# Patient Record
Sex: FEMALE | Race: BLACK OR AFRICAN AMERICAN | NOT HISPANIC OR LATINO | Employment: OTHER | URBAN - METROPOLITAN AREA
[De-identification: names, ages, dates, MRNs, and addresses within clinical notes are randomized per-mention and may not be internally consistent; named-entity substitution may affect disease eponyms.]

---

## 2017-01-24 ENCOUNTER — OFFICE VISIT (OUTPATIENT)
Dept: INTERNAL MEDICINE | Facility: CLINIC | Age: 47
End: 2017-01-24
Payer: MEDICARE

## 2017-01-24 ENCOUNTER — OFFICE VISIT (OUTPATIENT)
Dept: OBSTETRICS AND GYNECOLOGY | Facility: CLINIC | Age: 47
End: 2017-01-24
Attending: OBSTETRICS & GYNECOLOGY
Payer: MEDICARE

## 2017-01-24 ENCOUNTER — TELEPHONE (OUTPATIENT)
Dept: OBSTETRICS AND GYNECOLOGY | Facility: CLINIC | Age: 47
End: 2017-01-24

## 2017-01-24 VITALS
HEIGHT: 67 IN | BODY MASS INDEX: 41.91 KG/M2 | DIASTOLIC BLOOD PRESSURE: 84 MMHG | SYSTOLIC BLOOD PRESSURE: 122 MMHG | WEIGHT: 267 LBS

## 2017-01-24 VITALS
HEIGHT: 67 IN | BODY MASS INDEX: 43.7 KG/M2 | SYSTOLIC BLOOD PRESSURE: 116 MMHG | HEART RATE: 92 BPM | WEIGHT: 278.44 LBS | DIASTOLIC BLOOD PRESSURE: 88 MMHG

## 2017-01-24 DIAGNOSIS — M79.7 FIBROMYALGIA: Primary | ICD-10-CM

## 2017-01-24 DIAGNOSIS — F31.9 BIPOLAR 1 DISORDER: ICD-10-CM

## 2017-01-24 DIAGNOSIS — R39.89 SUSPECTED UTI: Primary | ICD-10-CM

## 2017-01-24 DIAGNOSIS — Z90.710 HISTORY OF HYSTERECTOMY: ICD-10-CM

## 2017-01-24 DIAGNOSIS — G89.4 CHRONIC PAIN SYNDROME: ICD-10-CM

## 2017-01-24 DIAGNOSIS — F41.9 ANXIETY: ICD-10-CM

## 2017-01-24 DIAGNOSIS — N39.0 URINARY TRACT INFECTION WITHOUT HEMATURIA, SITE UNSPECIFIED: Primary | ICD-10-CM

## 2017-01-24 DIAGNOSIS — R10.2 PELVIC PAIN: ICD-10-CM

## 2017-01-24 DIAGNOSIS — M35.00 SJOGREN'S SYNDROME: ICD-10-CM

## 2017-01-24 DIAGNOSIS — I10 ESSENTIAL HYPERTENSION: Chronic | ICD-10-CM

## 2017-01-24 LAB
BACTERIA #/AREA URNS AUTO: ABNORMAL /HPF
BILIRUB UR QL STRIP: NEGATIVE
CANDIDA RRNA VAG QL PROBE: POSITIVE
CLARITY UR REFRACT.AUTO: ABNORMAL
COLOR UR AUTO: YELLOW
G VAGINALIS RRNA GENITAL QL PROBE: NEGATIVE
GLUCOSE UR QL STRIP: NEGATIVE
HGB UR QL STRIP: NEGATIVE
HYALINE CASTS UR QL AUTO: 1 /LPF
KETONES UR QL STRIP: NEGATIVE
LEUKOCYTE ESTERASE UR QL STRIP: ABNORMAL
MICROSCOPIC COMMENT: ABNORMAL
NITRITE UR QL STRIP: POSITIVE
PH UR STRIP: 6 [PH] (ref 5–8)
PROT UR QL STRIP: ABNORMAL
RBC #/AREA URNS AUTO: 1 /HPF (ref 0–4)
SP GR UR STRIP: 1.01 (ref 1–1.03)
SQUAMOUS #/AREA URNS AUTO: 8 /HPF
T VAGINALIS RRNA GENITAL QL PROBE: NEGATIVE
URN SPEC COLLECT METH UR: ABNORMAL
UROBILINOGEN UR STRIP-ACNC: NEGATIVE EU/DL
WBC #/AREA URNS AUTO: 16 /HPF (ref 0–5)

## 2017-01-24 PROCEDURE — 87186 SC STD MICRODIL/AGAR DIL: CPT

## 2017-01-24 PROCEDURE — 87077 CULTURE AEROBIC IDENTIFY: CPT

## 2017-01-24 PROCEDURE — 99214 OFFICE O/P EST MOD 30 MIN: CPT | Mod: S$PBB,,, | Performed by: INTERNAL MEDICINE

## 2017-01-24 PROCEDURE — 99999 PR PBB SHADOW E&M-EST. PATIENT-LVL III: CPT | Mod: PBBFAC,,, | Performed by: INTERNAL MEDICINE

## 2017-01-24 PROCEDURE — 99213 OFFICE O/P EST LOW 20 MIN: CPT | Mod: PBBFAC,27 | Performed by: NURSE PRACTITIONER

## 2017-01-24 PROCEDURE — 87088 URINE BACTERIA CULTURE: CPT

## 2017-01-24 PROCEDURE — 87086 URINE CULTURE/COLONY COUNT: CPT

## 2017-01-24 PROCEDURE — 81001 URINALYSIS AUTO W/SCOPE: CPT

## 2017-01-24 PROCEDURE — 99999 PR PBB SHADOW E&M-EST. PATIENT-LVL III: CPT | Mod: PBBFAC,,, | Performed by: NURSE PRACTITIONER

## 2017-01-24 PROCEDURE — 87480 CANDIDA DNA DIR PROBE: CPT

## 2017-01-24 PROCEDURE — 99213 OFFICE O/P EST LOW 20 MIN: CPT | Mod: S$PBB,,, | Performed by: NURSE PRACTITIONER

## 2017-01-24 PROCEDURE — 87591 N.GONORRHOEAE DNA AMP PROB: CPT

## 2017-01-24 RX ORDER — HYDROCODONE BITARTRATE AND ACETAMINOPHEN 5; 325 MG/1; MG/1
1 TABLET ORAL 2 TIMES DAILY PRN
Qty: 60 TABLET | Refills: 0 | Status: SHIPPED | OUTPATIENT
Start: 2017-03-25 | End: 2017-04-18 | Stop reason: SDUPTHER

## 2017-01-24 RX ORDER — HYDROCODONE BITARTRATE AND ACETAMINOPHEN 5; 325 MG/1; MG/1
1 TABLET ORAL 2 TIMES DAILY PRN
Qty: 60 TABLET | Refills: 0 | Status: SHIPPED | OUTPATIENT
Start: 2017-01-24 | End: 2017-01-24 | Stop reason: SDUPTHER

## 2017-01-24 RX ORDER — NAPROXEN 500 MG/1
TABLET ORAL
Refills: 2 | COMMUNITY
Start: 2016-12-27 | End: 2017-03-17 | Stop reason: SDUPTHER

## 2017-01-24 RX ORDER — HYDROCODONE BITARTRATE AND ACETAMINOPHEN 5; 325 MG/1; MG/1
1 TABLET ORAL 2 TIMES DAILY PRN
Qty: 60 TABLET | Refills: 0 | Status: SHIPPED | OUTPATIENT
Start: 2017-02-23 | End: 2017-01-24 | Stop reason: SDUPTHER

## 2017-01-24 RX ORDER — CIPROFLOXACIN 500 MG/1
500 TABLET ORAL 2 TIMES DAILY
Qty: 14 TABLET | Refills: 0 | Status: SHIPPED | OUTPATIENT
Start: 2017-01-24 | End: 2017-01-31

## 2017-01-24 RX ORDER — DESONIDE 0.5 MG/G
CREAM TOPICAL
Qty: 30 G | Refills: 3 | Status: SHIPPED | OUTPATIENT
Start: 2017-01-24 | End: 2017-04-18 | Stop reason: SDUPTHER

## 2017-01-24 NOTE — TELEPHONE ENCOUNTER
Spoke to patient and informed patient of results. Informed patient medication was sent to the pharmacy.

## 2017-01-24 NOTE — TELEPHONE ENCOUNTER
----- Message from Crystal Fong NP sent at 1/24/2017  3:18 PM CST -----  Regarding: test results  Please notify pt that her urinalysis results indicate a UTI. I sent in Cipro-take 1 pill twice a day x 7 days. The urine culture is still processing and may cause us to change the antibiotic based on sensitivity.     Thanks  Crystal

## 2017-01-24 NOTE — PROGRESS NOTES
"  CC: Suspected UTI    HPI: Pt is a 46 y.o.  female who presents for a suspected UTI. Pt was dx with a UTI about a month ago by her PCP. Treated with macrobid. C/o urinary frequency and a "soreness" for several days now. Pt wants to make sure the UTI is gone.  Hx of hysterectomy in 2012 for fibroids. Stills has ovaries. Denies fever, chills, hematuria, odor, urgency and dysuria. No other complaints today.     ROS:  GENERAL: Feeling well overall. Denies fever or chills.   SKIN: Denies rash or lesions.   HEAD: Denies head injury or headache.   NODES: Denies enlarged lymph nodes.   CHEST: Denies chest pain or shortness of breath.   CARDIOVASCULAR: Denies palpitations or left sided chest pain.   ABDOMEN: No abdominal pain, constipation, diarrhea, nausea, vomiting or rectal bleeding. + soreness in pelvic region on right side.   URINARY: No dysuria, hematuria, or burning on urination.  REPRODUCTIVE: See HPI.   BREASTS: Denies pain, lumps, or nipple discharge.   HEMATOLOGIC: No easy bruisability or excessive bleeding.   MUSCULOSKELETAL: Denies joint pain or swelling.   NEUROLOGIC: Denies syncope or weakness.   PSYCHIATRIC: Denies depression, anxiety or mood swings.    PE:   APPEARANCE: Well nourished, well developed, Black or  female in no acute distress.  VULVA: No lesions. Normal external female genitalia.  URETHRAL MEATUS: Normal size and location, no lesions, no prolapse.  URETHRA: No masses, tenderness, or prolapse.  VAGINA: Moist. No lesions or lacerations noted. No abnormal discharge present. No odor present.   CERVIX: surgically absent  UTERUS:  Surgically absent  ADNEXA: No tenderness. No fullness or masses palpated in the adnexal regions.   ANUS PERINEUM: Normal.      Diagnosis:  1. Suspected UTI    2. Pelvic pain    3. History of hysterectomy        Plan:     Orders Placed This Encounter    Urine culture    Vaginosis Screen by DNA Probe    C. trachomatis/N. gonorrhoeae by AMP DNA Cervix    " "Urinalysis     Urine dip + nitrates  UA C&S pending  Discussed UTI prevention and OTC Azo   Affirm and GCCT pending  Discussed pelvic ultrasound if "soreness" continues despite UTI tx and/or negative results today. Pt agreed.     Follow-up pending results and/or pelvic pain does not improve        "

## 2017-01-24 NOTE — PROGRESS NOTES
Subjective:       Patient ID: Jaki Bajwa is a 46 y.o. female.    Chief Complaint: Hypertension    HPI Comments: Pt here for f/u. She has a dx of sjogrens and fibromyalgia based on blood work and chronic pain that is primarily across shoulders and in legs but also in upper and lower back. As a result she is on several meds that she says helps. She sees rheum at \[facility]\"" and has regular f/u. She has prior dx of lupus but this was not corroborated in notes from rheum. She is on plaquenil, tizanidine, gabapentin and norco. She uses norco 1-2x/day. She is due for refills on pain meds. She has previously signed a pain contract. [place]  reviewed and is consistent. She is seeing pain mgmt. She has also done PT with some success in the past.     She also has dx of sjogren's syndrome. She was on pilocarpine but stopped b/c it made her nauseous. Uses eye drops for dry eyes.    Pt's BP is well controlled. Tolerating meds well. Pt denies cp/sob/ha/vision or neuro changes. Not checking at home. Prior labs reviewed with pt.     She continues to see psychiatry for bipolar and anxiety. This is well controlled. No SI/HI.       Hypertension   Pertinent negatives include no chest pain, headaches or shortness of breath.   Medication Refill   Associated symptoms include arthralgias and myalgias. Pertinent negatives include no abdominal pain, chest pain, headaches, numbness or weakness.   Fibromyalgia   Associated symptoms include arthralgias and myalgias. Pertinent negatives include no abdominal pain, chest pain, headaches, numbness or weakness.   Back Pain   Pertinent negatives include no abdominal pain, chest pain, headaches, numbness or weakness.     Review of Systems   Constitutional: Negative for unexpected weight change.   Eyes: Negative for visual disturbance.   Respiratory: Negative for shortness of breath.    Cardiovascular: Negative for chest pain.   Gastrointestinal: Negative for abdominal pain.   Endocrine: Negative for  polyuria.   Genitourinary: Negative for frequency.   Musculoskeletal: Positive for arthralgias, back pain and myalgias.   Neurological: Negative for weakness, numbness and headaches.   Psychiatric/Behavioral: Negative for dysphoric mood.       Objective:          Assessment:       1. Fibromyalgia    2. Sjogren's syndrome    3. Essential hypertension    4. Chronic pain syndrome    5. Bipolar 1 disorder    6. Anxiety        Plan:       1. Refill norco--3 separate 30 day rxs given for 90 days total--proper use d/w pt  2. Keep f/u with specialists  3. Refill requested meds  4. F/u 3 mos          Physical Exam   Constitutional: She is oriented to person, place, and time. She appears well-developed and well-nourished.   Neck: Neck supple. No thyromegaly present.   Cardiovascular: Normal rate, regular rhythm and normal heart sounds.    Pulmonary/Chest: Effort normal and breath sounds normal.   Musculoskeletal: She exhibits no edema.        Lumbar back: She exhibits normal range of motion, no tenderness and no bony tenderness.   Lymphadenopathy:     She has no cervical adenopathy.   Neurological: She is alert and oriented to person, place, and time. No cranial nerve deficit.   Psychiatric: She has a normal mood and affect. Her behavior is normal.

## 2017-01-24 NOTE — MR AVS SNAPSHOT
[facility] [address]  [address]  [address]  [facility] [address]  Phone: [phone]  Fax: [phone]                  Jaki Bajwa   2017 8:40 AM   Office Visit    Description:  Female : 1970   Provider:  Crystal Fong NP   Department:  [facility] [address]           Reason for Visit     Pelvic Pain     Urinary Tract Infection                To Do List           Future Appointments        Provider Department Dept Phone    2017 8:40 AM Crystal Fong NP [facility] [address] [phone]    2017 7:00 AM Pratik Rasmussen MD [facility] Internal Medicine [phone]      Goals (5 Years of Data)     None      [facility] On [facility]     [facility] On Call Nurse Care Line -  Assistance  Registered nurses in the [facility] On Call Center provide clinical advisement, health education, appointment booking, and other advisory services.  Call for this free service at [phone].             Medications           Message regarding Medications     Verify the changes and/or additions to your medication regime listed below are the same as discussed with your clinician today.  If any of these changes or additions are incorrect, please notify your healthcare provider.             Verify that the below list of medications is an accurate representation of the medications you are currently taking.  If none reported, the list may be blank. If incorrect, please contact your healthcare provider. Carry this list with you in case of emergency.           Current Medications     alprazolam (XANAX) 1 MG tablet Take 1 tablet (1 mg total) by mouth 2 (two) times daily as needed for Anxiety.    buPROPion (WELLBUTRIN XL) 150 MG TB24 tablet Take 3 tablets (450 mg total) by mouth every morning.    desonide (DESOWEN) 0.05 % cream MILTON EXT AA BID PRN    doxepin (SINEQUAN) 150 MG Cap Take 1 capsule (150 mg total) by mouth every evening.    estradiol (CLIMARA) 0.0375 mg/24 hr APPLY 1  "PATCH EXTERNALLY TO THE SKIN EVERY 7 DAYS    gabapentin (NEURONTIN) 300 MG capsule Take 600 mg by mouth 2 (two) times daily.     hydrocodone-acetaminophen 5-325mg (NORCO) 5-325 mg per tablet Starting on Mar 25, 2017. Take 1 tablet by mouth 2 (two) times daily as needed for Pain.    hydroxychloroquine (PLAQUENIL) 200 mg tablet Take 200 mg by mouth Twice daily.    losartan-hydrochlorothiazide 100-12.5 mg (HYZAAR) 100-12.5 mg Tab Take 1 tablet by mouth once daily.    lurasidone (LATUDA) 60 mg Tab tablet Take 1 tablet (60 mg total) by mouth once daily.    naproxen (EC NAPROSYN) 500 MG EC tablet Take 1 tablet (500 mg total) by mouth 2 (two) times daily as needed.    RESTASIS 0.05 % ophthalmic emulsion PLACE ONE DROP INTO BOTH EYES BID    tizanidine (ZANAFLEX) 4 MG tablet Take 1 tablet (4 mg total) by mouth every 8 (eight) hours as needed (muscle pain).    naproxen (NAPROSYN) 500 MG tablet TK ONE T PO BID FOR PAIN. TAKE WITH FOOD           Clinical Reference Information           Vital Signs - Last Recorded  Most recent update: 1/24/2017  8:21 AM by Shirley Myles LPN    BP Ht Wt LMP BMI    122/84 5' 7" (1.702 m) 121.1 kg (266 lb 15.6 oz) 07/11/2012 41.81 kg/m2      Blood Pressure          Most Recent Value    BP  122/84      Allergies as of 1/24/2017     Aspirin      Immunizations Administered on Date of Encounter - 1/24/2017     None      "

## 2017-01-25 ENCOUNTER — TELEPHONE (OUTPATIENT)
Dept: OBSTETRICS AND GYNECOLOGY | Facility: CLINIC | Age: 47
End: 2017-01-25

## 2017-01-25 DIAGNOSIS — B37.31 YEAST VAGINITIS: Primary | ICD-10-CM

## 2017-01-25 LAB
C TRACH DNA SPEC QL NAA+PROBE: NEGATIVE
N GONORRHOEA DNA SPEC QL NAA+PROBE: NEGATIVE

## 2017-01-25 RX ORDER — LOSARTAN POTASSIUM AND HYDROCHLOROTHIAZIDE 12.5; 1 MG/1; MG/1
1 TABLET ORAL DAILY
Qty: 90 TABLET | Refills: 3 | Status: SHIPPED | OUTPATIENT
Start: 2017-01-25 | End: 2018-01-12 | Stop reason: SDUPTHER

## 2017-01-25 RX ORDER — FLUCONAZOLE 150 MG/1
150 TABLET ORAL ONCE
Qty: 1 TABLET | Refills: 0 | Status: SHIPPED | OUTPATIENT
Start: 2017-01-25 | End: 2017-01-25

## 2017-01-25 NOTE — TELEPHONE ENCOUNTER
Called pt to inform her that her vaginosis culture came back positive for yeast and that an Rx has been sent to her pharmacy.  Pt verbalized understanding.

## 2017-01-25 NOTE — TELEPHONE ENCOUNTER
----- Message from Crystal Fong NP sent at 1/25/2017  8:52 AM CST -----  Regarding: rx to pharmacy  + yeast infection. Diflucan sent to pharmacy.

## 2017-01-26 LAB — BACTERIA UR CULT: NORMAL

## 2017-02-14 ENCOUNTER — OFFICE VISIT (OUTPATIENT)
Dept: PSYCHIATRY | Facility: CLINIC | Age: 47
End: 2017-02-14
Payer: MEDICARE

## 2017-02-14 VITALS
SYSTOLIC BLOOD PRESSURE: 158 MMHG | HEART RATE: 86 BPM | BODY MASS INDEX: 44.42 KG/M2 | HEIGHT: 67 IN | WEIGHT: 283 LBS | DIASTOLIC BLOOD PRESSURE: 90 MMHG

## 2017-02-14 DIAGNOSIS — F40.10 SOCIAL PHOBIA: ICD-10-CM

## 2017-02-14 DIAGNOSIS — F41.1 GAD (GENERALIZED ANXIETY DISORDER): ICD-10-CM

## 2017-02-14 DIAGNOSIS — F33.1 MAJOR DEPRESSIVE DISORDER, RECURRENT, MODERATE: Primary | ICD-10-CM

## 2017-02-14 DIAGNOSIS — F42.9 OBSESSIVE-COMPULSIVE DISORDER, UNSPECIFIED TYPE: ICD-10-CM

## 2017-02-14 PROCEDURE — 90834 PSYTX W PT 45 MINUTES: CPT | Mod: PBBFAC | Performed by: SOCIAL WORKER

## 2017-02-14 PROCEDURE — 90833 PSYTX W PT W E/M 30 MIN: CPT | Mod: S$PBB,,, | Performed by: NURSE PRACTITIONER

## 2017-02-14 PROCEDURE — 99213 OFFICE O/P EST LOW 20 MIN: CPT | Mod: S$PBB,,, | Performed by: NURSE PRACTITIONER

## 2017-02-14 PROCEDURE — 99999 PR PBB SHADOW E&M-EST. PATIENT-LVL III: CPT | Mod: PBBFAC,,, | Performed by: NURSE PRACTITIONER

## 2017-02-14 PROCEDURE — 90834 PSYTX W PT 45 MINUTES: CPT | Mod: S$PBB,,, | Performed by: SOCIAL WORKER

## 2017-02-14 RX ORDER — DOXEPIN HYDROCHLORIDE 150 MG/1
150 CAPSULE ORAL NIGHTLY
Qty: 180 CAPSULE | Refills: 3 | Status: SHIPPED | OUTPATIENT
Start: 2017-02-14 | End: 2018-01-10 | Stop reason: SDUPTHER

## 2017-02-14 RX ORDER — BUPROPION HYDROCHLORIDE 150 MG/1
450 TABLET ORAL EVERY MORNING
Qty: 270 TABLET | Refills: 3 | Status: SHIPPED | OUTPATIENT
Start: 2017-02-14 | End: 2018-01-10 | Stop reason: SDUPTHER

## 2017-02-14 NOTE — PROGRESS NOTES
"Individual Psychotherapy (PhD/LCSW)    2/14/2017    Site:  [facility]         Therapeutic Intervention: Met with patient.  Outpatient - Insight oriented psychotherapy 45 min - CPT code 37671 and Outpatient - Behavior modifying psychotherapy 45 min - CPT code 70876    Chief complaint/reason for encounter: depression, anxiety and interpersonal     Interval history and content of current session:        Describes OCD behavior like having to write things down and tear it apart until it looks perfect.  She continues to be unable not to shop.     Has a stack of blue jeans  Feet high.   She is still with boyfriend.  Does not see the yolanda from [place]. But is "talking to another yolanda".   No suicidal ideation.   Arguments in relation as boyfriend wants her to cook.     Treatment plan:  · Target symptoms: depression, anxiety   · Why chosen therapy is appropriate versus another modality: relevant to diagnosis  · Outcome monitoring methods: self-report, observation    · Therapeutic intervention type: insight oriented psychotherapy, behavior modifying psychotherapy, supportive psychotherapy    Risk parameters:  Patient reports no suicidal ideation  Patient reports no homicidal ideation  Patient reports no self-injurious behavior  Patient reports no violent behavior    Verbal deficits: None    Patient's response to intervention:  The patient's response to intervention is accepting.    Progress toward goals and other mental status changes:  The patient's progress toward goals is limited.    Diagnosis: 296.35;   Obsessive compulsive disorder.  Cognitive limitations.      Plan:  individual psychotherapy    Return to clinic: 2 weeks pt prefers to come in 3 months.                                 "

## 2017-02-14 NOTE — MR AVS SNAPSHOT
[facility] - Psychiatry  [address]  [facility] [address]  Phone: [phone]  Fax: [phone]                  Jaki Bajwa   2017 8:00 AM   Office Visit    Description:  Female : 1970   Provider:  Ayush Claire NP   Department:  [facility] - Psychiatry           Reason for Visit     Mood           Diagnoses this Visit        Comments    Major depressive disorder, recurrent, moderate    -  Primary     JUAN JOSE (generalized anxiety disorder)         Social phobia                To Do List           Future Appointments        Provider Department Dept Phone    2017 7:00 AM Pratik Rasmussen MD [facility] Internal Medicine [phone]      Goals (5 Years of Data)     None      Follow-Up and Disposition     Return in about 3 months (around 2017).       These Medications        Disp Refills Start End    buPROPion (WELLBUTRIN XL) 150 MG TB24 tablet 270 tablet 3 2017     Take 3 tablets (450 mg total) by mouth every morning. - Oral    Pharmacy: [facility] [address] [place] [address] [address] AT [facility] [place] [place] Ph #: [phone]       doxepin (SINEQUAN) 150 MG Cap 180 capsule 3 2017     Take 1 capsule (150 mg total) by mouth every evening. - Oral    Pharmacy: [facility] [place] [address] AT [facility] [place] Ph #: [phone]         [facility] On [facility]     [facility] On Call Nurse Care Line -  Assistance  Registered nurses in the [facility] On Call Center provide clinical advisement, health education, appointment booking, and other advisory services.  Call for this free service at [phone].             Medications           Message regarding Medications     Verify the changes and/or additions to your medication regime listed below are the same as discussed with your clinician today.  If any of these changes or additions are incorrect, please notify your healthcare  "provider.             Verify that the below list of medications is an accurate representation of the medications you are currently taking.  If none reported, the list may be blank. If incorrect, please contact your healthcare provider. Carry this list with you in case of emergency.           Current Medications     alprazolam (XANAX) 1 MG tablet Take 1 tablet (1 mg total) by mouth 2 (two) times daily as needed for Anxiety.    buPROPion (WELLBUTRIN XL) 150 MG TB24 tablet Take 3 tablets (450 mg total) by mouth every morning.    desonide (DESOWEN) 0.05 % cream MILTON EXT AA BID PRN    doxepin (SINEQUAN) 150 MG Cap Take 1 capsule (150 mg total) by mouth every evening.    estradiol (CLIMARA) 0.0375 mg/24 hr APPLY 1 PATCH EXTERNALLY TO THE SKIN EVERY 7 DAYS    gabapentin (NEURONTIN) 300 MG capsule Take 600 mg by mouth 2 (two) times daily.     hydrocodone-acetaminophen 5-325mg (NORCO) 5-325 mg per tablet Starting on Mar 25, 2017. Take 1 tablet by mouth 2 (two) times daily as needed for Pain.    hydroxychloroquine (PLAQUENIL) 200 mg tablet Take 200 mg by mouth Twice daily.    losartan-hydrochlorothiazide 100-12.5 mg (HYZAAR) 100-12.5 mg Tab Take 1 tablet by mouth once daily.    lurasidone (LATUDA) 60 mg Tab tablet Take 1 tablet (60 mg total) by mouth once daily.    naproxen (EC NAPROSYN) 500 MG EC tablet Take 1 tablet (500 mg total) by mouth 2 (two) times daily as needed.    naproxen (NAPROSYN) 500 MG tablet TK ONE T PO BID FOR PAIN. TAKE WITH FOOD    RESTASIS 0.05 % ophthalmic emulsion PLACE ONE DROP INTO BOTH EYES BID    tizanidine (ZANAFLEX) 4 MG tablet Take 1 tablet (4 mg total) by mouth every 8 (eight) hours as needed (muscle pain).           Clinical Reference Information           Your Vitals Were     BP Pulse Height Weight Last Period BMI    158/90 86 5' 7" (1.702 m) 128.4 kg (283 lb) 07/11/2012 44.32 kg/m2      Blood Pressure          Most Recent Value    BP  (!)  158/90      Allergies as of 2/14/2017     Aspirin    "   Immunizations Administered on Date of Encounter - 2/14/2017     None      Instructions    [facility] - DEPARTMENT OF PSYCHIATRY   PATIENT INFORMATION    We appreciate the opportunity to participate in your medical care and hope the following protocols will make it easier for you to receive quality treatment in our department.    1. PUNCTUALITY: Your appointment is scheduled for a fixed amount of time.  To get the benefit of your appointment, please arrive early enough to allow time for traffic, parking and registration.  If you are late for your appointment, you may have to reschedule.  Please make every effort to be on time.      2. PAYMENT FOR SERVICES:   Payments are expected at the time of service.  Please contact [phone] if you need to resolve issues involving your account at [facility] or to set up a payment plan.    3. CANCELLATION / MISSED APPOINTMENTS:   In order to receive quality care, all appointments must be kept.  Appointment may be cancelled at least 24 hours before your appointment time. If you do not give at least 24-hour notice of cancellation a fee may be billed directly to the patient.  Please note that insurance does not cover no-show charges, so you will be billed directly.  If you are consistently late, cancel, or do not show for your appointments, our department reserves the right to terminate treatment     MESSAGES- In general you can reach the department by calling [phone], between 8:00 a.m. and 5:00 p.m., Monday through Friday.  You can also use the [facility] Patient Portal.     AFTER HOURS, WEEKEND OR HOLIDAYS- For urgent questions after hours, weekends and holidays, calling the department number [phone] will connect you with a representative.  EMERGENCY-  In case of a crisis when there is a concern of harm to self or others, call 911 or the office [phone] between 8:00 a.m. and 5:00 p.m., Monday through Friday or go to the [facility] Emergency  Room.    4. PRESCRIPTION REFILLS:  Prescription refills must be done at your physician office visit, You will be given a sufficient number of refills to last until your next appointment, you must come to appointments for prescriptions.   No additional refills will be approved beyond the original treatment plan. Again, please note that no additional prescriptions will be approved per patient request.     5. FOLLOW UP APPOINTMENTS:  Follow-up appointments can be made in person at the Psychiatry Appointment Desk, online through the [facility] Patient Portal, or by calling [phone], from 8am to 5pm, between Monday and Friday.  Patients are responsible for scheduling their own follow-up appointment,  It  Is recommended you schedule your appointment before leaving the clinic.    6. Providers are NOT ABLE to schedule appointments; all appointments must be made through the appointment department or through [facility] Patient Portal.           PATIENTS MAY EXPERIENCE SYMPTOMS OF WITHDRAWAL IF THEY RUN OUT OF MEDICATIONS.  THE PATIENT WILL ASSUME ALL RESPONSIBILITIES OF ANY OUTCOMES WHEN THERE IS AN ISSUE WITH NONCOMPLIANCE WITH FOLLOW-UP APPOINTMENTS AND MEDICATIONS.        THERE IS TO BE NO USE OF ALCOHOL AND/OR ILLEGAL SUBSTANCES    PATIENT ARE TO GO TO THE CLOSEST EMERGENCY ROOM IF FEELING A THREAT TO THEMSELVES OR OTHER OR IF GRAVELY DISABLED    TELL US HOW WE ARE DOING.  PLEASE COMPLETE THE PATIENT SATISFACTION SURVERY  Revised December 2016         Language Assistance Services     ATTENTION: Language assistance services are available, free of charge. Please call [phone].      ATENCIÓN: Si habla español, tiene a quiroz disposición servicios gratuitos de asistencia lingüística. Llame al [phone].     JASMIN Ý: N?u b?n nói Ti?ng Vi?t, có các d?ch v? h? tr? ngôn ng? mi?n phí dành cho b?n. G?i s? [phone].         [facility] complies with applicable Federal civil rights laws and does not  discriminate on the basis of race, color, national origin, age, disability, or sex.

## 2017-02-14 NOTE — PATIENT INSTRUCTIONS
[facility] - DEPARTMENT OF PSYCHIATRY   PATIENT INFORMATION    We appreciate the opportunity to participate in your medical care and hope the following protocols will make it easier for you to receive quality treatment in our department.    1. PUNCTUALITY: Your appointment is scheduled for a fixed amount of time.  To get the benefit of your appointment, please arrive early enough to allow time for traffic, parking and registration.  If you are late for your appointment, you may have to reschedule.  Please make every effort to be on time.      2. PAYMENT FOR SERVICES:   Payments are expected at the time of service.  Please contact [phone] if you need to resolve issues involving your account at [facility] or to set up a payment plan.    3. CANCELLATION / MISSED APPOINTMENTS:   In order to receive quality care, all appointments must be kept.  Appointment may be cancelled at least 24 hours before your appointment time. If you do not give at least 24-hour notice of cancellation a fee may be billed directly to the patient.  Please note that insurance does not cover no-show charges, so you will be billed directly.  If you are consistently late, cancel, or do not show for your appointments, our department reserves the right to terminate treatment     MESSAGES- In general you can reach the department by calling [phone], between 8:00 a.m. and 5:00 p.m., Monday through Friday.  You can also use the [facility] Patient Portal.     AFTER HOURS, WEEKEND OR HOLIDAYS- For urgent questions after hours, weekends and holidays, calling the department number [phone] will connect you with a representative.  EMERGENCY-  In case of a crisis when there is a concern of harm to self or others, call 911 or the office [phone] between 8:00 a.m. and 5:00 p.m., Monday through Friday or go to the [facility] Emergency Room.    4. PRESCRIPTION REFILLS:  Prescription refills must be done at your physician office visit, You  will be given a sufficient number of refills to last until your next appointment, you must come to appointments for prescriptions.   No additional refills will be approved beyond the original treatment plan. Again, please note that no additional prescriptions will be approved per patient request.     5. FOLLOW UP APPOINTMENTS:  Follow-up appointments can be made in person at the Psychiatry Appointment Desk, online through the [facility] Patient Portal, or by calling [phone], from 8am to 5pm, between Monday and Friday.  Patients are responsible for scheduling their own follow-up appointment,  It  Is recommended you schedule your appointment before leaving the clinic.    6. Providers are NOT ABLE to schedule appointments; all appointments must be made through the appointment department or through [facility].           PATIENTS MAY EXPERIENCE SYMPTOMS OF WITHDRAWAL IF THEY RUN OUT OF MEDICATIONS.  THE PATIENT WILL ASSUME ALL RESPONSIBILITIES OF ANY OUTCOMES WHEN THERE IS AN ISSUE WITH NONCOMPLIANCE WITH FOLLOW-UP APPOINTMENTS AND MEDICATIONS.        THERE IS TO BE NO USE OF ALCOHOL AND/OR ILLEGAL SUBSTANCES    PATIENT ARE TO GO TO THE CLOSEST EMERGENCY ROOM IF FEELING A THREAT TO THEMSELVES OR OTHER OR IF GRAVELY DISABLED    TELL US HOW WE ARE DOING.  PLEASE COMPLETE THE PATIENT SATISFACTION SURVERY  Revised December 2016

## 2017-02-14 NOTE — PROGRESS NOTES
"Outpatient Psychiatry Follow-Up Visit (MD/NP)    2/14/2017    Clinical Status of Patient:  Outpatient (Ambulatory)    Chief Complaint:  Jaki Bajwa is a 46 y.o. female who presents today for follow-up of depression and anxiety.  Met with patient.      Interval History and Content of Current Session:  Interim Events/Subjective Report/Content of Current Session:     Chart reviewed,  reviewed    Latuda 60mg po q d  Wellbutrin XL 450mg po q d  Doxepin 150mg po q hs  Xanax 1mg po bid prn    Still in difficult relationship, becomes very emotional when BF appears distance.  Mood improved with increase in Latuda but remains dysthymic.  When stressed patient will write and write random lists, which is a stress relief. Thinks others "look at me different when I tell them I get a check". Discussed the need for limits when meeting new people and not over disclosing.  Admits she talks too much and cuts others off during a conversation, "I got a big mouth", states her children don't discuss situations with her because she tells others.  "I just want to be liked".  Patient sits at home watching TV.        Psychotherapy:  · Target symptoms: depression, anxiety   · Why chosen therapy is appropriate versus another modality: relevant to diagnosis  · Outcome monitoring methods: self-report  · Therapeutic intervention type: insight oriented psychotherapy  · Topics discussed/themes: relationships difficulties, symptom recognition  · The patient's response to the intervention is accepting. The patient's progress toward treatment goals is poor.   · Duration of intervention: 16 minutes.    Review of Systems   GENERAL: + weight gain  SKIN: No rashes   HEAD: No headaches   EYES: No exophthalmos, jaundice or blindness   EARS: No dizziness, tinnitus or hearing loss   NOSE: No changes in smell   MOUTH & THROAT: No dyskinetic movements or obvious goiter   CHEST: No shortness of breath, hyperventilation or cough   CARDIOVASCULAR: No " "tachycardia or chest pain   ABDOMEN: No nausea, vomiting, pain, constipation or diarrhea   URINARY: No frequency, dysuria or sexual dysfunction   ENDOCRINE: No polydipsia, polyuria   MUSCULOSKELETAL: No pain or stiffness of the joints   NEUROLOGIC: No weakness, sensory changes, seizures, confusion, memory loss, tremor or other abnormal movements      Psychiatric Review Of Systems:  sleep: yes  appetite changes: yes  weight changes: yes  energy/anergy: yes  interest/pleasure/anhedonia: yes  somatic symptoms: no  libido: no  anxiety/panic: yes      Past Medical, Family and Social History: The patient's past medical, family and social history have been reviewed and updated as appropriate within the electronic medical record - see encounter notes.    Compliance: yes    Side effects: None    Risk Parameters:  Patient reports no suicidal ideation  Patient reports no homicidal ideation  Patient reports no self-injurious behavior  Patient reports no violent behavior    Exam (detailed: at least 9 elements; comprehensive: all 15 elements)   Constitutional  Vitals:  Most recent vital signs, dated less than 90 days prior to this appointment, were reviewed.   Vitals:    02/14/17 0744   BP: (!) 158/90   Pulse: 86   Weight: 128.4 kg (283 lb)   Height: 5' 7" (1.702 m)        General:  unremarkable, age appropriate, casually dressed, neatly groomed     Musculoskeletal  Muscle Strength/Tone:  no dyskinesia, no dystonia, no tremor, no tic   Gait & Station:  non-ataxic     Psychiatric  Speech:  no latency; no press   Mood & Affect:  dysthymic  blunted   Thought Process:  normal and logical   Associations:  intact   Thought Content:  normal, no suicidality, no homicidality, delusions, or paranoia   Insight:  has awareness of illness   Judgement: behavior is adequate to circumstances   Orientation:  grossly intact   Memory: intact for content of interview   Language: grossly intact   Attention Span & Concentration:  able to focus   Fund " of Knowledge:  intact and appropriate to age and level of education     Assessment and Diagnosis   Status/Progress: Based on the examination today, the patient's problem(s) is/are inadequately controlled.  New problems have not been presented today.   Co-morbidities and Lack of compliance are not complicating management of the primary condition.  There are no active rule-out diagnoses for this patient at this time.     General Impression:       ICD-10-CM ICD-9-CM   1. Major depressive disorder, recurrent, moderate F33.1 296.32   2. JUAN JOSE (generalized anxiety disorder) F41.1 300.02   3. Social phobia F40.10 300.23     Cluster B Traits    Intervention/Counseling/Treatment Plan   · Medication Management: Continue current medications. The risks and benefits of medication were discussed with the patient.   · Wellbutrin XL 450mg po q d  · Latuda 60mg po q d  · Doxepin 150mg po q hs  · Xanax 1mg po bid prn  · Continue with RAMIN Zamora LCSW      Return to Clinic: 3 months

## 2017-03-17 DIAGNOSIS — F41.1 GAD (GENERALIZED ANXIETY DISORDER): ICD-10-CM

## 2017-03-17 DIAGNOSIS — F40.10 SOCIAL PHOBIA: ICD-10-CM

## 2017-03-17 RX ORDER — ALPRAZOLAM 1 MG/1
TABLET ORAL
Qty: 60 TABLET | Refills: 0 | OUTPATIENT
Start: 2017-03-17

## 2017-03-17 RX ORDER — NAPROXEN 500 MG/1
TABLET ORAL
Qty: 60 TABLET | Refills: 5 | Status: SHIPPED | OUTPATIENT
Start: 2017-03-17 | End: 2017-09-13

## 2017-04-04 ENCOUNTER — PATIENT OUTREACH (OUTPATIENT)
Dept: ADMINISTRATIVE | Facility: HOSPITAL | Age: 47
End: 2017-04-04

## 2017-04-11 ENCOUNTER — TELEPHONE (OUTPATIENT)
Dept: INTERNAL MEDICINE | Facility: CLINIC | Age: 47
End: 2017-04-11

## 2017-04-11 NOTE — TELEPHONE ENCOUNTER
Spoke with pt advised that tdap can be done at 4/18/2017 visit, however mammogram not due until after 5/12/2017

## 2017-04-11 NOTE — TELEPHONE ENCOUNTER
----- Message from Dianna Ramos sent at 4/10/2017  3:25 PM CDT -----  Contact: Self  X  1st Request  _  2nd Request  _  3rd Request        Who: PENG MAYO [[id number]]    Why: Pt states she received a letter stating she needs an order for a mammogram and tetanus vaccine. Pt states she would like to have these done on 04/18 when she comes for her visit. Please call pt with any questions, thanks!    What Number to Call Back: [phone]    When to Expect a call back: (Before the end of the day)   -- if the call is after 12:00, the call back will be tomorrow.

## 2017-04-18 ENCOUNTER — TELEPHONE (OUTPATIENT)
Dept: INTERNAL MEDICINE | Facility: CLINIC | Age: 47
End: 2017-04-18

## 2017-04-18 ENCOUNTER — LAB VISIT (OUTPATIENT)
Dept: LAB | Facility: OTHER | Age: 47
End: 2017-04-18
Attending: INTERNAL MEDICINE
Payer: MEDICARE

## 2017-04-18 ENCOUNTER — OFFICE VISIT (OUTPATIENT)
Dept: OBSTETRICS AND GYNECOLOGY | Facility: CLINIC | Age: 47
End: 2017-04-18
Payer: MEDICARE

## 2017-04-18 ENCOUNTER — OFFICE VISIT (OUTPATIENT)
Dept: INTERNAL MEDICINE | Facility: CLINIC | Age: 47
End: 2017-04-18
Payer: MEDICARE

## 2017-04-18 VITALS
HEIGHT: 67 IN | DIASTOLIC BLOOD PRESSURE: 88 MMHG | HEART RATE: 94 BPM | WEIGHT: 273.56 LBS | SYSTOLIC BLOOD PRESSURE: 122 MMHG | BODY MASS INDEX: 42.93 KG/M2

## 2017-04-18 VITALS
HEIGHT: 67 IN | DIASTOLIC BLOOD PRESSURE: 82 MMHG | BODY MASS INDEX: 43.18 KG/M2 | SYSTOLIC BLOOD PRESSURE: 118 MMHG | WEIGHT: 275.13 LBS

## 2017-04-18 DIAGNOSIS — M79.7 FIBROMYALGIA: ICD-10-CM

## 2017-04-18 DIAGNOSIS — F31.9 BIPOLAR 1 DISORDER: ICD-10-CM

## 2017-04-18 DIAGNOSIS — Z13.220 ENCOUNTER FOR LIPID SCREENING FOR CARDIOVASCULAR DISEASE: ICD-10-CM

## 2017-04-18 DIAGNOSIS — Z12.31 ENCOUNTER FOR SCREENING MAMMOGRAM FOR BREAST CANCER: ICD-10-CM

## 2017-04-18 DIAGNOSIS — N39.0 FREQUENT UTI: ICD-10-CM

## 2017-04-18 DIAGNOSIS — R35.0 URINARY FREQUENCY: Primary | ICD-10-CM

## 2017-04-18 DIAGNOSIS — I10 ESSENTIAL HYPERTENSION: Chronic | ICD-10-CM

## 2017-04-18 DIAGNOSIS — G89.29 CHRONIC BILATERAL LOW BACK PAIN WITHOUT SCIATICA: ICD-10-CM

## 2017-04-18 DIAGNOSIS — F41.9 ANXIETY: ICD-10-CM

## 2017-04-18 DIAGNOSIS — Z13.6 ENCOUNTER FOR LIPID SCREENING FOR CARDIOVASCULAR DISEASE: ICD-10-CM

## 2017-04-18 DIAGNOSIS — I10 ESSENTIAL HYPERTENSION: Primary | Chronic | ICD-10-CM

## 2017-04-18 DIAGNOSIS — M54.50 CHRONIC BILATERAL LOW BACK PAIN WITHOUT SCIATICA: ICD-10-CM

## 2017-04-18 DIAGNOSIS — G89.4 CHRONIC PAIN SYNDROME: ICD-10-CM

## 2017-04-18 DIAGNOSIS — M35.01 SJOGREN'S SYNDROME WITH KERATOCONJUNCTIVITIS SICCA: ICD-10-CM

## 2017-04-18 DIAGNOSIS — N95.1 HOT FLASHES, MENOPAUSAL: ICD-10-CM

## 2017-04-18 LAB
ALBUMIN SERPL BCP-MCNC: 4.1 G/DL
ALP SERPL-CCNC: 68 U/L
ALT SERPL W/O P-5'-P-CCNC: 18 U/L
ANION GAP SERPL CALC-SCNC: 9 MMOL/L
AST SERPL-CCNC: 25 U/L
BASOPHILS # BLD AUTO: 0.02 K/UL
BASOPHILS NFR BLD: 0.3 %
BILIRUB SERPL-MCNC: 0.4 MG/DL
BILIRUB SERPL-MCNC: ABNORMAL MG/DL
BLOOD URINE, POC: ABNORMAL
BUN SERPL-MCNC: 15 MG/DL
CALCIUM SERPL-MCNC: 9.3 MG/DL
CHLORIDE SERPL-SCNC: 100 MMOL/L
CHOLEST/HDLC SERPL: 2.5 {RATIO}
CO2 SERPL-SCNC: 30 MMOL/L
COLOR, POC UA: ABNORMAL
CREAT SERPL-MCNC: 1 MG/DL
DIFFERENTIAL METHOD: ABNORMAL
EOSINOPHIL # BLD AUTO: 0.2 K/UL
EOSINOPHIL NFR BLD: 3 %
ERYTHROCYTE [DISTWIDTH] IN BLOOD BY AUTOMATED COUNT: 15 %
EST. GFR  (AFRICAN AMERICAN): >60 ML/MIN/1.73 M^2
EST. GFR  (NON AFRICAN AMERICAN): >60 ML/MIN/1.73 M^2
GLUCOSE SERPL-MCNC: 100 MG/DL
GLUCOSE UR QL STRIP: ABNORMAL
HCT VFR BLD AUTO: 39.3 %
HDL/CHOLESTEROL RATIO: 39.9 %
HDLC SERPL-MCNC: 163 MG/DL
HDLC SERPL-MCNC: 65 MG/DL
HGB BLD-MCNC: 11.9 G/DL
KETONES UR QL STRIP: ABNORMAL
LDLC SERPL CALC-MCNC: 74.8 MG/DL
LEUKOCYTE ESTERASE URINE, POC: ABNORMAL
LYMPHOCYTES # BLD AUTO: 2.5 K/UL
LYMPHOCYTES NFR BLD: 36.3 %
MCH RBC QN AUTO: 24.4 PG
MCHC RBC AUTO-ENTMCNC: 30.3 %
MCV RBC AUTO: 81 FL
MONOCYTES # BLD AUTO: 0.6 K/UL
MONOCYTES NFR BLD: 8.5 %
NEUTROPHILS # BLD AUTO: 3.6 K/UL
NEUTROPHILS NFR BLD: 51.9 %
NITRITE, POC UA: ABNORMAL
NONHDLC SERPL-MCNC: 98 MG/DL
PH, POC UA: ABNORMAL
PLATELET # BLD AUTO: 288 K/UL
PMV BLD AUTO: 11.6 FL
POTASSIUM SERPL-SCNC: 3.8 MMOL/L
PROT SERPL-MCNC: 8.3 G/DL
PROTEIN, POC: ABNORMAL
RBC # BLD AUTO: 4.88 M/UL
SODIUM SERPL-SCNC: 139 MMOL/L
SPECIFIC GRAVITY, POC UA: ABNORMAL
TRIGL SERPL-MCNC: 116 MG/DL
TSH SERPL DL<=0.005 MIU/L-ACNC: 2.26 UIU/ML
UROBILINOGEN, POC UA: ABNORMAL
WBC # BLD AUTO: 6.95 K/UL

## 2017-04-18 PROCEDURE — 99213 OFFICE O/P EST LOW 20 MIN: CPT | Mod: PBBFAC,27 | Performed by: NURSE PRACTITIONER

## 2017-04-18 PROCEDURE — 81002 URINALYSIS NONAUTO W/O SCOPE: CPT | Mod: PBBFAC | Performed by: NURSE PRACTITIONER

## 2017-04-18 PROCEDURE — 99999 PR PBB SHADOW E&M-EST. PATIENT-LVL III: CPT | Mod: PBBFAC,,, | Performed by: NURSE PRACTITIONER

## 2017-04-18 PROCEDURE — 99999 PR PBB SHADOW E&M-EST. PATIENT-LVL III: CPT | Mod: PBBFAC,,, | Performed by: INTERNAL MEDICINE

## 2017-04-18 PROCEDURE — 87077 CULTURE AEROBIC IDENTIFY: CPT

## 2017-04-18 PROCEDURE — 87088 URINE BACTERIA CULTURE: CPT

## 2017-04-18 PROCEDURE — 99214 OFFICE O/P EST MOD 30 MIN: CPT | Mod: S$PBB,,, | Performed by: INTERNAL MEDICINE

## 2017-04-18 PROCEDURE — 87086 URINE CULTURE/COLONY COUNT: CPT

## 2017-04-18 PROCEDURE — 99213 OFFICE O/P EST LOW 20 MIN: CPT | Mod: S$PBB,,, | Performed by: NURSE PRACTITIONER

## 2017-04-18 PROCEDURE — 87186 SC STD MICRODIL/AGAR DIL: CPT

## 2017-04-18 RX ORDER — HYDROCODONE BITARTRATE AND ACETAMINOPHEN 5; 325 MG/1; MG/1
1 TABLET ORAL 2 TIMES DAILY PRN
Qty: 60 TABLET | Refills: 0 | Status: SHIPPED | OUTPATIENT
Start: 2017-06-17 | End: 2017-07-18 | Stop reason: SDUPTHER

## 2017-04-18 RX ORDER — ESTRADIOL 0.04 MG/D
PATCH TRANSDERMAL
Qty: 4 PATCH | Refills: 11 | Status: SHIPPED | OUTPATIENT
Start: 2017-04-18 | End: 2017-06-02 | Stop reason: SDUPTHER

## 2017-04-18 RX ORDER — DESONIDE 0.5 MG/G
CREAM TOPICAL
Qty: 30 G | Refills: 3 | Status: SHIPPED | OUTPATIENT
Start: 2017-04-18 | End: 2017-07-18 | Stop reason: SDUPTHER

## 2017-04-18 RX ORDER — HYDROCODONE BITARTRATE AND ACETAMINOPHEN 5; 325 MG/1; MG/1
1 TABLET ORAL 2 TIMES DAILY PRN
Qty: 60 TABLET | Refills: 0 | Status: SHIPPED | OUTPATIENT
Start: 2017-04-18 | End: 2017-04-18 | Stop reason: SDUPTHER

## 2017-04-18 RX ORDER — HYDROCODONE BITARTRATE AND ACETAMINOPHEN 5; 325 MG/1; MG/1
1 TABLET ORAL 2 TIMES DAILY PRN
Qty: 60 TABLET | Refills: 0 | Status: SHIPPED | OUTPATIENT
Start: 2017-05-18 | End: 2017-04-18 | Stop reason: SDUPTHER

## 2017-04-18 RX ORDER — NITROFURANTOIN 25; 75 MG/1; MG/1
100 CAPSULE ORAL 2 TIMES DAILY
Qty: 14 CAPSULE | Refills: 0 | Status: SHIPPED | OUTPATIENT
Start: 2017-04-18 | End: 2017-04-25

## 2017-04-18 NOTE — MR AVS SNAPSHOT
[facility]  [address]  [place] [address]  Phone: [phone]  Fax: [phone]                  Jaki Bajwa   2017 7:00 AM   Office Visit    Description:  Female : 1970   Provider:  Pratik Rasmussen MD   Department:  [facility]           Reason for Visit     Hypertension           Diagnoses this Visit        Comments    Essential hypertension    -  Primary     Chronic pain syndrome         Encounter for screening mammogram for breast cancer         Chronic bilateral low back pain without sciatica         Sjogren's syndrome with keratoconjunctivitis sicca         Bipolar 1 disorder         Anxiety         Fibromyalgia         Encounter for lipid screening for cardiovascular disease                To Do List           Future Appointments        Provider Department Dept Phone    2017 7:30 AM LAB, SAME DAY [facility] [phone]    2017 8:20 AM Kristyn Carmichael NP [facility] [address] [phone]    2017 9:45 AM [facility] MAMMO1 [facility] [phone]      Goals (5 Years of Data)     None      Follow-Up and Disposition     Return in about 3 months (around 2017) for f/u.       These Medications        Disp Refills Start End    hydrocodone-acetaminophen 5-325mg (NORCO) 5-325 mg per tablet 60 tablet 0 2017     Take 1 tablet by mouth 2 (two) times daily as needed for Pain. - Oral    Pharmacy: [facility] [address] [place] [address] [address] AT [facility] [place] [facility] Ph #: [phone]       desonide (DESOWEN) 0.05 % cream 30 g 3 2017     MILTON EXT AA BID PRN    Pharmacy: [facility] [address] [place] [address] [address] AT [facility] [place] [facility] Ph #: [phone]         [facility]     [facility] Nurse Care Line -  Assistance  Unless otherwise directed by your provider, please contact  [facility], our nurse care line that is available for 24/7 assistance.     Registered nurses in the [facility] provide: appointment scheduling, clinical advisement, health education, and other advisory services.  Call: [phone] (toll free)               Medications           Message regarding Medications     Verify the changes and/or additions to your medication regime listed below are the same as discussed with your clinician today.  If any of these changes or additions are incorrect, please notify your healthcare provider.             Verify that the below list of medications is an accurate representation of the medications you are currently taking.  If none reported, the list may be blank. If incorrect, please contact your healthcare provider. Carry this list with you in case of emergency.           Current Medications     alprazolam (XANAX) 1 MG tablet Take 1 tablet (1 mg total) by mouth 2 (two) times daily as needed for Anxiety.    buPROPion (WELLBUTRIN XL) 150 MG TB24 tablet Take 3 tablets (450 mg total) by mouth every morning.    desonide (DESOWEN) 0.05 % cream MILTON EXT AA BID PRN    doxepin (SINEQUAN) 150 MG Cap Take 1 capsule (150 mg total) by mouth every evening.    estradiol (CLIMARA) 0.0375 mg/24 hr APPLY 1 PATCH EXTERNALLY TO THE SKIN EVERY 7 DAYS    gabapentin (NEURONTIN) 300 MG capsule Take 600 mg by mouth 2 (two) times daily.     hydrocodone-acetaminophen 5-325mg (NORCO) 5-325 mg per tablet Starting on Jun 17, 2017. Take 1 tablet by mouth 2 (two) times daily as needed for Pain.    hydroxychloroquine (PLAQUENIL) 200 mg tablet Take 200 mg by mouth Twice daily.    losartan-hydrochlorothiazide 100-12.5 mg (HYZAAR) 100-12.5 mg Tab Take 1 tablet by mouth once daily.    lurasidone (LATUDA) 60 mg Tab tablet Take 1 tablet (60 mg total) by mouth once daily.    naproxen (NAPROSYN) 500 MG tablet TAKE ONE TABLET BY MOUTH TWICE DAILY FOR PAIN. TAKE WITH FOOD    RESTASIS 0.05 % ophthalmic  "emulsion PLACE ONE DROP INTO BOTH EYES BID    tizanidine (ZANAFLEX) 4 MG tablet Take 1 tablet (4 mg total) by mouth every 8 (eight) hours as needed (muscle pain).           Clinical Reference Information           Your Vitals Were     BP Pulse Height Weight Last Period BMI    122/88 94 5' 7" (1.702 m) 124.1 kg (273 lb 9.5 oz) 07/11/2012 42.85 kg/m2      Blood Pressure          Most Recent Value    BP  122/88      Allergies as of 4/18/2017     Aspirin      Immunizations Administered on Date of Encounter - 4/18/2017     None      Orders Placed During Today's Visit     Future Labs/Procedures Expected by Expires    CBC auto differential  4/18/2017 4/18/2018    Comprehensive metabolic panel  4/18/2017 4/18/2018    Lipid panel  4/18/2017 4/18/2018    Mammo Digital Screening Bilat with CAD  4/18/2017 6/5/2018    TSH  4/18/2017 4/18/2018      Language Assistance Services     ATTENTION: Language assistance services are available, free of charge. Please call [phone].      ATENCIÓN: Si habla español, tiene a quiroz disposición servicios gratuitos de asistencia lingüística. Llame al [phone].     JASMIN Ý: N?u b?n nói Ti?ng Vi?t, có các d?ch v? h? tr? ngôn ng? mi?n phí dành cho b?n. G?i s? [phone].         [facility] complies with applicable Federal civil rights laws and does not discriminate on the basis of race, color, national origin, age, disability, or sex.        "

## 2017-04-18 NOTE — PROGRESS NOTES
Subjective:       Patient ID: Jaki Bajwa is a 46 y.o. female.    Chief Complaint: Hypertension    HPI Comments: Pt here for f/u. She has a dx of sjogrens and fibromyalgia based on blood work and chronic pain that is primarily across shoulders and in legs but also in upper and lower back. As a result she is on several meds that she says helps. She sees rheum at [facility] and has regular f/u. She has prior dx of lupus but this was not corroborated in notes from rheum. She is on plaquenil, tizanidine, gabapentin and norco. She uses norco 1-2x/day. She is due for refills on pain meds. She has previously signed a pain contract. [place]  reviewed and is consistent. She is seeing pain mgmt. She has also done PT with some success in the past.     She also has dx of sjogren's syndrome. She was on pilocarpine but stopped b/c it made her nauseous. Uses eye drops for dry eyes.    Pt's BP is well controlled. Tolerating meds well. Pt denies cp/sob/ha/vision or neuro changes. Not checking at home.     She continues to see psychiatry for bipolar and anxiety. This is well controlled. No SI/HI.       Hypertension   Pertinent negatives include no chest pain, headaches or shortness of breath.   Medication Refill   Associated symptoms include arthralgias and myalgias. Pertinent negatives include no abdominal pain, chest pain, headaches, numbness or weakness.   Fibromyalgia   Associated symptoms include arthralgias and myalgias. Pertinent negatives include no abdominal pain, chest pain, headaches, numbness or weakness.   Back Pain   Pertinent negatives include no abdominal pain, chest pain, headaches, numbness or weakness.     Review of Systems   Constitutional: Negative for unexpected weight change.   Eyes: Negative for visual disturbance.   Respiratory: Negative for shortness of breath.    Cardiovascular: Negative for chest pain.   Gastrointestinal: Negative for abdominal pain.   Endocrine: Negative for polyuria.   Genitourinary:  Negative for frequency.   Musculoskeletal: Positive for arthralgias, back pain and myalgias.   Neurological: Negative for weakness, numbness and headaches.   Psychiatric/Behavioral: Negative for dysphoric mood.       Objective:          Assessment:       1. Essential hypertension    2. Encounter for screening mammogram for breast cancer    3. Chronic pain syndrome    4. Chronic bilateral low back pain without sciatica    5. Sjogren's syndrome with keratoconjunctivitis sicca    6. Bipolar 1 disorder    7. Anxiety    8. Fibromyalgia    9. Encounter for lipid screening for cardiovascular disease        Plan:       1. Refill norco--3 separate 30 day rxs given for 90 days total--proper use d/w pt  2. Keep f/u with specialists  3. Refill requested meds  4. F/u 3 mos          Physical Exam   Constitutional: She is oriented to person, place, and time. She appears well-developed and well-nourished.   Neck: Neck supple. No thyromegaly present.   Cardiovascular: Normal rate, regular rhythm and normal heart sounds.    Pulmonary/Chest: Effort normal and breath sounds normal.   Musculoskeletal: She exhibits no edema.        Lumbar back: She exhibits normal range of motion, no tenderness and no bony tenderness.   Lymphadenopathy:     She has no cervical adenopathy.   Neurological: She is alert and oriented to person, place, and time. No cranial nerve deficit.   Psychiatric: She has a normal mood and affect. Her behavior is normal.

## 2017-04-18 NOTE — MR AVS SNAPSHOT
[facility] [address]  [address]  [facility] [address]  Phone: [phone]  Fax: [phone]                  Jaki Bajwa   2017 8:20 AM   Office Visit    Description:  Female : 1970   Provider:  Kristyn Carmichael NP   Department:  [facility] [address]           Reason for Visit     Urinary Frequency     Medication Refill                To Do List           Future Appointments        Provider Department Dept Phone    2017 9:45 AM [facility] MAMMO1 [facility] [phone]    2017 7:00 AM Pratik Rasmussen MD [facility] [phone]      Goals (5 Years of Data)     None      [facility]     [facility] Nurse Care Line -  Assistance  Unless otherwise directed by your provider, please contact [facility], our nurse care line that is available for  assistance.     Registered nurses in the [facility] provide: appointment scheduling, clinical advisement, health education, and other advisory services.  Call: [phone] (toll free)               Medications           Message regarding Medications     Verify the changes and/or additions to your medication regime listed below are the same as discussed with your clinician today.  If any of these changes or additions are incorrect, please notify your healthcare provider.             Verify that the below list of medications is an accurate representation of the medications you are currently taking.  If none reported, the list may be blank. If incorrect, please contact your healthcare provider. Carry this list with you in case of emergency.           Current Medications     alprazolam (XANAX) 1 MG tablet Take 1 tablet (1 mg total) by mouth 2 (two) times daily as needed for Anxiety.    buPROPion (WELLBUTRIN XL) 150 MG TB24 tablet Take 3 tablets (450 mg total) by mouth every morning.    desonide (DESOWEN) 0.05 % cream MILTON EXT AA BID PRN     "doxepin (SINEQUAN) 150 MG Cap Take 1 capsule (150 mg total) by mouth every evening.    estradiol (CLIMARA) 0.0375 mg/24 hr APPLY 1 PATCH EXTERNALLY TO THE SKIN EVERY 7 DAYS    gabapentin (NEURONTIN) 300 MG capsule Take 600 mg by mouth 2 (two) times daily.     hydrocodone-acetaminophen 5-325mg (NORCO) 5-325 mg per tablet Starting on Jun 17, 2017. Take 1 tablet by mouth 2 (two) times daily as needed for Pain.    hydroxychloroquine (PLAQUENIL) 200 mg tablet Take 200 mg by mouth Twice daily.    losartan-hydrochlorothiazide 100-12.5 mg (HYZAAR) 100-12.5 mg Tab Take 1 tablet by mouth once daily.    lurasidone (LATUDA) 60 mg Tab tablet Take 1 tablet (60 mg total) by mouth once daily.    naproxen (NAPROSYN) 500 MG tablet TAKE ONE TABLET BY MOUTH TWICE DAILY FOR PAIN. TAKE WITH FOOD    RESTASIS 0.05 % ophthalmic emulsion PLACE ONE DROP INTO BOTH EYES BID    tizanidine (ZANAFLEX) 4 MG tablet Take 1 tablet (4 mg total) by mouth every 8 (eight) hours as needed (muscle pain).           Clinical Reference Information           Your Vitals Were     BP Height Weight Last Period BMI    118/82 5' 7" (1.702 m) 124.8 kg (275 lb 2.2 oz) 07/11/2012 43.09 kg/m2      Blood Pressure          Most Recent Value    BP  118/82      Allergies as of 4/18/2017     Aspirin      Immunizations Administered on Date of Encounter - 4/18/2017     Name Date Dose VIS Date Route    TDAP 4/18/2017 0.5 mL 2/24/2015 Intramuscular      Language Assistance Services     ATTENTION: Language assistance services are available, free of charge. Please call [phone].      ATENCIÓN: Si wiliamla gueritarenetta, tiene a quiroz disposición servicios gratuitos de asistencia lingüística. Llame al [phone].     JASMIN Ý: N?u b?n nói Ti?ng Vi?t, có các d?ch v? h? tr? ngôn ng? mi?n phí dành cho b?n. G?i s? [phone].         [facility] [address] complies with applicable Federal civil rights laws and does not discriminate on the basis of race, color, national origin, age, " disability, or sex.

## 2017-04-18 NOTE — PROGRESS NOTES
CC: Urinary frequency and dark colored urine  HPI: Pt is a 46 y.o.  female who presents c/o urinary frequency and dark colored urine for the past 1 week.  Denies associated dysuria, back pain, or fever.  Urine dip is positive for +2 leukocytes, nitrates, and blood.  Reports she has had frequent UTIs since her hysterectomy.  Pt is also requesting a refill fir her ERT patches.      ROS:  GENERAL: Feeling well overall. Denies fever or chills.   SKIN: Denies rash or lesions.   HEAD: Denies head injury or headache.   NODES: Denies enlarged lymph nodes.   CHEST: Denies chest pain or shortness of breath.   CARDIOVASCULAR: Denies palpitations or left sided chest pain.   ABDOMEN: No abdominal pain, constipation, diarrhea, nausea, vomiting or rectal bleeding.   URINARY: No dysuria, hematuria, or burning on urination.  REPRODUCTIVE: See HPI.   BREASTS: Denies pain, lumps, or nipple discharge.   HEMATOLOGIC: No easy bruisability or excessive bleeding.   MUSCULOSKELETAL: Denies joint pain or swelling.   NEUROLOGIC: Denies syncope or weakness.   PSYCHIATRIC: Denies depression, anxiety or mood swings.    PE:   APPEARANCE: Well nourished, well developed, Black or  female in no acute distress.  PELVIS: Deferred  MUSCULOSKELETAL: No CVA tenderness      Diagnosis:  1. Urinary frequency    2. Hot flashes, menopausal    3. Frequent UTI        Plan:   Urine culture  Macrobid  Referral to urology for evaluation of frequent UTIs  Refilled Climara patches  Discussed R/B/A of ERT    Orders Placed This Encounter    Urine culture    Ambulatory Referral to Urology    POCT URINE DIPSTICK WITHOUT MICROSCOPE    nitrofurantoin, macrocrystal-monohydrate, (MACROBID) 100 MG capsule    estradiol (CLIMARA) 0.0375 mg/24 hr         Follow-up in 5/2017 for annual or PRN    RYLIE Alatorre

## 2017-04-21 LAB — BACTERIA UR CULT: NORMAL

## 2017-05-09 ENCOUNTER — OFFICE VISIT (OUTPATIENT)
Dept: PSYCHIATRY | Facility: CLINIC | Age: 47
End: 2017-05-09
Payer: MEDICARE

## 2017-05-09 VITALS
HEART RATE: 95 BPM | SYSTOLIC BLOOD PRESSURE: 130 MMHG | WEIGHT: 278 LBS | HEIGHT: 67 IN | DIASTOLIC BLOOD PRESSURE: 78 MMHG | BODY MASS INDEX: 43.63 KG/M2

## 2017-05-09 DIAGNOSIS — F41.1 GAD (GENERALIZED ANXIETY DISORDER): ICD-10-CM

## 2017-05-09 DIAGNOSIS — F42.9 OBSESSIVE-COMPULSIVE DISORDER, UNSPECIFIED TYPE: Primary | ICD-10-CM

## 2017-05-09 DIAGNOSIS — F40.10 SOCIAL PHOBIA: ICD-10-CM

## 2017-05-09 DIAGNOSIS — F33.41 MAJOR DEPRESSIVE DISORDER, RECURRENT EPISODE, IN PARTIAL REMISSION: ICD-10-CM

## 2017-05-09 DIAGNOSIS — F33.1 MAJOR DEPRESSIVE DISORDER, RECURRENT, MODERATE: Primary | ICD-10-CM

## 2017-05-09 PROCEDURE — 99213 OFFICE O/P EST LOW 20 MIN: CPT | Mod: S$PBB,,, | Performed by: NURSE PRACTITIONER

## 2017-05-09 PROCEDURE — 90833 PSYTX W PT W E/M 30 MIN: CPT | Mod: S$PBB,,, | Performed by: NURSE PRACTITIONER

## 2017-05-09 PROCEDURE — 90834 PSYTX W PT 45 MINUTES: CPT | Mod: PBBFAC | Performed by: SOCIAL WORKER

## 2017-05-09 PROCEDURE — 90834 PSYTX W PT 45 MINUTES: CPT | Mod: S$PBB,,, | Performed by: SOCIAL WORKER

## 2017-05-09 PROCEDURE — 99999 PR PBB SHADOW E&M-EST. PATIENT-LVL IV: CPT | Mod: PBBFAC,,, | Performed by: NURSE PRACTITIONER

## 2017-05-09 RX ORDER — ALPRAZOLAM 0.5 MG/1
0.5 TABLET ORAL 3 TIMES DAILY
Qty: 90 TABLET | Refills: 5 | Status: SHIPPED | OUTPATIENT
Start: 2017-05-09 | End: 2017-10-03

## 2017-05-09 NOTE — PROGRESS NOTES
"Outpatient Psychiatry Follow-Up Visit (MD/NP)    5/9/2017    Clinical Status of Patient:  Outpatient (Ambulatory)    Chief Complaint:  Jaki Bajwa is a 46 y.o. female who presents today for follow-up of depression and anxiety.  Met with patient.      Interval History and Content of Current Session:  Interim Events/Subjective Report/Content of Current Session:     Chart reviewed,  reviewed    Latuda 60mg po q day  Wellbutrin XL 450mg po q day  Doxepin 150mg po q hs  Xanax 1mg po bid prn    States, "everthing is great", getting along better with her BF, has 1 female friend, admits she is an introvert, enjoys being at home.  Yet still reports high anxiety daily.  Patient noted to be sitting in one position, resp even, unlabored at 16/min.  States her dreams influence her mood in the morning, "I can connect the two".  Mood dysthymic. Has not seen therapist since Feb 2017.  Discussed the need to titrate down from Xanax, "Kayley been on that for years". Very reluctant to decrease Xanax    Patient contradics herself, initially said she likes to be at home, then later stated she doesn't like to be at home.  Reported not being able to socialize but when at  house there are others she talks too.       Psychotherapy:  · Target symptoms: depression, anxiety   · Why chosen therapy is appropriate versus another modality: relevant to diagnosis  · Outcome monitoring methods: self-report  · Therapeutic intervention type: insight oriented psychotherapy  · Topics discussed/themes: symptom recognition  · The patient's response to the intervention is reluctant. The patient's progress toward treatment goals is fair.   · Duration of intervention: 16 minutes.    Review of Systems   GENERAL: + weight gain  SKIN: No rashes   HEAD: No headaches   EYES: No exophthalmos, jaundice or blindness   EARS: No dizziness, tinnitus or hearing loss   NOSE: No changes in smell   MOUTH & THROAT: No dyskinetic movements or obvious goiter   CHEST: " "No shortness of breath, hyperventilation or cough   CARDIOVASCULAR: No tachycardia or chest pain   ABDOMEN: No nausea, vomiting, pain, constipation or diarrhea   URINARY: No frequency, dysuria or sexual dysfunction   ENDOCRINE: No polydipsia, polyuria   MUSCULOSKELETAL: lower back pain, believes she could have slept in a different position.   NEUROLOGIC: No weakness, sensory changes, seizures, confusion, memory loss, tremor or other abnormal movements      Psychiatric Review Of Systems:  sleep: yes,  Sleeping 5-6 hours/night  appetite changes: yes  weight changes: yes  energy/anergy: yes  interest/pleasure/anhedonia: yes  somatic symptoms: no  libido: no  anxiety/panic: yes      Past Medical, Family and Social History: The patient's past medical, family and social history have been reviewed and updated as appropriate within the electronic medical record - see encounter notes.    Compliance: yes    Side effects: None    Risk Parameters:  Patient reports no suicidal ideation  Patient reports no homicidal ideation  Patient reports no self-injurious behavior  Patient reports no violent behavior    Exam (detailed: at least 9 elements; comprehensive: all 15 elements)   Constitutional  Vitals:  Most recent vital signs, dated less than 90 days prior to this appointment, were reviewed.   Vitals:    05/09/17 0742   BP: 130/78   Pulse: 95   Weight: 126.1 kg (278 lb)   Height: 5' 7" (1.702 m)        General:  unremarkable, age appropriate, casually dressed, neatly groomed     Musculoskeletal  Muscle Strength/Tone:  no dyskinesia, no dystonia, no tremor, no tic   Gait & Station:  non-ataxic     Psychiatric  Speech:  no latency; no press   Mood & Affect:  dysthymic, yet improved  blunted   Thought Process:  normal and logical   Associations:  intact   Thought Content:  normal, no suicidality, no homicidality, delusions, or paranoia   Insight:  has awareness of illness   Judgement: behavior is adequate to circumstances "   Orientation:  grossly intact   Memory: intact for content of interview   Language: grossly intact   Attention Span & Concentration:  able to focus   Fund of Knowledge:  intact and appropriate to age and level of education     Assessment and Diagnosis   Status/Progress: Based on the examination today, the patient's problem(s) is/are inadequately controlled.  New problems have not been presented today.   Co-morbidities and Lack of compliance are not complicating management of the primary condition.  There are no active rule-out diagnoses for this patient at this time.     General Impression:       ICD-10-CM ICD-9-CM   1. Major depressive disorder, recurrent, moderate F33.1 296.32   2. JUAN JOSE (generalized anxiety disorder) F41.1 300.02   3. Social phobia F40.10 300.23     Cluster B Traits    Intervention/Counseling/Treatment Plan   · Medication Management: Continue current medications. The risks and benefits of medication were discussed with the patient.   · Wellbutrin XL 450mg po q day  · Latuda 60mg po q dday  · Doxepin 150mg po q hs  · Decrease Xanax 0.5mg po tid prn  · Continue with RAMIN Zamora LCSW      Return to Clinic: 4 months

## 2017-05-09 NOTE — PATIENT INSTRUCTIONS
[facility] - DEPARTMENT OF PSYCHIATRY   PATIENT INFORMATION    We appreciate the opportunity to participate in your medical care and hope the following protocols will make it easier for you to receive quality treatment in our department.    1. PUNCTUALITY: Your appointment is scheduled for a fixed amount of time.  To get the benefit of your appointment, please arrive at least 15 minutes early enough to allow time for traffic, parking and registration.  If you are late for your appointment, you may have to reschedule.  Please make every effort to be on time.      2. PAYMENT FOR SERVICES:   Payments are expected at the time of service.  Please contact [phone] if you need to resolve issues involving your account at [facility] or to set up a payment plan.    3. CANCELLATION / MISSED APPOINTMENTS:   In order to receive quality care, all appointments must be kept.  Appointment may be cancelled at least 24 hours before your appointment time. If you do not give at least 24-hour notice of cancellation a fee may be billed directly to the patient.  Please note that insurance does not cover no-show charges, so you will be billed directly.  If you are consistently late, cancel, or do not show for your appointments, our department reserves the right to terminate treatment     MESSAGES- In general you can reach the department by calling [phone], between 8:00 a.m. and 5:00 p.m., Monday through Friday.  You can also use the [facility] Patient Portal.     AFTER HOURS, WEEKEND OR HOLIDAYS- For urgent questions after hours, weekends and holidays, calling the department number [phone] will connect you with a representative.  EMERGENCY-  In case of a crisis when there is a concern of harm to self or others, call 911 or the office [phone] between 8:00 a.m. and 5:00 p.m., Monday through Friday or go to the [facility] Emergency Room.    4. PRESCRIPTION REFILLS:  Prescription refills must be done at your  physician office visit, You will be given a sufficient number of refills to last until your next appointment, you must come to appointments for prescriptions.       5. FOLLOW UP APPOINTMENTS:  Follow-up appointments can be made in person at the Psychiatry Appointment Desk, online through the [facility] Patient Portal, or by calling [phone], from 8am to 5pm, between Monday and Friday.  Patients are responsible for scheduling their own follow-up appointment,  It  Is recommended you schedule your appointment before leaving the clinic.    6. Providers are NOT ABLE to schedule appointments; all appointments must be made through the appointment department or through [facility] Patient Portal.     Cancellation Policy:  Please provide greater than 24 hour notice of any cancellations as a fee will be charged for failure to do so. This policy is in effect to allow for other individuals on a long waiting list to be seen as soon as possible. Unlike other branches of medicine where several individuals can be scheduled in a 15 minute time slot, only one individual can be scheduled in any time slot in Psychiatry.    Walk-in appointments:      Walk in appointments are not available. For emergencies, please go to the Emergency Room.    [facility]: Please enroll as this is the preferred method of contacting your doctor for non-emergent calls. It will also allow you to book your own follow up appointments.    No Shows: Repeated no shows may result in a warning letter or you may be asked to seek care elsewhere.    Phone Calls: Please discuss concerns with your doctor within your scheduled appointment time. In most cases Psychotherapy and Medication management are not appropriate by telephone. If you have a simple question/concern, please provide all of the following information to the :   Detailed description of concern   Pharmacy name and phone number   Date of last visit and next scheduled visit   Phone number  where you can be reached throughout the day   Indication as to whether leaving a voice mail or message on an answering machine is appropriate (if applicable)  Calls will be returned by the Medical Assistant or Office Staff after your doctor has reviewed the message.  Please allow 24 hours for a returned phone call before calling back to leave another message. (If numerous calls are made between appointments, or if calls end up being lengthy, more frequent appointments are required for proper management.)    Disability: We do not do disability evaluations.  Please contact Social Security Administration for evaluations and determinations. You will then sign releases allowing for records from this office to be forwarded to Social Security Administration to use in their evaluation.    Gun Permit: We do not offer Sound Judgment Evaluations or assessments leading to gun ownership, nor do we fill out or file paperwork relevant to owning, concealing or purchasing a firearm.        Emotional Support     Animals: ESAs are not trained to perform tasks or recognize particular signs or symptoms but are distinguished by the close, emotional, and supportive bond between the animal and the owner; therefore we do not provide documentation, including letters, to aid in the acclamation that an Emotional Support Animal is required.      Samples: We do not provide samples of any medications. If you have financial difficulties and are on a limited income, you may qualify for Patient Assistance Programs from various pharmaceutical companies. This will require that you complete paperwork with your financial information, but this does not guarantee that the company will approve the application. Alternative medication options can be discussed.    Controlled Medications: Some medications are tightly controlled and regulated by the FDA and [place].  Controlled medications will not be refilled before 30 days. Some of these medications will not  be refilled without a face to face appointment. For some individuals, monthly appointments may have to be scheduled.    Forms: If you have any forms or letters that need to be completed by your doctor, please present these at the beginning of the appointment. Forms, letters, etc. will not be completed outside of appointment times. Please provide a full name and address of the recipient. Anonymous letters will not be dictated. We do not have disability forms, FMLA forms, etc. on file.  You will need to obtain the necessary paperwork from your employer/school.    Limitations: You will be referred to other providers if we feel unable to adequately diagnose or treat your particular condition, or if collaboration with another provider would allow for better management of your condition.    Revised 4/12/2017    PATIENTS MAY EXPERIENCE SYMPTOMS OF WITHDRAWAL IF THEY RUN OUT OF MEDICATIONS.  THE PATIENT WILL ASSUME ALL RESPONSIBILITIES OF ANY OUTCOMES WHEN THERE IS AN ISSUE WITH NONCOMPLIANCE WITH FOLLOW-UP APPOINTMENTS AND MEDICATIONS.        THERE IS TO BE NO USE OF ALCOHOL AND/OR ILLEGAL SUBSTANCES      PATIENT ARE TO GO TO THE CLOSEST EMERGENCY ROOM IF FEELING A THREAT TO THEMSELVES OR OTHER OR IF GRAVELY DISABLED    TELL US HOW WE ARE DOING.  PLEASE COMPLETE THE PATIENT SATISFACTION SURVERY

## 2017-05-09 NOTE — MR AVS SNAPSHOT
[facility]  [address]  [facility] [address]  Phone: [phone]  Fax: [phone]                  Jaki Bajwa   2017 8:00 AM   Office Visit    Description:  Female : 1970   Provider:  Ayush Claire NP   Department:  [facility]           Reason for Visit     Depression     Anxiety           Diagnoses this Visit        Comments    Major depressive disorder, recurrent, moderate    -  Primary     JUAN JOSE (generalized anxiety disorder)         Social phobia                To Do List           Future Appointments        Provider Department Dept Phone    2017 8:45 AM Hien Oh DO [facility] - OB/GYN [address] [phone]    2017 9:45 AM BAPH MAMMO1 [facility] [phone]    2017 7:00 AM Pratik Rasmussen MD [facility] Internal Medicine [phone]      Goals (5 Years of Data)     None      Follow-Up and Disposition     Return in about 4 months (around 2017).       These Medications        Disp Refills Start End    alprazolam (XANAX) 0.5 MG tablet 90 tablet 5 2017     Take 1 tablet (0.5 mg total) by mouth 3 (three) times daily. - Oral    Pharmacy: [facility] [address] [place] [address] [address] AT [facility] F[facility] De La Cruz  #: [phone]         [facility]     [facility] Care Line -  Assistance  Unless otherwise directed by your provider, please contact [facility], our nurse care line that is available for  assistance.     Registered nurses in the [facility] provide: appointment scheduling, clinical advisement, health education, and other advisory services.  Call: [phone] (toll free)               Medications           Message regarding Medications     Verify the changes and/or additions to your medication regime listed below are the same as discussed with your clinician today.  If any of these changes or additions are  incorrect, please notify your healthcare provider.        START taking these NEW medications        Refills    alprazolam (XANAX) 0.5 MG tablet 5    Sig: Take 1 tablet (0.5 mg total) by mouth 3 (three) times daily.    Class: Normal    Route: Oral           Verify that the below list of medications is an accurate representation of the medications you are currently taking.  If none reported, the list may be blank. If incorrect, please contact your healthcare provider. Carry this list with you in case of emergency.           Current Medications     alprazolam (XANAX) 0.5 MG tablet Take 1 tablet (0.5 mg total) by mouth 3 (three) times daily.    buPROPion (WELLBUTRIN XL) 150 MG TB24 tablet Take 3 tablets (450 mg total) by mouth every morning.    desonide (DESOWEN) 0.05 % cream MILTON EXT AA BID PRN    doxepin (SINEQUAN) 150 MG Cap Take 1 capsule (150 mg total) by mouth every evening.    estradiol (CLIMARA) 0.0375 mg/24 hr APPLY 1 PATCH EXTERNALLY TO THE SKIN EVERY 7 DAYS    estradiol (CLIMARA) 0.0375 mg/24 hr Apply 1 patch externally to the skin every 7 days    gabapentin (NEURONTIN) 300 MG capsule Take 600 mg by mouth 2 (two) times daily.     hydrocodone-acetaminophen 5-325mg (NORCO) 5-325 mg per tablet Starting on Jun 17, 2017. Take 1 tablet by mouth 2 (two) times daily as needed for Pain.    hydroxychloroquine (PLAQUENIL) 200 mg tablet Take 200 mg by mouth Twice daily.    losartan-hydrochlorothiazide 100-12.5 mg (HYZAAR) 100-12.5 mg Tab Take 1 tablet by mouth once daily.    lurasidone (LATUDA) 60 mg Tab tablet Take 1 tablet (60 mg total) by mouth once daily.    naproxen (NAPROSYN) 500 MG tablet TAKE ONE TABLET BY MOUTH TWICE DAILY FOR PAIN. TAKE WITH FOOD    RESTASIS 0.05 % ophthalmic emulsion PLACE ONE DROP INTO BOTH EYES BID    tizanidine (ZANAFLEX) 4 MG tablet Take 1 tablet (4 mg total) by mouth every 8 (eight) hours as needed (muscle pain).           Clinical Reference Information           Your Vitals Were     BP  "Pulse Height Weight Last Period BMI    130/78 95 5' 7" (1.702 m) 126.1 kg (278 lb) 07/11/2012 43.54 kg/m2      Blood Pressure          Most Recent Value    BP  130/78      Allergies as of 5/9/2017     Aspirin      Immunizations Administered on Date of Encounter - 5/9/2017     None      Instructions    [facility] - DEPARTMENT OF PSYCHIATRY   PATIENT INFORMATION    We appreciate the opportunity to participate in your medical care and hope the following protocols will make it easier for you to receive quality treatment in our department.    1. PUNCTUALITY: Your appointment is scheduled for a fixed amount of time.  To get the benefit of your appointment, please arrive at least 15 minutes early enough to allow time for traffic, parking and registration.  If you are late for your appointment, you may have to reschedule.  Please make every effort to be on time.      2. PAYMENT FOR SERVICES:   Payments are expected at the time of service.  Please contact [phone] if you need to resolve issues involving your account at [facility] or to set up a payment plan.    3. CANCELLATION / MISSED APPOINTMENTS:   In order to receive quality care, all appointments must be kept.  Appointment may be cancelled at least 24 hours before your appointment time. If you do not give at least 24-hour notice of cancellation a fee may be billed directly to the patient.  Please note that insurance does not cover no-show charges, so you will be billed directly.  If you are consistently late, cancel, or do not show for your appointments, our department reserves the right to terminate treatment     MESSAGES- In general you can reach the department by calling [phone], between 8:00 a.m. and 5:00 p.m., Monday through Friday.  You can also use the [facility] Patient Portal.     AFTER HOURS, WEEKEND OR HOLIDAYS- For urgent questions after hours, weekends and holidays, calling the department number [phone] will connect you with a " representative.  EMERGENCY-  In case of a crisis when there is a concern of harm to self or others, call 911 or the office [phone] between 8:00 a.m. and 5:00 p.m., Monday through Friday or go to the [facility] Emergency Room.    4. PRESCRIPTION REFILLS:  Prescription refills must be done at your physician office visit, You will be given a sufficient number of refills to last until your next appointment, you must come to appointments for prescriptions.       5. FOLLOW UP APPOINTMENTS:  Follow-up appointments can be made in person at the Psychiatry Appointment Desk, online through the [facility] Portal, or by calling [phone], from 8am to 5pm, between Monday and Friday.  Patients are responsible for scheduling their own follow-up appointment,  It  Is recommended you schedule your appointment before leaving the clinic.    6. Providers are NOT ABLE to schedule appointments; all appointments must be made through the appointment department or through [facility].     Cancellation Policy:  Please provide greater than 24 hour notice of any cancellations as a fee will be charged for failure to do so. This policy is in effect to allow for other individuals on a long waiting list to be seen as soon as possible. Unlike other branches of medicine where several individuals can be scheduled in a 15 minute time slot, only one individual can be scheduled in any time slot in Psychiatry.    Walk-in appointments:      Walk in appointments are not available. For emergencies, please go to the Emergency Room.    [facility]: Please enroll as this is the preferred method of contacting your doctor for non-emergent calls. It will also allow you to book your own follow up appointments.    No Shows: Repeated no shows may result in a warning letter or you may be asked to seek care elsewhere.    Phone Calls: Please discuss concerns with your doctor within your scheduled appointment time. In most cases Psychotherapy and  Medication management are not appropriate by telephone. If you have a simple question/concern, please provide all of the following information to the :   Detailed description of concern   Pharmacy name and phone number   Date of last visit and next scheduled visit   Phone number where you can be reached throughout the day   Indication as to whether leaving a voice mail or message on an answering machine is appropriate (if applicable)  Calls will be returned by the Medical Assistant or Office Staff after your doctor has reviewed the message.  Please allow 24 hours for a returned phone call before calling back to leave another message. (If numerous calls are made between appointments, or if calls end up being lengthy, more frequent appointments are required for proper management.)    Disability: We do not do disability evaluations.  Please contact Social Security Administration for evaluations and determinations. You will then sign releases allowing for records from this office to be forwarded to Social Security Administration to use in their evaluation.    Gun Permit: We do not offer Sound Judgment Evaluations or assessments leading to gun ownership, nor do we fill out or file paperwork relevant to owning, concealing or purchasing a firearm.        Emotional Support     Animals: ESAs are not trained to perform tasks or recognize particular signs or symptoms but are distinguished by the close, emotional, and supportive bond between the animal and the owner; therefore we do not provide documentation, including letters, to aid in the acclamation that an Emotional Support Animal is required.      Samples: We do not provide samples of any medications. If you have financial difficulties and are on a limited income, you may qualify for Patient Assistance Programs from various pharmaceutical companies. This will require that you complete paperwork with your financial information, but this does not  guarantee that the company will approve the application. Alternative medication options can be discussed.    Controlled Medications: Some medications are tightly controlled and regulated by the FDA and [place].  Controlled medications will not be refilled before 30 days. Some of these medications will not be refilled without a face to face appointment. For some individuals, monthly appointments may have to be scheduled.    Forms: If you have any forms or letters that need to be completed by your doctor, please present these at the beginning of the appointment. Forms, letters, etc. will not be completed outside of appointment times. Please provide a full name and address of the recipient. Anonymous letters will not be dictated. We do not have disability forms, FMLA forms, etc. on file.  You will need to obtain the necessary paperwork from your employer/school.    Limitations: You will be referred to other providers if we feel unable to adequately diagnose or treat your particular condition, or if collaboration with another provider would allow for better management of your condition.    Revised 4/12/2017    PATIENTS MAY EXPERIENCE SYMPTOMS OF WITHDRAWAL IF THEY RUN OUT OF MEDICATIONS.  THE PATIENT WILL ASSUME ALL RESPONSIBILITIES OF ANY OUTCOMES WHEN THERE IS AN ISSUE WITH NONCOMPLIANCE WITH FOLLOW-UP APPOINTMENTS AND MEDICATIONS.        THERE IS TO BE NO USE OF ALCOHOL AND/OR ILLEGAL SUBSTANCES      PATIENT ARE TO GO TO THE CLOSEST EMERGENCY ROOM IF FEELING A THREAT TO THEMSELVES OR OTHER OR IF GRAVELY DISABLED    TELL US HOW WE ARE DOING.  PLEASE COMPLETE THE PATIENT SATISFACTION SURVERY           Language Assistance Services     ATTENTION: Language assistance services are available, free of charge. Please call [phone].      ATENCIÓN: Si habla malu, tiene a quiroz disposición servicios gratuitos de asistencia lingüística. Llame al [phone].     CHÚ Ý: N?u b?n nói Ti?ng Vi?t, có các d?ch v? h? tr? ngôn ng?  mi?n phí dành cho b?n. G?i s? [phone].         [facility] complies with applicable Federal civil rights laws and does not discriminate on the basis of race, color, national origin, age, disability, or sex.

## 2017-05-09 NOTE — PROGRESS NOTES
"Individual Psychotherapy (PhD/LCSW)    5/9/2017    Site:  [facility]         Therapeutic Intervention: Met with patient.  Outpatient - Insight oriented psychotherapy 45 min - CPT code 32103 and Outpatient - Behavior modifying psychotherapy 45 min - CPT code 79487    Chief complaint/reason for encounter: depression, anxiety and interpersonal     Interval history and content of current session:         she recognizes she has compulsive shopping problem; however adds that she does not wants to stop because it makes her happy.    Cognitive therapy to disrupt her dysfunctional thinking and propel her into doing more life enhancing behaviors.   "I cant walk until I feel like walking" for example.    No suicidal ideation.  Reports doing well with boyfriend and no relations on the side.     Treatment plan:  · Target symptoms: depression, anxiety   · Why chosen therapy is appropriate versus another modality: relevant to diagnosis  · Outcome monitoring methods: self-report, observation    · Therapeutic intervention type: insight oriented psychotherapy, behavior modifying psychotherapy, supportive psychotherapy    Risk parameters:  Patient reports no suicidal ideation  Patient reports no homicidal ideation  Patient reports no self-injurious behavior  Patient reports no violent behavior    Verbal deficits: None    Patient's response to intervention:  The patient's response to intervention is accepting.    Progress toward goals and other mental status changes:  The patient's progress toward goals is limited.    Diagnosis: 296.35;   Obsessive compulsive disorder.  Cognitive limitations.      Plan:  individual psychotherapy    Return to clinic: 2 weeks pt prefers to come in 3 months.                                     "

## 2017-05-18 ENCOUNTER — TELEPHONE (OUTPATIENT)
Dept: INTERNAL MEDICINE | Facility: CLINIC | Age: 47
End: 2017-05-18

## 2017-05-18 ENCOUNTER — TELEPHONE (OUTPATIENT)
Dept: OBSTETRICS AND GYNECOLOGY | Facility: CLINIC | Age: 47
End: 2017-05-18

## 2017-05-18 NOTE — TELEPHONE ENCOUNTER
----- Message from Servando Wilson sent at 5/18/2017 11:56 AM CDT -----  Contact: Patient  x 1st Request  _ 2nd Request  _ 3rd Request    Who: PENG MAYO [[id number]]    Why: Patient is calling in regards to still experiencing the symptoms from the UTI. Patient states she is needing a RX called in to her pharmacy. Patient also states she is needing refills on her estradol RX as well. Patient is needing to speak with staff.    What Number to Call Back: Patient can be reached at [phone].    When to Expect a call back: (Before the end of the day)  -- if call after 3:00 call back will be tomorrow.

## 2017-05-18 NOTE — TELEPHONE ENCOUNTER
----- Message from Dianna Ramos sent at 5/18/2017 11:53 AM CDT -----  Contact: Self  X  1st Request  _  2nd Request  _  3rd Request        Who: PENG MAYO [[id number]]    Why: Pt states she would like to get some information regarding a weight loss program. Pt requested to speak to the clinical team. Please call, thanks!    What Number to Call Back: [phone]    When to Expect a call back: (Before the end of the day)   -- if the call is after 12:00, the call back will be tomorrow.

## 2017-05-22 ENCOUNTER — PATIENT OUTREACH (OUTPATIENT)
Dept: ADMINISTRATIVE | Facility: HOSPITAL | Age: 47
End: 2017-05-22

## 2017-05-29 ENCOUNTER — TELEPHONE (OUTPATIENT)
Dept: OBSTETRICS AND GYNECOLOGY | Facility: CLINIC | Age: 47
End: 2017-05-29

## 2017-05-29 NOTE — TELEPHONE ENCOUNTER
Called and advise patient to appointment with Urology and changed her appointment type for Friday, 5/2/2017 to NICKYE. Patient was given Urology phone number and verbalized understanding.

## 2017-06-02 ENCOUNTER — OFFICE VISIT (OUTPATIENT)
Dept: INTERNAL MEDICINE | Facility: CLINIC | Age: 47
End: 2017-06-02
Attending: FAMILY MEDICINE
Payer: MEDICARE

## 2017-06-02 ENCOUNTER — OFFICE VISIT (OUTPATIENT)
Dept: UROLOGY | Facility: CLINIC | Age: 47
End: 2017-06-02
Payer: MEDICARE

## 2017-06-02 ENCOUNTER — HOSPITAL ENCOUNTER (OUTPATIENT)
Dept: RADIOLOGY | Facility: OTHER | Age: 47
Discharge: HOME | End: 2017-06-02
Attending: NURSE PRACTITIONER
Payer: MEDICARE

## 2017-06-02 ENCOUNTER — TELEPHONE (OUTPATIENT)
Dept: INTERNAL MEDICINE | Facility: CLINIC | Age: 47
End: 2017-06-02

## 2017-06-02 ENCOUNTER — HOSPITAL ENCOUNTER (OUTPATIENT)
Dept: RADIOLOGY | Facility: OTHER | Age: 47
Discharge: HOME | End: 2017-06-02
Attending: INTERNAL MEDICINE
Payer: MEDICARE

## 2017-06-02 ENCOUNTER — OFFICE VISIT (OUTPATIENT)
Dept: OBSTETRICS AND GYNECOLOGY | Facility: CLINIC | Age: 47
End: 2017-06-02
Payer: MEDICARE

## 2017-06-02 VITALS
BODY MASS INDEX: 42.93 KG/M2 | DIASTOLIC BLOOD PRESSURE: 82 MMHG | SYSTOLIC BLOOD PRESSURE: 110 MMHG | HEIGHT: 67 IN | WEIGHT: 273.56 LBS | OXYGEN SATURATION: 96 % | HEART RATE: 109 BPM

## 2017-06-02 VITALS
HEIGHT: 67 IN | BODY MASS INDEX: 43.11 KG/M2 | WEIGHT: 274.69 LBS | DIASTOLIC BLOOD PRESSURE: 84 MMHG | SYSTOLIC BLOOD PRESSURE: 128 MMHG

## 2017-06-02 VITALS
DIASTOLIC BLOOD PRESSURE: 82 MMHG | HEIGHT: 67 IN | SYSTOLIC BLOOD PRESSURE: 134 MMHG | WEIGHT: 274 LBS | BODY MASS INDEX: 43 KG/M2 | HEART RATE: 112 BPM

## 2017-06-02 DIAGNOSIS — Z11.4 ENCOUNTER FOR SCREENING FOR HIV: ICD-10-CM

## 2017-06-02 DIAGNOSIS — Z01.419 WOMEN'S ANNUAL ROUTINE GYNECOLOGICAL EXAMINATION: ICD-10-CM

## 2017-06-02 DIAGNOSIS — N39.0 RECURRENT UTI (URINARY TRACT INFECTION): Primary | ICD-10-CM

## 2017-06-02 DIAGNOSIS — I10 ESSENTIAL HYPERTENSION: Primary | Chronic | ICD-10-CM

## 2017-06-02 DIAGNOSIS — N95.1 HOT FLASHES, MENOPAUSAL: ICD-10-CM

## 2017-06-02 DIAGNOSIS — F41.9 ANXIETY: ICD-10-CM

## 2017-06-02 DIAGNOSIS — M79.7 FIBROMYALGIA: ICD-10-CM

## 2017-06-02 DIAGNOSIS — N94.10 DYSPAREUNIA, FEMALE: ICD-10-CM

## 2017-06-02 DIAGNOSIS — E66.01 MORBID OBESITY, UNSPECIFIED OBESITY TYPE: ICD-10-CM

## 2017-06-02 DIAGNOSIS — R53.83 FATIGUE, UNSPECIFIED TYPE: ICD-10-CM

## 2017-06-02 DIAGNOSIS — M35.01 SJOGREN'S SYNDROME WITH KERATOCONJUNCTIVITIS SICCA: ICD-10-CM

## 2017-06-02 DIAGNOSIS — N39.3 SUI (STRESS URINARY INCONTINENCE, FEMALE): ICD-10-CM

## 2017-06-02 DIAGNOSIS — M26.629 TMJ SYNDROME: ICD-10-CM

## 2017-06-02 DIAGNOSIS — F33.0 MILD EPISODE OF RECURRENT MAJOR DEPRESSIVE DISORDER: Chronic | ICD-10-CM

## 2017-06-02 DIAGNOSIS — Z11.3 SCREEN FOR STD (SEXUALLY TRANSMITTED DISEASE): Primary | ICD-10-CM

## 2017-06-02 DIAGNOSIS — Z12.31 ENCOUNTER FOR SCREENING MAMMOGRAM FOR BREAST CANCER: ICD-10-CM

## 2017-06-02 DIAGNOSIS — N39.0 RECURRENT UTI (URINARY TRACT INFECTION): ICD-10-CM

## 2017-06-02 DIAGNOSIS — R82.90 ABNORMAL URINE ODOR: ICD-10-CM

## 2017-06-02 LAB
BILIRUB SERPL-MCNC: ABNORMAL MG/DL
BLOOD URINE, POC: ABNORMAL
C TRACH DNA SPEC QL NAA+PROBE: NOT DETECTED
CANDIDA RRNA VAG QL PROBE: NEGATIVE
COLOR, POC UA: ABNORMAL
G VAGINALIS RRNA GENITAL QL PROBE: NEGATIVE
GLUCOSE UR QL STRIP: ABNORMAL
KETONES UR QL STRIP: ABNORMAL
LEUKOCYTE ESTERASE URINE, POC: ABNORMAL
N GONORRHOEA DNA SPEC QL NAA+PROBE: NOT DETECTED
NITRITE, POC UA: ABNORMAL
PH, POC UA: 5
PROTEIN, POC: ABNORMAL
SPECIFIC GRAVITY, POC UA: 1.02
T VAGINALIS RRNA GENITAL QL PROBE: NEGATIVE
UROBILINOGEN, POC UA: ABNORMAL

## 2017-06-02 PROCEDURE — 99999 PR PBB SHADOW E&M-EST. PATIENT-LVL III: CPT | Mod: PBBFAC,,, | Performed by: NURSE PRACTITIONER

## 2017-06-02 PROCEDURE — 87186 SC STD MICRODIL/AGAR DIL: CPT

## 2017-06-02 PROCEDURE — 76770 US EXAM ABDO BACK WALL COMP: CPT | Mod: 26,,, | Performed by: RADIOLOGY

## 2017-06-02 PROCEDURE — 99214 OFFICE O/P EST MOD 30 MIN: CPT | Mod: S$PBB,,, | Performed by: FAMILY MEDICINE

## 2017-06-02 PROCEDURE — 81002 URINALYSIS NONAUTO W/O SCOPE: CPT | Mod: PBBFAC | Performed by: NURSE PRACTITIONER

## 2017-06-02 PROCEDURE — 99214 OFFICE O/P EST MOD 30 MIN: CPT | Mod: S$PBB,,, | Performed by: NURSE PRACTITIONER

## 2017-06-02 PROCEDURE — 99213 OFFICE O/P EST LOW 20 MIN: CPT | Mod: PBBFAC,27 | Performed by: NURSE PRACTITIONER

## 2017-06-02 PROCEDURE — G0101 CA SCREEN;PELVIC/BREAST EXAM: HCPCS | Mod: S$PBB,,, | Performed by: NURSE PRACTITIONER

## 2017-06-02 PROCEDURE — 87077 CULTURE AEROBIC IDENTIFY: CPT

## 2017-06-02 PROCEDURE — 87088 URINE BACTERIA CULTURE: CPT

## 2017-06-02 PROCEDURE — 99999 PR PBB SHADOW E&M-EST. PATIENT-LVL III: CPT | Mod: PBBFAC,,, | Performed by: FAMILY MEDICINE

## 2017-06-02 PROCEDURE — 99999 PR PBB SHADOW E&M-EST. PATIENT-LVL IV: CPT | Mod: PBBFAC,,, | Performed by: NURSE PRACTITIONER

## 2017-06-02 PROCEDURE — 87086 URINE CULTURE/COLONY COUNT: CPT

## 2017-06-02 PROCEDURE — 77067 SCR MAMMO BI INCL CAD: CPT | Mod: 26,,, | Performed by: RADIOLOGY

## 2017-06-02 RX ORDER — ALPRAZOLAM 1 MG/1
TABLET ORAL
COMMUNITY
Start: 2017-04-18 | End: 2017-06-02 | Stop reason: ALTCHOICE

## 2017-06-02 RX ORDER — PHENAZOPYRIDINE HYDROCHLORIDE 100 MG/1
100 TABLET, FILM COATED ORAL 3 TIMES DAILY PRN
Qty: 30 TABLET | Refills: 3 | Status: SHIPPED | OUTPATIENT
Start: 2017-06-02 | End: 2017-06-13

## 2017-06-02 RX ORDER — ESTRADIOL 0.04 MG/D
PATCH TRANSDERMAL
Qty: 12 PATCH | Refills: 11 | Status: SHIPPED | OUTPATIENT
Start: 2017-06-02 | End: 2017-10-18

## 2017-06-02 RX ORDER — CEPHALEXIN 500 MG/1
500 CAPSULE ORAL EVERY 12 HOURS
Qty: 14 CAPSULE | Refills: 0 | Status: SHIPPED | OUTPATIENT
Start: 2017-06-02 | End: 2017-06-09

## 2017-06-02 RX ORDER — NITROFURANTOIN MACROCRYSTALS 50 MG/1
50 CAPSULE ORAL NIGHTLY
Qty: 180 CAPSULE | Refills: 0 | Status: SHIPPED | OUTPATIENT
Start: 2017-06-02 | End: 2017-11-29

## 2017-06-02 NOTE — PROGRESS NOTES
Subjective:      Jaki Bajwa is a 46 y.o. female who was referred by Kristyn Carmichael [facility] for evaluation of recurrent UTIs.    Recurrent UTIs  Patient complains of recurrent urinary tract infections.  These have been occuring for 1 years and she reports 6 infections in the past year.  Her most recent infection was in April of this year.  Of her recent infections, 4 are culture proven, including E. coli.  She describes associated symptoms during these episodes as burning with urination, frequency and urgency.  She denies associated fever and flank pain.  She reports that symptoms improve with antibiotic treatment, which have included Nitrofurantoin and Trimethoprim/sulfa.  She is is sexually active. The timing of her infections is related to intercourse.  Other treatments have included none antibiotics.  Other urologic history includes none.    The patient presents today with recurrent UTIs since her hysterectomy 1 year ago. Today she reports malodorous urine, urinary frequency, and urgency. She denies flank pain, fever, and hematuria today. The patient reports that her urinary infections are related to intercourse. She does void before and after intercourse. No history of kidney stones.     The following portions of the patient's history were reviewed and updated as appropriate: allergies, current medications, past family history, past medical history, past social history, past surgical history and problem list.    Review of Systems  Constitutional: no fever or chills  ENT: no nasal congestion or sore throat  Respiratory: no cough or shortness of breath  Cardiovascular: no chest pain or palpitations  Gastrointestinal: no nausea or vomiting, tolerating diet  Genitourinary: as per HPI  Hematologic/Lymphatic: no easy bruising or lymphadenopathy  Musculoskeletal: no arthralgias or myalgias  Neurological: no seizures or tremors  Behavioral/Psych: no auditory or visual hallucinations     Objective:   Vital  "Signs:/82 (BP Location: Right arm, Patient Position: Sitting, BP Method: Automatic)   Pulse (!) 112   Ht 5' 7" (1.702 m)   Wt 124.3 kg (274 lb)   LMP 07/11/2012   BMI 42.91 kg/m²     Physical Exam   General: alert and oriented, no acute distress  Head: normocephalic, atraumatic  Neck: supple, no lymphadenopathy, normal ROM, no masses  Respiratory: Symmetric expansion, non-labored breathing  Cardiovascular: regular rate and rhythm, nomal pulses, no peripheral edema  Abdomen: soft, non tender, non distended, no palpable masses, no hernias, no hepatomegaly or splenomegaly  Pelvic:not examined   Lymphatic: no inguinal nodes  Skin: normal coloration and turgor, no rashes, no suspicious skin lesions noted  Neuro: alert and oriented x3, no gross deficits  Psych: normal judgment and insight, normal mood/affect and non-anxious    Lab Review   Urinalysis demonstrates positive for nitrites, leukocytes (++), protein (trace)  Lab Results   Component Value Date    WBC 6.95 04/18/2017    HGB 11.9 (L) 04/18/2017    HCT 39.3 04/18/2017    MCV 81 (L) 04/18/2017     04/18/2017     Lab Results   Component Value Date    CREATININE 1.0 04/18/2017    BUN 15 04/18/2017       Imaging   None   Assessment:     1. Recurrent UTI (urinary tract infection)      Plan:   Jaki CHI was seen today for urinary tract infection.    Diagnoses and all orders for this visit:    Recurrent UTI (urinary tract infection)  -     POCT URINE DIPSTICK WITHOUT MICROSCOPE  -     Urine culture  -     nitrofurantoin (MACRODANTIN) 50 MG capsule; Take 1 capsule (50 mg total) by mouth every evening.  -     cephALEXin (KEFLEX) 500 MG capsule; Take 1 capsule (500 mg total) by mouth every 12 (twelve) hours.  -     phenazopyridine (PYRIDIUM) 100 MG tablet; Take 1 tablet (100 mg total) by mouth 3 (three) times daily as needed for Pain.  -     US Kidney Only; Future    Plan:   1. Discussed various etiologies for recurrent UTIs as well as the difference " between recurrent and persistent UTIs.   2. Renal US to r/o hydro and/or stones.  3. Will send urine culture today and treat as indicated.   4. Begin keflex for current infection, may adjust based on the urine culture  5. Expressed importance of obtaining culture any time she thinks she has UTI - standing order placed for UA and UCx and pt provided with cups to collect at home.  6. Discussed preventive measures including adequate hydration, cranberry juice, regular voiding, and voiding after intercourse.  7. Discussed prophylactic antibiotics, which she wishes to start. Prescription provided forMacrodantin 50mg daily.  8. FU after US for cysto

## 2017-06-02 NOTE — PATIENT INSTRUCTIONS
140 fl oz water daily    EAS protein shakes, Atkins    protein shake (at least 20-30 g, no more than 5 g sugar) for breakfast and lunch and 1 snack  Sensible meal-lean protein(4-6 oz fish, chicken, turkey, lean beef) and 1-2 cup green vegetables for dinner.   No soda, sweet tea, juices or lemonade     Continue with exercise. Add some type of resistance training 2-3 days a week. These can be body weight exercises, light weight or elastic bands. [employer] and [employer] are great sources for free work out plans and videos.       [email or web address]         Liraglutide injection (Weight Management)  What is this medicine?  LIRAGLUTIDE (LIR a GLOO tide) is used with a reduced calorie diet and exercise to help you lose weight.  How should I use this medicine?  This medicine is for injection under the skin of your upper leg, stomach area, or upper arm. You will be taught how to prepare and give this medicine. Use exactly as directed. Take your medicine at regular intervals. Do not take it more often than directed.   It is important that you put your used needles and syringes in a special sharps container. Do not put them in a trash can. If you do not have a sharps container, call your pharmacist or healthcare provider to get one.   A special MedGuide will be given to you by the pharmacist with each prescription and refill. Be sure to read this information carefully each time.   Talk to your pediatrician regarding the use of this medicine in children. Special care may be needed.  What side effects may I notice from receiving this medicine?  Side effects that you should report to your doctor or health care professional as soon as possible:  · allergic reactions like skin rash, itching or hives, swelling of the face, lips, or tongue  · breathing problems  · fever, chills  · loss of appetite  · signs and symptoms of low blood sugar such as feeling anxious, confusion, dizziness, increased hunger, unusually weak or tired, sweating,  shakiness, cold, irritable, headache, blurred vision, fast heartbeat, loss of consciousness  · trouble passing urine or change in the amount of urine  · unusual stomach pain or upset  · vomiting  Side effects that usually do not require medical attention (Report these to your doctor or health care professional if they continue or are bothersome.):  · constipation  · diarrhea  · fatigue  · headache  · nausea  What may interact with this medicine?  · acetaminophen  · atorvastatin  · birth control pills  · digoxin  · griseofulvin  · lisinopril  What if I miss a dose?  If you miss a dose, take it as soon as you can. If it is almost time for your next dose, take only that dose. Do not take double or extra doses. If you miss your dose for 3 days or more, call your doctor or health care professional to talk about how to restart this medicine.  Where should I keep my medicine?  Keep out of the reach of children.  Store unopened pen in a refrigerator between 2 and 8 degrees C (36 and 46 degrees F). Do not freeze or use if the medicine has been frozen. Protect from light and excessive heat. After you first use the pen, it can be stored at room temperature between 15 and 30 degrees C (59 and 86 degrees F) or in a refrigerator. Throw away your used pen after 30 days or after the expiration date, whichever comes first.  Do not store your pen with the needle attached. If the needle is left on, medicine may leak from the pen.  What should I tell my health care provider before I take this medicine?  They need to know if you have any of these conditions:  · endocrine tumors (MEN 2) or if someone in your family had these tumors  · gallstones  · high cholesterol  · history of alcohol abuse problem  · history of pancreatitis  · kidney disease or if you are on dialysis  · liver disease  · previous swelling of the tongue, face, or lips with difficulty breathing, difficulty swallowing, hoarseness, or tightening of the throat  · stomach  problems  · suicidal thoughts, plans, or attempt; a previous suicide attempt by you or a family member  · thyroid cancer or if someone in your family had thyroid cancer  · an unusual or allergic reaction to liraglutide, medicines, foods, dyes, or preservatives  · pregnant or trying to get pregnant  · breast-feeding  What should I watch for while using this medicine?  Visit your doctor or health care professional for regular checks on your progress. This medicine is intended to be used in addition to a healthy diet and appropriate exercise. The best results are achieved this way. Do not increase or in any way change your dose without consulting your doctor or health care professional.  This medicine may affect blood sugar levels. If you have diabetes, check with your doctor or health care professional before you change your diet or the dose of your diabetic medicine.  Patients and their families should watch out for worsening depression or thoughts of suicide. Also watch out for sudden changes in feelings such as feeling anxious, agitated, panicky, irritable, hostile, aggressive, impulsive, severely restless, overly excited and hyperactive, or not being able to sleep. If this happens, especially at the beginning of treatment or after a change in dose, call your health care professional.  Date Last Reviewed:   NOTE:This sheet is a summary. It may not cover all possible information. If you have questions about this medicine, talk to your doctor, pharmacist, or health care provider. Copyright© 2016 [employer]      Initial: 0.6 mg once daily for one week; increase by 0.6 mg daily at weekly intervals to a target dose of 3 mg once daily.

## 2017-06-02 NOTE — PROGRESS NOTES
Jaki Bajwa is a 46 y.o. year old  who presents for annual exam.  She is scheduled for a mammogram later today.  She is S/P partial hysterectomy- ovaries still in place.  Pt desires STD screening - full panel.  She is requesting a refill for her estradiol patches- uses to help manage her hot flashes.   Pt c/o abnormal urine odor.  Denies any associated fever, back pain, N/V.   Reports frequent UTIs since her hysterectomy.  She has an appt today with urology for evaluation.  Pt also c/o painful intercourse.  She is using a water based lubricant prior to intercourse.  Denies any abnormal vaginal discharge, itching or odor.  She states she has occasional GRACE with coughing, laughing, and sneezing.  Reports fatigue- she has a history of lupus.       ROS:  GENERAL: Feeling well overall.   SKIN: Denies rash or lesions.   HEAD: Denies head injury or headache.   NODES: Denies enlarged lymph nodes.   CHEST: Denies chest pain or shortness of breath.   CARDIOVASCULAR: Denies palpitations or left sided chest pain.   ABDOMEN: No abdominal pain, nausea, vomiting or rectal bleeding.   URINARY: No dysuria or hematuria.  REPRODUCTIVE: See HPI.   BREASTS: Denies pain, lumps, or nipple discharge.   HEMATOLOGIC: No easy bruisability or excessive bleeding.   MUSCULOSKELETAL: Denies joint pain or swelling.   NEUROLOGIC: Denies syncope or weakness.   PSYCHIATRIC: Denies depression.    PE:  APPEARANCE: Well nourished, well developed, in no acute distress.  NODES: No inguinal lymph node enlargement.  ABDOMEN: Soft. No tenderness or masses. No hernias.  BREASTS: Symmetrical, no skin changes or visible lesions. No palpable masses, nipple discharge or adenopathy bilaterally. + bilateral breast reduction scars noted.   PELVIC: Normal external female genitalia without lesions. Normal hair distribution. Adequate perineal body, normal urethral meatus. Vagina without lesions or discharge. No significant cystocele or rectocele. Uterus and  cervix surgically absent. Bimanual exam revealed no masses, tenderness or abnormality.  ANUS: Normal.    Diagnosis:  1. Screen for STD (sexually transmitted disease)    2. Women's annual routine gynecological examination    3. Dyspareunia, female    4. Abnormal urine odor    5. GRACE (stress urinary incontinence, female)    6. Encounter for screening for HIV    7. Fatigue, unspecified type    8. Hot flashes, menopausal          PLAN:  Mammogram today  STD screening  Climara patches refilled.  Discussed R/B/A ERT.  Urology today for evaluation of recurrent UTIs  Discussed management options for painful intercourse including water based lubricant, Osphena, Premarin cream, and lidocaine jelly.  Pt to contact clinic if she desires to start treatment  Discussed timed voiding, decreasing bladder irritants, and Kegel exercises for GRACE.  Discussed referral to Uro Gyn  Orders Placed This Encounter    C. trachomatis/N. gonorrhoeae by AMP DNA Cervix    Vaginosis Screen by DNA Probe    HIV-1 and HIV-2 antibodies    RPR    Hepatitis panel, acute    estradiol (CLIMARA) 0.0375 mg/24 hr       Patient was counseled today on postmenopausal issues.     Follow-up in 1 year.    MAGDALENA Alatorre-C

## 2017-06-02 NOTE — PROGRESS NOTES
"Subjective:      Patient ID: Jaki Bajwa is a 46 y.o. female.    Chief Complaint: Weight Loss    She is here for weight loss management. She has tried in the past to exercise for 6 months. She worked 2 days a week which included 20 minutes of stretching. This did not help with her weight. Her heaviest weight is 279. Her goal weight is 250. She reports willingness to change.     Breakfast  Skip  Grits/2 eggs/sausage  8 oz Water     Lunch   Skip  [place]-white bread/bologna  8 oz water    Dinner  1 TV dinner-banquet   Chicken/vegetables/potatoes   Take out-chinese/ribs  8 oz water       Review of Systems   Constitutional: Negative.    Respiratory: Negative.    Cardiovascular: Negative.      I personally reviewed Past Medical History, Past Surgical history,  Past Social History and Family History    Objective:   /82   Pulse 109   Ht 5' 7" (1.702 m)   Wt 124.1 kg (273 lb 9.5 oz)   LMP 07/11/2012   SpO2 96%   BMI 42.85 kg/m²     Physical Exam   Constitutional: She is oriented to person, place, and time. She appears well-developed and well-nourished. No distress.   HENT:   Head: Normocephalic.   Right Ear: External ear normal.   Left Ear: External ear normal.   Mouth/Throat: Oropharynx is clear and moist.   Eyes: Conjunctivae and EOM are normal. Pupils are equal, round, and reactive to light. Right eye exhibits no discharge. Left eye exhibits no discharge. No scleral icterus.   Neck: Normal range of motion. No tracheal deviation present. No thyromegaly present.   Cardiovascular: Normal rate, regular rhythm, normal heart sounds and intact distal pulses.  Exam reveals no gallop.    No murmur heard.  Pulmonary/Chest: Effort normal and breath sounds normal. No respiratory distress. She has no wheezes. She has no rales. She exhibits no tenderness.   Abdominal: Soft. Bowel sounds are normal. She exhibits no distension and no mass. There is no tenderness. There is no rebound and no guarding. "   Musculoskeletal: Normal range of motion.   Neurological: She is alert and oriented to person, place, and time.   Skin: Skin is warm and dry.   Psychiatric: She has a normal mood and affect. Her behavior is normal. Judgment and thought content normal.   Vitals reviewed.      Jaki CHI was seen today for weight loss.    Diagnoses and all orders for this visit:    Essential hypertension  Sjogren's syndrome with keratoconjunctivitis sicca  TMJ syndrome  Mild episode of recurrent major depressive disorder  Bipolar  Fibromyalgia  Anxiety  Morbid obesity, unspecified obesity type  -reviewed with patient dietary changes, discussed all co-morbidities and medications   -discussed side effects pancreatitis, diarrhea, nausea  -   -     liraglutide 0.6 mg/0.1 mL, 18 mg/3 mL, subq PNIJ (VICTOZA 3-KHUSHI) 0.6 mg/0.1 mL (18 mg/3 mL) PnIj; Inject 0.6 mg into the skin once daily.          Other orders  -     Cancel: Mammo Digital Screening Bilat with CAD; Future

## 2017-06-04 LAB — BACTERIA UR CULT: NORMAL

## 2017-06-05 ENCOUNTER — TELEPHONE (OUTPATIENT)
Dept: UROLOGY | Facility: CLINIC | Age: 47
End: 2017-06-05

## 2017-06-05 ENCOUNTER — TELEPHONE (OUTPATIENT)
Dept: INTERNAL MEDICINE | Facility: CLINIC | Age: 47
End: 2017-06-05

## 2017-06-05 ENCOUNTER — TELEPHONE (OUTPATIENT)
Dept: OBSTETRICS AND GYNECOLOGY | Facility: CLINIC | Age: 47
End: 2017-06-05

## 2017-06-05 NOTE — TELEPHONE ENCOUNTER
"----- Message from Zehra Velasco sent at 6/5/2017 12:02 PM CDT -----  Contact: Patient herself  X  1st Request  _  2nd Request  _  3rd Request    Who: Angy Bajwa (mrn# [id number])    Why: Patient called and said, "she's returning your call and would like you to please call again."   THANKS!    What Number to Call Back:  [phone]    When to Expect a call back: (Before the end of the day)   -- if the call is after 12:00, the call back will be tomorrow.                        "

## 2017-06-05 NOTE — TELEPHONE ENCOUNTER
Pt informed of normal mammo. Pt states verbal understanding. Patient has no further questions or concerns.

## 2017-06-05 NOTE — TELEPHONE ENCOUNTER
----- Message from Najma Gallegos sent at 6/5/2017  8:12 AM CDT -----  Contact: pt   X_  1st Request  _  2nd Request  _  3rd Request    Who:PENG MAYO [[id number]]    Why: Patient states she would like to reschedule her procedure...... Please contact patient to further discuss and advise     What Number to Call Back: [phone]    When to Expect a call back: (Before the end of the day)   -- if call after 3:00 call back will be tomorrow.

## 2017-06-07 ENCOUNTER — TELEPHONE (OUTPATIENT)
Dept: UROLOGY | Facility: CLINIC | Age: 47
End: 2017-06-07

## 2017-06-07 RX ORDER — FLUCONAZOLE 150 MG/1
150 TABLET ORAL DAILY
Qty: 1 TABLET | Refills: 0 | Status: SHIPPED | OUTPATIENT
Start: 2017-06-07 | End: 2017-06-08

## 2017-06-07 NOTE — TELEPHONE ENCOUNTER
----- Message from Zonia Rhoades sent at 6/7/2017  9:16 AM CDT -----  Contact: pt  _  1st Request  _  2nd Request  _  3rd Request        Who: pt    Why: pt needs to speak to the nurse due to the antibiotics that was given is making her dry in the vaginal region. Please call the pt     What Number to Call Back:[phone]    When to Expect a call back: (Before the end of the day)   -- if the call is after 12:00, the call back will be tomorrow.

## 2017-06-07 NOTE — TELEPHONE ENCOUNTER
Spoke with the patient and called in a single dose of diflucan. Instructed the patient that it is okay to take Azo instead of pyridium, which has insurance is not covering.

## 2017-06-13 ENCOUNTER — PROCEDURE VISIT (OUTPATIENT)
Dept: UROLOGY | Facility: CLINIC | Age: 47
End: 2017-06-13
Attending: UROLOGY
Payer: MEDICARE

## 2017-06-13 VITALS
SYSTOLIC BLOOD PRESSURE: 126 MMHG | WEIGHT: 274 LBS | HEIGHT: 67 IN | BODY MASS INDEX: 43 KG/M2 | DIASTOLIC BLOOD PRESSURE: 87 MMHG | HEART RATE: 123 BPM

## 2017-06-13 DIAGNOSIS — N39.0 RECURRENT UTI (URINARY TRACT INFECTION): ICD-10-CM

## 2017-06-13 LAB
BILIRUB SERPL-MCNC: NORMAL MG/DL
BLOOD URINE, POC: NORMAL
COLOR, POC UA: NORMAL
GLUCOSE UR QL STRIP: NORMAL
KETONES UR QL STRIP: NORMAL
LEUKOCYTE ESTERASE URINE, POC: NORMAL
NITRITE, POC UA: NORMAL
PH, POC UA: 6
PROTEIN, POC: NORMAL
SPECIFIC GRAVITY, POC UA: 1
UROBILINOGEN, POC UA: NORMAL

## 2017-06-13 PROCEDURE — 81002 URINALYSIS NONAUTO W/O SCOPE: CPT | Mod: PBBFAC | Performed by: UROLOGY

## 2017-06-13 RX ORDER — TAMSULOSIN HYDROCHLORIDE 0.4 MG/1
0.4 CAPSULE ORAL DAILY
Qty: 30 CAPSULE | Refills: 11 | Status: SHIPPED | OUTPATIENT
Start: 2017-06-13 | End: 2018-02-08

## 2017-06-13 RX ORDER — LIDOCAINE HYDROCHLORIDE 20 MG/ML
JELLY TOPICAL
Status: CANCELLED | OUTPATIENT
Start: 2017-06-13 | End: 2017-06-13

## 2017-06-13 RX ORDER — LIDOCAINE HYDROCHLORIDE 20 MG/ML
JELLY TOPICAL
Status: COMPLETED | OUTPATIENT
Start: 2017-06-13 | End: 2017-06-13

## 2017-06-13 RX ADMIN — LIDOCAINE HYDROCHLORIDE 5 ML: 20 JELLY TOPICAL at 12:06

## 2017-06-13 NOTE — PROCEDURES
"Jaki Bajwa is a 46 y.o. female patient.    Pulse: (!) 123 (06/13/17 1239)  BP: 126/87 (06/13/17 1239)  Weight: 124.3 kg (274 lb) (06/13/17 1239)  Height: 5' 7" (170.2 cm) (06/13/17 1239)       Cystoscopy  Date/Time: 6/13/2017 1:08 PM  Performed by: SCOTT EUGENE  Authorized by: DANA RICK     Consent Done?:  Yes (Written)  Time out: Immediately prior to procedure a "time out" was called to verify the correct patient, procedure, equipment, support staff and site/side marked as required.    Indications: recurrent UTIs    Position:  Dorsal lithotomy  Anesthesia:  Intraurethral instillation  Patient sedated?: No    Preparation: Patient was prepped and draped in usual sterile fashion      Scope type:  Flexible cystoscope  Stent inserted: No    External exam normal: Yes    Digital exam performed: Yes    Urethra normal: Yes  Bladder neck normal: Bladder neck normal   Bladder normal: Yes      Patient tolerance:  Patient tolerated the procedure well with no immediate complications     No tumors  No stones  Few mucosal petechiae suggestive of recent infection  Sediment at bladder base suggestive of incomplete emptying    No evidence of yeast vaginitis    Renal US normal    Recurrent UTI  Incomplete bladder emptying    Add flomax  Cont nitrofurantoin qhs and post coital  rtc 3 months; check pvr        Scott Eugene  6/13/2017  "

## 2017-06-29 ENCOUNTER — TELEPHONE (OUTPATIENT)
Dept: INTERNAL MEDICINE | Facility: CLINIC | Age: 47
End: 2017-06-29

## 2017-06-29 NOTE — TELEPHONE ENCOUNTER
Pt states she needed clarity on rtc appt with PCP. Pt informed of 4wk f/u appt with Dr. Bartlett to f/u on weight loss visit from 6/2/2017.  States verbal understanding.  Pt states she will call the office when ready to schedule. Patient has no further questions or concerns.

## 2017-06-29 NOTE — TELEPHONE ENCOUNTER
----- Message from Najma Gallegos sent at 6/29/2017  9:52 AM CDT -----  Contact: pt  _X  1st Request  _  2nd Request  _  3rd Request    Who:PENG MAYO [[id number]]    Why: Patient states she would like to speak with the staff in regards to scheduling with the weight loss dept...... Please contact patient to further discuss and advise     What Number to Call Back: [phone]    When to Expect a call back: (Before the end of the day)   -- if call after 3:00 call back will be tomorrow.

## 2017-07-14 DIAGNOSIS — M79.10 MYALGIA: ICD-10-CM

## 2017-07-14 RX ORDER — TIZANIDINE 4 MG/1
TABLET ORAL
Qty: 270 TABLET | Refills: 0 | Status: SHIPPED | OUTPATIENT
Start: 2017-07-14 | End: 2017-10-09 | Stop reason: SDUPTHER

## 2017-07-18 ENCOUNTER — OFFICE VISIT (OUTPATIENT)
Dept: INTERNAL MEDICINE | Facility: CLINIC | Age: 47
End: 2017-07-18
Payer: MEDICARE

## 2017-07-18 VITALS
DIASTOLIC BLOOD PRESSURE: 76 MMHG | SYSTOLIC BLOOD PRESSURE: 104 MMHG | BODY MASS INDEX: 42.7 KG/M2 | HEART RATE: 91 BPM | HEIGHT: 67 IN | WEIGHT: 272.06 LBS

## 2017-07-18 DIAGNOSIS — N95.1 HOT FLASHES, MENOPAUSAL: ICD-10-CM

## 2017-07-18 DIAGNOSIS — M35.01 SJOGREN'S SYNDROME WITH KERATOCONJUNCTIVITIS SICCA: ICD-10-CM

## 2017-07-18 DIAGNOSIS — F33.0 MILD EPISODE OF RECURRENT MAJOR DEPRESSIVE DISORDER: Chronic | ICD-10-CM

## 2017-07-18 DIAGNOSIS — G89.4 CHRONIC PAIN SYNDROME: ICD-10-CM

## 2017-07-18 DIAGNOSIS — M79.7 FIBROMYALGIA: ICD-10-CM

## 2017-07-18 DIAGNOSIS — F41.9 ANXIETY: ICD-10-CM

## 2017-07-18 DIAGNOSIS — I10 ESSENTIAL HYPERTENSION: Primary | Chronic | ICD-10-CM

## 2017-07-18 DIAGNOSIS — F31.9 BIPOLAR 1 DISORDER: ICD-10-CM

## 2017-07-18 PROCEDURE — 99999 PR PBB SHADOW E&M-EST. PATIENT-LVL III: CPT | Mod: PBBFAC,,, | Performed by: INTERNAL MEDICINE

## 2017-07-18 PROCEDURE — 99213 OFFICE O/P EST LOW 20 MIN: CPT | Mod: PBBFAC | Performed by: INTERNAL MEDICINE

## 2017-07-18 PROCEDURE — 99214 OFFICE O/P EST MOD 30 MIN: CPT | Mod: S$PBB,,, | Performed by: INTERNAL MEDICINE

## 2017-07-18 RX ORDER — DESONIDE 0.5 MG/G
CREAM TOPICAL
Qty: 30 G | Refills: 3 | Status: SHIPPED | OUTPATIENT
Start: 2017-07-18 | End: 2018-06-26 | Stop reason: SDUPTHER

## 2017-07-18 RX ORDER — HYDROCODONE BITARTRATE AND ACETAMINOPHEN 5; 325 MG/1; MG/1
1 TABLET ORAL 2 TIMES DAILY PRN
Qty: 60 TABLET | Refills: 0 | Status: SHIPPED | OUTPATIENT
Start: 2017-07-18 | End: 2017-07-18 | Stop reason: SDUPTHER

## 2017-07-18 RX ORDER — HYDROCODONE BITARTRATE AND ACETAMINOPHEN 5; 325 MG/1; MG/1
1 TABLET ORAL 2 TIMES DAILY PRN
Qty: 60 TABLET | Refills: 0 | Status: SHIPPED | OUTPATIENT
Start: 2017-09-16 | End: 2017-10-18 | Stop reason: SDUPTHER

## 2017-07-18 RX ORDER — HYDROCODONE BITARTRATE AND ACETAMINOPHEN 5; 325 MG/1; MG/1
1 TABLET ORAL 2 TIMES DAILY PRN
Qty: 60 TABLET | Refills: 0 | Status: SHIPPED | OUTPATIENT
Start: 2017-08-17 | End: 2017-07-18 | Stop reason: SDUPTHER

## 2017-07-18 NOTE — PROGRESS NOTES
Subjective:       Patient ID: Jaki Bajwa is a 46 y.o. female.    Chief Complaint: Hypertension (3 mth f/u) and Medication Refill    Pt here for f/u. She has a dx of sjogrens and fibromyalgia based on blood work and chronic pain that is primarily across shoulders and in legs but also in upper and lower back. As a result she is on several meds that she says helps. She sees rheum at [facility] and has regular f/u. She has prior dx of lupus but this was not corroborated in notes from rheum. She is on plaquenil, tizanidine, gabapentin and norco. She uses norco 1-2x/day. She is due for refills on pain meds. She has previously signed a pain contract. [place]  reviewed and is consistent. She is seeing pain mgmt. She has also done PT with some success in the past.     She also has dx of sjogren's syndrome. She was on pilocarpine but stopped b/c it made her nauseous. Uses eye drops for dry eyes.    Pt's BP is well controlled. Tolerating meds well. Pt denies cp/sob/ha/vision or neuro changes. Not checking at home.     She continues to see psychiatry for bipolar and anxiety. This is well controlled. No SI/HI.         Hypertension   Pertinent negatives include no chest pain, headaches or shortness of breath.   Medication Refill   Associated symptoms include arthralgias and myalgias. Pertinent negatives include no abdominal pain, chest pain, headaches, numbness or weakness.   Fibromyalgia   Associated symptoms include arthralgias and myalgias. Pertinent negatives include no abdominal pain, chest pain, headaches, numbness or weakness.   Back Pain   Pertinent negatives include no abdominal pain, chest pain, headaches, numbness or weakness.     Review of Systems   Constitutional: Negative for unexpected weight change.   Eyes: Negative for visual disturbance.   Respiratory: Negative for shortness of breath.    Cardiovascular: Negative for chest pain.   Gastrointestinal: Negative for abdominal pain.   Endocrine: Negative for polyuria.    Genitourinary: Negative for frequency.   Musculoskeletal: Positive for arthralgias, back pain and myalgias.   Neurological: Negative for weakness, numbness and headaches.   Psychiatric/Behavioral: Negative for dysphoric mood.       Objective:          Assessment:       1. Essential hypertension    2. Chronic pain syndrome    3. Bipolar 1 disorder    4. Anxiety    5. Fibromyalgia    6. Mild episode of recurrent major depressive disorder    7. Sjogren's syndrome with keratoconjunctivitis sicca    8. Hot flashes, menopausal        Plan:       1. Refill norco--3 separate 30 day rxs given for 90 days total--proper use d/w pt  2. Keep f/u with specialists  3. Refill requested meds  4. F/u 3 mos          Physical Exam   Constitutional: She is oriented to person, place, and time. She appears well-developed and well-nourished.   Neck: Neck supple. No thyromegaly present.   Cardiovascular: Normal rate, regular rhythm and normal heart sounds.    Pulmonary/Chest: Effort normal and breath sounds normal.   Musculoskeletal: She exhibits no edema.        Lumbar back: She exhibits normal range of motion, no tenderness and no bony tenderness.   Lymphadenopathy:     She has no cervical adenopathy.   Neurological: She is alert and oriented to person, place, and time. No cranial nerve deficit.   Psychiatric: She has a normal mood and affect. Her behavior is normal.

## 2017-07-26 ENCOUNTER — OFFICE VISIT (OUTPATIENT)
Dept: INTERNAL MEDICINE | Facility: CLINIC | Age: 47
End: 2017-07-26
Attending: FAMILY MEDICINE
Payer: MEDICARE

## 2017-07-26 VITALS
WEIGHT: 270.31 LBS | BODY MASS INDEX: 42.43 KG/M2 | HEIGHT: 67 IN | DIASTOLIC BLOOD PRESSURE: 70 MMHG | OXYGEN SATURATION: 96 % | HEART RATE: 98 BPM | SYSTOLIC BLOOD PRESSURE: 112 MMHG

## 2017-07-26 DIAGNOSIS — M79.7 FIBROMYALGIA: ICD-10-CM

## 2017-07-26 DIAGNOSIS — I10 ESSENTIAL HYPERTENSION: Primary | Chronic | ICD-10-CM

## 2017-07-26 PROCEDURE — 99214 OFFICE O/P EST MOD 30 MIN: CPT | Mod: PBBFAC | Performed by: FAMILY MEDICINE

## 2017-07-26 PROCEDURE — 99213 OFFICE O/P EST LOW 20 MIN: CPT | Mod: S$PBB,,, | Performed by: FAMILY MEDICINE

## 2017-07-26 PROCEDURE — 99999 PR PBB SHADOW E&M-EST. PATIENT-LVL IV: CPT | Mod: PBBFAC,,, | Performed by: FAMILY MEDICINE

## 2017-07-26 NOTE — PROGRESS NOTES
"Subjective:      Patient ID: Jaki Bajwa is a 46 y.o. female.    Chief Complaint: Weight Loss (f/u)    She reports her nausea is manageable she gets it only in the morning and resolves. She denies vomiting, abdominal pain, blood in stool or black tarry stool. Mood has been ok.     Breakfast  Oatmeal  Generic protein shake   Yoplait yogurt  2 oz water     Lunch   Tuna   Fruits  Red bean salad   1 bottle 16.9 oz     Dinner   Lean cuisine     Snacks   Watermelon       Review of Systems   Constitutional: Negative.    Respiratory: Negative.    Cardiovascular: Negative.    Gastrointestinal: Negative.    Genitourinary: Negative.      I personally reviewed Past Medical History, Past Surgical history,  Past Social History and Family History    Objective:   /70   Pulse 98   Ht 5' 7" (1.702 m)   Wt 122.6 kg (270 lb 4.5 oz)   LMP 07/11/2012   SpO2 96%   BMI 42.33 kg/m²     Physical Exam   Constitutional: She is oriented to person, place, and time. She appears well-developed and well-nourished. No distress.   HENT:   Head: Normocephalic.   Right Ear: External ear normal.   Left Ear: External ear normal.   Mouth/Throat: Oropharynx is clear and moist.   Eyes: Conjunctivae and EOM are normal. Pupils are equal, round, and reactive to light. Right eye exhibits no discharge. Left eye exhibits no discharge. No scleral icterus.   Neck: Normal range of motion. Neck supple.   Cardiovascular: Normal rate, regular rhythm, normal heart sounds and intact distal pulses.  Exam reveals no gallop.    No murmur heard.  Pulmonary/Chest: Effort normal and breath sounds normal. No respiratory distress. She has no wheezes. She has no rales. She exhibits no tenderness.   Abdominal: Soft. Bowel sounds are normal. She exhibits no distension and no mass. There is no tenderness. There is no rebound and no guarding.   Neurological: She is alert and oriented to person, place, and time.   Vitals reviewed.      Jaki CHI was seen today for " "weight loss.    Diagnoses and all orders for this visit:    Essential hypertension  Fibromyalgia  -patient with 4 pound weight loss in 4 weeks, will continue victoza and increase to 1.8 mg    When you start Victoza, you will need to titrate it upwards to the final dose of 1.8 mg over several days.  There are only certain numbers mraked on the dial of the pen, but you can hear it clicking, so you will use the "click" sound to help you adjust it. The directions for that are as follows:  To Start Victoza:   Start  With 6 clicks.  Go up by 3 clicks every 3 days.  The goal is to get to 1.8 mg dose.     If you get pain across the upper abdomen, stop and call the office.       -     liraglutide 0.6 mg/0.1 mL, 18 mg/3 mL, subq PNIJ (VICTOZA 3-KHUSHI) 0.6 mg/0.1 mL (18 mg/3 mL) PnIj; Inject 3 mg into the skin once daily.      "

## 2017-07-26 NOTE — PATIENT INSTRUCTIONS
liraglutide 1.2 x 7 days  liraglutide 1.8 x 7 days   liraglutide 2.4 x7day   liraglutide 3 x 7days

## 2017-08-23 ENCOUNTER — OFFICE VISIT (OUTPATIENT)
Dept: INTERNAL MEDICINE | Facility: CLINIC | Age: 47
End: 2017-08-23
Attending: FAMILY MEDICINE
Payer: MEDICARE

## 2017-08-23 VITALS
OXYGEN SATURATION: 95 % | WEIGHT: 263.69 LBS | HEART RATE: 100 BPM | HEIGHT: 67 IN | SYSTOLIC BLOOD PRESSURE: 112 MMHG | BODY MASS INDEX: 41.39 KG/M2 | DIASTOLIC BLOOD PRESSURE: 84 MMHG

## 2017-08-23 DIAGNOSIS — E66.01 MORBID OBESITY, UNSPECIFIED OBESITY TYPE: Primary | ICD-10-CM

## 2017-08-23 DIAGNOSIS — I10 ESSENTIAL HYPERTENSION: Chronic | ICD-10-CM

## 2017-08-23 PROCEDURE — 3074F SYST BP LT 130 MM HG: CPT | Mod: ,,, | Performed by: FAMILY MEDICINE

## 2017-08-23 PROCEDURE — 99213 OFFICE O/P EST LOW 20 MIN: CPT | Mod: S$PBB,,, | Performed by: FAMILY MEDICINE

## 2017-08-23 PROCEDURE — 3079F DIAST BP 80-89 MM HG: CPT | Mod: ,,, | Performed by: FAMILY MEDICINE

## 2017-08-23 PROCEDURE — 99999 PR PBB SHADOW E&M-EST. PATIENT-LVL III: CPT | Mod: PBBFAC,,, | Performed by: FAMILY MEDICINE

## 2017-08-23 PROCEDURE — 99213 OFFICE O/P EST LOW 20 MIN: CPT | Mod: PBBFAC | Performed by: FAMILY MEDICINE

## 2017-08-23 NOTE — PROGRESS NOTES
"Subjective:      Patient ID: Jaki Bajwa is a 46 y.o. female.    Chief Complaint: Weight Loss (f/u)    She is here for HTN and weight loss follow up. She denies upset stomach, abdominal pain, nausea or vomiting. She is eating yogurt, smaller portion rice/veggies/chicken for lunch and dinner. She gets about 3 bottles water daily. She is not drinking anything but water. She will eat hard candy to help with dry mouth.       Review of Systems   Constitutional: Negative.    Respiratory: Negative.    Cardiovascular: Negative.    Gastrointestinal: Negative.      I personally reviewed Past Medical History, Past Surgical history,  Past Social History and Family History    Objective:   /84   Pulse 100   Ht 5' 7" (1.702 m)   Wt 119.6 kg (263 lb 10.7 oz)   LMP 07/11/2012   SpO2 95%   BMI 41.30 kg/m²     Physical Exam   Constitutional: She is oriented to person, place, and time. She appears well-developed and well-nourished. No distress.   HENT:   Head: Normocephalic and atraumatic.   Right Ear: External ear normal.   Left Ear: External ear normal.   Mouth/Throat: Oropharynx is clear and moist.   Eyes: Conjunctivae and EOM are normal. Pupils are equal, round, and reactive to light. Right eye exhibits no discharge. Left eye exhibits no discharge. No scleral icterus.   Neck: Normal range of motion. Neck supple. No thyromegaly present.   Cardiovascular: Normal rate, regular rhythm, normal heart sounds and intact distal pulses.  Exam reveals no gallop.    No murmur heard.  Pulmonary/Chest: Effort normal and breath sounds normal. No respiratory distress. She has no wheezes. She has no rales. She exhibits no tenderness.   Abdominal: Soft. Bowel sounds are normal. She exhibits no distension and no mass. There is no tenderness. There is no rebound and no guarding.   Neurological: She is alert and oriented to person, place, and time.   Skin: Skin is warm and dry.   Vitals reviewed.      Jaki CHI was seen today for " weight loss.    Diagnoses and all orders for this visit:    Essential hypertension  -controlled, cont current regimen     Morbid obesity   -patient with 7 pounds weight loss in the last 4 weeks, reviewed food journal, cont victoza and return in 4 weeks   -     liraglutide 0.6 mg/0.1 mL, 18 mg/3 mL, subq PNIJ (VICTOZA 3-KHUSHI) 0.6 mg/0.1 mL (18 mg/3 mL) PnIj; Inject 1.8 mg into the skin once daily.

## 2017-09-08 ENCOUNTER — OFFICE VISIT (OUTPATIENT)
Dept: PSYCHIATRY | Facility: CLINIC | Age: 47
End: 2017-09-08
Payer: MEDICARE

## 2017-09-08 DIAGNOSIS — F42.9 OBSESSIVE-COMPULSIVE DISORDER, UNSPECIFIED TYPE: Primary | ICD-10-CM

## 2017-09-08 DIAGNOSIS — F33.41 MAJOR DEPRESSIVE DISORDER, RECURRENT EPISODE, IN PARTIAL REMISSION: ICD-10-CM

## 2017-09-08 PROCEDURE — 90834 PSYTX W PT 45 MINUTES: CPT | Mod: PBBFAC | Performed by: SOCIAL WORKER

## 2017-09-08 PROCEDURE — 90834 PSYTX W PT 45 MINUTES: CPT | Mod: S$PBB,,, | Performed by: SOCIAL WORKER

## 2017-09-08 NOTE — PROGRESS NOTES
"Individual Psychotherapy (PhD/LCSW)    9/8/2017    Site:  [facility]         Therapeutic Intervention: Met with patient.  Outpatient - Insight oriented psychotherapy 45 min - CPT code 30516 and Outpatient - Behavior modifying psychotherapy 45 min - CPT code 86160    Chief complaint/reason for encounter: depression, anxiety and interpersonal     Interval history and content of current session:        Pt tells me today that she is angry.  That she is angry because Dr. Claire reduced her xanax.  That because of this she is staying home and not going out.   It is noted that the reduction in xanax occurred 4 mo. Ago for a total reduction of about 0.5mg per day.   I educated pt over the addictive potential of xanax.  I also reminded pt that her passive disposition has existed for as long as I have known her.  She insisted that she believes the reduction of xanax is to blame.  She conceded that she is currently taking showers.     She initially told me that she goes out with her boyfriend when he comes by but later told me this only happened in the past.  I believe they are spending days or weeks at each other's residence.   They tried to live together but pt was unable to tolerate this.    As usual she continues to struggle with various contradictions.    She reveals that if she was to have a second opportunity at life her choice in life would be to do "nothing".   She continues an isolated lifestyle with the exception of the visits by boyfriend.  She continues to buy new items because it gratifies her (even though he uses them just once).   She continues to want love (even though she can't tolerate too much of it).  She is gratified at boyfriend giving her gifts and money.  She reports the relationship is going well.  Her affect today is euthymic (usual for her).   She is encouraged to exercise.  She has failed to go to the [employer] around the block.  She avoids being alone but can not tolerate living with " boyfriend.  Frequent appointments has been encouraged.    Treatment plan:  · Target symptoms: depression, anxiety   · Why chosen therapy is appropriate versus another modality: relevant to diagnosis  · Outcome monitoring methods: self-report, observation    · Therapeutic intervention type: insight oriented psychotherapy, behavior modifying psychotherapy, supportive psychotherapy    Risk parameters:  Patient reports no suicidal ideation  Patient reports no homicidal ideation  Patient reports no self-injurious behavior  Patient reports no violent behavior    Verbal deficits: None    Patient's response to intervention:  The patient's response to intervention is accepting.    Progress toward goals and other mental status changes:  The patient's progress toward goals is limited.    Diagnosis: 296.35;   Obsessive compulsive disorder.  personality disorder nos      Plan:  individual psychotherapy    Return to clinic: 2 weeks pt prefers to come in 3 months.

## 2017-09-20 ENCOUNTER — OFFICE VISIT (OUTPATIENT)
Dept: UROLOGY | Facility: CLINIC | Age: 47
End: 2017-09-20
Payer: MEDICARE

## 2017-09-20 ENCOUNTER — TELEPHONE (OUTPATIENT)
Dept: OBSTETRICS AND GYNECOLOGY | Facility: CLINIC | Age: 47
End: 2017-09-20

## 2017-09-20 ENCOUNTER — CLINICAL SUPPORT (OUTPATIENT)
Dept: INTERNAL MEDICINE | Facility: CLINIC | Age: 47
End: 2017-09-20
Payer: MEDICARE

## 2017-09-20 VITALS
WEIGHT: 263.31 LBS | BODY MASS INDEX: 41.33 KG/M2 | SYSTOLIC BLOOD PRESSURE: 131 MMHG | HEIGHT: 67 IN | DIASTOLIC BLOOD PRESSURE: 90 MMHG | HEART RATE: 103 BPM

## 2017-09-20 VITALS — WEIGHT: 262.13 LBS | BODY MASS INDEX: 41.05 KG/M2

## 2017-09-20 DIAGNOSIS — N39.0 RECURRENT UTI (URINARY TRACT INFECTION): ICD-10-CM

## 2017-09-20 LAB
BILIRUB SERPL-MCNC: ABNORMAL MG/DL
BLOOD URINE, POC: ABNORMAL
COLOR, POC UA: ABNORMAL
GLUCOSE UR QL STRIP: NORMAL
KETONES UR QL STRIP: ABNORMAL
LEUKOCYTE ESTERASE URINE, POC: ABNORMAL
NITRITE, POC UA: ABNORMAL
PH, POC UA: 6
POC RESIDUAL URINE VOLUME: 17 ML (ref 0–100)
PROTEIN, POC: ABNORMAL
SPECIFIC GRAVITY, POC UA: 1
UROBILINOGEN, POC UA: NORMAL

## 2017-09-20 PROCEDURE — 3080F DIAST BP >= 90 MM HG: CPT | Mod: S$GLB,,, | Performed by: NURSE PRACTITIONER

## 2017-09-20 PROCEDURE — 3075F SYST BP GE 130 - 139MM HG: CPT | Mod: S$GLB,,, | Performed by: NURSE PRACTITIONER

## 2017-09-20 PROCEDURE — 87086 URINE CULTURE/COLONY COUNT: CPT

## 2017-09-20 PROCEDURE — 99213 OFFICE O/P EST LOW 20 MIN: CPT | Mod: 25,S$GLB,, | Performed by: NURSE PRACTITIONER

## 2017-09-20 PROCEDURE — 51798 US URINE CAPACITY MEASURE: CPT | Mod: S$GLB,,, | Performed by: NURSE PRACTITIONER

## 2017-09-20 PROCEDURE — 81002 URINALYSIS NONAUTO W/O SCOPE: CPT | Mod: S$GLB,,, | Performed by: NURSE PRACTITIONER

## 2017-09-20 NOTE — TELEPHONE ENCOUNTER
----- Message from Amalia Phillips NP sent at 9/20/2017  1:51 PM CDT -----  Keyshawn Simons,  I have been seeing Mrs. Bajwa in clinic for recurrent UTIs. I saw her this morning and she said that her skin has become very sensitive to the estrogen patch that ya'll have been prescribing. Do you want me to have her schedule an appointment with jeanine to discuss an alternative? Also, I was considering starting estrace cream for her atrophic vaginitis as well.   Thanks,  Amalia

## 2017-09-20 NOTE — PROGRESS NOTES
Subjective:      Jaki Bajwa is a 46 y.o. female who returns today regarding her recurrent UTIs.    Recurrent UTIs  Patient complains of recurrent urinary tract infections.  These have been occuring for 1 years and she reports 6 infections in the past year.  Her most recent infection was in April of this year.  Of her recent infections, 4 are culture proven, including E. coli.  She describes associated symptoms during these episodes as burning with urination, frequency and urgency.  She denies associated fever and flank pain.  She reports that symptoms improve with antibiotic treatment, which have included Nitrofurantoin and Trimethoprim/sulfa.  She is is sexually active. The timing of her infections is related to intercourse.  Other treatments have included none antibiotics.  Other urologic history includes none.     The patient reports recurrent UTIs since her hysterectomy, which was 1 year ago. When she was evaluated in June, she had an active infection. Treated with keflex and put on macrodantin 50 mg QHS. At the time she reported that her UTIs were related to intercourse. She does report voiding before and after intercourse. No history of kidney stones.      --Normal renal US in June  --Cystoscopy revealed no tumors or stones. There were few mucosal petechiae suggestive of recent infection as well as sediment in bladder base suggestive of incomplete emptying. Flomax was started per Dr. Acharya.     Today the patient reports that she is doing great. No UTI symptoms in the last 3 months. No SE of the medications. Does report vaginal dryness and skin irritation with her estrogen patch. No urinary symptoms today. No history of breast, ovarian or uterine cancer.      The following portions of the patient's history were reviewed and updated as appropriate: allergies, current medications, past family history, past medical history, past social history, past surgical history and problem list.    Review of  Systems  A comprehensive multipoint review of systems was negative except as otherwise stated in the HPI.      Objective:   Vitals:  Vitals:    09/20/17 0759   BP: (!) 131/90   Pulse: 103     Physical Exam   General: alert and oriented, no acute distress  Respiratory: Symmetric expansion, non-labored breathing  Cardiovascular: no peripheral edema  Abdomen: soft, non distended  Skin: normal coloration and turgor, no rashes, no suspicious skin lesions noted  Neuro: no gross deficits  Psych: normal judgment and insight, normal mood/affect and non-anxious    Lab Review   Urinalysis demonstrates positive for leukocytes (trace?)  PVR: 17 m:  Lab Results   Component Value Date    WBC 6.95 04/18/2017    HGB 11.9 (L) 04/18/2017    HCT 39.3 04/18/2017    MCV 81 (L) 04/18/2017     04/18/2017     Lab Results   Component Value Date    CREATININE 1.0 04/18/2017    BUN 15 04/18/2017       Imaging   All images have been personally reviewed and agree with the findings below:   Renal US (6/2017)- Normal renal US    Assessment and Plan:   Jaki CHI was seen today for follow-up.    Diagnoses and all orders for this visit:    Recurrent UTI (urinary tract infection)  -     Urine culture    Plan:  --Urine culture today  --Continue macrodantin and flomax  --Will consider estrace cream for atrophic vaginitis  --Follow up in 3 months

## 2017-09-20 NOTE — TELEPHONE ENCOUNTER
Pt was seen today in urology clinic for recurrent UTIs- she told Amalia (urology NP) that her skin has become very sensitive to the estrogen patch.  Please contact pt and see if she would like to try PO estrogen as an alternative.

## 2017-09-20 NOTE — TELEPHONE ENCOUNTER
Patient states would like to to try a po estrogen, but would like to speak with Kristyn in regards to po medication effecting other medications.

## 2017-09-21 ENCOUNTER — TELEPHONE (OUTPATIENT)
Dept: UROLOGY | Facility: CLINIC | Age: 47
End: 2017-09-21

## 2017-09-21 DIAGNOSIS — N95.2 ATROPHIC VAGINITIS: Primary | ICD-10-CM

## 2017-09-21 LAB — BACTERIA UR CULT: NO GROWTH

## 2017-09-21 RX ORDER — ESTRADIOL 0.1 MG/G
1 CREAM VAGINAL
Qty: 8 G | Refills: 11 | Status: SHIPPED | OUTPATIENT
Start: 2017-09-21 | End: 2017-11-15

## 2017-09-21 NOTE — TELEPHONE ENCOUNTER
Spoke with the patient regarding estrace cream. She would like to start the medication at this time. Prescription sent to her pharmacy.

## 2017-09-25 RX ORDER — NAPROXEN 500 MG/1
TABLET ORAL
Qty: 180 TABLET | Refills: 5 | OUTPATIENT
Start: 2017-09-25

## 2017-09-25 NOTE — TELEPHONE ENCOUNTER
Patient has not been seen here in > 1 year.  Please refer patient to PCP to fill medication, or offer a follow up here.

## 2017-10-03 ENCOUNTER — OFFICE VISIT (OUTPATIENT)
Dept: PSYCHIATRY | Facility: CLINIC | Age: 47
End: 2017-10-03
Payer: MEDICARE

## 2017-10-03 VITALS
WEIGHT: 260 LBS | SYSTOLIC BLOOD PRESSURE: 130 MMHG | HEART RATE: 115 BPM | HEIGHT: 67 IN | DIASTOLIC BLOOD PRESSURE: 87 MMHG | BODY MASS INDEX: 40.81 KG/M2

## 2017-10-03 DIAGNOSIS — G47.00 INSOMNIA, UNSPECIFIED TYPE: ICD-10-CM

## 2017-10-03 DIAGNOSIS — F41.1 GAD (GENERALIZED ANXIETY DISORDER): Primary | ICD-10-CM

## 2017-10-03 DIAGNOSIS — F33.0 MILD EPISODE OF RECURRENT MAJOR DEPRESSIVE DISORDER: Chronic | ICD-10-CM

## 2017-10-03 DIAGNOSIS — R46.81 OBSESSIVE-COMPULSIVE BEHAVIOR: ICD-10-CM

## 2017-10-03 DIAGNOSIS — F39 MOOD DISORDER: ICD-10-CM

## 2017-10-03 PROCEDURE — 99213 OFFICE O/P EST LOW 20 MIN: CPT | Mod: PBBFAC | Performed by: INTERNAL MEDICINE

## 2017-10-03 PROCEDURE — 99999 PR PBB SHADOW E&M-EST. PATIENT-LVL III: CPT | Mod: PBBFAC,,, | Performed by: INTERNAL MEDICINE

## 2017-10-03 PROCEDURE — 90792 PSYCH DIAG EVAL W/MED SRVCS: CPT | Mod: S$PBB,,, | Performed by: INTERNAL MEDICINE

## 2017-10-03 PROCEDURE — 90792 PSYCH DIAG EVAL W/MED SRVCS: CPT | Mod: PBBFAC | Performed by: INTERNAL MEDICINE

## 2017-10-03 RX ORDER — DIAZEPAM 5 MG/1
5 TABLET ORAL 3 TIMES DAILY
Qty: 90 TABLET | Refills: 2 | Status: SHIPPED | OUTPATIENT
Start: 2017-10-03 | End: 2017-12-29 | Stop reason: SDUPTHER

## 2017-10-03 NOTE — PATIENT INSTRUCTIONS
Valium (diazepam) 5mg   Can either take   1. 1 tablet 3 times per day.  OR  2. 1 tablet in the morning and 2 tablets at night.
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
## 2017-10-09 ENCOUNTER — PATIENT OUTREACH (OUTPATIENT)
Dept: INTERNAL MEDICINE | Facility: CLINIC | Age: 47
End: 2017-10-09

## 2017-10-09 DIAGNOSIS — M79.10 MYALGIA: ICD-10-CM

## 2017-10-09 RX ORDER — ALPRAZOLAM 0.5 MG/1
TABLET ORAL
Refills: 5 | COMMUNITY
Start: 2017-09-05 | End: 2017-10-18

## 2017-10-09 RX ORDER — NAPROXEN 500 MG/1
TABLET ORAL
COMMUNITY
Start: 2017-09-24 | End: 2018-01-12 | Stop reason: SDUPTHER

## 2017-10-09 RX ORDER — TIZANIDINE 4 MG/1
TABLET ORAL
Qty: 270 TABLET | Refills: 0 | Status: SHIPPED | OUTPATIENT
Start: 2017-10-09 | End: 2018-01-02 | Stop reason: SDUPTHER

## 2017-10-09 NOTE — TELEPHONE ENCOUNTER
----- Message from Ritujenae Alegre sent at 10/9/2017 10:29 AM CDT -----  Contact: Patient  x_  1st Request  _  2nd Request  _  3rd Request        Who: PENG MAYO [[id number]]    Why: Requesting a call back in regards to prescriptions. Please call her.     What Number to Call Back: [phone]    When to Expect a call back: (Within 24 hours)    Please return the call at earliest convenience. Thanks!

## 2017-10-09 NOTE — PROGRESS NOTES
[facility] is committed to your overall health.  To help you get the most out of each of your visits, we will review your information to make sure you are up to date on all of your recommended tests and/or procedures.       Your PCP  Pratik Rasmussen MD   found that you may be due for:       Health Maintenance Due   Topic Date Due    Influenza Vaccine  08/01/2017     Medicare does not cover all immunizations to be given in the clinic. Check your benefits to ensure that you do not need to receive your immunizations at the pharmacy.          If you have had any of the above done at another facility, please bring the records or information with you so that your record at [facility] will be complete.  If you would like to schedule any of these, please contact me.     If you are currently taking medication, please bring it with you to your appointment for review.     Also, if you have any type of Advanced Directives, please bring them with you to your office visit so we may scan them into your chart.       Thank you for choosing [facility] for your healthcare needs.

## 2017-10-18 ENCOUNTER — CLINICAL SUPPORT (OUTPATIENT)
Dept: INTERNAL MEDICINE | Facility: CLINIC | Age: 47
End: 2017-10-18
Payer: MEDICARE

## 2017-10-18 ENCOUNTER — OFFICE VISIT (OUTPATIENT)
Dept: INTERNAL MEDICINE | Facility: CLINIC | Age: 47
End: 2017-10-18
Payer: MEDICARE

## 2017-10-18 VITALS
BODY MASS INDEX: 42 KG/M2 | WEIGHT: 267.63 LBS | SYSTOLIC BLOOD PRESSURE: 126 MMHG | BODY MASS INDEX: 41.92 KG/M2 | WEIGHT: 267.63 LBS | DIASTOLIC BLOOD PRESSURE: 72 MMHG | HEART RATE: 100 BPM | OXYGEN SATURATION: 98 % | HEIGHT: 67 IN

## 2017-10-18 DIAGNOSIS — G89.29 CHRONIC BILATERAL LOW BACK PAIN WITHOUT SCIATICA: ICD-10-CM

## 2017-10-18 DIAGNOSIS — G89.4 CHRONIC PAIN SYNDROME: Primary | ICD-10-CM

## 2017-10-18 DIAGNOSIS — I10 ESSENTIAL HYPERTENSION: Chronic | ICD-10-CM

## 2017-10-18 DIAGNOSIS — M54.50 CHRONIC BILATERAL LOW BACK PAIN WITHOUT SCIATICA: ICD-10-CM

## 2017-10-18 DIAGNOSIS — M79.7 FIBROMYALGIA: ICD-10-CM

## 2017-10-18 PROCEDURE — G0008 ADMIN INFLUENZA VIRUS VAC: HCPCS | Mod: PBBFAC

## 2017-10-18 PROCEDURE — 99213 OFFICE O/P EST LOW 20 MIN: CPT | Mod: PBBFAC | Performed by: INTERNAL MEDICINE

## 2017-10-18 PROCEDURE — 99213 OFFICE O/P EST LOW 20 MIN: CPT | Mod: S$PBB,,, | Performed by: INTERNAL MEDICINE

## 2017-10-18 PROCEDURE — 99999 PR PBB SHADOW E&M-EST. PATIENT-LVL III: CPT | Mod: PBBFAC,,, | Performed by: INTERNAL MEDICINE

## 2017-10-18 RX ORDER — HYDROCODONE BITARTRATE AND ACETAMINOPHEN 5; 325 MG/1; MG/1
1 TABLET ORAL 2 TIMES DAILY PRN
Qty: 60 TABLET | Refills: 0 | Status: SHIPPED | OUTPATIENT
Start: 2017-12-17 | End: 2017-11-15

## 2017-10-18 RX ORDER — HYDROCODONE BITARTRATE AND ACETAMINOPHEN 5; 325 MG/1; MG/1
1 TABLET ORAL 2 TIMES DAILY PRN
Qty: 60 TABLET | Refills: 0 | Status: SHIPPED | OUTPATIENT
Start: 2017-11-17 | End: 2017-11-15

## 2017-10-18 RX ORDER — HYDROCODONE BITARTRATE AND ACETAMINOPHEN 5; 325 MG/1; MG/1
1 TABLET ORAL 2 TIMES DAILY PRN
Qty: 60 TABLET | Refills: 0 | Status: SHIPPED | OUTPATIENT
Start: 2017-10-18 | End: 2018-01-12 | Stop reason: SDUPTHER

## 2017-10-18 NOTE — TELEPHONE ENCOUNTER
Pt has been informed of rx denial. Pt scheduled for f/u appt 11/21/2017. Please advise if pt should proceed with weight check scheduled for 11/15/2017. Please advise/authorize?

## 2017-10-18 NOTE — PROGRESS NOTES
Patient given PF Influenza IM in the RD. Patient tolerated well and Band-Aid was applied. Lot#[id number] Exp:6/30/2018. Patient advised to wait in the lobby for 15 min to make sure no adverse reactions occur. Patient states verbal understanding and has no further questions. Patient given vaccine information sheet.

## 2017-10-18 NOTE — PROGRESS NOTES
Pt came in for weight check. Pt weight recorded as 121.4kg. Pt states she is in need of refill for victoza. Pt has no further questions or concerns.

## 2017-10-18 NOTE — PROGRESS NOTES
Subjective:       Patient ID: Jaki Bajwa is a 47 y.o. female.    Chief Complaint: Immunizations and Annual Exam    Pt here for f/u and refill of norco. She has a dx of sjogrens and fibromyalgia based on blood work and chronic pain that is primarily across shoulders and in legs but also in upper and lower back. As a result she is on several meds that she says helps. She sees rheum at [facility] and has regular f/u. She has prior dx of lupus but this was not corroborated in notes from rheum. She is on plaquenil, tizanidine, gabapentin and norco. She uses norco 1-2x/day. She is due for refills on pain meds. She has previously signed a pain contract. [place]  reviewed and is consistent. She is no longer seeing pain mgmt; she was offered injections but declined as meds were working for her. She has also done PT with some success in the past.      Review of Systems   Constitutional: Negative for fever.   Respiratory: Negative for shortness of breath.    Cardiovascular: Negative for chest pain.   Musculoskeletal: Positive for back pain. Negative for joint swelling.   Neurological: Negative for headaches.   Psychiatric/Behavioral: Negative for dysphoric mood.       Objective:      Physical Exam   Constitutional: She is oriented to person, place, and time. She appears well-developed and well-nourished.   Neck: Neck supple. No thyromegaly present.   Cardiovascular: Normal rate, regular rhythm and normal heart sounds.    Pulmonary/Chest: Effort normal and breath sounds normal.   Musculoskeletal: She exhibits no edema.        Lumbar back: She exhibits decreased range of motion and tenderness. She exhibits no bony tenderness.   Lymphadenopathy:     She has no cervical adenopathy.   Neurological: She is alert and oriented to person, place, and time. She has normal strength. No sensory deficit.   Reflex Scores:       Patellar reflexes are 2+ on the right side and 2+ on the left side.       Achilles reflexes are 2+ on the right side  and 2+ on the left side.  Psychiatric: She has a normal mood and affect. Her behavior is normal.       Assessment:       1. Chronic pain syndrome    2. Fibromyalgia    3. Chronic bilateral low back pain without sciatica        Plan:       1. Refill norco--3 separate 30 day rxs given for 90 days total; proper use d/w pt

## 2017-10-19 NOTE — TELEPHONE ENCOUNTER
----- Message from Ritujenae Alegre sent at 10/19/2017 10:55 AM CDT -----  Contact: Jossy  x_  1st Request  _  2nd Request  _  3rd Request        Who: Jossy/Varun     Why: Requesting a call back in regards to the prescription she have questions. Please call her.    What Number to Call Back: [phone]    When to Expect a call back: (Within 24 hours)    Please return the call at earliest convenience. Thanks!

## 2017-10-19 NOTE — TELEPHONE ENCOUNTER
Spoke with judy in pharmacy, states she needs the icd10 code for rx hydrocodone, provided chronic pain syndrome code, fibromyalgia and chronic low back pain with sciatica

## 2017-10-25 DIAGNOSIS — I10 ESSENTIAL HYPERTENSION: Chronic | ICD-10-CM

## 2017-11-15 ENCOUNTER — CLINICAL SUPPORT (OUTPATIENT)
Dept: INTERNAL MEDICINE | Facility: CLINIC | Age: 47
End: 2017-11-15
Payer: MEDICARE

## 2017-11-15 ENCOUNTER — OFFICE VISIT (OUTPATIENT)
Dept: OBSTETRICS AND GYNECOLOGY | Facility: CLINIC | Age: 47
End: 2017-11-15
Payer: MEDICARE

## 2017-11-15 VITALS
SYSTOLIC BLOOD PRESSURE: 100 MMHG | HEIGHT: 67 IN | WEIGHT: 272.06 LBS | DIASTOLIC BLOOD PRESSURE: 40 MMHG | BODY MASS INDEX: 42.7 KG/M2

## 2017-11-15 VITALS — WEIGHT: 270.5 LBS | BODY MASS INDEX: 42.37 KG/M2

## 2017-11-15 DIAGNOSIS — R82.90 ABNORMAL URINE ODOR: ICD-10-CM

## 2017-11-15 DIAGNOSIS — R82.998 URINE LEUKOCYTES: ICD-10-CM

## 2017-11-15 DIAGNOSIS — R35.0 URINARY FREQUENCY: ICD-10-CM

## 2017-11-15 DIAGNOSIS — R82.998 DARK URINE: Primary | ICD-10-CM

## 2017-11-15 PROCEDURE — 87088 URINE BACTERIA CULTURE: CPT

## 2017-11-15 PROCEDURE — 99213 OFFICE O/P EST LOW 20 MIN: CPT | Mod: S$PBB,,, | Performed by: NURSE PRACTITIONER

## 2017-11-15 PROCEDURE — 87186 SC STD MICRODIL/AGAR DIL: CPT

## 2017-11-15 PROCEDURE — 87086 URINE CULTURE/COLONY COUNT: CPT

## 2017-11-15 PROCEDURE — 99213 OFFICE O/P EST LOW 20 MIN: CPT | Mod: PBBFAC | Performed by: NURSE PRACTITIONER

## 2017-11-15 PROCEDURE — 87077 CULTURE AEROBIC IDENTIFY: CPT

## 2017-11-15 PROCEDURE — 99999 PR PBB SHADOW E&M-EST. PATIENT-LVL III: CPT | Mod: PBBFAC,,, | Performed by: NURSE PRACTITIONER

## 2017-11-15 RX ORDER — PEN NEEDLE, DIABETIC 31 GX5/16"
NEEDLE, DISPOSABLE MISCELLANEOUS
Qty: 100 EACH | Refills: 1 | Status: SHIPPED | OUTPATIENT
Start: 2017-11-15 | End: 2018-04-17

## 2017-11-15 RX ORDER — NITROFURANTOIN 25; 75 MG/1; MG/1
100 CAPSULE ORAL 2 TIMES DAILY
Qty: 14 CAPSULE | Refills: 0 | Status: SHIPPED | OUTPATIENT
Start: 2017-11-15 | End: 2017-11-22

## 2017-11-15 NOTE — PROGRESS NOTES
CC: Dark Urine, urinary odor  HPI: Pt is a 47 y.o.  female who presents for evaluation of her UTI symptoms.   The patient presents today with dark Urine, urinary odor, frequency, and urgency for the past 7 days. The patient denies flank pain, fever, nausea, vomiting, and hematuria. Alleviating factors: none.  Udip + 3 leukocytes.     ROS:  GENERAL: Feeling well overall. Denies fever or chills.   SKIN: Denies rash or lesions.   HEAD: Denies head injury or headache.   NODES: Denies enlarged lymph nodes.   CHEST: Denies chest pain or shortness of breath.   CARDIOVASCULAR: Denies palpitations or left sided chest pain.   ABDOMEN: No abdominal pain, constipation, diarrhea, nausea, vomiting or rectal bleeding.   URINARY: +urinary odor, frequency, and urgency.  No dysuria, hematuria, or burning on urination.  REPRODUCTIVE: See HPI.   BREASTS: Denies pain, lumps, or nipple discharge.   HEMATOLOGIC: No easy bruisability or excessive bleeding.   MUSCULOSKELETAL: Denies joint pain or swelling.   NEUROLOGIC: Denies syncope or weakness.   PSYCHIATRIC: Denies depression, anxiety or mood swings.    PE:   APPEARANCE: Well nourished, well developed, Black or  female in no acute distress.  PELVIS: Deferred  ABDOMEN: No suprapubic tenderness  MUSCULOSKELETAL: No CVA tenderness      Diagnosis:  1. Dark urine    2. Abnormal urine odor    3. Urinary frequency    4. Urine leukocytes        Plan:   Udip + 3 leukocytes  Urine culture  Macrobid      Orders Placed This Encounter    POCT URINE DIPSTICK WITHOUT MICROSCOPE    nitrofurantoin, macrocrystal-monohydrate, (MACROBID) 100 MG capsule         -UTI prevention including   a. perineal hygiene, wipe front to back,  b. drink water prior to intercourse and urinate afterwards and avoid certain positions which could increase likelyhood of UTIs,  c. establish regular bladder habits,   d. avoid vulvovaginal irritants,   e. increase fluids and avoid caffeine and alcohol;  -signs  of pylenephritis and to go to the ED if develops fever, chills, n/v, back pain, worsening dyuria, hematuria;   -pelvic rest until symptoms resolve;  -Macrobid use and potential side effects;  -A.C.S. Pap and pelvic exam guidelines (pap every 3 years), recomendations for yearly mammogram;  -to follow up with her PCP for other health maintenance.       Follow-up PRN no resolution of symptoms.        Kristyn Carmichale, ANTWONP-C

## 2017-11-15 NOTE — PROGRESS NOTES
Pt came in for weight check. Pt weight recorded as 122.7kg. Pt informed of weight. Patient has no further questions or concerns.

## 2017-11-17 LAB — BACTERIA UR CULT: NORMAL

## 2017-11-18 ENCOUNTER — TELEPHONE (OUTPATIENT)
Dept: OBSTETRICS AND GYNECOLOGY | Facility: CLINIC | Age: 47
End: 2017-11-18

## 2017-11-20 NOTE — TELEPHONE ENCOUNTER
Called to inform patient of abnormal  urien culture result and to continue medication as discussed. Patient verbalized understanding.        Please notify pt her urine culture came back showing a UTI.  Continue Macrobid as we discussed.

## 2017-11-27 ENCOUNTER — TELEPHONE (OUTPATIENT)
Dept: UROLOGY | Facility: CLINIC | Age: 47
End: 2017-11-27

## 2017-11-27 NOTE — TELEPHONE ENCOUNTER
----- Message from Servando Wilson sent at 11/27/2017  8:52 AM CST -----  Contact: pt  x_ 1st Request  _ 2nd Request  _ 3rd Request    Who: pt    Why: is needing to reschedule a appointment     What Number to Call Back: [phone]    When to Expect a call back: (Before the end of the day)  -- if call after 3:00 call back will be tomorrow.

## 2017-12-15 ENCOUNTER — OFFICE VISIT (OUTPATIENT)
Dept: UROLOGY | Facility: CLINIC | Age: 47
End: 2017-12-15
Payer: MEDICARE

## 2017-12-15 ENCOUNTER — OFFICE VISIT (OUTPATIENT)
Dept: INTERNAL MEDICINE | Facility: CLINIC | Age: 47
End: 2017-12-15
Attending: FAMILY MEDICINE
Payer: MEDICARE

## 2017-12-15 VITALS
BODY MASS INDEX: 41.28 KG/M2 | DIASTOLIC BLOOD PRESSURE: 91 MMHG | WEIGHT: 263 LBS | HEIGHT: 67 IN | HEART RATE: 112 BPM | SYSTOLIC BLOOD PRESSURE: 143 MMHG

## 2017-12-15 VITALS
SYSTOLIC BLOOD PRESSURE: 112 MMHG | WEIGHT: 263.44 LBS | HEIGHT: 67 IN | DIASTOLIC BLOOD PRESSURE: 82 MMHG | BODY MASS INDEX: 41.35 KG/M2

## 2017-12-15 DIAGNOSIS — E66.01 MORBID OBESITY: ICD-10-CM

## 2017-12-15 DIAGNOSIS — I10 ESSENTIAL HYPERTENSION: Chronic | ICD-10-CM

## 2017-12-15 DIAGNOSIS — N39.0 RECURRENT UTI: Primary | ICD-10-CM

## 2017-12-15 PROCEDURE — 51798 US URINE CAPACITY MEASURE: CPT | Mod: S$GLB,,, | Performed by: NURSE PRACTITIONER

## 2017-12-15 PROCEDURE — 99213 OFFICE O/P EST LOW 20 MIN: CPT | Mod: 25,S$GLB,, | Performed by: NURSE PRACTITIONER

## 2017-12-15 PROCEDURE — 99213 OFFICE O/P EST LOW 20 MIN: CPT | Mod: PBBFAC | Performed by: FAMILY MEDICINE

## 2017-12-15 PROCEDURE — 87086 URINE CULTURE/COLONY COUNT: CPT

## 2017-12-15 PROCEDURE — 99999 PR PBB SHADOW E&M-EST. PATIENT-LVL III: CPT | Mod: PBBFAC,,, | Performed by: FAMILY MEDICINE

## 2017-12-15 PROCEDURE — 99213 OFFICE O/P EST LOW 20 MIN: CPT | Mod: S$PBB,,, | Performed by: FAMILY MEDICINE

## 2017-12-15 RX ORDER — NITROFURANTOIN MACROCRYSTALS 50 MG/1
50 CAPSULE ORAL NIGHTLY
Qty: 30 CAPSULE | Refills: 2 | Status: SHIPPED | OUTPATIENT
Start: 2017-12-15 | End: 2018-02-08

## 2017-12-15 RX ORDER — ESTRADIOL 0.04 MG/D
PATCH TRANSDERMAL
COMMUNITY
Start: 2017-12-08 | End: 2018-04-17

## 2017-12-15 RX ORDER — PHENAZOPYRIDINE HYDROCHLORIDE 100 MG/1
100 TABLET, FILM COATED ORAL 3 TIMES DAILY PRN
Qty: 30 TABLET | Refills: 2 | Status: SHIPPED | OUTPATIENT
Start: 2017-12-15 | End: 2017-12-25

## 2017-12-15 NOTE — PROGRESS NOTES
"Subjective:      Patient ID: Jaki Bajwa is a 47 y.o. female.    Chief Complaint: Annual Exam (F/U)    She is here for weight loss follow up. She reports an appetite increase since she was started on valium however she reports her emotions are more well controlled.     Breakfast   Egg  Oatmeal   Yogurt   Orange juice    Lunch   Turkey [place]     Dinner  Salad   Fish/vegetable       Snacks   Chips   Cookies         BMR 7927       Review of Systems   Constitutional: Negative.    HENT: Negative.    Respiratory: Negative.    Cardiovascular: Negative.    Gastrointestinal: Negative.    Genitourinary: Negative.    Neurological: Negative.      I personally reviewed Past Medical History, Past Surgical history,  Past Social History and Family History    Objective:   /82 (BP Location: Right arm, Patient Position: Sitting, BP Method: Medium (Manual))   Ht 5' 7" (1.702 m)   Wt 119.5 kg (263 lb 7.2 oz)   LMP 07/11/2012   BMI 41.26 kg/m²     Physical Exam   Constitutional: She is oriented to person, place, and time. She appears well-developed and well-nourished. No distress.   HENT:   Head: Normocephalic and atraumatic.   Right Ear: External ear normal.   Left Ear: External ear normal.   Mouth/Throat: Oropharynx is clear and moist.   Eyes: Conjunctivae and EOM are normal. Pupils are equal, round, and reactive to light. Right eye exhibits no discharge. Left eye exhibits no discharge. No scleral icterus.   Neck: Normal range of motion. Neck supple.   Cardiovascular: Normal rate, regular rhythm, normal heart sounds and intact distal pulses.  Exam reveals no gallop.    No murmur heard.  Pulmonary/Chest: Effort normal and breath sounds normal. No respiratory distress. She has no wheezes. She has no rales. She exhibits no tenderness.   Abdominal: Soft. Bowel sounds are normal. She exhibits no distension and no mass. There is no tenderness. There is no rebound and no guarding.   Musculoskeletal: Normal range of motion. "   Neurological: She is alert and oriented to person, place, and time.   Skin: Skin is warm and dry.   Vitals reviewed.      Jaki CHI was seen today for annual exam.    Diagnoses and all orders for this visit:    Essential hypertension  Morbid obesity  -will cont victoza, will trial invokana in addition for 4 weeks, will discuss with psychiatry if amenable to starting diethylpropion   -start food journal and increase water intake   -     liraglutide 0.6 mg/0.1 mL, 18 mg/3 mL, subq PNIJ (VICTOZA 3-KHUSHI) 0.6 mg/0.1 mL (18 mg/3 mL) PnIj; Inject 1.8 mg into the skin once daily.        Other orders  -     Discontinue: canagliflozin (INVOKANA) 100 mg Tab; Take 1 tablet (100 mg total) by mouth once daily.  -     canagliflozin (INVOKANA) 100 mg Tab; Take 1 tablet (100 mg total) by mouth once daily.

## 2017-12-15 NOTE — PATIENT INSTRUCTIONS
150 fl oz water daily   My fitness pal   1400 calorie diet   Sleep 7.5 hours nightly     Helpful tips to survive the holidays:  - Dont save yourself for the meal: Make sure you continue to eat high protein small meals every 3-4 hours to ensure to do not become over-hungry. Avoid temptation by not showing up to a holiday party or gathering hungry.   - Plan ahead. Bring a dish to a party if you know there may not be an appropriate option.   - Choose sugar-free drinks: Stick to water or other sugar-free beverages and make sure you are getting 6-8 cups of fluid each day (but not with meals!). Avoid alcohol, carbonated beverages, and high-fat/high-sugar beverages like hot chocolate and eggnog. Try sugar-free hot cocoa made with skim milk or water, or sugar-free spiced tea to add some holiday flair to your beverage (see sugar-free mulled cider recipe below)  - Take your time: Eat mindfully. Dont graze on food throughout the day. Sit down to enjoy your small meals. Chew slowly and thoroughly. Cut your food into small pieces before eating.  - Listen to your body: Stop eating as soon as you feel full. Do not feel pressured to try certain (or all) foods or to eat all of the food on your plate. Listen to your hunger cues.   - REMEMBER: Make your holidays about spending time with family and friends instead of focusing gatherings around food.  - Keep up your exercise routine: Make sure you continue to get regular exercise throughout the holiday season. Encourage friends and family to be active by taking a walk together after a meal, to look at holiday lights, or to window-shop.    Good Holiday Meal Options:  - Roasted Turkey, NO skin. Use low sodium broth instead of gravy.   - Stuffed Bell Peppers made WITHOUT bread crumbs or Rice. Try using parmesan cheese instead  - Gumbo, NO rice. Try picking out mostly the meat/seafood and vegetables with little broth.   - Green Bean Casserole made with 98% fat free cream of mushroom  soup and crushed almonds/pecans instead of fried onions  - Side salad w/ low fat dressing. Try a different kind of salad maybe use Kale or spinach.   - Roasted non-starchy vegetables like brussel sprouts, broccoli, green beans, zucchini, butternut squash, cauliflower  - Cauliflower Mash (steam or roast cauliflower, puree w/ low fat cheese, dash of fat free milk and 2-3 sprays of I cant believe its not butter spray. Add garlic powder and black pepper to season). Use Low sodium broth instead of gravy.   - Try Loaded Cauliflower Mash (Make cauliflower like above cauliflower mash. Top with diced turkey zarate, ¼ cup low fat cheddar cheese and bake @ 350* F for 5-10 minutes, until cheese is melted. Top with minced chives, black pepper and garlic to taste).   - Homemade cranberry sauce using Splenda or another alternative sweetener. Boil fresh cranberries and add splenda to taste. Boil until cranberries break open and then simmer until it reaches the consistency you want (less time for more watery sauce and simmer for longer to create a thicker sauce).   - Deviled eggs: make using low fat traylor, mustard, DILL relish (not sweet relish).   - Vegetable tray w/ Greek yogurt Ranch Dip. Mix 1 packet of [place] ranch dip mix w/ 16 oz low fat plain greek yogurt.     Good Holiday Dessert Options:  - High protein Pumpkin Cheesecake (see recipe below)  - Pumpkin Whip (see recipe below)  - Quest Apple Pie or Cinnamon Roll flavored protein bar (warm in microwave for 10-15 seconds)  - Eggnog Protein shake (see recipe below)  - Fresh fruit w/ low fat cheese  - Sugar-free Jello Parfaits. Layer Red and Green sugar-free jello in cups and top w/ 2 tbsp Sugar-free cool-whip    Pumpkin Cheesecake    8 ounces fat free cream cheese, softened   2 scoops of vanilla protein powder (<4 g sugar per serving)   ¼ tsp Fine salt   2 eggs, at room temperature   1/3 cup fat free sour cream  1/3 cup fat free half and half  1 15 -ounce can pure  pumpkin puree   1 tablespoon pumpkin pie spice, plus more for dusting   Unsalted nuts, crushed  *Add splenda to taste    Directions     1. Preheat the oven to 300 degrees F. Line 18 muffin cups with paper liners. Sprinkle 1 tsp crushed unsalted nuts at the bottom of each of muffin cup liner.     2. In a large bowl, beat the cream cheese, vanilla protein powder and 1/4 teaspoon fine salt on medium-high speed until smooth and creamy, 2 to 3 minutes. Scrape down the sides, reduce speed to low and beat in the eggs, 1 at a time, until combined. Beat in 1/3 cup fat free sour cream and fat free half and half. Stir in the pumpkin puree and pumpkin pie spice until smooth. Divide evenly among cookie-lined paper cups, filling almost all the way to the top.     3. Bake until the filling is just set, 40 to 45 minutes. A sharp knife inserted into the center will come out moist, but clean. Cool completely in tins on a wire rack. Refrigerate until cold, 4 hours, or overnight. Top with a dusting of pumpkin pie spice.    Recipe altered from the following recipe: http://[email or web address].com/recipes/food-network-dev/mini-pumpkin-cheesecakes-recipe.print.html?oc=linkback    Pumpkin Whip    Box of sugar-free vanilla pudding  Can of pumpkin puree  Pumpkin Pie spice (sprinkle to taste)  ½ cup of sugar-free Cool Whip    Directions:  Make sugar-free pudding according to package directions using fat free or 1% milk. Stir in pumpkin and cool whip. Add pumpkin pie spice to taste.     Egg Nog Protein shake    8 oz skim or 1% milk  1 scoop vanilla protein powder  1 tbsp sugar-free vanilla pudding mix  ½ tsp butter flavor extract  ½ tsp rum extract  ½ tsp cinnamon     Shake together or blend with ice and serve.     Sugar-Free Mulled Cider    3 oz diet cran-apple juice  6 oz water  1 packet sugar-free apple cider mix  ½ tsp apple pie spice  ½ tsp butter flavor extract  1 tbsp Sugar-free Syrup    Mix together. Warm if needed and serve w/ orange  wedge and cinnamon stick.

## 2017-12-15 NOTE — PROGRESS NOTES
"Subjective:      Jaki Bajwa is a 47 y.o. female who returns today regarding her recurrent UTIs.    The patient has been on macrodantin prophylaxis since June. She recently stopped taking the macrodantin for a week and began having symptoms again. Urine culture on 11/15 was + for e Coli. She completed a week of macrobid. Mild dysuria today. Denies frequency, urgency, hematuria, flank pain and fever.     The following portions of the patient's history were reviewed and updated as appropriate: allergies, current medications, past family history, past medical history, past social history, past surgical history and problem list.    Review of Systems  A comprehensive multipoint review of systems was negative except as otherwise stated in the HPI.     Objective:   Vitals: BP (!) 143/91   Pulse (!) 112   Ht 5' 7" (1.702 m)   Wt 119.3 kg (263 lb)   LMP 07/11/2012   BMI 41.19 kg/m²     Physical Exam   General: alert and oriented, no acute distress  Respiratory: Symmetric expansion, non-labored breathing  Cardiovascular: no peripheral edema  Abdomen: soft, non distended  Skin: normal coloration and turgor, no rashes, no suspicious skin lesions noted  Neuro: no gross deficits  Psych: normal judgment and insight, normal mood/affect and non-anxious    Lab Review   Urinalysis demonstrates negative for all component  PVR: 37 mL  Lab Results   Component Value Date    WBC 6.95 04/18/2017    HGB 11.9 (L) 04/18/2017    HCT 39.3 04/18/2017    MCV 81 (L) 04/18/2017     04/18/2017     Lab Results   Component Value Date    CREATININE 1.0 04/18/2017    BUN 15 04/18/2017       Imaging   All images have been personally reviewed and agree with the findings below:   Renal US (6/2017)- Normal renal US       Assessment and Plan:   Jaki CHI was seen today for follow-up.    Diagnoses and all orders for this visit:    Recurrent UTI  -     nitrofurantoin (MACRODANTIN) 50 MG capsule; Take 1 capsule (50 mg total) by mouth every " evening.  -     Urine culture  -     phenazopyridine (PYRIDIUM) 100 MG tablet; Take 1 tablet (100 mg total) by mouth 3 (three) times daily as needed for Pain.    Plan:  --Repeat urine culture today  --Restart macrodantin prophylaxis x3 months   --Pyridium PRN  --Follow up in 6 months or sooner for any symptoms

## 2017-12-16 LAB — BACTERIA UR CULT: NO GROWTH

## 2017-12-29 DIAGNOSIS — F33.1 MAJOR DEPRESSIVE DISORDER, RECURRENT, MODERATE: ICD-10-CM

## 2017-12-29 RX ORDER — LURASIDONE HYDROCHLORIDE 60 MG/1
60 TABLET, FILM COATED ORAL DAILY
Qty: 90 TABLET | Refills: 0 | Status: SHIPPED | OUTPATIENT
Start: 2017-12-29 | End: 2018-01-10 | Stop reason: SDUPTHER

## 2017-12-29 RX ORDER — DIAZEPAM 5 MG/1
5 TABLET ORAL 3 TIMES DAILY
Qty: 90 TABLET | Refills: 0 | Status: SHIPPED | OUTPATIENT
Start: 2017-12-29 | End: 2018-01-10 | Stop reason: SDUPTHER

## 2018-01-02 DIAGNOSIS — M79.10 MYALGIA: ICD-10-CM

## 2018-01-02 RX ORDER — TIZANIDINE 4 MG/1
TABLET ORAL
Qty: 270 TABLET | Refills: 0 | Status: SHIPPED | OUTPATIENT
Start: 2018-01-02 | End: 2018-03-29 | Stop reason: SDUPTHER

## 2018-01-02 RX ORDER — TIZANIDINE 4 MG/1
TABLET ORAL
Qty: 270 TABLET | Refills: 0 | OUTPATIENT
Start: 2018-01-02

## 2018-01-02 NOTE — TELEPHONE ENCOUNTER
----- Message from Brien Villarreal sent at 1/2/2018 12:11 PM CST -----  Contact: Ryanne Lawtons      X_  1st Request  _  2nd Request  _  3rd Request    Please refill the medication(s) listed below. Please call the patient when the prescription(s) is ready for  at this phone number            Medication #1    tizanidine (ZANAFLEX) 4 MG tablet 270 tablet 0 10/9/2017  No  Sig: TAKE 1 TABLET(4 MG) BY MOUTH EVERY 8 HOURS AS NEEDED FOR MUSCLE PAIN  Class: Normal  Order: [id number]  Date/Time Signed: 10/9/2017 13:54      E-Prescribing Status: Receipt confirmed by pharmacy (10/9/2017  1:54 PM CDT)        Medication #2      [facility] Pharm[facility]:    [facility] [address] [place] [address] [address] AT [facility] [address] [place] [phone] (Phone)  [phone] (Fax)

## 2018-01-02 NOTE — TELEPHONE ENCOUNTER
----- Message from Dionte Andrews sent at 1/2/2018  1:35 PM CST -----  Contact: Pt      x  1st Request  _  2nd Request  _  3rd Request    Please refill the medication(s) listed below. Please call the patient when the prescription(s) is ready for  at this phone number [phone].      Medication #1:tiZANidine (ZANAFLEX) 4 MG tablet      Preferred Pharmacy:[facility] [address] [place] [address] [address] AT [facility] [address] [place]

## 2018-01-08 ENCOUNTER — TELEPHONE (OUTPATIENT)
Dept: INTERNAL MEDICINE | Facility: CLINIC | Age: 48
End: 2018-01-08

## 2018-01-10 ENCOUNTER — OFFICE VISIT (OUTPATIENT)
Dept: PSYCHIATRY | Facility: CLINIC | Age: 48
End: 2018-01-10
Payer: MEDICARE

## 2018-01-10 VITALS
BODY MASS INDEX: 42.16 KG/M2 | HEART RATE: 107 BPM | WEIGHT: 268.63 LBS | DIASTOLIC BLOOD PRESSURE: 82 MMHG | HEIGHT: 67 IN | SYSTOLIC BLOOD PRESSURE: 121 MMHG

## 2018-01-10 DIAGNOSIS — F33.1 MAJOR DEPRESSIVE DISORDER, RECURRENT, MODERATE: ICD-10-CM

## 2018-01-10 DIAGNOSIS — F40.10 SOCIAL PHOBIA: Primary | ICD-10-CM

## 2018-01-10 DIAGNOSIS — F41.1 GAD (GENERALIZED ANXIETY DISORDER): ICD-10-CM

## 2018-01-10 DIAGNOSIS — F41.1 GAD (GENERALIZED ANXIETY DISORDER): Primary | ICD-10-CM

## 2018-01-10 DIAGNOSIS — E66.01 MORBID OBESITY: ICD-10-CM

## 2018-01-10 DIAGNOSIS — F33.41 MAJOR DEPRESSIVE DISORDER, RECURRENT EPISODE, IN PARTIAL REMISSION: ICD-10-CM

## 2018-01-10 PROCEDURE — 99999 PR PBB SHADOW E&M-EST. PATIENT-LVL III: CPT | Mod: PBBFAC,,, | Performed by: INTERNAL MEDICINE

## 2018-01-10 PROCEDURE — 90834 PSYTX W PT 45 MINUTES: CPT | Mod: S$PBB,,, | Performed by: SOCIAL WORKER

## 2018-01-10 PROCEDURE — 99213 OFFICE O/P EST LOW 20 MIN: CPT | Mod: PBBFAC,25 | Performed by: INTERNAL MEDICINE

## 2018-01-10 PROCEDURE — 90834 PSYTX W PT 45 MINUTES: CPT | Mod: PBBFAC | Performed by: SOCIAL WORKER

## 2018-01-10 PROCEDURE — 99214 OFFICE O/P EST MOD 30 MIN: CPT | Mod: S$PBB,,, | Performed by: INTERNAL MEDICINE

## 2018-01-10 RX ORDER — BUPROPION HYDROCHLORIDE 150 MG/1
450 TABLET ORAL EVERY MORNING
Qty: 270 TABLET | Refills: 0 | Status: SHIPPED | OUTPATIENT
Start: 2018-01-10 | End: 2018-04-17 | Stop reason: SDUPTHER

## 2018-01-10 RX ORDER — DIAZEPAM 5 MG/1
5 TABLET ORAL 3 TIMES DAILY
Qty: 90 TABLET | Refills: 2 | Status: SHIPPED | OUTPATIENT
Start: 2018-01-10 | End: 2018-04-17 | Stop reason: SDUPTHER

## 2018-01-10 RX ORDER — LURASIDONE HYDROCHLORIDE 60 MG/1
60 TABLET, FILM COATED ORAL DAILY
Qty: 90 TABLET | Refills: 0 | Status: SHIPPED | OUTPATIENT
Start: 2018-01-10 | End: 2018-04-17 | Stop reason: SDUPTHER

## 2018-01-10 RX ORDER — DOXEPIN HYDROCHLORIDE 150 MG/1
150 CAPSULE ORAL NIGHTLY
Qty: 90 CAPSULE | Refills: 0 | Status: SHIPPED | OUTPATIENT
Start: 2018-01-10 | End: 2018-04-13 | Stop reason: SDUPTHER
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
## 2018-01-10 NOTE — Clinical Note
Dr. Bartlett,  I saw this mutual patient today.  I am hesitant to use diethylpropion due to its stimulant effects and the fact that she is already on high doses of Wellbutrin and Doxepin.  What are some other options for her for weight loss?    Najma Doyle

## 2018-01-10 NOTE — PROGRESS NOTES
Individual Psychotherapy (PhD/LCSW)    1/10/2018    Site:  [facility]         Therapeutic Intervention: Met with patient.  Outpatient - Insight oriented psychotherapy 45 min - CPT code 68654 and Outpatient - Behavior modifying psychotherapy 45 min - CPT code 11279    Chief complaint/reason for encounter: depression, anxiety and interpersonal     Interval history and content of current session:        Helping pt distinguish dreams with expectations in real life.  Emotions with real odds of catastrophe occurring.   Helping pt structure her day.  Pt wanted to discuss her intellectual and attention deficiencies.   Emphasis for her not to  her limitations.    Treatment plan:  · Target symptoms: depression, anxiety   · Why chosen therapy is appropriate versus another modality: relevant to diagnosis  · Outcome monitoring methods: self-report, observation    · Therapeutic intervention type: insight oriented psychotherapy, behavior modifying psychotherapy, supportive psychotherapy    Risk parameters:  Patient reports no suicidal ideation  Patient reports no homicidal ideation  Patient reports no self-injurious behavior  Patient reports no violent behavior    Verbal deficits: None    Patient's response to intervention:  The patient's response to intervention is accepting.    Progress toward goals and other mental status changes:  The patient's progress toward goals is limited.    Diagnosis: 296.35;   Obsessive compulsive disorder.  personality disorder nos      Plan:  individual psychotherapy    Return to clinic: 2 weeks pt prefers to come in 3 months.

## 2018-01-12 ENCOUNTER — OFFICE VISIT (OUTPATIENT)
Dept: INTERNAL MEDICINE | Facility: CLINIC | Age: 48
End: 2018-01-12
Payer: MEDICARE

## 2018-01-12 VITALS
HEART RATE: 88 BPM | WEIGHT: 267.88 LBS | DIASTOLIC BLOOD PRESSURE: 70 MMHG | HEIGHT: 67 IN | SYSTOLIC BLOOD PRESSURE: 116 MMHG | BODY MASS INDEX: 42.04 KG/M2

## 2018-01-12 DIAGNOSIS — M79.7 FIBROMYALGIA: Primary | ICD-10-CM

## 2018-01-12 DIAGNOSIS — M35.01 SJOGREN'S SYNDROME WITH KERATOCONJUNCTIVITIS SICCA: ICD-10-CM

## 2018-01-12 DIAGNOSIS — G89.4 CHRONIC PAIN SYNDROME: ICD-10-CM

## 2018-01-12 LAB
AMPHET+METHAMPHET UR QL: NEGATIVE
BARBITURATES UR QL SCN>200 NG/ML: NEGATIVE
BENZODIAZ UR QL SCN>200 NG/ML: NORMAL
BZE UR QL SCN: NEGATIVE
CANNABINOIDS UR QL SCN: NEGATIVE
CREAT UR-MCNC: 217.8 MG/DL
ETHANOL UR-MCNC: <10 MG/DL
METHADONE UR QL SCN>300 NG/ML: NEGATIVE
OPIATES UR QL SCN: NORMAL
PCP UR QL SCN>25 NG/ML: NEGATIVE
TOXICOLOGY INFORMATION: NORMAL

## 2018-01-12 PROCEDURE — 99213 OFFICE O/P EST LOW 20 MIN: CPT | Mod: PBBFAC | Performed by: INTERNAL MEDICINE

## 2018-01-12 PROCEDURE — 99999 PR PBB SHADOW E&M-EST. PATIENT-LVL III: CPT | Mod: PBBFAC,,, | Performed by: INTERNAL MEDICINE

## 2018-01-12 PROCEDURE — 80307 DRUG TEST PRSMV CHEM ANLYZR: CPT

## 2018-01-12 PROCEDURE — 99213 OFFICE O/P EST LOW 20 MIN: CPT | Mod: S$PBB,,, | Performed by: INTERNAL MEDICINE

## 2018-01-12 RX ORDER — HYDROCODONE BITARTRATE AND ACETAMINOPHEN 5; 325 MG/1; MG/1
1 TABLET ORAL 2 TIMES DAILY PRN
Qty: 60 TABLET | Refills: 0 | Status: SHIPPED | OUTPATIENT
Start: 2018-03-13 | End: 2018-07-11 | Stop reason: SDUPTHER

## 2018-01-12 RX ORDER — LOSARTAN POTASSIUM AND HYDROCHLOROTHIAZIDE 12.5; 1 MG/1; MG/1
1 TABLET ORAL DAILY
Qty: 90 TABLET | Refills: 3 | Status: SHIPPED | OUTPATIENT
Start: 2018-01-12 | End: 2019-01-09 | Stop reason: SDUPTHER

## 2018-01-12 RX ORDER — HYDROCODONE BITARTRATE AND ACETAMINOPHEN 5; 325 MG/1; MG/1
1 TABLET ORAL 2 TIMES DAILY PRN
Qty: 60 TABLET | Refills: 0 | Status: SHIPPED | OUTPATIENT
Start: 2018-02-11 | End: 2018-01-26 | Stop reason: SDUPTHER

## 2018-01-12 RX ORDER — NAPROXEN 500 MG/1
500 TABLET ORAL 2 TIMES DAILY PRN
Qty: 180 TABLET | Refills: 0 | Status: SHIPPED | OUTPATIENT
Start: 2018-01-12 | End: 2018-06-26 | Stop reason: SDUPTHER

## 2018-01-12 RX ORDER — HYDROCODONE BITARTRATE AND ACETAMINOPHEN 5; 325 MG/1; MG/1
1 TABLET ORAL 2 TIMES DAILY PRN
Qty: 60 TABLET | Refills: 0 | Status: SHIPPED | OUTPATIENT
Start: 2018-01-12 | End: 2018-04-17 | Stop reason: SDUPTHER

## 2018-01-12 NOTE — PROGRESS NOTES
Subjective:       Patient ID: Jaki Bajwa is a 47 y.o. female.    Chief Complaint: Hypertension    Pt here for f/u and refill of norco. She has a dx of sjogrens and fibromyalgia based on blood work and chronic pain that is primarily across shoulders and in legs but also in upper and lower back. As a result she is on several meds that she says helps. She sees rheum at [facility] and has regular f/u. She has prior dx of lupus but this was not corroborated in notes from rheum. She is on plaquenil, tizanidine, gabapentin and norco. She uses norco 1-2x/day. She is due for refills on pain meds. She has previously signed a pain contract. LA  reviewed and is consistent. She is no longer seeing pain mgmt; she was offered injections but declined as meds were working for her. She has also done PT with some success in the past.      Hypertension   Pertinent negatives include no chest pain, headaches or shortness of breath.     Review of Systems   Constitutional: Negative for fever.   Respiratory: Negative for shortness of breath.    Cardiovascular: Negative for chest pain.   Musculoskeletal: Positive for back pain. Negative for joint swelling.   Neurological: Negative for headaches.   Psychiatric/Behavioral: Negative for dysphoric mood.       Objective:      Physical Exam   Constitutional: She is oriented to person, place, and time. She appears well-developed and well-nourished.   Neck: Neck supple. No thyromegaly present.   Cardiovascular: Normal rate, regular rhythm and normal heart sounds.    Pulmonary/Chest: Effort normal and breath sounds normal.   Musculoskeletal: She exhibits no edema.        Lumbar back: She exhibits decreased range of motion and tenderness. She exhibits no bony tenderness.   Lymphadenopathy:     She has no cervical adenopathy.   Neurological: She is alert and oriented to person, place, and time. She has normal strength. No sensory deficit.   Reflex Scores:       Patellar reflexes are 2+ on the right  side and 2+ on the left side.       Achilles reflexes are 2+ on the right side and 2+ on the left side.  Psychiatric: She has a normal mood and affect. Her behavior is normal.       Assessment:       1. Fibromyalgia    2. Chronic pain syndrome    3. Sjogren's syndrome with keratoconjunctivitis sicca        Plan:       1. Refill norco--3 separate 30 day rxs given for 90 days total; proper use d/w pt  2. Educated on opiate use and narcan rx   3. UDS

## 2018-01-16 ENCOUNTER — TELEPHONE (OUTPATIENT)
Dept: INTERNAL MEDICINE | Facility: CLINIC | Age: 48
End: 2018-01-16

## 2018-01-16 NOTE — TELEPHONE ENCOUNTER
----- Message from Zehra Velasco sent at 1/16/2018 12:06 PM CST -----  Contact: Patient herself  X  1st Request  _  2nd Request  _  3rd Request    Who:  Jaki Bajwa (mrn# [id number])    Why:  Patient called requesting a prescription for the medication NARCAN.  Please give a call back at your earliest convenience.     THANKS!    What Number to Call Back:  [phone]    When to Expect a call back: (Before the end of the day)   -- if the call is after 12:00, the call back will be tomorrow.

## 2018-01-16 NOTE — TELEPHONE ENCOUNTER
Pt states insurance wont cover evzio. Pt states she saw on the news that the rx for evzio in nasal form. Pt would like to know if rx can be sent in for nasal form. Please advise/authorize?

## 2018-01-26 ENCOUNTER — OFFICE VISIT (OUTPATIENT)
Dept: INTERNAL MEDICINE | Facility: CLINIC | Age: 48
End: 2018-01-26
Attending: FAMILY MEDICINE
Payer: MEDICARE

## 2018-01-26 VITALS
OXYGEN SATURATION: 98 % | SYSTOLIC BLOOD PRESSURE: 132 MMHG | HEIGHT: 67 IN | BODY MASS INDEX: 41.73 KG/M2 | WEIGHT: 265.88 LBS | DIASTOLIC BLOOD PRESSURE: 86 MMHG | HEART RATE: 100 BPM

## 2018-01-26 DIAGNOSIS — I10 ESSENTIAL HYPERTENSION: Primary | Chronic | ICD-10-CM

## 2018-01-26 DIAGNOSIS — M79.7 FIBROMYALGIA: ICD-10-CM

## 2018-01-26 DIAGNOSIS — E66.01 MORBID OBESITY: ICD-10-CM

## 2018-01-26 PROCEDURE — 99215 OFFICE O/P EST HI 40 MIN: CPT | Mod: PBBFAC | Performed by: FAMILY MEDICINE

## 2018-01-26 PROCEDURE — 99999 PR PBB SHADOW E&M-EST. PATIENT-LVL V: CPT | Mod: PBBFAC,,, | Performed by: FAMILY MEDICINE

## 2018-01-26 PROCEDURE — 99213 OFFICE O/P EST LOW 20 MIN: CPT | Mod: S$PBB,,, | Performed by: FAMILY MEDICINE

## 2018-01-26 RX ORDER — TOPIRAMATE 50 MG/1
50-100 TABLET, FILM COATED ORAL NIGHTLY
Qty: 60 TABLET | Refills: 1 | Status: SHIPPED | OUTPATIENT
Start: 2018-01-26 | End: 2018-04-17

## 2018-01-26 NOTE — PATIENT INSTRUCTIONS
1600 calorie     Cauliflower rice    apps  [employer] fitness pal   Or loose it or  Seven       Topiramate tablets  What is this medicine?  TOPIRAMATE (toe PYRE a mate) is used to treat seizures in adults or children with epilepsy. It is also used for the prevention of migraine headaches.  How should I use this medicine?  Take this medicine by mouth with a glass of water. Follow the directions on the prescription label. Do not crush or chew. You may take this medicine with meals. Take your medicine at regular intervals. Do not take it more often than directed.  Talk to your pediatrician regarding the use of this medicine in children. Special care may be needed. While this drug may be prescribed for children as young as 2 years of age for selected conditions, precautions do apply.  What side effects may I notice from receiving this medicine?  Side effects that you should report to your doctor or health care professional as soon as possible:  · allergic reactions like skin rash, itching or hives, swelling of the face, lips, or tongue  · decreased sweating and/or rise in body temperature  · depression  · difficulty breathing, fast or irregular breathing patterns  · difficulty speaking  · difficulty walking or controlling muscle movements  · hearing impairment  · redness, blistering, peeling or loosening of the skin, including inside the mouth  · tingling, pain or numbness in the hands or feet  · unusual bleeding or bruising  · unusually weak or tired  · worsening of mood, thoughts or actions of suicide or dying  Side effects that usually do not require medical attention (report to your doctor or health care professional if they continue or are bothersome):  · altered taste  · back pain, joint or muscle aches and pains  · diarrhea, or constipation  · headache  · loss of appetite  · nausea  · stomach upset, indigestion  · tremors  What may interact with this medicine?  Do not take this medicine with any of the following  medications:  · probenecid  This medicine may also interact with the following medications:  · acetazolamide  · alcohol  · amitriptyline  · aspirin and aspirin-like medicines  · birth control pills  · certain medicines for depression  · certain medicines for seizures  · certain medicines that treat or prevent blood clots like warfarin, enoxaparin, dalteparin, apixaban, dabigatran, and rivaroxaban  · digoxin  · hydrochlorothiazide  · lithium  · medicines for pain, sleep, or muscle relaxation  · metformin  · methazolamide  · NSAIDS, medicines for pain and inflammation, like ibuprofen or naproxen  · pioglitazone  · risperidone  What if I miss a dose?  If you miss a dose, take it as soon as you can. If your next dose is to be taken in less than 6 hours, then do not take the missed dose. Take the next dose at your regular time. Do not take double or extra doses.  Where should I keep my medicine?  Keep out of the reach of children.  Store at room temperature between 15 and 30 degrees C (59 and 86 degrees F) in a tightly closed container. Protect from moisture. Throw away any unused medicine after the expiration date.  What should I tell my health care provider before I take this medicine?  They need to know if you have any of these conditions:  · bleeding disorders  · cirrhosis of the liver or liver disease  · diarrhea  · glaucoma  · kidney stones or kidney disease  · low blood counts, like low white cell, platelet, or red cell counts  · lung disease like asthma, obstructive pulmonary disease, emphysema  · metabolic acidosis  · on a ketogenic diet  · schedule for surgery or a procedure  · suicidal thoughts, plans, or attempt; a previous suicide attempt by you or a family member  · an unusual or allergic reaction to topiramate, other medicines, foods, dyes, or preservatives  · pregnant or trying to get pregnant  · breast-feeding  What should I watch for while using this medicine?  Visit your doctor or health care  professional for regular checks on your progress. Do not stop taking this medicine suddenly. This increases the risk of seizures if you are using this medicine to control epilepsy. Wear a medical identification bracelet or chain to say you have epilepsy or seizures, and carry a card that lists all your medicines.  This medicine can decrease sweating and increase your body temperature. Watch for signs of  sweating or fever, especially in children. Avoid extreme heat, hot baths, and saunas. Be careful about exercising, especially in hot weather. Contact your health care provider right away if you notice a fever or decrease in sweating.  You should drink plenty of fluids while taking this medicine. If you have had kidney stones in the past, this will help to reduce your chances of forming kidney stones.  If you have stomach pain, with nausea or vomiting and yellowing of your eyes or skin, call your doctor immediately.  You may get drowsy, dizzy, or have blurred vision. Do not drive, use machinery, or do anything that needs mental alertness until you know how this medicine affects you. To reduce dizziness, do not sit or stand up quickly, especially if you are an older patient. Alcohol can increase drowsiness and dizziness. Avoid alcoholic drinks.  If you notice blurred vision, eye pain, or other eye problems, seek medical attention at once for an eye exam.  The use of this medicine may increase the chance of suicidal thoughts or actions. Pay special attention to how you are responding while on this medicine. Any worsening of mood, or thoughts of suicide or dying should be reported to your health care professional right away.  This medicine may increase the chance of developing metabolic acidosis. If left untreated, this can cause kidney stones, bone disease, or slowed growth in children. Symptoms include breathing fast, fatigue, loss of appetite, irregular heartbeat, or loss of consciousness. Call your doctor  immediately if you experience any of these side effects. Also, tell your doctor about any surgery you plan on having while taking this medicine since this may increase your risk for metabolic acidosis.  Birth control pills may not work properly while you are taking this medicine. Talk to your doctor about using an extra method of birth control.  Women who become pregnant while using this medicine may enroll in the [place] Antiepileptic Drug Pregnancy Registry by calling 1-977.240.6276. This registry collects information about the safety of antiepileptic drug use during pregnancy.  NOTE:This sheet is a summary. It may not cover all possible information. If you have questions about this medicine, talk to your doctor, pharmacist, or health care provider. Copyright© 2017 [employer]

## 2018-02-08 ENCOUNTER — LAB VISIT (OUTPATIENT)
Dept: LAB | Facility: OTHER | Age: 48
End: 2018-02-08
Attending: INTERNAL MEDICINE
Payer: MEDICARE

## 2018-02-08 ENCOUNTER — TELEPHONE (OUTPATIENT)
Dept: INTERNAL MEDICINE | Facility: CLINIC | Age: 48
End: 2018-02-08

## 2018-02-08 ENCOUNTER — OFFICE VISIT (OUTPATIENT)
Dept: INTERNAL MEDICINE | Facility: CLINIC | Age: 48
End: 2018-02-08
Payer: MEDICARE

## 2018-02-08 VITALS
DIASTOLIC BLOOD PRESSURE: 70 MMHG | SYSTOLIC BLOOD PRESSURE: 118 MMHG | WEIGHT: 265.44 LBS | HEART RATE: 112 BPM | HEIGHT: 67 IN | BODY MASS INDEX: 41.66 KG/M2

## 2018-02-08 DIAGNOSIS — G89.4 CHRONIC PAIN SYNDROME: ICD-10-CM

## 2018-02-08 DIAGNOSIS — F41.1 GAD (GENERALIZED ANXIETY DISORDER): ICD-10-CM

## 2018-02-08 DIAGNOSIS — R46.81 OBSESSIVE-COMPULSIVE BEHAVIOR: ICD-10-CM

## 2018-02-08 DIAGNOSIS — F33.0 MILD EPISODE OF RECURRENT MAJOR DEPRESSIVE DISORDER: Chronic | ICD-10-CM

## 2018-02-08 DIAGNOSIS — I10 ESSENTIAL HYPERTENSION: Chronic | ICD-10-CM

## 2018-02-08 DIAGNOSIS — Z01.818 PREOP EXAM FOR INTERNAL MEDICINE: ICD-10-CM

## 2018-02-08 DIAGNOSIS — E66.01 MORBID OBESITY: ICD-10-CM

## 2018-02-08 DIAGNOSIS — I10 ESSENTIAL HYPERTENSION: Primary | Chronic | ICD-10-CM

## 2018-02-08 DIAGNOSIS — M35.01 SJOGREN'S SYNDROME WITH KERATOCONJUNCTIVITIS SICCA: ICD-10-CM

## 2018-02-08 DIAGNOSIS — M79.7 FIBROMYALGIA: ICD-10-CM

## 2018-02-08 LAB
ANION GAP SERPL CALC-SCNC: 7 MMOL/L
BASOPHILS # BLD AUTO: 0.04 K/UL
BASOPHILS NFR BLD: 0.6 %
BUN SERPL-MCNC: 18 MG/DL
CALCIUM SERPL-MCNC: 9.8 MG/DL
CHLORIDE SERPL-SCNC: 102 MMOL/L
CO2 SERPL-SCNC: 31 MMOL/L
CREAT SERPL-MCNC: 1.1 MG/DL
DIFFERENTIAL METHOD: ABNORMAL
EOSINOPHIL # BLD AUTO: 0.3 K/UL
EOSINOPHIL NFR BLD: 3.5 %
ERYTHROCYTE [DISTWIDTH] IN BLOOD BY AUTOMATED COUNT: 15.1 %
EST. GFR  (AFRICAN AMERICAN): >60 ML/MIN/1.73 M^2
EST. GFR  (NON AFRICAN AMERICAN): 59 ML/MIN/1.73 M^2
GLUCOSE SERPL-MCNC: 91 MG/DL
HCT VFR BLD AUTO: 39.7 %
HGB BLD-MCNC: 11.9 G/DL
LYMPHOCYTES # BLD AUTO: 2.7 K/UL
LYMPHOCYTES NFR BLD: 36.8 %
MCH RBC QN AUTO: 24.4 PG
MCHC RBC AUTO-ENTMCNC: 30 G/DL
MCV RBC AUTO: 81 FL
MONOCYTES # BLD AUTO: 0.4 K/UL
MONOCYTES NFR BLD: 5.4 %
NEUTROPHILS # BLD AUTO: 3.9 K/UL
NEUTROPHILS NFR BLD: 53.7 %
PLATELET # BLD AUTO: 300 K/UL
PMV BLD AUTO: 11.5 FL
POTASSIUM SERPL-SCNC: 3.9 MMOL/L
RBC # BLD AUTO: 4.88 M/UL
SODIUM SERPL-SCNC: 140 MMOL/L
WBC # BLD AUTO: 7.21 K/UL

## 2018-02-08 PROCEDURE — 93005 ELECTROCARDIOGRAM TRACING: CPT | Mod: PBBFAC | Performed by: INTERNAL MEDICINE

## 2018-02-08 PROCEDURE — 36415 COLL VENOUS BLD VENIPUNCTURE: CPT

## 2018-02-08 PROCEDURE — 99213 OFFICE O/P EST LOW 20 MIN: CPT | Mod: PBBFAC | Performed by: INTERNAL MEDICINE

## 2018-02-08 PROCEDURE — 85025 COMPLETE CBC W/AUTO DIFF WBC: CPT

## 2018-02-08 PROCEDURE — 93010 ELECTROCARDIOGRAM REPORT: CPT | Mod: ,,, | Performed by: INTERNAL MEDICINE

## 2018-02-08 PROCEDURE — 99999 PR PBB SHADOW E&M-EST. PATIENT-LVL III: CPT | Mod: PBBFAC,,, | Performed by: INTERNAL MEDICINE

## 2018-02-08 PROCEDURE — 99214 OFFICE O/P EST MOD 30 MIN: CPT | Mod: S$PBB,,, | Performed by: INTERNAL MEDICINE

## 2018-02-08 PROCEDURE — 80048 BASIC METABOLIC PNL TOTAL CA: CPT

## 2018-02-08 NOTE — TELEPHONE ENCOUNTER
Pt expressed verbal understandings concerning lab results. Faxed over clearance to Dr. Lio Bettencourt @[phone]. CF

## 2018-02-08 NOTE — PROGRESS NOTES
Subjective:       Patient ID: Jaki Bajwa is a 47 y.o. female.    Chief Complaint: Pre-op Exam (right toe surgery )    Pt here for preop exam for upcoming R foot surgery for removing growth with Dr. Bettencourt. She has tolerated anesthesia in the past without known complication. She is active and able to achieve >4 METS without cp/sob/angina.     She has a dx of sjogrens and fibromyalgia based on blood work and chronic pain that is primarily across shoulders and in legs but also in upper and lower back. As a result she is on several meds that she says helps. She sees rheum at [facility] and has regular f/u. She has prior dx of lupus but this was not corroborated in notes from rheum. She is on plaquenil, tizanidine, gabapentin and norco. She uses norco 1-2x/day. She is due for refills on pain meds. She has previously signed a pain contract. LA  reviewed and is consistent. She is seeing pain mgmt. She has also done PT with some success in the past.     She also has dx of sjogren's syndrome. She was on pilocarpine but stopped b/c it made her nauseous. Uses eye drops for dry eyes.    Pt's BP is well controlled. Tolerating meds well. Pt denies cp/sob/ha/vision or neuro changes. Not checking at home.     She continues to see psychiatry for bipolar and anxiety. This is well controlled. No SI/HI.         Hypertension   Pertinent negatives include no chest pain, headaches, palpitations or shortness of breath.   Fibromyalgia   Pertinent negatives include no abdominal pain, chest pain or headaches.     Review of Systems   Constitutional: Negative for unexpected weight change.   Eyes: Negative for visual disturbance.   Respiratory: Negative for shortness of breath.    Cardiovascular: Negative for chest pain, palpitations and leg swelling.   Gastrointestinal: Negative for abdominal pain.   Endocrine: Negative for polyuria.   Genitourinary: Negative for dysuria and frequency.   Neurological: Negative for headaches.    Psychiatric/Behavioral: Negative for dysphoric mood.       Objective:          Assessment:       1. Essential hypertension    2. Mild episode of recurrent major depressive disorder    3. Obsessive-compulsive behavior    4. JUAN JOSE (generalized anxiety disorder)    5. Sjogren's syndrome with keratoconjunctivitis sicca    6. Fibromyalgia    7. Morbid obesity    8. Chronic pain syndrome    9. Preop exam for internal medicine        Plan:       1. EKG--nsr with non-specific T changes but unchanged from EKG in 2011  2. Appropriate labs  3. Keep f/u with specialists  4. Pt is low risk to proceed with planned procedure as she has no active cardiopulmonary issues and chronic diseases are well compensated currently          Physical Exam   Constitutional: She is oriented to person, place, and time. She appears well-developed and well-nourished.   Eyes: EOM are normal. Pupils are equal, round, and reactive to light.   Neck: Neck supple. No thyromegaly present.   Cardiovascular: Normal rate, regular rhythm and normal heart sounds.    Pulmonary/Chest: Effort normal and breath sounds normal.   Musculoskeletal: She exhibits no edema.        Lumbar back: She exhibits normal range of motion, no tenderness and no bony tenderness.   Lymphadenopathy:     She has no cervical adenopathy.   Neurological: She is alert and oriented to person, place, and time. No cranial nerve deficit.   Psychiatric: She has a normal mood and affect. Her behavior is normal.

## 2018-02-08 NOTE — PATIENT INSTRUCTIONS
Thank you for enrolling in [facility]. Please follow the instructions below to securely access your online medical record. My allows you to send messages to your doctor, view your test results, renew your prescriptions, schedule appointments, and more.     How Do I Sign Up?  1. In your Internet browser, go to [email or web address].  2. In the lower right of the page, click the Activate Now link located under the Have Access Code? Title.  3. Enter your [facility] Access Code exactly as it appears below. You will not need to use this code after youve completed the sign-up process. If you do not sign up before the expiration date, you must request a new code.  [facility] Access Code: [email or web address]  Expires: 2/23/2018  8:56 AM    4. Enter Date of Birth (mm/dd/yyyy) as indicated and click the Next button. You will be taken to the next sign-up page.  5. Create a [facility] ID. This will be your new [facility] login ID and cannot be changed, so think of one that is secure and easy to remember.  6. Create a [facility] password.  Your password must be at least 8 characters long and contain at least 1 letter and 1 number.  You can change your password at any time.  7. Enter your Password Reset Question and Answer, then click the Next button.   8. Enter your e-mail address. You will receive e-mail notification when new information is available in [facility].  9. Click Sign Up. You can now view your medical record.     Additional Information  If you have questions, you can email [email or web address] or call [phone]  to talk to our [facility] staff. Remember, [facility] is NOT to be used for urgent needs. For medical emergencies, dial 911.

## 2018-02-08 NOTE — TELEPHONE ENCOUNTER
Inform pt that labs look good. No changes are needed at this time. Please send labs, UA, EKG and my last note to pt's podiatry for surgery.

## 2018-02-19 ENCOUNTER — TELEPHONE (OUTPATIENT)
Dept: INTERNAL MEDICINE | Facility: CLINIC | Age: 48
End: 2018-02-19

## 2018-02-19 NOTE — TELEPHONE ENCOUNTER
Spoke to pt advised that her insurance company may not cover increased dosage of Norco since she just  a 30 day supply approved by Dr. Rasmussen. Pt was advised to contact her surgeon with advice on current mediation of Norco prescribed by Dr. Rasmussen on 02/16/2018. Pt expressed verbal understandings. CF

## 2018-02-19 NOTE — TELEPHONE ENCOUNTER
----- Message from Zonia Rhoades sent at 2/19/2018  8:58 AM CST -----  Contact: pt      _  1st Request  _  2nd Request  _  3rd Request    Please refill the medication(s) listed below. Please call the patient when the prescription(s) is ready for  at this phone number      [phone]      Medication #1hydrocodone-acetaminophen 5-325mg (NORCO) 5-325 mg per tablet - asking for 10    Had foot surgery. Please call pt     Medication #2      Preferred Pharmacy:[facility]

## 2018-02-27 ENCOUNTER — TELEPHONE (OUTPATIENT)
Dept: INTERNAL MEDICINE | Facility: CLINIC | Age: 48
End: 2018-02-27

## 2018-02-27 NOTE — TELEPHONE ENCOUNTER
----- Message from Roberta Avitia sent at 2/27/2018  9:25 AM CST -----  _  1st Request  _  2nd Request  _  3rd Request        Who: patient     Why: Requesting a call back in regards to having a form filled out for the pt to get a handicap lic plate, does your office have the form and does pt have to make appt or can she just come in and pick it up.   Please return the call at earliest convenience. Thanks!    What Number to Call Back: [phone]      When to Expect a call back: (Within 24 hours)

## 2018-03-29 DIAGNOSIS — M79.10 MYALGIA: ICD-10-CM

## 2018-03-29 RX ORDER — TIZANIDINE 4 MG/1
TABLET ORAL
Qty: 270 TABLET | Refills: 0 | Status: SHIPPED | OUTPATIENT
Start: 2018-03-29 | End: 2018-06-20 | Stop reason: SDUPTHER

## 2018-04-01 ENCOUNTER — EXTERNAL CHRONIC CARE MANAGEMENT (OUTPATIENT)
Dept: PRIMARY CARE CLINIC | Facility: CLINIC | Age: 48
End: 2018-04-01
Payer: MEDICARE

## 2018-04-13 DIAGNOSIS — F33.1 MAJOR DEPRESSIVE DISORDER, RECURRENT, MODERATE: ICD-10-CM

## 2018-04-13 DIAGNOSIS — F40.10 SOCIAL PHOBIA: ICD-10-CM

## 2018-04-13 DIAGNOSIS — F41.1 GAD (GENERALIZED ANXIETY DISORDER): ICD-10-CM

## 2018-04-13 RX ORDER — DOXEPIN HYDROCHLORIDE 150 MG/1
150 CAPSULE ORAL NIGHTLY
Qty: 90 CAPSULE | Refills: 0 | Status: SHIPPED | OUTPATIENT
Start: 2018-04-13 | End: 2018-04-17 | Stop reason: SDUPTHER

## 2018-04-17 ENCOUNTER — OFFICE VISIT (OUTPATIENT)
Dept: INTERNAL MEDICINE | Facility: CLINIC | Age: 48
End: 2018-04-17
Payer: MEDICARE

## 2018-04-17 ENCOUNTER — LAB VISIT (OUTPATIENT)
Dept: LAB | Facility: HOSPITAL | Age: 48
End: 2018-04-17
Attending: INTERNAL MEDICINE
Payer: MEDICARE

## 2018-04-17 ENCOUNTER — TELEPHONE (OUTPATIENT)
Dept: INTERNAL MEDICINE | Facility: CLINIC | Age: 48
End: 2018-04-17

## 2018-04-17 ENCOUNTER — OFFICE VISIT (OUTPATIENT)
Dept: PSYCHIATRY | Facility: CLINIC | Age: 48
End: 2018-04-17
Payer: MEDICARE

## 2018-04-17 VITALS
DIASTOLIC BLOOD PRESSURE: 90 MMHG | WEIGHT: 276.56 LBS | HEIGHT: 67 IN | BODY MASS INDEX: 43.41 KG/M2 | HEART RATE: 102 BPM | SYSTOLIC BLOOD PRESSURE: 150 MMHG

## 2018-04-17 VITALS
SYSTOLIC BLOOD PRESSURE: 130 MMHG | HEIGHT: 67 IN | HEART RATE: 98 BPM | BODY MASS INDEX: 43.18 KG/M2 | DIASTOLIC BLOOD PRESSURE: 84 MMHG | WEIGHT: 275.13 LBS

## 2018-04-17 DIAGNOSIS — R46.81 OBSESSIVE-COMPULSIVE BEHAVIOR: ICD-10-CM

## 2018-04-17 DIAGNOSIS — Z12.39 SCREENING FOR BREAST CANCER: ICD-10-CM

## 2018-04-17 DIAGNOSIS — F33.1 MAJOR DEPRESSIVE DISORDER, RECURRENT, MODERATE: ICD-10-CM

## 2018-04-17 DIAGNOSIS — F40.10 SOCIAL PHOBIA: ICD-10-CM

## 2018-04-17 DIAGNOSIS — D64.9 ANEMIA, UNSPECIFIED TYPE: ICD-10-CM

## 2018-04-17 DIAGNOSIS — N39.0 RECURRENT UTI (URINARY TRACT INFECTION): ICD-10-CM

## 2018-04-17 DIAGNOSIS — F33.41 MAJOR DEPRESSIVE DISORDER, RECURRENT EPISODE, IN PARTIAL REMISSION: ICD-10-CM

## 2018-04-17 DIAGNOSIS — I10 ESSENTIAL HYPERTENSION: Chronic | ICD-10-CM

## 2018-04-17 DIAGNOSIS — M35.01 SJOGREN'S SYNDROME WITH KERATOCONJUNCTIVITIS SICCA: ICD-10-CM

## 2018-04-17 DIAGNOSIS — E66.01 MORBID OBESITY: ICD-10-CM

## 2018-04-17 DIAGNOSIS — F41.1 GAD (GENERALIZED ANXIETY DISORDER): Primary | ICD-10-CM

## 2018-04-17 DIAGNOSIS — F33.0 MILD EPISODE OF RECURRENT MAJOR DEPRESSIVE DISORDER: Chronic | ICD-10-CM

## 2018-04-17 DIAGNOSIS — Z13.6 ENCOUNTER FOR LIPID SCREENING FOR CARDIOVASCULAR DISEASE: ICD-10-CM

## 2018-04-17 DIAGNOSIS — Z13.220 ENCOUNTER FOR LIPID SCREENING FOR CARDIOVASCULAR DISEASE: ICD-10-CM

## 2018-04-17 DIAGNOSIS — G47.00 INSOMNIA, UNSPECIFIED TYPE: ICD-10-CM

## 2018-04-17 DIAGNOSIS — I10 ESSENTIAL HYPERTENSION: Primary | Chronic | ICD-10-CM

## 2018-04-17 DIAGNOSIS — F41.1 GAD (GENERALIZED ANXIETY DISORDER): ICD-10-CM

## 2018-04-17 DIAGNOSIS — M79.7 FIBROMYALGIA: ICD-10-CM

## 2018-04-17 DIAGNOSIS — F81.9 LEARNING DIFFICULTY DUE TO COGNITIVE LIMITATIONS: ICD-10-CM

## 2018-04-17 LAB
ALBUMIN SERPL BCP-MCNC: 4 G/DL
ALP SERPL-CCNC: 78 U/L
ALT SERPL W/O P-5'-P-CCNC: 45 U/L
ANION GAP SERPL CALC-SCNC: 10 MMOL/L
AST SERPL-CCNC: 45 U/L
BILIRUB SERPL-MCNC: 0.4 MG/DL
BUN SERPL-MCNC: 8 MG/DL
CALCIUM SERPL-MCNC: 10.2 MG/DL
CHLORIDE SERPL-SCNC: 100 MMOL/L
CHOLEST SERPL-MCNC: 171 MG/DL
CHOLEST/HDLC SERPL: 2.3 {RATIO}
CO2 SERPL-SCNC: 32 MMOL/L
CREAT SERPL-MCNC: 1.1 MG/DL
EST. GFR  (AFRICAN AMERICAN): >60 ML/MIN/1.73 M^2
EST. GFR  (NON AFRICAN AMERICAN): 59.1 ML/MIN/1.73 M^2
GLUCOSE SERPL-MCNC: 90 MG/DL
HDLC SERPL-MCNC: 73 MG/DL
HDLC SERPL: 42.7 %
LDLC SERPL CALC-MCNC: 71.8 MG/DL
NONHDLC SERPL-MCNC: 98 MG/DL
POTASSIUM SERPL-SCNC: 3.8 MMOL/L
PROT SERPL-MCNC: 8.1 G/DL
SODIUM SERPL-SCNC: 142 MMOL/L
TRIGL SERPL-MCNC: 131 MG/DL

## 2018-04-17 PROCEDURE — 90834 PSYTX W PT 45 MINUTES: CPT | Mod: PBBFAC | Performed by: SOCIAL WORKER

## 2018-04-17 PROCEDURE — 99999 PR PBB SHADOW E&M-EST. PATIENT-LVL III: CPT | Mod: PBBFAC,,, | Performed by: INTERNAL MEDICINE

## 2018-04-17 PROCEDURE — 99213 OFFICE O/P EST LOW 20 MIN: CPT | Mod: PBBFAC,27,25 | Performed by: INTERNAL MEDICINE

## 2018-04-17 PROCEDURE — 99214 OFFICE O/P EST MOD 30 MIN: CPT | Mod: S$PBB,,, | Performed by: INTERNAL MEDICINE

## 2018-04-17 PROCEDURE — 90834 PSYTX W PT 45 MINUTES: CPT | Mod: S$PBB,,, | Performed by: SOCIAL WORKER

## 2018-04-17 PROCEDURE — 99213 OFFICE O/P EST LOW 20 MIN: CPT | Mod: PBBFAC,25 | Performed by: INTERNAL MEDICINE

## 2018-04-17 PROCEDURE — 80061 LIPID PANEL: CPT

## 2018-04-17 PROCEDURE — 80053 COMPREHEN METABOLIC PANEL: CPT

## 2018-04-17 PROCEDURE — 36415 COLL VENOUS BLD VENIPUNCTURE: CPT

## 2018-04-17 RX ORDER — BUPROPION HYDROCHLORIDE 150 MG/1
450 TABLET ORAL EVERY MORNING
Qty: 270 TABLET | Refills: 0 | Status: SHIPPED | OUTPATIENT
Start: 2018-04-17 | End: 2018-07-18 | Stop reason: SDUPTHER

## 2018-04-17 RX ORDER — HYDROCODONE BITARTRATE AND ACETAMINOPHEN 5; 325 MG/1; MG/1
1 TABLET ORAL 2 TIMES DAILY PRN
Qty: 60 TABLET | Refills: 0 | Status: SHIPPED | OUTPATIENT
Start: 2018-04-17 | End: 2018-06-11

## 2018-04-17 RX ORDER — HYDROCODONE BITARTRATE AND ACETAMINOPHEN 5; 325 MG/1; MG/1
1 TABLET ORAL 2 TIMES DAILY PRN
Qty: 60 TABLET | Refills: 0 | Status: SHIPPED | OUTPATIENT
Start: 2018-06-16 | End: 2018-06-11

## 2018-04-17 RX ORDER — DOXEPIN HYDROCHLORIDE 150 MG/1
150 CAPSULE ORAL NIGHTLY
Qty: 90 CAPSULE | Refills: 0 | Status: SHIPPED | OUTPATIENT
Start: 2018-04-17 | End: 2018-07-18 | Stop reason: SDUPTHER

## 2018-04-17 RX ORDER — DIAZEPAM 5 MG/1
5 TABLET ORAL 3 TIMES DAILY
Qty: 90 TABLET | Refills: 2 | Status: SHIPPED | OUTPATIENT
Start: 2018-04-17 | End: 2018-07-18 | Stop reason: SDUPTHER

## 2018-04-17 RX ORDER — HYDROCODONE BITARTRATE AND ACETAMINOPHEN 5; 325 MG/1; MG/1
1 TABLET ORAL 2 TIMES DAILY PRN
Qty: 60 TABLET | Refills: 0 | Status: SHIPPED | OUTPATIENT
Start: 2018-05-17 | End: 2018-06-11

## 2018-04-17 RX ORDER — LURASIDONE HYDROCHLORIDE 60 MG/1
60 TABLET, FILM COATED ORAL DAILY
Qty: 90 TABLET | Refills: 0 | Status: SHIPPED | OUTPATIENT
Start: 2018-04-17 | End: 2018-07-18 | Stop reason: SDUPTHER
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
## 2018-04-17 NOTE — PROGRESS NOTES
OUTPATIENT PSYCHIATRY RETURN VISIT    ENCOUNTER DATE:  4/18/2018  SITE:  [facility]  LENGTH OF SESSION:  25 minutes    CHIEF COMPLAINT:  Mood and Anxiety    HISTORY OF PRESENTING ILLNESS:  Jaki Bajwa is a 47 y.o. female with history of MDD, JUAN JOSE, OCD, and social phobia who presents for follow up appointment.  Patient was last seen in clinic on 1/10/18, at which time she was continued on Latuda 60mg daily, Wellbutrin XL 450mg daily, Doxepin 150mg qHS, and Valium 5mg/10mg.  She is seeing Mr. Zamora, [facility], for therapy.    History as told by patient:  No changes with online shopping.  Continues to buy things obsessively.  Mood has been really good since change to Valium from Xanax.  Now getting out of bed in the morning, getting out of the house and doing things.  Went to the movies, strawberry festival, and out to eat.  Relationship with boyfriend is good.  Denies symptoms of depression, however say she sometimes wakes up crying because she is lonely.  States this has been present for many years.  Tries to stay busy and distract herself to feel better.  Say she wishes she had more people surrounding her in her life, especially family.  Says daughter is in nursing school - lives in [place], [place].  Son is a  so works a lot.  Boyfriend is retired but drives school buses for the [employer].  Gets lonely when she goes home from his house.  Fears people breaking into her house.  Sees grandchild on Thursdays - makes her very happy.  About to have another grandchild.  Denies close friends, although has childhood cousin she has recently been talking to every day.  Cousin has also been strugling with depression, she lives in [facility].  Obsessed with [employer] right now.  Gets obsessed over things easily.  Anxiety is still present.  Nervous about coming here and talking about things.  Any social interaction makes her nervous.  Gets very anxious in a crowded room - worried  people are looking at her and judging her.  Does not like looking in the mirror - very hard on herself, feels she is ugly.  Stopped Invokana and Topamax.  Denies SI, HI, or AVH.    Medication side effects:  No  Medication compliance:  Yes    PSYCHIATRIC REVIEW OF SYSTEMS:  Trouble with sleep:  Improved with medication  Appetite changes:  Denies  Weight changes:  Denies  Lack of energy:  Denies  Anhedonia:  Denies  Somatic symptoms:  Denies  Libido:  Denies  Anxiety/panic:  Yes, as above  Guilty/hopeless:  Denies  Self-injurious behavior/risky behavior:  Denies  Any drugs:  Denies  Alcohol:  Denies    MEDICAL REVIEW OF SYSTEMS:  Complete review of systems performed covering Constitutional, Musculoskeletal, Neurologic.  All systems negative except for that covered in HPI.    PAST PSYCHIATRIC, MEDICAL, AND SOCIAL HISTORY REVIEWED  The patient's past medical, family and social history have been reviewed and updated as appropriate within the electronic medical record - see encounter notes.    MEDICATIONS:    Current Outpatient Prescriptions:     buPROPion (WELLBUTRIN XL) 150 MG TB24 tablet, Take 3 tablets (450 mg total) by mouth every morning., Disp: 270 tablet, Rfl: 0    desonide (DESOWEN) 0.05 % cream, MILTON EXT AA BID PRN, Disp: 30 g, Rfl: 3    diazePAM (VALIUM) 5 MG tablet, Take 1 tablet (5 mg total) by mouth 3 (three) times daily., Disp: 90 tablet, Rfl: 2    doxepin (SINEQUAN) 150 MG Cap, Take 1 capsule (150 mg total) by mouth every evening., Disp: 90 capsule, Rfl: 0    gabapentin (NEURONTIN) 300 MG capsule, Take 600 mg by mouth 2 (two) times daily. , Disp: , Rfl:     hydrocodone-acetaminophen 5-325mg (NORCO) 5-325 mg per tablet, Take 1 tablet by mouth 2 (two) times daily as needed for Pain., Disp: 60 tablet, Rfl: 0    hydrocodone-acetaminophen 5-325mg (NORCO) 5-325 mg per tablet, Take 1 tablet by mouth 2 (two) times daily as needed for Pain., Disp: 60 tablet, Rfl: 0    [START ON 5/17/2018]  "hydrocodone-acetaminophen 5-325mg (NORCO) 5-325 mg per tablet, Take 1 tablet by mouth 2 (two) times daily as needed for Pain., Disp: 60 tablet, Rfl: 0    [START ON 6/16/2018] hydrocodone-acetaminophen 5-325mg (NORCO) 5-325 mg per tablet, Take 1 tablet by mouth 2 (two) times daily as needed for Pain., Disp: 60 tablet, Rfl: 0    hydroxychloroquine (PLAQUENIL) 200 mg tablet, Take 200 mg by mouth Twice daily., Disp: , Rfl:     losartan-hydrochlorothiazide 100-12.5 mg (HYZAAR) 100-12.5 mg Tab, Take 1 tablet by mouth once daily., Disp: 90 tablet, Rfl: 3    lurasidone (LATUDA) 60 mg Tab tablet, Take 1 tablet (60 mg total) by mouth once daily., Disp: 90 tablet, Rfl: 0    naproxen (NAPROSYN) 500 MG tablet, Take 1 tablet (500 mg total) by mouth 2 (two) times daily as needed (pain)., Disp: 180 tablet, Rfl: 0    RESTASIS 0.05 % ophthalmic emulsion, PLACE ONE DROP INTO BOTH EYES BID, Disp: , Rfl: 0    tiZANidine (ZANAFLEX) 4 MG tablet, TAKE 1 TABLET(4 MG) BY MOUTH EVERY 8 HOURS AS NEEDED FOR MUSCLE PAIN, Disp: 270 tablet, Rfl: 0    ALLERGIES:  Review of patient's allergies indicates:   Allergen Reactions    Aspirin Hives     PSYCHIATRIC EXAM:  Vitals:    04/17/18 0755   BP: (!) 150/90   Pulse: 102   Weight: 125.4 kg (276 lb 9.1 oz)   Height: 5' 7" (1.702 m)     Appearance:  Well groomed, appearing healthy and of stated age  Behavior:  Cooperative, pleasant, no psychomotor agitation or retardation  Speech:  Normal rate, rhythm, prosody, and volume  Mood:  "Good"  Affect:  Congruent and appropriate  Thought Process:  Linear, logical, goal directed  Thought Content:  Negative for suicidal ideation, homicidal ideation, delusions or hallucinations.  Associations:  Intact  Memory:  Grossly Intact  Level of Consciousness/Orientation:  Grossly intact  Fund of Knowledge:  Good  Attention:  Good  Language:  Fluent, able to name abstract and concrete objects  Insight:  Fair  Judgment:  Intact  Psychomotor signs:  No involuntary " movements or tremor  Gait:  Normal    RELEVANT LABS/STUDIES:    Lab Results   Component Value Date    WBC 7.21 02/08/2018    HGB 11.9 (L) 02/08/2018    HCT 39.7 02/08/2018    MCV 81 (L) 02/08/2018     02/08/2018     BMP  Lab Results   Component Value Date     04/17/2018    K 3.8 04/17/2018     04/17/2018    CO2 32 (H) 04/17/2018    BUN 8 04/17/2018    CREATININE 1.1 04/17/2018    CALCIUM 10.2 04/17/2018    ANIONGAP 10 04/17/2018    ESTGFRAFRICA >60.0 04/17/2018    EGFRNONAA 59.1 (A) 04/17/2018     Lab Results   Component Value Date    ALT 45 (H) 04/17/2018    AST 45 (H) 04/17/2018    ALKPHOS 78 04/17/2018    BILITOT 0.4 04/17/2018     Lab Results   Component Value Date    TSH 2.261 04/18/2017     No results found for: LABA1C, HGBA1C    IMPRESSION:    Jaki Bajwa is a 47 y.o. female with history of MDD, JUAN JOSE, OCD and social phobia who presents for follow up appointment.    Status/Progress:  Based on the examination today, the patient's problem(s) is/are improved.  New problems have not been presented today.     Risk Parameters:  Patient reports no suicidal ideation  Patient reports no homicidal ideation  Patient reports no self-injurious behavior  Patient reports no violent behavior    DIAGNOSES:    ICD-10-CM ICD-9-CM   1. JUAN JOSE (generalized anxiety disorder) F41.1 300.02   2. Social phobia F40.10 300.23   3. Obsessive-compulsive behavior R46.81 300.3   4. Major depressive disorder, recurrent, moderate F33.1 296.32   5. Insomnia, unspecified type G47.00 780.52     PLAN:  · Continue Latuda 60mg daily for mood stabilization/anxiety.    · Continue Wellbutrin XL 450mg daily for depression.    · Continue Doxepin 150mg qHS for insomnia.    · Continue Valium 5mg/10mg for anxiety and insomnia.  Discussed goal of slowly reducing this as symptoms remain stable.  She is aware of risks and potential side effects but would like to continue current dose for now.  · Continue therapy with Mr. Noah LCSW,  to target anxiety and obsessions (mostly online shopping).    RETURN TO CLINIC:  Follow-up in about 3 months (around 7/17/2018).

## 2018-04-17 NOTE — PROGRESS NOTES
Subjective:       Patient ID: Jaki Bajwa is a 47 y.o. female.    Chief Complaint: Hypertension    Pt here for f/u. She has a dx of sjogrens and fibromyalgia based on blood work and chronic pain that is primarily across shoulders and in legs but also in upper and lower back. As a result she is on several meds that she says helps. She sees rheum at [facility] and has regular f/u. She has prior dx of lupus but this was not corroborated in notes from rheum. She is on plaquenil, tizanidine, gabapentin and norco. She uses norco 1-2x/day. She is due for refills on pain meds. She has previously signed a pain contract. [place]  reviewed and is consistent. She is seeing pain mgmt. She has also done PT with some success in the past.     She also has dx of sjogren's syndrome. She was on pilocarpine but stopped b/c it made her nauseous. Uses eye drops for dry eyes which has been effective and is stable.     Pt's BP is well controlled. Tolerating meds well. Pt denies cp/sob/ha/vision or neuro changes. Not checking at home.     She continues to see psychiatry for bipolar and anxiety. This is well controlled and stable. No SI/HI.     She has morbid obesity but is seeing bariatric medicine. We reviewed importance of exercise. Has not found victoza effective so stopped this and will d/w her bariatric med specialist.     She has mild chronic anemia with prior negative w/u. This is stable and not having signs/symptoms of anemia/blood loss.     She has had recurrent UTIs. Saw urology and was on prophylactic macrodantin. However, has been off this and no issues in a while.         Hypertension   Pertinent negatives include no chest pain, headaches, palpitations or shortness of breath.   Fibromyalgia   Pertinent negatives include no abdominal pain, chest pain or headaches.     Review of Systems   Constitutional: Negative for unexpected weight change.   Eyes: Negative for visual disturbance.   Respiratory: Negative for shortness of breath.     Cardiovascular: Negative for chest pain, palpitations and leg swelling.   Gastrointestinal: Negative for abdominal pain.   Endocrine: Negative for polyuria.   Genitourinary: Negative for dysuria and frequency.   Neurological: Negative for headaches.   Psychiatric/Behavioral: Negative for dysphoric mood.       Objective:          Assessment:       1. Essential hypertension    2. Screening for breast cancer    3. JUAN JOSE (generalized anxiety disorder)    4. Mild episode of recurrent major depressive disorder    5. Obsessive-compulsive behavior    6. Recurrent UTI (urinary tract infection)    7. Sjogren's syndrome with keratoconjunctivitis sicca    8. Morbid obesity    9. Anemia, unspecified type    10. Fibromyalgia    11. Encounter for lipid screening for cardiovascular disease        Plan:       1. Appropriate labs  2. Keep f/u with specialists  3. Mammogram  4. Refill norco--3 separate 30 day rxs given for 90 days total; proper use d/w pt              Physical Exam   Constitutional: She is oriented to person, place, and time. She appears well-developed and well-nourished.   Eyes: EOM are normal. Pupils are equal, round, and reactive to light.   Neck: Neck supple. No thyromegaly present.   Cardiovascular: Normal rate, regular rhythm and normal heart sounds.    Pulmonary/Chest: Effort normal and breath sounds normal.   Musculoskeletal: She exhibits no edema.        Lumbar back: She exhibits normal range of motion, no tenderness and no bony tenderness.   Lymphadenopathy:     She has no cervical adenopathy.   Neurological: She is alert and oriented to person, place, and time. No cranial nerve deficit.   Psychiatric: She has a normal mood and affect. Her behavior is normal.

## 2018-04-30 PROCEDURE — 99490 CHRNC CARE MGMT STAFF 1ST 20: CPT | Mod: PBBFAC | Performed by: INTERNAL MEDICINE

## 2018-04-30 PROCEDURE — 99490 CHRNC CARE MGMT STAFF 1ST 20: CPT | Mod: S$PBB,,, | Performed by: INTERNAL MEDICINE

## 2018-05-01 ENCOUNTER — EXTERNAL CHRONIC CARE MANAGEMENT (OUTPATIENT)
Dept: PRIMARY CARE CLINIC | Facility: CLINIC | Age: 48
End: 2018-05-01
Payer: MEDICARE

## 2018-05-16 ENCOUNTER — OFFICE VISIT (OUTPATIENT)
Dept: OBSTETRICS AND GYNECOLOGY | Facility: CLINIC | Age: 48
End: 2018-05-16
Payer: MEDICARE

## 2018-05-16 ENCOUNTER — TELEPHONE (OUTPATIENT)
Dept: OBSTETRICS AND GYNECOLOGY | Facility: CLINIC | Age: 48
End: 2018-05-16

## 2018-05-16 VITALS — WEIGHT: 268.94 LBS | HEIGHT: 67 IN | BODY MASS INDEX: 42.21 KG/M2

## 2018-05-16 DIAGNOSIS — N89.8 VAGINAL ODOR: ICD-10-CM

## 2018-05-16 DIAGNOSIS — B96.89 BV (BACTERIAL VAGINOSIS): Primary | ICD-10-CM

## 2018-05-16 DIAGNOSIS — N30.01 ACUTE CYSTITIS WITH HEMATURIA: ICD-10-CM

## 2018-05-16 DIAGNOSIS — N76.0 BV (BACTERIAL VAGINOSIS): Primary | ICD-10-CM

## 2018-05-16 DIAGNOSIS — R30.0 DYSURIA: Primary | ICD-10-CM

## 2018-05-16 LAB
BILIRUB SERPL-MCNC: NEGATIVE MG/DL
BLOOD URINE, POC: ABNORMAL
C TRACH DNA SPEC QL NAA+PROBE: NOT DETECTED
CANDIDA RRNA VAG QL PROBE: NEGATIVE
COLOR, POC UA: YELLOW
G VAGINALIS RRNA GENITAL QL PROBE: POSITIVE
GLUCOSE UR QL STRIP: NORMAL
KETONES UR QL STRIP: NEGATIVE
LEUKOCYTE ESTERASE URINE, POC: ABNORMAL
N GONORRHOEA DNA SPEC QL NAA+PROBE: NOT DETECTED
NITRITE, POC UA: NEGATIVE
PH, POC UA: 7
PROTEIN, POC: ABNORMAL
SPECIFIC GRAVITY, POC UA: 1
T VAGINALIS RRNA GENITAL QL PROBE: NEGATIVE
UROBILINOGEN, POC UA: NORMAL

## 2018-05-16 PROCEDURE — 81001 URINALYSIS AUTO W/SCOPE: CPT | Mod: PBBFAC | Performed by: ADVANCED PRACTICE MIDWIFE

## 2018-05-16 PROCEDURE — 99999 PR PBB SHADOW E&M-EST. PATIENT-LVL IV: CPT | Mod: PBBFAC,,, | Performed by: ADVANCED PRACTICE MIDWIFE

## 2018-05-16 PROCEDURE — 87491 CHLMYD TRACH DNA AMP PROBE: CPT

## 2018-05-16 PROCEDURE — 87510 GARDNER VAG DNA DIR PROBE: CPT

## 2018-05-16 PROCEDURE — 87186 SC STD MICRODIL/AGAR DIL: CPT

## 2018-05-16 PROCEDURE — 87077 CULTURE AEROBIC IDENTIFY: CPT

## 2018-05-16 PROCEDURE — 87086 URINE CULTURE/COLONY COUNT: CPT

## 2018-05-16 PROCEDURE — 87480 CANDIDA DNA DIR PROBE: CPT

## 2018-05-16 PROCEDURE — 99214 OFFICE O/P EST MOD 30 MIN: CPT | Mod: PBBFAC | Performed by: ADVANCED PRACTICE MIDWIFE

## 2018-05-16 PROCEDURE — 99214 OFFICE O/P EST MOD 30 MIN: CPT | Mod: S$PBB,,, | Performed by: ADVANCED PRACTICE MIDWIFE

## 2018-05-16 PROCEDURE — 87088 URINE BACTERIA CULTURE: CPT

## 2018-05-16 RX ORDER — NITROFURANTOIN 25; 75 MG/1; MG/1
100 CAPSULE ORAL 2 TIMES DAILY
Qty: 10 CAPSULE | Refills: 0 | Status: SHIPPED | OUTPATIENT
Start: 2018-05-16 | End: 2018-05-21

## 2018-05-16 RX ORDER — METRONIDAZOLE 500 MG/1
500 TABLET ORAL 2 TIMES DAILY
Qty: 14 TABLET | Refills: 0 | Status: SHIPPED | OUTPATIENT
Start: 2018-05-16 | End: 2018-05-23

## 2018-05-16 NOTE — PROGRESS NOTES
Jaki Bajwa is a 47 y.o. female  [facility] with complaint of dysuria and vaginal odor x 1 week.  She denies itching, and reports odor.  She states the discharge is white.          ROS:  GENERAL: No fever, chills, fatigability or weight loss.  VULVAR: No pain, no lesions and no itching.  VAGINAL: No relaxation, no abnormal bleeding and no lesions.Reports discharge and odor  ABDOMEN: No abdominal pain. Denies nausea. Denies vomiting. No diarrhea. No constipation  BREAST: Denies pain. No lumps. No discharge.  URINARY: No incontinence, no nocturia, no frequency , reports urgency and dysuria.  CARDIOVASCULAR: No chest pain. No shortness of breath. No leg cramps.  NEUROLOGICAL: No headaches. No vision changes.      Review of patient's allergies indicates:   Allergen Reactions    Aspirin Hives       Current Outpatient Prescriptions:     buPROPion (WELLBUTRIN XL) 150 MG TB24 tablet, Take 3 tablets (450 mg total) by mouth every morning., Disp: 270 tablet, Rfl: 0    desonide (DESOWEN) 0.05 % cream, MILTON EXT AA BID PRN, Disp: 30 g, Rfl: 3    diazePAM (VALIUM) 5 MG tablet, Take 1 tablet (5 mg total) by mouth 3 (three) times daily., Disp: 90 tablet, Rfl: 2    doxepin (SINEQUAN) 150 MG Cap, Take 1 capsule (150 mg total) by mouth every evening., Disp: 90 capsule, Rfl: 0    gabapentin (NEURONTIN) 300 MG capsule, Take 600 mg by mouth 2 (two) times daily. , Disp: , Rfl:     hydrocodone-acetaminophen 5-325mg (NORCO) 5-325 mg per tablet, Take 1 tablet by mouth 2 (two) times daily as needed for Pain., Disp: 60 tablet, Rfl: 0    hydrocodone-acetaminophen 5-325mg (NORCO) 5-325 mg per tablet, Take 1 tablet by mouth 2 (two) times daily as needed for Pain., Disp: 60 tablet, Rfl: 0    [START ON 2018] hydrocodone-acetaminophen 5-325mg (NORCO) 5-325 mg per tablet, Take 1 tablet by mouth 2 (two) times daily as needed for Pain., Disp: 60 tablet, Rfl: 0    [START ON 2018] hydrocodone-acetaminophen  "5-325mg (NORCO) 5-325 mg per tablet, Take 1 tablet by mouth 2 (two) times daily as needed for Pain., Disp: 60 tablet, Rfl: 0    hydroxychloroquine (PLAQUENIL) 200 mg tablet, Take 200 mg by mouth Twice daily., Disp: , Rfl:     losartan-hydrochlorothiazide 100-12.5 mg (HYZAAR) 100-12.5 mg Tab, Take 1 tablet by mouth once daily., Disp: 90 tablet, Rfl: 3    lurasidone (LATUDA) 60 mg Tab tablet, Take 1 tablet (60 mg total) by mouth once daily., Disp: 90 tablet, Rfl: 0    metroNIDAZOLE (FLAGYL) 500 MG tablet, Take 1 tablet (500 mg total) by mouth 2 (two) times daily., Disp: 14 tablet, Rfl: 0    naproxen (NAPROSYN) 500 MG tablet, Take 1 tablet (500 mg total) by mouth 2 (two) times daily as needed (pain)., Disp: 180 tablet, Rfl: 0    nitrofurantoin, macrocrystal-monohydrate, (MACROBID) 100 MG capsule, Take 1 capsule (100 mg total) by mouth 2 (two) times daily., Disp: 10 capsule, Rfl: 0    RESTASIS 0.05 % ophthalmic emulsion, PLACE ONE DROP INTO BOTH EYES BID, Disp: , Rfl: 0    tiZANidine (ZANAFLEX) 4 MG tablet, TAKE 1 TABLET(4 MG) BY MOUTH EVERY 8 HOURS AS NEEDED FOR MUSCLE PAIN, Disp: 270 tablet, Rfl: 0    Past Medical History:   Diagnosis Date    Anemia     Anxiety     Depression     History of psychiatric hospitalization     [place] x 1 week for depression    History of ventral hernia repair     Hypertension     Lupus     patient reports that she is being treated "like I have Lupus"    Mental disorder     Morbid obesity 12/15/2017    Psychiatric problem     Sjogren's syndrome 2013    Therapy     TMJ (temporomandibular joint disorder)     Uterine fibroids 2012     Past Surgical History:   Procedure Laterality Date    breast reduction       SECTION      x 2    HYSTERECTOMY      INGUINAL HERNIA REPAIR      TUBAL LIGATION      VENTRAL HERNIA REPAIR       Social History   Substance Use Topics    Smoking status: Never Smoker    Smokeless tobacco: Never Used    Alcohol use Yes " "     Comment: daiquiri 2x/year     OB History    Para Term  AB Living   2 2           SAB TAB Ectopic Multiple Live Births                  # Outcome Date GA Lbr Riccardo/2nd Weight Sex Delivery Anes PTL Lv   2 Para            1 Para                   Ht 5' 7" (1.702 m)   Wt 122 kg (268 lb 15.4 oz)   LMP 2012   BMI 42.13 kg/m²     PHYSICAL EXAM:  GENERAL: Calm and appropriate affect, alert, oriented x4  SKIN: Color appropriate for race, warm and dry, clean and intact with no rashes.  RESP: Even, unlabored breathing  ABDOMEN: Soft, nontender, no masses.  :   Normal external female genitalia without lesions. Normal hair distribution. Adequate perineal body, normal urethral meatus.  Vagina pink and well rugated, no lesions, vaginal discharge - thin, clear, pos odor   No significant cystocele or rectocele.  Cervix pink without discharge or lesions, no cervical motion tenderness.  Uterus 4-6 weeks size, regular, mobile and nontender.  Adnexa: normal adnexa in size, nontender and no masses        ASSESSMENT / PLAN:    ICD-10-CM ICD-9-CM    1. Dysuria R30.0 788.1 nitrofurantoin, macrocrystal-monohydrate, (MACROBID) 100 MG capsule      POCT URINALYSIS, WITH MICRO, DIPSTICK OR TABLET REAGENT, AUTOMATED,   2. Vaginal odor N89.8 625.8 Vaginosis Screen by DNA Probe      C. trachomatis/N. gonorrhoeae by AMP DNA Cervicovaginal   3. Acute cystitis with hematuria N30.01 595.0 Urine culture     Dysuria  -     nitrofurantoin, macrocrystal-monohydrate, (MACROBID) 100 MG capsule; Take 1 capsule (100 mg total) by mouth 2 (two) times daily.  Dispense: 10 capsule; Refill: 0  -     POCT URINALYSIS, WITH MICRO, DIPSTICK OR TABLET REAGENT, AUTOMATED,    Vaginal odor  -     Vaginosis Screen by DNA Probe  -     C. trachomatis/N. gonorrhoeae by AMP DNA Cervicovaginal    Acute cystitis with hematuria  -     Urine culture    Discussed urine dip results, rx provided sec to symptoms and will follow up p culture " results      Patient was counseled today on vaginitis prevention including :  a. avoiding feminine products such as deoderant soaps, body wash, bubble bath, douches, scented toilet paper, deoderant tampons or pads, feminine wipes, chronic pad use, etc.  b. avoiding other vulvovaginal irritants such as long hot baths, humidity, tight, synthetic clothing, chlorine and sitting around in wet bathing suits  c. wearing cotton underwear, avoiding thong underwear and no underwear to bed  d. taking showers instead of baths and use a hair dryer on cool setting afterwards to dry  e. wearing cotton to exercise and shower immediately after exercise and change clothes  f. using polyurethane condoms without spermicide if sexually active and symptoms are triggered by intercourse  g. Discussed use of vagisil, along with repHresh and probiotics    FOLLOW UP:   Pending lab results, PRN lack of improvement.

## 2018-05-18 ENCOUNTER — TELEPHONE (OUTPATIENT)
Dept: OBSTETRICS AND GYNECOLOGY | Facility: CLINIC | Age: 48
End: 2018-05-18

## 2018-05-18 LAB — BACTERIA UR CULT: NORMAL

## 2018-05-31 PROCEDURE — 99490 CHRNC CARE MGMT STAFF 1ST 20: CPT | Mod: PBBFAC | Performed by: INTERNAL MEDICINE

## 2018-05-31 PROCEDURE — 99490 CHRNC CARE MGMT STAFF 1ST 20: CPT | Mod: S$PBB,,, | Performed by: INTERNAL MEDICINE

## 2018-06-07 ENCOUNTER — TELEPHONE (OUTPATIENT)
Dept: INTERNAL MEDICINE | Facility: CLINIC | Age: 48
End: 2018-06-07

## 2018-06-07 NOTE — TELEPHONE ENCOUNTER
Called pt and informed her the clinic want be held that day and reschedule pt for June 16,2018 for 8:30

## 2018-06-11 ENCOUNTER — OFFICE VISIT (OUTPATIENT)
Dept: INTERNAL MEDICINE | Facility: CLINIC | Age: 48
End: 2018-06-11
Attending: FAMILY MEDICINE
Payer: MEDICARE

## 2018-06-11 ENCOUNTER — OFFICE VISIT (OUTPATIENT)
Dept: OBSTETRICS AND GYNECOLOGY | Facility: CLINIC | Age: 48
End: 2018-06-11
Payer: MEDICARE

## 2018-06-11 ENCOUNTER — OFFICE VISIT (OUTPATIENT)
Dept: UROLOGY | Facility: CLINIC | Age: 48
End: 2018-06-11
Payer: MEDICARE

## 2018-06-11 ENCOUNTER — HOSPITAL ENCOUNTER (OUTPATIENT)
Dept: RADIOLOGY | Facility: OTHER | Age: 48
Discharge: HOME | End: 2018-06-11
Attending: INTERNAL MEDICINE
Payer: MEDICARE

## 2018-06-11 VITALS
BODY MASS INDEX: 42.06 KG/M2 | HEIGHT: 67 IN | HEART RATE: 97 BPM | HEIGHT: 67 IN | DIASTOLIC BLOOD PRESSURE: 86 MMHG | WEIGHT: 268 LBS | WEIGHT: 268.06 LBS | OXYGEN SATURATION: 99 % | BODY MASS INDEX: 42.07 KG/M2 | SYSTOLIC BLOOD PRESSURE: 122 MMHG

## 2018-06-11 VITALS
HEIGHT: 67 IN | BODY MASS INDEX: 42.06 KG/M2 | WEIGHT: 268 LBS | DIASTOLIC BLOOD PRESSURE: 82 MMHG | HEART RATE: 98 BPM | SYSTOLIC BLOOD PRESSURE: 116 MMHG

## 2018-06-11 VITALS
WEIGHT: 286.38 LBS | DIASTOLIC BLOOD PRESSURE: 84 MMHG | HEIGHT: 67 IN | SYSTOLIC BLOOD PRESSURE: 110 MMHG | BODY MASS INDEX: 44.95 KG/M2

## 2018-06-11 DIAGNOSIS — R33.9 INCOMPLETE BLADDER EMPTYING: ICD-10-CM

## 2018-06-11 DIAGNOSIS — N39.0 RECURRENT UTI (URINARY TRACT INFECTION): ICD-10-CM

## 2018-06-11 DIAGNOSIS — Z12.39 SCREENING FOR BREAST CANCER: ICD-10-CM

## 2018-06-11 DIAGNOSIS — N39.0 RECURRENT UTI: Primary | ICD-10-CM

## 2018-06-11 DIAGNOSIS — E66.01 MORBID OBESITY: ICD-10-CM

## 2018-06-11 DIAGNOSIS — R30.0 DYSURIA: Primary | ICD-10-CM

## 2018-06-11 DIAGNOSIS — N89.8 VAGINAL DRYNESS: ICD-10-CM

## 2018-06-11 DIAGNOSIS — I10 ESSENTIAL HYPERTENSION: Primary | Chronic | ICD-10-CM

## 2018-06-11 LAB
BILIRUB SERPL-MCNC: NORMAL MG/DL
BLOOD URINE, POC: NORMAL
COLOR, POC UA: YELLOW
GLUCOSE UR QL STRIP: NORMAL
KETONES UR QL STRIP: NORMAL
LEUKOCYTE ESTERASE URINE, POC: NORMAL
NITRITE, POC UA: NORMAL
PH, POC UA: 5
PROTEIN, POC: NORMAL
SPECIFIC GRAVITY, POC UA: 1.01
UROBILINOGEN, POC UA: NORMAL

## 2018-06-11 PROCEDURE — 87088 URINE BACTERIA CULTURE: CPT

## 2018-06-11 PROCEDURE — 99214 OFFICE O/P EST MOD 30 MIN: CPT | Mod: PBBFAC | Performed by: FAMILY MEDICINE

## 2018-06-11 PROCEDURE — 99213 OFFICE O/P EST LOW 20 MIN: CPT | Mod: S$PBB,,, | Performed by: FAMILY MEDICINE

## 2018-06-11 PROCEDURE — 77063 BREAST TOMOSYNTHESIS BI: CPT | Mod: 26,,, | Performed by: RADIOLOGY

## 2018-06-11 PROCEDURE — 99214 OFFICE O/P EST MOD 30 MIN: CPT | Mod: 25,S$GLB,, | Performed by: NURSE PRACTITIONER

## 2018-06-11 PROCEDURE — 81002 URINALYSIS NONAUTO W/O SCOPE: CPT | Mod: S$GLB,,, | Performed by: NURSE PRACTITIONER

## 2018-06-11 PROCEDURE — 87186 SC STD MICRODIL/AGAR DIL: CPT

## 2018-06-11 PROCEDURE — 77067 SCR MAMMO BI INCL CAD: CPT | Mod: TC

## 2018-06-11 PROCEDURE — 77067 SCR MAMMO BI INCL CAD: CPT | Mod: 26,,, | Performed by: RADIOLOGY

## 2018-06-11 PROCEDURE — 99999 PR PBB SHADOW E&M-EST. PATIENT-LVL IV: CPT | Mod: PBBFAC,,, | Performed by: FAMILY MEDICINE

## 2018-06-11 PROCEDURE — 99214 OFFICE O/P EST MOD 30 MIN: CPT | Mod: PBBFAC,27 | Performed by: NURSE PRACTITIONER

## 2018-06-11 PROCEDURE — 99999 PR PBB SHADOW E&M-EST. PATIENT-LVL IV: CPT | Mod: PBBFAC,,, | Performed by: NURSE PRACTITIONER

## 2018-06-11 PROCEDURE — 87077 CULTURE AEROBIC IDENTIFY: CPT

## 2018-06-11 PROCEDURE — 99213 OFFICE O/P EST LOW 20 MIN: CPT | Mod: S$PBB,,, | Performed by: NURSE PRACTITIONER

## 2018-06-11 PROCEDURE — 87086 URINE CULTURE/COLONY COUNT: CPT

## 2018-06-11 RX ORDER — SULFAMETHOXAZOLE AND TRIMETHOPRIM 800; 160 MG/1; MG/1
1 TABLET ORAL DAILY PRN
Qty: 30 TABLET | Refills: 6 | Status: SHIPPED | OUTPATIENT
Start: 2018-06-11 | End: 2018-07-11

## 2018-06-11 RX ORDER — ESTRADIOL 0.1 MG/G
1 CREAM VAGINAL
Qty: 8 G | Refills: 11 | Status: SHIPPED | OUTPATIENT
Start: 2018-06-11 | End: 2018-09-07 | Stop reason: SDUPTHER

## 2018-06-11 NOTE — PROGRESS NOTES
CC: Dysuria    HPI: Pt is a 47 y.o.  female who presents for evaluation of her UTI symptoms. Pt will be having a new grandson soon- baby due in 8/2018.    The patient presents today with  Frequency and urgency for the past 7 days. The patient denies dysuria, flank pain, fever, nausea, vomiting, and hematuria. Pt reports recurrent UTIs.   Alleviating factors: none.  She has appt with urology later today.     ROS:  GENERAL: Feeling well overall. Denies fever or chills.   SKIN: Denies rash or lesions.   HEAD: Denies head injury or headache.   NODES: Denies enlarged lymph nodes.   CHEST: Denies chest pain or shortness of breath.   CARDIOVASCULAR: Denies palpitations or left sided chest pain.   ABDOMEN: No abdominal pain, constipation, diarrhea, nausea, vomiting or rectal bleeding.   URINARY: + dysuria, hematuria, or burning on urination.  REPRODUCTIVE: See HPI.   BREASTS: Denies pain, lumps, or nipple discharge.   HEMATOLOGIC: No easy bruisability or excessive bleeding.   MUSCULOSKELETAL: Denies joint pain or swelling.   NEUROLOGIC: Denies syncope or weakness.   PSYCHIATRIC: Denies depression, anxiety or mood swings.    PE:   APPEARANCE: Well nourished, well developed, Black or  female in no acute distress.  PELVIS: Deferred  ABDOMEN: No suprapubic tenderness  MUSCULOSKELETAL: No CVA tenderness      Diagnosis:  1. Dysuria        Plan:   U dip is negative  Urine culture   Keep f/u appt with urology today    Orders Placed This Encounter    Urine culture    POCT URINE DIPSTICK WITHOUT MICROSCOPE       -UTI prevention including   a. perineal hygiene, wipe front to back,  b. drink water prior to intercourse and urinate afterwards and avoid certain positions which could increase likelyhood of UTIs,  c. establish regular bladder habits,   d. avoid vulvovaginal irritants,   e. increase fluids and avoid caffeine and alcohol;  -signs of pylenephritis and to go to the ED if develops fever, chills, n/v, back  pain, worsening dyuria, hematuria;   -pelvic rest until symptoms resolve;  -Macrobid use and potential side effects;  -A.C.S. Pap and pelvic exam guidelines (pap every 3 years), recomendations for yearly mammogram;  -to follow up with her PCP for other health maintenance.       Follow-up PRN no resolution of symptoms.    Kristyn Carmichael, ANTWONP-C

## 2018-06-11 NOTE — PROGRESS NOTES
"Subjective:      Patient ID: Jaki Bajwa is a 47 y.o. female.    Chief Complaint: Weight Loss    She has had 20 pound weight gain in 5 months. Her mood and personal life has been a rollercoaster. She stopped topamax/victoza stopped a few months ago. She drinks about 32 fl oz water daily. She is not exercising.     Breakfast  Coffee (sugar)     Lunch   tuna fish sandwich (mayonnaise/onion)    Dinner   tv dinner       Review of Systems   Constitutional: Negative.    Respiratory: Negative.    Cardiovascular: Negative.    Gastrointestinal: Negative.      I personally reviewed Past Medical History, Past Surgical history,  Past Social History and Family History    Objective:   /86   Pulse 97   Ht 5' 7" (1.702 m)   Wt 121.6 kg (268 lb 1.3 oz)   LMP 07/11/2012 Comment: partial   SpO2 99%   BMI 41.99 kg/m²     Physical Exam   Constitutional: She is oriented to person, place, and time. She appears well-developed and well-nourished. No distress.   HENT:   Head: Normocephalic.   Right Ear: External ear normal.   Left Ear: External ear normal.   Mouth/Throat: Oropharynx is clear and moist.   Eyes: Conjunctivae and EOM are normal. Pupils are equal, round, and reactive to light. Right eye exhibits no discharge. Left eye exhibits no discharge. No scleral icterus.   Neck: Normal range of motion. No tracheal deviation present. No thyromegaly present.   Cardiovascular: Normal rate, regular rhythm, normal heart sounds and intact distal pulses.  Exam reveals no gallop.    No murmur heard.  Pulmonary/Chest: Effort normal and breath sounds normal. No respiratory distress. She has no wheezes. She has no rales. She exhibits no tenderness.   Abdominal: Soft. Bowel sounds are normal. She exhibits no distension and no mass. There is no tenderness. There is no rebound and no guarding.   Musculoskeletal: Normal range of motion.   Neurological: She is alert and oriented to person, place, and time.   Skin: Skin is warm and dry. "   Psychiatric: She has a normal mood and affect. Her behavior is normal. Judgment and thought content normal.   Vitals reviewed.      Jaki CHI was seen today for weight loss.    Diagnoses and all orders for this visit:    Essential hypertension  Morbid obesity  -discussed medication alternative, change in lifestyle and patient would like to proceed with victoza, she also wants to consider surgery   -she will return in 4 weeks with a food journal   -     liraglutide 0.6 mg/0.1 mL, 18 mg/3 mL, subq PNIJ (VICTOZA 2-KHUSHI) 0.6 mg/0.1 mL (18 mg/3 mL) PnIj; Inject 1.8 mg into the skin once daily.  -     Ambulatory consult to Bariatric Surgery

## 2018-06-11 NOTE — PROGRESS NOTES
Subjective:      Jaki Bajwa is a 47 y.o. female who returns today regarding her recurrent UTIs.     Recurrent UTIs  Patient complains of recurrent urinary tract infections.  These have been occuring for 1 years and she reports 5 infections in the past year.  Her most recent infection was in May of this year.  Of her recent infections, all are culture proven, including E. coli.  She describes associated symptoms during these episodes as burning with urination, frequency and urgency.  She denies associated fever and flank pain.  She reports that symptoms improve with antibiotic treatment, which have included Bactrim and Nitrofurantoin.  She is is sexually active with 1 partners.  The timing of her infections is related to intercourse.  Other treatments have included none antibiotics.  Other urologic history includes none.    The patient has been seen for recurrent UTIs. MAY showed no stones and cysto showed bladder sediment suggestive of incomplete bladder emptying. She has intermittently been on macrodantin prophylaxis; however whenever she discontinues the medication, she develops an infection. Most recently treated for a UTI on 5/16. Today she denies urinary symptoms including dysuria, frequency, and urgency. Denies fever/chills and flank pain. She does report vaginal dryness and believes that her infections are related to intercourse. She is s/p partial hysterectomy. Voiding before and after intercourse.     The following portions of the patient's history were reviewed and updated as appropriate: allergies, current medications, past family history, past medical history, past social history, past surgical history and problem list.    Review of Systems  Constitutional: no fever or chills  ENT: no nasal congestion or sore throat  Respiratory: no cough or shortness of breath  Cardiovascular: no chest pain or palpitations  Gastrointestinal: no nausea or vomiting, tolerating diet  Genitourinary: as per  "HPI  Hematologic/Lymphatic: no easy bruising or lymphadenopathy  Musculoskeletal: no arthralgias or myalgias  Neurological: no seizures or tremors  Behavioral/Psych: no auditory or visual hallucinations     Objective:   Vital Signs:/82 (BP Location: Right arm, Patient Position: Sitting, BP Method: Large (Automatic))   Pulse 98   Ht 5' 7" (1.702 m)   Wt 121.6 kg (268 lb)   LMP 07/11/2012 Comment: partial   BMI 41.97 kg/m²     Physical Exam   General: alert and oriented, no acute distress  Head: normocephalic, atraumatic  Neck: supple, no lymphadenopathy, normal ROM, no masses  Respiratory: Symmetric expansion, non-labored breathing  Cardiovascular: regular rate and rhythm, nomal pulses, no peripheral edema  Abdomen: soft, non tender, non distended, no palpable masses, no hernias, no hepatomegaly or splenomegaly  Pelvic: not examined   Lymphatic: no inguinal nodes  Skin: normal coloration and turgor, no rashes, no suspicious skin lesions noted  Neuro: alert and oriented x3, no gross deficits  Psych: normal judgment and insight, normal mood/affect and non-anxious    Lab Review   Urinalysis demonstrates negative for all components  Lab Results   Component Value Date    WBC 7.21 02/08/2018    HGB 11.9 (L) 02/08/2018    HCT 39.7 02/08/2018    MCV 81 (L) 02/08/2018     02/08/2018     Lab Results   Component Value Date    CREATININE 1.1 04/17/2018    BUN 8 04/17/2018       Imaging   None    Assessment:     1. Recurrent UTI    2. Vaginal dryness    3. Incomplete bladder emptying   Plan:   Jaki CHI was seen today for follow-up.    Diagnoses and all orders for this visit:    Recurrent UTI  -     POCT URINE DIPSTICK WITHOUT MICROSCOPE  -     estradiol (ESTRACE) 0.01 % (0.1 mg/gram) vaginal cream; Place 1 g vaginally twice a week.  -     sulfamethoxazole-trimethoprim 800-160mg (BACTRIM DS) 800-160 mg Tab; Take 1 tablet by mouth daily as needed.    Vaginal dryness    Recurrent UTI (urinary tract " infection)    Incomplete bladder emptying      Plan:  1. Discussed various etiologies for recurrent UTIs as well as the difference between recurrent and persistent UTIs. Infections are likely 2/2 atrophic vaginitis  2. Discontinue flomax for incomplete bladder emptying  3. Renal US to r/o hydro and/or stones.- normal   4. Expressed importance of obtaining culture any time she thinks she has UTI symptoms   5. Discussed preventive measures including adequate hydration, cranberry juice, regular voiding, and voiding after intercourse.  6. Will start estrace cream for atrophic vaginitis. Also trial of replens OTC for vaginal dryness   7. Discussed prophylactic antibiotics, which she wishes to start. Prescription provided for postcoital Bactrim  8. FU in 6 months or sooner PRN

## 2018-06-14 LAB — BACTERIA UR CULT: NORMAL

## 2018-06-15 ENCOUNTER — TELEPHONE (OUTPATIENT)
Dept: UROLOGY | Facility: CLINIC | Age: 48
End: 2018-06-15

## 2018-06-15 DIAGNOSIS — N30.00 ACUTE CYSTITIS WITHOUT HEMATURIA: Primary | ICD-10-CM

## 2018-06-15 RX ORDER — NITROFURANTOIN 25; 75 MG/1; MG/1
100 CAPSULE ORAL 2 TIMES DAILY
Qty: 14 CAPSULE | Refills: 0 | Status: SHIPPED | OUTPATIENT
Start: 2018-06-15 | End: 2018-06-22

## 2018-06-18 ENCOUNTER — TELEPHONE (OUTPATIENT)
Dept: UROLOGY | Facility: CLINIC | Age: 48
End: 2018-06-18

## 2018-06-18 NOTE — TELEPHONE ENCOUNTER
----- Message from Xin Neri sent at 6/18/2018  8:51 AM CDT -----  Contact: PENG MAYO [[id number]]            Name of Who is Calling: PENG MAYO [[id number]]      What is the request in detail: Pt is calling to find out which medication she should be taking or should she be taking both.  Pt says that she was given sulfamethoxazole-trimethoprim 800-160mg (BACTRIM DS) 800-160 mg Tab first then given on Friday nitrofurantoin, macrocrystal-monohydrate, (MACROBID) 100 MG capsule.  Please contact pt to further discuss and advise.      Can the clinic reply by [facility]: No      [facility] [phone]

## 2018-06-20 DIAGNOSIS — M79.10 MYALGIA: ICD-10-CM

## 2018-06-20 DIAGNOSIS — M79.10 MUSCLE PAIN: Primary | ICD-10-CM

## 2018-06-20 RX ORDER — TIZANIDINE 4 MG/1
TABLET ORAL
Qty: 270 TABLET | Refills: 0 | Status: SHIPPED | OUTPATIENT
Start: 2018-06-20 | End: 2018-09-12 | Stop reason: SDUPTHER

## 2018-06-20 RX ORDER — TIZANIDINE 4 MG/1
TABLET ORAL
Qty: 270 TABLET | Refills: 0 | OUTPATIENT
Start: 2018-06-20

## 2018-06-20 NOTE — TELEPHONE ENCOUNTER
Pt was notified that rx was approved and sent to her pharm. Asked if she wanted to cancel 6/22/18 appointment as well as today? Yes , canceled and if not better next will see PCP or cancel.

## 2018-06-20 NOTE — TELEPHONE ENCOUNTER
Pt is  c/o muscle spasms states she's out of her zanaflex and requesting a refill . Has not had the mediciaton in a couple days. Pt is scheduled for 6/26/18 7:30  would prefer to see PCP.  Please advise

## 2018-06-26 ENCOUNTER — OFFICE VISIT (OUTPATIENT)
Dept: INTERNAL MEDICINE | Facility: CLINIC | Age: 48
End: 2018-06-26
Payer: MEDICARE

## 2018-06-26 VITALS
HEIGHT: 67 IN | WEIGHT: 262.13 LBS | HEART RATE: 78 BPM | DIASTOLIC BLOOD PRESSURE: 80 MMHG | BODY MASS INDEX: 41.14 KG/M2 | SYSTOLIC BLOOD PRESSURE: 124 MMHG

## 2018-06-26 DIAGNOSIS — M26.629 TMJ SYNDROME: Primary | ICD-10-CM

## 2018-06-26 PROCEDURE — 99213 OFFICE O/P EST LOW 20 MIN: CPT | Mod: PBBFAC,25 | Performed by: INTERNAL MEDICINE

## 2018-06-26 PROCEDURE — 99213 OFFICE O/P EST LOW 20 MIN: CPT | Mod: S$PBB,,, | Performed by: INTERNAL MEDICINE

## 2018-06-26 PROCEDURE — 96372 THER/PROPH/DIAG INJ SC/IM: CPT | Mod: PBBFAC

## 2018-06-26 PROCEDURE — 99999 PR PBB SHADOW E&M-EST. PATIENT-LVL III: CPT | Mod: PBBFAC,,, | Performed by: INTERNAL MEDICINE

## 2018-06-26 RX ORDER — NAPROXEN 500 MG/1
500 TABLET ORAL 2 TIMES DAILY PRN
Qty: 180 TABLET | Refills: 0 | Status: SHIPPED | OUTPATIENT
Start: 2018-06-26 | End: 2018-10-10 | Stop reason: SDUPTHER

## 2018-06-26 RX ORDER — DESONIDE 0.5 MG/G
CREAM TOPICAL
Qty: 30 G | Refills: 3 | Status: SHIPPED | OUTPATIENT
Start: 2018-06-26 | End: 2018-10-10 | Stop reason: SDUPTHER

## 2018-06-26 RX ORDER — TRIAMCINOLONE ACETONIDE 40 MG/ML
40 INJECTION, SUSPENSION INTRA-ARTICULAR; INTRAMUSCULAR
Status: COMPLETED | OUTPATIENT
Start: 2018-06-26 | End: 2018-06-26

## 2018-06-26 RX ADMIN — TRIAMCINOLONE ACETONIDE 40 MG: 40 INJECTION, SUSPENSION INTRA-ARTICULAR; INTRAMUSCULAR at 07:06

## 2018-06-26 NOTE — PROGRESS NOTES
Subjective:       Patient ID: Jaki Bajwa is a 47 y.o. female.    Chief Complaint: Eye Pain (right eye) and Otalgia (right ear )    Pt c/o pain related to TMJ for a couple of weeks. She ran out of tizanidine which prompted start of increase in pain. She is now back on this and feeling better but still with some pain. She just resumed tizanidine 4 days ago. This is similar to past episodes for TMJ. No objective fever but has had some chills at times. No dental pain otherwise. She is currently on bactrim for UTI. No URI symptoms.       Review of Systems   Constitutional: Negative for fever.   HENT: Negative for ear pain, rhinorrhea and sore throat.    Eyes: Negative for visual disturbance.   Respiratory: Negative for cough and shortness of breath.    Cardiovascular: Negative for chest pain.   Psychiatric/Behavioral: Negative for dysphoric mood.       Objective:      Physical Exam   Constitutional: She is oriented to person, place, and time. She appears well-developed and well-nourished.   HENT:   Right Ear: Tympanic membrane, external ear and ear canal normal.   Left Ear: Tympanic membrane, external ear and ear canal normal.   Nose: No mucosal edema or rhinorrhea.   Mouth/Throat: No oropharyngeal exudate or posterior oropharyngeal erythema.   Neck: Neck supple. No thyromegaly present.   Cardiovascular: Normal rate, regular rhythm and normal heart sounds.    Pulmonary/Chest: Effort normal and breath sounds normal.   Lymphadenopathy:     She has no cervical adenopathy.   Neurological: She is alert and oriented to person, place, and time.   Psychiatric: She has a normal mood and affect.       Assessment:       1. TMJ syndrome        Plan:       1. Stay on tizanidine as Rx  2. Will give shot of steroids which may help settle acute pain which is already improving; r/b of this d/w pt and she wishes to proceed

## 2018-06-26 NOTE — PROGRESS NOTES
Patient given Kenalog 40mg IM in the RUOQ using 22G x 1.5 needle, site cleaned with alcohol prep. Patient tolerated well and Band-Aid was applied. NDC#1306-1421-73 Lot#[id number] exp:9/2019. Patient advised to wait in the lobby for 15 min to make sure no adverse reactions occur. Patient states verbal understanding and has no further questions.

## 2018-06-30 ENCOUNTER — EXTERNAL CHRONIC CARE MANAGEMENT (OUTPATIENT)
Dept: PRIMARY CARE CLINIC | Facility: CLINIC | Age: 48
End: 2018-06-30
Payer: MEDICARE

## 2018-06-30 PROCEDURE — 99490 CHRNC CARE MGMT STAFF 1ST 20: CPT | Mod: PBBFAC | Performed by: INTERNAL MEDICINE

## 2018-06-30 PROCEDURE — 99490 CHRNC CARE MGMT STAFF 1ST 20: CPT | Mod: S$PBB,,, | Performed by: INTERNAL MEDICINE

## 2018-07-10 ENCOUNTER — OFFICE VISIT (OUTPATIENT)
Dept: INTERNAL MEDICINE | Facility: CLINIC | Age: 48
End: 2018-07-10
Attending: FAMILY MEDICINE
Payer: MEDICARE

## 2018-07-10 VITALS
WEIGHT: 263 LBS | HEART RATE: 100 BPM | HEIGHT: 67 IN | DIASTOLIC BLOOD PRESSURE: 80 MMHG | BODY MASS INDEX: 41.28 KG/M2 | SYSTOLIC BLOOD PRESSURE: 122 MMHG | OXYGEN SATURATION: 98 %

## 2018-07-10 DIAGNOSIS — I10 ESSENTIAL HYPERTENSION: Chronic | ICD-10-CM

## 2018-07-10 DIAGNOSIS — E66.01 MORBID OBESITY: ICD-10-CM

## 2018-07-10 PROCEDURE — 99999 PR PBB SHADOW E&M-EST. PATIENT-LVL III: CPT | Mod: PBBFAC,,, | Performed by: FAMILY MEDICINE

## 2018-07-10 PROCEDURE — 99213 OFFICE O/P EST LOW 20 MIN: CPT | Mod: PBBFAC | Performed by: FAMILY MEDICINE

## 2018-07-10 PROCEDURE — 99213 OFFICE O/P EST LOW 20 MIN: CPT | Mod: S$PBB,,, | Performed by: FAMILY MEDICINE

## 2018-07-10 NOTE — PROGRESS NOTES
"Subjective:      Patient ID: Jaki Bajwa is a 47 y.o. female.    Chief Complaint: Weight Loss    She is here for weight loss follow up. She has lost close to 20 pounds. She did restart victoza. No side effects. She does walk every other day for about 10 minutes. She is completing crossword puzzles instead of watching tv. She is sleeping 7-8 hours nightly. She is drinking 32 fl oz water daily.       Breakfast   Yogurt  oatmeal  Boiled egg  Meal replacement     Lunch   Chicken noodle soup   Smoothie   Tuna fish. Bellpepper. Onion. egg    Dinner   Fruits  Ribs     Snacks   none        Review of Systems   Constitutional: Negative for activity change, appetite change, chills, diaphoresis, fatigue, fever and unexpected weight change.   HENT: Negative for congestion, ear discharge, ear pain, hearing loss, postnasal drip, rhinorrhea, sinus pressure and sore throat.    Eyes: Negative for visual disturbance.   Respiratory: Negative for cough, shortness of breath and wheezing.    Cardiovascular: Negative for chest pain.   Gastrointestinal: Negative for abdominal pain, constipation, diarrhea, nausea and vomiting.   Genitourinary: Negative for dysuria, frequency, hematuria and vaginal discharge.   Musculoskeletal: Negative.    Skin: Negative.    Neurological: Negative for dizziness, facial asymmetry, light-headedness and headaches.   Psychiatric/Behavioral: Negative for suicidal ideas.     I personally reviewed Past Medical History, Past Surgical history,  Past Social History and Family History    Objective:   /80   Pulse 100   Ht 5' 7" (1.702 m)   Wt 119.3 kg (263 lb 0.1 oz)   LMP 07/11/2012 Comment: partial   SpO2 98%   BMI 41.19 kg/m²     Physical Exam   Constitutional: She is oriented to person, place, and time. She appears well-developed and well-nourished. No distress.   HENT:   Head: Normocephalic.   Right Ear: External ear normal.   Left Ear: External ear normal.   Mouth/Throat: Oropharynx is clear and " moist.   Eyes: Conjunctivae and EOM are normal. Pupils are equal, round, and reactive to light. Right eye exhibits no discharge. Left eye exhibits no discharge. No scleral icterus.   Neck: Normal range of motion. Neck supple.   Cardiovascular: Normal rate, regular rhythm, normal heart sounds and intact distal pulses.  Exam reveals no gallop.    No murmur heard.  Pulmonary/Chest: Effort normal and breath sounds normal. No respiratory distress. She has no wheezes. She has no rales. She exhibits no tenderness.   Abdominal: Soft. Bowel sounds are normal. She exhibits no distension and no mass. There is no tenderness. There is no rebound and no guarding.   Neurological: She is alert and oriented to person, place, and time.   Skin: Skin is warm and dry.   Vitals reviewed.      Jaki CHI was seen today for weight loss.    Diagnoses and all orders for this visit:    Essential hypertension  -  Controlled, cont current regimen     Morbid obesity  -patient without any side effects from medication   -reviewed food journal  -will cont victoza, return every 4 weeks for weight check     -     liraglutide 0.6 mg/0.1 mL, 18 mg/3 mL, subq PNIJ (VICTOZA 2-KHUSHI) 0.6 mg/0.1 mL (18 mg/3 mL) PnIj; Inject 1.8 mg into the skin once daily.

## 2018-07-11 ENCOUNTER — OFFICE VISIT (OUTPATIENT)
Dept: INTERNAL MEDICINE | Facility: CLINIC | Age: 48
End: 2018-07-11
Payer: MEDICARE

## 2018-07-11 VITALS
SYSTOLIC BLOOD PRESSURE: 112 MMHG | HEIGHT: 67 IN | WEIGHT: 260.38 LBS | DIASTOLIC BLOOD PRESSURE: 82 MMHG | BODY MASS INDEX: 40.87 KG/M2 | HEART RATE: 106 BPM

## 2018-07-11 DIAGNOSIS — M79.7 FIBROMYALGIA: ICD-10-CM

## 2018-07-11 DIAGNOSIS — G89.4 CHRONIC PAIN SYNDROME: Primary | ICD-10-CM

## 2018-07-11 PROCEDURE — 99213 OFFICE O/P EST LOW 20 MIN: CPT | Mod: S$PBB,,, | Performed by: INTERNAL MEDICINE

## 2018-07-11 PROCEDURE — 99999 PR PBB SHADOW E&M-EST. PATIENT-LVL III: CPT | Mod: PBBFAC,,, | Performed by: INTERNAL MEDICINE

## 2018-07-11 PROCEDURE — 99213 OFFICE O/P EST LOW 20 MIN: CPT | Mod: PBBFAC | Performed by: INTERNAL MEDICINE

## 2018-07-11 RX ORDER — HYDROCODONE BITARTRATE AND ACETAMINOPHEN 5; 325 MG/1; MG/1
1 TABLET ORAL 2 TIMES DAILY PRN
Qty: 50 TABLET | Refills: 0 | Status: SHIPPED | OUTPATIENT
Start: 2018-08-10 | End: 2018-10-10 | Stop reason: SDUPTHER

## 2018-07-11 RX ORDER — HYDROCODONE BITARTRATE AND ACETAMINOPHEN 5; 325 MG/1; MG/1
1 TABLET ORAL 2 TIMES DAILY PRN
Qty: 50 TABLET | Refills: 0 | Status: SHIPPED | OUTPATIENT
Start: 2018-07-11 | End: 2018-10-10 | Stop reason: SDUPTHER

## 2018-07-11 RX ORDER — HYDROCODONE BITARTRATE AND ACETAMINOPHEN 5; 325 MG/1; MG/1
1 TABLET ORAL 2 TIMES DAILY PRN
Qty: 50 TABLET | Refills: 0 | Status: SHIPPED | OUTPATIENT
Start: 2018-09-09 | End: 2018-10-10 | Stop reason: SDUPTHER

## 2018-07-11 NOTE — PROGRESS NOTES
Subjective:       Patient ID: Jaki Bajwa is a 47 y.o. female.    Chief Complaint: Back Pain    Pt here for f/u. She has a dx of sjogrens and fibromyalgia based on blood work and chronic pain that is primarily across shoulders and in legs but also in upper and lower back. As a result she is on several meds that she says helps. She [facility] and has regular f/u. She has prior dx of lupus but this was not corroborated in notes from rheum. She is on plaquenil, tizanidine, gabapentin and norco. She uses norco 1-2x/day. She is due for refills on pain meds. We discussed decreasing dispense amount to 50 per month and she is agreeable to this. She has previously signed a pain contract. [place]  reviewed and is consistent. She is seeing pain mgmt. She has also done PT with some success in the past.       Review of Systems   Constitutional: Negative for fever and unexpected weight change.   Respiratory: Negative for shortness of breath.    Cardiovascular: Negative for chest pain.   Gastrointestinal: Negative for abdominal pain.   Musculoskeletal: Positive for arthralgias and myalgias.   Psychiatric/Behavioral: Negative for dysphoric mood.       Objective:      Physical Exam   Constitutional: She is oriented to person, place, and time. She appears well-developed and well-nourished.   Neck: Neck supple. No thyromegaly present.   Cardiovascular: Normal rate, regular rhythm and normal heart sounds.    Pulmonary/Chest: Effort normal and breath sounds normal.   Musculoskeletal: She exhibits no edema.        Right shoulder: Normal.        Left shoulder: Normal.        Cervical back: Normal.        Lumbar back: Normal.   Lymphadenopathy:     She has no cervical adenopathy.   Neurological: She is alert and oriented to person, place, and time.   Psychiatric: She has a normal mood and affect. Her behavior is normal.       Assessment:       1. Chronic pain syndrome    2. Fibromyalgia        Plan:       1. Refill norco--3  separate 30 days Rxs given for 90 days total; proper use d/w pt; dispense 50 tabs (down from 60) per month  2. Will continue to try and decrease use of norco

## 2018-07-18 ENCOUNTER — TELEPHONE (OUTPATIENT)
Dept: RADIOLOGY | Facility: HOSPITAL | Age: 48
End: 2018-07-18

## 2018-07-18 ENCOUNTER — OFFICE VISIT (OUTPATIENT)
Dept: PSYCHIATRY | Facility: CLINIC | Age: 48
End: 2018-07-18
Payer: MEDICARE

## 2018-07-18 VITALS
HEIGHT: 67 IN | DIASTOLIC BLOOD PRESSURE: 83 MMHG | BODY MASS INDEX: 41.42 KG/M2 | WEIGHT: 263.88 LBS | SYSTOLIC BLOOD PRESSURE: 130 MMHG | HEART RATE: 97 BPM

## 2018-07-18 DIAGNOSIS — F41.1 GAD (GENERALIZED ANXIETY DISORDER): Primary | ICD-10-CM

## 2018-07-18 DIAGNOSIS — R46.81 OBSESSIVE-COMPULSIVE BEHAVIOR: ICD-10-CM

## 2018-07-18 DIAGNOSIS — F33.42 RECURRENT MAJOR DEPRESSIVE DISORDER, IN FULL REMISSION: ICD-10-CM

## 2018-07-18 DIAGNOSIS — F40.10 SOCIAL PHOBIA: ICD-10-CM

## 2018-07-18 DIAGNOSIS — F63.89 INTERNET ADDICTION: ICD-10-CM

## 2018-07-18 DIAGNOSIS — F33.41 MAJOR DEPRESSIVE DISORDER, RECURRENT EPISODE, IN PARTIAL REMISSION: ICD-10-CM

## 2018-07-18 PROCEDURE — 90834 PSYTX W PT 45 MINUTES: CPT | Mod: PBBFAC | Performed by: SOCIAL WORKER

## 2018-07-18 PROCEDURE — 90834 PSYTX W PT 45 MINUTES: CPT | Mod: S$PBB,,, | Performed by: SOCIAL WORKER

## 2018-07-18 PROCEDURE — 99213 OFFICE O/P EST LOW 20 MIN: CPT | Mod: PBBFAC | Performed by: INTERNAL MEDICINE

## 2018-07-18 PROCEDURE — 99999 PR PBB SHADOW E&M-EST. PATIENT-LVL III: CPT | Mod: PBBFAC,,, | Performed by: INTERNAL MEDICINE

## 2018-07-18 PROCEDURE — 99214 OFFICE O/P EST MOD 30 MIN: CPT | Mod: S$PBB,,, | Performed by: INTERNAL MEDICINE

## 2018-07-18 RX ORDER — DOXEPIN HYDROCHLORIDE 150 MG/1
150 CAPSULE ORAL NIGHTLY
Qty: 90 CAPSULE | Refills: 0 | Status: SHIPPED | OUTPATIENT
Start: 2018-07-18 | End: 2018-10-24 | Stop reason: SDUPTHER

## 2018-07-18 RX ORDER — BUPROPION HYDROCHLORIDE 150 MG/1
450 TABLET ORAL EVERY MORNING
Qty: 270 TABLET | Refills: 0 | Status: SHIPPED | OUTPATIENT
Start: 2018-07-18 | End: 2018-10-24 | Stop reason: SDUPTHER

## 2018-07-18 RX ORDER — DIAZEPAM 5 MG/1
5 TABLET ORAL 2 TIMES DAILY
Qty: 60 TABLET | Refills: 2 | Status: SHIPPED | OUTPATIENT
Start: 2018-07-18 | End: 2018-10-24 | Stop reason: SDUPTHER

## 2018-07-18 RX ORDER — LURASIDONE HYDROCHLORIDE 60 MG/1
60 TABLET, FILM COATED ORAL DAILY
Qty: 90 TABLET | Refills: 0 | Status: SHIPPED | OUTPATIENT
Start: 2018-07-18 | End: 2018-10-24 | Stop reason: SDUPTHER

## 2018-07-18 NOTE — PROGRESS NOTES
Individual Psychotherapy (PhD/LCSW)    7/18/2018    Site:  [facility]         Therapeutic Intervention: Met with patient.  Outpatient - Insight oriented psychotherapy 45 min - CPT code 40575 and Outpatient - Behavior modifying psychotherapy 45 min - CPT code 14803    Chief complaint/reason for encounter: depression, anxiety and interpersonal     Interval history and content of current session:        Pt of the belief that a watch brings her more happiness than visiting her cousin who has invited her for a visit.  Pt also scare to visit.   Metaphor of the chestboard.   Mindfulness of the leaves.   Constancy of the self.  To be the pieces or the board.   What matters in life, what is constant in life.     Boyfriend left her as he felt abandoned as pt bought more things instead of helping him.   Help pt recognized her responsibility in her life, that she plays a role in a relationship as well.       Treatment plan:  · Target symptoms: depression, anxiety   · Why chosen therapy is appropriate versus another modality: relevant to diagnosis  · Outcome monitoring methods: self-report, observation    · Therapeutic intervention type: insight oriented psychotherapy, behavior modifying psychotherapy, supportive psychotherapy    Risk parameters:  Patient reports no suicidal ideation  Patient reports no homicidal ideation  Patient reports no self-injurious behavior  Patient reports no violent behavior    Verbal deficits: None    Patient's response to intervention:  The patient's response to intervention is accepting.    Progress toward goals and other mental status changes:  The patient's progress toward goals is limited.    Diagnosis: 296.35;   Obsessive compulsive disorder.  personality disorder nos  Learning problems/limitations    Plan:  individual psychotherapy    Return to clinic: 2 weeks pt prefers to come in 3 months.
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
## 2018-07-24 ENCOUNTER — TELEPHONE (OUTPATIENT)
Dept: INTERNAL MEDICINE | Facility: CLINIC | Age: 48
End: 2018-07-24

## 2018-07-30 ENCOUNTER — TELEPHONE (OUTPATIENT)
Dept: UROLOGY | Facility: CLINIC | Age: 48
End: 2018-07-30

## 2018-07-30 DIAGNOSIS — B37.31 VAGINAL CANDIDA: Primary | ICD-10-CM

## 2018-07-30 RX ORDER — FLUCONAZOLE 150 MG/1
150 TABLET ORAL DAILY
Qty: 1 TABLET | Refills: 2 | Status: SHIPPED | OUTPATIENT
Start: 2018-07-30 | End: 2018-07-31

## 2018-07-30 NOTE — TELEPHONE ENCOUNTER
Spoke to the patient and was informed that rhe antibiotics that she takes has irritated the vaginal area.please advise

## 2018-07-30 NOTE — TELEPHONE ENCOUNTER
"----- Message from Zehra Velasco sent at 7/30/2018  8:35 AM CDT -----  Contact: PENG MAYO [[id number]]      Name of Who is Calling: PENG MAYO [[id number]]      What is the request in detail:  Patient called requesting to have medication  / DIFLUCAN  called into her local pharmacy.  Says, "she's irritated down there."   Please give a call back at your earliest convenience.       THANKS!       Can the clinic reply by [facility]: No      What Number to Call Back: PENG MAYO / ph# [phone]                                    "

## 2018-07-31 ENCOUNTER — EXTERNAL CHRONIC CARE MANAGEMENT (OUTPATIENT)
Dept: PRIMARY CARE CLINIC | Facility: CLINIC | Age: 48
End: 2018-07-31
Payer: MEDICARE

## 2018-07-31 PROCEDURE — 99490 CHRNC CARE MGMT STAFF 1ST 20: CPT | Mod: PBBFAC | Performed by: INTERNAL MEDICINE

## 2018-07-31 PROCEDURE — 99490 CHRNC CARE MGMT STAFF 1ST 20: CPT | Mod: S$PBB,,, | Performed by: INTERNAL MEDICINE

## 2018-08-02 ENCOUNTER — PES CALL (OUTPATIENT)
Dept: ADMINISTRATIVE | Facility: CLINIC | Age: 48
End: 2018-08-02

## 2018-08-08 ENCOUNTER — CLINICAL SUPPORT (OUTPATIENT)
Dept: INTERNAL MEDICINE | Facility: CLINIC | Age: 48
End: 2018-08-08
Payer: MEDICARE

## 2018-08-08 VITALS — BODY MASS INDEX: 40.26 KG/M2 | WEIGHT: 257.06 LBS

## 2018-08-08 PROCEDURE — 99999 PR PBB SHADOW E&M-EST. PATIENT-LVL I: CPT | Mod: PBBFAC,,,

## 2018-08-08 PROCEDURE — 99211 OFF/OP EST MAY X REQ PHY/QHP: CPT | Mod: PBBFAC

## 2018-08-08 NOTE — PATIENT INSTRUCTIONS
Pt. came into office for weight check as advised. Patient weight recorded as 116.6 kg . Pt states verbal understanding . Pt has no further questions or concerns.

## 2018-08-08 NOTE — PROGRESS NOTES
Pt. came into office for weight check as advised. Patient weight recorded as 116.6kg . Pt states verbal understanding . Pt has no further questions or concerns.   Adrienne WU

## 2018-08-31 ENCOUNTER — EXTERNAL CHRONIC CARE MANAGEMENT (OUTPATIENT)
Dept: PRIMARY CARE CLINIC | Facility: CLINIC | Age: 48
End: 2018-08-31
Payer: MEDICARE

## 2018-08-31 PROCEDURE — 99490 CHRNC CARE MGMT STAFF 1ST 20: CPT | Mod: PBBFAC | Performed by: INTERNAL MEDICINE

## 2018-08-31 PROCEDURE — 99490 CHRNC CARE MGMT STAFF 1ST 20: CPT | Mod: S$PBB,,, | Performed by: INTERNAL MEDICINE

## 2018-09-05 DIAGNOSIS — N95.1 HOT FLASHES, MENOPAUSAL: ICD-10-CM

## 2018-09-05 NOTE — TELEPHONE ENCOUNTER
Spoke with pt, explained that we needed to get her an appointment for an annual before we can do another refill. Pt expressed that she does not drive, so she will have to come in as a walk in tomorrow.

## 2018-09-05 NOTE — TELEPHONE ENCOUNTER
----- Message from Merced Hodge sent at 9/5/2018 12:18 PM CDT -----  Contact: pt   Name of Who is Calling: PENG MAYO [[id number]]    What is the request in detail:Patient is requesting hormone patches be called in to [facility] [address] [place] [address] [address] [facility] [employer]......Please contact to further discuss and advise      Can the clinic reply by [facility]: No     [facility]: [phone].

## 2018-09-06 RX ORDER — ESTRADIOL 0.04 MG/D
PATCH TRANSDERMAL
Qty: 12 PATCH | Refills: 8 | Status: SHIPPED | OUTPATIENT
Start: 2018-09-06 | End: 2018-09-10

## 2018-09-07 ENCOUNTER — TELEPHONE (OUTPATIENT)
Dept: INTERNAL MEDICINE | Facility: CLINIC | Age: 48
End: 2018-09-07

## 2018-09-07 DIAGNOSIS — N39.0 RECURRENT UTI: ICD-10-CM

## 2018-09-07 NOTE — TELEPHONE ENCOUNTER
Spoke to Aylin from [facility] regarding a fax they sent regarding Drug Therapy.Informed her that fax was not received.  They will resend the fax.  Adrienne WU

## 2018-09-10 RX ORDER — ESTRADIOL 0.1 MG/G
1 CREAM VAGINAL
Qty: 24 G | Refills: 3 | Status: SHIPPED | OUTPATIENT
Start: 2018-09-10 | End: 2018-09-12

## 2018-09-12 ENCOUNTER — OFFICE VISIT (OUTPATIENT)
Dept: OBSTETRICS AND GYNECOLOGY | Facility: CLINIC | Age: 48
End: 2018-09-12
Payer: MEDICARE

## 2018-09-12 ENCOUNTER — OFFICE VISIT (OUTPATIENT)
Dept: INTERNAL MEDICINE | Facility: CLINIC | Age: 48
End: 2018-09-12
Payer: MEDICARE

## 2018-09-12 VITALS
HEIGHT: 67 IN | DIASTOLIC BLOOD PRESSURE: 78 MMHG | HEART RATE: 108 BPM | BODY MASS INDEX: 39.31 KG/M2 | SYSTOLIC BLOOD PRESSURE: 120 MMHG | WEIGHT: 250.44 LBS

## 2018-09-12 VITALS
BODY MASS INDEX: 39.44 KG/M2 | HEIGHT: 67 IN | DIASTOLIC BLOOD PRESSURE: 78 MMHG | WEIGHT: 251.31 LBS | SYSTOLIC BLOOD PRESSURE: 120 MMHG

## 2018-09-12 DIAGNOSIS — Z78.0 POSTMENOPAUSAL: Primary | ICD-10-CM

## 2018-09-12 DIAGNOSIS — M79.10 MYALGIA: ICD-10-CM

## 2018-09-12 DIAGNOSIS — N39.0 RECURRENT UTI: ICD-10-CM

## 2018-09-12 DIAGNOSIS — N95.1 MENOPAUSAL VASOMOTOR SYNDROME: Primary | ICD-10-CM

## 2018-09-12 PROCEDURE — 99999 PR PBB SHADOW E&M-EST. PATIENT-LVL II: CPT | Mod: PBBFAC,,,

## 2018-09-12 PROCEDURE — 99213 OFFICE O/P EST LOW 20 MIN: CPT | Mod: S$PBB,,, | Performed by: OBSTETRICS & GYNECOLOGY

## 2018-09-12 PROCEDURE — 99212 OFFICE O/P EST SF 10 MIN: CPT | Mod: PBBFAC

## 2018-09-12 PROCEDURE — 99213 OFFICE O/P EST LOW 20 MIN: CPT | Mod: PBBFAC,27 | Performed by: INTERNAL MEDICINE

## 2018-09-12 PROCEDURE — 99999 PR PBB SHADOW E&M-EST. PATIENT-LVL III: CPT | Mod: PBBFAC,,, | Performed by: INTERNAL MEDICINE

## 2018-09-12 PROCEDURE — 99213 OFFICE O/P EST LOW 20 MIN: CPT | Mod: S$PBB,,, | Performed by: INTERNAL MEDICINE

## 2018-09-12 RX ORDER — ESTRADIOL 0.1 MG/G
1 CREAM VAGINAL
Qty: 24 G | Refills: 3 | Status: SHIPPED | OUTPATIENT
Start: 2018-09-13 | End: 2018-12-04 | Stop reason: SDUPTHER

## 2018-09-12 RX ORDER — TIZANIDINE 4 MG/1
TABLET ORAL
Qty: 270 TABLET | Refills: 0 | Status: SHIPPED | OUTPATIENT
Start: 2018-09-12 | End: 2018-12-04 | Stop reason: SDUPTHER

## 2018-09-12 RX ORDER — ESTRADIOL 0.03 MG/D
1 PATCH TRANSDERMAL
Qty: 4 PATCH | Refills: 11 | Status: SHIPPED | OUTPATIENT
Start: 2018-09-12 | End: 2018-09-12

## 2018-09-12 RX ORDER — ESTRADIOL 0.03 MG/D
1 PATCH TRANSDERMAL
Qty: 4 PATCH | Refills: 11 | Status: SHIPPED | OUTPATIENT
Start: 2018-09-12 | End: 2019-09-12

## 2018-09-12 NOTE — PROGRESS NOTES
Subjective:       Patient ID: Jaki Bajwa is a 47 y.o. female.    Chief Complaint: Mood    Pt c/o increased moodiness and generally not feeling well. She feels like some of her normal aches and pains are worse as are her migraines. She is menopausal so d/w her GYN who recently rx HRT patch which she has not yet started. She thinks this will help but just wanted to touch base with me today. Her chronic meds remain the same. She denies any cp/sob/abd pain. She does have some pain in xiphoid process which is prominent but no other concerning symptoms.       Review of Systems   Constitutional: Positive for fatigue. Negative for fever and unexpected weight change.   Eyes: Negative for visual disturbance.   Respiratory: Negative for shortness of breath.    Cardiovascular: Negative for chest pain.   Gastrointestinal: Negative for abdominal pain, constipation, diarrhea and vomiting.   Neurological: Negative for dizziness and numbness.   Psychiatric/Behavioral: Negative for dysphoric mood.       Objective:      Physical Exam   Constitutional: She is oriented to person, place, and time. She appears well-developed and well-nourished.   Neck: Neck supple. No thyromegaly present.   Cardiovascular: Normal rate, regular rhythm and normal heart sounds.   Pulmonary/Chest: Effort normal and breath sounds normal.   Prominent xiphoid process but non-tender today   Abdominal: Soft. Bowel sounds are normal. She exhibits no distension and no mass. There is no tenderness.   Musculoskeletal: She exhibits no edema.   Lymphadenopathy:     She has no cervical adenopathy.   Neurological: She is alert and oriented to person, place, and time.   Psychiatric: She has a normal mood and affect. Her behavior is normal.       Assessment:       1. Postmenopausal    2. Myalgia        Plan:       1. It does seem that general malaise, moodiness and aches/pains may be exacerbated by menopausal syndrome which I expect to improve with HRT per GYN      2. Offered to pursue additional w/u and/or adjust current meds but she wants to hold off and see how HRT works first which is reasonable  3. She is set to see me next month but understands to call if needing to be seen sooner

## 2018-09-12 NOTE — PROGRESS NOTES
"Gynecology      SUBJECTIVE:     Chief Complaint: Gynecologic Exam (HRT)       History of Present Illness:  46 y/o F presents for refill of HRT medications. Pt previously on estradiol patches with good control of vasomotor symptoms. States she ran out of Rx a few months ago and symptoms have been "out of control" - night sweats, hot flashes, decreased appetite, mood swings. Requesting refill.   Is s/p hysterectomy for fibroids.  Does not smoke.  Is treated with rheumatologist for "likely Lupus". No hx VTE or vascular disease.      Review of patient's allergies indicates:   Allergen Reactions    Aspirin Hives       Past Medical History:   Diagnosis Date    Anemia     Anxiety     Depression     History of psychiatric hospitalization     [place] x 1 week for depression    History of ventral hernia repair     Hypertension     Lupus     patient reports that she is being treated "like I have Lupus"    Mental disorder     Morbid obesity 12/15/2017    Psychiatric problem     Sjogren's syndrome 2013    Therapy     TMJ (temporomandibular joint disorder)     Uterine fibroids 2012     Past Surgical History:   Procedure Laterality Date    breast reduction       SECTION      x 2    HYSTERECTOMY      INGUINAL HERNIA REPAIR      TOTAL REDUCTION MAMMOPLASTY      TUBAL LIGATION      VENTRAL HERNIA REPAIR       OB History      Para Term  AB Living    2 2 2     2    SAB TAB Ectopic Multiple Live Births                     Family History   Problem Relation Age of Onset    Cancer Father         colon ca    Colon cancer Father     Pancreatic cancer Brother     Depression Brother     Alcohol abuse Brother     Liver cancer Sister     Cancer Sister     Depression Sister     Anxiety disorder Sister     Heart attack Sister     Pancreatic cancer Unknown     Liver cancer Unknown     Suicide Neg Hx     Ovarian cancer Neg Hx     Breast cancer Neg Hx      Social History " "    Tobacco Use    Smoking status: Never Smoker    Smokeless tobacco: Never Used   Substance Use Topics    Alcohol use: Yes     Comment: rarely     Drug use: No       Current Outpatient Medications   Medication Sig    buPROPion (WELLBUTRIN XL) 150 MG TB24 tablet Take 3 tablets (450 mg total) by mouth every morning.    desonide (DESOWEN) 0.05 % cream MILTON EXT AA BID PRN    diazePAM (VALIUM) 5 MG tablet Take 1 tablet (5 mg total) by mouth 2 (two) times daily.    doxepin (SINEQUAN) 150 MG Cap Take 1 capsule (150 mg total) by mouth every evening.    estradiol (ESTRACE) 0.01 % (0.1 mg/gram) vaginal cream Place 1 g vaginally twice a week.    gabapentin (NEURONTIN) 300 MG capsule Take 600 mg by mouth 2 (two) times daily.     HYDROcodone-acetaminophen (NORCO) 5-325 mg per tablet Take 1 tablet by mouth 2 (two) times daily as needed for Pain.    HYDROcodone-acetaminophen (NORCO) 5-325 mg per tablet Take 1 tablet by mouth 2 (two) times daily as needed for Pain.    HYDROcodone-acetaminophen (NORCO) 5-325 mg per tablet Take 1 tablet by mouth 2 (two) times daily as needed for Pain.    hydroxychloroquine (PLAQUENIL) 200 mg tablet Take 200 mg by mouth Twice daily.    liraglutide 0.6 mg/0.1 mL, 18 mg/3 mL, subq PNIJ (VICTOZA 2-KHUSHI) 0.6 mg/0.1 mL (18 mg/3 mL) PnIj Inject 1.8 mg into the skin once daily.    losartan-hydrochlorothiazide 100-12.5 mg (HYZAAR) 100-12.5 mg Tab Take 1 tablet by mouth once daily.    lurasidone (LATUDA) 60 mg Tab tablet Take 1 tablet (60 mg total) by mouth once daily.    naproxen (NAPROSYN) 500 MG tablet Take 1 tablet (500 mg total) by mouth 2 (two) times daily as needed (pain).    pen needle, diabetic (BD ULTRA-FINE SHORT PEN NEEDLE) 31 gauge x 5/16" Ndle USE WITH VICTOZA    RESTASIS 0.05 % ophthalmic emulsion PLACE ONE DROP INTO BOTH EYES BID    tiZANidine (ZANAFLEX) 4 MG tablet TAKE 1 TABLET(4 MG) BY MOUTH EVERY 8 HOURS AS NEEDED FOR MUSCLE PAIN     No current facility-administered " medications for this visit.          Review of Systems:  Review of Systems   Constitutional: Positive for diaphoresis.   Respiratory: Negative.  Negative for cough and shortness of breath.    Cardiovascular: Negative.  Negative for chest pain.   Gastrointestinal: Negative.  Negative for constipation, diarrhea, nausea and vomiting.   Endocrine: Positive for hot flashes.   Genitourinary: Negative.    Musculoskeletal: Negative.    Skin:  Negative.   Psychiatric/Behavioral: Positive for sleep disturbance.   Breast: negative.         OBJECTIVE:     Physical Exam:  Physical Exam   Constitutional: She is oriented to person, place, and time. She appears well-developed and well-nourished.   HENT:   Head: Normocephalic and atraumatic.   Cardiovascular: Normal rate, regular rhythm, normal heart sounds and intact distal pulses.   Pulmonary/Chest: Effort normal and breath sounds normal. She has no wheezes. She has no rales.   Abdominal: Soft. Bowel sounds are normal. She exhibits no distension. There is no tenderness.   Musculoskeletal: Normal range of motion.   Neurological: She is alert and oriented to person, place, and time.   Skin: Skin is warm and dry.         ASSESSMENT:       ICD-10-CM ICD-9-CM    1. Menopausal vasomotor syndrome N95.1 627.2           Plan:      Jaki CHI was seen today for gynecologic exam.    Diagnoses and all orders for this visit:    Menopausal vasomotor syndrome  A full discussion of the benefit-risk ratio of hormonal replacement therapy was carried out. Improvement in vasomotor and other climacteric symptoms is discussed, including possible improvements in sleep and mood. Reduction of risk for osteoporosis was explained. We discussed the study data showing increased risk of thrombo-embolic events such as myocardial infarction, stroke and also possibly breast cancer with estrogen replacement, and how this might affect her. The range of side effects such as breast tenderness, weight gain and  including possible increases in lifetime risk of breast cancer and possible thrombotic complications was discussed. We also discussed ACOG's recommendation to use hormone replacement therapy for the relief of hot flashes alone and to be on the lowest dose possible for the shortest amount of time.  Alternative such as herbal and soy-based products were reviewed. All of her questions about this therapy were answered.  Rx Climara. Discussed consider changing to Paroxetine in the future.   RTC for annual exam  -     estradiol 0.025 mg/24 hr 0.025 mg/24 hr; Place 1 patch onto the skin every 7 days.        No orders of the defined types were placed in this encounter.      Follow-up in about 1 month (around 10/12/2018) for well woman.    Counseling time: 15 minutes    Enid Coello

## 2018-09-30 ENCOUNTER — EXTERNAL CHRONIC CARE MANAGEMENT (OUTPATIENT)
Dept: PRIMARY CARE CLINIC | Facility: CLINIC | Age: 48
End: 2018-09-30
Payer: MEDICARE

## 2018-09-30 PROCEDURE — 99490 CHRNC CARE MGMT STAFF 1ST 20: CPT | Mod: S$PBB,,, | Performed by: INTERNAL MEDICINE

## 2018-09-30 PROCEDURE — 99490 CHRNC CARE MGMT STAFF 1ST 20: CPT | Mod: PBBFAC | Performed by: INTERNAL MEDICINE

## 2018-10-10 ENCOUNTER — TELEPHONE (OUTPATIENT)
Dept: INTERNAL MEDICINE | Facility: CLINIC | Age: 48
End: 2018-10-10

## 2018-10-10 ENCOUNTER — OFFICE VISIT (OUTPATIENT)
Dept: INTERNAL MEDICINE | Facility: CLINIC | Age: 48
End: 2018-10-10
Payer: MEDICARE

## 2018-10-10 ENCOUNTER — LAB VISIT (OUTPATIENT)
Dept: LAB | Facility: OTHER | Age: 48
End: 2018-10-10
Attending: INTERNAL MEDICINE
Payer: MEDICARE

## 2018-10-10 ENCOUNTER — OFFICE VISIT (OUTPATIENT)
Dept: OBSTETRICS AND GYNECOLOGY | Facility: CLINIC | Age: 48
End: 2018-10-10
Payer: MEDICARE

## 2018-10-10 VITALS
BODY MASS INDEX: 40.14 KG/M2 | HEART RATE: 110 BPM | HEIGHT: 67 IN | WEIGHT: 255.75 LBS | OXYGEN SATURATION: 99 % | DIASTOLIC BLOOD PRESSURE: 80 MMHG | SYSTOLIC BLOOD PRESSURE: 120 MMHG

## 2018-10-10 VITALS
HEIGHT: 67 IN | SYSTOLIC BLOOD PRESSURE: 102 MMHG | WEIGHT: 257.69 LBS | DIASTOLIC BLOOD PRESSURE: 80 MMHG | BODY MASS INDEX: 40.45 KG/M2

## 2018-10-10 DIAGNOSIS — N89.8 VAGINAL ODOR: ICD-10-CM

## 2018-10-10 DIAGNOSIS — F11.90 CHRONIC NARCOTIC USE: ICD-10-CM

## 2018-10-10 DIAGNOSIS — N39.0 RECURRENT UTI (URINARY TRACT INFECTION): ICD-10-CM

## 2018-10-10 DIAGNOSIS — M79.7 FIBROMYALGIA: ICD-10-CM

## 2018-10-10 DIAGNOSIS — F33.0 MILD EPISODE OF RECURRENT MAJOR DEPRESSIVE DISORDER: Chronic | ICD-10-CM

## 2018-10-10 DIAGNOSIS — Z80.0 FAMILY HISTORY OF COLON CANCER: ICD-10-CM

## 2018-10-10 DIAGNOSIS — E66.01 MORBID OBESITY: ICD-10-CM

## 2018-10-10 DIAGNOSIS — I10 ESSENTIAL HYPERTENSION: Chronic | ICD-10-CM

## 2018-10-10 DIAGNOSIS — F41.1 GAD (GENERALIZED ANXIETY DISORDER): ICD-10-CM

## 2018-10-10 DIAGNOSIS — I10 ESSENTIAL HYPERTENSION: Primary | Chronic | ICD-10-CM

## 2018-10-10 DIAGNOSIS — Z01.419 WOMEN'S ANNUAL ROUTINE GYNECOLOGICAL EXAMINATION: Primary | ICD-10-CM

## 2018-10-10 DIAGNOSIS — R46.81 OBSESSIVE-COMPULSIVE BEHAVIOR: ICD-10-CM

## 2018-10-10 DIAGNOSIS — D64.9 ANEMIA, UNSPECIFIED TYPE: ICD-10-CM

## 2018-10-10 DIAGNOSIS — M35.01 SJOGREN'S SYNDROME WITH KERATOCONJUNCTIVITIS SICCA: ICD-10-CM

## 2018-10-10 LAB
ALBUMIN SERPL BCP-MCNC: 4.3 G/DL
ALP SERPL-CCNC: 64 U/L
ALT SERPL W/O P-5'-P-CCNC: 14 U/L
AMPHET+METHAMPHET UR QL: NEGATIVE
ANION GAP SERPL CALC-SCNC: 7 MMOL/L
AST SERPL-CCNC: 16 U/L
BARBITURATES UR QL SCN>200 NG/ML: NEGATIVE
BASOPHILS # BLD AUTO: 0.02 K/UL
BASOPHILS NFR BLD: 0.2 %
BENZODIAZ UR QL SCN>200 NG/ML: NORMAL
BILIRUB SERPL-MCNC: 0.4 MG/DL
BUN SERPL-MCNC: 13 MG/DL
BZE UR QL SCN: NEGATIVE
CALCIUM SERPL-MCNC: 10.2 MG/DL
CANDIDA RRNA VAG QL PROBE: NEGATIVE
CANNABINOIDS UR QL SCN: NEGATIVE
CHLORIDE SERPL-SCNC: 102 MMOL/L
CO2 SERPL-SCNC: 30 MMOL/L
CREAT SERPL-MCNC: 1 MG/DL
CREAT UR-MCNC: 84 MG/DL
DIFFERENTIAL METHOD: ABNORMAL
EOSINOPHIL # BLD AUTO: 0.1 K/UL
EOSINOPHIL NFR BLD: 1 %
ERYTHROCYTE [DISTWIDTH] IN BLOOD BY AUTOMATED COUNT: 15 %
EST. GFR  (AFRICAN AMERICAN): >60 ML/MIN/1.73 M^2
EST. GFR  (NON AFRICAN AMERICAN): >60 ML/MIN/1.73 M^2
ETHANOL UR-MCNC: <10 MG/DL
G VAGINALIS RRNA GENITAL QL PROBE: POSITIVE
GLUCOSE SERPL-MCNC: 102 MG/DL
HCT VFR BLD AUTO: 38.4 %
HGB BLD-MCNC: 11.5 G/DL
LYMPHOCYTES # BLD AUTO: 2.2 K/UL
LYMPHOCYTES NFR BLD: 26.7 %
MCH RBC QN AUTO: 25.1 PG
MCHC RBC AUTO-ENTMCNC: 29.9 G/DL
MCV RBC AUTO: 84 FL
METHADONE UR QL SCN>300 NG/ML: NEGATIVE
MONOCYTES # BLD AUTO: 0.5 K/UL
MONOCYTES NFR BLD: 5.9 %
NEUTROPHILS # BLD AUTO: 5.5 K/UL
NEUTROPHILS NFR BLD: 66.1 %
OPIATES UR QL SCN: NORMAL
PCP UR QL SCN>25 NG/ML: NEGATIVE
PLATELET # BLD AUTO: 297 K/UL
PMV BLD AUTO: 11.9 FL
POTASSIUM SERPL-SCNC: 4.1 MMOL/L
PROT SERPL-MCNC: 8.2 G/DL
RBC # BLD AUTO: 4.59 M/UL
SODIUM SERPL-SCNC: 139 MMOL/L
T VAGINALIS RRNA GENITAL QL PROBE: NEGATIVE
TOXICOLOGY INFORMATION: NORMAL
WBC # BLD AUTO: 8.34 K/UL

## 2018-10-10 PROCEDURE — G0101 CA SCREEN;PELVIC/BREAST EXAM: HCPCS | Mod: S$PBB,,, | Performed by: NURSE PRACTITIONER

## 2018-10-10 PROCEDURE — 85025 COMPLETE CBC W/AUTO DIFF WBC: CPT

## 2018-10-10 PROCEDURE — 99999 PR PBB SHADOW E&M-EST. PATIENT-LVL III: CPT | Mod: PBBFAC,,, | Performed by: INTERNAL MEDICINE

## 2018-10-10 PROCEDURE — 80053 COMPREHEN METABOLIC PANEL: CPT

## 2018-10-10 PROCEDURE — 80307 DRUG TEST PRSMV CHEM ANLYZR: CPT

## 2018-10-10 PROCEDURE — 99214 OFFICE O/P EST MOD 30 MIN: CPT | Mod: S$PBB,,, | Performed by: INTERNAL MEDICINE

## 2018-10-10 PROCEDURE — 87660 TRICHOMONAS VAGIN DIR PROBE: CPT

## 2018-10-10 PROCEDURE — 99999 PR PBB SHADOW E&M-EST. PATIENT-LVL V: CPT | Mod: PBBFAC,,, | Performed by: NURSE PRACTITIONER

## 2018-10-10 PROCEDURE — 99213 OFFICE O/P EST LOW 20 MIN: CPT | Mod: PBBFAC | Performed by: INTERNAL MEDICINE

## 2018-10-10 PROCEDURE — 99215 OFFICE O/P EST HI 40 MIN: CPT | Mod: PBBFAC,27,25 | Performed by: NURSE PRACTITIONER

## 2018-10-10 PROCEDURE — 36415 COLL VENOUS BLD VENIPUNCTURE: CPT

## 2018-10-10 PROCEDURE — 90688 IIV4 VACCINE SPLT 0.5 ML IM: CPT | Mod: PBBFAC

## 2018-10-10 RX ORDER — HYDROCODONE BITARTRATE AND ACETAMINOPHEN 5; 325 MG/1; MG/1
1 TABLET ORAL 2 TIMES DAILY PRN
Qty: 40 TABLET | Refills: 0 | Status: SHIPPED | OUTPATIENT
Start: 2018-12-09 | End: 2018-10-10

## 2018-10-10 RX ORDER — SULFAMETHOXAZOLE AND TRIMETHOPRIM 200; 40 MG/5ML; MG/5ML
8 SUSPENSION ORAL
COMMUNITY
End: 2018-12-04

## 2018-10-10 RX ORDER — NAPROXEN 500 MG/1
500 TABLET ORAL 2 TIMES DAILY PRN
Qty: 180 TABLET | Refills: 0 | Status: SHIPPED | OUTPATIENT
Start: 2018-10-10 | End: 2019-01-09

## 2018-10-10 RX ORDER — METRONIDAZOLE 500 MG/1
500 TABLET ORAL 2 TIMES DAILY
Qty: 14 TABLET | Refills: 0 | Status: SHIPPED | OUTPATIENT
Start: 2018-10-10 | End: 2018-10-17

## 2018-10-10 RX ORDER — HYDROCODONE BITARTRATE AND ACETAMINOPHEN 5; 325 MG/1; MG/1
1 TABLET ORAL 2 TIMES DAILY PRN
Qty: 40 TABLET | Refills: 0 | Status: SHIPPED | OUTPATIENT
Start: 2018-11-09 | End: 2018-10-10

## 2018-10-10 RX ORDER — HYDROCODONE BITARTRATE AND ACETAMINOPHEN 5; 325 MG/1; MG/1
1 TABLET ORAL 2 TIMES DAILY PRN
Qty: 40 TABLET | Refills: 0 | Status: SHIPPED | OUTPATIENT
Start: 2018-10-10 | End: 2019-01-10 | Stop reason: SDUPTHER

## 2018-10-10 RX ORDER — DESONIDE 0.5 MG/G
CREAM TOPICAL
Qty: 30 G | Refills: 3 | Status: SHIPPED | OUTPATIENT
Start: 2018-10-10 | End: 2019-10-17 | Stop reason: SDUPTHER

## 2018-10-10 NOTE — PROGRESS NOTES
"Patient was given vaccine information sheet for the Flu Vaccine. The area of injection was palpated using the acromion process as a landmark. This area was cleaned with alcohol. Using a 25g 1" safety needle, 0.5mL of the vaccine was placed into the left muscle. The injection site was dressed with a bandage. Patient experienced no complications and was discharged in stable condition. Fluzone vaccine Lot: [id number] Exp: 06/30/2019.  Adrienne Lacy LPN    "

## 2018-10-10 NOTE — PROGRESS NOTES
CC: Annial  HPI: Pt is a 47 y.o.  female who presents for routine annual exam. She is s/p hysterectomy- ovaries remain. She does not want STD screening.  Pt is c/o vaginal odor. Denies any associated itching or discharge.  She uses Climara patches for HRT.  Reports the patches occasionally leave a rash to her skin and have come off in the shower. The patient participates in regular exercise: no.  The patient does not smoke.  The patient wears seatbelts.   Pt denies any domestic violence.  Pt is using Bactrim PRN after intercourse for recurrent UTIs- reports this has helped.  Last mammo- 2018- left breast WNL and right breast with focal asymmetry.  She has not yet done the follow up right breast US and diagnostic mammo. Reports + family history of colon cancer- father and 2 sisters- all now  from colon cancer.  Reports she had a colonoscopy several years ago- has not had a follow up.         FH:  Breast cancer: none  Colon cancer: father, sister X 2   Ovarian cancer: none  Endometrial cancer: none      ROS:  GENERAL: Feeling well overall.   SKIN: Denies rash or lesions.   HEAD: Denies head injury or headache.   NODES: Denies enlarged lymph nodes.   CHEST: Denies chest pain or shortness of breath.   CARDIOVASCULAR: Denies palpitations or left sided chest pain.   ABDOMEN: No abdominal pain, nausea, vomiting or rectal bleeding.   URINARY: No dysuria or hematuria.  REPRODUCTIVE: See HPI.   BREASTS: Denies pain, lumps, or nipple discharge.   HEMATOLOGIC: No easy bruisability or excessive bleeding.   MUSCULOSKELETAL: Denies joint pain or swelling.   NEUROLOGIC: Denies syncope or weakness.   PSYCHIATRIC: Denies depression.    PE:    APPEARANCE: Well nourished, well developed, in no acute distress.  NODES: No inguinal lymph node enlargement.  ABDOMEN: Soft. No tenderness or masses. No hernias.  BREASTS: Symmetrical, no skin changes or visible lesions. No palpable masses, nipple discharge or adenopathy  bilaterally.  PELVIC: Normal external female genitalia without lesions. Normal hair distribution. Adequate perineal body, normal urethral meatus. Vagina without lesions. + thin discharge. No significant cystocele or rectocele. Uterus and cervix surgically absent. Bimanual exam revealed no masses, tenderness or abnormality.  ANUS: Normal.    Diagnosis:  1. Women's annual routine gynecological examination    2. Family history of colon cancer    3. Vaginal odor          PLAN:  Right Breast US and diagnostic mammo scheduled   Discussed alternating sites of patches and applying OTC hydrocortisone to rash.    Affirm  Flagyl  Referral to GI for colonoscopy      Orders Placed This Encounter    Vaginosis Screen by DNA Probe    Ambulatory Referral to Gastroenterology    metroNIDAZOLE (FLAGYL) 500 MG tablet       Patient was counseled today on postmenopausal issues.     Follow-up in 1 year.    Kristyn Carmichael, BONNYC

## 2018-10-10 NOTE — PROGRESS NOTES
Subjective:       Patient ID: Jaki Bajwa is a 47 y.o. female.    Chief Complaint: No chief complaint on file.    Pt here for f/u. She has a dx of sjogrens and fibromyalgia based on blood work and chronic pain that is primarily across shoulders and in legs but also in upper and lower back. As a result she is on several meds that she says helps. She sees rheum at [facility] and has regular f/u. She has prior dx of lupus but this was not corroborated in notes from rheum. She is on plaquenil, tizanidine, gabapentin and norco. She uses norco 1-2x/day. She is due for refills on pain meds. Last visit we discussed decreasing dispense amount to 50 per month which we did and she has tolerated this well. Therefore, we discussed decreasing further again to #40 dispense per month and she is agreeable to this. She has previously signed a pain contract. [place]  reviewed and is consistent. She has seen pain mgmt. She has also done PT with some success in the past.     She also has dx of sjogren's syndrome. She was on pilocarpine but stopped b/c it made her nauseous. Uses eye drops for dry eyes which has been effective and is stable.     Pt's BP is well controlled. Tolerating meds well. Pt denies cp/sob/ha/vision or neuro changes. Not checking at home.     She continues to see psychiatry for bipolar and anxiety. This is well controlled and stable. No SI/HI. They are planning on reducing and hopefully eliminating diazepam per her report. She understands dangers of co-administration of norco and diazepam.     She has morbid obesity but is seeing bariatric medicine. We reviewed importance of exercise. Has not found victoza effective so stopped this and will d/w her bariatric med specialist.     She has mild chronic anemia with prior negative w/u. This is stable and not having signs/symptoms of anemia/blood loss.     She has had recurrent UTIs. Saw urology and was on prophylactic macrodantin. However, has been off this and no issues in  a while.         Hypertension   Pertinent negatives include no chest pain, headaches, palpitations or shortness of breath.   Fibromyalgia   Pertinent negatives include no abdominal pain, chest pain or headaches.     Review of Systems   Constitutional: Negative for unexpected weight change.   Eyes: Negative for visual disturbance.   Respiratory: Negative for shortness of breath.    Cardiovascular: Negative for chest pain, palpitations and leg swelling.   Gastrointestinal: Negative for abdominal pain.   Endocrine: Negative for polyuria.   Genitourinary: Negative for dysuria and frequency.   Neurological: Negative for headaches.   Psychiatric/Behavioral: Negative for dysphoric mood.       Objective:          Assessment:       1. Essential hypertension    2. Obsessive-compulsive behavior    3. Mild episode of recurrent major depressive disorder    4. JUAN JOSE (generalized anxiety disorder)    5. Recurrent UTI (urinary tract infection)    6. Sjogren's syndrome with keratoconjunctivitis sicca    7. Anemia, unspecified type    8. Morbid obesity    9. Fibromyalgia    10. Chronic narcotic use        Plan:       1. Appropriate labs  2. Keep f/u with specialists  3. Refill norco--3 separate 30 day rxs given for 90 days total but decrease dispense # to 40 per month; proper use d/w pt              Physical Exam   Constitutional: She is oriented to person, place, and time. She appears well-developed and well-nourished.   Eyes: EOM are normal. Pupils are equal, round, and reactive to light.   Neck: Neck supple. No thyromegaly present.   Cardiovascular: Normal rate, regular rhythm and normal heart sounds.   Pulmonary/Chest: Effort normal and breath sounds normal.   Musculoskeletal: She exhibits no edema.        Lumbar back: She exhibits normal range of motion, no tenderness and no bony tenderness.   Lymphadenopathy:     She has no cervical adenopathy.   Neurological: She is alert and oriented to person, place, and time. No cranial nerve  deficit.   Psychiatric: She has a normal mood and affect. Her behavior is normal.

## 2018-10-10 NOTE — TELEPHONE ENCOUNTER
Inform pt that labs look good. Reminder her that f/u breast imaging due to abnormal mammogram is still important to complete. Assist if needed.

## 2018-10-17 DIAGNOSIS — E66.01 MORBID OBESITY: ICD-10-CM

## 2018-10-17 DIAGNOSIS — I10 ESSENTIAL HYPERTENSION: Chronic | ICD-10-CM

## 2018-10-24 ENCOUNTER — OFFICE VISIT (OUTPATIENT)
Dept: PSYCHIATRY | Facility: CLINIC | Age: 48
End: 2018-10-24
Payer: MEDICARE

## 2018-10-24 VITALS
BODY MASS INDEX: 40.25 KG/M2 | DIASTOLIC BLOOD PRESSURE: 77 MMHG | HEART RATE: 114 BPM | WEIGHT: 256.44 LBS | SYSTOLIC BLOOD PRESSURE: 111 MMHG | HEIGHT: 67 IN

## 2018-10-24 DIAGNOSIS — F41.1 GAD (GENERALIZED ANXIETY DISORDER): ICD-10-CM

## 2018-10-24 DIAGNOSIS — F33.41 MAJOR DEPRESSIVE DISORDER, RECURRENT EPISODE, IN PARTIAL REMISSION: ICD-10-CM

## 2018-10-24 DIAGNOSIS — F33.42 RECURRENT MAJOR DEPRESSIVE DISORDER, IN FULL REMISSION: ICD-10-CM

## 2018-10-24 DIAGNOSIS — F40.10 SOCIAL PHOBIA: ICD-10-CM

## 2018-10-24 DIAGNOSIS — F41.1 GAD (GENERALIZED ANXIETY DISORDER): Primary | ICD-10-CM

## 2018-10-24 DIAGNOSIS — F63.89 INTERNET ADDICTION: ICD-10-CM

## 2018-10-24 DIAGNOSIS — R46.81 OBSESSIVE-COMPULSIVE BEHAVIOR: Primary | ICD-10-CM

## 2018-10-24 PROCEDURE — 99213 OFFICE O/P EST LOW 20 MIN: CPT | Mod: PBBFAC,25 | Performed by: INTERNAL MEDICINE

## 2018-10-24 PROCEDURE — 99999 PR PBB SHADOW E&M-EST. PATIENT-LVL III: CPT | Mod: PBBFAC,,, | Performed by: INTERNAL MEDICINE

## 2018-10-24 PROCEDURE — 90834 PSYTX W PT 45 MINUTES: CPT | Mod: S$PBB,,, | Performed by: SOCIAL WORKER

## 2018-10-24 PROCEDURE — 90834 PSYTX W PT 45 MINUTES: CPT | Mod: PBBFAC | Performed by: SOCIAL WORKER

## 2018-10-24 PROCEDURE — 99214 OFFICE O/P EST MOD 30 MIN: CPT | Mod: S$PBB,,, | Performed by: INTERNAL MEDICINE

## 2018-10-24 RX ORDER — BUPROPION HYDROCHLORIDE 150 MG/1
450 TABLET ORAL EVERY MORNING
Qty: 270 TABLET | Refills: 1 | Status: SHIPPED | OUTPATIENT
Start: 2018-10-24 | End: 2019-02-26 | Stop reason: SDUPTHER

## 2018-10-24 RX ORDER — DOXEPIN HYDROCHLORIDE 150 MG/1
150 CAPSULE ORAL NIGHTLY
Qty: 90 CAPSULE | Refills: 1 | Status: SHIPPED | OUTPATIENT
Start: 2018-10-24 | End: 2018-12-10

## 2018-10-24 RX ORDER — LURASIDONE HYDROCHLORIDE 60 MG/1
60 TABLET, FILM COATED ORAL DAILY
Qty: 90 TABLET | Refills: 1 | Status: SHIPPED | OUTPATIENT
Start: 2018-10-24 | End: 2019-02-26 | Stop reason: SDUPTHER

## 2018-10-24 RX ORDER — DIAZEPAM 5 MG/1
TABLET ORAL
Qty: 45 TABLET | Refills: 3 | Status: SHIPPED | OUTPATIENT
Start: 2018-10-24 | End: 2019-02-26 | Stop reason: SDUPTHER

## 2018-10-24 NOTE — PROGRESS NOTES
Individual Psychotherapy (PhD/LCSW)    10/24/2018    Site:  [facility]         Therapeutic Intervention: Met with patient.  Outpatient - Insight oriented psychotherapy 45 min - CPT code 85862 and Outpatient - Behavior modifying psychotherapy 45 min - CPT code 44928    Chief complaint/reason for encounter: depression, anxiety and interpersonal     Interval history and content of current session:        Shared with pt the passengers on the bus metaphor to address de-literalization.   Also  metaphor to identify her values.   Fear of being  predominates which leads to avoidance.    Reports reduce shopping but spent 600 for her bday in a purse and watch.    Mood is stable.      Treatment plan:  · Target symptoms: depression, anxiety   · Why chosen therapy is appropriate versus another modality: relevant to diagnosis  · Outcome monitoring methods: self-report, observation    · Therapeutic intervention type: insight oriented psychotherapy, behavior modifying psychotherapy, supportive psychotherapy    Risk parameters:  Patient reports no suicidal ideation  Patient reports no homicidal ideation  Patient reports no self-injurious behavior  Patient reports no violent behavior    Verbal deficits: None    Patient's response to intervention:  The patient's response to intervention is accepting.    Progress toward goals and other mental status changes:  The patient's progress toward goals is limited.    Diagnosis: 296.35;   Obsessive compulsive disorder.  personality disorder nos  Learning problems/limitations    Plan:  individual psychotherapy    Return to clinic: 2 weeks pt prefers to come in 3 months.

## 2018-10-24 NOTE — PROGRESS NOTES
"OUTPATIENT PSYCHIATRY RETURN VISIT    ENCOUNTER DATE:  10/29/2018  SITE:  [facility]  LENGTH OF SESSION:  25 minutes    CHIEF COMPLAINT:  Anxiety    HISTORY OF PRESENTING ILLNESS:  Jaki Bajwa is a 48 y.o. female with history of MDD, JUAN JOSE, OCD, social phobia, and Internet shopping addiction who presents for follow up appointment.  Patient was last seen in clinic on 7/18/18, at which time Valium was decreased from 5mg/10mg to 5mg BID since she is also on opiates.  She was continued on Latuda 60mg daily, Wellbutrin XL 450mg daily, and Doxepin 150mg qHS.  She is seeing Mr. Zamora, [facility], for therapy.    History as told by patient:  Had bump in the road with menopause.  Mood swings and did not want to get out of bed.  Got back on patch and started feeling better.  Feels it was hormone related.  This was about 1 month ago.  Still feels a little nervous and jittery in the mornings.  Otherwise feels pretty calm.  Has tolerated decreased Valium dose well.  Still feels she needs same morning dose, but she is sleeping well.  Feels Latuda has been extremely helpful for her depression.  Has made her feel "almost normal."  Still with ex-/boyfriend.  He wants her to move back in but she does not want to.  Just turned 48 but stayed in bed most of the weekend.  Had period where he told her it was not going to work, and this hurt her.  Feels it has to do with the shopping.  Has not bought clothes but spent 600 dollars for birthday.  Feels addiction is getting better.  Has not bought clothes in 3-4 months.  Spending less money.  Just splurged for birthday.  Working on this in therapy.  Denies depression currently.  Still feels anxious mostly in the mornings. Feels talking to daughter about shopping has helped her improved.  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  Worries she will not have enough money.  Triggers panic attacks.  Still cleans obsessively, checks locks " obsessively.  Feels guilty after shopping.  14 watches and 14 purses that she does not wear.  Will not let anyone come over if house is not spotless.  If anything gets messed up, is behind them cleaning up.  Feels she embarrasses people.  Denies symptoms of depression or SI.    Medication side effects:  No  Medication compliance:  Yes    PSYCHIATRIC REVIEW OF SYSTEMS:  Trouble with sleep:  Improved with medication  Appetite changes:  Denies  Weight changes:  Denies  Lack of energy:  Denies  Anhedonia:  Denies  Somatic symptoms:  Denies  Libido:  Denies  Anxiety/panic: Improving  Guilty/hopeless:  Denies  Self-injurious behavior/risky behavior:  Denies  Any drugs:  Denies  Alcohol:  Denies    MEDICAL REVIEW OF SYSTEMS:  Complete review of systems performed covering Constitutional, Musculoskeletal, Neurologic.  All systems negative except for that covered in HPI.    PAST PSYCHIATRIC, MEDICAL, AND SOCIAL HISTORY REVIEWED  The patient's past medical, family and social history have been reviewed and updated as appropriate within the electronic medical record - see encounter notes.    MEDICATIONS:    Current Outpatient Medications:     buPROPion (WELLBUTRIN XL) 150 MG TB24 tablet, Take 3 tablets (450 mg total) by mouth every morning., Disp: 270 tablet, Rfl: 1    desonide (DESOWEN) 0.05 % cream, MILTON EXT AA BID PRN, Disp: 30 g, Rfl: 3    diazePAM (VALIUM) 5 MG tablet, Take 1 tablet (5mg) in the morning and 1/2 tablet (2.5mg) in the evening., Disp: 45 tablet, Rfl: 3    doxepin (SINEQUAN) 150 MG Cap, Take 1 capsule (150 mg total) by mouth every evening., Disp: 90 capsule, Rfl: 1    estradiol (ESTRACE) 0.01 % (0.1 mg/gram) vaginal cream, Place 1 g vaginally twice a week., Disp: 24 g, Rfl: 3    estradiol 0.025 mg/24 hr 0.025 mg/24 hr, Place 1 patch onto the skin every 7 days., Disp: 4 patch, Rfl: 11    gabapentin (NEURONTIN) 300 MG capsule, Take 600 mg by mouth 2 (two) times daily. , Disp: , Rfl:      "HYDROcodone-acetaminophen (NORCO) 5-325 mg per tablet, Take 1 tablet by mouth 2 (two) times daily as needed for Pain., Disp: 40 tablet, Rfl: 0    hydroxychloroquine (PLAQUENIL) 200 mg tablet, Take 200 mg by mouth Twice daily., Disp: , Rfl:     liraglutide 0.6 mg/0.1 mL, 18 mg/3 mL, subq PNIJ (VICTOZA 2-KHUSHI) 0.6 mg/0.1 mL (18 mg/3 mL) PnIj, Inject 1.8 mg into the skin once daily., Disp: 9 mL, Rfl: 1    losartan-hydrochlorothiazide 100-12.5 mg (HYZAAR) 100-12.5 mg Tab, Take 1 tablet by mouth once daily., Disp: 90 tablet, Rfl: 3    lurasidone (LATUDA) 60 mg Tab tablet, Take 1 tablet (60 mg total) by mouth once daily., Disp: 90 tablet, Rfl: 1    naproxen (NAPROSYN) 500 MG tablet, Take 1 tablet (500 mg total) by mouth 2 (two) times daily as needed (pain)., Disp: 180 tablet, Rfl: 0    pen needle, diabetic (BD ULTRA-FINE SHORT PEN NEEDLE) 31 gauge x 5/16" Ndle, USE WITH VICTOZA, Disp: 100 each, Rfl: 1    RESTASIS 0.05 % ophthalmic emulsion, PLACE ONE DROP INTO BOTH EYES BID, Disp: , Rfl: 0    sulfamethoxazole-trimethoprim 200-40 mg/5 ml (BACTRIM,SEPTRA) 200-40 mg/5 mL Susp, Take 8 mg/kg/day by mouth every 12 (twelve) hours., Disp: , Rfl:     tiZANidine (ZANAFLEX) 4 MG tablet, TAKE 1 TABLET(4 MG) BY MOUTH EVERY 8 HOURS AS NEEDED FOR MUSCLE PAIN, Disp: 270 tablet, Rfl: 0    ALLERGIES:  Review of patient's allergies indicates:   Allergen Reactions    Aspirin Hives     PSYCHIATRIC EXAM:  Vitals:    10/24/18 0821   BP: 111/77   Pulse: (!) 114   Weight: 116.3 kg (256 lb 6.5 oz)   Height: 5' 7" (1.702 m)     Appearance:  Well groomed, appearing healthy and of stated age  Behavior:  Cooperative, pleasant, no psychomotor agitation or retardation  Speech:  Normal rate, rhythm, prosody, and volume  Mood:  "Getting better"  Affect:  Congruent and appropriate  Thought Process:  Linear, logical, goal directed  Thought Content:  Negative for suicidal ideation, homicidal ideation, delusions or hallucinations.  Associations:  " Intact  Memory:  Grossly Intact  Level of Consciousness/Orientation:  Grossly intact  Fund of Knowledge:  Good  Attention:  Good  Language:  Fluent, able to name abstract and concrete objects  Insight:  Fair  Judgment:  Intact  Psychomotor signs:  No involuntary movements or tremor  Gait:  Normal    RELEVANT LABS/STUDIES:    Lab Results   Component Value Date    WBC 8.34 10/10/2018    HGB 11.5 (L) 10/10/2018    HCT 38.4 10/10/2018    MCV 84 10/10/2018     10/10/2018     BMP  Lab Results   Component Value Date     10/10/2018    K 4.1 10/10/2018     10/10/2018    CO2 30 (H) 10/10/2018    BUN 13 10/10/2018    CREATININE 1.0 10/10/2018    CALCIUM 10.2 10/10/2018    ANIONGAP 7 (L) 10/10/2018    ESTGFRAFRICA >60 10/10/2018    EGFRNONAA >60 10/10/2018     Lab Results   Component Value Date    ALT 14 10/10/2018    AST 16 10/10/2018    ALKPHOS 64 10/10/2018    BILITOT 0.4 10/10/2018     Lab Results   Component Value Date    TSH 2.261 04/18/2017     No results found for: LABA1C, HGBA1C    IMPRESSION:    Jaki Bajwa is a 48 y.o. female with history of MDD, JUAN JOSE, OCD, social phobia, and Internet shopping addiction who presents for follow up appointment.    Status/Progress:  Based on the examination today, the patient's problem(s) is/are adequately but not ideally controlled.  New problems have not been presented today.    Risk Parameters:  Patient reports no suicidal ideation  Patient reports no homicidal ideation  Patient reports no self-injurious behavior  Patient reports no violent behavior    DIAGNOSES:    ICD-10-CM ICD-9-CM   1. Obsessive-compulsive behavior R46.81 300.3   2. JUAN JOSE (generalized anxiety disorder) F41.1 300.02   3. Social phobia F40.10 300.23   4. Recurrent major depressive disorder, in full remission F33.42 296.36   5. Internet shopping addiction F63.89 301.89     PLAN:  · Continue Latuda 60mg daily for mood stabilization/anxiety.    · Continue Wellbutrin XL 450mg daily for  depression.    · Continue Doxepin 150mg qHS for insomnia.    · Will continue reducing Valium dose.  Will reduce from 5mg BID to 5mg/2.5mg.  She is aware of risks associated with these medications, including the risk of respiratory depression and death.    · Continue therapy with Mr. Noah LCSW.    RETURN TO CLINIC:  Follow-up in about 4 months (around 2/24/2019).

## 2018-10-25 ENCOUNTER — TELEPHONE (OUTPATIENT)
Dept: OBSTETRICS AND GYNECOLOGY | Facility: CLINIC | Age: 48
End: 2018-10-25

## 2018-10-25 RX ORDER — FLUCONAZOLE 200 MG/1
200 TABLET ORAL EVERY OTHER DAY
Qty: 2 TABLET | Refills: 0 | Status: SHIPPED | OUTPATIENT
Start: 2018-10-25 | End: 2018-10-28

## 2018-10-25 NOTE — TELEPHONE ENCOUNTER
Spoke with pt, pt expressed that she now has a yeast infection from the medication for the B.V. Pt expressed that she would like a Diflucan for the yeast infection. I advised pt that I will send a message to get her a Diflucan. Pt expressed understanding.

## 2018-10-25 NOTE — TELEPHONE ENCOUNTER
----- Message from Gloria Mock sent at 10/25/2018  3:37 PM CDT -----  Contact: pt   Name of Who is Calling: PENG MAYO [[id number]]       What is the request in detail: patient is requesting a call in regards to the prescription she received.......Please contact to further discuss and advise.       Can the clinic reply by [facility]: no       What Number to Call Back if not in [facility]: [phone]

## 2018-10-31 ENCOUNTER — EXTERNAL CHRONIC CARE MANAGEMENT (OUTPATIENT)
Dept: PRIMARY CARE CLINIC | Facility: CLINIC | Age: 48
End: 2018-10-31
Payer: MEDICARE

## 2018-10-31 PROCEDURE — 99490 CHRNC CARE MGMT STAFF 1ST 20: CPT | Mod: PBBFAC | Performed by: INTERNAL MEDICINE

## 2018-10-31 PROCEDURE — 99490 CHRNC CARE MGMT STAFF 1ST 20: CPT | Mod: S$PBB,,, | Performed by: INTERNAL MEDICINE

## 2018-11-13 ENCOUNTER — TELEPHONE (OUTPATIENT)
Dept: OBSTETRICS AND GYNECOLOGY | Facility: CLINIC | Age: 48
End: 2018-11-13

## 2018-11-13 DIAGNOSIS — N76.0 BV (BACTERIAL VAGINOSIS): Primary | ICD-10-CM

## 2018-11-13 DIAGNOSIS — B96.89 BV (BACTERIAL VAGINOSIS): Primary | ICD-10-CM

## 2018-11-13 RX ORDER — METRONIDAZOLE 7.5 MG/G
1 GEL VAGINAL DAILY
Qty: 70 G | Refills: 0 | Status: SHIPPED | OUTPATIENT
Start: 2018-11-13 | End: 2018-11-18

## 2018-11-13 NOTE — TELEPHONE ENCOUNTER
----- Message from Breanna Hoffman MA sent at 11/13/2018  5:03 PM CST -----  Contact: self  Refill for BV   ----- Message -----  From: Kenna Wilcox  Sent: 11/13/2018   9:30 AM  To: [facility] Staff    Pt is requesting a prescription refill for BV. The pt can be reached at [phone].

## 2018-11-30 ENCOUNTER — EXTERNAL CHRONIC CARE MANAGEMENT (OUTPATIENT)
Dept: PRIMARY CARE CLINIC | Facility: CLINIC | Age: 48
End: 2018-11-30
Payer: MEDICARE

## 2018-11-30 PROCEDURE — 99490 CHRNC CARE MGMT STAFF 1ST 20: CPT | Mod: S$PBB,,, | Performed by: INTERNAL MEDICINE

## 2018-11-30 PROCEDURE — 99490 CHRNC CARE MGMT STAFF 1ST 20: CPT | Mod: PBBFAC | Performed by: INTERNAL MEDICINE

## 2018-12-04 ENCOUNTER — OFFICE VISIT (OUTPATIENT)
Dept: UROLOGY | Facility: CLINIC | Age: 48
End: 2018-12-04
Payer: MEDICARE

## 2018-12-04 ENCOUNTER — OFFICE VISIT (OUTPATIENT)
Dept: INTERNAL MEDICINE | Facility: CLINIC | Age: 48
End: 2018-12-04
Payer: MEDICARE

## 2018-12-04 ENCOUNTER — OFFICE VISIT (OUTPATIENT)
Dept: INTERNAL MEDICINE | Facility: CLINIC | Age: 48
End: 2018-12-04
Attending: FAMILY MEDICINE
Payer: MEDICARE

## 2018-12-04 VITALS
HEART RATE: 93 BPM | HEIGHT: 67 IN | DIASTOLIC BLOOD PRESSURE: 80 MMHG | WEIGHT: 242.06 LBS | SYSTOLIC BLOOD PRESSURE: 112 MMHG | WEIGHT: 241.38 LBS | OXYGEN SATURATION: 97 % | DIASTOLIC BLOOD PRESSURE: 80 MMHG | BODY MASS INDEX: 37.89 KG/M2 | OXYGEN SATURATION: 97 % | BODY MASS INDEX: 37.99 KG/M2 | HEART RATE: 99 BPM | SYSTOLIC BLOOD PRESSURE: 118 MMHG | HEIGHT: 67 IN

## 2018-12-04 VITALS
BODY MASS INDEX: 40.18 KG/M2 | SYSTOLIC BLOOD PRESSURE: 112 MMHG | HEART RATE: 110 BPM | HEIGHT: 67 IN | DIASTOLIC BLOOD PRESSURE: 80 MMHG | WEIGHT: 256 LBS

## 2018-12-04 DIAGNOSIS — G89.4 CHRONIC PAIN SYNDROME: ICD-10-CM

## 2018-12-04 DIAGNOSIS — M35.01 SJOGREN'S SYNDROME WITH KERATOCONJUNCTIVITIS SICCA: ICD-10-CM

## 2018-12-04 DIAGNOSIS — F41.1 GAD (GENERALIZED ANXIETY DISORDER): ICD-10-CM

## 2018-12-04 DIAGNOSIS — F33.0 MILD EPISODE OF RECURRENT MAJOR DEPRESSIVE DISORDER: Chronic | ICD-10-CM

## 2018-12-04 DIAGNOSIS — G89.29 CHRONIC BILATERAL LOW BACK PAIN WITHOUT SCIATICA: ICD-10-CM

## 2018-12-04 DIAGNOSIS — N89.8 VAGINAL DRYNESS: ICD-10-CM

## 2018-12-04 DIAGNOSIS — M79.10 MYALGIA: ICD-10-CM

## 2018-12-04 DIAGNOSIS — I10 ESSENTIAL HYPERTENSION: Chronic | ICD-10-CM

## 2018-12-04 DIAGNOSIS — E66.01 MORBID OBESITY: ICD-10-CM

## 2018-12-04 DIAGNOSIS — Z79.4 TYPE 2 DIABETES MELLITUS WITH COMPLICATION, WITH LONG-TERM CURRENT USE OF INSULIN: ICD-10-CM

## 2018-12-04 DIAGNOSIS — F63.89 INTERNET ADDICTION: ICD-10-CM

## 2018-12-04 DIAGNOSIS — R30.0 DYSURIA: ICD-10-CM

## 2018-12-04 DIAGNOSIS — M79.7 FIBROMYALGIA: ICD-10-CM

## 2018-12-04 DIAGNOSIS — N39.0 RECURRENT UTI (URINARY TRACT INFECTION): ICD-10-CM

## 2018-12-04 DIAGNOSIS — R46.81 OBSESSIVE-COMPULSIVE BEHAVIOR: ICD-10-CM

## 2018-12-04 DIAGNOSIS — D25.9 UTERINE LEIOMYOMA, UNSPECIFIED LOCATION: ICD-10-CM

## 2018-12-04 DIAGNOSIS — M54.50 CHRONIC BILATERAL LOW BACK PAIN WITHOUT SCIATICA: ICD-10-CM

## 2018-12-04 DIAGNOSIS — Z79.4 TYPE 2 DIABETES MELLITUS WITH COMPLICATION, WITH LONG-TERM CURRENT USE OF INSULIN: Primary | ICD-10-CM

## 2018-12-04 DIAGNOSIS — F33.42 RECURRENT MAJOR DEPRESSIVE DISORDER, IN FULL REMISSION: ICD-10-CM

## 2018-12-04 DIAGNOSIS — R33.9 INCOMPLETE BLADDER EMPTYING: ICD-10-CM

## 2018-12-04 DIAGNOSIS — F39 MOOD DISORDER: ICD-10-CM

## 2018-12-04 DIAGNOSIS — M26.629 TMJ SYNDROME: ICD-10-CM

## 2018-12-04 DIAGNOSIS — E11.8 TYPE 2 DIABETES MELLITUS WITH COMPLICATION, WITH LONG-TERM CURRENT USE OF INSULIN: Primary | ICD-10-CM

## 2018-12-04 DIAGNOSIS — R35.0 URINARY FREQUENCY: ICD-10-CM

## 2018-12-04 DIAGNOSIS — E11.8 TYPE 2 DIABETES MELLITUS WITH COMPLICATION, WITH LONG-TERM CURRENT USE OF INSULIN: ICD-10-CM

## 2018-12-04 DIAGNOSIS — N39.0 RECURRENT UTI: Primary | ICD-10-CM

## 2018-12-04 DIAGNOSIS — G47.62 LEG CRAMPS, SLEEP RELATED: ICD-10-CM

## 2018-12-04 DIAGNOSIS — I10 ESSENTIAL HYPERTENSION: Primary | Chronic | ICD-10-CM

## 2018-12-04 DIAGNOSIS — Z00.00 ENCOUNTER FOR PREVENTIVE HEALTH EXAMINATION: Primary | ICD-10-CM

## 2018-12-04 DIAGNOSIS — D64.9 ANEMIA, UNSPECIFIED TYPE: ICD-10-CM

## 2018-12-04 DIAGNOSIS — G47.00 INSOMNIA, UNSPECIFIED TYPE: ICD-10-CM

## 2018-12-04 LAB
BILIRUB SERPL-MCNC: ABNORMAL MG/DL
BLOOD URINE, POC: ABNORMAL
COLOR, POC UA: ABNORMAL
GLUCOSE UR QL STRIP: NORMAL
KETONES UR QL STRIP: ABNORMAL
LEUKOCYTE ESTERASE URINE, POC: ABNORMAL
NITRITE, POC UA: ABNORMAL
PH, POC UA: 5
PROTEIN, POC: ABNORMAL
SPECIFIC GRAVITY, POC UA: 1.01
UROBILINOGEN, POC UA: NORMAL

## 2018-12-04 PROCEDURE — 87086 URINE CULTURE/COLONY COUNT: CPT

## 2018-12-04 PROCEDURE — 99213 OFFICE O/P EST LOW 20 MIN: CPT | Mod: PBBFAC,27 | Performed by: NURSE PRACTITIONER

## 2018-12-04 PROCEDURE — 99213 OFFICE O/P EST LOW 20 MIN: CPT | Mod: S$PBB,,, | Performed by: FAMILY MEDICINE

## 2018-12-04 PROCEDURE — 87088 URINE BACTERIA CULTURE: CPT

## 2018-12-04 PROCEDURE — 99214 OFFICE O/P EST MOD 30 MIN: CPT | Mod: 25,S$GLB,, | Performed by: NURSE PRACTITIONER

## 2018-12-04 PROCEDURE — 87077 CULTURE AEROBIC IDENTIFY: CPT

## 2018-12-04 PROCEDURE — 99999 PR PBB SHADOW E&M-EST. PATIENT-LVL III: CPT | Mod: PBBFAC,,, | Performed by: FAMILY MEDICINE

## 2018-12-04 PROCEDURE — G0439 PPPS, SUBSEQ VISIT: HCPCS | Mod: ,,, | Performed by: NURSE PRACTITIONER

## 2018-12-04 PROCEDURE — 81002 URINALYSIS NONAUTO W/O SCOPE: CPT | Mod: S$GLB,,, | Performed by: NURSE PRACTITIONER

## 2018-12-04 PROCEDURE — 99999 PR PBB SHADOW E&M-EST. PATIENT-LVL III: CPT | Mod: PBBFAC,,, | Performed by: NURSE PRACTITIONER

## 2018-12-04 PROCEDURE — 99213 OFFICE O/P EST LOW 20 MIN: CPT | Mod: PBBFAC | Performed by: FAMILY MEDICINE

## 2018-12-04 PROCEDURE — 87186 SC STD MICRODIL/AGAR DIL: CPT

## 2018-12-04 RX ORDER — ESTRADIOL 0.1 MG/G
1 CREAM VAGINAL
Qty: 24 G | Refills: 3 | Status: SHIPPED | OUTPATIENT
Start: 2018-12-06 | End: 2019-12-06

## 2018-12-04 RX ORDER — SULFAMETHOXAZOLE AND TRIMETHOPRIM 400; 80 MG/1; MG/1
1 TABLET ORAL 2 TIMES DAILY
Qty: 60 TABLET | Refills: 0 | Status: SHIPPED | OUTPATIENT
Start: 2018-12-04 | End: 2019-01-03

## 2018-12-04 RX ORDER — TIZANIDINE 4 MG/1
TABLET ORAL
Qty: 270 TABLET | Refills: 0 | Status: SHIPPED | OUTPATIENT
Start: 2018-12-04 | End: 2019-02-26 | Stop reason: SDUPTHER

## 2018-12-04 RX ORDER — PEN NEEDLE, DIABETIC 30 GX3/16"
NEEDLE, DISPOSABLE MISCELLANEOUS
Qty: 100 EACH | Refills: 3 | Status: SHIPPED | OUTPATIENT
Start: 2018-12-04 | End: 2020-03-02

## 2018-12-04 RX ORDER — PEN NEEDLE, DIABETIC 30 GX3/16"
NEEDLE, DISPOSABLE MISCELLANEOUS
Qty: 100 EACH | Refills: 3 | Status: SHIPPED | OUTPATIENT
Start: 2018-12-04 | End: 2018-12-04 | Stop reason: SDUPTHER

## 2018-12-04 NOTE — PROGRESS NOTES
"Subjective:      Patient ID: Jaki Bajwa is a 48 y.o. female.    Chief Complaint: Weight Loss    HPI   Patient here today for follow up for weight loss. She is not exercising. She does watch a 4 month old grand child and is tired with watching him. She has been drinking around  fl oz water daily.        Breakfast  Smoothie coconut milk     Lunch   Protein/Salad/italian     Dinner   Shake     Review of Systems   Constitutional: Negative.    Respiratory: Negative.    Cardiovascular: Negative.    Gastrointestinal: Negative.    Genitourinary: Negative.      I personally reviewed Past Medical History, Past Surgical history,  Past Social History and Family History      Objective:   /80 (BP Location: Left arm, Patient Position: Sitting, BP Method: Medium (Manual))   Pulse 93   Ht 5' 7" (1.702 m)   Wt 109.8 kg (242 lb 1 oz)   LMP 07/11/2012 Comment: partial   SpO2 97%   BMI 37.91 kg/m²     Physical Exam   Constitutional: She is oriented to person, place, and time. She appears well-developed and well-nourished. No distress.   HENT:   Head: Normocephalic and atraumatic.   Right Ear: Hearing, tympanic membrane, external ear and ear canal normal.   Left Ear: Hearing, tympanic membrane, external ear and ear canal normal.   Nose: Nose normal.   Mouth/Throat: Uvula is midline and oropharynx is clear and moist. No oropharyngeal exudate.   Eyes: Conjunctivae and EOM are normal. Pupils are equal, round, and reactive to light. Right eye exhibits no discharge. Left eye exhibits no discharge. No scleral icterus.   Neck: Normal range of motion. Neck supple.   Cardiovascular: Normal rate, regular rhythm, normal heart sounds and intact distal pulses. Exam reveals no gallop.   No murmur heard.  Pulmonary/Chest: Effort normal and breath sounds normal. No respiratory distress. She has no wheezes. She has no rales. She exhibits no tenderness.   Abdominal: Soft. Bowel sounds are normal. She exhibits no distension and no " mass. There is no tenderness. There is no rebound and no guarding.   Neurological: She is alert and oriented to person, place, and time.   Skin: Skin is warm and dry.   Vitals reviewed.      1. Essential hypertension    2. Morbid obesity        1. stable, cont current regimen   2. 19 pound weight loss in the last 5 months, will cont victoza and discussed dietary changes, return in 6-8 weeks for weight checks       No orders of the defined types were placed in this encounter.    Medications Ordered This Encounter   Medications    liraglutide 0.6 mg/0.1 mL, 18 mg/3 mL, subq PNIJ (VICTOZA 2-KHUSHI) 0.6 mg/0.1 mL (18 mg/3 mL) PnIj     Sig: Inject 1.8 mg into the skin once daily.     Dispense:  9 mL     Refill:  2     Order Specific Question:   This medication typically requires a prior authorization. For prior auth services, patient financial assistance, patient education and medication management send to an [facility].     Answer:   Yes [facility]

## 2018-12-04 NOTE — PATIENT INSTRUCTIONS
Counseling and Referral of Other Preventative  (Italic type indicates deductible and co-insurance are waived)    Patient Name: Jaki Bajwa  Today's Date: 12/4/2018    Health Maintenance       Date Due Completion Date    Naloxone Prescription 01/16/2019 1/16/2018    Urine Drug Screen 04/10/2019 10/10/2018    Colonoscopy 05/21/2019 5/21/2014 (Done)    Override on 5/21/2014: Done    Mammogram 06/11/2019 6/11/2018    Lipid Panel 04/17/2023 4/17/2018    TETANUS VACCINE 04/18/2027 4/18/2017        No orders of the defined types were placed in this encounter.    The following information is provided to all patients.  This information is to help you find resources for any of the problems found today that may be affecting your health:                Living healthy guide: www.[facility].[place].gov      Understanding Diabetes: www.diabetes.org      Eating healthy: www.cdc.gov/healthyweight      CDC home safety checklist: www.cdc.gov/steadi/patient.html      Agency on Aging: www.goea.[place].gov      Alcoholics anonymous (AA): www.aa.org      Physical Activity: www.tracie.nih.gov/is7iszs      Tobacco use: www.quitwithusla.org

## 2018-12-04 NOTE — PROGRESS NOTES
"Subjective:      Jaki Bajwa is a 48 y.o. female who returns today regarding her recurrent UTIs.     Recurrent UTI workup in 2017- MAY showed no stones and cysto showed bladder sediment suggestive of incomplete bladder emptying.     Previously on prophylactic macrodantin; however when the medication was discontinued, she continued to develop UTIs. Now on post coital bactrim, as her infections appeared to be related to intercourse despite voiding before and after intercourse. Also started estrace vaginal cream 6 months ago. Denies UTIs over the last 6 months. Reports urinary urgency and "tingling" after urination and slight dysuria. She is unsure when these symptoms began. Denies frequency, hematuria, flank pain and fever/chills. Reports significant improvement since she began vaginal estrace.     The following portions of the patient's history were reviewed and updated as appropriate: allergies, current medications, past family history, past medical history, past social history, past surgical history and problem list.    Review of Systems  Constitutional: no fever or chills  ENT: no nasal congestion or sore throat  Respiratory: no cough or shortness of breath  Cardiovascular: no chest pain or palpitations  Gastrointestinal: no nausea or vomiting, tolerating diet  Genitourinary: as per HPI  Hematologic/Lymphatic: no easy bruising or lymphadenopathy  Musculoskeletal: no arthralgias or myalgias  Neurological: no seizures or tremors  Behavioral/Psych: no auditory or visual hallucinations     Objective:   Vital Signs:   Vitals:    12/04/18 0801   BP: 112/80   Pulse: 110       Physical Exam   General: alert and oriented, no acute distress  Head: normocephalic, atraumatic  Neck: supple, no lymphadenopathy, normal ROM, no masses  Respiratory: Symmetric expansion, non-labored breathing  Cardiovascular: regular rate and rhythm, nomal pulses, no peripheral edema  Abdomen: soft, non tender, non distended, no palpable " masses, no hernias, no hepatomegaly or splenomegaly  Pelvic: not examined   Lymphatic: no inguinal nodes  Skin: normal coloration and turgor, no rashes, no suspicious skin lesions noted  Neuro: alert and oriented x3, no gross deficits  Psych: normal judgment and insight, normal mood/affect and non-anxious  No CVA tenderness    Lab Review   Urinalysis demonstrates positive for nitrites, leukocytes (+), red blood cells (5-10 juan david/mL), protein (trace)  Lab Results   Component Value Date    WBC 8.34 10/10/2018    HGB 11.5 (L) 10/10/2018    HCT 38.4 10/10/2018    MCV 84 10/10/2018     10/10/2018     Lab Results   Component Value Date    CREATININE 1.0 10/10/2018    BUN 13 10/10/2018       Imaging   None    Assessment:   Recurrent UTI  Urinary frequency  Dysuria     Plan:   Jaki CHI was seen today for follow-up.    Diagnoses and all orders for this visit:    Recurrent UTI  -     POCT urine dipstick without microscope  -     sulfamethoxazole-trimethoprim 400-80mg (BACTRIM,SEPTRA) 400-80 mg per tablet; Take 1 tablet by mouth 2 (two) times daily.  -     estradiol (ESTRACE) 0.01 % (0.1 mg/gram) vaginal cream; Place 1 g vaginally twice a week.    Urinary frequency  -     Urine culture    Dysuria    Plan:  --Continue post coital bactrim   --Continue estrace  --Urine culture today, will notify if antibiotics are needed  --Follow up in 6 months

## 2018-12-05 NOTE — PROGRESS NOTES
"Jaki Bajwa presented for a  Medicare AWV and comprehensive Health Risk Assessment today. The following components were reviewed and updated:    · Medical history  · Family History  · Social history  · Allergies and Current Medications  · Health Risk Assessment  · Health Maintenance  · Care Team     ** See Completed Assessments for Annual Wellness Visit within the encounter summary.**       The following assessments were completed:  · Living Situation  · CAGE  · Depression Screening  · Timed Get Up and Go  · Whisper Test  · Cognitive Function Screening  · Nutrition Screening  · ADL Screening  · PAQ Screening      Vitals:    12/04/18 0911   BP: 112/80   Pulse: 99   SpO2: 97%   Weight: 109.5 kg (241 lb 6.5 oz)   Height: 5' 7" (1.702 m)     Body mass index is 37.81 kg/m².     Physical Exam   Constitutional: She is oriented to person, place, and time.   Obese   HENT:   Head: Normocephalic and atraumatic. Not macrocephalic and not microcephalic. Head is without raccoon's eyes, without Alan's sign, without abrasion, without contusion, without laceration, without right periorbital erythema and without left periorbital erythema. Hair is normal.   Right Ear: No lacerations. No drainage, swelling or tenderness. No foreign bodies. No mastoid tenderness. Tympanic membrane is not injected, not scarred, not perforated, not erythematous, not retracted and not bulging. Tympanic membrane mobility is normal. No middle ear effusion. No hemotympanum. No decreased hearing is noted.   Left Ear: No lacerations. No drainage, swelling or tenderness. No foreign bodies. No mastoid tenderness. Tympanic membrane is not injected, not scarred, not perforated, not erythematous, not retracted and not bulging. Tympanic membrane mobility is normal.  No middle ear effusion. No hemotympanum. No decreased hearing is noted.   Nose: Nose normal. No mucosal edema, rhinorrhea, nose lacerations, sinus tenderness or nasal deformity.   Mouth/Throat: " Uvula is midline.   Eyes: Conjunctivae and lids are normal. No scleral icterus.   Neck: Trachea normal. Neck supple. No spinous process tenderness and no muscular tenderness present. No neck rigidity. No edema, no erythema and normal range of motion present. No thyroid mass and no thyromegaly present.   Cardiovascular: Normal rate, regular rhythm, normal heart sounds and intact distal pulses. Exam reveals no gallop and no friction rub.   No murmur heard.  Pulmonary/Chest: Effort normal and breath sounds normal. No stridor. No respiratory distress. She has no wheezes. She has no rales. She exhibits no tenderness.   Abdominal: Soft. Bowel sounds are normal. She exhibits no distension.   Musculoskeletal: Normal range of motion.   Lymphadenopathy:        Head (right side): No submental, no submandibular, no tonsillar, no preauricular and no posterior auricular adenopathy present.        Head (left side): No submental, no submandibular, no tonsillar, no preauricular, no posterior auricular and no occipital adenopathy present.   Neurological: She is alert and oriented to person, place, and time. No cranial nerve deficit.   Skin: Skin is warm and dry.   Psychiatric: She has a normal mood and affect. Her behavior is normal. Judgment and thought content normal.   Vitals reviewed.            Diagnoses and health risks identified today and associated recommendations/orders:    1. Encounter for preventive health examination  Annual Health Risk Assessment (HRA) visit today.  Counseling and referral of health maintenance and preventative health measures performed.  Patient given annual wellness paperwork to take home.  Encouraged to return in 1 year for subsequent HRA visit.     2. Recurrent major depressive disorder, in full remission  Chronic. Stable. Continue current treatment plan. Monitored by Psych.    3. Mild episode of recurrent major depressive disorder  Chronic. Stable. Continue current treatment plan.Monitored by  Psych.    4. Mood disorder  Chronic. Stable. Continue current treatment plan. Monitored by Psych.    5. JUAN JOSE (generalized anxiety disorder)  Chronic. Stable. Continue current treatment plan. Monitored by Psych.    6. Obsessive-compulsive behavior  Chronic. Stable. Continue current treatment plan. Monitored by Psych.    7. Sjogren's syndrome with keratoconjunctivitis sicca  Chronic. Stable. Continue current treatment plan. Monitored by Hem/Onc.    8. Internet shopping addiction  Chronic. Stable. Continue current treatment plan. Monitored by Psych.    9. TMJ syndrome  Chronic. Stable. Continue current treatment plan. Monitored by ENT.    10. Essential hypertension  Chronic. Stable. Uncontrolled. Encouraged to increase exercise as tolerated (moderate-intensity aerobic activity and muscle-strengthening activities) improve diet to heart healthy, low sodium diet. Continue current treatment plan. Monitored by PCP.    11. Incomplete bladder emptying  Chronic. Stable. Continue current treatment plan. Monitored by Urology.    12. Recurrent UTI (urinary tract infection)  Chronic. Stable. Continue current treatment plan. Monitored by Urology.    13. Uterine leiomyoma, unspecified location  Chronic. Stable. Continue current treatment plan. Monitored by Ob/Gyn.    14. Vaginal dryness  Chronic. Stable. Continue current treatment plan. Monitored by Ob/Gyn.    15. Anemia, unspecified type  Chronic. Stable. Continue current treatment plan. Monitored by Hem/onc.    16. Morbid obesity  Chronic. Stable. Continue current treatment plan. Monitored by PCP.    17. Fibromyalgia  Chronic. Stable. Continue current treatment plan. Monitored by Pain Medicine.    18. Chronic bilateral low back pain without sciatica  Chronic. Stable. Continue current treatment plan. Monitored by Pain Medicine.    19. Insomnia, unspecified type  Chronic. Stable. Continue current treatment plan. Monitored by PCP.    20. Leg cramps, sleep related  Chronic. Stable.  Continue current treatment plan. Monitored by PCP.    21. Chronic pain syndrome  Chronic. Stable. Continue current treatment plan. Monitored by Pain Medicine.        Provided Jaki [facility] with a 5-10 year written screening schedule and personal prevention plan. Recommendations were developed using the USPSTF age appropriate recommendations. Education, counseling, and referrals were provided as needed. After Visit Summary printed and given to patient which includes a list of additional screenings\tests needed.    Follow-up in about 1 year (around 12/4/2019).    Renato Duran NP

## 2018-12-06 ENCOUNTER — TELEPHONE (OUTPATIENT)
Dept: UROLOGY | Facility: CLINIC | Age: 48
End: 2018-12-06

## 2018-12-06 DIAGNOSIS — N30.00 ACUTE CYSTITIS WITHOUT HEMATURIA: Primary | ICD-10-CM

## 2018-12-06 LAB — BACTERIA UR CULT: NORMAL

## 2018-12-06 RX ORDER — NITROFURANTOIN 25; 75 MG/1; MG/1
100 CAPSULE ORAL 2 TIMES DAILY
Qty: 14 CAPSULE | Refills: 0 | Status: SHIPPED | OUTPATIENT
Start: 2018-12-06 | End: 2018-12-13

## 2018-12-08 DIAGNOSIS — F40.10 SOCIAL PHOBIA: ICD-10-CM

## 2018-12-08 DIAGNOSIS — R46.81 OBSESSIVE-COMPULSIVE BEHAVIOR: ICD-10-CM

## 2018-12-08 DIAGNOSIS — F41.1 GAD (GENERALIZED ANXIETY DISORDER): ICD-10-CM

## 2018-12-08 DIAGNOSIS — F33.42 RECURRENT MAJOR DEPRESSIVE DISORDER, IN FULL REMISSION: ICD-10-CM

## 2018-12-10 RX ORDER — DOXEPIN HYDROCHLORIDE 150 MG/1
CAPSULE ORAL
Qty: 90 CAPSULE | Refills: 1 | Status: SHIPPED | OUTPATIENT
Start: 2018-12-10 | End: 2019-02-26 | Stop reason: SDUPTHER

## 2018-12-28 ENCOUNTER — TELEPHONE (OUTPATIENT)
Dept: UROLOGY | Facility: CLINIC | Age: 48
End: 2018-12-28

## 2018-12-28 DIAGNOSIS — B37.31 VAGINAL YEAST INFECTION: Primary | ICD-10-CM

## 2018-12-28 RX ORDER — FLUCONAZOLE 150 MG/1
150 TABLET ORAL DAILY
Qty: 1 TABLET | Refills: 6 | Status: SHIPPED | OUTPATIENT
Start: 2018-12-28 | End: 2018-12-29

## 2018-12-28 NOTE — TELEPHONE ENCOUNTER
----- Message from Lor Mason sent at 12/28/2018 10:15 AM CST -----  Contact: pt            Name of Who is Calling: PENG MAYO [[id number]]      What is the request in detail: pt returning call.. Please advise      Can the clinic reply by [facility]:yes      What Number to Call Back if not in [facility]: [phone]

## 2018-12-28 NOTE — TELEPHONE ENCOUNTER
----- Message from Lor Mason sent at 12/28/2018 10:15 AM CST -----  Contact: pt            Name of Who is Calling: PENG MAYO [[id number]]      What is the request in detail: pt returning call.. Please advise      Can the clinic reply by [facility]:yes      What Number to Call Back if not in [facility]: [phone]

## 2018-12-31 ENCOUNTER — EXTERNAL CHRONIC CARE MANAGEMENT (OUTPATIENT)
Dept: PRIMARY CARE CLINIC | Facility: CLINIC | Age: 48
End: 2018-12-31
Payer: MEDICARE

## 2018-12-31 PROCEDURE — 99490 CHRNC CARE MGMT STAFF 1ST 20: CPT | Mod: PBBFAC | Performed by: INTERNAL MEDICINE

## 2018-12-31 PROCEDURE — 99490 PR CHRONIC CARE MGMT, 1ST 20 MIN: ICD-10-PCS | Mod: S$PBB,,, | Performed by: INTERNAL MEDICINE

## 2018-12-31 PROCEDURE — 99490 CHRNC CARE MGMT STAFF 1ST 20: CPT | Mod: S$PBB,,, | Performed by: INTERNAL MEDICINE

## 2019-01-09 ENCOUNTER — TELEPHONE (OUTPATIENT)
Dept: INTERNAL MEDICINE | Facility: CLINIC | Age: 49
End: 2019-01-09

## 2019-01-09 RX ORDER — NAPROXEN 500 MG/1
TABLET ORAL
Qty: 180 TABLET | Refills: 0 | Status: SHIPPED | OUTPATIENT
Start: 2019-01-09 | End: 2019-10-17 | Stop reason: SDUPTHER

## 2019-01-09 RX ORDER — LOSARTAN POTASSIUM AND HYDROCHLOROTHIAZIDE 12.5; 1 MG/1; MG/1
1 TABLET ORAL DAILY
Qty: 90 TABLET | Refills: 3 | Status: SHIPPED | OUTPATIENT
Start: 2019-01-09 | End: 2019-04-11

## 2019-01-10 ENCOUNTER — HOSPITAL ENCOUNTER (OUTPATIENT)
Dept: RADIOLOGY | Facility: OTHER | Age: 49
Discharge: HOME | End: 2019-01-10
Attending: INTERNAL MEDICINE
Payer: MEDICARE

## 2019-01-10 ENCOUNTER — OFFICE VISIT (OUTPATIENT)
Dept: INTERNAL MEDICINE | Facility: CLINIC | Age: 49
End: 2019-01-10
Payer: MEDICARE

## 2019-01-10 VITALS
BODY MASS INDEX: 38.3 KG/M2 | HEART RATE: 99 BPM | HEIGHT: 67 IN | SYSTOLIC BLOOD PRESSURE: 130 MMHG | WEIGHT: 244.06 LBS | DIASTOLIC BLOOD PRESSURE: 80 MMHG

## 2019-01-10 DIAGNOSIS — Z13.6 ENCOUNTER FOR LIPID SCREENING FOR CARDIOVASCULAR DISEASE: ICD-10-CM

## 2019-01-10 DIAGNOSIS — Z13.220 ENCOUNTER FOR LIPID SCREENING FOR CARDIOVASCULAR DISEASE: ICD-10-CM

## 2019-01-10 DIAGNOSIS — G89.4 CHRONIC PAIN SYNDROME: Primary | ICD-10-CM

## 2019-01-10 DIAGNOSIS — M79.7 FIBROMYALGIA: ICD-10-CM

## 2019-01-10 DIAGNOSIS — I10 ESSENTIAL HYPERTENSION: Chronic | ICD-10-CM

## 2019-01-10 PROCEDURE — 77065 DX MAMMO INCL CAD UNI: CPT | Mod: 26,,, | Performed by: RADIOLOGY

## 2019-01-10 PROCEDURE — 99213 OFFICE O/P EST LOW 20 MIN: CPT | Mod: PBBFAC | Performed by: INTERNAL MEDICINE

## 2019-01-10 PROCEDURE — 77061 MAMMO DIGITAL DIAGNOSTIC RIGHT WITH TOMOSYNTHESIS_CAD: ICD-10-PCS | Mod: 26,,, | Performed by: RADIOLOGY

## 2019-01-10 PROCEDURE — 77065 DX MAMMO INCL CAD UNI: CPT | Mod: TC

## 2019-01-10 PROCEDURE — 77061 BREAST TOMOSYNTHESIS UNI: CPT | Mod: 26,,, | Performed by: RADIOLOGY

## 2019-01-10 PROCEDURE — 99999 PR PBB SHADOW E&M-EST. PATIENT-LVL III: ICD-10-PCS | Mod: PBBFAC,,, | Performed by: INTERNAL MEDICINE

## 2019-01-10 PROCEDURE — 99213 PR OFFICE/OUTPT VISIT, EST, LEVL III, 20-29 MIN: ICD-10-PCS | Mod: S$PBB,,, | Performed by: INTERNAL MEDICINE

## 2019-01-10 PROCEDURE — 99213 OFFICE O/P EST LOW 20 MIN: CPT | Mod: S$PBB,,, | Performed by: INTERNAL MEDICINE

## 2019-01-10 PROCEDURE — 77065 MAMMO DIGITAL DIAGNOSTIC RIGHT WITH TOMOSYNTHESIS_CAD: ICD-10-PCS | Mod: 26,,, | Performed by: RADIOLOGY

## 2019-01-10 PROCEDURE — 99999 PR PBB SHADOW E&M-EST. PATIENT-LVL III: CPT | Mod: PBBFAC,,, | Performed by: INTERNAL MEDICINE

## 2019-01-10 RX ORDER — HYDROCODONE BITARTRATE AND ACETAMINOPHEN 5; 325 MG/1; MG/1
1 TABLET ORAL 2 TIMES DAILY PRN
Qty: 40 TABLET | Refills: 0 | Status: SHIPPED | OUTPATIENT
Start: 2019-02-09 | End: 2019-04-04

## 2019-01-10 RX ORDER — HYDROCODONE BITARTRATE AND ACETAMINOPHEN 5; 325 MG/1; MG/1
1 TABLET ORAL 2 TIMES DAILY PRN
Qty: 40 TABLET | Refills: 0 | Status: SHIPPED | OUTPATIENT
Start: 2019-03-11 | End: 2019-04-04

## 2019-01-10 RX ORDER — HYDROCODONE BITARTRATE AND ACETAMINOPHEN 5; 325 MG/1; MG/1
1 TABLET ORAL 2 TIMES DAILY PRN
Qty: 40 TABLET | Refills: 0 | Status: SHIPPED | OUTPATIENT
Start: 2019-01-10 | End: 2019-04-11 | Stop reason: SDUPTHER

## 2019-01-10 NOTE — TELEPHONE ENCOUNTER
Inform pt that additional breast imaging was normal and a return to normal annual mammogram this summer is recommended.

## 2019-01-10 NOTE — PROGRESS NOTES
Subjective:       Patient ID: Jaki Bajwa is a 48 y.o. female.    Chief Complaint: Leg Pain; Ankle Pain; and Knee Pain    Pt here for f/u. She has a dx of sjogrens and fibromyalgia based on blood work and chronic pain that is primarily across shoulders and in legs but also in upper and lower back. As a result she is on several meds that she says helps. She sees rheum at [facility] and has regular f/u. She has prior dx of lupus but this was not corroborated in notes from rheum. She is on plaquenil, tizanidine, gabapentin and norco. She uses norco 1-2x/day. She is due for refills on pain meds. Last visit we discussed decreasing dispense amount to 40 per month which we did and she has tolerated this well but does say pain is more bothersome on some days; however, she is willing to stay at 40 tabs per month. She has previously signed a pain contract. [place]  reviewed and is consistent. She has seen pain mgmt. She has also done PT with some success in the past.       Review of Systems   Constitutional: Negative for fever.   Respiratory: Negative for shortness of breath.    Cardiovascular: Negative for chest pain.   Gastrointestinal: Negative for abdominal pain.   Neurological: Negative for headaches.   Psychiatric/Behavioral: Negative for dysphoric mood.       Objective:      Physical Exam   Constitutional: She is oriented to person, place, and time. She appears well-developed and well-nourished.   Neck: Neck supple. No thyromegaly present.   Cardiovascular: Normal rate and regular rhythm.   Pulmonary/Chest: Effort normal and breath sounds normal.   Lymphadenopathy:     She has no cervical adenopathy.   Neurological: She is alert and oriented to person, place, and time.   Psychiatric: She has a normal mood and affect. Her behavior is normal.       Assessment:       1. Chronic pain syndrome    2. Fibromyalgia    3. Essential hypertension    4. Encounter for lipid screening for cardiovascular disease        Plan:       1.  Refill norco--3 separate rx for 40 tabs each given for 90 days total; proper use d/w pt  2. F/u 3 mos

## 2019-01-31 ENCOUNTER — CLINICAL SUPPORT (OUTPATIENT)
Dept: INTERNAL MEDICINE | Facility: CLINIC | Age: 49
End: 2019-01-31
Payer: MEDICARE

## 2019-01-31 ENCOUNTER — OFFICE VISIT (OUTPATIENT)
Dept: OBSTETRICS AND GYNECOLOGY | Facility: CLINIC | Age: 49
End: 2019-01-31
Payer: MEDICARE

## 2019-01-31 ENCOUNTER — EXTERNAL CHRONIC CARE MANAGEMENT (OUTPATIENT)
Dept: PRIMARY CARE CLINIC | Facility: CLINIC | Age: 49
End: 2019-01-31
Payer: MEDICARE

## 2019-01-31 VITALS
SYSTOLIC BLOOD PRESSURE: 108 MMHG | HEIGHT: 67 IN | DIASTOLIC BLOOD PRESSURE: 80 MMHG | BODY MASS INDEX: 39.06 KG/M2 | WEIGHT: 248.88 LBS

## 2019-01-31 VITALS — WEIGHT: 246.06 LBS | BODY MASS INDEX: 38.53 KG/M2

## 2019-01-31 DIAGNOSIS — N89.8 VAGINAL IRRITATION: Primary | ICD-10-CM

## 2019-01-31 DIAGNOSIS — N39.0 RECURRENT UTI: ICD-10-CM

## 2019-01-31 DIAGNOSIS — N89.8 VAGINAL LESION: ICD-10-CM

## 2019-01-31 LAB
CANDIDA RRNA VAG QL PROBE: POSITIVE
G VAGINALIS RRNA GENITAL QL PROBE: NEGATIVE
T VAGINALIS RRNA GENITAL QL PROBE: NEGATIVE

## 2019-01-31 PROCEDURE — 99999 PR PBB SHADOW E&M-EST. PATIENT-LVL I: CPT | Mod: PBBFAC,,,

## 2019-01-31 PROCEDURE — 99213 PR OFFICE/OUTPT VISIT, EST, LEVL III, 20-29 MIN: ICD-10-PCS | Mod: S$PBB,,, | Performed by: NURSE PRACTITIONER

## 2019-01-31 PROCEDURE — 99214 OFFICE O/P EST MOD 30 MIN: CPT | Mod: PBBFAC | Performed by: NURSE PRACTITIONER

## 2019-01-31 PROCEDURE — 87088 URINE BACTERIA CULTURE: CPT

## 2019-01-31 PROCEDURE — 87086 URINE CULTURE/COLONY COUNT: CPT

## 2019-01-31 PROCEDURE — 87077 CULTURE AEROBIC IDENTIFY: CPT

## 2019-01-31 PROCEDURE — 87186 SC STD MICRODIL/AGAR DIL: CPT

## 2019-01-31 PROCEDURE — 99999 PR PBB SHADOW E&M-EST. PATIENT-LVL IV: ICD-10-PCS | Mod: PBBFAC,,, | Performed by: NURSE PRACTITIONER

## 2019-01-31 PROCEDURE — 99490 CHRNC CARE MGMT STAFF 1ST 20: CPT | Mod: S$PBB,,, | Performed by: INTERNAL MEDICINE

## 2019-01-31 PROCEDURE — 99213 OFFICE O/P EST LOW 20 MIN: CPT | Mod: S$PBB,,, | Performed by: NURSE PRACTITIONER

## 2019-01-31 PROCEDURE — 99999 PR PBB SHADOW E&M-EST. PATIENT-LVL I: ICD-10-PCS | Mod: PBBFAC,,,

## 2019-01-31 PROCEDURE — 99211 OFF/OP EST MAY X REQ PHY/QHP: CPT | Mod: PBBFAC,27,25

## 2019-01-31 PROCEDURE — 99490 PR CHRONIC CARE MGMT, 1ST 20 MIN: ICD-10-PCS | Mod: S$PBB,,, | Performed by: INTERNAL MEDICINE

## 2019-01-31 PROCEDURE — 99999 PR PBB SHADOW E&M-EST. PATIENT-LVL IV: CPT | Mod: PBBFAC,,, | Performed by: NURSE PRACTITIONER

## 2019-01-31 PROCEDURE — 99490 CHRNC CARE MGMT STAFF 1ST 20: CPT | Mod: PBBFAC | Performed by: INTERNAL MEDICINE

## 2019-01-31 PROCEDURE — 87480 CANDIDA DNA DIR PROBE: CPT

## 2019-01-31 NOTE — PROGRESS NOTES
CC: vaginal burning, tingling    HPI: Pt is a 48 y.o.  female who presents for c/o vaginal burning and tingling for the past several days.  Denies any abnormal vaginal discharge or odor.  No new sexual partner.  Pt with h/o recurrent UTIs.  Desires DONNA for UTI.        ROS:  GENERAL: Feeling well overall. Denies fever or chills.   SKIN: Denies rash or lesions.   HEAD: Denies head injury or headache.   NODES: Denies enlarged lymph nodes.   CHEST: Denies chest pain or shortness of breath.   CARDIOVASCULAR: Denies palpitations or left sided chest pain.   ABDOMEN: No abdominal pain, constipation, diarrhea, nausea, vomiting or rectal bleeding.   URINARY: No dysuria, hematuria, or burning on urination.  REPRODUCTIVE: See HPI.   BREASTS: Denies pain, lumps, or nipple discharge.   HEMATOLOGIC: No easy bruisability or excessive bleeding.   MUSCULOSKELETAL: Denies joint pain or swelling.   NEUROLOGIC: Denies syncope or weakness.   PSYCHIATRIC: Denies depression, anxiety or mood swings.    PE:   APPEARANCE: Well nourished, well developed, Black or  female in no acute distress.  VULVA: No lesions. Normal external female genitalia.  URETHRAL MEATUS: Normal size and location, no lesions, no prolapse.  URETHRA: No masses, tenderness, or prolapse.  VAGINA: Moist. + lesions to right labia majora.  + watery discharge present. No odor present.   CERVIX: surgically absent   UTERUS: surgically absent   ADNEXA: No tenderness. No fullness or masses palpated in the adnexal regions.   ANUS PERINEUM: Normal.      Diagnosis:  1. Vaginal irritation    2. Vaginal lesion    3. Recurrent UTI        Plan:   HSV culture  Affirm   Urine culture    Orders Placed This Encounter    Urine culture    Vaginosis Screen by DNA Probe    HSV by Rapid PCR, Non-Blood [facility]; Vagina         Follow-up PRN no resolution of symptoms.    Kristyn Carmichael, RYLIE

## 2019-01-31 NOTE — PROGRESS NOTES
Patient reported to clinic today for weight check.    Last office visit: 1/10/19  - Weight as of last office visit: 244.0    Current weight: 246.0    Patient adhering to diet plan: yes  Patient requesting refill of medication: no  Dr. Bartlett notified

## 2019-02-01 ENCOUNTER — TELEPHONE (OUTPATIENT)
Dept: OBSTETRICS AND GYNECOLOGY | Facility: CLINIC | Age: 49
End: 2019-02-01

## 2019-02-01 DIAGNOSIS — B37.31 VAGINAL YEAST INFECTION: Primary | ICD-10-CM

## 2019-02-01 RX ORDER — FLUCONAZOLE 200 MG/1
200 TABLET ORAL ONCE
Qty: 3 TABLET | Refills: 1 | Status: SHIPPED | OUTPATIENT
Start: 2019-02-01 | End: 2019-02-01

## 2019-02-02 LAB
HSV1 DNA SPEC QL NAA+PROBE: NEGATIVE
HSV2 DNA SPEC QL NAA+PROBE: NEGATIVE
SPECIMEN SOURCE: NORMAL

## 2019-02-04 LAB — BACTERIA UR CULT: NORMAL

## 2019-02-05 ENCOUNTER — TELEPHONE (OUTPATIENT)
Dept: OBSTETRICS AND GYNECOLOGY | Facility: CLINIC | Age: 49
End: 2019-02-05

## 2019-02-05 DIAGNOSIS — B96.20 E-COLI UTI: Primary | ICD-10-CM

## 2019-02-05 DIAGNOSIS — N39.0 E-COLI UTI: Primary | ICD-10-CM

## 2019-02-05 RX ORDER — AMOXICILLIN AND CLAVULANATE POTASSIUM 875; 125 MG/1; MG/1
1 TABLET, FILM COATED ORAL EVERY 12 HOURS
Qty: 14 TABLET | Refills: 0 | Status: SHIPPED | OUTPATIENT
Start: 2019-02-05 | End: 2019-02-12

## 2019-02-26 ENCOUNTER — OFFICE VISIT (OUTPATIENT)
Dept: PSYCHIATRY | Facility: CLINIC | Age: 49
End: 2019-02-26
Payer: MEDICARE

## 2019-02-26 ENCOUNTER — HOSPITAL ENCOUNTER (OUTPATIENT)
Dept: CARDIOLOGY | Facility: CLINIC | Age: 49
Discharge: HOME | End: 2019-02-26
Payer: MEDICARE

## 2019-02-26 VITALS
DIASTOLIC BLOOD PRESSURE: 68 MMHG | HEART RATE: 95 BPM | SYSTOLIC BLOOD PRESSURE: 116 MMHG | WEIGHT: 244.69 LBS | HEIGHT: 67 IN | BODY MASS INDEX: 38.4 KG/M2

## 2019-02-26 DIAGNOSIS — R46.81 OBSESSIVE-COMPULSIVE BEHAVIOR: ICD-10-CM

## 2019-02-26 DIAGNOSIS — M79.10 MYALGIA: ICD-10-CM

## 2019-02-26 DIAGNOSIS — F33.42 RECURRENT MAJOR DEPRESSIVE DISORDER, IN FULL REMISSION: ICD-10-CM

## 2019-02-26 DIAGNOSIS — F41.1 GAD (GENERALIZED ANXIETY DISORDER): ICD-10-CM

## 2019-02-26 DIAGNOSIS — F33.42 RECURRENT MAJOR DEPRESSIVE DISORDER, IN FULL REMISSION: Primary | ICD-10-CM

## 2019-02-26 DIAGNOSIS — F41.1 GAD (GENERALIZED ANXIETY DISORDER): Primary | ICD-10-CM

## 2019-02-26 DIAGNOSIS — F63.89 INTERNET ADDICTION: ICD-10-CM

## 2019-02-26 DIAGNOSIS — F40.10 SOCIAL PHOBIA: ICD-10-CM

## 2019-02-26 DIAGNOSIS — Z79.899 MEDICATION MANAGEMENT: ICD-10-CM

## 2019-02-26 PROCEDURE — 90834 PSYTX W PT 45 MINUTES: CPT | Mod: PBBFAC | Performed by: SOCIAL WORKER

## 2019-02-26 PROCEDURE — 99213 OFFICE O/P EST LOW 20 MIN: CPT | Mod: PBBFAC,25 | Performed by: INTERNAL MEDICINE

## 2019-02-26 PROCEDURE — 90834 PSYTX W PT 45 MINUTES: CPT | Mod: S$PBB,,, | Performed by: SOCIAL WORKER

## 2019-02-26 PROCEDURE — 99214 PR OFFICE/OUTPT VISIT, EST, LEVL IV, 30-39 MIN: ICD-10-PCS | Mod: S$PBB,,, | Performed by: INTERNAL MEDICINE

## 2019-02-26 PROCEDURE — 93010 ELECTROCARDIOGRAM REPORT: CPT | Mod: S$PBB,,, | Performed by: INTERNAL MEDICINE

## 2019-02-26 PROCEDURE — 99999 PR PBB SHADOW E&M-EST. PATIENT-LVL III: CPT | Mod: PBBFAC,,, | Performed by: INTERNAL MEDICINE

## 2019-02-26 PROCEDURE — 90834 PR PSYCHOTHERAPY W/PATIENT, 45 MIN: ICD-10-PCS | Mod: S$PBB,,, | Performed by: SOCIAL WORKER

## 2019-02-26 PROCEDURE — 99999 PR PBB SHADOW E&M-EST. PATIENT-LVL III: ICD-10-PCS | Mod: PBBFAC,,, | Performed by: INTERNAL MEDICINE

## 2019-02-26 PROCEDURE — 93010 EKG 12-LEAD: ICD-10-PCS | Mod: S$PBB,,, | Performed by: INTERNAL MEDICINE

## 2019-02-26 PROCEDURE — 99214 OFFICE O/P EST MOD 30 MIN: CPT | Mod: S$PBB,,, | Performed by: INTERNAL MEDICINE

## 2019-02-26 PROCEDURE — 93005 ELECTROCARDIOGRAM TRACING: CPT | Mod: PBBFAC | Performed by: INTERNAL MEDICINE

## 2019-02-26 RX ORDER — BUPROPION HYDROCHLORIDE 150 MG/1
450 TABLET ORAL EVERY MORNING
Qty: 270 TABLET | Refills: 3 | Status: SHIPPED | OUTPATIENT
Start: 2019-02-26 | End: 2019-11-27 | Stop reason: SDUPTHER

## 2019-02-26 RX ORDER — DIAZEPAM 5 MG/1
2.5 TABLET ORAL 2 TIMES DAILY
Qty: 30 TABLET | Refills: 3 | Status: SHIPPED | OUTPATIENT
Start: 2019-02-26 | End: 2019-05-28 | Stop reason: SDUPTHER

## 2019-02-26 RX ORDER — TIZANIDINE 4 MG/1
TABLET ORAL
Qty: 270 TABLET | Refills: 0 | Status: SHIPPED | OUTPATIENT
Start: 2019-02-26 | End: 2019-05-16 | Stop reason: SDUPTHER

## 2019-02-26 RX ORDER — DOXEPIN HYDROCHLORIDE 150 MG/1
150 CAPSULE ORAL NIGHTLY
Qty: 90 CAPSULE | Refills: 3 | Status: SHIPPED | OUTPATIENT
Start: 2019-02-26 | End: 2019-11-04 | Stop reason: SDUPTHER

## 2019-02-26 RX ORDER — LURASIDONE HYDROCHLORIDE 60 MG/1
60 TABLET, FILM COATED ORAL DAILY
Qty: 90 TABLET | Refills: 3 | Status: SHIPPED | OUTPATIENT
Start: 2019-02-26 | End: 2019-11-27 | Stop reason: SDUPTHER

## 2019-02-26 NOTE — PROGRESS NOTES
"OUTPATIENT PSYCHIATRY RETURN VISIT    ENCOUNTER DATE:  2/26/2019  SITE:  [facility]  LENGTH OF SESSION:  20 minutes    CHIEF COMPLAINT:  Anxiety    HISTORY OF PRESENTING ILLNESS:  Jaki Bajwa is a 48 y.o. female with history of MDD, JUAN JOSE, OCD, social phobia, and Internet shopping addiction who presents for follow up appointment.      Plan at last appointment on 10/24/2018:  · Continue Latuda 60mg daily for mood stabilization/anxiety.    · Continue Wellbutrin XL 450mg daily for depression.    · Continue Doxepin 150mg qHS for insomnia.    · Will continue reducing Valium dose.  Will reduce from 5mg BID to 5mg/2.5mg.  She is aware of risks associated with these medications, including the risk of respiratory depression and death.      History as told by patient:  Says it took a little while to adjust to lower Valium dose.  Says she is doing well now.  Feeling tired recently because of the grandchildren.  Keeps them every day except Thursdays.  Sometimes has the 4 year old on Thursday.  Still feels depression is well controlled with Latuda and Wellbutrin.  Anxiety not as great.  Not going anywhere or doing anything recently.  Anxiety is worse in the morning.  Goes down throughout the day.  Ok with decreasing Valium more.  Shopping obsession has not improved.  Says she did "ok" because she did have a little money left over from her check.  Does not care if she runs out of money.  Has family support.  Then lets her "old man" know if she runs out and needs something.  Relationship is good.  Denies SI, HI, AVH.    Medication side effects:  No  Medication compliance:  Yes    PSYCHIATRIC REVIEW OF SYSTEMS:  Trouble with sleep:  Improved with medication  Appetite changes:  Denies  Weight changes:  Denies  Lack of energy:  Denies  Anhedonia:  Denies  Somatic symptoms:  Denies  Libido:  Denies  Anxiety/panic: Improving  Guilty/hopeless:  Denies  Self-injurious behavior/risky behavior:  Denies  Any " drugs:  Denies  Alcohol:  Denies    MEDICAL REVIEW OF SYSTEMS:  Complete review of systems performed covering Constitutional, Musculoskeletal, Neurologic.  All systems negative except for that covered in HPI.    PAST PSYCHIATRIC, MEDICAL, AND SOCIAL HISTORY REVIEWED  The patient's past medical, family and social history have been reviewed and updated as appropriate within the electronic medical record - see encounter notes.    MEDICATIONS:    Current Outpatient Medications:     buPROPion (WELLBUTRIN XL) 150 MG TB24 tablet, Take 3 tablets (450 mg total) by mouth every morning., Disp: 270 tablet, Rfl: 3    desonide (DESOWEN) 0.05 % cream, MILTON EXT AA BID PRN, Disp: 30 g, Rfl: 3    diazePAM (VALIUM) 5 MG tablet, Take 0.5 tablets (2.5 mg total) by mouth 2 (two) times daily., Disp: 30 tablet, Rfl: 3    doxepin (SINEQUAN) 150 MG Cap, Take 1 capsule (150 mg total) by mouth every evening., Disp: 90 capsule, Rfl: 3    estradiol (ESTRACE) 0.01 % (0.1 mg/gram) vaginal cream, Place 1 g vaginally twice a week., Disp: 24 g, Rfl: 3    estradiol 0.025 mg/24 hr 0.025 mg/24 hr, Place 1 patch onto the skin every 7 days., Disp: 4 patch, Rfl: 11    gabapentin (NEURONTIN) 300 MG capsule, Take 600 mg by mouth 2 (two) times daily. , Disp: , Rfl:     HYDROcodone-acetaminophen (NORCO) 5-325 mg per tablet, Take 1 tablet by mouth 2 (two) times daily as needed for Pain., Disp: 40 tablet, Rfl: 0    HYDROcodone-acetaminophen (NORCO) 5-325 mg per tablet, Take 1 tablet by mouth 2 (two) times daily as needed for Pain., Disp: 40 tablet, Rfl: 0    [START ON 3/11/2019] HYDROcodone-acetaminophen (NORCO) 5-325 mg per tablet, Take 1 tablet by mouth 2 (two) times daily as needed for Pain., Disp: 40 tablet, Rfl: 0    hydroxychloroquine (PLAQUENIL) 200 mg tablet, Take 200 mg by mouth Twice daily., Disp: , Rfl:     liraglutide 0.6 mg/0.1 mL, 18 mg/3 mL, subq PNIJ (VICTOZA 2-KHUSHI) 0.6 mg/0.1 mL (18 mg/3 mL) PnIj, Inject 1.8 mg into the skin once  "daily., Disp: 9 mL, Rfl: 2    losartan-hydrochlorothiazide 100-12.5 mg (HYZAAR) 100-12.5 mg Tab, TAKE 1 TABLET BY MOUTH ONCE DAILY, Disp: 90 tablet, Rfl: 3    lurasidone (LATUDA) 60 mg Tab tablet, Take 1 tablet (60 mg total) by mouth once daily., Disp: 90 tablet, Rfl: 3    naproxen (NAPROSYN) 500 MG tablet, TAKE 1 TABLET(500 MG) BY MOUTH TWICE DAILY AS NEEDED FOR PAIN, Disp: 180 tablet, Rfl: 0    pen needle, diabetic (BD ULTRA-FINE SHORT PEN NEEDLE) 31 gauge x 5/16" Ndle, USE WITH VICTOZA, Disp: 100 each, Rfl: 3    RESTASIS 0.05 % ophthalmic emulsion, PLACE ONE DROP INTO BOTH EYES BID, Disp: , Rfl: 0    tiZANidine (ZANAFLEX) 4 MG tablet, TAKE 1 TABLET(4 MG) BY MOUTH EVERY 8 HOURS AS NEEDED FOR MUSCLE PAIN, Disp: 270 tablet, Rfl: 0    ALLERGIES:  Review of patient's allergies indicates:   Allergen Reactions    Aspirin Hives     PSYCHIATRIC EXAM:  Vitals:    02/26/19 0939   BP: 116/68   Pulse: 95   Weight: 111 kg (244 lb 11.4 oz)   Height: 5' 7" (1.702 m)     Appearance:  Well groomed, appearing healthy and of stated age  Behavior:  Cooperative, pleasant, no psychomotor agitation or retardation  Speech:  Normal rate, rhythm, prosody, and volume  Mood:  "Ok"  Affect:  Congruent and appropriate  Thought Process:  Linear, logical, goal directed  Thought Content:  Negative for suicidal ideation, homicidal ideation, delusions or hallucinations.  Associations:  Intact  Memory:  Grossly Intact  Level of Consciousness/Orientation:  Grossly intact  Fund of Knowledge:  Good  Attention:  Good  Language:  Fluent, able to name abstract and concrete objects  Insight:  Fair  Judgment:  Intact  Psychomotor signs:  No involuntary movements or tremor  Gait:  Normal    RELEVANT LABS/STUDIES:    Lab Results   Component Value Date    WBC 8.34 10/10/2018    HGB 11.5 (L) 10/10/2018    HCT 38.4 10/10/2018    MCV 84 10/10/2018     10/10/2018     BMP  Lab Results   Component Value Date     10/10/2018    K 4.1 10/10/2018    "  10/10/2018    CO2 30 (H) 10/10/2018    BUN 13 10/10/2018    CREATININE 1.0 10/10/2018    CALCIUM 10.2 10/10/2018    ANIONGAP 7 (L) 10/10/2018    ESTGFRAFRICA >60 10/10/2018    EGFRNONAA >60 10/10/2018     Lab Results   Component Value Date    ALT 14 10/10/2018    AST 16 10/10/2018    ALKPHOS 64 10/10/2018    BILITOT 0.4 10/10/2018     Lab Results   Component Value Date    TSH 2.261 04/18/2017     No results found for: LABA1C, HGBA1C    IMPRESSION:    Jaki Bajwa is a 48 y.o. female with history of MDD, JUAN JOSE, OCD, social phobia, and Internet shopping addiction who presents for follow up appointment.    Status/Progress:  Based on the examination today, the patient's problem(s) is/are adequately but not ideally controlled.  New problems have not been presented today.    Risk Parameters:  Patient reports no suicidal ideation  Patient reports no homicidal ideation  Patient reports no self-injurious behavior  Patient reports no violent behavior    DIAGNOSES:    ICD-10-CM ICD-9-CM   1. Recurrent major depressive disorder, in full remission F33.42 296.36   2. JUAN JOSE (generalized anxiety disorder) F41.1 300.02   3. Social phobia F40.10 300.23   4. Obsessive-compulsive behavior R46.81 300.3   5. Medication management Z79.899 V58.69     PLAN:  · Continue Latuda 60mg daily for mood stabilization/anxiety.    · Continue Wellbutrin XL 450mg daily for depression.    · Continue Doxepin 150mg qHS for insomnia.    · Will get EKG to assess for prolonged QTc since patient is on multiple high risk medications.    · Will continue reducing Valium dose.  Will reduce from 5mg/2.5mg to 2.5mg BID.  She is aware of risks associated with combining benzodiazepines and narcotics, including the risk of respiratory depression and death.    · Continue therapy with Mr. Noah LCSW.    RETURN TO CLINIC:  Follow-up in about 3 months (around 5/26/2019).

## 2019-02-27 DIAGNOSIS — I10 ESSENTIAL HYPERTENSION: Chronic | ICD-10-CM

## 2019-02-27 DIAGNOSIS — E66.01 MORBID OBESITY: ICD-10-CM

## 2019-02-27 RX ORDER — LIRAGLUTIDE 6 MG/ML
INJECTION SUBCUTANEOUS
Qty: 15 ML | Refills: 3 | Status: SHIPPED | OUTPATIENT
Start: 2019-02-27 | End: 2019-11-08 | Stop reason: SDUPTHER

## 2019-02-28 ENCOUNTER — EXTERNAL CHRONIC CARE MANAGEMENT (OUTPATIENT)
Dept: PRIMARY CARE CLINIC | Facility: CLINIC | Age: 49
End: 2019-02-28
Payer: MEDICARE

## 2019-02-28 PROCEDURE — 99490 PR CHRONIC CARE MGMT, 1ST 20 MIN: ICD-10-PCS | Mod: S$PBB,,, | Performed by: INTERNAL MEDICINE

## 2019-02-28 PROCEDURE — 99490 CHRNC CARE MGMT STAFF 1ST 20: CPT | Mod: S$PBB,,, | Performed by: INTERNAL MEDICINE

## 2019-02-28 PROCEDURE — 99490 CHRNC CARE MGMT STAFF 1ST 20: CPT | Mod: PBBFAC | Performed by: INTERNAL MEDICINE

## 2019-03-28 ENCOUNTER — PATIENT OUTREACH (OUTPATIENT)
Dept: ADMINISTRATIVE | Facility: HOSPITAL | Age: 49
End: 2019-03-28

## 2019-03-28 ENCOUNTER — TELEPHONE (OUTPATIENT)
Dept: ADMINISTRATIVE | Facility: HOSPITAL | Age: 49
End: 2019-03-28

## 2019-03-28 NOTE — TELEPHONE ENCOUNTER
CONTACTED [facility] TO OBTAIN COPY OF COLONOSCOPY REPORT    PATIENT COMPLETED COLONOSCOPY IN OCT OF 2018    FAX NUMBER PROVIDED    AWAITING REPORT.

## 2019-03-29 ENCOUNTER — TELEPHONE (OUTPATIENT)
Dept: ADMINISTRATIVE | Facility: HOSPITAL | Age: 49
End: 2019-03-29

## 2019-03-29 NOTE — TELEPHONE ENCOUNTER
COLONOSCOPY COMPLETED ON 10.25.2018 AT [facility]    REPORT SCANNED INTO PATIENT CHART CAN BE VIEWED UNDER MEDIA TAB.     Health Maintenance reviewed - AND UPDATED.

## 2019-03-31 ENCOUNTER — EXTERNAL CHRONIC CARE MANAGEMENT (OUTPATIENT)
Dept: PRIMARY CARE CLINIC | Facility: CLINIC | Age: 49
End: 2019-03-31
Payer: MEDICARE

## 2019-03-31 PROCEDURE — 99490 CHRNC CARE MGMT STAFF 1ST 20: CPT | Mod: PBBFAC | Performed by: INTERNAL MEDICINE

## 2019-03-31 PROCEDURE — 99490 CHRNC CARE MGMT STAFF 1ST 20: CPT | Mod: S$PBB,,, | Performed by: INTERNAL MEDICINE

## 2019-03-31 PROCEDURE — 99490 PR CHRONIC CARE MGMT, 1ST 20 MIN: ICD-10-PCS | Mod: S$PBB,,, | Performed by: INTERNAL MEDICINE

## 2019-04-02 ENCOUNTER — TELEPHONE (OUTPATIENT)
Dept: OBSTETRICS AND GYNECOLOGY | Facility: CLINIC | Age: 49
End: 2019-04-02

## 2019-04-02 DIAGNOSIS — N89.8 VAGINAL IRRITATION: Primary | ICD-10-CM

## 2019-04-02 RX ORDER — FLUCONAZOLE 200 MG/1
200 TABLET ORAL ONCE
Qty: 3 TABLET | Refills: 1 | Status: SHIPPED | OUTPATIENT
Start: 2019-04-02 | End: 2019-04-02

## 2019-04-02 NOTE — TELEPHONE ENCOUNTER
----- Message from Evelyn Grant MA sent at 4/2/2019  1:40 PM CDT -----  Contact: PENG MAYO [[id number]]      ----- Message -----  From: Martínez Coello  Sent: 4/2/2019   9:41 AM  To: [facility] Staff    Name of Who is Calling: PENG MAYO [[id number]]       What is the request in detail: Pt called requesting a refill on yeast infection medication. Pt didn't remember the name, but pt request a call back from nurse. Please advise. Thanks        Can the clinic reply by [facility]: No       What Number to Call Back if not in [facility]: [phone]

## 2019-04-04 ENCOUNTER — LAB VISIT (OUTPATIENT)
Dept: LAB | Facility: OTHER | Age: 49
End: 2019-04-04
Attending: INTERNAL MEDICINE
Payer: MEDICARE

## 2019-04-04 ENCOUNTER — OFFICE VISIT (OUTPATIENT)
Dept: OBSTETRICS AND GYNECOLOGY | Facility: CLINIC | Age: 49
End: 2019-04-04
Payer: MEDICARE

## 2019-04-04 VITALS
BODY MASS INDEX: 39.29 KG/M2 | SYSTOLIC BLOOD PRESSURE: 106 MMHG | DIASTOLIC BLOOD PRESSURE: 70 MMHG | WEIGHT: 250.88 LBS

## 2019-04-04 DIAGNOSIS — Z13.6 ENCOUNTER FOR LIPID SCREENING FOR CARDIOVASCULAR DISEASE: ICD-10-CM

## 2019-04-04 DIAGNOSIS — N89.8 VAGINAL IRRITATION: Primary | ICD-10-CM

## 2019-04-04 DIAGNOSIS — I10 ESSENTIAL HYPERTENSION: Chronic | ICD-10-CM

## 2019-04-04 DIAGNOSIS — Z13.220 ENCOUNTER FOR LIPID SCREENING FOR CARDIOVASCULAR DISEASE: ICD-10-CM

## 2019-04-04 DIAGNOSIS — N39.0 RECURRENT UTI: ICD-10-CM

## 2019-04-04 LAB
ALBUMIN SERPL BCP-MCNC: 3.9 G/DL (ref 3.5–5.2)
ALP SERPL-CCNC: 74 U/L (ref 55–135)
ALT SERPL W/O P-5'-P-CCNC: 16 U/L (ref 10–44)
ANION GAP SERPL CALC-SCNC: 6 MMOL/L (ref 8–16)
AST SERPL-CCNC: 19 U/L (ref 10–40)
BACTERIAL VAGINOSIS DNA: NEGATIVE
BASOPHILS # BLD AUTO: 0.03 K/UL (ref 0–0.2)
BASOPHILS NFR BLD: 0.5 % (ref 0–1.9)
BILIRUB SERPL-MCNC: 0.4 MG/DL (ref 0.1–1)
BUN SERPL-MCNC: 18 MG/DL (ref 6–20)
CALCIUM SERPL-MCNC: 9.8 MG/DL (ref 8.7–10.5)
CANDIDA GLABRATA DNA: POSITIVE
CANDIDA KRUSEI DNA: NEGATIVE
CANDIDA RRNA VAG QL PROBE: NEGATIVE
CHLORIDE SERPL-SCNC: 103 MMOL/L (ref 95–110)
CHOLEST SERPL-MCNC: 135 MG/DL (ref 120–199)
CHOLEST/HDLC SERPL: 2.5 {RATIO} (ref 2–5)
CO2 SERPL-SCNC: 31 MMOL/L (ref 23–29)
CREAT SERPL-MCNC: 1.1 MG/DL (ref 0.5–1.4)
DIFFERENTIAL METHOD: ABNORMAL
EOSINOPHIL # BLD AUTO: 0.2 K/UL (ref 0–0.5)
EOSINOPHIL NFR BLD: 2.9 % (ref 0–8)
ERYTHROCYTE [DISTWIDTH] IN BLOOD BY AUTOMATED COUNT: 14.4 % (ref 11.5–14.5)
EST. GFR  (AFRICAN AMERICAN): >60 ML/MIN/1.73 M^2
EST. GFR  (NON AFRICAN AMERICAN): 59 ML/MIN/1.73 M^2
GLUCOSE SERPL-MCNC: 86 MG/DL (ref 70–110)
HCT VFR BLD AUTO: 36.5 % (ref 37–48.5)
HDLC SERPL-MCNC: 54 MG/DL (ref 40–75)
HDLC SERPL: 40 % (ref 20–50)
HGB BLD-MCNC: 10.9 G/DL (ref 12–16)
LDLC SERPL CALC-MCNC: 67.2 MG/DL (ref 63–159)
LYMPHOCYTES # BLD AUTO: 2.3 K/UL (ref 1–4.8)
LYMPHOCYTES NFR BLD: 41.2 % (ref 18–48)
MCH RBC QN AUTO: 23.9 PG (ref 27–31)
MCHC RBC AUTO-ENTMCNC: 29.9 G/DL (ref 32–36)
MCV RBC AUTO: 80 FL (ref 82–98)
MONOCYTES # BLD AUTO: 0.3 K/UL (ref 0.3–1)
MONOCYTES NFR BLD: 5.8 % (ref 4–15)
NEUTROPHILS # BLD AUTO: 2.8 K/UL (ref 1.8–7.7)
NEUTROPHILS NFR BLD: 49.4 % (ref 38–73)
NONHDLC SERPL-MCNC: 81 MG/DL
PLATELET # BLD AUTO: 285 K/UL (ref 150–350)
PMV BLD AUTO: 12.3 FL (ref 9.2–12.9)
POTASSIUM SERPL-SCNC: 3.8 MMOL/L (ref 3.5–5.1)
PROT SERPL-MCNC: 7.7 G/DL (ref 6–8.4)
RBC # BLD AUTO: 4.57 M/UL (ref 4–5.4)
SODIUM SERPL-SCNC: 140 MMOL/L (ref 136–145)
T VAGINALIS RRNA GENITAL QL PROBE: NEGATIVE
TRIGL SERPL-MCNC: 69 MG/DL (ref 30–150)
WBC # BLD AUTO: 5.56 K/UL (ref 3.9–12.7)

## 2019-04-04 PROCEDURE — 80061 LIPID PANEL: CPT

## 2019-04-04 PROCEDURE — 36415 COLL VENOUS BLD VENIPUNCTURE: CPT

## 2019-04-04 PROCEDURE — 99999 PR PBB SHADOW E&M-EST. PATIENT-LVL III: ICD-10-PCS | Mod: PBBFAC,,, | Performed by: NURSE PRACTITIONER

## 2019-04-04 PROCEDURE — 99213 PR OFFICE/OUTPT VISIT, EST, LEVL III, 20-29 MIN: ICD-10-PCS | Mod: S$PBB,,, | Performed by: NURSE PRACTITIONER

## 2019-04-04 PROCEDURE — 87510 GARDNER VAG DNA DIR PROBE: CPT

## 2019-04-04 PROCEDURE — 85025 COMPLETE CBC W/AUTO DIFF WBC: CPT

## 2019-04-04 PROCEDURE — 99213 OFFICE O/P EST LOW 20 MIN: CPT | Mod: S$PBB,,, | Performed by: NURSE PRACTITIONER

## 2019-04-04 PROCEDURE — 87480 CANDIDA DNA DIR PROBE: CPT

## 2019-04-04 PROCEDURE — 80053 COMPREHEN METABOLIC PANEL: CPT

## 2019-04-04 PROCEDURE — 99999 PR PBB SHADOW E&M-EST. PATIENT-LVL III: CPT | Mod: PBBFAC,,, | Performed by: NURSE PRACTITIONER

## 2019-04-04 PROCEDURE — 99213 OFFICE O/P EST LOW 20 MIN: CPT | Mod: PBBFAC | Performed by: NURSE PRACTITIONER

## 2019-04-04 RX ORDER — NITROFURANTOIN 25; 75 MG/1; MG/1
100 CAPSULE ORAL DAILY PRN
Qty: 30 CAPSULE | Refills: 1 | Status: SHIPPED | OUTPATIENT
Start: 2019-04-04 | End: 2019-04-11

## 2019-04-04 NOTE — PROGRESS NOTES
CC: Vaginal Irritation     Jaki Bajwa is a 48 y.o. female  presents with complaint of vaginal irritation and  discharge for 1 week.  She denies itching.  denies odor.  She states the discharge is clear.  Alleviating factors: Diflucan with moderate relief of symptoms. No new sexual partners.  She is using Bactrim PRN after intercourse for recurrent UTI.  Reports the Bactrim seems to be causing yeast infections.       ROS:  GENERAL: No fever, chills, fatigability or weight loss.  VULVAR: No pain, no lesions and no itching.  VAGINAL: No relaxation, no itching, + discharge, + irritation.  no abnormal bleeding and no lesions.  ABDOMEN: No abdominal pain. Denies nausea. Denies vomiting. No diarrhea. No constipation  BREAST: Denies pain. No lumps. No discharge.  URINARY: No incontinence, no nocturia, no frequency and no dysuria.  CARDIOVASCULAR: No chest pain. No shortness of breath. No leg cramps.  NEUROLOGICAL: No headaches. No vision changes.    PHYSICAL EXAM:  VULVA: normal appearing vulva with no masses, tenderness or lesions   VAGINA: normal appearing vagina with normal color and discharge, no lesions   CERVIX: surgically absent   UTERUS: surgically absent   ADNEXA: nontender and no masses    ASSESSMENT and PLAN:    ICD-10-CM ICD-9-CM    1. Vaginal irritation N89.8 623.9 Vaginosis Screen by DNA Probe   2. Recurrent UTI N39.0 599.0 nitrofurantoin, macrocrystal-monohydrate, (MACROBID) 100 MG capsule     Affirm   Continue Diflucan as prescribed  DC Bactrim and changed to Macrobid PRN after intercourse    Patient was counseled today on vaginitis prevention including :  a. avoiding feminine products such as deoderant soaps, body wash, bubble bath, douches, scented toilet paper, deoderant tampons or pads, feminine wipes, chronic pad use, etc.  b. avoiding other vulvovaginal irritants such as long hot baths, humidity, tight, synthetic clothing, chlorine and sitting around in wet bathing suits  c. wearing  cotton underwear, avoiding thong underwear and no underwear to bed  d. taking showers instead of baths and use a hair dryer on cool setting afterwards to dry  e. wearing cotton to exercise and shower immediately after exercise and change clothes  f. using polyurethane condoms without spermicide if sexually active and symptoms are triggered by intercourse    FOLLOW UP: PRN lack of improvement.    Kristyn Carmichael, FNP-C

## 2019-04-05 ENCOUNTER — TELEPHONE (OUTPATIENT)
Dept: OBSTETRICS AND GYNECOLOGY | Facility: CLINIC | Age: 49
End: 2019-04-05

## 2019-04-05 DIAGNOSIS — B37.31 VAGINAL YEAST INFECTION: Primary | ICD-10-CM

## 2019-04-05 RX ORDER — TERCONAZOLE 4 MG/G
1 CREAM VAGINAL DAILY
Qty: 45 G | Refills: 0 | Status: SHIPPED | OUTPATIENT
Start: 2019-04-05 | End: 2020-05-29

## 2019-04-11 ENCOUNTER — TELEPHONE (OUTPATIENT)
Dept: INTERNAL MEDICINE | Facility: CLINIC | Age: 49
End: 2019-04-11

## 2019-04-11 ENCOUNTER — LAB VISIT (OUTPATIENT)
Dept: LAB | Facility: OTHER | Age: 49
End: 2019-04-11
Attending: INTERNAL MEDICINE
Payer: MEDICARE

## 2019-04-11 ENCOUNTER — OFFICE VISIT (OUTPATIENT)
Dept: INTERNAL MEDICINE | Facility: CLINIC | Age: 49
End: 2019-04-11
Payer: MEDICARE

## 2019-04-11 VITALS
BODY MASS INDEX: 39.83 KG/M2 | WEIGHT: 253.75 LBS | HEART RATE: 103 BPM | HEIGHT: 67 IN | DIASTOLIC BLOOD PRESSURE: 80 MMHG | SYSTOLIC BLOOD PRESSURE: 128 MMHG

## 2019-04-11 DIAGNOSIS — M35.01 SJOGREN'S SYNDROME WITH KERATOCONJUNCTIVITIS SICCA: ICD-10-CM

## 2019-04-11 DIAGNOSIS — E66.01 MORBID OBESITY: ICD-10-CM

## 2019-04-11 DIAGNOSIS — F33.0 MILD EPISODE OF RECURRENT MAJOR DEPRESSIVE DISORDER: Chronic | ICD-10-CM

## 2019-04-11 DIAGNOSIS — N39.0 RECURRENT UTI (URINARY TRACT INFECTION): ICD-10-CM

## 2019-04-11 DIAGNOSIS — R46.81 OBSESSIVE-COMPULSIVE BEHAVIOR: ICD-10-CM

## 2019-04-11 DIAGNOSIS — I10 ESSENTIAL HYPERTENSION: Primary | Chronic | ICD-10-CM

## 2019-04-11 DIAGNOSIS — D50.9 MICROCYTIC ANEMIA: ICD-10-CM

## 2019-04-11 DIAGNOSIS — F41.1 GAD (GENERALIZED ANXIETY DISORDER): ICD-10-CM

## 2019-04-11 DIAGNOSIS — N62 MACROMASTIA: ICD-10-CM

## 2019-04-11 DIAGNOSIS — D50.9 IRON DEFICIENCY ANEMIA, UNSPECIFIED IRON DEFICIENCY ANEMIA TYPE: Primary | ICD-10-CM

## 2019-04-11 DIAGNOSIS — M79.7 FIBROMYALGIA: ICD-10-CM

## 2019-04-11 LAB
FERRITIN SERPL-MCNC: 16 NG/ML (ref 20–300)
IRON SERPL-MCNC: 32 UG/DL (ref 30–160)
SATURATED IRON: 8 % (ref 20–50)
TOTAL IRON BINDING CAPACITY: 414 UG/DL (ref 250–450)
TRANSFERRIN SERPL-MCNC: 280 MG/DL (ref 200–375)

## 2019-04-11 PROCEDURE — 99214 OFFICE O/P EST MOD 30 MIN: CPT | Mod: S$PBB,,, | Performed by: INTERNAL MEDICINE

## 2019-04-11 PROCEDURE — 83540 ASSAY OF IRON: CPT

## 2019-04-11 PROCEDURE — 36415 COLL VENOUS BLD VENIPUNCTURE: CPT

## 2019-04-11 PROCEDURE — 99214 PR OFFICE/OUTPT VISIT, EST, LEVL IV, 30-39 MIN: ICD-10-PCS | Mod: S$PBB,,, | Performed by: INTERNAL MEDICINE

## 2019-04-11 PROCEDURE — 82728 ASSAY OF FERRITIN: CPT

## 2019-04-11 PROCEDURE — 99999 PR PBB SHADOW E&M-EST. PATIENT-LVL IV: ICD-10-PCS | Mod: PBBFAC,,, | Performed by: INTERNAL MEDICINE

## 2019-04-11 PROCEDURE — 99999 PR PBB SHADOW E&M-EST. PATIENT-LVL IV: CPT | Mod: PBBFAC,,, | Performed by: INTERNAL MEDICINE

## 2019-04-11 PROCEDURE — 99214 OFFICE O/P EST MOD 30 MIN: CPT | Mod: PBBFAC | Performed by: INTERNAL MEDICINE

## 2019-04-11 RX ORDER — HYDROCODONE BITARTRATE AND ACETAMINOPHEN 5; 325 MG/1; MG/1
1 TABLET ORAL 2 TIMES DAILY PRN
Qty: 35 TABLET | Refills: 0 | Status: SHIPPED | OUTPATIENT
Start: 2019-05-11 | End: 2019-07-18

## 2019-04-11 RX ORDER — HYDROCODONE BITARTRATE AND ACETAMINOPHEN 5; 325 MG/1; MG/1
1 TABLET ORAL 2 TIMES DAILY PRN
Qty: 35 TABLET | Refills: 0 | Status: SHIPPED | OUTPATIENT
Start: 2019-06-10 | End: 2019-07-18

## 2019-04-11 RX ORDER — IRBESARTAN AND HYDROCHLOROTHIAZIDE 150; 12.5 MG/1; MG/1
1 TABLET, FILM COATED ORAL DAILY
Qty: 90 TABLET | Refills: 3 | Status: SHIPPED | OUTPATIENT
Start: 2019-04-11 | End: 2020-04-06 | Stop reason: SDUPTHER

## 2019-04-11 RX ORDER — HYDROCODONE BITARTRATE AND ACETAMINOPHEN 5; 325 MG/1; MG/1
1 TABLET ORAL 2 TIMES DAILY PRN
Qty: 35 TABLET | Refills: 0 | Status: SHIPPED | OUTPATIENT
Start: 2019-04-11 | End: 2019-07-18

## 2019-04-11 RX ORDER — FLUCONAZOLE 200 MG/1
TABLET ORAL
Refills: 1 | COMMUNITY
Start: 2019-04-03 | End: 2019-04-11

## 2019-04-11 NOTE — TELEPHONE ENCOUNTER
Inform pt that iron levels are low. I'd like her to see GI and also do stool cards. Please arrange.

## 2019-04-11 NOTE — PROGRESS NOTES
Subjective:       Patient ID: Jaki Bajwa is a 48 y.o. female.    Chief Complaint: Hypertension    Pt here for f/u. She has a dx of sjogrens and fibromyalgia based on blood work and chronic pain that is primarily across shoulders and in legs but also in upper and lower back. As a result she is on several meds that she says helps. She sees rheum at [facility] and has regular f/u. She has prior dx of lupus but this was not corroborated in notes from rheum. She is on plaquenil, tizanidine, gabapentin and norco. She uses norco 1-2x/day. She is due for refills on pain meds. In previous visits we discussed decreasing dispense amount to 40 per month which we did and she has tolerated this well. We discussed decreasing further as she uses norco only once daily most of the time; she is willing to decrease to 35 tabs per month. She has previously signed a pain contract. LA  reviewed and is consistent. She has seen pain mgmt. She has also done PT with some success in the past.     She also has dx of sjogren's syndrome. She was on pilocarpine but stopped b/c it made her nauseous. Uses eye drops for dry eyes which has been effective and is stable.     Pt's BP is well controlled. Tolerating meds well. Pt denies cp/sob/ha/vision or neuro changes. Not checking at home.     She continues to see psychiatry for bipolar and anxiety. This is well controlled and stable. No SI/HI. They are planning on reducing and hopefully eliminating diazepam per her report although she remains on this. She understands dangers of co-administration of norco and diazepam and we again discussed importance of discussing with psychiatry re: alternatives.     She has morbid obesity but is seeing bariatric medicine. We reviewed importance of exercise. Has not found victoza effective so stopped this and will d/w her bariatric med specialist.     She has mild chronic anemia with prior negative w/u. This is stable and not having signs/symptoms of anemia/blood  loss.     She has had recurrent UTIs. Saw urology and was on prophylactic macrodantin. However, has been off this and no issues in a while.     Recent labs reviewed and shows mild microcytic anemia. She has no symptoms of such and denies any known bleeding or bloody/black stool.     She requests referral to plastic surgeon to discuss large breasts which she feels contributes to her chronic pain.     Hypertension   Pertinent negatives include no chest pain, headaches, palpitations or shortness of breath.   Fibromyalgia   Pertinent negatives include no abdominal pain, chest pain or headaches.     Review of Systems   Constitutional: Negative for unexpected weight change.   Eyes: Negative for visual disturbance.   Respiratory: Negative for shortness of breath.    Cardiovascular: Negative for chest pain, palpitations and leg swelling.   Gastrointestinal: Negative for abdominal pain.   Endocrine: Negative for polyuria.   Genitourinary: Negative for dysuria and frequency.   Neurological: Negative for headaches.   Psychiatric/Behavioral: Negative for dysphoric mood.       Objective:          Assessment:       1. Essential hypertension    2. JUAN JOSE (generalized anxiety disorder)    3. Mild episode of recurrent major depressive disorder    4. Obsessive-compulsive behavior    5. Sjogren's syndrome with keratoconjunctivitis sicca    6. Morbid obesity    7. Fibromyalgia    8. Recurrent UTI (urinary tract infection)    9. Microcytic anemia    10. Macromastia        Plan:       1. Check iron studies; if iron def confirmed, then will do stool cards and send to GI  2. Keep f/u with specialists  3. Refill norco--3 separate 30 day rxs given for 90 days total but decrease dispense # to 35 per month; proper use d/w pt  4. Plastics surgery referral              Physical Exam   Constitutional: She is oriented to person, place, and time. She appears well-developed and well-nourished.   Eyes: Pupils are equal, round, and reactive to light. EOM  are normal.   Neck: Neck supple. No thyromegaly present.   Cardiovascular: Normal rate, regular rhythm and normal heart sounds.   Pulmonary/Chest: Effort normal and breath sounds normal.   Musculoskeletal: She exhibits no edema.        Lumbar back: She exhibits normal range of motion, no tenderness and no bony tenderness.   Lymphadenopathy:     She has no cervical adenopathy.   Neurological: She is alert and oriented to person, place, and time. No cranial nerve deficit.   Psychiatric: She has a normal mood and affect. Her behavior is normal.

## 2019-04-12 NOTE — TELEPHONE ENCOUNTER
Informed pt of labs results. Pt verbalized understanding and would like stool cards mailed to home. [phone] [id number]. Informed pt that referrals to GI and Plastic surgery have been faxed to proper locations and someone with those offices will call to set up appt. Pt understood.

## 2019-04-15 ENCOUNTER — TELEPHONE (OUTPATIENT)
Dept: INTERNAL MEDICINE | Facility: CLINIC | Age: 49
End: 2019-04-15

## 2019-04-15 ENCOUNTER — TELEPHONE (OUTPATIENT)
Dept: PLASTIC SURGERY | Facility: CLINIC | Age: 49
End: 2019-04-15

## 2019-04-18 ENCOUNTER — TELEPHONE (OUTPATIENT)
Dept: INTERNAL MEDICINE | Facility: CLINIC | Age: 49
End: 2019-04-18

## 2019-04-18 NOTE — TELEPHONE ENCOUNTER
----- Message from Antonette Davis sent at 4/18/2019  8:54 AM CDT -----  Contact: pt  Name of Who is Calling: Jaki      What is the request in detail: requesting a call back in reference to knowing if she should still get the anxiety medicine filled or should she wait until she speaks with her psychiatrist? Please call to advise      Can the clinic reply by [facility]: no      What Number to Call Back if not in [facility]: [phone]

## 2019-04-30 ENCOUNTER — EXTERNAL CHRONIC CARE MANAGEMENT (OUTPATIENT)
Dept: PRIMARY CARE CLINIC | Facility: CLINIC | Age: 49
End: 2019-04-30
Payer: MEDICARE

## 2019-04-30 PROCEDURE — 99490 CHRNC CARE MGMT STAFF 1ST 20: CPT | Mod: PBBFAC | Performed by: INTERNAL MEDICINE

## 2019-04-30 PROCEDURE — 99490 CHRNC CARE MGMT STAFF 1ST 20: CPT | Mod: S$PBB,,, | Performed by: INTERNAL MEDICINE

## 2019-04-30 PROCEDURE — 99490 PR CHRONIC CARE MGMT, 1ST 20 MIN: ICD-10-PCS | Mod: S$PBB,,, | Performed by: INTERNAL MEDICINE

## 2019-05-09 ENCOUNTER — HOSPITAL ENCOUNTER (OUTPATIENT)
Dept: RADIOLOGY | Facility: OTHER | Age: 49
Discharge: HOME | End: 2019-05-09
Attending: INTERNAL MEDICINE
Payer: MEDICARE

## 2019-05-09 ENCOUNTER — OFFICE VISIT (OUTPATIENT)
Dept: INTERNAL MEDICINE | Facility: CLINIC | Age: 49
End: 2019-05-09
Payer: MEDICARE

## 2019-05-09 VITALS
WEIGHT: 244.69 LBS | DIASTOLIC BLOOD PRESSURE: 76 MMHG | BODY MASS INDEX: 38.4 KG/M2 | HEART RATE: 106 BPM | SYSTOLIC BLOOD PRESSURE: 122 MMHG | HEIGHT: 67 IN

## 2019-05-09 DIAGNOSIS — D64.9 ANEMIA, UNSPECIFIED: ICD-10-CM

## 2019-05-09 DIAGNOSIS — K21.9 GASTROESOPHAGEAL REFLUX DISEASE WITHOUT ESOPHAGITIS: Primary | ICD-10-CM

## 2019-05-09 DIAGNOSIS — K31.89 OTHER DISEASES OF STOMACH AND DUODENUM: ICD-10-CM

## 2019-05-09 DIAGNOSIS — K44.9 DIAPHRAGMATIC HERNIA WITHOUT OBSTRUCTION OR GANGRENE: ICD-10-CM

## 2019-05-09 PROCEDURE — 99999 PR PBB SHADOW E&M-EST. PATIENT-LVL III: CPT | Mod: PBBFAC,,, | Performed by: INTERNAL MEDICINE

## 2019-05-09 PROCEDURE — 99999 PR PBB SHADOW E&M-EST. PATIENT-LVL III: ICD-10-PCS | Mod: PBBFAC,,, | Performed by: INTERNAL MEDICINE

## 2019-05-09 PROCEDURE — 74241 FL UPPER GI W KUB: CPT | Mod: TC,FY

## 2019-05-09 PROCEDURE — 74241 FL UPPER GI W KUB: CPT | Mod: 26,,, | Performed by: RADIOLOGY

## 2019-05-09 PROCEDURE — 99213 OFFICE O/P EST LOW 20 MIN: CPT | Mod: S$PBB,,, | Performed by: INTERNAL MEDICINE

## 2019-05-09 PROCEDURE — 99213 PR OFFICE/OUTPT VISIT, EST, LEVL III, 20-29 MIN: ICD-10-PCS | Mod: S$PBB,,, | Performed by: INTERNAL MEDICINE

## 2019-05-09 PROCEDURE — 74241 FL UPPER GI W KUB: ICD-10-PCS | Mod: 26,,, | Performed by: RADIOLOGY

## 2019-05-09 PROCEDURE — 99213 OFFICE O/P EST LOW 20 MIN: CPT | Mod: PBBFAC,25 | Performed by: INTERNAL MEDICINE

## 2019-05-09 RX ORDER — OMEPRAZOLE 40 MG/1
40 CAPSULE, DELAYED RELEASE ORAL DAILY
Qty: 30 CAPSULE | Refills: 1 | Status: SHIPPED | OUTPATIENT
Start: 2019-05-09 | End: 2019-05-09 | Stop reason: SDUPTHER

## 2019-05-09 RX ORDER — OMEPRAZOLE 40 MG/1
CAPSULE, DELAYED RELEASE ORAL
Qty: 90 CAPSULE | Refills: 0 | Status: SHIPPED | OUTPATIENT
Start: 2019-05-09 | End: 2020-01-16 | Stop reason: SDUPTHER

## 2019-05-09 NOTE — PROGRESS NOTES
Subjective:       Patient ID: Jaki Bajwa is a 48 y.o. female.    Chief Complaint: Hernia    Pt here in f/u from GI whom she saw for iron def anemia. No deepika cause was found on recent EGD but did have hiatal hernia and patchy erythema in antrum. She is scheduled for upper GI and f/u with them soon. She was not placed on meds; biopsy is still pending. She does have some epigastric pain at times with reflux. She does not take anything for this. No dysphagia.     Review of Systems   Constitutional: Negative for unexpected weight change.   Respiratory: Negative for shortness of breath.    Cardiovascular: Negative for chest pain.   Gastrointestinal: Negative for blood in stool, constipation, diarrhea, nausea and vomiting.   Psychiatric/Behavioral: Negative for dysphoric mood.       Objective:      Physical Exam   Constitutional: She is oriented to person, place, and time. She appears well-developed and well-nourished.   Cardiovascular: Normal rate, regular rhythm and normal heart sounds.   Pulmonary/Chest: Effort normal and breath sounds normal.   Abdominal: Soft. Bowel sounds are normal. She exhibits no distension and no mass. There is no tenderness.   Neurological: She is alert and oriented to person, place, and time.   Psychiatric: She has a normal mood and affect. Her behavior is normal.       Assessment:       1. Gastroesophageal reflux disease without esophagitis        Plan:       1. Start prilosec 40mg daily x6-8 weeks  2. F/u GI as scheduled  3. If GI w/u is finalized and no etiology of iron def, will repeat labs; if still low then can consider referral to hematology

## 2019-05-16 DIAGNOSIS — M79.10 MYALGIA: ICD-10-CM

## 2019-05-16 RX ORDER — TIZANIDINE 4 MG/1
TABLET ORAL
Qty: 270 TABLET | Refills: 0 | Status: SHIPPED | OUTPATIENT
Start: 2019-05-16 | End: 2019-08-06 | Stop reason: SDUPTHER

## 2019-05-16 NOTE — TELEPHONE ENCOUNTER
Spoke with pt and advised this is ok.  ----- Message from Teresa Lacy sent at 5/16/2019  3:18 PM CDT -----  Contact: Pt   Name of Who is Calling: PENG MAYO [[id number]]      What is the request in detail: Patient is requesting a call from staff to weigh in on 5/23 if available. Please contact to further discuss and advise      Can the clinic reply by [facility]: No       What Number to Call Back if not in [facility]: [phone]

## 2019-05-28 ENCOUNTER — OFFICE VISIT (OUTPATIENT)
Dept: PSYCHIATRY | Facility: CLINIC | Age: 49
End: 2019-05-28
Payer: MEDICARE

## 2019-05-28 VITALS
WEIGHT: 249.44 LBS | DIASTOLIC BLOOD PRESSURE: 82 MMHG | BODY MASS INDEX: 39.15 KG/M2 | HEART RATE: 108 BPM | SYSTOLIC BLOOD PRESSURE: 129 MMHG | HEIGHT: 67 IN

## 2019-05-28 DIAGNOSIS — G47.00 INSOMNIA, UNSPECIFIED TYPE: ICD-10-CM

## 2019-05-28 DIAGNOSIS — F41.1 GAD (GENERALIZED ANXIETY DISORDER): ICD-10-CM

## 2019-05-28 DIAGNOSIS — F41.1 GAD (GENERALIZED ANXIETY DISORDER): Primary | ICD-10-CM

## 2019-05-28 DIAGNOSIS — F33.42 RECURRENT MAJOR DEPRESSIVE DISORDER, IN FULL REMISSION: Primary | ICD-10-CM

## 2019-05-28 DIAGNOSIS — F39 MOOD DISORDER: ICD-10-CM

## 2019-05-28 DIAGNOSIS — F33.41 RECURRENT MAJOR DEPRESSIVE DISORDER, IN PARTIAL REMISSION: ICD-10-CM

## 2019-05-28 DIAGNOSIS — F40.10 SOCIAL PHOBIA: ICD-10-CM

## 2019-05-28 DIAGNOSIS — R46.81 OBSESSIVE-COMPULSIVE BEHAVIOR: ICD-10-CM

## 2019-05-28 PROCEDURE — 99999 PR PBB SHADOW E&M-EST. PATIENT-LVL III: CPT | Mod: PBBFAC,,, | Performed by: INTERNAL MEDICINE

## 2019-05-28 PROCEDURE — 99214 PR OFFICE/OUTPT VISIT, EST, LEVL IV, 30-39 MIN: ICD-10-PCS | Mod: S$PBB,,, | Performed by: INTERNAL MEDICINE

## 2019-05-28 PROCEDURE — 99213 OFFICE O/P EST LOW 20 MIN: CPT | Mod: PBBFAC | Performed by: INTERNAL MEDICINE

## 2019-05-28 PROCEDURE — 90834 PSYTX W PT 45 MINUTES: CPT | Mod: ,,, | Performed by: SOCIAL WORKER

## 2019-05-28 PROCEDURE — 99214 OFFICE O/P EST MOD 30 MIN: CPT | Mod: S$PBB,,, | Performed by: INTERNAL MEDICINE

## 2019-05-28 PROCEDURE — 99999 PR PBB SHADOW E&M-EST. PATIENT-LVL III: ICD-10-PCS | Mod: PBBFAC,,, | Performed by: INTERNAL MEDICINE

## 2019-05-28 PROCEDURE — 90834 PR PSYCHOTHERAPY W/PATIENT, 45 MIN: ICD-10-PCS | Mod: ,,, | Performed by: SOCIAL WORKER

## 2019-05-28 RX ORDER — DIAZEPAM 5 MG/1
2.5 TABLET ORAL DAILY PRN
Qty: 15 TABLET | Refills: 2 | Status: SHIPPED | OUTPATIENT
Start: 2019-05-28 | End: 2019-08-27 | Stop reason: SDUPTHER

## 2019-05-28 NOTE — PROGRESS NOTES
"           Individual Psychotherapy (PhD/LCSW)    5/28/2019    Site:  [facility]         Therapeutic Intervention: Met with patient.  Outpatient - Insight oriented psychotherapy 45 min - CPT code 66674 and Outpatient - Behavior modifying psychotherapy 45 min - CPT code 38921    Chief complaint/reason for encounter: depression, anxiety and interpersonal     Interval history and content of current session:        She suspects boyfriend has been unfaithful.  He has apologized.  He is having financial problems.  Discussion over the influence of thoughts over perception of reality and influence in mood.  She smiled at times and did not appear in despair.  She has no thoughts of suicide and adds that that is not in "my vocabulary"  (my thoughts).          Treatment plan:  · Target symptoms: depression, anxiety   · Why chosen therapy is appropriate versus another modality: relevant to diagnosis  · Outcome monitoring methods: self-report, observation    · Therapeutic intervention type: insight oriented psychotherapy, behavior modifying psychotherapy, supportive psychotherapy    Risk parameters:  Patient reports no suicidal ideation  Patient reports no homicidal ideation  Patient reports no self-injurious behavior  Patient reports no violent behavior    Verbal deficits: None    Patient's response to intervention:  The patient's response to intervention is accepting.    Progress toward goals and other mental status changes:  The patient's progress toward goals is limited.    Diagnosis: 296.35;   Obsessive compulsive disorder.  personality disorder nos  Learning problems/limitations   internet shopping addiction     Plan:  individual psychotherapy    Return to clinic: 2 weeks pt prefers to come in 3 months.                                     "

## 2019-05-28 NOTE — PROGRESS NOTES
OUTPATIENT PSYCHIATRY RETURN VISIT    ENCOUNTER DATE:  5/28/2019  SITE:  [facility]  LENGTH OF SESSION:  20 minutes    CHIEF COMPLAINT:  Stress    HISTORY OF PRESENTING ILLNESS:  Jaki Bajwa is a 48 y.o. female with history of MDD, JUAN JOSE, OCD, social phobia, and Internet shopping addiction who presents for follow up appointment.      Plan at last appointment on 10/24/2018:  · Continue Latuda 60mg daily for mood stabilization/anxiety.    · Continue Wellbutrin XL 450mg daily for depression.    · Continue Doxepin 150mg qHS for insomnia.    · Will get EKG to assess for prolonged QTc since patient is on multiple high risk medications.    · Will continue reducing Valium dose.  Will reduce from 5mg/2.5mg to 2.5mg BID.  She is aware of risks associated with combining benzodiazepines and narcotics, including the risk of respiratory depression and death.    *EKG 2/26/19 with QTc 399.    History as told by patient:  Has had several stressors since last appointment.  Brother has been sick in the hospital.  He has been an alcoholic all of his life and they have not spoken in 2 years.  Saw on his daughter's Facebook that he was sick.  Went to visit him and was hard to see him in that state.  Extremely thin and not eating.  Also suspects Scott is cheating on her.  Says he has gotten several calls from women.  He denies cheating and says he was going to them for money.  She states he has been in a bad financial situation and will be losing his house.  Mood has been down in relation to these events.  Otherwise feels medications are very helpful.  Has tolerated lower dose of Valium 2.5mg BID and is not having panic attack.  She states her pharmacy does not want to fill the Valium and Norco, however she has told them we are working on decreasing dose and then stopping eventually.  Denies SI, HI, or AVH.  Reports some health problems.  Iron was low so saw GI who did EGD.  EGD showed H. Pylori which  has now been treated. Still unsure source of iron deficiency.  Denies blood in her stool.    Medication side effects:  No  Medication compliance:  Yes    PSYCHIATRIC REVIEW OF SYSTEMS:  Trouble with sleep:  Denies  Appetite changes:  Denies  Weight changes:  Denies  Lack of energy:  Denies  Anhedonia:  Denies  Somatic symptoms:  Denies  Libido:  Denies  Anxiety/panic: Improved  Guilty/hopeless:  Denies  Self-injurious behavior/risky behavior:  Denies  Any drugs:  Denies  Alcohol:  Denies    MEDICAL REVIEW OF SYSTEMS:  Complete review of systems performed covering Constitutional, Musculoskeletal, Neurologic.  All systems negative except for that covered in HPI.    PAST PSYCHIATRIC, MEDICAL, AND SOCIAL HISTORY REVIEWED  The patient's past medical, family and social history have been reviewed and updated as appropriate within the electronic medical record - see encounter notes.    MEDICATIONS:    Current Outpatient Medications:     buPROPion (WELLBUTRIN XL) 150 MG TB24 tablet, Take 3 tablets (450 mg total) by mouth every morning., Disp: 270 tablet, Rfl: 3    desonide (DESOWEN) 0.05 % cream, MILTON EXT AA BID PRN, Disp: 30 g, Rfl: 3    diazePAM (VALIUM) 5 MG tablet, Take 0.5 tablets (2.5 mg total) by mouth daily as needed for Anxiety., Disp: 15 tablet, Rfl: 2    doxepin (SINEQUAN) 150 MG Cap, Take 1 capsule (150 mg total) by mouth every evening., Disp: 90 capsule, Rfl: 3    estradiol (ESTRACE) 0.01 % (0.1 mg/gram) vaginal cream, Place 1 g vaginally twice a week., Disp: 24 g, Rfl: 3    estradiol 0.025 mg/24 hr 0.025 mg/24 hr, Place 1 patch onto the skin every 7 days., Disp: 4 patch, Rfl: 11    gabapentin (NEURONTIN) 300 MG capsule, Take 600 mg by mouth 2 (two) times daily. , Disp: , Rfl:     HYDROcodone-acetaminophen (NORCO) 5-325 mg per tablet, Take 1 tablet by mouth 2 (two) times daily as needed for Pain., Disp: 35 tablet, Rfl: 0    HYDROcodone-acetaminophen (NORCO) 5-325 mg per tablet, Take 1 tablet by mouth 2  "(two) times daily as needed for Pain., Disp: 35 tablet, Rfl: 0    [START ON 6/10/2019] HYDROcodone-acetaminophen (NORCO) 5-325 mg per tablet, Take 1 tablet by mouth 2 (two) times daily as needed for Pain., Disp: 35 tablet, Rfl: 0    hydroxychloroquine (PLAQUENIL) 200 mg tablet, Take 200 mg by mouth Twice daily., Disp: , Rfl:     irbesartan-hydrochlorothiazide (AVALIDE) 150-12.5 mg per tablet, Take 1 tablet by mouth once daily., Disp: 90 tablet, Rfl: 3    lurasidone (LATUDA) 60 mg Tab tablet, Take 1 tablet (60 mg total) by mouth once daily., Disp: 90 tablet, Rfl: 3    naproxen (NAPROSYN) 500 MG tablet, TAKE 1 TABLET(500 MG) BY MOUTH TWICE DAILY AS NEEDED FOR PAIN, Disp: 180 tablet, Rfl: 0    omeprazole (PRILOSEC) 40 MG capsule, TAKE 1 CAPSULE(40 MG) BY MOUTH EVERY DAY, Disp: 90 capsule, Rfl: 0    pen needle, diabetic (BD ULTRA-FINE SHORT PEN NEEDLE) 31 gauge x 5/16" Ndle, USE WITH VICTOZA, Disp: 100 each, Rfl: 3    RESTASIS 0.05 % ophthalmic emulsion, PLACE ONE DROP INTO BOTH EYES BID, Disp: , Rfl: 0    terconazole (TERAZOL 7) 0.4 % Crea, Place 1 applicator vaginally once daily., Disp: 45 g, Rfl: 0    tiZANidine (ZANAFLEX) 4 MG tablet, TAKE 1 TABLET(4 MG) BY MOUTH EVERY 8 HOURS AS NEEDED FOR MUSCLE PAIN, Disp: 270 tablet, Rfl: 0    VICTOZA 3-KHUSHI 0.6 mg/0.1 mL (18 mg/3 mL) PnIj, INJECT 1.8 MG UNDER THE SKIN EVERY DAY, Disp: 15 mL, Rfl: 3    ALLERGIES:  Review of patient's allergies indicates:   Allergen Reactions    Aspirin Hives     PSYCHIATRIC EXAM:  Vitals:    05/28/19 0821   BP: 129/82   Pulse: 108   Weight: 113.2 kg (249 lb 7.2 oz)   Height: 5' 7" (1.702 m)     Appearance:  Well groomed, appearing healthy and of stated age  Behavior:  Cooperative, pleasant, no psychomotor agitation or retardation  Speech:  Normal rate, rhythm, prosody, and volume  Mood:  "Ok"  Affect:  Congruent and appropriate  Thought Process:  Linear, logical, goal directed  Thought Content:  Negative for suicidal ideation, " homicidal ideation, delusions or hallucinations.  Associations:  Intact  Memory:  Grossly Intact  Level of Consciousness/Orientation:  Grossly intact  Fund of Knowledge:  Good  Attention:  Good  Language:  Fluent, able to name abstract and concrete objects  Insight:  Fair  Judgment:  Intact  Psychomotor signs:  No involuntary movements or tremor  Gait:  Normal    RELEVANT LABS/STUDIES:    Lab Results   Component Value Date    WBC 5.56 04/04/2019    HGB 10.9 (L) 04/04/2019    HCT 36.5 (L) 04/04/2019    MCV 80 (L) 04/04/2019     04/04/2019     BMP  Lab Results   Component Value Date     04/04/2019    K 3.8 04/04/2019     04/04/2019    CO2 31 (H) 04/04/2019    BUN 18 04/04/2019    CREATININE 1.1 04/04/2019    CALCIUM 9.8 04/04/2019    ANIONGAP 6 (L) 04/04/2019    ESTGFRAFRICA >60 04/04/2019    EGFRNONAA 59 (A) 04/04/2019     Lab Results   Component Value Date    ALT 16 04/04/2019    AST 19 04/04/2019    ALKPHOS 74 04/04/2019    BILITOT 0.4 04/04/2019     Lab Results   Component Value Date    TSH 2.261 04/18/2017     No results found for: LABA1C, HGBA1C    IMPRESSION:    Jaki Bajwa is a 48 y.o. female with history of MDD, JUAN JOSE, OCD, social phobia, and Internet shopping addiction who presents for follow up appointment.    Status/Progress:  Based on the examination today, the patient's problem(s) is/are adequately but not ideally controlled.  New problems have not been presented today.    Risk Parameters:  Patient reports no suicidal ideation  Patient reports no homicidal ideation  Patient reports no self-injurious behavior  Patient reports no violent behavior    DIAGNOSES:    ICD-10-CM ICD-9-CM   1. JUAN JOSE (generalized anxiety disorder) F41.1 300.02   2. Obsessive-compulsive behavior R46.81 300.3   3. Social phobia F40.10 300.23   4. Recurrent major depressive disorder, in partial remission F33.41 296.35   5. Mood disorder F39 296.90   6. Insomnia, unspecified type G47.00 780.52      PLAN:  · Continue Latuda 60mg daily for mood stabilization/anxiety.    · Continue Wellbutrin XL 450mg daily for depression.    · Continue Doxepin 150mg qHS for insomnia.    · Will reduce Valium from 2.5mg BID to 2.5mg daily PRN.  She is aware of risks associated with combining benzodiazepines and narcotics, including the risk of respiratory depression and death.    · Continue therapy with Mr. Noah LCSW.    RETURN TO CLINIC:  Follow up in about 3 months (around 8/28/2019).

## 2019-05-29 ENCOUNTER — TELEPHONE (OUTPATIENT)
Dept: INTERNAL MEDICINE | Facility: CLINIC | Age: 49
End: 2019-05-29

## 2019-05-29 NOTE — TELEPHONE ENCOUNTER
Spoke with pt and advised she is scheduled. Pt verbalized understanding.    ----- Message from Gloria Mock sent at 5/29/2019  9:12 AM CDT -----  Contact: pt  Name of Who is Calling: PENG MAYO [[id number]]       What is the request in detail: Patient is requesting a call from staff she would like to schedule an appointment with the nurse for weight check on the same day she has a scheduled appointment ( 07-  )  .....Please contact to further discuss and advise.     Can the clinic reply by [facility]: no     What Number to Call Back if not in [facility]: [phone]

## 2019-05-31 ENCOUNTER — EXTERNAL CHRONIC CARE MANAGEMENT (OUTPATIENT)
Dept: PRIMARY CARE CLINIC | Facility: CLINIC | Age: 49
End: 2019-05-31
Payer: MEDICARE

## 2019-05-31 PROCEDURE — 99490 CHRNC CARE MGMT STAFF 1ST 20: CPT | Mod: S$PBB,,, | Performed by: INTERNAL MEDICINE

## 2019-05-31 PROCEDURE — 99490 PR CHRONIC CARE MGMT, 1ST 20 MIN: ICD-10-PCS | Mod: S$PBB,,, | Performed by: INTERNAL MEDICINE

## 2019-05-31 PROCEDURE — 99490 CHRNC CARE MGMT STAFF 1ST 20: CPT | Mod: PBBFAC | Performed by: INTERNAL MEDICINE

## 2019-06-06 ENCOUNTER — PES CALL (OUTPATIENT)
Dept: ADMINISTRATIVE | Facility: CLINIC | Age: 49
End: 2019-06-06

## 2019-06-30 ENCOUNTER — EXTERNAL CHRONIC CARE MANAGEMENT (OUTPATIENT)
Dept: PRIMARY CARE CLINIC | Facility: CLINIC | Age: 49
End: 2019-06-30
Payer: MEDICARE

## 2019-06-30 PROCEDURE — 99490 CHRNC CARE MGMT STAFF 1ST 20: CPT | Mod: S$PBB,,, | Performed by: INTERNAL MEDICINE

## 2019-06-30 PROCEDURE — 99490 PR CHRONIC CARE MGMT, 1ST 20 MIN: ICD-10-PCS | Mod: S$PBB,,, | Performed by: INTERNAL MEDICINE

## 2019-06-30 PROCEDURE — 99490 CHRNC CARE MGMT STAFF 1ST 20: CPT | Mod: PBBFAC | Performed by: INTERNAL MEDICINE

## 2019-07-01 ENCOUNTER — TELEPHONE (OUTPATIENT)
Dept: INTERNAL MEDICINE | Facility: CLINIC | Age: 49
End: 2019-07-01

## 2019-07-01 DIAGNOSIS — Z12.39 SCREENING FOR BREAST CANCER: Primary | ICD-10-CM

## 2019-07-02 ENCOUNTER — TELEPHONE (OUTPATIENT)
Dept: OBSTETRICS AND GYNECOLOGY | Facility: CLINIC | Age: 49
End: 2019-07-02

## 2019-07-02 DIAGNOSIS — N39.0 RECURRENT UTI: Primary | ICD-10-CM

## 2019-07-02 RX ORDER — NITROFURANTOIN 25; 75 MG/1; MG/1
100 CAPSULE ORAL DAILY PRN
Qty: 30 CAPSULE | Refills: 0 | Status: SHIPPED | OUTPATIENT
Start: 2019-07-02 | End: 2019-07-09

## 2019-07-02 NOTE — TELEPHONE ENCOUNTER
----- Message from Evelyn Grant MA sent at 7/2/2019 11:45 AM CDT -----  Contact: SELF  Patient states you gave her this medication to take after intercourse, is this correct? Or will you like for me to leave a urine sample  ----- Message -----  From: Teofilo Erwin  Sent: 7/2/2019   8:58 AM  To: Amol [place] Staff    PT would like a refill on her RX, Nitrofurantion 100MG, to [facility] on [address] AT [address] [phone] (Phone). Please contact patient at [phone].

## 2019-07-18 ENCOUNTER — OFFICE VISIT (OUTPATIENT)
Dept: INTERNAL MEDICINE | Facility: CLINIC | Age: 49
End: 2019-07-18
Payer: MEDICARE

## 2019-07-18 ENCOUNTER — TELEPHONE (OUTPATIENT)
Dept: INTERNAL MEDICINE | Facility: CLINIC | Age: 49
End: 2019-07-18

## 2019-07-18 ENCOUNTER — HOSPITAL ENCOUNTER (OUTPATIENT)
Dept: RADIOLOGY | Facility: OTHER | Age: 49
Discharge: HOME | End: 2019-07-18
Attending: INTERNAL MEDICINE
Payer: MEDICARE

## 2019-07-18 VITALS
HEIGHT: 67 IN | SYSTOLIC BLOOD PRESSURE: 108 MMHG | DIASTOLIC BLOOD PRESSURE: 76 MMHG | HEART RATE: 103 BPM | WEIGHT: 247.81 LBS | OXYGEN SATURATION: 98 % | BODY MASS INDEX: 38.89 KG/M2

## 2019-07-18 VITALS — BODY MASS INDEX: 38.77 KG/M2 | WEIGHT: 247 LBS | HEIGHT: 67 IN

## 2019-07-18 DIAGNOSIS — G89.4 CHRONIC PAIN SYNDROME: Primary | ICD-10-CM

## 2019-07-18 DIAGNOSIS — Z12.39 SCREENING FOR BREAST CANCER: ICD-10-CM

## 2019-07-18 DIAGNOSIS — F41.1 GAD (GENERALIZED ANXIETY DISORDER): ICD-10-CM

## 2019-07-18 DIAGNOSIS — F33.41 RECURRENT MAJOR DEPRESSIVE DISORDER, IN PARTIAL REMISSION: ICD-10-CM

## 2019-07-18 DIAGNOSIS — R46.81 OBSESSIVE-COMPULSIVE BEHAVIOR: ICD-10-CM

## 2019-07-18 PROCEDURE — 77067 SCR MAMMO BI INCL CAD: CPT | Mod: TC

## 2019-07-18 PROCEDURE — 77067 MAMMO DIGITAL SCREENING BILAT WITH TOMOSYNTHESIS_CAD: ICD-10-PCS | Mod: 26,,, | Performed by: RADIOLOGY

## 2019-07-18 PROCEDURE — 99999 PR PBB SHADOW E&M-EST. PATIENT-LVL III: ICD-10-PCS | Mod: PBBFAC,,, | Performed by: INTERNAL MEDICINE

## 2019-07-18 PROCEDURE — 99214 PR OFFICE/OUTPT VISIT, EST, LEVL IV, 30-39 MIN: ICD-10-PCS | Mod: S$PBB,,, | Performed by: INTERNAL MEDICINE

## 2019-07-18 PROCEDURE — 99213 OFFICE O/P EST LOW 20 MIN: CPT | Mod: PBBFAC | Performed by: INTERNAL MEDICINE

## 2019-07-18 PROCEDURE — 99214 OFFICE O/P EST MOD 30 MIN: CPT | Mod: S$PBB,,, | Performed by: INTERNAL MEDICINE

## 2019-07-18 PROCEDURE — 77063 MAMMO DIGITAL SCREENING BILAT WITH TOMOSYNTHESIS_CAD: ICD-10-PCS | Mod: 26,,, | Performed by: RADIOLOGY

## 2019-07-18 PROCEDURE — 77063 BREAST TOMOSYNTHESIS BI: CPT | Mod: 26,,, | Performed by: RADIOLOGY

## 2019-07-18 PROCEDURE — 77067 SCR MAMMO BI INCL CAD: CPT | Mod: 26,,, | Performed by: RADIOLOGY

## 2019-07-18 PROCEDURE — 99999 PR PBB SHADOW E&M-EST. PATIENT-LVL III: CPT | Mod: PBBFAC,,, | Performed by: INTERNAL MEDICINE

## 2019-07-18 RX ORDER — HYDROCODONE BITARTRATE AND ACETAMINOPHEN 5; 325 MG/1; MG/1
1 TABLET ORAL 2 TIMES DAILY PRN
Qty: 35 TABLET | Refills: 0 | Status: SHIPPED | OUTPATIENT
Start: 2019-09-16 | End: 2019-10-17

## 2019-07-18 RX ORDER — HYDROCODONE BITARTRATE AND ACETAMINOPHEN 5; 325 MG/1; MG/1
1 TABLET ORAL 2 TIMES DAILY PRN
Qty: 35 TABLET | Refills: 0 | Status: SHIPPED | OUTPATIENT
Start: 2019-07-18 | End: 2019-09-17 | Stop reason: SDUPTHER

## 2019-07-18 RX ORDER — NALOXONE HYDROCHLORIDE 4 MG/.1ML
1 SPRAY NASAL ONCE
Qty: 2 EACH | Refills: 1 | Status: SHIPPED | OUTPATIENT
Start: 2019-07-18 | End: 2019-07-18

## 2019-07-18 RX ORDER — HYDROCODONE BITARTRATE AND ACETAMINOPHEN 5; 325 MG/1; MG/1
1 TABLET ORAL 2 TIMES DAILY PRN
Qty: 35 TABLET | Refills: 0 | Status: SHIPPED | OUTPATIENT
Start: 2019-08-17 | End: 2019-10-17

## 2019-07-18 NOTE — TELEPHONE ENCOUNTER
Call placed to patient to inform her that her Mammogram was normal. Patient had no further questions or concerns.     Екатерина Mares MA

## 2019-07-18 NOTE — PROGRESS NOTES
Subjective:       Patient ID: Jaki Bajwa is a 48 y.o. female.    Chief Complaint: Medication Refill and Depression    Pt here for f/u. She has a dx of sjogrens and fibromyalgia based on blood work and chronic pain that is primarily across shoulders and in legs but also in upper and lower back. As a result she is on several meds that she says helps. She sees rheum at [facility] and has regular f/u. She has prior dx of lupus but this was not corroborated in notes from rheum. She is on plaquenil, tizanidine, gabapentin and norco. She uses norco 1-2x/day. She is due for refills on pain meds. She has done well with decreasing dispense amount to 35 per month. She has previously signed a pain contract. [place]  reviewed and is consistent. She has seen pain mgmt. She has also done PT with some success in the past.     Review of Systems   Constitutional: Negative for fever.   Respiratory: Negative for shortness of breath.    Cardiovascular: Negative for chest pain.   Gastrointestinal: Negative for abdominal pain.   Neurological: Negative for headaches.   Psychiatric/Behavioral: Negative for dysphoric mood.       Objective:      Physical Exam   Constitutional: She is oriented to person, place, and time. She appears well-developed and well-nourished.   Neck: Neck supple. No thyromegaly present.   Cardiovascular: Normal rate and regular rhythm.   Pulmonary/Chest: Effort normal and breath sounds normal.   Lymphadenopathy:     She has no cervical adenopathy.   Neurological: She is alert and oriented to person, place, and time.   Psychiatric: She has a normal mood and affect. Her behavior is normal.       Assessment:       1. Chronic pain syndrome    2. JUAN JOSE (generalized anxiety disorder)    3. Obsessive-compulsive behavior    4. Recurrent major depressive disorder, in partial remission        Plan:       1. Refill norco--3 separate rx for 35 tabs each given for 90 days total; proper use d/w pt  2. Narcan rx given--proper use d/w  pt  3. UDS today  4. Behavioral health referral re: opiate program  5. F/u 3 mos

## 2019-07-19 ENCOUNTER — TELEPHONE (OUTPATIENT)
Dept: PRIMARY CARE CLINIC | Facility: CLINIC | Age: 49
End: 2019-07-19

## 2019-07-23 ENCOUNTER — DOCUMENTATION ONLY (OUTPATIENT)
Dept: PRIMARY CARE CLINIC | Facility: CLINIC | Age: 49
End: 2019-07-23

## 2019-07-24 NOTE — TELEPHONE ENCOUNTER
----- Message from Landy Valverde sent at 7/1/2019  9:43 AM CDT -----  Contact: Self/  [phone]  Type: Patient Call Back    Who called:  Patient    What is the request in detail:  Patient would like to schedule mammogram and would like it the same day as appt with Dr. Rasmussen she would like staff to give her a call once orders are placed.  Thank you        Would the patient rather a call back or a response via [facility]? Call back     Best call back number: [phone]      sensory intact

## 2019-07-30 ENCOUNTER — TELEPHONE (OUTPATIENT)
Dept: PRIMARY CARE CLINIC | Facility: CLINIC | Age: 49
End: 2019-07-30

## 2019-07-31 ENCOUNTER — EXTERNAL CHRONIC CARE MANAGEMENT (OUTPATIENT)
Dept: PRIMARY CARE CLINIC | Facility: CLINIC | Age: 49
End: 2019-07-31
Payer: MEDICARE

## 2019-07-31 PROCEDURE — 99490 PR CHRONIC CARE MGMT, 1ST 20 MIN: ICD-10-PCS | Mod: S$PBB,,, | Performed by: INTERNAL MEDICINE

## 2019-07-31 PROCEDURE — 99490 CHRNC CARE MGMT STAFF 1ST 20: CPT | Mod: PBBFAC | Performed by: INTERNAL MEDICINE

## 2019-07-31 PROCEDURE — 99490 CHRNC CARE MGMT STAFF 1ST 20: CPT | Mod: S$PBB,,, | Performed by: INTERNAL MEDICINE

## 2019-08-06 DIAGNOSIS — M79.10 MYALGIA: ICD-10-CM

## 2019-08-06 NOTE — TELEPHONE ENCOUNTER
----- Message from Mayra Stone sent at 8/6/2019 12:11 PM CDT -----  Contact: PENG MAYO [[id number]]  Can the clinic reply in [facility]: no    Please refill the medication(s) listed below. The patient can be reached at this phone number once it is called into the pharmacy [phone]    Medication #1: tiZANidine (ZANAFLEX) 4 MG tablet    Preferred Pharmacy: [id number] - [place] [address] [address]  [address]

## 2019-08-07 RX ORDER — TIZANIDINE 4 MG/1
TABLET ORAL
Qty: 270 TABLET | Refills: 0 | Status: SHIPPED | OUTPATIENT
Start: 2019-08-07 | End: 2019-10-28 | Stop reason: SDUPTHER

## 2019-08-08 ENCOUNTER — TELEPHONE (OUTPATIENT)
Dept: PRIMARY CARE CLINIC | Facility: CLINIC | Age: 49
End: 2019-08-08

## 2019-08-12 ENCOUNTER — TELEPHONE (OUTPATIENT)
Dept: PRIMARY CARE CLINIC | Facility: CLINIC | Age: 49
End: 2019-08-12

## 2019-08-15 ENCOUNTER — TELEPHONE (OUTPATIENT)
Dept: UROLOGY | Facility: CLINIC | Age: 49
End: 2019-08-15

## 2019-08-15 ENCOUNTER — CLINICAL SUPPORT (OUTPATIENT)
Dept: PRIMARY CARE CLINIC | Facility: CLINIC | Age: 49
End: 2019-08-15
Payer: MEDICARE

## 2019-08-15 DIAGNOSIS — M26.629 TMJ SYNDROME: ICD-10-CM

## 2019-08-15 DIAGNOSIS — G47.00 INSOMNIA, UNSPECIFIED TYPE: ICD-10-CM

## 2019-08-15 DIAGNOSIS — M79.7 FIBROMYALGIA: ICD-10-CM

## 2019-08-15 DIAGNOSIS — M54.50 CHRONIC BILATERAL LOW BACK PAIN WITHOUT SCIATICA: ICD-10-CM

## 2019-08-15 DIAGNOSIS — F63.89 INTERNET ADDICTION: ICD-10-CM

## 2019-08-15 DIAGNOSIS — R46.81 OBSESSIVE-COMPULSIVE BEHAVIOR: ICD-10-CM

## 2019-08-15 DIAGNOSIS — F33.41 RECURRENT MAJOR DEPRESSIVE DISORDER, IN PARTIAL REMISSION: ICD-10-CM

## 2019-08-15 DIAGNOSIS — G89.29 CHRONIC BILATERAL LOW BACK PAIN WITHOUT SCIATICA: ICD-10-CM

## 2019-08-15 DIAGNOSIS — G47.62 LEG CRAMPS, SLEEP RELATED: ICD-10-CM

## 2019-08-15 DIAGNOSIS — F39 MOOD DISORDER: ICD-10-CM

## 2019-08-15 DIAGNOSIS — F41.1 GAD (GENERALIZED ANXIETY DISORDER): ICD-10-CM

## 2019-08-15 DIAGNOSIS — R10.2 PELVIC PAIN IN FEMALE: ICD-10-CM

## 2019-08-15 DIAGNOSIS — G89.4 CHRONIC PAIN SYNDROME: Primary | ICD-10-CM

## 2019-08-15 PROCEDURE — 90791 PSYCH DIAGNOSTIC EVALUATION: CPT | Mod: BHI,S$GLB,, | Performed by: SOCIAL WORKER

## 2019-08-15 PROCEDURE — 99499 UNLISTED E&M SERVICE: CPT | Mod: BHI,S$GLB,, | Performed by: SOCIAL WORKER

## 2019-08-15 PROCEDURE — 99499 NO LOS: ICD-10-PCS | Mod: BHI,S$GLB,, | Performed by: SOCIAL WORKER

## 2019-08-15 PROCEDURE — 90791 PR PSYCHIATRIC DIAGNOSTIC EVALUATION: ICD-10-PCS | Mod: BHI,S$GLB,, | Performed by: SOCIAL WORKER
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
## 2019-08-15 NOTE — TELEPHONE ENCOUNTER
----- Message from Gloria Mock sent at 8/15/2019 10:50 AM CDT -----  Contact: PENG MAYO [[id number]]   Can the clinic reply in [facility]: no       Please refill the medication(s) listed below. Please call the patient when the prescription(s) is ready for  at the phone number [phone]    Medication #1: fluconazole (DIFLUCAN) 150 MG Tab    Medication #2:       Preferred Pharmacy: [id number] - [facility] [address]

## 2019-08-22 ENCOUNTER — RESEARCH ENCOUNTER (OUTPATIENT)
Dept: PRIMARY CARE CLINIC | Facility: CLINIC | Age: 49
End: 2019-08-22
Payer: MEDICAID

## 2019-08-22 ENCOUNTER — RESEARCH ENCOUNTER (OUTPATIENT)
Dept: INTERNAL MEDICINE | Facility: CLINIC | Age: 49
End: 2019-08-22

## 2019-08-22 DIAGNOSIS — F41.1 GAD (GENERALIZED ANXIETY DISORDER): ICD-10-CM

## 2019-08-22 DIAGNOSIS — F33.41 RECURRENT MAJOR DEPRESSIVE DISORDER, IN PARTIAL REMISSION: Primary | ICD-10-CM

## 2019-08-22 DIAGNOSIS — G47.00 INSOMNIA, UNSPECIFIED TYPE: ICD-10-CM

## 2019-08-22 DIAGNOSIS — F39 MOOD DISORDER: ICD-10-CM

## 2019-08-22 DIAGNOSIS — R46.81 OBSESSIVE-COMPULSIVE BEHAVIOR: ICD-10-CM

## 2019-08-22 DIAGNOSIS — F63.89 INTERNET ADDICTION: ICD-10-CM

## 2019-08-22 PROCEDURE — 99492 1ST PSYC COLLAB CARE MGMT: CPT | Mod: BHI,S$GLB,, | Performed by: INTERNAL MEDICINE

## 2019-08-22 PROCEDURE — 99492 PR PSYCH COLLAB CARE MGMT, INITIAL, 1ST 70 MIN: ICD-10-PCS | Mod: BHI,S$GLB,, | Performed by: INTERNAL MEDICINE

## 2019-08-22 NOTE — PROGRESS NOTES
"BEHAVIORAL HEALTH INTEGRATION CASE REVIEW  *This patient was not seen face to face.  History gathered by Behavioral Health Integration Treatment Team.    DATE:  8/22/2019  REFFERAL SOURCE:  Pratik Rasmussen MD    HISTORY OF PRESENTING ILLNESS:   Jaki Bajwa is a 48 y.o. female with history of MDD, JUAN JOSE, OCD, social phobia, and internet shopping addiction.      Patient does currently have a psychiatrist (myself)   Patient does  currently have a therapist (Mr. Krishnandaryaginakrista)   Patient is currently taking Latuda 60mg daily, Wellbutrin 450mg daily, Doxepin 150mg qHS, Valium 2.5mg PRN for depression and anxiety. They are interested in medication changes.  Patient is currently taking Norco 5/325mg BID PRN for chronic back and leg pain, prescribed by PCP.    Current symptoms:  Depression:  Positive/negative for depressed mood, anhedonia, fatigue, feelings of worthlessness/guilt, difficulty concentrating and impaired memory.  Anxiety:  Positive for difficulty concentrating, fatigue, irritable, racing thoughts.  Insomnia:  Positive for early morning awakening and difficulty falling asleep.  Rochelle:  Denies  Psychosis:  Denies    PHQ9 7/19/2019   Total Score 4     GAD7 7/19/2019   JUAN JOSE-7 Score 10      Current social stressors:   Financial stress, lack of support from ex-boyfriend/boyfriend, shopping addiction, wishes she were closer to children    Risk assessment:  Patient reports no suicidal ideation  Patient reports no homicidal ideation  Patient reports no self-injurious behavior  Patient reports no violent behavior    MEDICAL HISTORY:  Past Medical History:   Diagnosis Date    Anemia     Anxiety     Depression     History of psychiatric hospitalization 2000    [place] x 1 week for depression    History of ventral hernia repair     Hypertension     Lupus     patient reports that she is being treated "like I have Lupus"    Mental disorder     Morbid obesity 12/15/2017    Psychiatric problem     Sjogren's " syndrome 2013    Therapy     TMJ (temporomandibular joint disorder)     Uterine fibroids 8/20/2012       SYCHIATRIC HISTORY:  History of Rochelle or diagnosis of Bipolar Disorder in the past:  Previous diagnosis of Bipolar disorder.  History of Psychosis or diagnosis of Schizophrenia in the past:  [facility]H in her 30's.  Previous Psychiatric Hospitalizations:  No  Previous SI/HI:   No  Previous Suicide Attempts:  No  Previous Medication Trials: Yes - Geodon (bad dreams, bloating), Effexor (does not remember, thinks she likes), Cymbalta (not helpful).  Never tried:  Zoloft, Lexapro, Prozac, Paxil, Celexa.  Previous Psychiatric Outpatient Treatment:  Yes   History of Trauma:  Yes  History of Violence:  No  Access to a Gun:  Denies    SUBSTANCE ABUSE HISTORY:  Tobacco:  No  Alcohol:  No  Illicit Substances: No  Misuse of Prescription Medications:  No    Last COM-9 score:  7    NEUROLOGIC HISTORY:  Seizures:  No  Head trauma:  No  Memory loss:  Yes per patient    PSYCHIATRIC FAMILY HISTORY:  Father had schizophrenia, 2 sisters with schizophrenia, brother was an alcoholic    PROVISIONAL DIAGNOSES:  1. Recurrent major depressive disorder, in partial remission    2. JUAN JOSE (generalized anxiety disorder)    3. Mood disorder    4. Obsessive-compulsive behavior    5. Internet shopping addiction    6. Insomnia, unspecified type       PLAN:  I will continue to see this patient in Psychiatry for medication adjustments.  She will continue to see I LCSW every 4 weeks for therapy.    TOTAL TIME:  25 minutes

## 2019-08-27 ENCOUNTER — OFFICE VISIT (OUTPATIENT)
Dept: PSYCHIATRY | Facility: CLINIC | Age: 49
End: 2019-08-27
Payer: MEDICARE

## 2019-08-27 VITALS
BODY MASS INDEX: 40.35 KG/M2 | DIASTOLIC BLOOD PRESSURE: 89 MMHG | WEIGHT: 257.06 LBS | SYSTOLIC BLOOD PRESSURE: 139 MMHG | HEIGHT: 67 IN | HEART RATE: 92 BPM

## 2019-08-27 DIAGNOSIS — F41.1 GAD (GENERALIZED ANXIETY DISORDER): Primary | ICD-10-CM

## 2019-08-27 DIAGNOSIS — F39 MOOD DISORDER: ICD-10-CM

## 2019-08-27 DIAGNOSIS — R46.81 OBSESSIVE-COMPULSIVE BEHAVIOR: ICD-10-CM

## 2019-08-27 DIAGNOSIS — F33.41 RECURRENT MAJOR DEPRESSIVE DISORDER, IN PARTIAL REMISSION: ICD-10-CM

## 2019-08-27 DIAGNOSIS — G47.00 INSOMNIA, UNSPECIFIED TYPE: ICD-10-CM

## 2019-08-27 DIAGNOSIS — F40.10 SOCIAL PHOBIA: ICD-10-CM

## 2019-08-27 DIAGNOSIS — F63.89 INTERNET ADDICTION: ICD-10-CM

## 2019-08-27 PROCEDURE — 90834 PSYTX W PT 45 MINUTES: CPT | Mod: ,,, | Performed by: SOCIAL WORKER

## 2019-08-27 PROCEDURE — 99214 OFFICE O/P EST MOD 30 MIN: CPT | Mod: S$PBB,,, | Performed by: INTERNAL MEDICINE

## 2019-08-27 PROCEDURE — 99213 OFFICE O/P EST LOW 20 MIN: CPT | Mod: PBBFAC | Performed by: INTERNAL MEDICINE

## 2019-08-27 PROCEDURE — 99999 PR PBB SHADOW E&M-EST. PATIENT-LVL III: ICD-10-PCS | Mod: PBBFAC,,, | Performed by: INTERNAL MEDICINE

## 2019-08-27 PROCEDURE — 90834 PR PSYCHOTHERAPY W/PATIENT, 45 MIN: ICD-10-PCS | Mod: ,,, | Performed by: SOCIAL WORKER

## 2019-08-27 PROCEDURE — 99214 PR OFFICE/OUTPT VISIT, EST, LEVL IV, 30-39 MIN: ICD-10-PCS | Mod: S$PBB,,, | Performed by: INTERNAL MEDICINE

## 2019-08-27 PROCEDURE — 99999 PR PBB SHADOW E&M-EST. PATIENT-LVL III: CPT | Mod: PBBFAC,,, | Performed by: INTERNAL MEDICINE

## 2019-08-27 RX ORDER — DIAZEPAM 5 MG/1
2.5 TABLET ORAL 2 TIMES DAILY PRN
Qty: 30 TABLET | Refills: 2 | Status: SHIPPED | OUTPATIENT
Start: 2019-08-27 | End: 2019-11-27 | Stop reason: SDUPTHER

## 2019-08-27 NOTE — PROGRESS NOTES
OUTPATIENT PSYCHIATRY RETURN VISIT    ENCOUNTER DATE:  8/27/2019  SITE:  [facility]  LENGTH OF SESSION:  30 minutes    CHIEF COMPLAINT:  Anxiety    HISTORY OF PRESENTING ILLNESS:  Jaki Bajwa is a 48 y.o. female with history of MDD, JUAN JOSE, OCD, social phobia, and Internet shopping addiction who presents for follow up appointment.      Plan at last appointment on 5/28/2019:  · Continue Latuda 60mg daily for mood stabilization/anxiety.    · Continue Wellbutrin XL 450mg daily for depression.    · Continue Doxepin 150mg qHS for insomnia.    · Will reduce Valium from 2.5mg BID to 2.5mg daily PRN.  She is aware of risks associated with combining benzodiazepines and narcotics, including the risk of respiratory depression and death.    · Continue therapy with Mr. Noah LCSW.    History as told by patient:  Says she has been doing ok.  Feels nervous and anxious more now that she is on lower dose of Valium.  Does not feel like taking care of the baby.  It gets on her nerves but she tries not to let it affect her.  Then at the end of the day when he leaves she is missing him and wishes he were still there.  He is almost 1 year old.  Especially when she gets around people feels very anxious.  Things have improved with her boyfriend/ex-.  He just got  but his ex-wife is calling him now.  Mood down after argument with  where she imagined he was thinking she is not a strong enough woman (although he did not say that).  Feels that he wishes she worked and cooked.  They have not talked about it.  May have contributed to her worsened her depression.  Feels she should talk to him about this.  Normally feels they communicate well.  Unsure if she is actually depressed.  Feels happier than she has been in awhile - overall content.  Does not cry like she used to.  Denies SI or lily.    Medication side effects:  No  Medication compliance:  Yes    PSYCHIATRIC REVIEW OF  SYSTEMS:  Trouble with sleep:  Denies  Appetite changes:  Denies  Weight changes:  Denies  Lack of energy:  Denies  Anhedonia:  Denies  Somatic symptoms:  Denies  Libido:  Denies  Anxiety/panic: Improved  Guilty/hopeless:  Denies  Self-injurious behavior/risky behavior:  Denies  Any drugs:  Denies  Alcohol:  Denies    MEDICAL REVIEW OF SYSTEMS:  Complete review of systems performed covering Constitutional, Musculoskeletal, Neurologic.  All systems negative except for that covered in HPI.    PAST PSYCHIATRIC, MEDICAL, AND SOCIAL HISTORY REVIEWED  The patient's past medical, family and social history have been reviewed and updated as appropriate within the electronic medical record - see encounter notes.    MEDICATIONS:    Current Outpatient Medications:     buPROPion (WELLBUTRIN XL) 150 MG TB24 tablet, Take 3 tablets (450 mg total) by mouth every morning., Disp: 270 tablet, Rfl: 3    desonide (DESOWEN) 0.05 % cream, MILTON EXT AA BID PRN, Disp: 30 g, Rfl: 3    diazePAM (VALIUM) 5 MG tablet, Take 0.5 tablets (2.5 mg total) by mouth 2 (two) times daily as needed for Anxiety., Disp: 30 tablet, Rfl: 2    doxepin (SINEQUAN) 150 MG Cap, Take 1 capsule (150 mg total) by mouth every evening., Disp: 90 capsule, Rfl: 3    estradiol (ESTRACE) 0.01 % (0.1 mg/gram) vaginal cream, Place 1 g vaginally twice a week., Disp: 24 g, Rfl: 3    estradiol 0.025 mg/24 hr 0.025 mg/24 hr, Place 1 patch onto the skin every 7 days., Disp: 4 patch, Rfl: 11    gabapentin (NEURONTIN) 300 MG capsule, Take 600 mg by mouth 2 (two) times daily. , Disp: , Rfl:     HYDROcodone-acetaminophen (NORCO) 5-325 mg per tablet, Take 1 tablet by mouth 2 (two) times daily as needed for Pain., Disp: 35 tablet, Rfl: 0    HYDROcodone-acetaminophen (NORCO) 5-325 mg per tablet, Take 1 tablet by mouth 2 (two) times daily as needed for Pain., Disp: 35 tablet, Rfl: 0    [START ON 9/16/2019] HYDROcodone-acetaminophen (NORCO) 5-325 mg per tablet, Take 1 tablet by  "mouth 2 (two) times daily as needed for Pain., Disp: 35 tablet, Rfl: 0    hydroxychloroquine (PLAQUENIL) 200 mg tablet, Take 200 mg by mouth Twice daily., Disp: , Rfl:     irbesartan-hydrochlorothiazide (AVALIDE) 150-12.5 mg per tablet, Take 1 tablet by mouth once daily., Disp: 90 tablet, Rfl: 3    lurasidone (LATUDA) 60 mg Tab tablet, Take 1 tablet (60 mg total) by mouth once daily., Disp: 90 tablet, Rfl: 3    naproxen (NAPROSYN) 500 MG tablet, TAKE 1 TABLET(500 MG) BY MOUTH TWICE DAILY AS NEEDED FOR PAIN, Disp: 180 tablet, Rfl: 0    omeprazole (PRILOSEC) 40 MG capsule, TAKE 1 CAPSULE(40 MG) BY MOUTH EVERY DAY, Disp: 90 capsule, Rfl: 0    pen needle, diabetic (BD ULTRA-FINE SHORT PEN NEEDLE) 31 gauge x 5/16" Ndle, USE WITH VICTOZA, Disp: 100 each, Rfl: 3    RESTASIS 0.05 % ophthalmic emulsion, PLACE ONE DROP INTO BOTH EYES BID, Disp: , Rfl: 0    terconazole (TERAZOL 7) 0.4 % Crea, Place 1 applicator vaginally once daily., Disp: 45 g, Rfl: 0    tiZANidine (ZANAFLEX) 4 MG tablet, TAKE 1 TABLET(4 MG) BY MOUTH EVERY 8 HOURS AS NEEDED FOR MUSCLE PAIN, Disp: 270 tablet, Rfl: 0    VICTOZA 3-KHUSHI 0.6 mg/0.1 mL (18 mg/3 mL) PnIj, INJECT 1.8 MG UNDER THE SKIN EVERY DAY, Disp: 15 mL, Rfl: 3    ALLERGIES:  Review of patient's allergies indicates:   Allergen Reactions    Aspirin Hives     PSYCHIATRIC EXAM:  Vitals:    08/27/19 0950   BP: 139/89   Pulse: 92   Weight: 116.6 kg (257 lb 0.9 oz)   Height: 5' 7" (1.702 m)     Appearance:  Well groomed, appearing healthy and of stated age  Behavior:  Cooperative, pleasant, no psychomotor agitation or retardation  Speech:  Normal rate, rhythm, prosody, and volume  Mood:  "Ok"  Affect:  Congruent and appropriate  Thought Process:  Linear, logical, goal directed  Thought Content:  Negative for suicidal ideation, homicidal ideation, delusions or hallucinations.  Associations:  Intact  Memory:  Grossly Intact  Level of Consciousness/Orientation:  Grossly intact  Fund of " Knowledge:  Good  Attention:  Good  Language:  Fluent, able to name abstract and concrete objects  Insight:  Fair  Judgment:  Intact  Psychomotor signs:  No involuntary movements or tremor  Gait:  Normal    RELEVANT LABS/STUDIES:    Lab Results   Component Value Date    WBC 5.56 04/04/2019    HGB 10.9 (L) 04/04/2019    HCT 36.5 (L) 04/04/2019    MCV 80 (L) 04/04/2019     04/04/2019     BMP  Lab Results   Component Value Date     04/04/2019    K 3.8 04/04/2019     04/04/2019    CO2 31 (H) 04/04/2019    BUN 18 04/04/2019    CREATININE 1.1 04/04/2019    CALCIUM 9.8 04/04/2019    ANIONGAP 6 (L) 04/04/2019    ESTGFRAFRICA >60 04/04/2019    EGFRNONAA 59 (A) 04/04/2019     Lab Results   Component Value Date    ALT 16 04/04/2019    AST 19 04/04/2019    ALKPHOS 74 04/04/2019    BILITOT 0.4 04/04/2019     Lab Results   Component Value Date    TSH 2.261 04/18/2017     No results found for: LABA1C, HGBA1C    IMPRESSION:    Jaki Bajwa is a 48 y.o. female with history of MDD, JUAN JOSE, OCD, social phobia, and Internet shopping addiction who presents for follow up appointment.    Status/Progress:  Based on the examination today, the patient's problem(s) is/are inadequately controlled.  New problems have not been presented today.    Risk Parameters:  Patient reports no suicidal ideation  Patient reports no homicidal ideation  Patient reports no self-injurious behavior  Patient reports no violent behavior    DIAGNOSES:    ICD-10-CM ICD-9-CM   1. JUAN JOSE (generalized anxiety disorder) F41.1 300.02   2. Social phobia F40.10 300.23   3. Obsessive-compulsive behavior R46.81 300.3   4. Recurrent major depressive disorder, in partial remission F33.41 296.35   5. Mood disorder F39 296.90   6. Internet shopping addiction F63.89 301.89   7. Insomnia, unspecified type G47.00 780.52     PLAN:  · Will increase Valium back to 2.5mg BID due to worsening anxiety.  At next appointment if patient is doing better, can again try  reducing Valium to daily PRN and adding Buspar.  She is aware of risks associated with combining benzodiazepines and narcotics, including the risk of respiratory depression and death.    · Continue Latuda 60mg daily for mood stabilization/anxiety.    · Continue Wellbutrin XL 450mg daily for depression.    · Continue Doxepin 150mg qHS for insomnia and mood.    · Continue therapy with Mr. Noah LCSW.    RETURN TO CLINIC:  Follow up in about 3 months (around 11/27/2019).

## 2019-08-27 NOTE — PROGRESS NOTES
Individual Psychotherapy (PhD/LCSW)    8/27/2019    Site:  [facility]         Therapeutic Intervention: Met with patient.  Outpatient - Insight oriented psychotherapy 45 min - CPT code 78610 and Outpatient - Behavior modifying psychotherapy 45 min - CPT code 98704    Chief complaint/reason for encounter: depression, anxiety and interpersonal     Interval history and content of current session:        Reports relation with boyfriend is good.   She has no solid wish to change her lifestyle which includes shopping excessively and exchanging favors for intimacy.   She had an earlier experience with a teacher in high school for which she was able to get A in class.    I expressed how her thoughts can tell her things that are not accurate or that predisposes her to act in certain ways.     She is aware that social isolation will not decrease her social anxiety.    She has been in a few socials mostly or only while accompanied by boyfriend.          Treatment plan:  · Target symptoms: depression, anxiety   · Why chosen therapy is appropriate versus another modality: relevant to diagnosis  · Outcome monitoring methods: self-report, observation    · Therapeutic intervention type: insight oriented psychotherapy, behavior modifying psychotherapy, supportive psychotherapy    Risk parameters:  Patient reports no suicidal ideation  Patient reports no homicidal ideation  Patient reports no self-injurious behavior  Patient reports no violent behavior    Verbal deficits: None    Patient's response to intervention:  The patient's response to intervention is accepting.    Progress toward goals and other mental status changes:  The patient's progress toward goals is limited.    Diagnosis: 296.35;   Obsessive compulsive disorder.  personality disorder nos  Learning problems/limitations   internet shopping addiction   Social anxiety, generalized anxiety disorder.     Plan:  individual psychotherapy    Return to clinic:    3 months.

## 2019-08-31 ENCOUNTER — EXTERNAL CHRONIC CARE MANAGEMENT (OUTPATIENT)
Dept: PRIMARY CARE CLINIC | Facility: CLINIC | Age: 49
End: 2019-08-31
Payer: MEDICARE

## 2019-08-31 PROCEDURE — 99490 PR CHRONIC CARE MGMT, 1ST 20 MIN: ICD-10-PCS | Mod: S$PBB,,, | Performed by: INTERNAL MEDICINE

## 2019-08-31 PROCEDURE — 99490 CHRNC CARE MGMT STAFF 1ST 20: CPT | Mod: PBBFAC | Performed by: INTERNAL MEDICINE

## 2019-08-31 PROCEDURE — 99490 CHRNC CARE MGMT STAFF 1ST 20: CPT | Mod: S$PBB,,, | Performed by: INTERNAL MEDICINE

## 2019-09-03 DIAGNOSIS — B37.31 VAGINAL YEAST INFECTION: ICD-10-CM

## 2019-09-03 RX ORDER — SULFAMETHOXAZOLE AND TRIMETHOPRIM 800; 160 MG/1; MG/1
TABLET ORAL
Qty: 30 TABLET | Refills: 0 | OUTPATIENT
Start: 2019-09-03

## 2019-09-03 RX ORDER — NITROFURANTOIN 25; 75 MG/1; MG/1
100 CAPSULE ORAL
Qty: 30 CAPSULE | Refills: 12 | Status: SHIPPED | OUTPATIENT
Start: 2019-09-03 | End: 2019-10-03

## 2019-09-03 RX ORDER — FLUCONAZOLE 150 MG/1
TABLET ORAL
Qty: 1 TABLET | Refills: 0 | OUTPATIENT
Start: 2019-09-03

## 2019-09-04 ENCOUNTER — TELEPHONE (OUTPATIENT)
Dept: PRIMARY CARE CLINIC | Facility: CLINIC | Age: 49
End: 2019-09-04

## 2019-09-11 ENCOUNTER — TELEPHONE (OUTPATIENT)
Dept: PRIMARY CARE CLINIC | Facility: CLINIC | Age: 49
End: 2019-09-11

## 2019-09-12 ENCOUNTER — CLINICAL SUPPORT (OUTPATIENT)
Dept: PRIMARY CARE CLINIC | Facility: CLINIC | Age: 49
End: 2019-09-12
Payer: MEDICARE

## 2019-09-12 DIAGNOSIS — F33.41 RECURRENT MAJOR DEPRESSIVE DISORDER, IN PARTIAL REMISSION: ICD-10-CM

## 2019-09-12 DIAGNOSIS — G89.4 CHRONIC PAIN SYNDROME: Primary | ICD-10-CM

## 2019-09-12 DIAGNOSIS — F63.89 INTERNET ADDICTION: ICD-10-CM

## 2019-09-12 DIAGNOSIS — R46.81 OBSESSIVE-COMPULSIVE BEHAVIOR: ICD-10-CM

## 2019-09-12 DIAGNOSIS — M79.7 FIBROMYALGIA: ICD-10-CM

## 2019-09-12 DIAGNOSIS — M54.50 CHRONIC BILATERAL LOW BACK PAIN WITHOUT SCIATICA: ICD-10-CM

## 2019-09-12 DIAGNOSIS — G89.29 CHRONIC BILATERAL LOW BACK PAIN WITHOUT SCIATICA: ICD-10-CM

## 2019-09-12 DIAGNOSIS — F41.1 GAD (GENERALIZED ANXIETY DISORDER): ICD-10-CM

## 2019-09-12 DIAGNOSIS — G47.00 INSOMNIA, UNSPECIFIED TYPE: ICD-10-CM

## 2019-09-12 PROCEDURE — 99499 NO LOS: ICD-10-PCS | Mod: BHI,S$GLB,, | Performed by: SOCIAL WORKER

## 2019-09-12 PROCEDURE — 90837 PSYTX W PT 60 MINUTES: CPT | Mod: BHI,S$GLB,, | Performed by: SOCIAL WORKER

## 2019-09-12 PROCEDURE — 99499 UNLISTED E&M SERVICE: CPT | Mod: BHI,S$GLB,, | Performed by: SOCIAL WORKER

## 2019-09-12 PROCEDURE — 90837 PR PSYCHOTHERAPY W/PATIENT, 60 MIN: ICD-10-PCS | Mod: BHI,S$GLB,, | Performed by: SOCIAL WORKER

## 2019-09-12 NOTE — LETTER
September 13, 2019      Pratik Rasmussen MD  [address] [address]           [facility]  [address] [facility] [address]  Phone: [phone]  Fax: [phone]          Patient: Jaki Bajwa   MR Number: [id number]   YOB: 1970   Date of Visit: 9/12/2019       Dear Dr. Pratik Rasmussen:    Thank you for referring Jaki Bajwa to me for evaluation. Attached you will find relevant portions of my assessment and plan of care.    If you have questions, please do not hesitate to call me. I look forward to following Jaki Bajwa along with you.    Sincerely,    Kenney Marie, [facility]    Enclosure  CC:  No Recipients    If you would like to receive this communication electronically, please contact [email or web address] or [phone] to request more information on "[employer]" Link access.    For providers and/or their staff who would like to refer a patient to [facility], please contact us through our one-stop-shop provider referral line, [facility], at [phone].    If you feel you have received this communication in error or would no longer like to receive these types of communications, please e-mail [email or web address]

## 2019-09-13 NOTE — PROGRESS NOTES
Individual Psychotherapy (PhD/LCSW)    9/12/2019    Site:  [facility]         Therapeutic Intervention: Met with patient.  Outpatient - Insight oriented psychotherapy 60 min - CPT code 60036 and Outpatient - Supportive psychotherapy 60 min - CPT Code 46023    Chief complaint/reason for encounter: depression, anxiety, dissociation, somatic, interpersonal and sexual problems     Interval history and content of current session: Chronic pain adds to PT's depression and anxiety.     Treatment plan:  · Target symptoms: depression, anxiety   · Why chosen therapy is appropriate versus another modality: relevant to diagnosis, patient responds to this modality, evidence based practice  · Outcome monitoring methods: self-report, checklist/rating scale  · Therapeutic intervention type: behavior modifying psychotherapy, supportive psychotherapy    Risk parameters:  Patient reports no suicidal ideation  Patient reports no homicidal ideation  Patient reports no self-injurious behavior  Patient reports no violent behavior    Verbal deficits: None    Patient's response to intervention:  The patient's response to intervention is accepting, motivated.    Progress toward goals and other mental status changes:  The patient's progress toward goals is good.    Diagnosis:     ICD-10-CM ICD-9-CM   1. Chronic pain syndrome G89.4 338.4   2. JUAN JOSE (generalized anxiety disorder) F41.1 300.02   3. Recurrent major depressive disorder, in partial remission F33.41 296.35   4. Obsessive-compulsive behavior R46.81 300.3   5. Internet shopping addiction F63.89 301.89   6. Fibromyalgia M79.7 729.1   7. Chronic bilateral low back pain without sciatica M54.5 724.2    G89.29 338.29   8. Insomnia, unspecified type G47.00 780.52       Plan:  individual psychotherapy    Return to clinic: 1 month, as scheduled, as needed    Length of Service (minutes): 60     LCSW and PT met to explore how she is doing. PT states her pain has been better on a 5/10 scale.  "PT states she has been contacting her children more by phone, and that her daughter has went to visit her several times. LCSW and PT explored PT's since of self, PT states she was teased as a child and often called horse face. PT states the only thing she likes about her physical appearance is her hair. PT states she remembered a time when she was "feeling herself", this time she describes when she was thinner. LCSW and PT agreed that she would work on some empowering body positive goals. PT states she will try to look in the mirror 1-2 times per day to say positive affirmations about herself, PT noted that she avoids the mirrors at all cost. PT will also work on thought stopping, when negative thoughts come to her, she will try to redirect the negative thoughts into positive thoughts/feelings.     "

## 2019-09-17 DIAGNOSIS — G89.4 CHRONIC PAIN SYNDROME: Primary | ICD-10-CM

## 2019-09-17 RX ORDER — HYDROCODONE BITARTRATE AND ACETAMINOPHEN 5; 325 MG/1; MG/1
1 TABLET ORAL 2 TIMES DAILY PRN
Qty: 35 TABLET | Refills: 0 | Status: SHIPPED | OUTPATIENT
Start: 2019-09-17 | End: 2019-10-17

## 2019-09-17 NOTE — TELEPHONE ENCOUNTER
----- Message from Radha Parekh sent at 9/17/2019  2:26 PM CDT -----  Contact: Self   Name of Who is Calling: PENG MAYO [[id number]]    What is the request in detail: pt states that pharmacy advised her that she need a new Rx for the HYDROcodone-acetaminophen (NORCO) 5-325 that's is compliant with the new law. Pt states that she is available Wednesday to  a new Rx. Please contact to further discuss and advise.      Can the clinic reply by [facility]: N      What Number to Call Back [facility] not [facility]:  [phone]

## 2019-09-19 ENCOUNTER — TELEPHONE (OUTPATIENT)
Dept: INTERNAL MEDICINE | Facility: CLINIC | Age: 49
End: 2019-09-19

## 2019-09-27 ENCOUNTER — TELEPHONE (OUTPATIENT)
Dept: PRIMARY CARE CLINIC | Facility: CLINIC | Age: 49
End: 2019-09-27

## 2019-10-01 ENCOUNTER — PATIENT OUTREACH (OUTPATIENT)
Dept: ADMINISTRATIVE | Facility: OTHER | Age: 49
End: 2019-10-01

## 2019-10-04 ENCOUNTER — TELEPHONE (OUTPATIENT)
Dept: OBSTETRICS AND GYNECOLOGY | Facility: CLINIC | Age: 49
End: 2019-10-04

## 2019-10-04 NOTE — TELEPHONE ENCOUNTER
----- Message from Jude Hope sent at 10/3/2019  8:19 AM CDT -----  Pt had a appt for today she had to cancel but she needs a refill on her patch [phone]

## 2019-10-04 NOTE — TELEPHONE ENCOUNTER
Tried contacting patientt in regards to rescheduling appointment, and prescription refill. Left voicemail for patient to contact office back.

## 2019-10-11 ENCOUNTER — TELEPHONE (OUTPATIENT)
Dept: PRIMARY CARE CLINIC | Facility: CLINIC | Age: 49
End: 2019-10-11

## 2019-10-11 NOTE — PROGRESS NOTES
"SNOW Huerta contacted Ms. Bajwa to complete her assessments.  Ms. Bajwa scored "2" on PHQ 9 and "12" on the JUAN JOSE 7.    "

## 2019-10-17 ENCOUNTER — OFFICE VISIT (OUTPATIENT)
Dept: INTERNAL MEDICINE | Facility: CLINIC | Age: 49
End: 2019-10-17
Payer: MEDICARE

## 2019-10-17 VITALS
DIASTOLIC BLOOD PRESSURE: 82 MMHG | SYSTOLIC BLOOD PRESSURE: 110 MMHG | HEIGHT: 67 IN | BODY MASS INDEX: 40.52 KG/M2 | WEIGHT: 258.19 LBS | HEART RATE: 102 BPM

## 2019-10-17 DIAGNOSIS — R46.81 OBSESSIVE-COMPULSIVE BEHAVIOR: ICD-10-CM

## 2019-10-17 DIAGNOSIS — G47.00 INSOMNIA, UNSPECIFIED TYPE: ICD-10-CM

## 2019-10-17 DIAGNOSIS — N39.0 RECURRENT UTI (URINARY TRACT INFECTION): ICD-10-CM

## 2019-10-17 DIAGNOSIS — I10 ESSENTIAL HYPERTENSION: Primary | Chronic | ICD-10-CM

## 2019-10-17 DIAGNOSIS — F41.1 GAD (GENERALIZED ANXIETY DISORDER): ICD-10-CM

## 2019-10-17 DIAGNOSIS — G89.4 CHRONIC PAIN SYNDROME: ICD-10-CM

## 2019-10-17 DIAGNOSIS — M79.7 FIBROMYALGIA: ICD-10-CM

## 2019-10-17 DIAGNOSIS — E66.01 MORBID OBESITY: ICD-10-CM

## 2019-10-17 DIAGNOSIS — M35.01 SJOGREN'S SYNDROME WITH KERATOCONJUNCTIVITIS SICCA: ICD-10-CM

## 2019-10-17 PROCEDURE — 99214 OFFICE O/P EST MOD 30 MIN: CPT | Mod: S$PBB,,, | Performed by: INTERNAL MEDICINE

## 2019-10-17 PROCEDURE — 99999 PR PBB SHADOW E&M-EST. PATIENT-LVL III: CPT | Mod: PBBFAC,,, | Performed by: INTERNAL MEDICINE

## 2019-10-17 PROCEDURE — 99214 PR OFFICE/OUTPT VISIT, EST, LEVL IV, 30-39 MIN: ICD-10-PCS | Mod: S$PBB,,, | Performed by: INTERNAL MEDICINE

## 2019-10-17 PROCEDURE — 99213 OFFICE O/P EST LOW 20 MIN: CPT | Mod: PBBFAC,25 | Performed by: INTERNAL MEDICINE

## 2019-10-17 PROCEDURE — 99999 PR PBB SHADOW E&M-EST. PATIENT-LVL III: ICD-10-PCS | Mod: PBBFAC,,, | Performed by: INTERNAL MEDICINE

## 2019-10-17 PROCEDURE — 90686 IIV4 VACC NO PRSV 0.5 ML IM: CPT | Mod: PBBFAC

## 2019-10-17 RX ORDER — ESTRADIOL 0.04 MG/D
PATCH TRANSDERMAL
Refills: 8 | COMMUNITY
Start: 2019-08-06 | End: 2019-10-28 | Stop reason: SDUPTHER

## 2019-10-17 RX ORDER — PANTOPRAZOLE SODIUM 40 MG/1
40 TABLET, DELAYED RELEASE ORAL DAILY
COMMUNITY
End: 2020-01-16

## 2019-10-17 RX ORDER — HYDROCODONE BITARTRATE AND ACETAMINOPHEN 5; 325 MG/1; MG/1
1 TABLET ORAL 2 TIMES DAILY PRN
Qty: 35 TABLET | Refills: 0 | Status: SHIPPED | OUTPATIENT
Start: 2019-11-16 | End: 2020-01-16

## 2019-10-17 RX ORDER — HYDROCODONE BITARTRATE AND ACETAMINOPHEN 5; 325 MG/1; MG/1
1 TABLET ORAL 2 TIMES DAILY PRN
Qty: 35 TABLET | Refills: 0 | Status: SHIPPED | OUTPATIENT
Start: 2019-12-16 | End: 2020-01-16

## 2019-10-17 RX ORDER — DESONIDE 0.5 MG/G
CREAM TOPICAL
Qty: 30 G | Refills: 3 | Status: SHIPPED | OUTPATIENT
Start: 2019-10-17 | End: 2020-01-16 | Stop reason: SDUPTHER

## 2019-10-17 RX ORDER — HYDROCODONE BITARTRATE AND ACETAMINOPHEN 5; 325 MG/1; MG/1
1 TABLET ORAL 2 TIMES DAILY PRN
Qty: 35 TABLET | Refills: 0 | Status: SHIPPED | OUTPATIENT
Start: 2019-10-17 | End: 2020-01-16

## 2019-10-17 RX ORDER — NAPROXEN 500 MG/1
TABLET ORAL
Qty: 180 TABLET | Refills: 1 | Status: SHIPPED | OUTPATIENT
Start: 2019-10-17 | End: 2020-07-19

## 2019-10-17 NOTE — PROGRESS NOTES
Subjective:       Patient ID: Jaki Bajwa is a 49 y.o. female.    Chief Complaint: Hypertension    Pt here for f/u. She has a dx of sjogrens and fibromyalgia based on blood work and chronic pain that is primarily across shoulders and in legs but also in upper and lower back. As a result she is on several meds that she says helps. She sees rheum at [facility] and has regular f/u. She has prior dx of lupus but this was not corroborated in notes from rheum. She is on plaquenil, tizanidine, gabapentin and norco. She uses norco 1-2x/day. She is due for refills on pain meds. She has done well with decreasing dispense amount to 35 per month. She has previously signed a pain contract. [place]  reviewed and is consistent. She has seen pain mgmt. She has also done PT with some success in the past.     She also has dx of sjogren's syndrome. She was on pilocarpine but stopped b/c it made her nauseous. Uses eye drops for dry eyes which has been effective and is stable.     Pt's BP is well controlled. Tolerating meds well. Pt denies cp/sob/ha/vision or neuro changes. Not checking at home.     She continues to see psychiatry for bipolar and anxiety. This is well controlled and stable. No SI/HI. They have tryied reducing diazepam but anxiety returned. However, she is now down to 2.5mg bid. She understands dangers of co-administration of norco and diazepam and she continues to work with psychiatry re: alternatives.     She has morbid obesity but is seeing bariatric medicine. We reviewed importance of exercise. She is currently on victoza with some success this time but finding it makes her nauseous so is considering stopping. She will d/w her bariatric med specialist.     She has had recurrent UTIs. Saw urology and was on prophylactic macrodantin. However, has been off this and no issues in a while.     Most recent labs showed iron def anemia. She has no symptoms of such and denies any known bleeding or bloody/black stool. She had  c-scope 1 year ago that did not show any bleeding source. She also had EGD that showed positive h pylori for which she was tx but no other bleeding sources. She was to do stool cards but never completed this but is willing to do so now.    Hypertension   Pertinent negatives include no chest pain, headaches, palpitations or shortness of breath.   Fibromyalgia   Pertinent negatives include no abdominal pain, chest pain or headaches.     Review of Systems   Constitutional: Negative for unexpected weight change.   Eyes: Negative for visual disturbance.   Respiratory: Negative for shortness of breath.    Cardiovascular: Negative for chest pain, palpitations and leg swelling.   Gastrointestinal: Negative for abdominal pain.   Endocrine: Negative for polyuria.   Genitourinary: Negative for dysuria and frequency.   Neurological: Negative for headaches.   Psychiatric/Behavioral: Negative for dysphoric mood.       Objective:          Assessment:       1. Essential hypertension    2. JUAN JOSE (generalized anxiety disorder)    3. Chronic pain syndrome    4. Obsessive-compulsive behavior    5. Recurrent UTI (urinary tract infection)    6. Sjogren's syndrome with keratoconjunctivitis sicca    7. Morbid obesity    8. Fibromyalgia    9. Insomnia, unspecified type        Plan:       1. complete stool cards; if neg the replace iron and/or consider hematology eval if repeat labs show continued iron def  2. Keep f/u with specialists  3. Refill norco--3 separate 30 day rxs (dispense #35) given for 90 days total; proper use d/w pt                Physical Exam   Constitutional: She is oriented to person, place, and time. She appears well-developed and well-nourished.   Eyes: Pupils are equal, round, and reactive to light. EOM are normal.   Neck: Neck supple. No thyromegaly present.   Cardiovascular: Normal rate, regular rhythm and normal heart sounds.   Pulmonary/Chest: Effort normal and breath sounds normal.   Musculoskeletal: She exhibits no  edema.        Lumbar back: She exhibits normal range of motion, no tenderness and no bony tenderness.   Lymphadenopathy:     She has no cervical adenopathy.   Neurological: She is alert and oriented to person, place, and time. No cranial nerve deficit.   Psychiatric: She has a normal mood and affect. Her behavior is normal.

## 2019-10-17 NOTE — PROGRESS NOTES
"Patient was given vaccine information sheet for the Flu Vaccine. The area of injection was palpated using the acromion process as a landmark. This area was cleaned with alcohol. Using a 25g 1" safety needle, 0.5mL of the vaccine was placed into the right deltoid muscle. The injection site was dressed with a bandage. Patient experienced no complications and was discharged in stable condition. Fluarix vaccine Lot: [id number] Exp: 06/30/20    "

## 2019-10-18 ENCOUNTER — TELEPHONE (OUTPATIENT)
Dept: PRIMARY CARE CLINIC | Facility: CLINIC | Age: 49
End: 2019-10-18

## 2019-10-18 NOTE — PROGRESS NOTES
Ms. Bajwa, contacted SNOW Huerta regarding rescheduling appt to Thursday, Oct 31.  Appt for that date is not open as of yet however, CHW will reschedule PT as soon as possible.

## 2019-10-25 ENCOUNTER — TELEPHONE (OUTPATIENT)
Dept: PRIMARY CARE CLINIC | Facility: CLINIC | Age: 49
End: 2019-10-25

## 2019-10-28 DIAGNOSIS — M79.10 MYALGIA: ICD-10-CM

## 2019-10-28 RX ORDER — TIZANIDINE 4 MG/1
TABLET ORAL
Qty: 270 TABLET | Refills: 0 | Status: SHIPPED | OUTPATIENT
Start: 2019-10-28 | End: 2020-01-16 | Stop reason: SDUPTHER

## 2019-10-28 NOTE — TELEPHONE ENCOUNTER
----- Message from Gabriela Alas sent at 10/28/2019  3:39 PM CDT -----  Contact: PENG MAYO [[id number]]  Can the clinic reply in [facility]:     Please refill the medication(s) listed below. The patient can be reached at this phone number once it is called into the pharmacy.    Medication #1  estradiol (CLIMARA) 0.0375 mg/24 hr    Medication #2    Preferred Pharmacy: [id number] - [facility] [address]  [address]

## 2019-10-28 NOTE — TELEPHONE ENCOUNTER
Patient will like refill of estradiol (CLIMARA) 0.0375 mg/24 hr, patient states pharmacy says refills are . Please sign ERX

## 2019-10-29 RX ORDER — ESTRADIOL 0.04 MG/D
PATCH TRANSDERMAL
Qty: 4 PATCH | Refills: 0 | Status: SHIPPED | OUTPATIENT
Start: 2019-10-29 | End: 2019-10-30 | Stop reason: SDUPTHER

## 2019-10-29 RX ORDER — ESTRADIOL 0.03 MG/D
PATCH TRANSDERMAL
Qty: 4 PATCH | Refills: 0 | Status: SHIPPED | OUTPATIENT
Start: 2019-10-29 | End: 2020-01-16

## 2019-10-30 ENCOUNTER — TELEPHONE (OUTPATIENT)
Dept: OBSTETRICS AND GYNECOLOGY | Facility: CLINIC | Age: 49
End: 2019-10-30

## 2019-10-31 RX ORDER — ESTRADIOL 0.04 MG/D
PATCH TRANSDERMAL
Qty: 12 PATCH | Refills: 0 | Status: SHIPPED | OUTPATIENT
Start: 2019-10-31 | End: 2020-01-06 | Stop reason: SDUPTHER

## 2019-11-04 DIAGNOSIS — F40.10 SOCIAL PHOBIA: ICD-10-CM

## 2019-11-04 DIAGNOSIS — F33.42 RECURRENT MAJOR DEPRESSIVE DISORDER, IN FULL REMISSION: ICD-10-CM

## 2019-11-04 DIAGNOSIS — F41.1 GAD (GENERALIZED ANXIETY DISORDER): ICD-10-CM

## 2019-11-04 DIAGNOSIS — R46.81 OBSESSIVE-COMPULSIVE BEHAVIOR: ICD-10-CM

## 2019-11-04 RX ORDER — DOXEPIN HYDROCHLORIDE 150 MG/1
150 CAPSULE ORAL NIGHTLY
Qty: 90 CAPSULE | Refills: 1 | Status: SHIPPED | OUTPATIENT
Start: 2019-11-04 | End: 2019-11-27 | Stop reason: SDUPTHER

## 2019-11-07 ENCOUNTER — CLINICAL SUPPORT (OUTPATIENT)
Dept: PRIMARY CARE CLINIC | Facility: CLINIC | Age: 49
End: 2019-11-07
Payer: MEDICARE

## 2019-11-07 DIAGNOSIS — G89.4 CHRONIC PAIN SYNDROME: ICD-10-CM

## 2019-11-07 DIAGNOSIS — R46.81 OBSESSIVE-COMPULSIVE BEHAVIOR: ICD-10-CM

## 2019-11-07 DIAGNOSIS — F39 MOOD DISORDER: ICD-10-CM

## 2019-11-07 DIAGNOSIS — F41.1 GAD (GENERALIZED ANXIETY DISORDER): Primary | ICD-10-CM

## 2019-11-07 PROCEDURE — 99499 UNLISTED E&M SERVICE: CPT | Mod: BHI,S$GLB,, | Performed by: SOCIAL WORKER

## 2019-11-07 PROCEDURE — 90837 PR PSYCHOTHERAPY W/PATIENT, 60 MIN: ICD-10-PCS | Mod: BHI,S$GLB,, | Performed by: SOCIAL WORKER

## 2019-11-07 PROCEDURE — 99499 NO LOS: ICD-10-PCS | Mod: BHI,S$GLB,, | Performed by: SOCIAL WORKER

## 2019-11-07 PROCEDURE — 90837 PSYTX W PT 60 MINUTES: CPT | Mod: BHI,S$GLB,, | Performed by: SOCIAL WORKER

## 2019-11-07 NOTE — PROGRESS NOTES
The patient location is:  Patient Home   The chief complaint leading to consultation is: anxiety, depression, and chronic pain.   Visit type: Telephone  Total time spent with patient: 60 mins.   Each patient to whom he or she provides medical services by telemedicine is:  (1) informed of the relationship between the physician and patient and the respective role of any other health care provider with respect to management of the patient; and (2) notified that he or she may decline to receive medical services by telemedicine and may withdraw from such care at any time.      Individual Psychotherapy (PhD/LCSW)    2019    Site:  [facility]         Therapeutic Intervention: Met with patient.  Outpatient - Behavior modifying psychotherapy 60 min - CPT code 97870 and Outpatient - Supportive psychotherapy 60 min - CPT Code 46586    Chief complaint/reason for encounter: depression and anxiety     Interval history and content of current session: LCSW and PT met by telephone. PT states that her aunt  last week and that she wants to go to [place] for the  and stay a few days. PT states she has a ride there but will need to take the train back to [facility]. PT is nervous but excited at the same time. LCSW and PT processed how her anxiety might be leading up to the train ride. PT states that she has been talking to her son daily, which is nice. PT is thinking about talking to son about how she left him with his Dad at one point, but is a bit nervous. PT acknowledged that having this conversation would be beneficial to both her son and herself. No SI/HI/AVH during time of session.     Treatment plan:  · Target symptoms: depression, anxiety   · Why chosen therapy is appropriate versus another modality: relevant to diagnosis, patient responds to this modality, evidence based practice  · Outcome monitoring methods: self-report, checklist/rating scale  · Therapeutic intervention type: behavior modifying psychotherapy,  supportive psychotherapy    Risk parameters:  Patient reports no suicidal ideation  Patient reports no homicidal ideation  Patient reports no self-injurious behavior  Patient reports no violent behavior    Verbal deficits: None    Patient's response to intervention:  The patient's response to intervention is motivated.    Progress toward goals and other mental status changes:  The patient's progress toward goals is good.    Diagnosis:   No diagnosis found.    Plan:  individual psychotherapy    Return to clinic: 1 month, as scheduled, as needed    Length of Service (minutes): 60

## 2019-11-08 DIAGNOSIS — E66.01 MORBID OBESITY: ICD-10-CM

## 2019-11-08 DIAGNOSIS — I10 ESSENTIAL HYPERTENSION: Chronic | ICD-10-CM

## 2019-11-08 RX ORDER — LIRAGLUTIDE 6 MG/ML
INJECTION SUBCUTANEOUS
Qty: 18 ML | Refills: 0 | Status: SHIPPED | OUTPATIENT
Start: 2019-11-08 | End: 2020-01-16

## 2019-11-18 ENCOUNTER — TELEPHONE (OUTPATIENT)
Dept: PRIMARY CARE CLINIC | Facility: CLINIC | Age: 49
End: 2019-11-18

## 2019-11-18 NOTE — PROGRESS NOTES
"SNOW Huerta contacted Ms. Bajwa to complete her assessments.  Ms. Bajwa scored "5" on the PHQ 9 and "15" on the JUAN JOSE 7.  Her next appt is on December 12 at 2:00 p.m.     "

## 2019-11-27 ENCOUNTER — OFFICE VISIT (OUTPATIENT)
Dept: PSYCHIATRY | Facility: CLINIC | Age: 49
End: 2019-11-27
Payer: MEDICARE

## 2019-11-27 VITALS
SYSTOLIC BLOOD PRESSURE: 125 MMHG | WEIGHT: 262.38 LBS | HEART RATE: 107 BPM | BODY MASS INDEX: 41.09 KG/M2 | DIASTOLIC BLOOD PRESSURE: 79 MMHG

## 2019-11-27 DIAGNOSIS — F40.10 SOCIAL PHOBIA: ICD-10-CM

## 2019-11-27 DIAGNOSIS — F39 MOOD DISORDER: ICD-10-CM

## 2019-11-27 DIAGNOSIS — F63.89 INTERNET ADDICTION: ICD-10-CM

## 2019-11-27 DIAGNOSIS — G47.00 INSOMNIA, UNSPECIFIED TYPE: ICD-10-CM

## 2019-11-27 DIAGNOSIS — G89.4 CHRONIC PAIN SYNDROME: ICD-10-CM

## 2019-11-27 DIAGNOSIS — F41.1 GAD (GENERALIZED ANXIETY DISORDER): Primary | ICD-10-CM

## 2019-11-27 DIAGNOSIS — F33.42 RECURRENT MAJOR DEPRESSIVE DISORDER, IN FULL REMISSION: ICD-10-CM

## 2019-11-27 DIAGNOSIS — R46.81 OBSESSIVE-COMPULSIVE BEHAVIOR: ICD-10-CM

## 2019-11-27 PROCEDURE — 99999 PR PBB SHADOW E&M-EST. PATIENT-LVL III: ICD-10-PCS | Mod: PBBFAC,,, | Performed by: INTERNAL MEDICINE

## 2019-11-27 PROCEDURE — 99214 PR OFFICE/OUTPT VISIT, EST, LEVL IV, 30-39 MIN: ICD-10-PCS | Mod: S$PBB,,, | Performed by: INTERNAL MEDICINE

## 2019-11-27 PROCEDURE — 99214 OFFICE O/P EST MOD 30 MIN: CPT | Mod: S$PBB,,, | Performed by: INTERNAL MEDICINE

## 2019-11-27 PROCEDURE — 99999 PR PBB SHADOW E&M-EST. PATIENT-LVL III: CPT | Mod: PBBFAC,,, | Performed by: INTERNAL MEDICINE

## 2019-11-27 PROCEDURE — 99213 OFFICE O/P EST LOW 20 MIN: CPT | Mod: PBBFAC | Performed by: INTERNAL MEDICINE

## 2019-11-27 RX ORDER — DIAZEPAM 5 MG/1
2.5 TABLET ORAL DAILY PRN
Qty: 30 TABLET | Refills: 5 | Status: SHIPPED | OUTPATIENT
Start: 2019-11-27 | End: 2020-02-19 | Stop reason: SDUPTHER

## 2019-11-27 RX ORDER — BUPROPION HYDROCHLORIDE 150 MG/1
450 TABLET ORAL EVERY MORNING
Qty: 270 TABLET | Refills: 3 | Status: SHIPPED | OUTPATIENT
Start: 2019-11-27 | End: 2021-10-04 | Stop reason: SDUPTHER

## 2019-11-27 RX ORDER — LURASIDONE HYDROCHLORIDE 60 MG/1
60 TABLET, FILM COATED ORAL DAILY
Qty: 90 TABLET | Refills: 3 | Status: SHIPPED | OUTPATIENT
Start: 2019-11-27 | End: 2020-09-10 | Stop reason: SDUPTHER

## 2019-11-27 RX ORDER — BUSPIRONE HYDROCHLORIDE 5 MG/1
5 TABLET ORAL 2 TIMES DAILY
Qty: 180 TABLET | Refills: 1 | Status: SHIPPED | OUTPATIENT
Start: 2019-11-27 | End: 2020-02-19 | Stop reason: SDUPTHER

## 2019-11-27 RX ORDER — DOXEPIN HYDROCHLORIDE 150 MG/1
150 CAPSULE ORAL NIGHTLY
Qty: 90 CAPSULE | Refills: 3 | Status: SHIPPED | OUTPATIENT
Start: 2019-11-27 | End: 2020-08-12 | Stop reason: SDUPTHER

## 2019-11-27 NOTE — PROGRESS NOTES
OUTPATIENT PSYCHIATRY RETURN VISIT    ENCOUNTER DATE:  2019  SITE:  [facility]  LENGTH OF SESSION:  20 minutes    CHIEF COMPLAINT:  Anxiety    HISTORY OF PRESENTING ILLNESS:  Jaki Bajwa is a 49 y.o. female with history of MDD, JUAN JOSE, OCD, social phobia, and Internet shopping addiction who presents for follow up appointment.      Plan at last appointment on 2019:  · Will increase Valium back to 2.5mg BID due to worsening anxiety.  At next appointment if patient is doing better, can again try reducing Valium to daily PRN and adding Buspar.  She is aware of risks associated with combining benzodiazepines and narcotics, including the risk of respiratory depression and death.    · Continue Latuda 60mg daily for mood stabilization/anxiety.    · Continue Wellbutrin XL 450mg daily for depression.    · Continue Doxepin 150mg qHS for insomnia and mood.    · Continue therapy with Mr. Zamora LCSZO.    History as told by patient:  Says anxiety has been calmer.  Has been in a good spot.  Denies feeling depressed at all.  Says things have been great actually and this surprises her.  Anxiety has not been an issue recently.  Controlled with medication currently.  Takes first Valium around 5:30-6am.  Takes second one around 7pm and then goes to sleep.  Interested in trying Buspar and attempting to get off Valium.  Cousin passed away a few weeks ago and she wanted to go to the  however got anxious about taking train by herself.  Really regretted that she didn't go.      Medication side effects:  No  Medication compliance:  Yes    PSYCHIATRIC REVIEW OF SYSTEMS:  Trouble with sleep:  Denies  Appetite changes:  Denies  Weight changes:  Denies  Lack of energy:  Denies  Anhedonia:  Denies  Somatic symptoms:  Denies  Libido:  Denies  Anxiety/panic: Improved  Guilty/hopeless:  Denies  Self-injurious behavior/risky behavior:  Denies  Any drugs:  Denies  Alcohol:  Denies    MEDICAL REVIEW  OF SYSTEMS:  Complete review of systems performed covering Constitutional, Musculoskeletal, Neurologic.  All systems negative except for that covered in HPI.    PAST PSYCHIATRIC, MEDICAL, AND SOCIAL HISTORY REVIEWED  The patient's past medical, family and social history have been reviewed and updated as appropriate within the electronic medical record - see encounter notes.    MEDICATIONS:    Current Outpatient Medications:     buPROPion (WELLBUTRIN XL) 150 MG TB24 tablet, Take 3 tablets (450 mg total) by mouth every morning., Disp: 270 tablet, Rfl: 3    busPIRone (BUSPAR) 5 MG Tab, Take 1 tablet (5 mg total) by mouth 2 (two) times daily., Disp: 180 tablet, Rfl: 1    desonide (DESOWEN) 0.05 % cream, MILTON EXT AA BID PRN, Disp: 30 g, Rfl: 3    diazePAM (VALIUM) 5 MG tablet, Take 0.5 tablets (2.5 mg total) by mouth daily as needed for Anxiety., Disp: 30 tablet, Rfl: 5    doxepin (SINEQUAN) 150 MG Cap, Take 1 capsule (150 mg total) by mouth every evening., Disp: 90 capsule, Rfl: 3    estradiol (CLIMARA) 0.0375 mg/24 hr, APPLY 1 PATCH TO SKIN EVERY 7 DAYS, Disp: 12 patch, Rfl: 0    estradiol (ESTRACE) 0.01 % (0.1 mg/gram) vaginal cream, Place 1 g vaginally twice a week. (Patient not taking: Reported on 10/17/2019), Disp: 24 g, Rfl: 3    estradiol 0.025 mg/24 hr 0.025 mg/24 hr, PLACE 1 PATCH ONTO SKIN EVERY 7 DAYS, Disp: 4 patch, Rfl: 0    gabapentin (NEURONTIN) 300 MG capsule, Take 600 mg by mouth 2 (two) times daily. , Disp: , Rfl:     HYDROcodone-acetaminophen (NORCO) 5-325 mg per tablet, Take 1 tablet by mouth 2 (two) times daily as needed for Pain. Quantity medically necessary, Disp: 35 tablet, Rfl: 0    HYDROcodone-acetaminophen (NORCO) 5-325 mg per tablet, Take 1 tablet by mouth 2 (two) times daily as needed for Pain. Quantity medically necessary, Disp: 35 tablet, Rfl: 0    [START ON 12/16/2019] HYDROcodone-acetaminophen (NORCO) 5-325 mg per tablet, Take 1 tablet by mouth 2 (two) times daily as needed  "for Pain. Quantity medically necessary, Disp: 35 tablet, Rfl: 0    hydroxychloroquine (PLAQUENIL) 200 mg tablet, Take 200 mg by mouth Twice daily., Disp: , Rfl:     irbesartan-hydrochlorothiazide (AVALIDE) 150-12.5 mg per tablet, Take 1 tablet by mouth once daily., Disp: 90 tablet, Rfl: 3    lurasidone (LATUDA) 60 mg Tab tablet, Take 1 tablet (60 mg total) by mouth once daily., Disp: 90 tablet, Rfl: 3    naproxen (NAPROSYN) 500 MG tablet, TAKE 1 TABLET(500 MG) BY MOUTH TWICE DAILY AS NEEDED FOR PAIN, Disp: 180 tablet, Rfl: 1    omeprazole (PRILOSEC) 40 MG capsule, TAKE 1 CAPSULE(40 MG) BY MOUTH EVERY DAY (Patient not taking: Reported on 10/17/2019), Disp: 90 capsule, Rfl: 0    pantoprazole (PROTONIX) 40 MG tablet, Take 40 mg by mouth once daily., Disp: , Rfl:     pen needle, diabetic (BD ULTRA-FINE SHORT PEN NEEDLE) 31 gauge x 5/16" Ndle, USE WITH VICTOZA, Disp: 100 each, Rfl: 3    RESTASIS 0.05 % ophthalmic emulsion, PLACE ONE DROP INTO BOTH EYES BID, Disp: , Rfl: 0    terconazole (TERAZOL 7) 0.4 % Crea, Place 1 applicator vaginally once daily. (Patient not taking: Reported on 10/17/2019), Disp: 45 g, Rfl: 0    tiZANidine (ZANAFLEX) 4 MG tablet, TAKE 1 TABLET(4 MG) BY MOUTH EVERY 8 HOURS AS NEEDED FOR MUSCLE PAIN, Disp: 270 tablet, Rfl: 0    VICTOZA 3-KHUSHI 0.6 mg/0.1 mL (18 mg/3 mL) PnIj, ADMINISTER 1.8 MG UNDER THE SKIN EVERY DAY, Disp: 18 mL, Rfl: 0    ALLERGIES:  Review of patient's allergies indicates:   Allergen Reactions    Aspirin Hives     PSYCHIATRIC EXAM:  Vitals:    11/27/19 0859   BP: 125/79   Pulse: 107   Weight: 119 kg (262 lb 5.6 oz)     Appearance:  Well groomed, appearing healthy and of stated age  Behavior:  Cooperative, pleasant, no psychomotor agitation or retardation  Speech:  Normal rate, rhythm, prosody, and volume  Mood:  "Better"  Affect:  Congruent, smiling  Thought Process:  Linear, logical, goal directed  Thought Content:  Negative for suicidal ideation, homicidal ideation, " delusions or hallucinations.  Associations:  Intact  Memory:  Grossly Intact  Level of Consciousness/Orientation:  Grossly intact  Fund of Knowledge:  Good  Attention:  Good  Language:  Fluent, able to name abstract and concrete objects  Insight:  Fair  Judgment:  Intact  Psychomotor signs:  No involuntary movements or tremor  Gait:  Normal    RELEVANT LABS/STUDIES:    Lab Results   Component Value Date    WBC 5.56 04/04/2019    HGB 10.9 (L) 04/04/2019    HCT 36.5 (L) 04/04/2019    MCV 80 (L) 04/04/2019     04/04/2019     BMP  Lab Results   Component Value Date     04/04/2019    K 3.8 04/04/2019     04/04/2019    CO2 31 (H) 04/04/2019    BUN 18 04/04/2019    CREATININE 1.1 04/04/2019    CALCIUM 9.8 04/04/2019    ANIONGAP 6 (L) 04/04/2019    ESTGFRAFRICA >60 04/04/2019    EGFRNONAA 59 (A) 04/04/2019     Lab Results   Component Value Date    ALT 16 04/04/2019    AST 19 04/04/2019    ALKPHOS 74 04/04/2019    BILITOT 0.4 04/04/2019     Lab Results   Component Value Date    TSH 2.261 04/18/2017     No results found for: LABA1C, HGBA1C    IMPRESSION:    Jaki Bajwa is a 49 y.o. female with history of MDD, JUAN JOSE, OCD, social phobia, and Internet shopping addiction who presents for follow up appointment.    Status/Progress:  Based on the examination today, the patient's problem(s) is/are improved.  New problems have not been presented today.    Risk Parameters:  Patient reports no suicidal ideation  Patient reports no homicidal ideation  Patient reports no self-injurious behavior  Patient reports no violent behavior    DIAGNOSES:    ICD-10-CM ICD-9-CM   1. JUAN JOSE (generalized anxiety disorder) F41.1 300.02   2. Social phobia F40.10 300.23   3. Obsessive-compulsive behavior R46.81 300.3   4. Recurrent major depressive disorder, in full remission F33.42 296.36   5. Mood disorder F39 296.90   6. Internet shopping addiction F63.89 301.89   7. Chronic pain syndrome G89.4 338.4   8. Insomnia, unspecified type  G47.00 780.52     PLAN:  · Will start Buspar 5mg BID and reduce Valium to 2.5mg daily PRN.  If tolerating Buspar, can then increase to 10mg BID.  She is aware of risks associated with combining benzodiazepines and narcotics, including the risk of respiratory depression and death.  She would like to come off of Valium completely if anxiety can be well controlled with other medication.  · Continue Latuda 60mg daily for mood stabilization/anxiety.    · Continue Wellbutrin XL 450mg daily for depression.    · Continue Doxepin 150mg qHS for insomnia and mood.    · Continue therapy with JORDAN MEADE and Mr. Noah LCSW.    RETURN TO CLINIC:  Follow up in about 3 months (around 2/27/2020).

## 2019-11-27 NOTE — PATIENT INSTRUCTIONS
· Start Buspar 5mg twice daily.  · Reduce Valium to once daily AS NEEDED (if you are not feeling anxious, don't take it).    · If you are tolerating the Buspar and want to increase the dose before next appointment, send me a [employer] message and I will send in higher dose of 10mg twice daily.

## 2019-12-16 ENCOUNTER — TELEPHONE (OUTPATIENT)
Dept: PRIMARY CARE CLINIC | Facility: CLINIC | Age: 49
End: 2019-12-16

## 2019-12-16 NOTE — PROGRESS NOTES
SNOW Huerta contacted Ms. Bajwa to update Social Determinants of Health and confirm her appt on Thursday, December 19th.

## 2019-12-19 ENCOUNTER — TELEPHONE (OUTPATIENT)
Dept: PRIMARY CARE CLINIC | Facility: CLINIC | Age: 49
End: 2019-12-19

## 2019-12-27 ENCOUNTER — TELEPHONE (OUTPATIENT)
Dept: PRIMARY CARE CLINIC | Facility: CLINIC | Age: 49
End: 2019-12-27

## 2019-12-27 NOTE — PROGRESS NOTES
"SNOW Huerta contacted Ms. Bajwa to update her assessments and remind her of her upcoming appt with Kenney Marie on January 2.  Ms. Bajwa scored "0" on the PHQ 9 and "10" on the JUAN JOSE 7.    "

## 2019-12-31 ENCOUNTER — EXTERNAL CHRONIC CARE MANAGEMENT (OUTPATIENT)
Dept: PRIMARY CARE CLINIC | Facility: CLINIC | Age: 49
End: 2019-12-31
Payer: MEDICARE

## 2019-12-31 PROCEDURE — 99490 CHRNC CARE MGMT STAFF 1ST 20: CPT | Mod: S$PBB,,, | Performed by: INTERNAL MEDICINE

## 2019-12-31 PROCEDURE — 99490 CHRNC CARE MGMT STAFF 1ST 20: CPT | Mod: PBBFAC | Performed by: INTERNAL MEDICINE

## 2019-12-31 PROCEDURE — 99490 PR CHRONIC CARE MGMT, 1ST 20 MIN: ICD-10-PCS | Mod: S$PBB,,, | Performed by: INTERNAL MEDICINE

## 2020-01-02 ENCOUNTER — CLINICAL SUPPORT (OUTPATIENT)
Dept: PRIMARY CARE CLINIC | Facility: CLINIC | Age: 50
End: 2020-01-02
Payer: MEDICARE

## 2020-01-02 DIAGNOSIS — F41.1 GAD (GENERALIZED ANXIETY DISORDER): Primary | ICD-10-CM

## 2020-01-02 DIAGNOSIS — F39 MOOD DISORDER: ICD-10-CM

## 2020-01-02 DIAGNOSIS — G89.4 CHRONIC PAIN SYNDROME: ICD-10-CM

## 2020-01-02 DIAGNOSIS — R46.81 OBSESSIVE-COMPULSIVE BEHAVIOR: ICD-10-CM

## 2020-01-02 DIAGNOSIS — F63.89 INTERNET ADDICTION: ICD-10-CM

## 2020-01-02 PROCEDURE — 90837 PSYTX W PT 60 MINUTES: CPT | Mod: BHI,95,S$GLB, | Performed by: SOCIAL WORKER

## 2020-01-02 PROCEDURE — 90837 PR PSYCHOTHERAPY W/PATIENT, 60 MIN: ICD-10-PCS | Mod: BHI,95,S$GLB, | Performed by: SOCIAL WORKER

## 2020-01-02 NOTE — PROGRESS NOTES
"Individual Psychotherapy (PhD/LCSW)    The patient location is:  Patient Home   The chief complaint leading to consultation is: Chronic pain & Anxiety.   Visit type: Telephone  Total time spent with patient: 60  Each patient to whom he or she provides medical services by telemedicine is:  (1) informed of the relationship between the physician and patient and the respective role of any other health care provider with respect to management of the patient; and (2) notified that he or she may decline to receive medical services by telemedicine and may withdraw from such care at any time.      2020    Site:  [facility]         Therapeutic Intervention: Met with patient.  Outpatient - Behavior modifying psychotherapy 60 min - CPT code 17239 and Outpatient - Supportive psychotherapy 60 min - CPT Code 95175    Chief complaint/reason for encounter: anxiety and interpersonal     Interval history and content of current session: PT states her holiday went well. She did not go to her Aunt's  in [place], due to the feeling anxious about riding the bus back by herself. PT states she spent the holidays with her boyfriend which went well - PT did state they got into a verbal argument about her buying an expensive purse and two watches. PT states she is glad she has them, but notes she has not used any of the items. LCSW and PT explored the impulse feelings she had before, during, and after the purchase. PT states she still creates her "OCD lists" of the items she wants to purchase at the beginning on the month. PT disclosed that she has been staying with her boyfriend more, and is starting to feel increased anxiety about not staying with him when she goes home over the weekend. PT stated that when she was in 6th grade her parents  and that she and her mother moved to [place] to live. PT feels like she was abandoned - not being able to see her father for several years, her older siblings, and taking out of school " during the active school year. LCSW and PT reviewed CBT techniques - empowering the PT to realize that she is an adult and able to make her own choices. PT's relationships are stable, healthy, and secure.     PT recently started taking Buspar 5mg twice per day - PT states she does not feel like it made a huge difference but will wait until her next apt. With Dr. Najma Doyle to discuss an increase as noted in EHR.     Treatment plan:  · Target symptoms: anxiety , adjustment  · Why chosen therapy is appropriate versus another modality: relevant to diagnosis, patient responds to this modality, evidence based practice  · Outcome monitoring methods: self-report, checklist/rating scale  · Therapeutic intervention type: behavior modifying psychotherapy, supportive psychotherapy    Risk parameters:  Patient reports no suicidal ideation  Patient reports no homicidal ideation  Patient reports no self-injurious behavior  Patient reports no violent behavior    Verbal deficits: None    Patient's response to intervention:  The patient's response to intervention is accepting, motivated.    Progress toward goals and other mental status changes:  The patient's progress toward goals is good.    Diagnosis:     ICD-10-CM ICD-9-CM   1. JUAN JOSE (generalized anxiety disorder) F41.1 300.02   2. Obsessive-compulsive behavior R46.81 300.3   3. Internet shopping addiction F63.89 301.89   4. Mood disorder F39 296.90   5. Chronic pain syndrome G89.4 338.4       Plan:  individual psychotherapy    Return to clinic: 1 month, as scheduled, as needed    Length of Service (minutes): 60

## 2020-01-06 ENCOUNTER — TELEPHONE (OUTPATIENT)
Dept: OBSTETRICS AND GYNECOLOGY | Facility: CLINIC | Age: 50
End: 2020-01-06

## 2020-01-06 DIAGNOSIS — N95.1 MENOPAUSAL SYMPTOM: Primary | ICD-10-CM

## 2020-01-06 RX ORDER — ESTRADIOL 0.04 MG/D
PATCH TRANSDERMAL
Qty: 12 PATCH | Refills: 3 | Status: SHIPPED | OUTPATIENT
Start: 2020-01-06 | End: 2020-01-16 | Stop reason: SDUPTHER

## 2020-01-06 NOTE — TELEPHONE ENCOUNTER
----- Message from Evelyn Grant MA sent at 1/3/2020 10:44 AM CST -----  Contact: pt  Patient has scheduled appointment but has cancelled all others appointments in the past. Is it ok to refill ESTRADIOL?  ----- Message -----  From: Radha Parekh  Sent: 1/2/2020   8:29 AM CST  To: [facility] Staff    Can the clinic reply in [facility]: n      Please refill the medication(s) listed below. Please call the patient when the prescription(s) is ready for  at this phone number  [phone]      Medication #1 estradiol (CLIMARA) 0.0375 mg/24 hr    Medication #2       Preferred Pharmacy:    [facility] #[id number] [address] & [address] [address]  Phone: [phone] Fax: [phone]

## 2020-01-07 ENCOUNTER — TELEPHONE (OUTPATIENT)
Dept: PRIMARY CARE CLINIC | Facility: CLINIC | Age: 50
End: 2020-01-07

## 2020-01-09 ENCOUNTER — CLINICAL SUPPORT (OUTPATIENT)
Dept: PRIMARY CARE CLINIC | Facility: CLINIC | Age: 50
End: 2020-01-09
Payer: MEDICARE

## 2020-01-09 DIAGNOSIS — F39 MOOD DISORDER: ICD-10-CM

## 2020-01-09 DIAGNOSIS — F63.89 INTERNET ADDICTION: ICD-10-CM

## 2020-01-09 DIAGNOSIS — R46.81 OBSESSIVE-COMPULSIVE BEHAVIOR: ICD-10-CM

## 2020-01-09 DIAGNOSIS — F41.1 GAD (GENERALIZED ANXIETY DISORDER): Primary | ICD-10-CM

## 2020-01-09 DIAGNOSIS — G89.4 CHRONIC PAIN SYNDROME: ICD-10-CM

## 2020-01-09 PROCEDURE — 90837 PR PSYCHOTHERAPY W/PATIENT, 60 MIN: ICD-10-PCS | Mod: BHI,95,S$GLB, | Performed by: SOCIAL WORKER

## 2020-01-09 PROCEDURE — 90837 PSYTX W PT 60 MINUTES: CPT | Mod: BHI,95,S$GLB, | Performed by: SOCIAL WORKER

## 2020-01-10 NOTE — PROGRESS NOTES
Individual Psychotherapy (PhD/LCSW)    The patient location is:  Patient Home   Visit type: Telephone  Each patient to whom he or she provides medical services by telemedicine is:  (1) informed of the relationship between the physician and patient and the respective role of any other health care provider with respect to management of the patient; and (2) notified that he or she may decline to receive medical services by telemedicine and may withdraw from such care at any time.      1/9/2020    Site:  [facility] [address]         Therapeutic Intervention: Met with patient.  Outpatient - Behavior modifying psychotherapy 60 min - CPT code 17006 and Outpatient - Supportive psychotherapy 60 min - CPT Code 48404    Chief complaint/reason for encounter: anxiety, cognition and interpersonal     Interval history and content of current session: PT opened up about having a miscarriage when she was around 14/15 years old. Her mother did not told her not to tell the father as they would place the child up for adoption. PT states the mother never scolded her for getting pregnant and also never provided or linked her to professional help after she lost the baby. PT states she wished that her mother would have provided more support even if it was discipline. PT has carried over this secrete with her own kids and boyfriend as they do not know this past history. PT is has started to question if she wants to address the possible feelings her son may feel when she let his father raise him for about 5 years. PT states when she moved to [place] and didn't see her father that she had a lot questions and feelings around the move. PT states her son doesn't ask questions but she feels like she cannot give him motherly advice bc she was missing in is life for 5 years. LCSW and PT explore the link between her relationship with her father and her relationship with her son. LCSW and PT discussed CBT and ACT techniques.     Treatment plan:  · Target  symptoms: anxiety   · Why chosen therapy is appropriate versus another modality: relevant to diagnosis, patient responds to this modality, evidence based practice  · Outcome monitoring methods: self-report, checklist/rating scale  · Therapeutic intervention type: behavior modifying psychotherapy, supportive psychotherapy    Risk parameters:  Patient reports no suicidal ideation  Patient reports no homicidal ideation  Patient reports no self-injurious behavior  Patient reports no violent behavior    Verbal deficits: None    Patient's response to intervention:  The patient's response to intervention is accepting.    Progress toward goals and other mental status changes:  The patient's progress toward goals is good.    Diagnosis:     ICD-10-CM ICD-9-CM   1. JUAN JOSE (generalized anxiety disorder) F41.1 300.02   2. Mood disorder F39 296.90   3. Internet shopping addiction F63.89 301.89   4. Obsessive-compulsive behavior R46.81 300.3   5. Chronic pain syndrome G89.4 338.4       Plan:  individual psychotherapy    Return to clinic: 2 weeks, as scheduled, as needed    Length of Service (minutes): 60

## 2020-01-16 ENCOUNTER — LAB VISIT (OUTPATIENT)
Dept: LAB | Facility: OTHER | Age: 50
End: 2020-01-16
Attending: INTERNAL MEDICINE
Payer: MEDICARE

## 2020-01-16 ENCOUNTER — OFFICE VISIT (OUTPATIENT)
Dept: OBSTETRICS AND GYNECOLOGY | Facility: CLINIC | Age: 50
End: 2020-01-16
Payer: MEDICARE

## 2020-01-16 ENCOUNTER — OFFICE VISIT (OUTPATIENT)
Dept: INTERNAL MEDICINE | Facility: CLINIC | Age: 50
End: 2020-01-16
Payer: MEDICARE

## 2020-01-16 VITALS
SYSTOLIC BLOOD PRESSURE: 128 MMHG | BODY MASS INDEX: 41.16 KG/M2 | DIASTOLIC BLOOD PRESSURE: 88 MMHG | WEIGHT: 262.81 LBS

## 2020-01-16 VITALS
OXYGEN SATURATION: 96 % | WEIGHT: 260.81 LBS | SYSTOLIC BLOOD PRESSURE: 138 MMHG | HEIGHT: 67 IN | BODY MASS INDEX: 40.93 KG/M2 | HEART RATE: 103 BPM | DIASTOLIC BLOOD PRESSURE: 68 MMHG

## 2020-01-16 VITALS
OXYGEN SATURATION: 98 % | WEIGHT: 260.81 LBS | BODY MASS INDEX: 40.93 KG/M2 | HEART RATE: 101 BPM | DIASTOLIC BLOOD PRESSURE: 80 MMHG | SYSTOLIC BLOOD PRESSURE: 122 MMHG | HEIGHT: 67 IN

## 2020-01-16 DIAGNOSIS — R10.2 PELVIC PAIN IN FEMALE: ICD-10-CM

## 2020-01-16 DIAGNOSIS — D25.9 UTERINE LEIOMYOMA, UNSPECIFIED LOCATION: ICD-10-CM

## 2020-01-16 DIAGNOSIS — G89.4 CHRONIC PAIN SYNDROME: Primary | ICD-10-CM

## 2020-01-16 DIAGNOSIS — R30.0 DYSURIA: ICD-10-CM

## 2020-01-16 DIAGNOSIS — M79.10 MYALGIA: ICD-10-CM

## 2020-01-16 DIAGNOSIS — Z13.220 ENCOUNTER FOR LIPID SCREENING FOR CARDIOVASCULAR DISEASE: ICD-10-CM

## 2020-01-16 DIAGNOSIS — R33.9 INCOMPLETE BLADDER EMPTYING: ICD-10-CM

## 2020-01-16 DIAGNOSIS — M79.7 FIBROMYALGIA: ICD-10-CM

## 2020-01-16 DIAGNOSIS — M26.629 TMJ SYNDROME: ICD-10-CM

## 2020-01-16 DIAGNOSIS — G89.4 CHRONIC PAIN SYNDROME: ICD-10-CM

## 2020-01-16 DIAGNOSIS — R46.81 OBSESSIVE-COMPULSIVE BEHAVIOR: ICD-10-CM

## 2020-01-16 DIAGNOSIS — M54.50 CHRONIC BILATERAL LOW BACK PAIN WITHOUT SCIATICA: ICD-10-CM

## 2020-01-16 DIAGNOSIS — F63.89 INTERNET ADDICTION: ICD-10-CM

## 2020-01-16 DIAGNOSIS — G89.29 CHRONIC BILATERAL LOW BACK PAIN WITHOUT SCIATICA: ICD-10-CM

## 2020-01-16 DIAGNOSIS — M35.01 SJOGREN'S SYNDROME WITH KERATOCONJUNCTIVITIS SICCA: ICD-10-CM

## 2020-01-16 DIAGNOSIS — Z00.00 ENCOUNTER FOR PREVENTIVE HEALTH EXAMINATION: Primary | ICD-10-CM

## 2020-01-16 DIAGNOSIS — I10 ESSENTIAL HYPERTENSION: Primary | ICD-10-CM

## 2020-01-16 DIAGNOSIS — Z11.3 SCREEN FOR STD (SEXUALLY TRANSMITTED DISEASE): ICD-10-CM

## 2020-01-16 DIAGNOSIS — Z13.6 ENCOUNTER FOR LIPID SCREENING FOR CARDIOVASCULAR DISEASE: ICD-10-CM

## 2020-01-16 DIAGNOSIS — N39.0 RECURRENT UTI (URINARY TRACT INFECTION): ICD-10-CM

## 2020-01-16 DIAGNOSIS — F41.1 GAD (GENERALIZED ANXIETY DISORDER): ICD-10-CM

## 2020-01-16 DIAGNOSIS — D50.9 IRON DEFICIENCY ANEMIA, UNSPECIFIED IRON DEFICIENCY ANEMIA TYPE: ICD-10-CM

## 2020-01-16 DIAGNOSIS — F39 MOOD DISORDER: ICD-10-CM

## 2020-01-16 DIAGNOSIS — N39.0 RECURRENT UTI: ICD-10-CM

## 2020-01-16 DIAGNOSIS — I10 ESSENTIAL HYPERTENSION: Chronic | ICD-10-CM

## 2020-01-16 DIAGNOSIS — G47.62 LEG CRAMPS, SLEEP RELATED: ICD-10-CM

## 2020-01-16 DIAGNOSIS — E66.01 MORBID OBESITY: ICD-10-CM

## 2020-01-16 DIAGNOSIS — N95.1 MENOPAUSAL SYMPTOM: ICD-10-CM

## 2020-01-16 DIAGNOSIS — G47.00 INSOMNIA, UNSPECIFIED TYPE: ICD-10-CM

## 2020-01-16 DIAGNOSIS — Z12.31 ENCOUNTER FOR SCREENING MAMMOGRAM FOR BREAST CANCER: ICD-10-CM

## 2020-01-16 DIAGNOSIS — Z11.4 ENCOUNTER FOR SCREENING FOR HIV: ICD-10-CM

## 2020-01-16 DIAGNOSIS — N89.8 VAGINAL DRYNESS: ICD-10-CM

## 2020-01-16 DIAGNOSIS — Z01.419 WOMEN'S ANNUAL ROUTINE GYNECOLOGICAL EXAMINATION: Primary | ICD-10-CM

## 2020-01-16 DIAGNOSIS — D64.9 ANEMIA, UNSPECIFIED TYPE: ICD-10-CM

## 2020-01-16 LAB
C TRACH DNA SPEC QL NAA+PROBE: NOT DETECTED
N GONORRHOEA DNA SPEC QL NAA+PROBE: NOT DETECTED
OB PNL STL: NEGATIVE

## 2020-01-16 PROCEDURE — 99213 OFFICE O/P EST LOW 20 MIN: CPT | Mod: PBBFAC,27 | Performed by: NURSE PRACTITIONER

## 2020-01-16 PROCEDURE — G0439 PPPS, SUBSEQ VISIT: HCPCS | Mod: S$GLB,,, | Performed by: NURSE PRACTITIONER

## 2020-01-16 PROCEDURE — 99999 PR PBB SHADOW E&M-EST. PATIENT-LVL IV: ICD-10-PCS | Mod: PBBFAC,,, | Performed by: NURSE PRACTITIONER

## 2020-01-16 PROCEDURE — 87481 CANDIDA DNA AMP PROBE: CPT | Mod: 59

## 2020-01-16 PROCEDURE — 99213 OFFICE O/P EST LOW 20 MIN: CPT | Mod: S$PBB,,, | Performed by: INTERNAL MEDICINE

## 2020-01-16 PROCEDURE — 99999 PR PBB SHADOW E&M-EST. PATIENT-LVL III: ICD-10-PCS | Mod: PBBFAC,,, | Performed by: NURSE PRACTITIONER

## 2020-01-16 PROCEDURE — G0101 CA SCREEN;PELVIC/BREAST EXAM: HCPCS | Mod: S$PBB,,, | Performed by: NURSE PRACTITIONER

## 2020-01-16 PROCEDURE — 87661 TRICHOMONAS VAGINALIS AMPLIF: CPT

## 2020-01-16 PROCEDURE — 99214 OFFICE O/P EST MOD 30 MIN: CPT | Mod: PBBFAC,27 | Performed by: NURSE PRACTITIONER

## 2020-01-16 PROCEDURE — G0439 PR MEDICARE ANNUAL WELLNESS SUBSEQUENT VISIT: ICD-10-PCS | Mod: S$GLB,,, | Performed by: NURSE PRACTITIONER

## 2020-01-16 PROCEDURE — 99999 PR PBB SHADOW E&M-EST. PATIENT-LVL III: CPT | Mod: PBBFAC,,, | Performed by: NURSE PRACTITIONER

## 2020-01-16 PROCEDURE — 82272 OCCULT BLD FECES 1-3 TESTS: CPT

## 2020-01-16 PROCEDURE — 99213 OFFICE O/P EST LOW 20 MIN: CPT | Mod: PBBFAC | Performed by: INTERNAL MEDICINE

## 2020-01-16 PROCEDURE — G0101 PR CA SCREEN;PELVIC/BREAST EXAM: ICD-10-PCS | Mod: S$PBB,,, | Performed by: NURSE PRACTITIONER

## 2020-01-16 PROCEDURE — 99213 PR OFFICE/OUTPT VISIT, EST, LEVL III, 20-29 MIN: ICD-10-PCS | Mod: S$PBB,,, | Performed by: INTERNAL MEDICINE

## 2020-01-16 PROCEDURE — 87491 CHLMYD TRACH DNA AMP PROBE: CPT

## 2020-01-16 PROCEDURE — 99999 PR PBB SHADOW E&M-EST. PATIENT-LVL III: CPT | Mod: PBBFAC,,, | Performed by: INTERNAL MEDICINE

## 2020-01-16 PROCEDURE — 99999 PR PBB SHADOW E&M-EST. PATIENT-LVL III: ICD-10-PCS | Mod: PBBFAC,,, | Performed by: INTERNAL MEDICINE

## 2020-01-16 PROCEDURE — 99999 PR PBB SHADOW E&M-EST. PATIENT-LVL IV: CPT | Mod: PBBFAC,,, | Performed by: NURSE PRACTITIONER

## 2020-01-16 RX ORDER — TIZANIDINE 4 MG/1
4 TABLET ORAL 2 TIMES DAILY PRN
Qty: 180 TABLET | Refills: 1 | Status: SHIPPED | OUTPATIENT
Start: 2020-01-16 | End: 2020-04-06 | Stop reason: SDUPTHER

## 2020-01-16 RX ORDER — DESONIDE 0.5 MG/G
CREAM TOPICAL
Qty: 30 G | Refills: 3 | Status: SHIPPED | OUTPATIENT
Start: 2020-01-16 | End: 2020-04-06 | Stop reason: SDUPTHER

## 2020-01-16 RX ORDER — LIRAGLUTIDE 6 MG/ML
INJECTION SUBCUTANEOUS
Qty: 18 ML | Refills: 0 | Status: SHIPPED | OUTPATIENT
Start: 2020-01-16 | End: 2020-03-02

## 2020-01-16 RX ORDER — HYDROCHLOROTHIAZIDE 12.5 MG/1
12.5 TABLET ORAL
COMMUNITY
Start: 2014-01-21 | End: 2021-03-01

## 2020-01-16 RX ORDER — HYDROCODONE BITARTRATE AND ACETAMINOPHEN 5; 325 MG/1; MG/1
1 TABLET ORAL 2 TIMES DAILY PRN
Qty: 35 TABLET | Refills: 0 | Status: SHIPPED | OUTPATIENT
Start: 2020-01-16 | End: 2020-04-06 | Stop reason: SDUPTHER

## 2020-01-16 RX ORDER — HYDROCODONE BITARTRATE AND ACETAMINOPHEN 5; 325 MG/1; MG/1
1 TABLET ORAL 2 TIMES DAILY PRN
Qty: 35 TABLET | Refills: 0 | Status: SHIPPED | OUTPATIENT
Start: 2020-02-15 | End: 2020-06-11

## 2020-01-16 RX ORDER — HYDROCODONE BITARTRATE AND ACETAMINOPHEN 5; 325 MG/1; MG/1
1 TABLET ORAL 2 TIMES DAILY PRN
Qty: 35 TABLET | Refills: 0 | Status: SHIPPED | OUTPATIENT
Start: 2020-03-16 | End: 2020-06-11

## 2020-01-16 RX ORDER — LOSARTAN POTASSIUM 100 MG/1
100 TABLET ORAL
COMMUNITY
Start: 2014-01-21 | End: 2021-03-01

## 2020-01-16 RX ORDER — ESTRADIOL 0.04 MG/D
PATCH TRANSDERMAL
Qty: 12 PATCH | Refills: 3 | Status: SHIPPED | OUTPATIENT
Start: 2020-01-16 | End: 2021-01-04

## 2020-01-16 RX ORDER — NITROFURANTOIN 25; 75 MG/1; MG/1
100 CAPSULE ORAL DAILY PRN
Qty: 30 CAPSULE | Refills: 1 | Status: SHIPPED | OUTPATIENT
Start: 2020-01-16 | End: 2020-01-23

## 2020-01-16 RX ORDER — OMEPRAZOLE 40 MG/1
CAPSULE, DELAYED RELEASE ORAL
Qty: 90 CAPSULE | Refills: 1 | Status: SHIPPED | OUTPATIENT
Start: 2020-01-16 | End: 2020-09-03 | Stop reason: SDUPTHER

## 2020-01-16 NOTE — PROGRESS NOTES
CC: Annual  HPI: Pt is a 49 y.o.  female who presents for routine annual exam. She has been busy watching her grandson. She is s/p hysterectomy- ovaries remain. She does want STD screening- full panel.  She uses prophylactic Macrobid after intercourse for recurrent UTI.  This has helped decreased her UTI's.  Screening mammo is UTD- 7/2019 WNL. She uses HRT patcjes for menopause symptoms.    FH:  Breast cancer: none  Colon cancer: none  Ovarian cancer: none  Endometrial cancer: none      ROS:  GENERAL: Feeling well overall.   SKIN: Denies rash or lesions.   HEAD: Denies head injury or headache.   NODES: Denies enlarged lymph nodes.   CHEST: Denies chest pain or shortness of breath.   CARDIOVASCULAR: Denies palpitations or left sided chest pain.   ABDOMEN: No abdominal pain, nausea, vomiting or rectal bleeding.   URINARY: No dysuria or hematuria.  REPRODUCTIVE: See HPI.   BREASTS: Denies pain, lumps, or nipple discharge.   HEMATOLOGIC: No easy bruisability or excessive bleeding.   MUSCULOSKELETAL: Denies joint pain or swelling.   NEUROLOGIC: Denies syncope or weakness.   PSYCHIATRIC: Denies depression.    PE:    APPEARANCE: Well nourished, well developed, in no acute distress.  NODES: No inguinal lymph node enlargement.  ABDOMEN: Soft. No tenderness or masses. No hernias.  BREASTS: Symmetrical, no skin changes or visible lesions. No palpable masses, nipple discharge or adenopathy bilaterally.  PELVIC: Normal external female genitalia without lesions. Normal hair distribution. Adequate perineal body, normal urethral meatus. Vagina without lesions or discharge. No significant cystocele or rectocele. Uterus and cervix surgically absent. Bimanual exam revealed no masses, tenderness or abnormality.  ANUS: Normal.    Diagnosis:  1. Women's annual routine gynecological examination    2. Menopausal symptom    3. Recurrent UTI    4. Encounter for screening mammogram for breast cancer    5. Screen for STD (sexually transmitted  disease)    6. Encounter for screening for HIV    7. Dysuria          PLAN:  STD screening - full panel  Discussed R/B/A HRT- pt desires refill.  Climara patches refilled  Mammo in 7/2020  Macrobid refilled   Orders Placed This Encounter    C. trachomatis/N. gonorrhoeae by AMP DNA    Vaginosis Screen by DNA Probe    Mammo Digital Screening Bilat With CAD    HIV-1 and HIV-2 antibodies    RPR    Hepatitis panel, acute    estradiol (CLIMARA) 0.0375 mg/24 hr    nitrofurantoin, macrocrystal-monohydrate, (MACROBID) 100 MG capsule       Patient was counseled today on postmenopausal issues.     Follow-up in 1 year.    Kristyn Carmichael, BONNYC

## 2020-01-16 NOTE — PROGRESS NOTES
Subjective:       Patient ID: Jaki Bajwa is a 49 y.o. female.    Chief Complaint: Hypertension    Pt here for f/u. She has a dx of sjogrens and fibromyalgia based on blood work and chronic pain that is primarily across shoulders and in legs but also in upper and lower back. As a result she is on several meds that she says helps. She sees rheum at [facility] and has regular f/u. She has prior dx of lupus but this was not corroborated in notes from rheum. She is on plaquenil, tizanidine, gabapentin and norco. She uses norco 1-2x/day. She is due for refills on pain meds. She has done well with decreasing dispense amount to 35 per month. She has previously signed a pain contract. [place]  reviewed and is consistent. She has seen pain mgmt. She has also done PT with some success in the past.     Review of Systems   Constitutional: Negative for fever.   Respiratory: Negative for shortness of breath.    Cardiovascular: Negative for chest pain.   Gastrointestinal: Negative for abdominal pain.   Neurological: Negative for headaches.   Psychiatric/Behavioral: Negative for dysphoric mood.       Objective:      Physical Exam   Constitutional: She is oriented to person, place, and time. She appears well-developed and well-nourished.   Neck: Neck supple. No thyromegaly present.   Cardiovascular: Normal rate and regular rhythm.   Pulmonary/Chest: Effort normal and breath sounds normal.   Lymphadenopathy:     She has no cervical adenopathy.   Neurological: She is alert and oriented to person, place, and time.   Psychiatric: She has a normal mood and affect. Her behavior is normal.       Assessment:       1. Chronic pain syndrome    2. JUAN JOSE (generalized anxiety disorder)    3. Obsessive-compulsive behavior    4. Fibromyalgia    5. Myalgia        Plan:       1. Refill norco--3 separate rx for 35 tabs each given for 90 days total; proper use d/w pt  2. F/u 3 mos

## 2020-01-16 NOTE — PATIENT INSTRUCTIONS
Counseling and Referral of Other Preventative  (Italic type indicates deductible and co-insurance are waived)    Patient Name: Jaki Bajwa  Today's Date: 1/16/2020    Health Maintenance       Date Due Completion Date    Shingles Vaccine (1 of 2) 10/17/2018 ---    Urine Drug Screen 01/18/2020 7/18/2019    Naloxone Prescription 07/18/2020 7/18/2019    Mammogram 07/18/2020 7/18/2019    Colonoscopy 10/25/2023 10/25/2018    Override on 5/21/2014: Done    Lipid Panel 04/04/2024 4/4/2019    TETANUS VACCINE 04/18/2027 4/18/2017        No orders of the defined types were placed in this encounter.    The following information is provided to all patients.  This information is to help you find resources for any of the problems found today that may be affecting your health:                Living healthy guide: www.[facility].[place].gov      Understanding Diabetes: www.diabetes.org      Eating healthy: www.cdc.gov/healthyweight      [facility] home safety checklist: www.cdc.gov/steadi/patient.html      Agency on Aging: www.goea.[place].[facility]      Alcoholics anonymous (AA): www.aa.org      Physical Activity: www.tracie.nih.gov/na5tnab      Tobacco use: www.quitwithusla.org

## 2020-01-16 NOTE — PROGRESS NOTES
"Jaki Bajwa presented for a  Medicare AWV and comprehensive Health Risk Assessment today. The following components were reviewed and updated:    · Medical history  · Family History  · Social history  · Allergies and Current Medications  · Health Risk Assessment  · Health Maintenance  · Care Team     ** See Completed Assessments for Annual Wellness Visit within the encounter summary.**       The following assessments were completed:  · Living Situation  · CAGE  · Depression Screening  · Timed Get Up and Go  · Whisper Test  · Cognitive Function Screening  · Nutrition Screening  · ADL Screening  · PAQ Screening          Vitals:    01/16/20 0826   BP: 138/68   BP Location: Left arm   Patient Position: Sitting   Pulse: 103   SpO2: 96%   Weight: 118.3 kg (260 lb 12.9 oz)   Height: 5' 7" (1.702 m)     Body mass index is 40.85 kg/m².     Physical Exam   Constitutional: She is oriented to person, place, and time.   Obesity     HENT:   Head: Normocephalic and atraumatic. Not macrocephalic and not microcephalic. Head is without raccoon's eyes, without Alan's sign, without abrasion, without contusion, without laceration, without right periorbital erythema and without left periorbital erythema. Hair is normal.   Right Ear: No lacerations. No drainage, swelling or tenderness. No foreign bodies. No mastoid tenderness. Tympanic membrane is not injected, not scarred, not perforated, not erythematous, not retracted and not bulging. Tympanic membrane mobility is normal. No middle ear effusion. No hemotympanum. No decreased hearing is noted.   Left Ear: No lacerations. No drainage, swelling or tenderness. No foreign bodies. No mastoid tenderness. Tympanic membrane is not injected, not scarred, not perforated, not erythematous, not retracted and not bulging. Tympanic membrane mobility is normal.  No middle ear effusion. No hemotympanum. No decreased hearing is noted.   Nose: Nose normal. No mucosal edema, rhinorrhea, nose " lacerations, sinus tenderness or nasal deformity.   Mouth/Throat: Uvula is midline.   Eyes: Conjunctivae and lids are normal. No scleral icterus.   Neck: Trachea normal. Neck supple. No spinous process tenderness and no muscular tenderness present. No neck rigidity. No edema, no erythema and normal range of motion present. No thyroid mass and no thyromegaly present.   Cardiovascular: Normal rate, regular rhythm, normal heart sounds and intact distal pulses. Exam reveals no gallop and no friction rub.   No murmur heard.  Pulmonary/Chest: Effort normal and breath sounds normal. No stridor. No respiratory distress. She has no wheezes. She has no rales. She exhibits no tenderness.   Abdominal: Soft. Bowel sounds are normal. She exhibits no distension.   Musculoskeletal: Normal range of motion.   Lymphadenopathy:        Head (right side): No submental, no submandibular, no tonsillar, no preauricular and no posterior auricular adenopathy present.        Head (left side): No submental, no submandibular, no tonsillar, no preauricular, no posterior auricular and no occipital adenopathy present.   Neurological: She is alert and oriented to person, place, and time.   Skin: Skin is warm and dry.   Psychiatric: She has a normal mood and affect. Her behavior is normal. Judgment and thought content normal.   Vitals reviewed.      Diagnoses and health risks identified today and associated recommendations/orders:    1. Encounter for preventive health examination  Annual Health Risk Assessment (HRA) visit today.  Counseling and referral of health maintenance and preventative health measures performed.  Patient given annual wellness paperwork to take home.  Encouraged to return in 1 year for subsequent HRA visit.     2. Obsessive-compulsive behavior  Chronic. Stable. Continue current treatment plan as previously prescribed by PCP.    3. JUAN JOSE (generalized anxiety disorder)  Chronic. Stable. Continue current treatment plan as previously  prescribed by PCP.    4. Mood disorder  Chronic. Stable. Continue current treatment plan as previously prescribed by PCP.    5. Morbid obesity  Chronic. Stable. Encouraged to increase exercise as tolerated and improve diet to heart healthy, low sodium diet. Continue current treatment plan as previously prescribed by PCP.    6. Sjogren's syndrome with keratoconjunctivitis sicca  Chronic. Stable. Continue current treatment plan as previously prescribed by PCP.    7. Essential hypertension  Chronic. Stable. Controlled. Encouraged to increase exercise as tolerated (moderate-intensity aerobic activity and muscle-strengthening activities) improve diet to heart healthy, low sodium diet. Continue current treatment plan as previously prescribed by PCP.    8. Chronic pain syndrome  Chronic. Stable. Continue current treatment plan as previously prescribed by PCP.    9. TMJ syndrome  Chronic. Stable. Continue current treatment plan as previously prescribed by PCP.    10. Internet shopping addiction  Chronic. Stable. Continue current treatment plan as previously prescribed by PCP.    11. Uterine leiomyoma, unspecified location  Chronic. Stable. Continue current treatment plan as previously prescribed by PCP.    12. Incomplete bladder emptying  Chronic. Stable. Continue current treatment plan as previously prescribed by PCP.    13. Vaginal dryness  Chronic. Stable. Continue current treatment plan as previously prescribed by PCP.    14. Recurrent UTI (urinary tract infection)  Chronic. Stable. Continue current treatment plan as previously prescribed by PCP.    15. Pelvic pain   Chronic. Stable. Continue current treatment plan as previously prescribed by PCP.    16. Fibromyalgia  Chronic. Stable. Continue current treatment plan as previously prescribed by PCP.    17. Chronic bilateral low back pain without sciatica  Chronic. Stable. Continue current treatment plan as previously prescribed by PCP.    18. Insomnia, unspecified  type  Chronic. Stable. Continue current treatment plan as previously prescribed by PCP.    19. Leg cramps, sleep related  Chronic. Stable. Continue current treatment plan as previously prescribed by PCP.    20. Anemia, unspecified type  Chronic. Stable. Continue current treatment plan as previously prescribed by PCP.        Provided Jaki with a 5-10 year written screening schedule and personal prevention plan. Recommendations were developed using the USPSTF age appropriate recommendations. Education, counseling, and referrals were provided as needed. After Visit Summary printed and given to patient which includes a list of additional screenings\tests needed.    Follow up in about 1 year (around 1/16/2021).    Renato Duran NP  I offered to discuss end of life issues, including information on how to make advance directives that the patient could use to name someone who would make medical decisions on their behalf if they became too ill to make themselves.    ___Patient declined  _X_Patient is interested, I provided paper work and offered to discuss.

## 2020-01-17 ENCOUNTER — IMMUNIZATION (OUTPATIENT)
Dept: PHARMACY | Facility: CLINIC | Age: 50
End: 2020-01-17
Payer: MEDICARE

## 2020-01-17 ENCOUNTER — TELEPHONE (OUTPATIENT)
Dept: OBSTETRICS AND GYNECOLOGY | Facility: CLINIC | Age: 50
End: 2020-01-17

## 2020-01-17 DIAGNOSIS — B96.89 BV (BACTERIAL VAGINOSIS): Primary | ICD-10-CM

## 2020-01-17 DIAGNOSIS — B37.31 VAGINAL YEAST INFECTION: ICD-10-CM

## 2020-01-17 DIAGNOSIS — N76.0 BV (BACTERIAL VAGINOSIS): Primary | ICD-10-CM

## 2020-01-17 LAB
BACTERIAL VAGINOSIS DNA: POSITIVE
CANDIDA GLABRATA DNA: POSITIVE
CANDIDA KRUSEI DNA: NEGATIVE
CANDIDA RRNA VAG QL PROBE: POSITIVE
T VAGINALIS RRNA GENITAL QL PROBE: NEGATIVE

## 2020-01-17 RX ORDER — TERCONAZOLE 4 MG/G
1 CREAM VAGINAL DAILY
Qty: 45 G | Refills: 0 | Status: SHIPPED | OUTPATIENT
Start: 2020-01-17 | End: 2020-03-27

## 2020-01-17 RX ORDER — METRONIDAZOLE 500 MG/1
500 TABLET ORAL 2 TIMES DAILY
Qty: 14 TABLET | Refills: 0 | Status: SHIPPED | OUTPATIENT
Start: 2020-01-17 | End: 2020-01-24

## 2020-01-20 ENCOUNTER — TELEPHONE (OUTPATIENT)
Dept: OBSTETRICS AND GYNECOLOGY | Facility: CLINIC | Age: 50
End: 2020-01-20

## 2020-01-21 ENCOUNTER — TELEPHONE (OUTPATIENT)
Dept: PRIMARY CARE CLINIC | Facility: CLINIC | Age: 50
End: 2020-01-21

## 2020-01-21 DIAGNOSIS — D50.9 IRON DEFICIENCY ANEMIA, UNSPECIFIED IRON DEFICIENCY ANEMIA TYPE: Primary | ICD-10-CM

## 2020-01-21 NOTE — TELEPHONE ENCOUNTER
----- Message from Mary Marie sent at 1/21/2020  9:17 AM CST -----  Contact: self  [phone]  .Type: Patient Call Back    Who called: self     What is the request in detail: Pt states that she eats ice all day and would like to know if she should  do blood work to check iron levels     Can the clinic reply by [facility]? Call back     Would the patient rather a call back or a response via [facility]? Call back     Best call back number: [phone]

## 2020-01-22 NOTE — TELEPHONE ENCOUNTER
I ordered labs are her 1/16/20 visit and she should complete these as scheduled in April but I'd like her to go ahead with labs ordered here now.

## 2020-01-23 ENCOUNTER — LAB VISIT (OUTPATIENT)
Dept: LAB | Facility: OTHER | Age: 50
End: 2020-01-23
Attending: INTERNAL MEDICINE
Payer: MEDICARE

## 2020-01-23 DIAGNOSIS — D50.9 IRON DEFICIENCY ANEMIA, UNSPECIFIED IRON DEFICIENCY ANEMIA TYPE: ICD-10-CM

## 2020-01-23 LAB
BASOPHILS # BLD AUTO: 0.05 K/UL (ref 0–0.2)
BASOPHILS NFR BLD: 0.8 % (ref 0–1.9)
DIFFERENTIAL METHOD: ABNORMAL
EOSINOPHIL # BLD AUTO: 0.3 K/UL (ref 0–0.5)
EOSINOPHIL NFR BLD: 3.9 % (ref 0–8)
ERYTHROCYTE [DISTWIDTH] IN BLOOD BY AUTOMATED COUNT: 15.5 % (ref 11.5–14.5)
HCT VFR BLD AUTO: 31.5 % (ref 37–48.5)
HGB BLD-MCNC: 8.9 G/DL (ref 12–16)
IMM GRANULOCYTES # BLD AUTO: 0.01 K/UL (ref 0–0.04)
IMM GRANULOCYTES NFR BLD AUTO: 0.2 % (ref 0–0.5)
IRON SERPL-MCNC: 26 UG/DL (ref 30–160)
LYMPHOCYTES # BLD AUTO: 2.7 K/UL (ref 1–4.8)
LYMPHOCYTES NFR BLD: 42.8 % (ref 18–48)
MCH RBC QN AUTO: 22.2 PG (ref 27–31)
MCHC RBC AUTO-ENTMCNC: 28.3 G/DL (ref 32–36)
MCV RBC AUTO: 79 FL (ref 82–98)
MONOCYTES # BLD AUTO: 0.6 K/UL (ref 0.3–1)
MONOCYTES NFR BLD: 9.2 % (ref 4–15)
NEUTROPHILS # BLD AUTO: 2.8 K/UL (ref 1.8–7.7)
NEUTROPHILS NFR BLD: 43.1 % (ref 38–73)
NRBC BLD-RTO: 0 /100 WBC
PLATELET # BLD AUTO: 264 K/UL (ref 150–350)
PMV BLD AUTO: 11.8 FL (ref 9.2–12.9)
RBC # BLD AUTO: 4.01 M/UL (ref 4–5.4)
SATURATED IRON: 6 % (ref 20–50)
TOTAL IRON BINDING CAPACITY: 468 UG/DL (ref 250–450)
TRANSFERRIN SERPL-MCNC: 316 MG/DL (ref 200–375)
WBC # BLD AUTO: 6.4 K/UL (ref 3.9–12.7)

## 2020-01-23 PROCEDURE — 82728 ASSAY OF FERRITIN: CPT

## 2020-01-23 PROCEDURE — 83540 ASSAY OF IRON: CPT

## 2020-01-23 PROCEDURE — 36415 COLL VENOUS BLD VENIPUNCTURE: CPT

## 2020-01-23 PROCEDURE — 85025 COMPLETE CBC W/AUTO DIFF WBC: CPT

## 2020-01-24 ENCOUNTER — TELEPHONE (OUTPATIENT)
Dept: INTERNAL MEDICINE | Facility: CLINIC | Age: 50
End: 2020-01-24

## 2020-01-24 DIAGNOSIS — D50.9 IRON DEFICIENCY ANEMIA, UNSPECIFIED IRON DEFICIENCY ANEMIA TYPE: Primary | ICD-10-CM

## 2020-01-24 LAB — FERRITIN SERPL-MCNC: 5 NG/ML (ref 20–300)

## 2020-01-24 RX ORDER — FERROUS SULFATE 325(65) MG
325 TABLET, DELAYED RELEASE (ENTERIC COATED) ORAL DAILY
Qty: 90 TABLET | Refills: 1 | Status: SHIPPED | OUTPATIENT
Start: 2020-01-24 | End: 2020-06-11 | Stop reason: SDUPTHER

## 2020-01-24 NOTE — TELEPHONE ENCOUNTER
----- Message from Tanya Lemus sent at 1/24/2020 10:23 AM CST -----  Contact: PENG MAYO [[id number]]  Type:  Patient Returning Call    Who Called: PENG MAYO [[id number]]    Who Left Message for Patient: Isadora Wadsworth    Does the patient know what this is regarding?:Y     Best Call Back Number:[phone]    Additional Information:

## 2020-01-24 NOTE — TELEPHONE ENCOUNTER
"Spoke with pt and informed her that  would like for her to know "  iron levels and blood counts are again low and I'd like her to resume iron 1-2 times per day. Let's repeat labs in 8 weeks to see if things are improving."  Scheduled pt for 3/19/2020 with labs. Pt stated she is currently not taking any iron supplements. Message routed to provider    "

## 2020-01-24 NOTE — TELEPHONE ENCOUNTER
Inform pt that her iron levels and blood counts are again low and I'd like her to resume iron 1-2 times per day. Let's repeat labs in 8 weeks to see if things are improving. If she is already taking iron, let me know.

## 2020-01-27 ENCOUNTER — TELEPHONE (OUTPATIENT)
Dept: PRIMARY CARE CLINIC | Facility: CLINIC | Age: 50
End: 2020-01-27

## 2020-01-30 DIAGNOSIS — N89.8 VAGINAL IRRITATION: Primary | ICD-10-CM

## 2020-01-30 RX ORDER — FLUCONAZOLE 100 MG/1
150 TABLET ORAL EVERY OTHER DAY
Qty: 3 TABLET | Refills: 0 | Status: SHIPPED | OUTPATIENT
Start: 2020-01-30 | End: 2021-06-03 | Stop reason: SDUPTHER

## 2020-01-30 NOTE — TELEPHONE ENCOUNTER
----- Message from Britany Ruggiero sent at 1/30/2020  9:17 AM CST -----  Contact: Pt   Can the clinic reply in [facility]: No    Please refill the medication(s) listed below. The patient can be reached at this phone number (_[phone] once it is called into the pharmacy.    Medication #1 fluconazole (DIFLUCAN) 200 MG Tab    Preferred Pharmacy:[id number] - [place] [address] AT [address]

## 2020-01-31 ENCOUNTER — EXTERNAL CHRONIC CARE MANAGEMENT (OUTPATIENT)
Dept: PRIMARY CARE CLINIC | Facility: CLINIC | Age: 50
End: 2020-01-31
Payer: MEDICARE

## 2020-01-31 PROCEDURE — G2058 CCM ADD 20MIN: HCPCS | Mod: S$PBB,,, | Performed by: INTERNAL MEDICINE

## 2020-01-31 PROCEDURE — 99490 CHRNC CARE MGMT STAFF 1ST 20: CPT | Mod: S$PBB,,, | Performed by: INTERNAL MEDICINE

## 2020-01-31 PROCEDURE — G2058 PR CHRON CARE MGMT, EA ADDTL 20 MINS: ICD-10-PCS | Mod: S$PBB,,, | Performed by: INTERNAL MEDICINE

## 2020-01-31 PROCEDURE — 99490 CHRNC CARE MGMT STAFF 1ST 20: CPT | Mod: PBBFAC | Performed by: INTERNAL MEDICINE

## 2020-01-31 PROCEDURE — 99490 PR CHRONIC CARE MGMT, 1ST 20 MIN: ICD-10-PCS | Mod: S$PBB,,, | Performed by: INTERNAL MEDICINE

## 2020-02-05 ENCOUNTER — TELEPHONE (OUTPATIENT)
Dept: PRIMARY CARE CLINIC | Facility: CLINIC | Age: 50
End: 2020-02-05

## 2020-02-05 NOTE — PROGRESS NOTES
"Guillermina Rojas ZO, contacted Ms. Bajwa to update the assessments and confirm her appointment tomorrow with Kenney Marie LCSW. Patient scored "9" on the PHQ9 and "7" on the GAD7.    "

## 2020-02-06 ENCOUNTER — TELEPHONE (OUTPATIENT)
Dept: PRIMARY CARE CLINIC | Facility: CLINIC | Age: 50
End: 2020-02-06

## 2020-02-06 NOTE — PROGRESS NOTES
Ms. Bajwa contacted W, Guillermina Rojas to cancel her appointment for today and reschedule.  W scheduled next appointment on 02/13/2020 at 3:00 pm with Kenney Marie LCSW. Patient confirmed.

## 2020-02-13 ENCOUNTER — CLINICAL SUPPORT (OUTPATIENT)
Dept: PRIMARY CARE CLINIC | Facility: CLINIC | Age: 50
End: 2020-02-13
Payer: MEDICARE

## 2020-02-13 DIAGNOSIS — F41.1 GAD (GENERALIZED ANXIETY DISORDER): Primary | ICD-10-CM

## 2020-02-13 DIAGNOSIS — R46.81 OBSESSIVE-COMPULSIVE BEHAVIOR: ICD-10-CM

## 2020-02-13 DIAGNOSIS — F39 MOOD DISORDER: ICD-10-CM

## 2020-02-13 DIAGNOSIS — G89.4 CHRONIC PAIN SYNDROME: ICD-10-CM

## 2020-02-13 NOTE — PROGRESS NOTES
"Individual Psychotherapy (PhD/LCSW)    The patient location is:  Patient Home   Visit type: Phone   Each patient to whom he or she provides medical services by telemedicine is:  (1) informed of the relationship between the physician and patient and the respective role of any other health care provider with respect to management of the patient; and (2) notified that he or she may decline to receive medical services by telemedicine and may withdraw from such care at any time.     2020    Site:  [facility]         Therapeutic Intervention: Met with patient.  Outpatient - Behavior modifying psychotherapy 60 min - CPT code 57631 and Outpatient - Supportive psychotherapy 60 min - CPT Code 11931    Chief complaint/reason for encounter: depression, anxiety and interpersonal     Interval history and content of current session: LCSW and PT discussed increased depressive symptoms. PT states another sister in law has passed away. She states she wants to go to the  but is unsure. PT is scared that people are looking at her and making judgments. LCSW and PT discussed cultivating increase self esteem, self confidence, and self love. PT states "You know I don't really like myself", LCSW challenged this thought process because PT has stated to LCSW that she loves her hair.  LCSW and PT discussed her looking into a mirror each day and stating one positive thing she likes about herself. PT will start writing positive self affirmation down in her phone or journal to come back to when she is feeling.     Treatment plan:  · Target symptoms: depression, anxiety , grief  · Why chosen therapy is appropriate versus another modality: relevant to diagnosis, patient responds to this modality, evidence based practice  · Outcome monitoring methods: self-report, checklist/rating scale  · Therapeutic intervention type: behavior modifying psychotherapy, supportive psychotherapy    Risk parameters:  Patient reports no suicidal " ideation  Patient reports no homicidal ideation  Patient reports no self-injurious behavior  Patient reports no violent behavior    Verbal deficits: None    Patient's response to intervention:  The patient's response to intervention is accepting.    Treatment Goals:  Mental:  Increase positive thoughts about herself.   Physical:  PT states she would like to get out of her house more!     Progress toward goals:  The patient's progress toward goals is fair .    Diagnosis:     ICD-10-CM ICD-9-CM   1. JUAN JOSE (generalized anxiety disorder) F41.1 300.02   2. Mood disorder F39 296.90   3. Obsessive-compulsive behavior R46.81 300.3   4. Chronic pain syndrome G89.4 338.4       Plan:  individual psychotherapy and medication management by physician    Return to clinic: 1 month, as scheduled, as needed    Length of Service (minutes): 60     Declines

## 2020-02-18 ENCOUNTER — TELEPHONE (OUTPATIENT)
Dept: PRIMARY CARE CLINIC | Facility: CLINIC | Age: 50
End: 2020-02-18

## 2020-02-19 ENCOUNTER — OFFICE VISIT (OUTPATIENT)
Dept: PSYCHIATRY | Facility: CLINIC | Age: 50
End: 2020-02-19
Payer: MEDICARE

## 2020-02-19 VITALS
HEART RATE: 98 BPM | WEIGHT: 260.56 LBS | DIASTOLIC BLOOD PRESSURE: 78 MMHG | BODY MASS INDEX: 40.81 KG/M2 | SYSTOLIC BLOOD PRESSURE: 129 MMHG

## 2020-02-19 DIAGNOSIS — R46.81 OBSESSIVE-COMPULSIVE BEHAVIOR: ICD-10-CM

## 2020-02-19 DIAGNOSIS — G47.00 INSOMNIA, UNSPECIFIED TYPE: ICD-10-CM

## 2020-02-19 DIAGNOSIS — F40.10 SOCIAL PHOBIA: ICD-10-CM

## 2020-02-19 DIAGNOSIS — F41.1 GAD (GENERALIZED ANXIETY DISORDER): Primary | ICD-10-CM

## 2020-02-19 DIAGNOSIS — F63.89 INTERNET ADDICTION: ICD-10-CM

## 2020-02-19 DIAGNOSIS — F33.42 RECURRENT MAJOR DEPRESSIVE DISORDER, IN FULL REMISSION: ICD-10-CM

## 2020-02-19 DIAGNOSIS — F39 MOOD DISORDER: ICD-10-CM

## 2020-02-19 PROCEDURE — 99214 PR OFFICE/OUTPT VISIT, EST, LEVL IV, 30-39 MIN: ICD-10-PCS | Mod: S$PBB,,, | Performed by: INTERNAL MEDICINE

## 2020-02-19 PROCEDURE — 90834 PR PSYCHOTHERAPY W/PATIENT, 45 MIN: ICD-10-PCS | Mod: ,,, | Performed by: SOCIAL WORKER

## 2020-02-19 PROCEDURE — 99213 OFFICE O/P EST LOW 20 MIN: CPT | Mod: PBBFAC | Performed by: INTERNAL MEDICINE

## 2020-02-19 PROCEDURE — 99999 PR PBB SHADOW E&M-EST. PATIENT-LVL III: CPT | Mod: PBBFAC,,, | Performed by: INTERNAL MEDICINE

## 2020-02-19 PROCEDURE — 99999 PR PBB SHADOW E&M-EST. PATIENT-LVL III: ICD-10-PCS | Mod: PBBFAC,,, | Performed by: INTERNAL MEDICINE

## 2020-02-19 PROCEDURE — 90834 PSYTX W PT 45 MINUTES: CPT | Mod: ,,, | Performed by: SOCIAL WORKER

## 2020-02-19 PROCEDURE — 99214 OFFICE O/P EST MOD 30 MIN: CPT | Mod: S$PBB,,, | Performed by: INTERNAL MEDICINE

## 2020-02-19 RX ORDER — DIAZEPAM 2 MG/1
2 TABLET ORAL DAILY PRN
Qty: 30 TABLET | Refills: 0 | Status: SHIPPED | OUTPATIENT
Start: 2020-02-19 | End: 2020-07-13 | Stop reason: SDUPTHER

## 2020-02-19 RX ORDER — BUSPIRONE HYDROCHLORIDE 5 MG/1
5 TABLET ORAL 2 TIMES DAILY
Qty: 180 TABLET | Refills: 3 | Status: SHIPPED | OUTPATIENT
Start: 2020-02-19 | End: 2020-12-10

## 2020-02-19 NOTE — PROGRESS NOTES
"           Individual Psychotherapy (PhD/LCSW)    2/19/2020    Site:  [facility]         Therapeutic Intervention: Met with patient.  Outpatient - Insight oriented psychotherapy 45 min - CPT code 31762 and Outpatient - Behavior modifying psychotherapy 45 min - CPT code 90132    Chief complaint/reason for encounter: depression, anxiety and interpersonal     Interval history and content of current session:        Implemented mindful meditation followed by distinction of thoughts vs. Facts.   Kept reinforcing the "power" thoughts have over behaviors and beliefs.   Pt is status quo.  Reports she feels stable and relationship is good.  She continues to have her rituals but she does not recognize them as bothersome.   She has been staying with boyfriend per fear of staying alone (someone might bust the door and felt spirit of sister approaching her in a sexual way).   She is future oriented.  Has no suicidal ideation.  No delusions or hallucinations.  Not tearful.  Normal voice tone and rate as well as normal body motion rate.  Reports things are "good".          Treatment plan:  · Target symptoms: depression, anxiety   · Why chosen therapy is appropriate versus another modality: relevant to diagnosis  · Outcome monitoring methods: self-report, observation    · Therapeutic intervention type: insight oriented psychotherapy, behavior modifying psychotherapy, supportive psychotherapy    Risk parameters:  Patient reports no suicidal ideation  Patient reports no homicidal ideation  Patient reports no self-injurious behavior  Patient reports no violent behavior    Verbal deficits: None    Patient's response to intervention:  The patient's response to intervention is accepting.    Progress toward goals and other mental status changes:  The patient's progress toward goals is limited.    Diagnosis: 296.35;   Obsessive compulsive disorder.  personality disorder nos  Learning problems/limitations   internet shopping addiction "   Social anxiety, generalized anxiety disorder.     Plan:  individual psychotherapy    Return to clinic:   3 months.

## 2020-02-19 NOTE — PROGRESS NOTES
"OUTPATIENT PSYCHIATRY RETURN VISIT    ENCOUNTER DATE:  2/19/2020  SITE:  [facility], [facility]  LENGTH OF SESSION:  25 minutes    CHIEF COMPLAINT:  Mood    HISTORY OF PRESENTING ILLNESS:  Jaki Bajwa is a 49 y.o. female with history of MDD, JUAN JOSE, OCD, social phobia, and Internet shopping addiction who presents for follow up appointment.      Plan at last appointment on 11/27/2019:  · Will start Buspar 5mg BID and reduce Valium to 2.5mg daily PRN.  If tolerating Buspar, can then increase to 10mg BID.  She is aware of risks associated with combining benzodiazepines and narcotics, including the risk of respiratory depression and death.  She would like to come off of Valium completely if anxiety can be well controlled with other medication.  · Continue Latuda 60mg daily for mood stabilization/anxiety.    · Continue Wellbutrin XL 450mg daily for depression.    · Continue Doxepin 150mg qHS for insomnia and mood.    · Continue therapy with JORDAN MEADE and Mr. Noah LCSW.    History as told by patient:  Feels Buspar has been helpful - says it has caused improved mood, more energy, happier, and more alert.  Has also helped her anxiety.  Ran out of her Valium because she didn't realize there were only 15 tablets (prescription says 30 tablets, however she has brought prescription bottle today to confirm).  Has been out for a few weeks.  More anxious but doing "ok."  Sister-in-law passed away from heart problems and patient says she cried so much.  Does not go to funerals because she doesn't want nightmares.  Sister kissed her in her dream and this scared her.  Says this sister did not like her - was jealous of her.  Not feeling sad anmore.  Things with Scott are good.  Still shopping obsessively.  Has actually been more recently.  Scott gives her the money for this.  She believes it is a true addiction.  Gets "a high" from this.  Knows she shouldn't buy and save the money but can't make herself " stop.  Has been doing this most of her life.  She feels it fills a void in her life - temporary happiness.  She says buying things makes her more happy than her family.  Didn't want to come today - felt drained and tired.  Felt she didn't look good.  Says she didn't prepare herself like she usually does.  Continues to feel therapy is helpful.    Medication side effects:  No  Medication compliance:  Yes    PSYCHIATRIC REVIEW OF SYSTEMS:  Trouble with sleep:  Denies  Appetite changes:  Denies  Weight changes:  Denies  Lack of energy:  Denies  Anhedonia:  Denies  Somatic symptoms:  Denies  Libido:  Denies  Anxiety/panic: Yes   Guilty/hopeless:  Denies  Self-injurious behavior/risky behavior:  Denies  Any drugs:  Denies  Alcohol:  Denies    MEDICAL REVIEW OF SYSTEMS:  Complete review of systems performed covering Constitutional, Musculoskeletal, Neurologic.  All systems negative except for that covered in HPI.    PAST PSYCHIATRIC, MEDICAL, AND SOCIAL HISTORY REVIEWED  The patient's past medical, family and social history have been reviewed and updated as appropriate within the electronic medical record - see encounter notes.    MEDICATIONS:    Current Outpatient Medications:     buPROPion (WELLBUTRIN XL) 150 MG TB24 tablet, Take 3 tablets (450 mg total) by mouth every morning., Disp: 270 tablet, Rfl: 3    busPIRone (BUSPAR) 5 MG Tab, Take 1 tablet (5 mg total) by mouth 2 (two) times daily., Disp: 180 tablet, Rfl: 3    desonide (DESOWEN) 0.05 % cream, MILTON EXT AA BID PRN, Disp: 30 g, Rfl: 3    diazePAM (VALIUM) 2 MG tablet, Take 1 tablet (2 mg total) by mouth daily as needed (Severe anxiety/Panic attack)., Disp: 30 tablet, Rfl: 0    doxepin (SINEQUAN) 150 MG Cap, Take 1 capsule (150 mg total) by mouth every evening., Disp: 90 capsule, Rfl: 3    estradiol (CLIMARA) 0.0375 mg/24 hr, APPLY 1 PATCH TO SKIN EVERY 7 DAYS, Disp: 12 patch, Rfl: 3    ferrous sulfate 325 (65 FE) MG EC tablet, Take 1 tablet (325 mg total) by  mouth once daily., Disp: 90 tablet, Rfl: 1    gabapentin (NEURONTIN) 300 MG capsule, Take 600 mg by mouth 2 (two) times daily. , Disp: , Rfl:     hydroCHLOROthiazide (HYDRODIURIL) 12.5 MG Tab, Take 12.5 mg by mouth., Disp: , Rfl:     HYDROcodone-acetaminophen (NORCO) 5-325 mg per tablet, Take 1 tablet by mouth 2 (two) times daily as needed for Pain. Quantity medically necessary, Disp: 35 tablet, Rfl: 0    HYDROcodone-acetaminophen (NORCO) 5-325 mg per tablet, Take 1 tablet by mouth 2 (two) times daily as needed for Pain. Quantity medically necessary, Disp: 35 tablet, Rfl: 0    [START ON 3/16/2020] HYDROcodone-acetaminophen (NORCO) 5-325 mg per tablet, Take 1 tablet by mouth 2 (two) times daily as needed for Pain. Quantity medically necessary, Disp: 35 tablet, Rfl: 0    hydroxychloroquine (PLAQUENIL) 200 mg tablet, Take 200 mg by mouth Twice daily., Disp: , Rfl:     irbesartan-hydrochlorothiazide (AVALIDE) 150-12.5 mg per tablet, Take 1 tablet by mouth once daily., Disp: 90 tablet, Rfl: 3    losartan (COZAAR) 100 MG tablet, Take 100 mg by mouth., Disp: , Rfl:     lurasidone (LATUDA) 60 mg Tab tablet, Take 1 tablet (60 mg total) by mouth once daily., Disp: 90 tablet, Rfl: 3    naproxen (NAPROSYN) 500 MG tablet, TAKE 1 TABLET(500 MG) BY MOUTH TWICE DAILY AS NEEDED FOR PAIN, Disp: 180 tablet, Rfl: 1    omeprazole (PRILOSEC) 40 MG capsule, TAKE 1 CAPSULE(40 MG) BY MOUTH EVERY DAY, Disp: 90 capsule, Rfl: 1    RESTASIS 0.05 % ophthalmic emulsion, PLACE ONE DROP INTO BOTH EYES BID, Disp: , Rfl: 0    terconazole (TERAZOL 7) 0.4 % Crea, Place 1 applicator vaginally once daily., Disp: 45 g, Rfl: 0    terconazole (TERAZOL 7) 0.4 % Crea, Place 1 applicator vaginally once daily., Disp: 45 g, Rfl: 0    tiZANidine (ZANAFLEX) 4 MG tablet, Take 1 tablet (4 mg total) by mouth 2 (two) times daily as needed., Disp: 180 tablet, Rfl: 1    VICTOZA 3-KHUSHI 0.6 mg/0.1 mL (18 mg/3 mL) PnIj, ADMINISTER 1.8 MG UNDER THE SKIN EVERY  "DAY, Disp: 18 mL, Rfl: 0    ALLERGIES:  Review of patient's allergies indicates:   Allergen Reactions    Aspirin Hives     PSYCHIATRIC EXAM:  Vitals:    02/19/20 0844   BP: 129/78   Pulse: 98   Weight: 118.2 kg (260 lb 9.3 oz)     Appearance:  Well groomed, appearing healthy and of stated age  Behavior:  Cooperative, pleasant, no psychomotor agitation or retardation  Speech:  Normal rate, rhythm, prosody, and volume  Mood:  "Better"  Affect:  Congruent, smiling  Thought Process:  Linear, logical, goal directed  Thought Content:  Negative for suicidal ideation, homicidal ideation, delusions or hallucinations.  Associations:  Intact  Memory:  Grossly Intact  Level of Consciousness/Orientation:  Grossly intact  Fund of Knowledge:  Good  Attention:  Good  Language:  Fluent, able to name abstract and concrete objects  Insight:  Fair  Judgment:  Intact  Psychomotor signs:  No involuntary movements or tremor  Gait:  Normal    RELEVANT LABS/STUDIES:    Lab Results   Component Value Date    WBC 6.40 01/23/2020    HGB 8.9 (L) 01/23/2020    HCT 31.5 (L) 01/23/2020    MCV 79 (L) 01/23/2020     01/23/2020     BMP  Lab Results   Component Value Date     04/04/2019    K 3.8 04/04/2019     04/04/2019    CO2 31 (H) 04/04/2019    BUN 18 04/04/2019    CREATININE 1.1 04/04/2019    CALCIUM 9.8 04/04/2019    ANIONGAP 6 (L) 04/04/2019    ESTGFRAFRICA >60 04/04/2019    EGFRNONAA 59 (A) 04/04/2019     Lab Results   Component Value Date    ALT 16 04/04/2019    AST 19 04/04/2019    ALKPHOS 74 04/04/2019    BILITOT 0.4 04/04/2019     Lab Results   Component Value Date    TSH 2.261 04/18/2017     No results found for: LABA1C, HGBA1C    IMPRESSION:    Jaki Bajwa is a 49 y.o. female with history of MDD, JUAN JOSE, OCD, social phobia, and Internet shopping addiction who presents for follow up appointment.    Status/Progress:  Based on the examination today, the patient's problem(s) is/are improved.  New problems have not been " presented today.    Risk Parameters:  Patient reports no suicidal ideation  Patient reports no homicidal ideation  Patient reports no self-injurious behavior  Patient reports no violent behavior    DIAGNOSES:    ICD-10-CM ICD-9-CM   1. JUAN JOSE (generalized anxiety disorder) F41.1 300.02   2. Social phobia F40.10 300.23   3. Obsessive-compulsive behavior R46.81 300.3   4. Mood disorder F39 296.90   5. Internet shopping addiction F63.89 301.89   6. Insomnia, unspecified type G47.00 780.52     PLAN:  · Patient is not interested in medication changes today.  · Continue Buspar 5mg BID for anxiety.  · Reduce Valium to 2mg daily PRN severe anxiety (encouraged patient not to take every day).  · Continue Latuda 60mg daily for mood stabilization/anxiety.    · Continue Wellbutrin XL 450mg daily for depression.    · Continue Doxepin 150mg qHS for insomnia and mood.    · Continue therapy with JORDAN MEADE and Mr. Noah LCSW.    RETURN TO CLINIC:  Follow up in about 3 months (around 5/19/2020).

## 2020-02-29 ENCOUNTER — EXTERNAL CHRONIC CARE MANAGEMENT (OUTPATIENT)
Dept: PRIMARY CARE CLINIC | Facility: CLINIC | Age: 50
End: 2020-02-29
Payer: MEDICARE

## 2020-02-29 PROCEDURE — 99490 CHRNC CARE MGMT STAFF 1ST 20: CPT | Mod: S$PBB,,, | Performed by: INTERNAL MEDICINE

## 2020-02-29 PROCEDURE — 99490 PR CHRONIC CARE MGMT, 1ST 20 MIN: ICD-10-PCS | Mod: S$PBB,,, | Performed by: INTERNAL MEDICINE

## 2020-02-29 PROCEDURE — 99490 CHRNC CARE MGMT STAFF 1ST 20: CPT | Mod: PBBFAC | Performed by: INTERNAL MEDICINE

## 2020-03-02 DIAGNOSIS — E66.01 MORBID OBESITY: ICD-10-CM

## 2020-03-02 DIAGNOSIS — Z79.4 TYPE 2 DIABETES MELLITUS WITH COMPLICATION, WITH LONG-TERM CURRENT USE OF INSULIN: ICD-10-CM

## 2020-03-02 DIAGNOSIS — E11.8 TYPE 2 DIABETES MELLITUS WITH COMPLICATION, WITH LONG-TERM CURRENT USE OF INSULIN: ICD-10-CM

## 2020-03-02 DIAGNOSIS — I10 ESSENTIAL HYPERTENSION: Chronic | ICD-10-CM

## 2020-03-02 RX ORDER — PEN NEEDLE, DIABETIC 31 GX5/16"
NEEDLE, DISPOSABLE MISCELLANEOUS
Qty: 100 EACH | Refills: 3 | Status: SHIPPED | OUTPATIENT
Start: 2020-03-02 | End: 2021-09-29

## 2020-03-02 RX ORDER — LIRAGLUTIDE 6 MG/ML
INJECTION SUBCUTANEOUS
Qty: 18 ML | Refills: 0 | Status: SHIPPED | OUTPATIENT
Start: 2020-03-02 | End: 2020-04-28

## 2020-03-18 ENCOUNTER — TELEPHONE (OUTPATIENT)
Dept: PRIMARY CARE CLINIC | Facility: CLINIC | Age: 50
End: 2020-03-18

## 2020-03-18 NOTE — PROGRESS NOTES
"SNOW Royal contacted Ms. Bajwa to remind patient of her appointment tomorrow with Kenney Marie LCSW. LEONARDW updated the assessments, patient scored "5" on the PHQ9 and "10 on the GAD7. Patient confirmed appointment.  "

## 2020-03-19 ENCOUNTER — CLINICAL SUPPORT (OUTPATIENT)
Dept: PRIMARY CARE CLINIC | Facility: CLINIC | Age: 50
End: 2020-03-19
Payer: MEDICARE

## 2020-03-19 DIAGNOSIS — F41.1 GAD (GENERALIZED ANXIETY DISORDER): Primary | ICD-10-CM

## 2020-03-19 DIAGNOSIS — R46.81 OBSESSIVE-COMPULSIVE BEHAVIOR: ICD-10-CM

## 2020-03-19 DIAGNOSIS — G89.4 CHRONIC PAIN SYNDROME: ICD-10-CM

## 2020-03-19 DIAGNOSIS — F39 MOOD DISORDER: ICD-10-CM

## 2020-03-19 PROCEDURE — 90837 PR PSYCHOTHERAPY W/PATIENT, 60 MIN: ICD-10-PCS | Mod: BHI,95,S$GLB, | Performed by: SOCIAL WORKER

## 2020-03-19 PROCEDURE — 90837 PSYTX W PT 60 MINUTES: CPT | Mod: BHI,95,S$GLB, | Performed by: SOCIAL WORKER

## 2020-03-19 NOTE — PROGRESS NOTES
Individual Psychotherapy (PhD/LCSW)    The patient location is:  Patient Home   Visit type: Telephone  Each patient to whom he or she provides medical services by telemedicine is:  (1) informed of the relationship between the physician and patient and the respective role of any other health care provider with respect to management of the patient; and (2) notified that he or she may decline to receive medical services by telemedicine and may withdraw from such care at any time.     3/19/2020    Site:  [facility]         Therapeutic Intervention: Met with patient.  Outpatient - Behavior modifying psychotherapy 60 min - CPT code 52808 and Outpatient - Supportive psychotherapy 60 min - CPT Code 39109    Chief complaint/reason for encounter: anxiety, sleep, dissociation, behavior, cognition and interpersonal     Interval history and content of current session: LCSW and PT met by phone. PT states she is doing well overall. PT has an abscess in her gum which she has a emergency dental appointment scheduled for tomorrow. PT states she has put this dental appointment off for a while and finally has to get it taken care of. PT states it felt good making the apt and putting herself first. PT states her anxiety is higher due to COVID 19. PT states she is worried about her boyfriend and her two children. LCSW and PT reviewed ACT, CBT, and Mindfulness based interventions.      Treatment plan:  · Target symptoms: anxiety , confusion  · Why chosen therapy is appropriate versus another modality: relevant to diagnosis, patient responds to this modality, evidence based practice  · Outcome monitoring methods: self-report, checklist/rating scale  · Therapeutic intervention type: behavior modifying psychotherapy, supportive psychotherapy    Risk parameters:  Patient reports no suicidal ideation  Patient reports no homicidal ideation  Patient reports no self-injurious behavior  Patient reports no violent behavior    Verbal  deficits: None    Patient's response to intervention:  The patient's response to intervention is motivated.    Treatment Goals:  Mental:  PT states she wants to continue to be present either my meditation or mindfulness.   Physical:  PT would like to get a treadmill to be more active.     Progress toward goals:  The patient's progress toward goals is good.    Diagnosis:     ICD-10-CM ICD-9-CM   1. JUAN JOSE (generalized anxiety disorder) F41.1 300.02   2. Mood disorder F39 296.90   3. Chronic pain syndrome G89.4 338.4   4. Obsessive-compulsive behavior R46.81 300.3       Plan:  individual psychotherapy and medication management by physician    Return to clinic: 1 month, as scheduled, as needed    Length of Service (minutes): 60

## 2020-03-20 ENCOUNTER — TELEPHONE (OUTPATIENT)
Dept: UROLOGY | Facility: CLINIC | Age: 50
End: 2020-03-20

## 2020-03-20 ENCOUNTER — TELEPHONE (OUTPATIENT)
Dept: INTERNAL MEDICINE | Facility: CLINIC | Age: 50
End: 2020-03-20

## 2020-03-20 NOTE — TELEPHONE ENCOUNTER
----- Message from Daron Lemus, Patient Care Assistant sent at 3/20/2020 12:20 PM CDT -----  Contact: PENG MAYO [[id number]]  Type:  Patient Returning Call    Who Called: PENG MAYO [[id number]]    Who Left Message for Patient: Isadora Wadsworth    Does the patient know what this is regarding?:Yes    Best Call Back Number:[phone]    Additional Information:   None

## 2020-03-20 NOTE — TELEPHONE ENCOUNTER
"Patient has oral cyst/tumor that needs to be removed by an oral surgeon. Pt states her pain is well controlled on her previously prescribed NORCO.     Pt states she doesn't need to fill the higher dose prescribed by her dentist. Pt just wanted to make sure she wasn't going to "get in trouble" because her dentist prescribed something she is already taking.    Pt will let us know if her pain isn't controlled, as pharmacy advised her due to duplicate prescriptions it would be blocked from being filled because it is a controlled rx.        "

## 2020-03-20 NOTE — TELEPHONE ENCOUNTER
Placed a call to the pt and advised per orders Dr. Rasmussen to use the medication on hand. Do not refill second prescription Norco 7.5-325. Pt stated understanding.            Thanks Echo

## 2020-03-20 NOTE — TELEPHONE ENCOUNTER
----- Message from Sergey Youngblood sent at 3/20/2020 11:19 AM CDT -----  Contact: PENG MAYO [[id number]]  Name of Who is Calling: PENG MAYO [[id number]]      What is the request in detail: Would like to speak with staff in regards to being prescribed a higher dose of pain medication (Norco 7.5-325) from the dentist. Patient wants to know if it is okay to be filled. Please advise      Can the clinic reply by [facility]: no      What Number to Call Back [facility] not [facility]: [phone]

## 2020-03-20 NOTE — TELEPHONE ENCOUNTER
"Attempted to call pt. No answer, LVM informing pt  " advise not to fill the Rx at this time and use what she has."        "

## 2020-03-27 DIAGNOSIS — B37.31 VAGINAL YEAST INFECTION: ICD-10-CM

## 2020-03-27 RX ORDER — TERCONAZOLE 4 MG/G
CREAM VAGINAL
Qty: 45 G | Refills: 0 | Status: SHIPPED | OUTPATIENT
Start: 2020-03-27 | End: 2020-05-29

## 2020-03-27 NOTE — TELEPHONE ENCOUNTER
----- Message from Zehra Velasco sent at 3/27/2020 10:51 AM CDT -----  Contact: PENG MAYO [[id number]]      MEDICATION  REFILL  REQUEST      Please refill the medication(s) listed below. Please call the patient when the prescription(s) is ready for  at this phone number  [phone] (M)       Medication #1   terconazole (TERAZOL 7) 0.4 % Cream  Sig - Route: Place 1 applicator vaginally once daily. - Vaginal        Preferred Pharmacy: [id number] - [facility] [address] AT [address]     [phone] (Phone)  [phone] (Fax)

## 2020-03-31 ENCOUNTER — EXTERNAL CHRONIC CARE MANAGEMENT (OUTPATIENT)
Dept: PRIMARY CARE CLINIC | Facility: CLINIC | Age: 50
End: 2020-03-31
Payer: MEDICARE

## 2020-03-31 PROCEDURE — 99490 CHRNC CARE MGMT STAFF 1ST 20: CPT | Mod: S$PBB,,, | Performed by: INTERNAL MEDICINE

## 2020-03-31 PROCEDURE — 99490 CHRNC CARE MGMT STAFF 1ST 20: CPT | Mod: PBBFAC | Performed by: INTERNAL MEDICINE

## 2020-03-31 PROCEDURE — 99490 PR CHRONIC CARE MGMT, 1ST 20 MIN: ICD-10-PCS | Mod: S$PBB,,, | Performed by: INTERNAL MEDICINE

## 2020-04-06 ENCOUNTER — PATIENT MESSAGE (OUTPATIENT)
Dept: INTERNAL MEDICINE | Facility: CLINIC | Age: 50
End: 2020-04-06

## 2020-04-06 DIAGNOSIS — G89.4 CHRONIC PAIN SYNDROME: ICD-10-CM

## 2020-04-06 DIAGNOSIS — L30.9 ECZEMA, UNSPECIFIED TYPE: Primary | ICD-10-CM

## 2020-04-06 DIAGNOSIS — I10 ESSENTIAL HYPERTENSION: Primary | ICD-10-CM

## 2020-04-06 DIAGNOSIS — M79.10 MYALGIA: ICD-10-CM

## 2020-04-06 RX ORDER — LOSARTAN POTASSIUM 100 MG/1
100 TABLET ORAL DAILY
Qty: 90 TABLET | Refills: 0 | OUTPATIENT
Start: 2020-04-06

## 2020-04-06 RX ORDER — TIZANIDINE 4 MG/1
4 TABLET ORAL 2 TIMES DAILY PRN
Qty: 180 TABLET | Refills: 1 | Status: SHIPPED | OUTPATIENT
Start: 2020-04-06 | End: 2020-08-28

## 2020-04-06 RX ORDER — HYDROCORTISONE 1 %
CREAM (GRAM) TOPICAL 2 TIMES DAILY PRN
Qty: 20 G | Refills: 0 | OUTPATIENT
Start: 2020-04-06 | End: 2020-05-06

## 2020-04-06 RX ORDER — IRBESARTAN AND HYDROCHLOROTHIAZIDE 150; 12.5 MG/1; MG/1
1 TABLET, FILM COATED ORAL DAILY
Qty: 90 TABLET | Refills: 3 | Status: SHIPPED | OUTPATIENT
Start: 2020-04-06 | End: 2021-03-01 | Stop reason: SDUPTHER

## 2020-04-06 RX ORDER — DESONIDE 0.5 MG/G
CREAM TOPICAL
Qty: 30 G | Refills: 3 | Status: SHIPPED | OUTPATIENT
Start: 2020-04-06 | End: 2020-06-12 | Stop reason: SDUPTHER

## 2020-04-06 RX ORDER — HYDROCHLOROTHIAZIDE 12.5 MG/1
12.5 TABLET ORAL DAILY
Qty: 90 TABLET | Refills: 0 | OUTPATIENT
Start: 2020-04-06

## 2020-04-06 RX ORDER — ALCLOMETASONE DIPROPIONATE 0.5 MG/G
CREAM TOPICAL 2 TIMES DAILY PRN
Qty: 45 G | Refills: 0 | Status: SHIPPED | OUTPATIENT
Start: 2020-04-06 | End: 2021-12-02

## 2020-04-06 RX ORDER — HYDROCODONE BITARTRATE AND ACETAMINOPHEN 5; 325 MG/1; MG/1
1 TABLET ORAL 2 TIMES DAILY PRN
Qty: 35 TABLET | Refills: 0 | Status: SHIPPED | OUTPATIENT
Start: 2020-04-06 | End: 2020-05-13 | Stop reason: SDUPTHER

## 2020-04-06 NOTE — TELEPHONE ENCOUNTER
----- Message from Zehra Velasco sent at 4/6/2020  9:46 AM CDT -----  Contact: PENG MAYO [[id number]]      MEDICATION  REFILL  REQUEST      Please refill the medication(s) listed below. Please call the patient when the prescription(s) is ready for  at this phone number   [phone]      Medication #1 tiZANidine (ZANAFLEX) 4 MG tablet    Medication #2 naproxen (NAPROSYN) 500 MG tablet    Medication # 3 desonide (DESOWEN) 0.05 % cream    Medication #4 hydroCHLOROthiazide (HYDRODIURIL) 12.5 MG Tab    Medication #5 HYDROcodone-acetaminophen (NORCO) 5-325 mg per tablet    Preferred Pharmacy:  [id number] - [place] [address] [facility]     [phone] (Phone)  [phone] (Fax)

## 2020-04-06 NOTE — TELEPHONE ENCOUNTER
Received fax requesting drug change request for desonide cream; preferred alternative pending hydrocortcre, fluocinacetcre, alclometasoncre

## 2020-04-06 NOTE — TELEPHONE ENCOUNTER
LOV 1/16/2020     reviewed and is consistent:    03/15/2020 1 01/16/2020 Hydrocodone-Acetamin 5-325 Mg  35.00 17 [address] [id number] [facility] ([id number]) 0/0 10.29 [facility] [employer] [place]    02/15/2020 1 01/16/2020 Hydrocodone-Acetamin 5-325 Mg  35.00 17 [address] [id number] [facility] ([id number]) 0/0 10.29 [place]

## 2020-04-06 NOTE — TELEPHONE ENCOUNTER
Sent message to patient asking about BP meds. She has not answered the question about lab work in one month.

## 2020-04-08 ENCOUNTER — TELEPHONE (OUTPATIENT)
Dept: INTERNAL MEDICINE | Facility: CLINIC | Age: 50
End: 2020-04-08

## 2020-04-08 NOTE — TELEPHONE ENCOUNTER
----- Message from Dennis Lacy sent at 4/8/2020 12:42 PM CDT -----  Contact: Self      Name of Who is Calling: PENG MAYO [[id number]]      What is the request in detail: Pt wanted to know if she still needed to do the Virtual Visit being that she got all her meds      Can the clinic reply by [facility]: Y      What Number to Call Back if not in [facility]: [phone]

## 2020-04-09 ENCOUNTER — PATIENT MESSAGE (OUTPATIENT)
Dept: INTERNAL MEDICINE | Facility: CLINIC | Age: 50
End: 2020-04-09

## 2020-04-16 ENCOUNTER — PATIENT MESSAGE (OUTPATIENT)
Dept: PSYCHIATRY | Facility: CLINIC | Age: 50
End: 2020-04-16

## 2020-04-18 ENCOUNTER — PATIENT MESSAGE (OUTPATIENT)
Dept: PSYCHIATRY | Facility: CLINIC | Age: 50
End: 2020-04-18

## 2020-04-23 ENCOUNTER — CLINICAL SUPPORT (OUTPATIENT)
Dept: PRIMARY CARE CLINIC | Facility: CLINIC | Age: 50
End: 2020-04-23
Payer: MEDICARE

## 2020-04-23 ENCOUNTER — PATIENT MESSAGE (OUTPATIENT)
Dept: OBSTETRICS AND GYNECOLOGY | Facility: CLINIC | Age: 50
End: 2020-04-23

## 2020-04-23 DIAGNOSIS — G89.4 CHRONIC PAIN SYNDROME: ICD-10-CM

## 2020-04-23 DIAGNOSIS — F39 MOOD DISORDER: ICD-10-CM

## 2020-04-23 DIAGNOSIS — F41.1 GAD (GENERALIZED ANXIETY DISORDER): Primary | ICD-10-CM

## 2020-04-23 DIAGNOSIS — G47.00 INSOMNIA, UNSPECIFIED TYPE: ICD-10-CM

## 2020-04-23 PROCEDURE — 99499 NO LOS: ICD-10-PCS | Mod: BHI,95,, | Performed by: SOCIAL WORKER

## 2020-04-23 PROCEDURE — 99499 UNLISTED E&M SERVICE: CPT | Mod: BHI,95,, | Performed by: SOCIAL WORKER

## 2020-04-23 PROCEDURE — 90837 PR PSYCHOTHERAPY W/PATIENT, 60 MIN: ICD-10-PCS | Mod: BHI,95,, | Performed by: SOCIAL WORKER

## 2020-04-23 PROCEDURE — 90837 PSYTX W PT 60 MINUTES: CPT | Mod: BHI,95,, | Performed by: SOCIAL WORKER

## 2020-04-23 RX ORDER — FLUCONAZOLE 150 MG/1
TABLET ORAL
Qty: 1 TABLET | Refills: 1 | Status: SHIPPED | OUTPATIENT
Start: 2020-04-23 | End: 2020-05-28

## 2020-04-23 NOTE — TELEPHONE ENCOUNTER
Patient complains of vaginal irritation, and request prescription to help with symptoms. Please sign Erx.

## 2020-04-27 ENCOUNTER — PATIENT MESSAGE (OUTPATIENT)
Dept: INTERNAL MEDICINE | Facility: CLINIC | Age: 50
End: 2020-04-27

## 2020-04-27 NOTE — PROGRESS NOTES
Individual Psychotherapy (PhD/LCSW)    The patient location is:  Patient Home   Visit type: Telephone  Each patient to whom he or she provides medical services by telemedicine is:  (1) informed of the relationship between the physician and patient and the respective role of any other health care provider with respect to management of the patient; and (2) notified that he or she may decline to receive medical services by telemedicine and may withdraw from such care at any time.     4/23/2020    Site:  [facility]         Therapeutic Intervention: Met with patient.  Outpatient - Behavior modifying psychotherapy 60 min - CPT code 15266 and Outpatient - Supportive psychotherapy 60 min - CPT Code 66812    Chief complaint/reason for encounter: anxiety, sleep, behavior, cognition and interpersonal     Interval history and content of current session: LCSW and PT met by telephone. PT states she is doing well and has been staying at her boyfriend's for much longer than she normally does. PT states she is ready to go home for a while, but also is scared to stay by herself. PT often describes it takes a few days for her to feel comfortable in her apartment. LCSW and PT continued to process these feelings and how PT can use affirmations to to remind her that she is safe in her apartment. PT states her daughter got the option to graduate nursing school early due to COVID, but she plans to finish our her nursing education first. PT states she hasn't bought anything recently and is proud of herself for that. LCSW and PT reviewed ACT, CBT, and Mindfulness based interventions to reduce overall anxiety, and increase self esteem and self resilience.     Treatment plan:  · Target symptoms: anxiety , mood disorder, adjustment  · Why chosen therapy is appropriate versus another modality: relevant to diagnosis, patient responds to this modality, evidence based practice  · Outcome monitoring methods: self-report, checklist/rating  scale  · Therapeutic intervention type: behavior modifying psychotherapy, supportive psychotherapy    Risk parameters:  Patient reports no suicidal ideation  Patient reports no homicidal ideation  Patient reports no self-injurious behavior  Patient reports no violent behavior    Verbal deficits: None    Patient's response to intervention:  The patient's response to intervention is accepting, motivated.    Treatment Goals:  Mental:  To feel safe in her apartment when she arrives.   Physical:  To get outside at least once a day for a walk.     Progress toward goals:  The patient's progress toward goals is good.    Diagnosis:     ICD-10-CM ICD-9-CM   1. JUAN JOSE (generalized anxiety disorder) F41.1 300.02   2. Mood disorder F39 296.90   3. Insomnia, unspecified type G47.00 780.52   4. Chronic pain syndrome G89.4 338.4       Plan:  individual psychotherapy and medication management by physician    Return to clinic: 2 weeks, as scheduled, as needed    Length of Service (minutes): 60

## 2020-04-28 DIAGNOSIS — I10 ESSENTIAL HYPERTENSION: Chronic | ICD-10-CM

## 2020-04-28 DIAGNOSIS — E66.01 MORBID OBESITY: ICD-10-CM

## 2020-04-28 RX ORDER — LIRAGLUTIDE 6 MG/ML
INJECTION SUBCUTANEOUS
Qty: 18 ML | Refills: 0 | Status: SHIPPED | OUTPATIENT
Start: 2020-04-28 | End: 2020-06-29

## 2020-04-30 ENCOUNTER — EXTERNAL CHRONIC CARE MANAGEMENT (OUTPATIENT)
Dept: PRIMARY CARE CLINIC | Facility: CLINIC | Age: 50
End: 2020-04-30
Payer: MEDICARE

## 2020-04-30 ENCOUNTER — LAB VISIT (OUTPATIENT)
Dept: LAB | Facility: OTHER | Age: 50
End: 2020-04-30
Attending: INTERNAL MEDICINE
Payer: MEDICARE

## 2020-04-30 DIAGNOSIS — D50.9 IRON DEFICIENCY ANEMIA, UNSPECIFIED IRON DEFICIENCY ANEMIA TYPE: ICD-10-CM

## 2020-04-30 LAB
BASOPHILS # BLD AUTO: 0.07 K/UL (ref 0–0.2)
BASOPHILS NFR BLD: 1 % (ref 0–1.9)
DIFFERENTIAL METHOD: ABNORMAL
EOSINOPHIL # BLD AUTO: 0.2 K/UL (ref 0–0.5)
EOSINOPHIL NFR BLD: 2.9 % (ref 0–8)
ERYTHROCYTE [DISTWIDTH] IN BLOOD BY AUTOMATED COUNT: 16.1 % (ref 11.5–14.5)
HCT VFR BLD AUTO: 34.4 % (ref 37–48.5)
HGB BLD-MCNC: 9.8 G/DL (ref 12–16)
IMM GRANULOCYTES # BLD AUTO: 0.01 K/UL (ref 0–0.04)
IMM GRANULOCYTES NFR BLD AUTO: 0.1 % (ref 0–0.5)
LYMPHOCYTES # BLD AUTO: 2.3 K/UL (ref 1–4.8)
LYMPHOCYTES NFR BLD: 31.7 % (ref 18–48)
MCH RBC QN AUTO: 21.6 PG (ref 27–31)
MCHC RBC AUTO-ENTMCNC: 28.5 G/DL (ref 32–36)
MCV RBC AUTO: 76 FL (ref 82–98)
MONOCYTES # BLD AUTO: 0.5 K/UL (ref 0.3–1)
MONOCYTES NFR BLD: 7.4 % (ref 4–15)
NEUTROPHILS # BLD AUTO: 4.1 K/UL (ref 1.8–7.7)
NEUTROPHILS NFR BLD: 56.9 % (ref 38–73)
NRBC BLD-RTO: 0 /100 WBC
PLATELET # BLD AUTO: 314 K/UL (ref 150–350)
PMV BLD AUTO: 12.4 FL (ref 9.2–12.9)
RBC # BLD AUTO: 4.53 M/UL (ref 4–5.4)
WBC # BLD AUTO: 7.17 K/UL (ref 3.9–12.7)

## 2020-04-30 PROCEDURE — 99490 CHRNC CARE MGMT STAFF 1ST 20: CPT | Mod: S$PBB,,, | Performed by: INTERNAL MEDICINE

## 2020-04-30 PROCEDURE — 99490 CHRNC CARE MGMT STAFF 1ST 20: CPT | Mod: PBBFAC | Performed by: INTERNAL MEDICINE

## 2020-04-30 PROCEDURE — 36415 COLL VENOUS BLD VENIPUNCTURE: CPT

## 2020-04-30 PROCEDURE — 85025 COMPLETE CBC W/AUTO DIFF WBC: CPT

## 2020-04-30 PROCEDURE — 99490 PR CHRONIC CARE MGMT, 1ST 20 MIN: ICD-10-PCS | Mod: S$PBB,,, | Performed by: INTERNAL MEDICINE

## 2020-05-01 ENCOUNTER — PATIENT MESSAGE (OUTPATIENT)
Dept: PSYCHIATRY | Facility: CLINIC | Age: 50
End: 2020-05-01

## 2020-05-06 ENCOUNTER — TELEPHONE (OUTPATIENT)
Dept: PRIMARY CARE CLINIC | Facility: CLINIC | Age: 50
End: 2020-05-06

## 2020-05-06 NOTE — PROGRESS NOTES
"SNOW Huerta contacted Ms. Bajwa to update her assessments.  Ms. Bajwa scored "4" on the PHQ 9 and "11" on the JUAN JOSE 7.   Ms. Bajwa stated she is doing fine at this time.    "

## 2020-05-07 ENCOUNTER — OFFICE VISIT (OUTPATIENT)
Dept: INTERNAL MEDICINE | Facility: CLINIC | Age: 50
End: 2020-05-07
Payer: MEDICARE

## 2020-05-07 DIAGNOSIS — G89.4 CHRONIC PAIN SYNDROME: Primary | ICD-10-CM

## 2020-05-07 DIAGNOSIS — M35.01 SJOGREN'S SYNDROME WITH KERATOCONJUNCTIVITIS SICCA: ICD-10-CM

## 2020-05-07 DIAGNOSIS — M79.7 FIBROMYALGIA: ICD-10-CM

## 2020-05-07 DIAGNOSIS — D50.9 IRON DEFICIENCY ANEMIA, UNSPECIFIED IRON DEFICIENCY ANEMIA TYPE: ICD-10-CM

## 2020-05-07 PROCEDURE — 99211 OFF/OP EST MAY X REQ PHY/QHP: CPT

## 2020-05-07 PROCEDURE — 99213 OFFICE O/P EST LOW 20 MIN: CPT | Mod: 95,,, | Performed by: INTERNAL MEDICINE

## 2020-05-07 PROCEDURE — 99213 PR OFFICE/OUTPT VISIT, EST, LEVL III, 20-29 MIN: ICD-10-PCS | Mod: 95,,, | Performed by: INTERNAL MEDICINE

## 2020-05-07 PROCEDURE — G0463 HOSPITAL OUTPT CLINIC VISIT: HCPCS

## 2020-05-07 NOTE — PROGRESS NOTES
Subjective:       Patient ID: Jaki Bajwa is a 49 y.o. female.    Chief Complaint: Fibromyalgia    Pt here for f/u. She has a dx of sjogrens and fibromyalgia based on blood work and chronic pain that is primarily across shoulders and in legs but also in upper and lower back. As a result she is on several meds that she says helps. She sees rheum at [facility] and has regular f/u. She has prior dx of lupus but this was not corroborated in notes from rheum. She is on plaquenil, tizanidine, gabapentin and norco. She uses norco 1-2x/day. She is due for refills on pain meds. She has done well with decreasing dispense amount to 35 per month. She has previously signed a pain contract. [place]  reviewed and is consistent. She has seen pain mgmt. She has also done PT with some success in the past.     She has chronic iron def anemia. She has no symptoms of such and denies any known bleeding or bloody/black stool. She had c-scope 1 year ago that did not show any bleeding source. She also had EGD that showed positive h pylori for which she was tx but no other bleeding sources. She also did stool cards which were negative. Most recent labs show mild improvement in anemia but lab did not process iron studies or celiac testing.     The patient location is: home  The chief complaint leading to consultation is: chronic pain  Visit type: Virtual visit with synchronous audio and video  Total time spent with patient: 7mins  Each patient to whom he or she provides medical services by telemedicine is:  (1) informed of the relationship between the physician and patient and the respective role of any other health care provider with respect to management of the patient; and (2) notified that he or she may decline to receive medical services by telemedicine and may withdraw from such care at any time.    Notes: Pt opted for telemed visit due to covid-19      Review of Systems   Constitutional: Negative for fever.   Respiratory: Negative for  shortness of breath.    Cardiovascular: Negative for chest pain.   Gastrointestinal: Negative for abdominal pain.   Neurological: Negative for headaches.   Psychiatric/Behavioral: Negative for dysphoric mood.       Objective:      Physical Exam   Constitutional: She is oriented to person, place, and time. She appears well-developed and well-nourished.   Pulmonary/Chest: Effort normal. No respiratory distress.   Neurological: She is alert and oriented to person, place, and time.   Psychiatric: She has a normal mood and affect. Her behavior is normal. Judgment and thought content normal.       Assessment:       1. Chronic pain syndrome    2. Sjogren's syndrome with keratoconjunctivitis sicca    3. Fibromyalgia    4. Iron deficiency anemia, unspecified iron deficiency anemia type        Plan:       1. She is not yet due for refill on norco but will send by eRx when ready; she will let us know; proper use d/w pt  2. F/u 3 mos with labs prior; continue iron tablet for now

## 2020-05-13 ENCOUNTER — TELEPHONE (OUTPATIENT)
Dept: PRIMARY CARE CLINIC | Facility: CLINIC | Age: 50
End: 2020-05-13

## 2020-05-13 ENCOUNTER — PATIENT MESSAGE (OUTPATIENT)
Dept: INTERNAL MEDICINE | Facility: CLINIC | Age: 50
End: 2020-05-13

## 2020-05-13 NOTE — PROGRESS NOTES
SNOW Huerta contacted Ms. Bajwa to remind her of PT appt with Kenney Marie LCSW on tomorrow at 3:00 p.m. Ms. Bajwa confirmed her appt.

## 2020-05-14 ENCOUNTER — CLINICAL SUPPORT (OUTPATIENT)
Dept: PRIMARY CARE CLINIC | Facility: CLINIC | Age: 50
End: 2020-05-14
Payer: MEDICARE

## 2020-05-14 DIAGNOSIS — F41.1 GAD (GENERALIZED ANXIETY DISORDER): Primary | ICD-10-CM

## 2020-05-14 DIAGNOSIS — F63.89 INTERNET ADDICTION: ICD-10-CM

## 2020-05-14 DIAGNOSIS — R46.81 OBSESSIVE-COMPULSIVE BEHAVIOR: ICD-10-CM

## 2020-05-14 DIAGNOSIS — F39 MOOD DISORDER: ICD-10-CM

## 2020-05-14 DIAGNOSIS — G89.4 CHRONIC PAIN SYNDROME: ICD-10-CM

## 2020-05-14 PROCEDURE — 90837 PSYTX W PT 60 MINUTES: CPT | Mod: BHI,95,, | Performed by: SOCIAL WORKER

## 2020-05-14 PROCEDURE — 99499 NO LOS: ICD-10-PCS | Mod: BHI,95,, | Performed by: SOCIAL WORKER

## 2020-05-14 PROCEDURE — 90837 PR PSYCHOTHERAPY W/PATIENT, 60 MIN: ICD-10-PCS | Mod: BHI,95,, | Performed by: SOCIAL WORKER

## 2020-05-14 PROCEDURE — 99499 UNLISTED E&M SERVICE: CPT | Mod: BHI,95,, | Performed by: SOCIAL WORKER

## 2020-05-14 NOTE — PROGRESS NOTES
Individual Psychotherapy (PhD/LCSW)    The patient location is:  Patient Home   Visit type: Telephone   Each patient to whom he or she provides medical services by telemedicine is:  (1) informed of the relationship between the physician and patient and the respective role of any other health care provider with respect to management of the patient; and (2) notified that he or she may decline to receive medical services by telemedicine and may withdraw from such care at any time.     5/14/2020    Site:  [facility]         Therapeutic Intervention: Met with patient.      Chief complaint/reason for encounter: anxiety, sleep, dissociation, behavior, cognition and interpersonal     Interval history and content of current session: LCSW and PT met by phone. PT states her inner thoughts do not want her to be happy. PT states she typically agrees with her inner thoughts and will allow those thoughts to influence her reality.  She gives the example of wanting to get up to get something to drink, PT states her inner thoughts tell her she doesn't deserve a drink, PT will agree and not get drink.  PT states she is still watching the grand baby m-w, PT is relived but also feels restless on days the days she isn't watching him. PT reports that her relationship and communication is getting stronger with both of her children, which feel good. LCSW discussed ACT, CBT, and Mindfulness based Interventions as well as challenging her inner thoughts to decrease overall anxiety, depression, chronic pain, and increase self esteem and self control.     Treatment plan:  · Target symptoms: distractability, recurrent depression, anxiety , mood swings, confusion, adjustment  · Why chosen therapy is appropriate versus another modality: relevant to diagnosis, patient responds to this modality, evidence based practice  · Outcome monitoring methods: self-report, checklist/rating scale  · Therapeutic intervention type: behavior modifying  psychotherapy, supportive psychotherapy    Risk parameters:  Patient reports no suicidal ideation  Patient reports no homicidal ideation  Patient reports no self-injurious behavior  Patient reports no violent behavior    Verbal deficits: None    Patient's response to intervention:  The patient's response to intervention is accepting.    Treatment Goals:  Mental:  To reduce overall anxiety; making decision by putting PT's best interest first.     Physical:  To get outside and walk everyday.     Progress toward goals:  The patient's progress toward goals is good.    Diagnosis:     ICD-10-CM ICD-9-CM   1. JUAN JOSE (generalized anxiety disorder) F41.1 300.02   2. Mood disorder F39 296.90   3. Internet shopping addiction F63.89 301.89   4. Obsessive-compulsive behavior R46.81 300.3   5. Chronic pain syndrome G89.4 338.4       Plan:  individual psychotherapy and medication management by physician    Return to clinic: 1 month, as scheduled, as needed    Length of Service (minutes): 60

## 2020-05-19 ENCOUNTER — OFFICE VISIT (OUTPATIENT)
Dept: PSYCHIATRY | Facility: CLINIC | Age: 50
End: 2020-05-19
Payer: MEDICARE

## 2020-05-19 DIAGNOSIS — F41.1 GAD (GENERALIZED ANXIETY DISORDER): Primary | ICD-10-CM

## 2020-05-19 DIAGNOSIS — G47.00 INSOMNIA, UNSPECIFIED TYPE: ICD-10-CM

## 2020-05-19 DIAGNOSIS — F39 MOOD DISORDER: ICD-10-CM

## 2020-05-19 DIAGNOSIS — R46.81 OBSESSIVE-COMPULSIVE BEHAVIOR: ICD-10-CM

## 2020-05-19 DIAGNOSIS — F63.89 INTERNET ADDICTION: ICD-10-CM

## 2020-05-19 DIAGNOSIS — F40.10 SOCIAL PHOBIA: ICD-10-CM

## 2020-05-19 PROCEDURE — 90834 PSYTX W PT 45 MINUTES: CPT | Mod: 95,,, | Performed by: SOCIAL WORKER

## 2020-05-19 PROCEDURE — 99213 OFFICE O/P EST LOW 20 MIN: CPT | Mod: 95,,, | Performed by: INTERNAL MEDICINE

## 2020-05-19 PROCEDURE — 99213 PR OFFICE/OUTPT VISIT, EST, LEVL III, 20-29 MIN: ICD-10-PCS | Mod: 95,,, | Performed by: INTERNAL MEDICINE

## 2020-05-19 PROCEDURE — 90834 PR PSYCHOTHERAPY W/PATIENT, 45 MIN: ICD-10-PCS | Mod: 95,,, | Performed by: SOCIAL WORKER

## 2020-05-19 NOTE — PROGRESS NOTES
OUTPATIENT PSYCHIATRY RETURN VISIT    ENCOUNTER DATE:  5/24/2020  SITE:  [facility] [address]  LENGTH OF SESSION:  10 minutes    The patient location is:  Home  The chief complaint leading to consultation is:  Follow up    Visit type: audiovisual    Face to Face time with patient: 10  15 minutes of total time spent on the encounter, which includes face to face time and non-face to face time preparing to see the patient (eg, review of tests), Obtaining and/or reviewing separately obtained history, Documenting clinical information in the electronic or other health record, Independently interpreting results (not separately reported) and communicating results to the patient/family/caregiver, or Care coordination (not separately reported).     Each patient to whom he or she provides medical services by telemedicine is:  (1) informed of the relationship between the physician and patient and the respective role of any other health care provider with respect to management of the patient; and (2) notified that he or she may decline to receive medical services by telemedicine and may withdraw from such care at any time.    CHIEF COMPLAINT:  Mood    HISTORY OF PRESENTING ILLNESS:  Jaki Bajwa is a 49 y.o. female with history of MDD, JUAN JOSE, OCD, social phobia, and Internet shopping addiction who presents for follow up appointment.      Plan at last appointment on 11/27/2019:  · Will start Buspar 5mg BID and reduce Valium to 2.5mg daily PRN.  If tolerating Buspar, can then increase to 10mg BID.  She is aware of risks associated with combining benzodiazepines and narcotics, including the risk of respiratory depression and death.  She would like to come off of Valium completely if anxiety can be well controlled with other medication.  · Continue Latuda 60mg daily for mood stabilization/anxiety.    · Continue Wellbutrin XL 450mg daily for depression.    · Continue Doxepin 150mg qHS for insomnia and mood.   "  · [facility]    History as told by patient:  Patient reports she has been doing well overall.  Anxiety has been stable and now only taking Valium 1/2 tablet once a day.  She continues to feel Buspar has been helpful for her.  She says her shopping has gotten worse and is causing problems in her life.  She would like to quit this and hopes to work on this in therapy.  Denies feeling depressed.  Denies SI.    Medication side effects:  No  Medication compliance:  Yes    PSYCHIATRIC REVIEW OF SYSTEMS:  Trouble with sleep:  Denies  Appetite changes:  Denies  Weight changes:  Denies  Lack of energy:  Denies  Anhedonia:  Denies  Somatic symptoms:  Denies  Libido:  Denies  Anxiety/panic: Yes, but continues to improve  Guilty/hopeless:  Denies  Self-injurious behavior/risky behavior:  Denies  Any drugs:  Denies  Alcohol:  Denies    MEDICAL REVIEW OF SYSTEMS:  Complete review of systems performed covering Constitutional, Musculoskeletal, Neurologic.  All systems negative except for that covered in HPI.    PAST PSYCHIATRIC, MEDICAL, AND SOCIAL HISTORY REVIEWED  The patient's past medical, family and social history have been reviewed and updated as appropriate within the electronic medical record - see encounter notes.    MEDICATIONS:    Current Outpatient Medications:     alclomethasone (ACLOVATE) 0.05 % cream, Apply topically 2 (two) times daily as needed., Disp: 45 g, Rfl: 0    BD ULTRA-FINE SHORT PEN NEEDLE 31 gauge x 5/16" Ndle, USE WITH VICTOZA, Disp: 100 each, Rfl: 3    buPROPion (WELLBUTRIN XL) 150 MG TB24 tablet, Take 3 tablets (450 mg total) by mouth every morning., Disp: 270 tablet, Rfl: 3    busPIRone (BUSPAR) 5 MG Tab, Take 1 tablet (5 mg total) by mouth 2 (two) times daily., Disp: 180 tablet, Rfl: 3    desonide (DESOWEN) 0.05 % cream, MILTON EXT AA BID PRN, Disp: 30 g, Rfl: 3    diazePAM (VALIUM) 2 MG tablet, Take 1 tablet (2 mg total) by mouth daily as needed " (Severe anxiety/Panic attack)., Disp: 30 tablet, Rfl: 0    doxepin (SINEQUAN) 150 MG Cap, Take 1 capsule (150 mg total) by mouth every evening., Disp: 90 capsule, Rfl: 3    estradiol (CLIMARA) 0.0375 mg/24 hr, APPLY 1 PATCH TO SKIN EVERY 7 DAYS, Disp: 12 patch, Rfl: 3    ferrous sulfate 325 (65 FE) MG EC tablet, Take 1 tablet (325 mg total) by mouth once daily., Disp: 90 tablet, Rfl: 1    fluconazole (DIFLUCAN) 150 MG Tab, Take one tablet on day one and one tablet on day three if symptoms persists, Disp: 1 tablet, Rfl: 1    gabapentin (NEURONTIN) 300 MG capsule, Take 600 mg by mouth 2 (two) times daily. , Disp: , Rfl:     hydroCHLOROthiazide (HYDRODIURIL) 12.5 MG Tab, Take 12.5 mg by mouth., Disp: , Rfl:     HYDROcodone-acetaminophen (NORCO) 5-325 mg per tablet, Take 1 tablet by mouth 2 (two) times daily as needed for Pain. Quantity medically necessary, Disp: 35 tablet, Rfl: 0    HYDROcodone-acetaminophen (NORCO) 5-325 mg per tablet, Take 1 tablet by mouth 2 (two) times daily as needed for Pain. Quantity medically necessary, Disp: 35 tablet, Rfl: 0    HYDROcodone-acetaminophen (NORCO) 5-325 mg per tablet, Take 1 tablet by mouth 2 (two) times daily as needed for Pain. Quantity medically necessary, Disp: 35 tablet, Rfl: 0    hydroxychloroquine (PLAQUENIL) 200 mg tablet, Take 200 mg by mouth Twice daily., Disp: , Rfl:     irbesartan-hydrochlorothiazide (AVALIDE) 150-12.5 mg per tablet, Take 1 tablet by mouth once daily., Disp: 90 tablet, Rfl: 3    losartan (COZAAR) 100 MG tablet, Take 100 mg by mouth., Disp: , Rfl:     lurasidone (LATUDA) 60 mg Tab tablet, Take 1 tablet (60 mg total) by mouth once daily., Disp: 90 tablet, Rfl: 3    naproxen (NAPROSYN) 500 MG tablet, TAKE 1 TABLET(500 MG) BY MOUTH TWICE DAILY AS NEEDED FOR PAIN, Disp: 180 tablet, Rfl: 1    omeprazole (PRILOSEC) 40 MG capsule, TAKE 1 CAPSULE(40 MG) BY MOUTH EVERY DAY, Disp: 90 capsule, Rfl: 1    RESTASIS 0.05 % ophthalmic emulsion, PLACE  "ONE DROP INTO BOTH EYES BID, Disp: , Rfl: 0    terconazole (TERAZOL 7) 0.4 % Crea, Place 1 applicator vaginally once daily., Disp: 45 g, Rfl: 0    terconazole (TERAZOL 7) 0.4 % Crea, INSERT 1 APPLICATORFUL VAGINALLY EVERY DAY, Disp: 45 g, Rfl: 0    tiZANidine (ZANAFLEX) 4 MG tablet, Take 1 tablet (4 mg total) by mouth 2 (two) times daily as needed., Disp: 180 tablet, Rfl: 1    VICTOZA 2-KHUSHI 0.6 mg/0.1 mL (18 mg/3 mL) PnIj, ADMINISTER 1.8 MG UNDER THE SKIN EVERY DAY, Disp: 18 mL, Rfl: 0    ALLERGIES:  Review of patient's allergies indicates:   Allergen Reactions    Aspirin Hives     PSYCHIATRIC EXAM:  There were no vitals filed for this visit.  Appearance:  Well groomed, appearing healthy and of stated age  Behavior:  Cooperative, pleasant, no psychomotor agitation or retardation  Speech:  Normal rate, rhythm, prosody, and volume  Mood:  "Ok"  Affect:  Congruent, euthymic  Thought Process:  Linear, logical, goal directed  Thought Content:  Negative for suicidal ideation, homicidal ideation, delusions or hallucinations.  Associations:  Intact  Memory:  Grossly Intact  Level of Consciousness/Orientation:  Grossly intact  Fund of Knowledge:  Good  Attention:  Good  Language:  Fluent, able to name abstract and concrete objects  Insight:  Fair  Judgment:  Intact  Psychomotor signs:  Unable to assess via virtual visit  Gait:  Unable to assess via virtual visit      RELEVANT LABS/STUDIES:    Lab Results   Component Value Date    WBC 7.17 04/30/2020    HGB 9.8 (L) 04/30/2020    HCT 34.4 (L) 04/30/2020    MCV 76 (L) 04/30/2020     04/30/2020     BMP  Lab Results   Component Value Date     04/04/2019    K 3.8 04/04/2019     04/04/2019    CO2 31 (H) 04/04/2019    BUN 18 04/04/2019    CREATININE 1.1 04/04/2019    CALCIUM 9.8 04/04/2019    ANIONGAP 6 (L) 04/04/2019    ESTGFRAFRICA >60 04/04/2019    EGFRNONAA 59 (A) 04/04/2019     Lab Results   Component Value Date    ALT 16 04/04/2019    AST 19 04/04/2019 "    ALKPHOS 74 04/04/2019    BILITOT 0.4 04/04/2019     Lab Results   Component Value Date    TSH 2.261 04/18/2017     No results found for: LABA1C, HGBA1C    IMPRESSION:    Jaki Bajwa is a 49 y.o. female with history of MDD, JUAN JOSE, OCD, social phobia, and Internet shopping addiction who presents for follow up appointment.    Status/Progress:  Based on the examination today, the patient's problem(s) is/are improved.  New problems have not been presented today.    Risk Parameters:  Patient reports no suicidal ideation  Patient reports no homicidal ideation  Patient reports no self-injurious behavior  Patient reports no violent behavior    DIAGNOSES:    ICD-10-CM ICD-9-CM   1. JUAN JOSE (generalized anxiety disorder) F41.1 300.02   2. Obsessive-compulsive behavior R46.81 300.3   3. Internet shopping addiction F63.89 301.89   4. Mood disorder F39 296.90   5. Insomnia, unspecified type G47.00 780.52     PLAN:  · Symptoms improved.  · Continue Buspar 5mg BID for anxiety.  · Continue Latuda 60mg daily for mood stabilization/anxiety.    · Continue Wellbutrin XL 450mg daily for depression.    · Continue Doxepin 150mg qHS for insomnia and mood.    · Reduce Valium to 2mg daily PRN severe anxiety (encouraged patient not to take every day).  · Continue individual therapy with Mr. Noah LCSW.    RETURN TO CLINIC:  Follow up in about 3 months (around 8/19/2020).

## 2020-05-19 NOTE — PROGRESS NOTES
Individual Psychotherapy (PhD/LCSW)    5/19/2020    Site:  [facility]         Therapeutic Intervention: Met with patient.  Outpatient - Insight oriented psychotherapy 45 min - CPT code 30494 and Outpatient - Behavior modifying psychotherapy 45 min - CPT code 77536    Chief complaint/reason for encounter: depression, anxiety and interpersonal     Interval history and content of current session:        The patient location is: home  The chief complaint leading to consultation is: anxiety depression    Visit type: audiovisual    Face to Face time with patient:   45  minutes of total time spent on the encounter, which includes face to face time and non-face to face time preparing to see the patient (eg, review of tests), Obtaining and/or reviewing separately obtained history, Documenting clinical information in the electronic or other health record, Independently interpreting results (not separately reported) and communicating results to the patient/family/caregiver, or Care coordination (not separately reported).         Each patient to whom he or she provides medical services by telemedicine is:  (1) informed of the relationship between the physician and patient and the respective role of any other health care provider with respect to management of the patient; and (2) notified that he or she may decline to receive medical services by telemedicine and may withdraw from such care at any time.    Notes:   Spending 3 mo. With boyfriend and going well returning to her place today.   Mindful meditation and focus on de-fusing her thoughts.   Continues to buy things she does not need while boyfriend continues to give her money.  Encouraged her to go to dentist for needed biopsy rather than using the money for shopping.          Treatment plan:  · Target symptoms: depression, anxiety   · Why chosen therapy is appropriate versus another modality: relevant to diagnosis  · Outcome monitoring methods:  self-report, observation    · Therapeutic intervention type: insight oriented psychotherapy, behavior modifying psychotherapy, supportive psychotherapy    Risk parameters:  Patient reports no suicidal ideation  Patient reports no homicidal ideation  Patient reports no self-injurious behavior  Patient reports no violent behavior    Verbal deficits: None    Patient's response to intervention:  The patient's response to intervention is accepting.    Progress toward goals and other mental status changes:  The patient's progress toward goals is limited.    Diagnosis: 296.35;   Obsessive compulsive disorder.  personality disorder nos  Learning problems/limitations   internet shopping addiction   Social anxiety, generalized anxiety disorder.     Plan:  individual psychotherapy    Return to clinic:   3 months.

## 2020-05-22 ENCOUNTER — PATIENT MESSAGE (OUTPATIENT)
Dept: OBSTETRICS AND GYNECOLOGY | Facility: CLINIC | Age: 50
End: 2020-05-22

## 2020-05-22 ENCOUNTER — TELEPHONE (OUTPATIENT)
Dept: OBSTETRICS AND GYNECOLOGY | Facility: CLINIC | Age: 50
End: 2020-05-22

## 2020-05-22 DIAGNOSIS — R39.9 UTI SYMPTOMS: Primary | ICD-10-CM

## 2020-05-25 ENCOUNTER — PATIENT MESSAGE (OUTPATIENT)
Dept: OBSTETRICS AND GYNECOLOGY | Facility: CLINIC | Age: 50
End: 2020-05-25

## 2020-05-25 ENCOUNTER — TELEPHONE (OUTPATIENT)
Dept: PRIMARY CARE CLINIC | Facility: CLINIC | Age: 50
End: 2020-05-25

## 2020-05-28 ENCOUNTER — LAB VISIT (OUTPATIENT)
Dept: LAB | Facility: OTHER | Age: 50
End: 2020-05-28
Attending: INTERNAL MEDICINE
Payer: MEDICARE

## 2020-05-28 ENCOUNTER — OFFICE VISIT (OUTPATIENT)
Dept: OBSTETRICS AND GYNECOLOGY | Facility: CLINIC | Age: 50
End: 2020-05-28
Payer: MEDICARE

## 2020-05-28 VITALS
WEIGHT: 267.44 LBS | DIASTOLIC BLOOD PRESSURE: 66 MMHG | SYSTOLIC BLOOD PRESSURE: 120 MMHG | BODY MASS INDEX: 41.88 KG/M2

## 2020-05-28 DIAGNOSIS — Z11.3 SCREEN FOR STD (SEXUALLY TRANSMITTED DISEASE): ICD-10-CM

## 2020-05-28 DIAGNOSIS — N76.0 ACUTE VAGINITIS: Primary | ICD-10-CM

## 2020-05-28 DIAGNOSIS — R39.9 UTI SYMPTOMS: ICD-10-CM

## 2020-05-28 LAB
BACTERIAL VAGINOSIS DNA: POSITIVE
CANDIDA GLABRATA DNA: POSITIVE
CANDIDA KRUSEI DNA: NEGATIVE
CANDIDA RRNA VAG QL PROBE: NEGATIVE
T VAGINALIS RRNA GENITAL QL PROBE: NEGATIVE

## 2020-05-28 PROCEDURE — 87481 CANDIDA DNA AMP PROBE: CPT | Mod: 59

## 2020-05-28 PROCEDURE — 99213 PR OFFICE/OUTPT VISIT, EST, LEVL III, 20-29 MIN: ICD-10-PCS | Mod: S$PBB,,, | Performed by: NURSE PRACTITIONER

## 2020-05-28 PROCEDURE — 87086 URINE CULTURE/COLONY COUNT: CPT

## 2020-05-28 PROCEDURE — 99999 PR PBB SHADOW E&M-EST. PATIENT-LVL IV: CPT | Mod: PBBFAC,,, | Performed by: NURSE PRACTITIONER

## 2020-05-28 PROCEDURE — 87491 CHLMYD TRACH DNA AMP PROBE: CPT

## 2020-05-28 PROCEDURE — 99214 OFFICE O/P EST MOD 30 MIN: CPT | Mod: PBBFAC | Performed by: NURSE PRACTITIONER

## 2020-05-28 PROCEDURE — 99999 PR PBB SHADOW E&M-EST. PATIENT-LVL IV: ICD-10-PCS | Mod: PBBFAC,,, | Performed by: NURSE PRACTITIONER

## 2020-05-28 PROCEDURE — 99213 OFFICE O/P EST LOW 20 MIN: CPT | Mod: S$PBB,,, | Performed by: NURSE PRACTITIONER

## 2020-05-28 RX ORDER — METRONIDAZOLE 500 MG/1
500 TABLET ORAL EVERY 12 HOURS
Qty: 14 TABLET | Refills: 0 | Status: SHIPPED | OUTPATIENT
Start: 2020-05-28 | End: 2020-06-04

## 2020-05-28 RX ORDER — FLUCONAZOLE 200 MG/1
200 TABLET ORAL
Qty: 2 TABLET | Refills: 0 | Status: SHIPPED | OUTPATIENT
Start: 2020-05-28 | End: 2020-09-08

## 2020-05-28 NOTE — PROGRESS NOTES
"Jaki Bajwa is a 49 y.o. female  presents with complaint of vaginal discharge with an odor x 1 week. First noticed the odor after intercourse, slightly better today. Mild vaginal itching, some discomfort with sex. No new sexual partners. She does want STD screening today, swabs only. Hx of recurrent BV, gets it every few months or so. Never tried boric acid suppositories. Urine cx is pending in system. Hx of recurrent UTIs, better since taking Macrobid after intercourse.     GCCT negative in January  Urine cx pending    Past Medical History:   Diagnosis Date    Anemia     Anxiety     Depression     History of psychiatric hospitalization     [place] x 1 week for depression    History of ventral hernia repair     Hypertension     Lupus     patient reports that she is being treated "like I have Lupus"    Mental disorder     Morbid obesity 12/15/2017    Psychiatric problem     Sjogren's syndrome 2013    Therapy     TMJ (temporomandibular joint disorder)     Uterine fibroids 2012     Past Surgical History:   Procedure Laterality Date    breast reduction       SECTION      x 2    HYSTERECTOMY      INGUINAL HERNIA REPAIR      TOTAL REDUCTION MAMMOPLASTY      TUBAL LIGATION      VENTRAL HERNIA REPAIR       Social History     Tobacco Use    Smoking status: Never Smoker    Smokeless tobacco: Never Used   Substance Use Topics    Alcohol use: Yes     Comment: rarely     Drug use: Yes     Types: Hydrocodone     Family History   Problem Relation Age of Onset    Cancer Father         colon ca    Colon cancer Father     Depression Father     Schizophrenia Father     Anxiety disorder Father     Pancreatic cancer Brother     Depression Brother     Alcohol abuse Brother     Liver cancer Sister     Cancer Sister     Depression Sister     Anxiety disorder Sister     Heart attack Sister     Heart disease Mother     Pancreatic cancer Unknown     Liver cancer " Unknown     No Known Problems Daughter     Asthma Son     Cancer Sister         throat and colon    Depression Sister     Suicide Neg Hx     Ovarian cancer Neg Hx     Breast cancer Neg Hx      OB History    Para Term  AB Living   2 2 2     2   SAB TAB Ectopic Multiple Live Births                  # Outcome Date GA Lbr Riccardo/2nd Weight Sex Delivery Anes PTL Lv   2 Term      CS-Classical      1 Term      CS-Classical          /66 (BP Location: Right arm, Patient Position: Sitting)   Wt 121.3 kg (267 lb 6.7 oz)   LMP 2012 Comment: partial   BMI 41.88 kg/m²     ROS:  GENERAL: No fever, chills, fatigability or weight loss.  VULVAR: No pain, no lesions and no itching.  VAGINAL: No relaxation, + itching, + discharge, no abnormal bleeding and no lesions.  ABDOMEN: No abdominal pain. Denies nausea. Denies vomiting. No diarrhea. No constipation  BREAST: Denies pain. No lumps. No discharge.  URINARY: No incontinence, no nocturia, no frequency and no dysuria.  CARDIOVASCULAR: No chest pain. No shortness of breath. No leg cramps.  NEUROLOGICAL: No headaches. No vision changes.    PHYSICAL EXAM:  VULVA: normal appearing vulva with no masses, tenderness or lesions   VAGINA: normal appearing vagina with normal color. Thin white discharge, no lesions   CERVIX AND UTERUS IS SURGICALLY ABSENT  ADNEXA: normal adnexa in size, nontender and no masses    ASSESSMENT and PLAN:    ICD-10-CM ICD-9-CM    1. Acute vaginitis N76.0 616.10 Vaginosis Screen by DNA Probe      C. trachomatis/N. gonorrhoeae by AMP DNA   2. Screen for STD (sexually transmitted disease) Z11.3 V74.5      GCCT and affirm pending  Rx diflucan and flagyl today  Discussed boric acid as prevention    Patient was counseled today on vaginitis prevention including :  a. avoiding feminine products such as deoderant soaps, body wash, bubble bath, douches, scented toilet paper, deoderant tampons or pads, feminine wipes, chronic pad use, etc.  b.  avoiding other vulvovaginal irritants such as long hot baths, humidity, tight, synthetic clothing, chlorine and sitting around in wet bathing suits  c. wearing cotton underwear, avoiding thong underwear and no underwear to bed  d. taking showers instead of baths and use a hair dryer on cool setting afterwards to dry  e. wearing cotton to exercise and shower immediately after exercise and change clothes  f. using polyurethane condoms without spermicide if sexually active and symptoms are triggered by intercourse    FOLLOW UP: PRN lack of improvement.

## 2020-05-29 LAB
BACTERIA UR CULT: NORMAL
C TRACH DNA SPEC QL NAA+PROBE: NOT DETECTED
N GONORRHOEA DNA SPEC QL NAA+PROBE: NOT DETECTED

## 2020-05-29 RX ORDER — TERCONAZOLE 4 MG/G
1 CREAM VAGINAL NIGHTLY
Qty: 1 G | Refills: 0 | Status: SHIPPED | OUTPATIENT
Start: 2020-05-29 | End: 2020-06-05

## 2020-05-31 ENCOUNTER — EXTERNAL CHRONIC CARE MANAGEMENT (OUTPATIENT)
Dept: PRIMARY CARE CLINIC | Facility: CLINIC | Age: 50
End: 2020-05-31
Payer: MEDICARE

## 2020-05-31 PROCEDURE — 99490 CHRNC CARE MGMT STAFF 1ST 20: CPT | Mod: S$PBB,,, | Performed by: INTERNAL MEDICINE

## 2020-05-31 PROCEDURE — 99490 PR CHRONIC CARE MGMT, 1ST 20 MIN: ICD-10-PCS | Mod: S$PBB,,, | Performed by: INTERNAL MEDICINE

## 2020-05-31 PROCEDURE — 99490 CHRNC CARE MGMT STAFF 1ST 20: CPT | Mod: PBBFAC | Performed by: INTERNAL MEDICINE

## 2020-06-01 ENCOUNTER — IMMUNIZATION (OUTPATIENT)
Dept: PHARMACY | Facility: CLINIC | Age: 50
End: 2020-06-01
Payer: MEDICARE

## 2020-06-09 NOTE — PROGRESS NOTES
Ms. Bajwa contacted SNOW Huerta to confirm her appt with Kenney Marie LCSW on June 11 at 1:00p.m. n

## 2020-06-11 ENCOUNTER — TELEPHONE (OUTPATIENT)
Dept: PRIMARY CARE CLINIC | Facility: CLINIC | Age: 50
End: 2020-06-11

## 2020-06-11 NOTE — PROGRESS NOTES
Ms. Bajwa contacted Guillermina ADAMSON to cancel her appointment for today with Kenney Marie LCSW. Patient stated she is not feeling well and would like to be rescheduled.    SNOW Royal  cancelled the appointment for today and rescheduled the next appointment for June 18, 2020 at 4:00 pm. Patient confirmed.

## 2020-06-16 ENCOUNTER — TELEPHONE (OUTPATIENT)
Dept: RHEUMATOLOGY | Facility: CLINIC | Age: 50
End: 2020-06-16

## 2020-06-16 NOTE — TELEPHONE ENCOUNTER
----- Message from Kayce Euceda sent at 6/16/2020  2:47 PM CDT -----    Patient is scheduled for a new patient visit on 8/28 at 10 AM  Patient will be without access to her medication she is on for her sjorgens Syndrome. Would like to get an earlier appt. Please call with availability of an earlier appt so doesn't run out of the medication she is on for her Sjorgens syndrome.    Will be out for around the first week of July. Patient states she can do the virtual appt if they need to  can be made as per patient statement     Patient takes 2 Plaquenil 200 mg per day     Patient can be reached - PT Jaki Bajwa MRN [id number] @# [phone]

## 2020-06-16 NOTE — TELEPHONE ENCOUNTER
Spoke with the pt and let her know that we were not able to give the patient her medication since she has never been seen in the office. She acknowledge what I told her

## 2020-06-17 ENCOUNTER — TELEPHONE (OUTPATIENT)
Dept: PRIMARY CARE CLINIC | Facility: CLINIC | Age: 50
End: 2020-06-17

## 2020-06-17 NOTE — PROGRESS NOTES
SNOW Hureta contacted Ms. Bajwa to remind pt of appt tomorrow with Kenney Marie.   CHW left message on Ms. Bajwa telephone,

## 2020-06-18 ENCOUNTER — CLINICAL SUPPORT (OUTPATIENT)
Dept: PRIMARY CARE CLINIC | Facility: CLINIC | Age: 50
End: 2020-06-18
Payer: MEDICARE

## 2020-06-18 ENCOUNTER — TELEPHONE (OUTPATIENT)
Dept: PRIMARY CARE CLINIC | Facility: CLINIC | Age: 50
End: 2020-06-18

## 2020-06-18 ENCOUNTER — TELEPHONE (OUTPATIENT)
Dept: INTERNAL MEDICINE | Facility: CLINIC | Age: 50
End: 2020-06-18

## 2020-06-18 DIAGNOSIS — R46.81 OBSESSIVE-COMPULSIVE BEHAVIOR: ICD-10-CM

## 2020-06-18 DIAGNOSIS — G89.4 CHRONIC PAIN SYNDROME: ICD-10-CM

## 2020-06-18 DIAGNOSIS — F39 MOOD DISORDER: ICD-10-CM

## 2020-06-18 DIAGNOSIS — F41.1 GAD (GENERALIZED ANXIETY DISORDER): Primary | ICD-10-CM

## 2020-06-18 DIAGNOSIS — M35.01 SJOGREN'S SYNDROME WITH KERATOCONJUNCTIVITIS SICCA: Primary | ICD-10-CM

## 2020-06-18 PROCEDURE — 99499 UNLISTED E&M SERVICE: CPT | Mod: BHI,95,, | Performed by: SOCIAL WORKER

## 2020-06-18 PROCEDURE — 99499 NO LOS: ICD-10-PCS | Mod: BHI,95,, | Performed by: SOCIAL WORKER

## 2020-06-18 PROCEDURE — 90837 PR PSYCHOTHERAPY W/PATIENT, 60 MIN: ICD-10-PCS | Mod: BHI,95,, | Performed by: SOCIAL WORKER

## 2020-06-18 PROCEDURE — 90837 PSYTX W PT 60 MINUTES: CPT | Mod: BHI,95,, | Performed by: SOCIAL WORKER

## 2020-06-18 NOTE — PROGRESS NOTES
"SNOW Huerta contacted Ms. Bajwa to update her assessments.  Ms. Bajwa scored "6" on the PHQ 9 and "21" on the JUAN JOSE 7.      "

## 2020-06-18 NOTE — TELEPHONE ENCOUNTER
----- Message from Alicia Lebron sent at 6/18/2020  8:37 AM CDT -----  Regarding: Patient Call Back  Who called:PENG MAYO [[id number]]    What is the request in detail: Patient is requesting a call back. She states it is regarding a rheumatologist. She would like to know if she can be referred to another one, or if Dr. Rasmussen can refill her script. She states the soonest appointment she was able to get for herself was not until August and she would like to further discuss.   Please advise.    Can the clinic reply by [facility]? No    Best call back number:[phone]    Additional Information: N/A

## 2020-06-19 NOTE — PROGRESS NOTES
"Individual Psychotherapy (PhD/LCSW)    The patient location is:  Patient Home   Visit type: Phone  Each patient to whom he or she provides medical services by telemedicine is:  (1) informed of the relationship between the physician and patient and the respective role of any other health care provider with respect to management of the patient; and (2) notified that he or she may decline to receive medical services by telemedicine and may withdraw from such care at any time.     6/18/2020    Site:  [facility]         Therapeutic Intervention: Met with patient.  Outpatient - Behavior modifying psychotherapy 60 min - CPT code 74908 and Outpatient - Supportive psychotherapy 60 min - CPT Code 60760    Chief complaint/reason for encounter: anxiety, sleep, behavior, cognition and interpersonal     Interval history and content of current session: LCSW and PT met by phone. PT states she recently had oral surgery. PT states she is at her apt and having increased anxiety. PT states she wished her boyfriend would check in on her more, PT states "he is set in his ways." PT states her daughter got her first nursing job, and has been talking to her son everyday! LCSW and PT discussed termination, as the [facility] team has identified her as needing stepped up care and will continue with her current psychiatrist and LCSW. LCSW explained that Sylvester from the [facility] team would reach out to her on months 3, 6, 9 to obtain her PHQ9 and GAD7 scores. PT states this sounds good to her. LCSW and PT will meet 1-2 more times before termination. LCSW and PT reviewed ACT, CBT, and Mindfulness Interventions to reduce anxiety, depression, and chronic pain.     Treatment plan:  · Target symptoms: anxiety , adjustment  · Why chosen therapy is appropriate versus another modality: relevant to diagnosis, patient responds to this modality, evidence based practice  · Outcome monitoring methods: self-report, checklist/rating scale  · Therapeutic intervention " type: behavior modifying psychotherapy, supportive psychotherapy    Risk parameters:  Patient reports no suicidal ideation  Patient reports no homicidal ideation  Patient reports no self-injurious behavior  Patient reports no violent behavior    Verbal deficits: None    Patient's response to intervention:  The patient's response to intervention is accepting.    Treatment Goals:  Mental:  To reduce anxiety by practicing mindfulness.   Physical:  To go on one walk outside per day.     Progress toward goals:  The patient's progress toward goals is good.    Diagnosis:     ICD-10-CM ICD-9-CM   1. JUAN JOSE (generalized anxiety disorder)  F41.1 300.02   2. Mood disorder  F39 296.90   3. Obsessive-compulsive behavior  R46.81 300.3   4. Chronic pain syndrome  G89.4 338.4       Plan:  individual psychotherapy, consult psychiatrist for medication evaluation and medication management by physician    Return to clinic: 1 month, as scheduled, as needed    Length of Service (minutes): 60

## 2020-06-30 ENCOUNTER — EXTERNAL CHRONIC CARE MANAGEMENT (OUTPATIENT)
Dept: PRIMARY CARE CLINIC | Facility: CLINIC | Age: 50
End: 2020-06-30
Payer: MEDICARE

## 2020-06-30 PROCEDURE — G2058 CCM ADD 20MIN: HCPCS | Mod: S$GLB,,, | Performed by: INTERNAL MEDICINE

## 2020-06-30 PROCEDURE — 99490 PR CHRONIC CARE MGMT, 1ST 20 MIN: ICD-10-PCS | Mod: S$GLB,,, | Performed by: INTERNAL MEDICINE

## 2020-06-30 PROCEDURE — G2058 CCM ADD 20MIN: HCPCS | Mod: PBBFAC | Performed by: INTERNAL MEDICINE

## 2020-06-30 PROCEDURE — G2058 PR CHRON CARE MGMT, EA ADDTL 20 MINS: ICD-10-PCS | Mod: S$GLB,,, | Performed by: INTERNAL MEDICINE

## 2020-06-30 PROCEDURE — 99490 CHRNC CARE MGMT STAFF 1ST 20: CPT | Mod: S$GLB,,, | Performed by: INTERNAL MEDICINE

## 2020-06-30 PROCEDURE — 99490 CHRNC CARE MGMT STAFF 1ST 20: CPT | Mod: PBBFAC | Performed by: INTERNAL MEDICINE

## 2020-07-12 ENCOUNTER — PATIENT MESSAGE (OUTPATIENT)
Dept: PSYCHIATRY | Facility: CLINIC | Age: 50
End: 2020-07-12

## 2020-07-12 DIAGNOSIS — R46.81 OBSESSIVE-COMPULSIVE BEHAVIOR: ICD-10-CM

## 2020-07-12 DIAGNOSIS — F41.1 GAD (GENERALIZED ANXIETY DISORDER): ICD-10-CM

## 2020-07-12 DIAGNOSIS — F40.10 SOCIAL PHOBIA: ICD-10-CM

## 2020-07-14 RX ORDER — DIAZEPAM 2 MG/1
2 TABLET ORAL DAILY PRN
Qty: 30 TABLET | Refills: 2 | Status: SHIPPED | OUTPATIENT
Start: 2020-07-14 | End: 2020-10-06 | Stop reason: SDUPTHER

## 2020-07-16 ENCOUNTER — CLINICAL SUPPORT (OUTPATIENT)
Dept: PRIMARY CARE CLINIC | Facility: CLINIC | Age: 50
End: 2020-07-16
Payer: MEDICARE

## 2020-07-16 DIAGNOSIS — F41.1 GAD (GENERALIZED ANXIETY DISORDER): ICD-10-CM

## 2020-07-16 DIAGNOSIS — G89.4 CHRONIC PAIN SYNDROME: ICD-10-CM

## 2020-07-16 DIAGNOSIS — F43.21 GRIEF: Primary | ICD-10-CM

## 2020-07-16 DIAGNOSIS — F39 MOOD DISORDER: ICD-10-CM

## 2020-07-16 PROCEDURE — 99499 UNLISTED E&M SERVICE: CPT | Mod: BHI,95,, | Performed by: SOCIAL WORKER

## 2020-07-16 PROCEDURE — 90837 PR PSYCHOTHERAPY W/PATIENT, 60 MIN: ICD-10-PCS | Mod: BHI,95,, | Performed by: SOCIAL WORKER

## 2020-07-16 PROCEDURE — 99499 NO LOS: ICD-10-PCS | Mod: BHI,95,, | Performed by: SOCIAL WORKER

## 2020-07-16 PROCEDURE — 90837 PSYTX W PT 60 MINUTES: CPT | Mod: BHI,95,, | Performed by: SOCIAL WORKER
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
## 2020-07-21 ENCOUNTER — TELEPHONE (OUTPATIENT)
Dept: PRIMARY CARE CLINIC | Facility: CLINIC | Age: 50
End: 2020-07-21

## 2020-07-21 NOTE — PROGRESS NOTES
SNOW Huerta contacted Ms. Bajwa to update her assessments and schedule an appt with Kenney Marie LCSW.  Appt scheduled for August 20 at 3:00 p.m.  Ms. Bajwa asked Ms. Hoffman to call back next week to update assessments.

## 2020-07-22 ENCOUNTER — TELEPHONE (OUTPATIENT)
Dept: PSYCHIATRY | Facility: CLINIC | Age: 50
End: 2020-07-22

## 2020-07-22 ENCOUNTER — PATIENT OUTREACH (OUTPATIENT)
Dept: ADMINISTRATIVE | Facility: HOSPITAL | Age: 50
End: 2020-07-22

## 2020-07-22 DIAGNOSIS — G47.00 INSOMNIA, UNSPECIFIED TYPE: Primary | ICD-10-CM

## 2020-07-22 RX ORDER — DOXEPIN HYDROCHLORIDE 75 MG/1
150 CAPSULE ORAL NIGHTLY
Qty: 60 CAPSULE | Refills: 1 | Status: SHIPPED | OUTPATIENT
Start: 2020-07-22 | End: 2020-08-12

## 2020-07-22 RX ORDER — HYDROXYCHLOROQUINE SULFATE 200 MG/1
200 TABLET, FILM COATED ORAL 2 TIMES DAILY
Qty: 60 TABLET | Refills: 0 | Status: SHIPPED | OUTPATIENT
Start: 2020-07-22 | End: 2020-08-18

## 2020-07-31 ENCOUNTER — EXTERNAL CHRONIC CARE MANAGEMENT (OUTPATIENT)
Dept: PRIMARY CARE CLINIC | Facility: CLINIC | Age: 50
End: 2020-07-31
Payer: MEDICARE

## 2020-07-31 PROCEDURE — 99490 CHRNC CARE MGMT STAFF 1ST 20: CPT | Mod: S$GLB,,, | Performed by: INTERNAL MEDICINE

## 2020-07-31 PROCEDURE — 99490 PR CHRONIC CARE MGMT, 1ST 20 MIN: ICD-10-PCS | Mod: S$GLB,,, | Performed by: INTERNAL MEDICINE

## 2020-08-03 ENCOUNTER — PATIENT MESSAGE (OUTPATIENT)
Dept: INTERNAL MEDICINE | Facility: CLINIC | Age: 50
End: 2020-08-03

## 2020-08-03 DIAGNOSIS — G89.4 CHRONIC PAIN SYNDROME: ICD-10-CM

## 2020-08-03 NOTE — TELEPHONE ENCOUNTER
SURGERY CONSULT NOTE     REQUESTING PROVIDER:  Anthony Jackson MD  CHIEF COMPLAINT:  Buttock ulcer    HISTORY OF PRESENT ILLNESS:  Ronda King is a 85 year old female seen in consultation at the request of Anthony Jackson MD for evaluation of a right buttock ulcer.  She has had it for approximately 1 month.  She denies any significant drainage, and pain is minimal.  She denies any trauma to the area.  She denies a lot of pressure to the area. When she sits for a long period, she is usually in a comfortable recliner.  She is quite active for her age. Her nutrition is not great according to her family. She has lost 20-30 lbs recently.  Her appetite is good but she eats very little meat.  She does not take any dietary supplements.        Past Medical History:   Past Medical History:   Diagnosis Date   • Carpal tunnel syndrome    • Diverticulosis of colon (without mention of hemorrhage)     Diverticulosis   • Essential hypertension, benign     Hypertension   • Fracture     right hip   • High cholesterol    • Melanoma of skin, site unspecified     Melanoma   • Nevus, non-neoplastic 6/13/02    Melanocytic nevus, intradermal type---left forehead.  Melanocytic nevus, compound type with unusual features---right thigh.  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 Only order she needed to have done was CBC and I scheduled it on 8/6/2020. Patient's appointment needed to be rescheduled. Sent message to Dr. Rasmussen as she will need med refill before her next appt.     reviewed. Patient received #25 [place] 7.5/325 from a dentist in the month of June.    07/09/2020 1 07/09/2020 Hydrocodone-Acetamin 5-325 Mg  35.00 17 Sa Lemons [id number] [facility] ([id number]) 0/0 10.29 [facility] [place]  06/15/2020 1 06/15/2020 Hydrocodone-Acetamin 7.5-325  15.00 2 Masha Draper [id number] [facility] ([id number]) 0/0 56.25 [place]  06/11/2020 1 06/11/2020 Hydrocodone-Acetamin 5-325 Mg  35.00 17 Sa Lemons [id number] [facility] ([id number]) 0/0 10.29 [place]  06/04/2020 1 06/04/2020 Hydrocodone-Acetamin 7.5-325  10.00 1 Wa Baron [id number] [facility] ([id number]) 0/0 75.00 [place] [place]   removal   • ARTHROSCOPY KNEE MEDIAL OR LAT  12    Left knee arthroscopy medial meniscectomy Dr PM    •  DELIVERY+POSTPARTUM CARE         • EMG      Study is compatible with mild to moderate degree of peripheral neuropathy containing both axonal and demyelinating type features.   • EXC SKIN MALIG 3.1-4CM TRUNK,ARM,LEG      excision of melanoma right upper extremity greater than 10 years ago with no evidence of recurrence.   • HIP OPEN REDUCTION      right hip fx   • INJECTION INTRAVITREAL PHARMCOLOGIC  14    right TPH   • LAPAROSCOPY, CHOLECYSTECTOMY  3/99    Cholecystectomy, laparoscopic: Dr. Lopez   • LASER SURGERY OF EYE  05    ALT OS Dr. Benjamin   • LASER SURGERY OF EYE      ALT od TPH   • OPEN ACCESS COLONOSCOPY      Colonoscopy, Screen   • REMOVAL OF TONSILS,<11 Y/O      Tonsillectomy Alone   • REMV CATARACT EXTRACAP INSERT LENS Bilateral     OS-TPH (temp inc)   • TOTAL ABDOM HYSTERECTOMY  age 43    ORACIO w BSO          Social History:   Lives with:  . [place]  Tobacco:  Never smoker.  Alcohol:  None.      Allergies:   ALLERGIES:  No Known Allergies      Medications:   Current Outpatient Prescriptions   Medication   • HYDROcodone-acetaminophen (NORCO) 5-325 MG per tablet   • clonazePAM (KLONOPIN) 0.5 MG tablet   • furosemide (LASIX) 20 MG tablet   • furosemide (LASIX) 20 MG tablet   • simvastatin (ZOCOR) 40 MG tablet   • brimonidine (ALPHAGAN) 0.2 % ophthalmic solution   • timolol (TIMOPTIC) 0.5 % ophthalmic solution   • Cholecalciferol (VITAMIN D3) 2000 UNITS capsule   • SOFTCLIX LANCETS Misc   • Glucose Blood (ACCU-CHEK COMPACT STRIPS) strip   • Multiple Vitamins-Minerals (ICAPS) CAPS   • amoxicillin (AMOXIL) 500 MG capsule   • ASPIRIN 81 MG PO TABS   • ACCU-CHEK COMPACT CARE KIT KIT           PHYSICAL EXAM:   Visit Vitals  BP 98/52   Pulse 74   Resp 16   Ht 5' 1\" (1.549 m)   Wt 46.9 kg Comment: from visit on    BMI 19.54  kg/m²       GENERAL:  Alert, cooperative, no distress, appears stated age.  HEAD:  Normocephalic, without obvious abnormality, atraumatic.  EYES:  Sclerae are non-icteric.   NECK:  Supple, symmetrical, trachea midline.  LUNGS:  Clear to auscultation bilaterally, respirations unlabored, no wheezes, rales, or rhonchi.  HEART:  Regular rate and rhythm, S1 and S2 normal, no murmur, rub or gallop.  ABDOMEN:  Soft, non-tender, non-distended.    EXTREMITIES:  Upper extremities are bilaterally normal, atraumatic, no cyanosis or edema.   PULSES:  2+ radial pulses symmetric bilaterally.  SKIN:  Skin color, texture, turgor normal.  Right buttock with 1.5 x 0.9 cm eschar over ischial tuberosity. No erythema.  Does not appear infected. Minimal pain. No debridement performed.  NEUROLOGIC:  Cranial nerves II-XII grossly intact.  Normal strength, sensation throughout.  PSYCHIATRIC:  Good mood and appropriate affect.      LABORATORY DATA:   Lab Results   Component Value Date    WBC 8.4 11/06/2017    RBC 4.61 11/06/2017    HGB 12.1 11/06/2017    HCT 38.7 11/06/2017     11/06/2017      Lab Results   Component Value Date    SODIUM 142 11/06/2017    POTASSIUM 4.0 11/06/2017    GLUCOSE 112 (H) 11/06/2017    CALCIUM 9.5 11/06/2017    CO2 33 (H) 11/06/2017    CHLORIDE 101 11/06/2017    BUN 15 11/06/2017    CREATININE 0.69 11/06/2017      Hb A1c - 6.2      ASSESSMENT:   84 yo F with a stage 2 right buttock ulcer.      PLAN:   - Discussed offloading the pressure in that area while sitting. She reports that she has a donut pillow at home that she can sit on to help with this.  - Given that her diet is low in protein and she is losing weight, I recommend adding a nutritional supplement such as ensure or boost. We discussed how nutrition is essential to adequate wound healing.  - No debridement needed today.  - Recommend a wound consultation.  Discussed options of seeing Dr. Nunes in [place] versus one of the wound clinics at the  [facility].  She would like to see Dr. Nunes, and I agree with this.  Referral placed.      Gisele Morin MD    CC:  Documentation to Anthony Jackson MD and Danna Nunes DO.

## 2020-08-03 NOTE — TELEPHONE ENCOUNTER
----- Message from Mary Marie sent at 8/3/2020 11:27 AM CDT -----  Regarding: self  [phone]  .Type: Patient Call Back    Who called: self     What is the request in detail: Pt is requesting to know if she's due to come in for labs     Can the clinic reply by [facility]? Call back     Would the patient rather a call back or a response via [facility]?  Call back     Best call back number: [phone]

## 2020-08-04 ENCOUNTER — TELEPHONE (OUTPATIENT)
Dept: PRIMARY CARE CLINIC | Facility: CLINIC | Age: 50
End: 2020-08-04

## 2020-08-04 RX ORDER — HYDROCODONE BITARTRATE AND ACETAMINOPHEN 5; 325 MG/1; MG/1
1 TABLET ORAL 2 TIMES DAILY PRN
Qty: 35 TABLET | Refills: 0 | Status: SHIPPED | OUTPATIENT
Start: 2020-08-04 | End: 2020-09-03 | Stop reason: SDUPTHER

## 2020-08-04 NOTE — PROGRESS NOTES
"Ms. Bajwa contacted SNOW Huerta to confirm her appt and complete her assessments.  Ms. Bajwa scored " 7" on the PHQ 9 and "18" on the JUAN JOSE 7.      "

## 2020-08-06 ENCOUNTER — LAB VISIT (OUTPATIENT)
Dept: LAB | Facility: OTHER | Age: 50
End: 2020-08-06
Attending: INTERNAL MEDICINE
Payer: MEDICARE

## 2020-08-06 ENCOUNTER — PATIENT MESSAGE (OUTPATIENT)
Dept: INTERNAL MEDICINE | Facility: CLINIC | Age: 50
End: 2020-08-06

## 2020-08-06 DIAGNOSIS — D50.9 IRON DEFICIENCY ANEMIA, UNSPECIFIED IRON DEFICIENCY ANEMIA TYPE: ICD-10-CM

## 2020-08-06 DIAGNOSIS — D50.9 IRON DEFICIENCY ANEMIA, UNSPECIFIED IRON DEFICIENCY ANEMIA TYPE: Primary | ICD-10-CM

## 2020-08-06 LAB
BASOPHILS # BLD AUTO: 0.05 K/UL (ref 0–0.2)
BASOPHILS NFR BLD: 0.7 % (ref 0–1.9)
DIFFERENTIAL METHOD: ABNORMAL
EOSINOPHIL # BLD AUTO: 0.4 K/UL (ref 0–0.5)
EOSINOPHIL NFR BLD: 4.8 % (ref 0–8)
ERYTHROCYTE [DISTWIDTH] IN BLOOD BY AUTOMATED COUNT: 17.2 % (ref 11.5–14.5)
HCT VFR BLD AUTO: 31.6 % (ref 37–48.5)
HGB BLD-MCNC: 9 G/DL (ref 12–16)
IMM GRANULOCYTES # BLD AUTO: 0.02 K/UL (ref 0–0.04)
IMM GRANULOCYTES NFR BLD AUTO: 0.3 % (ref 0–0.5)
LYMPHOCYTES # BLD AUTO: 2.2 K/UL (ref 1–4.8)
LYMPHOCYTES NFR BLD: 29.9 % (ref 18–48)
MCH RBC QN AUTO: 21.8 PG (ref 27–31)
MCHC RBC AUTO-ENTMCNC: 28.5 G/DL (ref 32–36)
MCV RBC AUTO: 77 FL (ref 82–98)
MONOCYTES # BLD AUTO: 0.6 K/UL (ref 0.3–1)
MONOCYTES NFR BLD: 8.2 % (ref 4–15)
NEUTROPHILS # BLD AUTO: 4.1 K/UL (ref 1.8–7.7)
NEUTROPHILS NFR BLD: 56.1 % (ref 38–73)
NRBC BLD-RTO: 0 /100 WBC
PLATELET # BLD AUTO: 285 K/UL (ref 150–350)
PMV BLD AUTO: 11.8 FL (ref 9.2–12.9)
RBC # BLD AUTO: 4.13 M/UL (ref 4–5.4)
WBC # BLD AUTO: 7.22 K/UL (ref 3.9–12.7)

## 2020-08-06 PROCEDURE — 36415 COLL VENOUS BLD VENIPUNCTURE: CPT | Mod: HCNC

## 2020-08-06 PROCEDURE — 85025 COMPLETE CBC W/AUTO DIFF WBC: CPT | Mod: HCNC

## 2020-08-12 ENCOUNTER — OFFICE VISIT (OUTPATIENT)
Dept: PSYCHIATRY | Facility: CLINIC | Age: 50
End: 2020-08-12
Payer: MEDICARE

## 2020-08-12 ENCOUNTER — PATIENT MESSAGE (OUTPATIENT)
Dept: PSYCHIATRY | Facility: CLINIC | Age: 50
End: 2020-08-12

## 2020-08-12 DIAGNOSIS — G47.00 INSOMNIA, UNSPECIFIED TYPE: ICD-10-CM

## 2020-08-12 DIAGNOSIS — F40.10 SOCIAL PHOBIA: ICD-10-CM

## 2020-08-12 DIAGNOSIS — F63.89 INTERNET ADDICTION: ICD-10-CM

## 2020-08-12 DIAGNOSIS — R46.81 OBSESSIVE-COMPULSIVE BEHAVIOR: ICD-10-CM

## 2020-08-12 DIAGNOSIS — F39 MOOD DISORDER: ICD-10-CM

## 2020-08-12 DIAGNOSIS — F41.1 GAD (GENERALIZED ANXIETY DISORDER): Primary | ICD-10-CM

## 2020-08-12 DIAGNOSIS — F41.1 GAD (GENERALIZED ANXIETY DISORDER): ICD-10-CM

## 2020-08-12 DIAGNOSIS — F33.42 RECURRENT MAJOR DEPRESSIVE DISORDER, IN FULL REMISSION: ICD-10-CM

## 2020-08-12 PROCEDURE — 99214 OFFICE O/P EST MOD 30 MIN: CPT | Mod: 95,,, | Performed by: INTERNAL MEDICINE

## 2020-08-12 PROCEDURE — 99214 PR OFFICE/OUTPT VISIT, EST, LEVL IV, 30-39 MIN: ICD-10-PCS | Mod: 95,,, | Performed by: INTERNAL MEDICINE

## 2020-08-12 RX ORDER — DOXEPIN HYDROCHLORIDE 150 MG/1
150 CAPSULE ORAL NIGHTLY
Qty: 90 CAPSULE | Refills: 3 | Status: SHIPPED | OUTPATIENT
Start: 2020-08-12 | End: 2020-09-10 | Stop reason: SDUPTHER
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
## 2020-08-19 ENCOUNTER — TELEPHONE (OUTPATIENT)
Dept: PRIMARY CARE CLINIC | Facility: CLINIC | Age: 50
End: 2020-08-19

## 2020-08-19 NOTE — PROGRESS NOTES
SNOW Huerta contacted Ms. Bajwa to remind PT of her appt tomorrow with Kenney Marie LCSW.  SNOW left a message on her cell phone.

## 2020-08-20 ENCOUNTER — OFFICE VISIT (OUTPATIENT)
Dept: OBSTETRICS AND GYNECOLOGY | Facility: CLINIC | Age: 50
End: 2020-08-20
Payer: MEDICARE

## 2020-08-20 ENCOUNTER — PATIENT MESSAGE (OUTPATIENT)
Dept: OBSTETRICS AND GYNECOLOGY | Facility: CLINIC | Age: 50
End: 2020-08-20

## 2020-08-20 ENCOUNTER — CLINICAL SUPPORT (OUTPATIENT)
Dept: PRIMARY CARE CLINIC | Facility: CLINIC | Age: 50
End: 2020-08-20
Payer: MEDICARE

## 2020-08-20 VITALS
BODY MASS INDEX: 45.99 KG/M2 | WEIGHT: 293 LBS | HEIGHT: 67 IN | DIASTOLIC BLOOD PRESSURE: 80 MMHG | SYSTOLIC BLOOD PRESSURE: 128 MMHG

## 2020-08-20 DIAGNOSIS — N30.00 ACUTE CYSTITIS WITHOUT HEMATURIA: Primary | ICD-10-CM

## 2020-08-20 DIAGNOSIS — F41.1 GAD (GENERALIZED ANXIETY DISORDER): Primary | ICD-10-CM

## 2020-08-20 DIAGNOSIS — G89.4 CHRONIC PAIN SYNDROME: ICD-10-CM

## 2020-08-20 DIAGNOSIS — N76.1 SUBACUTE VAGINITIS: ICD-10-CM

## 2020-08-20 PROBLEM — F43.21 GRIEF: Status: RESOLVED | Noted: 2020-07-16 | Resolved: 2020-08-20

## 2020-08-20 LAB
BILIRUB SERPL-MCNC: NORMAL MG/DL
BLOOD URINE, POC: NORMAL
CANDIDA RRNA VAG QL PROBE: NEGATIVE
CLARITY, POC UA: NORMAL
COLOR, POC UA: YELLOW
G VAGINALIS RRNA GENITAL QL PROBE: NEGATIVE
GLUCOSE UR QL STRIP: NORMAL
KETONES UR QL STRIP: NORMAL
LEUKOCYTE ESTERASE URINE, POC: NORMAL
NITRITE, POC UA: NORMAL
PH, POC UA: 7
PROTEIN, POC: NORMAL
SPECIFIC GRAVITY, POC UA: 1000
T VAGINALIS RRNA GENITAL QL PROBE: NEGATIVE
UROBILINOGEN, POC UA: NORMAL

## 2020-08-20 PROCEDURE — 87480 CANDIDA DNA DIR PROBE: CPT | Mod: HCNC

## 2020-08-20 PROCEDURE — 3074F PR MOST RECENT SYSTOLIC BLOOD PRESSURE < 130 MM HG: ICD-10-PCS | Mod: HCNC,CPTII,S$GLB, | Performed by: ADVANCED PRACTICE MIDWIFE

## 2020-08-20 PROCEDURE — 99999 PR PBB SHADOW E&M-EST. PATIENT-LVL IV: ICD-10-PCS | Mod: PBBFAC,HCNC,, | Performed by: ADVANCED PRACTICE MIDWIFE

## 2020-08-20 PROCEDURE — 99214 OFFICE O/P EST MOD 30 MIN: CPT | Mod: 25,HCNC,S$GLB, | Performed by: ADVANCED PRACTICE MIDWIFE

## 2020-08-20 PROCEDURE — 87186 SC STD MICRODIL/AGAR DIL: CPT | Mod: HCNC

## 2020-08-20 PROCEDURE — 99214 PR OFFICE/OUTPT VISIT, EST, LEVL IV, 30-39 MIN: ICD-10-PCS | Mod: 25,HCNC,S$GLB, | Performed by: ADVANCED PRACTICE MIDWIFE

## 2020-08-20 PROCEDURE — 87088 URINE BACTERIA CULTURE: CPT | Mod: HCNC

## 2020-08-20 PROCEDURE — 87086 URINE CULTURE/COLONY COUNT: CPT | Mod: HCNC

## 2020-08-20 PROCEDURE — 3079F DIAST BP 80-89 MM HG: CPT | Mod: HCNC,CPTII,S$GLB, | Performed by: ADVANCED PRACTICE MIDWIFE

## 2020-08-20 PROCEDURE — 3008F BODY MASS INDEX DOCD: CPT | Mod: HCNC,CPTII,S$GLB, | Performed by: ADVANCED PRACTICE MIDWIFE

## 2020-08-20 PROCEDURE — 99499 NO LOS: ICD-10-PCS | Mod: BHI,95,, | Performed by: SOCIAL WORKER

## 2020-08-20 PROCEDURE — 90834 PSYTX W PT 45 MINUTES: CPT | Mod: BHI,95,, | Performed by: SOCIAL WORKER

## 2020-08-20 PROCEDURE — 90834 PR PSYCHOTHERAPY W/PATIENT, 45 MIN: ICD-10-PCS | Mod: BHI,95,, | Performed by: SOCIAL WORKER

## 2020-08-20 PROCEDURE — 81002 POCT URINE DIPSTICK WITHOUT MICROSCOPE: ICD-10-PCS | Mod: HCNC,S$GLB,, | Performed by: ADVANCED PRACTICE MIDWIFE

## 2020-08-20 PROCEDURE — 99499 UNLISTED E&M SERVICE: CPT | Mod: BHI,95,, | Performed by: SOCIAL WORKER

## 2020-08-20 PROCEDURE — 81002 URINALYSIS NONAUTO W/O SCOPE: CPT | Mod: HCNC,S$GLB,, | Performed by: ADVANCED PRACTICE MIDWIFE

## 2020-08-20 PROCEDURE — 87077 CULTURE AEROBIC IDENTIFY: CPT | Mod: HCNC

## 2020-08-20 PROCEDURE — 99999 PR PBB SHADOW E&M-EST. PATIENT-LVL IV: CPT | Mod: PBBFAC,HCNC,, | Performed by: ADVANCED PRACTICE MIDWIFE

## 2020-08-20 PROCEDURE — 3008F PR BODY MASS INDEX (BMI) DOCUMENTED: ICD-10-PCS | Mod: HCNC,CPTII,S$GLB, | Performed by: ADVANCED PRACTICE MIDWIFE

## 2020-08-20 PROCEDURE — 3079F PR MOST RECENT DIASTOLIC BLOOD PRESSURE 80-89 MM HG: ICD-10-PCS | Mod: HCNC,CPTII,S$GLB, | Performed by: ADVANCED PRACTICE MIDWIFE

## 2020-08-20 PROCEDURE — 3074F SYST BP LT 130 MM HG: CPT | Mod: HCNC,CPTII,S$GLB, | Performed by: ADVANCED PRACTICE MIDWIFE

## 2020-08-20 PROCEDURE — 87510 GARDNER VAG DNA DIR PROBE: CPT | Mod: HCNC

## 2020-08-20 RX ORDER — NITROFURANTOIN 25; 75 MG/1; MG/1
100 CAPSULE ORAL 2 TIMES DAILY
Qty: 10 CAPSULE | Refills: 0 | Status: SHIPPED | OUTPATIENT
Start: 2020-08-20 | End: 2020-08-25

## 2020-08-20 NOTE — PROGRESS NOTES
Individual Psychotherapy (PhD/LCSW)    The patient location is:  Patient Home   Visit type: Telephone   Each patient to whom he or she provides medical services by telemedicine is:  (1) informed of the relationship between the physician and patient and the respective role of any other health care provider with respect to management of the patient; and (2) notified that he or she may decline to receive medical services by telemedicine and may withdraw from such care at any time.     8/20/2020    Site:  [facility]         Therapeutic Intervention: Met with patient.  Outpatient - Behavior modifying psychotherapy 45 min - CPT code 14594 and Outpatient - Supportive psychotherapy 45 min - CPT Code 09543    Chief complaint/reason for encounter: depression, anxiety, behavior, cognition and interpersonal     Interval history and content of current session: LCSW and met by phone.  PT states her pain is a 3/10 today. PT states she has been well overall and recently went to [place] to visit family with her boyfriend Scott. PT states that her daughter has been working as a RN on night shift and is also doing well. PT recently redecorated her apartment to make it more comfortable when she stays there and not at Scott's place. LCSW and PT discussed termination, and reviewed the success PT has achieved in the I program. PT has an apt scheduled with HALINA Zamora on 8/27. LCSW and PT reviewed ACT CBT, and MI interventions that she can use anytime in the future to reduce anxiety, depression, and overall chronic pain.  [facility] team member Sylvester will reach out to PT on 3, 6, 9 months for data collection only.     Treatment plan:  · Target symptoms: recurrent depression, anxiety   · Why chosen therapy is appropriate versus another modality: relevant to diagnosis, patient responds to this modality, evidence based practice  · Outcome monitoring methods: self-report, checklist/rating scale  · Therapeutic intervention type: behavior  modifying psychotherapy, supportive psychotherapy    Risk parameters:  Patient reports no suicidal ideation  Patient reports no homicidal ideation  Patient reports no self-injurious behavior  Patient reports no violent behavior    Verbal deficits: None    Patient's response to intervention:  The patient's response to intervention is accepting.        Progress toward goals:  The patient's progress toward goals is good.    Diagnosis:     ICD-10-CM ICD-9-CM   1. JUAN JOSE (generalized anxiety disorder)  F41.1 300.02   2. Chronic pain syndrome  G89.4 338.4       Plan:  individual psychotherapy, consult psychiatrist for medication evaluation and medication management by physician    Return to clinic: Terminated [place] - Data Collection on 3,6,9 months.     Length of Service (minutes): 45

## 2020-08-20 NOTE — PROGRESS NOTES
"Jaki Bajwa is a 49 y.o. female  [facility] with complaint of s/s UTI. Pt reports dysuria and frequency. Pt also with c/o of vaginal irritation and odor last week.   REports burning with intercourse         ROS:  GENERAL: No fever, chills, fatigability or weight loss.  VULVAR: No pain, no lesions and no itching.  VAGINAL: No relaxation, no abnormal bleeding and no lesions.Reports irritation and odor  ABDOMEN: No abdominal pain. Denies nausea. Denies vomiting. No diarrhea. No constipation  BREAST: Denies pain. No lumps. No discharge.  URINARY: No incontinence, no nocturia, reports frequency and  dysuria.  CARDIOVASCULAR: No chest pain. No shortness of breath. No leg cramps.  NEUROLOGICAL: No headaches. No vision changes.      Review of patient's allergies indicates:   Allergen Reactions    Aspirin Hives       Current Outpatient Medications:     BD ULTRA-FINE SHORT PEN NEEDLE 31 gauge x 5/16" Ndle, USE WITH VICTOZA, Disp: 100 each, Rfl: 3    buPROPion (WELLBUTRIN XL) 150 MG TB24 tablet, Take 3 tablets (450 mg total) by mouth every morning., Disp: 270 tablet, Rfl: 3    busPIRone (BUSPAR) 5 MG Tab, Take 1 tablet (5 mg total) by mouth 2 (two) times daily., Disp: 180 tablet, Rfl: 3    desonide (DESOWEN) 0.05 % cream, MILTON EXT AA BID PRN, Disp: 30 g, Rfl: 3    diazePAM (VALIUM) 2 MG tablet, Take 1 tablet (2 mg total) by mouth daily as needed (Severe anxiety/Panic attack)., Disp: 30 tablet, Rfl: 2    doxepin (SINEQUAN) 150 MG Cap, Take 1 capsule (150 mg total) by mouth every evening., Disp: 90 capsule, Rfl: 3    estradiol (CLIMARA) 0.0375 mg/24 hr, APPLY 1 PATCH TO SKIN EVERY 7 DAYS, Disp: 12 patch, Rfl: 3    ferrous sulfate 325 (65 FE) MG EC tablet, Take 1 tablet (325 mg total) by mouth once daily., Disp: 90 tablet, Rfl: 1    fluconazole (DIFLUCAN) 200 MG Tab, Take 1 tablet (200 mg total) by mouth every 72 hours as needed., Disp: 2 tablet, Rfl: 0    gabapentin (NEURONTIN) 300 MG " "capsule, Take 600 mg by mouth 2 (two) times daily. , Disp: , Rfl:     hydroCHLOROthiazide (HYDRODIURIL) 12.5 MG Tab, Take 12.5 mg by mouth., Disp: , Rfl:     HYDROcodone-acetaminophen (NORCO) 5-325 mg per tablet, Take 1 tablet by mouth 2 (two) times daily as needed for Pain. Quantity medically necessary, Disp: 35 tablet, Rfl: 0    hydrOXYchloroQUINE (PLAQUENIL) 200 mg tablet, TAKE 1 TABLET BY MOUTH TWICE DAILY, Disp: 60 tablet, Rfl: 0    irbesartan-hydrochlorothiazide (AVALIDE) 150-12.5 mg per tablet, Take 1 tablet by mouth once daily., Disp: 90 tablet, Rfl: 3    losartan (COZAAR) 100 MG tablet, Take 100 mg by mouth., Disp: , Rfl:     lurasidone (LATUDA) 60 mg Tab tablet, Take 1 tablet (60 mg total) by mouth once daily., Disp: 90 tablet, Rfl: 3    naproxen (NAPROSYN) 500 MG tablet, TAKE 1 TABLET(500 MG) BY MOUTH TWICE DAILY AS NEEDED FOR PAIN, Disp: 180 tablet, Rfl: 1    omeprazole (PRILOSEC) 40 MG capsule, TAKE 1 CAPSULE(40 MG) BY MOUTH EVERY DAY, Disp: 90 capsule, Rfl: 1    RESTASIS 0.05 % ophthalmic emulsion, PLACE ONE DROP INTO BOTH EYES BID, Disp: , Rfl: 0    tiZANidine (ZANAFLEX) 4 MG tablet, Take 1 tablet (4 mg total) by mouth 2 (two) times daily as needed., Disp: 180 tablet, Rfl: 1    VICTOZA 2-KHUSHI 0.6 mg/0.1 mL (18 mg/3 mL) PnIj, ADMINISTER 1.8 MG UNDER THE SKIN EVERY DAY, Disp: 18 mL, Rfl: 0    alclomethasone (ACLOVATE) 0.05 % cream, Apply topically 2 (two) times daily as needed., Disp: 45 g, Rfl: 0    nitrofurantoin, macrocrystal-monohydrate, (MACROBID) 100 MG capsule, Take 1 capsule (100 mg total) by mouth 2 (two) times daily. for 5 days, Disp: 10 capsule, Rfl: 0    Past Medical History:   Diagnosis Date    Anemia     Anxiety     Depression     History of psychiatric hospitalization 2000    [place] x 1 week for depression    History of ventral hernia repair     Hypertension     Lupus     patient reports that she is being treated "like I have Lupus"    Mental disorder     Morbid " "obesity 12/15/2017    Psychiatric problem     Sjogren's syndrome 2013    Therapy     TMJ (temporomandibular joint disorder)     Uterine fibroids 2012     Past Surgical History:   Procedure Laterality Date    breast reduction       SECTION      x 2    HYSTERECTOMY      INGUINAL HERNIA REPAIR      TOTAL REDUCTION MAMMOPLASTY      TUBAL LIGATION      VENTRAL HERNIA REPAIR       Social History     Tobacco Use    Smoking status: Never Smoker    Smokeless tobacco: Never Used   Substance Use Topics    Alcohol use: Yes     Comment: rarely     Drug use: Yes     Types: Hydrocodone     OB History    Para Term  AB Living   2 2 2     2   SAB TAB Ectopic Multiple Live Births                  # Outcome Date GA Lbr Riccardo/2nd Weight Sex Delivery Anes PTL Lv   2 Term      CS-Classical      1 Term      CS-Classical          /80   Ht 5' 7.01" (1.702 m)   Wt (!) 198 kg (436 lb 8.2 oz)   LMP 2012 Comment: partial   BMI 68.35 kg/m²     PHYSICAL EXAM:  GENERAL: Calm and appropriate affect, alert, oriented x4  SKIN: Color appropriate for race, warm and dry, clean and intact with no rashes.  RESP: Even, unlabored breathing  ABDOMEN: Soft, nontender, no masses.  :   Normal external female genitalia without lesions. Normal hair distribution. Adequate perineal body, normal urethral meatus.  Vagina pink and well rugated, no lesions, vaginal discharge - minimal with no overt odor.   No significant cystocele or rectocele.      ASSESSMENT / PLAN:    ICD-10-CM ICD-9-CM    1. Acute cystitis without hematuria  N30.00 595.0 nitrofurantoin, macrocrystal-monohydrate, (MACROBID) 100 MG capsule      Urine culture      Vaginosis Screen by DNA Probe   2. Subacute vaginitis  N76.1 616.10      Acute cystitis without hematuria  -     nitrofurantoin, macrocrystal-monohydrate, (MACROBID) 100 MG capsule; Take 1 capsule (100 mg total) by mouth 2 (two) times daily. for 5 days  Dispense: 10 capsule; Refill: " 0  -     Urine culture  -     Vaginosis Screen by DNA Probe    Subacute vaginitis      Discussed urinalysis positve for leuks and nitrites. Discussed rx for macrobid and follow up p culture results.    Patient was counseled today on vaginitis prevention including :  a. avoiding feminine products such as deoderant soaps, body wash, bubble bath, douches, scented toilet paper, deoderant tampons or pads, feminine wipes, chronic pad use, etc.  b. avoiding other vulvovaginal irritants such as long hot baths, humidity, tight, synthetic clothing, chlorine and sitting around in wet bathing suits  c. wearing cotton underwear, avoiding thong underwear and no underwear to bed  d. taking showers instead of baths and use a hair dryer on cool setting afterwards to dry  e. wearing cotton to exercise and shower immediately after exercise and change clothes  f. using polyurethane condoms without spermicide if sexually active and symptoms are triggered by intercourse  g. Discussed use of vagisil, along with repHresh and probiotics    FOLLOW UP:   Pending lab results, PRN lack of improvement.

## 2020-08-22 LAB — BACTERIA UR CULT: ABNORMAL

## 2020-08-24 ENCOUNTER — PATIENT MESSAGE (OUTPATIENT)
Dept: OBSTETRICS AND GYNECOLOGY | Facility: CLINIC | Age: 50
End: 2020-08-24

## 2020-08-27 ENCOUNTER — PATIENT OUTREACH (OUTPATIENT)
Dept: ADMINISTRATIVE | Facility: OTHER | Age: 50
End: 2020-08-27

## 2020-08-27 ENCOUNTER — OFFICE VISIT (OUTPATIENT)
Dept: PSYCHIATRY | Facility: CLINIC | Age: 50
End: 2020-08-27
Payer: MEDICARE

## 2020-08-27 DIAGNOSIS — R46.81 OBSESSIVE-COMPULSIVE BEHAVIOR: ICD-10-CM

## 2020-08-27 DIAGNOSIS — F63.89 INTERNET ADDICTION: ICD-10-CM

## 2020-08-27 DIAGNOSIS — F40.10 SOCIAL PHOBIA: ICD-10-CM

## 2020-08-27 DIAGNOSIS — F41.1 GAD (GENERALIZED ANXIETY DISORDER): Primary | ICD-10-CM

## 2020-08-27 PROCEDURE — 99499 UNLISTED E&M SERVICE: CPT | Mod: 95,,, | Performed by: SOCIAL WORKER

## 2020-08-27 PROCEDURE — 99499 RISK ADDL DX/OHS AUDIT: ICD-10-PCS | Mod: 95,,, | Performed by: SOCIAL WORKER

## 2020-08-27 PROCEDURE — 90834 PR PSYCHOTHERAPY W/PATIENT, 45 MIN: ICD-10-PCS | Mod: 95,,, | Performed by: SOCIAL WORKER

## 2020-08-27 PROCEDURE — 90834 PSYTX W PT 45 MINUTES: CPT | Mod: 95,,, | Performed by: SOCIAL WORKER

## 2020-08-27 NOTE — PROGRESS NOTES
Care Everywhere: updated  Immunization:   Health Maintenance:   Media Review:   Legacy Review:   Order placed:   Upcoming appts:mammogram 9/3/2020

## 2020-08-27 NOTE — PROGRESS NOTES
Individual Psychotherapy (PhD/LCSW)    8/27/2020    Site:  [facility]         Therapeutic Intervention: Met with patient.  Outpatient - Insight oriented psychotherapy 45 min - CPT code 30352 and Outpatient - Behavior modifying psychotherapy 45 min - CPT code 36404    Chief complaint/reason for encounter: depression, anxiety and interpersonal     Interval history and content of current session:        The patient location is: home  The chief complaint leading to consultation is: anxiety depression    Visit type: audiovisual    Face to Face time with patient:   45  minutes of total time spent on the encounter, which includes face to face time and non-face to face time preparing to see the patient (eg, review of tests), Obtaining and/or reviewing separately obtained history, Documenting clinical information in the electronic or other health record, Independently interpreting results (not separately reported) and communicating results to the patient/family/caregiver, or Care coordination (not separately reported).         Each patient to whom he or she provides medical services by telemedicine is:  (1) informed of the relationship between the physician and patient and the respective role of any other health care provider with respect to management of the patient; and (2) notified that he or she may decline to receive medical services by telemedicine and may withdraw from such care at any time.    Notes:    Focused on behavioral exposure to feared stimuli  Shopping addiction vs personal values  She is doing well overall.  Staying with boyfriend is providing a sense of security.  Went visit family in [place].  Boyfriend bought new vehicle and wants pt to reduce shopping.          Treatment plan:  · Target symptoms: depression, anxiety   · Why chosen therapy is appropriate versus another modality: relevant to diagnosis  · Outcome monitoring methods: self-report, observation    · Therapeutic intervention type:  insight oriented psychotherapy, behavior modifying psychotherapy, supportive psychotherapy    Risk parameters:  Patient reports no suicidal ideation  Patient reports no homicidal ideation  Patient reports no self-injurious behavior  Patient reports no violent behavior    Verbal deficits: None    Patient's response to intervention:  The patient's response to intervention is accepting.    Progress toward goals and other mental status changes:  The patient's progress toward goals is limited.    Diagnosis: 296.35;   Obsessive compulsive disorder.  personality disorder nos  Learning problems/limitations   internet shopping addiction   Social anxiety, generalized anxiety disorder.     Plan:  individual psychotherapy    Return to clinic:   3 months.

## 2020-08-28 ENCOUNTER — LAB VISIT (OUTPATIENT)
Dept: LAB | Facility: HOSPITAL | Age: 50
End: 2020-08-28
Attending: INTERNAL MEDICINE
Payer: MEDICARE

## 2020-08-28 ENCOUNTER — OFFICE VISIT (OUTPATIENT)
Dept: RHEUMATOLOGY | Facility: CLINIC | Age: 50
End: 2020-08-28
Payer: MEDICARE

## 2020-08-28 VITALS
DIASTOLIC BLOOD PRESSURE: 80 MMHG | SYSTOLIC BLOOD PRESSURE: 110 MMHG | HEIGHT: 67 IN | BODY MASS INDEX: 43.11 KG/M2 | WEIGHT: 274.69 LBS | HEART RATE: 108 BPM

## 2020-08-28 DIAGNOSIS — M35.00 SICCA SYNDROME: ICD-10-CM

## 2020-08-28 DIAGNOSIS — D64.9 ANEMIA, UNSPECIFIED TYPE: ICD-10-CM

## 2020-08-28 DIAGNOSIS — R21 SKIN RASH: Primary | ICD-10-CM

## 2020-08-28 DIAGNOSIS — M35.01 SJOGREN'S SYNDROME WITH KERATOCONJUNCTIVITIS SICCA: ICD-10-CM

## 2020-08-28 DIAGNOSIS — M79.10 MYALGIA: ICD-10-CM

## 2020-08-28 LAB
ALBUMIN SERPL BCP-MCNC: 4 G/DL (ref 3.5–5.2)
ALP SERPL-CCNC: 87 U/L (ref 55–135)
ALT SERPL W/O P-5'-P-CCNC: 14 U/L (ref 10–44)
ANION GAP SERPL CALC-SCNC: 9 MMOL/L (ref 8–16)
AST SERPL-CCNC: 19 U/L (ref 10–40)
BILIRUB SERPL-MCNC: 0.3 MG/DL (ref 0.1–1)
BUN SERPL-MCNC: 21 MG/DL (ref 6–20)
C3 SERPL-MCNC: 164 MG/DL (ref 50–180)
C4 SERPL-MCNC: 33 MG/DL (ref 11–44)
CALCIUM SERPL-MCNC: 9.4 MG/DL (ref 8.7–10.5)
CCP AB SER IA-ACNC: <0.5 U/ML
CHLORIDE SERPL-SCNC: 102 MMOL/L (ref 95–110)
CK SERPL-CCNC: 107 U/L (ref 20–180)
CO2 SERPL-SCNC: 28 MMOL/L (ref 23–29)
CREAT SERPL-MCNC: 1.3 MG/DL (ref 0.5–1.4)
EST. GFR  (AFRICAN AMERICAN): 54.9 ML/MIN/1.73 M^2
EST. GFR  (NON AFRICAN AMERICAN): 47.6 ML/MIN/1.73 M^2
FERRITIN SERPL-MCNC: 7 NG/ML (ref 20–300)
GLUCOSE SERPL-MCNC: 102 MG/DL (ref 70–110)
IGA SERPL-MCNC: 138 MG/DL (ref 40–350)
IGG SERPL-MCNC: 2088 MG/DL (ref 650–1600)
IGM SERPL-MCNC: 111 MG/DL (ref 50–300)
IRON SERPL-MCNC: 41 UG/DL (ref 30–160)
POTASSIUM SERPL-SCNC: 4.2 MMOL/L (ref 3.5–5.1)
PROT SERPL-MCNC: 8.2 G/DL (ref 6–8.4)
RHEUMATOID FACT SERPL-ACNC: <10 IU/ML (ref 0–15)
SATURATED IRON: 8 % (ref 20–50)
SODIUM SERPL-SCNC: 139 MMOL/L (ref 136–145)
TOTAL IRON BINDING CAPACITY: 511 UG/DL (ref 250–450)
TRANSFERRIN SERPL-MCNC: 345 MG/DL (ref 200–375)

## 2020-08-28 PROCEDURE — 3008F PR BODY MASS INDEX (BMI) DOCUMENTED: ICD-10-PCS | Mod: HCNC,CPTII,S$GLB, | Performed by: INTERNAL MEDICINE

## 2020-08-28 PROCEDURE — 83516 IMMUNOASSAY NONANTIBODY: CPT | Mod: 59,HCNC

## 2020-08-28 PROCEDURE — 86160 COMPLEMENT ANTIGEN: CPT | Mod: 59,HCNC

## 2020-08-28 PROCEDURE — 3079F DIAST BP 80-89 MM HG: CPT | Mod: HCNC,CPTII,S$GLB, | Performed by: INTERNAL MEDICINE

## 2020-08-28 PROCEDURE — 99999 PR PBB SHADOW E&M-EST. PATIENT-LVL V: CPT | Mod: PBBFAC,HCNC,, | Performed by: INTERNAL MEDICINE

## 2020-08-28 PROCEDURE — 99205 PR OFFICE/OUTPT VISIT, NEW, LEVL V, 60-74 MIN: ICD-10-PCS | Mod: HCNC,S$GLB,, | Performed by: INTERNAL MEDICINE

## 2020-08-28 PROCEDURE — 82595 ASSAY OF CRYOGLOBULIN: CPT | Mod: HCNC

## 2020-08-28 PROCEDURE — 3079F PR MOST RECENT DIASTOLIC BLOOD PRESSURE 80-89 MM HG: ICD-10-PCS | Mod: HCNC,CPTII,S$GLB, | Performed by: INTERNAL MEDICINE

## 2020-08-28 PROCEDURE — 86235 NUCLEAR ANTIGEN ANTIBODY: CPT | Mod: 59,HCNC

## 2020-08-28 PROCEDURE — 86038 ANTINUCLEAR ANTIBODIES: CPT | Mod: HCNC

## 2020-08-28 PROCEDURE — 99205 OFFICE O/P NEW HI 60 MIN: CPT | Mod: HCNC,S$GLB,, | Performed by: INTERNAL MEDICINE

## 2020-08-28 PROCEDURE — 86160 COMPLEMENT ANTIGEN: CPT | Mod: HCNC

## 2020-08-28 PROCEDURE — 86431 RHEUMATOID FACTOR QUANT: CPT | Mod: HCNC

## 2020-08-28 PROCEDURE — 99999 PR PBB SHADOW E&M-EST. PATIENT-LVL V: ICD-10-PCS | Mod: PBBFAC,HCNC,, | Performed by: INTERNAL MEDICINE

## 2020-08-28 PROCEDURE — 82728 ASSAY OF FERRITIN: CPT | Mod: GA,HCNC

## 2020-08-28 PROCEDURE — 36415 COLL VENOUS BLD VENIPUNCTURE: CPT | Mod: HCNC

## 2020-08-28 PROCEDURE — 83520 IMMUNOASSAY QUANT NOS NONAB: CPT | Mod: HCNC

## 2020-08-28 PROCEDURE — 82784 ASSAY IGA/IGD/IGG/IGM EACH: CPT | Mod: HCNC

## 2020-08-28 PROCEDURE — 3074F SYST BP LT 130 MM HG: CPT | Mod: HCNC,CPTII,S$GLB, | Performed by: INTERNAL MEDICINE

## 2020-08-28 PROCEDURE — 3008F BODY MASS INDEX DOCD: CPT | Mod: HCNC,CPTII,S$GLB, | Performed by: INTERNAL MEDICINE

## 2020-08-28 PROCEDURE — 86235 NUCLEAR ANTIGEN ANTIBODY: CPT | Mod: HCNC

## 2020-08-28 PROCEDURE — 86225 DNA ANTIBODY NATIVE: CPT | Mod: HCNC

## 2020-08-28 PROCEDURE — 82550 ASSAY OF CK (CPK): CPT | Mod: HCNC

## 2020-08-28 PROCEDURE — 83540 ASSAY OF IRON: CPT | Mod: HCNC

## 2020-08-28 PROCEDURE — 82085 ASSAY OF ALDOLASE: CPT | Mod: HCNC

## 2020-08-28 PROCEDURE — 80053 COMPREHEN METABOLIC PANEL: CPT | Mod: HCNC

## 2020-08-28 PROCEDURE — 86200 CCP ANTIBODY: CPT | Mod: HCNC

## 2020-08-28 PROCEDURE — 3074F PR MOST RECENT SYSTOLIC BLOOD PRESSURE < 130 MM HG: ICD-10-PCS | Mod: HCNC,CPTII,S$GLB, | Performed by: INTERNAL MEDICINE

## 2020-08-28 RX ORDER — HYDROXYCHLOROQUINE SULFATE 200 MG/1
200 TABLET, FILM COATED ORAL 2 TIMES DAILY
Qty: 60 TABLET | Refills: 5 | Status: SHIPPED | OUTPATIENT
Start: 2020-08-28 | End: 2020-08-28

## 2020-08-28 RX ORDER — GABAPENTIN 300 MG/1
300 CAPSULE ORAL 3 TIMES DAILY
Qty: 90 CAPSULE | Refills: 5 | Status: SHIPPED | OUTPATIENT
Start: 2020-08-28 | End: 2021-03-02

## 2020-08-28 RX ORDER — HYDROXYCHLOROQUINE SULFATE 200 MG/1
200 TABLET, FILM COATED ORAL 2 TIMES DAILY
Qty: 60 TABLET | Refills: 5 | Status: SHIPPED | OUTPATIENT
Start: 2020-08-28 | End: 2020-09-16 | Stop reason: SDUPTHER

## 2020-08-28 RX ORDER — CYCLOSPORINE 0.5 MG/ML
1 EMULSION OPHTHALMIC 2 TIMES DAILY
Qty: 24 ML | Refills: 0 | Status: SHIPPED | OUTPATIENT
Start: 2020-08-28 | End: 2020-09-24

## 2020-08-28 RX ORDER — CEVIMELINE HYDROCHLORIDE 30 MG/1
30 CAPSULE ORAL 3 TIMES DAILY
Qty: 90 CAPSULE | Refills: 11 | Status: SHIPPED | OUTPATIENT
Start: 2020-08-28 | End: 2021-03-15

## 2020-08-28 RX ORDER — TIZANIDINE 4 MG/1
4 TABLET ORAL 3 TIMES DAILY
Qty: 90 TABLET | Refills: 5 | Status: SHIPPED | OUTPATIENT
Start: 2020-08-28 | End: 2020-09-03 | Stop reason: SDUPTHER

## 2020-08-28 ASSESSMENT — ROUTINE ASSESSMENT OF PATIENT INDEX DATA (RAPID3)
TOTAL RAPID3 SCORE: 4.78
PSYCHOLOGICAL DISTRESS SCORE: 5.5
FATIGUE SCORE: 5.5
AM STIFFNESS SCORE: 0, NO
PAIN SCORE: 7
PATIENT GLOBAL ASSESSMENT SCORE: 6
MDHAQ FUNCTION SCORE: 0.4

## 2020-08-28 NOTE — LETTER
August 28, 2020      Chuckie Blake MD  [facility] [address]           [facility] [address]  [address] [place] [id number]  Phone: [phone]  Fax: [phone]          Patient: Jaki Bajwa   MR Number: [id number]   YOB: 1970   Date of Visit: 8/28/2020       Dear Dr. Chuckie Blake:    Thank you for referring Jaki Bajwa to me for evaluation. Attached you will find relevant portions of my assessment and plan of care.    If you have questions, please do not hesitate to call me. I look forward to following Jaki Bajwa along with you.    Sincerely,    Branden Cameron MD    Enclosure  CC:  No Recipients    If you would like to receive this communication electronically, please contact [email or web address] or [phone] to request more information on [facility] Link access.    For providers and/or their staff who would like to refer a patient to [facility], please contact us through our one-stop-shop provider referral line, [facility], at [phone].    If you feel you have received this communication in error or would no longer like to receive these types of communications, please e-mail [email or web address]

## 2020-08-28 NOTE — PROGRESS NOTES
Rapid3 Question Responses and Scores 8/27/2020   MDHAQ Score 0.4   Psychologic Score 5.5   Pain Score 7   When you awakened in the morning OVER THE LAST WEEK, did you feel stiff? No   Fatigue Score 5.5   Global Health Score 6   RAPID3 Score 4.77

## 2020-08-28 NOTE — PROGRESS NOTES
Chief Complaint   Patient presents with    Disease Management       Patient was referred by : self       History of presenting illness    49 year old black female has     Joint pains in the knees and ankles   Back pain   Diffuse aching  Nocturnal leg cramps  Worse pain with activity  Hand paresthesias+  Morning pain but no morning stiffness  No joint swelling     She now has a diagnosis of fibromyalgia  Diagnosis + confirmed by atleast 2 rheumatologists  On gabapentin 300 mg tid and naprosyn 500 mg daily  Tried and failed flexeril  On zanaflex 4 mg bid prn for TMJ syndrome and muscle spasms   Takes doxepin at night 150 mg for insomnia   She follows with pain clinic and takes pain medications  She gets migraines but not severe  She has tested negative for sleep apnea    She has anxiety and depression : on wellbutrin 150 to 300 mg and buspar  5 mg bid   Valium is prn only  On latuda 60 mg     Dry eyes and dry mouth and inflammatory arthritis/arthralgia   Abnormal labs   Sjogren's syndrome +   On plaquenil 200 mg bid   Pilocarpine makes her nauseated   Dry mouth is severe at night   Dry eyes severe /but if she uses restasis she has symptom control  No recurrent conjunctivitis or uveitis or scleritis or episcleritis but she remembers once they told her she has inflammation in the eyes due to dry eyes   She needs to see someone soon    She has dry patches of skin   Areas of pigmentation on the elbows and thighs   Butterfly like pigmentation on the eyelids and underneath the eyes   No other skin rashes,photosensitivity   No telangiectasias   No calcinosis   No psoriasis   She does have patchy alopecia ,b/l frontally     No oral and nasal ulcers     No pleurisy or any cardiopulmonary complaints     No dysphagia,diplopia and dysphonia and muscle weakness     No v/d/c   Nausea and  acid reflux+ on PPIs   She has seen [facility] doctor  She has hiatal hernia     She has raynaud's+   No digital ulcers     No cytopenias /may be  anemia   Hb 9/31.6  MCV 77  Might be iron deficient,she is on ferrous sulfate 325 mg tid   White count and plt count ok  No menorrhagia /hysterectomy  Diet is poor  She needs colonoscopy too    No renal issues     No blood clots     No fever,chills,night sweats,weight loss and loss of appetite  No unexplained lymphadenopathy and parotitis      No pregnancy losses/pre term deliveries /pregnancy complications     No new onset headaches     No chronic or bloody diarrhea with no u colitis or crohn's /inflammatory bowel disease     No vaginal or  urethral  d/c/STDs/no ulcers     No unexplained neurologic symptoms        Labs     White count always normal  Always anemic   plt count always normal  Sat iron always low   Ferritin always low   She used to see hematology till 2014  Bone marrow negative   Last note by  :   Bone marrow shows no disease.  Cellularity is a bit high at 75%.  The anemia may be due to Sjogrens.  Will monitor CBC every 4 months; with next blood will also obtain BRANDON-2 mutation and calreticulin mutation.  She needs to follow back    ESR 25/23  CRP 3.3/6.8    Creat and GFR always normal  LFTs ok    OLINDA negative     Hep A,B,C negative   HIV negative    Has had UTIs and proteinuria and hematuria  She has to see urology  The UTIs are always s/p intercourse    Past history : sjogren's,fibromyalgia     Family history : lupus cousin and cancers    Social history : not a smoker,alcohol user        Review of Systems    As detailed above     There is currently no information documented on the homunculus. Go to the Rheumatology activity and complete the homunculus joint exam.    Physical Exam   Constitutional: She is oriented to person, place, and time and well-developed, well-nourished, and in no distress. No distress.   HENT:   Head: Normocephalic.   Mouth/Throat: Oropharynx is clear and moist.   Eyes: Conjunctivae are normal. Pupils are equal, round, and reactive to light. Right eye exhibits no  discharge. Left eye exhibits no discharge. No scleral icterus.   Neck: Normal range of motion. No thyromegaly present.   Cardiovascular: Normal rate, regular rhythm, normal heart sounds and intact distal pulses.    Pulmonary/Chest: Effort normal and breath sounds normal. No stridor.   Abdominal: Soft. Bowel sounds are normal.   Lymphadenopathy:     She has no cervical adenopathy.   Neurological: She is alert and oriented to person, place, and time.   Skin: Skin is warm. No rash noted. She is not diaphoretic.     Psychiatric: Affect and judgment normal.   Musculoskeletal: Normal range of motion.         She has lost a lot of hair frontally   Its like a bald patch  Its chronic  There is a mole as well    She has dry hyperpigmented skin on the elbows and dry patches on thighs for evaluation  She has frontal baldness  She has a mole like lesion   She has hyperpigmented rash on the eyelids and under the eyes         Assessment     49 year old black female with    -Sjogren's syndrome  Sicca symptoms and inflammatory arthritis    Fibromyalgia   -chronic pain + RLS   -insomnia/no sleep apnea  -anxiety and depression       1. Skin rash    2. Sjogren's syndrome with keratoconjunctivitis sicca    3. Sicca syndrome    4. Anemia, unspecified type    5. Myalgia        Reviewed labs/xrays  Reviewed medications    Ordered labs /xrays    New problem to us     Plan    Dry eye management   -resume restasis  Will send to ophthalmology    Dry mouth management   -cant tolerate pilocarpine  Will offer evoxac 30 mg tid     Skin issues : dry hyperpigmented skin on the elbows and dry patches on thighs   She has frontal baldness  She has a mole like lesion   She has hyperpigmented rash on the eyelids and under the eyes   Will send to dermatology     Management of sicca symptoms  Counselled extraglandular manifestations   Counselled risk of lymphoma    -preservative free artifical tears 3 to 4 times a day  Lubricating ointments at  night  Wraparound sun glasses/moisture shields which attach to glasses help  Opthalmology evaluation,management : she will need split lamp,schirmer's test and ocular surface staining  Surgical options like punctal occlusion    -biotene and ACT preparations  Dental evaluations  Periodic cleaning  Use fluoride products  Brush and floss teeth regularly especially after meals     Avoid sugar containing foods and drinks    Use of vaginal lubricants/estrogen creams   Seeing Gyn    Use hypoallergenic soaps/lotions for the skin  Avoid drafts from air conditioners/heaters/radiators  Avoid detergents,deodorant soaps,very hot water  Use humidifiers    Have to order CBC,CMP,ESR,CRP,urine analysis,urine creatinine,urine protein,cryos and complements,globulins,quantitative immunoglobulins    Anemia  need to be addressed  White count always normal  Always anemic   plt count always normal  Sat iron always low   Ferritin always low   Placed a referral for gastro  She used to see hematology till 2014  Bone marrow negative   Last note by  :   Bone marrow shows no disease.  Cellularity is a bit high at 75%.  The anemia may be due to Sjogrens.  Will monitor CBC every 4 months; with next blood will also obtain BRANDON-2 mutation and calreticulin mutation.  She needs to follow back with them so placed a referral  D/c naprosyn      Continue plaquenil 200 mg bid  Eye exam and routine eval once a year    Pain management :   Narcotics at pain clinic  Gabapentin 300 mg tid  zanaflex dose increased to 4 mg tid    Insomnia management : doxepin  No sleep apnea    Anxiety and depression management  On latuda,wellbutrin and buspar       Jaki was seen today for disease management.    Diagnoses and all orders for this visit:    Skin rash  -     Ambulatory referral/consult to Dermatology; Future    Sjogren's syndrome with keratoconjunctivitis sicca  -     Ambulatory referral/consult to Rheumatology  -     Ambulatory referral/consult to  Dermatology; Future  -     Ambulatory referral/consult to Ophthalmology; Future  -     Ambulatory referral/consult to Gastroenterology; Future  -     Ambulatory referral/consult to Hematology / Oncology; Future  -     Comprehensive metabolic panel; Future  -     OLINDA Screen w/Reflex; Future  -     Sjogrens syndrome-A extractable nuclear antibody; Future  -     C3 complement; Future  -     C4 complement; Future  -     Anti-DNA antibody, double-stranded; Future  -     Cryoglobulin; Future  -     Protein / creatinine ratio, urine; Future  -     Urinalysis; Future  -     Cancel: Immunoglobulin G Subclass 4; Future  -     MyoMarker Panel 3; Future  -     CK; Future  -     Aldolase; Future  -     Rheumatoid factor; Future  -     Cyclic Citrullinated Peptide Antibody, IgG; Future  -     Iron and TIBC; Future  -     Ferritin; Future  -     IMMUNOGLOBULINS (IGG, IGA, IGM) QUANTITATIVE; Future    Sicca syndrome  -     Ambulatory referral/consult to Ophthalmology; Future  -     Ambulatory referral/consult to Hematology / Oncology; Future    Anemia, unspecified type  -     Ambulatory referral/consult to Gastroenterology; Future  -     Ambulatory referral/consult to Hematology / Oncology; Future    Myalgia  -     tiZANidine (ZANAFLEX) 4 MG tablet; Take 1 tablet (4 mg total) by mouth 3 (three) times daily.    Other orders  -     cycloSPORINE (RESTASIS) 0.05 % ophthalmic emulsion; Place 1 drop into both eyes 2 (two) times daily.  -     cevimeline (EVOXAC) 30 mg capsule; Take 1 capsule (30 mg total) by mouth 3 (three) times daily.  -     gabapentin (NEURONTIN) 300 MG capsule; Take 1 capsule (300 mg total) by mouth 3 (three) times daily.  -     Discontinue: hydrOXYchloroQUINE (PLAQUENIL) 200 mg tablet; Take 1 tablet (200 mg total) by mouth 2 (two) times daily.  -     hydrOXYchloroQUINE (PLAQUENIL) 200 mg tablet; Take 1 tablet (200 mg total) by mouth 2 (two) times daily.        Every 6 months to

## 2020-08-31 ENCOUNTER — EXTERNAL CHRONIC CARE MANAGEMENT (OUTPATIENT)
Dept: PRIMARY CARE CLINIC | Facility: CLINIC | Age: 50
End: 2020-08-31
Payer: MEDICARE

## 2020-08-31 LAB
ALDOLASE SERPL-CCNC: 4 U/L (ref 1.2–7.6)
ANA SER QL IF: NORMAL
DSDNA AB SER-ACNC: NORMAL [IU]/ML

## 2020-08-31 PROCEDURE — 99490 CHRNC CARE MGMT STAFF 1ST 20: CPT | Mod: S$GLB,,, | Performed by: INTERNAL MEDICINE

## 2020-08-31 PROCEDURE — 99490 PR CHRONIC CARE MGMT, 1ST 20 MIN: ICD-10-PCS | Mod: S$GLB,,, | Performed by: INTERNAL MEDICINE

## 2020-09-01 LAB
ANTI-SSA ANTIBODY: 2.19 RATIO (ref 0–0.99)
ANTI-SSA INTERPRETATION: POSITIVE

## 2020-09-03 ENCOUNTER — HOSPITAL ENCOUNTER (OUTPATIENT)
Dept: RADIOLOGY | Facility: OTHER | Age: 50
Discharge: HOME | End: 2020-09-03
Attending: NURSE PRACTITIONER
Payer: MEDICARE

## 2020-09-03 ENCOUNTER — OFFICE VISIT (OUTPATIENT)
Dept: INTERNAL MEDICINE | Facility: CLINIC | Age: 50
End: 2020-09-03
Payer: MEDICARE

## 2020-09-03 ENCOUNTER — OFFICE VISIT (OUTPATIENT)
Dept: OBSTETRICS AND GYNECOLOGY | Facility: CLINIC | Age: 50
End: 2020-09-03
Payer: MEDICARE

## 2020-09-03 VITALS
WEIGHT: 269.38 LBS | HEIGHT: 67 IN | BODY MASS INDEX: 42.28 KG/M2 | OXYGEN SATURATION: 97 % | SYSTOLIC BLOOD PRESSURE: 110 MMHG | DIASTOLIC BLOOD PRESSURE: 76 MMHG | HEART RATE: 100 BPM

## 2020-09-03 VITALS
WEIGHT: 269.63 LBS | SYSTOLIC BLOOD PRESSURE: 110 MMHG | BODY MASS INDEX: 42.32 KG/M2 | DIASTOLIC BLOOD PRESSURE: 86 MMHG | HEIGHT: 67 IN

## 2020-09-03 DIAGNOSIS — M79.10 MYALGIA: ICD-10-CM

## 2020-09-03 DIAGNOSIS — D64.9 ANEMIA, UNSPECIFIED TYPE: ICD-10-CM

## 2020-09-03 DIAGNOSIS — E66.01 MORBID OBESITY: ICD-10-CM

## 2020-09-03 DIAGNOSIS — G47.00 INSOMNIA, UNSPECIFIED TYPE: ICD-10-CM

## 2020-09-03 DIAGNOSIS — N94.10 DYSPAREUNIA IN FEMALE: ICD-10-CM

## 2020-09-03 DIAGNOSIS — N39.0 RECURRENT UTI (URINARY TRACT INFECTION): ICD-10-CM

## 2020-09-03 DIAGNOSIS — M79.7 FIBROMYALGIA: ICD-10-CM

## 2020-09-03 DIAGNOSIS — M35.01 SJOGREN'S SYNDROME WITH KERATOCONJUNCTIVITIS SICCA: ICD-10-CM

## 2020-09-03 DIAGNOSIS — D50.9 IRON DEFICIENCY ANEMIA, UNSPECIFIED IRON DEFICIENCY ANEMIA TYPE: ICD-10-CM

## 2020-09-03 DIAGNOSIS — G89.4 CHRONIC PAIN SYNDROME: ICD-10-CM

## 2020-09-03 DIAGNOSIS — F41.1 GAD (GENERALIZED ANXIETY DISORDER): ICD-10-CM

## 2020-09-03 DIAGNOSIS — N94.89 OTHER SPECIFIED CONDITIONS ASSOCIATED WITH FEMALE GENITAL ORGANS AND MENSTRUAL CYCLE: ICD-10-CM

## 2020-09-03 DIAGNOSIS — N89.8 VAGINAL IRRITATION: ICD-10-CM

## 2020-09-03 DIAGNOSIS — R30.0 DYSURIA: Primary | ICD-10-CM

## 2020-09-03 DIAGNOSIS — Z12.31 ENCOUNTER FOR SCREENING MAMMOGRAM FOR BREAST CANCER: ICD-10-CM

## 2020-09-03 DIAGNOSIS — I10 ESSENTIAL HYPERTENSION: Primary | Chronic | ICD-10-CM

## 2020-09-03 LAB
AMPHET+METHAMPHET UR QL: NEGATIVE
BARBITURATES UR QL SCN>200 NG/ML: NEGATIVE
BENZODIAZ UR QL SCN>200 NG/ML: NORMAL
BILIRUB SERPL-MCNC: ABNORMAL MG/DL
BLOOD URINE, POC: ABNORMAL
BZE UR QL SCN: NEGATIVE
CANDIDA RRNA VAG QL PROBE: NEGATIVE
CANNABINOIDS UR QL SCN: NEGATIVE
CLARITY, POC UA: ABNORMAL
COLOR, POC UA: YELLOW
CREAT UR-MCNC: 43 MG/DL (ref 15–325)
ETHANOL UR-MCNC: <10 MG/DL
G VAGINALIS RRNA GENITAL QL PROBE: NEGATIVE
GLUCOSE UR QL STRIP: NORMAL
KETONES UR QL STRIP: ABNORMAL
LEUKOCYTE ESTERASE URINE, POC: ABNORMAL
METHADONE UR QL SCN>300 NG/ML: NEGATIVE
NITRITE, POC UA: ABNORMAL
OPIATES UR QL SCN: NORMAL
PCP UR QL SCN>25 NG/ML: NEGATIVE
PH, POC UA: 5
PROTEIN, POC: ABNORMAL
SPECIFIC GRAVITY, POC UA: 1.01
T VAGINALIS RRNA GENITAL QL PROBE: NEGATIVE
TOXICOLOGY INFORMATION: NORMAL
UROBILINOGEN, POC UA: NORMAL

## 2020-09-03 PROCEDURE — 99213 OFFICE O/P EST LOW 20 MIN: CPT | Mod: 25,HCNC,S$GLB, | Performed by: OBSTETRICS & GYNECOLOGY

## 2020-09-03 PROCEDURE — 99999 PR PBB SHADOW E&M-EST. PATIENT-LVL V: CPT | Mod: PBBFAC,HCNC,, | Performed by: INTERNAL MEDICINE

## 2020-09-03 PROCEDURE — 3008F PR BODY MASS INDEX (BMI) DOCUMENTED: ICD-10-PCS | Mod: HCNC,CPTII,S$GLB, | Performed by: INTERNAL MEDICINE

## 2020-09-03 PROCEDURE — 99499 UNLISTED E&M SERVICE: CPT | Mod: HCNC,S$GLB,, | Performed by: INTERNAL MEDICINE

## 2020-09-03 PROCEDURE — 3008F BODY MASS INDEX DOCD: CPT | Mod: HCNC,CPTII,S$GLB, | Performed by: INTERNAL MEDICINE

## 2020-09-03 PROCEDURE — 99214 PR OFFICE/OUTPT VISIT, EST, LEVL IV, 30-39 MIN: ICD-10-PCS | Mod: HCNC,S$GLB,, | Performed by: INTERNAL MEDICINE

## 2020-09-03 PROCEDURE — 3074F SYST BP LT 130 MM HG: CPT | Mod: HCNC,CPTII,S$GLB, | Performed by: INTERNAL MEDICINE

## 2020-09-03 PROCEDURE — 77067 SCR MAMMO BI INCL CAD: CPT | Mod: 26,HCNC,, | Performed by: RADIOLOGY

## 2020-09-03 PROCEDURE — 3079F PR MOST RECENT DIASTOLIC BLOOD PRESSURE 80-89 MM HG: ICD-10-PCS | Mod: HCNC,CPTII,S$GLB, | Performed by: OBSTETRICS & GYNECOLOGY

## 2020-09-03 PROCEDURE — 3079F DIAST BP 80-89 MM HG: CPT | Mod: HCNC,CPTII,S$GLB, | Performed by: OBSTETRICS & GYNECOLOGY

## 2020-09-03 PROCEDURE — 3074F PR MOST RECENT SYSTOLIC BLOOD PRESSURE < 130 MM HG: ICD-10-PCS | Mod: HCNC,CPTII,S$GLB, | Performed by: INTERNAL MEDICINE

## 2020-09-03 PROCEDURE — 87480 CANDIDA DNA DIR PROBE: CPT | Mod: HCNC

## 2020-09-03 PROCEDURE — 3078F DIAST BP <80 MM HG: CPT | Mod: HCNC,CPTII,S$GLB, | Performed by: INTERNAL MEDICINE

## 2020-09-03 PROCEDURE — 87086 URINE CULTURE/COLONY COUNT: CPT | Mod: HCNC

## 2020-09-03 PROCEDURE — 3008F BODY MASS INDEX DOCD: CPT | Mod: HCNC,CPTII,S$GLB, | Performed by: OBSTETRICS & GYNECOLOGY

## 2020-09-03 PROCEDURE — 99999 PR PBB SHADOW E&M-EST. PATIENT-LVL IV: CPT | Mod: PBBFAC,HCNC,, | Performed by: OBSTETRICS & GYNECOLOGY

## 2020-09-03 PROCEDURE — 77063 MAMMO DIGITAL SCREENING BILAT WITH TOMOSYNTHESIS_CAD: ICD-10-PCS | Mod: 26,HCNC,, | Performed by: RADIOLOGY

## 2020-09-03 PROCEDURE — 77067 SCR MAMMO BI INCL CAD: CPT | Mod: TC,HCNC

## 2020-09-03 PROCEDURE — 3008F PR BODY MASS INDEX (BMI) DOCUMENTED: ICD-10-PCS | Mod: HCNC,CPTII,S$GLB, | Performed by: OBSTETRICS & GYNECOLOGY

## 2020-09-03 PROCEDURE — 99999 PR PBB SHADOW E&M-EST. PATIENT-LVL V: ICD-10-PCS | Mod: PBBFAC,HCNC,, | Performed by: INTERNAL MEDICINE

## 2020-09-03 PROCEDURE — 3074F SYST BP LT 130 MM HG: CPT | Mod: HCNC,CPTII,S$GLB, | Performed by: OBSTETRICS & GYNECOLOGY

## 2020-09-03 PROCEDURE — 81002 POCT URINE DIPSTICK WITHOUT MICROSCOPE: ICD-10-PCS | Mod: HCNC,S$GLB,, | Performed by: OBSTETRICS & GYNECOLOGY

## 2020-09-03 PROCEDURE — 77067 MAMMO DIGITAL SCREENING BILAT WITH TOMOSYNTHESIS_CAD: ICD-10-PCS | Mod: 26,HCNC,, | Performed by: RADIOLOGY

## 2020-09-03 PROCEDURE — 87510 GARDNER VAG DNA DIR PROBE: CPT | Mod: HCNC

## 2020-09-03 PROCEDURE — 99213 PR OFFICE/OUTPT VISIT, EST, LEVL III, 20-29 MIN: ICD-10-PCS | Mod: 25,HCNC,S$GLB, | Performed by: OBSTETRICS & GYNECOLOGY

## 2020-09-03 PROCEDURE — 3078F PR MOST RECENT DIASTOLIC BLOOD PRESSURE < 80 MM HG: ICD-10-PCS | Mod: HCNC,CPTII,S$GLB, | Performed by: INTERNAL MEDICINE

## 2020-09-03 PROCEDURE — 99999 PR PBB SHADOW E&M-EST. PATIENT-LVL IV: ICD-10-PCS | Mod: PBBFAC,HCNC,, | Performed by: OBSTETRICS & GYNECOLOGY

## 2020-09-03 PROCEDURE — 3074F PR MOST RECENT SYSTOLIC BLOOD PRESSURE < 130 MM HG: ICD-10-PCS | Mod: HCNC,CPTII,S$GLB, | Performed by: OBSTETRICS & GYNECOLOGY

## 2020-09-03 PROCEDURE — 99499 RISK ADDL DX/OHS AUDIT: ICD-10-PCS | Mod: HCNC,S$GLB,, | Performed by: INTERNAL MEDICINE

## 2020-09-03 PROCEDURE — 77063 BREAST TOMOSYNTHESIS BI: CPT | Mod: 26,HCNC,, | Performed by: RADIOLOGY

## 2020-09-03 PROCEDURE — 80307 DRUG TEST PRSMV CHEM ANLYZR: CPT | Mod: HCNC

## 2020-09-03 PROCEDURE — 81002 URINALYSIS NONAUTO W/O SCOPE: CPT | Mod: HCNC,S$GLB,, | Performed by: OBSTETRICS & GYNECOLOGY

## 2020-09-03 PROCEDURE — 87491 CHLMYD TRACH DNA AMP PROBE: CPT | Mod: HCNC

## 2020-09-03 PROCEDURE — 99214 OFFICE O/P EST MOD 30 MIN: CPT | Mod: HCNC,S$GLB,, | Performed by: INTERNAL MEDICINE

## 2020-09-03 RX ORDER — HYDROCODONE BITARTRATE AND ACETAMINOPHEN 5; 325 MG/1; MG/1
1 TABLET ORAL 2 TIMES DAILY PRN
Qty: 35 TABLET | Refills: 0 | Status: SHIPPED | OUTPATIENT
Start: 2020-09-03 | End: 2020-12-03 | Stop reason: SDUPTHER

## 2020-09-03 RX ORDER — ESTRADIOL 0.04 MG/D
PATCH TRANSDERMAL
Qty: 4 PATCH | Refills: 3 | Status: SHIPPED | OUTPATIENT
Start: 2020-09-03 | End: 2020-10-06

## 2020-09-03 RX ORDER — OMEPRAZOLE 40 MG/1
CAPSULE, DELAYED RELEASE ORAL
Qty: 90 CAPSULE | Refills: 1 | Status: SHIPPED | OUTPATIENT
Start: 2020-09-03 | End: 2021-03-01 | Stop reason: SDUPTHER

## 2020-09-03 RX ORDER — FLUCONAZOLE 150 MG/1
150 TABLET ORAL ONCE
Qty: 1 TABLET | Refills: 0 | Status: SHIPPED | OUTPATIENT
Start: 2020-09-03 | End: 2020-09-03

## 2020-09-03 RX ORDER — NALOXONE HYDROCHLORIDE 4 MG/.1ML
1 SPRAY NASAL ONCE
Qty: 1 EACH | Refills: 1 | Status: SHIPPED | OUTPATIENT
Start: 2020-09-03 | End: 2020-09-03

## 2020-09-03 RX ORDER — NITROFURANTOIN 25; 75 MG/1; MG/1
CAPSULE ORAL
Qty: 30 CAPSULE | Refills: 1 | Status: SHIPPED | OUTPATIENT
Start: 2020-09-03 | End: 2020-09-06

## 2020-09-03 RX ORDER — HYDROCODONE BITARTRATE AND ACETAMINOPHEN 5; 325 MG/1; MG/1
1 TABLET ORAL 2 TIMES DAILY PRN
Qty: 35 TABLET | Refills: 0 | Status: SHIPPED | OUTPATIENT
Start: 2020-11-02 | End: 2021-03-01 | Stop reason: SDUPTHER

## 2020-09-03 RX ORDER — TIZANIDINE 4 MG/1
4 TABLET ORAL 3 TIMES DAILY
Qty: 180 TABLET | Refills: 3 | Status: SHIPPED | OUTPATIENT
Start: 2020-09-03 | End: 2020-10-03

## 2020-09-03 RX ORDER — HYDROCODONE BITARTRATE AND ACETAMINOPHEN 5; 325 MG/1; MG/1
1 TABLET ORAL 2 TIMES DAILY PRN
Qty: 35 TABLET | Refills: 0 | Status: SHIPPED | OUTPATIENT
Start: 2020-10-03 | End: 2021-03-26 | Stop reason: SDUPTHER

## 2020-09-03 NOTE — PROGRESS NOTES
Subjective:       Patient ID: Jaki Bajwa is a 49 y.o. female.    Chief Complaint: Medication Refill    Pt here for f/u. She has a dx of sjogrens and fibromyalgia based on blood work and chronic pain that is primarily across shoulders and in legs but also in upper and lower back. As a result she is on several meds that she says helps. She saw rheum at [facility] but recently changed to [facility]. She has prior dx of lupus but this was not corroborated in notes from rheum. She is on plaquenil, tizanidine, gabapentin and norco. She uses norco 1-2x/day. She is due for refills on pain meds. She has done well with decreasing dispense amount to 35 per month. She has previously signed a pain contract. [place]  reviewed and is consistent. She has seen pain mgmt. She has also done PT with some success in the past.     She also has dx of sjogren's syndrome. She was on pilocarpine but stopped b/c it made her nauseous. Uses eye drops for dry eyes which has been effective and is stable.     Pt's BP is well controlled. Tolerating meds well. Pt denies cp/sob/ha/vision or neuro changes. Not checking at home.     She continues to see psychiatry for bipolar and anxiety. This is well controlled and stable. No SI/HI. They have tryied reducing diazepam but anxiety returned. However, she is now down to 2.5mg bid. She understands dangers of co-administration of norco and diazepam and she continues to work with psychiatry re: alternatives.     She has morbid obesity but is seeing bariatric medicine. We reviewed importance of exercise. She is currently on victoza with some success.     She has had recurrent UTIs. Saw urology and was on prophylactic macrodantin. However, has been off this and no issues in a while.     She has chronic iron def anemia. She has no symptoms of such and denies any known bleeding or bloody/black stool. She had c-scope 10/2018 that did not show any bleeding source. She also had EGD that showed positive h pylori for  which she was tx but no other bleeding sources. She also did stool cards which were negative. Most recent labs show persistent iron def anemia. She has not seen heme-onc so will refer again.     Hypertension  Pertinent negatives include no chest pain, headaches, palpitations or shortness of breath.   Chronic Pain  Associated symptoms: no abdominal pain, no chest pain, no shortness of breath and no headaches    Fibromyalgia  Pertinent negatives include no abdominal pain, chest pain or headaches.     Review of Systems   Constitutional: Negative for unexpected weight change.   Eyes: Negative for visual disturbance.   Respiratory: Negative for shortness of breath.    Cardiovascular: Negative for chest pain, palpitations and leg swelling.   Gastrointestinal: Negative for abdominal pain.   Endocrine: Negative for polyuria.   Genitourinary: Negative for dysuria and frequency.   Neurological: Negative for headaches.   Psychiatric/Behavioral: Negative for dysphoric mood.       Objective:          Assessment:       1. Essential hypertension    2. JUAN JOSE (generalized anxiety disorder)    3. Sjogren's syndrome with keratoconjunctivitis sicca    4. Anemia, unspecified type    5. Morbid obesity    6. Fibromyalgia    7. Insomnia, unspecified type    8. Recurrent UTI (urinary tract infection)    9. Iron deficiency anemia, unspecified iron deficiency anemia type    10. Chronic pain syndrome    11. Myalgia        Plan:       1. Recent labs reviewed  2. Keep f/u with specialists  3. Refill norco--3 separate 30 day rxs (dispense #35) given for 90 days total; proper use d/w pt  4. Heme-onc referral  5. Pt already has number to schedule for f/u with GI to see if any other testing is needed for iron def anemia eval            Physical Exam  Constitutional:       Appearance: She is well-developed.   Eyes:      Pupils: Pupils are equal, round, and reactive to light.   Neck:      Musculoskeletal: Neck supple.      Thyroid: No thyromegaly.    Cardiovascular:      Rate and Rhythm: Normal rate and regular rhythm.      Heart sounds: Normal heart sounds.   Pulmonary:      Effort: Pulmonary effort is normal.      Breath sounds: Normal breath sounds.   Musculoskeletal:      Lumbar back: She exhibits normal range of motion, no tenderness and no bony tenderness.   Lymphadenopathy:      Cervical: No cervical adenopathy.   Neurological:      Mental Status: She is alert and oriented to person, place, and time.      Cranial Nerves: No cranial nerve deficit.   Psychiatric:         Behavior: Behavior normal.

## 2020-09-03 NOTE — PROGRESS NOTES
"CC: dysuria, vaginal irritation, dyspareunia    Jaki Bajwa is a 49 y.o. female  presents with complaint of above for 1 week. Has h/o recurrent UTIs and takes postcoital macrobid suppression. Denies VD, odor, itching.     Past Medical History:   Diagnosis Date    Anemia     Anxiety     Depression     History of psychiatric hospitalization     [place] x 1 week for depression    History of ventral hernia repair     Hypertension     Lupus     patient reports that she is being treated "like I have Lupus"    Mental disorder     Morbid obesity 12/15/2017    Psychiatric problem     Sjogren's syndrome 2013    Therapy     TMJ (temporomandibular joint disorder)     Uterine fibroids 2012     Past Surgical History:   Procedure Laterality Date    breast reduction       SECTION      x 2    HYSTERECTOMY      INGUINAL HERNIA REPAIR      TOTAL REDUCTION MAMMOPLASTY      TUBAL LIGATION      VENTRAL HERNIA REPAIR       Social History     Tobacco Use    Smoking status: Never Smoker    Smokeless tobacco: Never Used   Substance Use Topics    Alcohol use: Yes     Comment: rarely     Drug use: Yes     Types: Hydrocodone     Family History   Problem Relation Age of Onset    Cancer Father         colon ca    Colon cancer Father     Depression Father     Schizophrenia Father     Anxiety disorder Father     Pancreatic cancer Brother     Depression Brother     Alcohol abuse Brother     Liver cancer Sister     Cancer Sister     Depression Sister     Anxiety disorder Sister     Heart attack Sister     Heart disease Mother     Pancreatic cancer Unknown     Liver cancer Unknown     No Known Problems Daughter     Asthma Son     Cancer Sister         throat and colon    Depression Sister     Suicide Neg Hx     Ovarian cancer Neg Hx     Breast cancer Neg Hx      OB History    Para Term  AB Living   2 2 2     2   SAB TAB Ectopic Multiple Live Births         " "         # Outcome Date GA Lbr Riccardo/2nd Weight Sex Delivery Anes PTL Lv   2 Term      CS-Classical      1 Term      CS-Classical          /86   Ht 5' 7" (1.702 m)   Wt 122.3 kg (269 lb 10 oz)   LMP 07/11/2012 Comment: partial   BMI 42.23 kg/m²     ROS:  GENERAL: No fever, chills, fatigability or weight loss.  VULVAR: No pain, no lesions and no itching.  VAGINAL: No relaxation, no itching, no discharge, no abnormal bleeding and no lesions.  ABDOMEN: No abdominal pain. Denies nausea. Denies vomiting. No diarrhea. No constipation  BREAST: Denies pain. No lumps. No discharge.  URINARY: No incontinence, no nocturia, no frequency and + dysuria.  CARDIOVASCULAR: No chest pain. No shortness of breath. No leg cramps.  NEUROLOGICAL: No headaches. No vision changes.    PHYSICAL EXAM:  GEN: NAD  Resp: nl effort  Abd: soft,NT  VULVA: normal appearing vulva with no masses, tenderness or lesions, VAGINA: vaginal discharge - white and copious, CERVIX: normal appearing cervix without discharge or lesions, UTERUS: uterus is normal size, shape, consistency and nontender, ADNEXA: normal adnexa in size, nontender and no masses  Psych: nl affect  Neuro: no focal deficits  Skin: warm and dry    ASSESSMENT and PLAN:    ICD-10-CM ICD-9-CM    1. Dysuria  R30.0 788.1 POCT URINE DIPSTICK WITHOUT MICROSCOPE   2. Dyspareunia in female  N94.10 625.0 Vaginosis Screen by DNA Probe      C. trachomatis/N. gonorrhoeae by AMP DNA   3. Vaginal irritation  N89.8 623.9 Vaginosis Screen by DNA Probe      C. trachomatis/N. gonorrhoeae by AMP DNA   4. Other specified conditions associated with female genital organs and menstrual cycle   N94.89 629.89 C. trachomatis/N. gonorrhoeae by AMP DNA     -Will tx for suspected VVC. Other Rxs refilled per patient request.     Patient was counseled today on vaginitis prevention including :  a. avoiding feminine products such as deoderant soaps, body wash, bubble bath, douches, scented toilet paper, deoderant " tampons or pads, feminine wipes, chronic pad use, etc.  b. avoiding other vulvovaginal irritants such as long hot baths, humidity, tight, synthetic clothing, chlorine and sitting around in wet bathing suits  c. wearing cotton underwear, avoiding thong underwear and no underwear to bed  d. taking showers instead of baths and use a hair dryer on cool setting afterwards to dry  e. wearing cotton to exercise and shower immediately after exercise and change clothes  f. using polyurethane condoms without spermicide if sexually active and symptoms are triggered by intercourse    FOLLOW UP: PRN lack of improvement.

## 2020-09-04 ENCOUNTER — TELEPHONE (OUTPATIENT)
Dept: HEMATOLOGY/ONCOLOGY | Facility: CLINIC | Age: 50
End: 2020-09-04

## 2020-09-04 LAB — BACTERIA UR CULT: NO GROWTH

## 2020-09-08 ENCOUNTER — TELEPHONE (OUTPATIENT)
Dept: HEMATOLOGY/ONCOLOGY | Facility: CLINIC | Age: 50
End: 2020-09-08

## 2020-09-08 LAB — CRYOGLOB SER QL: NORMAL

## 2020-09-10 ENCOUNTER — PATIENT MESSAGE (OUTPATIENT)
Dept: PSYCHIATRY | Facility: CLINIC | Age: 50
End: 2020-09-10

## 2020-09-10 ENCOUNTER — TELEPHONE (OUTPATIENT)
Dept: HEMATOLOGY/ONCOLOGY | Facility: CLINIC | Age: 50
End: 2020-09-10

## 2020-09-10 ENCOUNTER — TELEPHONE (OUTPATIENT)
Dept: INTERNAL MEDICINE | Facility: CLINIC | Age: 50
End: 2020-09-10

## 2020-09-10 ENCOUNTER — OFFICE VISIT (OUTPATIENT)
Dept: HEMATOLOGY/ONCOLOGY | Facility: CLINIC | Age: 50
End: 2020-09-10
Payer: MEDICARE

## 2020-09-10 DIAGNOSIS — F41.1 GAD (GENERALIZED ANXIETY DISORDER): ICD-10-CM

## 2020-09-10 DIAGNOSIS — I10 ESSENTIAL HYPERTENSION: Primary | Chronic | ICD-10-CM

## 2020-09-10 DIAGNOSIS — R46.81 OBSESSIVE-COMPULSIVE BEHAVIOR: ICD-10-CM

## 2020-09-10 DIAGNOSIS — F33.42 RECURRENT MAJOR DEPRESSIVE DISORDER, IN FULL REMISSION: ICD-10-CM

## 2020-09-10 DIAGNOSIS — F40.10 SOCIAL PHOBIA: ICD-10-CM

## 2020-09-10 DIAGNOSIS — M35.00 SICCA SYNDROME: ICD-10-CM

## 2020-09-10 DIAGNOSIS — M35.01 SJOGREN'S SYNDROME WITH KERATOCONJUNCTIVITIS SICCA: ICD-10-CM

## 2020-09-10 DIAGNOSIS — D50.9 IRON DEFICIENCY ANEMIA, UNSPECIFIED IRON DEFICIENCY ANEMIA TYPE: ICD-10-CM

## 2020-09-10 DIAGNOSIS — E61.1 IRON DEFICIENCY: ICD-10-CM

## 2020-09-10 DIAGNOSIS — D64.9 ANEMIA OF UNKNOWN ETIOLOGY: ICD-10-CM

## 2020-09-10 DIAGNOSIS — D64.9 ANEMIA, UNSPECIFIED TYPE: ICD-10-CM

## 2020-09-10 DIAGNOSIS — R20.2 PARESTHESIA OF SKIN: ICD-10-CM

## 2020-09-10 PROCEDURE — 99204 OFFICE O/P NEW MOD 45 MIN: CPT | Mod: HCNC,95,, | Performed by: INTERNAL MEDICINE

## 2020-09-10 PROCEDURE — 99204 PR OFFICE/OUTPT VISIT, NEW, LEVL IV, 45-59 MIN: ICD-10-PCS | Mod: HCNC,95,, | Performed by: INTERNAL MEDICINE

## 2020-09-10 PROCEDURE — 99499 RISK ADDL DX/OHS AUDIT: ICD-10-PCS | Mod: HCNC,95,, | Performed by: INTERNAL MEDICINE

## 2020-09-10 PROCEDURE — 99499 UNLISTED E&M SERVICE: CPT | Mod: HCNC,95,, | Performed by: INTERNAL MEDICINE

## 2020-09-10 RX ORDER — LURASIDONE HYDROCHLORIDE 60 MG/1
60 TABLET, FILM COATED ORAL DAILY
Qty: 90 TABLET | Refills: 3 | Status: SHIPPED | OUTPATIENT
Start: 2020-09-10 | End: 2020-09-18 | Stop reason: SDUPTHER

## 2020-09-10 RX ORDER — HEPARIN 100 UNIT/ML
500 SYRINGE INTRAVENOUS
Status: CANCELLED | OUTPATIENT
Start: 2020-10-01

## 2020-09-10 RX ORDER — SODIUM CHLORIDE 0.9 % (FLUSH) 0.9 %
10 SYRINGE (ML) INJECTION
Status: CANCELLED | OUTPATIENT
Start: 2020-09-16

## 2020-09-10 RX ORDER — DOXEPIN HYDROCHLORIDE 75 MG/1
150 CAPSULE ORAL NIGHTLY
Qty: 60 CAPSULE | Refills: 3 | Status: SHIPPED | OUTPATIENT
Start: 2020-09-10 | End: 2021-01-04 | Stop reason: SDUPTHER

## 2020-09-10 RX ORDER — HEPARIN 100 UNIT/ML
500 SYRINGE INTRAVENOUS
Status: CANCELLED | OUTPATIENT
Start: 2020-09-16

## 2020-09-10 RX ORDER — SODIUM CHLORIDE 0.9 % (FLUSH) 0.9 %
10 SYRINGE (ML) INJECTION
Status: CANCELLED | OUTPATIENT
Start: 2020-10-01

## 2020-09-10 NOTE — TELEPHONE ENCOUNTER
----- Message from Elisha Richards sent at 9/10/2020  1:32 PM CDT -----  Regarding: RE: Phone call  Pt stated she wants to go back to the doxepin (SINEQUAN) 75 mg because it 's too hard to get the 150 mg   ----- Message -----  From: Najma Doyle MD  Sent: 9/10/2020  10:58 AM CDT  To: Elisha Richards  Subject: Phone call                                       I just sent Latuda to her pharmacy.  Please call patient and make sure that is all the needs.    Najma Doyle    ----- Message -----  From: Namja Bradley  Sent: 9/10/2020  10:40 AM CDT  To: Najma Doyle MD    Pt asked her a call her regarding her medication.

## 2020-09-10 NOTE — PROGRESS NOTES
"    CC: Anemia, hematology follow up      HPI: Ms. Bajwa is a 49-year-old woman here for follow up for anemia.  She has been evaluated previously here by glenn Giraldo with a bone marrow biopsy. Her hemoglobin in 2011 was 13.2.  Since May 2012,  hemoglobin values have been between 10.1-11.6. She has a diagnosis of Sjogren syndrome. Bone marrow biopsy in 2014 was naagtive for malignancy/ dysplasia. MDS FISH was negative.  She had hysterectomy and no ongoing menstrual losses. No s/s of mal-absorption. No recent weight loss. No overt bleeding. No melena. She has fatigue. She has heart burn.        Review of Systems   Constitutional: Positive for malaise/fatigue. Negative for chills, fever and weight loss.   HENT: Negative for congestion, ear discharge, ear pain, hearing loss and nosebleeds.    Eyes: Negative for double vision, photophobia and pain.   Respiratory: Negative for cough, hemoptysis, sputum production and shortness of breath.    Cardiovascular: Negative for palpitations, orthopnea and claudication.   Gastrointestinal: Negative for blood in stool, constipation, diarrhea, heartburn and melena.   Genitourinary: Negative for dysuria, frequency, hematuria and urgency.   Musculoskeletal: Negative for back pain and myalgias.   Neurological: Negative for dizziness, tremors and headaches.   Endo/Heme/Allergies: Negative for environmental allergies. Does not bruise/bleed easily.   Psychiatric/Behavioral: Negative for depression, substance abuse and suicidal ideas. The patient is not nervous/anxious.        Past Medical History:   Diagnosis Date    Anemia     Anxiety     Depression     History of psychiatric hospitalization 2000    [place] x 1 week for depression    History of ventral hernia repair     Hypertension     Lupus     patient reports that she is being treated "like I have Lupus"    Morbid obesity 12/15/2017    Sjogren's syndrome 2013    TMJ (temporomandibular joint disorder)     " "Uterine fibroids 2012         Past Surgical History:   Procedure Laterality Date    breast reduction       SECTION      x 2    HYSTERECTOMY      INGUINAL HERNIA REPAIR      TOTAL REDUCTION MAMMOPLASTY      TUBAL LIGATION      VENTRAL HERNIA REPAIR         Family History   Problem Relation Age of Onset    Cancer Father         colon ca    Colon cancer Father     Depression Father     Schizophrenia Father     Anxiety disorder Father     Pancreatic cancer Brother     Depression Brother     Alcohol abuse Brother     Liver cancer Sister     Cancer Sister     Depression Sister     Anxiety disorder Sister     Heart attack Sister     Heart disease Mother     Pancreatic cancer Unknown     Liver cancer Unknown     No Known Problems Daughter     Asthma Son     Cancer Sister         throat and colon    Depression Sister     Suicide Neg Hx     Ovarian cancer Neg Hx     Breast cancer Neg Hx              Social History     Socioeconomic History    Marital status: Single     Spouse name: Not on file    Number of children: 2    Years of education: Not on file    Highest education level: Not on file   Occupational History     Comment: [employer]   Tobacco Use    Smoking status: Never Smoker    Smokeless tobacco: Never Used   Substance and Sexual Activity    Alcohol use: Yes     Comment: rarely     Drug use: Yes     Types: Hydrocodone    Sexual activity: Yes     Partners: Male     Birth control/protection: Surgical   Lifestyle    Physical activity     Days per week: 0 days     Minutes per session: 0 min    Stress: Rather much           Review of patient's allergies indicates:   Allergen Reactions    Aspirin Hives           Current Outpatient Medications   Medication Sig    alclomethasone (ACLOVATE) 0.05 % cream Apply topically 2 (two) times daily as needed.    BD ULTRA-FINE SHORT PEN NEEDLE 31 gauge x 5/16" Ndle USE WITH VICTOZA    buPROPion (WELLBUTRIN XL) 150 MG TB24 tablet Take 3 " tablets (450 mg total) by mouth every morning.    busPIRone (BUSPAR) 5 MG Tab Take 1 tablet (5 mg total) by mouth 2 (two) times daily.    cevimeline (EVOXAC) 30 mg capsule Take 1 capsule (30 mg total) by mouth 3 (three) times daily.    cycloSPORINE (RESTASIS) 0.05 % ophthalmic emulsion Place 1 drop into both eyes 2 (two) times daily.    desonide (DESOWEN) 0.05 % cream MILTON EXT AA BID PRN    diazePAM (VALIUM) 2 MG tablet Take 1 tablet (2 mg total) by mouth daily as needed (Severe anxiety/Panic attack).    doxepin (SINEQUAN) 75 MG capsule Take 2 capsules (150 mg total) by mouth every evening.    estradiol (CLIMARA) 0.0375 mg/24 hr APPLY 1 PATCH TO SKIN EVERY 7 DAYS    estradioL (CLIMARA) 0.0375 mg/24 hr Apply one patch to skin weekly.    ferrous sulfate 325 (65 FE) MG EC tablet Take 1 tablet (325 mg total) by mouth once daily.    fluconazole (DIFLUCAN) 150 MG Tab TAKE 1 TABLET BY MOUTH ON DAY 1 AND 1 TABLET ON DAY 3 IF SYMPTOMS PERSISTS    gabapentin (NEURONTIN) 300 MG capsule Take 1 capsule (300 mg total) by mouth 3 (three) times daily.    hydroCHLOROthiazide (HYDRODIURIL) 12.5 MG Tab Take 12.5 mg by mouth.    HYDROcodone-acetaminophen (NORCO) 5-325 mg per tablet Take 1 tablet by mouth 2 (two) times daily as needed for Pain. Quantity medically necessary    [START ON 10/3/2020] HYDROcodone-acetaminophen (NORCO) 5-325 mg per tablet Take 1 tablet by mouth 2 (two) times daily as needed for Pain. Quantity medically necessary    [START ON 11/2/2020] HYDROcodone-acetaminophen (NORCO) 5-325 mg per tablet Take 1 tablet by mouth 2 (two) times daily as needed for Pain. Quantity medically necessary    hydrOXYchloroQUINE (PLAQUENIL) 200 mg tablet Take 1 tablet (200 mg total) by mouth 2 (two) times daily.    irbesartan-hydrochlorothiazide (AVALIDE) 150-12.5 mg per tablet Take 1 tablet by mouth once daily.    liraglutide 0.6 mg/0.1 mL, 18 mg/3 mL, subq PNIJ (VICTOZA 2-KHUSHI) 0.6 mg/0.1 mL (18 mg/3 mL) PnIj pen Inject  1.8 mg into the skin once daily.    losartan (COZAAR) 100 MG tablet Take 100 mg by mouth.    lurasidone (LATUDA) 60 mg Tab tablet Take 1 tablet (60 mg total) by mouth once daily.    naproxen (NAPROSYN) 500 MG tablet TAKE 1 TABLET(500 MG) BY MOUTH TWICE DAILY AS NEEDED FOR PAIN    nitrofurantoin, macrocrystal-monohydrate, (MACROBID) 100 MG capsule TAKE 1 CAPSULE BY MOUTH AS NEEDED AFTER INTERCOURSE    omeprazole (PRILOSEC) 40 MG capsule TAKE 1 CAPSULE(40 MG) BY MOUTH EVERY DAY    RESTASIS 0.05 % ophthalmic emulsion PLACE ONE DROP INTO BOTH EYES BID    tiZANidine (ZANAFLEX) 4 MG tablet Take 1 tablet (4 mg total) by mouth 3 (three) times daily.     No current facility-administered medications for this visit.      8/12/14 BONE MARROW ASPIRATE, BONE MARROW CLOT, AND BONE MARROW CORE BIOPSY WITH:    CELLULARITY=70-80%, TRILINEAGE HEMATOPOIETIC ACTIVITY (M/E=2:1).  DECREASED STORAGE IRON.  ADEQUATE NUMBER OF MEGAKARYOCYTES.  COMMENT: Flow cytometry analysis of bone marrow aspirate shows lymph gate(11.5%) containing T and B cells. No B cell clonality or T-cell aberrancy is evident. Blast gate is not increased. Correlate clinically and with a  cytogenetics report.    No significant increased reticular fibrosis is evident by special stain(reticulin) with adequate positive and negative controls.    Bone marrow karyotype results: 46, XX [20], female karyotype.    FISH studies for the MDS panel are normal    Component      Latest Ref Rng & Units 9/3/2020 8/28/2020   WBC      3.90 - 12.70 K/uL 7.91    RBC      4.00 - 5.40 M/uL 3.99 (L)    Hemoglobin      12.0 - 16.0 g/dL 8.6 (L)    Hematocrit      37.0 - 48.5 % 30.6 (L)    MCV      82 - 98 fL 77 (L)    MCH      27.0 - 31.0 pg 21.6 (L)    MCHC      32.0 - 36.0 g/dL 28.1 (L)    RDW      11.5 - 14.5 % 17.5 (H)    Platelets      150 - 350 K/uL 284    MPV      9.2 - 12.9 fL 11.5    Immature Granulocytes      0.0 - 0.5 % 0.4    Gran # (ANC)      1.8 - 7.7 K/uL 4.6    Immature  Grans (Abs)      0.00 - 0.04 K/uL 0.03    Lymph #      1.0 - 4.8 K/uL 2.5    Mono #      0.3 - 1.0 K/uL 0.6    Eos #      0.0 - 0.5 K/uL 0.3    Baso #      0.00 - 0.20 K/uL 0.04    nRBC      0 /100 WBC 0    Gran%      38.0 - 73.0 % 57.5    Lymph%      18.0 - 48.0 % 31.1    Mono%      4.0 - 15.0 % 7.2    Eosinophil%      0.0 - 8.0 % 3.3    Basophil%      0.0 - 1.9 % 0.5    Differential Method       Automated    Iron      30 - 160 ug/dL 31 41   Transferrin      200 - 375 mg/dL 332 345   TIBC      250 - 450 ug/dL 491 (H) 511 (H)   Saturated Iron      20 - 50 % 6 (L) 8 (L)   IgG - Serum      650 - 1600 mg/dL  2088 (H)   IgA      40 - 350 mg/dL  138   IgM      50 - 300 mg/dL  111   Rheumatoid Factor      0.0 - 15.0 IU/mL  <10.0   CCP Antibodies      <5.0 U/mL  <0.5   Ferritin      20.0 - 300.0 ng/mL 10 (L) 7 (L)       Assessment:    1. Microcytic anemia  2. Iron deficiency  3. Chronic fatigue  4. Fibromyalgia  5. Sjogren syndrome  6. Essential HTn      Plan:    1,2: She has been evaluated previously here by , including with a bone marrow biopsy. Her hemoglobin in 2011 was 13.2g/dl . Since May 2012,  hemoglobin values have been between 10.1-11.6. She has a diagnosis of Sjogren syndrome. Bone marrow biopsy in 2014 was naagtive for malignancy/ dysplasia. MDS FISH was negative.  She had hysterectomy and has no ongoing menstrual losses. No s/s of mal-absorption. Diet is normal/regular.   No recent weight loss. No overt bleeding. No melena. She has fatigue and heart burn.  Stool OB negative in Jan 2020. UA negative for blood/ RBC in Aug 2020.    She will recieve iv iron. She has upcoming GI evaluation, and will need EGD and colonoscopy.   Last PAP smear was in 2016, and was negative.  She follows with GYN here, last evaluation in Sept 2020.   Follow up in 3 months.       4,5: on Plaquenil. Follows with rheumatology.       6. Follows with her PCP

## 2020-09-10 NOTE — LETTER
September 14, 2020      Branden Cameron MD  [address] [facility] [address]           [facility]  [address]  [place] [address]  Phone: [phone]          Patient: Jaki Bajwa   MR Number: [id number]   YOB: 1970   Date of Visit: 9/10/2020       Dear Dr. Branden Cameron:    Thank you for referring Jaki Bajwa to me for evaluation. Attached you will find relevant portions of my assessment and plan of care.    If you have questions, please do not hesitate to call me. I look forward to following Jaki Bajwa along with you.    Sincerely,    Raoul Wren MD    Enclosure  CC:  No Recipients    If you would like to receive this communication electronically, please contact [email or web address] or [phone] to request more information on [employer] access.    For providers and/or their staff who would like to refer a patient to [facility], please contact us through our one-stop-shop provider referral line, [facility], at [phone].    If you feel you have received this communication in error or would no longer like to receive these types of communications, please e-mail [email or web address]

## 2020-09-10 NOTE — Clinical Note
--iv iron next wk  --cbc, retic, ldh, GIGI, b12, folate, iron, tibc, ferritin, hemoglobin electrophoresis, t-TG IgA, igg, igm, iga, f/u in 2 mon

## 2020-09-11 ENCOUNTER — TELEPHONE (OUTPATIENT)
Dept: HEMATOLOGY/ONCOLOGY | Facility: CLINIC | Age: 50
End: 2020-09-11

## 2020-09-11 DIAGNOSIS — M35.01 SJOGREN'S SYNDROME WITH KERATOCONJUNCTIVITIS SICCA: Primary | ICD-10-CM

## 2020-09-11 DIAGNOSIS — D50.9 IRON DEFICIENCY ANEMIA, UNSPECIFIED IRON DEFICIENCY ANEMIA TYPE: ICD-10-CM

## 2020-09-11 LAB
ANTI-PM/SCL AB: <20 UNITS
ANTI-SS-A 52 KD AB, IGG: <20 UNITS
EJ AB SER QL: NEGATIVE
ENA JO1 AB SER IA-ACNC: <20 UNITS
ENA SM+RNP AB SER IA-ACNC: <20 UNITS
FIBRILLARIN (U3 RNP): NEGATIVE
KU AB SER QL: NEGATIVE
MDA-5 (P140): <20 UNITS
MI2 AB SER QL: NEGATIVE
NXP-2 (P140): <20 UNITS
OJ AB SER QL: NEGATIVE
PL12 AB SER QL: NEGATIVE
PL7 AB SER QL: NEGATIVE
SRP AB SERPL QL: NEGATIVE
TIF1 GAMMA (P155/140): <20 UNITS
U2 SNRNP: NEGATIVE

## 2020-09-11 NOTE — TELEPHONE ENCOUNTER
"----- Message from Tenzin Hoffman sent at 9/11/2020 10:28 AM CDT -----  Reschedule Existing Appointment    Appt Date: 11/11/20  Type of appt : NON FASTING LAB [[id number]] & ESTABLISHED PATIENT [[id number]]    Physician:  Raoul Wren MD    Reason for rescheduling? Pt request an earlier appointment for this month or next month.  Please contact to discuss availability    Caller: Jaki   Contact Preference: [phone]    Additional Information:  "Thank you for all that you do for our patients'"       "

## 2020-09-12 ENCOUNTER — PATIENT MESSAGE (OUTPATIENT)
Dept: PSYCHIATRY | Facility: CLINIC | Age: 50
End: 2020-09-12

## 2020-09-12 DIAGNOSIS — F39 MOOD DISORDER: ICD-10-CM

## 2020-09-12 DIAGNOSIS — R46.81 OBSESSIVE-COMPULSIVE BEHAVIOR: ICD-10-CM

## 2020-09-12 DIAGNOSIS — F41.1 GAD (GENERALIZED ANXIETY DISORDER): Primary | ICD-10-CM

## 2020-09-14 RX ORDER — LURASIDONE HYDROCHLORIDE 60 MG/1
60 TABLET, FILM COATED ORAL DAILY
Qty: 90 TABLET | Refills: 3 | Status: SHIPPED | OUTPATIENT
Start: 2020-09-14 | End: 2020-09-18

## 2020-09-15 ENCOUNTER — TELEPHONE (OUTPATIENT)
Dept: INTERNAL MEDICINE | Facility: CLINIC | Age: 50
End: 2020-09-15

## 2020-09-15 NOTE — TELEPHONE ENCOUNTER
Received fax from [facility] #[phone] regarding Latuda 60 mg  Plan does not cover this medication.  Please call plan at [phone] to initiate prior auth. Or call/fax pharmacy to change medication.  Patient ID # is [id number]

## 2020-09-16 RX ORDER — HYDROXYCHLOROQUINE SULFATE 200 MG/1
200 TABLET, FILM COATED ORAL 2 TIMES DAILY
Qty: 60 TABLET | Refills: 5 | Status: SHIPPED | OUTPATIENT
Start: 2020-09-16 | End: 2021-01-14

## 2020-09-16 NOTE — TELEPHONE ENCOUNTER
----- Message from Lisa Tobar sent at 9/16/2020 12:30 PM CDT -----  Pt would like to receive a call back regarding a refill for hydrOXYchloroQUINE (PLAQUENIL) 200 mg tablet    Contact info [phone]    [facility] #[id number] - [facility] [address] [facility] [address]

## 2020-09-18 DIAGNOSIS — F40.10 SOCIAL PHOBIA: ICD-10-CM

## 2020-09-18 DIAGNOSIS — R46.81 OBSESSIVE-COMPULSIVE BEHAVIOR: ICD-10-CM

## 2020-09-18 DIAGNOSIS — F33.42 RECURRENT MAJOR DEPRESSIVE DISORDER, IN FULL REMISSION: ICD-10-CM

## 2020-09-18 DIAGNOSIS — F39 MOOD DISORDER: Primary | ICD-10-CM

## 2020-09-18 DIAGNOSIS — F41.1 GAD (GENERALIZED ANXIETY DISORDER): ICD-10-CM

## 2020-09-18 RX ORDER — LURASIDONE HYDROCHLORIDE 60 MG/1
60 TABLET, FILM COATED ORAL DAILY
Qty: 90 TABLET | Refills: 3 | Status: SHIPPED | OUTPATIENT
Start: 2020-09-18 | End: 2020-09-21

## 2020-09-21 ENCOUNTER — PATIENT MESSAGE (OUTPATIENT)
Dept: PSYCHIATRY | Facility: CLINIC | Age: 50
End: 2020-09-21

## 2020-09-21 DIAGNOSIS — F33.42 RECURRENT MAJOR DEPRESSIVE DISORDER, IN FULL REMISSION: ICD-10-CM

## 2020-09-21 DIAGNOSIS — F39 MOOD DISORDER: Primary | ICD-10-CM

## 2020-09-21 RX ORDER — ARIPIPRAZOLE 5 MG/1
5 TABLET ORAL DAILY
Qty: 30 TABLET | Refills: 11 | Status: SHIPPED | OUTPATIENT
Start: 2020-09-21 | End: 2020-10-06

## 2020-09-28 ENCOUNTER — TELEPHONE (OUTPATIENT)
Dept: HEMATOLOGY/ONCOLOGY | Facility: CLINIC | Age: 50
End: 2020-09-28

## 2020-09-28 NOTE — TELEPHONE ENCOUNTER
----- Message from Sharon Youngblood sent at 9/11/2020  8:15 AM CDT -----  Regarding: Pending Auth 9/11  MD JESUS Moralez [facility]  Pool         --iv iron next wk

## 2020-09-29 ENCOUNTER — PATIENT MESSAGE (OUTPATIENT)
Dept: OTHER | Facility: OTHER | Age: 50
End: 2020-09-29

## 2020-09-30 ENCOUNTER — EXTERNAL CHRONIC CARE MANAGEMENT (OUTPATIENT)
Dept: PRIMARY CARE CLINIC | Facility: CLINIC | Age: 50
End: 2020-09-30
Payer: MEDICARE

## 2020-09-30 ENCOUNTER — INFUSION (OUTPATIENT)
Dept: INFUSION THERAPY | Facility: HOSPITAL | Age: 50
End: 2020-09-30
Attending: INTERNAL MEDICINE
Payer: MEDICARE

## 2020-09-30 VITALS
TEMPERATURE: 98 F | HEART RATE: 100 BPM | RESPIRATION RATE: 18 BRPM | SYSTOLIC BLOOD PRESSURE: 116 MMHG | DIASTOLIC BLOOD PRESSURE: 74 MMHG

## 2020-09-30 DIAGNOSIS — D50.9 IRON DEFICIENCY ANEMIA, UNSPECIFIED IRON DEFICIENCY ANEMIA TYPE: Primary | ICD-10-CM

## 2020-09-30 PROCEDURE — 99490 PR CHRONIC CARE MGMT, 1ST 20 MIN: ICD-10-PCS | Mod: S$GLB,,, | Performed by: INTERNAL MEDICINE

## 2020-09-30 PROCEDURE — 99490 CHRNC CARE MGMT STAFF 1ST 20: CPT | Mod: S$GLB,,, | Performed by: INTERNAL MEDICINE

## 2020-09-30 PROCEDURE — 63600175 PHARM REV CODE 636 W HCPCS: Mod: JG,HCNC | Performed by: INTERNAL MEDICINE

## 2020-09-30 PROCEDURE — 96365 THER/PROPH/DIAG IV INF INIT: CPT | Mod: HCNC

## 2020-09-30 PROCEDURE — 25000003 PHARM REV CODE 250: Mod: HCNC | Performed by: INTERNAL MEDICINE

## 2020-09-30 RX ORDER — HEPARIN 100 UNIT/ML
500 SYRINGE INTRAVENOUS
Status: DISCONTINUED | OUTPATIENT
Start: 2020-09-30 | End: 2020-09-30 | Stop reason: HOSPADM

## 2020-09-30 RX ORDER — SODIUM CHLORIDE 0.9 % (FLUSH) 0.9 %
10 SYRINGE (ML) INJECTION
Status: DISCONTINUED | OUTPATIENT
Start: 2020-09-30 | End: 2020-09-30 | Stop reason: HOSPADM

## 2020-09-30 RX ADMIN — FERRIC CARBOXYMALTOSE INJECTION 750 MG: 50 INJECTION, SOLUTION INTRAVENOUS at 02:09

## 2020-09-30 RX ADMIN — SODIUM CHLORIDE: 0.9 INJECTION, SOLUTION INTRAVENOUS at 02:09

## 2020-09-30 NOTE — PLAN OF CARE
1513-Patient tolerated treatment well, she was monitored for 30 minutes post infusion with no signs of reaction. Discharged without complaints or S/S of adverse event. AVS given.  Instructed to call provider for any questions or concerns.

## 2020-09-30 NOTE — PLAN OF CARE
1427-Labs , hx, and medications reviewed. Assessment completed. Discussed plan of care with patient. Patient in agreement. Chair reclined and warm blanket and snack offered.

## 2020-10-03 LAB
C TRACH DNA SPEC QL NAA+PROBE: NOT DETECTED
N GONORRHOEA DNA SPEC QL NAA+PROBE: NOT DETECTED

## 2020-10-06 ENCOUNTER — OFFICE VISIT (OUTPATIENT)
Dept: PSYCHIATRY | Facility: CLINIC | Age: 50
End: 2020-10-06
Payer: MEDICARE

## 2020-10-06 DIAGNOSIS — F41.1 GAD (GENERALIZED ANXIETY DISORDER): Primary | ICD-10-CM

## 2020-10-06 DIAGNOSIS — F39 MOOD DISORDER: ICD-10-CM

## 2020-10-06 DIAGNOSIS — F40.10 SOCIAL PHOBIA: ICD-10-CM

## 2020-10-06 DIAGNOSIS — F99 INSOMNIA DUE TO OTHER MENTAL DISORDER: ICD-10-CM

## 2020-10-06 DIAGNOSIS — R46.81 OBSESSIVE-COMPULSIVE BEHAVIOR: ICD-10-CM

## 2020-10-06 DIAGNOSIS — F51.05 INSOMNIA DUE TO OTHER MENTAL DISORDER: ICD-10-CM

## 2020-10-06 PROCEDURE — 99214 PR OFFICE/OUTPT VISIT, EST, LEVL IV, 30-39 MIN: ICD-10-PCS | Mod: HCNC,95,, | Performed by: INTERNAL MEDICINE

## 2020-10-06 PROCEDURE — 99214 OFFICE O/P EST MOD 30 MIN: CPT | Mod: HCNC,95,, | Performed by: INTERNAL MEDICINE

## 2020-10-06 RX ORDER — DIAZEPAM 2 MG/1
2 TABLET ORAL 2 TIMES DAILY PRN
Qty: 60 TABLET | Refills: 0 | Status: SHIPPED | OUTPATIENT
Start: 2020-10-06 | End: 2020-11-03 | Stop reason: SDUPTHER

## 2020-10-06 RX ORDER — LURASIDONE HYDROCHLORIDE 60 MG/1
60 TABLET, FILM COATED ORAL DAILY
Qty: 30 TABLET | Refills: 11 | Status: SHIPPED | OUTPATIENT
Start: 2020-10-06 | End: 2021-09-22 | Stop reason: SDUPTHER

## 2020-10-06 NOTE — PROGRESS NOTES
OUTPATIENT PSYCHIATRY RETURN VISIT    ENCOUNTER DATE:  10/6/2020  SITE:  [facility]  LENGTH OF SESSION:  15 minutes    The patient location is:  [place] ([place])  The chief complaint leading to consultation is:  Follow up    Visit type:  Audio only    Face to Face time with patient:  15 minutes  20 minutes of total time spent on the encounter, which includes face to face time and non-face to face time preparing to see the patient (eg, review of tests), Obtaining and/or reviewing separately obtained history, Documenting clinical information in the electronic or other health record, Independently interpreting results (not separately reported) and communicating results to the patient/family/caregiver, or Care coordination (not separately reported).     Each patient to whom he or she provides medical services by telemedicine is:  (1) informed of the relationship between the physician and patient and the respective role of any other health care provider with respect to management of the patient; and (2) notified that he or she may decline to receive medical services by telemedicine and may withdraw from such care at any time.    CHIEF COMPLAINT:  Anxiety, Mood, Irritability, Insomnia, and Depression      HISTORY OF PRESENTING ILLNESS:  Jaki Bajwa is a 49 y.o. female with history of Mood disorder, JUAN JOSE, OCD, social phobia, and Internet shopping addiction who presents for follow up appointment.    Plan at last appointment on 5/19/2020:  · Symptoms improved.  · Continue Buspar 5mg BID for anxiety.  · Continue Latuda 60mg daily for mood stabilization/anxiety.    · Continue Wellbutrin XL 450mg daily for depression.    · Continue Doxepin 150mg qHS for insomnia and mood.    · Reduce Valium to 2mg daily PRN severe anxiety (encouraged patient not to take every day).  · Continue individual therapy with Mr. Noah LCSW.    History as told by patient:  Says she is not doing well.  Latuda helped  "her get up and do things.  Feels agitated, irritated, depressed.  Tried Abilify but it made her too sleepy, so she has now stopped it.  Also has trouble sleeping now.  Falls asleep but can't stay asleep.  Takes Valium PRN - has not tried taking at night.  Latuda balanced her mood.  Does not feel like doing anything.  Doesn't want to see anyone or get out of bed.  Feeling sad, depressed, alone.  Worrying Scott might be mad at her.  Feeling guilty about the shopping.  Denies SI, HI, AVH, or lily.    Medication side effects:  As above, stopped Abilify  Medication compliance:  Yes    PSYCHIATRIC REVIEW OF SYSTEMS:  Trouble with sleep:  As above  Appetite changes:  Denies  Weight changes:  Denies  Lack of energy:  Yes  Anhedonia:  Yes  Somatic symptoms:  Denies  Libido:  Denies  Anxiety/panic: Yes  Guilty/hopeless:  Yes  Self-injurious behavior/risky behavior:  Denies  Any drugs:  Denies  Alcohol:  Denies    MEDICAL REVIEW OF SYSTEMS:  Complete review of systems performed covering Constitutional, Musculoskeletal, Neurologic.  All systems negative except for that covered in HPI.    PAST PSYCHIATRIC, MEDICAL, AND SOCIAL HISTORY REVIEWED  The patient's past medical, family and social history have been reviewed and updated as appropriate within the electronic medical record - see encounter notes.    MEDICATIONS:    Current Outpatient Medications:     alclomethasone (ACLOVATE) 0.05 % cream, Apply topically 2 (two) times daily as needed., Disp: 45 g, Rfl: 0    BD ULTRA-FINE SHORT PEN NEEDLE 31 gauge x 5/16" Ndle, USE WITH VICTOZA, Disp: 100 each, Rfl: 3    buPROPion (WELLBUTRIN XL) 150 MG TB24 tablet, Take 3 tablets (450 mg total) by mouth every morning., Disp: 270 tablet, Rfl: 3    busPIRone (BUSPAR) 5 MG Tab, Take 1 tablet (5 mg total) by mouth 2 (two) times daily., Disp: 180 tablet, Rfl: 3    cevimeline (EVOXAC) 30 mg capsule, Take 1 capsule (30 mg total) by mouth 3 (three) times daily., Disp: 90 capsule, Rfl: " 11    desonide (DESOWEN) 0.05 % cream, MILTON EXT AA BID PRN, Disp: 30 g, Rfl: 3    diazePAM (VALIUM) 2 MG tablet, Take 1 tablet (2 mg total) by mouth 2 (two) times daily as needed (Severe anxiety/Panic attack)., Disp: 60 tablet, Rfl: 0    doxepin (SINEQUAN) 75 MG capsule, Take 2 capsules (150 mg total) by mouth every evening., Disp: 60 capsule, Rfl: 3    estradiol (CLIMARA) 0.0375 mg/24 hr, APPLY 1 PATCH TO SKIN EVERY 7 DAYS, Disp: 12 patch, Rfl: 3    estradioL (CLIMARA) 0.0375 mg/24 hr, APPLY 1 PATCH EXTERNALLY TO THE SKIN WEEKLY, Disp: 13 patch, Rfl: 0    ferrous sulfate 325 (65 FE) MG EC tablet, Take 1 tablet (325 mg total) by mouth once daily., Disp: 90 tablet, Rfl: 1    fluconazole (DIFLUCAN) 150 MG Tab, TAKE 1 TABLET BY MOUTH ON DAY 1 AND 1 TABLET ON DAY 3 IF SYMPTOMS PERSISTS, Disp: 1 tablet, Rfl: 1    gabapentin (NEURONTIN) 300 MG capsule, Take 1 capsule (300 mg total) by mouth 3 (three) times daily., Disp: 90 capsule, Rfl: 5    hydroCHLOROthiazide (HYDRODIURIL) 12.5 MG Tab, Take 12.5 mg by mouth., Disp: , Rfl:     HYDROcodone-acetaminophen (NORCO) 5-325 mg per tablet, Take 1 tablet by mouth 2 (two) times daily as needed for Pain. Quantity medically necessary, Disp: 35 tablet, Rfl: 0    HYDROcodone-acetaminophen (NORCO) 5-325 mg per tablet, Take 1 tablet by mouth 2 (two) times daily as needed for Pain. Quantity medically necessary, Disp: 35 tablet, Rfl: 0    [START ON 11/2/2020] HYDROcodone-acetaminophen (NORCO) 5-325 mg per tablet, Take 1 tablet by mouth 2 (two) times daily as needed for Pain. Quantity medically necessary, Disp: 35 tablet, Rfl: 0    hydrOXYchloroQUINE (PLAQUENIL) 200 mg tablet, Take 1 tablet (200 mg total) by mouth 2 (two) times daily., Disp: 60 tablet, Rfl: 5    irbesartan-hydrochlorothiazide (AVALIDE) 150-12.5 mg per tablet, Take 1 tablet by mouth once daily., Disp: 90 tablet, Rfl: 3    liraglutide 0.6 mg/0.1 mL, 18 mg/3 mL, subq PNIJ (VICTOZA 2-KHUSHI) 0.6 mg/0.1 mL (18 mg/3  "mL) PnIj pen, Inject 1.8 mg into the skin once daily., Disp: 18 mL, Rfl: 0    losartan (COZAAR) 100 MG tablet, Take 100 mg by mouth., Disp: , Rfl:     lurasidone (LATUDA) 60 mg Tab tablet, Take 1 tablet (60 mg total) by mouth once daily., Disp: 30 tablet, Rfl: 11    naproxen (NAPROSYN) 500 MG tablet, TAKE 1 TABLET(500 MG) BY MOUTH TWICE DAILY AS NEEDED FOR PAIN, Disp: 180 tablet, Rfl: 1    nitrofurantoin, macrocrystal-monohydrate, (MACROBID) 100 MG capsule, TAKE 1 CAPSULE BY MOUTH AS NEEDED AFTER INTERCOURSE, Disp: 30 capsule, Rfl: 1    omeprazole (PRILOSEC) 40 MG capsule, TAKE 1 CAPSULE(40 MG) BY MOUTH EVERY DAY, Disp: 90 capsule, Rfl: 1    RESTASIS 0.05 % ophthalmic emulsion, PLACE ONE DROP INTO BOTH EYES BID, Disp: , Rfl: 0    RESTASIS 0.05 % ophthalmic emulsion, INSTILL 1 DROP IN BOTH EYES TWICE DAILY, Disp: 60 each, Rfl: 1    ALLERGIES:  Review of patient's allergies indicates:   Allergen Reactions    Aspirin Hives     PSYCHIATRIC EXAM:  There were no vitals filed for this visit.  Appearance:  Unable to assess via virtual visit  Behavior:  Cooperative, pleasant, no psychomotor agitation or retardation  Speech:  Normal rate, rhythm, prosody, and volume  Mood:  "Irritable"  Affect:  Euthymic  Thought Process:  Linear, logical, goal directed  Thought Content:  Negative for suicidal ideation, homicidal ideation, delusions or hallucinations.  Associations:  Intact  Memory:  Grossly Intact  Level of Consciousness/Orientation:  Grossly intact  Fund of Knowledge:  Good  Attention:  Good  Language:  Fluent, able to name abstract and concrete objects  Insight:  Fair  Judgment:  Intact  Psychomotor signs:  Unable to assess via virtual visit  Gait:  Unable to assess via virtual visit      RELEVANT LABS/STUDIES:    Lab Results   Component Value Date    WBC 7.91 09/03/2020    HGB 8.6 (L) 09/03/2020    HCT 30.6 (L) 09/03/2020    MCV 77 (L) 09/03/2020     09/03/2020     BMP  Lab Results   Component Value Date    "  08/28/2020    K 4.2 08/28/2020     08/28/2020    CO2 28 08/28/2020    BUN 21 (H) 08/28/2020    CREATININE 1.3 08/28/2020    CALCIUM 9.4 08/28/2020    ANIONGAP 9 08/28/2020    ESTGFRAFRICA 54.9 (A) 08/28/2020    EGFRNONAA 47.6 (A) 08/28/2020     Lab Results   Component Value Date    ALT 14 08/28/2020    AST 19 08/28/2020    ALKPHOS 87 08/28/2020    BILITOT 0.3 08/28/2020     Lab Results   Component Value Date    TSH 2.261 04/18/2017     No results found for: LABA1C, HGBA1C    IMPRESSION:    Jaki Bajwa is a 49 y.o. female with history of Mood disorder, JUAN JOSE, OCD, social phobia, and Internet shopping addiction who presents for follow up appointment.    DIAGNOSES:    ICD-10-CM ICD-9-CM   1. JUAN JOSE (generalized anxiety disorder)  F41.1 300.02   2. Mood disorder  F39 296.90   3. Social phobia  F40.10 300.23   4. Obsessive-compulsive behavior  R46.81 300.3   5. Insomnia due to other mental disorder  F51.05 300.9    F99 327.02     PLAN:  · Will resend PA for Latuda as patient's mood has significantly declined since insurance denied.  May need to submit another appeal.  Increase Valium to 2mg BID in the meantime to help with irritability and sleep.  · Continue Buspar 5mg BID for anxiety.  · Continue Wellbutrin XL 450mg daily for depression.    · Continue Doxepin 150mg qHS for insomnia and mood.    · Continue individual therapy with Mr. Noah LCSW.    RETURN TO CLINIC:  Follow up in about 4 weeks (around 11/3/2020).

## 2020-10-07 ENCOUNTER — INFUSION (OUTPATIENT)
Dept: INFUSION THERAPY | Facility: HOSPITAL | Age: 50
End: 2020-10-07
Attending: INTERNAL MEDICINE
Payer: MEDICARE

## 2020-10-07 VITALS
SYSTOLIC BLOOD PRESSURE: 108 MMHG | HEART RATE: 99 BPM | DIASTOLIC BLOOD PRESSURE: 61 MMHG | TEMPERATURE: 100 F | RESPIRATION RATE: 18 BRPM

## 2020-10-07 DIAGNOSIS — D50.9 IRON DEFICIENCY ANEMIA, UNSPECIFIED IRON DEFICIENCY ANEMIA TYPE: Primary | ICD-10-CM

## 2020-10-07 PROCEDURE — 63600175 PHARM REV CODE 636 W HCPCS: Mod: JG,HCNC | Performed by: INTERNAL MEDICINE

## 2020-10-07 PROCEDURE — 25000003 PHARM REV CODE 250: Mod: HCNC | Performed by: INTERNAL MEDICINE

## 2020-10-07 PROCEDURE — 96365 THER/PROPH/DIAG IV INF INIT: CPT | Mod: HCNC

## 2020-10-07 RX ORDER — SODIUM CHLORIDE 0.9 % (FLUSH) 0.9 %
10 SYRINGE (ML) INJECTION
Status: DISCONTINUED | OUTPATIENT
Start: 2020-10-07 | End: 2020-10-07 | Stop reason: HOSPADM

## 2020-10-07 RX ORDER — HEPARIN 100 UNIT/ML
500 SYRINGE INTRAVENOUS
Status: DISCONTINUED | OUTPATIENT
Start: 2020-10-07 | End: 2020-10-07 | Stop reason: HOSPADM

## 2020-10-07 RX ADMIN — FERRIC CARBOXYMALTOSE INJECTION 750 MG: 50 INJECTION, SOLUTION INTRAVENOUS at 01:10

## 2020-10-07 RX ADMIN — SODIUM CHLORIDE: 9 INJECTION, SOLUTION INTRAVENOUS at 01:10

## 2020-10-07 NOTE — NURSING
1250  Pt here for Injectafer infusion, no new complaints or concerns at present, reports tolerating prior treatment other than minor headache following treatment; discussed treatment plan for today, all questions answered and pt agrees to proceed

## 2020-10-07 NOTE — PLAN OF CARE
1400  Infusion completed, pt tolerated well; pt observed 30 mins post infusion, no complaints or concerns; reviewed s/s of post transfusion reaction, how to treat, when to contact MD, when to report to ER; AVS declined, pt verbalized understanding of all discussed and when to report next

## 2020-10-08 ENCOUNTER — OFFICE VISIT (OUTPATIENT)
Dept: INTERNAL MEDICINE | Facility: CLINIC | Age: 50
End: 2020-10-08
Attending: FAMILY MEDICINE
Payer: MEDICARE

## 2020-10-08 VITALS
WEIGHT: 263 LBS | OXYGEN SATURATION: 98 % | BODY MASS INDEX: 41.28 KG/M2 | HEART RATE: 100 BPM | DIASTOLIC BLOOD PRESSURE: 78 MMHG | SYSTOLIC BLOOD PRESSURE: 114 MMHG | HEIGHT: 67 IN

## 2020-10-08 DIAGNOSIS — M26.621 TMJ TENDERNESS, RIGHT: Primary | ICD-10-CM

## 2020-10-08 DIAGNOSIS — F41.1 GAD (GENERALIZED ANXIETY DISORDER): ICD-10-CM

## 2020-10-08 DIAGNOSIS — I10 ESSENTIAL HYPERTENSION: ICD-10-CM

## 2020-10-08 PROCEDURE — 99214 PR OFFICE/OUTPT VISIT, EST, LEVL IV, 30-39 MIN: ICD-10-PCS | Mod: 25,HCNC,S$GLB, | Performed by: FAMILY MEDICINE

## 2020-10-08 PROCEDURE — 96372 THER/PROPH/DIAG INJ SC/IM: CPT | Mod: HCNC,S$GLB,, | Performed by: FAMILY MEDICINE

## 2020-10-08 PROCEDURE — 3008F BODY MASS INDEX DOCD: CPT | Mod: HCNC,CPTII,S$GLB, | Performed by: FAMILY MEDICINE

## 2020-10-08 PROCEDURE — 3078F DIAST BP <80 MM HG: CPT | Mod: HCNC,CPTII,S$GLB, | Performed by: FAMILY MEDICINE

## 2020-10-08 PROCEDURE — 3078F PR MOST RECENT DIASTOLIC BLOOD PRESSURE < 80 MM HG: ICD-10-PCS | Mod: HCNC,CPTII,S$GLB, | Performed by: FAMILY MEDICINE

## 2020-10-08 PROCEDURE — 99214 OFFICE O/P EST MOD 30 MIN: CPT | Mod: 25,HCNC,S$GLB, | Performed by: FAMILY MEDICINE

## 2020-10-08 PROCEDURE — 3008F PR BODY MASS INDEX (BMI) DOCUMENTED: ICD-10-PCS | Mod: HCNC,CPTII,S$GLB, | Performed by: FAMILY MEDICINE

## 2020-10-08 PROCEDURE — 3074F SYST BP LT 130 MM HG: CPT | Mod: HCNC,CPTII,S$GLB, | Performed by: FAMILY MEDICINE

## 2020-10-08 PROCEDURE — 99999 PR PBB SHADOW E&M-EST. PATIENT-LVL V: ICD-10-PCS | Mod: PBBFAC,HCNC,, | Performed by: FAMILY MEDICINE

## 2020-10-08 PROCEDURE — 96372 PR INJECTION,THERAP/PROPH/DIAG2ST, IM OR SUBCUT: ICD-10-PCS | Mod: HCNC,S$GLB,, | Performed by: FAMILY MEDICINE

## 2020-10-08 PROCEDURE — 99999 PR PBB SHADOW E&M-EST. PATIENT-LVL V: CPT | Mod: PBBFAC,HCNC,, | Performed by: FAMILY MEDICINE

## 2020-10-08 PROCEDURE — 3074F PR MOST RECENT SYSTOLIC BLOOD PRESSURE < 130 MM HG: ICD-10-PCS | Mod: HCNC,CPTII,S$GLB, | Performed by: FAMILY MEDICINE

## 2020-10-08 RX ORDER — KETOROLAC TROMETHAMINE 30 MG/ML
60 INJECTION, SOLUTION INTRAMUSCULAR; INTRAVENOUS
Status: COMPLETED | OUTPATIENT
Start: 2020-10-08 | End: 2020-10-08

## 2020-10-08 RX ORDER — TRIAMCINOLONE ACETONIDE 40 MG/ML
40 INJECTION, SUSPENSION INTRA-ARTICULAR; INTRAMUSCULAR
Status: COMPLETED | OUTPATIENT
Start: 2020-10-08 | End: 2020-10-08

## 2020-10-08 RX ADMIN — TRIAMCINOLONE ACETONIDE 40 MG: 40 INJECTION, SUSPENSION INTRA-ARTICULAR; INTRAMUSCULAR at 03:10

## 2020-10-08 RX ADMIN — KETOROLAC TROMETHAMINE 60 MG: 30 INJECTION, SOLUTION INTRAMUSCULAR; INTRAVENOUS at 03:10

## 2020-10-08 NOTE — PROGRESS NOTES
"Per order, patient to receive 2 mL of Toradol (ketorolac tromethamine) 30mg/mL. The area of injection was palpated using the medial fold and the iliac crest as anatomical landmarks. Patient was advised to relax the muscle. The area was cleaned with alcohol and allowed to dry. Using a 25g 1.5" needle, 2 mL of Toradol (ketorolac tromethamine) 30mg/mL was placed intramuscularly into the left upper outer gluteal quadrant. Patient experienced no complications and was discharged in stable condition. Toradol Lot: [id number] Exp: 01/2022    Per order, patient to receive 1 mL of Kenalog (triamcinolone acetonide) 40mg/mL. The area of injection was palpated using the medial fold and the iliac crest as anatomical landmarks. Patient was advised to relax the muscle. The area was cleaned with alcohol and allowed to dry. Using a 25g 1.5" needle, 1 mL of Kenalog (triamcinolone acetonide) 40mg/mL was placed intramuscularly into the right upper outer gluteal quadrant. Patient experienced no complications and was discharged in stable condition. Kenalog Lot: [id number] Exp: 04/2021      "

## 2020-10-08 NOTE — PROGRESS NOTES
"CHIEF COMPLAINT:  Recurrent right TMJ    HISTORY OF PRESENT ILLNESS: The patient is a chronically ill 49-year-old black female.  She has a recent recurrence of her right-sided TMJ.  The patient would also like to get an injection as this has helped in the past.    The patient has a history of stable hypertension on current medications.  Patient denies chest pain or shortness of breath today.    She has a long history of mental illness and is seen frequently in the psychiatry department    REVIEW OF SYSTEMS:  GENERAL: No fever, chills, fatigability or weight loss.  SKIN: No rashes, itching or changes in color or texture of skin.  HEAD: No headaches or recent head trauma.  EYES: Visual acuity fine. No photophobia, ocular pain or diplopia.  EARS: Denies ear pain, discharge or vertigo.  NOSE: No loss of smell, no epistaxis or postnasal drip.  MOUTH & THROAT: No hoarseness or change in voice. No excessive gum bleeding.  NODES: Denies swollen glands.  CHEST: Denies HULL, cyanosis, wheezing, cough and sputum production.  CARDIOVASCULAR: Denies chest pain, PND, orthopnea or reduced exercise tolerance.  ABDOMEN: Appetite fine. No weight loss. Denies diarrhea, abdominal pain, hematemesis or blood in stool.  URINARY: No flank pain, dysuria or hematuria.  PERIPHERAL VASCULAR: No claudication or cyanosis.  MUSCULOSKELETAL: No joint stiffness or swelling. Denies back pain.  NEUROLOGIC: No history of seizures, paralysis, alteration of gait or coordination.    SOCIAL HISTORY: The patient does not smoke.  The patient consumes alcohol socially.  The patient is single.    PHYSICAL EXAMINATION:   Blood pressure 114/78, pulse 100, height 5' 7" (1.702 m), weight 119.3 kg (263 lb 0.1 oz), last menstrual period 07/11/2012, SpO2 98 %.    APPEARANCE: Well nourished, well developed, in no acute distress.    HEAD: Normocephalic, atraumatic.  EYES: PERRL. EOMI.  Conjunctivae without injection and  anicteric  NOSE: Mucosa pink. Airway " clear.  MOUTH & THROAT: No tonsillar enlargement. No pharyngeal erythema or exudate. No stridor.  NECK: Supple.   NODES: No cervical, axillary or inguinal lymph node enlargement.  ABDOMEN: Bowel sounds normal. Not distended. Soft. No tenderness or masses.  No ascites is noted.  MUSCULOSKELETAL:  There is no clubbing, cyanosis, or edema of the extremities x4.  There is full range of motion of the lumbar spine.  There is full range of motion of the extremities x4.  There is no deformity noted.    NEUROLOGIC:       Normal speech development.      Hearing normal.      Normal gait.      Motor and sensory exams grossly normal.  PSYCHIATRIC: Patient is alert and oriented x3.  Thought processes are all normal.  There is no homicidality.  There is no suicidality.  There is no evidence of psychosis.    LABORATORY/RADIOLOGY:   Chart reviewed.      ASSESSMENT:   Right TMJ    PLAN:  A Kenalog injection was given in the clinic today.  I discussed the risks and benefits of steroid injection with the patient.  The risks include liponecrosis, exacerbation of diabetes, specifically elevated blood sugars, adrenal suppression, and markedly elevated mood and energy.  The patient is aware of and accepts these risks.  Toradol 60 mg IM given in clinic  Follow-up PCP.

## 2020-10-31 ENCOUNTER — EXTERNAL CHRONIC CARE MANAGEMENT (OUTPATIENT)
Dept: PRIMARY CARE CLINIC | Facility: CLINIC | Age: 50
End: 2020-10-31
Payer: MEDICARE

## 2020-10-31 PROCEDURE — 99490 CHRNC CARE MGMT STAFF 1ST 20: CPT | Mod: S$GLB,,, | Performed by: INTERNAL MEDICINE

## 2020-10-31 PROCEDURE — 99490 PR CHRONIC CARE MGMT, 1ST 20 MIN: ICD-10-PCS | Mod: S$GLB,,, | Performed by: INTERNAL MEDICINE

## 2020-11-04 ENCOUNTER — PATIENT OUTREACH (OUTPATIENT)
Dept: ADMINISTRATIVE | Facility: OTHER | Age: 50
End: 2020-11-04

## 2020-11-04 NOTE — PROGRESS NOTES
LINKS immunization registry updated  Care Everywhere updated  Health Maintenance updated  Chart reviewed for overdue Proactive [facility] Encounters (RUIZ) health maintenance testing (CRS, Breast Ca, Diabetic Eye Exam)   Orders entered:N/A

## 2020-11-05 ENCOUNTER — TELEPHONE (OUTPATIENT)
Dept: GASTROENTEROLOGY | Facility: CLINIC | Age: 50
End: 2020-11-05

## 2020-11-05 DIAGNOSIS — I10 ESSENTIAL HYPERTENSION: Chronic | ICD-10-CM

## 2020-11-05 DIAGNOSIS — E66.01 MORBID OBESITY: ICD-10-CM

## 2020-11-05 RX ORDER — LIRAGLUTIDE 6 MG/ML
1.8 INJECTION SUBCUTANEOUS DAILY
Qty: 18 ML | Refills: 0 | Status: SHIPPED | OUTPATIENT
Start: 2020-11-05 | End: 2021-03-15

## 2020-11-05 NOTE — TELEPHONE ENCOUNTER
MA contacted pt to let pt know we did no have anything on 11/11 , next available was 11/23 . Pt stated she will be out of town on that day . Pt said she appreciated the call back , but will call back for rescheduling.     ----- Message from Charley Clark sent at 11/5/2020  8:40 AM CST -----  Regarding: Appointment  Contact: [phone]  Calling to see if appointment can be rescheduled to 1111/2020  when she has other appointment at main campus. Please call to confirm

## 2020-11-11 ENCOUNTER — TELEPHONE (OUTPATIENT)
Dept: HEMATOLOGY/ONCOLOGY | Facility: CLINIC | Age: 50
End: 2020-11-11

## 2020-11-11 NOTE — TELEPHONE ENCOUNTER
"----- Message from Alma Logan sent at 11/11/2020  7:59 AM CST -----  Regarding: appt r/s  Patient Assist    Name of caller:  Jaki   Contact Preference:  [phone]  Does Patient feel the need to see the MD today? No   Physician:  Siena Silveira MD   What is the nature of the call?    - will like to r/s the appts, will not being coming in today. Please call     Additional Notes:   "Thank you for all that you do for our patients'"          "

## 2020-11-12 ENCOUNTER — OFFICE VISIT (OUTPATIENT)
Dept: PSYCHIATRY | Facility: CLINIC | Age: 50
End: 2020-11-12
Payer: MEDICAID

## 2020-11-12 DIAGNOSIS — F39 MOOD DISORDER: ICD-10-CM

## 2020-11-12 DIAGNOSIS — F41.1 GAD (GENERALIZED ANXIETY DISORDER): Primary | ICD-10-CM

## 2020-11-12 DIAGNOSIS — F51.05 INSOMNIA DUE TO OTHER MENTAL DISORDER: ICD-10-CM

## 2020-11-12 DIAGNOSIS — F99 INSOMNIA DUE TO OTHER MENTAL DISORDER: ICD-10-CM

## 2020-11-12 DIAGNOSIS — M79.7 FIBROMYALGIA: ICD-10-CM

## 2020-11-12 DIAGNOSIS — R46.81 OBSESSIVE-COMPULSIVE BEHAVIOR: ICD-10-CM

## 2020-11-12 DIAGNOSIS — F63.89 INTERNET ADDICTION: ICD-10-CM

## 2020-11-12 PROCEDURE — 99499 RISK ADDL DX/OHS AUDIT: ICD-10-PCS | Mod: 95,,, | Performed by: SOCIAL WORKER

## 2020-11-12 PROCEDURE — 90834 PSYTX W PT 45 MINUTES: CPT | Mod: 95,,, | Performed by: SOCIAL WORKER

## 2020-11-12 PROCEDURE — 99214 OFFICE O/P EST MOD 30 MIN: CPT | Mod: 95,,, | Performed by: INTERNAL MEDICINE

## 2020-11-12 PROCEDURE — 90834 PR PSYCHOTHERAPY W/PATIENT, 45 MIN: ICD-10-PCS | Mod: 95,,, | Performed by: SOCIAL WORKER

## 2020-11-12 PROCEDURE — 99214 PR OFFICE/OUTPT VISIT, EST, LEVL IV, 30-39 MIN: ICD-10-PCS | Mod: 95,,, | Performed by: INTERNAL MEDICINE

## 2020-11-12 PROCEDURE — 99499 UNLISTED E&M SERVICE: CPT | Mod: 95,,, | Performed by: SOCIAL WORKER

## 2020-11-12 NOTE — PROGRESS NOTES
Individual Psychotherapy (PhD/LCSW)    11/12/2020    Site:  [facility]         Therapeutic Intervention: Met with patient.  Outpatient - Insight oriented psychotherapy 45 min - CPT code 53056 and Outpatient - Behavior modifying psychotherapy 45 min - CPT code 27441    Chief complaint/reason for encounter: depression, anxiety and interpersonal     Interval history and content of current session:        The patient location is: home  The chief complaint leading to consultation is: anxiety depression    Visit type: audiovisual    Face to Face time with patient:   45  minutes of total time spent on the encounter, which includes face to face time and non-face to face time preparing to see the patient (eg, review of tests), Obtaining and/or reviewing separately obtained history, Documenting clinical information in the electronic or other health record, Independently interpreting results (not separately reported) and communicating results to the patient/family/caregiver, or Care coordination (not separately reported).         Each patient to whom he or she provides medical services by telemedicine is:  (1) informed of the relationship between the physician and patient and the respective role of any other health care provider with respect to management of the patient; and (2) notified that he or she may decline to receive medical services by telemedicine and may withdraw from such care at any time.    Notes:    She ran out of her latuda as insurance was not covering.  Felt deteriorating emotions.  She is back on it with improved mood.   She plans to go back to her house.  She has been staying with boyfriend for last two months and she feels it is time for them to have some space.  However they are planning to reunite later as they are going to go visit his family out of the city.  She depends on him for shopping wether it be for groceries or luxury items.  He bought her a purse for her birthday and she  bought herself three washes.  He bought him tennis shoes for his bday.    Supportive therapy.  Denies suicidal ideation.          Treatment plan:  · Target symptoms: depression, anxiety   · Why chosen therapy is appropriate versus another modality: relevant to diagnosis  · Outcome monitoring methods: self-report, observation    · Therapeutic intervention type: insight oriented psychotherapy, behavior modifying psychotherapy, supportive psychotherapy    Risk parameters:  Patient reports no suicidal ideation  Patient reports no homicidal ideation  Patient reports no self-injurious behavior  Patient reports no violent behavior    Verbal deficits: None    Patient's response to intervention:  The patient's response to intervention is accepting.    Progress toward goals and other mental status changes:  The patient's progress toward goals is limited.    Diagnosis: 296.35;   Obsessive compulsive disorder.  personality disorder nos  Learning problems/limitations   internet shopping addiction   Social anxiety, generalized anxiety disorder.     Plan:  individual psychotherapy    Return to clinic:   3 months.

## 2020-11-12 NOTE — PROGRESS NOTES
OUTPATIENT PSYCHIATRY RETURN VISIT    ENCOUNTER DATE:  11/15/2020  SITE:  [facility]  LENGTH OF SESSION:  20 minutes    The patient location is:  [place] ([place])  The chief complaint leading to consultation is:  Follow up    Visit type:  Audio only    Face to Face time with patient:  20 minutes  25 minutes of total time spent on the encounter, which includes face to face time and non-face to face time preparing to see the patient (eg, review of tests), Obtaining and/or reviewing separately obtained history, Documenting clinical information in the electronic or other health record, Independently interpreting results (not separately reported) and communicating results to the patient/family/caregiver, or Care coordination (not separately reported).     Each patient to whom he or she provides medical services by telemedicine is:  (1) informed of the relationship between the physician and patient and the respective role of any other health care provider with respect to management of the patient; and (2) notified that he or she may decline to receive medical services by telemedicine and may withdraw from such care at any time.    CHIEF COMPLAINT:  Anxiety      HISTORY OF PRESENTING ILLNESS:  Jaki Bajwa is a 50 y.o. female with history of Mood disorder, JUAN JOSE, OCD, social phobia, and Internet shopping addiction who presents for follow up appointment.      Plan at last appointment on 10/6/2020:  · Will resend PA for Latuda as patient's mood has significantly declined since insurance denied.  May need to submit another appeal.  Increase Valium to 2mg BID in the meantime to help with irritability and sleep.  · Continue Buspar 5mg BID for anxiety.  · Continue Wellbutrin XL 450mg daily for depression.    · Continue Doxepin 150mg qHS for insomnia and mood.    · Continue individual therapy with Mr. Noah LCSW.    History as told by patient:  She and Scott just had a fuss so she is  thinking about going home.  Medication mix-up really messed her up too.  Mean, grouchy, and didn't want to do anything.  Also went through period without Doxepin.  Everything is back to normal.  Still has 2 of the 75mg Doxepin tablets but doesn't think it works as well keeping her asleep.  Taking Valium 1-2 times per day.  She has been crying at night the last week.  Unsure what it is.  Normally when she prays she cries.  Also didn't have her sleep medication so that was playing a role.  Just got Doxepin back and has been back on for the last 2 nights - this is improving things.  Waking up in a better mood.  Says she had a lupus flare which caused pain  Was praying to God to make it stop.  Says her whole body was off.  Had 2 iron infusions last month - gave her more energy.  Feels her anxiety has been worse.  This has not been about anything in particular.  Says she is getting back to herself and knows medications are helping.  Denies Ariana..      Medication side effects:  Denies  Medication compliance:  Yes    PSYCHIATRIC REVIEW OF SYSTEMS:  Trouble with sleep:  As above  Appetite changes:  Denies  Weight changes:  Denies  Lack of energy:  Yes  Anhedonia:  Yes  Somatic symptoms:  Denies  Libido:  Denies  Anxiety/panic: Yes  Guilty/hopeless:  Denies  Self-injurious behavior/risky behavior:  Denies  Any drugs:  Denies  Alcohol:  Denies    MEDICAL REVIEW OF SYSTEMS:  Complete review of systems performed covering Constitutional, Musculoskeletal, Neurologic.  All systems negative except for that covered in HPI.    PAST PSYCHIATRIC, MEDICAL, AND SOCIAL HISTORY REVIEWED  The patient's past medical, family and social history have been reviewed and updated as appropriate within the electronic medical record - see encounter notes.    MEDICATIONS:    Current Outpatient Medications:     alclomethasone (ACLOVATE) 0.05 % cream, Apply topically 2 (two) times daily as needed., Disp: 45 g, Rfl: 0    BD ULTRA-FINE SHORT PEN NEEDLE  "31 gauge x 5/16" Ndle, USE WITH VICTOZA, Disp: 100 each, Rfl: 3    buPROPion (WELLBUTRIN XL) 150 MG TB24 tablet, Take 3 tablets (450 mg total) by mouth every morning., Disp: 270 tablet, Rfl: 3    busPIRone (BUSPAR) 5 MG Tab, Take 1 tablet (5 mg total) by mouth 2 (two) times daily., Disp: 180 tablet, Rfl: 3    cevimeline (EVOXAC) 30 mg capsule, Take 1 capsule (30 mg total) by mouth 3 (three) times daily., Disp: 90 capsule, Rfl: 11    desonide (DESOWEN) 0.05 % cream, MILTON EXT AA BID PRN, Disp: 30 g, Rfl: 3    diazePAM (VALIUM) 2 MG tablet, Take 1 tablet (2 mg total) by mouth 2 (two) times daily as needed (Severe anxiety/Panic attack)., Disp: 60 tablet, Rfl: 0    doxepin (SINEQUAN) 75 MG capsule, Take 2 capsules (150 mg total) by mouth every evening., Disp: 60 capsule, Rfl: 3    estradiol (CLIMARA) 0.0375 mg/24 hr, APPLY 1 PATCH TO SKIN EVERY 7 DAYS, Disp: 12 patch, Rfl: 3    estradioL (CLIMARA) 0.0375 mg/24 hr, APPLY 1 PATCH EXTERNALLY TO THE SKIN WEEKLY, Disp: 13 patch, Rfl: 0    ferrous sulfate 325 (65 FE) MG EC tablet, Take 1 tablet (325 mg total) by mouth once daily., Disp: 90 tablet, Rfl: 1    fluconazole (DIFLUCAN) 150 MG Tab, TAKE 1 TABLET BY MOUTH ON DAY 1 AND 1 TABLET ON DAY 3 IF SYMPTOMS PERSISTS, Disp: 1 tablet, Rfl: 1    gabapentin (NEURONTIN) 300 MG capsule, Take 1 capsule (300 mg total) by mouth 3 (three) times daily., Disp: 90 capsule, Rfl: 5    hydroCHLOROthiazide (HYDRODIURIL) 12.5 MG Tab, Take 12.5 mg by mouth., Disp: , Rfl:     HYDROcodone-acetaminophen (NORCO) 5-325 mg per tablet, Take 1 tablet by mouth 2 (two) times daily as needed for Pain. Quantity medically necessary, Disp: 35 tablet, Rfl: 0    HYDROcodone-acetaminophen (NORCO) 5-325 mg per tablet, Take 1 tablet by mouth 2 (two) times daily as needed for Pain. Quantity medically necessary, Disp: 35 tablet, Rfl: 0    HYDROcodone-acetaminophen (NORCO) 5-325 mg per tablet, Take 1 tablet by mouth 2 (two) times daily as needed for " "Pain. Quantity medically necessary, Disp: 35 tablet, Rfl: 0    irbesartan-hydrochlorothiazide (AVALIDE) 150-12.5 mg per tablet, Take 1 tablet by mouth once daily., Disp: 90 tablet, Rfl: 3    liraglutide 0.6 mg/0.1 mL, 18 mg/3 mL, subq PNIJ (VICTOZA 2-KHUSHI) 0.6 mg/0.1 mL (18 mg/3 mL) PnIj pen, Inject 1.8 mg into the skin once daily., Disp: 18 mL, Rfl: 0    losartan (COZAAR) 100 MG tablet, Take 100 mg by mouth., Disp: , Rfl:     lurasidone (LATUDA) 60 mg Tab tablet, Take 1 tablet (60 mg total) by mouth once daily. (Patient not taking: Reported on 10/8/2020), Disp: 30 tablet, Rfl: 11    naproxen (NAPROSYN) 500 MG tablet, TAKE 1 TABLET(500 MG) BY MOUTH TWICE DAILY AS NEEDED FOR PAIN, Disp: 180 tablet, Rfl: 1    nitrofurantoin, macrocrystal-monohydrate, (MACROBID) 100 MG capsule, TAKE 1 CAPSULE BY MOUTH AS NEEDED AFTER INTERCOURSE, Disp: 30 capsule, Rfl: 1    omeprazole (PRILOSEC) 40 MG capsule, TAKE 1 CAPSULE(40 MG) BY MOUTH EVERY DAY, Disp: 90 capsule, Rfl: 1    RESTASIS 0.05 % ophthalmic emulsion, PLACE ONE DROP INTO BOTH EYES BID, Disp: , Rfl: 0    RESTASIS 0.05 % ophthalmic emulsion, INSTILL 1 DROP IN BOTH EYES TWICE DAILY, Disp: 60 each, Rfl: 1    ALLERGIES:  Review of patient's allergies indicates:   Allergen Reactions    Aspirin Hives     PSYCHIATRIC EXAM:  There were no vitals filed for this visit.  Appearance:  Well groomed, appearing healthy and of stated age  Behavior:  Cooperative, pleasant, no psychomotor agitation or retardation  Speech:  Normal rate, rhythm, prosody, and volume  Mood:  "Ok"  Affect:  Anxious  Thought Process:  Linear, logical, goal directed  Thought Content:  Negative for suicidal ideation, homicidal ideation, delusions or hallucinations.  Associations:  Intact  Memory:  Grossly Intact  Level of Consciousness/Orientation:  Grossly intact  Fund of Knowledge:  Good  Attention:  Good  Language:  Fluent, able to name abstract and concrete objects  Insight:  Fair  Judgment:  " Intact  Psychomotor signs:  No abnormal movements of face  Gait:  Unable to assess via virtual visit        RELEVANT LABS/STUDIES:    Lab Results   Component Value Date    WBC 7.91 09/03/2020    HGB 8.6 (L) 09/03/2020    HCT 30.6 (L) 09/03/2020    MCV 77 (L) 09/03/2020     09/03/2020     BMP  Lab Results   Component Value Date     08/28/2020    K 4.2 08/28/2020     08/28/2020    CO2 28 08/28/2020    BUN 21 (H) 08/28/2020    CREATININE 1.3 08/28/2020    CALCIUM 9.4 08/28/2020    ANIONGAP 9 08/28/2020    ESTGFRAFRICA 54.9 (A) 08/28/2020    EGFRNONAA 47.6 (A) 08/28/2020     Lab Results   Component Value Date    ALT 14 08/28/2020    AST 19 08/28/2020    ALKPHOS 87 08/28/2020    BILITOT 0.3 08/28/2020     Lab Results   Component Value Date    TSH 2.261 04/18/2017     No results found for: LABA1C, HGBA1C    IMPRESSION:    Jaki Bajwa is a 50 y.o. female with history of Mood disorder, JUAN JOSE, OCD, social phobia, and Internet shopping addiction who presents for follow up appointment.    DIAGNOSES:    ICD-10-CM ICD-9-CM   1. JUAN JOSE (generalized anxiety disorder)  F41.1 300.02   2. Mood disorder  F39 296.90   3. Insomnia due to other mental disorder  F51.05 300.9    F99 327.02   4. Internet shopping addiction  F63.89 301.89   5. Fibromyalgia  M79.7 729.1     PLAN:  · Symptoms slowly improving now that she is back on Latuda.  Continue Latuda 60mg daily.  · Continue Valium 2mg BID and Buspar 5mg BID for anxiety.  · Continue Wellbutrin XL 450mg daily for depression.    · Continue Doxepin 150mg qHS for insomnia and mood.    · Continue individual therapy with Mr. Noah LCSW.    RETURN TO CLINIC:  Follow up in about 1 month (around 12/12/2020).

## 2020-11-19 ENCOUNTER — PATIENT MESSAGE (OUTPATIENT)
Dept: INTERNAL MEDICINE | Facility: CLINIC | Age: 50
End: 2020-11-19

## 2020-11-19 RX ORDER — KETOCONAZOLE 20 MG/ML
SHAMPOO, SUSPENSION TOPICAL WEEKLY
Qty: 120 ML | Refills: 1 | Status: SHIPPED | OUTPATIENT
Start: 2020-11-19 | End: 2021-09-22

## 2020-11-20 ENCOUNTER — PATIENT MESSAGE (OUTPATIENT)
Dept: INTERNAL MEDICINE | Facility: CLINIC | Age: 50
End: 2020-11-20

## 2020-11-20 ENCOUNTER — TELEPHONE (OUTPATIENT)
Dept: PRIMARY CARE CLINIC | Facility: CLINIC | Age: 50
End: 2020-11-20

## 2020-11-20 RX ORDER — KETOCONAZOLE 20 MG/G
CREAM TOPICAL DAILY
Qty: 15 G | Refills: 0 | Status: CANCELLED | OUTPATIENT
Start: 2020-11-20

## 2020-11-20 NOTE — PROGRESS NOTES
Contacted pt for 3M check-in to complete assessments. Pt asked for a call on Monday, mornings are good. I answered her questions that she doesn't have to continue to see Kenney Marie and that the assessments should take about 15 minutes.

## 2020-11-23 ENCOUNTER — TELEPHONE (OUTPATIENT)
Dept: PRIMARY CARE CLINIC | Facility: CLINIC | Age: 50
End: 2020-11-23

## 2020-11-23 NOTE — PROGRESS NOTES
Behavioral Health Community Health Worker  Follow-Up  Completed by:  Sylvester Richardson    Date:  11/23/2020    Patient Enrollment in Behavioral Health Program:  · Jaki Bajwa was discharged and moved to the [facility] on 8/20/2020.     Assessments     Promis 10:  PROMIS-10 11/23/2020   In general, would you say your health is 2   In general, would you say your quality of life is 3   In general, how would you rate your physical health? 3   In general, how would you rate your mental health, including your mood and your ability to think? 1   In general, how would you rate your satisfaction with your social activities and relationships? 3   In general, please rate how well you carry out your usual social activities and roles. 3   To what extent are you able to carry out your everyday physical activities such as walking, climbing stairs, carrying groceries, or moving a chair?  2   In the past 7 days, how often have you been bothered by emotional problems such as feeling anxious, depressed or irritable? 4   In the past 7 days, how would you rate your fatigue on average? 3   In the past 7 days, on a scale of 0 to 10 (where 0 is no pain and 10 is the worst pain imaginable) how would you rate your pain on average? 5   Global Physical Health 13   Global Mental health Score 11       Depression PHQ:  PHQ9 11/23/2020   Total Score 15       Generalized Anxiety Disorder 7-Item Scale:  GAD7 11/23/2020   1. Feeling nervous, anxious, or on edge? 3   2. Not being able to stop or control worrying? 3   3. Worrying too much about different things? 3   4. Trouble relaxing? 1   5. Being so restless that it is hard to sit still? 3   6. Becoming easily annoyed or irritable? 1   7. Feeling afraid as if something awful might happen? 2   8. If you checked off any problems, how difficult have these problems made it for you to do your work, take care of things at home, or get along with other people? 1   JUAN JOSE-7 Score 16            Call Summary    contacted patient in [facility] for 3-Month follow-up to administer the PROMIS, PHQ9 (pt scored 15 shared that medication change has affected sleep), and GAD7 (pt scored 16). Reminded pt that we will follow-up again in 3M. When asked, pt didn't have any messages for the I team.

## 2020-11-30 ENCOUNTER — EXTERNAL CHRONIC CARE MANAGEMENT (OUTPATIENT)
Dept: PRIMARY CARE CLINIC | Facility: CLINIC | Age: 50
End: 2020-11-30
Payer: MEDICARE

## 2020-11-30 PROCEDURE — 99490 PR CHRONIC CARE MGMT, 1ST 20 MIN: ICD-10-PCS | Mod: S$GLB,,, | Performed by: INTERNAL MEDICINE

## 2020-11-30 PROCEDURE — 99490 CHRNC CARE MGMT STAFF 1ST 20: CPT | Mod: S$GLB,,, | Performed by: INTERNAL MEDICINE

## 2020-12-03 ENCOUNTER — OFFICE VISIT (OUTPATIENT)
Dept: INTERNAL MEDICINE | Facility: CLINIC | Age: 50
End: 2020-12-03
Payer: MEDICARE

## 2020-12-03 VITALS
HEART RATE: 99 BPM | WEIGHT: 272.25 LBS | SYSTOLIC BLOOD PRESSURE: 110 MMHG | BODY MASS INDEX: 42.73 KG/M2 | OXYGEN SATURATION: 96 % | DIASTOLIC BLOOD PRESSURE: 80 MMHG | HEIGHT: 67 IN

## 2020-12-03 DIAGNOSIS — M35.01 SJOGREN'S SYNDROME WITH KERATOCONJUNCTIVITIS SICCA: Primary | ICD-10-CM

## 2020-12-03 DIAGNOSIS — G89.4 CHRONIC PAIN SYNDROME: ICD-10-CM

## 2020-12-03 DIAGNOSIS — M54.50 CHRONIC BILATERAL LOW BACK PAIN WITHOUT SCIATICA: ICD-10-CM

## 2020-12-03 DIAGNOSIS — G89.29 CHRONIC BILATERAL LOW BACK PAIN WITHOUT SCIATICA: ICD-10-CM

## 2020-12-03 DIAGNOSIS — M79.7 FIBROMYALGIA: ICD-10-CM

## 2020-12-03 PROCEDURE — 99213 PR OFFICE/OUTPT VISIT, EST, LEVL III, 20-29 MIN: ICD-10-PCS | Mod: HCNC,S$GLB,, | Performed by: INTERNAL MEDICINE

## 2020-12-03 PROCEDURE — 3008F PR BODY MASS INDEX (BMI) DOCUMENTED: ICD-10-PCS | Mod: HCNC,CPTII,S$GLB, | Performed by: INTERNAL MEDICINE

## 2020-12-03 PROCEDURE — 99213 OFFICE O/P EST LOW 20 MIN: CPT | Mod: HCNC,S$GLB,, | Performed by: INTERNAL MEDICINE

## 2020-12-03 PROCEDURE — 3079F PR MOST RECENT DIASTOLIC BLOOD PRESSURE 80-89 MM HG: ICD-10-PCS | Mod: HCNC,CPTII,S$GLB, | Performed by: INTERNAL MEDICINE

## 2020-12-03 PROCEDURE — 3074F SYST BP LT 130 MM HG: CPT | Mod: HCNC,CPTII,S$GLB, | Performed by: INTERNAL MEDICINE

## 2020-12-03 PROCEDURE — 3079F DIAST BP 80-89 MM HG: CPT | Mod: HCNC,CPTII,S$GLB, | Performed by: INTERNAL MEDICINE

## 2020-12-03 PROCEDURE — 1125F AMNT PAIN NOTED PAIN PRSNT: CPT | Mod: HCNC,S$GLB,, | Performed by: INTERNAL MEDICINE

## 2020-12-03 PROCEDURE — 3074F PR MOST RECENT SYSTOLIC BLOOD PRESSURE < 130 MM HG: ICD-10-PCS | Mod: HCNC,CPTII,S$GLB, | Performed by: INTERNAL MEDICINE

## 2020-12-03 PROCEDURE — 1125F PR PAIN SEVERITY QUANTIFIED, PAIN PRESENT: ICD-10-PCS | Mod: HCNC,S$GLB,, | Performed by: INTERNAL MEDICINE

## 2020-12-03 PROCEDURE — 99999 PR PBB SHADOW E&M-EST. PATIENT-LVL V: ICD-10-PCS | Mod: PBBFAC,HCNC,, | Performed by: INTERNAL MEDICINE

## 2020-12-03 PROCEDURE — 99999 PR PBB SHADOW E&M-EST. PATIENT-LVL V: CPT | Mod: PBBFAC,HCNC,, | Performed by: INTERNAL MEDICINE

## 2020-12-03 PROCEDURE — 3008F BODY MASS INDEX DOCD: CPT | Mod: HCNC,CPTII,S$GLB, | Performed by: INTERNAL MEDICINE

## 2020-12-03 RX ORDER — HYDROCODONE BITARTRATE AND ACETAMINOPHEN 5; 325 MG/1; MG/1
1 TABLET ORAL 2 TIMES DAILY PRN
Qty: 35 TABLET | Refills: 0 | Status: SHIPPED | OUTPATIENT
Start: 2021-02-01 | End: 2021-03-15 | Stop reason: SDUPTHER

## 2020-12-03 RX ORDER — TIZANIDINE 4 MG/1
4 TABLET ORAL 3 TIMES DAILY PRN
Qty: 90 TABLET | Refills: 1 | Status: SHIPPED | OUTPATIENT
Start: 2020-12-03 | End: 2021-03-15

## 2020-12-03 RX ORDER — HYDROCODONE BITARTRATE AND ACETAMINOPHEN 5; 325 MG/1; MG/1
1 TABLET ORAL 2 TIMES DAILY PRN
Qty: 35 TABLET | Refills: 0 | Status: SHIPPED | OUTPATIENT
Start: 2020-12-03 | End: 2021-03-15 | Stop reason: SDUPTHER

## 2020-12-03 RX ORDER — TIZANIDINE 4 MG/1
1 TABLET ORAL 3 TIMES DAILY PRN
COMMUNITY
Start: 2020-10-30 | End: 2020-12-03 | Stop reason: SDUPTHER

## 2020-12-03 RX ORDER — HYDROCODONE BITARTRATE AND ACETAMINOPHEN 5; 325 MG/1; MG/1
1 TABLET ORAL 2 TIMES DAILY PRN
Qty: 35 TABLET | Refills: 0 | Status: SHIPPED | OUTPATIENT
Start: 2021-01-02 | End: 2021-03-15 | Stop reason: SDUPTHER

## 2020-12-03 NOTE — PROGRESS NOTES
Subjective:       Patient ID: Jkai Bajwa is a 50 y.o. female.    Chief Complaint: Pain    Pt here for f/u. She has a dx of sjogrens and fibromyalgia based on blood work and chronic pain that is primarily across shoulders and in legs but also in upper and lower back. As a result she is on several meds that she says helps. She sees rheum at hospitals and has regular f/u. She has prior dx of lupus but this was not corroborated in notes from rheum. She is on plaquenil, tizanidine, gabapentin and norco. She uses norco 1-2x/day. She is due for refills on pain meds. She has done well with decreasing dispense amount to 35 per month. She has previously signed a pain contract. [place]  reviewed and is consistent. She has seen pain mgmt. She has also done PT with some success in the past.         Review of Systems   Constitutional: Negative for fever.   Respiratory: Negative for shortness of breath.    Cardiovascular: Negative for chest pain.   Gastrointestinal: Negative for abdominal pain.   Neurological: Negative for headaches.   Psychiatric/Behavioral: Negative for dysphoric mood.       Objective:      Physical Exam  Constitutional:       Appearance: She is well-developed.   HENT:      Head: Normocephalic and atraumatic.   Eyes:      Pupils: Pupils are equal, round, and reactive to light.   Neck:      Thyroid: No thyroid mass or thyromegaly.      Vascular: No carotid bruit.   Cardiovascular:      Rate and Rhythm: Normal rate and regular rhythm.      Heart sounds: Normal heart sounds, S1 normal and S2 normal. No murmur.   Pulmonary:      Effort: Pulmonary effort is normal. No respiratory distress.      Breath sounds: Normal breath sounds. No wheezing.   Abdominal:      General: Bowel sounds are normal. There is no distension.      Palpations: Abdomen is soft. There is no mass.      Tenderness: There is no abdominal tenderness.   Lymphadenopathy:      Cervical: No cervical adenopathy.   Neurological:      Mental Status: She  is alert and oriented to person, place, and time.      Cranial Nerves: No cranial nerve deficit.   Psychiatric:         Behavior: Behavior normal.         Thought Content: Thought content normal.         Judgment: Judgment normal.         Assessment:       1. Sjogren's syndrome with keratoconjunctivitis sicca    2. Fibromyalgia    3. Chronic bilateral low back pain without sciatica    4. Chronic pain syndrome        Plan:       1. Refill norco by e[facility]--3 separate 30 day rxs given for 90 days total; proper use d/w pt

## 2020-12-04 ENCOUNTER — IMMUNIZATION (OUTPATIENT)
Dept: PHARMACY | Facility: CLINIC | Age: 50
End: 2020-12-04
Payer: MEDICARE

## 2020-12-08 ENCOUNTER — PES CALL (OUTPATIENT)
Dept: ADMINISTRATIVE | Facility: CLINIC | Age: 50
End: 2020-12-08

## 2020-12-10 ENCOUNTER — OFFICE VISIT (OUTPATIENT)
Dept: PSYCHIATRY | Facility: CLINIC | Age: 50
End: 2020-12-10
Payer: MEDICARE

## 2020-12-10 DIAGNOSIS — F63.89 INTERNET ADDICTION: ICD-10-CM

## 2020-12-10 DIAGNOSIS — R46.81 OBSESSIVE-COMPULSIVE BEHAVIOR: ICD-10-CM

## 2020-12-10 DIAGNOSIS — F41.1 GAD (GENERALIZED ANXIETY DISORDER): Primary | ICD-10-CM

## 2020-12-10 DIAGNOSIS — F39 MOOD DISORDER: ICD-10-CM

## 2020-12-10 DIAGNOSIS — F99 INSOMNIA DUE TO OTHER MENTAL DISORDER: ICD-10-CM

## 2020-12-10 DIAGNOSIS — M79.7 FIBROMYALGIA: ICD-10-CM

## 2020-12-10 DIAGNOSIS — F51.05 INSOMNIA DUE TO OTHER MENTAL DISORDER: ICD-10-CM

## 2020-12-10 PROCEDURE — 99499 RISK ADDL DX/OHS AUDIT: ICD-10-PCS | Mod: 95,,, | Performed by: INTERNAL MEDICINE

## 2020-12-10 PROCEDURE — 99214 PR OFFICE/OUTPT VISIT, EST, LEVL IV, 30-39 MIN: ICD-10-PCS | Mod: 95,,, | Performed by: INTERNAL MEDICINE

## 2020-12-10 PROCEDURE — 99499 UNLISTED E&M SERVICE: CPT | Mod: 95,,, | Performed by: INTERNAL MEDICINE

## 2020-12-10 PROCEDURE — 99214 OFFICE O/P EST MOD 30 MIN: CPT | Mod: 95,,, | Performed by: INTERNAL MEDICINE

## 2020-12-10 RX ORDER — BUSPIRONE HYDROCHLORIDE 15 MG/1
15 TABLET ORAL DAILY
Qty: 90 TABLET | Refills: 1 | Status: SHIPPED | OUTPATIENT
Start: 2020-12-10 | End: 2021-03-12 | Stop reason: SDUPTHER

## 2020-12-10 NOTE — PROGRESS NOTES
OUTPATIENT PSYCHIATRY RETURN VISIT    ENCOUNTER DATE:  12/19/2020  SITE:  [facility], [facility]  LENGTH OF SESSION:  17 minutes    The patient location is:  [place] ([place]  The chief complaint leading to consultation is:  Follow up    Visit type:  Audiovisual    Face to Face time with patient:  17 minutes  23 minutes of total time spent on the encounter, which includes face to face time and non-face to face time preparing to see the patient (eg, review of tests), Obtaining and/or reviewing separately obtained history, Documenting clinical information in the electronic or other health record, Independently interpreting results (not separately reported) and communicating results to the patient/family/caregiver, or Care coordination (not separately reported).     Each patient to whom he or she provides medical services by telemedicine is:  (1) informed of the relationship between the physician and patient and the respective role of any other health care provider with respect to management of the patient; and (2) notified that he or she may decline to receive medical services by telemedicine and may withdraw from such care at any time.    CHIEF COMPLAINT:  Anxiety      HISTORY OF PRESENTING ILLNESS:  Jaki Bajwa is a 50 y.o. female with history of Mood disorder, JUAN JOSE, OCD, social phobia, and Internet shopping addiction who presents for follow up appointment.      Plan at last appointment on 11/12/2020:  · Symptoms slowly improving now that she is back on Latuda.  Continue Latuda 60mg daily.  · Continue Valium 2mg BID and Buspar 5mg BID for anxiety.  · Continue Wellbutrin XL 450mg daily for depression.    · Continue Doxepin 150mg qHS for insomnia and mood.    · Continue individual therapy with Mr. Noah LCSW..    History as told by patient:  Says she is not good.  Scott just had prostate cancer surgery on Friday.  It went well.  He got out on Saturday.  She has been taking care of him.   "He goes back on the  to see if they got it all out of him.  Scott did not tell her about prostate cancer until the last minute.  He said he had told her.  She did not know until she saw discharge papers saying "prostate cancer."  She is worrying a lot of whether they got it all out.  She is taking care of him around the clock.  Has 2 drains.  She has only been sleeping about 2 hours and then he will get up.  She assists him in taking a bath.  You never image the person you are with would be that sick.  She would rather God take her life than his.  She believes her children love him more and wouldn't want them to hurt if he .  Tries not to cry in front of him.  Nerves worse but this is undertsandable with the news she got.  Says she was doing well until this news with Scott.  Denies SI.      Medication side effects:  Denies  Medication compliance:  Yes    PSYCHIATRIC REVIEW OF SYSTEMS:  Trouble with sleep:  As above  Appetite changes:  Denies  Weight changes:  Denies  Lack of energy:  At times  Anhedonia:  Denies  Somatic symptoms:  Denies  Libido:  Denies  Anxiety/panic: Yes, mostly situational  Guilty/hopeless:  Denies  Self-injurious behavior/risky behavior:  Denies  Any drugs:  Denies  Alcohol:  Denies    MEDICAL REVIEW OF SYSTEMS:  Complete review of systems performed covering Constitutional, Musculoskeletal, Neurologic.  All systems negative except for that covered in HPI.    PAST PSYCHIATRIC, MEDICAL, AND SOCIAL HISTORY REVIEWED  The patient's past medical, family and social history have been reviewed and updated as appropriate within the electronic medical record - see encounter notes.    MEDICATIONS:    Current Outpatient Medications:     alclomethasone (ACLOVATE) 0.05 % cream, Apply topically 2 (two) times daily as needed. (Patient not taking: Reported on 12/3/2020), Disp: 45 g, Rfl: 0    BD ULTRA-FINE SHORT PEN NEEDLE 31 gauge x 5/16" Ndle, USE WITH VICTOZA, Disp: 100 each, Rfl: 3    " buPROPion (WELLBUTRIN XL) 150 MG TB24 tablet, Take 3 tablets (450 mg total) by mouth every morning., Disp: 270 tablet, Rfl: 3    busPIRone (BUSPAR) 15 MG tablet, Take 1 tablet (15 mg total) by mouth once daily., Disp: 90 tablet, Rfl: 1    cevimeline (EVOXAC) 30 mg capsule, Take 1 capsule (30 mg total) by mouth 3 (three) times daily., Disp: 90 capsule, Rfl: 11    desonide (DESOWEN) 0.05 % cream, MILTON EXT AA BID PRN, Disp: 30 g, Rfl: 3    diazePAM (VALIUM) 2 MG tablet, Take 1 tablet (2 mg total) by mouth 2 (two) times daily as needed (Severe anxiety/Panic attack)., Disp: 60 tablet, Rfl: 2    doxepin (SINEQUAN) 75 MG capsule, Take 2 capsules (150 mg total) by mouth every evening., Disp: 60 capsule, Rfl: 3    estradiol (CLIMARA) 0.0375 mg/24 hr, APPLY 1 PATCH TO SKIN EVERY 7 DAYS, Disp: 12 patch, Rfl: 3    ferrous sulfate 325 (65 FE) MG EC tablet, Take 1 tablet (325 mg total) by mouth once daily., Disp: 90 tablet, Rfl: 1    fluconazole (DIFLUCAN) 150 MG Tab, TAKE 1 TABLET BY MOUTH ON DAY 1 AND 1 TABLET ON DAY 3 IF SYMPTOMS PERSISTS, Disp: 1 tablet, Rfl: 1    gabapentin (NEURONTIN) 300 MG capsule, Take 1 capsule (300 mg total) by mouth 3 (three) times daily., Disp: 90 capsule, Rfl: 5    hydroCHLOROthiazide (HYDRODIURIL) 12.5 MG Tab, Take 12.5 mg by mouth., Disp: , Rfl:     HYDROcodone-acetaminophen (NORCO) 5-325 mg per tablet, Take 1 tablet by mouth 2 (two) times daily as needed for Pain. Quantity medically necessary, Disp: 35 tablet, Rfl: 0    HYDROcodone-acetaminophen (NORCO) 5-325 mg per tablet, Take 1 tablet by mouth 2 (two) times daily as needed for Pain. Quantity medically necessary, Disp: 35 tablet, Rfl: 0    HYDROcodone-acetaminophen (NORCO) 5-325 mg per tablet, Take 1 tablet by mouth 2 (two) times daily as needed for Pain. Quantity medically necessary, Disp: 35 tablet, Rfl: 0    [START ON 1/2/2021] HYDROcodone-acetaminophen (NORCO) 5-325 mg per tablet, Take 1 tablet by mouth 2 (two) times daily as  "needed for Pain. Quantity medically necessary, Disp: 35 tablet, Rfl: 0    [START ON 2/1/2021] HYDROcodone-acetaminophen (NORCO) 5-325 mg per tablet, Take 1 tablet by mouth 2 (two) times daily as needed for Pain. Quantity medically necessary, Disp: 35 tablet, Rfl: 0    irbesartan-hydrochlorothiazide (AVALIDE) 150-12.5 mg per tablet, Take 1 tablet by mouth once daily., Disp: 90 tablet, Rfl: 3    ketoconazole (NIZORAL) 2 % shampoo, Apply topically once a week., Disp: 120 mL, Rfl: 1    liraglutide 0.6 mg/0.1 mL, 18 mg/3 mL, subq PNIJ (VICTOZA 2-KHUSHI) 0.6 mg/0.1 mL (18 mg/3 mL) PnIj pen, Inject 1.8 mg into the skin once daily., Disp: 18 mL, Rfl: 0    losartan (COZAAR) 100 MG tablet, Take 100 mg by mouth., Disp: , Rfl:     lurasidone (LATUDA) 60 mg Tab tablet, Take 1 tablet (60 mg total) by mouth once daily., Disp: 30 tablet, Rfl: 11    naproxen (NAPROSYN) 500 MG tablet, TAKE 1 TABLET(500 MG) BY MOUTH TWICE DAILY AS NEEDED FOR PAIN, Disp: 180 tablet, Rfl: 1    nitrofurantoin, macrocrystal-monohydrate, (MACROBID) 100 MG capsule, TAKE 1 CAPSULE BY MOUTH AS NEEDED AFTER INTERCOURSE (Patient not taking: Reported on 12/3/2020), Disp: 30 capsule, Rfl: 1    omeprazole (PRILOSEC) 40 MG capsule, TAKE 1 CAPSULE(40 MG) BY MOUTH EVERY DAY, Disp: 90 capsule, Rfl: 1    RESTASIS 0.05 % ophthalmic emulsion, INSTILL 1 DROP IN BOTH EYES TWICE DAILY, Disp: 60 each, Rfl: 1    tiZANidine (ZANAFLEX) 4 MG tablet, Take 1 tablet (4 mg total) by mouth 3 (three) times daily as needed., Disp: 90 tablet, Rfl: 1    ALLERGIES:  Review of patient's allergies indicates:   Allergen Reactions    Aspirin Hives       PSYCHIATRIC EXAM:  There were no vitals filed for this visit.  Appearance:  Well groomed, appearing healthy and of stated age  Behavior:  Cooperative, pleasant, no psychomotor agitation or retardation  Speech:  Normal rate, rhythm, prosody, and volume  Mood:  "Stressed"  Affect:  Congruent  Thought Process:  Linear, logical, goal " directed  Thought Content:  Negative for suicidal ideation, homicidal ideation, delusions or hallucinations.  Associations:  Intact  Memory:  Grossly Intact  Level of Consciousness/Orientation:  Grossly intact  Fund of Knowledge:  Good  Attention:  Good  Language:  Fluent, able to name abstract and concrete objects  Insight:  Fair  Judgment:  Intact  Psychomotor signs:  No abnormal movements of face  Gait:  Unable to assess via virtual visit        RELEVANT LABS/STUDIES:    Lab Results   Component Value Date    WBC 7.91 09/03/2020    HGB 8.6 (L) 09/03/2020    HCT 30.6 (L) 09/03/2020    MCV 77 (L) 09/03/2020     09/03/2020     BMP  Lab Results   Component Value Date     08/28/2020    K 4.2 08/28/2020     08/28/2020    CO2 28 08/28/2020    BUN 21 (H) 08/28/2020    CREATININE 1.3 08/28/2020    CALCIUM 9.4 08/28/2020    ANIONGAP 9 08/28/2020    ESTGFRAFRICA 54.9 (A) 08/28/2020    EGFRNONAA 47.6 (A) 08/28/2020     Lab Results   Component Value Date    ALT 14 08/28/2020    AST 19 08/28/2020    ALKPHOS 87 08/28/2020    BILITOT 0.3 08/28/2020     Lab Results   Component Value Date    TSH 2.261 04/18/2017     No results found for: LABA1C, HGBA1C    IMPRESSION:    Jaki Bajwa is a 50 y.o. female with history of Mood disorder, JUAN JOSE, OCD, social phobia, and Internet shopping addiction who presents for follow up appointment.    DIAGNOSES:    ICD-10-CM ICD-9-CM   1. JUAN JOSE (generalized anxiety disorder)  F41.1 300.02   2. Mood disorder  F39 296.90   3. Obsessive-compulsive behavior  R46.81 300.3   4. Insomnia due to other mental disorder  F51.05 300.9    F99 327.02   5. Internet shopping addiction  F63.89 301.89   6. Fibromyalgia  M79.7 729.1     PLAN:  · Patient feels her worsening anxiety is situational and is not interested in medication changes today.  · Continue Latuda 60mg daily.  · Continue Valium 2mg BID and Buspar 5mg BID for anxiety.  · Continue Wellbutrin XL 450mg daily for depression.  · Continue  Doxepin 150mg qHS for insomnia and mood.    · Continue individual therapy with Mr. Noah LCSW.    RETURN TO CLINIC:  Follow up in about 6 weeks (around 1/21/2021).

## 2020-12-11 ENCOUNTER — PATIENT MESSAGE (OUTPATIENT)
Dept: OTHER | Facility: OTHER | Age: 50
End: 2020-12-11

## 2020-12-31 ENCOUNTER — EXTERNAL CHRONIC CARE MANAGEMENT (OUTPATIENT)
Dept: PRIMARY CARE CLINIC | Facility: CLINIC | Age: 50
End: 2020-12-31
Payer: MEDICARE

## 2020-12-31 PROCEDURE — 99490 CHRNC CARE MGMT STAFF 1ST 20: CPT | Mod: S$GLB,,, | Performed by: INTERNAL MEDICINE

## 2020-12-31 PROCEDURE — 99490 PR CHRONIC CARE MGMT, 1ST 20 MIN: ICD-10-PCS | Mod: S$GLB,,, | Performed by: INTERNAL MEDICINE

## 2021-01-04 ENCOUNTER — PATIENT MESSAGE (OUTPATIENT)
Dept: PSYCHIATRY | Facility: CLINIC | Age: 51
End: 2021-01-04

## 2021-01-04 DIAGNOSIS — R46.81 OBSESSIVE-COMPULSIVE BEHAVIOR: ICD-10-CM

## 2021-01-04 DIAGNOSIS — F33.42 RECURRENT MAJOR DEPRESSIVE DISORDER, IN FULL REMISSION: ICD-10-CM

## 2021-01-04 DIAGNOSIS — F41.1 GAD (GENERALIZED ANXIETY DISORDER): ICD-10-CM

## 2021-01-04 DIAGNOSIS — F40.10 SOCIAL PHOBIA: ICD-10-CM

## 2021-01-04 RX ORDER — DOXEPIN HYDROCHLORIDE 75 MG/1
150 CAPSULE ORAL NIGHTLY
Qty: 60 CAPSULE | Refills: 11 | Status: SHIPPED | OUTPATIENT
Start: 2021-01-04 | End: 2021-12-09

## 2021-01-06 ENCOUNTER — HOSPITAL ENCOUNTER (EMERGENCY)
Facility: HOSPITAL | Age: 51
Discharge: HOME | End: 2021-01-06
Attending: EMERGENCY MEDICINE
Payer: MEDICARE

## 2021-01-06 VITALS
BODY MASS INDEX: 41.59 KG/M2 | HEIGHT: 67 IN | DIASTOLIC BLOOD PRESSURE: 78 MMHG | HEART RATE: 90 BPM | OXYGEN SATURATION: 97 % | WEIGHT: 265 LBS | TEMPERATURE: 98 F | SYSTOLIC BLOOD PRESSURE: 131 MMHG | RESPIRATION RATE: 20 BRPM

## 2021-01-06 DIAGNOSIS — M26.621 TMJ TENDERNESS, RIGHT: Primary | ICD-10-CM

## 2021-01-06 PROCEDURE — 99284 EMERGENCY DEPT VISIT MOD MDM: CPT | Mod: HCNC,,, | Performed by: EMERGENCY MEDICINE

## 2021-01-06 PROCEDURE — 25000003 PHARM REV CODE 250: Mod: HCNC | Performed by: EMERGENCY MEDICINE

## 2021-01-06 PROCEDURE — 96372 THER/PROPH/DIAG INJ SC/IM: CPT | Mod: HCNC

## 2021-01-06 PROCEDURE — 99284 EMERGENCY DEPT VISIT MOD MDM: CPT | Mod: 25,HCNC

## 2021-01-06 PROCEDURE — 99284 PR EMERGENCY DEPT VISIT,LEVEL IV: ICD-10-PCS | Mod: HCNC,,, | Performed by: EMERGENCY MEDICINE

## 2021-01-06 PROCEDURE — 63600175 PHARM REV CODE 636 W HCPCS: Mod: HCNC | Performed by: EMERGENCY MEDICINE

## 2021-01-06 RX ORDER — TETRACAINE HYDROCHLORIDE 5 MG/ML
2 SOLUTION OPHTHALMIC
Status: COMPLETED | OUTPATIENT
Start: 2021-01-06 | End: 2021-01-06

## 2021-01-06 RX ORDER — ACETAMINOPHEN 500 MG
500 TABLET ORAL EVERY 6 HOURS PRN
Qty: 20 TABLET | Refills: 0 | Status: SHIPPED | OUTPATIENT
Start: 2021-01-06 | End: 2021-03-15

## 2021-01-06 RX ORDER — KETOROLAC TROMETHAMINE 30 MG/ML
15 INJECTION, SOLUTION INTRAMUSCULAR; INTRAVENOUS
Status: COMPLETED | OUTPATIENT
Start: 2021-01-06 | End: 2021-01-06

## 2021-01-06 RX ADMIN — TETRACAINE HYDROCHLORIDE 2 DROP: 5 SOLUTION OPHTHALMIC at 01:01

## 2021-01-06 RX ADMIN — KETOROLAC TROMETHAMINE 15 MG: 30 INJECTION, SOLUTION INTRAMUSCULAR at 01:01

## 2021-01-07 ENCOUNTER — TELEPHONE (OUTPATIENT)
Dept: HEMATOLOGY/ONCOLOGY | Facility: CLINIC | Age: 51
End: 2021-01-07

## 2021-01-08 ENCOUNTER — OFFICE VISIT (OUTPATIENT)
Dept: PSYCHIATRY | Facility: CLINIC | Age: 51
End: 2021-01-08
Payer: MEDICARE

## 2021-01-08 ENCOUNTER — OFFICE VISIT (OUTPATIENT)
Dept: HEMATOLOGY/ONCOLOGY | Facility: CLINIC | Age: 51
End: 2021-01-08
Payer: MEDICARE

## 2021-01-08 ENCOUNTER — LAB VISIT (OUTPATIENT)
Dept: LAB | Facility: HOSPITAL | Age: 51
End: 2021-01-08
Attending: INTERNAL MEDICINE
Payer: MEDICARE

## 2021-01-08 VITALS
RESPIRATION RATE: 16 BRPM | HEIGHT: 68 IN | WEIGHT: 274.5 LBS | DIASTOLIC BLOOD PRESSURE: 94 MMHG | SYSTOLIC BLOOD PRESSURE: 126 MMHG | BODY MASS INDEX: 41.6 KG/M2 | HEART RATE: 93 BPM | OXYGEN SATURATION: 99 %

## 2021-01-08 DIAGNOSIS — D64.9 ANEMIA OF UNKNOWN ETIOLOGY: ICD-10-CM

## 2021-01-08 DIAGNOSIS — D64.9 ANEMIA OF UNKNOWN ETIOLOGY: Primary | ICD-10-CM

## 2021-01-08 DIAGNOSIS — D25.9 UTERINE LEIOMYOMA, UNSPECIFIED LOCATION: ICD-10-CM

## 2021-01-08 DIAGNOSIS — R20.2 PARESTHESIA OF SKIN: ICD-10-CM

## 2021-01-08 DIAGNOSIS — F63.89 INTERNET ADDICTION: ICD-10-CM

## 2021-01-08 DIAGNOSIS — F39 MOOD DISORDER: ICD-10-CM

## 2021-01-08 DIAGNOSIS — D50.9 IRON DEFICIENCY ANEMIA, UNSPECIFIED IRON DEFICIENCY ANEMIA TYPE: ICD-10-CM

## 2021-01-08 DIAGNOSIS — D64.9 ANEMIA, UNSPECIFIED TYPE: ICD-10-CM

## 2021-01-08 DIAGNOSIS — M79.7 FIBROMYALGIA: ICD-10-CM

## 2021-01-08 DIAGNOSIS — F41.1 GAD (GENERALIZED ANXIETY DISORDER): Primary | ICD-10-CM

## 2021-01-08 DIAGNOSIS — E66.01 MORBID OBESITY: ICD-10-CM

## 2021-01-08 DIAGNOSIS — M35.01 SJOGREN'S SYNDROME WITH KERATOCONJUNCTIVITIS SICCA: ICD-10-CM

## 2021-01-08 DIAGNOSIS — I10 ESSENTIAL HYPERTENSION: Chronic | ICD-10-CM

## 2021-01-08 LAB
BASOPHILS # BLD AUTO: 0.04 K/UL (ref 0–0.2)
BASOPHILS NFR BLD: 0.6 % (ref 0–1.9)
DAT IGG-SP REAG RBC-IMP: NORMAL
DIFFERENTIAL METHOD: ABNORMAL
EOSINOPHIL # BLD AUTO: 0.2 K/UL (ref 0–0.5)
EOSINOPHIL NFR BLD: 3.3 % (ref 0–8)
ERYTHROCYTE [DISTWIDTH] IN BLOOD BY AUTOMATED COUNT: 15.4 % (ref 11.5–14.5)
FERRITIN SERPL-MCNC: 113 NG/ML (ref 20–300)
FOLATE SERPL-MCNC: 6.4 NG/ML (ref 4–24)
HCT VFR BLD AUTO: 38.4 % (ref 37–48.5)
HGB BLD-MCNC: 11.4 G/DL (ref 12–16)
IGA SERPL-MCNC: 126 MG/DL (ref 40–350)
IGG SERPL-MCNC: 1734 MG/DL (ref 650–1600)
IGM SERPL-MCNC: 94 MG/DL (ref 50–300)
IMM GRANULOCYTES # BLD AUTO: 0.01 K/UL (ref 0–0.04)
IMM GRANULOCYTES NFR BLD AUTO: 0.1 % (ref 0–0.5)
IRON SERPL-MCNC: 60 UG/DL (ref 30–160)
LDH SERPL L TO P-CCNC: 187 U/L (ref 110–260)
LYMPHOCYTES # BLD AUTO: 3.2 K/UL (ref 1–4.8)
LYMPHOCYTES NFR BLD: 43.4 % (ref 18–48)
MCH RBC QN AUTO: 25.9 PG (ref 27–31)
MCHC RBC AUTO-ENTMCNC: 29.7 G/DL (ref 32–36)
MCV RBC AUTO: 87 FL (ref 82–98)
MONOCYTES # BLD AUTO: 0.4 K/UL (ref 0.3–1)
MONOCYTES NFR BLD: 5.8 % (ref 4–15)
NEUTROPHILS # BLD AUTO: 3.4 K/UL (ref 1.8–7.7)
NEUTROPHILS NFR BLD: 46.8 % (ref 38–73)
NRBC BLD-RTO: 0 /100 WBC
PLATELET # BLD AUTO: 267 K/UL (ref 150–350)
PMV BLD AUTO: 11.2 FL (ref 9.2–12.9)
RBC # BLD AUTO: 4.4 M/UL (ref 4–5.4)
RETICS/RBC NFR AUTO: 2.3 % (ref 0.5–2.5)
SATURATED IRON: 17 % (ref 20–50)
TOTAL IRON BINDING CAPACITY: 351 UG/DL (ref 250–450)
TRANSFERRIN SERPL-MCNC: 237 MG/DL (ref 200–375)
VIT B12 SERPL-MCNC: 405 PG/ML (ref 210–950)
WBC # BLD AUTO: 7.26 K/UL (ref 3.9–12.7)

## 2021-01-08 PROCEDURE — 99213 PR OFFICE/OUTPT VISIT, EST, LEVL III, 20-29 MIN: ICD-10-PCS | Mod: HCNC,95,, | Performed by: INTERNAL MEDICINE

## 2021-01-08 PROCEDURE — 3074F SYST BP LT 130 MM HG: CPT | Mod: HCNC,CPTII,S$GLB, | Performed by: INTERNAL MEDICINE

## 2021-01-08 PROCEDURE — 85045 AUTOMATED RETICULOCYTE COUNT: CPT | Mod: HCNC

## 2021-01-08 PROCEDURE — 3074F PR MOST RECENT SYSTOLIC BLOOD PRESSURE < 130 MM HG: ICD-10-PCS | Mod: HCNC,CPTII,95, | Performed by: INTERNAL MEDICINE

## 2021-01-08 PROCEDURE — 3074F PR MOST RECENT SYSTOLIC BLOOD PRESSURE < 130 MM HG: ICD-10-PCS | Mod: HCNC,CPTII,S$GLB, | Performed by: INTERNAL MEDICINE

## 2021-01-08 PROCEDURE — 1126F AMNT PAIN NOTED NONE PRSNT: CPT | Mod: HCNC,S$GLB,, | Performed by: INTERNAL MEDICINE

## 2021-01-08 PROCEDURE — 99214 PR OFFICE/OUTPT VISIT, EST, LEVL IV, 30-39 MIN: ICD-10-PCS | Mod: HCNC,S$GLB,, | Performed by: INTERNAL MEDICINE

## 2021-01-08 PROCEDURE — 36415 COLL VENOUS BLD VENIPUNCTURE: CPT | Mod: HCNC

## 2021-01-08 PROCEDURE — 83516 IMMUNOASSAY NONANTIBODY: CPT | Mod: HCNC

## 2021-01-08 PROCEDURE — 99213 OFFICE O/P EST LOW 20 MIN: CPT | Mod: HCNC,95,, | Performed by: INTERNAL MEDICINE

## 2021-01-08 PROCEDURE — 83540 ASSAY OF IRON: CPT | Mod: HCNC

## 2021-01-08 PROCEDURE — 99499 UNLISTED E&M SERVICE: CPT | Mod: 95,,, | Performed by: INTERNAL MEDICINE

## 2021-01-08 PROCEDURE — 83615 LACTATE (LD) (LDH) ENZYME: CPT | Mod: HCNC

## 2021-01-08 PROCEDURE — 83021 HEMOGLOBIN CHROMOTOGRAPHY: CPT | Mod: HCNC

## 2021-01-08 PROCEDURE — 86880 COOMBS TEST DIRECT: CPT | Mod: HCNC

## 2021-01-08 PROCEDURE — 3074F SYST BP LT 130 MM HG: CPT | Mod: HCNC,CPTII,95, | Performed by: INTERNAL MEDICINE

## 2021-01-08 PROCEDURE — 85025 COMPLETE CBC W/AUTO DIFF WBC: CPT | Mod: HCNC

## 2021-01-08 PROCEDURE — 1126F PR PAIN SEVERITY QUANTIFIED, NO PAIN PRESENT: ICD-10-PCS | Mod: HCNC,S$GLB,, | Performed by: INTERNAL MEDICINE

## 2021-01-08 PROCEDURE — 3080F DIAST BP >= 90 MM HG: CPT | Mod: HCNC,CPTII,S$GLB, | Performed by: INTERNAL MEDICINE

## 2021-01-08 PROCEDURE — 99999 PR PBB SHADOW E&M-EST. PATIENT-LVL III: ICD-10-PCS | Mod: PBBFAC,HCNC,, | Performed by: INTERNAL MEDICINE

## 2021-01-08 PROCEDURE — 82784 ASSAY IGA/IGD/IGG/IGM EACH: CPT | Mod: HCNC

## 2021-01-08 PROCEDURE — 82607 VITAMIN B-12: CPT | Mod: HCNC

## 2021-01-08 PROCEDURE — 82746 ASSAY OF FOLIC ACID SERUM: CPT | Mod: HCNC

## 2021-01-08 PROCEDURE — 99499 RISK ADDL DX/OHS AUDIT: ICD-10-PCS | Mod: S$GLB,,, | Performed by: INTERNAL MEDICINE

## 2021-01-08 PROCEDURE — 99499 UNLISTED E&M SERVICE: CPT | Mod: S$GLB,,, | Performed by: INTERNAL MEDICINE

## 2021-01-08 PROCEDURE — 82728 ASSAY OF FERRITIN: CPT | Mod: HCNC

## 2021-01-08 PROCEDURE — 99499 RISK ADDL DX/OHS AUDIT: ICD-10-PCS | Mod: 95,,, | Performed by: INTERNAL MEDICINE

## 2021-01-08 PROCEDURE — 3080F PR MOST RECENT DIASTOLIC BLOOD PRESSURE >= 90 MM HG: ICD-10-PCS | Mod: HCNC,CPTII,S$GLB, | Performed by: INTERNAL MEDICINE

## 2021-01-08 PROCEDURE — 99999 PR PBB SHADOW E&M-EST. PATIENT-LVL III: CPT | Mod: PBBFAC,HCNC,, | Performed by: INTERNAL MEDICINE

## 2021-01-08 PROCEDURE — 3080F PR MOST RECENT DIASTOLIC BLOOD PRESSURE >= 90 MM HG: ICD-10-PCS | Mod: HCNC,CPTII,95, | Performed by: INTERNAL MEDICINE

## 2021-01-08 PROCEDURE — 3008F BODY MASS INDEX DOCD: CPT | Mod: HCNC,CPTII,S$GLB, | Performed by: INTERNAL MEDICINE

## 2021-01-08 PROCEDURE — 99214 OFFICE O/P EST MOD 30 MIN: CPT | Mod: HCNC,S$GLB,, | Performed by: INTERNAL MEDICINE

## 2021-01-08 PROCEDURE — 3008F PR BODY MASS INDEX (BMI) DOCUMENTED: ICD-10-PCS | Mod: HCNC,CPTII,S$GLB, | Performed by: INTERNAL MEDICINE

## 2021-01-08 PROCEDURE — 3080F DIAST BP >= 90 MM HG: CPT | Mod: HCNC,CPTII,95, | Performed by: INTERNAL MEDICINE

## 2021-01-11 LAB
HGB A MFR BLD ELPH: 97.6 % (ref 95.8–98)
HGB A2 MFR BLD: 2.4 % (ref 2–3.3)
HGB F MFR BLD: 0 % (ref 0–0.9)
HGB FRACT BLD ELPH-IMP: NORMAL
HGB OTHER MFR BLD ELPH: 0 %
THEVP VARIANT 2: NORMAL
THEVP VARIANT 3: NORMAL
TTG IGA SER-ACNC: 4 UNITS

## 2021-01-14 ENCOUNTER — TELEPHONE (OUTPATIENT)
Dept: INTERNAL MEDICINE | Facility: CLINIC | Age: 51
End: 2021-01-14

## 2021-01-14 RX ORDER — NAPROXEN 500 MG/1
500 TABLET ORAL 2 TIMES DAILY PRN
Qty: 180 TABLET | Refills: 1 | Status: SHIPPED | OUTPATIENT
Start: 2021-01-14 | End: 2021-06-28 | Stop reason: SDUPTHER

## 2021-01-19 ENCOUNTER — PATIENT OUTREACH (OUTPATIENT)
Dept: ADMINISTRATIVE | Facility: HOSPITAL | Age: 51
End: 2021-01-19

## 2021-01-21 ENCOUNTER — PATIENT MESSAGE (OUTPATIENT)
Dept: INTERNAL MEDICINE | Facility: CLINIC | Age: 51
End: 2021-01-21

## 2021-01-21 ENCOUNTER — TELEPHONE (OUTPATIENT)
Dept: INTERNAL MEDICINE | Facility: CLINIC | Age: 51
End: 2021-01-21

## 2021-01-21 ENCOUNTER — OFFICE VISIT (OUTPATIENT)
Dept: INTERNAL MEDICINE | Facility: CLINIC | Age: 51
End: 2021-01-21
Payer: MEDICARE

## 2021-01-21 DIAGNOSIS — J01.90 ACUTE NON-RECURRENT SINUSITIS, UNSPECIFIED LOCATION: Primary | ICD-10-CM

## 2021-01-21 DIAGNOSIS — E11.9 TYPE 2 DIABETES MELLITUS WITHOUT COMPLICATION: ICD-10-CM

## 2021-01-21 PROCEDURE — 99213 PR OFFICE/OUTPT VISIT, EST, LEVL III, 20-29 MIN: ICD-10-PCS | Mod: HCNC,95,, | Performed by: INTERNAL MEDICINE

## 2021-01-21 PROCEDURE — 99213 OFFICE O/P EST LOW 20 MIN: CPT | Mod: HCNC,95,, | Performed by: INTERNAL MEDICINE

## 2021-01-21 RX ORDER — AZITHROMYCIN 250 MG/1
TABLET, FILM COATED ORAL
Qty: 6 TABLET | Refills: 0 | Status: SHIPPED | OUTPATIENT
Start: 2021-01-21 | End: 2021-03-15

## 2021-01-21 RX ORDER — FLUTICASONE PROPIONATE 50 MCG
2 SPRAY, SUSPENSION (ML) NASAL DAILY
Qty: 16 G | Refills: 1 | Status: SHIPPED | OUTPATIENT
Start: 2021-01-21 | End: 2021-01-21

## 2021-01-26 ENCOUNTER — PATIENT OUTREACH (OUTPATIENT)
Dept: ADMINISTRATIVE | Facility: HOSPITAL | Age: 51
End: 2021-01-26

## 2021-01-28 ENCOUNTER — PATIENT OUTREACH (OUTPATIENT)
Dept: ADMINISTRATIVE | Facility: HOSPITAL | Age: 51
End: 2021-01-28

## 2021-01-31 ENCOUNTER — EXTERNAL CHRONIC CARE MANAGEMENT (OUTPATIENT)
Dept: PRIMARY CARE CLINIC | Facility: CLINIC | Age: 51
End: 2021-01-31
Payer: MEDICARE

## 2021-01-31 PROCEDURE — 99490 CHRNC CARE MGMT STAFF 1ST 20: CPT | Mod: S$GLB,,, | Performed by: INTERNAL MEDICINE

## 2021-01-31 PROCEDURE — 99490 PR CHRONIC CARE MGMT, 1ST 20 MIN: ICD-10-PCS | Mod: S$GLB,,, | Performed by: INTERNAL MEDICINE

## 2021-02-06 ENCOUNTER — PATIENT OUTREACH (OUTPATIENT)
Dept: ADMINISTRATIVE | Facility: OTHER | Age: 51
End: 2021-02-06

## 2021-02-08 ENCOUNTER — OFFICE VISIT (OUTPATIENT)
Dept: PSYCHIATRY | Facility: CLINIC | Age: 51
End: 2021-02-08
Payer: MEDICARE

## 2021-02-08 DIAGNOSIS — F99 INSOMNIA DUE TO OTHER MENTAL DISORDER: ICD-10-CM

## 2021-02-08 DIAGNOSIS — F39 MOOD DISORDER: ICD-10-CM

## 2021-02-08 DIAGNOSIS — F51.05 INSOMNIA DUE TO OTHER MENTAL DISORDER: ICD-10-CM

## 2021-02-08 DIAGNOSIS — F41.1 GAD (GENERALIZED ANXIETY DISORDER): Primary | ICD-10-CM

## 2021-02-08 DIAGNOSIS — M79.7 FIBROMYALGIA: ICD-10-CM

## 2021-02-08 PROCEDURE — 99214 PR OFFICE/OUTPT VISIT, EST, LEVL IV, 30-39 MIN: ICD-10-PCS | Mod: 95,,, | Performed by: INTERNAL MEDICINE

## 2021-02-08 PROCEDURE — 99214 OFFICE O/P EST MOD 30 MIN: CPT | Mod: 95,,, | Performed by: INTERNAL MEDICINE

## 2021-02-09 ENCOUNTER — TELEPHONE (OUTPATIENT)
Dept: DERMATOLOGY | Facility: CLINIC | Age: 51
End: 2021-02-09

## 2021-02-09 ENCOUNTER — OFFICE VISIT (OUTPATIENT)
Dept: DERMATOLOGY | Facility: CLINIC | Age: 51
End: 2021-02-09
Payer: MEDICARE

## 2021-02-09 ENCOUNTER — PES CALL (OUTPATIENT)
Dept: ADMINISTRATIVE | Facility: CLINIC | Age: 51
End: 2021-02-09

## 2021-02-09 DIAGNOSIS — L30.0 NUMMULAR DERMATITIS: ICD-10-CM

## 2021-02-09 DIAGNOSIS — L82.1 SEBORRHEIC KERATOSIS: Primary | ICD-10-CM

## 2021-02-09 PROCEDURE — 99204 PR OFFICE/OUTPT VISIT, NEW, LEVL IV, 45-59 MIN: ICD-10-PCS | Mod: GC,S$GLB,, | Performed by: DERMATOLOGY

## 2021-02-09 PROCEDURE — 99999 PR PBB SHADOW E&M-EST. PATIENT-LVL III: CPT | Mod: PBBFAC,,,

## 2021-02-09 PROCEDURE — 99999 PR PBB SHADOW E&M-EST. PATIENT-LVL III: ICD-10-PCS | Mod: PBBFAC,,,

## 2021-02-09 PROCEDURE — 99204 OFFICE O/P NEW MOD 45 MIN: CPT | Mod: GC,S$GLB,, | Performed by: DERMATOLOGY

## 2021-02-09 RX ORDER — TRIAMCINOLONE ACETONIDE 1 MG/G
CREAM TOPICAL
Qty: 454 G | Refills: 0 | Status: SHIPPED | OUTPATIENT
Start: 2021-02-09 | End: 2021-07-16 | Stop reason: SDUPTHER

## 2021-02-23 ENCOUNTER — PATIENT MESSAGE (OUTPATIENT)
Dept: RHEUMATOLOGY | Facility: CLINIC | Age: 51
End: 2021-02-23

## 2021-02-24 ENCOUNTER — TELEPHONE (OUTPATIENT)
Dept: PRIMARY CARE CLINIC | Facility: CLINIC | Age: 51
End: 2021-02-24

## 2021-02-28 ENCOUNTER — EXTERNAL CHRONIC CARE MANAGEMENT (OUTPATIENT)
Dept: PRIMARY CARE CLINIC | Facility: CLINIC | Age: 51
End: 2021-02-28
Payer: MEDICARE

## 2021-02-28 PROCEDURE — 99490 PR CHRONIC CARE MGMT, 1ST 20 MIN: ICD-10-PCS | Mod: S$GLB,,, | Performed by: INTERNAL MEDICINE

## 2021-02-28 PROCEDURE — 99490 CHRNC CARE MGMT STAFF 1ST 20: CPT | Mod: S$GLB,,, | Performed by: INTERNAL MEDICINE

## 2021-03-01 ENCOUNTER — PATIENT MESSAGE (OUTPATIENT)
Dept: INTERNAL MEDICINE | Facility: CLINIC | Age: 51
End: 2021-03-01

## 2021-03-01 DIAGNOSIS — I10 ESSENTIAL HYPERTENSION: Primary | ICD-10-CM

## 2021-03-01 DIAGNOSIS — G89.4 CHRONIC PAIN SYNDROME: ICD-10-CM

## 2021-03-01 RX ORDER — IRBESARTAN AND HYDROCHLOROTHIAZIDE 150; 12.5 MG/1; MG/1
1 TABLET, FILM COATED ORAL DAILY
Qty: 90 TABLET | Refills: 3 | Status: SHIPPED | OUTPATIENT
Start: 2021-03-01 | End: 2022-02-18

## 2021-03-01 RX ORDER — HYDROCODONE BITARTRATE AND ACETAMINOPHEN 5; 325 MG/1; MG/1
1 TABLET ORAL 2 TIMES DAILY PRN
Qty: 35 TABLET | Refills: 0 | Status: SHIPPED | OUTPATIENT
Start: 2021-03-01 | End: 2021-03-15 | Stop reason: SDUPTHER

## 2021-03-01 RX ORDER — OMEPRAZOLE 40 MG/1
CAPSULE, DELAYED RELEASE ORAL
Qty: 90 CAPSULE | Refills: 1 | Status: SHIPPED | OUTPATIENT
Start: 2021-03-01 | End: 2021-08-26 | Stop reason: SDUPTHER

## 2021-03-02 ENCOUNTER — TELEPHONE (OUTPATIENT)
Dept: PRIMARY CARE CLINIC | Facility: CLINIC | Age: 51
End: 2021-03-02

## 2021-03-03 ENCOUNTER — TELEPHONE (OUTPATIENT)
Dept: PRIMARY CARE CLINIC | Facility: CLINIC | Age: 51
End: 2021-03-03

## 2021-03-04 ENCOUNTER — TELEPHONE (OUTPATIENT)
Dept: PRIMARY CARE CLINIC | Facility: CLINIC | Age: 51
End: 2021-03-04

## 2021-03-12 ENCOUNTER — OFFICE VISIT (OUTPATIENT)
Dept: PSYCHIATRY | Facility: CLINIC | Age: 51
End: 2021-03-12
Payer: MEDICARE

## 2021-03-12 DIAGNOSIS — F39 MOOD DISORDER: ICD-10-CM

## 2021-03-12 DIAGNOSIS — F99 INSOMNIA DUE TO OTHER MENTAL DISORDER: ICD-10-CM

## 2021-03-12 DIAGNOSIS — F51.05 INSOMNIA DUE TO OTHER MENTAL DISORDER: ICD-10-CM

## 2021-03-12 DIAGNOSIS — R46.81 OBSESSIVE-COMPULSIVE BEHAVIOR: ICD-10-CM

## 2021-03-12 DIAGNOSIS — F41.1 GAD (GENERALIZED ANXIETY DISORDER): Primary | ICD-10-CM

## 2021-03-12 PROCEDURE — 99214 OFFICE O/P EST MOD 30 MIN: CPT | Mod: 95,,, | Performed by: INTERNAL MEDICINE

## 2021-03-12 PROCEDURE — 99214 PR OFFICE/OUTPT VISIT, EST, LEVL IV, 30-39 MIN: ICD-10-PCS | Mod: 95,,, | Performed by: INTERNAL MEDICINE

## 2021-03-12 PROCEDURE — 99499 UNLISTED E&M SERVICE: CPT | Mod: 95,,, | Performed by: INTERNAL MEDICINE

## 2021-03-12 PROCEDURE — 99499 RISK ADDL DX/OHS AUDIT: ICD-10-PCS | Mod: 95,,, | Performed by: INTERNAL MEDICINE

## 2021-03-12 RX ORDER — BUSPIRONE HYDROCHLORIDE 15 MG/1
15 TABLET ORAL 3 TIMES DAILY
Qty: 270 TABLET | Refills: 1 | Status: SHIPPED | OUTPATIENT
Start: 2021-03-12 | End: 2021-10-20 | Stop reason: SDUPTHER

## 2021-03-15 ENCOUNTER — OFFICE VISIT (OUTPATIENT)
Dept: RHEUMATOLOGY | Facility: CLINIC | Age: 51
End: 2021-03-15
Payer: MEDICARE

## 2021-03-15 ENCOUNTER — LAB VISIT (OUTPATIENT)
Dept: LAB | Facility: HOSPITAL | Age: 51
End: 2021-03-15
Attending: INTERNAL MEDICINE
Payer: MEDICARE

## 2021-03-15 VITALS
WEIGHT: 280.44 LBS | DIASTOLIC BLOOD PRESSURE: 73 MMHG | HEART RATE: 110 BPM | BODY MASS INDEX: 42.64 KG/M2 | SYSTOLIC BLOOD PRESSURE: 111 MMHG

## 2021-03-15 DIAGNOSIS — M35.00 SJOGREN'S SYNDROME WITHOUT EXTRAGLANDULAR INVOLVEMENT: Primary | ICD-10-CM

## 2021-03-15 DIAGNOSIS — Z79.899 LONG-TERM USE OF PLAQUENIL: ICD-10-CM

## 2021-03-15 DIAGNOSIS — H04.123 DRY EYES: ICD-10-CM

## 2021-03-15 DIAGNOSIS — M35.00 SJOGREN'S SYNDROME WITHOUT EXTRAGLANDULAR INVOLVEMENT: ICD-10-CM

## 2021-03-15 LAB
ALBUMIN SERPL BCP-MCNC: 4 G/DL (ref 3.5–5.2)
ALP SERPL-CCNC: 72 U/L (ref 55–135)
ALT SERPL W/O P-5'-P-CCNC: 18 U/L (ref 10–44)
ANION GAP SERPL CALC-SCNC: 9 MMOL/L (ref 8–16)
AST SERPL-CCNC: 21 U/L (ref 10–40)
BASOPHILS # BLD AUTO: 0.05 K/UL (ref 0–0.2)
BASOPHILS NFR BLD: 0.6 % (ref 0–1.9)
BILIRUB SERPL-MCNC: 0.4 MG/DL (ref 0.1–1)
BUN SERPL-MCNC: 17 MG/DL (ref 6–20)
C3 SERPL-MCNC: 167 MG/DL (ref 50–180)
C4 SERPL-MCNC: 40 MG/DL (ref 11–44)
CALCIUM SERPL-MCNC: 9.6 MG/DL (ref 8.7–10.5)
CHLORIDE SERPL-SCNC: 101 MMOL/L (ref 95–110)
CO2 SERPL-SCNC: 32 MMOL/L (ref 23–29)
CREAT SERPL-MCNC: 1.2 MG/DL (ref 0.5–1.4)
CRP SERPL-MCNC: 5.3 MG/L (ref 0–8.2)
DIFFERENTIAL METHOD: ABNORMAL
EOSINOPHIL # BLD AUTO: 0.3 K/UL (ref 0–0.5)
EOSINOPHIL NFR BLD: 3.3 % (ref 0–8)
ERYTHROCYTE [DISTWIDTH] IN BLOOD BY AUTOMATED COUNT: 13.8 % (ref 11.5–14.5)
ERYTHROCYTE [SEDIMENTATION RATE] IN BLOOD BY WESTERGREN METHOD: 78 MM/HR (ref 0–36)
EST. GFR  (AFRICAN AMERICAN): >60 ML/MIN/1.73 M^2
EST. GFR  (NON AFRICAN AMERICAN): 52.1 ML/MIN/1.73 M^2
GLUCOSE SERPL-MCNC: 85 MG/DL (ref 70–110)
HCT VFR BLD AUTO: 41.3 % (ref 37–48.5)
HGB BLD-MCNC: 12.1 G/DL (ref 12–16)
IMM GRANULOCYTES # BLD AUTO: 0.02 K/UL (ref 0–0.04)
IMM GRANULOCYTES NFR BLD AUTO: 0.3 % (ref 0–0.5)
LYMPHOCYTES # BLD AUTO: 2.3 K/UL (ref 1–4.8)
LYMPHOCYTES NFR BLD: 29.8 % (ref 18–48)
MCH RBC QN AUTO: 26 PG (ref 27–31)
MCHC RBC AUTO-ENTMCNC: 29.3 G/DL (ref 32–36)
MCV RBC AUTO: 89 FL (ref 82–98)
MONOCYTES # BLD AUTO: 0.5 K/UL (ref 0.3–1)
MONOCYTES NFR BLD: 6.7 % (ref 4–15)
NEUTROPHILS # BLD AUTO: 4.7 K/UL (ref 1.8–7.7)
NEUTROPHILS NFR BLD: 59.3 % (ref 38–73)
NRBC BLD-RTO: 0 /100 WBC
PLATELET # BLD AUTO: 279 K/UL (ref 150–350)
PMV BLD AUTO: 11.8 FL (ref 9.2–12.9)
POTASSIUM SERPL-SCNC: 3.9 MMOL/L (ref 3.5–5.1)
PROT SERPL-MCNC: 8.4 G/DL (ref 6–8.4)
RBC # BLD AUTO: 4.66 M/UL (ref 4–5.4)
SODIUM SERPL-SCNC: 142 MMOL/L (ref 136–145)
WBC # BLD AUTO: 7.86 K/UL (ref 3.9–12.7)

## 2021-03-15 PROCEDURE — 85652 RBC SED RATE AUTOMATED: CPT | Performed by: INTERNAL MEDICINE

## 2021-03-15 PROCEDURE — 1126F PR PAIN SEVERITY QUANTIFIED, NO PAIN PRESENT: ICD-10-PCS | Mod: S$GLB,,, | Performed by: INTERNAL MEDICINE

## 2021-03-15 PROCEDURE — 3008F BODY MASS INDEX DOCD: CPT | Mod: CPTII,S$GLB,, | Performed by: INTERNAL MEDICINE

## 2021-03-15 PROCEDURE — 86160 COMPLEMENT ANTIGEN: CPT | Performed by: INTERNAL MEDICINE

## 2021-03-15 PROCEDURE — 99999 PR PBB SHADOW E&M-EST. PATIENT-LVL IV: ICD-10-PCS | Mod: PBBFAC,,, | Performed by: INTERNAL MEDICINE

## 2021-03-15 PROCEDURE — 86225 DNA ANTIBODY NATIVE: CPT | Performed by: INTERNAL MEDICINE

## 2021-03-15 PROCEDURE — 99214 OFFICE O/P EST MOD 30 MIN: CPT | Mod: S$GLB,,, | Performed by: INTERNAL MEDICINE

## 2021-03-15 PROCEDURE — 86160 COMPLEMENT ANTIGEN: CPT | Mod: 59 | Performed by: INTERNAL MEDICINE

## 2021-03-15 PROCEDURE — 99999 PR PBB SHADOW E&M-EST. PATIENT-LVL IV: CPT | Mod: PBBFAC,,, | Performed by: INTERNAL MEDICINE

## 2021-03-15 PROCEDURE — 85025 COMPLETE CBC W/AUTO DIFF WBC: CPT | Performed by: INTERNAL MEDICINE

## 2021-03-15 PROCEDURE — 99214 PR OFFICE/OUTPT VISIT, EST, LEVL IV, 30-39 MIN: ICD-10-PCS | Mod: S$GLB,,, | Performed by: INTERNAL MEDICINE

## 2021-03-15 PROCEDURE — 3074F PR MOST RECENT SYSTOLIC BLOOD PRESSURE < 130 MM HG: ICD-10-PCS | Mod: CPTII,S$GLB,, | Performed by: INTERNAL MEDICINE

## 2021-03-15 PROCEDURE — 3008F PR BODY MASS INDEX (BMI) DOCUMENTED: ICD-10-PCS | Mod: CPTII,S$GLB,, | Performed by: INTERNAL MEDICINE

## 2021-03-15 PROCEDURE — 3078F DIAST BP <80 MM HG: CPT | Mod: CPTII,S$GLB,, | Performed by: INTERNAL MEDICINE

## 2021-03-15 PROCEDURE — 1126F AMNT PAIN NOTED NONE PRSNT: CPT | Mod: S$GLB,,, | Performed by: INTERNAL MEDICINE

## 2021-03-15 PROCEDURE — 3078F PR MOST RECENT DIASTOLIC BLOOD PRESSURE < 80 MM HG: ICD-10-PCS | Mod: CPTII,S$GLB,, | Performed by: INTERNAL MEDICINE

## 2021-03-15 PROCEDURE — 86140 C-REACTIVE PROTEIN: CPT | Performed by: INTERNAL MEDICINE

## 2021-03-15 PROCEDURE — 82595 ASSAY OF CRYOGLOBULIN: CPT | Performed by: INTERNAL MEDICINE

## 2021-03-15 PROCEDURE — 80053 COMPREHEN METABOLIC PANEL: CPT | Performed by: INTERNAL MEDICINE

## 2021-03-15 PROCEDURE — 3074F SYST BP LT 130 MM HG: CPT | Mod: CPTII,S$GLB,, | Performed by: INTERNAL MEDICINE

## 2021-03-15 RX ORDER — CYCLOSPORINE 0.5 MG/ML
1 EMULSION OPHTHALMIC 2 TIMES DAILY
Qty: 60 EACH | Refills: 1 | Status: SHIPPED | OUTPATIENT
Start: 2021-03-15 | End: 2021-11-09 | Stop reason: SDUPTHER

## 2021-03-15 RX ORDER — CEVIMELINE HYDROCHLORIDE 30 MG/1
30 CAPSULE ORAL 3 TIMES DAILY
Qty: 90 CAPSULE | Refills: 11 | Status: SHIPPED | OUTPATIENT
Start: 2021-03-15 | End: 2021-09-22

## 2021-03-15 RX ORDER — HYDROXYCHLOROQUINE SULFATE 200 MG/1
TABLET, FILM COATED ORAL
Qty: 180 TABLET | Refills: 0 | Status: SHIPPED | OUTPATIENT
Start: 2021-03-15 | End: 2021-07-12

## 2021-03-15 RX ORDER — GABAPENTIN 300 MG/1
300 CAPSULE ORAL 3 TIMES DAILY
Qty: 90 CAPSULE | Refills: 5 | Status: SHIPPED | OUTPATIENT
Start: 2021-03-15 | End: 2022-01-31

## 2021-03-15 RX ORDER — TIZANIDINE 4 MG/1
TABLET ORAL
Qty: 180 TABLET | Refills: 5 | Status: SHIPPED | OUTPATIENT
Start: 2021-03-15 | End: 2021-06-29 | Stop reason: SDUPTHER

## 2021-03-16 LAB — DSDNA AB SER-ACNC: NORMAL [IU]/ML

## 2021-03-19 ENCOUNTER — IMMUNIZATION (OUTPATIENT)
Dept: PHARMACY | Facility: CLINIC | Age: 51
End: 2021-03-19
Payer: MEDICARE

## 2021-03-19 DIAGNOSIS — Z23 NEED FOR VACCINATION: Primary | ICD-10-CM

## 2021-03-25 LAB — CRYOGLOB SER QL: NORMAL

## 2021-03-26 ENCOUNTER — LAB VISIT (OUTPATIENT)
Dept: LAB | Facility: OTHER | Age: 51
End: 2021-03-26
Attending: INTERNAL MEDICINE
Payer: MEDICARE

## 2021-03-26 ENCOUNTER — OFFICE VISIT (OUTPATIENT)
Dept: INTERNAL MEDICINE | Facility: CLINIC | Age: 51
End: 2021-03-26
Payer: MEDICARE

## 2021-03-26 VITALS
HEIGHT: 68 IN | OXYGEN SATURATION: 96 % | HEART RATE: 100 BPM | BODY MASS INDEX: 41.63 KG/M2 | SYSTOLIC BLOOD PRESSURE: 124 MMHG | WEIGHT: 274.69 LBS | DIASTOLIC BLOOD PRESSURE: 80 MMHG

## 2021-03-26 DIAGNOSIS — R30.0 DYSURIA: ICD-10-CM

## 2021-03-26 DIAGNOSIS — E66.01 MORBID OBESITY: ICD-10-CM

## 2021-03-26 DIAGNOSIS — M35.00 SJOGREN'S SYNDROME, WITH UNSPECIFIED ORGAN INVOLVEMENT: ICD-10-CM

## 2021-03-26 DIAGNOSIS — F41.1 GAD (GENERALIZED ANXIETY DISORDER): ICD-10-CM

## 2021-03-26 DIAGNOSIS — M79.7 FIBROMYALGIA: ICD-10-CM

## 2021-03-26 DIAGNOSIS — R46.81 OBSESSIVE-COMPULSIVE BEHAVIOR: ICD-10-CM

## 2021-03-26 DIAGNOSIS — I10 ESSENTIAL HYPERTENSION: Primary | Chronic | ICD-10-CM

## 2021-03-26 DIAGNOSIS — G89.4 CHRONIC PAIN SYNDROME: ICD-10-CM

## 2021-03-26 DIAGNOSIS — D50.9 IRON DEFICIENCY ANEMIA, UNSPECIFIED IRON DEFICIENCY ANEMIA TYPE: ICD-10-CM

## 2021-03-26 PROCEDURE — 3074F PR MOST RECENT SYSTOLIC BLOOD PRESSURE < 130 MM HG: ICD-10-PCS | Mod: CPTII,S$GLB,, | Performed by: INTERNAL MEDICINE

## 2021-03-26 PROCEDURE — 99214 PR OFFICE/OUTPT VISIT, EST, LEVL IV, 30-39 MIN: ICD-10-PCS | Mod: S$GLB,,, | Performed by: INTERNAL MEDICINE

## 2021-03-26 PROCEDURE — 3079F PR MOST RECENT DIASTOLIC BLOOD PRESSURE 80-89 MM HG: ICD-10-PCS | Mod: CPTII,S$GLB,, | Performed by: INTERNAL MEDICINE

## 2021-03-26 PROCEDURE — 99214 OFFICE O/P EST MOD 30 MIN: CPT | Mod: S$GLB,,, | Performed by: INTERNAL MEDICINE

## 2021-03-26 PROCEDURE — 3008F BODY MASS INDEX DOCD: CPT | Mod: CPTII,S$GLB,, | Performed by: INTERNAL MEDICINE

## 2021-03-26 PROCEDURE — 1126F AMNT PAIN NOTED NONE PRSNT: CPT | Mod: S$GLB,,, | Performed by: INTERNAL MEDICINE

## 2021-03-26 PROCEDURE — 1126F PR PAIN SEVERITY QUANTIFIED, NO PAIN PRESENT: ICD-10-PCS | Mod: S$GLB,,, | Performed by: INTERNAL MEDICINE

## 2021-03-26 PROCEDURE — 99999 PR PBB SHADOW E&M-EST. PATIENT-LVL IV: CPT | Mod: PBBFAC,,, | Performed by: INTERNAL MEDICINE

## 2021-03-26 PROCEDURE — 87077 CULTURE AEROBIC IDENTIFY: CPT | Performed by: INTERNAL MEDICINE

## 2021-03-26 PROCEDURE — 99999 PR PBB SHADOW E&M-EST. PATIENT-LVL IV: ICD-10-PCS | Mod: PBBFAC,,, | Performed by: INTERNAL MEDICINE

## 2021-03-26 PROCEDURE — 87086 URINE CULTURE/COLONY COUNT: CPT | Performed by: INTERNAL MEDICINE

## 2021-03-26 PROCEDURE — 87088 URINE BACTERIA CULTURE: CPT | Performed by: INTERNAL MEDICINE

## 2021-03-26 PROCEDURE — 3008F PR BODY MASS INDEX (BMI) DOCUMENTED: ICD-10-PCS | Mod: CPTII,S$GLB,, | Performed by: INTERNAL MEDICINE

## 2021-03-26 PROCEDURE — 3079F DIAST BP 80-89 MM HG: CPT | Mod: CPTII,S$GLB,, | Performed by: INTERNAL MEDICINE

## 2021-03-26 PROCEDURE — 3074F SYST BP LT 130 MM HG: CPT | Mod: CPTII,S$GLB,, | Performed by: INTERNAL MEDICINE

## 2021-03-26 PROCEDURE — 87186 SC STD MICRODIL/AGAR DIL: CPT | Performed by: INTERNAL MEDICINE

## 2021-03-26 RX ORDER — HYDROCODONE BITARTRATE AND ACETAMINOPHEN 5; 325 MG/1; MG/1
1 TABLET ORAL
Qty: 30 TABLET | Refills: 0 | Status: SHIPPED | OUTPATIENT
Start: 2021-05-29 | End: 2021-06-21

## 2021-03-26 RX ORDER — HYDROCODONE BITARTRATE AND ACETAMINOPHEN 5; 325 MG/1; MG/1
1 TABLET ORAL
Qty: 30 TABLET | Refills: 0 | Status: SHIPPED | OUTPATIENT
Start: 2021-03-31 | End: 2021-06-28 | Stop reason: SDUPTHER

## 2021-03-26 RX ORDER — NITROFURANTOIN 25; 75 MG/1; MG/1
100 CAPSULE ORAL 2 TIMES DAILY
Qty: 14 CAPSULE | Refills: 0 | Status: SHIPPED | OUTPATIENT
Start: 2021-03-26 | End: 2021-06-22

## 2021-03-26 RX ORDER — FERROUS SULFATE 325(65) MG
325 TABLET, DELAYED RELEASE (ENTERIC COATED) ORAL DAILY
Qty: 90 TABLET | Refills: 3 | Status: SHIPPED | OUTPATIENT
Start: 2021-03-26 | End: 2021-09-29

## 2021-03-26 RX ORDER — HYDROCODONE BITARTRATE AND ACETAMINOPHEN 5; 325 MG/1; MG/1
1 TABLET ORAL
Qty: 30 TABLET | Refills: 0 | Status: SHIPPED | OUTPATIENT
Start: 2021-04-29 | End: 2021-06-21

## 2021-03-29 ENCOUNTER — TELEPHONE (OUTPATIENT)
Dept: INTERNAL MEDICINE | Facility: CLINIC | Age: 51
End: 2021-03-29

## 2021-03-29 LAB — BACTERIA UR CULT: ABNORMAL

## 2021-03-31 ENCOUNTER — EXTERNAL CHRONIC CARE MANAGEMENT (OUTPATIENT)
Dept: PRIMARY CARE CLINIC | Facility: CLINIC | Age: 51
End: 2021-03-31
Payer: MEDICARE

## 2021-03-31 DIAGNOSIS — Z79.899 LONG-TERM USE OF PLAQUENIL: Primary | ICD-10-CM

## 2021-03-31 PROCEDURE — 99490 PR CHRONIC CARE MGMT, 1ST 20 MIN: ICD-10-PCS | Mod: S$GLB,,, | Performed by: INTERNAL MEDICINE

## 2021-03-31 PROCEDURE — 99490 CHRNC CARE MGMT STAFF 1ST 20: CPT | Mod: S$GLB,,, | Performed by: INTERNAL MEDICINE

## 2021-04-07 ENCOUNTER — TELEPHONE (OUTPATIENT)
Dept: ADMINISTRATIVE | Facility: CLINIC | Age: 51
End: 2021-04-07

## 2021-04-07 ENCOUNTER — TELEPHONE (OUTPATIENT)
Dept: HEMATOLOGY/ONCOLOGY | Facility: CLINIC | Age: 51
End: 2021-04-07

## 2021-04-08 ENCOUNTER — TELEPHONE (OUTPATIENT)
Dept: HEMATOLOGY/ONCOLOGY | Facility: CLINIC | Age: 51
End: 2021-04-08

## 2021-04-12 ENCOUNTER — PATIENT MESSAGE (OUTPATIENT)
Dept: INTERNAL MEDICINE | Facility: CLINIC | Age: 51
End: 2021-04-12

## 2021-04-12 RX ORDER — FLUCONAZOLE 150 MG/1
150 TABLET ORAL ONCE
Qty: 1 TABLET | Refills: 1 | Status: SHIPPED | OUTPATIENT
Start: 2021-04-12 | End: 2021-04-12

## 2021-04-14 ENCOUNTER — TELEPHONE (OUTPATIENT)
Dept: HEMATOLOGY/ONCOLOGY | Facility: CLINIC | Age: 51
End: 2021-04-14

## 2021-04-16 ENCOUNTER — IMMUNIZATION (OUTPATIENT)
Dept: PHARMACY | Facility: CLINIC | Age: 51
End: 2021-04-16
Payer: MEDICARE

## 2021-04-16 DIAGNOSIS — Z23 NEED FOR VACCINATION: Primary | ICD-10-CM

## 2021-04-22 ENCOUNTER — PATIENT MESSAGE (OUTPATIENT)
Dept: UROLOGY | Facility: CLINIC | Age: 51
End: 2021-04-22

## 2021-04-30 ENCOUNTER — EXTERNAL CHRONIC CARE MANAGEMENT (OUTPATIENT)
Dept: PRIMARY CARE CLINIC | Facility: CLINIC | Age: 51
End: 2021-04-30
Payer: MEDICARE

## 2021-04-30 ENCOUNTER — OFFICE VISIT (OUTPATIENT)
Dept: PSYCHIATRY | Facility: CLINIC | Age: 51
End: 2021-04-30
Payer: MEDICARE

## 2021-04-30 DIAGNOSIS — F41.1 GAD (GENERALIZED ANXIETY DISORDER): Primary | ICD-10-CM

## 2021-04-30 DIAGNOSIS — F63.89 INTERNET ADDICTION: ICD-10-CM

## 2021-04-30 DIAGNOSIS — R46.81 OBSESSIVE-COMPULSIVE BEHAVIOR: ICD-10-CM

## 2021-04-30 DIAGNOSIS — F51.05 INSOMNIA DUE TO OTHER MENTAL DISORDER: ICD-10-CM

## 2021-04-30 DIAGNOSIS — F99 INSOMNIA DUE TO OTHER MENTAL DISORDER: ICD-10-CM

## 2021-04-30 DIAGNOSIS — F39 MOOD DISORDER: ICD-10-CM

## 2021-04-30 PROCEDURE — 99214 OFFICE O/P EST MOD 30 MIN: CPT | Mod: 95,,, | Performed by: INTERNAL MEDICINE

## 2021-04-30 PROCEDURE — 99490 CHRNC CARE MGMT STAFF 1ST 20: CPT | Mod: S$GLB,,, | Performed by: INTERNAL MEDICINE

## 2021-04-30 PROCEDURE — 99490 PR CHRONIC CARE MGMT, 1ST 20 MIN: ICD-10-PCS | Mod: S$GLB,,, | Performed by: INTERNAL MEDICINE

## 2021-04-30 PROCEDURE — 99214 PR OFFICE/OUTPT VISIT, EST, LEVL IV, 30-39 MIN: ICD-10-PCS | Mod: 95,,, | Performed by: INTERNAL MEDICINE

## 2021-05-20 ENCOUNTER — PATIENT OUTREACH (OUTPATIENT)
Dept: INTERNAL MEDICINE | Facility: CLINIC | Age: 51
End: 2021-05-20

## 2021-05-21 ENCOUNTER — OFFICE VISIT (OUTPATIENT)
Dept: INTERNAL MEDICINE | Facility: CLINIC | Age: 51
End: 2021-05-21
Attending: FAMILY MEDICINE
Payer: MEDICARE

## 2021-05-21 DIAGNOSIS — J06.9 VIRAL UPPER RESPIRATORY TRACT INFECTION: ICD-10-CM

## 2021-05-21 DIAGNOSIS — I10 ESSENTIAL HYPERTENSION: Primary | ICD-10-CM

## 2021-05-21 PROCEDURE — 99214 OFFICE O/P EST MOD 30 MIN: CPT | Mod: 95,,, | Performed by: FAMILY MEDICINE

## 2021-05-21 PROCEDURE — 99214 PR OFFICE/OUTPT VISIT, EST, LEVL IV, 30-39 MIN: ICD-10-PCS | Mod: 95,,, | Performed by: FAMILY MEDICINE

## 2021-05-21 RX ORDER — AZITHROMYCIN 500 MG/1
500 TABLET, FILM COATED ORAL DAILY
Qty: 5 TABLET | Refills: 0 | Status: SHIPPED | OUTPATIENT
Start: 2021-05-21 | End: 2021-05-26

## 2021-05-26 ENCOUNTER — LAB VISIT (OUTPATIENT)
Dept: LAB | Facility: HOSPITAL | Age: 51
End: 2021-05-26
Attending: INTERNAL MEDICINE
Payer: MEDICARE

## 2021-05-26 ENCOUNTER — PATIENT MESSAGE (OUTPATIENT)
Dept: INTERNAL MEDICINE | Facility: CLINIC | Age: 51
End: 2021-05-26

## 2021-05-26 DIAGNOSIS — R30.0 DYSURIA: ICD-10-CM

## 2021-05-26 LAB
BACTERIA #/AREA URNS AUTO: ABNORMAL /HPF
BILIRUB UR QL STRIP: NEGATIVE
CLARITY UR REFRACT.AUTO: ABNORMAL
COLOR UR AUTO: ABNORMAL
GLUCOSE UR QL STRIP: NEGATIVE
HGB UR QL STRIP: ABNORMAL
HYALINE CASTS UR QL AUTO: 0 /LPF
KETONES UR QL STRIP: NEGATIVE
LEUKOCYTE ESTERASE UR QL STRIP: ABNORMAL
MICROSCOPIC COMMENT: ABNORMAL
NITRITE UR QL STRIP: POSITIVE
PH UR STRIP: 5 [PH] (ref 5–8)
PROT UR QL STRIP: ABNORMAL
RBC #/AREA URNS AUTO: 82 /HPF (ref 0–4)
SP GR UR STRIP: 1.02 (ref 1–1.03)
SQUAMOUS #/AREA URNS AUTO: 2 /HPF
URN SPEC COLLECT METH UR: ABNORMAL
WBC #/AREA URNS AUTO: >100 /HPF (ref 0–5)

## 2021-05-26 PROCEDURE — 87086 URINE CULTURE/COLONY COUNT: CPT | Performed by: INTERNAL MEDICINE

## 2021-05-26 PROCEDURE — 87088 URINE BACTERIA CULTURE: CPT | Performed by: INTERNAL MEDICINE

## 2021-05-26 PROCEDURE — 81001 URINALYSIS AUTO W/SCOPE: CPT | Performed by: INTERNAL MEDICINE

## 2021-05-26 PROCEDURE — 87186 SC STD MICRODIL/AGAR DIL: CPT | Performed by: INTERNAL MEDICINE

## 2021-05-26 PROCEDURE — 87077 CULTURE AEROBIC IDENTIFY: CPT | Performed by: INTERNAL MEDICINE

## 2021-05-27 ENCOUNTER — PATIENT MESSAGE (OUTPATIENT)
Dept: INTERNAL MEDICINE | Facility: CLINIC | Age: 51
End: 2021-05-27

## 2021-05-27 RX ORDER — AMOXICILLIN AND CLAVULANATE POTASSIUM 875; 125 MG/1; MG/1
1 TABLET, FILM COATED ORAL EVERY 12 HOURS
Qty: 10 TABLET | Refills: 0 | Status: SHIPPED | OUTPATIENT
Start: 2021-05-27 | End: 2021-06-22

## 2021-05-28 LAB — BACTERIA UR CULT: ABNORMAL

## 2021-05-31 ENCOUNTER — EXTERNAL CHRONIC CARE MANAGEMENT (OUTPATIENT)
Dept: PRIMARY CARE CLINIC | Facility: CLINIC | Age: 51
End: 2021-05-31
Payer: MEDICARE

## 2021-05-31 PROCEDURE — 99490 PR CHRONIC CARE MGMT, 1ST 20 MIN: ICD-10-PCS | Mod: S$GLB,,, | Performed by: INTERNAL MEDICINE

## 2021-05-31 PROCEDURE — 99439 CHRNC CARE MGMT STAF EA ADDL: CPT | Mod: S$GLB,,, | Performed by: INTERNAL MEDICINE

## 2021-05-31 PROCEDURE — 99439 PR CHRONIC CARE MGMT, EA ADDTL 20 MIN: ICD-10-PCS | Mod: S$GLB,,, | Performed by: INTERNAL MEDICINE

## 2021-05-31 PROCEDURE — 99490 CHRNC CARE MGMT STAFF 1ST 20: CPT | Mod: S$GLB,,, | Performed by: INTERNAL MEDICINE

## 2021-06-03 ENCOUNTER — PATIENT MESSAGE (OUTPATIENT)
Dept: INTERNAL MEDICINE | Facility: CLINIC | Age: 51
End: 2021-06-03

## 2021-06-03 DIAGNOSIS — N89.8 VAGINAL IRRITATION: ICD-10-CM

## 2021-06-03 RX ORDER — FLUCONAZOLE 100 MG/1
150 TABLET ORAL EVERY OTHER DAY
Qty: 3 TABLET | Refills: 0 | Status: SHIPPED | OUTPATIENT
Start: 2021-06-03 | End: 2021-06-06

## 2021-06-04 ENCOUNTER — TELEPHONE (OUTPATIENT)
Dept: RHEUMATOLOGY | Facility: CLINIC | Age: 51
End: 2021-06-04

## 2021-06-21 ENCOUNTER — TELEPHONE (OUTPATIENT)
Dept: ADMINISTRATIVE | Facility: CLINIC | Age: 51
End: 2021-06-21

## 2021-06-22 ENCOUNTER — OFFICE VISIT (OUTPATIENT)
Dept: INTERNAL MEDICINE | Facility: CLINIC | Age: 51
End: 2021-06-22
Payer: MEDICARE

## 2021-06-22 ENCOUNTER — TELEPHONE (OUTPATIENT)
Dept: INTERNAL MEDICINE | Facility: CLINIC | Age: 51
End: 2021-06-22

## 2021-06-22 ENCOUNTER — OFFICE VISIT (OUTPATIENT)
Dept: UROLOGY | Facility: CLINIC | Age: 51
End: 2021-06-22
Payer: MEDICARE

## 2021-06-22 VITALS
HEART RATE: 104 BPM | WEIGHT: 269.19 LBS | SYSTOLIC BLOOD PRESSURE: 110 MMHG | HEIGHT: 68 IN | DIASTOLIC BLOOD PRESSURE: 78 MMHG | OXYGEN SATURATION: 95 % | BODY MASS INDEX: 40.8 KG/M2

## 2021-06-22 VITALS
WEIGHT: 268.06 LBS | SYSTOLIC BLOOD PRESSURE: 118 MMHG | HEART RATE: 87 BPM | HEIGHT: 67 IN | BODY MASS INDEX: 42.07 KG/M2 | OXYGEN SATURATION: 95 % | DIASTOLIC BLOOD PRESSURE: 77 MMHG

## 2021-06-22 VITALS
DIASTOLIC BLOOD PRESSURE: 94 MMHG | HEIGHT: 68 IN | WEIGHT: 269 LBS | HEART RATE: 102 BPM | SYSTOLIC BLOOD PRESSURE: 130 MMHG | BODY MASS INDEX: 40.77 KG/M2

## 2021-06-22 DIAGNOSIS — R10.2 PELVIC PAIN IN FEMALE: ICD-10-CM

## 2021-06-22 DIAGNOSIS — N39.0 RECURRENT UTI (URINARY TRACT INFECTION): ICD-10-CM

## 2021-06-22 DIAGNOSIS — E66.01 MORBID OBESITY: ICD-10-CM

## 2021-06-22 DIAGNOSIS — F99 INSOMNIA DUE TO OTHER MENTAL DISORDER: ICD-10-CM

## 2021-06-22 DIAGNOSIS — F51.05 INSOMNIA DUE TO OTHER MENTAL DISORDER: ICD-10-CM

## 2021-06-22 DIAGNOSIS — M26.629 TMJ SYNDROME: ICD-10-CM

## 2021-06-22 DIAGNOSIS — N39.0 RECURRENT UTI: ICD-10-CM

## 2021-06-22 DIAGNOSIS — Z00.00 ENCOUNTER FOR PREVENTIVE HEALTH EXAMINATION: Primary | ICD-10-CM

## 2021-06-22 DIAGNOSIS — R39.15 URINARY URGENCY: ICD-10-CM

## 2021-06-22 DIAGNOSIS — M79.7 FIBROMYALGIA: ICD-10-CM

## 2021-06-22 DIAGNOSIS — M79.7 FIBROMYALGIA: Primary | ICD-10-CM

## 2021-06-22 DIAGNOSIS — G89.29 CHRONIC BILATERAL LOW BACK PAIN WITHOUT SCIATICA: ICD-10-CM

## 2021-06-22 DIAGNOSIS — D50.9 IRON DEFICIENCY ANEMIA, UNSPECIFIED IRON DEFICIENCY ANEMIA TYPE: ICD-10-CM

## 2021-06-22 DIAGNOSIS — R46.81 OBSESSIVE-COMPULSIVE BEHAVIOR: ICD-10-CM

## 2021-06-22 DIAGNOSIS — G89.4 CHRONIC PAIN SYNDROME: ICD-10-CM

## 2021-06-22 DIAGNOSIS — E66.01 CLASS 3 SEVERE OBESITY DUE TO EXCESS CALORIES WITHOUT SERIOUS COMORBIDITY WITH BODY MASS INDEX (BMI) OF 40.0 TO 44.9 IN ADULT: ICD-10-CM

## 2021-06-22 DIAGNOSIS — R33.9 INCOMPLETE BLADDER EMPTYING: ICD-10-CM

## 2021-06-22 DIAGNOSIS — N89.8 VAGINAL DRYNESS: ICD-10-CM

## 2021-06-22 DIAGNOSIS — F63.89 INTERNET ADDICTION: ICD-10-CM

## 2021-06-22 DIAGNOSIS — G47.62 LEG CRAMPS, SLEEP RELATED: ICD-10-CM

## 2021-06-22 DIAGNOSIS — M35.00 SJOGREN'S SYNDROME, WITH UNSPECIFIED ORGAN INVOLVEMENT: ICD-10-CM

## 2021-06-22 DIAGNOSIS — I10 ESSENTIAL HYPERTENSION: Chronic | ICD-10-CM

## 2021-06-22 DIAGNOSIS — D25.9 UTERINE LEIOMYOMA, UNSPECIFIED LOCATION: ICD-10-CM

## 2021-06-22 DIAGNOSIS — F39 MOOD DISORDER: ICD-10-CM

## 2021-06-22 DIAGNOSIS — M54.50 CHRONIC BILATERAL LOW BACK PAIN WITHOUT SCIATICA: ICD-10-CM

## 2021-06-22 DIAGNOSIS — D64.9 ANEMIA, UNSPECIFIED TYPE: ICD-10-CM

## 2021-06-22 DIAGNOSIS — F41.1 GAD (GENERALIZED ANXIETY DISORDER): ICD-10-CM

## 2021-06-22 DIAGNOSIS — R35.0 URINARY FREQUENCY: Primary | ICD-10-CM

## 2021-06-22 DIAGNOSIS — D64.9 ANEMIA OF UNKNOWN ETIOLOGY: ICD-10-CM

## 2021-06-22 LAB
AMPHET+METHAMPHET UR QL: NEGATIVE
BARBITURATES UR QL SCN>200 NG/ML: NEGATIVE
BENZODIAZ UR QL SCN>200 NG/ML: NORMAL
BILIRUB UR QL STRIP: NEGATIVE
BZE UR QL SCN: NEGATIVE
CANNABINOIDS UR QL SCN: NEGATIVE
CLARITY UR: ABNORMAL
COLOR UR: YELLOW
CREAT UR-MCNC: 178.3 MG/DL (ref 15–325)
ETHANOL UR-MCNC: <10 MG/DL
GLUCOSE UR QL STRIP: NEGATIVE
HGB UR QL STRIP: NEGATIVE
KETONES UR QL STRIP: NEGATIVE
LEUKOCYTE ESTERASE UR QL STRIP: NEGATIVE
METHADONE UR QL SCN>300 NG/ML: NEGATIVE
NITRITE UR QL STRIP: NEGATIVE
OPIATES UR QL SCN: NORMAL
PCP UR QL SCN>25 NG/ML: NEGATIVE
PH UR STRIP: 6 [PH] (ref 5–8)
PROT UR QL STRIP: NEGATIVE
SP GR UR STRIP: 1.02 (ref 1–1.03)
TOXICOLOGY INFORMATION: NORMAL
URN SPEC COLLECT METH UR: ABNORMAL

## 2021-06-22 PROCEDURE — 99999 PR PBB SHADOW E&M-EST. PATIENT-LVL V: ICD-10-PCS | Mod: PBBFAC,,, | Performed by: INTERNAL MEDICINE

## 2021-06-22 PROCEDURE — 3008F PR BODY MASS INDEX (BMI) DOCUMENTED: ICD-10-PCS | Mod: CPTII,S$GLB,, | Performed by: NURSE PRACTITIONER

## 2021-06-22 PROCEDURE — 81003 URINALYSIS AUTO W/O SCOPE: CPT | Performed by: INTERNAL MEDICINE

## 2021-06-22 PROCEDURE — 99213 PR OFFICE/OUTPT VISIT, EST, LEVL III, 20-29 MIN: ICD-10-PCS | Mod: S$GLB,,, | Performed by: INTERNAL MEDICINE

## 2021-06-22 PROCEDURE — 1126F PR PAIN SEVERITY QUANTIFIED, NO PAIN PRESENT: ICD-10-PCS | Mod: S$GLB,,, | Performed by: INTERNAL MEDICINE

## 2021-06-22 PROCEDURE — 99999 PR PBB SHADOW E&M-EST. PATIENT-LVL V: CPT | Mod: PBBFAC,,, | Performed by: INTERNAL MEDICINE

## 2021-06-22 PROCEDURE — 3008F BODY MASS INDEX DOCD: CPT | Mod: CPTII,S$GLB,, | Performed by: NURSE PRACTITIONER

## 2021-06-22 PROCEDURE — 99999 PR PBB SHADOW E&M-EST. PATIENT-LVL III: CPT | Mod: PBBFAC,,, | Performed by: NURSE PRACTITIONER

## 2021-06-22 PROCEDURE — 99499 UNLISTED E&M SERVICE: CPT | Mod: S$GLB,,, | Performed by: NURSE PRACTITIONER

## 2021-06-22 PROCEDURE — 99213 OFFICE O/P EST LOW 20 MIN: CPT | Mod: S$GLB,,, | Performed by: INTERNAL MEDICINE

## 2021-06-22 PROCEDURE — 3008F BODY MASS INDEX DOCD: CPT | Mod: CPTII,S$GLB,, | Performed by: INTERNAL MEDICINE

## 2021-06-22 PROCEDURE — G9920 SCRNING PERF AND NEGATIVE: HCPCS | Mod: CPTII,S$GLB,, | Performed by: NURSE PRACTITIONER

## 2021-06-22 PROCEDURE — G0439 PPPS, SUBSEQ VISIT: HCPCS | Mod: S$GLB,,, | Performed by: NURSE PRACTITIONER

## 2021-06-22 PROCEDURE — 1126F PR PAIN SEVERITY QUANTIFIED, NO PAIN PRESENT: ICD-10-PCS | Mod: S$GLB,,, | Performed by: NURSE PRACTITIONER

## 2021-06-22 PROCEDURE — 99999 PR PBB SHADOW E&M-EST. PATIENT-LVL III: ICD-10-PCS | Mod: PBBFAC,,, | Performed by: NURSE PRACTITIONER

## 2021-06-22 PROCEDURE — 99213 PR OFFICE/OUTPT VISIT, EST, LEVL III, 20-29 MIN: ICD-10-PCS | Mod: S$GLB,,, | Performed by: NURSE PRACTITIONER

## 2021-06-22 PROCEDURE — 99499 RISK ADDL DX/OHS AUDIT: ICD-10-PCS | Mod: S$GLB,,, | Performed by: NURSE PRACTITIONER

## 2021-06-22 PROCEDURE — G9920 PR SCREENING AND NEGATIVE: ICD-10-PCS | Mod: CPTII,S$GLB,, | Performed by: NURSE PRACTITIONER

## 2021-06-22 PROCEDURE — 3008F PR BODY MASS INDEX (BMI) DOCUMENTED: ICD-10-PCS | Mod: CPTII,S$GLB,, | Performed by: INTERNAL MEDICINE

## 2021-06-22 PROCEDURE — 99213 OFFICE O/P EST LOW 20 MIN: CPT | Mod: S$GLB,,, | Performed by: NURSE PRACTITIONER

## 2021-06-22 PROCEDURE — 1126F AMNT PAIN NOTED NONE PRSNT: CPT | Mod: S$GLB,,, | Performed by: NURSE PRACTITIONER

## 2021-06-22 PROCEDURE — 87086 URINE CULTURE/COLONY COUNT: CPT | Performed by: NURSE PRACTITIONER

## 2021-06-22 PROCEDURE — 80307 DRUG TEST PRSMV CHEM ANLYZR: CPT | Performed by: INTERNAL MEDICINE

## 2021-06-22 PROCEDURE — G0439 PR MEDICARE ANNUAL WELLNESS SUBSEQUENT VISIT: ICD-10-PCS | Mod: S$GLB,,, | Performed by: NURSE PRACTITIONER

## 2021-06-22 PROCEDURE — 1126F AMNT PAIN NOTED NONE PRSNT: CPT | Mod: S$GLB,,, | Performed by: INTERNAL MEDICINE

## 2021-06-22 RX ORDER — HYDROCODONE BITARTRATE AND ACETAMINOPHEN 5; 325 MG/1; MG/1
1 TABLET ORAL
Qty: 30 TABLET | Refills: 0 | Status: CANCELLED | OUTPATIENT
Start: 2021-06-22

## 2021-06-22 RX ORDER — SULFAMETHOXAZOLE AND TRIMETHOPRIM 800; 160 MG/1; MG/1
1 TABLET ORAL 2 TIMES DAILY
Qty: 20 TABLET | Refills: 0 | Status: SHIPPED | OUTPATIENT
Start: 2021-06-22 | End: 2021-07-02

## 2021-06-22 RX ORDER — ESTRADIOL 0.1 MG/G
1 CREAM VAGINAL
Qty: 8 G | Refills: 11 | Status: SHIPPED | OUTPATIENT
Start: 2021-06-24 | End: 2021-09-22

## 2021-06-24 LAB — BACTERIA UR CULT: NORMAL

## 2021-06-28 ENCOUNTER — PATIENT MESSAGE (OUTPATIENT)
Dept: INTERNAL MEDICINE | Facility: CLINIC | Age: 51
End: 2021-06-28

## 2021-06-28 DIAGNOSIS — G89.4 CHRONIC PAIN SYNDROME: ICD-10-CM

## 2021-06-28 RX ORDER — NAPROXEN 500 MG/1
500 TABLET ORAL 2 TIMES DAILY PRN
Qty: 180 TABLET | Refills: 1 | Status: SHIPPED | OUTPATIENT
Start: 2021-06-28 | End: 2021-12-30

## 2021-06-28 RX ORDER — HYDROCODONE BITARTRATE AND ACETAMINOPHEN 5; 325 MG/1; MG/1
1 TABLET ORAL
Qty: 30 TABLET | Refills: 0 | Status: SHIPPED | OUTPATIENT
Start: 2021-06-28 | End: 2021-07-28 | Stop reason: SDUPTHER

## 2021-06-29 ENCOUNTER — PATIENT MESSAGE (OUTPATIENT)
Dept: PSYCHIATRY | Facility: CLINIC | Age: 51
End: 2021-06-29

## 2021-06-29 DIAGNOSIS — F40.10 SOCIAL PHOBIA: ICD-10-CM

## 2021-06-29 DIAGNOSIS — R46.81 OBSESSIVE-COMPULSIVE BEHAVIOR: ICD-10-CM

## 2021-06-29 DIAGNOSIS — F41.1 GAD (GENERALIZED ANXIETY DISORDER): ICD-10-CM

## 2021-06-29 RX ORDER — DIAZEPAM 2 MG/1
2 TABLET ORAL 2 TIMES DAILY PRN
Qty: 60 TABLET | Refills: 5 | Status: SHIPPED | OUTPATIENT
Start: 2021-06-29 | End: 2021-09-03 | Stop reason: SDUPTHER

## 2021-06-30 ENCOUNTER — TELEPHONE (OUTPATIENT)
Dept: PRIMARY CARE CLINIC | Facility: CLINIC | Age: 51
End: 2021-06-30

## 2021-06-30 ENCOUNTER — EXTERNAL CHRONIC CARE MANAGEMENT (OUTPATIENT)
Dept: PRIMARY CARE CLINIC | Facility: CLINIC | Age: 51
End: 2021-06-30
Payer: MEDICARE

## 2021-06-30 PROCEDURE — 99490 PR CHRONIC CARE MGMT, 1ST 20 MIN: ICD-10-PCS | Mod: S$GLB,,, | Performed by: INTERNAL MEDICINE

## 2021-06-30 PROCEDURE — 99490 CHRNC CARE MGMT STAFF 1ST 20: CPT | Mod: S$GLB,,, | Performed by: INTERNAL MEDICINE

## 2021-07-12 RX ORDER — HYDROXYCHLOROQUINE SULFATE 200 MG/1
TABLET, FILM COATED ORAL
Qty: 180 TABLET | Refills: 0 | Status: SHIPPED | OUTPATIENT
Start: 2021-07-12 | End: 2021-09-29

## 2021-07-15 ENCOUNTER — OFFICE VISIT (OUTPATIENT)
Dept: PRIMARY CARE CLINIC | Facility: CLINIC | Age: 51
End: 2021-07-15
Payer: MEDICARE

## 2021-07-15 ENCOUNTER — TELEPHONE (OUTPATIENT)
Dept: INTERNAL MEDICINE | Facility: CLINIC | Age: 51
End: 2021-07-15

## 2021-07-15 DIAGNOSIS — R09.81 NASAL CONGESTION: ICD-10-CM

## 2021-07-15 DIAGNOSIS — J34.89 RHINORRHEA: ICD-10-CM

## 2021-07-15 DIAGNOSIS — J06.9 UPPER RESPIRATORY TRACT INFECTION, UNSPECIFIED TYPE: Primary | ICD-10-CM

## 2021-07-15 PROCEDURE — 99213 OFFICE O/P EST LOW 20 MIN: CPT | Mod: 95,,, | Performed by: NURSE PRACTITIONER

## 2021-07-15 PROCEDURE — 99213 PR OFFICE/OUTPT VISIT, EST, LEVL III, 20-29 MIN: ICD-10-PCS | Mod: 95,,, | Performed by: NURSE PRACTITIONER

## 2021-07-15 RX ORDER — AMOXICILLIN AND CLAVULANATE POTASSIUM 500; 125 MG/1; MG/1
1 TABLET, FILM COATED ORAL 2 TIMES DAILY
Qty: 14 TABLET | Refills: 0 | Status: SHIPPED | OUTPATIENT
Start: 2021-07-15 | End: 2021-07-22

## 2021-07-15 RX ORDER — CETIRIZINE HYDROCHLORIDE 10 MG/1
10 TABLET ORAL NIGHTLY
Qty: 30 TABLET | Refills: 0 | Status: SHIPPED | OUTPATIENT
Start: 2021-07-15 | End: 2023-02-02

## 2021-07-15 RX ORDER — FLUTICASONE PROPIONATE 50 MCG
1 SPRAY, SUSPENSION (ML) NASAL DAILY
Qty: 48 G | Refills: 0 | Status: SHIPPED | OUTPATIENT
Start: 2021-07-15 | End: 2021-09-22

## 2021-07-16 DIAGNOSIS — L30.0 NUMMULAR DERMATITIS: ICD-10-CM

## 2021-07-16 RX ORDER — TRIAMCINOLONE ACETONIDE 1 MG/G
CREAM TOPICAL
Qty: 454 G | Refills: 0 | Status: SHIPPED | OUTPATIENT
Start: 2021-07-16 | End: 2021-09-22

## 2021-07-20 ENCOUNTER — OFFICE VISIT (OUTPATIENT)
Dept: PSYCHIATRY | Facility: CLINIC | Age: 51
End: 2021-07-20
Payer: MEDICARE

## 2021-07-20 DIAGNOSIS — F51.05 INSOMNIA DUE TO OTHER MENTAL DISORDER: ICD-10-CM

## 2021-07-20 DIAGNOSIS — F41.1 GAD (GENERALIZED ANXIETY DISORDER): Primary | ICD-10-CM

## 2021-07-20 DIAGNOSIS — F99 INSOMNIA DUE TO OTHER MENTAL DISORDER: ICD-10-CM

## 2021-07-20 DIAGNOSIS — F39 MOOD DISORDER: ICD-10-CM

## 2021-07-20 DIAGNOSIS — R46.81 OBSESSIVE-COMPULSIVE BEHAVIOR: ICD-10-CM

## 2021-07-20 PROCEDURE — 99214 PR OFFICE/OUTPT VISIT, EST, LEVL IV, 30-39 MIN: ICD-10-PCS | Mod: 95,,, | Performed by: INTERNAL MEDICINE

## 2021-07-20 PROCEDURE — 99499 RISK ADDL DX/OHS AUDIT: ICD-10-PCS | Mod: AF,HB,95, | Performed by: INTERNAL MEDICINE

## 2021-07-20 PROCEDURE — 99499 UNLISTED E&M SERVICE: CPT | Mod: AF,HB,95, | Performed by: INTERNAL MEDICINE

## 2021-07-20 PROCEDURE — 99214 OFFICE O/P EST MOD 30 MIN: CPT | Mod: 95,,, | Performed by: INTERNAL MEDICINE

## 2021-07-22 ENCOUNTER — PATIENT MESSAGE (OUTPATIENT)
Dept: PRIMARY CARE CLINIC | Facility: CLINIC | Age: 51
End: 2021-07-22

## 2021-07-26 RX ORDER — FLUCONAZOLE 150 MG/1
150 TABLET ORAL ONCE
Qty: 1 TABLET | Refills: 0 | Status: SHIPPED | OUTPATIENT
Start: 2021-07-26 | End: 2021-07-26

## 2021-07-28 ENCOUNTER — PATIENT MESSAGE (OUTPATIENT)
Dept: INTERNAL MEDICINE | Facility: CLINIC | Age: 51
End: 2021-07-28

## 2021-07-28 DIAGNOSIS — G89.4 CHRONIC PAIN SYNDROME: ICD-10-CM

## 2021-07-28 RX ORDER — HYDROCODONE BITARTRATE AND ACETAMINOPHEN 5; 325 MG/1; MG/1
1 TABLET ORAL
Qty: 30 TABLET | Refills: 0 | Status: SHIPPED | OUTPATIENT
Start: 2021-07-28 | End: 2021-08-26 | Stop reason: SDUPTHER

## 2021-07-31 ENCOUNTER — EXTERNAL CHRONIC CARE MANAGEMENT (OUTPATIENT)
Dept: PRIMARY CARE CLINIC | Facility: CLINIC | Age: 51
End: 2021-07-31
Payer: MEDICARE

## 2021-07-31 PROCEDURE — 99490 CHRNC CARE MGMT STAFF 1ST 20: CPT | Mod: S$GLB,,, | Performed by: INTERNAL MEDICINE

## 2021-07-31 PROCEDURE — 99490 PR CHRONIC CARE MGMT, 1ST 20 MIN: ICD-10-PCS | Mod: S$GLB,,, | Performed by: INTERNAL MEDICINE

## 2021-08-24 ENCOUNTER — IMMUNIZATION (OUTPATIENT)
Dept: PHARMACY | Facility: CLINIC | Age: 51
End: 2021-08-24
Payer: MEDICARE

## 2021-08-24 DIAGNOSIS — Z23 NEED FOR VACCINATION: Primary | ICD-10-CM

## 2021-08-26 ENCOUNTER — PATIENT MESSAGE (OUTPATIENT)
Dept: INTERNAL MEDICINE | Facility: CLINIC | Age: 51
End: 2021-08-26

## 2021-08-26 DIAGNOSIS — G89.4 CHRONIC PAIN SYNDROME: ICD-10-CM

## 2021-08-26 RX ORDER — HYDROCODONE BITARTRATE AND ACETAMINOPHEN 5; 325 MG/1; MG/1
1 TABLET ORAL
Qty: 30 TABLET | Refills: 0 | Status: SHIPPED | OUTPATIENT
Start: 2021-08-26 | End: 2021-09-21 | Stop reason: SDUPTHER

## 2021-08-26 RX ORDER — OMEPRAZOLE 40 MG/1
CAPSULE, DELAYED RELEASE ORAL
Qty: 90 CAPSULE | Refills: 1 | Status: SHIPPED | OUTPATIENT
Start: 2021-08-26 | End: 2021-11-22 | Stop reason: SDUPTHER

## 2021-08-31 ENCOUNTER — EXTERNAL CHRONIC CARE MANAGEMENT (OUTPATIENT)
Dept: PRIMARY CARE CLINIC | Facility: CLINIC | Age: 51
End: 2021-08-31
Payer: MEDICARE

## 2021-08-31 PROCEDURE — 99439 PR CHRONIC CARE MGMT, EA ADDTL 20 MIN: ICD-10-PCS | Mod: S$GLB,,, | Performed by: INTERNAL MEDICINE

## 2021-08-31 PROCEDURE — 99439 CHRNC CARE MGMT STAF EA ADDL: CPT | Mod: S$GLB,,, | Performed by: INTERNAL MEDICINE

## 2021-08-31 PROCEDURE — 99490 PR CHRONIC CARE MGMT, 1ST 20 MIN: ICD-10-PCS | Mod: S$GLB,,, | Performed by: INTERNAL MEDICINE

## 2021-08-31 PROCEDURE — 99490 CHRNC CARE MGMT STAFF 1ST 20: CPT | Mod: S$GLB,,, | Performed by: INTERNAL MEDICINE

## 2021-09-03 ENCOUNTER — NURSE TRIAGE (OUTPATIENT)
Dept: ADMINISTRATIVE | Facility: CLINIC | Age: 51
End: 2021-09-03

## 2021-09-03 ENCOUNTER — PATIENT MESSAGE (OUTPATIENT)
Dept: PSYCHIATRY | Facility: CLINIC | Age: 51
End: 2021-09-03

## 2021-09-03 DIAGNOSIS — R46.81 OBSESSIVE-COMPULSIVE BEHAVIOR: ICD-10-CM

## 2021-09-03 DIAGNOSIS — F40.10 SOCIAL PHOBIA: ICD-10-CM

## 2021-09-03 DIAGNOSIS — F41.1 GAD (GENERALIZED ANXIETY DISORDER): ICD-10-CM

## 2021-09-03 RX ORDER — DIAZEPAM 2 MG/1
2 TABLET ORAL 2 TIMES DAILY PRN
Qty: 60 TABLET | Refills: 0 | Status: SHIPPED | OUTPATIENT
Start: 2021-09-03 | End: 2021-10-04 | Stop reason: SDUPTHER

## 2021-09-22 ENCOUNTER — LAB VISIT (OUTPATIENT)
Dept: LAB | Facility: OTHER | Age: 51
End: 2021-09-22
Attending: INTERNAL MEDICINE
Payer: MEDICARE

## 2021-09-22 ENCOUNTER — OFFICE VISIT (OUTPATIENT)
Dept: INTERNAL MEDICINE | Facility: CLINIC | Age: 51
End: 2021-09-22
Payer: MEDICARE

## 2021-09-22 ENCOUNTER — TELEPHONE (OUTPATIENT)
Dept: INTERNAL MEDICINE | Facility: CLINIC | Age: 51
End: 2021-09-22

## 2021-09-22 VITALS
BODY MASS INDEX: 42.84 KG/M2 | WEIGHT: 272.94 LBS | HEIGHT: 67 IN | HEART RATE: 108 BPM | OXYGEN SATURATION: 95 % | SYSTOLIC BLOOD PRESSURE: 110 MMHG | DIASTOLIC BLOOD PRESSURE: 74 MMHG

## 2021-09-22 DIAGNOSIS — E66.01 MORBID OBESITY: ICD-10-CM

## 2021-09-22 DIAGNOSIS — Z13.6 ENCOUNTER FOR LIPID SCREENING FOR CARDIOVASCULAR DISEASE: ICD-10-CM

## 2021-09-22 DIAGNOSIS — D50.9 IRON DEFICIENCY ANEMIA, UNSPECIFIED IRON DEFICIENCY ANEMIA TYPE: ICD-10-CM

## 2021-09-22 DIAGNOSIS — Z13.220 ENCOUNTER FOR LIPID SCREENING FOR CARDIOVASCULAR DISEASE: ICD-10-CM

## 2021-09-22 DIAGNOSIS — R73.01 IMPAIRED FASTING GLUCOSE: Primary | ICD-10-CM

## 2021-09-22 DIAGNOSIS — F41.1 GAD (GENERALIZED ANXIETY DISORDER): ICD-10-CM

## 2021-09-22 DIAGNOSIS — R73.01 IMPAIRED FASTING GLUCOSE: ICD-10-CM

## 2021-09-22 DIAGNOSIS — N39.0 RECURRENT UTI (URINARY TRACT INFECTION): ICD-10-CM

## 2021-09-22 DIAGNOSIS — M35.00 SJOGREN'S SYNDROME, WITH UNSPECIFIED ORGAN INVOLVEMENT: ICD-10-CM

## 2021-09-22 DIAGNOSIS — I10 ESSENTIAL HYPERTENSION: ICD-10-CM

## 2021-09-22 DIAGNOSIS — F39 MOOD DISORDER: ICD-10-CM

## 2021-09-22 DIAGNOSIS — G89.4 CHRONIC PAIN SYNDROME: ICD-10-CM

## 2021-09-22 DIAGNOSIS — M79.7 FIBROMYALGIA: ICD-10-CM

## 2021-09-22 DIAGNOSIS — R46.81 OBSESSIVE-COMPULSIVE BEHAVIOR: ICD-10-CM

## 2021-09-22 DIAGNOSIS — I10 ESSENTIAL HYPERTENSION: Primary | ICD-10-CM

## 2021-09-22 DIAGNOSIS — Z12.31 ENCOUNTER FOR SCREENING MAMMOGRAM FOR BREAST CANCER: ICD-10-CM

## 2021-09-22 LAB
ALBUMIN SERPL BCP-MCNC: 3.9 G/DL (ref 3.5–5.2)
ALP SERPL-CCNC: 77 U/L (ref 55–135)
ALT SERPL W/O P-5'-P-CCNC: 11 U/L (ref 10–44)
ANION GAP SERPL CALC-SCNC: 14 MMOL/L (ref 8–16)
AST SERPL-CCNC: 15 U/L (ref 10–40)
BASOPHILS # BLD AUTO: 0.05 K/UL (ref 0–0.2)
BASOPHILS NFR BLD: 0.7 % (ref 0–1.9)
BILIRUB SERPL-MCNC: 0.4 MG/DL (ref 0.1–1)
BUN SERPL-MCNC: 19 MG/DL (ref 6–20)
CALCIUM SERPL-MCNC: 9.8 MG/DL (ref 8.7–10.5)
CHLORIDE SERPL-SCNC: 102 MMOL/L (ref 95–110)
CHOLEST SERPL-MCNC: 141 MG/DL (ref 120–199)
CHOLEST/HDLC SERPL: 2.5 {RATIO} (ref 2–5)
CO2 SERPL-SCNC: 25 MMOL/L (ref 23–29)
CREAT SERPL-MCNC: 1.3 MG/DL (ref 0.5–1.4)
DIFFERENTIAL METHOD: ABNORMAL
EOSINOPHIL # BLD AUTO: 0.3 K/UL (ref 0–0.5)
EOSINOPHIL NFR BLD: 3.8 % (ref 0–8)
ERYTHROCYTE [DISTWIDTH] IN BLOOD BY AUTOMATED COUNT: 14.4 % (ref 11.5–14.5)
EST. GFR  (AFRICAN AMERICAN): 55 ML/MIN/1.73 M^2
EST. GFR  (NON AFRICAN AMERICAN): 47 ML/MIN/1.73 M^2
FERRITIN SERPL-MCNC: 40 NG/ML (ref 20–300)
GLUCOSE SERPL-MCNC: 124 MG/DL (ref 70–110)
HCT VFR BLD AUTO: 38 % (ref 37–48.5)
HDLC SERPL-MCNC: 56 MG/DL (ref 40–75)
HDLC SERPL: 39.7 % (ref 20–50)
HGB BLD-MCNC: 11.1 G/DL (ref 12–16)
IMM GRANULOCYTES # BLD AUTO: 0.02 K/UL (ref 0–0.04)
IMM GRANULOCYTES NFR BLD AUTO: 0.3 % (ref 0–0.5)
IRON SERPL-MCNC: 36 UG/DL (ref 30–160)
LDLC SERPL CALC-MCNC: 60.8 MG/DL (ref 63–159)
LYMPHOCYTES # BLD AUTO: 2.2 K/UL (ref 1–4.8)
LYMPHOCYTES NFR BLD: 30 % (ref 18–48)
MCH RBC QN AUTO: 24.5 PG (ref 27–31)
MCHC RBC AUTO-ENTMCNC: 29.2 G/DL (ref 32–36)
MCV RBC AUTO: 84 FL (ref 82–98)
MONOCYTES # BLD AUTO: 0.5 K/UL (ref 0.3–1)
MONOCYTES NFR BLD: 6.3 % (ref 4–15)
NEUTROPHILS # BLD AUTO: 4.3 K/UL (ref 1.8–7.7)
NEUTROPHILS NFR BLD: 58.9 % (ref 38–73)
NONHDLC SERPL-MCNC: 85 MG/DL
NRBC BLD-RTO: 0 /100 WBC
PLATELET # BLD AUTO: 232 K/UL (ref 150–450)
PMV BLD AUTO: 12.1 FL (ref 9.2–12.9)
POTASSIUM SERPL-SCNC: 3.9 MMOL/L (ref 3.5–5.1)
PROT SERPL-MCNC: 8.3 G/DL (ref 6–8.4)
RBC # BLD AUTO: 4.53 M/UL (ref 4–5.4)
SATURATED IRON: 9 % (ref 20–50)
SODIUM SERPL-SCNC: 141 MMOL/L (ref 136–145)
TOTAL IRON BINDING CAPACITY: 389 UG/DL (ref 250–450)
TRANSFERRIN SERPL-MCNC: 263 MG/DL (ref 200–375)
TRIGL SERPL-MCNC: 121 MG/DL (ref 30–150)
TSH SERPL DL<=0.005 MIU/L-ACNC: 2.56 UIU/ML (ref 0.4–4)
WBC # BLD AUTO: 7.31 K/UL (ref 3.9–12.7)

## 2021-09-22 PROCEDURE — 99215 OFFICE O/P EST HI 40 MIN: CPT | Mod: PBBFAC | Performed by: INTERNAL MEDICINE

## 2021-09-22 PROCEDURE — 84466 ASSAY OF TRANSFERRIN: CPT | Mod: HCNC | Performed by: INTERNAL MEDICINE

## 2021-09-22 PROCEDURE — 80053 COMPREHEN METABOLIC PANEL: CPT | Mod: HCNC | Performed by: INTERNAL MEDICINE

## 2021-09-22 PROCEDURE — 84443 ASSAY THYROID STIM HORMONE: CPT | Mod: HCNC | Performed by: INTERNAL MEDICINE

## 2021-09-22 PROCEDURE — 99214 PR OFFICE/OUTPT VISIT, EST, LEVL IV, 30-39 MIN: ICD-10-PCS | Mod: HCNC,S$GLB,, | Performed by: INTERNAL MEDICINE

## 2021-09-22 PROCEDURE — 36415 COLL VENOUS BLD VENIPUNCTURE: CPT | Mod: HCNC | Performed by: INTERNAL MEDICINE

## 2021-09-22 PROCEDURE — 99999 PR PBB SHADOW E&M-EST. PATIENT-LVL V: CPT | Mod: PBBFAC,HCNC,, | Performed by: INTERNAL MEDICINE

## 2021-09-22 PROCEDURE — 99999 PR PBB SHADOW E&M-EST. PATIENT-LVL V: ICD-10-PCS | Mod: PBBFAC,HCNC,, | Performed by: INTERNAL MEDICINE

## 2021-09-22 PROCEDURE — 99214 OFFICE O/P EST MOD 30 MIN: CPT | Mod: HCNC,S$GLB,, | Performed by: INTERNAL MEDICINE

## 2021-09-22 PROCEDURE — 80061 LIPID PANEL: CPT | Mod: HCNC | Performed by: INTERNAL MEDICINE

## 2021-09-22 PROCEDURE — 82728 ASSAY OF FERRITIN: CPT | Mod: HCNC | Performed by: INTERNAL MEDICINE

## 2021-09-22 PROCEDURE — 85025 COMPLETE CBC W/AUTO DIFF WBC: CPT | Mod: HCNC | Performed by: INTERNAL MEDICINE

## 2021-09-22 RX ORDER — NALOXONE HYDROCHLORIDE 4 MG/.1ML
1 SPRAY NASAL ONCE
Qty: 1 EACH | Refills: 1 | Status: SHIPPED | OUTPATIENT
Start: 2021-09-22 | End: 2021-09-22

## 2021-09-22 RX ORDER — HYDROCODONE BITARTRATE AND ACETAMINOPHEN 5; 325 MG/1; MG/1
1 TABLET ORAL
Qty: 30 TABLET | Refills: 0 | Status: SHIPPED | OUTPATIENT
Start: 2021-09-22 | End: 2021-09-24 | Stop reason: SDUPTHER

## 2021-09-22 RX ORDER — LURASIDONE HYDROCHLORIDE 60 MG/1
60 TABLET, FILM COATED ORAL DAILY
Qty: 30 TABLET | Refills: 0 | Status: SHIPPED | OUTPATIENT
Start: 2021-09-22 | End: 2021-09-27

## 2021-09-23 ENCOUNTER — TELEPHONE (OUTPATIENT)
Dept: RHEUMATOLOGY | Facility: CLINIC | Age: 51
End: 2021-09-23

## 2021-09-24 ENCOUNTER — PATIENT MESSAGE (OUTPATIENT)
Dept: INTERNAL MEDICINE | Facility: CLINIC | Age: 51
End: 2021-09-24

## 2021-09-24 DIAGNOSIS — G89.4 CHRONIC PAIN SYNDROME: ICD-10-CM

## 2021-09-24 RX ORDER — HYDROCODONE BITARTRATE AND ACETAMINOPHEN 5; 325 MG/1; MG/1
1 TABLET ORAL
Qty: 30 TABLET | Refills: 0 | Status: SHIPPED | OUTPATIENT
Start: 2021-09-24 | End: 2021-10-22 | Stop reason: SDUPTHER

## 2021-09-26 DIAGNOSIS — F39 MOOD DISORDER: ICD-10-CM

## 2021-09-27 RX ORDER — LURASIDONE HYDROCHLORIDE 60 MG/1
TABLET, FILM COATED ORAL
Qty: 90 TABLET | Refills: 0 | Status: SHIPPED | OUTPATIENT
Start: 2021-09-27 | End: 2022-02-10 | Stop reason: SDUPTHER

## 2021-09-29 ENCOUNTER — OFFICE VISIT (OUTPATIENT)
Dept: RHEUMATOLOGY | Facility: CLINIC | Age: 51
End: 2021-09-29
Payer: MEDICARE

## 2021-09-29 DIAGNOSIS — R20.2 PARESTHESIA OF BOTH HANDS: Primary | ICD-10-CM

## 2021-09-29 DIAGNOSIS — M35.00 SJOGREN'S SYNDROME WITHOUT EXTRAGLANDULAR INVOLVEMENT: ICD-10-CM

## 2021-09-29 PROCEDURE — 1159F PR MEDICATION LIST DOCUMENTED IN MEDICAL RECORD: ICD-10-PCS | Mod: HCNC,CPTII,95, | Performed by: INTERNAL MEDICINE

## 2021-09-29 PROCEDURE — 99214 OFFICE O/P EST MOD 30 MIN: CPT | Mod: HCNC,95,, | Performed by: INTERNAL MEDICINE

## 2021-09-29 PROCEDURE — 1160F RVW MEDS BY RX/DR IN RCRD: CPT | Mod: HCNC,CPTII,95, | Performed by: INTERNAL MEDICINE

## 2021-09-29 PROCEDURE — 99214 PR OFFICE/OUTPT VISIT, EST, LEVL IV, 30-39 MIN: ICD-10-PCS | Mod: HCNC,95,, | Performed by: INTERNAL MEDICINE

## 2021-09-29 PROCEDURE — 1159F MED LIST DOCD IN RCRD: CPT | Mod: HCNC,CPTII,95, | Performed by: INTERNAL MEDICINE

## 2021-09-29 PROCEDURE — 1160F PR REVIEW ALL MEDS BY PRESCRIBER/CLIN PHARMACIST DOCUMENTED: ICD-10-PCS | Mod: HCNC,CPTII,95, | Performed by: INTERNAL MEDICINE

## 2021-09-29 RX ORDER — HYDROXYCHLOROQUINE SULFATE 200 MG/1
TABLET, FILM COATED ORAL
Qty: 180 TABLET | Refills: 2 | Status: SHIPPED | OUTPATIENT
Start: 2021-09-29 | End: 2021-11-09 | Stop reason: SDUPTHER

## 2021-09-29 RX ORDER — TIZANIDINE 4 MG/1
TABLET ORAL
Qty: 180 TABLET | Refills: 5 | Status: SHIPPED | OUTPATIENT
Start: 2021-09-29 | End: 2021-11-09 | Stop reason: SDUPTHER

## 2021-09-30 ENCOUNTER — EXTERNAL CHRONIC CARE MANAGEMENT (OUTPATIENT)
Dept: PRIMARY CARE CLINIC | Facility: CLINIC | Age: 51
End: 2021-09-30
Payer: MEDICARE

## 2021-09-30 PROCEDURE — 99490 CHRNC CARE MGMT STAFF 1ST 20: CPT | Mod: S$GLB,,, | Performed by: INTERNAL MEDICINE

## 2021-09-30 PROCEDURE — 99490 PR CHRONIC CARE MGMT, 1ST 20 MIN: ICD-10-PCS | Mod: S$GLB,,, | Performed by: INTERNAL MEDICINE

## 2021-10-04 ENCOUNTER — OFFICE VISIT (OUTPATIENT)
Dept: INTERNAL MEDICINE | Facility: CLINIC | Age: 51
End: 2021-10-04
Payer: MEDICARE

## 2021-10-04 DIAGNOSIS — J01.90 ACUTE BACTERIAL SINUSITIS: Primary | ICD-10-CM

## 2021-10-04 DIAGNOSIS — M35.00 SJOGREN'S SYNDROME, WITH UNSPECIFIED ORGAN INVOLVEMENT: ICD-10-CM

## 2021-10-04 DIAGNOSIS — D84.9 IMMUNOCOMPROMISED: ICD-10-CM

## 2021-10-04 DIAGNOSIS — N18.31 STAGE 3A CHRONIC KIDNEY DISEASE: ICD-10-CM

## 2021-10-04 DIAGNOSIS — B96.89 ACUTE BACTERIAL SINUSITIS: Primary | ICD-10-CM

## 2021-10-04 PROCEDURE — 99442 PR PHYSICIAN TELEPHONE EVALUATION 11-20 MIN: ICD-10-PCS | Mod: HCNC,95,, | Performed by: INTERNAL MEDICINE

## 2021-10-04 PROCEDURE — 99442 PR PHYSICIAN TELEPHONE EVALUATION 11-20 MIN: CPT | Mod: HCNC,95,, | Performed by: INTERNAL MEDICINE

## 2021-10-04 RX ORDER — FLUCONAZOLE 200 MG/1
200 TABLET ORAL DAILY
Qty: 1 TABLET | Refills: 0 | Status: SHIPPED | OUTPATIENT
Start: 2021-10-04 | End: 2021-10-05

## 2021-10-04 RX ORDER — BENZONATATE 200 MG/1
200 CAPSULE ORAL 3 TIMES DAILY PRN
Qty: 30 CAPSULE | Refills: 0 | Status: SHIPPED | OUTPATIENT
Start: 2021-10-04 | End: 2021-10-14

## 2021-10-04 RX ORDER — PREDNISONE 20 MG/1
20 TABLET ORAL DAILY
Qty: 3 TABLET | Refills: 0 | Status: SHIPPED | OUTPATIENT
Start: 2021-10-04 | End: 2021-10-07

## 2021-10-04 RX ORDER — GUAIFENESIN AND DEXTROMETHORPHAN HYDROBROMIDE 600; 30 MG/1; MG/1
1 TABLET, EXTENDED RELEASE ORAL 2 TIMES DAILY
Qty: 20 TABLET | Refills: 0 | Status: SHIPPED | OUTPATIENT
Start: 2021-10-04 | End: 2021-10-14

## 2021-10-04 RX ORDER — FLUTICASONE PROPIONATE 50 MCG
1 SPRAY, SUSPENSION (ML) NASAL DAILY
Qty: 16 G | Refills: 0 | Status: SHIPPED | OUTPATIENT
Start: 2021-10-04 | End: 2021-12-02

## 2021-10-04 RX ORDER — AMOXICILLIN AND CLAVULANATE POTASSIUM 875; 125 MG/1; MG/1
1 TABLET, FILM COATED ORAL EVERY 12 HOURS
Qty: 14 TABLET | Refills: 0 | Status: SHIPPED | OUTPATIENT
Start: 2021-10-04 | End: 2021-10-11

## 2021-10-07 ENCOUNTER — TELEPHONE (OUTPATIENT)
Dept: INTERNAL MEDICINE | Facility: CLINIC | Age: 51
End: 2021-10-07

## 2021-10-08 ENCOUNTER — PATIENT MESSAGE (OUTPATIENT)
Dept: INTERNAL MEDICINE | Facility: CLINIC | Age: 51
End: 2021-10-08

## 2021-10-11 ENCOUNTER — TELEPHONE (OUTPATIENT)
Dept: INTERNAL MEDICINE | Facility: CLINIC | Age: 51
End: 2021-10-11

## 2021-10-11 DIAGNOSIS — E66.01 MORBID OBESITY: Primary | ICD-10-CM

## 2021-10-11 DIAGNOSIS — R73.01 IMPAIRED FASTING GLUCOSE: ICD-10-CM

## 2021-10-13 ENCOUNTER — PATIENT MESSAGE (OUTPATIENT)
Dept: RHEUMATOLOGY | Facility: CLINIC | Age: 51
End: 2021-10-13
Payer: MEDICARE

## 2021-10-13 ENCOUNTER — HOSPITAL ENCOUNTER (OUTPATIENT)
Dept: RADIOLOGY | Facility: OTHER | Age: 51
Discharge: HOME | End: 2021-10-13
Attending: INTERNAL MEDICINE
Payer: MEDICARE

## 2021-10-13 DIAGNOSIS — Z12.31 ENCOUNTER FOR SCREENING MAMMOGRAM FOR BREAST CANCER: ICD-10-CM

## 2021-10-13 PROCEDURE — 77067 SCR MAMMO BI INCL CAD: CPT | Mod: 26,HCNC,, | Performed by: RADIOLOGY

## 2021-10-13 PROCEDURE — 77063 MAMMO DIGITAL SCREENING BILAT WITH TOMO: ICD-10-PCS | Mod: 26,HCNC,, | Performed by: RADIOLOGY

## 2021-10-13 PROCEDURE — 77067 SCR MAMMO BI INCL CAD: CPT | Mod: TC,HCNC

## 2021-10-13 PROCEDURE — 77063 BREAST TOMOSYNTHESIS BI: CPT | Mod: 26,HCNC,, | Performed by: RADIOLOGY

## 2021-10-13 PROCEDURE — 77067 MAMMO DIGITAL SCREENING BILAT WITH TOMO: ICD-10-PCS | Mod: 26,HCNC,, | Performed by: RADIOLOGY

## 2021-10-20 ENCOUNTER — OFFICE VISIT (OUTPATIENT)
Dept: PSYCHIATRY | Facility: CLINIC | Age: 51
End: 2021-10-20
Payer: MEDICARE

## 2021-10-20 DIAGNOSIS — F63.89 INTERNET ADDICTION: ICD-10-CM

## 2021-10-20 DIAGNOSIS — R46.81 OBSESSIVE-COMPULSIVE BEHAVIOR: ICD-10-CM

## 2021-10-20 DIAGNOSIS — F99 INSOMNIA DUE TO OTHER MENTAL DISORDER: ICD-10-CM

## 2021-10-20 DIAGNOSIS — F51.05 INSOMNIA DUE TO OTHER MENTAL DISORDER: ICD-10-CM

## 2021-10-20 DIAGNOSIS — F41.1 GAD (GENERALIZED ANXIETY DISORDER): Primary | ICD-10-CM

## 2021-10-20 DIAGNOSIS — F39 MOOD DISORDER: ICD-10-CM

## 2021-10-20 PROCEDURE — 1160F RVW MEDS BY RX/DR IN RCRD: CPT | Mod: HCNC,CPTII,95, | Performed by: INTERNAL MEDICINE

## 2021-10-20 PROCEDURE — 3044F PR MOST RECENT HEMOGLOBIN A1C LEVEL <7.0%: ICD-10-PCS | Mod: HCNC,CPTII,95, | Performed by: INTERNAL MEDICINE

## 2021-10-20 PROCEDURE — 99499 RISK ADDL DX/OHS AUDIT: ICD-10-PCS | Mod: HCNC,95,, | Performed by: INTERNAL MEDICINE

## 2021-10-20 PROCEDURE — 99214 OFFICE O/P EST MOD 30 MIN: CPT | Mod: HCNC,95,, | Performed by: INTERNAL MEDICINE

## 2021-10-20 PROCEDURE — 1159F MED LIST DOCD IN RCRD: CPT | Mod: HCNC,CPTII,95, | Performed by: INTERNAL MEDICINE

## 2021-10-20 PROCEDURE — 3044F HG A1C LEVEL LT 7.0%: CPT | Mod: HCNC,CPTII,95, | Performed by: INTERNAL MEDICINE

## 2021-10-20 PROCEDURE — 99214 PR OFFICE/OUTPT VISIT, EST, LEVL IV, 30-39 MIN: ICD-10-PCS | Mod: HCNC,95,, | Performed by: INTERNAL MEDICINE

## 2021-10-20 PROCEDURE — 99499 UNLISTED E&M SERVICE: CPT | Mod: HCNC,95,, | Performed by: INTERNAL MEDICINE

## 2021-10-20 PROCEDURE — 1160F PR REVIEW ALL MEDS BY PRESCRIBER/CLIN PHARMACIST DOCUMENTED: ICD-10-PCS | Mod: HCNC,CPTII,95, | Performed by: INTERNAL MEDICINE

## 2021-10-20 PROCEDURE — 1159F PR MEDICATION LIST DOCUMENTED IN MEDICAL RECORD: ICD-10-PCS | Mod: HCNC,CPTII,95, | Performed by: INTERNAL MEDICINE

## 2021-10-20 RX ORDER — BUSPIRONE HYDROCHLORIDE 15 MG/1
15 TABLET ORAL 3 TIMES DAILY
Qty: 270 TABLET | Refills: 1 | Status: SHIPPED | OUTPATIENT
Start: 2021-10-20 | End: 2022-04-18

## 2021-10-22 ENCOUNTER — PATIENT MESSAGE (OUTPATIENT)
Dept: INTERNAL MEDICINE | Facility: CLINIC | Age: 51
End: 2021-10-22
Payer: MEDICARE

## 2021-10-22 DIAGNOSIS — G89.4 CHRONIC PAIN SYNDROME: ICD-10-CM

## 2021-10-22 RX ORDER — HYDROCODONE BITARTRATE AND ACETAMINOPHEN 5; 325 MG/1; MG/1
1 TABLET ORAL
Qty: 30 TABLET | Refills: 0 | Status: SHIPPED | OUTPATIENT
Start: 2021-10-22 | End: 2021-11-22 | Stop reason: SDUPTHER

## 2021-10-26 ENCOUNTER — OFFICE VISIT (OUTPATIENT)
Dept: PSYCHIATRY | Facility: CLINIC | Age: 51
End: 2021-10-26
Payer: MEDICARE

## 2021-10-26 DIAGNOSIS — F63.89 INTERNET ADDICTION: ICD-10-CM

## 2021-10-26 DIAGNOSIS — F41.1 GAD (GENERALIZED ANXIETY DISORDER): Primary | ICD-10-CM

## 2021-10-26 DIAGNOSIS — R46.81 OBSESSIVE-COMPULSIVE BEHAVIOR: ICD-10-CM

## 2021-10-26 DIAGNOSIS — F40.10 SOCIAL PHOBIA: ICD-10-CM

## 2021-10-26 PROCEDURE — 99499 UNLISTED E&M SERVICE: CPT | Mod: HCNC,95,, | Performed by: SOCIAL WORKER

## 2021-10-26 PROCEDURE — 90834 PSYTX W PT 45 MINUTES: CPT | Mod: HCNC,95,, | Performed by: SOCIAL WORKER

## 2021-10-26 PROCEDURE — 90834 PR PSYCHOTHERAPY W/PATIENT, 45 MIN: ICD-10-PCS | Mod: HCNC,95,, | Performed by: SOCIAL WORKER

## 2021-10-26 PROCEDURE — 3044F PR MOST RECENT HEMOGLOBIN A1C LEVEL <7.0%: ICD-10-PCS | Mod: HCNC,CPTII,95, | Performed by: SOCIAL WORKER

## 2021-10-26 PROCEDURE — 3044F HG A1C LEVEL LT 7.0%: CPT | Mod: HCNC,CPTII,95, | Performed by: SOCIAL WORKER

## 2021-10-26 PROCEDURE — 99499 RISK ADDL DX/OHS AUDIT: ICD-10-PCS | Mod: HCNC,95,, | Performed by: SOCIAL WORKER

## 2021-10-27 ENCOUNTER — TELEPHONE (OUTPATIENT)
Dept: INTERNAL MEDICINE | Facility: CLINIC | Age: 51
End: 2021-10-27
Payer: MEDICARE

## 2021-10-31 ENCOUNTER — EXTERNAL CHRONIC CARE MANAGEMENT (OUTPATIENT)
Dept: PRIMARY CARE CLINIC | Facility: CLINIC | Age: 51
End: 2021-10-31
Payer: MEDICARE

## 2021-10-31 PROCEDURE — 99490 PR CHRONIC CARE MGMT, 1ST 20 MIN: ICD-10-PCS | Mod: S$GLB,,, | Performed by: INTERNAL MEDICINE

## 2021-10-31 PROCEDURE — 99490 CHRNC CARE MGMT STAFF 1ST 20: CPT | Mod: S$GLB,,, | Performed by: INTERNAL MEDICINE

## 2021-11-02 ENCOUNTER — TELEPHONE (OUTPATIENT)
Dept: BEHAVIORAL HEALTH | Facility: CLINIC | Age: 51
End: 2021-11-02
Payer: MEDICARE

## 2021-11-02 DIAGNOSIS — F40.10 SOCIAL PHOBIA: ICD-10-CM

## 2021-11-02 DIAGNOSIS — R46.81 OBSESSIVE-COMPULSIVE BEHAVIOR: ICD-10-CM

## 2021-11-02 DIAGNOSIS — F41.1 GAD (GENERALIZED ANXIETY DISORDER): ICD-10-CM

## 2021-11-02 RX ORDER — DIAZEPAM 2 MG/1
2 TABLET ORAL 2 TIMES DAILY PRN
Qty: 60 TABLET | Refills: 0 | Status: SHIPPED | OUTPATIENT
Start: 2021-11-02 | End: 2021-12-06 | Stop reason: SDUPTHER

## 2021-11-05 ENCOUNTER — PATIENT MESSAGE (OUTPATIENT)
Dept: RHEUMATOLOGY | Facility: CLINIC | Age: 51
End: 2021-11-05
Payer: MEDICARE

## 2021-11-08 ENCOUNTER — PATIENT MESSAGE (OUTPATIENT)
Dept: INTERNAL MEDICINE | Facility: CLINIC | Age: 51
End: 2021-11-08
Payer: MEDICARE

## 2021-11-08 DIAGNOSIS — R30.0 DYSURIA: Primary | ICD-10-CM

## 2021-11-09 RX ORDER — TIZANIDINE 4 MG/1
TABLET ORAL
Qty: 180 TABLET | Refills: 5 | Status: SHIPPED | OUTPATIENT
Start: 2021-11-09 | End: 2022-01-31

## 2021-11-09 RX ORDER — CYCLOSPORINE 0.5 MG/ML
1 EMULSION OPHTHALMIC 2 TIMES DAILY
Qty: 60 EACH | Refills: 1 | Status: SHIPPED | OUTPATIENT
Start: 2021-11-09 | End: 2022-05-29

## 2021-11-09 RX ORDER — HYDROXYCHLOROQUINE SULFATE 200 MG/1
TABLET, FILM COATED ORAL
Qty: 180 TABLET | Refills: 2 | Status: SHIPPED | OUTPATIENT
Start: 2021-11-09 | End: 2022-01-31

## 2021-11-22 ENCOUNTER — PATIENT MESSAGE (OUTPATIENT)
Dept: INTERNAL MEDICINE | Facility: CLINIC | Age: 51
End: 2021-11-22
Payer: MEDICARE

## 2021-11-22 DIAGNOSIS — R12 HEARTBURN: Primary | ICD-10-CM

## 2021-11-22 DIAGNOSIS — G89.4 CHRONIC PAIN SYNDROME: ICD-10-CM

## 2021-11-22 RX ORDER — OMEPRAZOLE 40 MG/1
CAPSULE, DELAYED RELEASE ORAL
Qty: 90 CAPSULE | Refills: 1 | Status: SHIPPED | OUTPATIENT
Start: 2021-11-22 | End: 2022-02-18 | Stop reason: SDUPTHER

## 2021-11-22 RX ORDER — HYDROCODONE BITARTRATE AND ACETAMINOPHEN 5; 325 MG/1; MG/1
1 TABLET ORAL
Qty: 30 TABLET | Refills: 0 | Status: SHIPPED | OUTPATIENT
Start: 2021-11-22 | End: 2021-12-20 | Stop reason: SDUPTHER

## 2021-11-22 RX ORDER — HYDROCODONE BITARTRATE AND ACETAMINOPHEN 5; 325 MG/1; MG/1
1 TABLET ORAL
Qty: 30 TABLET | Refills: 0 | Status: CANCELLED | OUTPATIENT
Start: 2021-11-22

## 2021-11-22 RX ORDER — OMEPRAZOLE 40 MG/1
CAPSULE, DELAYED RELEASE ORAL
Qty: 90 CAPSULE | Refills: 1 | Status: CANCELLED | OUTPATIENT
Start: 2021-11-22

## 2021-11-30 ENCOUNTER — EXTERNAL CHRONIC CARE MANAGEMENT (OUTPATIENT)
Dept: PRIMARY CARE CLINIC | Facility: CLINIC | Age: 51
End: 2021-11-30
Payer: MEDICARE

## 2021-11-30 PROCEDURE — 99490 CHRNC CARE MGMT STAFF 1ST 20: CPT | Mod: S$GLB,,, | Performed by: INTERNAL MEDICINE

## 2021-11-30 PROCEDURE — 99490 PR CHRONIC CARE MGMT, 1ST 20 MIN: ICD-10-PCS | Mod: S$GLB,,, | Performed by: INTERNAL MEDICINE

## 2021-12-01 ENCOUNTER — TELEPHONE (OUTPATIENT)
Dept: PSYCHIATRY | Facility: CLINIC | Age: 51
End: 2021-12-01
Payer: MEDICARE

## 2021-12-02 ENCOUNTER — OFFICE VISIT (OUTPATIENT)
Dept: INTERNAL MEDICINE | Facility: CLINIC | Age: 51
End: 2021-12-02
Payer: MEDICARE

## 2021-12-02 ENCOUNTER — PATIENT MESSAGE (OUTPATIENT)
Dept: INTERNAL MEDICINE | Facility: CLINIC | Age: 51
End: 2021-12-02

## 2021-12-02 ENCOUNTER — TELEPHONE (OUTPATIENT)
Dept: INTERNAL MEDICINE | Facility: CLINIC | Age: 51
End: 2021-12-02

## 2021-12-02 VITALS
BODY MASS INDEX: 42.53 KG/M2 | DIASTOLIC BLOOD PRESSURE: 68 MMHG | OXYGEN SATURATION: 95 % | WEIGHT: 270.94 LBS | SYSTOLIC BLOOD PRESSURE: 108 MMHG | HEIGHT: 67 IN | HEART RATE: 106 BPM

## 2021-12-02 DIAGNOSIS — F11.20 CHRONIC NARCOTIC DEPENDENCE: ICD-10-CM

## 2021-12-02 DIAGNOSIS — G89.29 CHRONIC BILATERAL LOW BACK PAIN WITHOUT SCIATICA: ICD-10-CM

## 2021-12-02 DIAGNOSIS — I10 ESSENTIAL HYPERTENSION: Primary | ICD-10-CM

## 2021-12-02 DIAGNOSIS — M79.7 FIBROMYALGIA: ICD-10-CM

## 2021-12-02 DIAGNOSIS — R35.0 URINARY FREQUENCY: ICD-10-CM

## 2021-12-02 DIAGNOSIS — R05.9 COUGH: ICD-10-CM

## 2021-12-02 DIAGNOSIS — M54.50 CHRONIC BILATERAL LOW BACK PAIN WITHOUT SCIATICA: ICD-10-CM

## 2021-12-02 LAB
CTP QC/QA: YES
POC MOLECULAR INFLUENZA A AGN: NEGATIVE
POC MOLECULAR INFLUENZA B AGN: NEGATIVE

## 2021-12-02 PROCEDURE — 99999 PR PBB SHADOW E&M-EST. PATIENT-LVL IV: CPT | Mod: PBBFAC,HCNC,, | Performed by: INTERNAL MEDICINE

## 2021-12-02 PROCEDURE — U0003 INFECTIOUS AGENT DETECTION BY NUCLEIC ACID (DNA OR RNA); SEVERE ACUTE RESPIRATORY SYNDROME CORONAVIRUS 2 (SARS-COV-2) (CORONAVIRUS DISEASE [COVID-19]), AMPLIFIED PROBE TECHNIQUE, MAKING USE OF HIGH THROUGHPUT TECHNOLOGIES AS DESCRIBED BY CMS-2020-01-R: HCPCS | Mod: HCNC | Performed by: INTERNAL MEDICINE

## 2021-12-02 PROCEDURE — 99499 UNLISTED E&M SERVICE: CPT | Mod: HCNC,S$GLB,, | Performed by: INTERNAL MEDICINE

## 2021-12-02 PROCEDURE — 87502 POCT INFLUENZA A/B MOLECULAR: ICD-10-PCS | Mod: QW,HCNC,S$GLB, | Performed by: INTERNAL MEDICINE

## 2021-12-02 PROCEDURE — 87502 INFLUENZA DNA AMP PROBE: CPT | Mod: QW,HCNC,S$GLB, | Performed by: INTERNAL MEDICINE

## 2021-12-02 PROCEDURE — 99999 PR PBB SHADOW E&M-EST. PATIENT-LVL IV: ICD-10-PCS | Mod: PBBFAC,HCNC,, | Performed by: INTERNAL MEDICINE

## 2021-12-02 PROCEDURE — U0005 INFEC AGEN DETEC AMPLI PROBE: HCPCS | Performed by: INTERNAL MEDICINE

## 2021-12-02 PROCEDURE — 99499 RISK ADDL DX/OHS AUDIT: ICD-10-PCS | Mod: HCNC,S$GLB,, | Performed by: INTERNAL MEDICINE

## 2021-12-02 PROCEDURE — 99214 PR OFFICE/OUTPT VISIT, EST, LEVL IV, 30-39 MIN: ICD-10-PCS | Mod: HCNC,S$GLB,, | Performed by: INTERNAL MEDICINE

## 2021-12-02 PROCEDURE — 99214 OFFICE O/P EST MOD 30 MIN: CPT | Mod: HCNC,S$GLB,, | Performed by: INTERNAL MEDICINE

## 2021-12-02 RX ORDER — NALOXONE HYDROCHLORIDE 4 MG/.1ML
SPRAY NASAL
COMMUNITY
Start: 2021-09-25

## 2021-12-03 RX ORDER — NITROFURANTOIN 25; 75 MG/1; MG/1
100 CAPSULE ORAL 2 TIMES DAILY
Qty: 14 CAPSULE | Refills: 0 | Status: SHIPPED | OUTPATIENT
Start: 2021-12-03 | End: 2022-03-08

## 2021-12-03 RX ORDER — FLUCONAZOLE 200 MG/1
200 TABLET ORAL
Qty: 2 TABLET | Refills: 0 | Status: SHIPPED | OUTPATIENT
Start: 2021-12-03 | End: 2022-04-29 | Stop reason: SDUPTHER

## 2021-12-04 LAB
SARS-COV-2 RNA RESP QL NAA+PROBE: NOT DETECTED
SARS-COV-2- CYCLE NUMBER: NORMAL

## 2021-12-06 ENCOUNTER — TELEPHONE (OUTPATIENT)
Dept: INTERNAL MEDICINE | Facility: CLINIC | Age: 51
End: 2021-12-06
Payer: MEDICARE

## 2021-12-09 ENCOUNTER — TELEPHONE (OUTPATIENT)
Dept: INTERNAL MEDICINE | Facility: CLINIC | Age: 51
End: 2021-12-09
Payer: MEDICARE

## 2021-12-20 ENCOUNTER — PATIENT MESSAGE (OUTPATIENT)
Dept: INTERNAL MEDICINE | Facility: CLINIC | Age: 51
End: 2021-12-20
Payer: MEDICARE

## 2021-12-20 DIAGNOSIS — G89.4 CHRONIC PAIN SYNDROME: ICD-10-CM

## 2021-12-20 RX ORDER — HYDROCODONE BITARTRATE AND ACETAMINOPHEN 5; 325 MG/1; MG/1
1 TABLET ORAL
Qty: 30 TABLET | Refills: 0 | Status: CANCELLED | OUTPATIENT
Start: 2021-12-20

## 2021-12-20 RX ORDER — HYDROCODONE BITARTRATE AND ACETAMINOPHEN 5; 325 MG/1; MG/1
1 TABLET ORAL
Qty: 30 TABLET | Refills: 0 | Status: SHIPPED | OUTPATIENT
Start: 2021-12-20 | End: 2022-01-19 | Stop reason: SDUPTHER

## 2021-12-20 NOTE — TELEPHONE ENCOUNTER
Routed Rx refill request to provider - checked allergies/ included last office visit date    Messaged pt to inform them I forwarded their request to their PCP

## 2021-12-21 RX ORDER — HYDROCODONE BITARTRATE AND ACETAMINOPHEN 5; 325 MG/1; MG/1
1 TABLET ORAL
Qty: 30 TABLET | Refills: 0 | OUTPATIENT
Start: 2021-12-21

## 2021-12-31 ENCOUNTER — EXTERNAL CHRONIC CARE MANAGEMENT (OUTPATIENT)
Dept: PRIMARY CARE CLINIC | Facility: CLINIC | Age: 51
End: 2021-12-31
Payer: MEDICARE

## 2021-12-31 PROCEDURE — 99439 PR CHRONIC CARE MGMT, EA ADDTL 20 MIN: ICD-10-PCS | Mod: S$GLB,,, | Performed by: INTERNAL MEDICINE

## 2021-12-31 PROCEDURE — 99490 PR CHRONIC CARE MGMT, 1ST 20 MIN: ICD-10-PCS | Mod: S$GLB,,, | Performed by: INTERNAL MEDICINE

## 2021-12-31 PROCEDURE — 99439 CHRNC CARE MGMT STAF EA ADDL: CPT | Mod: S$GLB,,, | Performed by: INTERNAL MEDICINE

## 2021-12-31 PROCEDURE — 99490 CHRNC CARE MGMT STAFF 1ST 20: CPT | Mod: S$GLB,,, | Performed by: INTERNAL MEDICINE

## 2022-01-04 ENCOUNTER — TELEPHONE (OUTPATIENT)
Dept: PSYCHIATRY | Facility: CLINIC | Age: 52
End: 2022-01-04
Payer: MEDICARE

## 2022-01-04 DIAGNOSIS — F41.1 GAD (GENERALIZED ANXIETY DISORDER): ICD-10-CM

## 2022-01-04 DIAGNOSIS — F40.10 SOCIAL PHOBIA: ICD-10-CM

## 2022-01-04 DIAGNOSIS — R46.81 OBSESSIVE-COMPULSIVE BEHAVIOR: ICD-10-CM

## 2022-01-04 RX ORDER — DIAZEPAM 2 MG/1
2 TABLET ORAL 2 TIMES DAILY PRN
Qty: 60 TABLET | Refills: 0 | Status: SHIPPED | OUTPATIENT
Start: 2022-01-04 | End: 2022-02-04 | Stop reason: SDUPTHER

## 2022-01-19 DIAGNOSIS — G89.4 CHRONIC PAIN SYNDROME: ICD-10-CM

## 2022-01-19 RX ORDER — HYDROCODONE BITARTRATE AND ACETAMINOPHEN 5; 325 MG/1; MG/1
1 TABLET ORAL
Qty: 30 TABLET | Refills: 0 | Status: SHIPPED | OUTPATIENT
Start: 2022-01-19 | End: 2022-02-18 | Stop reason: SDUPTHER

## 2022-01-19 NOTE — TELEPHONE ENCOUNTER
No new care gaps identified.  Powered by [employer]. Reference number: [id number].   1/19/2022 9:10:47 AM CST

## 2022-01-20 ENCOUNTER — OFFICE VISIT (OUTPATIENT)
Dept: PSYCHIATRY | Facility: CLINIC | Age: 52
End: 2022-01-20
Payer: MEDICARE

## 2022-01-20 DIAGNOSIS — F41.1 GAD (GENERALIZED ANXIETY DISORDER): Primary | ICD-10-CM

## 2022-01-20 DIAGNOSIS — R46.81 OBSESSIVE-COMPULSIVE BEHAVIOR: ICD-10-CM

## 2022-01-20 DIAGNOSIS — F39 MOOD DISORDER: ICD-10-CM

## 2022-01-20 DIAGNOSIS — F99 INSOMNIA DUE TO OTHER MENTAL DISORDER: ICD-10-CM

## 2022-01-20 DIAGNOSIS — F51.05 INSOMNIA DUE TO OTHER MENTAL DISORDER: ICD-10-CM

## 2022-01-20 PROCEDURE — 1159F MED LIST DOCD IN RCRD: CPT | Mod: HCNC,CPTII,95, | Performed by: INTERNAL MEDICINE

## 2022-01-20 PROCEDURE — 99214 PR OFFICE/OUTPT VISIT, EST, LEVL IV, 30-39 MIN: ICD-10-PCS | Mod: HCNC,95,, | Performed by: INTERNAL MEDICINE

## 2022-01-20 PROCEDURE — 99499 RISK ADDL DX/OHS AUDIT: ICD-10-PCS | Mod: 95,,, | Performed by: INTERNAL MEDICINE

## 2022-01-20 PROCEDURE — 1159F PR MEDICATION LIST DOCUMENTED IN MEDICAL RECORD: ICD-10-PCS | Mod: HCNC,CPTII,95, | Performed by: INTERNAL MEDICINE

## 2022-01-20 PROCEDURE — 99499 UNLISTED E&M SERVICE: CPT | Mod: 95,,, | Performed by: INTERNAL MEDICINE

## 2022-01-20 PROCEDURE — 1160F PR REVIEW ALL MEDS BY PRESCRIBER/CLIN PHARMACIST DOCUMENTED: ICD-10-PCS | Mod: HCNC,CPTII,95, | Performed by: INTERNAL MEDICINE

## 2022-01-20 PROCEDURE — 99214 OFFICE O/P EST MOD 30 MIN: CPT | Mod: HCNC,95,, | Performed by: INTERNAL MEDICINE

## 2022-01-20 PROCEDURE — 1160F RVW MEDS BY RX/DR IN RCRD: CPT | Mod: HCNC,CPTII,95, | Performed by: INTERNAL MEDICINE
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
## 2022-01-27 ENCOUNTER — TELEPHONE (OUTPATIENT)
Dept: INTERNAL MEDICINE | Facility: CLINIC | Age: 52
End: 2022-01-27
Payer: MEDICARE

## 2022-01-27 NOTE — TELEPHONE ENCOUNTER
----- Message from Dorie Pimentel sent at 1/27/2022  8:45 AM CST -----  Regarding: Issues with Virtual  Name of Who is Calling:PENG MAYO [[id number]]    What is the request in detail:Pt is having issues with virtual.Pt stated it will not allow her to join.    Can the clinic reply by [facility]:Yes    What Number to Call Back [facility]:[phone]

## 2022-01-27 NOTE — TELEPHONE ENCOUNTER
Returned call to the patient. States she didn't think about it,  but she now  realizes her virtual appointment is tomorrow at 0840        Probably b/c pt is scheduled for tomorrow at 8:40            Pt is having issues with virtual.Pt stated it will not allow her to join.

## 2022-01-28 ENCOUNTER — TELEPHONE (OUTPATIENT)
Dept: INTERNAL MEDICINE | Facility: CLINIC | Age: 52
End: 2022-01-28

## 2022-01-28 ENCOUNTER — OFFICE VISIT (OUTPATIENT)
Dept: INTERNAL MEDICINE | Facility: CLINIC | Age: 52
End: 2022-01-28
Attending: INTERNAL MEDICINE
Payer: MEDICARE

## 2022-01-28 VITALS — WEIGHT: 270 LBS | BODY MASS INDEX: 42.38 KG/M2 | HEIGHT: 67 IN

## 2022-01-28 DIAGNOSIS — R05.9 COUGH: Primary | ICD-10-CM

## 2022-01-28 DIAGNOSIS — R09.89 CHEST CONGESTION: ICD-10-CM

## 2022-01-28 DIAGNOSIS — M35.00 SJOGREN'S SYNDROME, WITH UNSPECIFIED ORGAN INVOLVEMENT: ICD-10-CM

## 2022-01-28 PROCEDURE — 99214 OFFICE O/P EST MOD 30 MIN: CPT | Mod: HCNC,95,, | Performed by: INTERNAL MEDICINE

## 2022-01-28 PROCEDURE — 1159F MED LIST DOCD IN RCRD: CPT | Mod: HCNC,CPTII,95, | Performed by: INTERNAL MEDICINE

## 2022-01-28 PROCEDURE — 3008F PR BODY MASS INDEX (BMI) DOCUMENTED: ICD-10-PCS | Mod: HCNC,CPTII,95, | Performed by: INTERNAL MEDICINE

## 2022-01-28 PROCEDURE — 3008F BODY MASS INDEX DOCD: CPT | Mod: HCNC,CPTII,95, | Performed by: INTERNAL MEDICINE

## 2022-01-28 PROCEDURE — 1159F PR MEDICATION LIST DOCUMENTED IN MEDICAL RECORD: ICD-10-PCS | Mod: HCNC,CPTII,95, | Performed by: INTERNAL MEDICINE

## 2022-01-28 PROCEDURE — 99214 PR OFFICE/OUTPT VISIT, EST, LEVL IV, 30-39 MIN: ICD-10-PCS | Mod: HCNC,95,, | Performed by: INTERNAL MEDICINE

## 2022-01-28 NOTE — TELEPHONE ENCOUNTER
----- Message from Melany Gutierrez sent at 1/28/2022  8:03 AM CST -----  Name of Who is Calling: PENG MAYO          What is the request in detail: The patient is calling to see if the doctor can call her for her appointment instead of the virtual due to the patient stating she is in the hospital with her . Please advise          Can the clinic reply by [facility]: Yes         What [facility] Back [facility]: [phone]

## 2022-01-28 NOTE — PROGRESS NOTES
Subjective:       Patient ID: Jaki Bajwa is a 51 y.o. female.    Chief Complaint: No chief complaint on file.    The patient location is: Home [place]   The chief complaint leading to consultation is:   Visit type: audiovisual  Total time spent with patient: 20  Minutes  visit performed on virtual visit due to current risk associated with COVID 19 pandemic  Each patient to whom he or she provides medical services by telemedicine is:  (1) informed of the relationship between the physician and patient and the respective role of any other health care provider with respect to management of the patient; and (2) notified that he or she may decline to receive medical services by telemedicine and may withdraw from such care at any time.    Pt normally cared for by my colleague Dr. Rasmussen and patient is new to me. I have reviewed patient's past medical, surgical, and social history in addition to MAR and allergies.     52 yo F with PMhx of Sjogren's on  Plaquenil presents for:  5 days prior developed cough, worsening throughout the week. No f/c, no night sweats, no myalgia. + Change in voice, sore throat,   Takes Claritin prn.   States she has episodes like this every 2 months.       Cough  This is a new problem. The current episode started in the past 7 days. The problem has been gradually worsening. The problem occurs every few minutes. The cough is productive of purulent sputum. Associated symptoms include wheezing. Pertinent negatives include no chest pain, chills, ear congestion, ear pain, fever, headaches, heartburn, hemoptysis, myalgias, nasal congestion, postnasal drip, rash, rhinorrhea, sore throat, shortness of breath, sweats or weight loss. Nothing aggravates the symptoms. She has tried nothing and OTC cough suppressant for the symptoms. The treatment provided no relief. There is no history of asthma, bronchiectasis, bronchitis, COPD, emphysema, environmental allergies or pneumonia.       Review of  "Systems   Constitutional: Negative for chills, fever and weight loss.   HENT: Negative for ear pain, postnasal drip, rhinorrhea and sore throat.    Respiratory: Positive for cough and wheezing. Negative for hemoptysis and shortness of breath.    Cardiovascular: Negative for chest pain.   Gastrointestinal: Negative for heartburn.   Musculoskeletal: Negative for myalgias.   Skin: Negative for rash.   Allergic/Immunologic: Negative for environmental allergies.   Neurological: Negative for headaches.       Objective:      Vitals:    01/28/22 0841   Weight: 122.5 kg (270 lb)   Height: 5' 7" (1.702 m)      Physical Exam  Constitutional:       General: She is not in acute distress.     Appearance: Normal appearance. She is well-developed and well-nourished. She is not sickly-appearing or diaphoretic.   HENT:      Head: Normocephalic and atraumatic.   Eyes:      General: No scleral icterus.        Right eye: No discharge.         Left eye: No discharge.      Extraocular Movements: EOM normal.      Conjunctiva/sclera: Conjunctivae normal.   Pulmonary:      Effort: Pulmonary effort is normal. No respiratory distress.   Abdominal:      General: There is no distension.   Skin:     General: Skin is warm and dry.   Neurological:      Mental Status: She is alert and oriented to person, place, and time.   Psychiatric:         Mood and Affect: Mood and affect normal.         Speech: Speech normal.         Assessment:       1. Cough    2. Sjogren's syndrome, with unspecified organ involvement        Plan:       Diagnoses and all orders for this visit:    Cough   Secondary to allergic rhinitis verses URI.  Will get COVID testing despite minimal infectious symptoms. Start with OTC Flonase and antihistamine. Office and Emergency Department prompts discussed.  Possible that due to patient Sjogren's and hydroxychloroquine she is unable to mount a larger response to clear an infectious etiology and if time frame to clears would not " hesitate to cover with antibiotics.  -     COVID-19 Routine Screening  -     COVID-19 Routine Screening; Future    Sjogren's syndrome, with unspecified organ involvement           Jorge Shipley MD  Internal Medicine-[facility]        Side effects of medication(s) were discussed in detail and patient voiced understanding.  Patient will call back for any issues or complications.

## 2022-01-30 DIAGNOSIS — B96.89 ACUTE BACTERIAL SINUSITIS: ICD-10-CM

## 2022-01-30 DIAGNOSIS — J01.90 ACUTE BACTERIAL SINUSITIS: ICD-10-CM

## 2022-01-30 NOTE — TELEPHONE ENCOUNTER
No new care gaps identified.  Powered by [employer]. Reference number: [id number].   1/30/2022 3:44:09 AM CST

## 2022-01-31 ENCOUNTER — OFFICE VISIT (OUTPATIENT)
Dept: RHEUMATOLOGY | Facility: CLINIC | Age: 52
End: 2022-01-31
Payer: MEDICARE

## 2022-01-31 ENCOUNTER — EXTERNAL CHRONIC CARE MANAGEMENT (OUTPATIENT)
Dept: PRIMARY CARE CLINIC | Facility: CLINIC | Age: 52
End: 2022-01-31
Payer: MEDICARE

## 2022-01-31 DIAGNOSIS — M35.01 SJOGREN'S SYNDROME WITH KERATOCONJUNCTIVITIS SICCA: Primary | ICD-10-CM

## 2022-01-31 PROCEDURE — 99490 CHRNC CARE MGMT STAFF 1ST 20: CPT | Mod: S$GLB,,, | Performed by: INTERNAL MEDICINE

## 2022-01-31 PROCEDURE — 99214 OFFICE O/P EST MOD 30 MIN: CPT | Mod: HCNC,95,, | Performed by: INTERNAL MEDICINE

## 2022-01-31 PROCEDURE — 99490 PR CHRONIC CARE MGMT, 1ST 20 MIN: ICD-10-PCS | Mod: S$GLB,,, | Performed by: INTERNAL MEDICINE

## 2022-01-31 PROCEDURE — 99214 PR OFFICE/OUTPT VISIT, EST, LEVL IV, 30-39 MIN: ICD-10-PCS | Mod: HCNC,95,, | Performed by: INTERNAL MEDICINE

## 2022-01-31 RX ORDER — HYDROXYCHLOROQUINE SULFATE 200 MG/1
TABLET, FILM COATED ORAL
Qty: 180 TABLET | Refills: 2 | Status: SHIPPED | OUTPATIENT
Start: 2022-01-31 | End: 2022-05-02

## 2022-01-31 RX ORDER — GABAPENTIN 300 MG/1
300 CAPSULE ORAL 3 TIMES DAILY
Qty: 90 CAPSULE | Refills: 5 | Status: SHIPPED | OUTPATIENT
Start: 2022-01-31 | End: 2022-08-10

## 2022-01-31 RX ORDER — TIZANIDINE 4 MG/1
TABLET ORAL
Qty: 180 TABLET | Refills: 5 | Status: SHIPPED | OUTPATIENT
Start: 2022-01-31 | End: 2022-05-02

## 2022-01-31 NOTE — PROGRESS NOTES
Chief Complaint   Patient presents with    Disease Management           The patient location is: Home  The chief complaint leading to consultation is: sjogren's syndrome    Visit type: audiovisual    Face to Face time with patient: 20   minutes of total time spent on the encounter, which includes face to face time and non-face to face time preparing to see the patient (eg, review of tests), Obtaining and/or reviewing separately obtained history, Documenting clinical information in the electronic or other health record, Independently interpreting results (not separately reported) and communicating results to the patient/family/caregiver, or Care coordination (not separately reported).         Each patient to whom he or she provides medical services by telemedicine is:  (1) informed of the relationship between the physician and patient and the respective role of any other health care provider with respect to management of the patient; and (2) notified that he or she may decline to receive medical services by telemedicine and may withdraw from such care at any time.    Notes:     History of presenting illness    51  year old black female has     Joint pains in the knees and ankles   Back pain   Diffuse aching  Nocturnal leg cramps  Worse pain with activity  Hand paresthesias+  Morning pain but no morning stiffness  No joint swelling     She now has a diagnosis of fibromyalgia  Diagnosis + confirmed by atleast 2 rheumatologists  On gabapentin 300 mg tid  on naprosyn 500 mg daily  Tried and failed flexeril  On zanaflex 4 mg bid prn for TMJ syndrome and muscle spasms   Takes doxepin at night 150 mg for insomnia   She follows with pain clinic and takes pain medications  She gets migraines but not severe  She has tested negative for sleep apnea    She has anxiety and depression : on wellbutrin 150 to 300 mg and buspar  5 mg bid   Valium is prn only  On latuda 60 mg     Dry eyes and dry mouth and inflammatory  arthritis/arthralgia   Abnormal labs   Sjogren's syndrome +   On plaquenil 200 mg bid   Pilocarpine makes her nauseated   So we gave cevelimine   Dry mouth is severe at night   Dry eyes severe /but if she uses restasis she has symptom control  No recurrent conjunctivitis or uveitis or scleritis or episcleritis but she remembers once they told her she has inflammation in the eyes due to dry eyes   Sees ophthalmology     She has dry patches of skin   Areas of pigmentation on the elbows and thighs   Butterfly like pigmentation on the eyelids and underneath the eyes   Dermatology says : nummular dermatitis and seborrheic keratosis   No other skin rashes,photosensitivity   No telangiectasias   No calcinosis   No psoriasis   She does have patchy alopecia ,b/l frontally     No oral and nasal ulcers     No pleurisy or any cardiopulmonary complaints     No dysphagia,diplopia and dysphonia and muscle weakness     No v/d/c   Nausea and  acid reflux+ on PPIs   [facility]  She has hiatal hernia     She has raynaud's+   No digital ulcers     No cytopenias   Mostly irom deficiency anemia  White count and plt count ok  No menorrhagia /hysterectomy  Diet is poor    No renal issues     No blood clots     No fever,chills,night sweats,weight loss and loss of appetite  No unexplained lymphadenopathy and parotitis      No pregnancy losses/pre term deliveries /pregnancy complications     No new onset headaches     No chronic or bloody diarrhea with no u colitis or crohn's /inflammatory bowel disease     No vaginal or  urethral  d/c/STDs/no ulcers     No unexplained neurologic symptoms        Labs     White count always normal    Anemia     ESR 25/23  CRP 3.3/6.8    Creat and GFR always normal  LFTs ok    OLINDA negative     Hep A,B,C negative   HIV negative    Has had UTIs and proteinuria and hematuria  She has to see urology  The UTIs are always s/p intercourse    8/2020 labs     OLINDA neg  SSA positive 2.19  nml complements    Dsdna neg  cryos nml  myomarker panel neg  Ck,aldolase nml  RF  Urine nml  UPCR neg  GFR 54.9  Immunoglobulins elevated 2088  GIGI neg,LDH nml  B12,folate nml  retic nml      First follow up  9/2021    Hands hurt/ache  Foot and ankles : swell now and then   It comes and goes away  She takes a diuretic     Dry eyes and dry mouth-ok    On plaquenil 200 mg bid     On evoxac 30 mg tid-causes stomach upset    1/2022    Some aches and pains    On plaquenil 200 mg bid  On evoxac 30 mg daily-cant tolerate more   Dry eyes and dry mouth-ok    Past history : sjogren's,fibromyalgia     Family history : lupus cousin and cancers    Social history : not a smoker,alcohol user        Review of Systems   Constitutional: Negative for fever and unexpected weight change.   HENT: Negative for mouth sores and trouble swallowing.    Eyes: Negative for redness.   Respiratory: Negative for cough and shortness of breath.    Cardiovascular: Negative for chest pain.   Gastrointestinal: Negative for constipation and diarrhea.   Genitourinary: Negative for dysuria and genital sores.   Skin: Positive for rash.   Neurological: Negative for headaches.   Hematological: Does not bruise/bleed easily.       As detailed above     MSK exam  nml    Physical Exam   Constitutional: She is oriented to person, place, and time. No distress.   HENT:   Head: Normocephalic.   Mouth/Throat: Oropharynx is clear and moist.   Eyes: Pupils are equal, round, and reactive to light. Conjunctivae are normal. Right eye exhibits no discharge. Left eye exhibits no discharge. No scleral icterus.   Neck: No thyromegaly present.   Cardiovascular: Normal rate, regular rhythm and normal heart sounds.   Pulmonary/Chest: Effort normal and breath sounds normal. No stridor.   Abdominal: Soft. Bowel sounds are normal.   Musculoskeletal:         General: Normal range of motion.      Cervical back: Normal range of motion.   Lymphadenopathy:     She has no cervical adenopathy.    Neurological: She is alert and oriented to person, place, and time.   Skin: Skin is warm. No rash noted. She is not diaphoretic.   Psychiatric: Affect and judgment normal.       She has lost a lot of hair frontally   Its like a bald patch  Its chronic  There is a mole as well    She has dry hyperpigmented skin on the elbows and dry patches on thighs for evaluation  She has frontal baldness  She has a mole like lesion   She has hyperpigmented rash on the eyelids and under the eyes       Assessment     51 year old black female with    -Sjogren's syndrome  Sicca symptoms and inflammatory arthritis    Fibromyalgia   -chronic pain + RLS   -insomnia/no sleep apnea  -anxiety and depression     Hands used to hurt,ache,they had paresthesias-CTS evaluation pending     Foot and ankles : swell now and then   It comes and goes away  She takes a diuretic   Compression stockings help    Dry eyes and dry mouth-ok    On plaquenil 200 mg bid     On evoxac 30 mg daily    1. Sjogren's syndrome with keratoconjunctivitis sicca        Reviewed labs/xrays  Reviewed medications    Ordered labs /xrays    F/u     Plan    Continue to wear compression stockings  Take diuretics  Manage BP  Minimise salt intake    Dry eye management   -on restasis  She now sees ophthalmology    Dry mouth management   -cant tolerate pilocarpine  Cant tolerate evoxac 30 mg tid -cant tolerate 30 mg bid   30 mg daily -is all she can take   See dentist regularly      Skin issues : dry hyperpigmented skin on the elbows and dry patches on thighs   She has frontal baldness  She has a mole like lesion   She has hyperpigmented rash on the eyelids and under the eyes   She has nummular dermatitis and seborrheic dermatitis   Dermatology saw her,on topical medications     Management of sicca symptoms  Counselled extraglandular manifestations   Counselled risk of lymphoma    -preservative free artifical tears 3 to 4 times a day  Lubricating ointments at night  [place]raparound sun  glasses/moisture shields which attach to glasses help  Opthalmology evaluation,management : she will need split lamp,schirmer's test and ocular surface staining  Surgical options like punctal occlusion    -biotene and ACT preparations  Dental evaluations  Periodic cleaning  Use fluoride products  Brush and floss teeth regularly especially after meals     Avoid sugar containing foods and drinks    Use of vaginal lubricants/estrogen creams   Seeing Gyn    Use hypoallergenic soaps/lotions for the skin  Avoid drafts from air conditioners/heaters/radiators  Avoid detergents,deodorant soaps,very hot water  Use humidifiers    Have to order CBC,CMP,ESR,CRP,urine analysis,urine creatinine,urine protein,cryos and complements,globulins,quantitative immunoglobulins    Anemia being addressed-she asks for rpt labs today    Bone marrow biopsy 2014 : [facility]  Hematology note :  Since May 2012,  hemoglobin values have been between 10.1-11.6.   She had hysterectomy and has no ongoing menstrual losses. No s/s of mal-absorption. Diet is normal/regular.   No overt bleeding. No melena. She has fatigue and heart burn.  Stool OB negative in Jan 2020. UA negative for blood/ RBC in Aug 2020.    She received iv injectafer on 9/30 and 10/7/20. Serum ferritin now normal. LDH, reticuloyte count, b12, folate all normal today. Hemoglobin 11.4g/dl. GIGI negative.  On oral iron once or twice daily on empty stomach.     She used to be iron deficient not anymore-but she asks for rpt labs   GI : said not bleeding  PPIs helping      White count always normal  Always anemic   plt count always normal    Continue plaquenil 200 mg bid  Eye exam and routine eval once a year    Pain management :   Narcotics at pain clinic  Gabapentin 300 mg tid  zanaflex dose increased to 8 mg tid    Insomnia management : doxepin  No sleep apnea    Anxiety and depression management  On latuda,wellbutrin and buspar       Jaki was seen today for disease management.    Diagnoses  and all orders for this visit:    Sjogren's syndrome with keratoconjunctivitis sicca  -     CBC Auto Differential; Future  -     Comprehensive Metabolic Panel; Future  -     Sedimentation rate; Future  -     C-Reactive Protein; Future  -     Anti-DNA Ab, Double-Stranded; Future  -     C3 Complement; Future  -     C4 Complement; Future  -     Protein/Creatinine Ratio, Urine; Future  -     Urinalysis; Future  -     Cryoglobulin; Future  -     Iron and TIBC; Future  -     Ferritin; Future    Other orders  -     hydrOXYchloroQUINE (PLAQUENIL) 200 mg tablet; One pill bid  -     gabapentin (NEURONTIN) 300 MG capsule; Take 1 capsule (300 mg total) by mouth 3 (three) times daily.  -     tiZANidine (ZANAFLEX) 4 MG tablet; 8 mg tid        Every 6 months to us

## 2022-02-04 ENCOUNTER — TELEPHONE (OUTPATIENT)
Dept: PSYCHIATRY | Facility: CLINIC | Age: 52
End: 2022-02-04
Payer: MEDICARE

## 2022-02-04 DIAGNOSIS — F40.10 SOCIAL PHOBIA: ICD-10-CM

## 2022-02-04 DIAGNOSIS — F41.1 GAD (GENERALIZED ANXIETY DISORDER): ICD-10-CM

## 2022-02-04 DIAGNOSIS — R46.81 OBSESSIVE-COMPULSIVE BEHAVIOR: ICD-10-CM

## 2022-02-04 RX ORDER — DIAZEPAM 2 MG/1
2 TABLET ORAL 2 TIMES DAILY PRN
Qty: 60 TABLET | Refills: 2 | Status: SHIPPED | OUTPATIENT
Start: 2022-02-04 | End: 2022-05-02 | Stop reason: SDUPTHER

## 2022-02-04 NOTE — TELEPHONE ENCOUNTER
----- Message from Kenna Dennis RN sent at 2/4/2022 11:45 AM CST -----  Regarding: FW: Refill    ----- Message -----  From: Cynthia Mcknight  Sent: 2/4/2022   8:10 AM CST  To: Kenna Dennis RN  Subject: Refill                                           Pt is calling requesting a refill of diazePAM (VALIUM) 2 MG tablet from [id number] [place] [address] [address]  [facility] [place]  Phone: [phone], Fax: [phone].  She was last seen on 1/20/22 and is scheduled to be seen on 4/18/22.    She can be reached at [phone].     Thank you.

## 2022-02-09 ENCOUNTER — PATIENT MESSAGE (OUTPATIENT)
Dept: PSYCHIATRY | Facility: CLINIC | Age: 52
End: 2022-02-09
Payer: MEDICARE

## 2022-02-09 DIAGNOSIS — F39 MOOD DISORDER: ICD-10-CM

## 2022-02-10 ENCOUNTER — PATIENT MESSAGE (OUTPATIENT)
Dept: PSYCHIATRY | Facility: CLINIC | Age: 52
End: 2022-02-10
Payer: MEDICARE

## 2022-02-10 RX ORDER — LURASIDONE HYDROCHLORIDE 60 MG/1
60 TABLET, FILM COATED ORAL DAILY
Qty: 90 TABLET | Refills: 3 | Status: SHIPPED | OUTPATIENT
Start: 2022-02-10 | End: 2023-01-24 | Stop reason: SDUPTHER

## 2022-02-10 RX ORDER — FLUTICASONE PROPIONATE 50 MCG
SPRAY, SUSPENSION (ML) NASAL
Qty: 48 G | Refills: 3 | Status: SHIPPED | OUTPATIENT
Start: 2022-02-10 | End: 2022-07-14

## 2022-02-10 NOTE — TELEPHONE ENCOUNTER
Refill Routing Note   Medication(s) are not appropriate for processing by [facility] for the following reason(s):      - LCO patient not taking    [facility] action(s):  Defer       Medication Therapy Plan: LCO by Mari Dumas MD patient not taking; defer to you  --->Care Gap information included in message below if applicable.   Medication reconciliation completed: No   Automatic Epic Generated Protocol Data:        Requested Prescriptions   Pending Prescriptions Disp Refills    fluticasone propionate (FLONASE) 50 mcg/actuation nasal spray [Pharmacy Med Name: FLUTICASONE 50MCG NASAL SP (120) RX] 48 g 3     Sig: SHAKE LIQUID AND USE 1 SPRAY(50 MCG) IN EACH NOSTRIL EVERY DAY       Ear, Nose, and Throat: Nasal Preparations - Corticosteroids Passed - 1/30/2022  3:43 AM        Passed - Patient is at least 18 years old        Passed - Valid encounter within last 15 months     Recent Visits  Date Type Provider Dept   12/02/21 Office Visit Pratik Rasmussen MD [facility] Internal Medicine   09/22/21 Office Visit Pratik Rasmussen MD [facility]   06/22/21 Office Visit Pratik Rasmussen MD [facility]   03/26/21 Office Visit Pratik Rasmussen MD [facility]   12/03/20 Office Visit Pratik Rasmussen MD [facility]   09/03/20 Office Visit Pratik Rasmussen MD [facility]   05/07/20 Office Visit Pratik Rasmussen MD [facility]   Showing recent visits within past 720 days and meeting all other requirements  Future Appointments  No visits were found meeting these conditions.  Showing future appointments within next 150 days and meeting all other requirements      Future Appointments              Today LAB, APPOINTMENT [facility] - Lab (Venipuncture), Doris Pina    Today LAB, APPOINTMENT [facility] - Lab (Venipuncture), Doris Pina    In 3 weeks Pratik Rasmussen MD [facility] [place] - Internal Medicine, [facility]    In 2  months Brand[facility] Cameron [facility]                      Appointments  past 12m or future 3m with PCP    Date Provider   Last Visit   12/2/2021 Pratik Rasmussen MD   Next Visit   3/8/2022 Pratik Rasmussen MD   ED visits in past 90 days: 0        Note composed:11:45 AM 02/10/2022

## 2022-02-15 ENCOUNTER — LAB VISIT (OUTPATIENT)
Dept: LAB | Facility: HOSPITAL | Age: 52
End: 2022-02-15
Attending: INTERNAL MEDICINE
Payer: MEDICARE

## 2022-02-15 ENCOUNTER — PATIENT MESSAGE (OUTPATIENT)
Dept: RHEUMATOLOGY | Facility: CLINIC | Age: 52
End: 2022-02-15
Payer: MEDICARE

## 2022-02-15 DIAGNOSIS — M35.01 SJOGREN'S SYNDROME WITH KERATOCONJUNCTIVITIS SICCA: ICD-10-CM

## 2022-02-15 LAB
ALBUMIN SERPL BCP-MCNC: 3.7 G/DL (ref 3.5–5.2)
ALP SERPL-CCNC: 85 U/L (ref 55–135)
ALT SERPL W/O P-5'-P-CCNC: 10 U/L (ref 10–44)
ANION GAP SERPL CALC-SCNC: 10 MMOL/L (ref 8–16)
AST SERPL-CCNC: 16 U/L (ref 10–40)
BASOPHILS # BLD AUTO: 0.05 K/UL (ref 0–0.2)
BASOPHILS NFR BLD: 0.8 % (ref 0–1.9)
BILIRUB SERPL-MCNC: 0.3 MG/DL (ref 0.1–1)
BUN SERPL-MCNC: 18 MG/DL (ref 6–20)
C3 SERPL-MCNC: 149 MG/DL (ref 50–180)
C4 SERPL-MCNC: 33 MG/DL (ref 11–44)
CALCIUM SERPL-MCNC: 9.7 MG/DL (ref 8.7–10.5)
CHLORIDE SERPL-SCNC: 103 MMOL/L (ref 95–110)
CO2 SERPL-SCNC: 29 MMOL/L (ref 23–29)
CREAT SERPL-MCNC: 1.2 MG/DL (ref 0.5–1.4)
CRP SERPL-MCNC: 3.9 MG/L (ref 0–8.2)
DIFFERENTIAL METHOD: ABNORMAL
EOSINOPHIL # BLD AUTO: 0.3 K/UL (ref 0–0.5)
EOSINOPHIL NFR BLD: 4.3 % (ref 0–8)
ERYTHROCYTE [DISTWIDTH] IN BLOOD BY AUTOMATED COUNT: 16.2 % (ref 11.5–14.5)
ERYTHROCYTE [SEDIMENTATION RATE] IN BLOOD BY WESTERGREN METHOD: 40 MM/HR (ref 0–36)
EST. GFR  (AFRICAN AMERICAN): 59.6 ML/MIN/1.73 M^2
EST. GFR  (NON AFRICAN AMERICAN): 51.7 ML/MIN/1.73 M^2
FERRITIN SERPL-MCNC: 10 NG/ML (ref 20–300)
GLUCOSE SERPL-MCNC: 100 MG/DL (ref 70–110)
HCT VFR BLD AUTO: 35.1 % (ref 37–48.5)
HGB BLD-MCNC: 10 G/DL (ref 12–16)
IMM GRANULOCYTES # BLD AUTO: 0.01 K/UL (ref 0–0.04)
IMM GRANULOCYTES NFR BLD AUTO: 0.2 % (ref 0–0.5)
IRON SERPL-MCNC: 33 UG/DL (ref 30–160)
LYMPHOCYTES # BLD AUTO: 2.4 K/UL (ref 1–4.8)
LYMPHOCYTES NFR BLD: 35.9 % (ref 18–48)
MCH RBC QN AUTO: 23.1 PG (ref 27–31)
MCHC RBC AUTO-ENTMCNC: 28.5 G/DL (ref 32–36)
MCV RBC AUTO: 81 FL (ref 82–98)
MONOCYTES # BLD AUTO: 0.4 K/UL (ref 0.3–1)
MONOCYTES NFR BLD: 6.1 % (ref 4–15)
NEUTROPHILS # BLD AUTO: 3.5 K/UL (ref 1.8–7.7)
NEUTROPHILS NFR BLD: 52.7 % (ref 38–73)
NRBC BLD-RTO: 0 /100 WBC
PLATELET # BLD AUTO: 274 K/UL (ref 150–450)
PMV BLD AUTO: 12.2 FL (ref 9.2–12.9)
POTASSIUM SERPL-SCNC: 4 MMOL/L (ref 3.5–5.1)
PROT SERPL-MCNC: 8 G/DL (ref 6–8.4)
RBC # BLD AUTO: 4.33 M/UL (ref 4–5.4)
SATURATED IRON: 7 % (ref 20–50)
SODIUM SERPL-SCNC: 142 MMOL/L (ref 136–145)
TOTAL IRON BINDING CAPACITY: 447 UG/DL (ref 250–450)
TRANSFERRIN SERPL-MCNC: 302 MG/DL (ref 200–375)
WBC # BLD AUTO: 6.57 K/UL (ref 3.9–12.7)

## 2022-02-15 PROCEDURE — 36415 COLL VENOUS BLD VENIPUNCTURE: CPT | Mod: HCNC | Performed by: INTERNAL MEDICINE

## 2022-02-15 PROCEDURE — 86225 DNA ANTIBODY NATIVE: CPT | Mod: HCNC | Performed by: INTERNAL MEDICINE

## 2022-02-15 PROCEDURE — 85652 RBC SED RATE AUTOMATED: CPT | Mod: HCNC | Performed by: INTERNAL MEDICINE

## 2022-02-15 PROCEDURE — 86160 COMPLEMENT ANTIGEN: CPT | Mod: 59,HCNC | Performed by: INTERNAL MEDICINE

## 2022-02-15 PROCEDURE — 80053 COMPREHEN METABOLIC PANEL: CPT | Mod: HCNC | Performed by: INTERNAL MEDICINE

## 2022-02-15 PROCEDURE — 84466 ASSAY OF TRANSFERRIN: CPT | Mod: HCNC | Performed by: INTERNAL MEDICINE

## 2022-02-15 PROCEDURE — 86160 COMPLEMENT ANTIGEN: CPT | Mod: HCNC | Performed by: INTERNAL MEDICINE

## 2022-02-15 PROCEDURE — 82595 ASSAY OF CRYOGLOBULIN: CPT | Mod: HCNC | Performed by: INTERNAL MEDICINE

## 2022-02-15 PROCEDURE — 86140 C-REACTIVE PROTEIN: CPT | Mod: HCNC | Performed by: INTERNAL MEDICINE

## 2022-02-15 PROCEDURE — 85025 COMPLETE CBC W/AUTO DIFF WBC: CPT | Mod: HCNC | Performed by: INTERNAL MEDICINE

## 2022-02-15 PROCEDURE — 82728 ASSAY OF FERRITIN: CPT | Mod: HCNC | Performed by: INTERNAL MEDICINE

## 2022-02-16 ENCOUNTER — PATIENT MESSAGE (OUTPATIENT)
Dept: HEMATOLOGY/ONCOLOGY | Facility: CLINIC | Age: 52
End: 2022-02-16
Payer: MEDICARE

## 2022-02-16 LAB — DSDNA AB SER-ACNC: NORMAL [IU]/ML

## 2022-02-16 RX ORDER — HEPARIN 100 UNIT/ML
500 SYRINGE INTRAVENOUS
Status: CANCELLED | OUTPATIENT
Start: 2022-03-09

## 2022-02-16 RX ORDER — HEPARIN 100 UNIT/ML
500 SYRINGE INTRAVENOUS
Status: CANCELLED | OUTPATIENT
Start: 2022-03-02

## 2022-02-16 RX ORDER — SODIUM CHLORIDE 0.9 % (FLUSH) 0.9 %
10 SYRINGE (ML) INJECTION
Status: CANCELLED | OUTPATIENT
Start: 2022-03-02

## 2022-02-16 RX ORDER — SODIUM CHLORIDE 0.9 % (FLUSH) 0.9 %
10 SYRINGE (ML) INJECTION
Status: CANCELLED | OUTPATIENT
Start: 2022-03-09

## 2022-02-17 ENCOUNTER — TELEPHONE (OUTPATIENT)
Dept: HEMATOLOGY/ONCOLOGY | Facility: CLINIC | Age: 52
End: 2022-02-17
Payer: MEDICARE

## 2022-02-17 RX ORDER — HEPARIN 100 UNIT/ML
500 SYRINGE INTRAVENOUS
Status: CANCELLED | OUTPATIENT
Start: 2022-02-18

## 2022-02-17 RX ORDER — SODIUM CHLORIDE 0.9 % (FLUSH) 0.9 %
10 SYRINGE (ML) INJECTION
Status: CANCELLED | OUTPATIENT
Start: 2022-02-18

## 2022-02-17 NOTE — TELEPHONE ENCOUNTER
Advised pt that iron infusions are still pending approval at this time. Will continue to follow up.

## 2022-02-17 NOTE — TELEPHONE ENCOUNTER
"----- Message from Emanuel Bernardo sent at 2/17/2022  1:02 PM CST -----  Consult/Advisory:          Name Of Caller: Self      Contact Preference?: [phone] ([facility] phone #)        Provider Name: Siena      Does patient feel the need to be seen today? No      What is the nature of the call?: Calling to speak w/ Olivia or Cata. Stating that her 2 medications for infusion were denied by [employer], but [facility] will allow Infed and Venoser.  Also stating that [employer] is asking you to reach out to them if needed.          Additional Notes:  "Thank you for all that you do for our patients'"      "

## 2022-02-17 NOTE — TELEPHONE ENCOUNTER
"----- Message from Teresa Muñoz sent at 2/17/2022  8:21 AM CST -----         Consult/Advisory:      Name Of Caller:Jaki   Contact Preference?:[phone]      Provider Name: Siena   Does patient feel the need to be seen today?no       What is the nature of the call?:pt is calling to check the status on her iron infusion. Please call      Additional Notes:  "Thank you for all that you do for our patients'"                           "

## 2022-02-17 NOTE — TELEPHONE ENCOUNTER
"----- Message from Emanuel Bernardo sent at 2/17/2022  4:53 PM CST -----  Consult/Advisory:          Name Of Caller: Self      Contact Preference?: [phone]        Provider Name: Siena      Does patient feel the need to be seen today? No      What is the nature of the call?: Calling to speak w/ Allan about any updates on medication situation. Stating she's really anxious and is tired of eating only ice cubes.          Additional Notes:  "Thank you for all that you do for our patients'"      "

## 2022-02-18 ENCOUNTER — TELEPHONE (OUTPATIENT)
Dept: HEMATOLOGY/ONCOLOGY | Facility: CLINIC | Age: 52
End: 2022-02-18
Payer: MEDICARE

## 2022-02-18 ENCOUNTER — PATIENT MESSAGE (OUTPATIENT)
Dept: INTERNAL MEDICINE | Facility: CLINIC | Age: 52
End: 2022-02-18
Payer: MEDICARE

## 2022-02-18 DIAGNOSIS — G89.4 CHRONIC PAIN SYNDROME: ICD-10-CM

## 2022-02-18 DIAGNOSIS — R12 HEARTBURN: ICD-10-CM

## 2022-02-18 DIAGNOSIS — I10 ESSENTIAL HYPERTENSION: ICD-10-CM

## 2022-02-18 RX ORDER — IRBESARTAN AND HYDROCHLOROTHIAZIDE 150; 12.5 MG/1; MG/1
1 TABLET, FILM COATED ORAL DAILY
Qty: 90 TABLET | Refills: 3 | Status: SHIPPED | OUTPATIENT
Start: 2022-02-18 | End: 2023-02-21

## 2022-02-18 RX ORDER — HYDROCODONE BITARTRATE AND ACETAMINOPHEN 5; 325 MG/1; MG/1
1 TABLET ORAL
Qty: 30 TABLET | Refills: 0 | Status: SHIPPED | OUTPATIENT
Start: 2022-02-18 | End: 2022-03-15 | Stop reason: SDUPTHER

## 2022-02-18 RX ORDER — OMEPRAZOLE 40 MG/1
CAPSULE, DELAYED RELEASE ORAL
Qty: 90 CAPSULE | Refills: 1 | Status: SHIPPED | OUTPATIENT
Start: 2022-02-18 | End: 2022-04-18 | Stop reason: SDUPTHER

## 2022-02-18 NOTE — TELEPHONE ENCOUNTER
"----- Message from Ania Savage sent at 2/18/2022  2:19 PM CST -----  Regarding: Consult/Advisory:  Name Of Caller: Jaki    Contact Preference?: [phone]    What is the nature of the call?: calling in regards to iron medications that do not require authorization      Additional Notes: 2nd attempt to reach someone      "Thank you for all that you do for our patients'"     "

## 2022-02-18 NOTE — TELEPHONE ENCOUNTER
Routed Rx refill request to provider - checked allergies/ included last office visit date    Messaged pt in sep encounter to inform them I forwarded their request to their PCP

## 2022-02-18 NOTE — TELEPHONE ENCOUNTER
"----- Message from Ania Savage sent at 2/18/2022 10:15 AM CST -----  Regarding: Consult/Advisory:  Name Of Caller: Jaki    Contact Preference?: [phone]    What is the nature of the call?: inquiring about medications being sent to insurance company for approval           Additional Notes:  "Thank you for all that you do for our patients'"     "

## 2022-02-18 NOTE — TELEPHONE ENCOUNTER
Refill Authorization Note   Jaki Bajwa  is requesting a refill authorization.  Brief Assessment and Rationale for Refill:  Approve     Medication Therapy Plan:       Medication Reconciliation Completed: No   Comments:   --->Care Gap information included below if applicable.   Orders Placed This Encounter    irbesartan-hydrochlorothiazide (AVALIDE) 150-12.5 mg per tablet      Requested Prescriptions   Signed Prescriptions Disp Refills    irbesartan-hydrochlorothiazide (AVALIDE) 150-12.5 mg per tablet 90 tablet 3     Sig: Take 1 tablet by mouth once daily.       Cardiovascular: ARB + Diuretic Combos Passed - 2/18/2022  3:40 AM        Passed - Patient is at least 18 years old        Passed - Last BP in normal range within 360 days     BP Readings from Last 1 Encounters:   12/02/21 108/68               Passed - Valid encounter within last 15 months     Recent Visits  Date Type Provider Dept   12/02/21 Office Visit Pratik Rasmussen MD [facility] Internal Medicine   09/22/21 Office Visit Pratik Rasmussen MD [facility] Internal Medicine   06/22/21 Office Visit Pratik Rasmussen MD [facility] Internal Medicine   03/26/21 Office Visit Pratik Rasmussen MD [facility] Internal Medicine   12/03/20 Office Visit Pratik Rasmussen MD [facility] Internal Medicine   09/03/20 Office Visit Pratik Rasmussen MD [facility] Internal Medicine   05/07/20 Office Visit Pratik Rasmussen MD [facility] Internal Medicine   Showing recent visits within past 720 days and meeting all other requirements  Future Appointments  No visits were found meeting these conditions.  Showing future appointments within next 150 days and meeting all other requirements      Future Appointments              In 2 weeks Pratik Rasmussen MD [facility]    In 2 months [id number] [address]                Passed - K in normal range and within 360 days     Potassium   Date Value Ref Range Status    02/15/2022 4.0 3.5 - 5.1 mmol/L Final   09/22/2021 3.9 3.5 - 5.1 mmol/L Final   03/15/2021 3.9 3.5 - 5.1 mmol/L Final              Passed - Na is between 130 and 148 and within 360 days     Sodium   Date Value Ref Range Status   02/15/2022 142 136 - 145 mmol/L Final   09/22/2021 141 136 - 145 mmol/L Final   03/15/2021 142 136 - 145 mmol/L Final              Passed - Cr is 1.39 or below and within 360 days     Lab Results   Component Value Date    CREATININE 1.2 02/15/2022    CREATININE 1.3 09/22/2021    CREATININE 1.2 03/15/2021              Passed - eGFR within 360 days     Lab Results   Component Value Date    EGFRNONAA 51.7 (A) 02/15/2022    EGFRNONAA 47 (A) 09/22/2021    EGFRNONAA 52.1 (A) 03/15/2021                    Appointments  past 12m or future 3m with PCP    Date Provider   Last Visit   12/2/2021 Pratik Rasmussen MD   Next Visit   2/18/2022 Pratik Rasmussen MD   ED visits in past 90 days: 0     Note composed:1:34 PM 02/18/2022

## 2022-02-18 NOTE — TELEPHONE ENCOUNTER
No new care gaps identified.  Powered by [employer] by [employer]. Reference number: [id number].   2/18/2022 3:40:56 AM CST

## 2022-02-18 NOTE — TELEPHONE ENCOUNTER
No new care gaps identified.  Powered by [employer] by [employer]. Reference number: [id number].   2/18/2022 8:47:59 AM CST

## 2022-02-18 NOTE — TELEPHONE ENCOUNTER
"----- Message from Teresa Muñoz sent at 2/18/2022 11:45 AM CST -----  Consult/Advisory:              Name Of Caller: Self        Contact Preference?: [phone]           Provider Name: Siena        Does patient feel the need to be seen today? No        What is the nature of the call?: pt is calling to speak with you regarding iron infusion and medication   Additional Notes:  "Thank you for all that you do for our patients'"     "

## 2022-02-18 NOTE — TELEPHONE ENCOUNTER
Routed Rx refill request to provider in sep encounter - checked allergies/ included last office visit date    Messaged pt to inform them I forwarded their request to their PCP

## 2022-02-22 ENCOUNTER — TELEPHONE (OUTPATIENT)
Dept: HEMATOLOGY/ONCOLOGY | Facility: CLINIC | Age: 52
End: 2022-02-22
Payer: MEDICARE

## 2022-02-22 DIAGNOSIS — D64.9 ANEMIA OF UNKNOWN ETIOLOGY: Primary | ICD-10-CM

## 2022-02-22 DIAGNOSIS — D50.9 IRON DEFICIENCY ANEMIA, UNSPECIFIED IRON DEFICIENCY ANEMIA TYPE: ICD-10-CM

## 2022-02-23 DIAGNOSIS — D84.9 IMMUNOSUPPRESSED STATUS: ICD-10-CM

## 2022-02-26 LAB — CRYOGLOB SER QL: NORMAL

## 2022-02-28 ENCOUNTER — EXTERNAL CHRONIC CARE MANAGEMENT (OUTPATIENT)
Dept: PRIMARY CARE CLINIC | Facility: CLINIC | Age: 52
End: 2022-02-28
Payer: MEDICARE

## 2022-02-28 PROCEDURE — 99439 CHRNC CARE MGMT STAF EA ADDL: CPT | Mod: S$GLB,,, | Performed by: INTERNAL MEDICINE

## 2022-02-28 PROCEDURE — 99439 PR CHRONIC CARE MGMT, EA ADDTL 20 MIN: ICD-10-PCS | Mod: S$GLB,,, | Performed by: INTERNAL MEDICINE

## 2022-02-28 PROCEDURE — 99490 CHRNC CARE MGMT STAFF 1ST 20: CPT | Mod: S$GLB,,, | Performed by: INTERNAL MEDICINE

## 2022-02-28 PROCEDURE — 99490 PR CHRONIC CARE MGMT, 1ST 20 MIN: ICD-10-PCS | Mod: S$GLB,,, | Performed by: INTERNAL MEDICINE

## 2022-03-03 ENCOUNTER — INFUSION (OUTPATIENT)
Dept: INFUSION THERAPY | Facility: HOSPITAL | Age: 52
End: 2022-03-03
Payer: MEDICARE

## 2022-03-03 VITALS
SYSTOLIC BLOOD PRESSURE: 134 MMHG | DIASTOLIC BLOOD PRESSURE: 80 MMHG | TEMPERATURE: 98 F | RESPIRATION RATE: 18 BRPM | HEART RATE: 94 BPM

## 2022-03-03 DIAGNOSIS — D50.9 IRON DEFICIENCY ANEMIA, UNSPECIFIED IRON DEFICIENCY ANEMIA TYPE: Primary | ICD-10-CM

## 2022-03-03 PROCEDURE — 96374 THER/PROPH/DIAG INJ IV PUSH: CPT | Mod: HCNC

## 2022-03-03 PROCEDURE — 63600175 PHARM REV CODE 636 W HCPCS: Mod: HCNC | Performed by: NURSE PRACTITIONER

## 2022-03-03 PROCEDURE — 25000003 PHARM REV CODE 250: Mod: HCNC | Performed by: NURSE PRACTITIONER

## 2022-03-03 RX ORDER — SODIUM CHLORIDE 0.9 % (FLUSH) 0.9 %
10 SYRINGE (ML) INJECTION
Status: DISCONTINUED | OUTPATIENT
Start: 2022-03-03 | End: 2022-03-03 | Stop reason: HOSPADM

## 2022-03-03 RX ORDER — HEPARIN 100 UNIT/ML
500 SYRINGE INTRAVENOUS
Status: DISCONTINUED | OUTPATIENT
Start: 2022-03-03 | End: 2022-03-03 | Stop reason: HOSPADM

## 2022-03-03 RX ADMIN — SODIUM CHLORIDE: 9 INJECTION, SOLUTION INTRAVENOUS at 07:03

## 2022-03-03 RX ADMIN — IRON SUCROSE 200 MG: 20 INJECTION, SOLUTION INTRAVENOUS at 07:03

## 2022-03-03 NOTE — PLAN OF CARE
Patient tolerated Venofer with no complications. VSS. Pt instructed to call MD with any problems. NAD. Pt discharged home independently.

## 2022-03-08 ENCOUNTER — LAB VISIT (OUTPATIENT)
Dept: LAB | Facility: OTHER | Age: 52
End: 2022-03-08
Attending: INTERNAL MEDICINE
Payer: MEDICARE

## 2022-03-08 ENCOUNTER — TELEPHONE (OUTPATIENT)
Dept: INTERNAL MEDICINE | Facility: CLINIC | Age: 52
End: 2022-03-08

## 2022-03-08 ENCOUNTER — OFFICE VISIT (OUTPATIENT)
Dept: INTERNAL MEDICINE | Facility: CLINIC | Age: 52
End: 2022-03-08
Payer: MEDICARE

## 2022-03-08 VITALS
HEART RATE: 104 BPM | HEIGHT: 67 IN | WEIGHT: 268.5 LBS | DIASTOLIC BLOOD PRESSURE: 92 MMHG | OXYGEN SATURATION: 100 % | SYSTOLIC BLOOD PRESSURE: 130 MMHG | BODY MASS INDEX: 42.14 KG/M2

## 2022-03-08 DIAGNOSIS — R30.0 DYSURIA: ICD-10-CM

## 2022-03-08 DIAGNOSIS — N18.31 STAGE 3A CHRONIC KIDNEY DISEASE: ICD-10-CM

## 2022-03-08 DIAGNOSIS — F41.1 GAD (GENERALIZED ANXIETY DISORDER): ICD-10-CM

## 2022-03-08 DIAGNOSIS — D84.9 IMMUNOCOMPROMISED: ICD-10-CM

## 2022-03-08 DIAGNOSIS — N39.0 RECURRENT UTI (URINARY TRACT INFECTION): ICD-10-CM

## 2022-03-08 DIAGNOSIS — I10 ESSENTIAL HYPERTENSION: Primary | ICD-10-CM

## 2022-03-08 DIAGNOSIS — R46.81 OBSESSIVE-COMPULSIVE BEHAVIOR: ICD-10-CM

## 2022-03-08 DIAGNOSIS — D64.9 ANEMIA OF UNKNOWN ETIOLOGY: ICD-10-CM

## 2022-03-08 DIAGNOSIS — M35.00 SJOGREN'S SYNDROME, WITH UNSPECIFIED ORGAN INVOLVEMENT: ICD-10-CM

## 2022-03-08 DIAGNOSIS — E66.01 MORBID OBESITY: ICD-10-CM

## 2022-03-08 DIAGNOSIS — M79.7 FIBROMYALGIA: ICD-10-CM

## 2022-03-08 LAB
BILIRUB UR QL STRIP: NEGATIVE
CLARITY UR: CLEAR
COLOR UR: YELLOW
GLUCOSE UR QL STRIP: NEGATIVE
HGB UR QL STRIP: NEGATIVE
KETONES UR QL STRIP: NEGATIVE
LEUKOCYTE ESTERASE UR QL STRIP: NEGATIVE
NITRITE UR QL STRIP: NEGATIVE
PH UR STRIP: 6 [PH] (ref 5–8)
PROT UR QL STRIP: NEGATIVE
SP GR UR STRIP: <=1.005 (ref 1–1.03)
URN SPEC COLLECT METH UR: ABNORMAL

## 2022-03-08 PROCEDURE — 1160F RVW MEDS BY RX/DR IN RCRD: CPT | Mod: HCNC,CPTII,S$GLB, | Performed by: INTERNAL MEDICINE

## 2022-03-08 PROCEDURE — 3008F BODY MASS INDEX DOCD: CPT | Mod: HCNC,CPTII,S$GLB, | Performed by: INTERNAL MEDICINE

## 2022-03-08 PROCEDURE — 99214 OFFICE O/P EST MOD 30 MIN: CPT | Mod: HCNC,S$GLB,, | Performed by: INTERNAL MEDICINE

## 2022-03-08 PROCEDURE — 3080F PR MOST RECENT DIASTOLIC BLOOD PRESSURE >= 90 MM HG: ICD-10-PCS | Mod: HCNC,CPTII,S$GLB, | Performed by: INTERNAL MEDICINE

## 2022-03-08 PROCEDURE — 1159F PR MEDICATION LIST DOCUMENTED IN MEDICAL RECORD: ICD-10-PCS | Mod: HCNC,CPTII,S$GLB, | Performed by: INTERNAL MEDICINE

## 2022-03-08 PROCEDURE — 3080F DIAST BP >= 90 MM HG: CPT | Mod: HCNC,CPTII,S$GLB, | Performed by: INTERNAL MEDICINE

## 2022-03-08 PROCEDURE — 99499 RISK ADDL DX/OHS AUDIT: ICD-10-PCS | Mod: HCNC,S$GLB,, | Performed by: INTERNAL MEDICINE

## 2022-03-08 PROCEDURE — 3075F SYST BP GE 130 - 139MM HG: CPT | Mod: HCNC,CPTII,S$GLB, | Performed by: INTERNAL MEDICINE

## 2022-03-08 PROCEDURE — 99214 PR OFFICE/OUTPT VISIT, EST, LEVL IV, 30-39 MIN: ICD-10-PCS | Mod: HCNC,S$GLB,, | Performed by: INTERNAL MEDICINE

## 2022-03-08 PROCEDURE — 99499 UNLISTED E&M SERVICE: CPT | Mod: HCNC,S$GLB,, | Performed by: INTERNAL MEDICINE

## 2022-03-08 PROCEDURE — 3008F PR BODY MASS INDEX (BMI) DOCUMENTED: ICD-10-PCS | Mod: HCNC,CPTII,S$GLB, | Performed by: INTERNAL MEDICINE

## 2022-03-08 PROCEDURE — 99999 PR PBB SHADOW E&M-EST. PATIENT-LVL V: CPT | Mod: PBBFAC,HCNC,, | Performed by: INTERNAL MEDICINE

## 2022-03-08 PROCEDURE — 1160F PR REVIEW ALL MEDS BY PRESCRIBER/CLIN PHARMACIST DOCUMENTED: ICD-10-PCS | Mod: HCNC,CPTII,S$GLB, | Performed by: INTERNAL MEDICINE

## 2022-03-08 PROCEDURE — 99999 PR PBB SHADOW E&M-EST. PATIENT-LVL V: ICD-10-PCS | Mod: PBBFAC,HCNC,, | Performed by: INTERNAL MEDICINE

## 2022-03-08 PROCEDURE — 3075F PR MOST RECENT SYSTOLIC BLOOD PRESS GE 130-139MM HG: ICD-10-PCS | Mod: HCNC,CPTII,S$GLB, | Performed by: INTERNAL MEDICINE

## 2022-03-08 PROCEDURE — 81003 URINALYSIS AUTO W/O SCOPE: CPT | Mod: HCNC | Performed by: INTERNAL MEDICINE

## 2022-03-08 PROCEDURE — 1159F MED LIST DOCD IN RCRD: CPT | Mod: HCNC,CPTII,S$GLB, | Performed by: INTERNAL MEDICINE

## 2022-03-08 RX ORDER — CEVIMELINE HYDROCHLORIDE 30 MG/1
CAPSULE ORAL
COMMUNITY
Start: 2021-12-02 | End: 2022-05-02

## 2022-03-08 RX ORDER — ESTRADIOL 0.1 MG/G
1 CREAM VAGINAL
Qty: 42.5 G | Refills: 3 | Status: SHIPPED | OUTPATIENT
Start: 2022-03-10 | End: 2022-05-02

## 2022-03-08 NOTE — TELEPHONE ENCOUNTER
----- Message from Rylee Ireland MA sent at 3/8/2022  9:39 AM CST -----  Regarding: returning missed call  Type:  Patient Returning Call         Who Called: PENG MAYO [[id number]]         Who Left Message for Patient:         Does the patient know what this is regarding? Unknown          Best Call Back Number:[phone]         Additional Information:

## 2022-03-08 NOTE — TELEPHONE ENCOUNTER
Returned pt's call and let her Dr. Rasmussen recommends tylenol OTC for pain (pt had asked when she was in office today)  Pt verbalized understanding and had no further concerns or questions.

## 2022-03-10 ENCOUNTER — INFUSION (OUTPATIENT)
Dept: INFUSION THERAPY | Facility: HOSPITAL | Age: 52
End: 2022-03-10
Payer: MEDICARE

## 2022-03-10 VITALS
RESPIRATION RATE: 18 BRPM | SYSTOLIC BLOOD PRESSURE: 140 MMHG | HEART RATE: 101 BPM | DIASTOLIC BLOOD PRESSURE: 87 MMHG | TEMPERATURE: 97 F

## 2022-03-10 DIAGNOSIS — D50.9 IRON DEFICIENCY ANEMIA, UNSPECIFIED IRON DEFICIENCY ANEMIA TYPE: Primary | ICD-10-CM

## 2022-03-10 PROCEDURE — 96374 THER/PROPH/DIAG INJ IV PUSH: CPT | Mod: HCNC

## 2022-03-10 PROCEDURE — 25000003 PHARM REV CODE 250: Mod: HCNC | Performed by: INTERNAL MEDICINE

## 2022-03-10 PROCEDURE — 63600175 PHARM REV CODE 636 W HCPCS: Mod: HCNC | Performed by: INTERNAL MEDICINE

## 2022-03-10 RX ORDER — SODIUM CHLORIDE 0.9 % (FLUSH) 0.9 %
10 SYRINGE (ML) INJECTION
Status: DISCONTINUED | OUTPATIENT
Start: 2022-03-10 | End: 2022-03-10 | Stop reason: HOSPADM

## 2022-03-10 RX ORDER — HEPARIN 100 UNIT/ML
500 SYRINGE INTRAVENOUS
Status: DISCONTINUED | OUTPATIENT
Start: 2022-03-10 | End: 2022-03-10 | Stop reason: HOSPADM

## 2022-03-10 RX ADMIN — IRON SUCROSE 200 MG: 20 INJECTION, SOLUTION INTRAVENOUS at 09:03

## 2022-03-10 RX ADMIN — SODIUM CHLORIDE: 9 INJECTION, SOLUTION INTRAVENOUS at 09:03

## 2022-03-10 NOTE — PLAN OF CARE
Patient meets parameters for treatment today. Patient tolerated Venofer injection and 30 mins observation with VSS and no complications. Patient instructed to call clinic with any problems or concerns. Patient verbalize understanding. AVS given and patient discharged home.     Problem: Anemia  Goal: Anemia Symptom Improvement  Outcome: Ongoing, Progressing

## 2022-03-15 ENCOUNTER — TELEPHONE (OUTPATIENT)
Dept: INTERNAL MEDICINE | Facility: CLINIC | Age: 52
End: 2022-03-15
Payer: MEDICARE

## 2022-03-15 ENCOUNTER — PATIENT MESSAGE (OUTPATIENT)
Dept: INTERNAL MEDICINE | Facility: CLINIC | Age: 52
End: 2022-03-15
Payer: MEDICARE

## 2022-03-15 NOTE — TELEPHONE ENCOUNTER
----- Message from Cherelle Lee MA sent at 3/8/2022  8:27 AM CST -----  Regarding: bp  Past week

## 2022-03-17 ENCOUNTER — TELEPHONE (OUTPATIENT)
Dept: HEMATOLOGY/ONCOLOGY | Facility: CLINIC | Age: 52
End: 2022-03-17
Payer: MEDICARE

## 2022-03-17 ENCOUNTER — INFUSION (OUTPATIENT)
Dept: INFUSION THERAPY | Facility: HOSPITAL | Age: 52
End: 2022-03-17
Payer: MEDICARE

## 2022-03-17 VITALS
DIASTOLIC BLOOD PRESSURE: 81 MMHG | RESPIRATION RATE: 18 BRPM | TEMPERATURE: 98 F | SYSTOLIC BLOOD PRESSURE: 115 MMHG | HEART RATE: 102 BPM

## 2022-03-17 DIAGNOSIS — D50.9 IRON DEFICIENCY ANEMIA, UNSPECIFIED IRON DEFICIENCY ANEMIA TYPE: Primary | ICD-10-CM

## 2022-03-17 PROCEDURE — 25000003 PHARM REV CODE 250: Performed by: INTERNAL MEDICINE

## 2022-03-17 PROCEDURE — 96374 THER/PROPH/DIAG INJ IV PUSH: CPT

## 2022-03-17 PROCEDURE — 63600175 PHARM REV CODE 636 W HCPCS: Performed by: INTERNAL MEDICINE

## 2022-03-17 RX ORDER — HEPARIN 100 UNIT/ML
500 SYRINGE INTRAVENOUS
Status: DISCONTINUED | OUTPATIENT
Start: 2022-03-17 | End: 2022-03-17 | Stop reason: HOSPADM

## 2022-03-17 RX ORDER — SODIUM CHLORIDE 0.9 % (FLUSH) 0.9 %
10 SYRINGE (ML) INJECTION
Status: DISCONTINUED | OUTPATIENT
Start: 2022-03-17 | End: 2022-03-17 | Stop reason: HOSPADM

## 2022-03-17 RX ADMIN — SODIUM CHLORIDE: 0.9 INJECTION, SOLUTION INTRAVENOUS at 09:03

## 2022-03-17 RX ADMIN — IRON SUCROSE 200 MG: 20 INJECTION, SOLUTION INTRAVENOUS at 09:03

## 2022-03-17 NOTE — PLAN OF CARE
Patient tolerated Venofer and 30 minute observation with no complications. VSS. Pt instructed to call MD with any problems. NAD. Pt discharged home independently.

## 2022-03-21 ENCOUNTER — PATIENT MESSAGE (OUTPATIENT)
Dept: INTERNAL MEDICINE | Facility: CLINIC | Age: 52
End: 2022-03-21
Payer: MEDICARE

## 2022-03-21 ENCOUNTER — TELEPHONE (OUTPATIENT)
Dept: INTERNAL MEDICINE | Facility: CLINIC | Age: 52
End: 2022-03-21
Payer: MEDICARE

## 2022-03-21 DIAGNOSIS — G89.4 CHRONIC PAIN SYNDROME: ICD-10-CM

## 2022-03-21 RX ORDER — HYDROCODONE BITARTRATE AND ACETAMINOPHEN 5; 325 MG/1; MG/1
1 TABLET ORAL
Qty: 30 TABLET | Refills: 0 | Status: SHIPPED | OUTPATIENT
Start: 2022-03-21 | End: 2022-04-18 | Stop reason: SDUPTHER

## 2022-03-21 NOTE — TELEPHONE ENCOUNTER
----- Message from Michel Martino sent at 3/21/2022 10:34 AM CDT -----  Who called?:Shaylee ([facility])      What is the request in detail:Shaylee states the HYDROcodone-acetaminophen (NORCO) 5-325 mg per tablet is locked in the system and would like to know if she can have another prescription sent over so that it can be filled.        Can the clinic reply by [facility]?:        Best Call Back Number: [phone]

## 2022-03-21 NOTE — TELEPHONE ENCOUNTER
No new care gaps identified.  Powered by [employer]. Reference number: [id number].   3/21/2022 10:47:43 AM CDT

## 2022-03-21 NOTE — TELEPHONE ENCOUNTER
Called pharmacy to see what's going on with this refill    They stated they had spoken w/ pt and let her know that it was too soon to refill this medication, but that it will be ready for her tomorrow.  msgd pt to let her know.

## 2022-03-21 NOTE — TELEPHONE ENCOUNTER
----- Message from Mariana Bishop sent at 3/21/2022  9:33 AM CDT -----  Regarding: Call Back Request  Name of Who is Calling: PENG MAYO [[id number]]           What is the request in detail: Patient is requesting a call back from Audrey to discuss her medication.  She states that the prescription for HYDROcodone-acetaminophen (NORCO) 5-325 mg per tablet was written incorrectly. Please assist.           Can the clinic reply by [facility]: No           What Number to Call Back if not in [facility]: [phone]

## 2022-03-21 NOTE — TELEPHONE ENCOUNTER
Returned Varun' call  They stated we need to resent rx in bc it's locked in their system   Routed Rx refill request to provider - checked allergies/ included last office visit date

## 2022-03-23 ENCOUNTER — PATIENT OUTREACH (OUTPATIENT)
Dept: ADMINISTRATIVE | Facility: OTHER | Age: 52
End: 2022-03-23
Payer: MEDICARE

## 2022-03-25 ENCOUNTER — INFUSION (OUTPATIENT)
Dept: INFUSION THERAPY | Facility: HOSPITAL | Age: 52
End: 2022-03-25
Payer: MEDICARE

## 2022-03-25 VITALS
DIASTOLIC BLOOD PRESSURE: 74 MMHG | SYSTOLIC BLOOD PRESSURE: 121 MMHG | BODY MASS INDEX: 41.94 KG/M2 | HEART RATE: 102 BPM | HEIGHT: 67 IN | RESPIRATION RATE: 17 BRPM | WEIGHT: 267.19 LBS | TEMPERATURE: 98 F

## 2022-03-25 DIAGNOSIS — D50.9 IRON DEFICIENCY ANEMIA, UNSPECIFIED IRON DEFICIENCY ANEMIA TYPE: Primary | ICD-10-CM

## 2022-03-25 PROCEDURE — 96374 THER/PROPH/DIAG INJ IV PUSH: CPT

## 2022-03-25 PROCEDURE — 63600175 PHARM REV CODE 636 W HCPCS: Performed by: INTERNAL MEDICINE

## 2022-03-25 PROCEDURE — 25000003 PHARM REV CODE 250: Performed by: INTERNAL MEDICINE

## 2022-03-25 RX ORDER — SODIUM CHLORIDE 0.9 % (FLUSH) 0.9 %
10 SYRINGE (ML) INJECTION
Status: DISCONTINUED | OUTPATIENT
Start: 2022-03-25 | End: 2022-03-25 | Stop reason: HOSPADM

## 2022-03-25 RX ORDER — HEPARIN 100 UNIT/ML
500 SYRINGE INTRAVENOUS
Status: DISCONTINUED | OUTPATIENT
Start: 2022-03-25 | End: 2022-03-25 | Stop reason: HOSPADM

## 2022-03-25 RX ADMIN — IRON SUCROSE 200 MG: 20 INJECTION, SOLUTION INTRAVENOUS at 08:03

## 2022-03-25 RX ADMIN — SODIUM CHLORIDE: 0.9 INJECTION, SOLUTION INTRAVENOUS at 08:03

## 2022-03-25 NOTE — PLAN OF CARE
Pt arrived AAOx3, VSS, for venofer IVP #4. Pt tolerated withoutincident and was discharged in stable ambulatory condition to home. Pt OBS for 20 min.

## 2022-03-31 ENCOUNTER — INFUSION (OUTPATIENT)
Dept: INFUSION THERAPY | Facility: HOSPITAL | Age: 52
End: 2022-03-31
Payer: MEDICARE

## 2022-03-31 ENCOUNTER — EXTERNAL CHRONIC CARE MANAGEMENT (OUTPATIENT)
Dept: PRIMARY CARE CLINIC | Facility: CLINIC | Age: 52
End: 2022-03-31
Payer: MEDICARE

## 2022-03-31 VITALS
RESPIRATION RATE: 16 BRPM | DIASTOLIC BLOOD PRESSURE: 87 MMHG | HEART RATE: 83 BPM | SYSTOLIC BLOOD PRESSURE: 139 MMHG | TEMPERATURE: 99 F

## 2022-03-31 DIAGNOSIS — D50.9 IRON DEFICIENCY ANEMIA, UNSPECIFIED IRON DEFICIENCY ANEMIA TYPE: Primary | ICD-10-CM

## 2022-03-31 PROCEDURE — 96374 THER/PROPH/DIAG INJ IV PUSH: CPT

## 2022-03-31 PROCEDURE — 99490 CHRNC CARE MGMT STAFF 1ST 20: CPT | Mod: S$GLB,,, | Performed by: INTERNAL MEDICINE

## 2022-03-31 PROCEDURE — 63600175 PHARM REV CODE 636 W HCPCS: Performed by: INTERNAL MEDICINE

## 2022-03-31 PROCEDURE — 99490 PR CHRONIC CARE MGMT, 1ST 20 MIN: ICD-10-PCS | Mod: S$GLB,,, | Performed by: INTERNAL MEDICINE

## 2022-03-31 RX ADMIN — IRON SUCROSE 200 MG: 20 INJECTION, SOLUTION INTRAVENOUS at 10:03

## 2022-03-31 NOTE — PLAN OF CARE
Pt here for Venofer.  Assessment complete and VSS.  Pt tolerated iron well, no reaction suspected after 30 min observation.  No questions or concerns.  Pt ambulated out of unit unassisted.

## 2022-04-07 ENCOUNTER — INFUSION (OUTPATIENT)
Dept: INFUSION THERAPY | Facility: HOSPITAL | Age: 52
End: 2022-04-07
Payer: MEDICARE

## 2022-04-07 VITALS
TEMPERATURE: 98 F | SYSTOLIC BLOOD PRESSURE: 124 MMHG | HEART RATE: 90 BPM | OXYGEN SATURATION: 99 % | RESPIRATION RATE: 16 BRPM | DIASTOLIC BLOOD PRESSURE: 73 MMHG

## 2022-04-07 DIAGNOSIS — D50.9 IRON DEFICIENCY ANEMIA, UNSPECIFIED IRON DEFICIENCY ANEMIA TYPE: Primary | ICD-10-CM

## 2022-04-07 PROCEDURE — 96374 THER/PROPH/DIAG INJ IV PUSH: CPT

## 2022-04-07 PROCEDURE — 63600175 PHARM REV CODE 636 W HCPCS: Performed by: INTERNAL MEDICINE

## 2022-04-07 RX ADMIN — IRON SUCROSE 200 MG: 20 INJECTION, SOLUTION INTRAVENOUS at 09:04

## 2022-04-07 NOTE — PLAN OF CARE
Pt here for Venofer infusion.  Assessment complete and labs reviewed. VSS.  Pt tolerated infusion well, no reaction suspected after 30 min observation.  No questions or concerns.  Pt ambulated out of unit unassisted.

## 2022-04-14 ENCOUNTER — INFUSION (OUTPATIENT)
Dept: INFUSION THERAPY | Facility: HOSPITAL | Age: 52
End: 2022-04-14
Payer: MEDICARE

## 2022-04-14 ENCOUNTER — IMMUNIZATION (OUTPATIENT)
Dept: PHARMACY | Facility: CLINIC | Age: 52
End: 2022-04-14
Payer: MEDICARE

## 2022-04-14 VITALS
HEART RATE: 97 BPM | SYSTOLIC BLOOD PRESSURE: 124 MMHG | TEMPERATURE: 98 F | RESPIRATION RATE: 16 BRPM | OXYGEN SATURATION: 97 % | DIASTOLIC BLOOD PRESSURE: 74 MMHG

## 2022-04-14 DIAGNOSIS — Z23 NEED FOR VACCINATION: Primary | ICD-10-CM

## 2022-04-14 DIAGNOSIS — D50.9 IRON DEFICIENCY ANEMIA, UNSPECIFIED IRON DEFICIENCY ANEMIA TYPE: Primary | ICD-10-CM

## 2022-04-14 PROCEDURE — 96374 THER/PROPH/DIAG INJ IV PUSH: CPT

## 2022-04-14 PROCEDURE — 63600175 PHARM REV CODE 636 W HCPCS: Performed by: NURSE PRACTITIONER

## 2022-04-14 RX ORDER — SODIUM CHLORIDE 0.9 % (FLUSH) 0.9 %
10 SYRINGE (ML) INJECTION
Status: CANCELLED | OUTPATIENT
Start: 2022-04-14

## 2022-04-14 RX ORDER — HEPARIN 100 UNIT/ML
500 SYRINGE INTRAVENOUS
Status: CANCELLED | OUTPATIENT
Start: 2022-04-14

## 2022-04-14 RX ADMIN — IRON SUCROSE 200 MG: 20 INJECTION, SOLUTION INTRAVENOUS at 09:04

## 2022-04-14 NOTE — PLAN OF CARE
Pt here for Venforer IV push.  Assessment complete and labs reviewed.  VSS.  Pt tolerated Venofer well, no reaction suspected after 30 min observation period.  No questions or concerns.  Pt ambulated out of unit unassisted.

## 2022-04-18 ENCOUNTER — OFFICE VISIT (OUTPATIENT)
Dept: PSYCHIATRY | Facility: CLINIC | Age: 52
End: 2022-04-18
Payer: MEDICARE

## 2022-04-18 ENCOUNTER — PATIENT MESSAGE (OUTPATIENT)
Dept: INTERNAL MEDICINE | Facility: CLINIC | Age: 52
End: 2022-04-18
Payer: MEDICARE

## 2022-04-18 DIAGNOSIS — F51.05 INSOMNIA DUE TO OTHER MENTAL DISORDER: ICD-10-CM

## 2022-04-18 DIAGNOSIS — R46.81 OBSESSIVE-COMPULSIVE BEHAVIOR: ICD-10-CM

## 2022-04-18 DIAGNOSIS — R12 HEARTBURN: ICD-10-CM

## 2022-04-18 DIAGNOSIS — F39 MOOD DISORDER: ICD-10-CM

## 2022-04-18 DIAGNOSIS — F41.1 GAD (GENERALIZED ANXIETY DISORDER): Primary | ICD-10-CM

## 2022-04-18 DIAGNOSIS — F99 INSOMNIA DUE TO OTHER MENTAL DISORDER: ICD-10-CM

## 2022-04-18 PROCEDURE — 99214 OFFICE O/P EST MOD 30 MIN: CPT | Mod: 95,,, | Performed by: INTERNAL MEDICINE

## 2022-04-18 PROCEDURE — 99214 PR OFFICE/OUTPT VISIT, EST, LEVL IV, 30-39 MIN: ICD-10-PCS | Mod: 95,,, | Performed by: INTERNAL MEDICINE

## 2022-04-18 NOTE — TELEPHONE ENCOUNTER
No new care gaps identified.  Powered by [employer] by [employer]. Reference number: [id number].   4/18/2022 8:59:52 AM CDT

## 2022-04-18 NOTE — PROGRESS NOTES
OUTPATIENT PSYCHIATRY RETURN VISIT    ENCOUNTER DATE:  4/18/2022  SITE:  [facility]  LENGTH OF SESSION:  10 minutes    The patient location is:  [place], not in healthcare facility  The chief complaint leading to consultation is:  Anxiety, mood    Visit type:  Audiovisual    Face to Face time with patient:  10 minutes  15 minutes of total time spent on the encounter, which includes face to face time and non-face to face time preparing to see the patient (eg, review of tests), Obtaining and/or reviewing separately obtained history, Documenting clinical information in the electronic or other health record, Independently interpreting results (not separately reported) and communicating results to the patient/family/caregiver, or Care coordination (not separately reported).     Each patient to whom he or she provides medical services by telemedicine is:  (1) informed of the relationship between the physician and patient and the respective role of any other health care provider with respect to management of the patient; and (2) notified that he or she may decline to receive medical services by telemedicine and may withdraw from such care at any time.    CHIEF COMPLAINT:  Anxiety      HISTORY OF PRESENTING ILLNESS:  Jaki Bajaw is a 51 y.o. female with history of Mood disorder, JUAN JOSE, OCD, social phobia, and Internet shopping addiction who presents for follow up appointment.      Plan at last appointment on 1/20/2022:  · Mood is currently stable.  · Continue Latuda 60mg daily, Buspar 15mg daily, Wellbutrin XL 450mg daily, Doxepin 150mg qHS for insomnia and mood, and Valium 2mg daily PRN severe anxiety.     · Continue individual therapy with Mr. Noah LCSW.    History as told by patient:  A little agitated and tired today.  Had some rough days - busy Easter - seafood boil, Easter egg wells, hot.  Scott and kids surprised her last birthday - family came to visit.  Was shocked.  Says  this triggered some depression because it had been so long and didn't want them to leave.  Hadn't seen some of them in a few years.  Hadn't travelled because of COVID.  Feels this is what was going on at last appointment.  This has all gotten better now.  Denies depression.  Anxiety about the same.  Still hasn't left Scott's house - they are arguing.  Going home after Mother's Day.  No change in shopping obsession.  Feels like she is less of a women due to her depression and anxiety.  Hasn't seen Mr. Zamora lately but plans to schedule.       Medication side effects:  Denies  Medication compliance:  Yes    PSYCHIATRIC REVIEW OF SYSTEMS:  Trouble with sleep:  Denies  Appetite changes:  Denies  Weight changes:  Denies  Lack of energy:  At times  Anhedonia:  Improved  Somatic symptoms:  Denies  Libido:  Denies  Anxiety/panic:  Chronic but currently stable  Guilty/hopeless:  Denies  Self-injurious behavior/risky behavior:  Denies  Any drugs:  Denies  Alcohol:  Denies    MEDICAL REVIEW OF SYSTEMS:  Complete review of systems performed covering Constitutional, Musculoskeletal, Neurologic.  All systems negative except for that covered in HPI.    PAST PSYCHIATRIC, MEDICAL, AND SOCIAL HISTORY REVIEWED  The patient's past medical, family and social history have been reviewed and updated as appropriate within the electronic medical record - see encounter notes.    MEDICATIONS:    Current Outpatient Medications:     buPROPion (WELLBUTRIN XL) 150 MG TB24 tablet, Take 3 tablets (450 mg total) by mouth every morning., Disp: 270 tablet, Rfl: 1    busPIRone (BUSPAR) 15 MG tablet, TAKE 1 TABLET(15 MG) BY MOUTH THREE TIMES DAILY, Disp: 270 tablet, Rfl: 1    cetirizine (ZYRTEC) 10 MG tablet, Take 1 tablet (10 mg total) by mouth every evening., Disp: 30 tablet, Rfl: 0    cevimeline (EVOXAC) 30 mg capsule, , Disp: , Rfl:     cycloSPORINE (RESTASIS) 0.05 % ophthalmic emulsion, Place 1 drop into both eyes 2 (two) times daily.,  Disp: 60 each, Rfl: 1    diazePAM (VALIUM) 2 MG tablet, Take 1 tablet (2 mg total) by mouth 2 (two) times daily as needed (Severe anxiety/Panic attack)., Disp: 60 tablet, Rfl: 2    doxepin (SINEQUAN) 75 MG capsule, TAKE 2 CAPSULES(150 MG) BY MOUTH EVERY EVENING, Disp: 60 capsule, Rfl: 11    estradioL (ESTRACE) 0.01 % (0.1 mg/gram) vaginal cream, Place 1 g vaginally twice a week., Disp: 42.5 g, Rfl: 3    fluconazole (DIFLUCAN) 200 MG Tab, Take 1 tablet (200 mg total) by mouth every 72 hours as needed., Disp: 2 tablet, Rfl: 0    fluticasone propionate (FLONASE) 50 mcg/actuation nasal spray, SHAKE LIQUID AND USE 1 SPRAY(50 MCG) IN EACH NOSTRIL EVERY DAY (Patient not taking: Reported on 3/8/2022), Disp: 48 g, Rfl: 3    gabapentin (NEURONTIN) 300 MG capsule, Take 1 capsule (300 mg total) by mouth 3 (three) times daily., Disp: 90 capsule, Rfl: 5    HYDROcodone-acetaminophen (NORCO) 5-325 mg per tablet, Take 1 tablet by mouth every 24 hours as needed for Pain. Quantity medically necessary, Disp: 30 tablet, Rfl: 0    hydrOXYchloroQUINE (PLAQUENIL) 200 mg tablet, One pill bid, Disp: 180 tablet, Rfl: 2    irbesartan-hydrochlorothiazide (AVALIDE) 150-12.5 mg per tablet, Take 1 tablet by mouth once daily., Disp: 90 tablet, Rfl: 3    lurasidone (LATUDA) 60 mg Tab tablet, Take 1 tablet (60 mg total) by mouth once daily., Disp: 90 tablet, Rfl: 3    naproxen (NAPROSYN) 500 MG tablet, TAKE 1 TABLET(500 MG) BY MOUTH TWICE DAILY AS NEEDED FOR PAIN, Disp: 180 tablet, Rfl: 1    NARCAN 4 mg/actuation [place], SPRAY 1 SPRAY IN NOSTRIL ONCE FOR 1 DOSE, Disp: , Rfl:     omeprazole (PRILOSEC) 40 MG capsule, TAKE 1 CAPSULE(40 MG) BY MOUTH EVERY DAY, Disp: 90 capsule, Rfl: 1    tiZANidine (ZANAFLEX) 4 MG tablet, 8 mg tid, Disp: 180 tablet, Rfl: 5    ALLERGIES:  Review of patient's allergies indicates:   Allergen Reactions    Aspirin Hives       PSYCHIATRIC EXAM:  There were no vitals filed for this visit.  Appearance:  Well  "groomed, appearing healthy and of stated age  Behavior:  Cooperative, pleasant, no psychomotor agitation or retardation  Speech:  Normal rate, rhythm, prosody, and volume  Mood:  "Ok"  Affect:  Euthymic  Thought Process:  Linear, logical, goal directed  Thought Content:  Negative for suicidal ideation, homicidal ideation, delusions or hallucinations.  Associations:  Intact  Memory:  Grossly Intact  Level of Consciousness/Orientation:  Grossly intact  Fund of Knowledge:  Good  Attention:  Good  Language:  Fluent, able to name abstract and concrete objects  Insight:  Fair  Judgment:  Intact  Psychomotor signs:  No abnormal movements of face  Gait:  Unable to assess via virtual visit      RELEVANT LABS/STUDIES:    Lab Results   Component Value Date    WBC 6.57 02/15/2022    HGB 10.0 (L) 02/15/2022    HCT 35.1 (L) 02/15/2022    MCV 81 (L) 02/15/2022     02/15/2022     BMP  Lab Results   Component Value Date     02/15/2022    K 4.0 02/15/2022     02/15/2022    CO2 29 02/15/2022    BUN 18 02/15/2022    CREATININE 1.2 02/15/2022    CALCIUM 9.7 02/15/2022    ANIONGAP 10 02/15/2022    ESTGFRAFRICA 59.6 (A) 02/15/2022    EGFRNONAA 51.7 (A) 02/15/2022     Lab Results   Component Value Date    ALT 10 02/15/2022    AST 16 02/15/2022    ALKPHOS 85 02/15/2022    BILITOT 0.3 02/15/2022     Lab Results   Component Value Date    TSH 2.563 09/22/2021     Lab Results   Component Value Date    HGBA1C 5.3 10/13/2021       IMPRESSION:    Jaki Bajwa is a 51 y.o. female with history of Mood disorder, JUAN JOSE, OCD, social phobia, and Internet shopping addiction who presents for follow up appointment.    DIAGNOSES:    ICD-10-CM ICD-9-CM   1. JUAN JOSE (generalized anxiety disorder)  F41.1 300.02   2. Obsessive-compulsive behavior  R46.81 300.3   3. Mood disorder  F39 296.90   4. Insomnia due to other mental disorder  F51.05 300.9    F99 327.02     PLAN:  · Discussed plan to only take Valium 2mg as needed for severe anxiety.  " Mood is currently stable.  · Continue Latuda 60mg daily, Buspar 15mg daily, Wellbutrin XL 450mg daily, Doxepin 150mg qHS for insomnia and mood, and Valium 2mg daily PRN severe anxiety.     · Continue individual therapy with Mr. Noah LCSW.    RETURN TO CLINIC:  Follow up in about 3 months (around 7/18/2022).

## 2022-04-19 RX ORDER — OMEPRAZOLE 40 MG/1
CAPSULE, DELAYED RELEASE ORAL
Qty: 90 CAPSULE | Refills: 3 | Status: SHIPPED | OUTPATIENT
Start: 2022-04-19 | End: 2022-05-16 | Stop reason: SDUPTHER

## 2022-04-19 NOTE — TELEPHONE ENCOUNTER
Refill Routing Note   Medication(s) are not appropriate for processing by [facility] for the following reason(s):      - Required indication for medication not on problem list (GERD)    [facility] action(s):  Defer          Medication reconciliation completed: No     Appointments  past 12m or future 3m with PCP    Date Provider   Last Visit   3/8/2022 Praitk Rasmussen MD   Next Visit   6/8/2022 Pratik Rasmussen MD   ED visits in past 90 days: 0        Note composed:7:16 PM 04/18/2022

## 2022-04-21 ENCOUNTER — INFUSION (OUTPATIENT)
Dept: INFUSION THERAPY | Facility: HOSPITAL | Age: 52
End: 2022-04-21
Payer: MEDICARE

## 2022-04-21 VITALS
SYSTOLIC BLOOD PRESSURE: 130 MMHG | RESPIRATION RATE: 16 BRPM | TEMPERATURE: 98 F | OXYGEN SATURATION: 95 % | HEART RATE: 98 BPM | DIASTOLIC BLOOD PRESSURE: 72 MMHG

## 2022-04-21 DIAGNOSIS — D50.9 IRON DEFICIENCY ANEMIA, UNSPECIFIED IRON DEFICIENCY ANEMIA TYPE: Primary | ICD-10-CM

## 2022-04-21 PROCEDURE — 63600175 PHARM REV CODE 636 W HCPCS: Performed by: NURSE PRACTITIONER

## 2022-04-21 PROCEDURE — 96374 THER/PROPH/DIAG INJ IV PUSH: CPT

## 2022-04-21 RX ORDER — HEPARIN 100 UNIT/ML
500 SYRINGE INTRAVENOUS
Status: CANCELLED | OUTPATIENT
Start: 2022-04-21

## 2022-04-21 RX ORDER — SODIUM CHLORIDE 0.9 % (FLUSH) 0.9 %
10 SYRINGE (ML) INJECTION
Status: CANCELLED | OUTPATIENT
Start: 2022-04-21

## 2022-04-21 RX ADMIN — IRON SUCROSE 200 MG: 20 INJECTION, SOLUTION INTRAVENOUS at 09:04

## 2022-04-21 NOTE — PLAN OF CARE
Pt here Venofer IV push.  Assessment complete and labs reviewed.  VSS.  Pt tolerated Venofer well, no reaction after 30 minute observation.  No questions or concerns.  Pt ambulated out of unit unassisted.

## 2022-04-23 ENCOUNTER — HOSPITAL ENCOUNTER (OUTPATIENT)
Facility: HOSPITAL | Age: 52
Discharge: HOME | End: 2022-04-25
Attending: EMERGENCY MEDICINE | Admitting: INTERNAL MEDICINE
Payer: MEDICARE

## 2022-04-23 DIAGNOSIS — K62.5 BLEEDING PER RECTUM: ICD-10-CM

## 2022-04-23 DIAGNOSIS — I10 ESSENTIAL HYPERTENSION: Chronic | ICD-10-CM

## 2022-04-23 DIAGNOSIS — R19.5 DARK STOOLS: ICD-10-CM

## 2022-04-23 DIAGNOSIS — K92.2 GI BLEED: ICD-10-CM

## 2022-04-23 DIAGNOSIS — N17.9 AKI (ACUTE KIDNEY INJURY): Primary | ICD-10-CM

## 2022-04-23 DIAGNOSIS — N18.31 STAGE 3A CHRONIC KIDNEY DISEASE: ICD-10-CM

## 2022-04-23 DIAGNOSIS — F41.1 GAD (GENERALIZED ANXIETY DISORDER): ICD-10-CM

## 2022-04-23 PROBLEM — K64.9 BLEEDING HEMORRHOID: Status: ACTIVE | Noted: 2022-04-23

## 2022-04-23 LAB
ABO + RH BLD: NORMAL
ALBUMIN SERPL BCP-MCNC: 3.9 G/DL (ref 3.5–5.2)
ALP SERPL-CCNC: 84 U/L (ref 55–135)
ALT SERPL W/O P-5'-P-CCNC: 12 U/L (ref 10–44)
ANION GAP SERPL CALC-SCNC: 10 MMOL/L (ref 8–16)
APTT BLDCRRT: 21.9 SEC (ref 21–32)
AST SERPL-CCNC: 17 U/L (ref 10–40)
BACTERIA #/AREA URNS AUTO: ABNORMAL /HPF
BASOPHILS # BLD AUTO: 0.07 K/UL (ref 0–0.2)
BASOPHILS NFR BLD: 0.6 % (ref 0–1.9)
BILIRUB SERPL-MCNC: 0.3 MG/DL (ref 0.1–1)
BILIRUB UR QL STRIP: NEGATIVE
BLD GP AB SCN CELLS X3 SERPL QL: NORMAL
BUN SERPL-MCNC: 32 MG/DL (ref 6–20)
CALCIUM SERPL-MCNC: 9.5 MG/DL (ref 8.7–10.5)
CHLORIDE SERPL-SCNC: 100 MMOL/L (ref 95–110)
CLARITY UR REFRACT.AUTO: ABNORMAL
CO2 SERPL-SCNC: 30 MMOL/L (ref 23–29)
COLOR UR AUTO: YELLOW
CREAT SERPL-MCNC: 2.2 MG/DL (ref 0.5–1.4)
DIFFERENTIAL METHOD: ABNORMAL
EOSINOPHIL # BLD AUTO: 0.2 K/UL (ref 0–0.5)
EOSINOPHIL NFR BLD: 2 % (ref 0–8)
ERYTHROCYTE [DISTWIDTH] IN BLOOD BY AUTOMATED COUNT: 18.5 % (ref 11.5–14.5)
EST. GFR  (AFRICAN AMERICAN): 28.7 ML/MIN/1.73 M^2
EST. GFR  (NON AFRICAN AMERICAN): 24.9 ML/MIN/1.73 M^2
GLUCOSE SERPL-MCNC: 85 MG/DL (ref 70–110)
GLUCOSE UR QL STRIP: NEGATIVE
HCT VFR BLD AUTO: 35.5 % (ref 37–48.5)
HGB BLD-MCNC: 10.5 G/DL (ref 12–16)
HGB UR QL STRIP: ABNORMAL
IMM GRANULOCYTES # BLD AUTO: 0.04 K/UL (ref 0–0.04)
IMM GRANULOCYTES NFR BLD AUTO: 0.3 % (ref 0–0.5)
INR PPP: 1 (ref 0.8–1.2)
KETONES UR QL STRIP: NEGATIVE
LEUKOCYTE ESTERASE UR QL STRIP: ABNORMAL
LYMPHOCYTES # BLD AUTO: 2.4 K/UL (ref 1–4.8)
LYMPHOCYTES NFR BLD: 20.3 % (ref 18–48)
MCH RBC QN AUTO: 24.1 PG (ref 27–31)
MCHC RBC AUTO-ENTMCNC: 29.6 G/DL (ref 32–36)
MCV RBC AUTO: 81 FL (ref 82–98)
MICROSCOPIC COMMENT: ABNORMAL
MONOCYTES # BLD AUTO: 0.7 K/UL (ref 0.3–1)
MONOCYTES NFR BLD: 5.6 % (ref 4–15)
NEUTROPHILS # BLD AUTO: 8.3 K/UL (ref 1.8–7.7)
NEUTROPHILS NFR BLD: 71.2 % (ref 38–73)
NITRITE UR QL STRIP: POSITIVE
NRBC BLD-RTO: 0 /100 WBC
PH UR STRIP: 5 [PH] (ref 5–8)
PLATELET # BLD AUTO: 291 K/UL (ref 150–450)
PMV BLD AUTO: 11 FL (ref 9.2–12.9)
POTASSIUM SERPL-SCNC: 4.2 MMOL/L (ref 3.5–5.1)
PROT SERPL-MCNC: 8.2 G/DL (ref 6–8.4)
PROT UR QL STRIP: NEGATIVE
PROTHROMBIN TIME: 10.1 SEC (ref 9–12.5)
RBC # BLD AUTO: 4.36 M/UL (ref 4–5.4)
RBC #/AREA URNS AUTO: 7 /HPF (ref 0–4)
SODIUM SERPL-SCNC: 140 MMOL/L (ref 136–145)
SP GR UR STRIP: 1.01 (ref 1–1.03)
SQUAMOUS #/AREA URNS AUTO: 1 /HPF
URN SPEC COLLECT METH UR: ABNORMAL
WBC # BLD AUTO: 11.59 K/UL (ref 3.9–12.7)
WBC #/AREA URNS AUTO: >100 /HPF (ref 0–5)
WBC CLUMPS UR QL AUTO: ABNORMAL

## 2022-04-23 PROCEDURE — 96375 TX/PRO/DX INJ NEW DRUG ADDON: CPT

## 2022-04-23 PROCEDURE — 99285 EMERGENCY DEPT VISIT HI MDM: CPT | Mod: ,,, | Performed by: EMERGENCY MEDICINE

## 2022-04-23 PROCEDURE — 86850 RBC ANTIBODY SCREEN: CPT | Performed by: EMERGENCY MEDICINE

## 2022-04-23 PROCEDURE — 85610 PROTHROMBIN TIME: CPT | Performed by: EMERGENCY MEDICINE

## 2022-04-23 PROCEDURE — 81001 URINALYSIS AUTO W/SCOPE: CPT | Performed by: EMERGENCY MEDICINE

## 2022-04-23 PROCEDURE — 80053 COMPREHEN METABOLIC PANEL: CPT | Performed by: EMERGENCY MEDICINE

## 2022-04-23 PROCEDURE — 99220 PR INITIAL OBSERVATION CARE,LEVL III: CPT | Mod: ,,, | Performed by: HOSPITALIST

## 2022-04-23 PROCEDURE — G0378 HOSPITAL OBSERVATION PER HR: HCPCS

## 2022-04-23 PROCEDURE — 93005 ELECTROCARDIOGRAM TRACING: CPT

## 2022-04-23 PROCEDURE — 99220 PR INITIAL OBSERVATION CARE,LEVL III: ICD-10-PCS | Mod: ,,, | Performed by: HOSPITALIST

## 2022-04-23 PROCEDURE — 87186 SC STD MICRODIL/AGAR DIL: CPT | Performed by: EMERGENCY MEDICINE

## 2022-04-23 PROCEDURE — 85730 THROMBOPLASTIN TIME PARTIAL: CPT | Performed by: EMERGENCY MEDICINE

## 2022-04-23 PROCEDURE — 87077 CULTURE AEROBIC IDENTIFY: CPT | Performed by: EMERGENCY MEDICINE

## 2022-04-23 PROCEDURE — C9113 INJ PANTOPRAZOLE SODIUM, VIA: HCPCS | Performed by: EMERGENCY MEDICINE

## 2022-04-23 PROCEDURE — 99285 EMERGENCY DEPT VISIT HI MDM: CPT | Mod: 25

## 2022-04-23 PROCEDURE — 25000003 PHARM REV CODE 250: Performed by: HOSPITALIST

## 2022-04-23 PROCEDURE — 99285 PR EMERGENCY DEPT VISIT,LEVEL V: ICD-10-PCS | Mod: ,,, | Performed by: EMERGENCY MEDICINE

## 2022-04-23 PROCEDURE — 93010 EKG 12-LEAD: ICD-10-PCS | Mod: ,,, | Performed by: INTERNAL MEDICINE

## 2022-04-23 PROCEDURE — 87086 URINE CULTURE/COLONY COUNT: CPT | Performed by: EMERGENCY MEDICINE

## 2022-04-23 PROCEDURE — 96361 HYDRATE IV INFUSION ADD-ON: CPT

## 2022-04-23 PROCEDURE — 93010 ELECTROCARDIOGRAM REPORT: CPT | Mod: ,,, | Performed by: INTERNAL MEDICINE

## 2022-04-23 PROCEDURE — 87088 URINE BACTERIA CULTURE: CPT | Performed by: EMERGENCY MEDICINE

## 2022-04-23 PROCEDURE — 63600175 PHARM REV CODE 636 W HCPCS: Performed by: EMERGENCY MEDICINE

## 2022-04-23 PROCEDURE — 85025 COMPLETE CBC W/AUTO DIFF WBC: CPT | Performed by: EMERGENCY MEDICINE

## 2022-04-23 PROCEDURE — 25000003 PHARM REV CODE 250: Performed by: EMERGENCY MEDICINE

## 2022-04-23 RX ORDER — TIZANIDINE 4 MG/1
8 TABLET ORAL 3 TIMES DAILY PRN
Status: DISCONTINUED | OUTPATIENT
Start: 2022-04-23 | End: 2022-04-25 | Stop reason: HOSPADM

## 2022-04-23 RX ORDER — LURASIDONE HYDROCHLORIDE 20 MG/1
60 TABLET, FILM COATED ORAL DAILY
Status: DISCONTINUED | OUTPATIENT
Start: 2022-04-24 | End: 2022-04-25 | Stop reason: HOSPADM

## 2022-04-23 RX ORDER — PANTOPRAZOLE SODIUM 40 MG/10ML
80 INJECTION, POWDER, LYOPHILIZED, FOR SOLUTION INTRAVENOUS
Status: COMPLETED | OUTPATIENT
Start: 2022-04-23 | End: 2022-04-23

## 2022-04-23 RX ORDER — TALC
6 POWDER (GRAM) TOPICAL NIGHTLY PRN
Status: DISCONTINUED | OUTPATIENT
Start: 2022-04-23 | End: 2022-04-25 | Stop reason: HOSPADM

## 2022-04-23 RX ORDER — SODIUM CHLORIDE 0.9 % (FLUSH) 0.9 %
10 SYRINGE (ML) INJECTION
Status: DISCONTINUED | OUTPATIENT
Start: 2022-04-23 | End: 2022-04-25 | Stop reason: HOSPADM

## 2022-04-23 RX ORDER — AMOXICILLIN 250 MG
1 CAPSULE ORAL 2 TIMES DAILY
Status: DISCONTINUED | OUTPATIENT
Start: 2022-04-23 | End: 2022-04-25 | Stop reason: HOSPADM

## 2022-04-23 RX ORDER — CETIRIZINE HYDROCHLORIDE 10 MG/1
10 TABLET ORAL NIGHTLY
Status: DISCONTINUED | OUTPATIENT
Start: 2022-04-23 | End: 2022-04-25 | Stop reason: HOSPADM

## 2022-04-23 RX ORDER — HYDROCODONE BITARTRATE AND ACETAMINOPHEN 5; 325 MG/1; MG/1
1 TABLET ORAL
Status: DISCONTINUED | OUTPATIENT
Start: 2022-04-23 | End: 2022-04-25 | Stop reason: HOSPADM

## 2022-04-23 RX ORDER — ACETAMINOPHEN 325 MG/1
650 TABLET ORAL EVERY 6 HOURS PRN
Status: DISCONTINUED | OUTPATIENT
Start: 2022-04-23 | End: 2022-04-25 | Stop reason: HOSPADM

## 2022-04-23 RX ORDER — SODIUM CHLORIDE 9 MG/ML
INJECTION, SOLUTION INTRAVENOUS CONTINUOUS
Status: DISCONTINUED | OUTPATIENT
Start: 2022-04-23 | End: 2022-04-25 | Stop reason: HOSPADM

## 2022-04-23 RX ORDER — GABAPENTIN 100 MG/1
300 CAPSULE ORAL 3 TIMES DAILY
Status: DISCONTINUED | OUTPATIENT
Start: 2022-04-23 | End: 2022-04-25 | Stop reason: HOSPADM

## 2022-04-23 RX ORDER — DOXEPIN HYDROCHLORIDE 75 MG/1
150 CAPSULE ORAL NIGHTLY
Status: DISCONTINUED | OUTPATIENT
Start: 2022-04-23 | End: 2022-04-25 | Stop reason: HOSPADM

## 2022-04-23 RX ORDER — HYDROXYCHLOROQUINE SULFATE 200 MG/1
200 TABLET, FILM COATED ORAL 2 TIMES DAILY
Status: DISCONTINUED | OUTPATIENT
Start: 2022-04-23 | End: 2022-04-25 | Stop reason: HOSPADM

## 2022-04-23 RX ADMIN — DOXEPIN HYDROCHLORIDE 150 MG: 75 CAPSULE ORAL at 10:04

## 2022-04-23 RX ADMIN — SODIUM CHLORIDE 1000 ML: 0.9 INJECTION, SOLUTION INTRAVENOUS at 06:04

## 2022-04-23 RX ADMIN — CETIRIZINE HYDROCHLORIDE 10 MG: 10 TABLET, FILM COATED ORAL at 10:04

## 2022-04-23 RX ADMIN — HYDROCODONE BITARTRATE AND ACETAMINOPHEN 1 TABLET: 5; 325 TABLET ORAL at 10:04

## 2022-04-23 RX ADMIN — SENNOSIDES AND DOCUSATE SODIUM 1 TABLET: 50; 8.6 TABLET ORAL at 10:04

## 2022-04-23 RX ADMIN — HYDROXYCHLOROQUINE SULFATE 200 MG: 200 TABLET, FILM COATED ORAL at 10:04

## 2022-04-23 RX ADMIN — BUSPIRONE HYDROCHLORIDE 15 MG: 10 TABLET ORAL at 10:04

## 2022-04-23 RX ADMIN — PANTOPRAZOLE SODIUM 80 MG: 40 INJECTION, POWDER, FOR SOLUTION INTRAVENOUS at 06:04

## 2022-04-23 RX ADMIN — SODIUM CHLORIDE: 0.9 INJECTION, SOLUTION INTRAVENOUS at 10:04

## 2022-04-23 RX ADMIN — GABAPENTIN 300 MG: 100 CAPSULE ORAL at 10:04

## 2022-04-23 NOTE — ED PROVIDER NOTES
"Encounter Date: 2022       History     Chief Complaint   Patient presents with    Melena     Iron infusions last week, states blood in stools this AM      HPI   Jaki Bajwa is a 51-year-old female with a history of iron deficiency anemia, anxiety, depression, hypertension, Sjogren syndrome presenting with bright red bleeding per rectum and blood in her stools this a.m..  She reports having a history of iron deficiency anemia in the past and is currently on iron infusions with her last dose last week.  She denies any fevers, chills, nausea, abdominal pain, back pain, neck pain, dysuria or hematuria.  She reports occasional dark stool but noticed bright red blood in deepika blood in her toilet and toilet paper this a.m..  Been constipated for the last week and has used Dulcolax for her symptoms.  No other aggravating or alleviating factors.  She has had hemorrhoids in the past with banding.    Review of patient's allergies indicates:   Allergen Reactions    Aspirin Hives     Past Medical History:   Diagnosis Date    Anemia     Anxiety     Depression     History of psychiatric hospitalization     [place] x [place] week for depression    History of ventral hernia repair     Hypertension     Lupus     patient reports that she is being treated "like I have Lupus"    Mental disorder     Morbid obesity 12/15/2017    Psychiatric problem     Sjogren's syndrome 2013    Therapy     TMJ (temporomandibular joint disorder)      Past Surgical History:   Procedure Laterality Date    breast reduction      BREAST SURGERY  2005     SECTION      x 2    HYSTERECTOMY  2005    INGUINAL HERNIA REPAIR      LAPAROSCOPIC TOTAL HYSTERECTOMY      ASA    TOTAL REDUCTION MAMMOPLASTY      TUBAL LIGATION      VENTRAL HERNIA REPAIR       Family History   Problem Relation Age of Onset    Cancer Father         colon ca    Colon cancer Father     Depression Father     Schizophrenia Father     " Anxiety disorder Father     Arthritis Father     Mental illness Father     Pancreatic cancer Brother     Depression Brother     Alcohol abuse Brother     Liver cancer Sister     Cancer Sister     Depression Sister     Anxiety disorder Sister     Heart attack Sister     COPD Sister     Heart disease Mother     Hypertension Mother     Pancreatic cancer Unknown     Liver cancer Unknown     No Known Problems Daughter     Asthma Son     Cancer Sister         throat and colon    Depression Sister     Miscarriages / Stillbirths Sister     Suicide Neg Hx     Ovarian cancer Neg Hx     Breast cancer Neg Hx      Social History     Tobacco Use    Smoking status: Never Smoker    Smokeless tobacco: Never Used   Substance Use Topics    Alcohol use: Yes     Alcohol/week: 1.0 standard drink     Types: 1 Glasses of wine per week     Comment: rarely     Drug use: Never     Types: Hydrocodone     Review of Systems   Constitutional: Negative for chills and fatigue.   HENT: Negative for congestion and sore throat.    Eyes: Negative for photophobia and visual disturbance.   Respiratory: Negative for chest tightness and shortness of breath.    Cardiovascular: Negative for chest pain, palpitations and leg swelling.   Gastrointestinal: Positive for anal bleeding, blood in stool and constipation. Negative for abdominal pain and nausea.   Endocrine: Negative for polyphagia and polyuria.   Genitourinary: Negative for dysuria and pelvic pain.   Musculoskeletal: Negative for myalgias, neck pain and neck stiffness.   Skin: Negative for color change and wound.   Neurological: Negative for facial asymmetry, weakness and light-headedness.   Psychiatric/Behavioral: Negative for confusion. The patient is not nervous/anxious.        Physical Exam     Initial Vitals   BP Pulse Resp Temp SpO2   04/23/22 1508 04/23/22 1508 04/23/22 1508 04/23/22 1509 04/23/22 1508   119/71 98 18 98.3 °F (36.8 °C) 96 %      MAP       --                 Physical Exam    Nursing note and vitals reviewed.      Gen/Constitutional: Interactive. No acute distress  Head: Normocephalic, Atraumatic  Neck: supple, no masses or LAD, no JVD  Eyes: PERRLA, conjunctiva clear  Ears, Nose and Throat: No rhinorrhea or stridor.  Cardiac:  Regular rate, Reg Rhythm, No murmur  Pulmonary: CTA Bilat, no wheezes, rhonchi, rales.  No increased work of breathing.  GI: Abdomen soft, non-tender, non-distended; no rebound or guarding  Rectal exam:  There is a small non bleeding hemorrhoid that is nontender with evidence of recent bleeding.  Positive guaiac without gross melena.  : No CVA tenderness.  Musculoskeletal: Extremities warm, well perfused, no erythema, no edema  Skin: No rashes, cyanosis or jaundice.  Neuro: Alert and Oriented x 3; No focal motor or sensory deficits.    Psych: Normal affect      ED Course   Procedures  Labs Reviewed   CBC W/ AUTO DIFFERENTIAL - Abnormal; Notable for the following components:       Result Value    Hemoglobin 10.5 (*)     Hematocrit 35.5 (*)     MCV 81 (*)     MCH 24.1 (*)     MCHC 29.6 (*)     RDW 18.5 (*)     Gran # (ANC) 8.3 (*)     All other components within normal limits   COMPREHENSIVE METABOLIC PANEL - Abnormal; Notable for the following components:    CO2 30 (*)     BUN 32 (*)     Creatinine 2.2 (*)     eGFR if  28.7 (*)     eGFR if non  24.9 (*)     All other components within normal limits   URINALYSIS, REFLEX TO URINE CULTURE - Abnormal; Notable for the following components:    Appearance, UA Cloudy (*)     Occult Blood UA 1+ (*)     Nitrite, UA Positive (*)     Leukocytes, UA 3+ (*)     All other components within normal limits    Narrative:     Specimen Source->Urine   URINALYSIS MICROSCOPIC - Abnormal; Notable for the following components:    RBC, UA 7 (*)     WBC, UA >100 (*)     WBC Clumps, UA Few (*)     Bacteria Many (*)     All other components within normal limits    Narrative:     Specimen  Source->Urine   CULTURE, URINE   APTT   PROTIME-INR   TYPE & SCREEN          Imaging Results    None          Medications   buPROPion TB24 tablet 450 mg (has no administration in time range)   busPIRone tablet 15 mg (has no administration in time range)   cetirizine tablet 10 mg (has no administration in time range)   doxepin capsule 150 mg (has no administration in time range)   gabapentin capsule 300 mg (has no administration in time range)   HYDROcodone-acetaminophen 5-325 mg per tablet 1 tablet (has no administration in time range)   hydrOXYchloroQUINE tablet 200 mg (has no administration in time range)   lurasidone tablet 60 mg (has no administration in time range)   tiZANidine tablet 8 mg (has no administration in time range)   sodium chloride 0.9% flush 10 mL (has no administration in time range)   0.9%  NaCl infusion (has no administration in time range)   acetaminophen tablet 650 mg (has no administration in time range)   melatonin tablet 6 mg (has no administration in time range)   senna-docusate 8.6-50 mg per tablet 1 tablet (has no administration in time range)   artificial tears 0.5 % ophthalmic solution 1 drop (has no administration in time range)   sodium chloride 0.9% bolus 1,000 mL (1,000 mLs Intravenous New Bag 4/23/22 1826)   pantoprazole injection 80 mg (80 mg Intravenous Given 4/23/22 1825)     Medical Decision Making:   History:   Old Medical Records: I decided to obtain old medical records.  Initial Assessment:   Jaki Bajwa is a 51-year-old female with a history of iron deficiency anemia, anxiety, depression, hypertension, Sjogren syndrome presenting with bright red bleeding per rectum and blood in her stools this a.m.  Differential Diagnosis:   Hemorrhoidal bleed, melena, hematochezia, diverticular bleed, AVM, angiodysplasia, upper GI bleed, anal fissure  Clinical Tests:   Lab Tests: Ordered and Reviewed    Afebrile and vital signs stable.  Physical exam findings remarkable for rectal  bleeding, guaiac positive, small hemorrhoid and no masses palpated.  Broad workup including IV fluids, IV line, type and screen and labs.  Labs show stable hemoglobin, chemistry panel with CANDELARIA elevated creatinine and elevated BUN.  Given dark stool and positive guaiac will administer Protonix x1 dose.  Will place consult for GI.  Discussed case with hospital medicine will admit to observation.  UA does show signs of UTI, nitrite positive with 3+ leukocytes, will plan on treatment during observation/admission stay.  Given CANDELARIA, patient meets observation criteria.  Discussed with patient, patient agreeable to observation stay.    Complexity:  Moderate high                    Clinical Impression:   Final diagnoses:  [K62.5] Bleeding per rectum (Primary)  [K92.2] GI bleed  [R19.5] Dark stools  [N17.9] CANDELARIA (acute kidney injury)          ED Disposition Condition    Observation             Vasquez Alvarez DO, FAAEM  Emergency Staff Physician   Dept of Emergency Medicine   [facility]  Spectralink: [id number]        Disclaimer: This note has been generated using voice-recognition software. There may be typographical errors that have been missed during proof-reading.       Vasquez Alvarez DO  04/23/22 [id number]

## 2022-04-23 NOTE — ED NOTES
Patient states he has had iron infusions, last one Thursday, reports dark red stool after using Dulcolax, today with blood in toilet and tissue, reports no BM x 7 days prior

## 2022-04-23 NOTE — ED NOTES
Patient identifiers verified and correct for Ms Bajwa  C/C: Bleeding from rectum SEE NN  APPEARANCE: awake and alert in NAD.  SKIN: warm, dry and intact. No breakdown or bruising.  MUSCULOSKELETAL: Patient moving all extremities spontaneously, no obvious swelling or deformities noted. Ambulates independently.  RESPIRATORY: Denies shortness of breath.Respirations unlabored.   CARDIAC: Denies CP, 2+ distal pulses; no peripheral edema  ABDOMEN: S/ND/NT, Denies nausea, reports blood in stool and in toilet  : voids spontaneously, denies difficulty  Neurologic: AAO x 4; follows commands equal strength in all extremities; denies numbness/tingling. Denies dizziness  Denies weakness

## 2022-04-24 LAB
ANION GAP SERPL CALC-SCNC: 9 MMOL/L (ref 8–16)
BASOPHILS # BLD AUTO: 0.06 K/UL (ref 0–0.2)
BASOPHILS NFR BLD: 0.7 % (ref 0–1.9)
BUN SERPL-MCNC: 23 MG/DL (ref 6–20)
CALCIUM SERPL-MCNC: 9.1 MG/DL (ref 8.7–10.5)
CHLORIDE SERPL-SCNC: 104 MMOL/L (ref 95–110)
CO2 SERPL-SCNC: 27 MMOL/L (ref 23–29)
CREAT SERPL-MCNC: 1.6 MG/DL (ref 0.5–1.4)
DIFFERENTIAL METHOD: ABNORMAL
EOSINOPHIL # BLD AUTO: 0.2 K/UL (ref 0–0.5)
EOSINOPHIL NFR BLD: 2.5 % (ref 0–8)
ERYTHROCYTE [DISTWIDTH] IN BLOOD BY AUTOMATED COUNT: 18.6 % (ref 11.5–14.5)
EST. GFR  (AFRICAN AMERICAN): 42.1 ML/MIN/1.73 M^2
EST. GFR  (NON AFRICAN AMERICAN): 36.5 ML/MIN/1.73 M^2
GLUCOSE SERPL-MCNC: 107 MG/DL (ref 70–110)
HCT VFR BLD AUTO: 36.9 % (ref 37–48.5)
HGB BLD-MCNC: 10.5 G/DL (ref 12–16)
IMM GRANULOCYTES # BLD AUTO: 0.02 K/UL (ref 0–0.04)
IMM GRANULOCYTES NFR BLD AUTO: 0.2 % (ref 0–0.5)
LYMPHOCYTES # BLD AUTO: 2.1 K/UL (ref 1–4.8)
LYMPHOCYTES NFR BLD: 23.9 % (ref 18–48)
MAGNESIUM SERPL-MCNC: 2.2 MG/DL (ref 1.6–2.6)
MCH RBC QN AUTO: 24.4 PG (ref 27–31)
MCHC RBC AUTO-ENTMCNC: 28.5 G/DL (ref 32–36)
MCV RBC AUTO: 86 FL (ref 82–98)
MONOCYTES # BLD AUTO: 0.5 K/UL (ref 0.3–1)
MONOCYTES NFR BLD: 6 % (ref 4–15)
NEUTROPHILS # BLD AUTO: 5.8 K/UL (ref 1.8–7.7)
NEUTROPHILS NFR BLD: 66.7 % (ref 38–73)
NRBC BLD-RTO: 0 /100 WBC
PLATELET # BLD AUTO: 284 K/UL (ref 150–450)
PMV BLD AUTO: 12 FL (ref 9.2–12.9)
POTASSIUM SERPL-SCNC: 3.9 MMOL/L (ref 3.5–5.1)
RBC # BLD AUTO: 4.3 M/UL (ref 4–5.4)
SODIUM SERPL-SCNC: 140 MMOL/L (ref 136–145)
WBC # BLD AUTO: 8.69 K/UL (ref 3.9–12.7)

## 2022-04-24 PROCEDURE — 96365 THER/PROPH/DIAG IV INF INIT: CPT

## 2022-04-24 PROCEDURE — 83735 ASSAY OF MAGNESIUM: CPT | Performed by: HOSPITALIST

## 2022-04-24 PROCEDURE — G0378 HOSPITAL OBSERVATION PER HR: HCPCS

## 2022-04-24 PROCEDURE — 85025 COMPLETE CBC W/AUTO DIFF WBC: CPT | Performed by: HOSPITALIST

## 2022-04-24 PROCEDURE — 25000003 PHARM REV CODE 250: Performed by: HOSPITALIST

## 2022-04-24 PROCEDURE — 63600175 PHARM REV CODE 636 W HCPCS: Performed by: PHYSICIAN ASSISTANT

## 2022-04-24 PROCEDURE — 94761 N-INVAS EAR/PLS OXIMETRY MLT: CPT

## 2022-04-24 PROCEDURE — 96361 HYDRATE IV INFUSION ADD-ON: CPT

## 2022-04-24 PROCEDURE — 36415 COLL VENOUS BLD VENIPUNCTURE: CPT | Performed by: HOSPITALIST

## 2022-04-24 PROCEDURE — 80048 BASIC METABOLIC PNL TOTAL CA: CPT | Performed by: HOSPITALIST

## 2022-04-24 PROCEDURE — 99226 PR SUBSEQUENT OBSERVATION CARE,LEVEL III: CPT | Mod: ,,, | Performed by: PHYSICIAN ASSISTANT

## 2022-04-24 PROCEDURE — 99226 PR SUBSEQUENT OBSERVATION CARE,LEVEL III: ICD-10-PCS | Mod: ,,, | Performed by: PHYSICIAN ASSISTANT

## 2022-04-24 RX ADMIN — HYDROXYCHLOROQUINE SULFATE 200 MG: 200 TABLET, FILM COATED ORAL at 09:04

## 2022-04-24 RX ADMIN — TIZANIDINE 8 MG: 4 TABLET ORAL at 12:04

## 2022-04-24 RX ADMIN — LURASIDONE HYDROCHLORIDE 60 MG: 20 TABLET, FILM COATED ORAL at 09:04

## 2022-04-24 RX ADMIN — SENNOSIDES AND DOCUSATE SODIUM 1 TABLET: 50; 8.6 TABLET ORAL at 09:04

## 2022-04-24 RX ADMIN — BUSPIRONE HYDROCHLORIDE 15 MG: 10 TABLET ORAL at 09:04

## 2022-04-24 RX ADMIN — ACETAMINOPHEN 650 MG: 325 TABLET ORAL at 08:04

## 2022-04-24 RX ADMIN — GABAPENTIN 300 MG: 100 CAPSULE ORAL at 09:04

## 2022-04-24 RX ADMIN — HYDROXYCHLOROQUINE SULFATE 200 MG: 200 TABLET, FILM COATED ORAL at 08:04

## 2022-04-24 RX ADMIN — Medication 6 MG: at 08:04

## 2022-04-24 RX ADMIN — TIZANIDINE 8 MG: 4 TABLET ORAL at 09:04

## 2022-04-24 RX ADMIN — BUSPIRONE HYDROCHLORIDE 15 MG: 10 TABLET ORAL at 02:04

## 2022-04-24 RX ADMIN — GABAPENTIN 300 MG: 100 CAPSULE ORAL at 08:04

## 2022-04-24 RX ADMIN — BUPROPION HYDROCHLORIDE 450 MG: 300 TABLET, FILM COATED, EXTENDED RELEASE ORAL at 08:04

## 2022-04-24 RX ADMIN — BUSPIRONE HYDROCHLORIDE 15 MG: 10 TABLET ORAL at 08:04

## 2022-04-24 RX ADMIN — CEFTRIAXONE 1 G: 1 INJECTION, SOLUTION INTRAVENOUS at 09:04

## 2022-04-24 RX ADMIN — SODIUM CHLORIDE: 0.9 INJECTION, SOLUTION INTRAVENOUS at 05:04

## 2022-04-24 RX ADMIN — DOXEPIN HYDROCHLORIDE 150 MG: 75 CAPSULE ORAL at 08:04

## 2022-04-24 RX ADMIN — SENNOSIDES AND DOCUSATE SODIUM 1 TABLET: 50; 8.6 TABLET ORAL at 08:04

## 2022-04-24 RX ADMIN — CETIRIZINE HYDROCHLORIDE 10 MG: 10 TABLET, FILM COATED ORAL at 08:04

## 2022-04-24 RX ADMIN — GABAPENTIN 300 MG: 100 CAPSULE ORAL at 02:04

## 2022-04-24 NOTE — H&P
[facility] Medicine  History & Physical    Patient Name: Jaki Bajwa  MRN: [id number]  Patient Class: OP- Observation  Admission Date: 4/23/2022  Attending Physician: Kristyn Handy MD   Primary Care Provider: Pratik Rasmussen MD         Patient information was obtained from patient, past medical records and ER records.     Subjective:     Principal Problem:CANDELARIA (acute kidney injury)    Chief Complaint:   Chief Complaint   Patient presents with    Melena     Iron infusions last week, states blood in stools this AM         HPI: 51-year-old woman presents with rectal bleeding.  She states that she has been constipated for the past few days so she drank some Dulcolax, after which she had a large, hard bowel movement which was very painful to pass and clogged the toilet.  She noted afterwards that she was bleeding from the anus almost as if she were having a menstrual cycle.  It was bright red blood that started dripping into the toilet and would be present on toilet paper with wiping.  She started feeling lightheaded and weak after noting the bleeding.  Her  advised her to go to the ED.  she is wearing a menstrual pad to catch the blood.  She has a prior history of a hemorrhoid that was banded.  She also had a colonoscopy 1 year previously but also showed scattered diverticula.  For the past few days she has also noted feeling some orthostatic dizziness with sitting up and standing.  She has chronic dry mouth on account of Sjogren syndrome.  She denies spending an undue amount of time outdoors or doing any activity lately, although earlier today she was mopping which she noted seemed to be more difficult than usual.        Past Medical History:   Diagnosis Date    Anemia     Anxiety     Depression     History of psychiatric hospitalization 2000    [place] x 1 week for depression    History of ventral hernia repair     Hypertension     Lupus     patient reports that she  "is being treated "like I have Lupus"    Mental disorder     Morbid obesity 12/15/2017    Psychiatric problem     Sjogren's syndrome 2013    Therapy     TMJ (temporomandibular joint disorder)        Past Surgical History:   Procedure Laterality Date    breast reduction      BREAST SURGERY  2005     SECTION      x 2    HYSTERECTOMY  2005    INGUINAL HERNIA REPAIR      LAPAROSCOPIC TOTAL HYSTERECTOMY      ASA    TOTAL REDUCTION MAMMOPLASTY      TUBAL LIGATION      VENTRAL HERNIA REPAIR         Review of patient's allergies indicates:   Allergen Reactions    Aspirin Hives       No current facility-administered medications on file prior to encounter.     Current Outpatient Medications on File Prior to Encounter   Medication Sig    buPROPion (WELLBUTRIN XL) 150 MG TB24 tablet Take 3 tablets (450 mg total) by mouth every morning.    busPIRone (BUSPAR) 15 MG tablet TAKE 1 TABLET(15 MG) BY MOUTH THREE TIMES DAILY    doxepin (SINEQUAN) 75 MG capsule TAKE 2 CAPSULES(150 MG) BY MOUTH EVERY EVENING    hydrOXYchloroQUINE (PLAQUENIL) 200 mg tablet One pill bid    irbesartan-hydrochlorothiazide (AVALIDE) 150-12.5 mg per tablet Take 1 tablet by mouth once daily.    lurasidone (LATUDA) 60 mg Tab tablet Take 1 tablet (60 mg total) by mouth once daily.    cetirizine (ZYRTEC) 10 MG tablet Take 1 tablet (10 mg total) by mouth every evening.    cevimeline (EVOXAC) 30 mg capsule     cycloSPORINE (RESTASIS) 0.05 % ophthalmic emulsion Place 1 drop into both eyes 2 (two) times daily.    diazePAM (VALIUM) 2 MG tablet Take 1 tablet (2 mg total) by mouth 2 (two) times daily as needed (Severe anxiety/Panic attack).    estradioL (ESTRACE) 0.01 % (0.1 mg/gram) vaginal cream Place 1 g vaginally twice a week.    fluconazole (DIFLUCAN) 200 MG Tab Take 1 tablet (200 mg total) by mouth every 72 hours as needed.    fluticasone propionate (FLONASE) 50 mcg/actuation nasal spray SHAKE LIQUID AND USE 1 " SPRAY(50 MCG) IN EACH NOSTRIL EVERY DAY (Patient not taking: Reported on 3/8/2022)    gabapentin (NEURONTIN) 300 MG capsule Take 1 capsule (300 mg total) by mouth 3 (three) times daily.    HYDROcodone-acetaminophen (NORCO) 5-325 mg per tablet Take 1 tablet by mouth every 24 hours as needed for Pain. Quantity medically necessary    naproxen (NAPROSYN) 500 MG tablet TAKE 1 TABLET(500 MG) BY MOUTH TWICE DAILY AS NEEDED FOR PAIN    NARCAN 4 mg/actuation [place] SPRAY 1 SPRAY IN NOSTRIL ONCE FOR 1 DOSE    omeprazole (PRILOSEC) 40 MG capsule TAKE 1 CAPSULE(40 MG) BY MOUTH EVERY DAY    tiZANidine (ZANAFLEX) 4 MG tablet 8 mg tid     Family History       Problem Relation (Age of Onset)    Alcohol abuse Brother    Anxiety disorder Father, Sister    Arthritis Father    Asthma Son    COPD Sister    Cancer Father, Sister, Sister    Colon cancer Father    Depression Father, Brother, Sister, Sister    Heart attack Sister    Heart disease Mother    Hypertension Mother    Liver cancer Sister,     Mental illness Father    Miscarriages / Stillbirths Sister    No Known Problems Daughter    Pancreatic cancer Brother,     Schizophrenia Father          Tobacco Use    Smoking status: Never Smoker    Smokeless tobacco: Never Used   Substance and Sexual Activity    Alcohol use: Yes     Alcohol/week: 1.0 standard drink     Types: 1 Glasses of wine per week     Comment: rarely     Drug use: Never     Types: Hydrocodone    Sexual activity: Not Currently     Partners: Male     Birth control/protection: Condom     Review of Systems   Constitutional:  Negative for activity change, fatigue and fever.   HENT:  Negative for congestion and sinus pressure.    Eyes:  Negative for photophobia and visual disturbance.   Respiratory:  Negative for cough and shortness of breath.    Cardiovascular:  Negative for chest pain and leg swelling.   Gastrointestinal:  Positive for anal bleeding and constipation. Negative for abdominal pain.   Endocrine:  Negative for polydipsia and polyuria.   Genitourinary:  Negative for dysuria and hematuria.   Musculoskeletal:  Negative for arthralgias and myalgias.   Skin:  Negative for color change and rash.   Neurological:  Positive for dizziness, weakness and light-headedness. Negative for syncope.   Objective:     Vital Signs (Most Recent):  Temp: 98.2 °F (36.8 °C) (04/23/22 2031)  Pulse: 92 (04/23/22 2031)  Resp: 18 (04/23/22 2224)  BP: 130/86 (04/23/22 2031)  SpO2: 98 % (04/23/22 2031) Vital Signs (24h Range):  Temp:  [98.2 °F (36.8 °C)-98.3 °F (36.8 °C)] 98.2 °F (36.8 °C)  Pulse:  [92-98] 92  Resp:  [16-18] 18  SpO2:  [96 %-98 %] 98 %  BP: (119-130)/(71-86) 130/86        There is no height or weight on file to calculate BMI.    Physical Exam  Vitals and nursing note reviewed.   Constitutional:       General: She is not in acute distress.     Appearance: Normal appearance. She is obese.   HENT:      Head: Normocephalic and atraumatic.      Nose: Nose normal.      Mouth/Throat:      Pharynx: Oropharynx is clear.      Comments: Tacky MM  Eyes:      General: No scleral icterus.     Extraocular Movements: Extraocular movements intact.      Conjunctiva/sclera: Conjunctivae normal.      Pupils: Pupils are equal, round, and reactive to light.   Cardiovascular:      Rate and Rhythm: Normal rate and regular rhythm.      Pulses: Normal pulses.      Heart sounds: Normal heart sounds. No murmur heard.    No gallop.   Pulmonary:      Effort: Pulmonary effort is normal.      Breath sounds: Normal breath sounds.   Abdominal:      General: Bowel sounds are normal. There is no distension.      Palpations: Abdomen is soft.      Tenderness: There is no abdominal tenderness. There is no guarding.   Musculoskeletal:         General: No tenderness or signs of injury. Normal range of motion.      Cervical back: Normal range of motion and neck supple. No rigidity.      Right lower leg: No edema.      Left lower leg: No edema.   Lymphadenopathy:       Cervical: No cervical adenopathy.   Skin:     General: Skin is warm and dry.      Findings: No erythema or rash.   Neurological:      Mental Status: She is alert and oriented to person, place, and time.      Cranial Nerves: No cranial nerve deficit.         CRANIAL NERVES     CN III, IV, VI   Pupils are equal, round, and reactive to light.     Significant Labs: All pertinent labs within the past 24 hours have been reviewed.  CBC:   Recent Labs   Lab 04/23/22  1614   WBC 11.59   HGB 10.5*   HCT 35.5*        CMP:   Recent Labs   Lab 04/23/22  1614      K 4.2      CO2 30*   GLU 85   BUN 32*   CREATININE 2.2*   CALCIUM 9.5   PROT 8.2   ALBUMIN 3.9   BILITOT 0.3   ALKPHOS 84   AST 17   ALT 12   ANIONGAP 10   EGFRNONAA 24.9*       Significant Imaging: I have reviewed all pertinent imaging results/findings within the past 24 hours.    Assessment/Plan:     * CANDELARIA (acute kidney injury)  Patient with acute kidney injury likely d/t Pre-renal azotemia Which is currently acute. Labs reviewed- Renal function/electrolytes with CrCl cannot be calculated (Unknown ideal weight.). according to latest data. Monitor urine output and serial BMP and adjust therapy as needed. Avoid nephrotoxins and renally dose meds for GFR listed above.   I suspect dehydration as the likely cause, which likely also caused her constipation.  Her ARB likely further magnified the renal injury in the context of volume depletion.  Her symptoms of orthostasis suggest dehydration, but is not immediately clear what the inciting event causing this might be.  Her chronic dry mouth related to Sjogren's may be a confounding factor masking thirst.  She denies being out in the heat or not having access to adequate fluid intake.  She got 1 L of normal saline in the ED; will put on maintenance IV fluids overnight and recheck labs in the morning.  Will hold her ARB for now pending repeat labs.    Bleeding hemorrhoid  Her rectal bleeding is likely  secondary to avulsed hemorrhoid caused by trauma from hard stool.  She should be started on stool softener regimen to allow healing and to prevent recurrence.  Will apply Tucks pad for now to help reduce irritation.  I counseled her that in most cases bleeding should resolve spontaneously, but in the event that it does not CRS can be consulted in the morning to evaluate and ligate the hemorrhoid.  Otherwise she can be referred to see them for outpatient follow-up.      Sjogren's syndrome  She is on Plaquenil 200 mg b.i.d.; will continue.  Would normally order Caphosol p.r.n. for dry mouth but it is presently on back order.  Artificial tears p.r.n. dry eyes.      Essential hypertension  Will hold Avalide for now in the setting of CANDELARIA.        VTE Risk Mitigation (From admission, onward)         Ordered     IP VTE HIGH RISK PATIENT  Once         04/23/22 2049     Place sequential compression device  Until discontinued         04/23/22 2049     Reason for No Pharmacological VTE Prophylaxis  Once        Question:  Reasons:  Answer:  Active Bleeding    04/23/22 2049                   Ramirez Curtis MD  Department of Hospital Medicine   [facility]ation

## 2022-04-24 NOTE — PLAN OF CARE
Problem: Adult Inpatient Plan of Care  Goal: Plan of Care Review  Outcome: Ongoing, Progressing  Goal: Patient-Specific Goal (Individualized)  Outcome: Ongoing, Progressing  Goal: Absence of Hospital-Acquired Illness or Injury  Outcome: Ongoing, Progressing  Goal: Optimal Comfort and Wellbeing  Outcome: Ongoing, Progressing  Goal: Readiness for Transition of Care  Outcome: Ongoing, Progressing     Problem: Bariatric Environmental Safety  Goal: Safety Maintained with Care  Outcome: Ongoing, Progressing     Problem: Fluid and Electrolyte Imbalance (Acute Kidney Injury/Impairment)  Goal: Fluid and Electrolyte Balance  Outcome: Ongoing, Progressing     Problem: Oral Intake Inadequate (Acute Kidney Injury/Impairment)  Goal: Optimal Nutrition Intake  Outcome: Ongoing, Progressing     Problem: Renal Function Impairment (Acute Kidney Injury/Impairment)  Goal: Effective Renal Function  Outcome: Ongoing, Progressing     Pt lying supine in bed with HOB elevated 45 degrees. Pt c/o facial pain/discomfort and right flank pain this AM. AAOx4 and ask same questions upon each entry to the room. Pt is very talkative and concerned about care. Pt requests regular diet instead of liquid diet this AM. PCP aware. N.O. noted. Cont ABX therapy without s/s of adverse reactions noted. No c/o of active bleeding at present from rectum. Call light within reach. Will cont to monitor.

## 2022-04-24 NOTE — HPI
51-year-old woman presents with rectal bleeding.  She states that she has been constipated for the past few days so she drank some Dulcolax, after which she had a large, hard bowel movement which was very painful to pass and clogged the toilet.  She noted afterwards that she was bleeding from the anus almost as if she were having a menstrual cycle.  It was bright red blood that started dripping into the toilet and would be present on toilet paper with wiping.  She started feeling lightheaded and weak after noting the bleeding.  Her  advised her to go to the ED.  she is wearing a menstrual pad to catch the blood.  She has a prior history of a hemorrhoid that was banded.  She also had a colonoscopy 1 year previously but also showed scattered diverticula.  For the past few days she has also noted feeling some orthostatic dizziness with sitting up and standing.  She has chronic dry mouth on account of Sjogren syndrome.  She denies spending an undue amount of time outdoors or doing any activity lately, although earlier today she was mopping which she noted seemed to be more difficult than usual.

## 2022-04-24 NOTE — ASSESSMENT & PLAN NOTE
Her rectal bleeding is likely secondary to avulsed hemorrhoid caused by trauma from hard stool.  She should be started on stool softener regimen to allow healing and to prevent recurrence.  Will apply Tucks pad for now to help reduce irritation.  I counseled her that in most cases bleeding should resolve spontaneously, but in the event that it does not CRS can be consulted in the morning to evaluate and ligate the hemorrhoid.  Otherwise she can be referred to see them for outpatient follow-up.  hgb stable, will refer to CRS clinic

## 2022-04-24 NOTE — HOSPITAL COURSE
Patient admitted for CANDELARIA secondary to hypovolemia, UTI. Cr BL ~1.0, cr 2.2 on admission. Improved to baseline with IVF and CTX. Ucx with GNR, discharge with augmentin to complete 7d course.   Additionally, patient had episode of rectal bleeding after being severely constipated, suspect hemorrhoidal bleed. Hgb stable, will refer to CRS clinic at dc. Return precautions discussed, stable for dc with PCP and CRS f/u. No further questions

## 2022-04-24 NOTE — ASSESSMENT & PLAN NOTE
Her rectal bleeding is likely secondary to avulsed hemorrhoid caused by trauma from hard stool.  She should be started on stool softener regimen to allow healing and to prevent recurrence.  Will apply Tucks pad for now to help reduce irritation.  I counseled her that in most cases bleeding should resolve spontaneously, but in the event that it does not CRS can be consulted in the morning to evaluate and ligate the hemorrhoid.  Otherwise she can be referred to see them for outpatient follow-up.

## 2022-04-24 NOTE — ASSESSMENT & PLAN NOTE
Patient with acute kidney injury likely d/t Pre-renal azotemia Which is currently acute. Labs reviewed- Renal function/electrolytes with CrCl cannot be calculated (Unknown ideal weight.). according to latest data. Monitor urine output and serial BMP and adjust therapy as needed. Avoid nephrotoxins and renally dose meds for GFR listed above.   I suspect dehydration as the likely cause, which likely also caused her constipation.  Her ARB likely further magnified the renal injury in the context of volume depletion.  Her symptoms of orthostasis suggest dehydration, but is not immediately clear what the inciting event causing this might be.  Her chronic dry mouth related to Sjogren's may be a confounding factor masking thirst.  She denies being out in the heat or not having access to adequate fluid intake.  She got 1 L of normal saline in the ED; will put on maintenance IV fluids overnight and recheck labs in the morning.  Will hold her ARB for now pending repeat labs.

## 2022-04-24 NOTE — ASSESSMENT & PLAN NOTE
Patient with acute kidney injury likely d/t Pre-renal azotemia Which is currently acute. Labs reviewed- Renal function/electrolytes with Estimated Creatinine Clearance: 55.1 mL/min (A) (based on SCr of 1.6 mg/dL (H)). according to latest data. Monitor urine output and serial BMP and adjust therapy as needed. Avoid nephrotoxins and renally dose meds for GFR listed above.   I suspect dehydration as the likely cause, which likely also caused her constipation.  Her ARB likely further magnified the renal injury in the context of volume depletion.  Her symptoms of orthostasis suggest dehydration, but is not immediately clear what the inciting event causing this might be.  Her chronic dry mouth related to Sjogren's may be a confounding factor masking thirst.  She denies being out in the heat or not having access to adequate fluid intake.  She got 1 L of normal saline in the ED; will put on maintenance IV fluids overnight and recheck labs in the morning.  Will hold her ARB for now pending repeat labs.  Cr 2.2 on admission, 1.6 currently   BL Cr ~1.0

## 2022-04-24 NOTE — SUBJECTIVE & OBJECTIVE
"Past Medical History:   Diagnosis Date    Anemia     Anxiety     Depression     History of psychiatric hospitalization     [place] x 1 week for depression    History of ventral hernia repair     Hypertension     Lupus     patient reports that she is being treated "like I have Lupus"    Mental disorder     Morbid obesity 12/15/2017    Psychiatric problem     Sjogren's syndrome 2013    Therapy     TMJ (temporomandibular joint disorder)        Past Surgical History:   Procedure Laterality Date    breast reduction      BREAST SURGERY  2005     SECTION      x 2    HYSTERECTOMY  2005    INGUINAL HERNIA REPAIR      LAPAROSCOPIC TOTAL HYSTERECTOMY  2012    ASA    TOTAL REDUCTION MAMMOPLASTY      TUBAL LIGATION      VENTRAL HERNIA REPAIR         Review of patient's allergies indicates:   Allergen Reactions    Aspirin Hives       No current facility-administered medications on file prior to encounter.     Current Outpatient Medications on File Prior to Encounter   Medication Sig    buPROPion (WELLBUTRIN XL) 150 MG TB24 tablet Take 3 tablets (450 mg total) by mouth every morning.    busPIRone (BUSPAR) 15 MG tablet TAKE 1 TABLET(15 MG) BY MOUTH THREE TIMES DAILY    doxepin (SINEQUAN) 75 MG capsule TAKE 2 CAPSULES(150 MG) BY MOUTH EVERY EVENING    hydrOXYchloroQUINE (PLAQUENIL) 200 mg tablet One pill bid    irbesartan-hydrochlorothiazide (AVALIDE) 150-12.5 mg per tablet Take 1 tablet by mouth once daily.    lurasidone (LATUDA) 60 mg Tab tablet Take 1 tablet (60 mg total) by mouth once daily.    cetirizine (ZYRTEC) 10 MG tablet Take 1 tablet (10 mg total) by mouth every evening.    cevimeline (EVOXAC) 30 mg capsule     cycloSPORINE (RESTASIS) 0.05 % ophthalmic emulsion Place 1 drop into both eyes 2 (two) times daily.    diazePAM (VALIUM) 2 MG tablet Take 1 tablet (2 mg total) by mouth 2 (two) times daily as needed (Severe anxiety/Panic attack).    estradioL (ESTRACE) 0.01 % (0.1 mg/gram) vaginal cream " Place 1 g vaginally twice a week.    fluconazole (DIFLUCAN) 200 MG Tab Take 1 tablet (200 mg total) by mouth every 72 hours as needed.    fluticasone propionate (FLONASE) 50 mcg/actuation nasal spray SHAKE LIQUID AND USE 1 SPRAY(50 MCG) IN EACH NOSTRIL EVERY DAY (Patient not taking: Reported on 3/8/2022)    gabapentin (NEURONTIN) 300 MG capsule Take 1 capsule (300 mg total) by mouth 3 (three) times daily.    HYDROcodone-acetaminophen (NORCO) 5-325 mg per tablet Take 1 tablet by mouth every 24 hours as needed for Pain. Quantity medically necessary    naproxen (NAPROSYN) 500 MG tablet TAKE 1 TABLET(500 MG) BY MOUTH TWICE DAILY AS NEEDED FOR PAIN    NARCAN 4 mg/actuation [place] SPRAY 1 SPRAY IN NOSTRIL ONCE FOR 1 DOSE    omeprazole (PRILOSEC) 40 MG capsule TAKE 1 CAPSULE(40 MG) BY MOUTH EVERY DAY    tiZANidine (ZANAFLEX) 4 MG tablet 8 mg tid     Family History       Problem Relation (Age of Onset)    Alcohol abuse Brother    Anxiety disorder Father, Sister    Arthritis Father    Asthma Son    COPD Sister    Cancer Father, Sister, Sister    Colon cancer Father    Depression Father, Brother, Sister, Sister    Heart attack Sister    Heart disease Mother    Hypertension Mother    Liver cancer Sister,     Mental illness Father    Miscarriages / Stillbirths Sister    No Known Problems Daughter    Pancreatic cancer Brother,     Schizophrenia Father          Tobacco Use    Smoking status: Never Smoker    Smokeless tobacco: Never Used   Substance and Sexual Activity    Alcohol use: Yes     Alcohol/week: 1.0 standard drink     Types: 1 Glasses of wine per week     Comment: rarely     Drug use: Never     Types: Hydrocodone    Sexual activity: Not Currently     Partners: Male     Birth control/protection: Condom     Review of Systems   Constitutional:  Negative for activity change, fatigue and fever.   HENT:  Negative for congestion and sinus pressure.    Eyes:  Negative for photophobia and visual disturbance.   Respiratory:   Negative for cough and shortness of breath.    Cardiovascular:  Negative for chest pain and leg swelling.   Gastrointestinal:  Positive for anal bleeding and constipation. Negative for abdominal pain.   Endocrine: Negative for polydipsia and polyuria.   Genitourinary:  Negative for dysuria and hematuria.   Musculoskeletal:  Negative for arthralgias and myalgias.   Skin:  Negative for color change and rash.   Neurological:  Positive for dizziness, weakness and light-headedness. Negative for syncope.   Objective:     Vital Signs (Most Recent):  Temp: 98.2 °F (36.8 °C) (04/23/22 2031)  Pulse: 92 (04/23/22 2031)  Resp: 18 (04/23/22 2224)  BP: 130/86 (04/23/22 2031)  SpO2: 98 % (04/23/22 2031) Vital Signs (24h Range):  Temp:  [98.2 °F (36.8 °C)-98.3 °F (36.8 °C)] 98.2 °F (36.8 °C)  Pulse:  [92-98] 92  Resp:  [16-18] 18  SpO2:  [96 %-98 %] 98 %  BP: (119-130)/(71-86) 130/86        There is no height or weight on file to calculate BMI.    Physical Exam  Vitals and nursing note reviewed.   Constitutional:       General: She is not in acute distress.     Appearance: Normal appearance. She is obese.   HENT:      Head: Normocephalic and atraumatic.      Nose: Nose normal.      Mouth/Throat:      Pharynx: Oropharynx is clear.      Comments: Tacky MM  Eyes:      General: No scleral icterus.     Extraocular Movements: Extraocular movements intact.      Conjunctiva/sclera: Conjunctivae normal.      Pupils: Pupils are equal, round, and reactive to light.   Cardiovascular:      Rate and Rhythm: Normal rate and regular rhythm.      Pulses: Normal pulses.      Heart sounds: Normal heart sounds. No murmur heard.    No gallop.   Pulmonary:      Effort: Pulmonary effort is normal.      Breath sounds: Normal breath sounds.   Abdominal:      General: Bowel sounds are normal. There is no distension.      Palpations: Abdomen is soft.      Tenderness: There is no abdominal tenderness. There is no guarding.   Musculoskeletal:         General:  No tenderness or signs of injury. Normal range of motion.      Cervical back: Normal range of motion and neck supple. No rigidity.      Right lower leg: No edema.      Left lower leg: No edema.   Lymphadenopathy:      Cervical: No cervical adenopathy.   Skin:     General: Skin is warm and dry.      Findings: No erythema or rash.   Neurological:      Mental Status: She is alert and oriented to person, place, and time.      Cranial Nerves: No cranial nerve deficit.         CRANIAL NERVES     CN III, IV, VI   Pupils are equal, round, and reactive to light.     Significant Labs: All pertinent labs within the past 24 hours have been reviewed.  CBC:   Recent Labs   Lab 04/23/22  1614   WBC 11.59   HGB 10.5*   HCT 35.5*        CMP:   Recent Labs   Lab 04/23/22  1614      K 4.2      CO2 30*   GLU 85   BUN 32*   CREATININE 2.2*   CALCIUM 9.5   PROT 8.2   ALBUMIN 3.9   BILITOT 0.3   ALKPHOS 84   AST 17   ALT 12   ANIONGAP 10   EGFRNONAA 24.9*       Significant Imaging: I have reviewed all pertinent imaging results/findings within the past 24 hours.

## 2022-04-24 NOTE — SUBJECTIVE & OBJECTIVE
Interval History: UA from last night +, started on CTX this AM. Cr improving this morning 2.2 > 1.6 (BL ~ 1.0). Patient seen at bedside, discussed positive urinalysis, she states she has had foul smelling, cloudy urine and increased urgency for several weeks. She has a slight headache this morning, but otherwise feeling well.    Follow Ucx and encourage PO intake    Review of Systems   Constitutional:  Negative for activity change, fatigue and fever.   HENT:  Negative for congestion and sinus pressure.    Eyes:  Negative for photophobia and visual disturbance.   Respiratory:  Negative for cough and shortness of breath.    Cardiovascular:  Negative for chest pain and leg swelling.   Gastrointestinal:  Positive for anal bleeding and constipation. Negative for abdominal pain.   Endocrine: Negative for polydipsia and polyuria.   Genitourinary:  Negative for dysuria and hematuria.   Musculoskeletal:  Negative for arthralgias and myalgias.   Skin:  Negative for color change and rash.   Neurological:  Positive for dizziness, weakness and light-headedness. Negative for syncope.   Objective:     Vital Signs (Most Recent):  Temp: 97.7 °F (36.5 °C) (04/24/22 0721)  Pulse: 101 (04/24/22 0721)  Resp: 16 (04/24/22 0721)  BP: (!) 143/75 (04/24/22 0721)  SpO2: 96 % (04/24/22 0738)   Vital Signs (24h Range):  Temp:  [97.6 °F (36.4 °C)-98.3 °F (36.8 °C)] 97.7 °F (36.5 °C)  Pulse:  [] 101  Resp:  [16-18] 16  SpO2:  [95 %-98 %] 96 %  BP: (119-143)/(71-88) 143/75     Weight: 122 kg (268 lb 15.4 oz)  Body mass index is 42.13 kg/m².  No intake or output data in the 24 hours ending 04/24/22 1026   Physical Exam  Vitals and nursing note reviewed.   Constitutional:       General: She is not in acute distress.     Appearance: Normal appearance. She is not ill-appearing.   HENT:      Head: Normocephalic.      Nose: Nose normal.   Eyes:      General: No scleral icterus.     Extraocular Movements: Extraocular movements intact.    Cardiovascular:      Rate and Rhythm: Normal rate and regular rhythm.   Pulmonary:      Effort: Pulmonary effort is normal. No respiratory distress.   Abdominal:      General: Abdomen is flat.      Palpations: Abdomen is soft.      Tenderness: There is no abdominal tenderness.   Musculoskeletal:      Cervical back: Normal range of motion.      Right lower leg: No edema.      Left lower leg: No edema.   Skin:     General: Skin is warm and dry.   Neurological:      General: No focal deficit present.      Mental Status: She is alert and oriented to person, place, and time.      Cranial Nerves: No cranial nerve deficit.   Psychiatric:         Mood and Affect: Mood normal.         Behavior: Behavior normal.       Significant Labs: All pertinent labs within the past 24 hours have been reviewed.    Significant Imaging: I have reviewed all pertinent imaging results/findings within the past 24 hours.

## 2022-04-24 NOTE — PROGRESS NOTES
[facility]  Progress Note    Patient Name: Jaki Bajwa  MRN: [id number]  Patient Class: OP- Observation   Admission Date: 4/23/2022  Length of Stay: 0 days  Attending Physician: Benito Long MD  Primary Care Provider: Pratik Ramsussen MD        Subjective:     Principal Problem:CANDELARIA (acute kidney injury)        HPI:  51-year-old woman presents with rectal bleeding.  She states that she has been constipated for the past few days so she drank some Dulcolax, after which she had a large, hard bowel movement which was very painful to pass and clogged the toilet.  She noted afterwards that she was bleeding from the anus almost as if she were having a menstrual cycle.  It was bright red blood that started dripping into the toilet and would be present on toilet paper with wiping.  She started feeling lightheaded and weak after noting the bleeding.  Her  advised her to go to the ED.  she is wearing a menstrual pad to catch the blood.  She has a prior history of a hemorrhoid that was banded.  She also had a colonoscopy 1 year previously but also showed scattered diverticula.  For the past few days she has also noted feeling some orthostatic dizziness with sitting up and standing.  She has chronic dry mouth on account of Sjogren syndrome.  She denies spending an undue amount of time outdoors or doing any activity lately, although earlier today she was mopping which she noted seemed to be more difficult than usual.        Overview/Hospital Course:  Patient admitted for CANDELARIA secondary to hypovolemia, UTI. Cr BL ~1.0, cr 2.2 on admission. Improving with IVF and CTX. Follow UCX, continue to encourage PO intake    Additionally, patient had episode of rectal bleeding after being severely constipated, suspect hemorrhoidal bleed. Hgb stable, will refer to CRS clinic at dc      Interval History: UA from last night +, started on CTX this AM. Cr improving this morning 2.2 > 1.6 (BL ~ 1.0).  Patient seen at bedside, discussed positive urinalysis, she states she has had foul smelling, cloudy urine and increased urgency for several weeks. She has a slight headache this morning, but otherwise feeling well.    Follow Ucx and encourage PO intake    Review of Systems   Constitutional:  Negative for activity change, fatigue and fever.   HENT:  Negative for congestion and sinus pressure.    Eyes:  Negative for photophobia and visual disturbance.   Respiratory:  Negative for cough and shortness of breath.    Cardiovascular:  Negative for chest pain and leg swelling.   Gastrointestinal:  Positive for anal bleeding and constipation. Negative for abdominal pain.   Endocrine: Negative for polydipsia and polyuria.   Genitourinary:  Negative for dysuria and hematuria.   Musculoskeletal:  Negative for arthralgias and myalgias.   Skin:  Negative for color change and rash.   Neurological:  Positive for dizziness, weakness and light-headedness. Negative for syncope.   Objective:     Vital Signs (Most Recent):  Temp: 97.7 °F (36.5 °C) (04/24/22 0721)  Pulse: 101 (04/24/22 0721)  Resp: 16 (04/24/22 0721)  BP: (!) 143/75 (04/24/22 0721)  SpO2: 96 % (04/24/22 0738)   Vital Signs (24h Range):  Temp:  [97.6 °F (36.4 °C)-98.3 °F (36.8 °C)] 97.7 °F (36.5 °C)  Pulse:  [] 101  Resp:  [16-18] 16  SpO2:  [95 %-98 %] 96 %  BP: (119-143)/(71-88) 143/75     Weight: 122 kg (268 lb 15.4 oz)  Body mass index is 42.13 kg/m².  No intake or output data in the 24 hours ending 04/24/22 1026   Physical Exam  Vitals and nursing note reviewed.   Constitutional:       General: She is not in acute distress.     Appearance: Normal appearance. She is not ill-appearing.   HENT:      Head: Normocephalic.      Nose: Nose normal.   Eyes:      General: No scleral icterus.     Extraocular Movements: Extraocular movements intact.   Cardiovascular:      Rate and Rhythm: Normal rate and regular rhythm.   Pulmonary:      Effort: Pulmonary effort is  normal. No respiratory distress.   Abdominal:      General: Abdomen is flat.      Palpations: Abdomen is soft.      Tenderness: There is no abdominal tenderness.   Musculoskeletal:      Cervical back: Normal range of motion.      Right lower leg: No edema.      Left lower leg: No edema.   Skin:     General: Skin is warm and dry.   Neurological:      General: No focal deficit present.      Mental Status: She is alert and oriented to person, place, and time.      Cranial Nerves: No cranial nerve deficit.   Psychiatric:         Mood and Affect: Mood normal.         Behavior: Behavior normal.       Significant Labs: All pertinent labs within the past 24 hours have been reviewed.    Significant Imaging: I have reviewed all pertinent imaging results/findings within the past 24 hours.      Assessment/Plan:      * CANDELARIA (acute kidney injury)  Patient with acute kidney injury likely d/t Pre-renal azotemia Which is currently acute. Labs reviewed- Renal function/electrolytes with Estimated Creatinine Clearance: 55.1 mL/min (A) (based on SCr of 1.6 mg/dL (H)). according to latest data. Monitor urine output and serial BMP and adjust therapy as needed. Avoid nephrotoxins and renally dose meds for GFR listed above.   I suspect dehydration as the likely cause, which likely also caused her constipation.  Her ARB likely further magnified the renal injury in the context of volume depletion.  Her symptoms of orthostasis suggest dehydration, but is not immediately clear what the inciting event causing this might be.  Her chronic dry mouth related to Sjogren's may be a confounding factor masking thirst.  She denies being out in the heat or not having access to adequate fluid intake.  She got 1 L of normal saline in the ED; will put on maintenance IV fluids overnight and recheck labs in the morning.  Will hold her ARB for now pending repeat labs.  Cr 2.2 on admission, 1.6 currently   BL Cr ~1.0    Bleeding hemorrhoid  Her rectal bleeding is  likely secondary to avulsed hemorrhoid caused by trauma from hard stool.  She should be started on stool softener regimen to allow healing and to prevent recurrence.  Will apply Tucks pad for now to help reduce irritation.  I counseled her that in most cases bleeding should resolve spontaneously, but in the event that it does not CRS can be consulted in the morning to evaluate and ligate the hemorrhoid.  Otherwise she can be referred to see them for outpatient follow-up.  hgb stable, will refer to CRS clinic    Sjogren's syndrome  She is on Plaquenil 200 mg b.i.d.; will continue.  Would normally order Caphosol p.r.n. for dry mouth but it is presently on back order.  Artificial tears p.r.n. dry eyes.      Essential hypertension  Will hold Avalide for now in the setting of CANDELARIA, BP stable without it        VTE Risk Mitigation (From admission, onward)         Ordered     IP VTE HIGH RISK PATIENT  Once         04/23/22 2049     Place sequential compression device  Until discontinued         04/23/22 2049     Reason for No Pharmacological VTE Prophylaxis  Once        Question:  Reasons:  Answer:  Active Bleeding    04/23/22 2049                Discharge Planning   RAY:      Code Status: Full Code   Is the patient medically ready for discharge?:     Reason for patient still in hospital (select all that apply): Patient trending condition and Laboratory test                     Lucina Pinedo PA-C  Department of Hospital Medicine   [facility] - Observation

## 2022-04-24 NOTE — ASSESSMENT & PLAN NOTE
She is on Plaquenil 200 mg b.i.d.; will continue.  Would normally order Caphosol p.r.n. for dry mouth but it is presently on back order.  Artificial tears p.r.n. dry eyes.

## 2022-04-24 NOTE — NURSING
Patient arrived from ER via stretcher accompanied by transporter. Patient ambulated to bed without difficulty. Patient AAOX4, respirations even and unlabored. Oriented to room, plan of care discussed. All questions answered. Safety measure maintained, bed in lowest position, call bell and personal items are within reach.

## 2022-04-25 VITALS
DIASTOLIC BLOOD PRESSURE: 80 MMHG | SYSTOLIC BLOOD PRESSURE: 131 MMHG | RESPIRATION RATE: 16 BRPM | WEIGHT: 268.94 LBS | HEIGHT: 67 IN | OXYGEN SATURATION: 96 % | BODY MASS INDEX: 42.21 KG/M2 | HEART RATE: 90 BPM | TEMPERATURE: 98 F

## 2022-04-25 LAB
ANION GAP SERPL CALC-SCNC: 6 MMOL/L (ref 8–16)
BUN SERPL-MCNC: 21 MG/DL (ref 6–20)
CALCIUM SERPL-MCNC: 9.2 MG/DL (ref 8.7–10.5)
CHLORIDE SERPL-SCNC: 109 MMOL/L (ref 95–110)
CO2 SERPL-SCNC: 28 MMOL/L (ref 23–29)
CREAT SERPL-MCNC: 1.1 MG/DL (ref 0.5–1.4)
EST. GFR  (AFRICAN AMERICAN): >60 ML/MIN/1.73 M^2
EST. GFR  (NON AFRICAN AMERICAN): 57.5 ML/MIN/1.73 M^2
GLUCOSE SERPL-MCNC: 113 MG/DL (ref 70–110)
POTASSIUM SERPL-SCNC: 4.8 MMOL/L (ref 3.5–5.1)
SODIUM SERPL-SCNC: 143 MMOL/L (ref 136–145)

## 2022-04-25 PROCEDURE — 25000003 PHARM REV CODE 250: Performed by: HOSPITALIST

## 2022-04-25 PROCEDURE — 63600175 PHARM REV CODE 636 W HCPCS: Performed by: PHYSICIAN ASSISTANT

## 2022-04-25 PROCEDURE — 99217 PR OBSERVATION CARE DISCHARGE: CPT | Mod: ,,, | Performed by: PHYSICIAN ASSISTANT

## 2022-04-25 PROCEDURE — 80048 BASIC METABOLIC PNL TOTAL CA: CPT | Performed by: PHYSICIAN ASSISTANT

## 2022-04-25 PROCEDURE — 36415 COLL VENOUS BLD VENIPUNCTURE: CPT | Performed by: PHYSICIAN ASSISTANT

## 2022-04-25 PROCEDURE — 96366 THER/PROPH/DIAG IV INF ADDON: CPT

## 2022-04-25 PROCEDURE — 96361 HYDRATE IV INFUSION ADD-ON: CPT

## 2022-04-25 PROCEDURE — 99217 PR OBSERVATION CARE DISCHARGE: ICD-10-PCS | Mod: ,,, | Performed by: PHYSICIAN ASSISTANT

## 2022-04-25 PROCEDURE — G0378 HOSPITAL OBSERVATION PER HR: HCPCS

## 2022-04-25 RX ORDER — AMOXICILLIN AND CLAVULANATE POTASSIUM 875; 125 MG/1; MG/1
1 TABLET, FILM COATED ORAL EVERY 12 HOURS
Qty: 10 TABLET | Refills: 0 | Status: SHIPPED | OUTPATIENT
Start: 2022-04-26 | End: 2022-05-01

## 2022-04-25 RX ADMIN — BUSPIRONE HYDROCHLORIDE 15 MG: 10 TABLET ORAL at 10:04

## 2022-04-25 RX ADMIN — GABAPENTIN 300 MG: 100 CAPSULE ORAL at 10:04

## 2022-04-25 RX ADMIN — SODIUM CHLORIDE: 0.9 INJECTION, SOLUTION INTRAVENOUS at 01:04

## 2022-04-25 RX ADMIN — SENNOSIDES AND DOCUSATE SODIUM 1 TABLET: 50; 8.6 TABLET ORAL at 10:04

## 2022-04-25 RX ADMIN — LURASIDONE HYDROCHLORIDE 60 MG: 20 TABLET, FILM COATED ORAL at 10:04

## 2022-04-25 RX ADMIN — CEFTRIAXONE 1 G: 1 INJECTION, SOLUTION INTRAVENOUS at 10:04

## 2022-04-25 RX ADMIN — HYDROXYCHLOROQUINE SULFATE 200 MG: 200 TABLET, FILM COATED ORAL at 10:04

## 2022-04-25 RX ADMIN — BUPROPION HYDROCHLORIDE 450 MG: 300 TABLET, FILM COATED, EXTENDED RELEASE ORAL at 10:04

## 2022-04-25 NOTE — PLAN OF CARE
Patient had uneventful night. Reports of pain addressed as voiced. Bed in lowest position, call light within reach.     Problem: Adult Inpatient Plan of Care  Goal: Plan of Care Review  Outcome: Ongoing, Progressing  Goal: Patient-Specific Goal (Individualized)  Outcome: Ongoing, Progressing  Goal: Absence of Hospital-Acquired Illness or Injury  Outcome: Ongoing, Progressing  Goal: Optimal Comfort and Wellbeing  Outcome: Ongoing, Progressing  Goal: Readiness for Transition of Care  Outcome: Ongoing, Progressing     Problem: Bariatric Environmental Safety  Goal: Safety Maintained with Care  Outcome: Ongoing, Progressing     Problem: Fluid and Electrolyte Imbalance (Acute Kidney Injury/Impairment)  Goal: Fluid and Electrolyte Balance  Outcome: Ongoing, Progressing     Problem: Oral Intake Inadequate (Acute Kidney Injury/Impairment)  Goal: Optimal Nutrition Intake  Outcome: Ongoing, Progressing     Problem: Renal Function Impairment (Acute Kidney Injury/Impairment)  Goal: Effective Renal Function  Outcome: Ongoing, Progressing

## 2022-04-25 NOTE — ASSESSMENT & PLAN NOTE
Patient with acute kidney injury likely d/t Pre-renal azotemia Which is currently acute. Labs reviewed- Renal function/electrolytes with Estimated Creatinine Clearance: 80.1 mL/min (based on SCr of 1.1 mg/dL). according to latest data. Monitor urine output and serial BMP and adjust therapy as needed. Avoid nephrotoxins and renally dose meds for GFR listed above.   I suspect dehydration as the likely cause, which likely also caused her constipation.  Her ARB likely further magnified the renal injury in the context of volume depletion.  Her symptoms of orthostasis suggest dehydration, but is not immediately clear what the inciting event causing this might be.  Her chronic dry mouth related to Sjogren's may be a confounding factor masking thirst.  She denies being out in the heat or not having access to adequate fluid intake.  She got 1 L of normal saline in the ED; will put on maintenance IV fluids overnight and recheck labs in the morning.  Will hold her ARB for now pending repeat labs.  Cr 2.2 on admission, returned to BL with IVF and CTX

## 2022-04-25 NOTE — ASSESSMENT & PLAN NOTE
Will hold Avalide for now in the setting of CANDELARIA, BP stable without it  Ok to resume at dc

## 2022-04-25 NOTE — RESPIRATORY THERAPY
RAPID RESPONSE RESPIRATORY CHART CHECK       Chart check completed,    instructed to call [phone] for further concerns or assistance.

## 2022-04-25 NOTE — PLAN OF CARE
[facility]  Discharge Final Note    Primary Care Provider: Pratik Rasmussen MD    Expected Discharge Date: 4/25/2022    Final Discharge Note (most recent)     Final Note - 04/25/22 1321        Final Note    Assessment Type Final Discharge Note     Anticipated Discharge Disposition Home or Self Care     Hospital Resources/Appts/Education Provided Appointments scheduled and added to AVS        Post-Acute Status    Post-Acute Authorization Other     Other Status No Post-Acute Service Needs                 Important Message from Medicare      Future Appointments   Date Time Provider Department Center   5/2/2022  8:00 AM [facility]   5/2/2022 11:00 AM Jorge Rader MD [facility]   5/24/2022  8:00 AM [facility] BMT [facility]   5/24/2022  9:00 AM Raoul Wren MD [facility] HC BMT [facility]   6/8/2022  8:00 AM Pratik Rasmussen MD [facility]   6/13/2022  3:00 PM Gordo Zamora LCSW [facility] SOCL [facility]   7/19/2022  2:30 PM Najma Doyle MD [facility] PSYCH [facility]

## 2022-04-25 NOTE — DISCHARGE SUMMARY
[facility]  [facility]  Discharge Summary      Patient Name: Jaki Bajwa  MRN: [id number]  Patient Class: OP- Observation  Admission Date: 4/23/2022  Hospital Length of Stay: 0 days  Discharge Date and Time: 4/25/2022 12:34 PM  Attending Physician: No att. providers found   Discharging Provider: Lucina Pinedo PA-C  Primary Care Provider: Pratik Rasmussen MD  Hospital Medicine Team: [facility] HOSP MED F Lucina Pinedo PA-C    HPI:   51-year-old woman presents with rectal bleeding.  She states that she has been constipated for the past few days so she drank some Dulcolax, after which she had a large, hard bowel movement which was very painful to pass and clogged the toilet.  She noted afterwards that she was bleeding from the anus almost as if she were having a menstrual cycle.  It was bright red blood that started dripping into the toilet and would be present on toilet paper with wiping.  She started feeling lightheaded and weak after noting the bleeding.  Her  advised her to go to the ED.  she is wearing a menstrual pad to catch the blood.  She has a prior history of a hemorrhoid that was banded.  She also had a colonoscopy 1 year previously but also showed scattered diverticula.  For the past few days she has also noted feeling some orthostatic dizziness with sitting up and standing.  She has chronic dry mouth on account of Sjogren syndrome.  She denies spending an undue amount of time outdoors or doing any activity lately, although earlier today she was mopping which she noted seemed to be more difficult than usual.        * No surgery found *      Hospital Course:   Patient admitted for CANDELARIA secondary to hypovolemia, UTI. Cr BL ~1.0, cr 2.2 on admission. Improved to baseline with IVF and CTX. Ucx with GNR, discharge with augmentin to complete 7d course.   Additionally, patient had episode of rectal bleeding after being severely constipated, suspect hemorrhoidal bleed. Hgb stable,  will refer to CRS clinic at [place]. Return precautions discussed, stable for dc with PCP and CRS f/u. No further questions       Goals of Care Treatment Preferences:  Code Status: Full Code      Consults:     * CANDELARIA (acute kidney injury)  Patient with acute kidney injury likely d/t Pre-renal azotemia Which is currently acute. Labs reviewed- Renal function/electrolytes with Estimated Creatinine Clearance: 80.1 mL/min (based on SCr of 1.1 mg/dL). according to latest data. Monitor urine output and serial BMP and adjust therapy as needed. Avoid nephrotoxins and renally dose meds for GFR listed above.   I suspect dehydration as the likely cause, which likely also caused her constipation.  Her ARB likely further magnified the renal injury in the context of volume depletion.  Her symptoms of orthostasis suggest dehydration, but is not immediately clear what the inciting event causing this might be.  Her chronic dry mouth related to Sjogren's may be a confounding factor masking thirst.  She denies being out in the heat or not having access to adequate fluid intake.  She got 1 L of normal saline in the ED; will put on maintenance IV fluids overnight and recheck labs in the morning.  Will hold her ARB for now pending repeat labs.  Cr 2.2 on admission, returned to BL with IVF and CTX    Bleeding hemorrhoid  Her rectal bleeding is likely secondary to avulsed hemorrhoid caused by trauma from hard stool.  She should be started on stool softener regimen to allow healing and to prevent recurrence.  Will apply Tucks pad for now to help reduce irritation.  I counseled her that in most cases bleeding should resolve spontaneously, but in the event that it does not CRS can be consulted in the morning to evaluate and ligate the hemorrhoid.  Otherwise she can be referred to see them for outpatient follow-up.  hgb stable, will refer to CRS clinic  Encouraged continuing bowel regimen outpt      Final Active Diagnoses:    Diagnosis Date Noted POA     PRINCIPAL PROBLEM:  CANDELARIA (acute kidney injury) [N17.9] 04/23/2022 Yes    Bleeding hemorrhoid [K64.9] 04/23/2022 Yes    Sjogren's syndrome [M35.00] 05/14/2014 Yes    Essential hypertension [I10] 08/20/2012 Yes     Chronic      Problems Resolved During this Admission:       Discharged Condition: stable    Disposition: Home or Self Care    Follow Up:    Patient Instructions:      Ambulatory referral/consult to Outpatient Case Management   Referral Priority: Routine Referral Type: Consultation   Referral Reason: Specialty Services Required   Number of Visits Requested: 1     Ambulatory referral/consult to Colorectal Surgery   Standing Status: Future   Referral Priority: Routine Referral Type: Consultation   Referral Reason: Specialty Services Required   Requested Specialty: Colon and Rectal Surgery   Number of Visits Requested: 1     Notify your health care provider if you experience any of the following:  temperature >100.4     Notify your health care provider if you experience any of the following:  severe uncontrolled pain     Notify your health care provider if you experience any of the following:  redness, tenderness, or signs of infection (pain, swelling, redness, odor or green/yellow discharge around incision site)     Notify your health care provider if you experience any of the following:  worsening rash     Notify your health care provider if you experience any of the following:  persistent dizziness, light-headedness, or visual disturbances     Notify your health care provider if you experience any of the following:  increased confusion or weakness     Activity as tolerated       Significant Diagnostic Studies: Labs:   BMP:   Recent Labs   Lab 04/23/22  1614 04/24/22  0426 04/25/22  0807   GLU 85 107 113*    140 143   K 4.2 3.9 4.8    104 109   CO2 30* 27 28   BUN 32* 23* 21*   CREATININE 2.2* 1.6* 1.1   CALCIUM 9.5 9.1 9.2   MG  --  2.2  --      Microbiology:   Urine Culture    Lab Results    Component Value Date    LABURIN (A) 04/23/2022     GRAM NEGATIVE LENNIE  >100,000 cfu/ml  Identification and susceptibility pending         Pending Diagnostic Studies:     None         Medications:  Reconciled Home Medications:      Medication List      START taking these medications    amoxicillin-clavulanate 875-125mg 875-125 mg per tablet  Commonly known as: AUGMENTIN  Take 1 tablet by mouth every 12 (twelve) hours. for 5 days  Start taking on: April 26, 2022        CONTINUE taking these medications    buPROPion 150 MG TB24 tablet  Commonly known as: WELLBUTRIN XL  Take 3 tablets (450 mg total) by mouth every morning.     busPIRone 15 MG tablet  Commonly known as: BUSPAR  TAKE 1 TABLET(15 MG) BY MOUTH THREE TIMES DAILY     cetirizine 10 MG tablet  Commonly known as: ZYRTEC  Take 1 tablet (10 mg total) by mouth every evening.     cevimeline 30 mg capsule  Commonly known as: EVOXAC     diazePAM 2 MG tablet  Commonly known as: VALIUM  Take 1 tablet (2 mg total) by mouth 2 (two) times daily as needed (Severe anxiety/Panic attack).     doxepin 75 MG capsule  Commonly known as: SINEQUAN  TAKE 2 CAPSULES(150 MG) BY MOUTH EVERY EVENING     estradioL 0.01 % (0.1 mg/gram) vaginal cream  Commonly known as: ESTRACE  Place 1 g vaginally twice a week.     fluconazole 200 MG Tab  Commonly known as: DIFLUCAN  Take 1 tablet (200 mg total) by mouth every 72 hours as needed.     fluticasone propionate 50 mcg/actuation nasal spray  Commonly known as: FLONASE  SHAKE LIQUID AND USE 1 SPRAY(50 MCG) IN EACH NOSTRIL EVERY DAY     gabapentin 300 MG capsule  Commonly known as: NEURONTIN  Take 1 capsule (300 mg total) by mouth 3 (three) times daily.     HYDROcodone-acetaminophen 5-325 mg per tablet  Commonly known as: NORCO  Take 1 tablet by mouth every 24 hours as needed for Pain. Quantity medically necessary     hydrOXYchloroQUINE 200 mg tablet  Commonly known as: PLAQUENIL  One pill bid     irbesartan-hydrochlorothiazide 150-12.5 mg per  tablet  Commonly known as: AVALIDE  Take 1 tablet by mouth once daily.     lurasidone 60 mg Tab tablet  Commonly known as: LATUDA  Take 1 tablet (60 mg total) by mouth once daily.     naproxen 500 MG tablet  Commonly known as: NAPROSYN  TAKE 1 TABLET(500 MG) BY MOUTH TWICE DAILY AS NEEDED FOR PAIN     NARCAN 4 mg/actuation Spry  Generic drug: naloxone  SPRAY 1 SPRAY IN NOSTRIL ONCE FOR 1 DOSE     omeprazole 40 MG capsule  Commonly known as: PRILOSEC  TAKE 1 CAPSULE(40 MG) BY MOUTH EVERY DAY     RESTASIS 0.05 % ophthalmic emulsion  Generic drug: cycloSPORINE  Place 1 drop into both eyes 2 (two) times daily.     tiZANidine 4 MG tablet  Commonly known as: ZANAFLEX  8 mg tid            Indwelling Lines/Drains at time of discharge:   Lines/Drains/Airways     None                 Time spent on the discharge of patient: >45 minutes         Lucina Pinedo PA-C  Department of Hospital Medicine  [facility]

## 2022-04-25 NOTE — ASSESSMENT & PLAN NOTE
Her rectal bleeding is likely secondary to avulsed hemorrhoid caused by trauma from hard stool.  She should be started on stool softener regimen to allow healing and to prevent recurrence.  Will apply Tucks pad for now to help reduce irritation.  I counseled her that in most cases bleeding should resolve spontaneously, but in the event that it does not CRS can be consulted in the morning to evaluate and ligate the hemorrhoid.  Otherwise she can be referred to see them for outpatient follow-up.  hgb stable, will refer to CRS clinic  Encouraged continuing bowel regimen outpt

## 2022-04-26 LAB — BACTERIA UR CULT: ABNORMAL

## 2022-04-29 RX ORDER — FLUCONAZOLE 200 MG/1
200 TABLET ORAL
Qty: 2 TABLET | Refills: 0 | Status: SHIPPED | OUTPATIENT
Start: 2022-04-29 | End: 2022-10-14 | Stop reason: SDUPTHER

## 2022-04-29 NOTE — TELEPHONE ENCOUNTER
Medication reaction   Received: Yesterday  Roxy Munguia Staff  Phone Number: [phone]     MRN: [id number]   Pt: PENG MAYO   Phone: [phone]     Pharmacy:  [id number] [place] [address] [facility] [address] [facility]     Good afternoon,     Patient  reports in patient admitted to Hospital on 04/23/22. Patient is currently being treated for a UTI, pt was prescribed Augmentin. Pt called to report severe diarrhea and beginning to have s/s of yeast infection. Pt is requesting something to help with the diarrhea and yeast infection. CC educated pt on BRATs diet and increasing fluids.      Im happy to help, please let me know if theres anything I can do.     Thanks,     Roxy Hobbs LPN   Care Coordinator   [facility]   [phone] ext [phone]

## 2022-04-29 NOTE — TELEPHONE ENCOUNTER
Would recommend probiotic otc such as align to help with diarrhea. Diflucan sent to pharmacy for yeast infection. She can take diflucan 200mg on her med list once for yeast infection.

## 2022-04-29 NOTE — TELEPHONE ENCOUNTER
"Called pt to let her know per Dr. Rasmussen "Would recommend probiotic otc such as align to help with diarrhea. Diflucan sent to pharmacy for yeast infection. She can take diflucan 200mg on her med list once for yeast infection."    She asked if we could send in diflucan refill  Pending to provider   Pt verbalized understanding and had no further concerns or questions.    "

## 2022-04-30 ENCOUNTER — EXTERNAL CHRONIC CARE MANAGEMENT (OUTPATIENT)
Dept: PRIMARY CARE CLINIC | Facility: CLINIC | Age: 52
End: 2022-04-30
Payer: MEDICARE

## 2022-04-30 PROCEDURE — 99490 PR CHRONIC CARE MGMT, 1ST 20 MIN: ICD-10-PCS | Mod: S$GLB,,, | Performed by: INTERNAL MEDICINE

## 2022-04-30 PROCEDURE — 99439 CHRNC CARE MGMT STAF EA ADDL: CPT | Mod: S$GLB,,, | Performed by: INTERNAL MEDICINE

## 2022-04-30 PROCEDURE — 99439 PR CHRONIC CARE MGMT, EA ADDTL 20 MIN: ICD-10-PCS | Mod: S$GLB,,, | Performed by: INTERNAL MEDICINE

## 2022-04-30 PROCEDURE — 99490 CHRNC CARE MGMT STAFF 1ST 20: CPT | Mod: S$GLB,,, | Performed by: INTERNAL MEDICINE

## 2022-05-02 ENCOUNTER — PATIENT MESSAGE (OUTPATIENT)
Dept: PSYCHIATRY | Facility: CLINIC | Age: 52
End: 2022-05-02
Payer: MEDICARE

## 2022-05-02 ENCOUNTER — OFFICE VISIT (OUTPATIENT)
Dept: RHEUMATOLOGY | Facility: CLINIC | Age: 52
End: 2022-05-02
Payer: MEDICARE

## 2022-05-02 DIAGNOSIS — M35.01 SJOGREN'S SYNDROME WITH KERATOCONJUNCTIVITIS SICCA: Primary | ICD-10-CM

## 2022-05-02 DIAGNOSIS — Z79.899 LONG-TERM USE OF PLAQUENIL: ICD-10-CM

## 2022-05-02 PROCEDURE — 1159F PR MEDICATION LIST DOCUMENTED IN MEDICAL RECORD: ICD-10-PCS | Mod: CPTII,95,, | Performed by: INTERNAL MEDICINE

## 2022-05-02 PROCEDURE — 99214 PR OFFICE/OUTPT VISIT, EST, LEVL IV, 30-39 MIN: ICD-10-PCS | Mod: 95,,, | Performed by: INTERNAL MEDICINE

## 2022-05-02 PROCEDURE — 1160F RVW MEDS BY RX/DR IN RCRD: CPT | Mod: CPTII,95,, | Performed by: INTERNAL MEDICINE

## 2022-05-02 PROCEDURE — 1159F MED LIST DOCD IN RCRD: CPT | Mod: CPTII,95,, | Performed by: INTERNAL MEDICINE

## 2022-05-02 PROCEDURE — 1160F PR REVIEW ALL MEDS BY PRESCRIBER/CLIN PHARMACIST DOCUMENTED: ICD-10-PCS | Mod: CPTII,95,, | Performed by: INTERNAL MEDICINE

## 2022-05-02 PROCEDURE — 99214 OFFICE O/P EST MOD 30 MIN: CPT | Mod: 95,,, | Performed by: INTERNAL MEDICINE

## 2022-05-02 RX ORDER — TIZANIDINE 4 MG/1
TABLET ORAL
Qty: 180 TABLET | Refills: 5 | Status: SHIPPED | OUTPATIENT
Start: 2022-05-02 | End: 2022-05-16 | Stop reason: SDUPTHER

## 2022-05-02 RX ORDER — PILOCARPINE HYDROCHLORIDE 5 MG/1
5 TABLET, FILM COATED ORAL 2 TIMES DAILY
Qty: 60 TABLET | Refills: 11 | Status: SHIPPED | OUTPATIENT
Start: 2022-05-02 | End: 2023-03-10

## 2022-05-02 RX ORDER — IRON SUCROSE 20 MG/ML
INJECTION, SOLUTION INTRAVENOUS
COMMUNITY
Start: 2022-03-03 | End: 2022-07-14

## 2022-05-02 RX ORDER — HYDROXYCHLOROQUINE SULFATE 200 MG/1
TABLET, FILM COATED ORAL
Qty: 180 TABLET | Refills: 2 | Status: SHIPPED | OUTPATIENT
Start: 2022-05-02 | End: 2022-10-27

## 2022-05-02 ASSESSMENT — ROUTINE ASSESSMENT OF PATIENT INDEX DATA (RAPID3)
PATIENT GLOBAL ASSESSMENT SCORE: 4.5
PSYCHOLOGICAL DISTRESS SCORE: 3.3
WHEN YOU AWAKENED IN THE MORNING OVER THE LAST WEEK, PLEASE INDICATE THE AMOUNT OF TIME IT TAKES UNTIL YOU ARE AS LIMBER AS YOU WILL BE FOR THE DAY: 2 HOURS
AM STIFFNESS SCORE: 1, YES
MDHAQ FUNCTION SCORE: 0
PAIN SCORE: 5
FATIGUE SCORE: 5
TOTAL RAPID3 SCORE: 3.17

## 2022-05-02 NOTE — PROGRESS NOTES
Chief Complaint   Patient presents with    Disease Management           The patient location is: Home  The chief complaint leading to consultation is: sjogren's syndrome    Visit type: audiovisual    Face to Face time with patient: 20   minutes of total time spent on the encounter, which includes face to face time and non-face to face time preparing to see the patient (eg, review of tests), Obtaining and/or reviewing separately obtained history, Documenting clinical information in the electronic or other health record, Independently interpreting results (not separately reported) and communicating results to the patient/family/caregiver, or Care coordination (not separately reported).         Each patient to whom he or she provides medical services by telemedicine is:  (1) informed of the relationship between the physician and patient and the respective role of any other health care provider with respect to management of the patient; and (2) notified that he or she may decline to receive medical services by telemedicine and may withdraw from such care at any time.    Notes:     History of presenting illness    51  year old black female has     Joint pains in the knees and ankles   Back pain   Diffuse aching  Nocturnal leg cramps  Worse pain with activity  Hand paresthesias+  Morning pain but no morning stiffness  No joint swelling     She now has a diagnosis of fibromyalgia  Diagnosis + confirmed by atleast 2 rheumatologists  On gabapentin 300 mg tid  on naprosyn 500 mg daily  Tried and failed flexeril  On zanaflex 4 mg bid prn for TMJ syndrome and muscle spasms   Takes doxepin at night 150 mg for insomnia   She follows with pain clinic and takes pain medications  She gets migraines but not severe  She has tested negative for sleep apnea    She has anxiety and depression : on wellbutrin 150 to 300 mg and buspar  5 mg bid   Valium is prn only  On latuda 60 mg     Dry eyes and dry mouth and inflammatory  arthritis/arthralgia   Abnormal labs   Sjogren's syndrome +   On plaquenil 200 mg bid   Pilocarpine makes her nauseated   So we gave cevelimine   Dry mouth is severe at night   Dry eyes severe /but if she uses restasis she has symptom control  No recurrent conjunctivitis or uveitis or scleritis or episcleritis but she remembers once they told her she has inflammation in the eyes due to dry eyes   Sees ophthalmology     She has dry patches of skin   Areas of pigmentation on the elbows and thighs   Butterfly like pigmentation on the eyelids and underneath the eyes   Dermatology says : nummular dermatitis and seborrheic keratosis   No other skin rashes,photosensitivity   No telangiectasias   No calcinosis   No psoriasis   She does have patchy alopecia ,b/l frontally     No oral and nasal ulcers     No pleurisy or any cardiopulmonary complaints     No dysphagia,diplopia and dysphonia and muscle weakness     No v/d/c   Nausea and  acid reflux+ on PPIs   [facility]  She has hiatal hernia     She has raynaud's+   No digital ulcers     No cytopenias   Mostly irom deficiency anemia  White count and plt count ok  No menorrhagia /hysterectomy  Diet is poor    No renal issues     No blood clots     No fever,chills,night sweats,weight loss and loss of appetite  No unexplained lymphadenopathy and parotitis      No pregnancy losses/pre term deliveries /pregnancy complications     No new onset headaches     No chronic or bloody diarrhea with no u colitis or crohn's /inflammatory bowel disease     No vaginal or  urethral  d/c/STDs/no ulcers     No unexplained neurologic symptoms        Labs     White count always normal    Anemia     ESR 25/23  CRP 3.3/6.8    Creat and GFR always normal  LFTs ok    OLINDA negative     Hep A,B,C negative   HIV negative    Has had UTIs and proteinuria and hematuria  She has to see urology  The UTIs are always s/p intercourse    8/2020 labs     OLINDA neg  SSA positive 2.19  nml complements    Dsdna neg  cryos nml  myomarker panel neg  Ck,aldolase nml  RF  Urine nml  UPCR neg  GFR 54.9  Immunoglobulins elevated 2088  GIGI neg,LDH nml  B12,folate nml  retic nml      First follow up  9/2021    Hands hurt/ache  Foot and ankles : swell now and then   It comes and goes away  She takes a diuretic     Dry eyes and dry mouth-ok    On plaquenil 200 mg bid     On evoxac 30 mg tid-causes stomach upset    1/2022    Some aches and pains    On plaquenil 200 mg bid  On evoxac 30 mg daily-cant tolerate more   Dry eyes and dry mouth-ok    5/2022    Iron infusions let to constipation  She went to the ER  GFR dropped to 28.7  NOW UPTO >60  UA -uti  On antibiotics    Dry eyes and mouth- does well  Drinks well    On plaquenil 200 mg bid  Eye exam-pending    Doesn't want evoxac  Will try pilocarpine     White count nml  H/h 10.5/36.9  Iron deficiency  plts nml    Sees hematology     2/2022    Complements,cryos,dsdna,CRP nml  UA,UPCR nml    Past history : sjogren's,fibromyalgia     Family history : lupus cousin and cancers    Social history : not a smoker,alcohol user        Review of Systems   Constitutional: Negative for fever and unexpected weight change.   HENT: Negative for mouth sores and trouble swallowing.    Eyes: Negative for redness.   Respiratory: Negative for cough and shortness of breath.    Cardiovascular: Negative for chest pain.   Gastrointestinal: Negative for constipation and diarrhea.   Genitourinary: Negative for dysuria and genital sores.   Skin: Positive for rash.   Neurological: Negative for headaches.   Hematological: Does not bruise/bleed easily.       As detailed above     MSK exam  nml    Physical Exam   Constitutional: She is oriented to person, place, and time. No distress.   HENT:   Head: Normocephalic.   Mouth/Throat: Oropharynx is clear and moist.   Eyes: Pupils are equal, round, and reactive to light. Conjunctivae are normal. Right eye exhibits no discharge. Left eye exhibits no discharge. No  scleral icterus.   Neck: No thyromegaly present.   Cardiovascular: Normal rate, regular rhythm and normal heart sounds.   Pulmonary/Chest: Effort normal and breath sounds normal. No stridor.   Abdominal: Soft. Bowel sounds are normal.   Musculoskeletal:         General: Normal range of motion.      Cervical back: Normal range of motion.   Lymphadenopathy:     She has no cervical adenopathy.   Neurological: She is alert and oriented to person, place, and time.   Skin: Skin is warm. No rash noted. She is not diaphoretic.   Psychiatric: Affect and judgment normal.       She has lost a lot of hair frontally   Its like a bald patch  Its chronic  There is a mole as well    She has dry hyperpigmented skin on the elbows and dry patches on thighs for evaluation  She has frontal baldness  She has a mole like lesion   She has hyperpigmented rash on the eyelids and under the eyes       Assessment     51 year old black female with    -Sjogren's syndrome  Sicca symptoms and inflammatory arthritis    Fibromyalgia   -chronic pain + RLS   -insomnia/no sleep apnea  -anxiety and depression     Hands used to hurt,ache,they had paresthesias-CTS evaluation pending     Foot and ankles : swell now and then   It comes and goes away  She takes a diuretic   Compression stockings help    Dry eyes and dry mouth-ok    On plaquenil 200 mg bid   Needs eye exam    5/2022    Iron infusions let to constipation  She went to the ER  GFR dropped to 28.7  NOW UPTO >60  UA -uti  On antibiotics    Dry eyes and mouth- does well  Drinks well    On plaquenil 200 mg bid  Eye exam-pending    Doesn't want evoxac  Will try pilocarpine     White count nml  H/h 10.5/36.9  Iron deficiency  plts nml    Sees hematology     2/2022    Complements,cryos,dsdna,CRP nml  UA,UPCR nml    Off the irbesartan-hctz for a brief period and now back on it  She was told that BP medication could have dropped the kidney function      1. Sjogren's syndrome with keratoconjunctivitis  sicca    2. Long-term use of Plaquenil        Reviewed labs/xrays  Reviewed medications    Ordered labs /xrays    F/u     Plan    Check kidney function before PCP visit  Then decide if BP medications caused the drop in kidney function  Since constipation and UTI could have caused drop in kidney function    Continue to wear compression stockings    Dry eye management   -on restasis  She now sees ophthalmology    Dry mouth management   Cant tolerate evoxac 30 mg tid -cant tolerate 30 mg bid  Will try pilocarpine 5 mg bid    Skin issues : dry hyperpigmented skin on the elbows and dry patches on thighs   She has frontal baldness  She has a mole like lesion   She has hyperpigmented rash on the eyelids and under the eyes   She has nummular dermatitis and seborrheic dermatitis   Dermatology saw her,on topical medications     Management of sicca symptoms  Counselled extraglandular manifestations   Counselled risk of lymphoma    -preservative free artifical tears 3 to 4 times a day  Lubricating ointments at night  Wraparound sun glasses/moisture shields which attach to glasses help  Opthalmology evaluation,management : she will need split lamp,schirmer's test and ocular surface staining  Surgical options like punctal occlusion    -biotene and ACT preparations  Dental evaluations  Periodic cleaning  Use fluoride products  Brush and floss teeth regularly especially after meals     Avoid sugar containing foods and drinks    Use of vaginal lubricants/estrogen creams   Seeing Gyn    Use hypoallergenic soaps/lotions for the skin  Avoid drafts from air conditioners/heaters/radiators  Avoid detergents,deodorant soaps,very hot water  Use humidifiers    Have to order CBC,CMP,ESR,CRP,urine analysis,urine creatinine,urine protein,cryos and complements,globulins,quantitative immunoglobulins    Anemia being addressed-she sees hematology  Bone marrow biopsy 2014 : nml  Hematology note :  Since May 2012,  hemoglobin values have been between  10.1-11.6.   She had hysterectomy and has no ongoing menstrual losses. No s/s of mal-absorption. Diet is normal/regular.   No overt bleeding. No melena. She has fatigue and heart burn.  Stool OB negative in Jan 2020. UA negative for blood/ RBC in Aug 2020.    She received iv injectafer on 9/30 and 10/7/20. Serum ferritin now normal. LDH, reticuloyte count, b12, folate all normal today. Hemoglobin 11.4g/dl. GIGI negative.  On oral iron once or twice daily on empty stomach.     White count always normal  Always anemic   plt count always normal    Continue plaquenil 200 mg bid  Eye exam and routine eval once a year    Pain management :   Narcotics at pain clinic  Gabapentin 300 mg tid  zanaflex dose increased to 8 mg tid    Insomnia management : doxepin  No sleep apnea    Anxiety and depression management  On latuda,wellbutrin and buspar       Jaki was seen today for disease management.    Diagnoses and all orders for this visit:    Sjogren's syndrome with keratoconjunctivitis sicca  -     Ambulatory referral/consult to Ophthalmology; Future  -     CBC Auto Differential; Future  -     Comprehensive Metabolic Panel; Future  -     Sedimentation rate; Future  -     C-Reactive Protein; Future  -     Cryoglobulin; Future  -     C3 Complement; Future  -     C4 Complement; Future  -     Anti-DNA Ab, Double-Stranded; Future  -     Protein/Creatinine Ratio, Urine; Future  -     Urinalysis; Future  -     Comprehensive Metabolic Panel; Future    Long-term use of Plaquenil  -     Ambulatory referral/consult to Ophthalmology; Future    Other orders  -     hydrOXYchloroQUINE (PLAQUENIL) 200 mg tablet; One pill bid  -     pilocarpine (SALAGEN) 5 MG Tab; Take 1 tablet (5 mg total) by mouth 2 (two) times daily.  -     tiZANidine (ZANAFLEX) 4 MG tablet; 8 mg tid        Every 6 months to us

## 2022-05-10 RX ORDER — TRIAMCINOLONE ACETONIDE 1 MG/G
CREAM TOPICAL
Qty: 80 G | Refills: 3 | OUTPATIENT
Start: 2022-05-10

## 2022-05-13 ENCOUNTER — OUTPATIENT CASE MANAGEMENT (OUTPATIENT)
Dept: ADMINISTRATIVE | Facility: OTHER | Age: 52
End: 2022-05-13
Payer: MEDICARE

## 2022-05-13 ENCOUNTER — PATIENT MESSAGE (OUTPATIENT)
Dept: ADMINISTRATIVE | Facility: OTHER | Age: 52
End: 2022-05-13
Payer: MEDICARE

## 2022-05-13 DIAGNOSIS — N39.0 RECURRENT UTI (URINARY TRACT INFECTION): Primary | ICD-10-CM

## 2022-05-13 DIAGNOSIS — R30.0 DYSURIA: ICD-10-CM

## 2022-05-13 RX ORDER — BISACODYL 5 MG
5 TABLET, DELAYED RELEASE (ENTERIC COATED) ORAL DAILY PRN
Refills: 0 | Status: CANCELLED | OUTPATIENT
Start: 2022-05-13

## 2022-05-13 NOTE — LETTER
May 13, 2022           Dear Jaki     Welcome to [facility] Complex Care Management Program.  It was a pleasure talking with you today.  My name is Yi Page RN and I look forward to being your Care Manager.  My goal is to help you function at the healthiest and highest level possible.  You can contact me directly at [phone].    As an [facility] patient with [employer], some of the services we may be able to provide include:      Development of an individualized care plan with a Registered Nurse    Connection with a    Connection with available resources and services     Coordinate communication among your care team members    Provide coaching and education    Help you understand your doctors treatment plan   Help you obtain information about your insurance coverage.     All services provided by [facility]s Complex Care Managers and other care team members are coordinated with and communicated to your primary care team.    As part of your enrollment, you will be receiving education materials and more information about these services in your [facility] account, by phone or through the mail.  If you do not wish to participate or receive information, please contact our office at [phone].      Sincerely,        Yi Page RN  [facility]   Out-patient RN Complex Care Manager

## 2022-05-13 NOTE — TELEPHONE ENCOUNTER
----- Message from Yi Page RN sent at 5/13/2022  4:55 PM CDT -----  Regarding: medications and UTI  Contact: Yi Domínguez    This is Yi with Outpatient Case Management. Pt states she is having some tingling to vaginal when urinating and would like to r/o UTI. Pt states she was also supposed to be prescribed a stool softener but never heard anything about the orders? Please Advise. Thank You.

## 2022-05-13 NOTE — PROGRESS NOTES
Outpatient Care Management  Initial Patient Assessment    Patient: Jaki Bajwa  MRN: [id number]  Date of Service: 05/13/2022  Completed by: Yi Page RN  Referral Date: 04/25/2022  Program: High Risk  Status: Ongoing  Effective Dates: 5/13/2022 - present  Responsible Staff: Yi Page RN        Reason for Visit   Patient presents with    OPCM Chart Review     5/13/22    OPCM Enrollment Call     5/13/22    Initial Assessment    PHQ-9    Plan Of Care       Brief Summary:  Jaki Bajwa was referred by Dr. Handy for bleeding from rectum. Patient qualifies for program based on risk score 68.5%.   Active problem list, medical, surgical and social history reviewed. Active comorbidities include bleeding from rectum, UTI. Areas of need identified by patient include assist with recognizing s/s of UTI and bleeding.   Complex care plan created with patient/caregiver input. By next encounter in 1 week,  pt agrees to incorporate discussed ways of preventing UTI, agrees to pick and take stool softener was received from pharmacy, and agrees to f/u call.. Reached out to PCP for orders for urine lab and stool softener.     Assessment Documentation     OPCM Initial Assessment    Involvement of Care  Do I have permission to speak with other family members about your care?: No  Assessment completed by: Patient  Identified Areas of Need  Advanced Care Planning: Yes  Housing: no  Medication Adherence: No  *Active medication list was reviewed and reconciled with patient and/or caregiver:   Nutrition: no  Lab Adherence: no  Depression: No  Cognitive/Behavioral Health: no  Communication: no  Health Literacy: no  Fall risk?: No  Equipment/Supplies/Services: no          Problem List and History     Problems Addressed This Visit    Depression: Not identified by patient as current problem  Hypertension: Not identified by patient as current problem  Anemia: Not identified by patient as current problem  Chronic Kidney  Disease: Not identified by patient as current problem  UTI: Identified as current problem         Reviewed medical and social history with patient and/or caregiver. A complex care plan was discussed and completed today, with input from patient and/or caregiver.    Patient Instructions     Instructions were provided via the [employer] patient resources and are available for the patient to view on the patient portal, if active.    Next steps: F/U concerning s/s of UTI, confirm pt pickup and is taking stool softener, f/u with PCP concerning lab for urine.     Follow up in about 1 week (around 5/20/2022).    Todays OPCM Self-Management Care Plan was developed with the patients/caregivers input and was based on identified barriers from todays assessment.  Goals were written today with the patient/caregiver and the patient has agreed to work towards these goals to improve his/her overall well-being. Patient verbalized understanding of the care plan, goals, and all of today's instructions. Encouraged patient/caregiver to communicate with his/her physician and health care team about health conditions and the treatment plan.  Provided my contact information today and encouraged patient/caregiver to call me with any questions as needed.

## 2022-05-14 RX ORDER — DOCUSATE SODIUM 100 MG/1
100 CAPSULE, LIQUID FILLED ORAL 2 TIMES DAILY PRN
Qty: 60 CAPSULE | Refills: 0 | Status: SHIPPED | OUTPATIENT
Start: 2022-05-14 | End: 2022-07-13 | Stop reason: SDUPTHER

## 2022-05-16 ENCOUNTER — PATIENT MESSAGE (OUTPATIENT)
Dept: INTERNAL MEDICINE | Facility: CLINIC | Age: 52
End: 2022-05-16
Payer: MEDICARE

## 2022-05-16 ENCOUNTER — TELEPHONE (OUTPATIENT)
Dept: INTERNAL MEDICINE | Facility: CLINIC | Age: 52
End: 2022-05-16
Payer: MEDICARE

## 2022-05-16 DIAGNOSIS — G89.4 CHRONIC PAIN SYNDROME: ICD-10-CM

## 2022-05-16 DIAGNOSIS — R12 HEARTBURN: ICD-10-CM

## 2022-05-16 DIAGNOSIS — N39.0 RECURRENT UTI (URINARY TRACT INFECTION): Primary | ICD-10-CM

## 2022-05-16 RX ORDER — OMEPRAZOLE 40 MG/1
CAPSULE, DELAYED RELEASE ORAL
Qty: 90 CAPSULE | Refills: 3 | Status: SHIPPED | OUTPATIENT
Start: 2022-05-16 | End: 2023-05-01 | Stop reason: SDUPTHER

## 2022-05-16 RX ORDER — HYDROCODONE BITARTRATE AND ACETAMINOPHEN 5; 325 MG/1; MG/1
1 TABLET ORAL
Qty: 30 TABLET | Refills: 0 | Status: SHIPPED | OUTPATIENT
Start: 2022-05-16 | End: 2022-06-14 | Stop reason: SDUPTHER

## 2022-05-16 RX ORDER — HYDROCODONE BITARTRATE AND ACETAMINOPHEN 5; 325 MG/1; MG/1
1 TABLET ORAL
Qty: 30 TABLET | Refills: 0 | OUTPATIENT
Start: 2022-05-16

## 2022-05-16 NOTE — TELEPHONE ENCOUNTER
No new care gaps identified.  [facility] Embedded Care Gaps. Reference number: [id number]. 5/16/2022   4:05:32 PM CDT

## 2022-05-16 NOTE — TELEPHONE ENCOUNTER
No new care gaps identified.  [facility] Embedded Care Gaps. Reference number: [id number]. 5/16/2022   8:16:07 AM DAVIDET

## 2022-05-16 NOTE — TELEPHONE ENCOUNTER
Urine testing per Outpatient Case Management has been pended and routed to Dr. Rasmussen for approval. Please see message below and its details. Thanks.

## 2022-05-16 NOTE — TELEPHONE ENCOUNTER
----- Message from Yi Page RN sent at 5/16/2022 12:55 PM CDT -----  Regarding: UTI S/S  Contact: Yi LESLIE  Enedinamiguel      This is Yi with Outpatient Case Management. Pt reports that she has a feeling that she's developing a UTI. Pt reports no burning or pain but states she has a tingling feeling when she urinates; pt reports that she's had this feeling before and it was a UTI. May we please have orders for urine lab to r/o. Thank You    ROWDY Roman, RN  [facility]  [email or web address]  TEL:  [phone]

## 2022-05-20 ENCOUNTER — OUTPATIENT CASE MANAGEMENT (OUTPATIENT)
Dept: ADMINISTRATIVE | Facility: OTHER | Age: 52
End: 2022-05-20
Payer: MEDICARE

## 2022-05-20 ENCOUNTER — TELEPHONE (OUTPATIENT)
Dept: PHARMACY | Facility: CLINIC | Age: 52
End: 2022-05-20
Payer: MEDICARE

## 2022-05-20 NOTE — TELEPHONE ENCOUNTER
I called patient offering assistance with Pharmacy Patient Assistance and she stated she just need the doctor to renew her pain medications and do not need assistance. I provided her with my contact information for future assistance.

## 2022-05-23 ENCOUNTER — TELEPHONE (OUTPATIENT)
Dept: HEMATOLOGY/ONCOLOGY | Facility: CLINIC | Age: 52
End: 2022-05-23
Payer: MEDICARE

## 2022-05-24 NOTE — PROGRESS NOTES
Outpatient Care Management  Plan of Care Follow Up Visit    Patient: Jaki Bajwa  MRN: [id number]  Date of Service: 05/20/2022  Completed by: Yi Page RN  Referral Date: 04/25/2022  Program: Case Management high risk    Reason for Visit   Patient presents with    OPCM Chart Review     5/20/22    Update Plan Of Care     5/24/22    OPCM RN First Follow-Up Attempt     5/20/22       Brief Summary: Pt reports that her daughter suggested that she try OTC AZO pills for the UTI. Pt reports she tried this and it gave some relief but not completely. Pt admits she did not have UA done because she took he AZO pills. Pt stated she will have UA done later. Pt reports that she is out of Latuda because her pharmacy is out of stock at this time. OPCM RN reached out to prescribing physician to inquire if they may have samples until the pt's pharmacy has the med in stock.     Patient Summary     Involvement of Care:  Do I have permission to speak with other family members about your care?  No    Patient Reported Labs & Vitals:  1.  Any Patient Reported Labs & Vitals?  No  2.  Patient Reported Blood Pressure:     3.  Patient Reported Pulse:     4.  Patient Reported Weight (Kg):     5.  Patient Reported Blood Glucose (mg/dl):       Medical and social history was reviewed with patient and/or caregiver.     Clinical Assessment     Reviewed and provided basic information on available community resources for mental health, transportation, wellness resources, and palliative care programs with patient and/or caregiver.     Complex Care Plan     Care plan was discussed and completed today with input from patient and/or caregiver.    Patient Instructions     Instructions were provided via the [employer] patient resources and are available for the patient to view on the patient portal.    Next Steps: F/U concerning UA  F/U concerning Latuda    Follow up in about 11 days (around 5/31/2022).    Todays OPCM Self-Management Care Plan was  developed with the patients/caregivers input and was based on identified barriers from todays assessment.  Goals were written today with the patient/caregiver and the patient has agreed to work towards these goals to improve his/her overall well-being. Patient verbalized understanding of the care plan, goals, and all of today's instructions. Encouraged patient/caregiver to communicate with his/her physician and health care team about health conditions and the treatment plan.  Provided my contact information today and encouraged patient/caregiver to call me with any questions as needed.

## 2022-05-25 ENCOUNTER — OUTPATIENT CASE MANAGEMENT (OUTPATIENT)
Dept: ADMINISTRATIVE | Facility: OTHER | Age: 52
End: 2022-05-25
Payer: MEDICARE

## 2022-05-25 NOTE — PROGRESS NOTES
Outpatient Care Management  Plan of Care Follow Up Visit    Patient: Jaki Bajwa  MRN: [id number]  Date of Service: 05/25/2022  Completed by: Yi Page RN  Referral Date: 04/25/2022  Program:  Case Management High Risk    Reason for Visit   Patient presents with    OPCM Chart Review     5/25/22    Update Plan Of Care     5/25/22       Brief Summary: Pt states she had Latuda sent to a different pharmacy who had the medication in stock. Pt states she has not picked up the medication as of yet, but she will later today.     Patient Summary     Involvement of Care:  Do I have permission to speak with other family members about your care?  No    Patient Reported Labs & Vitals:  1.  Any Patient Reported Labs & Vitals?  No  2.  Patient Reported Blood Pressure:     3.  Patient Reported Pulse:     4.  Patient Reported Weight (Kg):     5.  Patient Reported Blood Glucose (mg/dl):       Medical and social history was reviewed with patient and/or caregiver.     Clinical Assessment     Reviewed and provided basic information on available community resources for mental health, transportation, wellness resources, and palliative care programs with patient and/or caregiver.     Complex Care Plan     Care plan was discussed and completed today with input from patient and/or caregiver.    Patient Instructions     Instructions were provided via the [employer] patient resources and are available for the patient to view on the patient portal.    Next Steps: F/U concerning UA and ways to prevent UTI    Follow up in about 6 days (around 5/31/2022).    Todays OPCM Self-Management Care Plan was developed with the patients/caregivers input and was based on identified barriers from todays assessment.  Goals were written today with the patient/caregiver and the patient has agreed to work towards these goals to improve his/her overall well-being. Patient verbalized understanding of the care plan, goals, and all of today's instructions.  Encouraged patient/caregiver to communicate with his/her physician and health care team about health conditions and the treatment plan.  Provided my contact information today and encouraged patient/caregiver to call me with any questions as needed.

## 2022-05-31 ENCOUNTER — EXTERNAL CHRONIC CARE MANAGEMENT (OUTPATIENT)
Dept: PRIMARY CARE CLINIC | Facility: CLINIC | Age: 52
End: 2022-05-31
Payer: MEDICARE

## 2022-05-31 ENCOUNTER — OUTPATIENT CASE MANAGEMENT (OUTPATIENT)
Dept: ADMINISTRATIVE | Facility: OTHER | Age: 52
End: 2022-05-31
Payer: MEDICARE

## 2022-05-31 PROCEDURE — 99490 PR CHRONIC CARE MGMT, 1ST 20 MIN: ICD-10-PCS | Mod: S$GLB,,, | Performed by: INTERNAL MEDICINE

## 2022-05-31 PROCEDURE — 99439 PR CHRONIC CARE MGMT, EA ADDTL 20 MIN: ICD-10-PCS | Mod: S$GLB,,, | Performed by: INTERNAL MEDICINE

## 2022-05-31 PROCEDURE — 99439 CHRNC CARE MGMT STAF EA ADDL: CPT | Mod: S$GLB,,, | Performed by: INTERNAL MEDICINE

## 2022-05-31 PROCEDURE — 99490 CHRNC CARE MGMT STAFF 1ST 20: CPT | Mod: S$GLB,,, | Performed by: INTERNAL MEDICINE

## 2022-06-01 NOTE — PROGRESS NOTES
Outpatient Care Management  Plan of Care Follow Up Visit    Patient: Jaki Bajwa  MRN: [id number]  Date of Service: 05/31/2022  Completed by: Yi Page RN  Referral Date: 04/25/2022  Program: Care Management high risk    Reason for Visit   Patient presents with    OPCM Chart Review     5/31/22    OPCM RN First Follow-Up Attempt     5/31/22    Update Plan Of Care     6/1/22       Brief Summary: Pt reports that she is not having any burning, bleeding or pain when urinating, but her urine is a darker yellow color with a thicker consistency. Pt reports no tingling feelings in vaginal area. Pt reports she has increased her water intake to (4) 16.5 oz daily,she's been wearing loose fitting clothing, and she has been sitting on toilet longer after urinating to ensure bladder is empty. Pt states the sitting on toilet longer has produced positive results because she now realizes she does have residual after urinating. Pt states she will have UA done at PCP or GYN appt next week.       Patient Summary     Involvement of Care:  Do I have permission to speak with other family members about your care?  No    Patient Reported Labs & Vitals:  1.  Any Patient Reported Labs & Vitals?  No  2.  Patient Reported Blood Pressure:     3.  Patient Reported Pulse:     4.  Patient Reported Weight (Kg):     5.  Patient Reported Blood Glucose (mg/dl):       Medical and social history was reviewed with patient and/or caregiver.     Clinical Assessment     Reviewed and provided basic information on available community resources for mental health, transportation, wellness resources, and palliative care programs with patient and/or caregiver.     Complex Care Plan     Care plan was discussed and completed today with input from patient and/or caregiver.    Patient Instructions     Instructions were provided via the [employer] patient resources and are available for the patient to view on the patient portal.    Next Steps: F/U concerning pt  having UA done  F/U concerning UTI prevention methods   Follow up in about 10 days (around 6/10/2022).    Todays OPCM Self-Management Care Plan was developed with the patients/caregivers input and was based on identified barriers from todays assessment.  Goals were written today with the patient/caregiver and the patient has agreed to work towards these goals to improve his/her overall well-being. Patient verbalized understanding of the care plan, goals, and all of today's instructions. Encouraged patient/caregiver to communicate with his/her physician and health care team about health conditions and the treatment plan.  Provided my contact information today and encouraged patient/caregiver to call me with any questions as needed.

## 2022-06-07 NOTE — PROGRESS NOTES
Subjective:       Patient ID: Jaki Bajwa is a 51 y.o. female.    Chief Complaint: Hypertension    Pt here for f/u. She has a dx of sjogrens and fibromyalgia based on blood work and chronic pain that is primarily across shoulders and in legs but also in upper and lower back. As a result she is on several meds that she says helps. She saw rheum at [facility] but changed to [facility]. She has prior dx of lupus but this was not corroborated in notes from rheum. She is on plaquenil (she is due for eye exam and said she will schedule--she understands importance), tizanidine, gabapentin and norco. She uses norco 1x/day. She has done well with decreasing dispense amount to 30 per month. She has previously signed a pain contract. [place]  reviewed and is consistent. She has seen pain mgmt. She has also done PT with some success in the past.      She also has dx of sjogren's syndrome. She is on pilocarpine with some success. Uses eye drops for dry eyes which has been effective and is stable.      Pt's BP is well controlled. Tolerating meds well. Pt denies cp/sob/ha/vision or neuro changes. Home readings are well controlled.      She continues to see psychiatry for bipolar and anxiety. This is fairly well controlled and stable on current meds. No SI/HI. They have tryied reducing diazepam but anxiety returned. However, she is now down to 1mg bid. She understands dangers of co-administration of norco and diazepam and she continues to work with psychiatry re: alternatives.      She has had recurrent UTIs. Saw urology and was on prophylactic macrodantin. However, has been off this and no issues in a while until 6 wks ago where she was admitted to hospital with UTI and CANDELARIA. Tx accordingly--record reviewed. Currently doing well. Due for f/u with urology which she will schedule. She was on vaginal estrace cream but has not been using this lately b/c she ran out. It is worth noting that her UTI issues decreased while on this.     She has  chronic iron def anemia. She has no symptoms of such and denies any known bleeding or bloody/black stool. She had c-scope 10/2018 and again 1/2021 that did not show any bleeding source. She also had EGD that showed positive h pylori for which she was tx but no other bleeding sources. She also did stool cards which were negative. Most recent labs show improvement in iron def and anemia. She saw heme-onc who has instructed her to resume oral iron as she previously received IV iron; however, she recently has started back on IV iron course.       She has mild CKD-3 with baseline creatinine 1.2-1.3. This is stable.     She has had a flare in eczema and requests kenalog cream. Proper use d/w pt.     Hypertension  Pertinent negatives include no chest pain, headaches, palpitations or shortness of breath.   Chronic Pain  Associated symptoms: no abdominal pain, no chest pain, no shortness of breath and no headaches    Fibromyalgia  Pertinent negatives include no abdominal pain, chest pain, coughing, fever, headaches or sore throat.     Review of Systems   Constitutional: Negative for fever and unexpected weight change.   HENT: Negative for ear pain, rhinorrhea and sore throat.    Eyes: Negative for visual disturbance.   Respiratory: Negative for cough and shortness of breath.    Cardiovascular: Negative for chest pain, palpitations and leg swelling.   Gastrointestinal: Negative for abdominal pain.   Endocrine: Negative for polyuria.   Genitourinary: Negative for dysuria, frequency and hematuria.   Neurological: Negative for headaches.   Psychiatric/Behavioral: Negative for dysphoric mood.       Objective:          Assessment:       1. Essential hypertension    2. Fibromyalgia    3. JUAN JOSE (generalized anxiety disorder)    4. Sjogren's syndrome with keratoconjunctivitis sicca    5. Recurrent UTI (urinary tract infection)    6. Morbid obesity    7. Obsessive-compulsive behavior    8. Iron deficiency anemia, unspecified iron  deficiency anemia type    9. Stage 3a chronic kidney disease        Plan:       1. Not yet due for refill on norco but will let us know when due; narcan--pt understands how to use and how to instruct family/friends how to use  2. Recent labs reviewed  3. Keep f/u with specialists  4. Home BP monitoring  5. Kenalog cream--proper use d/w pt            Physical Exam  Constitutional:       Appearance: Normal appearance. She is well-developed.   HENT:      Right Ear: Tympanic membrane, ear canal and external ear normal.      Left Ear: Tympanic membrane, ear canal and external ear normal.      Nose: No mucosal edema or rhinorrhea.      Mouth/Throat:      Pharynx: No oropharyngeal exudate or posterior oropharyngeal erythema.   Eyes:      Extraocular Movements: Extraocular movements intact.      Pupils: Pupils are equal, round, and reactive to light.   Neck:      Thyroid: No thyromegaly.   Cardiovascular:      Rate and Rhythm: Normal rate and regular rhythm.      Heart sounds: Normal heart sounds.   Pulmonary:      Effort: Pulmonary effort is normal.      Breath sounds: Normal breath sounds.   Abdominal:      General: Bowel sounds are normal. There is no distension.      Palpations: Abdomen is soft. There is no mass.      Tenderness: There is no abdominal tenderness. There is no right CVA tenderness or left CVA tenderness.   Musculoskeletal:      Cervical back: Neck supple.      Lumbar back: No tenderness or bony tenderness. Normal range of motion.      Right lower leg: No edema.      Left lower leg: No edema.   Lymphadenopathy:      Cervical: No cervical adenopathy.   Skin:     Comments: Scaly papular rash forearms, L>R   Neurological:      Mental Status: She is alert and oriented to person, place, and time.      Cranial Nerves: No cranial nerve deficit.   Psychiatric:         Mood and Affect: Mood normal.         Behavior: Behavior normal.

## 2022-06-08 ENCOUNTER — OFFICE VISIT (OUTPATIENT)
Dept: OBSTETRICS AND GYNECOLOGY | Facility: CLINIC | Age: 52
End: 2022-06-08
Attending: OBSTETRICS & GYNECOLOGY
Payer: MEDICARE

## 2022-06-08 ENCOUNTER — LAB VISIT (OUTPATIENT)
Dept: LAB | Facility: OTHER | Age: 52
End: 2022-06-08
Attending: INTERNAL MEDICINE
Payer: MEDICARE

## 2022-06-08 ENCOUNTER — OFFICE VISIT (OUTPATIENT)
Dept: INTERNAL MEDICINE | Facility: CLINIC | Age: 52
End: 2022-06-08
Payer: MEDICARE

## 2022-06-08 VITALS
HEART RATE: 93 BPM | HEIGHT: 67 IN | WEIGHT: 272.25 LBS | BODY MASS INDEX: 42.53 KG/M2 | OXYGEN SATURATION: 99 % | WEIGHT: 271 LBS | SYSTOLIC BLOOD PRESSURE: 130 MMHG | SYSTOLIC BLOOD PRESSURE: 118 MMHG | HEIGHT: 67 IN | HEART RATE: 98 BPM | BODY MASS INDEX: 42.73 KG/M2 | DIASTOLIC BLOOD PRESSURE: 88 MMHG | DIASTOLIC BLOOD PRESSURE: 86 MMHG

## 2022-06-08 DIAGNOSIS — Z13.220 ENCOUNTER FOR LIPID SCREENING FOR CARDIOVASCULAR DISEASE: ICD-10-CM

## 2022-06-08 DIAGNOSIS — N18.31 STAGE 3A CHRONIC KIDNEY DISEASE: ICD-10-CM

## 2022-06-08 DIAGNOSIS — N39.0 RECURRENT UTI (URINARY TRACT INFECTION): ICD-10-CM

## 2022-06-08 DIAGNOSIS — I10 ESSENTIAL HYPERTENSION: Primary | ICD-10-CM

## 2022-06-08 DIAGNOSIS — D50.9 IRON DEFICIENCY ANEMIA, UNSPECIFIED IRON DEFICIENCY ANEMIA TYPE: ICD-10-CM

## 2022-06-08 DIAGNOSIS — R46.81 OBSESSIVE-COMPULSIVE BEHAVIOR: ICD-10-CM

## 2022-06-08 DIAGNOSIS — M35.01 SJOGREN'S SYNDROME WITH KERATOCONJUNCTIVITIS SICCA: ICD-10-CM

## 2022-06-08 DIAGNOSIS — Z13.6 ENCOUNTER FOR LIPID SCREENING FOR CARDIOVASCULAR DISEASE: ICD-10-CM

## 2022-06-08 DIAGNOSIS — E66.01 MORBID OBESITY: ICD-10-CM

## 2022-06-08 DIAGNOSIS — N95.2 VAGINAL ATROPHY: ICD-10-CM

## 2022-06-08 DIAGNOSIS — M79.7 FIBROMYALGIA: ICD-10-CM

## 2022-06-08 DIAGNOSIS — F41.1 GAD (GENERALIZED ANXIETY DISORDER): ICD-10-CM

## 2022-06-08 DIAGNOSIS — N39.0 URINARY TRACT INFECTION WITHOUT HEMATURIA, SITE UNSPECIFIED: Primary | ICD-10-CM

## 2022-06-08 LAB
ALBUMIN SERPL BCP-MCNC: 4 G/DL (ref 3.5–5.2)
ALP SERPL-CCNC: 74 U/L (ref 55–135)
ALT SERPL W/O P-5'-P-CCNC: 11 U/L (ref 10–44)
ANION GAP SERPL CALC-SCNC: 9 MMOL/L (ref 8–16)
AST SERPL-CCNC: 17 U/L (ref 10–40)
BILIRUB SERPL-MCNC: 0.4 MG/DL (ref 0.1–1)
BUN SERPL-MCNC: 14 MG/DL (ref 6–20)
CALCIUM SERPL-MCNC: 10.2 MG/DL (ref 8.7–10.5)
CHLORIDE SERPL-SCNC: 102 MMOL/L (ref 95–110)
CO2 SERPL-SCNC: 32 MMOL/L (ref 23–29)
CREAT SERPL-MCNC: 1.3 MG/DL (ref 0.5–1.4)
EST. GFR  (AFRICAN AMERICAN): 54 ML/MIN/1.73 M^2
EST. GFR  (NON AFRICAN AMERICAN): 47 ML/MIN/1.73 M^2
GLUCOSE SERPL-MCNC: 99 MG/DL (ref 70–110)
POTASSIUM SERPL-SCNC: 4.2 MMOL/L (ref 3.5–5.1)
PROT SERPL-MCNC: 8 G/DL (ref 6–8.4)
SODIUM SERPL-SCNC: 143 MMOL/L (ref 136–145)

## 2022-06-08 PROCEDURE — 87077 CULTURE AEROBIC IDENTIFY: CPT | Performed by: PHYSICIAN ASSISTANT

## 2022-06-08 PROCEDURE — 99213 OFFICE O/P EST LOW 20 MIN: CPT | Mod: S$GLB,,, | Performed by: PHYSICIAN ASSISTANT

## 2022-06-08 PROCEDURE — 36415 COLL VENOUS BLD VENIPUNCTURE: CPT | Performed by: INTERNAL MEDICINE

## 2022-06-08 PROCEDURE — 3079F DIAST BP 80-89 MM HG: CPT | Mod: CPTII,S$GLB,, | Performed by: PHYSICIAN ASSISTANT

## 2022-06-08 PROCEDURE — 3079F PR MOST RECENT DIASTOLIC BLOOD PRESSURE 80-89 MM HG: ICD-10-PCS | Mod: CPTII,S$GLB,, | Performed by: PHYSICIAN ASSISTANT

## 2022-06-08 PROCEDURE — 1160F PR REVIEW ALL MEDS BY PRESCRIBER/CLIN PHARMACIST DOCUMENTED: ICD-10-PCS | Mod: CPTII,S$GLB,, | Performed by: INTERNAL MEDICINE

## 2022-06-08 PROCEDURE — 3008F BODY MASS INDEX DOCD: CPT | Mod: CPTII,S$GLB,, | Performed by: PHYSICIAN ASSISTANT

## 2022-06-08 PROCEDURE — 3008F PR BODY MASS INDEX (BMI) DOCUMENTED: ICD-10-PCS | Mod: CPTII,S$GLB,, | Performed by: PHYSICIAN ASSISTANT

## 2022-06-08 PROCEDURE — 87086 URINE CULTURE/COLONY COUNT: CPT | Performed by: PHYSICIAN ASSISTANT

## 2022-06-08 PROCEDURE — 3079F PR MOST RECENT DIASTOLIC BLOOD PRESSURE 80-89 MM HG: ICD-10-PCS | Mod: CPTII,S$GLB,, | Performed by: INTERNAL MEDICINE

## 2022-06-08 PROCEDURE — 3074F PR MOST RECENT SYSTOLIC BLOOD PRESSURE < 130 MM HG: ICD-10-PCS | Mod: CPTII,S$GLB,, | Performed by: PHYSICIAN ASSISTANT

## 2022-06-08 PROCEDURE — 3008F BODY MASS INDEX DOCD: CPT | Mod: CPTII,S$GLB,, | Performed by: INTERNAL MEDICINE

## 2022-06-08 PROCEDURE — 99999 PR PBB SHADOW E&M-EST. PATIENT-LVL V: ICD-10-PCS | Mod: PBBFAC,,, | Performed by: INTERNAL MEDICINE

## 2022-06-08 PROCEDURE — 3079F DIAST BP 80-89 MM HG: CPT | Mod: CPTII,S$GLB,, | Performed by: INTERNAL MEDICINE

## 2022-06-08 PROCEDURE — 99214 OFFICE O/P EST MOD 30 MIN: CPT | Mod: S$GLB,,, | Performed by: INTERNAL MEDICINE

## 2022-06-08 PROCEDURE — 99999 PR PBB SHADOW E&M-EST. PATIENT-LVL V: CPT | Mod: PBBFAC,,, | Performed by: INTERNAL MEDICINE

## 2022-06-08 PROCEDURE — 3074F SYST BP LT 130 MM HG: CPT | Mod: CPTII,S$GLB,, | Performed by: PHYSICIAN ASSISTANT

## 2022-06-08 PROCEDURE — 99214 PR OFFICE/OUTPT VISIT, EST, LEVL IV, 30-39 MIN: ICD-10-PCS | Mod: S$GLB,,, | Performed by: INTERNAL MEDICINE

## 2022-06-08 PROCEDURE — 99213 PR OFFICE/OUTPT VISIT, EST, LEVL III, 20-29 MIN: ICD-10-PCS | Mod: S$GLB,,, | Performed by: PHYSICIAN ASSISTANT

## 2022-06-08 PROCEDURE — 81001 URINALYSIS AUTO W/SCOPE: CPT | Performed by: PHYSICIAN ASSISTANT

## 2022-06-08 PROCEDURE — 87088 URINE BACTERIA CULTURE: CPT | Performed by: PHYSICIAN ASSISTANT

## 2022-06-08 PROCEDURE — 1159F MED LIST DOCD IN RCRD: CPT | Mod: CPTII,S$GLB,, | Performed by: PHYSICIAN ASSISTANT

## 2022-06-08 PROCEDURE — 3008F PR BODY MASS INDEX (BMI) DOCUMENTED: ICD-10-PCS | Mod: CPTII,S$GLB,, | Performed by: INTERNAL MEDICINE

## 2022-06-08 PROCEDURE — 1160F RVW MEDS BY RX/DR IN RCRD: CPT | Mod: CPTII,S$GLB,, | Performed by: PHYSICIAN ASSISTANT

## 2022-06-08 PROCEDURE — 1159F PR MEDICATION LIST DOCUMENTED IN MEDICAL RECORD: ICD-10-PCS | Mod: CPTII,S$GLB,, | Performed by: PHYSICIAN ASSISTANT

## 2022-06-08 PROCEDURE — 80053 COMPREHEN METABOLIC PANEL: CPT | Performed by: INTERNAL MEDICINE

## 2022-06-08 PROCEDURE — 3075F PR MOST RECENT SYSTOLIC BLOOD PRESS GE 130-139MM HG: ICD-10-PCS | Mod: CPTII,S$GLB,, | Performed by: INTERNAL MEDICINE

## 2022-06-08 PROCEDURE — 87186 SC STD MICRODIL/AGAR DIL: CPT | Performed by: PHYSICIAN ASSISTANT

## 2022-06-08 PROCEDURE — 1160F RVW MEDS BY RX/DR IN RCRD: CPT | Mod: CPTII,S$GLB,, | Performed by: INTERNAL MEDICINE

## 2022-06-08 PROCEDURE — 1159F MED LIST DOCD IN RCRD: CPT | Mod: CPTII,S$GLB,, | Performed by: INTERNAL MEDICINE

## 2022-06-08 PROCEDURE — 3075F SYST BP GE 130 - 139MM HG: CPT | Mod: CPTII,S$GLB,, | Performed by: INTERNAL MEDICINE

## 2022-06-08 PROCEDURE — 1160F PR REVIEW ALL MEDS BY PRESCRIBER/CLIN PHARMACIST DOCUMENTED: ICD-10-PCS | Mod: CPTII,S$GLB,, | Performed by: PHYSICIAN ASSISTANT

## 2022-06-08 PROCEDURE — 99999 PR PBB SHADOW E&M-EST. PATIENT-LVL IV: CPT | Mod: PBBFAC,,, | Performed by: PHYSICIAN ASSISTANT

## 2022-06-08 PROCEDURE — 1159F PR MEDICATION LIST DOCUMENTED IN MEDICAL RECORD: ICD-10-PCS | Mod: CPTII,S$GLB,, | Performed by: INTERNAL MEDICINE

## 2022-06-08 PROCEDURE — 99999 PR PBB SHADOW E&M-EST. PATIENT-LVL IV: ICD-10-PCS | Mod: PBBFAC,,, | Performed by: PHYSICIAN ASSISTANT

## 2022-06-08 RX ORDER — NITROFURANTOIN 25; 75 MG/1; MG/1
100 CAPSULE ORAL 2 TIMES DAILY
Qty: 14 CAPSULE | Refills: 0 | Status: SHIPPED | OUTPATIENT
Start: 2022-06-08 | End: 2022-06-15

## 2022-06-08 RX ORDER — FLUCONAZOLE 150 MG/1
150 TABLET ORAL DAILY
Qty: 1 TABLET | Refills: 0 | Status: SHIPPED | OUTPATIENT
Start: 2022-06-08 | End: 2022-06-09

## 2022-06-08 RX ORDER — ESTRADIOL 0.1 MG/G
CREAM VAGINAL
Qty: 42.5 G | Refills: 2 | Status: ON HOLD | OUTPATIENT
Start: 2022-06-08 | End: 2024-02-14 | Stop reason: HOSPADM

## 2022-06-08 RX ORDER — TRIAMCINOLONE ACETONIDE 1 MG/G
CREAM TOPICAL 2 TIMES DAILY PRN
Qty: 45 G | Refills: 0 | Status: SHIPPED | OUTPATIENT
Start: 2022-06-08 | End: 2022-07-14 | Stop reason: SDUPTHER

## 2022-06-08 NOTE — PROGRESS NOTES
Subjective:      Jaki Bajwa is a 51 y.o. female who presents for dysuria. Symptoms started about 2 weeks ago. Reports tingling with urination. Hospitalized with pyleo in 4/2022. Treated with Augmentin at that time. Reports recurrent UTI and saw urology last year. Did schedule follow up with Urology next week. Denies fever, chills or nausea. No vaginal irritation or discharge.  S/p hyst for benign reasons in her 40s. Still has ovaries. Went into Menopause a few years later in her 40s. Previously on estradiol patch for menopausal symptoms but not currently. Reports vaginal atrophy.    Admission on 04/23/2022, Discharged on 04/25/2022   Component Date Value Ref Range Status    WBC 04/23/2022 11.59  3.90 - 12.70 K/uL Final    RBC 04/23/2022 4.36  4.00 - 5.40 M/uL Final    Hemoglobin 04/23/2022 10.5 (A) 12.0 - 16.0 g/dL Final    Hematocrit 04/23/2022 35.5 (A) 37.0 - 48.5 % Final    MCV 04/23/2022 81 (A) 82 - 98 fL Final    MCH 04/23/2022 24.1 (A) 27.0 - 31.0 pg Final    MCHC 04/23/2022 29.6 (A) 32.0 - 36.0 g/dL Final    RDW 04/23/2022 18.5 (A) 11.5 - 14.5 % Final    Platelets 04/23/2022 291  150 - 450 K/uL Final    MPV 04/23/2022 11.0  9.2 - 12.9 fL Final    Immature Granulocytes 04/23/2022 0.3  0.0 - 0.5 % Final    Gran # (ANC) 04/23/2022 8.3 (A) 1.8 - 7.7 K/uL Final    Immature Grans (Abs) 04/23/2022 0.04  0.00 - 0.04 K/uL Final    Lymph # 04/23/2022 2.4  1.0 - 4.8 K/uL Final    Mono # 04/23/2022 0.7  0.3 - 1.0 K/uL Final    Eos # 04/23/2022 0.2  0.0 - 0.5 K/uL Final    Baso # 04/23/2022 0.07  0.00 - 0.20 K/uL Final    nRBC 04/23/2022 0  0 /100 WBC Final    Gran % 04/23/2022 71.2  38.0 - 73.0 % Final    Lymph % 04/23/2022 20.3  18.0 - 48.0 % Final    Mono % 04/23/2022 5.6  4.0 - 15.0 % Final    Eosinophil % 04/23/2022 2.0  0.0 - 8.0 % Final    Basophil % 04/23/2022 0.6  0.0 - 1.9 % Final    Differential Method 04/23/2022 Automated   Final    Sodium 04/23/2022 140  136 - 145 mmol/L Final     Potassium 04/23/2022 4.2  3.5 - 5.1 mmol/L Final    Chloride 04/23/2022 100  95 - 110 mmol/L Final    CO2 04/23/2022 30 (A) 23 - 29 mmol/L Final    Glucose 04/23/2022 85  70 - 110 mg/dL Final    BUN 04/23/2022 32 (A) 6 - 20 mg/dL Final    Creatinine 04/23/2022 2.2 (A) 0.5 - 1.4 mg/dL Final    Calcium 04/23/2022 9.5  8.7 - 10.5 mg/dL Final    Total Protein 04/23/2022 8.2  6.0 - 8.4 g/dL Final    Albumin 04/23/2022 3.9  3.5 - 5.2 g/dL Final    Total Bilirubin 04/23/2022 0.3  0.1 - 1.0 mg/dL Final    Alkaline Phosphatase 04/23/2022 84  55 - 135 U/L Final    AST 04/23/2022 17  10 - 40 U/L Final    ALT 04/23/2022 12  10 - 44 U/L Final    Anion Gap 04/23/2022 10  8 - 16 mmol/L Final    eGFR if  04/23/2022 28.7 (A) >60 mL/min/1.73 m^2 Final    eGFR if non African American 04/23/2022 24.9 (A) >60 mL/min/1.73 m^2 Final    Group & Rh 04/23/2022 O POS   Final    Indirect Alessio 04/23/2022 NEG   Final    aPTT 04/23/2022 21.9  21.0 - 32.0 sec Final    Prothrombin Time 04/23/2022 10.1  9.0 - 12.5 sec Final    INR 04/23/2022 1.0  0.8 - 1.2 Final    Specimen UA 04/23/2022 Urine, Clean Catch   Final    Color, UA 04/23/2022 Yellow  Yellow, Straw, [place] Final    Appearance, UA 04/23/2022 Cloudy (A) Clear Final    pH, UA 04/23/2022 5.0  5.0 - 8.0 Final    Specific Gravity, UA 04/23/2022 1.010  1.005 - 1.030 Final    Protein, UA 04/23/2022 Negative  Negative Final    Glucose, UA 04/23/2022 Negative  Negative Final    Ketones, UA 04/23/2022 Negative  Negative Final    Bilirubin (UA) 04/23/2022 Negative  Negative Final    Occult Blood UA 04/23/2022 1+ (A) Negative Final    Nitrite, UA 04/23/2022 Positive (A) Negative Final    Leukocytes, UA 04/23/2022 3+ (A) Negative Final    RBC, UA 04/23/2022 7 (A) 0 - 4 /hpf Final    WBC, UA 04/23/2022 >100 (A) 0 - 5 /hpf Final    WBC Clumps, UA 04/23/2022 Few (A) None-Rare Final    Bacteria 04/23/2022 Many (A) None-Occ /hpf Final    Squam  Epithel, UA 04/23/2022 1  /hpf Final    Microscopic Comment 04/23/2022 SEE COMMENT   Final    Urine Culture, Routine 04/23/2022  (A)  Final                    Value:ESCHERICHIA COLI  >100,000 cfu/ml      WBC 04/24/2022 8.69  3.90 - 12.70 K/uL Final    RBC 04/24/2022 4.30  4.00 - 5.40 M/uL Final    Hemoglobin 04/24/2022 10.5 (A) 12.0 - 16.0 g/dL Final    Hematocrit 04/24/2022 36.9 (A) 37.0 - 48.5 % Final    MCV 04/24/2022 86  82 - 98 fL Final    MCH 04/24/2022 24.4 (A) 27.0 - 31.0 pg Final    MCHC 04/24/2022 28.5 (A) 32.0 - 36.0 g/dL Final    RDW 04/24/2022 18.6 (A) 11.5 - 14.5 % Final    Platelets 04/24/2022 284  150 - 450 K/uL Final    MPV 04/24/2022 12.0  9.2 - 12.9 fL Final    Immature Granulocytes 04/24/2022 0.2  0.0 - 0.5 % Final    Gran # (ANC) 04/24/2022 5.8  1.8 - 7.7 K/uL Final    Immature Grans (Abs) 04/24/2022 0.02  0.00 - 0.04 K/uL Final    Lymph # 04/24/2022 2.1  1.0 - 4.8 K/uL Final    Mono # 04/24/2022 0.5  0.3 - 1.0 K/uL Final    Eos # 04/24/2022 0.2  0.0 - 0.5 K/uL Final    Baso # 04/24/2022 0.06  0.00 - 0.20 K/uL Final    nRBC 04/24/2022 0  0 /100 WBC Final    Gran % 04/24/2022 66.7  38.0 - 73.0 % Final    Lymph % 04/24/2022 23.9  18.0 - 48.0 % Final    Mono % 04/24/2022 6.0  4.0 - 15.0 % Final    Eosinophil % 04/24/2022 2.5  0.0 - 8.0 % Final    Basophil % 04/24/2022 0.7  0.0 - 1.9 % Final    Differential Method 04/24/2022 Automated   Final    Magnesium 04/24/2022 2.2  1.6 - 2.6 mg/dL Final    Sodium 04/24/2022 140  136 - 145 mmol/L Final    Potassium 04/24/2022 3.9  3.5 - 5.1 mmol/L Final    Chloride 04/24/2022 104  95 - 110 mmol/L Final    CO2 04/24/2022 27  23 - 29 mmol/L Final    Glucose 04/24/2022 107  70 - 110 mg/dL Final    BUN 04/24/2022 23 (A) 6 - 20 mg/dL Final    Creatinine 04/24/2022 1.6 (A) 0.5 - 1.4 mg/dL Final    Calcium 04/24/2022 9.1  8.7 - 10.5 mg/dL Final    Anion Gap 04/24/2022 9  8 - 16 mmol/L Final    eGFR if  04/24/2022  "42.1 (A) >60 mL/min/1.73 m^2 Final    eGFR if non African American 2022 36.5 (A) >60 mL/min/1.73 m^2 Final    Sodium 2022 143  136 - 145 mmol/L Final    Potassium 2022 4.8  3.5 - 5.1 mmol/L Final    Chloride 2022 109  95 - 110 mmol/L Final    CO2 2022 28  23 - 29 mmol/L Final    Glucose 2022 113 (A) 70 - 110 mg/dL Final    BUN 2022 21 (A) 6 - 20 mg/dL Final    Creatinine 2022 1.1  0.5 - 1.4 mg/dL Final    Calcium 2022 9.2  8.7 - 10.5 mg/dL Final    Anion Gap 2022 6 (A) 8 - 16 mmol/L Final    eGFR if African American 2022 >60.0  >60 mL/min/1.73 m^2 Final    eGFR if non African American 2022 57.5 (A) >60 mL/min/1.73 m^2 Final       Past Medical History:   Diagnosis Date    Anemia     Anxiety     Depression     History of psychiatric hospitalization     [place] x 1 week for depression    History of ventral hernia repair     Hypertension     Lupus     patient reports that she is being treated "like I have Lupus"    Mental disorder     Morbid obesity 12/15/2017    Psychiatric problem     Sjogren's syndrome 2013    Therapy     TMJ (temporomandibular joint disorder)      Past Surgical History:   Procedure Laterality Date    breast reduction      BREAST SURGERY  2005     SECTION      x 2    HYSTERECTOMY  2005    INGUINAL HERNIA REPAIR      LAPAROSCOPIC TOTAL HYSTERECTOMY  2012    ASA    TOTAL REDUCTION MAMMOPLASTY      TUBAL LIGATION      VENTRAL HERNIA REPAIR       Social History     Tobacco Use    Smoking status: Never Smoker    Smokeless tobacco: Never Used   Substance Use Topics    Alcohol use: Yes     Alcohol/week: 1.0 standard drink     Types: 1 Glasses of wine per week     Comment: rarely     Drug use: Never     Types: Hydrocodone     Family History   Problem Relation Age of Onset    Cancer Father         colon ca    Colon cancer Father     Depression Father     Schizophrenia " Father     Anxiety disorder Father     Arthritis Father     Mental illness Father     Pancreatic cancer Brother     Depression Brother     Alcohol abuse Brother     Liver cancer Sister     Cancer Sister     Depression Sister     Anxiety disorder Sister     Heart attack Sister     COPD Sister     Heart disease Mother     Hypertension Mother     Pancreatic cancer Unknown     Liver cancer Unknown     No Known Problems Daughter     Asthma Son     Cancer Sister         throat and colon    Depression Sister     Miscarriages / Stillbirths Sister     Suicide Neg Hx     Ovarian cancer Neg Hx     Breast cancer Neg Hx      OB History    Para Term  AB Living   2 2 2     2   SAB IAB Ectopic Multiple Live Births                  # Outcome Date GA Lbr Riccardo/2nd Weight Sex Delivery Anes PTL Lv   2 Term      CS-Classical      1 Term      CS-Classical          Current Outpatient Medications:     buPROPion (WELLBUTRIN XL) 150 MG TB24 tablet, Take 3 tablets (450 mg total) by mouth every morning., Disp: 270 tablet, Rfl: 1    busPIRone (BUSPAR) 15 MG tablet, TAKE 1 TABLET(15 MG) BY MOUTH THREE TIMES DAILY, Disp: 270 tablet, Rfl: 1    cetirizine (ZYRTEC) 10 MG tablet, Take 1 tablet (10 mg total) by mouth every evening., Disp: 30 tablet, Rfl: 0    cycloSPORINE (RESTASIS) 0.05 % ophthalmic emulsion, INSTILL 1 DROP IN BOTH EYES TWICE DAILY, Disp: 60 each, Rfl: 1    diazePAM (VALIUM) 2 MG tablet, Take 1 tablet (2 mg total) by mouth daily as needed (Severe anxiety/Panic attack)., Disp: 30 tablet, Rfl: 2    docusate sodium (COLACE) 100 MG capsule, Take 1 capsule (100 mg total) by mouth 2 (two) times daily as needed for Constipation., Disp: 60 capsule, Rfl: 0    doxepin (SINEQUAN) 75 MG capsule, TAKE 2 CAPSULES(150 MG) BY MOUTH EVERY EVENING, Disp: 60 capsule, Rfl: 11    gabapentin (NEURONTIN) 300 MG capsule, Take 1 capsule (300 mg total) by mouth 3 (three) times daily., Disp: 90 capsule, Rfl: 5     HYDROcodone-acetaminophen (NORCO) 5-325 mg per tablet, Take 1 tablet by mouth every 24 hours as needed for Pain. Quantity medically necessary, Disp: 30 tablet, Rfl: 0    hydrOXYchloroQUINE (PLAQUENIL) 200 mg tablet, One pill bid, Disp: 180 tablet, Rfl: 2    irbesartan-hydrochlorothiazide (AVALIDE) 150-12.5 mg per tablet, Take 1 tablet by mouth once daily., Disp: 90 tablet, Rfl: 3    lurasidone (LATUDA) 60 mg Tab tablet, Take 1 tablet (60 mg total) by mouth once daily., Disp: 90 tablet, Rfl: 3    naproxen (NAPROSYN) 500 MG tablet, TAKE 1 TABLET(500 MG) BY MOUTH TWICE DAILY AS NEEDED FOR PAIN, Disp: 180 tablet, Rfl: 1    NARCAN 4 mg/actuation [place], SPRAY 1 SPRAY IN NOSTRIL ONCE FOR 1 DOSE, Disp: , Rfl:     omeprazole (PRILOSEC) 40 MG capsule, TAKE 1 CAPSULE(40 MG) BY MOUTH EVERY DAY, Disp: 90 capsule, Rfl: 3    pilocarpine (SALAGEN) 5 MG Tab, Take 1 tablet (5 mg total) by mouth 2 (two) times daily., Disp: 60 tablet, Rfl: 11    tiZANidine (ZANAFLEX) 4 MG tablet, 8 mg tid, Disp: 180 tablet, Rfl: 5    triamcinolone acetonide 0.1% (KENALOG) 0.1 % cream, Apply topically 2 (two) times daily as needed (rash)., Disp: 45 g, Rfl: 0    estradioL (ESTRACE) 0.01 % (0.1 mg/gram) vaginal cream, 1g vaginally daily x 2 weeks, then BIW as directed, Disp: 42.5 g, Rfl: 2    fluconazole (DIFLUCAN) 150 MG Tab, Take 1 tablet (150 mg total) by mouth once daily. for 1 day, Disp: 1 tablet, Rfl: 0    fluconazole (DIFLUCAN) 200 MG Tab, Take 1 tablet (200 mg total) by mouth every 72 hours as needed., Disp: 2 tablet, Rfl: 0    fluticasone propionate (FLONASE) 50 mcg/actuation nasal spray, SHAKE LIQUID AND USE 1 SPRAY(50 MCG) IN EACH NOSTRIL EVERY DAY, Disp: 48 g, Rfl: 3    nitrofurantoin, macrocrystal-monohydrate, (MACROBID) 100 MG capsule, Take 1 capsule (100 mg total) by mouth 2 (two) times daily. for 7 days, Disp: 14 capsule, Rfl: 0    VENOFER 100 mg iron/5 mL injection, , Disp: , Rfl:     The 10-year ASCVD risk score (Marybeth DC  " et al., 2013) is: 2.3%    Values used to calculate the score:      Age: 51 years      Sex: Female      Is Non- : Yes      Diabetic: No      Tobacco smoker: No      Systolic Blood Pressure: 118 mmHg      Is BP treated: Yes      HDL Cholesterol: 56 mg/dL      Total Cholesterol: 141 mg/dL    Review of Systems:  General: No fever, chills, or weight loss.  Chest: No chest pain, shortness of breath, or palpitations.  Breast: No pain, masses, or nipple discharge.  Vulva: No pain, lesions, or itching.  Vagina: No relaxation, itching, discharge, or lesions.  Abdomen: No pain, nausea, vomiting, diarrhea, or constipation.  Urinary: No incontinence, nocturia, frequency. +dysuria  Extremities:  No leg cramps, edema, or calf pain.  Neurologic: No headaches, dizziness, or visual changes.    Objective:     Vitals:    06/08/22 0844   BP: 118/88   Pulse: 93   Weight: 122.9 kg (271 lb)   Height: 5' 7" (1.702 m)   PainSc: 0-No pain     Body mass index is 42.44 kg/m².    PHYSICAL EXAM:  APPEARANCE: Well nourished, well developed, in no acute distress.  AFFECT: WNL, alert and oriented x 3  CHEST: Good respiratory effect  ABDOMEN: Soft.  No tenderness or masses.  No hepatosplenomegaly.  No hernias. +No CVA tenderness bilaterally.  PELVIC: Normal external genitalia without lesions. +atrophy Normal hair distribution.  Adequate perineal body, normal urethral meatus.  Vagina moist and well rugated without lesions or discharge.  Cervix and Uterus are surgically absent Adnexa without masses or tenderness.    EXTREMITIES: No edema.    Assessment:      Urinary tract infection without hematuria, site unspecified  -     Urinalysis  -     Urine culture  -     nitrofurantoin, macrocrystal-monohydrate, (MACROBID) 100 MG capsule; Take 1 capsule (100 mg total) by mouth 2 (two) times daily. for 7 days  Dispense: 14 capsule; Refill: 0    Vaginal atrophy  -     estradioL (ESTRACE) 0.01 % (0.1 mg/gram) vaginal cream; 1g " vaginally daily x 2 weeks, then BIW as directed  Dispense: 42.5 g; Refill: 2    Other orders  -     fluconazole (DIFLUCAN) 150 MG Tab; Take 1 tablet (150 mg total) by mouth once daily. for 1 day  Dispense: 1 tablet; Refill: 0        Plan:   Urine for culture/UA  Start macrobid 100mg BID x 7 days.  Increase water intake.  Reviewed preventative measures  Add estrace cream nightly x 2 weeks, the BIW.  Scheduled follow up with Urology.  Contact if symptoms are not resolving in 24-48 hours.    Instructed patient to call if she experiences any side effects or has any questions.    I spent a total of 25 minutes on the day of the visit.This includes face to face time and non-face to face time preparing to see the patient (eg, review of tests), obtaining and/or reviewing separately obtained history, documenting clinical information in the electronic or other health record, independently interpreting results and communicating results to the patient/family/caregiver, or care coordinator.

## 2022-06-09 LAB
BACTERIA #/AREA URNS AUTO: ABNORMAL /HPF
BILIRUB UR QL STRIP: NEGATIVE
CLARITY UR REFRACT.AUTO: ABNORMAL
COLOR UR AUTO: YELLOW
GLUCOSE UR QL STRIP: NEGATIVE
HGB UR QL STRIP: NEGATIVE
KETONES UR QL STRIP: NEGATIVE
LEUKOCYTE ESTERASE UR QL STRIP: ABNORMAL
MICROSCOPIC COMMENT: ABNORMAL
NITRITE UR QL STRIP: NEGATIVE
PH UR STRIP: 5 [PH] (ref 5–8)
PROT UR QL STRIP: NEGATIVE
SP GR UR STRIP: 1.02 (ref 1–1.03)
SQUAMOUS #/AREA URNS AUTO: 1 /HPF
URN SPEC COLLECT METH UR: ABNORMAL
WBC #/AREA URNS AUTO: 6 /HPF (ref 0–5)

## 2022-06-10 ENCOUNTER — OUTPATIENT CASE MANAGEMENT (OUTPATIENT)
Dept: ADMINISTRATIVE | Facility: OTHER | Age: 52
End: 2022-06-10
Payer: MEDICARE

## 2022-06-12 LAB — BACTERIA UR CULT: ABNORMAL

## 2022-06-13 NOTE — PROGRESS NOTES
Outpatient Care Management  Plan of Care Follow Up Visit    Patient: Jaki Bajwa  MRN: [id number]  Date of Service: 06/13/2022  Completed by: Yi Page RN  Referral Date: 04/25/2022  Program: Case Management High Risk    Reason for Visit   Patient presents with    OPCM Chart Review     6/10/22    OPCM RN First Follow-Up Attempt     6/10/22    Update Plan Of Care     6/13/22       Brief Summary:  Pt reports that she had UA done at appt and was dx with UTI. Pt was placed on Macrodantin x 7 days starting 6/8/22. Pt reports that she was advised by PCP and GYN that she needs to f/u with urology for this issue. Pt made appt with urology for next week. Pt agrees to take all antibiotics until completed.     Patient Summary     Involvement of Care:  Do I have permission to speak with other family members about your care?  No    Patient Reported Labs & Vitals:  1.  Any Patient Reported Labs & Vitals?  No  2.  Patient Reported Blood Pressure:     3.  Patient Reported Pulse:     4.  Patient Reported Weight (Kg):     5.  Patient Reported Blood Glucose (mg/dl):       Medical and social history was reviewed with patient and/or caregiver.     Clinical Assessment     Reviewed and provided basic information on available community resources for mental health, transportation, wellness resources, and palliative care programs with patient and/or caregiver.     Complex Care Plan     Care plan was discussed and completed today with input from patient and/or caregiver.    Patient Instructions     Instructions were provided via the [employer] patient resources and are available for the patient to view on the patient portal.    Next Steps: F/U concerning preventing UTI  Confirm pt consumed all antibiotics until completed  Confirm pt attend urology appt.     Follow up in about 17 days (around 6/27/2022).    Todays OPCM Self-Management Care Plan was developed with the patients/caregivers input and was based on identified barriers  from todays assessment.  Goals were written today with the patient/caregiver and the patient has agreed to work towards these goals to improve his/her overall well-being. Patient verbalized understanding of the care plan, goals, and all of today's instructions. Encouraged patient/caregiver to communicate with his/her physician and health care team about health conditions and the treatment plan.  Provided my contact information today and encouraged patient/caregiver to call me with any questions as needed.

## 2022-06-14 DIAGNOSIS — G89.4 CHRONIC PAIN SYNDROME: ICD-10-CM

## 2022-06-14 RX ORDER — HYDROCODONE BITARTRATE AND ACETAMINOPHEN 5; 325 MG/1; MG/1
1 TABLET ORAL
Qty: 30 TABLET | Refills: 0 | Status: SHIPPED | OUTPATIENT
Start: 2022-06-14 | End: 2022-07-14 | Stop reason: SDUPTHER

## 2022-06-14 NOTE — TELEPHONE ENCOUNTER
Requested medication has been pended and routed to Dr. Rasmussen for approval. LOV 5/8/2022 Upcoming Visits 9/7/22

## 2022-06-14 NOTE — TELEPHONE ENCOUNTER
No new care gaps identified.  [facility] Embedded Care Gaps. Reference number: [id number]. 6/14/2022   9:46:27 AM DAVIDET

## 2022-06-14 NOTE — PROGRESS NOTES
CC: Anemia, hematology follow up        HPI: Ms. Bajwa is a 51-year-old woman here for follow up for anemia.  She has been evaluated previously here by glenn Giraldo with a bone marrow biopsy. Her hemoglobin in 2011 was 13.2.  Since May 2012,  hemoglobin values have been between 10.1-11.6. She has a diagnosis of Sjogren syndrome. Bone marrow biopsy in 2014 was naagtive for malignancy/ dysplasia. MDS FISH was negative.  She had hysterectomy and no ongoing menstrual losses. No s/s of mal-absorption. No recent weight loss. No overt bleeding. No melena. She has fatigue. She has heart burn.        Interval history: She is here for a follow up.  She received iv injectafer on 9/30 and 10/7/20.  She received iv venofer between 3/3/22-4/21/22( 8 treatments, cumulative iron dose of 1600 mg). She denies upper or lower GI bleeding, hematuria, epistaxis or hemoptysis.     Review of Systems   Constitutional: Positive for malaise/fatigue. Negative for chills, fever and weight loss.   HENT: Negative for ear discharge, ear pain, hearing loss and tinnitus.    Eyes: Negative for double vision, photophobia and discharge.   Cardiovascular: Negative for chest pain, palpitations and orthopnea.   Gastrointestinal: Negative for blood in stool, heartburn, melena, nausea and vomiting.   Genitourinary: Negative for frequency and urgency.   Musculoskeletal: Negative for back pain, myalgias and neck pain.   Neurological: Negative for tingling, tremors, sensory change, speech change and focal weakness.   Endo/Heme/Allergies: Negative for environmental allergies. Does not bruise/bleed easily.   Psychiatric/Behavioral: Negative for depression, hallucinations, substance abuse and suicidal ideas. The patient is not nervous/anxious.      Current Outpatient Medications   Medication Sig    buPROPion (WELLBUTRIN XL) 150 MG TB24 tablet Take 3 tablets (450 mg total) by mouth every morning.    busPIRone (BUSPAR) 15 MG tablet TAKE 1 TABLET(15  MG) BY MOUTH THREE TIMES DAILY    cetirizine (ZYRTEC) 10 MG tablet Take 1 tablet (10 mg total) by mouth every evening.    cycloSPORINE (RESTASIS) 0.05 % ophthalmic emulsion INSTILL 1 DROP IN BOTH EYES TWICE DAILY    diazePAM (VALIUM) 2 MG tablet Take 1 tablet (2 mg total) by mouth daily as needed (Severe anxiety/Panic attack).    docusate sodium (COLACE) 100 MG capsule Take 1 capsule (100 mg total) by mouth 2 (two) times daily as needed for Constipation.    doxepin (SINEQUAN) 75 MG capsule TAKE 2 CAPSULES(150 MG) BY MOUTH EVERY EVENING    estradioL (ESTRACE) 0.01 % (0.1 mg/gram) vaginal cream 1g vaginally daily x 2 weeks, then BIW as directed    fluconazole (DIFLUCAN) 200 MG Tab Take 1 tablet (200 mg total) by mouth every 72 hours as needed.    fluticasone propionate (FLONASE) 50 mcg/actuation nasal spray SHAKE LIQUID AND USE 1 SPRAY(50 MCG) IN EACH NOSTRIL EVERY DAY    gabapentin (NEURONTIN) 300 MG capsule Take 1 capsule (300 mg total) by mouth 3 (three) times daily.    HYDROcodone-acetaminophen (NORCO) 5-325 mg per tablet Take 1 tablet by mouth every 24 hours as needed for Pain. Quantity medically necessary    hydrOXYchloroQUINE (PLAQUENIL) 200 mg tablet One pill bid    irbesartan-hydrochlorothiazide (AVALIDE) 150-12.5 mg per tablet Take 1 tablet by mouth once daily.    lurasidone (LATUDA) 60 mg Tab tablet Take 1 tablet (60 mg total) by mouth once daily.    naproxen (NAPROSYN) 500 MG tablet TAKE 1 TABLET(500 MG) BY MOUTH TWICE DAILY AS NEEDED FOR PAIN    NARCAN 4 mg/actuation [place] SPRAY 1 SPRAY IN NOSTRIL ONCE FOR 1 DOSE    nitrofurantoin, macrocrystal-monohydrate, (MACROBID) 100 MG capsule Take 1 capsule (100 mg total) by mouth 2 (two) times daily. for 7 days    omeprazole (PRILOSEC) 40 MG capsule TAKE 1 CAPSULE(40 MG) BY MOUTH EVERY DAY    pilocarpine (SALAGEN) 5 MG Tab Take 1 tablet (5 mg total) by mouth 2 (two) times daily.    tiZANidine (ZANAFLEX) 4 MG tablet 8 mg tid    triamcinolone  acetonide 0.1% (KENALOG) 0.1 % cream Apply topically 2 (two) times daily as needed (rash).    VENOFER 100 mg iron/5 mL injection      No current facility-administered medications for this visit.          Vitals:    06/15/22 0819   BP: 102/64   Pulse: 86   Resp: 16   Temp: 98.6 °F (37 °C)       Physical Exam  Constitutional:       Appearance: She is well-developed.   HENT:      Head: Normocephalic and atraumatic.      Mouth/Throat:      Pharynx: No oropharyngeal exudate.   Eyes:      General: No scleral icterus.  Cardiovascular:      Rate and Rhythm: Normal rate.      Heart sounds: No murmur heard.  Pulmonary:      Effort: Pulmonary effort is normal. No respiratory distress.      Breath sounds: No wheezing or rales.   Abdominal:      General: There is no distension.      Palpations: Abdomen is soft.      Tenderness: There is no rebound.   Lymphadenopathy:      Cervical: No cervical adenopathy.   Skin:     General: Skin is warm.   Neurological:      Mental Status: She is alert and oriented to person, place, and time.      Cranial Nerves: No cranial nerve deficit.         8/12/14 BONE MARROW ASPIRATE, BONE MARROW CLOT, AND BONE MARROW CORE BIOPSY WITH:     CELLULARITY=70-80%, TRILINEAGE HEMATOPOIETIC ACTIVITY (M/E=2:1).  DECREASED STORAGE IRON.  ADEQUATE NUMBER OF MEGAKARYOCYTES.  COMMENT: Flow cytometry analysis of bone marrow aspirate shows lymph gate(11.5%) containing T and B cells. No B cell clonality or T-cell aberrancy is evident. Blast gate is not increased. Correlate clinically and with a  cytogenetics report.     No significant increased reticular fibrosis is evident by special stain(reticulin) with adequate positive and negative controls.     Bone marrow karyotype results: 46, XX [20], female karyotype.     FISH studies for the MDS panel are normal       Component      Latest Ref Rng & Units 6/8/2022 4/24/2022   WBC      3.90 - 12.70 K/uL  8.69   RBC      4.00 - 5.40 M/uL  4.30   Hemoglobin      12.0 - 16.0  g/dL  10.5 (L)   Hematocrit      37.0 - 48.5 %  36.9 (L)   MCV      82 - 98 fL  86   MCH      27.0 - 31.0 pg  24.4 (L)   MCHC      32.0 - 36.0 g/dL  28.5 (L)   RDW      11.5 - 14.5 %  18.6 (H)   Platelets      150 - 450 K/uL  284   MPV      9.2 - 12.9 fL  12.0   Immature Granulocytes      0.0 - 0.5 %  0.2   Gran # (ANC)      1.8 - 7.7 K/uL  5.8   Immature Grans (Abs)      0.00 - 0.04 K/uL  0.02   Lymph #      1.0 - 4.8 K/uL  2.1   Mono #      0.3 - 1.0 K/uL  0.5   Eos #      0.0 - 0.5 K/uL  0.2   Baso #      0.00 - 0.20 K/uL  0.06   nRBC      0 /100 WBC  0   Gran %      38.0 - 73.0 %  66.7   Lymph %      18.0 - 48.0 %  23.9   Mono %      4.0 - 15.0 %  6.0   Eosinophil %      0.0 - 8.0 %  2.5   Basophil %      0.0 - 1.9 %  0.7   Differential Method        Automated   Sodium      136 - 145 mmol/L 143    Potassium      3.5 - 5.1 mmol/L 4.2    Chloride      95 - 110 mmol/L 102    CO2      23 - 29 mmol/L 32 (H)    Glucose      70 - 110 mg/dL 99    BUN      6 - 20 mg/dL 14    Creatinine      0.5 - 1.4 mg/dL 1.3    Calcium      8.7 - 10.5 mg/dL 10.2    PROTEIN TOTAL      6.0 - 8.4 g/dL 8.0    Albumin      3.5 - 5.2 g/dL 4.0    BILIRUBIN TOTAL      0.1 - 1.0 mg/dL 0.4    Alkaline Phosphatase      55 - 135 U/L 74    AST      10 - 40 U/L 17    ALT      10 - 44 U/L 11    Anion Gap      8 - 16 mmol/L 9    eGFR if African American      >60 mL/min/1.73 m:2 54 (A)    eGFR if non African American      >60 mL/min/1.73 m:2 47 (A)    Specimen UA       Urine, Clean Catch    Color, UA      Yellow, Straw, [place] Yellow    Appearance, UA      Clear Cloudy (A)    pH, UA      5.0 - 8.0 5.0    Specific Gravity, UA      1.005 - 1.030 1.020    Protein, UA      Negative Negative    Glucose, UA      Negative Negative    Ketones, UA      Negative Negative    Bilirubin (UA)      Negative Negative    Occult Blood UA      Negative Negative    NITRITE UA      Negative Negative    Leukocytes, UA      Negative Trace (A)    WBC, UA      0 - 5 /hpf 6 (H)     Bacteria, UA      None-Occ /hpf Many (A)    Squam Epithel, UA      /hpf 1    Microscopic Comment       SEE COMMENT         Component      Latest Ref Rng & Units 6/15/2022   WBC      3.90 - 12.70 K/uL 9.01   RBC      4.00 - 5.40 M/uL 4.26   Hemoglobin      12.0 - 16.0 g/dL 11.0 (L)   Hematocrit      37.0 - 48.5 % 36.4 (L)   MCV      82 - 98 fL 85   MCH      27.0 - 31.0 pg 25.8 (L)   MCHC      32.0 - 36.0 g/dL 30.2 (L)   RDW      11.5 - 14.5 % 15.2 (H)   Platelets      150 - 450 K/uL 235   MPV      9.2 - 12.9 fL 11.8   Immature Granulocytes      0.0 - 0.5 % 0.2   Gran # (ANC)      1.8 - 7.7 K/uL 5.6   Immature Grans (Abs)      0.00 - 0.04 K/uL 0.02   Lymph #      1.0 - 4.8 K/uL 2.5   Mono #      0.3 - 1.0 K/uL 0.7   Eos #      0.0 - 0.5 K/uL 0.2   Baso #      0.00 - 0.20 K/uL 0.04   nRBC      0 /100 WBC 0   Gran %      38.0 - 73.0 % 61.8   Lymph %      18.0 - 48.0 % 28.1   Mono %      4.0 - 15.0 % 7.2   Eosinophil %      0.0 - 8.0 % 2.3   Basophil %      0.0 - 1.9 % 0.4   Differential Method       Automated   Sodium      136 - 145 mmol/L 135 (L)   Potassium      3.5 - 5.1 mmol/L 4.1   Chloride      95 - 110 mmol/L 98   CO2      23 - 29 mmol/L 29   Glucose      70 - 110 mg/dL 91   BUN      6 - 20 mg/dL 29 (H)   Creatinine      0.5 - 1.4 mg/dL 1.5 (H)   Calcium      8.7 - 10.5 mg/dL 9.7   PROTEIN TOTAL      6.0 - 8.4 g/dL 7.6   Albumin      3.5 - 5.2 g/dL 3.7   BILIRUBIN TOTAL      0.1 - 1.0 mg/dL 0.8   Alkaline Phosphatase      55 - 135 U/L 73   AST      10 - 40 U/L 17   ALT      10 - 44 U/L 14   Anion Gap      8 - 16 mmol/L 8   eGFR if African American      >60 mL/min/1.73 m:2 45.5 (A)   eGFR if non African American      >60 mL/min/1.73 m:2 39.5 (A)   Iron      30 - 160 ug/dL 95   Transferrin      200 - 375 mg/dL 205   TIBC      250 - 450 ug/dL 303   Saturated Iron      20 - 50 % 31     Assessment:     1. Microcytic anemia  2. Iron deficiency  3. Chronic fatigue  4. Fibromyalgia  5. Sjogren syndrome  6. Essential  HTn        Plan:     1,2: She has been evaluated previously here by , including with a bone marrow biopsy. Her hemoglobin in 2011 was 13.2g/dl . Since May 2012,  hemoglobin values have been between 10.1-11.6. She has a diagnosis of Sjogren syndrome. Bone marrow biopsy in 2014 was negative for malignancy/ dysplasia. MDS FISH was negative.  She had hysterectomy and has no ongoing menstrual losses. No s/s of mal-absorption. Diet is normal/regular.   No recent weight loss. No overt bleeding. No melena. She has fatigue and heart burn.  Stool OB negative in Jan 2020. UA negative for blood/ RBC in Aug 2020.    Last PAP smear was in 2016, and was negative.  She follows with GYN here, last evaluation in Sept 2020.   She received iv injectafer on 9/30 and 10/7/20. GIGI previously negative.She received iv venofer between 3/3/22-4/21/22( 8 treatments, cumulative iron dose of 1600 mg).  Serum iron saturation has improved from 7% in Feb 2022 to 31% now. Serum ferritin now normal at 227 ng/ml. She ahs mild hypochromic anemia today.   Colonoscopy was normal in Jan 2021 except for sigmoid colon diverticulosis.   She will continue oral iron and follow here as needed.            4,5: on Plaquenil. Follows with rheumatology.         6. Follows with her PCP

## 2022-06-15 ENCOUNTER — OFFICE VISIT (OUTPATIENT)
Dept: HEMATOLOGY/ONCOLOGY | Facility: CLINIC | Age: 52
End: 2022-06-15
Payer: MEDICARE

## 2022-06-15 ENCOUNTER — LAB VISIT (OUTPATIENT)
Dept: LAB | Facility: HOSPITAL | Age: 52
End: 2022-06-15
Payer: MEDICARE

## 2022-06-15 ENCOUNTER — OUTPATIENT CASE MANAGEMENT (OUTPATIENT)
Dept: ADMINISTRATIVE | Facility: OTHER | Age: 52
End: 2022-06-15
Payer: MEDICARE

## 2022-06-15 VITALS
WEIGHT: 270.75 LBS | BODY MASS INDEX: 42.49 KG/M2 | SYSTOLIC BLOOD PRESSURE: 102 MMHG | DIASTOLIC BLOOD PRESSURE: 64 MMHG | TEMPERATURE: 99 F | HEIGHT: 67 IN | HEART RATE: 86 BPM | OXYGEN SATURATION: 96 % | RESPIRATION RATE: 16 BRPM

## 2022-06-15 DIAGNOSIS — M35.01 SJOGREN'S SYNDROME WITH KERATOCONJUNCTIVITIS SICCA: ICD-10-CM

## 2022-06-15 DIAGNOSIS — D50.9 IRON DEFICIENCY ANEMIA, UNSPECIFIED IRON DEFICIENCY ANEMIA TYPE: ICD-10-CM

## 2022-06-15 DIAGNOSIS — K64.9 BLEEDING HEMORRHOID: ICD-10-CM

## 2022-06-15 DIAGNOSIS — I10 ESSENTIAL HYPERTENSION: Primary | Chronic | ICD-10-CM

## 2022-06-15 DIAGNOSIS — D64.9 ANEMIA OF UNKNOWN ETIOLOGY: ICD-10-CM

## 2022-06-15 LAB
ALBUMIN SERPL BCP-MCNC: 3.7 G/DL (ref 3.5–5.2)
ALP SERPL-CCNC: 73 U/L (ref 55–135)
ALT SERPL W/O P-5'-P-CCNC: 14 U/L (ref 10–44)
ANION GAP SERPL CALC-SCNC: 8 MMOL/L (ref 8–16)
AST SERPL-CCNC: 17 U/L (ref 10–40)
BASOPHILS # BLD AUTO: 0.04 K/UL (ref 0–0.2)
BASOPHILS NFR BLD: 0.4 % (ref 0–1.9)
BILIRUB SERPL-MCNC: 0.8 MG/DL (ref 0.1–1)
BUN SERPL-MCNC: 29 MG/DL (ref 6–20)
CALCIUM SERPL-MCNC: 9.7 MG/DL (ref 8.7–10.5)
CHLORIDE SERPL-SCNC: 98 MMOL/L (ref 95–110)
CO2 SERPL-SCNC: 29 MMOL/L (ref 23–29)
CREAT SERPL-MCNC: 1.5 MG/DL (ref 0.5–1.4)
DIFFERENTIAL METHOD: ABNORMAL
EOSINOPHIL # BLD AUTO: 0.2 K/UL (ref 0–0.5)
EOSINOPHIL NFR BLD: 2.3 % (ref 0–8)
ERYTHROCYTE [DISTWIDTH] IN BLOOD BY AUTOMATED COUNT: 15.2 % (ref 11.5–14.5)
EST. GFR  (AFRICAN AMERICAN): 45.5 ML/MIN/1.73 M^2
EST. GFR  (NON AFRICAN AMERICAN): 39.5 ML/MIN/1.73 M^2
FERRITIN SERPL-MCNC: 227 NG/ML (ref 20–300)
GLUCOSE SERPL-MCNC: 91 MG/DL (ref 70–110)
HCT VFR BLD AUTO: 36.4 % (ref 37–48.5)
HGB BLD-MCNC: 11 G/DL (ref 12–16)
IMM GRANULOCYTES # BLD AUTO: 0.02 K/UL (ref 0–0.04)
IMM GRANULOCYTES NFR BLD AUTO: 0.2 % (ref 0–0.5)
IRON SERPL-MCNC: 95 UG/DL (ref 30–160)
LYMPHOCYTES # BLD AUTO: 2.5 K/UL (ref 1–4.8)
LYMPHOCYTES NFR BLD: 28.1 % (ref 18–48)
MCH RBC QN AUTO: 25.8 PG (ref 27–31)
MCHC RBC AUTO-ENTMCNC: 30.2 G/DL (ref 32–36)
MCV RBC AUTO: 85 FL (ref 82–98)
MONOCYTES # BLD AUTO: 0.7 K/UL (ref 0.3–1)
MONOCYTES NFR BLD: 7.2 % (ref 4–15)
NEUTROPHILS # BLD AUTO: 5.6 K/UL (ref 1.8–7.7)
NEUTROPHILS NFR BLD: 61.8 % (ref 38–73)
NRBC BLD-RTO: 0 /100 WBC
PLATELET # BLD AUTO: 235 K/UL (ref 150–450)
PMV BLD AUTO: 11.8 FL (ref 9.2–12.9)
POTASSIUM SERPL-SCNC: 4.1 MMOL/L (ref 3.5–5.1)
PROT SERPL-MCNC: 7.6 G/DL (ref 6–8.4)
RBC # BLD AUTO: 4.26 M/UL (ref 4–5.4)
SATURATED IRON: 31 % (ref 20–50)
SODIUM SERPL-SCNC: 135 MMOL/L (ref 136–145)
TOTAL IRON BINDING CAPACITY: 303 UG/DL (ref 250–450)
TRANSFERRIN SERPL-MCNC: 205 MG/DL (ref 200–375)
WBC # BLD AUTO: 9.01 K/UL (ref 3.9–12.7)

## 2022-06-15 PROCEDURE — 99999 PR PBB SHADOW E&M-EST. PATIENT-LVL IV: ICD-10-PCS | Mod: PBBFAC,,, | Performed by: INTERNAL MEDICINE

## 2022-06-15 PROCEDURE — 1159F PR MEDICATION LIST DOCUMENTED IN MEDICAL RECORD: ICD-10-PCS | Mod: CPTII,S$GLB,, | Performed by: INTERNAL MEDICINE

## 2022-06-15 PROCEDURE — 3008F BODY MASS INDEX DOCD: CPT | Mod: CPTII,S$GLB,, | Performed by: INTERNAL MEDICINE

## 2022-06-15 PROCEDURE — 3008F PR BODY MASS INDEX (BMI) DOCUMENTED: ICD-10-PCS | Mod: CPTII,S$GLB,, | Performed by: INTERNAL MEDICINE

## 2022-06-15 PROCEDURE — 3074F PR MOST RECENT SYSTOLIC BLOOD PRESSURE < 130 MM HG: ICD-10-PCS | Mod: CPTII,S$GLB,, | Performed by: INTERNAL MEDICINE

## 2022-06-15 PROCEDURE — 82728 ASSAY OF FERRITIN: CPT | Performed by: INTERNAL MEDICINE

## 2022-06-15 PROCEDURE — 99215 OFFICE O/P EST HI 40 MIN: CPT | Mod: S$GLB,,, | Performed by: INTERNAL MEDICINE

## 2022-06-15 PROCEDURE — 3078F DIAST BP <80 MM HG: CPT | Mod: CPTII,S$GLB,, | Performed by: INTERNAL MEDICINE

## 2022-06-15 PROCEDURE — 99499 RISK ADDL DX/OHS AUDIT: ICD-10-PCS | Mod: S$GLB,,, | Performed by: INTERNAL MEDICINE

## 2022-06-15 PROCEDURE — 99215 PR OFFICE/OUTPT VISIT, EST, LEVL V, 40-54 MIN: ICD-10-PCS | Mod: S$GLB,,, | Performed by: INTERNAL MEDICINE

## 2022-06-15 PROCEDURE — 99499 UNLISTED E&M SERVICE: CPT | Mod: S$GLB,,, | Performed by: INTERNAL MEDICINE

## 2022-06-15 PROCEDURE — 1159F MED LIST DOCD IN RCRD: CPT | Mod: CPTII,S$GLB,, | Performed by: INTERNAL MEDICINE

## 2022-06-15 PROCEDURE — 36415 COLL VENOUS BLD VENIPUNCTURE: CPT | Performed by: INTERNAL MEDICINE

## 2022-06-15 PROCEDURE — 84466 ASSAY OF TRANSFERRIN: CPT | Performed by: INTERNAL MEDICINE

## 2022-06-15 PROCEDURE — 3078F PR MOST RECENT DIASTOLIC BLOOD PRESSURE < 80 MM HG: ICD-10-PCS | Mod: CPTII,S$GLB,, | Performed by: INTERNAL MEDICINE

## 2022-06-15 PROCEDURE — 85025 COMPLETE CBC W/AUTO DIFF WBC: CPT | Performed by: INTERNAL MEDICINE

## 2022-06-15 PROCEDURE — 99999 PR PBB SHADOW E&M-EST. PATIENT-LVL IV: CPT | Mod: PBBFAC,,, | Performed by: INTERNAL MEDICINE

## 2022-06-15 PROCEDURE — 80053 COMPREHEN METABOLIC PANEL: CPT | Performed by: INTERNAL MEDICINE

## 2022-06-15 PROCEDURE — 3074F SYST BP LT 130 MM HG: CPT | Mod: CPTII,S$GLB,, | Performed by: INTERNAL MEDICINE

## 2022-06-16 ENCOUNTER — PATIENT MESSAGE (OUTPATIENT)
Dept: ADMINISTRATIVE | Facility: OTHER | Age: 52
End: 2022-06-16
Payer: MEDICARE

## 2022-06-16 ENCOUNTER — OUTPATIENT CASE MANAGEMENT (OUTPATIENT)
Dept: ADMINISTRATIVE | Facility: OTHER | Age: 52
End: 2022-06-16
Payer: MEDICARE

## 2022-06-16 NOTE — PROGRESS NOTES
6/15/2022    1st Attempt to complete Social Work Assessment for Outpatient Care Management; left message requesting a return call.  LCSW will reattempt at a later date.

## 2022-06-16 NOTE — PROGRESS NOTES
6/16/2022      2nd attempt to complete Social Work Assessment for OPCM; left message requesting a return call. LCSW sent message in pt portal with contact information requesting a return call.  OPCM RN notified.

## 2022-06-20 ENCOUNTER — OUTPATIENT CASE MANAGEMENT (OUTPATIENT)
Dept: ADMINISTRATIVE | Facility: OTHER | Age: 52
End: 2022-06-20
Payer: MEDICARE

## 2022-06-20 NOTE — PROGRESS NOTES
6/20/2022      3rd Attempt to complete SW Assessment for Outpatient Care Management;  left message requesting a return call.  LCSW will close case and notified OPCM RN.

## 2022-06-27 ENCOUNTER — OUTPATIENT CASE MANAGEMENT (OUTPATIENT)
Dept: ADMINISTRATIVE | Facility: OTHER | Age: 52
End: 2022-06-27

## 2022-06-28 ENCOUNTER — TELEPHONE (OUTPATIENT)
Dept: DERMATOLOGY | Facility: CLINIC | Age: 52
End: 2022-06-28
Payer: MEDICARE

## 2022-06-28 NOTE — TELEPHONE ENCOUNTER
Spoke with pt/ Confirmed appt    ----- Message from Andrew Blair sent at 6/28/2022  9:36 AM CDT -----  Contact: Patient  Patient requesting call back in regards to scheduling an appointment.         Patient @[phone] ([place])

## 2022-06-30 ENCOUNTER — EXTERNAL CHRONIC CARE MANAGEMENT (OUTPATIENT)
Dept: PRIMARY CARE CLINIC | Facility: CLINIC | Age: 52
End: 2022-06-30
Payer: MEDICARE

## 2022-06-30 PROCEDURE — 99490 PR CHRONIC CARE MGMT, 1ST 20 MIN: ICD-10-PCS | Mod: S$GLB,,, | Performed by: INTERNAL MEDICINE

## 2022-06-30 PROCEDURE — 99490 CHRNC CARE MGMT STAFF 1ST 20: CPT | Mod: S$GLB,,, | Performed by: INTERNAL MEDICINE

## 2022-07-13 DIAGNOSIS — G89.4 CHRONIC PAIN SYNDROME: ICD-10-CM

## 2022-07-13 RX ORDER — DOCUSATE SODIUM 100 MG/1
100 CAPSULE, LIQUID FILLED ORAL 2 TIMES DAILY PRN
Qty: 60 CAPSULE | Refills: 0 | Status: SHIPPED | OUTPATIENT
Start: 2022-07-13

## 2022-07-13 NOTE — TELEPHONE ENCOUNTER
No new care gaps identified.  [facility] Embedded Care Gaps. Reference number: [id number]. 7/13/2022   9:07:28 AM DAVIDET

## 2022-07-13 NOTE — TELEPHONE ENCOUNTER
Refill Routing Note   Medication(s) are not appropriate for processing by [facility] for the following reason(s):      - Outside of protocol    [facility] action(s):  Route          Medication reconciliation completed: No     Appointments  past 12m or future 3m with PCP    Date Provider   Last Visit   6/8/2022 Pratik Rasmussen MD   Next Visit   9/7/2022 Pratik Rasmussen MD   ED visits in past 90 days: 0        Note composed:4:13 PM 07/13/2022

## 2022-07-13 NOTE — TELEPHONE ENCOUNTER
Pt asking about refills that she requested this am. Informed that I see them pending and they may take up to 72 hours to process. Pt states understanding.  Routed Colace attached tot his message to CRP

## 2022-07-13 NOTE — TELEPHONE ENCOUNTER
----- Message from Frances Myles sent at 7/13/2022  3:12 PM CDT -----  Regarding: Call  Type: Patient Call Back    Who called:Patient    What is the request in detail: Patient is requesting a call back. Please advise.    Can the clinic reply by [facility]? No    Would the patient rather a call back or a response via [facility]? Call    Best call back number: [phone]    Additional Information:    Thanks

## 2022-07-14 ENCOUNTER — TELEPHONE (OUTPATIENT)
Dept: UROLOGY | Facility: CLINIC | Age: 52
End: 2022-07-14

## 2022-07-14 ENCOUNTER — OFFICE VISIT (OUTPATIENT)
Dept: UROLOGY | Facility: CLINIC | Age: 52
End: 2022-07-14
Payer: MEDICARE

## 2022-07-14 ENCOUNTER — PATIENT MESSAGE (OUTPATIENT)
Dept: INTERNAL MEDICINE | Facility: CLINIC | Age: 52
End: 2022-07-14
Payer: MEDICARE

## 2022-07-14 VITALS
WEIGHT: 270.75 LBS | BODY MASS INDEX: 42.49 KG/M2 | DIASTOLIC BLOOD PRESSURE: 76 MMHG | HEART RATE: 95 BPM | SYSTOLIC BLOOD PRESSURE: 123 MMHG | HEIGHT: 67 IN

## 2022-07-14 DIAGNOSIS — R35.0 URINARY FREQUENCY: Primary | ICD-10-CM

## 2022-07-14 DIAGNOSIS — N39.0 RECURRENT UTI: ICD-10-CM

## 2022-07-14 DIAGNOSIS — R39.15 URINARY URGENCY: ICD-10-CM

## 2022-07-14 PROCEDURE — 87186 SC STD MICRODIL/AGAR DIL: CPT | Performed by: NURSE PRACTITIONER

## 2022-07-14 PROCEDURE — 1160F PR REVIEW ALL MEDS BY PRESCRIBER/CLIN PHARMACIST DOCUMENTED: ICD-10-PCS | Mod: CPTII,S$GLB,, | Performed by: NURSE PRACTITIONER

## 2022-07-14 PROCEDURE — 87077 CULTURE AEROBIC IDENTIFY: CPT | Performed by: NURSE PRACTITIONER

## 2022-07-14 PROCEDURE — 87088 URINE BACTERIA CULTURE: CPT | Performed by: NURSE PRACTITIONER

## 2022-07-14 PROCEDURE — 1160F RVW MEDS BY RX/DR IN RCRD: CPT | Mod: CPTII,S$GLB,, | Performed by: NURSE PRACTITIONER

## 2022-07-14 PROCEDURE — 3074F SYST BP LT 130 MM HG: CPT | Mod: CPTII,S$GLB,, | Performed by: NURSE PRACTITIONER

## 2022-07-14 PROCEDURE — 99214 OFFICE O/P EST MOD 30 MIN: CPT | Mod: S$GLB,,, | Performed by: NURSE PRACTITIONER

## 2022-07-14 PROCEDURE — 1159F PR MEDICATION LIST DOCUMENTED IN MEDICAL RECORD: ICD-10-PCS | Mod: CPTII,S$GLB,, | Performed by: NURSE PRACTITIONER

## 2022-07-14 PROCEDURE — 3008F PR BODY MASS INDEX (BMI) DOCUMENTED: ICD-10-PCS | Mod: CPTII,S$GLB,, | Performed by: NURSE PRACTITIONER

## 2022-07-14 PROCEDURE — 3008F BODY MASS INDEX DOCD: CPT | Mod: CPTII,S$GLB,, | Performed by: NURSE PRACTITIONER

## 2022-07-14 PROCEDURE — 3078F PR MOST RECENT DIASTOLIC BLOOD PRESSURE < 80 MM HG: ICD-10-PCS | Mod: CPTII,S$GLB,, | Performed by: NURSE PRACTITIONER

## 2022-07-14 PROCEDURE — 99214 PR OFFICE/OUTPT VISIT, EST, LEVL IV, 30-39 MIN: ICD-10-PCS | Mod: S$GLB,,, | Performed by: NURSE PRACTITIONER

## 2022-07-14 PROCEDURE — 87086 URINE CULTURE/COLONY COUNT: CPT | Performed by: NURSE PRACTITIONER

## 2022-07-14 PROCEDURE — 3078F DIAST BP <80 MM HG: CPT | Mod: CPTII,S$GLB,, | Performed by: NURSE PRACTITIONER

## 2022-07-14 PROCEDURE — 1159F MED LIST DOCD IN RCRD: CPT | Mod: CPTII,S$GLB,, | Performed by: NURSE PRACTITIONER

## 2022-07-14 PROCEDURE — 3074F PR MOST RECENT SYSTOLIC BLOOD PRESSURE < 130 MM HG: ICD-10-PCS | Mod: CPTII,S$GLB,, | Performed by: NURSE PRACTITIONER

## 2022-07-14 RX ORDER — HYDROCODONE BITARTRATE AND ACETAMINOPHEN 5; 325 MG/1; MG/1
1 TABLET ORAL
Qty: 30 TABLET | Refills: 0 | OUTPATIENT
Start: 2022-07-14

## 2022-07-14 RX ORDER — FLUCONAZOLE 150 MG/1
150 TABLET ORAL
Qty: 6 TABLET | Refills: 0 | Status: SHIPPED | OUTPATIENT
Start: 2022-07-14 | End: 2022-12-22

## 2022-07-14 RX ORDER — NITROFURANTOIN MACROCRYSTALS 50 MG/1
50 CAPSULE ORAL NIGHTLY
Qty: 90 CAPSULE | Refills: 1 | Status: SHIPPED | OUTPATIENT
Start: 2022-07-14 | End: 2022-10-14 | Stop reason: SDUPTHER

## 2022-07-14 NOTE — PROGRESS NOTES
Subjective:      Jaki Bajwa is a 51 y.o. female who returns today regarding her urinary symptoms.      Recurrent UTI workup in 2017- MAY showed no stones and cysto showed bladder sediment suggestive of incomplete bladder emptying.      She has a history of recurrent UTIs. She completed macrodantin prophylaxis in 2018 and was subsequently managed with post coital bactrim.     She presents today reporting 3 recent infections. Today she reports urinary frequency, urgency and urethral discomfort. She denies dysuria, flank pain and fever/chills. She has not been sexually active for the last year. Using estrace PRN.     Component Urine Culture, Routine   Latest Ref Rng & Units    6/8/2022 ENTEROCOCCUS FAECALIS (A) .   4/23/2022 ESCHERICHIA COLI (A) . . .   12/2/2021 ESCHERICHIA COLI (A) . . .     The following portions of the patient's history were reviewed and updated as appropriate: allergies, current medications, past family history, past medical history, past social history, past surgical history and problem list.    Review of Systems  Constitutional: no fever or chills  ENT: no nasal congestion or sore throat  Respiratory: no cough or shortness of breath  Cardiovascular: no chest pain or palpitations  Gastrointestinal: no nausea or vomiting, tolerating diet  Genitourinary: as per HPI  Hematologic/Lymphatic: no easy bruising or lymphadenopathy  Musculoskeletal: no arthralgias or myalgias  Neurological: no seizures or tremors  Behavioral/Psych: no auditory or visual hallucinations     Objective:   Vital Signs:[facility] 07/11/2012 Comment: partial     Physical Exam   General: alert and oriented, no acute distress  Head: normocephalic, atraumatic  Neck: supple, normal ROM  Respiratory: Symmetric expansion, non-labored breathing  Cardiovascular: regular rate and rhythm  Abdomen: soft, non tender, non distended  Pelvic: deferred  Skin: normal coloration and turgor, no rashes, no suspicious skin lesions noted  Neuro:  alert and oriented x3, no gross deficits  Psych: normal judgment and insight, normal mood/affect and non-anxious    Lab Review   Urinalysis demonstrates positive for protein (Trace)  Lab Results   Component Value Date    WBC 9.01 06/15/2022    HGB 11.0 (L) 06/15/2022    HCT 36.4 (L) 06/15/2022    MCV 85 06/15/2022     06/15/2022     Lab Results   Component Value Date    CREATININE 1.5 (H) 06/15/2022    BUN 29 (H) 06/15/2022       Imaging   None    Assessment:     Urinary frequency  Recurrent UTI  Urinary urgency    Plan:   Jaki was seen today for follow-up.    Diagnoses and all orders for this visit:    Urinary frequency  -     Urine culture  -     nitrofurantoin (MACRODANTIN) 50 MG capsule; Take 1 capsule (50 mg total) by mouth every evening.  -     fluconazole (DIFLUCAN) 150 MG Tab; Take 1 tablet (150 mg total) by mouth every 72 hours as needed.    Recurrent UTI  -     US Retroperitoneal Complete (Kidney and; Future    Urinary urgency    Plan:  --UA appears negative today, sent for culture   --If culture is negative and symptoms persist we will consider a trial of ditropan for her OAB symptoms  --Resume macrodantin prophylaxis  --Diflucan PRN   --MAY next opening

## 2022-07-17 LAB — BACTERIA UR CULT: ABNORMAL

## 2022-07-18 DIAGNOSIS — N30.00 ACUTE CYSTITIS WITHOUT HEMATURIA: Primary | ICD-10-CM

## 2022-07-18 RX ORDER — AMOXICILLIN AND CLAVULANATE POTASSIUM 875; 125 MG/1; MG/1
1 TABLET, FILM COATED ORAL EVERY 12 HOURS
Qty: 14 TABLET | Refills: 0 | Status: SHIPPED | OUTPATIENT
Start: 2022-07-18 | End: 2022-07-25

## 2022-07-19 ENCOUNTER — OUTPATIENT CASE MANAGEMENT (OUTPATIENT)
Dept: ADMINISTRATIVE | Facility: OTHER | Age: 52
End: 2022-07-19
Payer: MEDICARE

## 2022-07-19 ENCOUNTER — OFFICE VISIT (OUTPATIENT)
Dept: PSYCHIATRY | Facility: CLINIC | Age: 52
End: 2022-07-19
Payer: MEDICARE

## 2022-07-19 DIAGNOSIS — F41.1 GAD (GENERALIZED ANXIETY DISORDER): ICD-10-CM

## 2022-07-19 DIAGNOSIS — F51.05 INSOMNIA DUE TO OTHER MENTAL DISORDER: ICD-10-CM

## 2022-07-19 DIAGNOSIS — F99 INSOMNIA DUE TO OTHER MENTAL DISORDER: ICD-10-CM

## 2022-07-19 DIAGNOSIS — F39 MOOD DISORDER: ICD-10-CM

## 2022-07-19 DIAGNOSIS — R46.81 OBSESSIVE-COMPULSIVE BEHAVIOR: ICD-10-CM

## 2022-07-19 DIAGNOSIS — F33.0 MILD EPISODE OF RECURRENT MAJOR DEPRESSIVE DISORDER: Primary | ICD-10-CM

## 2022-07-19 PROCEDURE — 99214 OFFICE O/P EST MOD 30 MIN: CPT | Mod: 95,,, | Performed by: INTERNAL MEDICINE

## 2022-07-19 PROCEDURE — 1160F PR REVIEW ALL MEDS BY PRESCRIBER/CLIN PHARMACIST DOCUMENTED: ICD-10-PCS | Mod: CPTII,95,, | Performed by: INTERNAL MEDICINE

## 2022-07-19 PROCEDURE — 99214 PR OFFICE/OUTPT VISIT, EST, LEVL IV, 30-39 MIN: ICD-10-PCS | Mod: 95,,, | Performed by: INTERNAL MEDICINE

## 2022-07-19 PROCEDURE — 1159F PR MEDICATION LIST DOCUMENTED IN MEDICAL RECORD: ICD-10-PCS | Mod: CPTII,95,, | Performed by: INTERNAL MEDICINE

## 2022-07-19 PROCEDURE — 1160F RVW MEDS BY RX/DR IN RCRD: CPT | Mod: CPTII,95,, | Performed by: INTERNAL MEDICINE

## 2022-07-19 PROCEDURE — 1159F MED LIST DOCD IN RCRD: CPT | Mod: CPTII,95,, | Performed by: INTERNAL MEDICINE

## 2022-07-19 RX ORDER — BUPROPION HYDROCHLORIDE 150 MG/1
450 TABLET ORAL EVERY MORNING
Qty: 270 TABLET | Refills: 1 | Status: SHIPPED | OUTPATIENT
Start: 2022-07-19 | End: 2023-01-12

## 2022-07-19 RX ORDER — BUSPIRONE HYDROCHLORIDE 15 MG/1
TABLET ORAL
Qty: 270 TABLET | Refills: 1 | Status: SHIPPED | OUTPATIENT
Start: 2022-07-19 | End: 2023-01-12

## 2022-07-19 NOTE — PROGRESS NOTES
OUTPATIENT PSYCHIATRY RETURN VISIT    ENCOUNTER DATE:  7/24/2022  SITE:  [facility], [facility]  LENGTH OF SESSION:  20 minutes    The patient location is:  [place], not in healthcare facility  The chief complaint leading to consultation is:  Mood    Visit type:  Audiovisual    Face to Face time with patient:  20 minutes  25 minutes of total time spent on the encounter, which includes face to face time and non-face to face time preparing to see the patient (eg, review of tests), Obtaining and/or reviewing separately obtained history, Documenting clinical information in the electronic or other health record, Independently interpreting results (not separately reported) and communicating results to the patient/family/caregiver, or Care coordination (not separately reported).     Each patient to whom he or she provides medical services by telemedicine is:  (1) informed of the relationship between the physician and patient and the respective role of any other health care provider with respect to management of the patient; and (2) notified that he or she may decline to receive medical services by telemedicine and may withdraw from such care at any time.    CHIEF COMPLAINT:  Mood and Depression      HISTORY OF PRESENTING ILLNESS:  Jaki Bajwa is a 51 y.o. female with history of Mood disorder, JUAN JOSE, OCD, social phobia, and Internet shopping addiction who presents for follow up appointment.      Plan at last appointment on 4/18/2022:  · Discussed plan to only take Valium 2mg as needed for severe anxiety.  Mood is currently stable.  · Continue Latuda 60mg daily, Buspar 15mg daily, Wellbutrin XL 450mg daily, Doxepin 150mg qHS for insomnia and mood, and Valium 2mg daily PRN severe anxiety.     · Continue individual therapy with Mr. Noah LCSW.    History as told by patient:  Feeling depressed and lonely for the last month.  Scott not supportive - he is really showing her his colors.  She has felt  "more emotional.  Told him how she felt and she wasn't getting any response back.  They got into a heated disagreement about this.  He said "I can't help it if I'm not so "f**ed" up in the mind like you.  She cried more, it didn't phase him.  Feels alone even when she is with him.  Things have changed since they are no longer intimate.  Baby used to be there and they have now moved out about 1 month ago.  They now live 10 minutes away.  She has not been home in months.      Medication side effects:  Denies  Medication compliance:  Yes    PSYCHIATRIC REVIEW OF SYSTEMS:  Trouble with sleep:  Denies  Appetite changes:  Denies  Weight changes:  Denies  Lack of energy:  At times  Anhedonia:  Sometimes  Somatic symptoms:  Denies  Libido:  Denies  Anxiety/panic:  Yes, chronic  Guilty/hopeless:  Denies  Self-injurious behavior/risky behavior:  Denies  Any drugs:  Denies  Alcohol:  Denies    MEDICAL REVIEW OF SYSTEMS:  Complete review of systems performed covering Constitutional, Musculoskeletal, Neurologic.  All systems negative except for that covered in HPI.    PAST PSYCHIATRIC, MEDICAL, AND SOCIAL HISTORY REVIEWED  The patient's past medical, family and social history have been reviewed and updated as appropriate within the electronic medical record - see encounter notes.    MEDICATIONS:    Current Outpatient Medications:     amoxicillin-clavulanate 875-125mg (AUGMENTIN) 875-125 mg per tablet, Take 1 tablet by mouth every 12 (twelve) hours. for 7 days (Patient not taking: Reported on 7/20/2022), Disp: 14 tablet, Rfl: 0    buPROPion (WELLBUTRIN XL) 150 MG TB24 tablet, Take 3 tablets (450 mg total) by mouth every morning., Disp: 270 tablet, Rfl: 1    busPIRone (BUSPAR) 15 MG tablet, TAKE 1 TABLET(15 MG) BY MOUTH THREE TIMES DAILY, Disp: 270 tablet, Rfl: 1    cetirizine (ZYRTEC) 10 MG tablet, Take 1 tablet (10 mg total) by mouth every evening., Disp: 30 tablet, Rfl: 0    cycloSPORINE (RESTASIS) 0.05 % ophthalmic " emulsion, INSTILL 1 DROP IN BOTH EYES TWICE DAILY, Disp: 60 each, Rfl: 1    diazePAM (VALIUM) 2 MG tablet, Take 1 tablet (2 mg total) by mouth daily as needed (Severe anxiety/Panic attack)., Disp: 30 tablet, Rfl: 2    docusate sodium (COLACE) 100 MG capsule, Take 1 capsule (100 mg total) by mouth 2 (two) times daily as needed for Constipation., Disp: 60 capsule, Rfl: 0    doxepin (SINEQUAN) 75 MG capsule, TAKE 2 CAPSULES(150 MG) BY MOUTH EVERY EVENING, Disp: 60 capsule, Rfl: 11    estradioL (ESTRACE) 0.01 % (0.1 mg/gram) vaginal cream, 1g vaginally daily x 2 weeks, then BIW as directed, Disp: 42.5 g, Rfl: 2    fluconazole (DIFLUCAN) 150 MG Tab, Take 1 tablet (150 mg total) by mouth every 72 hours as needed. (Patient not taking: Reported on 7/20/2022), Disp: 6 tablet, Rfl: 0    fluconazole (DIFLUCAN) 200 MG Tab, Take 1 tablet (200 mg total) by mouth every 72 hours as needed. (Patient not taking: Reported on 7/20/2022), Disp: 2 tablet, Rfl: 0    gabapentin (NEURONTIN) 300 MG capsule, Take 1 capsule (300 mg total) by mouth 3 (three) times daily., Disp: 90 capsule, Rfl: 5    HYDROcodone-acetaminophen (NORCO) 5-325 mg per tablet, Take 1 tablet by mouth every 24 hours as needed for Pain. Quantity medically necessary, Disp: 30 tablet, Rfl: 0    hydrOXYchloroQUINE (PLAQUENIL) 200 mg tablet, One pill bid, Disp: 180 tablet, Rfl: 2    irbesartan-hydrochlorothiazide (AVALIDE) 150-12.5 mg per tablet, Take 1 tablet by mouth once daily., Disp: 90 tablet, Rfl: 3    lurasidone (LATUDA) 60 mg Tab tablet, Take 1 tablet (60 mg total) by mouth once daily., Disp: 90 tablet, Rfl: 3    naproxen (NAPROSYN) 500 MG tablet, TAKE 1 TABLET(500 MG) BY MOUTH TWICE DAILY AS NEEDED FOR PAIN, Disp: 180 tablet, Rfl: 1    NARCAN 4 mg/actuation [place], SPRAY 1 SPRAY IN NOSTRIL ONCE FOR 1 DOSE, Disp: , Rfl:     nitrofurantoin (MACRODANTIN) 50 MG capsule, Take 1 capsule (50 mg total) by mouth every evening., Disp: 90 capsule, Rfl: 1     "omeprazole (PRILOSEC) 40 MG capsule, TAKE 1 CAPSULE(40 MG) BY MOUTH EVERY DAY, Disp: 90 capsule, Rfl: 3    pilocarpine (SALAGEN) 5 MG Tab, Take 1 tablet (5 mg total) by mouth 2 (two) times daily., Disp: 60 tablet, Rfl: 11    tiZANidine (ZANAFLEX) 4 MG tablet, 8 mg tid, Disp: 180 tablet, Rfl: 5    triamcinolone acetonide 0.1% (KENALOG) 0.1 % cream, Apply topically 2 (two) times daily as needed (rash)., Disp: 45 g, Rfl: 0    ALLERGIES:  Review of patient's allergies indicates:   Allergen Reactions    Aspirin Hives       PSYCHIATRIC EXAM:  There were no vitals filed for this visit.  Appearance:  Well groomed, appearing healthy and of stated age  Behavior:  Cooperative, pleasant, no psychomotor agitation or retardation  Speech:  Normal rate, rhythm, prosody, and volume  Mood:  "Depressed"  Affect:  Euthymic  Thought Process:  Linear, logical, goal directed  Thought Content:  Negative for suicidal ideation, homicidal ideation, delusions or hallucinations.  Associations:  Intact  Memory:  Grossly Intact  Level of Consciousness/Orientation:  Grossly intact  Fund of Knowledge:  Good  Attention:  Good  Language:  Fluent, able to name abstract and concrete objects  Insight:  Fair  Judgment:  Intact  Psychomotor signs:  No abnormal movements of face  Gait:  Unable to assess via virtual visit      RELEVANT LABS/STUDIES:    Lab Results   Component Value Date    WBC 9.01 06/15/2022    HGB 11.0 (L) 06/15/2022    HCT 36.4 (L) 06/15/2022    MCV 85 06/15/2022     06/15/2022     BMP  Lab Results   Component Value Date     (L) 06/15/2022    K 4.1 06/15/2022    CL 98 06/15/2022    CO2 29 06/15/2022    BUN 29 (H) 06/15/2022    CREATININE 1.5 (H) 06/15/2022    CALCIUM 9.7 06/15/2022    ANIONGAP 8 06/15/2022    ESTGFRAFRICA 45.5 (A) 06/15/2022    EGFRNONAA 39.5 (A) 06/15/2022     Lab Results   Component Value Date    ALT 14 06/15/2022    AST 17 06/15/2022    ALKPHOS 73 06/15/2022    BILITOT 0.8 06/15/2022     Lab Results "   Component Value Date    TSH 2.563 09/22/2021     Lab Results   Component Value Date    HGBA1C 5.3 10/13/2021       IMPRESSION:    Jaki Bajwa is a 51 y.o. female with history of Mood disorder, JUAN JOSE, OCD, social phobia, and Internet shopping addiction who presents for follow up appointment.    DIAGNOSES:    ICD-10-CM ICD-9-CM   1. Mild episode of recurrent major depressive disorder  F33.0 296.31   2. JUAN JOSE (generalized anxiety disorder)  F41.1 300.02   3. Mood disorder  F39 296.90   4. Insomnia due to other mental disorder  F51.05 300.9    F99 327.02   5. Obsessive-compulsive behavior  R46.81 300.3     PLAN:  · Patient would like to continue current medications.  She plans to work on getting out of the house.  Also encouraged her to schedule therapy sessions more often.    · Continue Latuda 60mg daily, Buspar 15mg TID, Wellbutrin XL 450mg daily (educated to take in the morning), Doxepin 150mg qHS for insomnia and mood, and Valium 2mg daily PRN severe anxiety.     · Continue individual therapy with Mr. Noah LCSW.    RETURN TO CLINIC:  Follow up in about 4 weeks (around 8/16/2022).

## 2022-07-20 ENCOUNTER — OFFICE VISIT (OUTPATIENT)
Dept: INTERNAL MEDICINE | Facility: CLINIC | Age: 52
End: 2022-07-20
Attending: INTERNAL MEDICINE
Payer: MEDICARE

## 2022-07-20 ENCOUNTER — NURSE TRIAGE (OUTPATIENT)
Dept: ADMINISTRATIVE | Facility: CLINIC | Age: 52
End: 2022-07-20
Payer: MEDICARE

## 2022-07-20 VITALS
OXYGEN SATURATION: 94 % | HEIGHT: 67 IN | WEIGHT: 267.63 LBS | HEART RATE: 76 BPM | SYSTOLIC BLOOD PRESSURE: 138 MMHG | DIASTOLIC BLOOD PRESSURE: 80 MMHG | BODY MASS INDEX: 42 KG/M2

## 2022-07-20 DIAGNOSIS — N39.0 RECURRENT UTI (URINARY TRACT INFECTION): Primary | ICD-10-CM

## 2022-07-20 DIAGNOSIS — R50.9 FEVER: ICD-10-CM

## 2022-07-20 DIAGNOSIS — I10 ESSENTIAL HYPERTENSION: ICD-10-CM

## 2022-07-20 PROCEDURE — 99214 OFFICE O/P EST MOD 30 MIN: CPT | Mod: S$GLB,,, | Performed by: INTERNAL MEDICINE

## 2022-07-20 PROCEDURE — 99214 PR OFFICE/OUTPT VISIT, EST, LEVL IV, 30-39 MIN: ICD-10-PCS | Mod: S$GLB,,, | Performed by: INTERNAL MEDICINE

## 2022-07-20 PROCEDURE — U0003 INFECTIOUS AGENT DETECTION BY NUCLEIC ACID (DNA OR RNA); SEVERE ACUTE RESPIRATORY SYNDROME CORONAVIRUS 2 (SARS-COV-2) (CORONAVIRUS DISEASE [COVID-19]), AMPLIFIED PROBE TECHNIQUE, MAKING USE OF HIGH THROUGHPUT TECHNOLOGIES AS DESCRIBED BY CMS-2020-01-R: HCPCS | Performed by: INTERNAL MEDICINE

## 2022-07-20 PROCEDURE — 99999 PR PBB SHADOW E&M-EST. PATIENT-LVL IV: CPT | Mod: PBBFAC,,, | Performed by: INTERNAL MEDICINE

## 2022-07-20 PROCEDURE — 3008F BODY MASS INDEX DOCD: CPT | Mod: CPTII,S$GLB,, | Performed by: INTERNAL MEDICINE

## 2022-07-20 PROCEDURE — 99999 PR PBB SHADOW E&M-EST. PATIENT-LVL IV: ICD-10-PCS | Mod: PBBFAC,,, | Performed by: INTERNAL MEDICINE

## 2022-07-20 PROCEDURE — U0005 INFEC AGEN DETEC AMPLI PROBE: HCPCS | Performed by: INTERNAL MEDICINE

## 2022-07-20 PROCEDURE — 1159F MED LIST DOCD IN RCRD: CPT | Mod: CPTII,S$GLB,, | Performed by: INTERNAL MEDICINE

## 2022-07-20 PROCEDURE — 1160F PR REVIEW ALL MEDS BY PRESCRIBER/CLIN PHARMACIST DOCUMENTED: ICD-10-PCS | Mod: CPTII,S$GLB,, | Performed by: INTERNAL MEDICINE

## 2022-07-20 PROCEDURE — 3079F DIAST BP 80-89 MM HG: CPT | Mod: CPTII,S$GLB,, | Performed by: INTERNAL MEDICINE

## 2022-07-20 PROCEDURE — 3008F PR BODY MASS INDEX (BMI) DOCUMENTED: ICD-10-PCS | Mod: CPTII,S$GLB,, | Performed by: INTERNAL MEDICINE

## 2022-07-20 PROCEDURE — 3075F PR MOST RECENT SYSTOLIC BLOOD PRESS GE 130-139MM HG: ICD-10-PCS | Mod: CPTII,S$GLB,, | Performed by: INTERNAL MEDICINE

## 2022-07-20 PROCEDURE — 1160F RVW MEDS BY RX/DR IN RCRD: CPT | Mod: CPTII,S$GLB,, | Performed by: INTERNAL MEDICINE

## 2022-07-20 PROCEDURE — 3075F SYST BP GE 130 - 139MM HG: CPT | Mod: CPTII,S$GLB,, | Performed by: INTERNAL MEDICINE

## 2022-07-20 PROCEDURE — 3079F PR MOST RECENT DIASTOLIC BLOOD PRESSURE 80-89 MM HG: ICD-10-PCS | Mod: CPTII,S$GLB,, | Performed by: INTERNAL MEDICINE

## 2022-07-20 PROCEDURE — 1159F PR MEDICATION LIST DOCUMENTED IN MEDICAL RECORD: ICD-10-PCS | Mod: CPTII,S$GLB,, | Performed by: INTERNAL MEDICINE

## 2022-07-20 NOTE — PROGRESS NOTES
"Subjective:       Patient ID: Jaki Bajwa is a 51 y.o. female.    Chief Complaint: Leg Pain, Chills, and POOR APPETITE    Here for urgent visit    Pt normally cared for by my colleague Dr. Rasmussen and patient is new to me. I have reviewed patient's past medical, surgical, and social history in addition to MAR and allergies.     50 yo F with PMHx of mood disorder, Sjogren's, recurrent UTI, HTN, CKD 3    Chills and foot pain for past 2 days. Bilateral ankle pain, 1130 yesterday had 101 temperature, diarrhea.  Dx with UTI 2 days prior and Rx augmentin. Has not started this. + urinary frequency and urgency. No dysuria, abd pain, n/v or flank pain. No vaginal discharge. Her ankles are her only recurrent arthralgia. No other current joint pains.       Review of Systems   Constitutional: Negative for chills, fatigue, fever and unexpected weight change.   HENT: Negative for ear pain, hearing loss, postnasal drip, tinnitus, trouble swallowing and voice change.    Respiratory: Negative for cough, chest tightness, shortness of breath and wheezing.    Cardiovascular: Negative for chest pain, palpitations and leg swelling.   Gastrointestinal: Negative for abdominal pain, blood in stool, diarrhea, nausea and vomiting.   Endocrine: Negative for polydipsia, polyphagia and polyuria.   Genitourinary: Positive for frequency and urgency. Negative for difficulty urinating, dysuria, hematuria and vaginal bleeding.   Skin: Negative for rash.   Allergic/Immunologic: Negative for food allergies.   Neurological: Negative for dizziness, numbness and headaches.   Hematological: Does not bruise/bleed easily.   Psychiatric/Behavioral: The patient is not nervous/anxious.        Objective:      Vitals:    07/20/22 0919   BP: 138/80   Pulse: 76   SpO2: (!) 94%   Weight: 121.4 kg (267 lb 10.2 oz)   Height: 5' 7" (1.702 m)      Physical Exam  Constitutional:       General: She is not in acute distress.     Appearance: She is well-developed. "   HENT:      Head: Normocephalic and atraumatic.      Mouth/Throat:      Pharynx: No oropharyngeal exudate.   Eyes:      General: No scleral icterus.     Conjunctiva/sclera: Conjunctivae normal.      Pupils: Pupils are equal, round, and reactive to light.   Neck:      Thyroid: No thyromegaly.   Cardiovascular:      Rate and Rhythm: Normal rate and regular rhythm.      Heart sounds: Normal heart sounds. No murmur heard.  Pulmonary:      Effort: Pulmonary effort is normal.      Breath sounds: Normal breath sounds. No wheezing or rales.   Abdominal:      General: There is no distension.      Palpations: Abdomen is soft.      Tenderness: There is no abdominal tenderness.   Musculoskeletal:         General: No tenderness.   Lymphadenopathy:      Cervical: No cervical adenopathy.   Skin:     General: Skin is warm and dry.   Neurological:      Mental Status: She is alert and oriented to person, place, and time.   Psychiatric:         Behavior: Behavior normal.         Assessment:       1. Recurrent UTI (urinary tract infection)    2. Fever    3. Essential hypertension        Plan:       Jaki was seen today for leg pain, chills and poor appetite.    Diagnoses and all orders for this visit:    Recurrent UTI (urinary tract infection)   Start augmentin. Office and Emergency Department prompts discussed.    Fever  Due to UTI but pt would like to be safe  -     COVID-19 Routine Screening    Essential hypertension           Jorge Shipley MD  Internal Medicine-[facility]        Side effects of medication(s) were discussed in detail and patient voiced understanding.  Patient will call back for any issues or complications.

## 2022-07-20 NOTE — TELEPHONE ENCOUNTER
07/20 0814 Called and spoke with Ms. Bajwa. States she feels worse with a temperature of 101. Discussed with Dr. Rasmussen and states to ask about SOB or chest pain and suggest can still come in with MD appointment but to wear a mask if none of these symptoms      07/20 0817 Patient states temperature is down, but she does not have chest pain or shortness of breath. Advised to wear mask when coming in to the clinic this morning

## 2022-07-20 NOTE — TELEPHONE ENCOUNTER
Patient seen in the clinic on 7/14 for UTI symptoms. She was prescribed abx on 7/18. Patient wasn't aware of an abx prescription until 7/19. She has not started her course of abx. Currently c/o chills, bilateral leg pain, and a poor appetite. Advised per protocol to be seen within the next 4 hours. Patient VU. Advised the patient to call back with any further questions or if symptoms worsen.      Reason for Disposition   [1] SEVERE pain (e.g., excruciating, unable to do any normal activities) AND [2] not improved after 2 hours of pain medicine    Additional Information   Negative: Looks like a broken bone or dislocated joint (e.g., crooked or deformed)   Negative: Sounds like a life-threatening emergency to the triager   Negative: Chest pain   Negative: Difficulty breathing   Negative: Unable to walk   Negative: Entire foot is cool or blue in comparison to other side   Negative: [1] Red area or streak AND [2] fever   Negative: [1] Swollen joint AND [2] fever   Negative: [1] Cast on leg or ankle AND [2] now increased pain   Negative: Patient sounds very sick or weak to the triager    Protocols used: LEG PAIN-A-AH

## 2022-07-21 LAB
SARS-COV-2 RNA RESP QL NAA+PROBE: NOT DETECTED
SARS-COV-2- CYCLE NUMBER: NORMAL

## 2022-07-31 ENCOUNTER — EXTERNAL CHRONIC CARE MANAGEMENT (OUTPATIENT)
Dept: PRIMARY CARE CLINIC | Facility: CLINIC | Age: 52
End: 2022-07-31
Payer: MEDICARE

## 2022-07-31 PROCEDURE — 99490 CHRNC CARE MGMT STAFF 1ST 20: CPT | Mod: S$GLB,,, | Performed by: INTERNAL MEDICINE

## 2022-07-31 PROCEDURE — 99490 PR CHRONIC CARE MGMT, 1ST 20 MIN: ICD-10-PCS | Mod: S$GLB,,, | Performed by: INTERNAL MEDICINE

## 2022-08-10 ENCOUNTER — OFFICE VISIT (OUTPATIENT)
Dept: PSYCHIATRY | Facility: CLINIC | Age: 52
End: 2022-08-10
Payer: MEDICARE

## 2022-08-10 DIAGNOSIS — F40.10 SOCIAL PHOBIA: ICD-10-CM

## 2022-08-10 DIAGNOSIS — F41.1 GAD (GENERALIZED ANXIETY DISORDER): ICD-10-CM

## 2022-08-10 DIAGNOSIS — R46.81 OBSESSIVE-COMPULSIVE BEHAVIOR: ICD-10-CM

## 2022-08-10 DIAGNOSIS — F33.0 MILD EPISODE OF RECURRENT MAJOR DEPRESSIVE DISORDER: Primary | ICD-10-CM

## 2022-08-10 PROCEDURE — 99499 RISK ADDL DX/OHS AUDIT: ICD-10-PCS | Mod: 95,,, | Performed by: SOCIAL WORKER

## 2022-08-10 PROCEDURE — 90834 PSYTX W PT 45 MINUTES: CPT | Mod: 95,,, | Performed by: SOCIAL WORKER

## 2022-08-10 PROCEDURE — 99499 UNLISTED E&M SERVICE: CPT | Mod: 95,,, | Performed by: SOCIAL WORKER

## 2022-08-10 PROCEDURE — 90834 PR PSYCHOTHERAPY W/PATIENT, 45 MIN: ICD-10-PCS | Mod: 95,,, | Performed by: SOCIAL WORKER

## 2022-08-10 NOTE — PROGRESS NOTES
"           Individual Psychotherapy (PhD/LCSW)    8/10/2022    Site:  Telemed         Therapeutic Intervention: Met with patient.  Outpatient - Insight oriented psychotherapy 45 min - CPT code 48026 and Outpatient - Behavior modifying psychotherapy 45 min - CPT code 25247    Chief complaint/reason for encounter: depression, anxiety and interpersonal     Interval history and content of current session:        The patient location is: home  The chief complaint leading to consultation is: anxiety depression    Visit type: audiovisual    Face to Face time with patient:   45  minutes of total time spent on the encounter, which includes face to face time and non-face to face time preparing to see the patient (eg, review of tests), Obtaining and/or reviewing separately obtained history, Documenting clinical information in the electronic or other health record, Independently interpreting results (not separately reported) and communicating results to the patient/family/caregiver, or Care coordination (not separately reported).         Each patient to whom he or she provides medical services by telemedicine is:  (1) informed of the relationship between the physician and patient and the respective role of any other health care provider with respect to management of the patient; and (2) notified that he or she may decline to receive medical services by telemedicine and may withdraw from such care at any time.    Notes:    Scott. Surgery for prostate cancer.  No longer needs a bag.  Unable to be active as of now.   She believes this may have affected his ego.  He has been irritable since the illness and now "every now and then;  He gives me anything I want".   She reports she has felt ok this week but last week she felt depressed.  Was thinking that no one loves her.  But she also thinks that Scott loves her.  Seems that their relationship is in homeostasis given their difficulties with emotional bonding. Hence, they live " together but rarely spend time in the same room.  They do sleep in same bedroom.   Her old habits and behaviors are unchanged.  She continues to fear being autonomous and alone.  She continues to compulsively shop.      I suggested joining debtors anonymous.  She was guided to confirm the meetings in [place] by calling a number given to her.    She enjoys the visit of her 4 year old granddaughter.   Pt has not been in her apartment for 6 mo. but fears going because of her fears of loneliness.  On the other hand she wants to go per her fears of attachment.               Boyfriend  Scott.  Prostate cancer.   Son Can       Drives trucks   Daughter           Halina    Nurse    two grand kids 4 year old    Can                           8 year old is a different mother  And does not like to stay out of his house.  Also is picky with food.    Treatment plan:  · Target symptoms: depression, anxiety   · Why chosen therapy is appropriate versus another modality: relevant to diagnosis  · Outcome monitoring methods: self-report, observation    · Therapeutic intervention type: insight oriented psychotherapy, behavior modifying psychotherapy, supportive psychotherapy    Risk parameters:  Patient reports no suicidal ideation  Patient reports no homicidal ideation  Patient reports no self-injurious behavior  Patient reports no violent behavior    Verbal deficits: None    Patient's response to intervention:  The patient's response to intervention is accepting.    Progress toward goals and other mental status changes:  The patient's progress toward goals is limited.    Diagnosis: 296.35;   Obsessive compulsive disorder.  personality disorder nos  Learning problems/limitations   internet shopping addiction   Social anxiety, generalized anxiety disorder.     Plan:  individual psychotherapy    Return to clinic:   1 mo.

## 2022-08-12 ENCOUNTER — OUTPATIENT CASE MANAGEMENT (OUTPATIENT)
Dept: ADMINISTRATIVE | Facility: OTHER | Age: 52
End: 2022-08-12
Payer: MEDICARE

## 2022-08-12 NOTE — PROGRESS NOTES
Outpatient Care Management  Plan of Care Follow Up Visit    Patient: Jaki Bajwa  MRN: [id number]  Date of Service: 06/27/2022  Completed by: Yi Page RN  Referral Date: 04/25/2022  Program: Case Management High Risk     Reason for Visit   Patient presents with    OPCM Chart Review     6/27/22    OPCM RN First Follow-Up Attempt     6/27/22    Update Plan Of Care     7/5/22       Brief Summary: Pt reports that she had to cancel urology appt but she has rescheduled appt for 7/14/22. Pt reports she completed round of Macrodantin until all were gone. Pt reports she received and reviewed [employer] educational materials regarding prevention and management of UTI. Pt reports she understands information and has no questions at this time. Advised pt that OPCM LCSW has been trying to contact her concerning the medical transportation. Pt reports that she does not feel she needs the medical transportation because her boyfriend or a family member is able to provide her with transporation when needed, pt also stated that she does not like medical transportation because she does not like having to wait until other people are done with their appts before she can leave to go home.       Patient Summary     Involvement of Care:  Do I have permission to speak with other family members about your care?  No    Patient Reported Labs & Vitals:  1.  Any Patient Reported Labs & Vitals?  No  2.  Patient Reported Blood Pressure:     3.  Patient Reported Pulse:     4.  Patient Reported Weight (Kg):     5.  Patient Reported Blood Glucose (mg/dl):       Medical and social history was reviewed with patient and/or caregiver.     Clinical Assessment     Reviewed and provided basic information on available community resources for mental health, transportation, wellness resources, and palliative care programs with patient and/or caregiver.     Complex Care Plan     Care plan was discussed and completed today with input from patient and/or  caregiver.    Patient Instructions     Instructions were provided via the [employer] patient resources and are available for the patient to view on the patient portal.    Next Steps: F/U concerning management of UTI  Discuss GI Bleeding.     Follow up in about 22 days (around 7/19/2022).    Todays OPCM Self-Management Care Plan was developed with the patients/caregivers input and was based on identified barriers from todays assessment.  Goals were written today with the patient/caregiver and the patient has agreed to work towards these goals to improve his/her overall well-being. Patient verbalized understanding of the care plan, goals, and all of today's instructions. Encouraged patient/caregiver to communicate with his/her physician and health care team about health conditions and the treatment plan.  Provided my contact information today and encouraged patient/caregiver to call me with any questions as needed.

## 2022-08-15 NOTE — PROGRESS NOTES
Outpatient Care Management  Plan of Care Follow Up Visit    Patient: Jaki Bajwa  MRN: [id number]  Date of Service: 07/29/2022  Completed by: Yi Page RN  Referral Date: 04/25/2022  Program: Care Management High Risk    Reason for Visit   Patient presents with    OPCM Chart Review     7/19/22    OPCM RN First Follow-Up Attempt     7/19/22    Update Plan Of Care     7/29/22       Brief Summary: Pt reports that she attended urology appt on 7/14. Pt reports she had no s/s of UTI but the urologist decided to check her urine and she was positive for UTI. Pt reports she was placed on Nitrofurantoin daily which she has started and is taking. Pt reports no s/s of UTI at this time.     Patient Summary     Involvement of Care:  Do I have permission to speak with other family members about your care?  No    Patient Reported Labs & Vitals:  1.  Any Patient Reported Labs & Vitals?  No  2.  Patient Reported Blood Pressure:     3.  Patient Reported Pulse:     4.  Patient Reported Weight (Kg):     5.  Patient Reported Blood Glucose (mg/dl):       Medical and social history was reviewed with patient and/or caregiver.     Clinical Assessment     Reviewed and provided basic information on available community resources for mental health, transportation, wellness resources, and palliative care programs with patient and/or caregiver.     Complex Care Plan     Care plan was discussed and completed today with input from patient and/or caregiver.    Patient Instructions     Instructions were provided via the [employer] patient resources and are available for the patient to view on the patient portal.    Next Steps: F/U concerning management of UTI  Discuss GI Bleeding     Follow up in about 10 days (around 7/29/2022).    Todays OPCM Self-Management Care Plan was developed with the patients/caregivers input and was based on identified barriers from todays assessment.  Goals were written today with the patient/caregiver and the  patient has agreed to work towards these goals to improve his/her overall well-being. Patient verbalized understanding of the care plan, goals, and all of today's instructions. Encouraged patient/caregiver to communicate with his/her physician and health care team about health conditions and the treatment plan.  Provided my contact information today and encouraged patient/caregiver to call me with any questions as needed.

## 2022-08-15 NOTE — PROGRESS NOTES
Outpatient Care Management  Plan of Care Follow Up Visit    Patient: Jaki Bajwa  MRN: [id number]  Date of Service: 08/12/2022  Completed by: Yi Page RN  Referral Date: 04/25/2022  Program: Case Management High Risk    Reason for Visit   Patient presents with    OPCM Chart Review     8/12/22    OPCM RN First Follow-Up Attempt     8/12/22    Update Plan Of Care     8/15/22       Brief Summary: Pt report no s/s of UTI at time. Pt reports she has been compliant with taking nitrofurantoin daily and monitoring for s/s of UTI. Pt reports she has been implementing ways to prevent/manage UTI.     Patient Summary     Involvement of Care:  Do I have permission to speak with other family members about your care?  No    Patient Reported Labs & Vitals:  1.  Any Patient Reported Labs & Vitals?  No  2.  Patient Reported Blood Pressure:     3.  Patient Reported Pulse:     4.  Patient Reported Weight (Kg):     5.  Patient Reported Blood Glucose (mg/dl):       Medical and social history was reviewed with patient and/or caregiver.     Clinical Assessment     Reviewed and provided basic information on available community resources for mental health, transportation, wellness resources, and palliative care programs with patient and/or caregiver.     Complex Care Plan     Care plan was discussed and completed today with input from patient and/or caregiver.    Patient Instructions     Instructions were provided via the [facility] patient resources and are available for the patient to view on the patient portal.    Next Steps:F/U concerning management of GI Bleed    Follow up in about 17 days (around 8/29/2022).    Todays OPCM Self-Management Care Plan was developed with the patients/caregivers input and was based on identified barriers from todays assessment.  Goals were written today with the patient/caregiver and the patient has agreed to work towards these goals to improve his/her overall well-being. Patient verbalized  understanding of the care plan, goals, and all of today's instructions. Encouraged patient/caregiver to communicate with his/her physician and health care team about health conditions and the treatment plan.  Provided my contact information today and encouraged patient/caregiver to call me with any questions as needed.

## 2022-08-19 ENCOUNTER — OFFICE VISIT (OUTPATIENT)
Dept: DERMATOLOGY | Facility: CLINIC | Age: 52
End: 2022-08-19
Payer: MEDICARE

## 2022-08-19 DIAGNOSIS — L30.0 NUMMULAR DERMATITIS: Primary | ICD-10-CM

## 2022-08-19 PROCEDURE — 1160F RVW MEDS BY RX/DR IN RCRD: CPT | Mod: CPTII,S$GLB,, | Performed by: DERMATOLOGY

## 2022-08-19 PROCEDURE — 99999 PR PBB SHADOW E&M-EST. PATIENT-LVL III: CPT | Mod: PBBFAC,,, | Performed by: DERMATOLOGY

## 2022-08-19 PROCEDURE — 99214 OFFICE O/P EST MOD 30 MIN: CPT | Mod: S$GLB,,, | Performed by: DERMATOLOGY

## 2022-08-19 PROCEDURE — 1160F PR REVIEW ALL MEDS BY PRESCRIBER/CLIN PHARMACIST DOCUMENTED: ICD-10-PCS | Mod: CPTII,S$GLB,, | Performed by: DERMATOLOGY

## 2022-08-19 PROCEDURE — 1159F PR MEDICATION LIST DOCUMENTED IN MEDICAL RECORD: ICD-10-PCS | Mod: CPTII,S$GLB,, | Performed by: DERMATOLOGY

## 2022-08-19 PROCEDURE — 99999 PR PBB SHADOW E&M-EST. PATIENT-LVL III: ICD-10-PCS | Mod: PBBFAC,,, | Performed by: DERMATOLOGY

## 2022-08-19 PROCEDURE — 99214 PR OFFICE/OUTPT VISIT, EST, LEVL IV, 30-39 MIN: ICD-10-PCS | Mod: S$GLB,,, | Performed by: DERMATOLOGY

## 2022-08-19 PROCEDURE — 1159F MED LIST DOCD IN RCRD: CPT | Mod: CPTII,S$GLB,, | Performed by: DERMATOLOGY

## 2022-08-19 RX ORDER — TRIAMCINOLONE ACETONIDE 1 MG/G
CREAM TOPICAL 2 TIMES DAILY PRN
Qty: 80 G | Refills: 2 | Status: SHIPPED | OUTPATIENT
Start: 2022-08-19 | End: 2023-03-30 | Stop reason: SDUPTHER

## 2022-08-19 NOTE — PROGRESS NOTES
Subjective:       Patient ID:  Jaki Bajwa is a 51 y.o. female who presents for   Chief Complaint   Patient presents with    Dermatitis     Follow up     Dermatitis - Follow-up  Symptom course: stable  Currently using: TAC.  Affected locations: diffuse  Signs / symptoms: dryness and scaling    here for f/u skin nummular dermatitis currently flaring - using TAC which helps -   Using bath and body works products and sprays at times.    Review of Systems   Skin: Positive for rash and dry skin. Negative for itching.        Objective:    Physical Exam   Constitutional: She appears well-developed and well-nourished. She is obese.  No distress.   HENT:   Mouth/Throat: Lips normal.    Eyes: Lids are normal.  No conjunctival no injection.   Neurological: She is alert and oriented to person, place, and time. She is not disoriented.   Psychiatric: She has a normal mood and affect.   Skin:   Areas Examined (abnormalities noted in diagram):   Scalp / Hair Palpated and Inspected  Head / Face Inspection Performed  Neck Inspection Performed  Chest / Axilla Inspection Performed  Abdomen Inspection Performed  Back Inspection Performed  RUE Inspected  LUE Inspection Performed  RLE Inspected  LLE Inspection Performed  Nails and Digits Inspection Performed                   Diagram Legend     Erythematous scaling macule/papule c/w actinic keratosis       Vascular papule c/w angioma      Pigmented verrucoid papule/plaque c/w seborrheic keratosis      Yellow umbilicated papule c/w sebaceous hyperplasia      Irregularly shaped tan macule c/w lentigo     1-2 mm smooth white papules consistent with Milia      Movable subcutaneous cyst with punctum c/w epidermal inclusion cyst      Subcutaneous movable cyst c/w pilar cyst      Firm pink to brown papule c/w dermatofibroma      Pedunculated fleshy papule(s) c/w skin tag(s)      Evenly pigmented macule c/w junctional nevus     Mildly variegated pigmented, slightly irregular-bordered  macule c/w mildly atypical nevus      Flesh colored to evenly pigmented papule c/w intradermal nevus       Pink pearly papule/plaque c/w basal cell carcinoma      Erythematous hyperkeratotic cursted plaque c/w SCC      Surgical scar with no sign of skin cancer recurrence      Open and closed comedones      Inflammatory papules and pustules      Verrucoid papule consistent consistent with wart     Erythematous eczematous patches and plaques     Dystrophic onycholytic nail with subungual debris c/w onychomycosis     Umbilicated papule    Erythematous-base heme-crusted tan verrucoid plaque consistent with inflamed seborrheic keratosis     Erythematous Silvery Scaling Plaque c/w Psoriasis     See annotation      Assessment / Plan:        Nummular dermatitis  -     triamcinolone acetonide 0.1% (KENALOG) 0.1 % cream; Apply topically 2 (two) times daily as needed (rash). X 2 weeks to rashes PRN  Dispense: 80 g; Refill: 2    D/c all fragranced products including bath and body works    I advised changing to a fragrance free bar soap or body wash such as Dove, and fragrance free detergent such as Tide Free & Clear, for sensitive skin.      Good skin care regimen discussed including limiting to one bath or shower/day, using lukewarm water with mild soap and moisturizing cream to skin 1 - 2x/day.    Limit hot water with bathing/showers             No follow-ups on file.

## 2022-08-26 ENCOUNTER — OFFICE VISIT (OUTPATIENT)
Dept: PSYCHIATRY | Facility: CLINIC | Age: 52
End: 2022-08-26
Payer: MEDICARE

## 2022-08-26 ENCOUNTER — TELEPHONE (OUTPATIENT)
Dept: INTERNAL MEDICINE | Facility: CLINIC | Age: 52
End: 2022-08-26
Payer: MEDICARE

## 2022-08-26 DIAGNOSIS — F63.89 INTERNET ADDICTION: ICD-10-CM

## 2022-08-26 DIAGNOSIS — F51.05 INSOMNIA DUE TO OTHER MENTAL DISORDER: ICD-10-CM

## 2022-08-26 DIAGNOSIS — F39 MOOD DISORDER: ICD-10-CM

## 2022-08-26 DIAGNOSIS — F99 INSOMNIA DUE TO OTHER MENTAL DISORDER: ICD-10-CM

## 2022-08-26 DIAGNOSIS — F41.1 GAD (GENERALIZED ANXIETY DISORDER): Primary | ICD-10-CM

## 2022-08-26 PROCEDURE — 99499 UNLISTED E&M SERVICE: CPT | Mod: 95,,, | Performed by: INTERNAL MEDICINE

## 2022-08-26 PROCEDURE — 1160F RVW MEDS BY RX/DR IN RCRD: CPT | Mod: CPTII,95,, | Performed by: INTERNAL MEDICINE

## 2022-08-26 PROCEDURE — 99499 RISK ADDL DX/OHS AUDIT: ICD-10-PCS | Mod: 95,,, | Performed by: INTERNAL MEDICINE

## 2022-08-26 PROCEDURE — 1159F MED LIST DOCD IN RCRD: CPT | Mod: CPTII,95,, | Performed by: INTERNAL MEDICINE

## 2022-08-26 PROCEDURE — 1159F PR MEDICATION LIST DOCUMENTED IN MEDICAL RECORD: ICD-10-PCS | Mod: CPTII,95,, | Performed by: INTERNAL MEDICINE

## 2022-08-26 PROCEDURE — 1160F PR REVIEW ALL MEDS BY PRESCRIBER/CLIN PHARMACIST DOCUMENTED: ICD-10-PCS | Mod: CPTII,95,, | Performed by: INTERNAL MEDICINE

## 2022-08-26 PROCEDURE — 99214 PR OFFICE/OUTPT VISIT, EST, LEVL IV, 30-39 MIN: ICD-10-PCS | Mod: 95,,, | Performed by: INTERNAL MEDICINE

## 2022-08-26 PROCEDURE — 99214 OFFICE O/P EST MOD 30 MIN: CPT | Mod: 95,,, | Performed by: INTERNAL MEDICINE

## 2022-08-26 NOTE — PROGRESS NOTES
OUTPATIENT PSYCHIATRY RETURN VISIT    ENCOUNTER DATE:  9/1/2022  SITE:  [facility], [facility] [address]  LENGTH OF SESSION:  10 minutes    The patient location is:  [place], not in healthcare facility  The chief complaint leading to consultation is:  Mood    Visit type:  Audiovisual    Face to Face time with patient:  10 minutes  15 minutes of total time spent on the encounter, which includes face to face time and non-face to face time preparing to see the patient (eg, review of tests), Obtaining and/or reviewing separately obtained history, Documenting clinical information in the electronic or other health record, Independently interpreting results (not separately reported) and communicating results to the patient/family/caregiver, or Care coordination (not separately reported).     Each patient to whom he or she provides medical services by telemedicine is:  (1) informed of the relationship between the physician and patient and the respective role of any other health care provider with respect to management of the patient; and (2) notified that he or she may decline to receive medical services by telemedicine and may withdraw from such care at any time.    CHIEF COMPLAINT:  Mood      HISTORY OF PRESENTING ILLNESS:  Jaki Bajwa is a 51 y.o. female with history of Mood disorder, JUAN JOSE, OCD, social phobia, and Internet shopping addiction who presents for follow up appointment.      Plan at last appointment on 7/19/2022:  Patient would like to continue current medications.  She plans to work on getting out of the house.  Also encouraged her to schedule therapy sessions more often.    Continue Latuda 60mg daily, Buspar 15mg TID, Wellbutrin XL 450mg daily (educated to take in the morning), Doxepin 150mg qHS for insomnia and mood, and Valium 2mg daily PRN severe anxiety.     Continue individual therapy with Mr. Noah LCSW.    History as told by patient:  Feeling better now.  Went home for a week and  this really helped her.  Worked on de-cluttering her house - this helped.  Currently has 2 closets for her new things.  But her shoes don't fit so they are out.  Has given away some things.  Spends about 300-400 dollars per month.  Scott was missing her and told her when she went home.  Still meeting with Brandon for therapy.  Wants to work on the shopping addiction.  Denies feeling depressed, anxiety stable.  Working on taking Valium as needed rather than daily.      Medication side effects:  Denies  Medication compliance:  Yes    PSYCHIATRIC REVIEW OF SYSTEMS:  Trouble with sleep:  Denies  Appetite changes:  Denies  Weight changes:  Denies  Lack of energy:  At times  Anhedonia:  Sometimes  Somatic symptoms:  Denies  Libido:  Denies  Anxiety/panic:  Yes, but stable  Guilty/hopeless:  Denies  Self-injurious behavior/risky behavior:  Denies  Any drugs:  Denies  Alcohol:  Denies    MEDICAL REVIEW OF SYSTEMS:  Complete review of systems performed covering Constitutional, Musculoskeletal, Neurologic.  All systems negative except for that covered in HPI.    PAST PSYCHIATRIC, MEDICAL, AND SOCIAL HISTORY REVIEWED  The patient's past medical, family and social history have been reviewed and updated as appropriate within the electronic medical record - see encounter notes.    MEDICATIONS:    Current Outpatient Medications:     buPROPion (WELLBUTRIN XL) 150 MG TB24 tablet, Take 3 tablets (450 mg total) by mouth every morning., Disp: 270 tablet, Rfl: 1    busPIRone (BUSPAR) 15 MG tablet, TAKE 1 TABLET(15 MG) BY MOUTH THREE TIMES DAILY, Disp: 270 tablet, Rfl: 1    cetirizine (ZYRTEC) 10 MG tablet, Take 1 tablet (10 mg total) by mouth every evening., Disp: 30 tablet, Rfl: 0    diazePAM (VALIUM) 2 MG tablet, Take 1 tablet (2 mg total) by mouth daily as needed (Severe anxiety/Panic attack)., Disp: 30 tablet, Rfl: 2    docusate sodium (COLACE) 100 MG capsule, Take 1 capsule (100 mg total) by mouth 2 (two) times daily as needed for  Constipation., Disp: 60 capsule, Rfl: 0    doxepin (SINEQUAN) 75 MG capsule, TAKE 2 CAPSULES(150 MG) BY MOUTH EVERY EVENING, Disp: 60 capsule, Rfl: 11    estradioL (ESTRACE) 0.01 % (0.1 mg/gram) vaginal cream, 1g vaginally daily x 2 weeks, then BIW as directed, Disp: 42.5 g, Rfl: 2    fluconazole (DIFLUCAN) 150 MG Tab, Take 1 tablet (150 mg total) by mouth every 72 hours as needed., Disp: 6 tablet, Rfl: 0    fluconazole (DIFLUCAN) 200 MG Tab, Take 1 tablet (200 mg total) by mouth every 72 hours as needed., Disp: 2 tablet, Rfl: 0    gabapentin (NEURONTIN) 300 MG capsule, TAKE 1 CAPSULE(300 MG) BY MOUTH THREE TIMES DAILY, Disp: 90 capsule, Rfl: 5    gabapentin (NEURONTIN) 300 MG capsule, Take 1 capsule (300 mg total) by mouth 3 (three) times daily., Disp: 90 capsule, Rfl: 5    HYDROcodone-acetaminophen (NORCO) 5-325 mg per tablet, Take 1 tablet by mouth every 24 hours as needed for Pain. Quantity medically necessary, Disp: 30 tablet, Rfl: 0    hydrOXYchloroQUINE (PLAQUENIL) 200 mg tablet, One pill bid, Disp: 180 tablet, Rfl: 2    irbesartan-hydrochlorothiazide (AVALIDE) 150-12.5 mg per tablet, Take 1 tablet by mouth once daily., Disp: 90 tablet, Rfl: 3    lurasidone (LATUDA) 60 mg Tab tablet, Take 1 tablet (60 mg total) by mouth once daily., Disp: 90 tablet, Rfl: 3    naproxen (NAPROSYN) 500 MG tablet, TAKE 1 TABLET(500 MG) BY MOUTH TWICE DAILY AS NEEDED FOR PAIN, Disp: 180 tablet, Rfl: 1    NARCAN 4 mg/actuation [place], SPRAY 1 SPRAY IN NOSTRIL ONCE FOR 1 DOSE, Disp: , Rfl:     nitrofurantoin (MACRODANTIN) 50 MG capsule, Take 1 capsule (50 mg total) by mouth every evening., Disp: 90 capsule, Rfl: 1    omeprazole (PRILOSEC) 40 MG capsule, TAKE 1 CAPSULE(40 MG) BY MOUTH EVERY DAY, Disp: 90 capsule, Rfl: 3    pilocarpine (SALAGEN) 5 MG Tab, Take 1 tablet (5 mg total) by mouth 2 (two) times daily., Disp: 60 tablet, Rfl: 11    RESTASIS 0.05 % ophthalmic emulsion, INSTILL 1 DROP IN BOTH EYES TWICE DAILY, Disp: 60 each, Rfl:  "1    tiZANidine (ZANAFLEX) 4 MG tablet, 8 mg tid, Disp: 180 tablet, Rfl: 5    triamcinolone acetonide 0.1% (KENALOG) 0.1 % cream, Apply topically 2 (two) times daily as needed (rash). X 2 weeks to rashes PRN, Disp: 80 g, Rfl: 2    ALLERGIES:  Review of patient's allergies indicates:   Allergen Reactions    Aspirin Hives       PSYCHIATRIC EXAM:  There were no vitals filed for this visit.  Appearance:  Well groomed, appearing healthy and of stated age  Behavior:  Cooperative, pleasant, no psychomotor agitation or retardation  Speech:  Normal rate, rhythm, prosody, and volume  Mood:  "Better"  Affect:  Congruent, smiling  Thought Process:  Linear, logical, goal directed  Thought Content:  Negative for suicidal ideation, homicidal ideation, delusions or hallucinations.  Associations:  Intact  Memory:  Grossly Intact  Level of Consciousness/Orientation:  Grossly intact  Fund of Knowledge:  Good  Attention:  Good  Language:  Fluent, able to name abstract and concrete objects  Insight:  Fair  Judgment:  Intact  Psychomotor signs:  No abnormal movements of face  Gait:  Unable to assess via virtual visit      RELEVANT LABS/STUDIES:    Lab Results   Component Value Date    WBC 9.01 06/15/2022    HGB 11.0 (L) 06/15/2022    HCT 36.4 (L) 06/15/2022    MCV 85 06/15/2022     06/15/2022     BMP  Lab Results   Component Value Date     (L) 06/15/2022    K 4.1 06/15/2022    CL 98 06/15/2022    CO2 29 06/15/2022    BUN 29 (H) 06/15/2022    CREATININE 1.5 (H) 06/15/2022    CALCIUM 9.7 06/15/2022    ANIONGAP 8 06/15/2022    ESTGFRAFRICA 45.5 (A) 06/15/2022    EGFRNONAA 39.5 (A) 06/15/2022     Lab Results   Component Value Date    ALT 14 06/15/2022    AST 17 06/15/2022    ALKPHOS 73 06/15/2022    BILITOT 0.8 06/15/2022     Lab Results   Component Value Date    TSH 2.563 09/22/2021     Lab Results   Component Value Date    HGBA1C 5.3 10/13/2021       IMPRESSION:    Jaki Bajwa is a 51 y.o. female with history of Mood " disorder, JUAN JOSE, OCD, social phobia, and Internet shopping addiction who presents for follow up appointment.    DIAGNOSES:    ICD-10-CM ICD-9-CM   1. JUAN JOSE (generalized anxiety disorder)  F41.1 300.02   2. Mood disorder  F39 296.90   3. Internet shopping addiction  F63.89 301.89   4. Insomnia due to other mental disorder  F51.05 300.9    F99 327.02        PLAN:  Symptoms improving.  Continue Latuda 60mg daily, Buspar 15mg TID, Wellbutrin XL 450mg daily, Doxepin 150mg qHS for insomnia and mood, and Valium 2mg daily PRN severe anxiety.    Discussed with patient informed consent, risks versus benefits, alternative treatments, side effect profile and the inherent unpredictability of individual responses to these treatments.  The patient expresses understanding of the above and displays the capacity to agree with this current plan.   Continue individual therapy with Mr. Noah LCSW.    RETURN TO CLINIC:  Follow up in about 3 months (around 11/26/2022).

## 2022-08-29 ENCOUNTER — TELEPHONE (OUTPATIENT)
Dept: PSYCHIATRY | Facility: CLINIC | Age: 52
End: 2022-08-29
Payer: MEDICARE

## 2022-08-29 ENCOUNTER — OUTPATIENT CASE MANAGEMENT (OUTPATIENT)
Dept: ADMINISTRATIVE | Facility: OTHER | Age: 52
End: 2022-08-29

## 2022-08-29 ENCOUNTER — TELEPHONE (OUTPATIENT)
Dept: INTERNAL MEDICINE | Facility: CLINIC | Age: 52
End: 2022-08-29
Payer: MEDICARE

## 2022-08-29 DIAGNOSIS — F41.1 GAD (GENERALIZED ANXIETY DISORDER): ICD-10-CM

## 2022-08-29 DIAGNOSIS — R46.81 OBSESSIVE-COMPULSIVE BEHAVIOR: ICD-10-CM

## 2022-08-29 DIAGNOSIS — F40.10 SOCIAL PHOBIA: ICD-10-CM

## 2022-08-29 RX ORDER — DIAZEPAM 2 MG/1
2 TABLET ORAL DAILY PRN
Qty: 30 TABLET | Refills: 2 | Status: SHIPPED | OUTPATIENT
Start: 2022-08-29 | End: 2022-12-09

## 2022-08-29 NOTE — TELEPHONE ENCOUNTER
----- Message from Natalie Kellogg MA sent at 8/29/2022 10:25 AM CDT -----  Regarding: refill  Need a refill on her meds

## 2022-08-29 NOTE — TELEPHONE ENCOUNTER
Patient requested to reschedule appointment on 9/9/2022. Patient rescheduled for 9/12/22 per request. Thanks.

## 2022-08-29 NOTE — TELEPHONE ENCOUNTER
----- Message from Landy Valverde sent at 8/29/2022  9:01 AM CDT -----  Regarding: Self/   [phone]  Type: Patient Call Back    Who called:  Patient    What is the request in detail:  Patient is needing a call back from staff regarding her appt and her lab appt and her medication.  Thank you    Would the patient rather a call back or a response via [facility]?  Call back    Best call back number:  [phone] (home)           Thank you

## 2022-09-12 ENCOUNTER — LAB VISIT (OUTPATIENT)
Dept: LAB | Facility: OTHER | Age: 52
End: 2022-09-12
Attending: PHYSICIAN ASSISTANT
Payer: MEDICARE

## 2022-09-12 ENCOUNTER — OFFICE VISIT (OUTPATIENT)
Dept: INTERNAL MEDICINE | Facility: CLINIC | Age: 52
End: 2022-09-12
Payer: MEDICARE

## 2022-09-12 VITALS
OXYGEN SATURATION: 96 % | SYSTOLIC BLOOD PRESSURE: 124 MMHG | HEART RATE: 90 BPM | BODY MASS INDEX: 42.82 KG/M2 | WEIGHT: 273.38 LBS | DIASTOLIC BLOOD PRESSURE: 82 MMHG

## 2022-09-12 DIAGNOSIS — F11.20 CHRONIC NARCOTIC DEPENDENCE: ICD-10-CM

## 2022-09-12 DIAGNOSIS — R60.0 LOWER EXTREMITY EDEMA: ICD-10-CM

## 2022-09-12 DIAGNOSIS — Z12.31 SCREENING MAMMOGRAM FOR BREAST CANCER: ICD-10-CM

## 2022-09-12 DIAGNOSIS — J04.0 LARYNGITIS: ICD-10-CM

## 2022-09-12 DIAGNOSIS — M79.7 FIBROMYALGIA: ICD-10-CM

## 2022-09-12 DIAGNOSIS — Z76.0 MEDICATION REFILL: Primary | ICD-10-CM

## 2022-09-12 DIAGNOSIS — G89.4 CHRONIC PAIN SYNDROME: ICD-10-CM

## 2022-09-12 LAB
AMPHET+METHAMPHET UR QL: NEGATIVE
BARBITURATES UR QL SCN>200 NG/ML: NEGATIVE
BENZODIAZ UR QL SCN>200 NG/ML: ABNORMAL
BZE UR QL SCN: NEGATIVE
CANNABINOIDS UR QL SCN: NEGATIVE
CREAT UR-MCNC: 212.8 MG/DL (ref 15–325)
ETHANOL UR-MCNC: <10 MG/DL
METHADONE UR QL SCN>300 NG/ML: NEGATIVE
OPIATES UR QL SCN: ABNORMAL
PCP UR QL SCN>25 NG/ML: NEGATIVE
TOXICOLOGY INFORMATION: ABNORMAL

## 2022-09-12 PROCEDURE — 3008F BODY MASS INDEX DOCD: CPT | Mod: CPTII,S$GLB,, | Performed by: PHYSICIAN ASSISTANT

## 2022-09-12 PROCEDURE — 3074F SYST BP LT 130 MM HG: CPT | Mod: CPTII,S$GLB,, | Performed by: PHYSICIAN ASSISTANT

## 2022-09-12 PROCEDURE — 99499 UNLISTED E&M SERVICE: CPT | Mod: HCNC,S$GLB,, | Performed by: PHYSICIAN ASSISTANT

## 2022-09-12 PROCEDURE — 80307 DRUG TEST PRSMV CHEM ANLYZR: CPT | Performed by: PHYSICIAN ASSISTANT

## 2022-09-12 PROCEDURE — 1160F PR REVIEW ALL MEDS BY PRESCRIBER/CLIN PHARMACIST DOCUMENTED: ICD-10-PCS | Mod: CPTII,S$GLB,, | Performed by: PHYSICIAN ASSISTANT

## 2022-09-12 PROCEDURE — 3079F DIAST BP 80-89 MM HG: CPT | Mod: CPTII,S$GLB,, | Performed by: PHYSICIAN ASSISTANT

## 2022-09-12 PROCEDURE — 1159F PR MEDICATION LIST DOCUMENTED IN MEDICAL RECORD: ICD-10-PCS | Mod: CPTII,S$GLB,, | Performed by: PHYSICIAN ASSISTANT

## 2022-09-12 PROCEDURE — 99999 PR PBB SHADOW E&M-EST. PATIENT-LVL V: ICD-10-PCS | Mod: PBBFAC,,, | Performed by: PHYSICIAN ASSISTANT

## 2022-09-12 PROCEDURE — 1160F RVW MEDS BY RX/DR IN RCRD: CPT | Mod: CPTII,S$GLB,, | Performed by: PHYSICIAN ASSISTANT

## 2022-09-12 PROCEDURE — 3074F PR MOST RECENT SYSTOLIC BLOOD PRESSURE < 130 MM HG: ICD-10-PCS | Mod: CPTII,S$GLB,, | Performed by: PHYSICIAN ASSISTANT

## 2022-09-12 PROCEDURE — 1159F MED LIST DOCD IN RCRD: CPT | Mod: CPTII,S$GLB,, | Performed by: PHYSICIAN ASSISTANT

## 2022-09-12 PROCEDURE — 3008F PR BODY MASS INDEX (BMI) DOCUMENTED: ICD-10-PCS | Mod: CPTII,S$GLB,, | Performed by: PHYSICIAN ASSISTANT

## 2022-09-12 PROCEDURE — 99999 PR PBB SHADOW E&M-EST. PATIENT-LVL V: CPT | Mod: PBBFAC,,, | Performed by: PHYSICIAN ASSISTANT

## 2022-09-12 PROCEDURE — 3079F PR MOST RECENT DIASTOLIC BLOOD PRESSURE 80-89 MM HG: ICD-10-PCS | Mod: CPTII,S$GLB,, | Performed by: PHYSICIAN ASSISTANT

## 2022-09-12 PROCEDURE — 99214 PR OFFICE/OUTPT VISIT, EST, LEVL IV, 30-39 MIN: ICD-10-PCS | Mod: S$GLB,,, | Performed by: PHYSICIAN ASSISTANT

## 2022-09-12 PROCEDURE — 99214 OFFICE O/P EST MOD 30 MIN: CPT | Mod: S$GLB,,, | Performed by: PHYSICIAN ASSISTANT

## 2022-09-12 RX ORDER — HYDROCODONE BITARTRATE AND ACETAMINOPHEN 5; 325 MG/1; MG/1
1 TABLET ORAL
Qty: 30 TABLET | Refills: 0 | Status: SHIPPED | OUTPATIENT
Start: 2022-09-12 | End: 2022-10-13 | Stop reason: SDUPTHER

## 2022-09-12 NOTE — PROGRESS NOTES
INTERNAL MEDICINE PROGRESS NOTE    CHIEF COMPLAINT     Chief Complaint   Patient presents with    Follow-up       HPI     Jaki Bajwa is a 51 y.o. female who presents for a follow-up visit today.    PCP is Pratik Rasmussen MD, patient is new to me.     Patient presents for 3 month follow-up.   Subjective:          Chief Complaint:Medication refill, 3 month follow-up    Pt here for f/u. She went to the [employer] game in [place] this weekend, traveled by bus, has lower leg swelling no pain redness or warmth. She arrived in [facility] 5 hours before this appointment. Has not slept or elevated legs, not wearing compression socks. No history of DVT. She also has a hoarse voice with a scratchy throat. No fever.     She has a dx of sjogrens and fibromyalgia based on blood work and chronic pain that is primarily across shoulders and in legs but also in upper and lower back. As a result she is on several meds that she says helps. She saw rheum at [facility] but changed to [facility]. She has prior dx of lupus but this was not corroborated in notes from rheum. She is on plaquenil (she is due for eye exam and said she will schedule--she understands importance), tizanidine, gabapentin and norco. She uses norco 1x/day. She has done well with decreasing dispense amount to 30 per month. She has previously signed a pain contract. LA  reviewed today and is consistent. She has seen pain mgmt. She has also done PT with some success in the past.      She also has dx of sjogren's syndrome. She is on pilocarpine with some success. Uses eye drops for dry eyes which has been effective and is stable.      Pt's BP is well controlled. Tolerating meds well. Pt denies cp/sob/ha/vision or neuro changes. Home readings are well controlled.      She continues to see psychiatry for bipolar and anxiety. This is fairly well controlled and stable on current meds. No SI/HI. They have tryied reducing diazepam but anxiety returned. However, she is now down to  "1mg bid. She understands dangers of co-administration of norco and diazepam and she continues to work with psychiatry re: alternatives.      She has had recurrent UTIs. Saw urology and was on prophylactic macrodantin. However, has been off this and no issues in a while until 6 wks ago where she was admitted to hospital with UTI and CANDELARIA. Tx accordingly--record reviewed. Currently doing well. Due for f/u with urology which she will schedule. She was on vaginal estrace cream but has not been using this lately b/c she ran out. It is worth noting that her UTI issues decreased while on this.     She has chronic iron def anemia. She has no symptoms of such and denies any known bleeding or bloody/black stool. She had c-scope 10/2018 and again 1/2021 that did not show any bleeding source. She also had EGD that showed positive h pylori for which she was tx but no other bleeding sources. She also did stool cards which were negative. Most recent labs show improvement in iron def and anemia. She saw heme-onc who has instructed her to resume oral iron as she previously received IV iron; however, she recently has started back on IV iron course.       She has mild CKD-3 with baseline creatinine 1.2-1.3. This is stable.     She has had a flare in eczema and requests kenalog cream. Proper use d/w pt.       Past Medical History:  Past Medical History:   Diagnosis Date    Anemia     Anxiety     Depression     History of psychiatric hospitalization 2000    [place] x 1 week for depression    History of ventral hernia repair     Hypertension     Lupus     patient reports that she is being treated "like I have Lupus"    Mental disorder     Morbid obesity 12/15/2017    Psychiatric problem     Sjogren's syndrome 2013    Therapy     TMJ (temporomandibular joint disorder)        Home Medications:  Prior to Admission medications    Medication Sig Start Date End Date Taking? Authorizing Provider   buPROPion (WELLBUTRIN XL) 150 MG TB24 tablet " Take 3 tablets (450 mg total) by mouth every morning. 7/19/22  Yes Najma Doyle MD   busPIRone (BUSPAR) 15 MG tablet TAKE 1 TABLET(15 MG) BY MOUTH THREE TIMES DAILY 7/19/22  Yes Najma Doyle MD   diazePAM (VALIUM) 2 MG tablet Take 1 tablet (2 mg total) by mouth daily as needed (Severe anxiety/Panic attack). 8/29/22  Yes Najma Doyle MD   docusate sodium (COLACE) 100 MG capsule Take 1 capsule (100 mg total) by mouth 2 (two) times daily as needed for Constipation. 7/13/22  Yes Pratik Rasmussen MD   doxepin (SINEQUAN) 75 MG capsule TAKE 2 CAPSULES(150 MG) BY MOUTH EVERY EVENING 12/9/21  Yes Najma Doyle MD   estradioL (ESTRACE) 0.01 % (0.1 mg/gram) vaginal cream 1g vaginally daily x 2 weeks, then BIW as directed 6/8/22  Yes Kayli Villegas PA-C   fluconazole (DIFLUCAN) 150 MG Tab Take 1 tablet (150 mg total) by mouth every 72 hours as needed. 7/14/22  Yes Amalia Phillips NP   fluconazole (DIFLUCAN) 200 MG Tab Take 1 tablet (200 mg total) by mouth every 72 hours as needed. 4/29/22  Yes Pratik Rasmussen MD   gabapentin (NEURONTIN) 300 MG capsule TAKE 1 CAPSULE(300 MG) BY MOUTH THREE TIMES DAILY 8/10/22  Yes Branden Cameron MD   gabapentin (NEURONTIN) 300 MG capsule Take 1 capsule (300 mg total) by mouth 3 (three) times daily. 8/10/22  Yes Branden Cameron MD   HYDROcodone-acetaminophen (NORCO) 5-325 mg per tablet Take 1 tablet by mouth every 24 hours as needed for Pain. Quantity medically necessary 8/15/22  Yes Tristin Jauregui MD   hydrOXYchloroQUINE (PLAQUENIL) 200 mg tablet One pill bid 5/2/22  Yes Branden Cameron MD   irbesartan-hydrochlorothiazide (AVALIDE) 150-12.5 mg per tablet Take 1 tablet by mouth once daily. 2/18/22  Yes Pratik Rasmussen MD   lurasidone (LATUDA) 60 mg Tab tablet Take 1 tablet (60 mg total) by mouth once daily. 2/10/22  Yes Najma Doyle MD   naproxen (NAPROSYN) 500 MG tablet TAKE 1 TABLET(500 MG) BY MOUTH  TWICE DAILY AS NEEDED FOR PAIN 6/29/22  Yes Pratik Rasmussen MD   NARCAN 4 mg/actuation [place] SPRAY 1 SPRAY IN NOSTRIL ONCE FOR 1 DOSE 9/25/21  Yes Historical Provider   nitrofurantoin (MACRODANTIN) 50 MG capsule Take 1 capsule (50 mg total) by mouth every evening. 7/14/22 1/10/23 Yes Amalia Phillips NP   omeprazole (PRILOSEC) 40 MG capsule TAKE 1 CAPSULE(40 MG) BY MOUTH EVERY DAY 5/16/22  Yes Pratik Rasmussen MD   pilocarpine (SALAGEN) 5 MG Tab Take 1 tablet (5 mg total) by mouth 2 (two) times daily. 5/2/22 5/2/23 Yes Branden Cameron MD   RESTASIS 0.05 % ophthalmic emulsion INSTILL 1 DROP IN BOTH EYES TWICE DAILY 8/17/22  Yes Branden Cameron MD   tiZANidine (ZANAFLEX) 4 MG tablet 8 mg tid 5/16/22  Yes Branden Cameron MD   cetirizine (ZYRTEC) 10 MG tablet Take 1 tablet (10 mg total) by mouth every evening. 7/15/21 7/20/22  MAGDALENA James-VIOLETA   triamcinolone acetonide 0.1% (KENALOG) 0.1 % cream Apply topically 2 (two) times daily as needed (rash). X 2 weeks to rashes PRN 8/19/22 9/2/22  Doris Means MD       Review of Systems:  Review of Systems   Constitutional:  Negative for chills and fever.   HENT:  Negative for sore throat and trouble swallowing.    Eyes:  Negative for visual disturbance.   Respiratory:  Negative for cough and shortness of breath.    Cardiovascular:  Negative for chest pain.   Gastrointestinal:  Negative for abdominal pain, constipation, diarrhea, nausea and vomiting.   Genitourinary:  Negative for dysuria and flank pain.   Musculoskeletal:  Negative for back pain, neck pain and neck stiffness.   Skin:  Negative for rash.   Neurological:  Negative for dizziness, syncope, weakness and headaches.   Psychiatric/Behavioral:  Negative for confusion.      Health Maintainence:   Immunizations:  Health Maintenance         Date Due Completion Date    Pneumococcal Vaccines (Age 0-64) (1 - PCV) Never done ---    Urine Drug Screen 06/02/2022 12/2/2021     COVID-19 Vaccine (5 - Booster for Moderna series) 08/14/2022 4/14/2022    Influenza Vaccine (1) 09/01/2022 12/10/2021    Mammogram 10/13/2022 10/13/2021    Naloxone Prescription 12/02/2022 12/2/2021    Colorectal Cancer Screening 01/28/2026 1/28/2021    Lipid Panel 09/22/2026 9/22/2021    TETANUS VACCINE 04/18/2027 4/18/2017             PHYSICAL EXAM     /82 (BP Location: Right arm, Patient Position: Sitting)   Pulse 90   Wt 124 kg (273 lb 5.9 oz)   LMP 07/11/2012 Comment: partial   SpO2 96%   BMI 42.82 kg/m²     Physical Exam  Vitals and nursing note reviewed.   Constitutional:       Appearance: Normal appearance.      Comments: Healthy appearing female in NAD or apparent pain. She makes good eye contact, speaks in clear full sentences and ambulates with ease.        HENT:      Head: Normocephalic and atraumatic.      Comments: Hoarse voice      Nose: Nose normal.      Mouth/Throat:      Pharynx: Oropharynx is clear.   Eyes:      Conjunctiva/sclera: Conjunctivae normal.   Cardiovascular:      Rate and Rhythm: Normal rate and regular rhythm.      Pulses: Normal pulses.   Pulmonary:      Effort: No respiratory distress.   Abdominal:      Tenderness: There is no abdominal tenderness.   Musculoskeletal:         General: Swelling present. Normal range of motion.      Cervical back: No rigidity.   Skin:     General: Skin is warm and dry.      Capillary Refill: Capillary refill takes less than 2 seconds.      Findings: No rash.   Neurological:      General: No focal deficit present.      Mental Status: She is alert.      Gait: Gait normal.   Psychiatric:         Mood and Affect: Mood normal.       LABS     Lab Results   Component Value Date    HGBA1C 5.3 10/13/2021     CMP  Sodium   Date Value Ref Range Status   06/15/2022 135 (L) 136 - 145 mmol/L Final     Potassium   Date Value Ref Range Status   06/15/2022 4.1 3.5 - 5.1 mmol/L Final     Chloride   Date Value Ref Range Status   06/15/2022 98 95 - 110 mmol/L  Final     CO2   Date Value Ref Range Status   06/15/2022 29 23 - 29 mmol/L Final     Glucose   Date Value Ref Range Status   06/15/2022 91 70 - 110 mg/dL Final     BUN   Date Value Ref Range Status   06/15/2022 29 (H) 6 - 20 mg/dL Final     Creatinine   Date Value Ref Range Status   06/15/2022 1.5 (H) 0.5 - 1.4 mg/dL Final     Calcium   Date Value Ref Range Status   06/15/2022 9.7 8.7 - 10.5 mg/dL Final     Total Protein   Date Value Ref Range Status   06/15/2022 7.6 6.0 - 8.4 g/dL Final     Albumin   Date Value Ref Range Status   06/15/2022 3.7 3.5 - 5.2 g/dL Final     Total Bilirubin   Date Value Ref Range Status   06/15/2022 0.8 0.1 - 1.0 mg/dL Final     Comment:     For infants and newborns, interpretation of results should be based  on gestational age, weight and in agreement with clinical  observations.    Premature Infant recommended reference ranges:  Up to 24 hours.............<8.0 mg/dL  Up to 48 hours............<12.0 mg/dL  3-5 days..................<15.0 mg/dL  6-29 days.................<15.0 mg/dL       Alkaline Phosphatase   Date Value Ref Range Status   06/15/2022 73 55 - 135 U/L Final     AST   Date Value Ref Range Status   06/15/2022 17 10 - 40 U/L Final     ALT   Date Value Ref Range Status   06/15/2022 14 10 - 44 U/L Final     Anion Gap   Date Value Ref Range Status   06/15/2022 8 8 - 16 mmol/L Final     eGFR if    Date Value Ref Range Status   06/15/2022 45.5 (A) >60 mL/min/1.73 m^2 Final     eGFR if non    Date Value Ref Range Status   06/15/2022 39.5 (A) >60 mL/min/1.73 m^2 Final     Comment:     Calculation used to obtain the estimated glomerular filtration  rate (eGFR) is the CKD-EPI equation.        Lab Results   Component Value Date    WBC 9.01 06/15/2022    HGB 11.0 (L) 06/15/2022    HCT 36.4 (L) 06/15/2022    MCV 85 06/15/2022     06/15/2022     Lab Results   Component Value Date    CHOL 141 09/22/2021    CHOL 129 09/03/2020    CHOL 135 04/04/2019      Lab Results   Component Value Date    HDL 56 09/22/2021    HDL 60 09/03/2020    HDL 54 04/04/2019     Lab Results   Component Value Date    LDLCALC 60.8 (L) 09/22/2021    LDLCALC 55.6 (L) 09/03/2020    LDLCALC 67.2 04/04/2019     Lab Results   Component Value Date    TRIG 121 09/22/2021    TRIG 67 09/03/2020    TRIG 69 04/04/2019     Lab Results   Component Value Date    CHOLHDL 39.7 09/22/2021    CHOLHDL 46.5 09/03/2020    CHOLHDL 40.0 04/04/2019     Lab Results   Component Value Date    TSH 2.563 09/22/2021       ASSESSMENT/PLAN     Jaki Bajwa is a 51 y.o. female     Jaki was seen today for follow-up. Will refill medication, LA  consistent with her plan. Will defer any additional refills to her PCP. Will order screening Mammo which is due next month. Low threshold to US lower extremities if any edema worsens or if she develops leg pain. Expect laryngitis to self limit with supportive care which she has already initiated. She will follow-up with PCP in 3 months, sooner if needed.     Diagnoses and all orders for this visit:    Medication refill    Chronic narcotic dependence  -     Cancel: POCT RAPID DRUG SCREEN  -     Cancel: Toxicology screen, urine  -     Toxicology screen, urine; Future    Screening mammogram for breast cancer  -     Mammo Digital Screening Bilat; Future    Chronic pain syndrome  -     HYDROcodone-acetaminophen (NORCO) 5-325 mg per tablet; Take 1 tablet by mouth every 24 hours as needed for Pain. Quantity medically necessary    Fibromyalgia    Laryngitis    Lower extremity edema              Roxy Coello PA-C   Department of Internal Medicine - [facility]   7:59 AM

## 2022-09-19 ENCOUNTER — OFFICE VISIT (OUTPATIENT)
Dept: PSYCHIATRY | Facility: CLINIC | Age: 52
End: 2022-09-19
Payer: MEDICARE

## 2022-09-19 DIAGNOSIS — F41.1 GAD (GENERALIZED ANXIETY DISORDER): Primary | ICD-10-CM

## 2022-09-19 DIAGNOSIS — F39 MOOD DISORDER: ICD-10-CM

## 2022-09-19 DIAGNOSIS — R46.81 OBSESSIVE-COMPULSIVE BEHAVIOR: ICD-10-CM

## 2022-09-19 DIAGNOSIS — F40.10 SOCIAL PHOBIA: ICD-10-CM

## 2022-09-19 DIAGNOSIS — F63.89 INTERNET ADDICTION: ICD-10-CM

## 2022-09-19 PROCEDURE — 90837 PSYTX W PT 60 MINUTES: CPT | Mod: 95,,, | Performed by: SOCIAL WORKER

## 2022-09-19 PROCEDURE — 99499 RISK ADDL DX/OHS AUDIT: ICD-10-PCS | Mod: HCNC,95,, | Performed by: SOCIAL WORKER

## 2022-09-19 PROCEDURE — 90837 PR PSYCHOTHERAPY W/PATIENT, 60 MIN: ICD-10-PCS | Mod: 95,,, | Performed by: SOCIAL WORKER

## 2022-09-19 PROCEDURE — 99499 UNLISTED E&M SERVICE: CPT | Mod: HCNC,95,, | Performed by: SOCIAL WORKER

## 2022-09-19 NOTE — PROGRESS NOTES
"           Individual Psychotherapy (PhD/LCSW)    9/19/2022    Site:  Telemed         Therapeutic Intervention: Met with patient.  Outpatient - Insight oriented psychotherapy 60 min - CPT code 67985     Chief complaint/reason for encounter: depression, anxiety and interpersonal     Interval history and content of current session:        The patient location is: home  The chief complaint leading to consultation is: anxiety depression  shopping addiction    Visit type: audiovisual    Face to Face time with patient:     60  minutes of total time spent on the encounter, which includes face to face time and non-face to face time preparing to see the patient (eg, review of tests), Obtaining and/or reviewing separately obtained history, Documenting clinical information in the electronic or other health record, Independently interpreting results (not separately reported) and communicating results to the patient/family/caregiver, or Care coordination (not separately reported).         Each patient to whom he or she provides medical services by telemedicine is:  (1) informed of the relationship between the physician and patient and the respective role of any other health care provider with respect to management of the patient; and (2) notified that he or she may decline to receive medical services by telemedicine and may withdraw from such care at any time.    Notes:    Discussion over her addiction to shopping.  Analogy of alcoholism used to explain phenomena like rationalization, costs, denial, etc.   Pt tells me "I will try to stop after my birthday".     After processing more variables she told me she will "stop shopping after my birthday".    She can not read but wants to read a book from relative/friend.  I suggested she search to see if there is an audio book.   She has not searched into debtors anonymous or the like and this was encouraged.    She went to a [employer] game in [place] with boyfriend and had such a great " time during the game that she lost her voice (from screaming).     She reports insomnia and she is doing various activities from her bed.   I have recommended limiting her bed for just sleep.         Boyfriend  Scott.  Prostate cancer.   Son Can       Drives trucks   Daughter           Halina    Nurse    two grand kids 4 year old    Can                           8 year old is a different mother  And does not like to stay out of his house.  Also is picky with food.        Treatment plan:  Target symptoms: depression, anxiety   Why chosen therapy is appropriate versus another modality: relevant to diagnosis  Outcome monitoring methods: self-report, observation    Therapeutic intervention type: insight oriented psychotherapy, behavior modifying psychotherapy, supportive psychotherapy    Risk parameters:  Patient reports no suicidal ideation  Patient reports no homicidal ideation  Patient reports no self-injurious behavior  Patient reports no violent behavior    Verbal deficits: None    Patient's response to intervention:  The patient's response to intervention is accepting.    Progress toward goals and other mental status changes:  The patient's progress toward goals is limited.    Diagnosis: 296.35;   Obsessive compulsive disorder.  personality disorder nos  Learning problems/limitations   internet shopping addiction   Social anxiety, generalized anxiety disorder.     Plan:  individual psychotherapy    Return to clinic:   1 mo.

## 2022-09-26 ENCOUNTER — OUTPATIENT CASE MANAGEMENT (OUTPATIENT)
Dept: ADMINISTRATIVE | Facility: OTHER | Age: 52
End: 2022-09-26

## 2022-10-04 DIAGNOSIS — Z79.899 LONG-TERM USE OF PLAQUENIL: Primary | ICD-10-CM

## 2022-10-04 PROCEDURE — 92134 POSTERIOR SEGMENT OCT RETINA (OCULAR COHERENCE TOMOGRAPHY)-BOTH EYES: ICD-10-PCS | Mod: S$GLB,,, | Performed by: OPTOMETRIST

## 2022-10-04 PROCEDURE — 92134 CPTRZ OPH DX IMG PST SGM RTA: CPT | Mod: S$GLB,,, | Performed by: OPTOMETRIST

## 2022-10-05 ENCOUNTER — OFFICE VISIT (OUTPATIENT)
Dept: OPTOMETRY | Facility: CLINIC | Age: 52
End: 2022-10-05
Attending: FAMILY MEDICINE
Payer: MEDICARE

## 2022-10-05 ENCOUNTER — CLINICAL SUPPORT (OUTPATIENT)
Dept: OPHTHALMOLOGY | Facility: CLINIC | Age: 52
End: 2022-10-05
Payer: MEDICARE

## 2022-10-05 DIAGNOSIS — M35.00 SJOGREN'S SYNDROME, WITH UNSPECIFIED ORGAN INVOLVEMENT: ICD-10-CM

## 2022-10-05 DIAGNOSIS — H04.123 DRY EYE SYNDROME, BILATERAL: ICD-10-CM

## 2022-10-05 DIAGNOSIS — Z79.899 LONG-TERM USE OF PLAQUENIL: ICD-10-CM

## 2022-10-05 DIAGNOSIS — M35.01 SJOGREN'S SYNDROME WITH KERATOCONJUNCTIVITIS SICCA: ICD-10-CM

## 2022-10-05 DIAGNOSIS — H52.4 PRESBYOPIA: Primary | ICD-10-CM

## 2022-10-05 DIAGNOSIS — H16.229 KERATOCONJUNCT SICCA, NOT SPECIFIED AS SJOGREN'S, UNSP EYE: ICD-10-CM

## 2022-10-05 DIAGNOSIS — I10 ESSENTIAL HYPERTENSION: Chronic | ICD-10-CM

## 2022-10-05 PROCEDURE — 92015 DETERMINE REFRACTIVE STATE: CPT | Mod: S$GLB,,, | Performed by: OPTOMETRIST

## 2022-10-05 PROCEDURE — 92083 EXTENDED VISUAL FIELD XM: CPT | Mod: S$GLB,,, | Performed by: OPTOMETRIST

## 2022-10-05 PROCEDURE — 92083 HUMPHREY VISUAL FIELD - OU - BOTH EYES: ICD-10-PCS | Mod: S$GLB,,, | Performed by: OPTOMETRIST

## 2022-10-05 PROCEDURE — 92004 COMPRE OPH EXAM NEW PT 1/>: CPT | Mod: S$GLB,,, | Performed by: OPTOMETRIST

## 2022-10-05 PROCEDURE — 99999 PR PBB SHADOW E&M-EST. PATIENT-LVL I: ICD-10-PCS | Mod: PBBFAC,,, | Performed by: OPTOMETRIST

## 2022-10-05 PROCEDURE — 99999 PR PBB SHADOW E&M-EST. PATIENT-LVL I: CPT | Mod: PBBFAC,,, | Performed by: OPTOMETRIST

## 2022-10-05 PROCEDURE — 92004 PR EYE EXAM, NEW PATIENT,COMPREHESV: ICD-10-PCS | Mod: S$GLB,,, | Performed by: OPTOMETRIST

## 2022-10-05 PROCEDURE — 92015 PR REFRACTION: ICD-10-PCS | Mod: S$GLB,,, | Performed by: OPTOMETRIST

## 2022-10-05 RX ORDER — LOTEPREDNOL ETABONATE 2.5 MG/ML
1-2 SUSPENSION/ DROPS OPHTHALMIC 4 TIMES DAILY
Qty: 8.3 ML | Refills: 0 | Status: SHIPPED | OUTPATIENT
Start: 2022-10-05 | End: 2022-10-19

## 2022-10-05 RX ORDER — CYCLOSPORINE 0.5 MG/ML
1 EMULSION OPHTHALMIC 2 TIMES DAILY
Qty: 60 EACH | Refills: 1 | Status: SHIPPED | OUTPATIENT
Start: 2022-10-05 | End: 2023-06-12 | Stop reason: SDUPTHER

## 2022-10-05 NOTE — PROGRESS NOTES
HPI    50 y/o female presents to clinic for eye care with history of Sjogren's   syndrome and long-term use of Plaquenil. Pt presents with concerns of   irritation of the LT eye X 4 days and dry eyes. No floaters, flashes of   light, headaches, diplopia, or migraines reported today. No ocular   sx/procedures reported today.     Eyemeds  Restasis OU BID   Last edited by Shani eDjesus on 10/5/2022  8:27 AM.            Assessment /Plan     For exam results, see Encounter Report.    Presbyopia    Long-term use of Plaquenil    Sjogren's syndrome, with unspecified organ involvement    Keratoconjunct sicca, not specified as Sjogren's, unsp eye  -     cycloSPORINE (RESTASIS) 0.05 % ophthalmic emulsion; Place 1 drop into both eyes 2 (two) times daily.  Dispense: 60 each; Refill: 1    Dry eye syndrome, bilateral    Essential hypertension    Other orders  -     loteprednol etabonate (EYSUVIS) 0.25 % DrpS; Apply 1-2 drops to eye 4 (four) times daily. for 14 days  Dispense: 8.3 mL; Refill: 0      ***

## 2022-10-05 NOTE — PROGRESS NOTES
HPI    50 y/o female presents to clinic for eye care with history of Sjogren's   syndrome and long-term use of Plaquenil. Pt presents with concerns of   irritation of the LT eye X 4 days and dry eyes. No floaters, flashes of   light, headaches, diplopia, or migraines reported today. No ocular   sx/procedures reported today.     Eyemeds  Restasis OU BID   Last edited by Shani Dejesus on 10/5/2022  8:27 AM.            Assessment /Plan     For exam results, see Encounter Report.    Presbyopia   Rx specs    Long-term use of Plaquenil   OCT mac WNL today   HVF 10-2 WNL today   Repeat next year    Sjogren's syndrome, with unspecified organ involvement  Keratoconjunct sicca, not specified as Sjogren's, unsp eye  -     cycloSPORINE (RESTASIS) 0.05 % ophthalmic emulsion; Place 1 drop into both eyes 2 (two) times daily.  Dispense: 60 each; Refill: 1      loteprednol etabonate (EYSUVIS) 0.25 % DrpS; Apply 1-2 drops to eye 4 (four) times daily. for 4 days  Dispense: 8.3 mL; Refill: 0  Dry eye syndrome, bilateral    Essential hypertension  No retinopathy, monitor yearly      RTC 1 year, sooner PRN

## 2022-10-06 NOTE — PROGRESS NOTES
Assessment /Plan     For exam results, see Encounter Report.    Sjogren's syndrome with keratoconjunctivitis sicca  -     Ambulatory referral/consult to Ophthalmology    Long-term use of Plaquenil  -     Ambulatory referral/consult to Ophthalmology                  OCT DONE OU   JS       10-2  SS PLAQ DONE OU     REL & FIX  =   GOOD OU       COOP =        GOOD     PATIENT DENIES ANY ALLERGIES TO LATEX OR ADHESIVES AT THIS TIME    JTHOMAS            Glasses Prescription     Sphere Cylinder Axis Dist VA Add   Right -0.75 +0.75 165 20/20 +2.25   Left -0.75 +0.50 018 20/20 +2.25

## 2022-10-13 ENCOUNTER — PATIENT MESSAGE (OUTPATIENT)
Dept: INTERNAL MEDICINE | Facility: CLINIC | Age: 52
End: 2022-10-13
Payer: MEDICARE

## 2022-10-13 DIAGNOSIS — G89.4 CHRONIC PAIN SYNDROME: ICD-10-CM

## 2022-10-13 RX ORDER — HYDROCODONE BITARTRATE AND ACETAMINOPHEN 5; 325 MG/1; MG/1
1 TABLET ORAL
Qty: 30 TABLET | Refills: 0 | Status: SHIPPED | OUTPATIENT
Start: 2022-10-13 | End: 2022-11-14 | Stop reason: SDUPTHER

## 2022-10-13 RX ORDER — NAPROXEN 500 MG/1
TABLET ORAL
Qty: 180 TABLET | Refills: 1 | Status: SHIPPED | OUTPATIENT
Start: 2022-10-13 | End: 2023-04-17

## 2022-10-13 NOTE — TELEPHONE ENCOUNTER
No new care gaps identified.  [facility] Embedded Care Gaps. Reference number: [id number]. 10/13/2022   9:48:34 AM DAVIDET

## 2022-10-14 ENCOUNTER — IMMUNIZATION (OUTPATIENT)
Dept: PHARMACY | Facility: CLINIC | Age: 52
End: 2022-10-14
Payer: MEDICARE

## 2022-10-14 DIAGNOSIS — R35.0 URINARY FREQUENCY: ICD-10-CM

## 2022-10-14 DIAGNOSIS — Z23 NEED FOR VACCINATION: Primary | ICD-10-CM

## 2022-10-17 RX ORDER — FLUCONAZOLE 200 MG/1
200 TABLET ORAL
Qty: 2 TABLET | Refills: 0 | Status: SHIPPED | OUTPATIENT
Start: 2022-10-17 | End: 2022-12-22

## 2022-10-17 RX ORDER — NITROFURANTOIN MACROCRYSTALS 50 MG/1
50 CAPSULE ORAL NIGHTLY
Qty: 90 CAPSULE | Refills: 1 | Status: SHIPPED | OUTPATIENT
Start: 2022-10-17 | End: 2023-03-14

## 2022-10-19 DIAGNOSIS — R39.9 UTI SYMPTOMS: Primary | ICD-10-CM

## 2022-10-19 RX ORDER — FLUCONAZOLE 150 MG/1
150 TABLET ORAL
Qty: 4 TABLET | Refills: 0 | Status: SHIPPED | OUTPATIENT
Start: 2022-10-19 | End: 2022-11-16

## 2022-10-19 RX ORDER — AMOXICILLIN AND CLAVULANATE POTASSIUM 875; 125 MG/1; MG/1
1 TABLET, FILM COATED ORAL EVERY 12 HOURS
Qty: 14 TABLET | Refills: 0 | Status: SHIPPED | OUTPATIENT
Start: 2022-10-19 | End: 2022-10-26

## 2022-10-20 ENCOUNTER — TELEPHONE (OUTPATIENT)
Dept: RHEUMATOLOGY | Facility: CLINIC | Age: 52
End: 2022-10-20
Payer: MEDICARE

## 2022-10-20 ENCOUNTER — TELEPHONE (OUTPATIENT)
Dept: UROLOGY | Facility: CLINIC | Age: 52
End: 2022-10-20
Payer: MEDICARE

## 2022-10-20 NOTE — TELEPHONE ENCOUNTER
Called patient to clarify if she wants it on 10/24 or on 10/26 at [facility] because she has appointments on that day.  She can call back to let us know. I changed it to 10/26    ----- Message from Nasir Varghese sent at 10/20/2022  2:35 PM CDT -----  Contact: @[phone]  Pt is calling in to see if her labs can be moved to 10/24 instead, please call to discuss further.

## 2022-10-24 ENCOUNTER — OUTPATIENT CASE MANAGEMENT (OUTPATIENT)
Dept: ADMINISTRATIVE | Facility: OTHER | Age: 52
End: 2022-10-24
Payer: MEDICARE

## 2022-11-03 ENCOUNTER — HOSPITAL ENCOUNTER (EMERGENCY)
Facility: HOSPITAL | Age: 52
Discharge: HOME | End: 2022-11-03
Attending: EMERGENCY MEDICINE
Payer: MEDICARE

## 2022-11-03 VITALS
HEART RATE: 82 BPM | SYSTOLIC BLOOD PRESSURE: 129 MMHG | BODY MASS INDEX: 42.85 KG/M2 | HEIGHT: 67 IN | WEIGHT: 273 LBS | RESPIRATION RATE: 18 BRPM | DIASTOLIC BLOOD PRESSURE: 78 MMHG | TEMPERATURE: 99 F | OXYGEN SATURATION: 98 %

## 2022-11-03 DIAGNOSIS — M25.579 ANKLE PAIN: ICD-10-CM

## 2022-11-03 DIAGNOSIS — N17.9 AKI (ACUTE KIDNEY INJURY): Primary | ICD-10-CM

## 2022-11-03 DIAGNOSIS — S82.401A CLOSED RIGHT FIBULAR FRACTURE: ICD-10-CM

## 2022-11-03 LAB
ALBUMIN SERPL BCP-MCNC: 3.7 G/DL (ref 3.5–5.2)
ALP SERPL-CCNC: 65 U/L (ref 55–135)
ALT SERPL W/O P-5'-P-CCNC: 10 U/L (ref 10–44)
ANION GAP SERPL CALC-SCNC: 8 MMOL/L (ref 8–16)
AST SERPL-CCNC: 14 U/L (ref 10–40)
BACTERIA #/AREA URNS AUTO: ABNORMAL /HPF
BASOPHILS # BLD AUTO: 0.05 K/UL (ref 0–0.2)
BASOPHILS NFR BLD: 0.6 % (ref 0–1.9)
BILIRUB SERPL-MCNC: 0.4 MG/DL (ref 0.1–1)
BILIRUB UR QL STRIP: NEGATIVE
BUN SERPL-MCNC: 27 MG/DL (ref 6–20)
BUN SERPL-MCNC: 28 MG/DL (ref 6–30)
CALCIUM SERPL-MCNC: 9.5 MG/DL (ref 8.7–10.5)
CHLORIDE SERPL-SCNC: 100 MMOL/L (ref 95–110)
CHLORIDE SERPL-SCNC: 100 MMOL/L (ref 95–110)
CLARITY UR REFRACT.AUTO: ABNORMAL
CO2 SERPL-SCNC: 27 MMOL/L (ref 23–29)
COLOR UR AUTO: YELLOW
CREAT SERPL-MCNC: 1.7 MG/DL (ref 0.5–1.4)
CREAT SERPL-MCNC: 1.9 MG/DL (ref 0.5–1.4)
DIFFERENTIAL METHOD: ABNORMAL
EOSINOPHIL # BLD AUTO: 0.2 K/UL (ref 0–0.5)
EOSINOPHIL NFR BLD: 3 % (ref 0–8)
ERYTHROCYTE [DISTWIDTH] IN BLOOD BY AUTOMATED COUNT: 14.2 % (ref 11.5–14.5)
EST. GFR  (NO RACE VARIABLE): 35.4 ML/MIN/1.73 M^2
GLUCOSE SERPL-MCNC: 73 MG/DL (ref 70–110)
GLUCOSE SERPL-MCNC: 86 MG/DL (ref 70–110)
GLUCOSE UR QL STRIP: NEGATIVE
HCT VFR BLD AUTO: 36.1 % (ref 37–48.5)
HCT VFR BLD CALC: 39 %PCV (ref 36–54)
HCV AB SERPL QL IA: NORMAL
HGB BLD-MCNC: 10.7 G/DL (ref 12–16)
HGB UR QL STRIP: NEGATIVE
HIV 1+2 AB+HIV1 P24 AG SERPL QL IA: NORMAL
HYALINE CASTS UR QL AUTO: 12 /LPF
IMM GRANULOCYTES # BLD AUTO: 0.03 K/UL (ref 0–0.04)
IMM GRANULOCYTES NFR BLD AUTO: 0.4 % (ref 0–0.5)
KETONES UR QL STRIP: NEGATIVE
LEUKOCYTE ESTERASE UR QL STRIP: NEGATIVE
LYMPHOCYTES # BLD AUTO: 2.8 K/UL (ref 1–4.8)
LYMPHOCYTES NFR BLD: 34.6 % (ref 18–48)
MCH RBC QN AUTO: 25.4 PG (ref 27–31)
MCHC RBC AUTO-ENTMCNC: 29.6 G/DL (ref 32–36)
MCV RBC AUTO: 86 FL (ref 82–98)
MICROSCOPIC COMMENT: ABNORMAL
MONOCYTES # BLD AUTO: 0.6 K/UL (ref 0.3–1)
MONOCYTES NFR BLD: 7.9 % (ref 4–15)
NEUTROPHILS # BLD AUTO: 4.3 K/UL (ref 1.8–7.7)
NEUTROPHILS NFR BLD: 53.5 % (ref 38–73)
NITRITE UR QL STRIP: NEGATIVE
NRBC BLD-RTO: 0 /100 WBC
PH UR STRIP: 6 [PH] (ref 5–8)
PLATELET # BLD AUTO: 224 K/UL (ref 150–450)
PMV BLD AUTO: 12.3 FL (ref 9.2–12.9)
POC IONIZED CALCIUM: 1.29 MMOL/L (ref 1.06–1.42)
POC TCO2 (MEASURED): 30 MMOL/L (ref 23–29)
POTASSIUM BLD-SCNC: 4.2 MMOL/L (ref 3.5–5.1)
POTASSIUM SERPL-SCNC: 4.2 MMOL/L (ref 3.5–5.1)
PROT SERPL-MCNC: 7.5 G/DL (ref 6–8.4)
PROT UR QL STRIP: ABNORMAL
RBC # BLD AUTO: 4.21 M/UL (ref 4–5.4)
RBC #/AREA URNS AUTO: 1 /HPF (ref 0–4)
SAMPLE: ABNORMAL
SODIUM BLD-SCNC: 139 MMOL/L (ref 136–145)
SODIUM SERPL-SCNC: 135 MMOL/L (ref 136–145)
SP GR UR STRIP: 1.02 (ref 1–1.03)
SQUAMOUS #/AREA URNS AUTO: 9 /HPF
URN SPEC COLLECT METH UR: ABNORMAL
WBC # BLD AUTO: 7.97 K/UL (ref 3.9–12.7)
WBC #/AREA URNS AUTO: 2 /HPF (ref 0–5)

## 2022-11-03 PROCEDURE — 80053 COMPREHEN METABOLIC PANEL: CPT | Performed by: PHYSICIAN ASSISTANT

## 2022-11-03 PROCEDURE — 86803 HEPATITIS C AB TEST: CPT | Performed by: PHYSICIAN ASSISTANT

## 2022-11-03 PROCEDURE — 85025 COMPLETE CBC W/AUTO DIFF WBC: CPT | Performed by: PHYSICIAN ASSISTANT

## 2022-11-03 PROCEDURE — 87389 HIV-1 AG W/HIV-1&-2 AB AG IA: CPT | Performed by: PHYSICIAN ASSISTANT

## 2022-11-03 PROCEDURE — 96374 THER/PROPH/DIAG INJ IV PUSH: CPT | Mod: 59

## 2022-11-03 PROCEDURE — 29515 APPLICATION SHORT LEG SPLINT: CPT | Mod: RT

## 2022-11-03 PROCEDURE — 96361 HYDRATE IV INFUSION ADD-ON: CPT

## 2022-11-03 PROCEDURE — 27786 PR CLOSED RX DIST FIBULA FX: ICD-10-PCS | Mod: 54,RT,, | Performed by: PHYSICIAN ASSISTANT

## 2022-11-03 PROCEDURE — 96376 TX/PRO/DX INJ SAME DRUG ADON: CPT

## 2022-11-03 PROCEDURE — 99285 PR EMERGENCY DEPT VISIT,LEVEL V: ICD-10-PCS | Mod: 57,,, | Performed by: PHYSICIAN ASSISTANT

## 2022-11-03 PROCEDURE — 27786 TREATMENT OF ANKLE FRACTURE: CPT | Mod: 54,RT,, | Performed by: PHYSICIAN ASSISTANT

## 2022-11-03 PROCEDURE — 80047 BASIC METABLC PNL IONIZED CA: CPT

## 2022-11-03 PROCEDURE — 99285 EMERGENCY DEPT VISIT HI MDM: CPT | Mod: 57,,, | Performed by: PHYSICIAN ASSISTANT

## 2022-11-03 PROCEDURE — 63600175 PHARM REV CODE 636 W HCPCS: Performed by: PHYSICIAN ASSISTANT

## 2022-11-03 PROCEDURE — 81001 URINALYSIS AUTO W/SCOPE: CPT | Performed by: PHYSICIAN ASSISTANT

## 2022-11-03 PROCEDURE — 25000003 PHARM REV CODE 250: Performed by: PHYSICIAN ASSISTANT

## 2022-11-03 PROCEDURE — 99284 EMERGENCY DEPT VISIT MOD MDM: CPT | Mod: 25

## 2022-11-03 RX ORDER — MORPHINE SULFATE 4 MG/ML
4 INJECTION, SOLUTION INTRAMUSCULAR; INTRAVENOUS
Status: COMPLETED | OUTPATIENT
Start: 2022-11-03 | End: 2022-11-03

## 2022-11-03 RX ORDER — OXYCODONE AND ACETAMINOPHEN 5; 325 MG/1; MG/1
1 TABLET ORAL
Status: COMPLETED | OUTPATIENT
Start: 2022-11-03 | End: 2022-11-03

## 2022-11-03 RX ORDER — MORPHINE SULFATE 2 MG/ML
2 INJECTION, SOLUTION INTRAMUSCULAR; INTRAVENOUS
Status: COMPLETED | OUTPATIENT
Start: 2022-11-03 | End: 2022-11-03

## 2022-11-03 RX ORDER — OXYCODONE AND ACETAMINOPHEN 5; 325 MG/1; MG/1
1 TABLET ORAL EVERY 6 HOURS PRN
Qty: 12 TABLET | Refills: 0 | Status: SHIPPED | OUTPATIENT
Start: 2022-11-03 | End: 2022-11-05

## 2022-11-03 RX ORDER — MUPIROCIN 20 MG/G
OINTMENT TOPICAL ONCE
Status: COMPLETED | OUTPATIENT
Start: 2022-11-03 | End: 2022-11-03

## 2022-11-03 RX ADMIN — MUPIROCIN: 20 OINTMENT TOPICAL at 06:11

## 2022-11-03 RX ADMIN — SODIUM CHLORIDE, SODIUM LACTATE, POTASSIUM CHLORIDE, AND CALCIUM CHLORIDE 1000 ML: .6; .31; .03; .02 INJECTION, SOLUTION INTRAVENOUS at 12:11

## 2022-11-03 RX ADMIN — OXYCODONE HYDROCHLORIDE AND ACETAMINOPHEN 1 TABLET: 5; 325 TABLET ORAL at 05:11

## 2022-11-03 RX ADMIN — MORPHINE SULFATE 2 MG: 2 INJECTION, SOLUTION INTRAMUSCULAR; INTRAVENOUS at 01:11

## 2022-11-03 RX ADMIN — MORPHINE SULFATE 4 MG: 4 INJECTION INTRAVENOUS at 02:11

## 2022-11-03 NOTE — ED NOTES
Tripped and fell down stairs (about 10 of them). States she lost her balance and had stuff in her hand.  Denies hitting head. Denies LOC. C/o right leg pain/ right ankle pain.  Swelling noted to right ankle

## 2022-11-03 NOTE — ED NOTES
I-STAT Chem-8+ Results:   Value Reference Range   Sodium 139 136-145 mmol/L   Potassium  4.2 3.5-5.1 mmol/L   Chloride 100  mmol/L   Ionized Calcium 1.29 1.06-1.42 mmol/L   CO2 (measured) 30 23-29 mmol/L   Glucose 73  mg/dL   BUN 28 6-30 mg/dL   Creatinine 1.9 0.5-1.4 mg/dL   Hematocrit 39 36-54%

## 2022-11-03 NOTE — DISCHARGE INSTRUCTIONS
Your seen in the emergency department for a fall.  Your x-ray showed a fracture of your right distal fibula.  Please call orthopedics and make a follow-up appointment.  Please do not bear weight on your leg.  I have prescribed 2 medications for pain.  Please use as with caution as it may make you drowsy.  He may use Tylenol for additional pain relief but do not go over 3 g per day.  Please be mindful as your pain medication does contain 325 mg of Tylenol.  If you develop any new or worsening symptoms please return to the ED.  Also your kidney function was slightly worsened but did not have a UTI.  Please follow-up with her PCP for repeat labs.

## 2022-11-03 NOTE — ED PROVIDER NOTES
"Encounter Date: 11/3/2022       History     Chief Complaint   Patient presents with    Fall     Fell down stairs, no loc, not on blood thinners and r leg pain     52-year-old female with history of lupus, hypertension, Sjogren syndrome, morbid obesity, depression anxiety who presents to the ED for right ankle pain after a mechanical slip and fall down 4 steps.  States she was caring some stuff down the stairs and thought that she was at the bottom but there was for more steps and fell down.  States she did not hit her head, denies LOC, nausea vomiting dizziness or blurred vision.  States she is not on any blood thinners.  Complains of 7/10 pain to the right lateral ankle with no improvement from her home Norco 5 mg.  States she was of usual health prior to the fall and and states that she slipped.    The history is provided by the patient.   Review of patient's allergies indicates:   Allergen Reactions    Aspirin Hives     Past Medical History:   Diagnosis Date    Anemia     Anxiety     Depression     History of psychiatric hospitalization     [place] x 1 week for depression    History of ventral hernia repair     Hypertension     Lupus     patient reports that she is being treated "like I have Lupus"    Mental disorder     Morbid obesity 12/15/2017    Psychiatric problem     Sjogren's syndrome 2013    Therapy     TMJ (temporomandibular joint disorder)      Past Surgical History:   Procedure Laterality Date    breast reduction      BREAST SURGERY  2005     SECTION      x 2    HYSTERECTOMY  2005    INGUINAL HERNIA REPAIR      LAPAROSCOPIC TOTAL HYSTERECTOMY  2012    ASA    TOTAL REDUCTION MAMMOPLASTY      TUBAL LIGATION      VENTRAL HERNIA REPAIR       Family History   Problem Relation Age of Onset    Cancer Father         colon ca    Colon cancer Father     Depression Father     Schizophrenia Father     Anxiety disorder Father     Arthritis Father     Mental illness Father     Pancreatic " cancer Brother     Depression Brother     Alcohol abuse Brother     Liver cancer Sister     Cancer Sister     Depression Sister     Anxiety disorder Sister     Heart attack Sister     COPD Sister     Heart disease Mother     Hypertension Mother     Pancreatic cancer Unknown     Liver cancer Unknown     No Known Problems Daughter     Asthma Son     Cancer Sister         throat and colon    Depression Sister     Miscarriages / Stillbirths Sister     Suicide Neg Hx     Ovarian cancer Neg Hx     Breast cancer Neg Hx      Social History     Tobacco Use    Smoking status: Never    Smokeless tobacco: Never   Substance Use Topics    Alcohol use: Yes     Alcohol/week: 1.0 standard drink     Types: 1 Glasses of wine per week     Comment: rarely     Drug use: Never     Types: Hydrocodone     Review of Systems   Constitutional:  Negative for chills and fever.   HENT:  Negative for sore throat.    Respiratory:  Negative for cough and shortness of breath.    Cardiovascular:  Negative for chest pain.   Gastrointestinal:  Negative for abdominal pain.   Genitourinary:  Negative for difficulty urinating and dysuria.   Musculoskeletal:  Positive for arthralgias and joint swelling.   Skin: Negative.    Neurological:  Negative for weakness.   Psychiatric/Behavioral:  Negative for confusion.      Physical Exam     Initial Vitals [11/03/22 1146]   BP Pulse Resp Temp SpO2   96/61 86 18 98.3 °F (36.8 °C) 99 %      MAP       --         Physical Exam    Nursing note and vitals reviewed.  Constitutional: She appears well-developed and well-nourished. She is not diaphoretic. No distress.   HENT:   Head: Normocephalic and atraumatic.   Eyes: Conjunctivae are normal. Pupils are equal, round, and reactive to light.   Neck: Neck supple.   No midline cervical tenderness or step-offs.   Normal range of motion.  Cardiovascular:  Normal rate, regular rhythm, normal heart sounds and intact distal pulses.     Exam reveals no gallop and no friction rub.        No murmur heard.  Pulmonary/Chest: Breath sounds normal.   Abdominal: Abdomen is soft. Bowel sounds are normal. She exhibits no distension and no mass. There is no abdominal tenderness. There is no rebound and no guarding.   Musculoskeletal:         General: Tenderness and edema present.      Cervical back: Normal range of motion and neck supple.      Comments: No midfoot or navicular tenderness to the right foot.  2+ DP/PT bilaterally.  Tenderness over the right lateral malleolus with overlying swelling but no skin changes or abrasions noted.      Bilateral knees with intact flexor and extensor mechanism.  No tenderness to palpation.  No laxity on exam.     Neurological: She is alert and oriented to person, place, and time.   Skin: Skin is warm and dry. Capillary refill takes less than 2 seconds.   Small superficial abrasion to the left anterior knee.   Psychiatric: She has a normal mood and affect.       ED Course   Procedures  Labs Reviewed   CBC W/ AUTO DIFFERENTIAL - Abnormal; Notable for the following components:       Result Value    Hemoglobin 10.7 (*)     Hematocrit 36.1 (*)     MCH 25.4 (*)     MCHC 29.6 (*)     All other components within normal limits   COMPREHENSIVE METABOLIC PANEL - Abnormal; Notable for the following components:    Sodium 135 (*)     BUN 27 (*)     Creatinine 1.7 (*)     eGFR 35.4 (*)     All other components within normal limits   URINALYSIS, REFLEX TO URINE CULTURE - Abnormal; Notable for the following components:    Appearance, UA Hazy (*)     Protein, UA 1+ (*)     All other components within normal limits    Narrative:     Specimen Source->Urine   URINALYSIS MICROSCOPIC - Abnormal; Notable for the following components:    Bacteria Many (*)     Hyaline Casts, UA 12 (*)     All other components within normal limits    Narrative:     Specimen Source->Urine   ISTAT PROCEDURE - Abnormal; Notable for the following components:    POC Creatinine 1.9 (*)     POC TCO2 (MEASURED) 30 (*)      All other components within normal limits   HIV 1 / 2 ANTIBODY    Narrative:     Release to patient->Immediate   HEPATITIS C ANTIBODY    Narrative:     Release to patient->Immediate   ISTAT CHEM8          Imaging Results              X-Ray Ankle Complete Right (Final result)  Result time 11/03/22 16:59:37      Final result by Sunny Morillo MD (11/03/22 16:59:37)                   Impression:      As above.      Electronically signed by: Sunny Morillo MD  Date:    11/03/2022  Time:    16:59               Narrative:    EXAMINATION:  XR ANKLE COMPLETE 3 VIEW RIGHT    CLINICAL HISTORY:  distal fibular fracture;    TECHNIQUE:  AP, lateral, and oblique images of the right ankle were performed, labeled weight-bearing.    COMPARISON:  Right ankle series earlier same day    FINDINGS:  No significant change in the overall configuration of previously reported distal fibular acute spiral type fracture with minimal displacement.  No new displaced fracture or dislocation.  Otherwise no change.                                       X-Ray Knee 3 View Right (Final result)  Result time 11/03/22 16:53:36      Final result by Sunny Morillo MD (11/03/22 16:53:36)                   Impression:      No acute displaced fracture-dislocation identified.      Electronically signed by: Sunny Morillo MD  Date:    11/03/2022  Time:    16:53               Narrative:    EXAMINATION:  XR KNEE 3 VIEW RIGHT    CLINICAL HISTORY:  Unspecified fracture of shaft of right fibula, initial encounter for closed fracture    TECHNIQUE:  AP, lateral, and Merchant views of the right knee were performed.    COMPARISON:  None    FINDINGS:  Bones are well mineralized.  Overall alignment is within normal limits.  No displaced fracture, dislocation or destructive osseous process.  No large suprapatellar joint effusion.  Minimal spurring of the posterior patella.  No subcutaneous emphysema or radiodense retained foreign body.                                        X-Ray Ankle Complete Right (Final result)  Result time 11/03/22 14:20:23      Final result by Sunny Morillo MD (11/03/22 14:20:23)                   Impression:      Distal fibular acute fracture with intra-articular extension and associated ankle joint effusion/soft tissue swelling, as above.      Electronically signed by: Sunny Morillo MD  Date:    11/03/2022  Time:    14:20               Narrative:    EXAMINATION:  XR ANKLE COMPLETE 3 VIEW RIGHT    CLINICAL HISTORY:  Pain in unspecified ankle and joints of unspecified foot    TECHNIQUE:  AP, lateral, and oblique images of the right ankle were performed.    COMPARISON:  Right foot series 12/14/2012    FINDINGS:  Acute appearing spiral type fracture with minimal displacement involving the distal fibular metaphysis extending to the lateral malleolar base medially at the level of the ankle joint.  Associated soft tissue swelling about the lateral ankle.  There is suspected ankle joint effusion.  No dislocation or destructive osseous process.  Minimal degenerative change.  No subcutaneous emphysema or radiodense retained foreign body.                                       Medications   lactated ringers bolus 1,000 mL (0 mLs Intravenous Stopped 11/3/22 1508)   morphine injection 2 mg (2 mg Intravenous Given 11/3/22 1302)   morphine injection 4 mg (4 mg Intravenous Given 11/3/22 1450)   oxyCODONE-acetaminophen 5-325 mg per tablet 1 tablet (1 tablet Oral Given 11/3/22 1750)   mupirocin 2 % ointment ( Topical (Top) Given 11/3/22 1818)     Medical Decision Making:   History:   Old Medical Records: I decided to obtain old medical records.  Initial Assessment:   Presents to the ED for mechanical trip and fall down 4 steps.  No head injury.  On blood thinners.  Tenderness and swelling over the right lateral malleolus.  Lower extremities neurovascularly intact.  Abrasion to the left anterior knee as well as the bottom of the right great toe.  Differential Diagnosis:    Ankle fracture, Solitario new fracture, foot fracture, UTI, CANDELARIA  Clinical Tests:   Lab Tests: Ordered and Reviewed  Radiological Study: Ordered and Reviewed  ED Management:  Denies any urinary symptoms states that she did have a UTI a week ago.  Noted to have a slightly elevated creatinine.  Improved slightly with IV fluids.  She tolerates p.o. without difficulty and has been asymptomatic will have her follow-up with PCP encourage p.o. hydration at home.  UA negative for UTI.  X-ray of the foot shows distal fibular fracture which is closed.  Weight-bearing x-ray did not show any changes.  X-ray of the knee unremarkable.  Patient was placed in short posterior splint with stirrup and given crutches.  Will discharge with short course of pain medication and advised Tylenol as needed.  Discuss that she should not take over 3 g per day.  Outpatient referral for Orthopedics was placed.  Discussed return to ED precautions for any new worsening symptoms.                        Clinical Impression:   Final diagnoses:  [M25.579] Ankle pain  [S82.401A] Closed right fibular fracture  [N17.9] CANDELARIA (acute kidney injury) (Primary)        ED Disposition Condition    Discharge Stable          ED Prescriptions       Medication Sig Dispense Start Date End Date Auth. Provider    oxyCODONE-acetaminophen (PERCOCET) 5-325 mg per tablet Take 1 tablet by mouth every 6 (six) hours as needed for Pain. 12 tablet 11/3/2022 11/5/2022 Ralph Dyer PA-C          Follow-up Information       Follow up With Specialties Details Why Contact Info Additional Information    [facility] - [facility] Orthoped[facility] Call   [address], [address]  [facility] [address] [phone] [facility] - Main Building, 5th Floor Please park in [facility] and take Atrium elevator    Pratik Rasmussen MD Internal Medicine In 1 week  [address] [facility] [id number] [phone]                Ralph Dyer PA-C  11/03/22 1361        Ralph Dyer PA-C  11/03/22 1824

## 2022-11-04 ENCOUNTER — TELEPHONE (OUTPATIENT)
Dept: INTERNAL MEDICINE | Facility: CLINIC | Age: 52
End: 2022-11-04
Payer: MEDICARE

## 2022-11-04 ENCOUNTER — OFFICE VISIT (OUTPATIENT)
Dept: ORTHOPEDICS | Facility: CLINIC | Age: 52
End: 2022-11-04
Payer: MEDICARE

## 2022-11-04 VITALS
BODY MASS INDEX: 42.85 KG/M2 | HEIGHT: 67 IN | DIASTOLIC BLOOD PRESSURE: 62 MMHG | TEMPERATURE: 98 F | WEIGHT: 273 LBS | SYSTOLIC BLOOD PRESSURE: 80 MMHG

## 2022-11-04 DIAGNOSIS — S82.401A CLOSED RIGHT FIBULAR FRACTURE: ICD-10-CM

## 2022-11-04 PROCEDURE — 3008F BODY MASS INDEX DOCD: CPT | Mod: CPTII,S$GLB,, | Performed by: PHYSICIAN ASSISTANT

## 2022-11-04 PROCEDURE — 3078F DIAST BP <80 MM HG: CPT | Mod: CPTII,S$GLB,, | Performed by: PHYSICIAN ASSISTANT

## 2022-11-04 PROCEDURE — 99999 PR PBB SHADOW E&M-EST. PATIENT-LVL IV: ICD-10-PCS | Mod: PBBFAC,,, | Performed by: PHYSICIAN ASSISTANT

## 2022-11-04 PROCEDURE — 3074F PR MOST RECENT SYSTOLIC BLOOD PRESSURE < 130 MM HG: ICD-10-PCS | Mod: CPTII,S$GLB,, | Performed by: PHYSICIAN ASSISTANT

## 2022-11-04 PROCEDURE — 1159F PR MEDICATION LIST DOCUMENTED IN MEDICAL RECORD: ICD-10-PCS | Mod: CPTII,S$GLB,, | Performed by: PHYSICIAN ASSISTANT

## 2022-11-04 PROCEDURE — 1159F MED LIST DOCD IN RCRD: CPT | Mod: CPTII,S$GLB,, | Performed by: PHYSICIAN ASSISTANT

## 2022-11-04 PROCEDURE — 99204 PR OFFICE/OUTPT VISIT, NEW, LEVL IV, 45-59 MIN: ICD-10-PCS | Mod: S$GLB,,, | Performed by: PHYSICIAN ASSISTANT

## 2022-11-04 PROCEDURE — 99999 PR PBB SHADOW E&M-EST. PATIENT-LVL IV: CPT | Mod: PBBFAC,,, | Performed by: PHYSICIAN ASSISTANT

## 2022-11-04 PROCEDURE — 3074F SYST BP LT 130 MM HG: CPT | Mod: CPTII,S$GLB,, | Performed by: PHYSICIAN ASSISTANT

## 2022-11-04 PROCEDURE — 3008F PR BODY MASS INDEX (BMI) DOCUMENTED: ICD-10-PCS | Mod: CPTII,S$GLB,, | Performed by: PHYSICIAN ASSISTANT

## 2022-11-04 PROCEDURE — 99204 OFFICE O/P NEW MOD 45 MIN: CPT | Mod: S$GLB,,, | Performed by: PHYSICIAN ASSISTANT

## 2022-11-04 PROCEDURE — 3078F PR MOST RECENT DIASTOLIC BLOOD PRESSURE < 80 MM HG: ICD-10-PCS | Mod: CPTII,S$GLB,, | Performed by: PHYSICIAN ASSISTANT

## 2022-11-04 NOTE — PROGRESS NOTES
Outpatient Care Management  Plan of Care Follow Up Visit    Patient: Jaki Bajwa  MRN: [id number]  Date of Service: 09/12/2022  Completed by: Yi Page RN  Referral Date: 04/25/2022    Reason for Visit   Patient presents with    OPCM Chart Review     8/29/2022    OPCM RN First Follow-Up Attempt     8/29/2022    Update Plan Of Care     09/12/22       Brief Summary: Pt reports she does not have any self care habits in place at this time for GI Bleed. Educated pt on self care measures for GI bleed.     Patient Summary     Involvement of Care:  Do I have permission to speak with other family members about your care?  No    Patient Reported Labs & Vitals:  1.  Any Patient Reported Labs & Vitals?  No  2.  Patient Reported Blood Pressure:     3.  Patient Reported Pulse:     4.  Patient Reported Weight (Kg):     5.  Patient Reported Blood Glucose (mg/dl):       Medical and social history was reviewed with patient and/or caregiver.     Clinical Assessment     Reviewed and provided basic information on available community resources for mental health, transportation, wellness resources, and palliative care programs with patient and/or caregiver.     Complex Care Plan     Care plan was discussed and completed today with input from patient and/or caregiver.    Patient Instructions     Instructions were provided via the [employer] patient resources and are available for the patient to view on the patient portal.    Next Steps: F/U concerning self care habits for GI bleed    Follow up in about 4 weeks (around 9/26/2022).    Todays OPCM Self-Management Care Plan was developed with the patients/caregivers input and was based on identified barriers from todays assessment.  Goals were written today with the patient/caregiver and the patient has agreed to work towards these goals to improve his/her overall well-being. Patient verbalized understanding of the care plan, goals, and all of today's instructions. Encouraged  patient/caregiver to communicate with his/her physician and health care team about health conditions and the treatment plan.  Provided my contact information today and encouraged patient/caregiver to call me with any questions as needed.

## 2022-11-04 NOTE — PROGRESS NOTES
Outpatient Care Management  Plan of Care Follow Up Visit    Patient: Jaki Bajwa  MRN: [id number]  Date of Service: 11/4/2022  Completed by: Yi Page RN  Referral Date: 04/25/2022    Reason for Visit   Patient presents with    OPCM Chart Review     10/24/22    OPCM RN First Follow-Up Attempt     10/24/22    Update Plan Of Care     11/4/22       Brief Summary:  Pt reports no s/s of GI bleed at this time. Pt still compliant with taking stool softener daily. Pt able to teach back that exercising for 30 minutes 3 days a week and avoiding straining when having a bowel movement. Pt reports she is till drinking the 4 bottled feliz daily and has been avoiding straining when having a bowel movement. Case Closed     Patient Summary     Involvement of Care:  Do I have permission to speak with other family members about your care?  No    Patient Reported Labs & Vitals:  1.  Any Patient Reported Labs & Vitals?  No  2.  Patient Reported Blood Pressure:     3.  Patient Reported Pulse:     4.  Patient Reported Weight (Kg):     5.  Patient Reported Blood Glucose (mg/dl):       Medical and social history was reviewed with patient and/or caregiver.     Clinical Assessment     Reviewed and provided basic information on available community resources for mental health, transportation, wellness resources, and palliative care programs with patient and/or caregiver.     Complex Care Plan     Care plan was discussed and completed today with input from patient and/or caregiver.    Patient Instructions     Instructions were provided via the [employer] patient resources and are available for the patient to view on the patient portal.    Next Steps: Case Closed     No follow-ups on file.    Todays OPCM Self-Management Care Plan was developed with the patients/caregivers input and was based on identified barriers from todays assessment.  Goals were written today with the patient/caregiver and the patient has agreed to work towards  these goals to improve his/her overall well-being. Patient verbalized understanding of the care plan, goals, and all of today's instructions. Encouraged patient/caregiver to communicate with his/her physician and health care team about health conditions and the treatment plan.  Provided my contact information today and encouraged patient/caregiver to call me with any questions as needed.

## 2022-11-04 NOTE — PROGRESS NOTES
Placed RT plaster short leg splint ordered by Chelle Alberto. Skin intact. No redness and no bruising.

## 2022-11-04 NOTE — TELEPHONE ENCOUNTER
----- Message from Miley Castelan sent at 11/4/2022 12:05 PM CDT -----  Regarding: Advice  Contact: JAKI BAJWA [[id number]]  Name of Who is Calling Jaki Bajwa            What is the request in detail:  She states she is taking  pain RX oxyCODONE-acetaminophen (PERCOCET) 5-325 mg per tablet due to her Closed right fibular fracture,   she states she just wanted to let MD Rasmussen know   Please advise she is requesting a call back to consult in regards         Can the clinic reply by [facility]: No           [facility] Back [facility][phone]

## 2022-11-04 NOTE — PROGRESS NOTES
Outpatient Care Management  Plan of Care Follow Up Visit    Patient: Jaki Bajwa  MRN: [id number]  Date of Service: 09/26/2022  Completed by: Yi Page RN  Referral Date: 04/25/2022    Reason for Visit   Patient presents with    OPCM Chart Review     09/26/2022    OPCM RN First Follow-Up Attempt     09/26/2022      Update Plan Of Care     10/10/2022       Brief Summary: Pt reports no s/s of GI bleed at this time. Pt able to teach back the self care habit of drinking 6 to 8 glasses of water but no other self care habits. Pt reports she has been drinking up to 4 bottled feliz daily to prevent GI bleed and UTI.     Patient Summary     Involvement of Care:  Do I have permission to speak with other family members about your care?  No    Patient Reported Labs & Vitals:  1.  Any Patient Reported Labs & Vitals?  No  2.  Patient Reported Blood Pressure:     3.  Patient Reported Pulse:     4.  Patient Reported Weight (Kg):     5.  Patient Reported Blood Glucose (mg/dl):       Medical and social history was reviewed with patient and/or caregiver.     Clinical Assessment     Reviewed and provided basic information on available community resources for mental health, transportation, wellness resources, and palliative care programs with patient and/or caregiver.     Complex Care Plan     Care plan was discussed and completed today with input from patient and/or caregiver.    Patient Instructions     Instructions were provided via the [employer] patient resources and are available for the patient to view on the patient portal.    Next Steps: F/U concerning self care habits for GI bleed     Follow up in about 4 weeks (around 10/24/2022).    Todays OPCM Self-Management Care Plan was developed with the patients/caregivers input and was based on identified barriers from todays assessment.  Goals were written today with the patient/caregiver and the patient has agreed to work towards these goals to improve his/her overall  well-being. Patient verbalized understanding of the care plan, goals, and all of today's instructions. Encouraged patient/caregiver to communicate with his/her physician and health care team about health conditions and the treatment plan.  Provided my contact information today and encouraged patient/caregiver to call me with any questions as needed.

## 2022-11-04 NOTE — PROGRESS NOTES
Subjective:      Patient ID: Jaki Bajwa is a 52 y.o. female.    Chief Complaint: Injury of the Right Foot    HPI    Patient is a 52 year old female who presented to the Ed on 11/03/22 with right ankle pain after falling on the steps.   RADS:  distal fibular acute spiral type fracture with minimal displacement.    Patient was treated in a posterior splint and NWB. She has crutches. She denied any numbness or tingling. She takes norco 5, Zanaflex, Neurontin at base line as supplied by PCP. She was prescribed percocet in the ED.     Review of Systems   Constitutional: Negative for chills and fever.   Cardiovascular:  Negative for chest pain.   Respiratory:  Negative for cough and shortness of breath.    Skin:  Negative for color change, dry skin, itching, nail changes, poor wound healing and rash.   Musculoskeletal:  Positive for falls.        Right ankle fracture    Neurological:  Negative for dizziness.   Psychiatric/Behavioral:  Negative for altered mental status. The patient is not nervous/anxious.    All other systems reviewed and are negative.      Objective:            General    Constitutional: She is oriented to person, place, and time. She appears well-developed and well-nourished. No distress.   HENT:   Head: Atraumatic.   Eyes: Conjunctivae are normal.   Cardiovascular:  Normal rate.            Pulmonary/Chest: Effort normal.   Neurological: She is alert and oriented to person, place, and time.   Psychiatric: She has a normal mood and affect. Her behavior is normal.         Right Ankle/Foot Exam     Comments:  Presented to clinic in wheel chair splint in placed, removed, moderate swelling TTP to lateral ankle.   Skin tear to plantar great toe           Assessment:       Encounter Diagnosis   Name Primary?    Closed right fibular fracture           Plan:       Discussed plan with the patient. At this time will replace into posterior splint with stirup. Order placed for a knee scooter. Return to  clinic in 1 week at that time x-ray of her ankle is needed OOS and weight bear. If stable continue non-operative treatment and place into CAM boot and begin weight bear./

## 2022-11-07 ENCOUNTER — TELEPHONE (OUTPATIENT)
Dept: RHEUMATOLOGY | Facility: CLINIC | Age: 52
End: 2022-11-07
Payer: MEDICARE

## 2022-11-07 ENCOUNTER — PATIENT MESSAGE (OUTPATIENT)
Dept: INTERNAL MEDICINE | Facility: CLINIC | Age: 52
End: 2022-11-07
Payer: MEDICARE

## 2022-11-07 NOTE — TELEPHONE ENCOUNTER
Returned pt's call.  Pt gave recent history of fall and f/u with ortho.  Pt took Oxycodone as prescribed by Ortho and is out now. Took last one this morning.  Pt has history of taking Norco and is aware not to take both - but is unsure what she should take now that Oxycodone is finished - should she go back to [place] and if so, when? Pt nervous about taking too much pain medications and was told at ortho appt to let PCP know that she was prescribed pain med by Ortho.    Her leg with fx is throbbing at times.    Pt informed that PCP is out today, but covering provider will be reviewing. Will also send to [facility].

## 2022-11-07 NOTE — TELEPHONE ENCOUNTER
----- Message from Arina Shar sent at 11/7/2022  8:35 AM CST -----  Type: Patient Call Back        Who called: Self         What is the request in detail: Pt broke her right ankle or fibula and is asking for a nurse to call her         Can the clinic reply by [facility]? No         Would the patient rather a call back or a response via [facility]? Yes         Best call back number: [phone] (home)                    Thank You

## 2022-11-07 NOTE — TELEPHONE ENCOUNTER
Called pt and resched to virtual    ----- Message from Meka Duran sent at 11/7/2022  8:58 AM CST -----  Regarding: Reschedule  Contact: [phone]  Pt is requesting call back regarding Reschedule appt 11/16/2022   Pt states she cannot make it due to Broken Fibula(Right Ankle) please Call to discuss further.

## 2022-11-14 ENCOUNTER — PATIENT MESSAGE (OUTPATIENT)
Dept: INTERNAL MEDICINE | Facility: CLINIC | Age: 52
End: 2022-11-14
Payer: MEDICARE

## 2022-11-14 ENCOUNTER — TELEPHONE (OUTPATIENT)
Dept: INTERNAL MEDICINE | Facility: CLINIC | Age: 52
End: 2022-11-14
Payer: MEDICARE

## 2022-11-14 DIAGNOSIS — G89.4 CHRONIC PAIN SYNDROME: ICD-10-CM

## 2022-11-14 NOTE — TELEPHONE ENCOUNTER
No new care gaps identified.  [facility] Embedded Care Gaps. Reference number: [id number]. 11/14/2022   10:40:54 AM CST

## 2022-11-14 NOTE — TELEPHONE ENCOUNTER
----- Message from Asha Mckeon sent at 11/14/2022 11:40 AM CST -----  Regarding: pt rx  Who Called:PENG MAYO [[id number]]    Pt states that she broke her right fibula and is a great deal of pain. She is asking if she could please get a refill on her rx.        Refill or New Rx: Refill            RX Name and Strength HYDROcodone-acetaminophen (NORCO) 5-325 mg per tablet            Is this a 30 day or 90 day RX:            Preferred Pharmacy with phone number:  [id number] [place] [address] AT [facility] [address]   Phone:  [phone]  Fax:  [phone]                    Local or Mail Order:local              Would the patient rather a call back or a response via [facility]?c/b            Best Call Back Number:PENG MAYO [[id number]]            Additional Information:Pt states that she broke her right fibula and is a great deal of pain. She is asking if she could please get a refill on her rx.

## 2022-11-15 RX ORDER — HYDROCODONE BITARTRATE AND ACETAMINOPHEN 5; 325 MG/1; MG/1
1 TABLET ORAL
Qty: 30 TABLET | Refills: 0 | Status: SHIPPED | OUTPATIENT
Start: 2022-11-15 | End: 2022-12-30 | Stop reason: SDUPTHER

## 2022-11-16 ENCOUNTER — OFFICE VISIT (OUTPATIENT)
Dept: PSYCHIATRY | Facility: CLINIC | Age: 52
End: 2022-11-16
Payer: MEDICARE

## 2022-11-16 ENCOUNTER — OFFICE VISIT (OUTPATIENT)
Dept: RHEUMATOLOGY | Facility: CLINIC | Age: 52
End: 2022-11-16
Payer: MEDICARE

## 2022-11-16 DIAGNOSIS — M84.68XA PATHOLOGICAL FRACTURE IN OTHER DISEASE, OTHER SITE, INITIAL ENCOUNTER FOR FRACTURE: ICD-10-CM

## 2022-11-16 DIAGNOSIS — N18.9 CHRONIC KIDNEY DISEASE, UNSPECIFIED CKD STAGE: ICD-10-CM

## 2022-11-16 DIAGNOSIS — R46.81 OBSESSIVE-COMPULSIVE BEHAVIOR: ICD-10-CM

## 2022-11-16 DIAGNOSIS — M35.01 SJOGREN'S SYNDROME WITH KERATOCONJUNCTIVITIS SICCA: ICD-10-CM

## 2022-11-16 DIAGNOSIS — F41.1 GAD (GENERALIZED ANXIETY DISORDER): Primary | ICD-10-CM

## 2022-11-16 DIAGNOSIS — Z13.820 SCREENING FOR OSTEOPOROSIS: Primary | ICD-10-CM

## 2022-11-16 DIAGNOSIS — F51.05 INSOMNIA DUE TO OTHER MENTAL DISORDER: ICD-10-CM

## 2022-11-16 DIAGNOSIS — F99 INSOMNIA DUE TO OTHER MENTAL DISORDER: ICD-10-CM

## 2022-11-16 DIAGNOSIS — F39 MOOD DISORDER: ICD-10-CM

## 2022-11-16 DIAGNOSIS — Z79.899 LONG-TERM USE OF PLAQUENIL: ICD-10-CM

## 2022-11-16 PROCEDURE — 1159F PR MEDICATION LIST DOCUMENTED IN MEDICAL RECORD: ICD-10-PCS | Mod: 95,CPTII,, | Performed by: INTERNAL MEDICINE

## 2022-11-16 PROCEDURE — 1159F PR MEDICATION LIST DOCUMENTED IN MEDICAL RECORD: ICD-10-PCS | Mod: CPTII,95,, | Performed by: INTERNAL MEDICINE

## 2022-11-16 PROCEDURE — 99214 OFFICE O/P EST MOD 30 MIN: CPT | Mod: 95,,, | Performed by: INTERNAL MEDICINE

## 2022-11-16 PROCEDURE — 1159F MED LIST DOCD IN RCRD: CPT | Mod: 95,CPTII,, | Performed by: INTERNAL MEDICINE

## 2022-11-16 PROCEDURE — 1160F PR REVIEW ALL MEDS BY PRESCRIBER/CLIN PHARMACIST DOCUMENTED: ICD-10-PCS | Mod: 95,CPTII,, | Performed by: INTERNAL MEDICINE

## 2022-11-16 PROCEDURE — 99214 PR OFFICE/OUTPT VISIT, EST, LEVL IV, 30-39 MIN: ICD-10-PCS | Mod: 95,,, | Performed by: INTERNAL MEDICINE

## 2022-11-16 PROCEDURE — 99499 UNLISTED E&M SERVICE: CPT | Mod: HCNC,95,, | Performed by: INTERNAL MEDICINE

## 2022-11-16 PROCEDURE — 99999 PR PBB SHADOW E&M-EST. PATIENT-LVL II: ICD-10-PCS | Mod: PBBFAC,,, | Performed by: INTERNAL MEDICINE

## 2022-11-16 PROCEDURE — 99999 PR PBB SHADOW E&M-EST. PATIENT-LVL II: CPT | Mod: PBBFAC,,, | Performed by: INTERNAL MEDICINE

## 2022-11-16 PROCEDURE — 1160F RVW MEDS BY RX/DR IN RCRD: CPT | Mod: 95,CPTII,, | Performed by: INTERNAL MEDICINE

## 2022-11-16 PROCEDURE — 1159F MED LIST DOCD IN RCRD: CPT | Mod: CPTII,95,, | Performed by: INTERNAL MEDICINE

## 2022-11-16 RX ORDER — TIZANIDINE 4 MG/1
TABLET ORAL
Qty: 180 TABLET | Refills: 5 | Status: SHIPPED | OUTPATIENT
Start: 2022-11-16 | End: 2023-03-10

## 2022-11-16 ASSESSMENT — ROUTINE ASSESSMENT OF PATIENT INDEX DATA (RAPID3)
PSYCHOLOGICAL DISTRESS SCORE: 3.3
TOTAL RAPID3 SCORE: 4.17
FATIGUE SCORE: 3
MDHAQ FUNCTION SCORE: 0
AM STIFFNESS SCORE: 0, NO
PAIN SCORE: 7
PATIENT GLOBAL ASSESSMENT SCORE: 5.5

## 2022-11-16 NOTE — PROGRESS NOTES
OUTPATIENT PSYCHIATRY RETURN VISIT    ENCOUNTER DATE:  11/16/2022  SITE:  [facility]  LENGTH OF SESSION:  10 minutes    The patient location is:  [place], not in healthcare facility  Visit type:  Audiovisual    Face to Face time with patient:  10 minutes  15 minutes of total time spent on the encounter, which includes face to face time and non-face to face time preparing to see the patient (eg, review of tests), Obtaining and/or reviewing separately obtained history, Documenting clinical information in the electronic or other health record, Independently interpreting results (not separately reported) and communicating results to the patient/family/caregiver, or Care coordination (not separately reported).     Each patient to whom he or she provides medical services by telemedicine is:  (1) informed of the relationship between the physician and patient and the respective role of any other health care provider with respect to management of the patient; and (2) notified that he or she may decline to receive medical services by telemedicine and may withdraw from such care at any time.    CHIEF COMPLAINT:  Mood      HISTORY OF PRESENTING ILLNESS:  Jaki Bajwa is a 52 y.o. female with history of Mood disorder, JUAN JOSE, OCD, social phobia, and Internet shopping addiction who presents for follow up appointment.      Plan at last appointment on 8/26/2022:  Symptoms improving.  Continue Latuda 60mg daily, Buspar 15mg TID, Wellbutrin XL 450mg daily, Doxepin 150mg qHS for insomnia and mood, and Valium 2mg daily PRN severe anxiety.    Discussed with patient informed consent, risks versus benefits, alternative treatments, side effect profile and the inherent unpredictability of individual responses to these treatments.  The patient expresses understanding of the above and displays the capacity to agree with this current plan.   Continue individual therapy with Mr. Noah LCSW.    History as  told by patient:  Broke her ankle - fell down the stairs on 11/3/22.  Went to ER so had to miss last appointment.  She has been in a lot of pain.  Has follow up tomorrow.  In the ED had CANDELARIA (Cr 1.7).  Has follow up with Dr. FAJARDO today.  Mood-sinclair she has been doing well.  Denies depression.  Does have some bad days - more emotional but ok.  Feels she is doing well with taking Valium PRN instead of daily.  Knows she can not take with her pain medication.    Medication side effects:  Denies  Medication compliance:  Yes    PSYCHIATRIC REVIEW OF SYSTEMS:  Trouble with sleep:  Yes due to foot pain  Appetite changes:  Denies  Weight changes:  Denies  Lack of energy:  At times  Anhedonia:  Sometimes  Somatic symptoms:  Denies  Libido:  Denies  Anxiety/panic:  Yes, chronic but stable  Guilty/hopeless:  Denies  Self-injurious behavior/risky behavior:  Denies  Any drugs:  Denies  Alcohol:  Denies    MEDICAL REVIEW OF SYSTEMS:  Complete review of systems performed covering Constitutional, Musculoskeletal, Neurologic.  All systems negative except for that covered in HPI.    PAST PSYCHIATRIC, MEDICAL, AND SOCIAL HISTORY REVIEWED  The patient's past medical, family and social history have been reviewed and updated as appropriate within the electronic medical record - see encounter notes.    MEDICATIONS:    Current Outpatient Medications:     buPROPion (WELLBUTRIN XL) 150 MG TB24 tablet, Take 3 tablets (450 mg total) by mouth every morning., Disp: 270 tablet, Rfl: 1    busPIRone (BUSPAR) 15 MG tablet, TAKE 1 TABLET(15 MG) BY MOUTH THREE TIMES DAILY, Disp: 270 tablet, Rfl: 1    cetirizine (ZYRTEC) 10 MG tablet, Take 1 tablet (10 mg total) by mouth every evening., Disp: 30 tablet, Rfl: 0    cycloSPORINE (RESTASIS) 0.05 % ophthalmic emulsion, Place 1 drop into both eyes 2 (two) times daily., Disp: 60 each, Rfl: 1    diazePAM (VALIUM) 2 MG tablet, Take 1 tablet (2 mg total) by mouth daily as needed (Severe anxiety/Panic attack)., Disp:  30 tablet, Rfl: 2    docusate sodium (COLACE) 100 MG capsule, Take 1 capsule (100 mg total) by mouth 2 (two) times daily as needed for Constipation., Disp: 60 capsule, Rfl: 0    doxepin (SINEQUAN) 75 MG capsule, TAKE 2 CAPSULES(150 MG) BY MOUTH EVERY EVENING, Disp: 60 capsule, Rfl: 11    estradioL (ESTRACE) 0.01 % (0.1 mg/gram) vaginal cream, 1g vaginally daily x 2 weeks, then BIW as directed, Disp: 42.5 g, Rfl: 2    fluconazole (DIFLUCAN) 150 MG Tab, Take 1 tablet (150 mg total) by mouth every 72 hours as needed., Disp: 6 tablet, Rfl: 0    fluconazole (DIFLUCAN) 150 MG Tab, Take 1 tablet (150 mg total) by mouth every 72 hours as needed (vaginal  yeast)., Disp: 4 tablet, Rfl: 0    fluconazole (DIFLUCAN) 200 MG Tab, Take 1 tablet (200 mg total) by mouth every 72 hours as needed., Disp: 2 tablet, Rfl: 0    gabapentin (NEURONTIN) 300 MG capsule, TAKE 1 CAPSULE(300 MG) BY MOUTH THREE TIMES DAILY, Disp: 90 capsule, Rfl: 5    gabapentin (NEURONTIN) 300 MG capsule, Take 1 capsule (300 mg total) by mouth 3 (three) times daily., Disp: 90 capsule, Rfl: 5    HYDROcodone-acetaminophen (NORCO) 5-325 mg per tablet, Take 1 tablet by mouth every 24 hours as needed for Pain. Quantity medically necessary, Disp: 30 tablet, Rfl: 0    hydrOXYchloroQUINE (PLAQUENIL) 200 mg tablet, TAKE 1 TABLET BY MOUTH TWICE DAILY, Disp: 180 tablet, Rfl: 2    irbesartan-hydrochlorothiazide (AVALIDE) 150-12.5 mg per tablet, Take 1 tablet by mouth once daily., Disp: 90 tablet, Rfl: 3    lurasidone (LATUDA) 60 mg Tab tablet, Take 1 tablet (60 mg total) by mouth once daily., Disp: 90 tablet, Rfl: 3    naproxen (NAPROSYN) 500 MG tablet, TAKE 1 TABLET(500 MG) BY MOUTH TWICE DAILY AS NEEDED FOR PAIN Strength: 500 mg, Disp: 180 tablet, Rfl: 1    NARCAN 4 mg/actuation [place], SPRAY 1 SPRAY IN NOSTRIL ONCE FOR 1 DOSE, Disp: , Rfl:     nitrofurantoin (MACRODANTIN) 50 MG capsule, Take 1 capsule (50 mg total) by mouth every evening., Disp: 90 capsule, Rfl: 1     "omeprazole (PRILOSEC) 40 MG capsule, TAKE 1 CAPSULE(40 MG) BY MOUTH EVERY DAY, Disp: 90 capsule, Rfl: 3    pilocarpine (SALAGEN) 5 MG Tab, Take 1 tablet (5 mg total) by mouth 2 (two) times daily., Disp: 60 tablet, Rfl: 11    tiZANidine (ZANAFLEX) 4 MG tablet, 8 mg tid, Disp: 180 tablet, Rfl: 5    triamcinolone acetonide 0.1% (KENALOG) 0.1 % cream, Apply topically 2 (two) times daily as needed (rash). X 2 weeks to rashes PRN, Disp: 80 g, Rfl: 2    ALLERGIES:  Review of patient's allergies indicates:   Allergen Reactions    Aspirin Hives       PSYCHIATRIC EXAM:  There were no vitals filed for this visit.  Appearance:  Well groomed, appearing healthy and of stated age  Behavior:  Cooperative, pleasant, no psychomotor agitation or retardation  Speech:  Normal rate, rhythm, prosody, and volume  Mood:  "Ok"  Affect:  Euthymic  Thought Process:  Linear, logical, goal directed  Thought Content:  Negative for suicidal ideation, homicidal ideation, delusions or hallucinations.  Associations:  Intact  Memory:  Grossly Intact  Level of Consciousness/Orientation:  Grossly intact  Fund of Knowledge:  Good  Attention:  Good  Language:  Fluent, able to name abstract and concrete objects  Insight:  Fair  Judgment:  Intact  Psychomotor signs:  No abnormal movements of face  Gait:  Unable to assess via virtual visit      RELEVANT LABS/STUDIES:    Lab Results   Component Value Date    WBC 7.97 11/03/2022    HGB 10.7 (L) 11/03/2022    HCT 36.1 (L) 11/03/2022    MCV 86 11/03/2022     11/03/2022     BMP  Lab Results   Component Value Date     (L) 11/03/2022    K 4.2 11/03/2022     11/03/2022    CO2 27 11/03/2022    BUN 27 (H) 11/03/2022    CREATININE 1.7 (H) 11/03/2022    CALCIUM 9.5 11/03/2022    ANIONGAP 8 11/03/2022    ESTGFRAFRICA 45.5 (A) 06/15/2022    EGFRNONAA 39.5 (A) 06/15/2022     Lab Results   Component Value Date    ALT 10 11/03/2022    AST 14 11/03/2022    ALKPHOS 65 11/03/2022    BILITOT 0.4 11/03/2022 "     Lab Results   Component Value Date    TSH 2.563 09/22/2021     Lab Results   Component Value Date    HGBA1C 5.3 10/13/2021       IMPRESSION:    Jaki Bajwa is a 52 y.o. female with history of Mood disorder, JUAN JOSE, OCD, social phobia, and Internet shopping addiction who presents for follow up appointment.    DIAGNOSES:    ICD-10-CM ICD-9-CM   1. JUAN JOSE (generalized anxiety disorder)  F41.1 300.02   2. Obsessive-compulsive behavior  R46.81 300.3   3. Mood disorder  F39 296.90   4. Insomnia due to other mental disorder  F51.05 300.9    F99 327.02        PLAN:  Symptoms stable.    Continue Latuda 60mg daily, Buspar 15mg TID, Wellbutrin XL 450mg daily, Doxepin 150mg qHS, and Valium 2mg daily PRN severe anxiety.    Discussed with patient informed consent, risks versus benefits, alternative treatments, side effect profile and the inherent unpredictability of individual responses to these treatments.  The patient expresses understanding of the above and displays the capacity to agree with this current plan.   Continue individual therapy with Mr. Noah LCSW.    RETURN TO CLINIC:  Follow up in 3 months (on 2/7/2023). In person

## 2022-11-16 NOTE — PROGRESS NOTES
Chief Complaint   Patient presents with    Disease Management           The patient location is: Home  The chief complaint leading to consultation is: sjogren's syndrome    Visit type: audiovisual    Face to Face time with patient: 20   minutes of total time spent on the encounter, which includes face to face time and non-face to face time preparing to see the patient (eg, review of tests), Obtaining and/or reviewing separately obtained history, Documenting clinical information in the electronic or other health record, Independently interpreting results (not separately reported) and communicating results to the patient/family/caregiver, or Care coordination (not separately reported).         Each patient to whom he or she provides medical services by telemedicine is:  (1) informed of the relationship between the physician and patient and the respective role of any other health care provider with respect to management of the patient; and (2) notified that he or she may decline to receive medical services by telemedicine and may withdraw from such care at any time.    Notes:     History of presenting illness    52  year old black female has     Joint pains in the knees and ankles   Back pain   Diffuse aching  Nocturnal leg cramps  Worse pain with activity  Hand paresthesias+  Morning pain but no morning stiffness  No joint swelling     She now has a diagnosis of fibromyalgia  Diagnosis + confirmed by atleast 2 rheumatologists  On gabapentin 300 mg tid  on naprosyn 500 mg daily  Tried and failed flexeril  On zanaflex 4 mg bid prn for TMJ syndrome and muscle spasms   Takes doxepin at night 150 mg for insomnia   She follows with pain clinic and takes pain medications  She gets migraines but not severe  She has tested negative for sleep apnea    She has anxiety and depression : on wellbutrin 150 to 300 mg and buspar  5 mg bid   Valium is prn only  On latuda 60 mg     Dry eyes and dry mouth and inflammatory  arthritis/arthralgia   Abnormal labs   Sjogren's syndrome +   On plaquenil 200 mg bid   Pilocarpine makes her nauseated   So we gave cevelimine   Dry mouth is severe at night   Dry eyes severe /but if she uses restasis she has symptom control  No recurrent conjunctivitis or uveitis or scleritis or episcleritis but she remembers once they told her she has inflammation in the eyes due to dry eyes   Sees ophthalmology     She has dry patches of skin   Areas of pigmentation on the elbows and thighs   Butterfly like pigmentation on the eyelids and underneath the eyes   Dermatology says : nummular dermatitis and seborrheic keratosis   No other skin rashes,photosensitivity   No telangiectasias   No calcinosis   No psoriasis   She does have patchy alopecia ,b/l frontally     No oral and nasal ulcers     No pleurisy or any cardiopulmonary complaints     No dysphagia,diplopia and dysphonia and muscle weakness     No v/d/c   Nausea and  acid reflux+ on PPIs   [facility]  She has hiatal hernia     She has raynaud's+   No digital ulcers     No cytopenias   Mostly irom deficiency anemia  White count and plt count ok  No menorrhagia /hysterectomy  Diet is poor    No renal issues     No blood clots     No fever,chills,night sweats,weight loss and loss of appetite  No unexplained lymphadenopathy and parotitis      No pregnancy losses/pre term deliveries /pregnancy complications     No new onset headaches     No chronic or bloody diarrhea with no u colitis or crohn's /inflammatory bowel disease     No vaginal or  urethral  d/c/STDs/no ulcers     No unexplained neurologic symptoms       Labs     White count always normal    Anemia     ESR 25/23  CRP 3.3/6.8    Creat and GFR always normal  LFTs ok    OLINDA negative     Hep A,B,C negative   HIV negative    Has had UTIs and proteinuria and hematuria  She has to see urology  The UTIs are always s/p intercourse    8/2020 labs     OLINDA neg  SSA positive 2.19  nml complements    Dsdna neg  cryos nml  myomarker panel neg  Ck,aldolase nml  RF  Urine nml  UPCR neg  GFR 54.9  Immunoglobulins elevated 2088  GIGI neg,LDH nml  B12,folate nml  retic nml      First follow up  9/2021    Hands hurt/ache  Foot and ankles : swell now and then   It comes and goes away  She takes a diuretic     Dry eyes and dry mouth-ok    On plaquenil 200 mg bid     On evoxac 30 mg tid-causes stomach upset    1/2022    Some aches and pains    On plaquenil 200 mg bid  On evoxac 30 mg daily-cant tolerate more   Dry eyes and dry mouth-ok    5/2022    Iron infusions let to constipation  She went to the ER  GFR dropped to 28.7  NOW UPTO >60  UA -uti  On antibiotics    Dry eyes and mouth- does well  Drinks well    On plaquenil 200 mg bid  Eye exam-pending    Doesn't want evoxac  Will try pilocarpine     White count nml  H/h 10.5/36.9  Iron deficiency  plts nml    Sees hematology     2/2022    Complements,cryos,dsdna,CRP nml  UA,UPCR nml      11/2022    Nov-tibial fracture-Healing well-fell down the steps-mechanical fall  Will need a dexa    Dry eyes-eye drops and restasis helps  Dry mouth -pilocarpine helps    On plaquenil 200 mg bid-no major joint pains  No gland enlargement,lymph node enlargement,rashes     Anemia- 10.7/36.1  Stopped iron infusions  Nml white count and plts    GFR 35  UA 1+ protein  Iron -normal in June 2022  We dont know the cause of the low GFR      Past history : sjogren's,fibromyalgia     Family history : lupus cousin and cancers    Social history : not a smoker,alcohol user        Review of Systems   Constitutional:  Negative for fever and unexpected weight change.   HENT:  Negative for mouth sores and trouble swallowing.    Eyes:  Negative for redness.   Respiratory:  Negative for cough and shortness of breath.    Cardiovascular:  Negative for chest pain.   Gastrointestinal:  Negative for constipation and diarrhea.   Genitourinary:  Negative for dysuria and genital sores.   Skin:  Positive for rash.    Neurological:  Negative for headaches.   Hematological:  Does not bruise/bleed easily.     As detailed above     MSK exam  nml    Physical Exam   Constitutional: She is oriented to person, place, and time. No distress.   HENT:   Head: Normocephalic.   Mouth/Throat: Oropharynx is clear and moist.   Eyes: Pupils are equal, round, and reactive to light. Conjunctivae are normal. Right eye exhibits no discharge. Left eye exhibits no discharge. No scleral icterus.   Neck: No thyromegaly present.   Cardiovascular: Normal rate, regular rhythm and normal heart sounds.   Pulmonary/Chest: Effort normal and breath sounds normal. No stridor.   Abdominal: Soft. Bowel sounds are normal.   Musculoskeletal:         General: Normal range of motion.      Cervical back: Normal range of motion.   Lymphadenopathy:     She has no cervical adenopathy.   Neurological: She is alert and oriented to person, place, and time.   Skin: Skin is warm. No rash noted. She is not diaphoretic.   Psychiatric: Affect and judgment normal.     She has lost a lot of hair frontally   Its like a bald patch  Its chronic  There is a mole as well    She has dry hyperpigmented skin on the elbows and dry patches on thighs for evaluation  She has frontal baldness  She has a mole like lesion   She has hyperpigmented rash on the eyelids and under the eyes       Assessment     52 year old black female with    -Sjogren's syndrome  Sicca symptoms and inflammatory arthritis    Fibromyalgia   -chronic pain + RLS   -insomnia/no sleep apnea  -anxiety and depression     Joint pains  Hands,feet,ankles and knees    Fluid retention needing diuretics and compression stockings    CTS braces help    Anemia- sees hematology ,completed iron infusions    Very concerning- the low GFR -cause not known    2/2022    Complements,cryos,dsdna,CRP nml  UA,UPCR nml    Nov-tibial fracture-Healing well-fell down the steps-mechanical fall  Will need a dexa      1. Screening for osteoporosis     2. Pathological fracture in other disease, other site, initial encounter for fracture    3. Chronic kidney disease, unspecified CKD stage    4. Sjogren's syndrome with keratoconjunctivitis sicca    5. Long-term use of Plaquenil          Reviewed labs/xrays  Reviewed medications    Ordered labs /xrays    F/u     Plan    Order dexa    Do Sjogren's labs     Nephrology- to evaluate the cause   They attributed it to constipation,UTI,BP Medications  But GFR is very low-35  Needs extensive w/u        April 2022 was the first time it dropped and then improved to > 60 but now dropping again    Dry eye management   -on restasis  She now sees ophthalmology    Dry mouth management   Cant tolerate evoxac 30 mg tid -cant tolerate 30 mg bid  Tolerates pilocarpine 5 mg bid    Skin issues : dry hyperpigmented skin on the elbows and dry patches on thighs   She has frontal baldness  She has a mole like lesion   She has hyperpigmented rash on the eyelids and under the eyes   She has nummular dermatitis and seborrheic dermatitis   Dermatology saw her,on topical medications     Counselled extraglandular manifestations   Counselled risk of lymphoma    Use of vaginal lubricants/estrogen creams   Seeing Gyn    Use hypoallergenic soaps/lotions for the skin  Avoid drafts from air conditioners/heaters/radiators  Avoid detergents,deodorant soaps,very hot water  Use humidifiers    Have to order CBC,CMP,ESR,CRP,urine analysis,urine creatinine,urine protein,cryos and complements,globulins,quantitative immunoglobulins    Anemia being addressed-she sees hematology  Bone marrow biopsy 2014 : nml  Hematology note :  Since May 2012,  hemoglobin values have been between 10.1-11.6.   She had hysterectomy and has no ongoing menstrual losses. No s/s of mal-absorption. Diet is normal/regular.   No overt bleeding. No melena. She has fatigue and heart burn.  Stool OB negative in Jan 2020. UA negative for blood/ RBC in Aug 2020.    She received iv injectafer  on 9/30 and 10/7/20. Serum ferritin now normal. LDH, reticuloyte count, b12, folate all normal today. Hemoglobin 11.4g/dl. GIGI negative.  On oral iron once or twice daily on empty stomach.     White count always normal  Always anemic   plt count always normal    Continue plaquenil 200 mg bid  Eye exam and routine eval once a year    Pain management :   Narcotics at pain clinic  Gabapentin 300 mg tid  zanaflex dose increased to 8 mg tid    Insomnia management : doxepin  No sleep apnea    Anxiety and depression management  On latuda,wellbutrin and buspar       Jaki was seen today for disease management.    Diagnoses and all orders for this visit:    Screening for osteoporosis  -     DXA Bone Density Spine And Hip; Future    Pathological fracture in other disease, other site, initial encounter for fracture  -     DXA Bone Density Spine And Hip; Future    Chronic kidney disease, unspecified CKD stage  -     Ambulatory referral/consult to Nephrology; Future    Sjogren's syndrome with keratoconjunctivitis sicca  -     Ambulatory referral/consult to Nephrology; Future    Long-term use of Plaquenil  -     Ambulatory referral/consult to Nephrology; Future    Other orders  -     tiZANidine (ZANAFLEX) 4 MG tablet; 8 mg tid        Every 6 months to

## 2022-11-16 NOTE — PROGRESS NOTES
Rapid3 Question Responses and Scores 11/14/2022   MDHAQ Score 0   Psychologic Score 3.3   Pain Score 7   When you awakened in the morning OVER THE LAST WEEK, did you feel stiff? No   If Yes, please indicate the number of hours until you are as limber as you will be for the day -   Fatigue Score 3   Global Health Score 5.5   RAPID3 Score 4.17       Answers submitted by the patient for this visit:  Rheumatology Questionnaire (Submitted on 11/14/2022)  fever: No  eye redness: No  mouth sores: No  headaches: No  shortness of breath: No  chest pain: No  trouble swallowing: No  diarrhea: No  constipation: No  unexpected weight change: No  genital sore: No  dysuria: No  During the last 3 days, have you had a skin rash?: No  Bruises or bleeds easily: No  cough: No

## 2022-11-17 ENCOUNTER — OFFICE VISIT (OUTPATIENT)
Dept: ORTHOPEDICS | Facility: CLINIC | Age: 52
End: 2022-11-17
Payer: MEDICARE

## 2022-11-17 ENCOUNTER — HOSPITAL ENCOUNTER (OUTPATIENT)
Dept: RADIOLOGY | Facility: HOSPITAL | Age: 52
Discharge: HOME | End: 2022-11-17
Attending: PHYSICIAN ASSISTANT
Payer: MEDICARE

## 2022-11-17 DIAGNOSIS — S82.61XD CLOSED DISPLACED FRACTURE OF LATERAL MALLEOLUS OF RIGHT FIBULA WITH ROUTINE HEALING, SUBSEQUENT ENCOUNTER: ICD-10-CM

## 2022-11-17 DIAGNOSIS — M25.571 ACUTE RIGHT ANKLE PAIN: ICD-10-CM

## 2022-11-17 DIAGNOSIS — M25.571 ACUTE RIGHT ANKLE PAIN: Primary | ICD-10-CM

## 2022-11-17 PROCEDURE — 99213 PR OFFICE/OUTPT VISIT, EST, LEVL III, 20-29 MIN: ICD-10-PCS | Mod: S$GLB,,, | Performed by: PHYSICIAN ASSISTANT

## 2022-11-17 PROCEDURE — 99999 PR PBB SHADOW E&M-EST. PATIENT-LVL IV: ICD-10-PCS | Mod: PBBFAC,,, | Performed by: PHYSICIAN ASSISTANT

## 2022-11-17 PROCEDURE — 99213 OFFICE O/P EST LOW 20 MIN: CPT | Mod: S$GLB,,, | Performed by: PHYSICIAN ASSISTANT

## 2022-11-17 PROCEDURE — 73610 XR ANKLE COMPLETE 3 VIEW RIGHT: ICD-10-PCS | Mod: 26,RT,, | Performed by: RADIOLOGY

## 2022-11-17 PROCEDURE — 73610 X-RAY EXAM OF ANKLE: CPT | Mod: 26,RT,, | Performed by: RADIOLOGY

## 2022-11-17 PROCEDURE — 1159F PR MEDICATION LIST DOCUMENTED IN MEDICAL RECORD: ICD-10-PCS | Mod: CPTII,S$GLB,, | Performed by: PHYSICIAN ASSISTANT

## 2022-11-17 PROCEDURE — 1159F MED LIST DOCD IN RCRD: CPT | Mod: CPTII,S$GLB,, | Performed by: PHYSICIAN ASSISTANT

## 2022-11-17 PROCEDURE — 99999 PR PBB SHADOW E&M-EST. PATIENT-LVL IV: CPT | Mod: PBBFAC,,, | Performed by: PHYSICIAN ASSISTANT

## 2022-11-17 PROCEDURE — 73610 X-RAY EXAM OF ANKLE: CPT | Mod: TC,RT

## 2022-11-17 NOTE — PROGRESS NOTES
Removed RT plaster splint ordered by REAL Alberto. Skin intact with no redness and no bruising.

## 2022-11-18 ENCOUNTER — PATIENT MESSAGE (OUTPATIENT)
Dept: ORTHOPEDICS | Facility: CLINIC | Age: 52
End: 2022-11-18
Payer: MEDICARE

## 2022-11-18 RX ORDER — HYDROCODONE BITARTRATE AND ACETAMINOPHEN 5; 325 MG/1; MG/1
1 TABLET ORAL EVERY 6 HOURS PRN
Qty: 28 TABLET | Refills: 0 | Status: SHIPPED | OUTPATIENT
Start: 2022-11-18 | End: 2022-12-02 | Stop reason: SDUPTHER

## 2022-11-18 NOTE — PROGRESS NOTES
Subjective:      Patient ID: Jaki Bajwa is a 52 y.o. female.    Chief Complaint: Follow-up (right ankle)    HPI    Patient is a 52 year old female who presented to the Ed on 11/03/22 with right ankle pain after falling on the steps.   RADS:  distal fibular acute spiral type fracture with minimal displacement.    Patient was treated in a posterior splint and NWB. She has crutches. She denied any numbness or tingling. She takes norco 5, Zanaflex, Neurontin at base line as supplied by PCP. She was prescribed percocet in the ED.     11/17/22: Over all doing ok. She is still in pain. She did reach out to PCP but got regular prescription. She stated that she is also elevating. She has not placed any weight. No numbness or tingling.     Review of Systems   Constitutional: Negative for chills and fever.   Cardiovascular:  Negative for chest pain.   Respiratory:  Negative for cough and shortness of breath.    Skin:  Negative for color change, dry skin, itching, nail changes, poor wound healing and rash.   Musculoskeletal:  Positive for falls.        Right ankle fracture    Neurological:  Negative for dizziness.   Psychiatric/Behavioral:  Negative for altered mental status. The patient is not nervous/anxious.    All other systems reviewed and are negative.      Objective:            General    Constitutional: She is oriented to person, place, and time. She appears well-developed and well-nourished. No distress.   HENT:   Head: Atraumatic.   Eyes: Conjunctivae are normal.   Cardiovascular:  Normal rate.            Pulmonary/Chest: Effort normal.   Neurological: She is alert and oriented to person, place, and time.   Psychiatric: She has a normal mood and affect. Her behavior is normal.         Right Ankle/Foot Exam     Comments:  Presented to clinic in wheel chair splint in placed, removed, moderate swelling TTP to lateral ankle.   Skin tear to plantar great toe       RADS: Healing fracture of the lateral malleolus  remain in unchanged position as compared to the previous examination.  Soft tissue swelling above the ankle joint as before        Assessment:       Encounter Diagnoses   Name Primary?    Acute right ankle pain Yes    Closed displaced fracture of lateral malleolus of right fibula with routine healing, subsequent encounter           Plan:       Discussed plan with the patient. At this time will replace into pCAM boot, ok to place weight within limits of pain . Return to clinic in 4 week at that time x-ray of her ankle is needed OOB and weight bear.    Message sent to PCP regarding pain medication

## 2022-11-29 ENCOUNTER — OFFICE VISIT (OUTPATIENT)
Dept: PSYCHIATRY | Facility: CLINIC | Age: 52
End: 2022-11-29
Payer: MEDICARE

## 2022-11-29 DIAGNOSIS — R46.81 OBSESSIVE-COMPULSIVE BEHAVIOR: ICD-10-CM

## 2022-11-29 DIAGNOSIS — F41.1 GAD (GENERALIZED ANXIETY DISORDER): Primary | ICD-10-CM

## 2022-11-29 DIAGNOSIS — F39 MOOD DISORDER: ICD-10-CM

## 2022-11-29 DIAGNOSIS — F40.10 SOCIAL PHOBIA: ICD-10-CM

## 2022-11-29 PROCEDURE — 90834 PR PSYCHOTHERAPY W/PATIENT, 45 MIN: ICD-10-PCS | Mod: 95,,, | Performed by: SOCIAL WORKER

## 2022-11-29 PROCEDURE — 90834 PSYTX W PT 45 MINUTES: CPT | Mod: 95,,, | Performed by: SOCIAL WORKER
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
## 2022-12-02 ENCOUNTER — PATIENT MESSAGE (OUTPATIENT)
Dept: RHEUMATOLOGY | Facility: CLINIC | Age: 52
End: 2022-12-02
Payer: MEDICARE

## 2022-12-02 DIAGNOSIS — S82.61XD CLOSED DISPLACED FRACTURE OF LATERAL MALLEOLUS OF RIGHT FIBULA WITH ROUTINE HEALING, SUBSEQUENT ENCOUNTER: ICD-10-CM

## 2022-12-02 RX ORDER — HYDROCODONE BITARTRATE AND ACETAMINOPHEN 5; 325 MG/1; MG/1
1 TABLET ORAL EVERY 6 HOURS PRN
Qty: 28 TABLET | Refills: 0 | Status: SHIPPED | OUTPATIENT
Start: 2022-12-02 | End: 2022-12-09

## 2022-12-02 NOTE — TELEPHONE ENCOUNTER
----- Message from Lisa Duran sent at 12/2/2022 10:05 AM CST -----  Regarding: refill  Contact: pt [phone]  Rx Refill/Request    Is this a Refill or New Rx: refill     Rx Name and Strength:  HYDROcodone-acetaminophen (NORCO) 5-325 mg per tablet    Preferred Pharmacy with phone number:  [id number] [place] [address] [address] [address] [facility] [address]  Phone: [phone] Fax: [phone]        Communication Preference: pt [phone]    Additional Information: pt would like a phone when the refill is sent in

## 2022-12-04 ENCOUNTER — PATIENT MESSAGE (OUTPATIENT)
Dept: RHEUMATOLOGY | Facility: CLINIC | Age: 52
End: 2022-12-04
Payer: MEDICARE

## 2022-12-05 ENCOUNTER — TELEPHONE (OUTPATIENT)
Dept: NEPHROLOGY | Facility: CLINIC | Age: 52
End: 2022-12-05
Payer: MEDICARE

## 2022-12-05 DIAGNOSIS — N18.31 STAGE 3A CHRONIC KIDNEY DISEASE: Primary | ICD-10-CM

## 2022-12-05 NOTE — TELEPHONE ENCOUNTER
----- Message from Birgit Hollingsworth sent at 12/5/2022  8:27 AM CST -----  Regarding: Appt  Contact: Pt [phone]  Patient calling to reschedule appt on 12/13 to a later date due to the patient is on crutches due to broken fibula please call to discuss further

## 2022-12-08 NOTE — PROGRESS NOTES
Subjective:       Patient ID: Jaki Bajwa is a 52 y.o. female.    Chief Complaint: Hypertension    Pt here for f/u. She has a dx of sjogrens and fibromyalgia based on blood work and chronic pain that is primarily across shoulders and in legs but also in upper and lower back. As a result she is on several meds that she says helps. She saw rheum at [facility] but changed to [facility]. She has prior dx of lupus but this was not corroborated in notes from rheum. She is on plaquenil (she is up to date on eye exam), tizanidine, gabapentin and norco. She uses norco 1x/day. She has done well with decreasing dispense amount to 30 per month. She has previously signed a pain contract. [place]  reviewed and is consistent. She has seen pain mgmt. She has also done PT with some success in the past.      She also has dx of sjogren's syndrome. She is on pilocarpine with some success. Uses eye drops for dry eyes which has been effective and is stable.      Pt's BP is well controlled. Tolerating meds well. Pt denies cp/sob/ha/vision or neuro changes. Home readings are well controlled.      She continues to see psychiatry for bipolar and anxiety. This is fairly well controlled and stable on current meds. No SI/HI. They have been able to get her off valium and she no longer takes this.      She has had recurrent UTIs. Saw urology and is on prophylactic macrodantin. She was off this for a while but was recently admitted to hospital with UTI and CANDELARIA. Tx accordingly--record reviewed. Currently doing well. Due for f/u with urology which she will schedule. She was on vaginal estrace cream but has not been using this lately b/c she ran out. It is worth noting that her UTI issues decreased while on this.     She has chronic iron def anemia. She has no symptoms of such and denies any known bleeding or bloody/black stool. She had c-scope 10/2018 and again 1/2021 that did not show any bleeding source. She also had EGD that showed positive h pylori  for which she was tx but no other bleeding sources. She also did stool cards which were negative. Most recent labs show improvement in iron def and anemia. She saw heme-onc who has instructed her to resume oral iron as she previously received IV iron; however, she recently has started back on IV iron course.       She has mild CKD-3 with baseline creatinine 1.2-1.3. most recent labs show increase in creatinine and she was referred to nephrology but has not yet completed this. She understands importance of doing so.     She recently broke her R ankle/fibula. Seeing ortho and recovering as expected. Rheumatology ordered DEXA which is scheduled.        Hypertension  Pertinent negatives include no chest pain, headaches, neck pain, palpitations or shortness of breath.   Chronic Pain  Associated symptoms: no abdominal pain, no chest pain, no constipation, no shortness of breath, no headaches and no weakness    Fibromyalgia  Pertinent negatives include no abdominal pain, arthralgias, chest pain, coughing, fever, headaches, joint swelling, neck pain, sore throat, vomiting or weakness.   Review of Systems   Constitutional:  Positive for activity change. Negative for fever and unexpected weight change.   HENT:  Negative for ear pain, hearing loss, rhinorrhea, sore throat and trouble swallowing.    Eyes:  Negative for discharge and visual disturbance.   Respiratory:  Negative for cough, chest tightness, shortness of breath and wheezing.    Cardiovascular:  Negative for chest pain, palpitations and leg swelling.   Gastrointestinal:  Negative for abdominal pain, blood in stool, constipation, diarrhea and vomiting.   Endocrine: Negative for polydipsia and polyuria.   Genitourinary:  Negative for difficulty urinating, dysuria, frequency, hematuria and menstrual problem.   Musculoskeletal:  Negative for arthralgias, joint swelling and neck pain.   Neurological:  Negative for weakness and headaches.   Psychiatric/Behavioral:   Negative for confusion and dysphoric mood.      Objective:          Assessment:       1. Essential hypertension    2. Fibromyalgia    3. JUAN JOSE (generalized anxiety disorder)    4. Chronic pain syndrome    5. Stage 3a chronic kidney disease    6. Recurrent UTI (urinary tract infection)    7. Sjogren's syndrome, with unspecified organ involvement    8. Morbid obesity    9. Anemia of unknown etiology    10. Obsessive-compulsive behavior    11. Chronic narcotic dependence    12. Impaired fasting glucose        Plan:       1. Not yet due for refill on norco but will let us know when due; narcan--pt understands how to use and how to instruct family/friends how to use  2. Recent labs reviewed  3. Keep f/u with specialists  4. Home BP monitoring                Physical Exam  Constitutional:       Appearance: Normal appearance. She is well-developed.   HENT:      Right Ear: Tympanic membrane, ear canal and external ear normal.      Left Ear: Tympanic membrane, ear canal and external ear normal.      Nose: No mucosal edema or rhinorrhea.      Mouth/Throat:      Pharynx: No oropharyngeal exudate or posterior oropharyngeal erythema.   Eyes:      Extraocular Movements: Extraocular movements intact.      Pupils: Pupils are equal, round, and reactive to light.   Neck:      Thyroid: No thyromegaly.   Cardiovascular:      Rate and Rhythm: Normal rate and regular rhythm.      Heart sounds: Normal heart sounds.   Pulmonary:      Effort: Pulmonary effort is normal.      Breath sounds: Normal breath sounds.   Abdominal:      General: Bowel sounds are normal. There is no distension.      Palpations: Abdomen is soft. There is no mass.      Tenderness: There is no abdominal tenderness. There is no right CVA tenderness or left CVA tenderness.   Musculoskeletal:      Cervical back: Neck supple.      Lumbar back: No tenderness or bony tenderness. Normal range of motion.      Right lower leg: No edema.      Left lower leg: No edema.    Lymphadenopathy:      Cervical: No cervical adenopathy.   Skin:     Comments: Scaly papular rash forearms, L>R   Neurological:      Mental Status: She is alert and oriented to person, place, and time.      Cranial Nerves: No cranial nerve deficit.   Psychiatric:         Mood and Affect: Mood normal.         Behavior: Behavior normal.

## 2022-12-09 ENCOUNTER — OFFICE VISIT (OUTPATIENT)
Dept: INTERNAL MEDICINE | Facility: CLINIC | Age: 52
End: 2022-12-09
Payer: MEDICARE

## 2022-12-09 VITALS
SYSTOLIC BLOOD PRESSURE: 126 MMHG | HEART RATE: 102 BPM | WEIGHT: 262.38 LBS | HEIGHT: 67 IN | BODY MASS INDEX: 41.18 KG/M2 | DIASTOLIC BLOOD PRESSURE: 86 MMHG | OXYGEN SATURATION: 95 %

## 2022-12-09 DIAGNOSIS — M35.00 SJOGREN'S SYNDROME, WITH UNSPECIFIED ORGAN INVOLVEMENT: ICD-10-CM

## 2022-12-09 DIAGNOSIS — R73.01 IMPAIRED FASTING GLUCOSE: ICD-10-CM

## 2022-12-09 DIAGNOSIS — I10 ESSENTIAL HYPERTENSION: Primary | Chronic | ICD-10-CM

## 2022-12-09 DIAGNOSIS — R46.81 OBSESSIVE-COMPULSIVE BEHAVIOR: ICD-10-CM

## 2022-12-09 DIAGNOSIS — F41.1 GAD (GENERALIZED ANXIETY DISORDER): ICD-10-CM

## 2022-12-09 DIAGNOSIS — F11.20 CHRONIC NARCOTIC DEPENDENCE: ICD-10-CM

## 2022-12-09 DIAGNOSIS — G89.4 CHRONIC PAIN SYNDROME: ICD-10-CM

## 2022-12-09 DIAGNOSIS — N18.31 STAGE 3A CHRONIC KIDNEY DISEASE: ICD-10-CM

## 2022-12-09 DIAGNOSIS — N39.0 RECURRENT UTI (URINARY TRACT INFECTION): ICD-10-CM

## 2022-12-09 DIAGNOSIS — M79.7 FIBROMYALGIA: ICD-10-CM

## 2022-12-09 DIAGNOSIS — D64.9 ANEMIA OF UNKNOWN ETIOLOGY: ICD-10-CM

## 2022-12-09 DIAGNOSIS — E66.01 MORBID OBESITY: ICD-10-CM

## 2022-12-09 PROCEDURE — 99214 OFFICE O/P EST MOD 30 MIN: CPT | Mod: 25,S$GLB,, | Performed by: INTERNAL MEDICINE

## 2022-12-09 PROCEDURE — 1159F MED LIST DOCD IN RCRD: CPT | Mod: CPTII,S$GLB,, | Performed by: INTERNAL MEDICINE

## 2022-12-09 PROCEDURE — 3079F DIAST BP 80-89 MM HG: CPT | Mod: CPTII,S$GLB,, | Performed by: INTERNAL MEDICINE

## 2022-12-09 PROCEDURE — 1160F RVW MEDS BY RX/DR IN RCRD: CPT | Mod: CPTII,S$GLB,, | Performed by: INTERNAL MEDICINE

## 2022-12-09 PROCEDURE — 3008F PR BODY MASS INDEX (BMI) DOCUMENTED: ICD-10-PCS | Mod: CPTII,S$GLB,, | Performed by: INTERNAL MEDICINE

## 2022-12-09 PROCEDURE — 99999 PR PBB SHADOW E&M-EST. PATIENT-LVL V: CPT | Mod: PBBFAC,,, | Performed by: INTERNAL MEDICINE

## 2022-12-09 PROCEDURE — 99999 PR PBB SHADOW E&M-EST. PATIENT-LVL V: ICD-10-PCS | Mod: PBBFAC,,, | Performed by: INTERNAL MEDICINE

## 2022-12-09 PROCEDURE — 1160F PR REVIEW ALL MEDS BY PRESCRIBER/CLIN PHARMACIST DOCUMENTED: ICD-10-PCS | Mod: CPTII,S$GLB,, | Performed by: INTERNAL MEDICINE

## 2022-12-09 PROCEDURE — 99214 PR OFFICE/OUTPT VISIT, EST, LEVL IV, 30-39 MIN: ICD-10-PCS | Mod: 25,S$GLB,, | Performed by: INTERNAL MEDICINE

## 2022-12-09 PROCEDURE — 3074F SYST BP LT 130 MM HG: CPT | Mod: CPTII,S$GLB,, | Performed by: INTERNAL MEDICINE

## 2022-12-09 PROCEDURE — 1159F PR MEDICATION LIST DOCUMENTED IN MEDICAL RECORD: ICD-10-PCS | Mod: CPTII,S$GLB,, | Performed by: INTERNAL MEDICINE

## 2022-12-09 PROCEDURE — 3008F BODY MASS INDEX DOCD: CPT | Mod: CPTII,S$GLB,, | Performed by: INTERNAL MEDICINE

## 2022-12-09 PROCEDURE — 3079F PR MOST RECENT DIASTOLIC BLOOD PRESSURE 80-89 MM HG: ICD-10-PCS | Mod: CPTII,S$GLB,, | Performed by: INTERNAL MEDICINE

## 2022-12-09 PROCEDURE — 3074F PR MOST RECENT SYSTOLIC BLOOD PRESSURE < 130 MM HG: ICD-10-PCS | Mod: CPTII,S$GLB,, | Performed by: INTERNAL MEDICINE

## 2022-12-09 NOTE — PROGRESS NOTES
After obtaining consent, and per orders of Dr. Rasmussen, injection of Fluarix Lot [id number] Expiration 06/30/2023  given by Amisha Sears RN .Patient instructed to remain in clinic for 20 minutes afterwards, and to report any adverse reaction to me immediately.

## 2022-12-15 ENCOUNTER — OFFICE VISIT (OUTPATIENT)
Dept: NEPHROLOGY | Facility: CLINIC | Age: 52
End: 2022-12-15
Payer: MEDICARE

## 2022-12-15 ENCOUNTER — HOSPITAL ENCOUNTER (OUTPATIENT)
Dept: RADIOLOGY | Facility: HOSPITAL | Age: 52
Discharge: HOME | End: 2022-12-15
Attending: PHYSICIAN ASSISTANT
Payer: MEDICARE

## 2022-12-15 ENCOUNTER — OFFICE VISIT (OUTPATIENT)
Dept: ORTHOPEDICS | Facility: CLINIC | Age: 52
End: 2022-12-15
Payer: MEDICARE

## 2022-12-15 VITALS
WEIGHT: 262.38 LBS | HEART RATE: 116 BPM | SYSTOLIC BLOOD PRESSURE: 136 MMHG | BODY MASS INDEX: 41.18 KG/M2 | OXYGEN SATURATION: 94 % | HEIGHT: 67 IN | DIASTOLIC BLOOD PRESSURE: 82 MMHG

## 2022-12-15 DIAGNOSIS — N18.9 CHRONIC KIDNEY DISEASE, UNSPECIFIED CKD STAGE: ICD-10-CM

## 2022-12-15 DIAGNOSIS — S82.61XD CLOSED DISPLACED FRACTURE OF LATERAL MALLEOLUS OF RIGHT FIBULA WITH ROUTINE HEALING, SUBSEQUENT ENCOUNTER: ICD-10-CM

## 2022-12-15 DIAGNOSIS — Z79.899 LONG-TERM USE OF PLAQUENIL: ICD-10-CM

## 2022-12-15 DIAGNOSIS — M25.571 ACUTE RIGHT ANKLE PAIN: ICD-10-CM

## 2022-12-15 DIAGNOSIS — M25.571 ACUTE RIGHT ANKLE PAIN: Primary | ICD-10-CM

## 2022-12-15 DIAGNOSIS — M35.01 SJOGREN'S SYNDROME WITH KERATOCONJUNCTIVITIS SICCA: ICD-10-CM

## 2022-12-15 PROCEDURE — 3066F PR DOCUMENTATION OF TREATMENT FOR NEPHROPATHY: ICD-10-PCS | Mod: CPTII,S$GLB,, | Performed by: INTERNAL MEDICINE

## 2022-12-15 PROCEDURE — 99213 OFFICE O/P EST LOW 20 MIN: CPT | Mod: S$GLB,,, | Performed by: PHYSICIAN ASSISTANT

## 2022-12-15 PROCEDURE — 99204 PR OFFICE/OUTPT VISIT, NEW, LEVL IV, 45-59 MIN: ICD-10-PCS | Mod: S$GLB,,, | Performed by: INTERNAL MEDICINE

## 2022-12-15 PROCEDURE — 1159F PR MEDICATION LIST DOCUMENTED IN MEDICAL RECORD: ICD-10-PCS | Mod: CPTII,S$GLB,, | Performed by: INTERNAL MEDICINE

## 2022-12-15 PROCEDURE — 99999 PR PBB SHADOW E&M-EST. PATIENT-LVL IV: ICD-10-PCS | Mod: PBBFAC,,, | Performed by: PHYSICIAN ASSISTANT

## 2022-12-15 PROCEDURE — 99204 OFFICE O/P NEW MOD 45 MIN: CPT | Mod: S$GLB,,, | Performed by: INTERNAL MEDICINE

## 2022-12-15 PROCEDURE — 3075F SYST BP GE 130 - 139MM HG: CPT | Mod: CPTII,S$GLB,, | Performed by: INTERNAL MEDICINE

## 2022-12-15 PROCEDURE — 3008F BODY MASS INDEX DOCD: CPT | Mod: CPTII,S$GLB,, | Performed by: INTERNAL MEDICINE

## 2022-12-15 PROCEDURE — 73610 XR ANKLE COMPLETE 3 VIEW RIGHT: ICD-10-PCS | Mod: 26,RT,, | Performed by: RADIOLOGY

## 2022-12-15 PROCEDURE — 3079F DIAST BP 80-89 MM HG: CPT | Mod: CPTII,S$GLB,, | Performed by: INTERNAL MEDICINE

## 2022-12-15 PROCEDURE — 99999 PR PBB SHADOW E&M-EST. PATIENT-LVL IV: ICD-10-PCS | Mod: PBBFAC,,, | Performed by: INTERNAL MEDICINE

## 2022-12-15 PROCEDURE — 3008F PR BODY MASS INDEX (BMI) DOCUMENTED: ICD-10-PCS | Mod: CPTII,S$GLB,, | Performed by: INTERNAL MEDICINE

## 2022-12-15 PROCEDURE — 3066F NEPHROPATHY DOC TX: CPT | Mod: CPTII,S$GLB,, | Performed by: INTERNAL MEDICINE

## 2022-12-15 PROCEDURE — 3066F NEPHROPATHY DOC TX: CPT | Mod: CPTII,S$GLB,, | Performed by: PHYSICIAN ASSISTANT

## 2022-12-15 PROCEDURE — 3066F PR DOCUMENTATION OF TREATMENT FOR NEPHROPATHY: ICD-10-PCS | Mod: CPTII,S$GLB,, | Performed by: PHYSICIAN ASSISTANT

## 2022-12-15 PROCEDURE — 3079F PR MOST RECENT DIASTOLIC BLOOD PRESSURE 80-89 MM HG: ICD-10-PCS | Mod: CPTII,S$GLB,, | Performed by: INTERNAL MEDICINE

## 2022-12-15 PROCEDURE — 99213 PR OFFICE/OUTPT VISIT, EST, LEVL III, 20-29 MIN: ICD-10-PCS | Mod: S$GLB,,, | Performed by: PHYSICIAN ASSISTANT

## 2022-12-15 PROCEDURE — 99999 PR PBB SHADOW E&M-EST. PATIENT-LVL IV: CPT | Mod: PBBFAC,,, | Performed by: PHYSICIAN ASSISTANT

## 2022-12-15 PROCEDURE — 99999 PR PBB SHADOW E&M-EST. PATIENT-LVL IV: CPT | Mod: PBBFAC,,, | Performed by: INTERNAL MEDICINE

## 2022-12-15 PROCEDURE — 73610 X-RAY EXAM OF ANKLE: CPT | Mod: TC,RT

## 2022-12-15 PROCEDURE — 1159F MED LIST DOCD IN RCRD: CPT | Mod: CPTII,S$GLB,, | Performed by: INTERNAL MEDICINE

## 2022-12-15 PROCEDURE — 73610 X-RAY EXAM OF ANKLE: CPT | Mod: 26,RT,, | Performed by: RADIOLOGY

## 2022-12-15 PROCEDURE — 3075F PR MOST RECENT SYSTOLIC BLOOD PRESS GE 130-139MM HG: ICD-10-PCS | Mod: CPTII,S$GLB,, | Performed by: INTERNAL MEDICINE

## 2022-12-15 NOTE — PROGRESS NOTES
Subjective:      Patient ID: Jaki Bajwa is a 52 y.o. female.    Chief Complaint: Follow-up (RIGHT ANKLE)      HPI    Patient is a 52 year old female who presented to the Ed on 11/03/22 with right ankle pain after falling on the steps.   RADS:  distal fibular acute spiral type fracture with minimal displacement.    Patient was treated in a posterior splint and NWB. She has crutches. She denied any numbness or tingling. She takes norco 5, Zanaflex, Neurontin at base line as supplied by PCP. She was prescribed percocet in the ED.     11/17/22: Over all doing ok. She is still in pain. She did reach out to PCP but got regular prescription. She stated that she is also elevating. She has not placed any weight. No numbness or tingling.     12/15/22: Over all doing well. She has continued to use the boot. She is have some pain if ambulate out of the boot.     Review of Systems   Constitutional: Negative for chills and fever.   Cardiovascular:  Negative for chest pain.   Respiratory:  Negative for cough and shortness of breath.    Skin:  Negative for color change, dry skin, itching, nail changes, poor wound healing and rash.   Musculoskeletal:  Positive for falls.        Right ankle fracture    Neurological:  Negative for dizziness.   Psychiatric/Behavioral:  Negative for altered mental status. The patient is not nervous/anxious.    All other systems reviewed and are negative.      Objective:            General    Constitutional: She is oriented to person, place, and time. She appears well-developed and well-nourished. No distress.   HENT:   Head: Atraumatic.   Eyes: Conjunctivae are normal.   Cardiovascular:  Normal rate.            Pulmonary/Chest: Effort normal.   Neurological: She is alert and oriented to person, place, and time.   Psychiatric: She has a normal mood and affect. Her behavior is normal.         Right Ankle/Foot Exam     Comments:  Presented to clinic iwalked in with boot in place, removed, moderate  swelling TTP to lateral ankle. Resolved to fracture site some TTP to peroneals and PTT/;   Skin tear to plantar great toe resolved       RADS: Position alignment of the fracture of the distal fibula is satisfactory.  Callus formation is developing.         Assessment:       Encounter Diagnoses   Name Primary?    Acute right ankle pain Yes    Closed displaced fracture of lateral malleolus of right fibula with routine healing, subsequent encounter           Plan:       Discussed plan with the patient. At this time will replace into pCAM boot, ok to place weight within limits of pain .ok to wean as pain allows. Order placed for PT patient is to make appointment Return to clinic in 6 week at that time x-ray of her ankle is needed OOB and weight bear.    Message sent to PCP regarding pain medication

## 2022-12-15 NOTE — PROGRESS NOTES
"REASON FOR CONSULT: CKD    REFERRING PHYSICIAN: Pratik Rasmussen MD      HISTORY OF PRESENT ILLNESS: 52 y.o. female who is new to me  has a past medical history of Anemia, Anxiety, Depression, History of psychiatric hospitalization (), History of ventral hernia repair, Hypertension, Lupus, Mental disorder, Morbid obesity (12/15/2017), Psychiatric problem, Sjogren's syndrome (), Therapy, and TMJ (temporomandibular joint disorder). patient was referred here for abnormal renal function   The patient denies taking NSAIDs or new antibiotics, recreational drugs, recent episode of dehydration, diarrhea, nausea or vomiting, acute illness, hospitalization or exposure to IV radiocontrast.     ROS:  General: negative for chills, or fatigue  ENT: No epistaxis or headaches  Hematological and Lymphatic: No bleeding problems or blood clots.  Endocrine: No skin changes or temperature intolerance  Respiratory: No cough, shortness of breath, or wheezing  Cardiovascular: No chest pain or dyspnea   Gastrointestinal: No abdominal pain, change in bowel habits  Genito-Urinary: No dysuria, trouble voiding, or hematuria  Musculoskeletal: ROS: negative for - joint pain, joint stiffness, joint swelling, muscle pain or muscular weakness  Neurological: No focal weakness, no numbness  Dermatological: No rash or ulcers.    PAST MEDICAL HISTORY:  Past Medical History:   Diagnosis Date    Anemia     Anxiety     Depression     History of psychiatric hospitalization     [place] x 1 week for depression    History of ventral hernia repair     Hypertension     Lupus     patient reports that she is being treated "like I have Lupus"    Mental disorder     Morbid obesity 12/15/2017    Psychiatric problem     Sjogren's syndrome 2013    Therapy     TMJ (temporomandibular joint disorder)        PAST SURGICAL HISTORY:  Past Surgical History:   Procedure Laterality Date    breast reduction      BREAST SURGERY  2005     SECTION   "    x 2    HYSTERECTOMY  2/2/2005    INGUINAL HERNIA REPAIR      LAPAROSCOPIC TOTAL HYSTERECTOMY  2012    ASA    TOTAL REDUCTION MAMMOPLASTY      TUBAL LIGATION      VENTRAL HERNIA REPAIR         FAMILY HISTORY:   Family History   Problem Relation Age of Onset    Cancer Father         colon ca    Colon cancer Father     Depression Father     Schizophrenia Father     Anxiety disorder Father     Arthritis Father     Mental illness Father     Pancreatic cancer Brother     Depression Brother     Alcohol abuse Brother     Liver cancer Sister     Cancer Sister     Depression Sister     Anxiety disorder Sister     Heart attack Sister     COPD Sister     Heart disease Mother     Hypertension Mother     Pancreatic cancer Unknown     Liver cancer Unknown     No Known Problems Daughter     Asthma Son     Cancer Sister         throat and colon    Depression Sister     Miscarriages / Stillbirths Sister     Suicide Neg Hx     Ovarian cancer Neg Hx     Breast cancer Neg Hx        SOCIAL HISTORY:  Social History     Socioeconomic History    Marital status: Single    Number of children: 2   Occupational History     Comment: [employer]   Tobacco Use    Smoking status: Never    Smokeless tobacco: Never   Substance and Sexual Activity    Alcohol use: Yes     Alcohol/week: 1.0 standard drink     Types: 1 Glasses of wine per week     Comment: rarely     Drug use: Never     Types: Hydrocodone    Sexual activity: Not Currently     Partners: Male     Birth control/protection: Condom   Other Topics Concern    Patient feels they ought to cut down on drinking/drug use No    Patient annoyed by others criticizing their drinking/drug use No    Patient has felt bad or guilty about drinking/drug use No    Patient has had a drink/used drugs as an eye opener in the AM No     Social Determinants of Health     Financial Resource Strain: Low Risk     Difficulty of Paying Living Expenses: Not hard at all   Food Insecurity: No Food Insecurity    Worried About  Running Out of Food in the Last Year: Never true    Ran Out of Food in the Last Year: Never true   Transportation Needs: No Transportation Needs    Lack of Transportation (Medical): No    Lack of Transportation (Non-Medical): No   Physical Activity: Insufficiently Active    Days of Exercise per Week: 2 days    Minutes of Exercise per Session: 30 min   Stress: No Stress Concern Present    Feeling of Stress : Not at all   Social Connections: Socially Isolated    Frequency of Communication with Friends and Family: Once a week    Frequency of Social Gatherings with Friends and Family: Once a week    Attends Sikhism Services: Never    Active Member of Clubs or Organizations: No    Attends Club or Organization Meetings: Never    Marital Status:    Housing Stability: Unknown    Unable to Pay for Housing in the Last Year: No    Unstable Housing in the Last Year: No       ALLERGIES:  Review of patient's allergies indicates:   Allergen Reactions    Aspirin Hives       MEDICATIONS:    Current Outpatient Medications:     buPROPion (WELLBUTRIN XL) 150 MG TB24 tablet, Take 3 tablets (450 mg total) by mouth every morning., Disp: 270 tablet, Rfl: 1    busPIRone (BUSPAR) 15 MG tablet, TAKE 1 TABLET(15 MG) BY MOUTH THREE TIMES DAILY, Disp: 270 tablet, Rfl: 1    cetirizine (ZYRTEC) 10 MG tablet, Take 1 tablet (10 mg total) by mouth every evening., Disp: 30 tablet, Rfl: 0    cycloSPORINE (RESTASIS) 0.05 % ophthalmic emulsion, Place 1 drop into both eyes 2 (two) times daily., Disp: 60 each, Rfl: 1    docusate sodium (COLACE) 100 MG capsule, Take 1 capsule (100 mg total) by mouth 2 (two) times daily as needed for Constipation., Disp: 60 capsule, Rfl: 0    doxepin (SINEQUAN) 75 MG capsule, TAKE 2 CAPSULES(150 MG) BY MOUTH EVERY EVENING, Disp: 60 capsule, Rfl: 11    estradioL (ESTRACE) 0.01 % (0.1 mg/gram) vaginal cream, 1g vaginally daily x 2 weeks, then BIW as directed, Disp: 42.5 g, Rfl: 2    fluconazole (DIFLUCAN) 150 MG Tab,  Take 1 tablet (150 mg total) by mouth every 72 hours as needed., Disp: 6 tablet, Rfl: 0    fluconazole (DIFLUCAN) 200 MG Tab, Take 1 tablet (200 mg total) by mouth every 72 hours as needed., Disp: 2 tablet, Rfl: 0    gabapentin (NEURONTIN) 300 MG capsule, Take 1 capsule (300 mg total) by mouth 3 (three) times daily., Disp: 90 capsule, Rfl: 5    HYDROcodone-acetaminophen (NORCO) 5-325 mg per tablet, Take 1 tablet by mouth every 24 hours as needed for Pain. Quantity medically necessary, Disp: 30 tablet, Rfl: 0    hydrOXYchloroQUINE (PLAQUENIL) 200 mg tablet, TAKE 1 TABLET BY MOUTH TWICE DAILY, Disp: 180 tablet, Rfl: 2    irbesartan-hydrochlorothiazide (AVALIDE) 150-12.5 mg per tablet, Take 1 tablet by mouth once daily., Disp: 90 tablet, Rfl: 3    lurasidone (LATUDA) 60 mg Tab tablet, Take 1 tablet (60 mg total) by mouth once daily., Disp: 90 tablet, Rfl: 3    naproxen (NAPROSYN) 500 MG tablet, TAKE 1 TABLET(500 MG) BY MOUTH TWICE DAILY AS NEEDED FOR PAIN Strength: 500 mg, Disp: 180 tablet, Rfl: 1    NARCAN 4 mg/actuation [place], SPRAY 1 SPRAY IN NOSTRIL ONCE FOR 1 DOSE, Disp: , Rfl:     nitrofurantoin (MACRODANTIN) 50 MG capsule, Take 1 capsule (50 mg total) by mouth every evening., Disp: 90 capsule, Rfl: 1    omeprazole (PRILOSEC) 40 MG capsule, TAKE 1 CAPSULE(40 MG) BY MOUTH EVERY DAY, Disp: 90 capsule, Rfl: 3    pilocarpine (SALAGEN) 5 MG Tab, Take 1 tablet (5 mg total) by mouth 2 (two) times daily., Disp: 60 tablet, Rfl: 11    tiZANidine (ZANAFLEX) 4 MG tablet, 8 mg tid, Disp: 180 tablet, Rfl: 5    triamcinolone acetonide 0.1% (KENALOG) 0.1 % cream, Apply topically 2 (two) times daily as needed (rash). X 2 weeks to rashes PRN, Disp: 80 g, Rfl: 2   Medication List with Changes/Refills   Current Medications    BUPROPION (WELLBUTRIN XL) 150 MG TB24 TABLET    Take 3 tablets (450 mg total) by mouth every morning.    BUSPIRONE (BUSPAR) 15 MG TABLET    TAKE 1 TABLET(15 MG) BY MOUTH THREE TIMES DAILY    CETIRIZINE (ZYRTEC)  10 MG TABLET    Take 1 tablet (10 mg total) by mouth every evening.    CYCLOSPORINE (RESTASIS) 0.05 % OPHTHALMIC EMULSION    Place 1 drop into both eyes 2 (two) times daily.    DOCUSATE SODIUM (COLACE) 100 MG CAPSULE    Take 1 capsule (100 mg total) by mouth 2 (two) times daily as needed for Constipation.    DOXEPIN (SINEQUAN) 75 MG CAPSULE    TAKE 2 CAPSULES(150 MG) BY MOUTH EVERY EVENING    ESTRADIOL (ESTRACE) 0.01 % (0.1 MG/GRAM) VAGINAL CREAM    1g vaginally daily x 2 weeks, then BIW as directed    FLUCONAZOLE (DIFLUCAN) 150 MG TAB    Take 1 tablet (150 mg total) by mouth every 72 hours as needed.    FLUCONAZOLE (DIFLUCAN) 200 MG TAB    Take 1 tablet (200 mg total) by mouth every 72 hours as needed.    GABAPENTIN (NEURONTIN) 300 MG CAPSULE    Take 1 capsule (300 mg total) by mouth 3 (three) times daily.    HYDROCODONE-ACETAMINOPHEN (NORCO) 5-325 MG PER TABLET    Take 1 tablet by mouth every 24 hours as needed for Pain. Quantity medically necessary    HYDROXYCHLOROQUINE (PLAQUENIL) 200 MG TABLET    TAKE 1 TABLET BY MOUTH TWICE DAILY    IRBESARTAN-HYDROCHLOROTHIAZIDE (AVALIDE) 150-12.5 MG PER TABLET    Take 1 tablet by mouth once daily.    LURASIDONE (LATUDA) 60 MG TAB TABLET    Take 1 tablet (60 mg total) by mouth once daily.    NAPROXEN (NAPROSYN) 500 MG TABLET    TAKE 1 TABLET(500 MG) BY MOUTH TWICE DAILY AS NEEDED FOR PAIN Strength: 500 mg    NARCAN 4 MG/ACTUATION SPRY    SPRAY 1 SPRAY IN NOSTRIL ONCE FOR 1 DOSE    NITROFURANTOIN (MACRODANTIN) 50 MG CAPSULE    Take 1 capsule (50 mg total) by mouth every evening.    OMEPRAZOLE (PRILOSEC) 40 MG CAPSULE    TAKE 1 CAPSULE(40 MG) BY MOUTH EVERY DAY    PILOCARPINE (SALAGEN) 5 MG TAB    Take 1 tablet (5 mg total) by mouth 2 (two) times daily.    TIZANIDINE (ZANAFLEX) 4 MG TABLET    8 mg tid    TRIAMCINOLONE ACETONIDE 0.1% (KENALOG) 0.1 % CREAM    Apply topically 2 (two) times daily as needed (rash). X 2 weeks to rashes PRN        PHYSICAL EXAM:  LMP 07/11/2012  Comment: partial     General: No distress, No fever or chills  Head: Normocephalic,atraumatic  Eyes: conjunctivae/corneas clear. PERRL, EOM's intact.  Nose: Nares normal. Mucosa normal. No drainage or sinus tenderness.  Neck: No adenopathy,no carotid bruit,no JVD  Lungs:Clear to auscultation bilaterally, No Crackles  Heart: Regular rate and rhythm, no murmur, gallops or rubs  Abdomen: Soft, no tenderness, bowel sounds normal  Extremities: Atraumatic, no edema in LE  Skin: Turgor normal. No rashes or ulcers  Neurologic: No focal weakness, oriented.          LABS:  Lab Results   Component Value Date    CREATININE 1.7 (H) 11/03/2022       Prot/Creat Ratio, Urine   Date Value Ref Range Status   11/17/2022 Unable to calculate 0.00 - 0.20 Final   02/15/2022 0.08 0.00 - 0.20 Final   03/15/2021 0.12 0.00 - 0.20 Final       Lab Results   Component Value Date     (L) 11/03/2022    K 4.2 11/03/2022    CO2 27 11/03/2022       last PTH   Lab Results   Component Value Date    CALCIUM 9.5 11/03/2022       Lab Results   Component Value Date    HGB 10.7 (L) 11/03/2022        Lab Results   Component Value Date    HGBA1C 5.3 10/13/2021       Lab Results   Component Value Date    LDLCALC 76.0 09/12/2022           ASSESSMENT:    1- CANDELARIA on CKD IIIb  - pt has baseline scr 1.3 on 6/8/22 increased to 1.5 on 6/15/22, then on 11/3/22 was 1.7   - will repeat it today   - UA from 11/17/22 was unremarkable   -Avoid NSAIDs intake    2-HTN : controlled   -Continue current BP meds regimen        RTC in 6m      Thanks for allowing me to participate in the care of this patient.     9:36 AM    ISIDRO KING MD  NEPHROLOGY ATTENDING

## 2022-12-22 DIAGNOSIS — B37.9 YEAST INFECTION: Primary | ICD-10-CM

## 2022-12-22 RX ORDER — FLUCONAZOLE 150 MG/1
150 TABLET ORAL DAILY
Qty: 2 TABLET | Refills: 0 | Status: SHIPPED | OUTPATIENT
Start: 2022-12-22 | End: 2022-12-24

## 2022-12-23 ENCOUNTER — TELEPHONE (OUTPATIENT)
Dept: UROLOGY | Facility: CLINIC | Age: 52
End: 2022-12-23
Payer: MEDICARE

## 2022-12-30 DIAGNOSIS — G89.4 CHRONIC PAIN SYNDROME: ICD-10-CM

## 2022-12-30 RX ORDER — HYDROCODONE BITARTRATE AND ACETAMINOPHEN 5; 325 MG/1; MG/1
1 TABLET ORAL
Qty: 30 TABLET | Refills: 0 | Status: SHIPPED | OUTPATIENT
Start: 2022-12-30 | End: 2023-01-30 | Stop reason: SDUPTHER

## 2022-12-30 NOTE — TELEPHONE ENCOUNTER
No new care gaps identified.  [facility] Embedded Care Gaps. Reference number: [id number]. 12/30/2022   8:31:50 AM CST

## 2022-12-30 NOTE — TELEPHONE ENCOUNTER
----- Message from Mariana Bishop sent at 12/30/2022  8:04 AM CST -----  Regarding: Refill Request  Can the clinic reply in [facility]:NO          Please refill the medication(s) listed below. Please call the patient when the prescription(s) is ready for  at this phone number  [phone]           Medication #1  HYDROcodone-acetaminophen (NORCO) 5-325 mg per tablet             Medication #2               Preferred Pharmacy:  [id number] - [facility] [address] [place]

## 2023-01-20 ENCOUNTER — HOSPITAL ENCOUNTER (OUTPATIENT)
Dept: RADIOLOGY | Facility: OTHER | Age: 53
Discharge: HOME | End: 2023-01-20
Attending: PHYSICIAN ASSISTANT
Payer: MEDICARE

## 2023-01-20 DIAGNOSIS — Z12.31 SCREENING MAMMOGRAM FOR BREAST CANCER: ICD-10-CM

## 2023-01-20 PROCEDURE — 77063 MAMMO DIGITAL SCREENING BILAT WITH TOMO: ICD-10-PCS | Mod: 26,HCNC,, | Performed by: RADIOLOGY

## 2023-01-20 PROCEDURE — 77067 MAMMO DIGITAL SCREENING BILAT WITH TOMO: ICD-10-PCS | Mod: 26,HCNC,, | Performed by: RADIOLOGY

## 2023-01-20 PROCEDURE — 77067 SCR MAMMO BI INCL CAD: CPT | Mod: 26,HCNC,, | Performed by: RADIOLOGY

## 2023-01-20 PROCEDURE — 77063 BREAST TOMOSYNTHESIS BI: CPT | Mod: 26,HCNC,, | Performed by: RADIOLOGY

## 2023-01-20 PROCEDURE — 77067 SCR MAMMO BI INCL CAD: CPT | Mod: TC,HCNC

## 2023-01-24 ENCOUNTER — TELEPHONE (OUTPATIENT)
Dept: PSYCHIATRY | Facility: CLINIC | Age: 53
End: 2023-01-24
Payer: MEDICARE

## 2023-01-24 ENCOUNTER — TELEPHONE (OUTPATIENT)
Dept: INTERNAL MEDICINE | Facility: CLINIC | Age: 53
End: 2023-01-24
Payer: MEDICARE

## 2023-01-24 ENCOUNTER — PATIENT MESSAGE (OUTPATIENT)
Dept: PSYCHIATRY | Facility: CLINIC | Age: 53
End: 2023-01-24
Payer: MEDICARE

## 2023-01-24 DIAGNOSIS — F39 MOOD DISORDER: ICD-10-CM

## 2023-01-24 RX ORDER — LURASIDONE HYDROCHLORIDE 60 MG/1
60 TABLET, FILM COATED ORAL DAILY
Qty: 90 TABLET | Refills: 3 | Status: SHIPPED | OUTPATIENT
Start: 2023-01-24 | End: 2023-06-21

## 2023-01-24 NOTE — TELEPHONE ENCOUNTER
----- Message from Ade Valdez MA sent at 1/24/2023  2:47 PM CST -----  Regarding: monthly medication  Patient stated a medication want to make sure her Latuda  prescription be on an automatic refill so that she will not run out of medication.     Regards,  Ade

## 2023-01-24 NOTE — TELEPHONE ENCOUNTER
----- Message from Alicia Lebron sent at 1/23/2023 11:21 AM CST -----  Regarding: Patient call back  Who called: PENG MAYO [[id number]]    What is the request in detail: Patient is requesting a call back. She would like to know if her 1/20 mammo results  can be sent to [facility] so she can get credit for it.   Please advise.    Can the clinic reply by [facility]? No    Best call back number: [phone]    Additional Information: N/A

## 2023-02-02 ENCOUNTER — OFFICE VISIT (OUTPATIENT)
Dept: PSYCHIATRY | Facility: CLINIC | Age: 53
End: 2023-02-02
Payer: MEDICARE

## 2023-02-02 DIAGNOSIS — M79.7 FIBROMYALGIA: ICD-10-CM

## 2023-02-02 DIAGNOSIS — F41.1 GAD (GENERALIZED ANXIETY DISORDER): Primary | ICD-10-CM

## 2023-02-02 DIAGNOSIS — F39 MOOD DISORDER: ICD-10-CM

## 2023-02-02 DIAGNOSIS — F51.05 INSOMNIA DUE TO OTHER MENTAL DISORDER: ICD-10-CM

## 2023-02-02 DIAGNOSIS — G89.4 CHRONIC PAIN SYNDROME: ICD-10-CM

## 2023-02-02 DIAGNOSIS — F99 INSOMNIA DUE TO OTHER MENTAL DISORDER: ICD-10-CM

## 2023-02-02 PROCEDURE — 99214 PR OFFICE/OUTPT VISIT, EST, LEVL IV, 30-39 MIN: ICD-10-PCS | Mod: HCNC,95,, | Performed by: INTERNAL MEDICINE

## 2023-02-02 PROCEDURE — 1159F MED LIST DOCD IN RCRD: CPT | Mod: HCNC,CPTII,95, | Performed by: INTERNAL MEDICINE

## 2023-02-02 PROCEDURE — 99214 OFFICE O/P EST MOD 30 MIN: CPT | Mod: HCNC,95,, | Performed by: INTERNAL MEDICINE

## 2023-02-02 PROCEDURE — 1160F PR REVIEW ALL MEDS BY PRESCRIBER/CLIN PHARMACIST DOCUMENTED: ICD-10-PCS | Mod: HCNC,CPTII,95, | Performed by: INTERNAL MEDICINE

## 2023-02-02 PROCEDURE — 1160F RVW MEDS BY RX/DR IN RCRD: CPT | Mod: HCNC,CPTII,95, | Performed by: INTERNAL MEDICINE

## 2023-02-02 PROCEDURE — 1159F PR MEDICATION LIST DOCUMENTED IN MEDICAL RECORD: ICD-10-PCS | Mod: HCNC,CPTII,95, | Performed by: INTERNAL MEDICINE

## 2023-02-02 RX ORDER — HYDROXYZINE HYDROCHLORIDE 25 MG/1
25 TABLET, FILM COATED ORAL 3 TIMES DAILY PRN
Qty: 90 TABLET | Refills: 2 | Status: SHIPPED | OUTPATIENT
Start: 2023-02-02 | End: 2023-02-15

## 2023-02-02 NOTE — PROGRESS NOTES
OUTPATIENT PSYCHIATRY RETURN VISIT    ENCOUNTER DATE:  2/9/2023  SITE:  [facility]  LENGTH OF SESSION:  15 minutes    The patient location is:  [place], not in healthcare facility  Visit type:  Audiovisual    Face to Face time with patient:  15 minutes  20 minutes of total time spent on the encounter, which includes face to face time and non-face to face time preparing to see the patient (eg, review of tests), Obtaining and/or reviewing separately obtained history, Documenting clinical information in the electronic or other health record, Independently interpreting results (not separately reported) and communicating results to the patient/family/caregiver, or Care coordination (not separately reported).     Each patient to whom he or she provides medical services by telemedicine is:  (1) informed of the relationship between the physician and patient and the respective role of any other health care provider with respect to management of the patient; and (2) notified that he or she may decline to receive medical services by telemedicine and may withdraw from such care at any time.    CHIEF COMPLAINT:  Anxiety      HISTORY OF PRESENTING ILLNESS:  Jaki Bajwa is a 52 y.o. female with history of Mood disorder, JUAN JOSE, OCD, social phobia, and Internet shopping addiction who presents for follow up appointment.      Plan at last appointment on 11/16/2022:  Symptoms stable.    Continue Latuda 60mg daily, Buspar 15mg TID, Wellbutrin XL 450mg daily, Doxepin 150mg qHS, and Valium 2mg daily PRN severe anxiety.    Discussed with patient informed consent, risks versus benefits, alternative treatments, side effect profile and the inherent unpredictability of individual responses to these treatments.  The patient expresses understanding of the above and displays the capacity to agree with this current plan.   Continue individual therapy with Mr. Noah LCSW.    History as told by patient:  Doing  ok - tough being off of Valium completely but making it.  More anxiety - a lot of laying around, doesn't want to do anything, not leaving the house much.  Aunt fell and hit her head so heading to the country tomorrow.  Gets anxiety when traveling so worried about this.  Mood down at times but doesn't believe she has been depressed.      Medication side effects:  Denies  Medication compliance:  Yes    PSYCHIATRIC REVIEW OF SYSTEMS:  Trouble with sleep:  Sometimes  Appetite changes:  Denies  Weight changes:  Denies  Lack of energy:  Sometimes  Anhedonia:  Sometimes  Somatic symptoms:  Denies  Libido:  Denies  Anxiety/panic:  Yes  Guilty/hopeless:  Denies  Self-injurious behavior/risky behavior:  Denies  Any drugs:  Denies  Alcohol:  Denies    MEDICAL REVIEW OF SYSTEMS:  Complete review of systems performed covering Constitutional, Musculoskeletal, Neurologic.  All systems negative except for that covered in HPI.    PAST PSYCHIATRIC, MEDICAL, AND SOCIAL HISTORY REVIEWED  The patient's past medical, family and social history have been reviewed and updated as appropriate within the electronic medical record - see encounter notes.    MEDICATIONS:    Current Outpatient Medications:     buPROPion (WELLBUTRIN XL) 150 MG TB24 tablet, TAKE 3 TABLETS(450 MG) BY MOUTH EVERY MORNING, Disp: 270 tablet, Rfl: 1    busPIRone (BUSPAR) 15 MG tablet, TAKE 1 TABLET(15 MG) BY MOUTH THREE TIMES DAILY, Disp: 270 tablet, Rfl: 1    cycloSPORINE (RESTASIS) 0.05 % ophthalmic emulsion, Place 1 drop into both eyes 2 (two) times daily., Disp: 60 each, Rfl: 1    docusate sodium (COLACE) 100 MG capsule, Take 1 capsule (100 mg total) by mouth 2 (two) times daily as needed for Constipation., Disp: 60 capsule, Rfl: 0    doxepin (SINEQUAN) 75 MG capsule, TAKE 2 CAPSULES(150 MG) BY MOUTH EVERY EVENING, Disp: 60 capsule, Rfl: 11    estradioL (ESTRACE) 0.01 % (0.1 mg/gram) vaginal cream, 1g vaginally daily x 2 weeks, then BIW as directed, Disp: 42.5 g,  Rfl: 2    gabapentin (NEURONTIN) 300 MG capsule, Take 1 capsule (300 mg total) by mouth 3 (three) times daily., Disp: 90 capsule, Rfl: 5    HYDROcodone-acetaminophen (NORCO) 5-325 mg per tablet, Take 1 tablet by mouth every 24 hours as needed for Pain. Quantity medically necessary, Disp: 30 tablet, Rfl: 0    hydrOXYchloroQUINE (PLAQUENIL) 200 mg tablet, TAKE 1 TABLET BY MOUTH TWICE DAILY, Disp: 180 tablet, Rfl: 2    hydrOXYzine HCL (ATARAX) 25 MG tablet, Take 1 tablet (25 mg total) by mouth 3 (three) times daily as needed (Anxiety or Insomnia)., Disp: 90 tablet, Rfl: 2    irbesartan-hydrochlorothiazide (AVALIDE) 150-12.5 mg per tablet, Take 1 tablet by mouth once daily., Disp: 90 tablet, Rfl: 3    lurasidone (LATUDA) 60 mg Tab tablet, Take 1 tablet (60 mg total) by mouth once daily., Disp: 90 tablet, Rfl: 3    naproxen (NAPROSYN) 500 MG tablet, TAKE 1 TABLET(500 MG) BY MOUTH TWICE DAILY AS NEEDED FOR PAIN Strength: 500 mg, Disp: 180 tablet, Rfl: 1    NARCAN 4 mg/actuation [place], SPRAY 1 SPRAY IN NOSTRIL ONCE FOR 1 DOSE, Disp: , Rfl:     nitrofurantoin (MACRODANTIN) 50 MG capsule, Take 1 capsule (50 mg total) by mouth every evening., Disp: 90 capsule, Rfl: 1    omeprazole (PRILOSEC) 40 MG capsule, TAKE 1 CAPSULE(40 MG) BY MOUTH EVERY DAY, Disp: 90 capsule, Rfl: 3    pilocarpine (SALAGEN) 5 MG Tab, Take 1 tablet (5 mg total) by mouth 2 (two) times daily., Disp: 60 tablet, Rfl: 11    tiZANidine (ZANAFLEX) 4 MG tablet, 8 mg tid, Disp: 180 tablet, Rfl: 5    triamcinolone acetonide 0.1% (KENALOG) 0.1 % cream, Apply topically 2 (two) times daily as needed (rash). X 2 weeks to rashes PRN, Disp: 80 g, Rfl: 2    ALLERGIES:  Review of patient's allergies indicates:   Allergen Reactions    Aspirin Hives       PSYCHIATRIC EXAM:  There were no vitals filed for this visit.  Appearance:  Well groomed, appearing healthy and of stated age  Behavior:  Cooperative, pleasant, no psychomotor agitation or retardation  Speech:  Normal rate,  "rhythm, prosody, and volume  Mood:  "Ok but anxious"  Affect:  Euthymic  Thought Process:  Linear, logical, goal directed  Thought Content:  Negative for suicidal ideation, homicidal ideation, delusions or hallucinations.  Associations:  Intact  Memory:  Grossly Intact  Level of Consciousness/Orientation:  Grossly intact  Fund of Knowledge:  Good  Attention:  Good  Language:  Fluent, able to name abstract and concrete objects  Insight:  Fair  Judgment:  Intact  Psychomotor signs:  No abnormal movements of face  Gait:  Unable to assess via virtual visit      RELEVANT LABS/STUDIES:    Lab Results   Component Value Date    WBC 7.97 11/03/2022    HGB 10.7 (L) 11/03/2022    HCT 36.1 (L) 11/03/2022    MCV 86 11/03/2022     11/03/2022     BMP  Lab Results   Component Value Date     12/15/2022    K 3.9 12/15/2022    CL 99 12/15/2022    CO2 29 12/15/2022    BUN 17 12/15/2022    CREATININE 1.3 12/15/2022    CALCIUM 10.2 12/15/2022    ANIONGAP 9 12/15/2022    ESTGFRAFRICA 45.5 (A) 06/15/2022    EGFRNONAA 39.5 (A) 06/15/2022     Lab Results   Component Value Date    ALT 10 11/03/2022    AST 14 11/03/2022    ALKPHOS 65 11/03/2022    BILITOT 0.4 11/03/2022     Lab Results   Component Value Date    TSH 2.563 09/22/2021     Lab Results   Component Value Date    HGBA1C 5.3 10/13/2021       IMPRESSION:    Jaki Bajwa is a 52 y.o. female with history of Mood disorder, JUAN JOSE, OCD, social phobia, and Internet shopping addiction who presents for follow up appointment.    DIAGNOSES:    ICD-10-CM ICD-9-CM   1. JUAN JOSE (generalized anxiety disorder)  F41.1 300.02   2. Insomnia due to other mental disorder  F51.05 300.9    F99 327.02   3. Mood disorder  F39 296.90   4. Fibromyalgia  M79.7 729.1   5. Chronic pain syndrome  G89.4 338.4          PLAN:  Start hydroxyzine 25mg TID PRN anxiety.    Continue Latuda 60mg daily, Buspar 15mg TID, Wellbutrin XL 450mg daily, and Doxepin 150mg qHS  She is no longer on Valium.  Discussed with " patient informed consent, risks versus benefits, alternative treatments, side effect profile and the inherent unpredictability of individual responses to these treatments.  The patient expresses understanding of the above and displays the capacity to agree with this current plan.   Continue individual therapy with Mr. Noah LCSW.    RETURN TO CLINIC:  Follow up in about 2 months (around 4/2/2023) for In person.

## 2023-02-07 DIAGNOSIS — Z00.00 ENCOUNTER FOR MEDICARE ANNUAL WELLNESS EXAM: ICD-10-CM

## 2023-02-09 DIAGNOSIS — Z00.00 ENCOUNTER FOR MEDICARE ANNUAL WELLNESS EXAM: ICD-10-CM

## 2023-02-15 ENCOUNTER — PATIENT MESSAGE (OUTPATIENT)
Dept: PSYCHIATRY | Facility: CLINIC | Age: 53
End: 2023-02-15
Payer: MEDICARE

## 2023-02-15 ENCOUNTER — PES CALL (OUTPATIENT)
Dept: ADMINISTRATIVE | Facility: CLINIC | Age: 53
End: 2023-02-15
Payer: MEDICARE

## 2023-02-15 DIAGNOSIS — F41.1 GAD (GENERALIZED ANXIETY DISORDER): Primary | ICD-10-CM

## 2023-02-15 RX ORDER — HYDROXYZINE PAMOATE 50 MG/1
50 CAPSULE ORAL 2 TIMES DAILY PRN
Qty: 180 CAPSULE | Refills: 1 | Status: SHIPPED | OUTPATIENT
Start: 2023-02-15 | End: 2023-04-18

## 2023-02-23 ENCOUNTER — TELEPHONE (OUTPATIENT)
Dept: UROLOGY | Facility: CLINIC | Age: 53
End: 2023-02-23
Payer: MEDICARE

## 2023-02-23 ENCOUNTER — OFFICE VISIT (OUTPATIENT)
Dept: UROLOGY | Facility: CLINIC | Age: 53
End: 2023-02-23
Payer: MEDICARE

## 2023-02-23 ENCOUNTER — OFFICE VISIT (OUTPATIENT)
Dept: PSYCHIATRY | Facility: CLINIC | Age: 53
End: 2023-02-23
Payer: MEDICARE

## 2023-02-23 DIAGNOSIS — R39.9 UTI SYMPTOMS: Primary | ICD-10-CM

## 2023-02-23 DIAGNOSIS — R46.81 OBSESSIVE-COMPULSIVE BEHAVIOR: ICD-10-CM

## 2023-02-23 DIAGNOSIS — R10.9 LEFT FLANK PAIN: ICD-10-CM

## 2023-02-23 DIAGNOSIS — R39.15 URINARY URGENCY: ICD-10-CM

## 2023-02-23 DIAGNOSIS — F40.10 SOCIAL PHOBIA: ICD-10-CM

## 2023-02-23 DIAGNOSIS — R35.0 URINARY FREQUENCY: ICD-10-CM

## 2023-02-23 DIAGNOSIS — F41.1 GAD (GENERALIZED ANXIETY DISORDER): Primary | ICD-10-CM

## 2023-02-23 DIAGNOSIS — F39 MOOD DISORDER: ICD-10-CM

## 2023-02-23 PROCEDURE — 99213 OFFICE O/P EST LOW 20 MIN: CPT | Mod: HCNC,95,, | Performed by: NURSE PRACTITIONER

## 2023-02-23 PROCEDURE — 1159F PR MEDICATION LIST DOCUMENTED IN MEDICAL RECORD: ICD-10-PCS | Mod: HCNC,CPTII,95, | Performed by: NURSE PRACTITIONER

## 2023-02-23 PROCEDURE — 90832 PR PSYCHOTHERAPY W/PATIENT, 30 MIN: ICD-10-PCS | Mod: HCNC,95,, | Performed by: SOCIAL WORKER

## 2023-02-23 PROCEDURE — 1160F PR REVIEW ALL MEDS BY PRESCRIBER/CLIN PHARMACIST DOCUMENTED: ICD-10-PCS | Mod: HCNC,CPTII,95, | Performed by: NURSE PRACTITIONER

## 2023-02-23 PROCEDURE — 1159F MED LIST DOCD IN RCRD: CPT | Mod: HCNC,CPTII,95, | Performed by: NURSE PRACTITIONER

## 2023-02-23 PROCEDURE — 99213 PR OFFICE/OUTPT VISIT, EST, LEVL III, 20-29 MIN: ICD-10-PCS | Mod: HCNC,95,, | Performed by: NURSE PRACTITIONER

## 2023-02-23 PROCEDURE — 90832 PSYTX W PT 30 MINUTES: CPT | Mod: HCNC,95,, | Performed by: SOCIAL WORKER

## 2023-02-23 PROCEDURE — 1160F RVW MEDS BY RX/DR IN RCRD: CPT | Mod: HCNC,CPTII,95, | Performed by: NURSE PRACTITIONER

## 2023-02-23 RX ORDER — FLUCONAZOLE 150 MG/1
150 TABLET ORAL
Qty: 4 TABLET | Refills: 0 | Status: SHIPPED | OUTPATIENT
Start: 2023-02-23 | End: 2023-03-30 | Stop reason: SDUPTHER

## 2023-02-23 RX ORDER — SULFAMETHOXAZOLE AND TRIMETHOPRIM 800; 160 MG/1; MG/1
1 TABLET ORAL 2 TIMES DAILY
Qty: 20 TABLET | Refills: 0 | Status: SHIPPED | OUTPATIENT
Start: 2023-02-23 | End: 2023-03-05

## 2023-02-23 RX ORDER — PHENAZOPYRIDINE HYDROCHLORIDE 200 MG/1
200 TABLET, FILM COATED ORAL 3 TIMES DAILY PRN
Qty: 30 TABLET | Refills: 0 | Status: SHIPPED | OUTPATIENT
Start: 2023-02-23 | End: 2023-03-05
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
## 2023-02-23 NOTE — PROGRESS NOTES
Subjective:      Jaki Bajwa is a 52 y.o. female who returns today regarding her urinary symptoms.     Recurrent UTI workup in 2017- MAY showed no stones and cysto showed bladder sediment suggestive of incomplete bladder emptying.      She has a history of recurrent UTIs. She completed macrodantin prophylaxis in 2018 and was subsequently managed with post coital bactrim. Most recently she completed 3 months of antibiotic prophylaxis in October.     She present today reporting frequency, urgency, L flank discomfort, and nausea that began several weeks ago worsening now. Denies fever/chills and dysuria.   Using estrace PRN.     The following portions of the patient's history were reviewed and updated as appropriate: allergies, current medications, past family history, past medical history, past social history, past surgical history and problem list.    Review of Systems  Constitutional: no fever or chills  ENT: no nasal congestion or sore throat  Respiratory: no cough or shortness of breath  Cardiovascular: no chest pain or palpitations  Gastrointestinal: no nausea or vomiting, tolerating diet  Genitourinary: as per HPI  Hematologic/Lymphatic: no easy bruising or lymphadenopathy  Musculoskeletal: no arthralgias or myalgias  Neurological: no seizures or tremors  Behavioral/Psych: no auditory or visual hallucinations     Objective:   Vital Signs:LMP 07/11/2012 Comment: partial  (deferred, VV)    Physical Exam   Deferred, VV    Lab Review   Urinalysis demonstrates : no sample, virtual   Lab Results   Component Value Date    WBC 7.97 11/03/2022    HGB 10.7 (L) 11/03/2022    HCT 36.1 (L) 11/03/2022    HCT 39 11/03/2022    MCV 86 11/03/2022     11/03/2022     Lab Results   Component Value Date    CREATININE 1.3 12/15/2022    BUN 17 12/15/2022       Imaging   None    Assessment:     1. UTI symptoms    2. Left flank pain    3. Urinary frequency    4. Urinary urgency      Plan:   Diagnoses and all orders for this  visit:    UTI symptoms  -     Urine culture; Standing  -     sulfamethoxazole-trimethoprim 800-160mg (BACTRIM DS) 800-160 mg Tab; Take 1 tablet by mouth 2 (two) times daily. for 10 days  -     phenazopyridine (PYRIDIUM) 200 MG tablet; Take 1 tablet (200 mg total) by mouth 3 (three) times daily as needed for Pain.  -     fluconazole (DIFLUCAN) 150 MG Tab; Take 1 tablet (150 mg total) by mouth every 72 hours as needed (vaginal yeast).    Left flank pain    Urinary frequency    Urinary urgency    Plan:  --Standing order for urine culture at the lab  --Bactrim BID x 10 days, after collecting the urine sample. Will adjust if needed based on culture results  --Pyridium and diflucan PRN    The patient location is: car  The chief complaint leading to consultation is: UTI symptoms    Visit type: audiovisual    Face to Face time with patient: 10  20 minutes of total time spent on the encounter, which includes face to face time and non-face to face time preparing to see the patient (eg, review of tests), Obtaining and/or reviewing separately obtained history, Documenting clinical information in the electronic or other health record, Independently interpreting results (not separately reported) and communicating results to the patient/family/caregiver, or Care coordination (not separately reported).     Each patient to whom he or she provides medical services by telemedicine is:  (1) informed of the relationship between the physician and patient and the respective role of any other health care provider with respect to management of the patient; and (2) notified that he or she may decline to receive medical services by telemedicine and may withdraw from such care at any time.    Notes: see above

## 2023-02-23 NOTE — TELEPHONE ENCOUNTER
LVM about needing to provide a urine specimen for Urine Culture before antibiotics can be prescribed.

## 2023-03-02 ENCOUNTER — TELEPHONE (OUTPATIENT)
Dept: INTERNAL MEDICINE | Facility: CLINIC | Age: 53
End: 2023-03-02
Payer: MEDICARE

## 2023-03-02 DIAGNOSIS — G89.4 CHRONIC PAIN SYNDROME: ICD-10-CM

## 2023-03-02 RX ORDER — HYDROCODONE BITARTRATE AND ACETAMINOPHEN 5; 325 MG/1; MG/1
1 TABLET ORAL
Qty: 30 TABLET | Refills: 0 | Status: CANCELLED | OUTPATIENT
Start: 2023-03-02

## 2023-03-02 NOTE — TELEPHONE ENCOUNTER
----- Message from Gabriela Alas sent at 3/2/2023  8:56 AM CST -----  Who Called:        Refill or New Rx: refill         RX Name and Strength:  HYDROcodone-acetaminophen (NORCO) 5-325 mg per tablet          Is this a 30 day or 90 day RX        Preferred Pharmacy with phone number:  [id number] - [place] [address] [facility]        Local or Mail Order: local           Would the patient rather a call back or a response via [facility]? Call back         Best Call Back Number:  [phone]        Additional Information:

## 2023-03-02 NOTE — TELEPHONE ENCOUNTER
No new care gaps identified.  [facility] Embedded Care Gaps. Reference number: [id number]. 3/02/2023   8:58:20 AM CST

## 2023-03-10 ENCOUNTER — OFFICE VISIT (OUTPATIENT)
Dept: RHEUMATOLOGY | Facility: CLINIC | Age: 53
End: 2023-03-10
Payer: MEDICARE

## 2023-03-10 DIAGNOSIS — Z79.899 LONG-TERM USE OF PLAQUENIL: ICD-10-CM

## 2023-03-10 DIAGNOSIS — R20.2 PARESTHESIA OF HAND, BILATERAL: Primary | ICD-10-CM

## 2023-03-10 DIAGNOSIS — M35.01 SJOGREN'S SYNDROME WITH KERATOCONJUNCTIVITIS SICCA: ICD-10-CM

## 2023-03-10 DIAGNOSIS — Z13.820 SCREENING FOR OSTEOPOROSIS: ICD-10-CM

## 2023-03-10 PROCEDURE — 1159F PR MEDICATION LIST DOCUMENTED IN MEDICAL RECORD: ICD-10-PCS | Mod: HCNC,CPTII,95, | Performed by: INTERNAL MEDICINE

## 2023-03-10 PROCEDURE — 99214 PR OFFICE/OUTPT VISIT, EST, LEVL IV, 30-39 MIN: ICD-10-PCS | Mod: HCNC,95,, | Performed by: INTERNAL MEDICINE

## 2023-03-10 PROCEDURE — 99214 OFFICE O/P EST MOD 30 MIN: CPT | Mod: HCNC,95,, | Performed by: INTERNAL MEDICINE

## 2023-03-10 PROCEDURE — 1159F MED LIST DOCD IN RCRD: CPT | Mod: HCNC,CPTII,95, | Performed by: INTERNAL MEDICINE

## 2023-03-10 RX ORDER — TIZANIDINE 4 MG/1
TABLET ORAL
Qty: 180 TABLET | Refills: 5 | Status: SHIPPED | OUTPATIENT
Start: 2023-03-10 | End: 2023-05-23 | Stop reason: SDUPTHER

## 2023-03-10 RX ORDER — NYSTATIN 100000 [USP'U]/ML
4 SUSPENSION ORAL 4 TIMES DAILY
Qty: 473 ML | Refills: 0 | Status: SHIPPED | OUTPATIENT
Start: 2023-03-10 | End: 2023-04-09

## 2023-03-10 RX ORDER — PILOCARPINE HYDROCHLORIDE 5 MG/1
5 TABLET, FILM COATED ORAL 4 TIMES DAILY
Qty: 120 TABLET | Refills: 11 | Status: SHIPPED | OUTPATIENT
Start: 2023-03-10 | End: 2024-04-03

## 2023-03-10 RX ORDER — HYDROXYCHLOROQUINE SULFATE 200 MG/1
TABLET, FILM COATED ORAL
Qty: 180 TABLET | Refills: 2 | Status: SHIPPED | OUTPATIENT
Start: 2023-03-10 | End: 2023-10-29

## 2023-03-10 NOTE — PROGRESS NOTES
Chief Complaint   Patient presents with    Disease Management           The patient location is: Home  The chief complaint leading to consultation is: sjogren's syndrome    Visit type: audiovisual    Face to Face time with patient: 20   minutes of total time spent on the encounter, which includes face to face time and non-face to face time preparing to see the patient (eg, review of tests), Obtaining and/or reviewing separately obtained history, Documenting clinical information in the electronic or other health record, Independently interpreting results (not separately reported) and communicating results to the patient/family/caregiver, or Care coordination (not separately reported).         Each patient to whom he or she provides medical services by telemedicine is:  (1) informed of the relationship between the physician and patient and the respective role of any other health care provider with respect to management of the patient; and (2) notified that he or she may decline to receive medical services by telemedicine and may withdraw from such care at any time.    Notes:     History of presenting illness    52  year old black female has     Joint pains in the knees and ankles   Back pain   Diffuse aching  Nocturnal leg cramps  Worse pain with activity  Hand paresthesias+  Morning pain but no morning stiffness  No joint swelling     She now has a diagnosis of fibromyalgia  Diagnosis + confirmed by atleast 2 rheumatologists  On gabapentin 300 mg tid  on naprosyn 500 mg daily  Tried and failed flexeril  On zanaflex 4 mg bid prn for TMJ syndrome and muscle spasms   Takes doxepin at night 150 mg for insomnia   She follows with pain clinic and takes pain medications  She gets migraines but not severe  She has tested negative for sleep apnea    She has anxiety and depression : on wellbutrin 150 to 300 mg and buspar  5 mg bid   Valium is prn only  On latuda 60 mg     Dry eyes and dry mouth and inflammatory  arthritis/arthralgia   Abnormal labs   Sjogren's syndrome +   On plaquenil 200 mg bid   Pilocarpine makes her nauseated   So we gave cevelimine   Dry mouth is severe at night   Dry eyes severe /but if she uses restasis she has symptom control  No recurrent conjunctivitis or uveitis or scleritis or episcleritis but she remembers once they told her she has inflammation in the eyes due to dry eyes   Sees ophthalmology     She has dry patches of skin   Areas of pigmentation on the elbows and thighs   Butterfly like pigmentation on the eyelids and underneath the eyes   Dermatology says : nummular dermatitis and seborrheic keratosis   No other skin rashes,photosensitivity   No telangiectasias   No calcinosis   No psoriasis   She does have patchy alopecia ,b/l frontally     No oral and nasal ulcers     No pleurisy or any cardiopulmonary complaints     No dysphagia,diplopia and dysphonia and muscle weakness     No v/d/c   Nausea and  acid reflux+ on PPIs   [facility]  She has hiatal hernia     She has raynaud's+   No digital ulcers     No cytopenias   Mostly irom deficiency anemia  White count and plt count ok  No menorrhagia /hysterectomy  Diet is poor    No renal issues     No blood clots     No fever,chills,night sweats,weight loss and loss of appetite  No unexplained lymphadenopathy and parotitis      No pregnancy losses/pre term deliveries /pregnancy complications     No new onset headaches     No chronic or bloody diarrhea with no u colitis or crohn's /inflammatory bowel disease     No vaginal or  urethral  d/c/STDs/no ulcers     No unexplained neurologic symptoms       Labs     White count always normal    Anemia     ESR 25/23  CRP 3.3/6.8    Creat and GFR always normal  LFTs ok    OLINDA negative     Hep A,B,C negative   HIV negative    Has had UTIs and proteinuria and hematuria  She has to see urology  The UTIs are always s/p intercourse    8/2020 labs     OLINDA neg  SSA positive 2.19  nml complements    Dsdna neg  cryos nml  myomarker panel neg  Ck,aldolase nml  RF  Urine nml  UPCR neg  GFR 54.9  Immunoglobulins elevated 2088  GIGI neg,LDH nml  B12,folate nml  retic nml      First follow up  9/2021    Hands hurt/ache  Foot and ankles : swell now and then   It comes and goes away  She takes a diuretic     Dry eyes and dry mouth-ok    On plaquenil 200 mg bid     On evoxac 30 mg tid-causes stomach upset    1/2022    Some aches and pains    On plaquenil 200 mg bid  On evoxac 30 mg daily-cant tolerate more   Dry eyes and dry mouth-ok    5/2022    Iron infusions let to constipation  She went to the ER  GFR dropped to 28.7  NOW UPTO >60  UA -uti  On antibiotics    Dry eyes and mouth- does well  Drinks well    On plaquenil 200 mg bid  Eye exam-pending    Doesn't want evoxac  Will try pilocarpine     White count nml  H/h 10.5/36.9  Iron deficiency  plts nml    Sees hematology     2/2022    Complements,cryos,dsdna,CRP nml  UA,UPCR nml      11/2022    Nov-tibial fracture-Healing well-fell down the steps-mechanical fall  Will need a dexa    Dry eyes-eye drops and restasis helps  Dry mouth -pilocarpine helps    On plaquenil 200 mg bid-no major joint pains  No gland enlargement,lymph node enlargement,rashes     Anemia- 10.7/36.1  Stopped iron infusions  Nml white count and plts    GFR 35  UA 1+ protein  Iron -normal in June 2022  We dont know the cause of the low GFR    3/2023    Few problems with the tongue  Tongue was cracked with blisters  Her mouth was very dry  She takes pilocarpine 5 mg tid    Dexa not done    Dry eyes comes as flares  Eye pain- needs evaluation      On plaquenil 200 mg bid-no major joint pains  No gland enlargement,lymph node enlargement,rashes       Past history : sjogren's,fibromyalgia     Family history : lupus cousin and cancers    Social history : not a smoker,alcohol user        Review of Systems   Constitutional:  Negative for fever and unexpected weight change.   HENT:  Negative for mouth sores  and trouble swallowing.    Eyes:  Negative for redness.   Respiratory:  Negative for cough and shortness of breath.    Cardiovascular:  Negative for chest pain.   Gastrointestinal:  Negative for constipation and diarrhea.   Genitourinary:  Negative for dysuria and genital sores.   Skin:  Positive for rash.   Neurological:  Negative for headaches.   Hematological:  Does not bruise/bleed easily.     As detailed above     MSK exam  nml    Physical Exam   Constitutional: She is oriented to person, place, and time. No distress.   HENT:   Head: Normocephalic.   Mouth/Throat: Oropharynx is clear and moist.   Eyes: Pupils are equal, round, and reactive to light. Conjunctivae are normal. Right eye exhibits no discharge. Left eye exhibits no discharge. No scleral icterus.   Neck: No thyromegaly present.   Cardiovascular: Normal rate, regular rhythm and normal heart sounds.   Pulmonary/Chest: Effort normal and breath sounds normal. No stridor.   Abdominal: Soft. Bowel sounds are normal.   Musculoskeletal:         General: Normal range of motion.      Cervical back: Normal range of motion.   Lymphadenopathy:     She has no cervical adenopathy.   Neurological: She is alert and oriented to person, place, and time.   Skin: Skin is warm. No rash noted. She is not diaphoretic.   Psychiatric: Affect and judgment normal.     She has lost a lot of hair frontally   Its like a bald patch  Its chronic  There is a mole as well    She has dry hyperpigmented skin on the elbows and dry patches on thighs for evaluation  She has frontal baldness  She has a mole like lesion   She has hyperpigmented rash on the eyelids and under the eyes       Assessment     52 year old black female with    -Sjogren's syndrome  Sicca symptoms and inflammatory arthritis    Fibromyalgia   -chronic pain + RLS   -insomnia/no sleep apnea  -anxiety and depression     Joint pains  Hands,feet,ankles and knees    Fluid retention needing diuretics and compression  stockings    CTS braces help    Anemia- sees hematology ,completed iron infusions      3/2023    Few problems with the tongue  Tongue was cracked with blisters  Her mouth was very dry  She takes pilocarpine 5 mg tid    Dexa not done    Dry eyes comes as flares  Eye pain- needs evaluation      On plaquenil 200 mg bid-no major joint pains  No gland enlargement,lymph node enlargement,rashes     Hands-when she places it a certain way- it goes numb and dead  She drops things easily    GFR 35- 48.9  UA,UPCR nml  Nephro saw her    1. Paresthesia of hand, bilateral    2. Screening for osteoporosis    3. Sjogren's syndrome with keratoconjunctivitis sicca    4. Long-term use of Plaquenil            Reviewed labs/xrays  Reviewed medications    Ordered labs /xrays    F/u     Plan      Will get EMG/NCS B/L UE    Order dexa    Do Sjogren's labs     Nephrology- f/u  She gets recurrent UTIs-sees urology    Dry eye management   -on restasis  She now sees ophthalmology  Tell them about the eye pain    Dry mouth management   Cant tolerate evoxac 30 mg tid -cant tolerate 30 mg bid  Tolerates pilocarpine 5 mg tid  Will increase dose to qid  Add nystatin swish and swallow    Skin issues : dry hyperpigmented skin on the elbows and dry patches on thighs   She has frontal baldness  She has a mole like lesion   She has hyperpigmented rash on the eyelids and under the eyes   She has nummular dermatitis and seborrheic dermatitis   Dermatology saw her,on topical medications     Counselled extraglandular manifestations   Counselled risk of lymphoma    Use of vaginal lubricants/estrogen creams   Seeing Gyn    Use hypoallergenic soaps/lotions for the skin  Avoid drafts from air conditioners/heaters/radiators  Avoid detergents,deodorant soaps,very hot water  Use humidifiers    Have to order CBC,CMP,ESR,CRP,urine analysis,urine creatinine,urine protein,cryos and complements,globulins,quantitative immunoglobulins    Anemia being addressed-she sees  hematology  Bone marrow biopsy 2014 : [facility]  Hematology note :  Since May 2012,  hemoglobin values have been between 10.1-11.6.   She had hysterectomy and has no ongoing menstrual losses. No s/s of mal-absorption. Diet is normal/regular.   No overt bleeding. No melena. She has fatigue and heart burn.  Stool OB negative in Jan 2020. UA negative for blood/ RBC in Aug 2020.    She received iv injectafer on 9/30 and 10/7/20. Serum ferritin now normal. LDH, reticuloyte count, b12, folate all normal today. Hemoglobin 11.4g/dl. GGII negative.  On oral iron once or twice daily on empty stomach.     White count always normal  Always anemic   plt count always normal    Continue plaquenil 200 mg bid  Eye exam and routine eval once a year    Pain management :   Narcotics at pain clinic  Gabapentin 300 mg tid  zanaflex dose increased to 8 mg tid    Insomnia management : doxepin  No sleep apnea    Anxiety and depression management  On latuda,wellbutrin and buspar       Jaki was seen today for disease management.    Diagnoses and all orders for this visit:    Paresthesia of hand, bilateral  -     EMG W/ ULTRASOUND AND NERVE CONDUCTION TEST 2 Extremities; Future    Screening for osteoporosis  -     DXA Bone Density Axial Skeleton 1 or more sites; Future    Sjogren's syndrome with keratoconjunctivitis sicca  -     CBC Auto Differential; Future  -     Comprehensive Metabolic Panel; Future  -     Sedimentation rate; Future  -     C-Reactive Protein; Future  -     C3 Complement; Future  -     C4 Complement; Future  -     Cryoglobulin; Future  -     Urinalysis; Future  -     Protein/Creatinine Ratio, Urine; Future    Long-term use of Plaquenil    Other orders  -     pilocarpine (SALAGEN) 5 MG Tab; Take 1 tablet (5 mg total) by mouth 4 (four) times daily.  -     nystatin (MYCOSTATIN) 100,000 unit/mL suspension; Take 4 mLs (400,000 Units total) by mouth 4 (four) times daily.  -     hydrOXYchloroQUINE (PLAQUENIL) 200 mg tablet; TAKE 1  TABLET BY MOUTH TWICE DAILY  -     tiZANidine (ZANAFLEX) 4 MG tablet; 8 mg tid            Every 6 months to us

## 2023-03-13 PROBLEM — F11.20 CHRONIC NARCOTIC DEPENDENCE: Status: ACTIVE | Noted: 2023-03-13

## 2023-03-13 PROBLEM — D84.9 IMMUNOCOMPROMISED: Status: RESOLVED | Noted: 2022-03-08 | Resolved: 2023-03-13

## 2023-03-13 NOTE — PROGRESS NOTES
Subjective:       Patient ID: Jaki Bajwa is a 52 y.o. female.    Chief Complaint: Hypertension      Pt here for f/u. Counseled on exercise/wt loss goals/strategies.    She has a dx of sjogrens and fibromyalgia based on blood work and chronic pain that is primarily across shoulders and in legs but also in upper and lower back. As a result she is on several meds that she says helps. She saw rheum [facility]. She has prior dx of lupus but this was not corroborated in notes from rheum. She is on plaquenil (she is up to date on eye exam), tizanidine, gabapentin and norco. She uses norco 1x/day. She has done well with decreasing dispense amount to 30 per month. She has previously signed a pain contract. [place]  reviewed and is consistent. She has seen pain mgmt. She has also done PT with some success in the past.      She also has dx of sjogren's syndrome. She is on pilocarpine with some success. Uses eye drops for dry eyes which has been effective and is stable.      Pt's BP is well controlled. Tolerating meds well. Pt denies cp/sob/ha/vision or neuro changes. Home readings are well controlled.      She continues to see psychiatry for bipolar and anxiety. This is fairly well controlled and stable on current meds. No SI/HI. They have been able to get her off valium and she no longer takes this.      She has had recurrent UTIs. Saw urology and is on prophylactic macrodantin. Currently doing well. She is on vaginal estrace cream which also helped decrease her UTI issues. However, she is confused about her med regimen and will contact urology to discuss for clarification.     She has chronic iron def anemia. She has no symptoms of such and denies any known bleeding or bloody/black stool. She had c-scope 10/2018 and again 1/2021 that did not show any bleeding source. She also had EGD that showed positive h pylori for which she was tx but no other bleeding sources. She also did stool cards which were  negative. Most recent labs show stability in iron def and anemia. She saw heme-onc who has instructed her to resume oral iron as she previously received IV iron which she did start.       She has mild CKD-3 with baseline creatinine 1.3-1.7. She has seen nephrology.        Hypertension  Pertinent negatives include no chest pain, headaches, neck pain, palpitations or shortness of breath.   Chronic Pain  Associated symptoms: no abdominal pain, no chest pain, no constipation, no shortness of breath, no headaches and no weakness    Fibromyalgia  Pertinent negatives include no abdominal pain, arthralgias, chest pain, coughing, fever, headaches, joint swelling, neck pain, sore throat, vomiting or weakness.   Review of Systems   Constitutional:  Positive for activity change. Negative for fever and unexpected weight change.   HENT:  Negative for ear pain, hearing loss, rhinorrhea, sore throat and trouble swallowing.    Eyes:  Negative for discharge and visual disturbance.   Respiratory:  Negative for cough, chest tightness, shortness of breath and wheezing.    Cardiovascular:  Negative for chest pain, palpitations and leg swelling.   Gastrointestinal:  Negative for abdominal pain, blood in stool, constipation, diarrhea and vomiting.   Endocrine: Negative for polydipsia and polyuria.   Genitourinary:  Negative for difficulty urinating, dysuria, frequency, hematuria and menstrual problem.   Musculoskeletal:  Negative for arthralgias, joint swelling and neck pain.   Neurological:  Negative for weakness and headaches.   Psychiatric/Behavioral:  Negative for confusion and dysphoric mood.      Objective:          Assessment:       1. Essential hypertension    2. Fibromyalgia    3. JUAN JOSE (generalized anxiety disorder)    4. Sjogren's syndrome, with unspecified organ involvement    5. Obsessive-compulsive behavior    6. Stage 3a chronic kidney disease    7. Morbid obesity    8. Recurrent UTI (urinary tract infection)    9. Chronic  pain syndrome    10. Anemia of unknown etiology    11. Chronic narcotic dependence        Plan:       1. Not yet due for refill on norco but will let us know when due; narcan--pt understands how to use and how to instruct family/friends how to use  2. Recent labs reviewed  3. Keep f/u with specialists  4. Home BP monitoring                Physical Exam  Constitutional:       Appearance: Normal appearance. She is well-developed.   HENT:      Right Ear: Tympanic membrane, ear canal and external ear normal.      Left Ear: Tympanic membrane, ear canal and external ear normal.      Nose: No mucosal edema or rhinorrhea.      Mouth/Throat:      Pharynx: No oropharyngeal exudate or posterior oropharyngeal erythema.   Eyes:      Extraocular Movements: Extraocular movements intact.      Pupils: Pupils are equal, round, and reactive to light.   Neck:      Thyroid: No thyromegaly.   Cardiovascular:      Rate and Rhythm: Normal rate and regular rhythm.      Heart sounds: Normal heart sounds.   Pulmonary:      Effort: Pulmonary effort is normal.      Breath sounds: Normal breath sounds.   Abdominal:      General: Bowel sounds are normal. There is no distension.      Palpations: Abdomen is soft. There is no mass.      Tenderness: There is no abdominal tenderness. There is no right CVA tenderness or left CVA tenderness.   Musculoskeletal:      Cervical back: Neck supple.      Lumbar back: No tenderness or bony tenderness. Normal range of motion.      Right lower leg: No edema.      Left lower leg: No edema.   Lymphadenopathy:      Cervical: No cervical adenopathy.   Neurological:      Mental Status: She is alert and oriented to person, place, and time.      Cranial Nerves: No cranial nerve deficit.   Psychiatric:         Mood and Affect: Mood normal.         Behavior: Behavior normal.

## 2023-03-14 ENCOUNTER — OFFICE VISIT (OUTPATIENT)
Dept: INTERNAL MEDICINE | Facility: CLINIC | Age: 53
End: 2023-03-14
Payer: MEDICARE

## 2023-03-14 ENCOUNTER — LAB VISIT (OUTPATIENT)
Dept: LAB | Facility: OTHER | Age: 53
End: 2023-03-14
Attending: INTERNAL MEDICINE
Payer: MEDICARE

## 2023-03-14 VITALS
OXYGEN SATURATION: 98 % | BODY MASS INDEX: 42.37 KG/M2 | DIASTOLIC BLOOD PRESSURE: 86 MMHG | SYSTOLIC BLOOD PRESSURE: 134 MMHG | HEIGHT: 67 IN | HEART RATE: 99 BPM | WEIGHT: 269.94 LBS

## 2023-03-14 DIAGNOSIS — N39.0 RECURRENT UTI (URINARY TRACT INFECTION): ICD-10-CM

## 2023-03-14 DIAGNOSIS — F11.20 CHRONIC NARCOTIC DEPENDENCE: ICD-10-CM

## 2023-03-14 DIAGNOSIS — E66.01 MORBID OBESITY: ICD-10-CM

## 2023-03-14 DIAGNOSIS — M35.00 SJOGREN'S SYNDROME, WITH UNSPECIFIED ORGAN INVOLVEMENT: ICD-10-CM

## 2023-03-14 DIAGNOSIS — I10 ESSENTIAL HYPERTENSION: Primary | Chronic | ICD-10-CM

## 2023-03-14 DIAGNOSIS — M79.7 FIBROMYALGIA: ICD-10-CM

## 2023-03-14 DIAGNOSIS — F41.1 GAD (GENERALIZED ANXIETY DISORDER): ICD-10-CM

## 2023-03-14 DIAGNOSIS — G89.4 CHRONIC PAIN SYNDROME: ICD-10-CM

## 2023-03-14 DIAGNOSIS — R46.81 OBSESSIVE-COMPULSIVE BEHAVIOR: ICD-10-CM

## 2023-03-14 DIAGNOSIS — N18.31 STAGE 3A CHRONIC KIDNEY DISEASE: ICD-10-CM

## 2023-03-14 DIAGNOSIS — R73.01 IMPAIRED FASTING GLUCOSE: ICD-10-CM

## 2023-03-14 DIAGNOSIS — D64.9 ANEMIA OF UNKNOWN ETIOLOGY: ICD-10-CM

## 2023-03-14 LAB
ESTIMATED AVG GLUCOSE: 100 MG/DL (ref 68–131)
HBA1C MFR BLD: 5.1 % (ref 4–5.6)

## 2023-03-14 PROCEDURE — 99999 PR PBB SHADOW E&M-EST. PATIENT-LVL V: ICD-10-PCS | Mod: PBBFAC,HCNC,, | Performed by: INTERNAL MEDICINE

## 2023-03-14 PROCEDURE — 1159F PR MEDICATION LIST DOCUMENTED IN MEDICAL RECORD: ICD-10-PCS | Mod: HCNC,CPTII,S$GLB, | Performed by: INTERNAL MEDICINE

## 2023-03-14 PROCEDURE — 3079F DIAST BP 80-89 MM HG: CPT | Mod: HCNC,CPTII,S$GLB, | Performed by: INTERNAL MEDICINE

## 2023-03-14 PROCEDURE — 3079F PR MOST RECENT DIASTOLIC BLOOD PRESSURE 80-89 MM HG: ICD-10-PCS | Mod: HCNC,CPTII,S$GLB, | Performed by: INTERNAL MEDICINE

## 2023-03-14 PROCEDURE — 83036 HEMOGLOBIN GLYCOSYLATED A1C: CPT | Mod: HCNC | Performed by: INTERNAL MEDICINE

## 2023-03-14 PROCEDURE — 99214 PR OFFICE/OUTPT VISIT, EST, LEVL IV, 30-39 MIN: ICD-10-PCS | Mod: HCNC,S$GLB,, | Performed by: INTERNAL MEDICINE

## 2023-03-14 PROCEDURE — 1159F MED LIST DOCD IN RCRD: CPT | Mod: HCNC,CPTII,S$GLB, | Performed by: INTERNAL MEDICINE

## 2023-03-14 PROCEDURE — 36415 COLL VENOUS BLD VENIPUNCTURE: CPT | Mod: HCNC | Performed by: INTERNAL MEDICINE

## 2023-03-14 PROCEDURE — 3075F SYST BP GE 130 - 139MM HG: CPT | Mod: HCNC,CPTII,S$GLB, | Performed by: INTERNAL MEDICINE

## 2023-03-14 PROCEDURE — 3008F PR BODY MASS INDEX (BMI) DOCUMENTED: ICD-10-PCS | Mod: HCNC,CPTII,S$GLB, | Performed by: INTERNAL MEDICINE

## 2023-03-14 PROCEDURE — 3075F PR MOST RECENT SYSTOLIC BLOOD PRESS GE 130-139MM HG: ICD-10-PCS | Mod: HCNC,CPTII,S$GLB, | Performed by: INTERNAL MEDICINE

## 2023-03-14 PROCEDURE — 99999 PR PBB SHADOW E&M-EST. PATIENT-LVL V: CPT | Mod: PBBFAC,HCNC,, | Performed by: INTERNAL MEDICINE

## 2023-03-14 PROCEDURE — 1160F RVW MEDS BY RX/DR IN RCRD: CPT | Mod: HCNC,CPTII,S$GLB, | Performed by: INTERNAL MEDICINE

## 2023-03-14 PROCEDURE — 1160F PR REVIEW ALL MEDS BY PRESCRIBER/CLIN PHARMACIST DOCUMENTED: ICD-10-PCS | Mod: HCNC,CPTII,S$GLB, | Performed by: INTERNAL MEDICINE

## 2023-03-14 PROCEDURE — 99214 OFFICE O/P EST MOD 30 MIN: CPT | Mod: HCNC,S$GLB,, | Performed by: INTERNAL MEDICINE

## 2023-03-14 PROCEDURE — 3008F BODY MASS INDEX DOCD: CPT | Mod: HCNC,CPTII,S$GLB, | Performed by: INTERNAL MEDICINE

## 2023-03-23 ENCOUNTER — PATIENT MESSAGE (OUTPATIENT)
Dept: PSYCHIATRY | Facility: CLINIC | Age: 53
End: 2023-03-23
Payer: MEDICARE

## 2023-03-30 ENCOUNTER — OFFICE VISIT (OUTPATIENT)
Dept: UROLOGY | Facility: CLINIC | Age: 53
End: 2023-03-30
Payer: MEDICARE

## 2023-03-30 ENCOUNTER — OFFICE VISIT (OUTPATIENT)
Dept: INTERNAL MEDICINE | Facility: CLINIC | Age: 53
End: 2023-03-30
Payer: MEDICARE

## 2023-03-30 VITALS
SYSTOLIC BLOOD PRESSURE: 155 MMHG | RESPIRATION RATE: 16 BRPM | HEART RATE: 95 BPM | HEIGHT: 67 IN | DIASTOLIC BLOOD PRESSURE: 87 MMHG | OXYGEN SATURATION: 96 % | BODY MASS INDEX: 41.28 KG/M2 | WEIGHT: 263 LBS

## 2023-03-30 VITALS
WEIGHT: 263.69 LBS | SYSTOLIC BLOOD PRESSURE: 159 MMHG | HEIGHT: 67 IN | DIASTOLIC BLOOD PRESSURE: 93 MMHG | HEART RATE: 94 BPM | BODY MASS INDEX: 41.39 KG/M2

## 2023-03-30 DIAGNOSIS — J32.9 SINUSITIS, UNSPECIFIED CHRONICITY, UNSPECIFIED LOCATION: ICD-10-CM

## 2023-03-30 DIAGNOSIS — L98.9 SCALP LESION: ICD-10-CM

## 2023-03-30 DIAGNOSIS — R39.15 URINARY URGENCY: ICD-10-CM

## 2023-03-30 DIAGNOSIS — N39.0 RECURRENT UTI: Primary | ICD-10-CM

## 2023-03-30 DIAGNOSIS — L30.0 NUMMULAR DERMATITIS: Primary | ICD-10-CM

## 2023-03-30 PROCEDURE — 1159F PR MEDICATION LIST DOCUMENTED IN MEDICAL RECORD: ICD-10-PCS | Mod: HCNC,CPTII,S$GLB, | Performed by: NURSE PRACTITIONER

## 2023-03-30 PROCEDURE — 3080F DIAST BP >= 90 MM HG: CPT | Mod: HCNC,CPTII,S$GLB, | Performed by: PHYSICIAN ASSISTANT

## 2023-03-30 PROCEDURE — 1159F MED LIST DOCD IN RCRD: CPT | Mod: HCNC,CPTII,S$GLB, | Performed by: NURSE PRACTITIONER

## 2023-03-30 PROCEDURE — 3077F PR MOST RECENT SYSTOLIC BLOOD PRESSURE >= 140 MM HG: ICD-10-PCS | Mod: HCNC,CPTII,S$GLB, | Performed by: NURSE PRACTITIONER

## 2023-03-30 PROCEDURE — 3044F HG A1C LEVEL LT 7.0%: CPT | Mod: HCNC,CPTII,S$GLB, | Performed by: NURSE PRACTITIONER

## 2023-03-30 PROCEDURE — 99214 OFFICE O/P EST MOD 30 MIN: CPT | Mod: HCNC,S$GLB,, | Performed by: PHYSICIAN ASSISTANT

## 2023-03-30 PROCEDURE — 99214 PR OFFICE/OUTPT VISIT, EST, LEVL IV, 30-39 MIN: ICD-10-PCS | Mod: HCNC,S$GLB,, | Performed by: PHYSICIAN ASSISTANT

## 2023-03-30 PROCEDURE — 3077F SYST BP >= 140 MM HG: CPT | Mod: HCNC,CPTII,S$GLB, | Performed by: PHYSICIAN ASSISTANT

## 2023-03-30 PROCEDURE — 3008F PR BODY MASS INDEX (BMI) DOCUMENTED: ICD-10-PCS | Mod: HCNC,CPTII,S$GLB, | Performed by: NURSE PRACTITIONER

## 2023-03-30 PROCEDURE — 3008F PR BODY MASS INDEX (BMI) DOCUMENTED: ICD-10-PCS | Mod: HCNC,CPTII,S$GLB, | Performed by: PHYSICIAN ASSISTANT

## 2023-03-30 PROCEDURE — 3079F DIAST BP 80-89 MM HG: CPT | Mod: HCNC,CPTII,S$GLB, | Performed by: NURSE PRACTITIONER

## 2023-03-30 PROCEDURE — 3008F BODY MASS INDEX DOCD: CPT | Mod: HCNC,CPTII,S$GLB, | Performed by: PHYSICIAN ASSISTANT

## 2023-03-30 PROCEDURE — 3080F PR MOST RECENT DIASTOLIC BLOOD PRESSURE >= 90 MM HG: ICD-10-PCS | Mod: HCNC,CPTII,S$GLB, | Performed by: PHYSICIAN ASSISTANT

## 2023-03-30 PROCEDURE — 1159F PR MEDICATION LIST DOCUMENTED IN MEDICAL RECORD: ICD-10-PCS | Mod: HCNC,CPTII,S$GLB, | Performed by: PHYSICIAN ASSISTANT

## 2023-03-30 PROCEDURE — 1160F RVW MEDS BY RX/DR IN RCRD: CPT | Mod: HCNC,CPTII,S$GLB, | Performed by: NURSE PRACTITIONER

## 2023-03-30 PROCEDURE — 99213 PR OFFICE/OUTPT VISIT, EST, LEVL III, 20-29 MIN: ICD-10-PCS | Mod: HCNC,S$GLB,, | Performed by: NURSE PRACTITIONER

## 2023-03-30 PROCEDURE — 3077F PR MOST RECENT SYSTOLIC BLOOD PRESSURE >= 140 MM HG: ICD-10-PCS | Mod: HCNC,CPTII,S$GLB, | Performed by: PHYSICIAN ASSISTANT

## 2023-03-30 PROCEDURE — 1160F PR REVIEW ALL MEDS BY PRESCRIBER/CLIN PHARMACIST DOCUMENTED: ICD-10-PCS | Mod: HCNC,CPTII,S$GLB, | Performed by: NURSE PRACTITIONER

## 2023-03-30 PROCEDURE — 3008F BODY MASS INDEX DOCD: CPT | Mod: HCNC,CPTII,S$GLB, | Performed by: NURSE PRACTITIONER

## 2023-03-30 PROCEDURE — 3079F PR MOST RECENT DIASTOLIC BLOOD PRESSURE 80-89 MM HG: ICD-10-PCS | Mod: HCNC,CPTII,S$GLB, | Performed by: NURSE PRACTITIONER

## 2023-03-30 PROCEDURE — 1159F MED LIST DOCD IN RCRD: CPT | Mod: HCNC,CPTII,S$GLB, | Performed by: PHYSICIAN ASSISTANT

## 2023-03-30 PROCEDURE — 99999 PR PBB SHADOW E&M-EST. PATIENT-LVL III: ICD-10-PCS | Mod: PBBFAC,HCNC,, | Performed by: PHYSICIAN ASSISTANT

## 2023-03-30 PROCEDURE — 3044F HG A1C LEVEL LT 7.0%: CPT | Mod: HCNC,CPTII,S$GLB, | Performed by: PHYSICIAN ASSISTANT

## 2023-03-30 PROCEDURE — 99999 PR PBB SHADOW E&M-EST. PATIENT-LVL III: CPT | Mod: PBBFAC,HCNC,, | Performed by: PHYSICIAN ASSISTANT

## 2023-03-30 PROCEDURE — 3044F PR MOST RECENT HEMOGLOBIN A1C LEVEL <7.0%: ICD-10-PCS | Mod: HCNC,CPTII,S$GLB, | Performed by: PHYSICIAN ASSISTANT

## 2023-03-30 PROCEDURE — 99213 OFFICE O/P EST LOW 20 MIN: CPT | Mod: HCNC,S$GLB,, | Performed by: NURSE PRACTITIONER

## 2023-03-30 PROCEDURE — 87086 URINE CULTURE/COLONY COUNT: CPT | Mod: HCNC | Performed by: NURSE PRACTITIONER

## 2023-03-30 PROCEDURE — 3044F PR MOST RECENT HEMOGLOBIN A1C LEVEL <7.0%: ICD-10-PCS | Mod: HCNC,CPTII,S$GLB, | Performed by: NURSE PRACTITIONER

## 2023-03-30 PROCEDURE — 3077F SYST BP >= 140 MM HG: CPT | Mod: HCNC,CPTII,S$GLB, | Performed by: NURSE PRACTITIONER

## 2023-03-30 RX ORDER — FLUTICASONE PROPIONATE 50 MCG
1 SPRAY, SUSPENSION (ML) NASAL DAILY
Qty: 11.1 ML | Refills: 0 | Status: SHIPPED | OUTPATIENT
Start: 2023-03-30 | End: 2023-05-29

## 2023-03-30 RX ORDER — CETIRIZINE HYDROCHLORIDE 10 MG/1
10 TABLET ORAL DAILY
Qty: 30 TABLET | Refills: 11 | Status: SHIPPED | OUTPATIENT
Start: 2023-03-30 | End: 2024-02-13

## 2023-03-30 RX ORDER — TRIAMCINOLONE ACETONIDE 1 MG/G
CREAM TOPICAL 2 TIMES DAILY PRN
Qty: 80 G | Refills: 2 | Status: SHIPPED | OUTPATIENT
Start: 2023-03-30 | End: 2023-10-25

## 2023-03-30 RX ORDER — FLUCONAZOLE 150 MG/1
150 TABLET ORAL
Qty: 3 TABLET | Refills: 0 | Status: SHIPPED | OUTPATIENT
Start: 2023-03-30 | End: 2023-06-13

## 2023-03-30 RX ORDER — SULFAMETHOXAZOLE AND TRIMETHOPRIM 800; 160 MG/1; MG/1
1 TABLET ORAL 2 TIMES DAILY
Qty: 14 TABLET | Refills: 0 | Status: SHIPPED | OUTPATIENT
Start: 2023-03-30 | End: 2023-03-30 | Stop reason: SDUPTHER

## 2023-03-30 RX ORDER — FLUCONAZOLE 150 MG/1
150 TABLET ORAL
Qty: 4 TABLET | Refills: 0 | Status: SHIPPED | OUTPATIENT
Start: 2023-03-30 | End: 2023-06-13

## 2023-03-30 RX ORDER — SULFAMETHOXAZOLE AND TRIMETHOPRIM 800; 160 MG/1; MG/1
1 TABLET ORAL 2 TIMES DAILY
Qty: 14 TABLET | Refills: 0 | Status: SHIPPED | OUTPATIENT
Start: 2023-03-30 | End: 2023-04-06

## 2023-03-30 RX ORDER — NITROFURANTOIN MACROCRYSTALS 50 MG/1
50 CAPSULE ORAL NIGHTLY
Qty: 90 CAPSULE | Refills: 1 | Status: SHIPPED | OUTPATIENT
Start: 2023-03-30 | End: 2023-06-13

## 2023-03-30 NOTE — PROGRESS NOTES
"INTERNAL MEDICINE URGENT VISIT NOTE    CHIEF COMPLAINT     Chief Complaint   Patient presents with    Rash    Headache       HPI     Jaki Bajwa is a 52 y.o. female who presents for an urgent visit today.    PCP is Pratik Rasmussen MD, patient is known to me.     Patient presents with complaints of 1 week of itchy dry rash to her back that has been slowly improving over the last 2 days.  She has had this rash before and reports that it usually comes and goes and has been refractory to topical moisturizers.  She has never been evaluated by a dermatologist and has never used any prescribed medications to manage this rash.  Denies any blistering or bleeding.  No associated fever, chills, nausea or vomiting.  Does report some right-sided facial sinus pressure and ear discomfort.  Also reports so seated pressure-type headaches on the right side.    Also reports scalp lesion that has been present for years and she is hoping to have evaluated by dermatology.  Sometimes this lesion is flaky it has increased in size in the last few months.  There has never bleeding or drainage from the lesion.    Past Medical History:  Past Medical History:   Diagnosis Date    Anemia     Anxiety     Depression     History of psychiatric hospitalization 2000    [place] x 1 week for depression    History of ventral hernia repair     Hypertension     Lupus     patient reports that she is being treated "like I have Lupus"    Mental disorder     Morbid obesity 12/15/2017    Psychiatric problem     Sjogren's syndrome 2013    Therapy     TMJ (temporomandibular joint disorder)        Home Medications:  Prior to Admission medications    Medication Sig Start Date End Date Taking? Authorizing Provider   buPROPion (WELLBUTRIN XL) 150 MG TB24 tablet TAKE 3 TABLETS(450 MG) BY MOUTH EVERY MORNING 1/12/23  Yes Najma Doyle MD   busPIRone (BUSPAR) 15 MG tablet TAKE 1 TABLET(15 MG) BY MOUTH THREE TIMES DAILY 1/12/23  Yes Najma ALONSO" MD Vivek   cycloSPORINE (RESTASIS) 0.05 % ophthalmic emulsion Place 1 drop into both eyes 2 (two) times daily. 10/5/22 10/5/23 Yes Lupe Simons, NICOLÁS   docusate sodium (COLACE) 100 MG capsule Take 1 capsule (100 mg total) by mouth 2 (two) times daily as needed for Constipation. 7/13/22  Yes Pratik Rasmussen MD   doxepin (SINEQUAN) 75 MG capsule TAKE 2 CAPSULES(150 MG) BY MOUTH EVERY EVENING 10/14/22  Yes Najma Doyle MD   fluconazole (DIFLUCAN) 150 MG Tab Take 1 tablet (150 mg total) by mouth every 72 hours as needed (vaginal yeast). 2/23/23  Yes Amalia Phillips NP   gabapentin (NEURONTIN) 300 MG capsule Take 1 capsule (300 mg total) by mouth 3 (three) times daily. 8/10/22  Yes Branden Cameron MD   HYDROcodone-acetaminophen (NORCO) 5-325 mg per tablet Take 1 tablet by mouth every 24 hours as needed for Pain. Quantity medically necessary 3/2/23  Yes Pratik Rasmussen MD   hydrOXYchloroQUINE (PLAQUENIL) 200 mg tablet TAKE 1 TABLET BY MOUTH TWICE DAILY 3/10/23  Yes Branden Cameron MD   irbesartan-hydrochlorothiazide (AVALIDE) 150-12.5 mg per tablet TAKE 1 TABLET BY MOUTH EVERY DAY 2/21/23  Yes Pratibha Fabian DO   lurasidone (LATUDA) 60 mg Tab tablet Take 1 tablet (60 mg total) by mouth once daily. 1/24/23  Yes Najma Doyle MD   naproxen (NAPROSYN) 500 MG tablet TAKE 1 TABLET(500 MG) BY MOUTH TWICE DAILY AS NEEDED FOR PAIN Strength: 500 mg 10/13/22  Yes Pratik Rasmussen MD   NARCAN 4 mg/actuation [place] SPRAY 1 SPRAY IN NOSTRIL ONCE FOR 1 DOSE 9/25/21  Yes Historical Provider   omeprazole (PRILOSEC) 40 MG capsule TAKE 1 CAPSULE(40 MG) BY MOUTH EVERY DAY 5/16/22  Yes Pratik Rasmussen MD   pilocarpine (SALAGEN) 5 MG Tab Take 1 tablet (5 mg total) by mouth 4 (four) times daily. 3/10/23 3/9/24 Yes Branden Cameron MD   tiZANidine (ZANAFLEX) 4 MG tablet 8 mg tid 3/10/23  Yes Branden Cameron MD   cetirizine (ZYRTEC) 10 MG tablet Take 1 tablet (10 mg total)  "by mouth once daily. 3/30/23 3/29/24  Roxy Coello PA-C   estradioL (ESTRACE) 0.01 % (0.1 mg/gram) vaginal cream 1g vaginally daily x 2 weeks, then BIW as directed  Patient not taking: Reported on 3/10/2023 6/8/22   Kayli Villegas PA-C   fluticasone propionate (FLONASE) 50 mcg/actuation nasal spray 1 spray (50 mcg total) by Each Nostril route once daily. 3/30/23   Roxy Coello PA-C   hydrOXYzine pamoate (VISTARIL) 50 MG Cap Take 1 capsule (50 mg total) by mouth 2 (two) times daily as needed (anxiety or insomnia).  Patient not taking: Reported on 3/14/2023 2/15/23   Najma Doyle MD   nystatin (MYCOSTATIN) 100,000 unit/mL suspension Take 4 mLs (400,000 Units total) by mouth 4 (four) times daily.  Patient not taking: Reported on 3/14/2023 3/10/23 4/9/23  Branden Cameron MD   triamcinolone acetonide 0.1% (KENALOG) 0.1 % cream Apply topically 2 (two) times daily as needed (rash). X 2 weeks to rashes PRN 3/30/23 4/13/23  Roxy Coello PA-C   triamcinolone acetonide 0.1% (KENALOG) 0.1 % cream Apply topically 2 (two) times daily as needed (rash). X 2 weeks to rashes PRN  Patient not taking: Reported on 3/14/2023 8/19/22 3/30/23  Doris Means MD       Review of Systems:  Review of Systems    Health Maintainence:   Immunizations:  Health Maintenance         Date Due Completion Date    Pneumococcal Vaccines (Age 0-64) (1 - PCV) Never done ---    Urine Drug Screen 09/12/2023 9/12/2022    Mammogram 01/20/2024 1/20/2023    Hemoglobin A1c (Prediabetes) 03/14/2024 3/14/2023    Colorectal Cancer Screening 01/28/2026 1/28/2021    TETANUS VACCINE 04/18/2027 4/18/2017    Lipid Panel 09/12/2027 9/12/2022             PHYSICAL EXAM     BP (!) 159/93 (BP Location: Right arm, Patient Position: Sitting, BP Method: Large (Automatic))   Pulse 94   Ht 5' 7" (1.702 m)   Wt 119.6 kg (263 lb 10.7 oz)   LMP 07/11/2012 Comment: partial   BMI 41.30 kg/m²     Physical Exam    LABS     Lab Results   Component " Value Date    HGBA1C 5.1 03/14/2023     CMP  Sodium   Date Value Ref Range Status   03/14/2023 141 136 - 145 mmol/L Final     Potassium   Date Value Ref Range Status   03/14/2023 4.1 3.5 - 5.1 mmol/L Final     Chloride   Date Value Ref Range Status   03/14/2023 105 95 - 110 mmol/L Final     CO2   Date Value Ref Range Status   03/14/2023 30 (H) 23 - 29 mmol/L Final     Glucose   Date Value Ref Range Status   03/14/2023 108 70 - 110 mg/dL Final     BUN   Date Value Ref Range Status   03/14/2023 19 6 - 20 mg/dL Final     Creatinine   Date Value Ref Range Status   03/14/2023 1.5 (H) 0.5 - 1.4 mg/dL Final     Calcium   Date Value Ref Range Status   03/14/2023 9.7 8.7 - 10.5 mg/dL Final     Total Protein   Date Value Ref Range Status   03/14/2023 8.0 6.0 - 8.4 g/dL Final     Albumin   Date Value Ref Range Status   03/14/2023 4.2 3.5 - 5.2 g/dL Final     Total Bilirubin   Date Value Ref Range Status   03/14/2023 0.3 0.1 - 1.0 mg/dL Final     Comment:     For infants and newborns, interpretation of results should be based  on gestational age, weight and in agreement with clinical  observations.    Premature Infant recommended reference ranges:  Up to 24 hours.............<8.0 mg/dL  Up to 48 hours............<12.0 mg/dL  3-5 days..................<15.0 mg/dL  6-29 days.................<15.0 mg/dL       Alkaline Phosphatase   Date Value Ref Range Status   03/14/2023 73 55 - 135 U/L Final     AST   Date Value Ref Range Status   03/14/2023 19 10 - 40 U/L Final     ALT   Date Value Ref Range Status   03/14/2023 22 10 - 44 U/L Final     Anion Gap   Date Value Ref Range Status   03/14/2023 6 (L) 8 - 16 mmol/L Final     eGFR if    Date Value Ref Range Status   06/15/2022 45.5 (A) >60 mL/min/1.73 m^2 Final     eGFR if non    Date Value Ref Range Status   06/15/2022 39.5 (A) >60 mL/min/1.73 m^2 Final     Comment:     Calculation used to obtain the estimated glomerular filtration  rate (eGFR) is the  CKD-EPI equation.        Lab Results   Component Value Date    WBC 6.17 03/14/2023    HGB 10.4 (L) 03/14/2023    HCT 35.9 (L) 03/14/2023    MCV 84 03/14/2023     03/14/2023     Lab Results   Component Value Date    CHOL 156 09/12/2022    CHOL 141 09/22/2021    CHOL 129 09/03/2020     Lab Results   Component Value Date    HDL 61 09/12/2022    HDL 56 09/22/2021    HDL 60 09/03/2020     Lab Results   Component Value Date    LDLCALC 76.0 09/12/2022    LDLCALC 60.8 (L) 09/22/2021    LDLCALC 55.6 (L) 09/03/2020     Lab Results   Component Value Date    TRIG 95 09/12/2022    TRIG 121 09/22/2021    TRIG 67 09/03/2020     Lab Results   Component Value Date    CHOLHDL 39.1 09/12/2022    CHOLHDL 39.7 09/22/2021    CHOLHDL 46.5 09/03/2020     Lab Results   Component Value Date    TSH 2.563 09/22/2021       ASSESSMENT/PLAN     Jaki Bajwa is a 52 y.o. female     Jaki was seen today for rash and headache.  Suspect that rash is related to her history of nummular dermatitis found on chart check.  Will refill Kenalog which has previously been prescribed.  We discussed differential including pityriasis rosea.  She will follow up with Dermatology in May.  She will monitor symptoms carefully and provide feedback if symptoms change or worsen.    At her dermatologist appointment in May she will discuss the scalp lesion noted today in the office with irregular size shape and color.  No signs of secondary infection.    Also having symptoms of allergic rhinitis causing sinusitis will treat with Zyrtec and Flonase.  Taking an antihistamine daily may complicate her Sjogren's she is encouraged to hydrate appropriately and to let me know if symptoms change or worsen.    Diagnoses and all orders for this visit:    Nummular dermatitis  -     triamcinolone acetonide 0.1% (KENALOG) 0.1 % cream; Apply topically 2 (two) times daily as needed (rash). X 2 weeks to rashes PRN    Scalp lesion    Sinusitis, unspecified chronicity,  unspecified location    Other orders  -     fluticasone propionate (FLONASE) 50 mcg/actuation nasal spray; 1 spray (50 mcg total) by Each Nostril route once daily.  -     cetirizine (ZYRTEC) 10 MG tablet; Take 1 tablet (10 mg total) by mouth once daily.     Patient was counseled on when and how to seek emergent care.       Roxy Coello PA-C   Department of Internal Medicine - [facility]   9:02 AM

## 2023-03-30 NOTE — PROGRESS NOTES
"Subjective:      Jaki Bajwa is a 52 y.o. female who returns today regarding her urinary symptoms.     Recurrent UTI workup in 2017- MAY showed no stones and cysto showed bladder sediment suggestive of incomplete bladder emptying.      She has a history of recurrent UTIs. She completed macrodantin prophylaxis in 2018 and was subsequently managed with post coital bactrim. Most recently she completed 3 months of antibiotic prophylaxis in October. Most recent UTI was in February- she did not come in for urine testing.     Returns today with urinary urgency and frequency that began last week. Denies dysuria, flank pain, and fever/chills.      Using estrace PRN.     Component Urine Culture, Routine   Latest Ref Rng & Units    7/14/2022 ENTEROCOCCUS FAECALIS (A) . .   6/8/2022 ENTEROCOCCUS FAECALIS (A) . .   4/23/2022 ESCHERICHIA COLI (A) . . .   12/2/2021 ESCHERICHIA COLI (A) . . .   5/26/2021 ESCHERICHIA COLI (A) . . .     The following portions of the patient's history were reviewed and updated as appropriate: allergies, current medications, past family history, past medical history, past social history, past surgical history and problem list.    Review of Systems  Constitutional: no fever or chills  ENT: no nasal congestion or sore throat  Respiratory: no cough or shortness of breath  Cardiovascular: no chest pain or palpitations  Gastrointestinal: no nausea or vomiting, tolerating diet  Genitourinary: as per HPI  Hematologic/Lymphatic: no easy bruising or lymphadenopathy  Musculoskeletal: no arthralgias or myalgias  Neurological: no seizures or tremors  Behavioral/Psych: no auditory or visual hallucinations     Objective:   Vital Signs:BP (!) 155/87 (BP Location: Right arm, Patient Position: Sitting, BP Method: Large (Automatic))   Pulse 95   Resp 16   Ht 5' 7" (1.702 m)   Wt 119.3 kg (263 lb)   LMP 07/11/2012 Comment: partial   SpO2 96%   BMI 41.19 kg/m²     Physical Exam   General: alert and oriented, " no acute distress  Head: normocephalic, atraumatic  Neck: supple, normal ROM  Respiratory: Symmetric expansion, non-labored breathing  Cardiovascular: regular rate and rhythm  Abdomen: soft, non tender, non distended  Pelvic: deferred   Skin: normal coloration and turgor, no rashes, no suspicious skin lesions noted  Neuro: alert and oriented x3, no gross deficits  Psych: normal judgment and insight, normal mood/affect, and non-anxious    Lab Review   Urinalysis demonstrates positive for protein  Lab Results   Component Value Date    WBC 6.17 03/14/2023    HGB 10.4 (L) 03/14/2023    HCT 35.9 (L) 03/14/2023    HCT 39 11/03/2022    MCV 84 03/14/2023     03/14/2023     Lab Results   Component Value Date    CREATININE 1.5 (H) 03/14/2023    BUN 19 03/14/2023       Imaging   None    Assessment:     1. Recurrent UTI    2. Urinary urgency      Plan:   Jaki was seen today for uti symptoms.    Diagnoses and all orders for this visit:    Recurrent UTI  -     Urine culture  -     Discontinue: sulfamethoxazole-trimethoprim 800-160mg (BACTRIM DS) 800-160 mg Tab; Take 1 tablet by mouth 2 (two) times daily. for 7 days  -     fluconazole (DIFLUCAN) 150 MG Tab; Take 1 tablet (150 mg total) by mouth every 72 hours as needed (vaginal yeast).  -     nitrofurantoin (MACRODANTIN) 50 MG capsule; Take 1 capsule (50 mg total) by mouth every evening.  -     sulfamethoxazole-trimethoprim 800-160mg (BACTRIM DS) 800-160 mg Tab; Take 1 tablet by mouth 2 (two) times daily. for 7 days    Urinary urgency  -     fluconazole (DIFLUCAN) 150 MG Tab; Take 1 tablet (150 mg total) by mouth every 72 hours as needed (vaginal yeast).    Plan:  --Urine culture today  --Start bactrim BID x 7 days, will adjust if needed based on culture results  --Resume macrodantin prophylaxis x 6 months  --Diflucan PRN   --Follow up PRN

## 2023-03-31 LAB
BACTERIA UR CULT: NORMAL
BACTERIA UR CULT: NORMAL

## 2023-04-04 DIAGNOSIS — G89.4 CHRONIC PAIN SYNDROME: ICD-10-CM

## 2023-04-04 RX ORDER — HYDROCODONE BITARTRATE AND ACETAMINOPHEN 5; 325 MG/1; MG/1
1 TABLET ORAL
Qty: 30 TABLET | Refills: 0 | OUTPATIENT
Start: 2023-04-04

## 2023-04-04 NOTE — TELEPHONE ENCOUNTER
Refill Encounter    PCP Visits: Recent Visits  Date Type Provider Dept   03/14/23 Office Visit Pratik Rasmussen MD [facility]   12/09/22 Office Visit Pratik Rasmussen MD [facility]   06/08/22 Office Visit Pratik Rasmussen MD [facility]   Showing recent visits within past 360 days and meeting all other requirements  Future Appointments  Date Type Provider Dept   06/13/23 Appointment Pratik Rasmussen MD [facility]   Showing future appointments within next 720 days and meeting all other requirements     Last 3 Blood Pressure:   BP Readings from Last 3 Encounters:   03/30/23 (!) 155/87   03/30/23 (!) 159/93   03/14/23 134/86     Preferred Pharmacy:   [id number] - [facility] [address] AT [place] [facility]  [address]  [facility] [address]  Phone: [phone] Fax: [phone]    [id number] - [place], [place] - [address] AT [facility] [address]  [address]  [facility] [address]  Phone: [phone] Fax: [phone]    [facility] [address] [place] [address]  [address] [address]  Phone: [phone] Fax: [phone]    Requested RX:  Requested Prescriptions     Pending Prescriptions Disp Refills    HYDROcodone-acetaminophen (NORCO) 5-325 mg per tablet 30 tablet 0     Sig: Take 1 tablet by mouth every 24 hours as needed for Pain. Quantity medically necessary      RX Route: Normal

## 2023-04-04 NOTE — TELEPHONE ENCOUNTER
No new care gaps identified.  [facility] Embedded Care Gaps. Reference number: [id number]. 4/04/2023   2:52:13 PM CDT

## 2023-04-04 NOTE — TELEPHONE ENCOUNTER
----- Message from May Santiago sent at 4/4/2023  8:31 AM CDT -----  Regarding: Refill  Who Called: PENG MAYO [[id number]]        Refill or New Rx: Refill        RX Name and Strength HYDROcodone-acetaminophen (NORCO) 5-325 mg per tablet        Is this a 30 day or 90 day RX: 30        Preferred Pharmacy with phone number: [facility] #[id number] [place] [address] [facility] [address] [facility]          Local or Mail Order: Local          Would the patient rather a call back or a response via [facility]? No        Best Call Back Number:  [phone]          Additional Information:

## 2023-04-06 ENCOUNTER — TELEPHONE (OUTPATIENT)
Dept: INTERNAL MEDICINE | Facility: CLINIC | Age: 53
End: 2023-04-06
Payer: MEDICARE

## 2023-04-06 NOTE — TELEPHONE ENCOUNTER
----- Message from Katarina Rosado sent at 4/6/2023  2:22 PM CDT -----  Name of Who is Calling: PENG MAYO [[id number]]              What is the request in detail: PT stated that this medication cetirizine (ZYRTEC) 10 MG tablet is currently not working and would like to speak with the office directly in regards to this medication.Please contact to further discuss and advise.               Can the clinic reply by [facility]: NO              What Number to [facility] Back [facility]: [phone]

## 2023-04-06 NOTE — TELEPHONE ENCOUNTER
Returned pt's call.  Pt states the zyrtec is not helping her sinus pain. She still has the H/A and also still has the L ear pain. Pt states this is not an ear infection, just some pain. Denies fever or ear drainage.    Pt uses [facility] #[id number] - [facility] [address] AT [facility] [address]

## 2023-04-06 NOTE — TELEPHONE ENCOUNTER
----- Message from Katarina Rosado sent at 4/5/2023  4:25 PM CDT -----  Name of Who is Calling: PENG MAYO [[id number]]           What is the request in detail: PT stated that this medication cetirizine (ZYRTEC) 10 MG tablet is currently not working and would like to speak with the office directly in regards to this medication.Please contact to further discuss and advise.            Can the clinic reply by [facility]: NO           What Number to Call Back [facility] not [facility]: [phone]

## 2023-04-06 NOTE — TELEPHONE ENCOUNTER
She will need appt to eval symptoms further. Urgent care if she doesn't feel she can wait until next week.

## 2023-04-10 VITALS — SYSTOLIC BLOOD PRESSURE: 100 MMHG | DIASTOLIC BLOOD PRESSURE: 60 MMHG

## 2023-04-10 RX ORDER — IRBESARTAN 150 MG/1
150 TABLET ORAL NIGHTLY
Qty: 90 TABLET | Refills: 3 | Status: SHIPPED | OUTPATIENT
Start: 2023-04-10 | End: 2023-06-14

## 2023-04-10 NOTE — TELEPHONE ENCOUNTER
"Called pt to inform her per Dr. Rasmussen :    "She will need appt to eval symptoms further. Urgent care if she doesn't feel she can wait until next week."    Spoke to her   She said she's feeling better now  I told her to pls call if she starts feeling bad again  Pt verbalized understanding and had no further concerns or questions.    "

## 2023-04-10 NOTE — TELEPHONE ENCOUNTER
Called pt as second attempt for home BP readings  Spoke to pt   She took it while I was on phone  She got:   86/54  I had her check the other arm and reviewed the protocol with her  She got:  100/60    Denied any dizziness today  I let her know to be careful going from sitting to standing, and to call if any dizziness or lightheadedness. To ED if severe  I let her know I'll call Friday for more readings  Pt verbalized understanding and had no further concerns or questions.    Routing to provider to advise further  Added reading to remote

## 2023-04-10 NOTE — TELEPHONE ENCOUNTER
"Called pt per Dr. Rasmussen's advise "Have pt stop irbesartan/hctz and I'll send a new Rx for plain irbesartan for her to start. Please check on her BP in 1-2 weeks."    LVM  Sent msg  Routing for another call     "

## 2023-04-10 NOTE — TELEPHONE ENCOUNTER
Have pt stop irbesartan/hctz and I'll send a new Rx for plain irbesartan for her to start. Please check on her BP in 1-2 weeks.

## 2023-04-12 ENCOUNTER — TELEPHONE (OUTPATIENT)
Dept: INTERNAL MEDICINE | Facility: CLINIC | Age: 53
End: 2023-04-12
Payer: MEDICARE

## 2023-04-12 NOTE — TELEPHONE ENCOUNTER
----- Message from Roxy Coello PA-C sent at 3/30/2023  9:18 AM CDT -----  Most recent blood pressure at office visit is elevated to 159/93.  Please contact patient to request home blood pressure reading.  Offer nurse visit for blood pressure assessment if home blood pressure reading is unable to be obtained. -GABY MENDOZA

## 2023-04-17 ENCOUNTER — PES CALL (OUTPATIENT)
Dept: ADMINISTRATIVE | Facility: CLINIC | Age: 53
End: 2023-04-17
Payer: MEDICARE

## 2023-04-18 ENCOUNTER — OFFICE VISIT (OUTPATIENT)
Dept: PSYCHIATRY | Facility: CLINIC | Age: 53
End: 2023-04-18
Payer: MEDICARE

## 2023-04-18 DIAGNOSIS — R46.81 OBSESSIVE-COMPULSIVE BEHAVIOR: ICD-10-CM

## 2023-04-18 DIAGNOSIS — F41.1 GAD (GENERALIZED ANXIETY DISORDER): Primary | ICD-10-CM

## 2023-04-18 DIAGNOSIS — F99 INSOMNIA DUE TO OTHER MENTAL DISORDER: ICD-10-CM

## 2023-04-18 DIAGNOSIS — F39 MOOD DISORDER: ICD-10-CM

## 2023-04-18 DIAGNOSIS — F63.89 INTERNET ADDICTION: ICD-10-CM

## 2023-04-18 DIAGNOSIS — F40.10 SOCIAL PHOBIA: ICD-10-CM

## 2023-04-18 DIAGNOSIS — F51.05 INSOMNIA DUE TO OTHER MENTAL DISORDER: ICD-10-CM

## 2023-04-18 PROCEDURE — 1159F MED LIST DOCD IN RCRD: CPT | Mod: CPTII,95,, | Performed by: INTERNAL MEDICINE

## 2023-04-18 PROCEDURE — 1159F PR MEDICATION LIST DOCUMENTED IN MEDICAL RECORD: ICD-10-PCS | Mod: CPTII,95,, | Performed by: INTERNAL MEDICINE

## 2023-04-18 PROCEDURE — 99214 PR OFFICE/OUTPT VISIT, EST, LEVL IV, 30-39 MIN: ICD-10-PCS | Mod: 95,,, | Performed by: INTERNAL MEDICINE

## 2023-04-18 PROCEDURE — 3044F HG A1C LEVEL LT 7.0%: CPT | Mod: CPTII,95,, | Performed by: INTERNAL MEDICINE

## 2023-04-18 PROCEDURE — 1160F PR REVIEW ALL MEDS BY PRESCRIBER/CLIN PHARMACIST DOCUMENTED: ICD-10-PCS | Mod: CPTII,95,, | Performed by: INTERNAL MEDICINE

## 2023-04-18 PROCEDURE — 3044F PR MOST RECENT HEMOGLOBIN A1C LEVEL <7.0%: ICD-10-PCS | Mod: HCNC,CPTII,95, | Performed by: SOCIAL WORKER

## 2023-04-18 PROCEDURE — 4010F PR ACE/ARB THEARPY RXD/TAKEN: ICD-10-PCS | Mod: CPTII,95,, | Performed by: INTERNAL MEDICINE

## 2023-04-18 PROCEDURE — 90834 PSYTX W PT 45 MINUTES: CPT | Mod: HCNC,95,, | Performed by: SOCIAL WORKER

## 2023-04-18 PROCEDURE — 4010F PR ACE/ARB THEARPY RXD/TAKEN: ICD-10-PCS | Mod: HCNC,CPTII,95, | Performed by: SOCIAL WORKER

## 2023-04-18 PROCEDURE — 1160F RVW MEDS BY RX/DR IN RCRD: CPT | Mod: CPTII,95,, | Performed by: INTERNAL MEDICINE

## 2023-04-18 PROCEDURE — 99214 OFFICE O/P EST MOD 30 MIN: CPT | Mod: 95,,, | Performed by: INTERNAL MEDICINE

## 2023-04-18 PROCEDURE — 90834 PR PSYCHOTHERAPY W/PATIENT, 45 MIN: ICD-10-PCS | Mod: HCNC,95,, | Performed by: SOCIAL WORKER

## 2023-04-18 PROCEDURE — 4010F ACE/ARB THERAPY RXD/TAKEN: CPT | Mod: HCNC,CPTII,95, | Performed by: SOCIAL WORKER

## 2023-04-18 PROCEDURE — 4010F ACE/ARB THERAPY RXD/TAKEN: CPT | Mod: CPTII,95,, | Performed by: INTERNAL MEDICINE

## 2023-04-18 PROCEDURE — 3044F HG A1C LEVEL LT 7.0%: CPT | Mod: HCNC,CPTII,95, | Performed by: SOCIAL WORKER

## 2023-04-18 PROCEDURE — 3044F PR MOST RECENT HEMOGLOBIN A1C LEVEL <7.0%: ICD-10-PCS | Mod: CPTII,95,, | Performed by: INTERNAL MEDICINE
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
## 2023-04-18 NOTE — PROGRESS NOTES
OUTPATIENT PSYCHIATRY RETURN VISIT    ENCOUNTER DATE:  5/7/2023  SITE:  [facility]  LENGTH OF SESSION:  15 minutes    The patient location is:  [place], not in healthcare facility  Visit type:  Audiovisual    Face to Face time with patient:  15 minutes  20 minutes of total time spent on the encounter, which includes face to face time and non-face to face time preparing to see the patient (eg, review of tests), Obtaining and/or reviewing separately obtained history, Documenting clinical information in the electronic or other health record, Independently interpreting results (not separately reported) and communicating results to the patient/family/caregiver, or Care coordination (not separately reported).     Each patient to whom he or she provides medical services by telemedicine is:  (1) informed of the relationship between the physician and patient and the respective role of any other health care provider with respect to management of the patient; and (2) notified that he or she may decline to receive medical services by telemedicine and may withdraw from such care at any time.    CHIEF COMPLAINT:  Mood      HISTORY OF PRESENTING ILLNESS:  Jaki Bajwa is a 52 y.o. female with history of Mood disorder, JUAN JOSE, OCD, social phobia, and Internet shopping addiction who presents for follow up appointment.      Plan at last appointment on 2/2/2023:  Start hydroxyzine 25mg TID PRN anxiety.    Continue Latuda 60mg daily, Buspar 15mg TID, Wellbutrin XL 450mg daily, and Doxepin 150mg qHS  She is no longer on Valium.  Discussed with patient informed consent, risks versus benefits, alternative treatments, side effect profile and the inherent unpredictability of individual responses to these treatments.  The patient expresses understanding of the above and displays the capacity to agree with this current plan.   Continue individual therapy with Mr. Noah LCSW.    History as told by  "patient:  Scott and daughter can tell she is anxious when she is talking quickly.  Being in public is main trigger.  Also can be nervous at home.  Had a party where Scott's family came over - told them to serve themself.  Daughter is pregnant again - due September 14th.  Very excited about this.  Shopping has not improved at all.  Can't organize her room - gets frustrated, wonders if she is a hoarder.  Stalks the tracking number, see if its shipped, gets excited about that, gets here, looks at it throughout the day, then puts it in the room and orders something else.  Excitement lasts 1 day.  She does feel her medication really helps her.  Helps her get up and get through the day.  Hydroxyzine didn't help.  Sleeping ok.  Describes social anxiety.  If she is in a crowded room believes people are talking about her.  If she is in line at [facility] believes people are thinking "Hurry up!"  Denies depression or SI.    Medication side effects:  Denies  Medication compliance:  Yes    PSYCHIATRIC REVIEW OF SYSTEMS:  Trouble with sleep:  Sometimes  Appetite changes:  Denies  Weight changes:  Denies  Lack of energy:  Sometimes  Anhedonia:  Sometimes  Somatic symptoms:  Denies  Libido:  Denies  Anxiety/panic:  Yes as above  Guilty/hopeless:  Denies  Self-injurious behavior/risky behavior:  Denies  Any drugs:  Denies  Alcohol:  Denies    MEDICAL REVIEW OF SYSTEMS:  Complete review of systems performed covering Constitutional, Musculoskeletal, Neurologic.  All systems negative except for that covered in HPI.    PAST PSYCHIATRIC, MEDICAL, AND SOCIAL HISTORY REVIEWED  The patient's past medical, family and social history have been reviewed and updated as appropriate within the electronic medical record - see encounter notes.    MEDICATIONS:    Current Outpatient Medications:     buPROPion (WELLBUTRIN XL) 150 MG TB24 tablet, TAKE 3 TABLETS(450 MG) BY MOUTH EVERY MORNING, Disp: 270 tablet, Rfl: 1    busPIRone (BUSPAR) 15 MG " tablet, TAKE 1 TABLET(15 MG) BY MOUTH THREE TIMES DAILY, Disp: 270 tablet, Rfl: 1    cetirizine (ZYRTEC) 10 MG tablet, Take 1 tablet (10 mg total) by mouth once daily., Disp: 30 tablet, Rfl: 11    cycloSPORINE (RESTASIS) 0.05 % ophthalmic emulsion, Place 1 drop into both eyes 2 (two) times daily., Disp: 60 each, Rfl: 1    docusate sodium (COLACE) 100 MG capsule, Take 1 capsule (100 mg total) by mouth 2 (two) times daily as needed for Constipation., Disp: 60 capsule, Rfl: 0    doxepin (SINEQUAN) 75 MG capsule, TAKE 2 CAPSULES(150 MG) BY MOUTH EVERY EVENING, Disp: 60 capsule, Rfl: 11    estradioL (ESTRACE) 0.01 % (0.1 mg/gram) vaginal cream, 1g vaginally daily x 2 weeks, then BIW as directed, Disp: 42.5 g, Rfl: 2    fluconazole (DIFLUCAN) 150 MG Tab, Take 1 tablet (150 mg total) by mouth every 72 hours as needed (vaginal yeast)., Disp: 3 tablet, Rfl: 0    fluconazole (DIFLUCAN) 150 MG Tab, Take 1 tablet (150 mg total) by mouth every 72 hours as needed (vaginal yeast)., Disp: 4 tablet, Rfl: 0    fluticasone propionate (FLONASE) 50 mcg/actuation nasal spray, 1 spray (50 mcg total) by Each Nostril route once daily., Disp: 11.1 mL, Rfl: 0    gabapentin (NEURONTIN) 300 MG capsule, Take 1 capsule (300 mg total) by mouth 3 (three) times daily., Disp: 90 capsule, Rfl: 5    HYDROcodone-acetaminophen (NORCO) 5-325 mg per tablet, Take 1 tablet by mouth every 24 hours as needed for Pain. Quantity medically necessary, Disp: 30 tablet, Rfl: 0    hydrOXYchloroQUINE (PLAQUENIL) 200 mg tablet, TAKE 1 TABLET BY MOUTH TWICE DAILY, Disp: 180 tablet, Rfl: 2    irbesartan (AVAPRO) 150 MG tablet, Take 1 tablet (150 mg total) by mouth every evening., Disp: 90 tablet, Rfl: 3    lurasidone (LATUDA) 60 mg Tab tablet, Take 1 tablet (60 mg total) by mouth once daily., Disp: 90 tablet, Rfl: 3    naproxen (NAPROSYN) 500 MG tablet, TAKE 1 TABLET BY MOUTH TWICE DAILY AS NEEDED FOR PAIN, Disp: 180 tablet, Rfl: 1    NARCAN 4 mg/actuation Spry, SPRAY 1  "SPRAY IN NOSTRIL ONCE FOR 1 DOSE, Disp: , Rfl:     nitrofurantoin (MACRODANTIN) 50 MG capsule, Take 1 capsule (50 mg total) by mouth every evening., Disp: 90 capsule, Rfl: 1    omeprazole (PRILOSEC) 40 MG capsule, Take 1 capsule (40 mg total) by mouth once daily., Disp: 90 capsule, Rfl: 3    pilocarpine (SALAGEN) 5 MG Tab, Take 1 tablet (5 mg total) by mouth 4 (four) times daily., Disp: 120 tablet, Rfl: 11    tiZANidine (ZANAFLEX) 4 MG tablet, 8 mg tid, Disp: 180 tablet, Rfl: 5    triamcinolone acetonide 0.1% (KENALOG) 0.1 % cream, Apply topically 2 (two) times daily as needed (rash). X 2 weeks to rashes PRN, Disp: 80 g, Rfl: 2    ALLERGIES:  Review of patient's allergies indicates:   Allergen Reactions    Aspirin Hives       PSYCHIATRIC EXAM:  There were no vitals filed for this visit.  Appearance:  Well groomed, appearing healthy and of stated age  Behavior:  Cooperative, pleasant, no psychomotor agitation or retardation  Speech:  Normal rate, rhythm, prosody, and volume  Mood:  "Ok"  Affect:  Euthymic  Thought Process:  Linear, logical, goal directed  Thought Content:  Negative for suicidal ideation, homicidal ideation, delusions or hallucinations.  Associations:  Intact  Memory:  Grossly Intact  Level of Consciousness/Orientation:  Grossly intact  Fund of Knowledge:  Good  Attention:  Good  Language:  Fluent, able to name abstract and concrete objects  Insight:  Fair  Judgment:  Intact  Psychomotor signs:  No abnormal movements of face  Gait:  Unable to assess via virtual visit      RELEVANT LABS/STUDIES:    Lab Results   Component Value Date    WBC 6.17 03/14/2023    HGB 10.4 (L) 03/14/2023    HCT 35.9 (L) 03/14/2023    MCV 84 03/14/2023     03/14/2023     BMP  Lab Results   Component Value Date     03/14/2023    K 4.1 03/14/2023     03/14/2023    CO2 30 (H) 03/14/2023    BUN 19 03/14/2023    CREATININE 1.5 (H) 03/14/2023    CALCIUM 9.7 03/14/2023    ANIONGAP 6 (L) 03/14/2023    KORY " 45.5 (A) 06/15/2022    EGFRNONAA 39.5 (A) 06/15/2022     Lab Results   Component Value Date    ALT 22 03/14/2023    AST 19 03/14/2023    ALKPHOS 73 03/14/2023    BILITOT 0.3 03/14/2023     Lab Results   Component Value Date    TSH 2.563 09/22/2021     Lab Results   Component Value Date    HGBA1C 5.1 03/14/2023       IMPRESSION:    Jaki Bajwa is a 52 y.o. female with history of Mood disorder, JUAN JOSE, OCD, social phobia, and Internet shopping addiction who presents for follow up appointment.    DIAGNOSES:    ICD-10-CM ICD-9-CM   1. JUAN JOSE (generalized anxiety disorder)  F41.1 300.02   2. Obsessive-compulsive behavior  R46.81 300.3   3. Mood disorder  F39 296.90   4. Insomnia due to other mental disorder  F51.05 300.9    F99 327.02       PLAN:  Patient feels mood is stable other than occasional social anxiety.  Continue Latuda 60mg daily, Buspar 15mg TID, Wellbutrin XL 450mg daily, and Doxepin 150mg qHS  She is no longer on Valium since she is also on chronic narcotics.  She did not find hydroxyzine helpful so has stopped this.  Discussed with patient informed consent, risks versus benefits, alternative treatments, side effect profile and the inherent unpredictability of individual responses to these treatments.  The patient expresses understanding of the above and displays the capacity to agree with this current plan.   Continue individual therapy with Mr. Noah LCSW.    RETURN TO CLINIC:  Follow up in about 3 months (around 7/18/2023).

## 2023-04-21 ENCOUNTER — PATIENT MESSAGE (OUTPATIENT)
Dept: PSYCHIATRY | Facility: CLINIC | Age: 53
End: 2023-04-21
Payer: MEDICARE

## 2023-04-28 DIAGNOSIS — N18.9 CHRONIC KIDNEY DISEASE, UNSPECIFIED CKD STAGE: Primary | ICD-10-CM

## 2023-05-01 DIAGNOSIS — R12 HEARTBURN: ICD-10-CM

## 2023-05-01 RX ORDER — OMEPRAZOLE 40 MG/1
40 CAPSULE, DELAYED RELEASE ORAL DAILY
Qty: 90 CAPSULE | Refills: 3 | Status: SHIPPED | OUTPATIENT
Start: 2023-05-01

## 2023-05-01 NOTE — TELEPHONE ENCOUNTER
No care due was identified.  [facility] Embedded Care Due Messages. Reference number: [id number].   5/01/2023 10:55:12 AM CDT

## 2023-05-01 NOTE — TELEPHONE ENCOUNTER
Refill Decision Note   Jaki Garett  is requesting a refill authorization.  Brief Assessment and Rationale for Refill:  Approve     Medication Therapy Plan:       Medication Reconciliation Completed: No    Comments:     No Care Gaps recommended.     Note composed:5:07 PM 05/01/2023

## 2023-05-02 ENCOUNTER — PATIENT MESSAGE (OUTPATIENT)
Dept: PSYCHIATRY | Facility: CLINIC | Age: 53
End: 2023-05-02
Payer: MEDICARE

## 2023-05-02 DIAGNOSIS — G89.4 CHRONIC PAIN SYNDROME: ICD-10-CM

## 2023-05-02 NOTE — TELEPHONE ENCOUNTER
No care due was identified.  [facility] Embedded Care Due Messages. Reference number: [id number].   5/02/2023 9:47:31 AM CDT

## 2023-05-02 NOTE — TELEPHONE ENCOUNTER
Refill Encounter    PCP Visits: Recent Visits  Date Type Provider Dept   03/14/23 Office Visit Pratik Rasmussen MD [facility]   12/09/22 Office Visit Pratik Rasmussen MD [facility]   06/08/22 Office Visit Pratik Rasmussen MD [facility]   Showing recent visits within past 360 days and meeting all other requirements  Future Appointments  Date Type Provider Dept   06/13/23 Appointment Pratik Rasmussen MD [facility] Medicine   Showing future appointments within next 720 days and meeting all other requirements     Last 3 Blood Pressure:   BP Readings from Last 3 Encounters:   04/10/23 100/60   03/30/23 (!) 155/87   03/30/23 (!) 159/93     Preferred Pharmacy:   [id number] - [facility] [address] AT [address] [facility]  [address]  [facility] [address]  Phone: [phone] Fax: [phone]    [id number] - [place], [place] - [address] AT [facility] [address]  [address]  [facility] [address]  Phone: [phone] Fax: [phone]    [facility] [address] [facility] [address]  [address] [address]  Phone: [phone] Fax: [phone]    Requested RX:  Requested Prescriptions     Pending Prescriptions Disp Refills    HYDROcodone-acetaminophen (NORCO) 5-325 mg per tablet 30 tablet 0     Sig: Take 1 tablet by mouth every 24 hours as needed for Pain. Quantity medically necessary      RX Route: Normal

## 2023-05-03 ENCOUNTER — TELEPHONE (OUTPATIENT)
Dept: ADMINISTRATIVE | Facility: OTHER | Age: 53
End: 2023-05-03
Payer: MEDICARE

## 2023-05-03 ENCOUNTER — TELEPHONE (OUTPATIENT)
Dept: INTERNAL MEDICINE | Facility: CLINIC | Age: 53
End: 2023-05-03
Payer: MEDICARE

## 2023-05-03 DIAGNOSIS — G89.4 CHRONIC PAIN SYNDROME: ICD-10-CM

## 2023-05-03 RX ORDER — HYDROCODONE BITARTRATE AND ACETAMINOPHEN 5; 325 MG/1; MG/1
1 TABLET ORAL
Qty: 30 TABLET | Refills: 0 | Status: CANCELLED | OUTPATIENT
Start: 2023-05-03

## 2023-05-03 RX ORDER — HYDROCODONE BITARTRATE AND ACETAMINOPHEN 5; 325 MG/1; MG/1
1 TABLET ORAL
Qty: 30 TABLET | Refills: 0 | Status: SHIPPED | OUTPATIENT
Start: 2023-05-03 | End: 2023-06-02 | Stop reason: SDUPTHER

## 2023-05-03 NOTE — TELEPHONE ENCOUNTER
No care due was identified.  [facility] Embedded Care Due Messages. Reference number: [id number].   5/03/2023 8:31:41 AM CDT

## 2023-05-09 NOTE — PROGRESS NOTES
Called patient and advised she still needs u/s and she verbalized understanding.   Subjective:       Patient ID: Jaki Bajwa is a 51 y.o. female.    Chief Complaint: Hypertension    Pt here for f/u. She has a dx of sjogrens and fibromyalgia based on blood work and chronic pain that is primarily across shoulders and in legs but also in upper and lower back. As a result she is on several meds that she says helps. She saw rheum at [facility] but changed to [facility]. She has prior dx of lupus but this was not corroborated in notes from rheum. She is on plaquenil (she is due for eye exam and said she will schedule--she understands importance), tizanidine, gabapentin and norco. She uses norco 1x/day. She has done well with decreasing dispense amount to 30 per month. She has previously signed a pain contract. [place]  reviewed and is consistent. She has seen pain mgmt. She has also done PT with some success in the past.      She also has dx of sjogren's syndrome. She was on pilocarpine but stopped b/c it made her nauseous. Uses eye drops for dry eyes which has been effective and is stable.      Pt's BP is normally well controlled but high today. Tolerating meds well. Pt denies cp/sob/ha/vision or neuro changes. Home readings are better controlled.      She continues to see psychiatry for bipolar and anxiety. This is fairly well controlled and stable on current meds. No SI/HI. They have tryied reducing diazepam but anxiety returned. However, she is now down to 1mg bid. She understands dangers of co-administration of norco and diazepam and she continues to work with psychiatry re: alternatives.      She has had recurrent UTIs. Saw urology and was on prophylactic macrodantin. However, has been off this and no issues in a while until 3 mos ago where she was tx with macrobid for UTI successfully. She reports urinary frequency and some tingling at end of urination for the last week. No hematuria. No flank/abd pain. No n/v/fever. No vaginal symptoms. Has not used anything for it. She returned to urology and was  started on vaginal estrace cream but has not been using this lately b/c she ran out. It is worth noting that her UTI issues decreased while on this.     She has chronic iron def anemia. She has no symptoms of such and denies any known bleeding or bloody/black stool. She had c-scope 10/2018 and again 1/2021 that did not show any bleeding source. She also had EGD that showed positive h pylori for which she was tx but no other bleeding sources. She also did stool cards which were negative. Most recent labs show improvement in iron def and anemia. She saw heme-onc who has instructed her to resume oral iron as she previously received IV iron; however, she recently has started back on IV iron course.       She has mild CKD-3 with baseline creatinine 1.2-1.3. This is stable.         Hypertension  Pertinent negatives include no chest pain, headaches, palpitations or shortness of breath.   Chronic Pain  Associated symptoms: no abdominal pain, no chest pain, no shortness of breath and no headaches    Fibromyalgia  Pertinent negatives include no abdominal pain, chest pain, coughing, fever, headaches or sore throat.     Review of Systems   Constitutional: Negative for fever and unexpected weight change.   HENT: Negative for ear pain, rhinorrhea and sore throat.    Eyes: Negative for visual disturbance.   Respiratory: Negative for cough and shortness of breath.    Cardiovascular: Negative for chest pain, palpitations and leg swelling.   Gastrointestinal: Negative for abdominal pain.   Endocrine: Negative for polyuria.   Genitourinary: Positive for frequency. Negative for dysuria and hematuria.   Neurological: Negative for headaches.   Psychiatric/Behavioral: Negative for dysphoric mood.       Objective:          Assessment:       1. Essential hypertension    2. Fibromyalgia    3. JUAN JOSE (generalized anxiety disorder)    4. Recurrent UTI (urinary tract infection)    5. Sjogren's syndrome, with unspecified organ involvement    6.  Obsessive-compulsive behavior    7. Morbid obesity    8. Anemia of unknown etiology    9. Dysuria    10. Immunocompromised    11. Stage 3a chronic kidney disease        Plan:       1. Not yet due for refill on norco but will let us know when due; narcan--pt understands how to use and how to instruct family/friends how to use  2. Recent labs reviewed  3. Check UA; consider f/u with urology; would also restart estrace cream  4. Keep f/u with specialists  5. Home BP monitoring; call for home readings in 2 wks              Physical Exam  Constitutional:       Appearance: Normal appearance. She is well-developed.   HENT:      Right Ear: Tympanic membrane, ear canal and external ear normal.      Left Ear: Tympanic membrane, ear canal and external ear normal.      Nose: No mucosal edema or rhinorrhea.      Mouth/Throat:      Pharynx: No oropharyngeal exudate or posterior oropharyngeal erythema.   Eyes:      Extraocular Movements: Extraocular movements intact.      Pupils: Pupils are equal, round, and reactive to light.   Neck:      Thyroid: No thyromegaly.   Cardiovascular:      Rate and Rhythm: Normal rate and regular rhythm.      Heart sounds: Normal heart sounds.   Pulmonary:      Effort: Pulmonary effort is normal.      Breath sounds: Normal breath sounds.   Abdominal:      General: Bowel sounds are normal. There is no distension.      Palpations: Abdomen is soft. There is no mass.      Tenderness: There is no abdominal tenderness. There is no right CVA tenderness or left CVA tenderness.   Musculoskeletal:      Cervical back: Neck supple.      Lumbar back: No tenderness or bony tenderness. Normal range of motion.      Right lower leg: No edema.      Left lower leg: No edema.   Lymphadenopathy:      Cervical: No cervical adenopathy.   Neurological:      Mental Status: She is alert and oriented to person, place, and time.      Cranial Nerves: No cranial nerve deficit.   Psychiatric:         Mood and Affect: Mood normal.          Behavior: Behavior normal.

## 2023-05-16 ENCOUNTER — TELEPHONE (OUTPATIENT)
Dept: DERMATOLOGY | Facility: CLINIC | Age: 53
End: 2023-05-16
Payer: MEDICARE

## 2023-05-22 ENCOUNTER — TELEPHONE (OUTPATIENT)
Dept: ADMINISTRATIVE | Facility: CLINIC | Age: 53
End: 2023-05-22
Payer: MEDICARE

## 2023-05-23 RX ORDER — TIZANIDINE 4 MG/1
TABLET ORAL
Qty: 180 TABLET | Refills: 5 | Status: SHIPPED | OUTPATIENT
Start: 2023-05-23 | End: 2023-10-16

## 2023-05-24 ENCOUNTER — OFFICE VISIT (OUTPATIENT)
Dept: HOME HEALTH SERVICES | Facility: CLINIC | Age: 53
End: 2023-05-24
Payer: MEDICARE

## 2023-05-24 ENCOUNTER — TELEPHONE (OUTPATIENT)
Dept: ADMINISTRATIVE | Facility: CLINIC | Age: 53
End: 2023-05-24
Payer: MEDICARE

## 2023-05-24 VITALS — BODY MASS INDEX: 40.81 KG/M2 | HEIGHT: 67 IN | WEIGHT: 260 LBS

## 2023-05-24 DIAGNOSIS — F39 MOOD DISORDER: ICD-10-CM

## 2023-05-24 DIAGNOSIS — F41.1 GAD (GENERALIZED ANXIETY DISORDER): ICD-10-CM

## 2023-05-24 DIAGNOSIS — R46.81 OBSESSIVE-COMPULSIVE BEHAVIOR: ICD-10-CM

## 2023-05-24 DIAGNOSIS — I10 ESSENTIAL HYPERTENSION: Chronic | ICD-10-CM

## 2023-05-24 DIAGNOSIS — Z00.00 ENCOUNTER FOR PREVENTIVE HEALTH EXAMINATION: ICD-10-CM

## 2023-05-24 DIAGNOSIS — M79.7 FIBROMYALGIA: ICD-10-CM

## 2023-05-24 DIAGNOSIS — F11.20 CHRONIC NARCOTIC DEPENDENCE: ICD-10-CM

## 2023-05-24 DIAGNOSIS — Z00.00 ENCOUNTER FOR MEDICARE ANNUAL WELLNESS EXAM: Primary | ICD-10-CM

## 2023-05-24 DIAGNOSIS — G89.4 CHRONIC PAIN SYNDROME: ICD-10-CM

## 2023-05-24 DIAGNOSIS — E66.01 MORBID OBESITY: ICD-10-CM

## 2023-05-24 DIAGNOSIS — M35.00 SJOGREN'S SYNDROME, WITH UNSPECIFIED ORGAN INVOLVEMENT: ICD-10-CM

## 2023-05-24 DIAGNOSIS — N18.32 STAGE 3B CHRONIC KIDNEY DISEASE: ICD-10-CM

## 2023-05-24 PROCEDURE — 4010F ACE/ARB THERAPY RXD/TAKEN: CPT | Mod: CPTII,95,, | Performed by: NURSE PRACTITIONER

## 2023-05-24 PROCEDURE — 1160F PR REVIEW ALL MEDS BY PRESCRIBER/CLIN PHARMACIST DOCUMENTED: ICD-10-PCS | Mod: CPTII,95,, | Performed by: NURSE PRACTITIONER

## 2023-05-24 PROCEDURE — 1159F MED LIST DOCD IN RCRD: CPT | Mod: CPTII,95,, | Performed by: NURSE PRACTITIONER

## 2023-05-24 PROCEDURE — G0439 PPPS, SUBSEQ VISIT: HCPCS | Mod: 95,,, | Performed by: NURSE PRACTITIONER

## 2023-05-24 PROCEDURE — 3008F BODY MASS INDEX DOCD: CPT | Mod: CPTII,95,, | Performed by: NURSE PRACTITIONER

## 2023-05-24 PROCEDURE — 1160F RVW MEDS BY RX/DR IN RCRD: CPT | Mod: CPTII,95,, | Performed by: NURSE PRACTITIONER

## 2023-05-24 PROCEDURE — 4010F PR ACE/ARB THEARPY RXD/TAKEN: ICD-10-PCS | Mod: CPTII,95,, | Performed by: NURSE PRACTITIONER

## 2023-05-24 PROCEDURE — 3044F HG A1C LEVEL LT 7.0%: CPT | Mod: CPTII,95,, | Performed by: NURSE PRACTITIONER

## 2023-05-24 PROCEDURE — G0439 PR MEDICARE ANNUAL WELLNESS SUBSEQUENT VISIT: ICD-10-PCS | Mod: 95,,, | Performed by: NURSE PRACTITIONER

## 2023-05-24 PROCEDURE — 3044F PR MOST RECENT HEMOGLOBIN A1C LEVEL <7.0%: ICD-10-PCS | Mod: CPTII,95,, | Performed by: NURSE PRACTITIONER

## 2023-05-24 PROCEDURE — 1159F PR MEDICATION LIST DOCUMENTED IN MEDICAL RECORD: ICD-10-PCS | Mod: CPTII,95,, | Performed by: NURSE PRACTITIONER

## 2023-05-24 PROCEDURE — 3008F PR BODY MASS INDEX (BMI) DOCUMENTED: ICD-10-PCS | Mod: CPTII,95,, | Performed by: NURSE PRACTITIONER

## 2023-05-24 RX ORDER — NYSTATIN 100000 [USP'U]/ML
SUSPENSION ORAL
COMMUNITY
End: 2024-02-13

## 2023-05-24 NOTE — PROGRESS NOTES
"The patient location is: [place]  The chief complaint leading to consultation is: Health Risk Assessment    Visit type: audiovisual    Face to Face time with patient: 25  40 minutes of total time spent on the encounter, which includes face to face time and non-face to face time preparing to see the patient (eg, review of tests), Obtaining and/or reviewing separately obtained history, Documenting clinical information in the electronic or other health record, Independently interpreting results (not separately reported) and communicating results to the patient/family/caregiver, or Care coordination (not separately reported).         Each patient to whom he or she provides medical services by telemedicine is:  (1) informed of the relationship between the physician and patient and the respective role of any other health care provider with respect to management of the patient; and (2) notified that he or she may decline to receive medical services by telemedicine and may withdraw from such care at any time.    Notes:       Jaki Bajwa presented for a  Medicare AWV and comprehensive Health Risk Assessment today. The following components were reviewed and updated:    Medical history  Family History  Social history  Allergies and Current Medications  Health Risk Assessment  Health Maintenance  Care Team     Patient screened moderate and/or high risk for one or more social determinants of health (SDOH). Patient connected to community resources through the ED Navigator.      ** See Completed Assessments for Annual Wellness Visit within the encounter summary.**         The following assessments were completed:  Living Situation  CAGE  Depression Screening  Fall Risk Assessment (MACH 10)  Hearing Assessment(HHI)  Cognitive Function Screening  Nutrition Screening  ADL Screening  PAQ Screening      Vitals:    05/24/23 0954   Weight: 117.9 kg (260 lb)   Height: 5' 7" (1.702 m)     Body mass index is 40.72 kg/m².  Physical " Exam  Constitutional:       Appearance: She is obese.   Neurological:      General: No focal deficit present.      Mental Status: She is alert and oriented to person, place, and time.             Diagnoses and health risks identified today and associated recommendations/orders:    1. Encounter for Medicare annual wellness exam  - Ambulatory Referral/Consult to Enhanced Annual Wellness Visit (eAWV)    2. Encounter for preventive health examination  Assessments completed. Preventive measures and health maintenance reviewed with patient.  Review for opioid screening: Patient on chronic opioids. Risk factors reviewed for any potential opioid use disorder. Risk factors such as misuse and/or abuse. Pain evaluated during visit.  Current treatment plan documented. Patient being treated and followed by PCP.  Review for substance use disorder: Patient does not use any substances.    3. Sjogren's syndrome, with unspecified organ involvement  Stable, patient on Plaquenil and Salagen. Followed by PCP and Rheumatology.    4. Mood disorder  Stable, patient on Latuda, Wellbutrin, and Doxepin. Followed by Psychiatry.     5. Morbid obesity  Stable, followed by PCP. Healthy diet and tolerable exercises encouraged.    6. Stage 3b chronic kidney disease  Stable, followed by Nephrology.    7. Chronic narcotic dependence  Stable, patient on Norco 5-325 mg. Followed by PCP.    8. Chronic pain syndrome  Stable, followed by PCP and Rheumatology.    9. Fibromyalgia  Stable, followed by Rheumatology.    10. JUAN JOSE (generalized anxiety disorder)  Stable, patient on Buspar. Followed by Psychiatry.    11. Obsessive-compulsive behavior  Stable, followed by Psychiatry.    12. Essential hypertension  Stable, patient on Hydrochlorothiazide and Avapro. Followed by PCP.      Provided Jaki with a 5-10 year written screening schedule and personal prevention plan. Recommendations were developed using the USPSTF age appropriate recommendations.  Education, counseling, and referrals were provided as needed. After Visit Summary printed and given to patient which includes a list of additional screenings\tests needed.    Follow up in about 1 year (around 5/24/2024) for your next annual wellness visit.    Priscila Cuello, KAILEY  I offered to discuss advanced care planning, including how to pick a person who would make decisions for you if you were unable to make them for yourself, called a health care power of , and what kind of decisions you might make such as use of life sustaining treatments such as ventilators and tube feeding when faced with a life limiting illness recorded on a living will that they will need to know. (How you want to be cared for as you near the end of your natural life)     X Patient is interested in learning more about how to make advanced directives.  I provided them paperwork and offered to discuss this with them.

## 2023-05-24 NOTE — PATIENT INSTRUCTIONS
Counseling and Referral of Other Preventative  (Italic type indicates deductible and co-insurance are waived)    Patient Name: Jaki Bajwa  Today's Date: 5/24/2023    Health Maintenance       Date Due Completion Date    Pneumococcal Vaccines (Age 0-64) (1 - PCV) Never done ---    Urine Drug Screen 09/12/2023 9/12/2022    Mammogram 01/20/2024 1/20/2023    Hemoglobin A1c (Prediabetes) 03/14/2024 3/14/2023    Colorectal Cancer Screening 01/28/2026 1/28/2021    TETANUS VACCINE 04/18/2027 4/18/2017    Lipid Panel 09/12/2027 9/12/2022        No orders of the defined types were placed in this encounter.    The following information is provided to all patients.  This information is to help you find resources for any of the problems found today that may be affecting your health:                Living healthy guide: www.[facility].[place]      Understanding Diabetes: www.diabetes.org      Eating healthy: www.cdc.gov/healthyweight      [facility] home safety checklist: www.cdc.gov/steadi/patient.html      Agency on Aging: www.goea.[place]      Alcoholics anonymous (AA): www.aa.org      Physical Activity: www.tracie.nih.gov/xd7kknk      Tobacco use: www.quitwithusla.org

## 2023-05-25 ENCOUNTER — OFFICE VISIT (OUTPATIENT)
Dept: PSYCHIATRY | Facility: CLINIC | Age: 53
End: 2023-05-25
Payer: MEDICARE

## 2023-05-25 ENCOUNTER — TELEPHONE (OUTPATIENT)
Dept: INTERNAL MEDICINE | Facility: CLINIC | Age: 53
End: 2023-05-25
Payer: MEDICARE

## 2023-05-25 DIAGNOSIS — F41.1 GAD (GENERALIZED ANXIETY DISORDER): Primary | ICD-10-CM

## 2023-05-25 DIAGNOSIS — F39 MOOD DISORDER: ICD-10-CM

## 2023-05-25 DIAGNOSIS — F99 INSOMNIA DUE TO OTHER MENTAL DISORDER: ICD-10-CM

## 2023-05-25 DIAGNOSIS — F40.10 SOCIAL ANXIETY DISORDER: ICD-10-CM

## 2023-05-25 DIAGNOSIS — F51.05 INSOMNIA DUE TO OTHER MENTAL DISORDER: ICD-10-CM

## 2023-05-25 DIAGNOSIS — F63.89 INTERNET ADDICTION: ICD-10-CM

## 2023-05-25 DIAGNOSIS — I10 ESSENTIAL HYPERTENSION: Primary | ICD-10-CM

## 2023-05-25 PROCEDURE — 1159F MED LIST DOCD IN RCRD: CPT | Mod: CPTII,95,, | Performed by: INTERNAL MEDICINE

## 2023-05-25 PROCEDURE — 4010F PR ACE/ARB THEARPY RXD/TAKEN: ICD-10-PCS | Mod: CPTII,95,, | Performed by: INTERNAL MEDICINE

## 2023-05-25 PROCEDURE — 3044F PR MOST RECENT HEMOGLOBIN A1C LEVEL <7.0%: ICD-10-PCS | Mod: CPTII,95,, | Performed by: INTERNAL MEDICINE

## 2023-05-25 PROCEDURE — 1160F RVW MEDS BY RX/DR IN RCRD: CPT | Mod: CPTII,95,, | Performed by: INTERNAL MEDICINE

## 2023-05-25 PROCEDURE — 4010F ACE/ARB THERAPY RXD/TAKEN: CPT | Mod: CPTII,95,, | Performed by: INTERNAL MEDICINE

## 2023-05-25 PROCEDURE — 1159F PR MEDICATION LIST DOCUMENTED IN MEDICAL RECORD: ICD-10-PCS | Mod: CPTII,95,, | Performed by: INTERNAL MEDICINE

## 2023-05-25 PROCEDURE — 99214 OFFICE O/P EST MOD 30 MIN: CPT | Mod: 95,,, | Performed by: INTERNAL MEDICINE

## 2023-05-25 PROCEDURE — 99214 PR OFFICE/OUTPT VISIT, EST, LEVL IV, 30-39 MIN: ICD-10-PCS | Mod: 95,,, | Performed by: INTERNAL MEDICINE

## 2023-05-25 PROCEDURE — 1160F PR REVIEW ALL MEDS BY PRESCRIBER/CLIN PHARMACIST DOCUMENTED: ICD-10-PCS | Mod: CPTII,95,, | Performed by: INTERNAL MEDICINE

## 2023-05-25 PROCEDURE — 3044F HG A1C LEVEL LT 7.0%: CPT | Mod: CPTII,95,, | Performed by: INTERNAL MEDICINE

## 2023-05-25 RX ORDER — HYDROCHLOROTHIAZIDE 12.5 MG/1
12.5 TABLET ORAL DAILY
Qty: 90 TABLET | Refills: 3 | Status: SHIPPED | OUTPATIENT
Start: 2023-05-25 | End: 2023-06-14

## 2023-05-25 RX ORDER — HYDROCHLOROTHIAZIDE 12.5 MG/1
12.5 TABLET ORAL DAILY
Qty: 90 TABLET | Refills: 3 | Status: SHIPPED | OUTPATIENT
Start: 2023-05-25 | End: 2023-05-25 | Stop reason: SDUPTHER

## 2023-05-25 NOTE — TELEPHONE ENCOUNTER
Dr. Rasmussen electronically canceled HCTZ rx at [facility] but it didn't go through  Called pharmacy to cancel Rx   Unable to get someone on the phone  Routing to staff for another attempt

## 2023-05-25 NOTE — Clinical Note
Dr. Rasmussen,  What are your thoughts on this patient having a few Valium 2mg tablets per month for panic attack?  I know she is also on narcotics from you so wanted to reach out to you first before I prescribe.  Unfortunately hydroxyzine wasn't helpful and she does have severe social anxiety that leads to panic attacks when she tries to leave the house.  I'm also encouraging more frequent therapy sessions with Mr. Zamora.  Thanks so much, Najma Doyle

## 2023-05-25 NOTE — TELEPHONE ENCOUNTER
Called pt   Spoke to her   She said she has her ankles propped up to help, and it's been for the past week. She said one side is just ankle, and other is ankle and leg  She said she was taken off the HCTZ and then started swelling  I asked if she has any redness, hotness to touch, or pain   She declined all three  I told her I will see if there's something we can send in or if she needs appt  Pt verbalized understanding and had no further concerns or questions.    Routing to provider to advise

## 2023-05-25 NOTE — TELEPHONE ENCOUNTER
----- Message from Zehra Velasco sent at 5/25/2023 10:04 AM CDT -----  Regarding: Patient Advice                Name of Who is Calling: Jaki Bajwa    Who Left The Message: Jaki Bajwa      What is the request in detail:     Patient called requesting a call regarding the bilateral swelling of her ankles.   Please give a call back and further advise.   Thank you      Reply by [facility]: NO      Preferred Call Back :  [phone] (G)

## 2023-05-25 NOTE — PROGRESS NOTES
OUTPATIENT PSYCHIATRY RETURN VISIT    ENCOUNTER DATE:  6/10/2023  SITE:  [facility]  LENGTH OF SESSION:  20 minutes    The patient location is:  [place], not in healthcare facility  Visit type:  Audiovisual    Face to Face time with patient:  20 minutes  25 minutes of total time spent on the encounter, which includes face to face time and non-face to face time preparing to see the patient (eg, review of tests), Obtaining and/or reviewing separately obtained history, Documenting clinical information in the electronic or other health record, Independently interpreting results (not separately reported) and communicating results to the patient/family/caregiver, or Care coordination (not separately reported).     Each patient to whom he or she provides medical services by telemedicine is:  (1) informed of the relationship between the physician and patient and the respective role of any other health care provider with respect to management of the patient; and (2) notified that he or she may decline to receive medical services by telemedicine and may withdraw from such care at any time.    CHIEF COMPLAINT:  Anxiety      HISTORY OF PRESENTING ILLNESS:  Jaki Bajwa is a 52 y.o. female with history of Mood disorder, JUAN JOSE, OCD, social phobia, and Internet shopping addiction who presents for follow up appointment.      Plan at last appointment on 4/18/2023:  Patient feels mood is stable other than occasional social anxiety.  Continue Latuda 60mg daily, Buspar 15mg TID, Wellbutrin XL 450mg daily, and Doxepin 150mg qHS  She is no longer on Valium since she is also on chronic narcotics.  She did not find hydroxyzine helpful so has stopped this.  Discussed with patient informed consent, risks versus benefits, alternative treatments, side effect profile and the inherent unpredictability of individual responses to these treatments.  The patient expresses understanding of the above and displays the  capacity to agree with this current plan.   Continue individual therapy with Mr. Zamora, LCSW.    History as told by patient:  Can think in her mind what she wants to say but then can't remember everything by the time of the appointment.  Still has a lot of anxiety.  Went into [employer] and had a panic attack because she didn't know how to do self check out.  Went out to eat with her family and freaked out in the restaurant - can't handle people looking at her.  Doesn't like people looking at her, believes people are saying bad things about her.  Still has crying spells sometimes - feels lonely, sometimes can't talk to Scott, doesn't have anyone else to talk to.  She and Scott argue.  They are not physically intimate - doesn't show her affection at all, no physical touch.  He isn't supportive.  When she goes home, fears he won't come back to get her.  Her daughter works a lot and is pregnant.  Children are somewhat distant, her mother's relationship with her was the same.  Doesn't see her children that much.      Medication side effects:  Denies  Medication compliance:  Yes    PSYCHIATRIC REVIEW OF SYSTEMS:  Trouble with sleep:  Sometimes  Appetite changes:  Denies  Weight changes:  Denies  Lack of energy:  Sometimes  Anhedonia:  Sometimes  Somatic symptoms:  Denies  Libido:  Denies  Anxiety/panic:  Yes as above  Guilty/hopeless:  Denies  Self-injurious behavior/risky behavior:  Denies  Any drugs:  Denies  Alcohol:  Denies    MEDICAL REVIEW OF SYSTEMS:  Complete review of systems performed covering Constitutional, Musculoskeletal, Neurologic.  All systems negative except for that covered in HPI.    PAST PSYCHIATRIC, MEDICAL, AND SOCIAL HISTORY REVIEWED  The patient's past medical, family and social history have been reviewed and updated as appropriate within the electronic medical record - see encounter notes.    MEDICATIONS:    Current Outpatient Medications:     buPROPion (WELLBUTRIN XL) 150 MG TB24  tablet, TAKE 3 TABLETS(450 MG) BY MOUTH EVERY MORNING, Disp: 270 tablet, Rfl: 1    busPIRone (BUSPAR) 15 MG tablet, TAKE 1 TABLET(15 MG) BY MOUTH THREE TIMES DAILY, Disp: 270 tablet, Rfl: 1    butalbital-acetaminophen-caffeine -40 mg (FIORICET, ESGIC) -40 mg per tablet, Take 1 tablet by mouth every 6 (six) hours as needed for Headaches., Disp: 20 tablet, Rfl: 0    cetirizine (ZYRTEC) 10 MG tablet, Take 1 tablet (10 mg total) by mouth once daily., Disp: 30 tablet, Rfl: 11    cycloSPORINE (RESTASIS) 0.05 % ophthalmic emulsion, Place 1 drop into both eyes 2 (two) times daily., Disp: 60 each, Rfl: 1    docusate sodium (COLACE) 100 MG capsule, Take 1 capsule (100 mg total) by mouth 2 (two) times daily as needed for Constipation., Disp: 60 capsule, Rfl: 0    doxepin (SINEQUAN) 75 MG capsule, TAKE 2 CAPSULES(150 MG) BY MOUTH EVERY EVENING, Disp: 60 capsule, Rfl: 11    estradioL (ESTRACE) 0.01 % (0.1 mg/gram) vaginal cream, 1g vaginally daily x 2 weeks, then BIW as directed, Disp: 42.5 g, Rfl: 2    fluconazole (DIFLUCAN) 150 MG Tab, Take 1 tablet (150 mg total) by mouth every 72 hours as needed (vaginal yeast)., Disp: 3 tablet, Rfl: 0    fluconazole (DIFLUCAN) 150 MG Tab, Take 1 tablet (150 mg total) by mouth every 72 hours as needed (vaginal yeast)., Disp: 4 tablet, Rfl: 0    fluticasone propionate (FLONASE) 50 mcg/actuation nasal spray, SHAKE LIQUID AND USE 1 SPRAY(50 MCG) IN EACH NOSTRIL EVERY DAY, Disp: 16 g, Rfl: 0    gabapentin (NEURONTIN) 300 MG capsule, Take 1 capsule (300 mg total) by mouth 3 (three) times daily., Disp: 90 capsule, Rfl: 5    hydroCHLOROthiazide (HYDRODIURIL) 12.5 MG Tab, Take 1 tablet (12.5 mg total) by mouth once daily., Disp: 90 tablet, Rfl: 3    HYDROcodone-acetaminophen (NORCO) 5-325 mg per tablet, Take 1 tablet by mouth every 24 hours as needed for Pain. Quantity medically necessary, Disp: 30 tablet, Rfl: 0    hydrOXYchloroQUINE (PLAQUENIL) 200 mg tablet, TAKE 1 TABLET BY MOUTH  "TWICE DAILY, Disp: 180 tablet, Rfl: 2    irbesartan (AVAPRO) 150 MG tablet, Take 1 tablet (150 mg total) by mouth every evening., Disp: 90 tablet, Rfl: 3    lurasidone (LATUDA) 60 mg Tab tablet, Take 1 tablet (60 mg total) by mouth once daily., Disp: 90 tablet, Rfl: 3    NARCAN 4 mg/actuation Delmita, SPRAY 1 SPRAY IN NOSTRIL ONCE FOR 1 DOSE, Disp: , Rfl:     nitrofurantoin (MACRODANTIN) 50 MG capsule, Take 1 capsule (50 mg total) by mouth every evening., Disp: 90 capsule, Rfl: 1    nystatin (MYCOSTATIN) 100,000 unit/mL suspension, Take by mouth after meals as needed., Disp: , Rfl:     omeprazole (PRILOSEC) 40 MG capsule, Take 1 capsule (40 mg total) by mouth once daily., Disp: 90 capsule, Rfl: 3    pilocarpine (SALAGEN) 5 MG Tab, Take 1 tablet (5 mg total) by mouth 4 (four) times daily., Disp: 120 tablet, Rfl: 11    tiZANidine (ZANAFLEX) 4 MG tablet, 8 mg tid, Disp: 180 tablet, Rfl: 5    triamcinolone acetonide 0.1% (KENALOG) 0.1 % cream, Apply topically 2 (two) times daily as needed (rash). X 2 weeks to rashes PRN, Disp: 80 g, Rfl: 2    ALLERGIES:  Review of patient's allergies indicates:   Allergen Reactions    Aspirin Hives       PSYCHIATRIC EXAM:  There were no vitals filed for this visit.  Appearance:  Well groomed, appearing healthy and of stated age  Behavior:  Cooperative, pleasant, no psychomotor agitation or retardation  Speech:  Normal rate, rhythm, prosody, and volume  Mood:  "Anxious"  Affect:  Euthymic  Thought Process:  Linear, logical, goal directed  Thought Content:  Negative for suicidal ideation, homicidal ideation, delusions or hallucinations.  Associations:  Intact  Memory:  Grossly Intact  Level of Consciousness/Orientation:  Grossly intact  Fund of Knowledge:  Good  Attention:  Good  Language:  Fluent, able to name abstract and concrete objects  Insight:  Fair  Judgment:  Intact  Psychomotor signs:  No abnormal movements of face  Gait:  Unable to assess via virtual visit      RELEVANT " LABS/STUDIES:    Lab Results   Component Value Date    WBC 9.63 06/02/2023    HGB 12.6 06/02/2023    HCT 42.2 06/02/2023    MCV 81 (L) 06/02/2023     06/02/2023     BMP  Lab Results   Component Value Date     06/02/2023    K 3.6 06/02/2023     06/02/2023    CO2 25 06/02/2023    BUN 15 06/02/2023    CREATININE 1.0 06/02/2023    CALCIUM 10.9 (H) 06/02/2023    ANIONGAP 13 06/02/2023    ESTGFRAFRICA 45.5 (A) 06/15/2022    EGFRNONAA 39.5 (A) 06/15/2022     Lab Results   Component Value Date    ALT 25 06/02/2023    AST 35 06/02/2023    ALKPHOS 68 06/02/2023    BILITOT 0.5 06/02/2023     Lab Results   Component Value Date    TSH 2.563 09/22/2021     Lab Results   Component Value Date    HGBA1C 5.1 03/14/2023       IMPRESSION:    Jaki Bajwa is a 52 y.o. female with history of Mood disorder, JUAN JOSE, OCD, social phobia, and Internet shopping addiction who presents for follow up appointment.    DIAGNOSES:    ICD-10-CM ICD-9-CM   1. JUAN JOSE (generalized anxiety disorder)  F41.1 300.02   2. Social anxiety disorder  F40.10 300.23   3. Mood disorder  F39 296.90   4. Internet shopping addiction  F63.89 301.89   5. Insomnia due to other mental disorder  F51.05 300.9    F99 327.02     PLAN:  Patient continues to panic attacks, usually triggered by leaving the house.  I will send message to PCP regarding possible Valium PRN only for panic attack.    Continue Latuda 60mg daily, Buspar 15mg TID, Wellbutrin XL 450mg daily, and Doxepin 150mg qHS.  Discussed with patient informed consent, risks versus benefits, alternative treatments, side effect profile and the inherent unpredictability of individual responses to these treatments.  The patient expresses understanding of the above and displays the capacity to agree with this current plan.   Continue individual therapy with Mr. Noah LCSW.    RETURN TO CLINIC:  Follow up in about 2 months (around 7/25/2023).

## 2023-05-25 NOTE — TELEPHONE ENCOUNTER
No care due was identified.  [facility] Embedded Care Due Messages. Reference number: [id number].   5/25/2023 2:23:07 PM CDT

## 2023-05-25 NOTE — TELEPHONE ENCOUNTER
Called pt to inform her per Dr. Rasmussen :    Have pt resume hctz 12.5mg daily. Monitor BP at home and get lab in 2 wks.    Spoke to pt  She said that's fine but needs dif pharmacy  She had me sched lab for same day she's seeing Dr. Rasmussen in June.  Pt verbalized understanding and had no further concerns or questions.      Pended/routing med w/ updated pharmacy and also routing for Dr. Rasmussen to advise if ok that she waits until June 13 for labs since she is already seeing us then

## 2023-05-29 ENCOUNTER — TELEPHONE (OUTPATIENT)
Dept: UROLOGY | Facility: CLINIC | Age: 53
End: 2023-05-29
Payer: MEDICARE

## 2023-05-29 RX ORDER — FLUTICASONE PROPIONATE 50 MCG
SPRAY, SUSPENSION (ML) NASAL
Qty: 16 G | Refills: 0 | Status: SHIPPED | OUTPATIENT
Start: 2023-05-29 | End: 2023-08-03

## 2023-05-29 NOTE — TELEPHONE ENCOUNTER
Spoke with pt to let her know that orders have been placed for her to go to any [facility] lab to do a urine sample for a UA and UC. Once we have the results a Rx will be called in for her.

## 2023-05-31 ENCOUNTER — HOSPITAL ENCOUNTER (EMERGENCY)
Facility: HOSPITAL | Age: 53
Discharge: HOME | End: 2023-05-31
Attending: STUDENT IN AN ORGANIZED HEALTH CARE EDUCATION/TRAINING PROGRAM
Payer: MEDICARE

## 2023-05-31 VITALS
RESPIRATION RATE: 18 BRPM | BODY MASS INDEX: 40.81 KG/M2 | HEIGHT: 67 IN | SYSTOLIC BLOOD PRESSURE: 148 MMHG | HEART RATE: 78 BPM | TEMPERATURE: 98 F | OXYGEN SATURATION: 95 % | DIASTOLIC BLOOD PRESSURE: 91 MMHG | WEIGHT: 260 LBS

## 2023-05-31 DIAGNOSIS — I10 HTN (HYPERTENSION): ICD-10-CM

## 2023-05-31 DIAGNOSIS — I10 UNCONTROLLED HYPERTENSION: Primary | ICD-10-CM

## 2023-05-31 DIAGNOSIS — I10 HYPERTENSION: ICD-10-CM

## 2023-05-31 LAB
BUN SERPL-MCNC: 14 MG/DL (ref 6–30)
CHLORIDE SERPL-SCNC: 101 MMOL/L (ref 95–110)
CREAT SERPL-MCNC: 1 MG/DL (ref 0.5–1.4)
GLUCOSE SERPL-MCNC: 91 MG/DL (ref 70–110)
HCT VFR BLD CALC: 39 %PCV (ref 36–54)
HCV AB SERPL QL IA: NORMAL
HIV 1+2 AB+HIV1 P24 AG SERPL QL IA: NORMAL
POC IONIZED CALCIUM: 1.22 MMOL/L (ref 1.06–1.42)
POC TCO2 (MEASURED): 28 MMOL/L (ref 23–29)
POTASSIUM BLD-SCNC: 3.3 MMOL/L (ref 3.5–5.1)
SAMPLE: ABNORMAL
SODIUM BLD-SCNC: 142 MMOL/L (ref 136–145)

## 2023-05-31 PROCEDURE — 25000003 PHARM REV CODE 250: Performed by: STUDENT IN AN ORGANIZED HEALTH CARE EDUCATION/TRAINING PROGRAM

## 2023-05-31 PROCEDURE — 87389 HIV-1 AG W/HIV-1&-2 AB AG IA: CPT | Performed by: PHYSICIAN ASSISTANT

## 2023-05-31 PROCEDURE — 99284 EMERGENCY DEPT VISIT MOD MDM: CPT | Mod: 25

## 2023-05-31 PROCEDURE — 80047 BASIC METABLC PNL IONIZED CA: CPT

## 2023-05-31 PROCEDURE — 93010 ELECTROCARDIOGRAM REPORT: CPT | Mod: ,,, | Performed by: INTERNAL MEDICINE

## 2023-05-31 PROCEDURE — 96375 TX/PRO/DX INJ NEW DRUG ADDON: CPT

## 2023-05-31 PROCEDURE — 93010 EKG 12-LEAD: ICD-10-PCS | Mod: ,,, | Performed by: INTERNAL MEDICINE

## 2023-05-31 PROCEDURE — 99285 PR EMERGENCY DEPT VISIT,LEVEL V: ICD-10-PCS | Mod: ,,, | Performed by: STUDENT IN AN ORGANIZED HEALTH CARE EDUCATION/TRAINING PROGRAM

## 2023-05-31 PROCEDURE — 25000003 PHARM REV CODE 250

## 2023-05-31 PROCEDURE — 96361 HYDRATE IV INFUSION ADD-ON: CPT

## 2023-05-31 PROCEDURE — 96374 THER/PROPH/DIAG INJ IV PUSH: CPT

## 2023-05-31 PROCEDURE — 99285 EMERGENCY DEPT VISIT HI MDM: CPT | Mod: ,,, | Performed by: STUDENT IN AN ORGANIZED HEALTH CARE EDUCATION/TRAINING PROGRAM

## 2023-05-31 PROCEDURE — 93005 ELECTROCARDIOGRAM TRACING: CPT

## 2023-05-31 PROCEDURE — 63600175 PHARM REV CODE 636 W HCPCS

## 2023-05-31 PROCEDURE — 86803 HEPATITIS C AB TEST: CPT | Performed by: PHYSICIAN ASSISTANT

## 2023-05-31 RX ORDER — DIPHENHYDRAMINE HCL 25 MG
25 CAPSULE ORAL
Status: COMPLETED | OUTPATIENT
Start: 2023-05-31 | End: 2023-05-31

## 2023-05-31 RX ORDER — LABETALOL HYDROCHLORIDE 5 MG/ML
10 INJECTION, SOLUTION INTRAVENOUS
Status: DISCONTINUED | OUTPATIENT
Start: 2023-05-31 | End: 2023-05-31

## 2023-05-31 RX ORDER — LABETALOL HYDROCHLORIDE 5 MG/ML
20 INJECTION, SOLUTION INTRAVENOUS
Status: COMPLETED | OUTPATIENT
Start: 2023-05-31 | End: 2023-05-31

## 2023-05-31 RX ORDER — PROCHLORPERAZINE EDISYLATE 5 MG/ML
10 INJECTION INTRAMUSCULAR; INTRAVENOUS
Status: COMPLETED | OUTPATIENT
Start: 2023-05-31 | End: 2023-05-31

## 2023-05-31 RX ORDER — NAPROXEN 500 MG/1
500 TABLET ORAL
Status: DISCONTINUED | OUTPATIENT
Start: 2023-05-31 | End: 2023-05-31

## 2023-05-31 RX ADMIN — LABETALOL HYDROCHLORIDE 20 MG: 5 INJECTION INTRAVENOUS at 10:05

## 2023-05-31 RX ADMIN — SODIUM CHLORIDE 1000 ML: 9 INJECTION, SOLUTION INTRAVENOUS at 09:05

## 2023-05-31 RX ADMIN — DIPHENHYDRAMINE HYDROCHLORIDE 25 MG: 25 CAPSULE ORAL at 09:05

## 2023-05-31 RX ADMIN — PROCHLORPERAZINE EDISYLATE 10 MG: 5 INJECTION INTRAMUSCULAR; INTRAVENOUS at 09:05

## 2023-06-01 ENCOUNTER — PATIENT OUTREACH (OUTPATIENT)
Dept: EMERGENCY MEDICINE | Facility: HOSPITAL | Age: 53
End: 2023-06-01
Payer: MEDICARE

## 2023-06-01 ENCOUNTER — TELEPHONE (OUTPATIENT)
Dept: INTERNAL MEDICINE | Facility: CLINIC | Age: 53
End: 2023-06-01
Payer: MEDICARE

## 2023-06-01 ENCOUNTER — PATIENT MESSAGE (OUTPATIENT)
Dept: INTERNAL MEDICINE | Facility: CLINIC | Age: 53
End: 2023-06-01
Payer: MEDICARE

## 2023-06-01 NOTE — TELEPHONE ENCOUNTER
----- Message from Fernando Hoffman sent at 6/1/2023  8:13 AM CDT -----  Who Called: PENG MAYO [[id number]]              Who Left Message for Patient: Patient              Does the patient know what this is regarding? Patient is requesting a call about up coming visits, please assist             Best Call Back Number: [phone]

## 2023-06-01 NOTE — TELEPHONE ENCOUNTER
Check BP again today now and a couple of times over the next hour to see if BP is starting to come down. Would recommend appt if not improving in the next 24 hrs. To ED if cp/sob/severe HA/vision or neuro changes.

## 2023-06-01 NOTE — PROGRESS NOTES
Pt declined the need for a post ED discharge f/u appt with pt PCP. Pt stated she has an appt on 6/13/2023 and does not feel the need to schedule a sooner appt. Pt aware of how to contact PCP office in case pt needs change.    MARIELENA Ramsey

## 2023-06-01 NOTE — ED NOTES
Bed: [facility]  Expected date:   Expected time:   Means of arrival:   Comments:  ericka

## 2023-06-01 NOTE — ED PROVIDER NOTES
"Encounter Date: 2023       History     Chief Complaint   Patient presents with    Hypertension     200/120s at home.        Pt is a 52 year old female with PMHx significant for HTN and lupus who presents for evaluation of frontal headache, which has persisted for the last week. Pt reports headache gradual in onset and similar to previous headache. She has taken tylenol without symptom relief. Denies any fever, chills, chest pain, shortness of breath, nausea, vomiting, or abdominal pain. She also reports elevated blood pressure in the 200s at home    The history is provided by the patient.   Review of patient's allergies indicates:   Allergen Reactions    Aspirin Hives     Past Medical History:   Diagnosis Date    Anemia     Anxiety     Depression     History of psychiatric hospitalization     [place] x 1 week for depression    History of ventral hernia repair     Hypertension     Lupus     patient reports that she is being treated "like I have Lupus"    Mental disorder     Morbid obesity 12/15/2017    Psychiatric problem     Sjogren's syndrome 2013    Therapy     TMJ (temporomandibular joint disorder)      Past Surgical History:   Procedure Laterality Date    breast reduction      BREAST SURGERY  2005     SECTION      x 2    HYSTERECTOMY  2005    INGUINAL HERNIA REPAIR      LAPAROSCOPIC TOTAL HYSTERECTOMY      ASA    TOTAL REDUCTION MAMMOPLASTY      TUBAL LIGATION      VENTRAL HERNIA REPAIR       Family History   Problem Relation Age of Onset    Heart disease Mother     Hypertension Mother     Cancer Father         colon ca    Colon cancer Father     Depression Father     Schizophrenia Father     Anxiety disorder Father     Arthritis Father     Mental illness Father     Liver cancer Sister     Cancer Sister     Depression Sister     Anxiety disorder Sister     Heart attack Sister     COPD Sister     Cancer Sister         throat and colon    Depression Sister     Miscarriages / " Stillbirths Sister     Pancreatic cancer Brother     Depression Brother     Alcohol abuse Brother     No Known Problems Daughter     Asthma Son     Pancreatic cancer Other     Liver cancer Other     Suicide Neg Hx     Ovarian cancer Neg Hx     Breast cancer Neg Hx      Social History     Tobacco Use    Smoking status: Never    Smokeless tobacco: Never   Substance Use Topics    Alcohol use: Yes     Alcohol/week: 1.0 standard drink     Types: 1 Glasses of wine per week     Comment: rarely     Drug use: Yes     Types: Hydrocodone     Comment: rx hydrocodone     Review of Systems   Constitutional:  Negative for chills and fever.   HENT:  Negative for ear discharge and ear pain.    Eyes:  Negative for pain and redness.   Respiratory:  Negative for cough and shortness of breath.    Cardiovascular:  Negative for chest pain and palpitations.   Gastrointestinal:  Negative for abdominal pain, blood in stool and nausea.   Genitourinary:  Negative for dysuria and frequency.   Musculoskeletal:  Negative for back pain and neck pain.   Skin:  Negative for rash and wound.   Neurological:  Positive for headaches. Negative for weakness, light-headedness and numbness.     Physical Exam     Initial Vitals [05/31/23 1855]   BP Pulse Resp Temp SpO2   (!) 174/123 (!) 113 20 98.2 °F (36.8 °C) 96 %      MAP       --         Physical Exam    Nursing note and vitals reviewed.  Constitutional: She appears well-developed and well-nourished. No distress.   HENT:   Head: Normocephalic and atraumatic.   Mouth/Throat: Oropharynx is clear and moist.   Eyes: Conjunctivae and EOM are normal. Pupils are equal, round, and reactive to light.   Neck: Neck supple. No tracheal deviation present.   Cardiovascular:  Normal rate, regular rhythm and intact distal pulses.           No murmur heard.  Pulmonary/Chest: Breath sounds normal. No stridor. No respiratory distress. She has no wheezes. She has no rales.   Abdominal: Abdomen is soft. There is no abdominal  tenderness. There is no rebound.   Musculoskeletal:         General: No edema. Normal range of motion.      Cervical back: Neck supple.     Neurological: She is alert and oriented to person, place, and time.   Skin: Skin is warm and dry. Capillary refill takes less than 2 seconds.       ED Course   Procedures  Labs Reviewed   ISTAT PROCEDURE - Abnormal; Notable for the following components:       Result Value    POC Potassium 3.3 (*)     All other components within normal limits   HIV 1 / 2 ANTIBODY    Narrative:     Release to patient->Immediate   HEPATITIS C ANTIBODY    Narrative:     Release to patient->Immediate          Imaging Results    None          Medications   sodium chloride 0.9% bolus 1,000 mL 1,000 mL (0 mLs Intravenous Stopped 5/31/23 2214)   prochlorperazine injection Soln 10 mg (10 mg Intravenous Given 5/31/23 2117)   diphenhydrAMINE capsule 25 mg (25 mg Oral Given 5/31/23 2117)   labetaloL injection 20 mg (20 mg Intravenous Given 5/31/23 2225)     Medical Decision Making:   History:   Old Medical Records: I decided to obtain old medical records.  Initial Assessment:   Pt presents for hypertension and headache   Differential Diagnosis:   Poorly controlled hypertension, hypertensive emergency(doubt), ACS, arrhythmia, CANDELARIA  Clinical Tests:   Lab Tests: Ordered and Reviewed  Medical Tests: Ordered and Reviewed  ED Management:  Normal renal function on ISTAT. EKG without evidence of ST elevation. Pt treated in the ED with fluids and compazine with improvement in symptoms. Pt treated with labetalol with improvement in blood pressure. Pt stable to discharge to home. Return precautions advised           Attending Attestation:   Physician Attestation Statement for Resident:  As the supervising MD   Physician Attestation Statement: I have personally seen and examined this patient.   I agree with the above history.  -: 52yoF presenting with headache intermittent over the last week that is similar to her  typical headaches. She was hypertensive on arrival. Istat notable for normal cr. EKG without signs of ischemic change. The pt received medications for HA and 1 dose of labetalol. The pt felt improved and had no evidnece of end organ damage. Disucssed importance of close follow up for medication titration. Also return precuations for chest pain, shortness of breath, neuro changes or any other concerning symptoms.    -: Vicky Hillman MD  Emergency Medicine Staff   Critical Care Fellow                                   Clinical Impression:   Final diagnoses:  [I10] HTN (hypertension)  [I10] Hypertension  [I10] Uncontrolled hypertension (Primary)        ED Disposition Condition    Discharge Stable          ED Prescriptions    None       Follow-up Information       Follow up With Specialties Details Why Contact Info    Pratik Rasmussen MD Internal Medicine In 1 week  [address] [facility] [address]  [phone]               Vicky Hillman MD  06/01/23 1447       Jose Eduardo Trejo Jr., MD  Resident  06/01/23 8762

## 2023-06-01 NOTE — TELEPHONE ENCOUNTER
Called pt as first attempt  LVM  I see pt went to ED yesterday but will not need ER f/u bc already scheduled with us soon   Messaged pt and selected option to be notified if not read by   Tomorrow

## 2023-06-01 NOTE — ED TRIAGE NOTES
"Jaki Bajwa, a 52 y.o. female presents to the ED w/ complaint of HTN.     Pt c/o HA x 1 week and HTN at home 200/120s. Pt stated her B/P meds were recently changed to aid in BLE edema. Stated that edema has gone, but B/P is now uncontrolled. Denies CP, SOB.     Triage note:  Chief Complaint   Patient presents with    Hypertension     200/120s at home.        Review of patient's allergies indicates:   Allergen Reactions    Aspirin Hives     Past Medical History:   Diagnosis Date    Anemia     Anxiety     Depression     History of psychiatric hospitalization 2000    [place] x 1 week for depression    History of ventral hernia repair     Hypertension     Lupus     patient reports that she is being treated "like I have Lupus"    Mental disorder     Morbid obesity 12/15/2017    Psychiatric problem     Sjogren's syndrome 2013    Therapy     TMJ (temporomandibular joint disorder)            "

## 2023-06-01 NOTE — TELEPHONE ENCOUNTER
----- Message from Shu Howard sent at 6/1/2023  9:33 AM CDT -----  Regarding: call back  Type:  Patient Returning Call    Who Called:ce    Who Left Message for Patient:jay    Does the patient know what this is regarding?:yes     Would the patient rather a call back or a response via [facility]? Call     Best Call Back Number:[phone]    Additional Information:

## 2023-06-02 ENCOUNTER — TELEPHONE (OUTPATIENT)
Dept: INTERNAL MEDICINE | Facility: CLINIC | Age: 53
End: 2023-06-02
Payer: MEDICARE

## 2023-06-02 ENCOUNTER — HOSPITAL ENCOUNTER (EMERGENCY)
Facility: HOSPITAL | Age: 53
Discharge: HOME | End: 2023-06-03
Attending: EMERGENCY MEDICINE
Payer: MEDICARE

## 2023-06-02 DIAGNOSIS — G89.4 CHRONIC PAIN SYNDROME: ICD-10-CM

## 2023-06-02 DIAGNOSIS — R20.0 ARM NUMBNESS: ICD-10-CM

## 2023-06-02 DIAGNOSIS — R03.0 ELEVATED BLOOD PRESSURE READING: ICD-10-CM

## 2023-06-02 DIAGNOSIS — R51.9 NONINTRACTABLE EPISODIC HEADACHE, UNSPECIFIED HEADACHE TYPE: Primary | ICD-10-CM

## 2023-06-02 LAB
ALBUMIN SERPL BCP-MCNC: 4.4 G/DL (ref 3.5–5.2)
ALP SERPL-CCNC: 68 U/L (ref 55–135)
ALT SERPL W/O P-5'-P-CCNC: 25 U/L (ref 10–44)
ANION GAP SERPL CALC-SCNC: 13 MMOL/L (ref 8–16)
ANISOCYTOSIS BLD QL SMEAR: SLIGHT
AST SERPL-CCNC: 35 U/L (ref 10–40)
BASOPHILS # BLD AUTO: 0.08 K/UL (ref 0–0.2)
BASOPHILS NFR BLD: 0.8 % (ref 0–1.9)
BILIRUB SERPL-MCNC: 0.5 MG/DL (ref 0.1–1)
BUN SERPL-MCNC: 15 MG/DL (ref 6–20)
CALCIUM SERPL-MCNC: 10.9 MG/DL (ref 8.7–10.5)
CHLORIDE SERPL-SCNC: 101 MMOL/L (ref 95–110)
CO2 SERPL-SCNC: 25 MMOL/L (ref 23–29)
CREAT SERPL-MCNC: 1 MG/DL (ref 0.5–1.4)
DIFFERENTIAL METHOD: ABNORMAL
EOSINOPHIL # BLD AUTO: 0.2 K/UL (ref 0–0.5)
EOSINOPHIL NFR BLD: 2.2 % (ref 0–8)
ERYTHROCYTE [DISTWIDTH] IN BLOOD BY AUTOMATED COUNT: 15.4 % (ref 11.5–14.5)
EST. GFR  (NO RACE VARIABLE): >60 ML/MIN/1.73 M^2
GLUCOSE SERPL-MCNC: 103 MG/DL (ref 70–110)
HCT VFR BLD AUTO: 42.2 % (ref 37–48.5)
HGB BLD-MCNC: 12.6 G/DL (ref 12–16)
IMM GRANULOCYTES # BLD AUTO: 0.02 K/UL (ref 0–0.04)
IMM GRANULOCYTES NFR BLD AUTO: 0.2 % (ref 0–0.5)
LYMPHOCYTES # BLD AUTO: 2.3 K/UL (ref 1–4.8)
LYMPHOCYTES NFR BLD: 24.3 % (ref 18–48)
MCH RBC QN AUTO: 24.2 PG (ref 27–31)
MCHC RBC AUTO-ENTMCNC: 29.9 G/DL (ref 32–36)
MCV RBC AUTO: 81 FL (ref 82–98)
MONOCYTES # BLD AUTO: 0.6 K/UL (ref 0.3–1)
MONOCYTES NFR BLD: 6.5 % (ref 4–15)
NEUTROPHILS # BLD AUTO: 6.4 K/UL (ref 1.8–7.7)
NEUTROPHILS NFR BLD: 66 % (ref 38–73)
NRBC BLD-RTO: 0 /100 WBC
PLATELET # BLD AUTO: 305 K/UL (ref 150–450)
PLATELET BLD QL SMEAR: ABNORMAL
PMV BLD AUTO: 12.4 FL (ref 9.2–12.9)
POTASSIUM SERPL-SCNC: 3.6 MMOL/L (ref 3.5–5.1)
PROT SERPL-MCNC: 8.9 G/DL (ref 6–8.4)
RBC # BLD AUTO: 5.21 M/UL (ref 4–5.4)
SODIUM SERPL-SCNC: 139 MMOL/L (ref 136–145)
WBC # BLD AUTO: 9.63 K/UL (ref 3.9–12.7)

## 2023-06-02 PROCEDURE — 85025 COMPLETE CBC W/AUTO DIFF WBC: CPT | Performed by: EMERGENCY MEDICINE

## 2023-06-02 PROCEDURE — 99284 PR EMERGENCY DEPT VISIT,LEVEL IV: ICD-10-PCS | Mod: ,,, | Performed by: EMERGENCY MEDICINE

## 2023-06-02 PROCEDURE — 99285 EMERGENCY DEPT VISIT HI MDM: CPT | Mod: 25

## 2023-06-02 PROCEDURE — 63600175 PHARM REV CODE 636 W HCPCS: Performed by: EMERGENCY MEDICINE

## 2023-06-02 PROCEDURE — 25000003 PHARM REV CODE 250: Performed by: EMERGENCY MEDICINE

## 2023-06-02 PROCEDURE — 99284 EMERGENCY DEPT VISIT MOD MDM: CPT | Mod: ,,, | Performed by: EMERGENCY MEDICINE

## 2023-06-02 PROCEDURE — 96375 TX/PRO/DX INJ NEW DRUG ADDON: CPT

## 2023-06-02 PROCEDURE — 80053 COMPREHEN METABOLIC PANEL: CPT | Performed by: EMERGENCY MEDICINE

## 2023-06-02 PROCEDURE — 96374 THER/PROPH/DIAG INJ IV PUSH: CPT

## 2023-06-02 RX ORDER — KETOROLAC TROMETHAMINE 30 MG/ML
15 INJECTION, SOLUTION INTRAMUSCULAR; INTRAVENOUS
Status: COMPLETED | OUTPATIENT
Start: 2023-06-02 | End: 2023-06-02

## 2023-06-02 RX ORDER — BUTALBITAL, ACETAMINOPHEN AND CAFFEINE 50; 325; 40 MG/1; MG/1; MG/1
1 TABLET ORAL
Status: COMPLETED | OUTPATIENT
Start: 2023-06-02 | End: 2023-06-02

## 2023-06-02 RX ORDER — PROCHLORPERAZINE EDISYLATE 5 MG/ML
5 INJECTION INTRAMUSCULAR; INTRAVENOUS
Status: COMPLETED | OUTPATIENT
Start: 2023-06-02 | End: 2023-06-02

## 2023-06-02 RX ADMIN — PROCHLORPERAZINE EDISYLATE 5 MG: 5 INJECTION INTRAMUSCULAR; INTRAVENOUS at 07:06

## 2023-06-02 RX ADMIN — BUTALBITAL, ACETAMINOPHEN, AND CAFFEINE 1 TABLET: 50; 325; 40 TABLET ORAL at 06:06

## 2023-06-02 RX ADMIN — KETOROLAC TROMETHAMINE 15 MG: 30 INJECTION, SOLUTION INTRAMUSCULAR; INTRAVENOUS at 07:06

## 2023-06-02 NOTE — ED TRIAGE NOTES
Pt arriving to ED c/o hypertension. Seen here few days ago and bp still high. Was told to follow up with PCP in a week, pt followed up and was told to come here. Pt c/o H/A, denies blurry vision. States it got to 210/103 at home.

## 2023-06-02 NOTE — TELEPHONE ENCOUNTER
----- Message from Gabriela Alas sent at 6/2/2023  2:22 PM CDT -----   Name of Who is Calling:     What is the request in detail: patient request call back in reference to sooner appointment /ER follow up Please contact to further discuss and advise      Can the clinic reply by [facility]:     What Number to Call Back if not in [facility]:  [phone]

## 2023-06-02 NOTE — TELEPHONE ENCOUNTER
Called pt back  Spoke w/ her    She said she was d/c from hospital this week and requesting sooner appt than the one she has with us in a couple weeks bc her Bp today is 210/103  Severe HA   Bilateral arm pain   I let her know generally we advise ED w/ severe HA and a high reading like that, but will double check w/ covering provider    She also asked if covering provider is comfortable filling the pain med she put in a request for today. I told her I would ask.    Pt verbalized understanding and had no further concerns or questions.    Routing as HP  Msgd provider

## 2023-06-02 NOTE — TELEPHONE ENCOUNTER
No care due was identified.  [facility] Embedded Care Due Messages. Reference number: [id number].   6/02/2023 8:45:08 AM CDT

## 2023-06-02 NOTE — TELEPHONE ENCOUNTER
"Called pt and informed her per Dr. Carpenter' advising "Unfortunately I think she needs to go back to ED with that BP and symptoms"    I let her know   She said ok  I told her to touch base w/ us once she is d/c   Pt verbalized understanding and had no further concerns or questions.    "

## 2023-06-02 NOTE — TELEPHONE ENCOUNTER
Refill Encounter    PCP Visits: Recent Visits  Date Type Provider Dept   03/14/23 Office Visit Pratik Rasmussen MD [facility] Internal Medicine   12/09/22 Office Visit Pratik Rasmussen MD [facility] Internal Medicine   06/08/22 Office Visit Pratik Rasmussen MD [facility] Internal Medicine   Showing recent visits within past 360 days and meeting all other requirements  Future Appointments  Date Type Provider Dept   06/13/23 Appointment Pratik Rasmussen MD [facility] Internal Medicine   Showing future appointments within next 720 days and meeting all other requirements     Last 3 Blood Pressure:   BP Readings from Last 3 Encounters:   05/31/23 (!) 148/91   04/10/23 100/60   03/30/23 (!) 155/87     Preferred Pharmacy:   [id number] - [facility] [address] AT [facility]  [address]  [facility] [address]  Phone: [phone] Fax: [phone]    [id number] - [place], [place] - [address] AT [facility] [address]  [address]  [facility] [address]  Phone: [phone] Fax: [phone]    [facility] [address] [facility] [address]  [address] [address]  Phone: [phone] Fax: [phone]    Requested RX:  Requested Prescriptions     Pending Prescriptions Disp Refills    HYDROcodone-acetaminophen (NORCO) 5-325 mg per tablet 30 tablet 0     Sig: Take 1 tablet by mouth every 24 hours as needed for Pain. Quantity medically necessary      RX Route: Normal

## 2023-06-02 NOTE — ED NOTES
Patient identifiers verified and correct for Jaki Bajwa   LOC: The patient is awake, alert and aware of environment with an appropriate affect, the patient is oriented x 3 and speaking appropriately.   APPEARANCE: Patient appears comfortable and in no acute distress, patient is clean and well groomed.  SKIN: The skin is warm and dry, color consistent with ethnicity, patient has normal skin turgor and moist mucus membranes, skin intact  MUSCULOSKELETAL: Patient moving all extremities spontaneously  RESPIRATORY: Airway is open and patent, respirations are spontaneous, patient has a normal effort and rate, no accessory muscle use noted, O2 sats noted at 98% on room air.  CARDIAC: Patient has a normal rate, no edema noted, capillary refill < 3 seconds.   GASTRO: Soft and non tender to palpation, no distention noted, normoactive bowel sounds present in all four quadrants. Pt states bowel movements have been regular.  : Pt denies any pain or frequency with urination.  NEURO: Pt opens eyes spontaneously, behavior appropriate to situation, follows commands. Pt c/o H/A

## 2023-06-03 VITALS
OXYGEN SATURATION: 95 % | TEMPERATURE: 98 F | BODY MASS INDEX: 42.38 KG/M2 | WEIGHT: 270 LBS | RESPIRATION RATE: 16 BRPM | HEIGHT: 67 IN | HEART RATE: 98 BPM | DIASTOLIC BLOOD PRESSURE: 97 MMHG | SYSTOLIC BLOOD PRESSURE: 182 MMHG

## 2023-06-03 RX ORDER — BUTALBITAL, ACETAMINOPHEN AND CAFFEINE 50; 325; 40 MG/1; MG/1; MG/1
1 TABLET ORAL EVERY 6 HOURS PRN
Qty: 20 TABLET | Refills: 0 | Status: SHIPPED | OUTPATIENT
Start: 2023-06-03 | End: 2023-06-13 | Stop reason: SDUPTHER

## 2023-06-03 RX ORDER — KETOROLAC TROMETHAMINE 10 MG/1
10 TABLET, FILM COATED ORAL EVERY 8 HOURS PRN
Qty: 15 TABLET | Refills: 0 | Status: SHIPPED | OUTPATIENT
Start: 2023-06-03 | End: 2023-06-08

## 2023-06-03 RX ORDER — KETOROLAC TROMETHAMINE 10 MG/1
10 TABLET, FILM COATED ORAL EVERY 8 HOURS PRN
Qty: 15 TABLET | Refills: 0 | Status: SHIPPED | OUTPATIENT
Start: 2023-06-03 | End: 2023-06-03 | Stop reason: CLARIF

## 2023-06-03 NOTE — PROVIDER PROGRESS NOTES - EMERGENCY DEPT.
Encounter Date: 6/2/2023    ED Physician Progress Notes        Physician Note:   AOC @ 0130. Patient presented w/ headache, acute on chronic a/w BUE paresthesias and numbness. Concern for for possible acute or subacute CVA    Pending studies include MRI BRAIN    Pending actions include f/u MRI    Likely disposition is home    Caleb Allan    3:00AM  Patient has a normal neurologic exam.  Her MRIs unremarkable.  Her symptoms are relieved.  She is safe to follow up as an outpatient

## 2023-06-03 NOTE — DISCHARGE INSTRUCTIONS
Contact your primary care provider to discuss moving your upcoming appointment to a closer date.    Return to the ER for worsened headache, weakness to any part of your body, vision changes, or for any other concerns needing immediate attention.    Thank you for allowing us to participate in your healthcare needs. We really appreciate it and hope that the care we provided was satisfactory.    - Dr. Nelson

## 2023-06-05 ENCOUNTER — PATIENT OUTREACH (OUTPATIENT)
Dept: EMERGENCY MEDICINE | Facility: HOSPITAL | Age: 53
End: 2023-06-05
Payer: MEDICARE

## 2023-06-05 RX ORDER — HYDROCODONE BITARTRATE AND ACETAMINOPHEN 5; 325 MG/1; MG/1
1 TABLET ORAL
Qty: 30 TABLET | Refills: 0 | Status: SHIPPED | OUTPATIENT
Start: 2023-06-05 | End: 2023-07-05 | Stop reason: SDUPTHER

## 2023-06-10 NOTE — TELEPHONE ENCOUNTER
----- Message from Natalie Mena sent at 8/6/2019  3:50 PM CDT -----  Contact: Pt   Can the clinic reply in [facility]: no     Please refill the medication(s) listed below. The patient can be reached at this phone number once it is called into the pharmacy [phone]     Medication #1: tiZANidine (ZANAFLEX) 4 MG tablet     Preferred Pharmacy: [facility] #[id number] - [place] [address] AT [address] [employer]        

## 2023-06-12 DIAGNOSIS — H16.229 KERATOCONJUNCT SICCA, NOT SPECIFIED AS SJOGREN'S, UNSP EYE: ICD-10-CM

## 2023-06-12 RX ORDER — CYCLOSPORINE 0.5 MG/ML
1 EMULSION OPHTHALMIC 2 TIMES DAILY
Qty: 60 EACH | Refills: 1 | Status: SHIPPED | OUTPATIENT
Start: 2023-06-12 | End: 2023-08-29

## 2023-06-12 NOTE — TELEPHONE ENCOUNTER
----- Message from Vanessa Myles sent at 6/12/2023  8:56 AM CDT -----  Regarding: Refill  Type: RX Refill Request    Who Called:PT    RX Name and Strength:cycloSPORINE (RESTASIS) 0.05 % ophthalmic emulsion    [facility] with [facility] number[id number] [address] AT [facility] [address]    Would the patient rather a call back or a response via [facility]?call back    Best Call Back Number:[phone]    Additional Information:Pt says when she wakes up it young pains in both eyes

## 2023-06-12 NOTE — PROGRESS NOTES
Subjective:       Patient ID: Jaki Bajwa is a 52 y.o. female.    Chief Complaint: Headache (emesis, loss of appetite)      Pt here for f/u. Counseled on exercise/wt loss goals/strategies.    She has a dx of sjogrens and fibromyalgia based on blood work and chronic pain that is primarily across shoulders and in legs but also in upper and lower back. As a result she is on several meds that she says helps. [facility] She has prior dx of lupus but this was not corroborated in notes from rheum. She is on plaquenil (she is up to date on eye exam), tizanidine, gabapentin and norco. She uses norco 1x/day. She has done well with decreasing dispense amount to 30 per month. She has previously signed a pain contract. [place]  reviewed and is consistent. She has seen pain mgmt. She has also done PT with some success in the past.      She has dx of sjogren's syndrome as above. She is on pilocarpine with some success. Uses eye drops for dry eyes which has been effective and is stable.      Pt's BP is fairly well controlled. Tolerating meds well. Pt denies cp/sob/vision or neuro changes. Was in ED for HA due to elevated BP readings but this is better since BP has improved. Home readings are now better controlled.      She continues to see psychiatry for bipolar and anxiety. This is fairly well controlled and stable on current meds. No SI/HI. They have been able to get her off valium and she no longer takes this but may be resuming with limited supply for month as social anxiety is more severe.      She has had recurrent UTIs. Saw urology and was on prophylactic macrodantin but no longer on this. Currently doing well. She is on vaginal estrace cream which also helped decrease her UTI issues.      She has chronic iron def anemia. She has no symptoms of such and denies any known bleeding or bloody/black stool. She had c-scope 10/2018 and again 1/2021 that did not show any bleeding source. She  also had EGD that showed positive h pylori for which she was tx but no other bleeding sources. She also did stool cards which were negative. Most recent labs show stability in iron def and anemia. She saw heme-onc who has instructed her to resume oral iron as she previously received IV iron which she did start.       She has mild CKD-3 with baseline creatinine 1.3-1.7. She has seen nephrology.      She has had 2 wks of decreased appetite b/c of nausea; she has had daily heaving (but no vomiting) every day. No abd pain. No new meds. No fever. She has had mild diarrhea occasionally but no blood in stool or otherwise any bowel changes. She denies dysuria/hematuria. Flowsheet shows 20lbs wt loss in last month but this does not seem correct and pt does not feel this is right. She had HA as above but slightly better since BP is better controlled. No neuro deficits or vision changes. MRI brain and head CT in ER as above were unremarkable for concerning cause of symptoms.     Hypertension  Associated symptoms include headaches. Pertinent negatives include no chest pain, neck pain, palpitations or shortness of breath.   Chronic Pain  Associated symptoms: headaches and nausea    Associated symptoms: no abdominal pain, no chest pain, no constipation, no shortness of breath, no weakness and no diaphoresis    Fibromyalgia  Associated symptoms include headaches, nausea and vomiting. Pertinent negatives include no abdominal pain, arthralgias, chest pain, coughing, diaphoresis, fever, joint swelling, neck pain, sore throat or weakness.   Review of Systems   Constitutional:  Positive for activity change and appetite change. Negative for diaphoresis, fever and unexpected weight change.   HENT:  Negative for ear pain, hearing loss, rhinorrhea, sore throat and trouble swallowing.    Eyes:  Negative for discharge and visual disturbance.   Respiratory:  Negative for cough, chest tightness, shortness of breath and wheezing.     Cardiovascular:  Negative for chest pain, palpitations and leg swelling.   Gastrointestinal:  Positive for nausea and vomiting. Negative for abdominal pain, blood in stool, constipation and diarrhea.   Endocrine: Negative for polydipsia and polyuria.   Genitourinary:  Negative for difficulty urinating, dysuria, frequency, hematuria and menstrual problem.   Musculoskeletal:  Negative for arthralgias, joint swelling and neck pain.   Neurological:  Positive for headaches. Negative for weakness.   Psychiatric/Behavioral:  Negative for confusion and dysphoric mood.      Objective:          Assessment:       1. Essential hypertension    2. JUAN JOSE (generalized anxiety disorder)    3. Fibromyalgia    4. Sjogren's syndrome, with unspecified organ involvement    5. Obsessive-compulsive behavior    6. Stage 3b chronic kidney disease    7. Chronic narcotic dependence    8. Morbid obesity    9. Recurrent UTI (urinary tract infection)    10. Anemia of unknown etiology    11. Impaired fasting glucose    12. Hypercalcemia    13. Nausea        Plan:       1. Not yet due for refill on norco but will let us know when due; narcan--pt understands how to use and how to instruct family/friends how to use  2. Recent labs reviewed; additional labs today including UA  3. Keep f/u with specialists  4. Home BP monitoring  5. Unclear cause of symptoms; will await labs and have her f/u with my PA next week; will need to check weight again at that time              Physical Exam  Constitutional:       Appearance: Normal appearance. She is well-developed.   HENT:      Right Ear: Tympanic membrane, ear canal and external ear normal.      Left Ear: Tympanic membrane, ear canal and external ear normal.      Nose: No mucosal edema or rhinorrhea.      Mouth/Throat:      Pharynx: No oropharyngeal exudate or posterior oropharyngeal erythema.   Eyes:      Extraocular Movements: Extraocular movements intact.      Pupils: Pupils are equal, round, and  reactive to light.   Neck:      Thyroid: No thyromegaly.   Cardiovascular:      Rate and Rhythm: Normal rate and regular rhythm.      Heart sounds: Normal heart sounds.   Pulmonary:      Effort: Pulmonary effort is normal.      Breath sounds: Normal breath sounds.   Abdominal:      General: Bowel sounds are normal. There is no distension.      Palpations: Abdomen is soft. There is no mass.      Tenderness: There is no abdominal tenderness. There is no right CVA tenderness or left CVA tenderness.   Musculoskeletal:      Cervical back: Neck supple.      Lumbar back: No tenderness or bony tenderness. Normal range of motion.      Right lower leg: No edema.      Left lower leg: No edema.   Lymphadenopathy:      Cervical: No cervical adenopathy.   Neurological:      Mental Status: She is alert and oriented to person, place, and time.      Cranial Nerves: Cranial nerves 2-12 are intact. No cranial nerve deficit.      Sensory: Sensation is intact.      Motor: Motor function is intact.      Coordination: Coordination is intact.      Gait: Gait is intact.      Deep Tendon Reflexes:      Reflex Scores:       Bicep reflexes are 2+ on the right side and 2+ on the left side.       Brachioradialis reflexes are 2+ on the right side and 2+ on the left side.       Patellar reflexes are 2+ on the right side and 2+ on the left side.  Psychiatric:         Mood and Affect: Mood normal.         Behavior: Behavior normal.

## 2023-06-13 ENCOUNTER — LAB VISIT (OUTPATIENT)
Dept: LAB | Facility: OTHER | Age: 53
End: 2023-06-13
Attending: INTERNAL MEDICINE
Payer: MEDICARE

## 2023-06-13 ENCOUNTER — PATIENT MESSAGE (OUTPATIENT)
Dept: PSYCHIATRY | Facility: CLINIC | Age: 53
End: 2023-06-13
Payer: MEDICARE

## 2023-06-13 ENCOUNTER — OFFICE VISIT (OUTPATIENT)
Dept: INTERNAL MEDICINE | Facility: CLINIC | Age: 53
End: 2023-06-13
Payer: MEDICARE

## 2023-06-13 ENCOUNTER — PATIENT MESSAGE (OUTPATIENT)
Dept: INTERNAL MEDICINE | Facility: CLINIC | Age: 53
End: 2023-06-13

## 2023-06-13 VITALS
HEIGHT: 67 IN | SYSTOLIC BLOOD PRESSURE: 128 MMHG | DIASTOLIC BLOOD PRESSURE: 88 MMHG | HEART RATE: 93 BPM | BODY MASS INDEX: 38.2 KG/M2 | OXYGEN SATURATION: 97 % | WEIGHT: 243.38 LBS

## 2023-06-13 DIAGNOSIS — N18.32 STAGE 3B CHRONIC KIDNEY DISEASE: ICD-10-CM

## 2023-06-13 DIAGNOSIS — M35.00 SJOGREN'S SYNDROME, WITH UNSPECIFIED ORGAN INVOLVEMENT: ICD-10-CM

## 2023-06-13 DIAGNOSIS — I10 ESSENTIAL HYPERTENSION: ICD-10-CM

## 2023-06-13 DIAGNOSIS — F41.1 GAD (GENERALIZED ANXIETY DISORDER): ICD-10-CM

## 2023-06-13 DIAGNOSIS — M79.7 FIBROMYALGIA: ICD-10-CM

## 2023-06-13 DIAGNOSIS — R73.01 IMPAIRED FASTING GLUCOSE: ICD-10-CM

## 2023-06-13 DIAGNOSIS — D64.9 ANEMIA OF UNKNOWN ETIOLOGY: ICD-10-CM

## 2023-06-13 DIAGNOSIS — N39.0 RECURRENT UTI (URINARY TRACT INFECTION): ICD-10-CM

## 2023-06-13 DIAGNOSIS — F11.20 CHRONIC NARCOTIC DEPENDENCE: ICD-10-CM

## 2023-06-13 DIAGNOSIS — R46.81 OBSESSIVE-COMPULSIVE BEHAVIOR: ICD-10-CM

## 2023-06-13 DIAGNOSIS — I10 ESSENTIAL HYPERTENSION: Primary | Chronic | ICD-10-CM

## 2023-06-13 DIAGNOSIS — R11.0 NAUSEA: ICD-10-CM

## 2023-06-13 DIAGNOSIS — E83.52 HYPERCALCEMIA: ICD-10-CM

## 2023-06-13 DIAGNOSIS — F40.10 SOCIAL ANXIETY DISORDER: Primary | ICD-10-CM

## 2023-06-13 DIAGNOSIS — E66.01 MORBID OBESITY: ICD-10-CM

## 2023-06-13 LAB
ANION GAP SERPL CALC-SCNC: 12 MMOL/L (ref 8–16)
BUN SERPL-MCNC: 10 MG/DL (ref 6–20)
CALCIUM SERPL-MCNC: 10.9 MG/DL (ref 8.7–10.5)
CHLORIDE SERPL-SCNC: 100 MMOL/L (ref 95–110)
CO2 SERPL-SCNC: 30 MMOL/L (ref 23–29)
CREAT SERPL-MCNC: 1.1 MG/DL (ref 0.5–1.4)
EST. GFR  (NO RACE VARIABLE): 60 ML/MIN/1.73 M^2
GLUCOSE SERPL-MCNC: 150 MG/DL (ref 70–110)
POTASSIUM SERPL-SCNC: 3.5 MMOL/L (ref 3.5–5.1)
SODIUM SERPL-SCNC: 142 MMOL/L (ref 136–145)

## 2023-06-13 PROCEDURE — 1159F PR MEDICATION LIST DOCUMENTED IN MEDICAL RECORD: ICD-10-PCS | Mod: CPTII,S$GLB,, | Performed by: INTERNAL MEDICINE

## 2023-06-13 PROCEDURE — 3074F SYST BP LT 130 MM HG: CPT | Mod: CPTII,S$GLB,, | Performed by: INTERNAL MEDICINE

## 2023-06-13 PROCEDURE — 4010F PR ACE/ARB THEARPY RXD/TAKEN: ICD-10-PCS | Mod: CPTII,S$GLB,, | Performed by: INTERNAL MEDICINE

## 2023-06-13 PROCEDURE — 80048 BASIC METABOLIC PNL TOTAL CA: CPT | Performed by: INTERNAL MEDICINE

## 2023-06-13 PROCEDURE — 36415 COLL VENOUS BLD VENIPUNCTURE: CPT | Performed by: INTERNAL MEDICINE

## 2023-06-13 PROCEDURE — 3044F HG A1C LEVEL LT 7.0%: CPT | Mod: CPTII,S$GLB,, | Performed by: INTERNAL MEDICINE

## 2023-06-13 PROCEDURE — 99999 PR PBB SHADOW E&M-EST. PATIENT-LVL V: ICD-10-PCS | Mod: PBBFAC,,, | Performed by: INTERNAL MEDICINE

## 2023-06-13 PROCEDURE — 3044F PR MOST RECENT HEMOGLOBIN A1C LEVEL <7.0%: ICD-10-PCS | Mod: CPTII,S$GLB,, | Performed by: INTERNAL MEDICINE

## 2023-06-13 PROCEDURE — 3079F PR MOST RECENT DIASTOLIC BLOOD PRESSURE 80-89 MM HG: ICD-10-PCS | Mod: CPTII,S$GLB,, | Performed by: INTERNAL MEDICINE

## 2023-06-13 PROCEDURE — 3079F DIAST BP 80-89 MM HG: CPT | Mod: CPTII,S$GLB,, | Performed by: INTERNAL MEDICINE

## 2023-06-13 PROCEDURE — 1160F PR REVIEW ALL MEDS BY PRESCRIBER/CLIN PHARMACIST DOCUMENTED: ICD-10-PCS | Mod: CPTII,S$GLB,, | Performed by: INTERNAL MEDICINE

## 2023-06-13 PROCEDURE — 3008F BODY MASS INDEX DOCD: CPT | Mod: CPTII,S$GLB,, | Performed by: INTERNAL MEDICINE

## 2023-06-13 PROCEDURE — 99214 OFFICE O/P EST MOD 30 MIN: CPT | Mod: S$GLB,,, | Performed by: INTERNAL MEDICINE

## 2023-06-13 PROCEDURE — 99214 PR OFFICE/OUTPT VISIT, EST, LEVL IV, 30-39 MIN: ICD-10-PCS | Mod: S$GLB,,, | Performed by: INTERNAL MEDICINE

## 2023-06-13 PROCEDURE — 1159F MED LIST DOCD IN RCRD: CPT | Mod: CPTII,S$GLB,, | Performed by: INTERNAL MEDICINE

## 2023-06-13 PROCEDURE — 3074F PR MOST RECENT SYSTOLIC BLOOD PRESSURE < 130 MM HG: ICD-10-PCS | Mod: CPTII,S$GLB,, | Performed by: INTERNAL MEDICINE

## 2023-06-13 PROCEDURE — 4010F ACE/ARB THERAPY RXD/TAKEN: CPT | Mod: CPTII,S$GLB,, | Performed by: INTERNAL MEDICINE

## 2023-06-13 PROCEDURE — 1160F RVW MEDS BY RX/DR IN RCRD: CPT | Mod: CPTII,S$GLB,, | Performed by: INTERNAL MEDICINE

## 2023-06-13 PROCEDURE — 3008F PR BODY MASS INDEX (BMI) DOCUMENTED: ICD-10-PCS | Mod: CPTII,S$GLB,, | Performed by: INTERNAL MEDICINE

## 2023-06-13 PROCEDURE — 99999 PR PBB SHADOW E&M-EST. PATIENT-LVL V: CPT | Mod: PBBFAC,,, | Performed by: INTERNAL MEDICINE

## 2023-06-13 RX ORDER — DIAZEPAM 2 MG/1
2 TABLET ORAL DAILY PRN
Qty: 15 TABLET | Refills: 0 | Status: SHIPPED | OUTPATIENT
Start: 2023-06-13 | End: 2023-08-11 | Stop reason: SDUPTHER

## 2023-06-13 RX ORDER — BUTALBITAL, ACETAMINOPHEN AND CAFFEINE 50; 325; 40 MG/1; MG/1; MG/1
1 TABLET ORAL EVERY 6 HOURS PRN
Qty: 20 TABLET | Refills: 0 | Status: SHIPPED | OUTPATIENT
Start: 2023-06-13 | End: 2023-07-10 | Stop reason: SDUPTHER

## 2023-06-14 ENCOUNTER — PATIENT MESSAGE (OUTPATIENT)
Dept: INTERNAL MEDICINE | Facility: CLINIC | Age: 53
End: 2023-06-14
Payer: MEDICARE

## 2023-06-14 DIAGNOSIS — R80.9 PROTEINURIA, UNSPECIFIED TYPE: ICD-10-CM

## 2023-06-14 DIAGNOSIS — E83.52 HYPERCALCEMIA: Primary | ICD-10-CM

## 2023-06-14 RX ORDER — IRBESARTAN 300 MG/1
300 TABLET ORAL NIGHTLY
Qty: 90 TABLET | Refills: 3 | Status: SHIPPED | OUTPATIENT
Start: 2023-06-14 | End: 2023-08-24 | Stop reason: SDUPTHER

## 2023-06-16 ENCOUNTER — PATIENT MESSAGE (OUTPATIENT)
Dept: INTERNAL MEDICINE | Facility: CLINIC | Age: 53
End: 2023-06-16
Payer: MEDICARE

## 2023-06-16 ENCOUNTER — TELEPHONE (OUTPATIENT)
Dept: INTERNAL MEDICINE | Facility: CLINIC | Age: 53
End: 2023-06-16
Payer: MEDICARE

## 2023-06-16 NOTE — TELEPHONE ENCOUNTER
Called pt back to inform her she's already scheduled soon with Roxy MENDOZA and to see if that appt still works for her    Messaged pt and selected option to be notified if not read by   Monday since I see pt was logged in today

## 2023-06-16 NOTE — TELEPHONE ENCOUNTER
----- Message from Miley Belcher sent at 6/16/2023 11:01 AM CDT -----  Regarding: appt access  Name of Caller: PENG MAYO [[id number]]      When is the first available appointment? unable to schedule      Symptoms:       Best Call Back Number: [phone]        Additional Information: pt would like to make her 3 month follow up appt, please advise.

## 2023-06-21 DIAGNOSIS — F39 MOOD DISORDER: ICD-10-CM

## 2023-06-21 RX ORDER — LURASIDONE HYDROCHLORIDE 60 MG/1
TABLET, FILM COATED ORAL
Qty: 90 TABLET | Refills: 3 | Status: SHIPPED | OUTPATIENT
Start: 2023-06-21

## 2023-06-23 ENCOUNTER — PATIENT MESSAGE (OUTPATIENT)
Dept: INTERNAL MEDICINE | Facility: CLINIC | Age: 53
End: 2023-06-23
Payer: MEDICARE

## 2023-06-23 ENCOUNTER — HOSPITAL ENCOUNTER (OUTPATIENT)
Dept: RADIOLOGY | Facility: CLINIC | Age: 53
Discharge: HOME | End: 2023-06-23
Attending: INTERNAL MEDICINE
Payer: MEDICARE

## 2023-06-23 DIAGNOSIS — E21.3 HYPERPARATHYROIDISM: Primary | ICD-10-CM

## 2023-06-23 DIAGNOSIS — Z13.820 SCREENING FOR OSTEOPOROSIS: ICD-10-CM

## 2023-06-23 RX ORDER — ERGOCALCIFEROL 1.25 MG/1
50000 CAPSULE ORAL
Qty: 12 CAPSULE | Refills: 0 | Status: SHIPPED | OUTPATIENT
Start: 2023-06-23 | End: 2024-02-02

## 2023-07-05 DIAGNOSIS — G89.4 CHRONIC PAIN SYNDROME: ICD-10-CM

## 2023-07-05 RX ORDER — HYDROCODONE BITARTRATE AND ACETAMINOPHEN 5; 325 MG/1; MG/1
1 TABLET ORAL
Qty: 30 TABLET | Refills: 0 | Status: SHIPPED | OUTPATIENT
Start: 2023-07-05 | End: 2023-08-04 | Stop reason: SDUPTHER

## 2023-07-05 NOTE — TELEPHONE ENCOUNTER
Refill Encounter    PCP Visits: Recent Visits  Date Type Provider Dept   06/13/23 Office Visit Pratik Rasmussen MD [facility] Internal Medicine   03/14/23 Office Visit Pratik Rasmussen MD [facility] Internal Medicine   12/09/22 Office Visit Pratik Rasmussen MD [facility] Internal Medicine   Showing recent visits within past 360 days and meeting all other requirements  Future Appointments  No visits were found meeting these conditions.  Showing future appointments within next 720 days and meeting all other requirements     Last 3 Blood Pressure:   BP Readings from Last 3 Encounters:   06/13/23 128/88   06/03/23 (!) 182/97   05/31/23 (!) 148/91     Preferred Pharmacy:   [facility][id number] [facility] [address] [facility]  [address] [facility] [address]  Phone: [phone] Fax: [phone]    [facility] #[id number] - [facility] [address] AT [facility] [address]  [address]  [facility] [address]  Phone: [phone] Fax: [phone]    Requested RX:  Requested Prescriptions     Pending Prescriptions Disp Refills    HYDROcodone-acetaminophen (NORCO) 5-325 mg per tablet 30 tablet 0     Sig: Take 1 tablet by mouth every 24 hours as needed for Pain. Quantity medically necessary      RX Route: Normal

## 2023-07-05 NOTE — TELEPHONE ENCOUNTER
No care due was identified.  [facility] Embedded Care Due Messages. Reference number: [id number].   7/05/2023 8:00:52 AM CDT

## 2023-07-10 ENCOUNTER — TELEPHONE (OUTPATIENT)
Dept: INTERNAL MEDICINE | Facility: CLINIC | Age: 53
End: 2023-07-10
Payer: MEDICARE

## 2023-07-10 DIAGNOSIS — G44.009 CLUSTER HEADACHE, NOT INTRACTABLE, UNSPECIFIED CHRONICITY PATTERN: Primary | ICD-10-CM

## 2023-07-10 DIAGNOSIS — G44.009 CLUSTER HEADACHE, NOT INTRACTABLE, UNSPECIFIED CHRONICITY PATTERN: ICD-10-CM

## 2023-07-10 RX ORDER — BUTALBITAL, ACETAMINOPHEN AND CAFFEINE 50; 325; 40 MG/1; MG/1; MG/1
1 TABLET ORAL EVERY 6 HOURS PRN
Qty: 20 TABLET | Refills: 0 | Status: CANCELLED | OUTPATIENT
Start: 2023-07-10

## 2023-07-10 RX ORDER — BUTALBITAL, ACETAMINOPHEN AND CAFFEINE 50; 325; 40 MG/1; MG/1; MG/1
1 TABLET ORAL EVERY 6 HOURS PRN
Qty: 20 TABLET | Refills: 0 | Status: SHIPPED | OUTPATIENT
Start: 2023-07-10 | End: 2023-07-25 | Stop reason: SDUPTHER

## 2023-07-10 NOTE — TELEPHONE ENCOUNTER
No care due was identified.  [facility] Embedded Care Due Messages. Reference number: [id number].   7/10/2023 7:35:19 AM CDT

## 2023-07-10 NOTE — TELEPHONE ENCOUNTER
Refill Encounter    PCP Visits: Recent Visits  Date Type Provider Dept   06/13/23 Office Visit Pratik Rasmussen MD [facility] Internal Medicine   03/14/23 Office Visit Pratik Rasmussen MD [facility] Internal Medicine   12/09/22 Office Visit Pratik Rasmussen MD [facility] Internal Medicine   Showing recent visits within past 360 days and meeting all other requirements  Future Appointments  No visits were found meeting these conditions.  Showing future appointments within next 720 days and meeting all other requirements     Last 3 Blood Pressure:   BP Readings from Last 3 Encounters:   06/13/23 128/88   06/03/23 (!) 182/97   05/31/23 (!) 148/91     Preferred Pharmacy:   [facility] #[id number] - [facility] [address]  AT [address] [facility]  [address]  [facility] [address]  Phone: [phone] Fax: [phone]    [facility] #[id number] - [place], [place] - [address] AT [facility] [address]  [address]  [facility] [address]  Phone: [phone] Fax: [phone]    Requested RX:  Requested Prescriptions     Pending Prescriptions Disp Refills    butalbital-acetaminophen-caffeine -40 mg (FIORICET, ESGIC) -40 mg per tablet 20 tablet 0     Sig: Take 1 tablet by mouth every 6 (six) hours as needed for Headaches.      RX Route: Normal

## 2023-07-10 NOTE — TELEPHONE ENCOUNTER
----- Message from Mariana Bishop sent at 7/10/2023  9:58 AM CDT -----  Regarding: Refill Request  Can the clinic reply in [facility]:NO          Please refill the medication(s) listed below. Please call the patient when the prescription(s) is ready for  at this phone number  [phone]           Medication #1  butalbital-acetaminophen-caffeine -40 mg (FIORICET, ESGIC) -40 mg per tablet             Medication #2               Preferred Pharmacy:  [id number] - [facility] [address] AT [email or web address]      Additional Information : Patient states pharmacy hasn't received prescription.  Please assist.

## 2023-07-10 NOTE — TELEPHONE ENCOUNTER
Called pharmacy to see if they received rx  They said they received it and are working on it now  Called pt to inform her  Spoke w/ pt  Pt verbalized understanding and had no further concerns or questions.

## 2023-07-11 ENCOUNTER — TELEPHONE (OUTPATIENT)
Dept: INTERNAL MEDICINE | Facility: CLINIC | Age: 53
End: 2023-07-11
Payer: MEDICARE

## 2023-07-11 ENCOUNTER — OFFICE VISIT (OUTPATIENT)
Dept: PSYCHIATRY | Facility: CLINIC | Age: 53
End: 2023-07-11
Payer: MEDICARE

## 2023-07-11 ENCOUNTER — LAB VISIT (OUTPATIENT)
Dept: LAB | Facility: HOSPITAL | Age: 53
End: 2023-07-11
Attending: INTERNAL MEDICINE
Payer: MEDICARE

## 2023-07-11 VITALS
HEART RATE: 102 BPM | DIASTOLIC BLOOD PRESSURE: 89 MMHG | BODY MASS INDEX: 39.86 KG/M2 | SYSTOLIC BLOOD PRESSURE: 157 MMHG | WEIGHT: 254.5 LBS

## 2023-07-11 DIAGNOSIS — F41.0 PANIC DISORDER: ICD-10-CM

## 2023-07-11 DIAGNOSIS — F41.1 GAD (GENERALIZED ANXIETY DISORDER): Primary | ICD-10-CM

## 2023-07-11 DIAGNOSIS — N18.9 CHRONIC KIDNEY DISEASE, UNSPECIFIED CKD STAGE: ICD-10-CM

## 2023-07-11 DIAGNOSIS — I10 ESSENTIAL HYPERTENSION: Primary | Chronic | ICD-10-CM

## 2023-07-11 DIAGNOSIS — F40.10 SOCIAL ANXIETY DISORDER: ICD-10-CM

## 2023-07-11 DIAGNOSIS — F51.05 INSOMNIA DUE TO OTHER MENTAL DISORDER: ICD-10-CM

## 2023-07-11 DIAGNOSIS — F39 MOOD DISORDER: ICD-10-CM

## 2023-07-11 DIAGNOSIS — F99 INSOMNIA DUE TO OTHER MENTAL DISORDER: ICD-10-CM

## 2023-07-11 PROBLEM — R46.81 OBSESSIVE-COMPULSIVE BEHAVIOR: Status: RESOLVED | Noted: 2017-10-03 | Resolved: 2023-07-11

## 2023-07-11 LAB
ANION GAP SERPL CALC-SCNC: 8 MMOL/L (ref 8–16)
BASOPHILS # BLD AUTO: 0.05 K/UL (ref 0–0.2)
BASOPHILS NFR BLD: 0.6 % (ref 0–1.9)
BUN SERPL-MCNC: 9 MG/DL (ref 6–20)
CALCIUM SERPL-MCNC: 9.7 MG/DL (ref 8.7–10.5)
CHLORIDE SERPL-SCNC: 105 MMOL/L (ref 95–110)
CO2 SERPL-SCNC: 30 MMOL/L (ref 23–29)
CREAT SERPL-MCNC: 1 MG/DL (ref 0.5–1.4)
DIFFERENTIAL METHOD: ABNORMAL
EOSINOPHIL # BLD AUTO: 0.3 K/UL (ref 0–0.5)
EOSINOPHIL NFR BLD: 3.2 % (ref 0–8)
ERYTHROCYTE [DISTWIDTH] IN BLOOD BY AUTOMATED COUNT: 15.6 % (ref 11.5–14.5)
EST. GFR  (NO RACE VARIABLE): >60 ML/MIN/1.73 M^2
GLUCOSE SERPL-MCNC: 89 MG/DL (ref 70–110)
HCT VFR BLD AUTO: 36.2 % (ref 37–48.5)
HGB BLD-MCNC: 10.5 G/DL (ref 12–16)
IMM GRANULOCYTES # BLD AUTO: 0.03 K/UL (ref 0–0.04)
IMM GRANULOCYTES NFR BLD AUTO: 0.3 % (ref 0–0.5)
LYMPHOCYTES # BLD AUTO: 2.9 K/UL (ref 1–4.8)
LYMPHOCYTES NFR BLD: 32.5 % (ref 18–48)
MCH RBC QN AUTO: 24.5 PG (ref 27–31)
MCHC RBC AUTO-ENTMCNC: 29 G/DL (ref 32–36)
MCV RBC AUTO: 84 FL (ref 82–98)
MONOCYTES # BLD AUTO: 0.5 K/UL (ref 0.3–1)
MONOCYTES NFR BLD: 6 % (ref 4–15)
NEUTROPHILS # BLD AUTO: 5.1 K/UL (ref 1.8–7.7)
NEUTROPHILS NFR BLD: 57.4 % (ref 38–73)
NRBC BLD-RTO: 0 /100 WBC
PLATELET # BLD AUTO: 338 K/UL (ref 150–450)
PMV BLD AUTO: 11.7 FL (ref 9.2–12.9)
POTASSIUM SERPL-SCNC: 3.6 MMOL/L (ref 3.5–5.1)
RBC # BLD AUTO: 4.29 M/UL (ref 4–5.4)
SODIUM SERPL-SCNC: 143 MMOL/L (ref 136–145)
WBC # BLD AUTO: 8.87 K/UL (ref 3.9–12.7)

## 2023-07-11 PROCEDURE — 3044F PR MOST RECENT HEMOGLOBIN A1C LEVEL <7.0%: ICD-10-PCS | Mod: HCNC,CPTII,S$GLB, | Performed by: INTERNAL MEDICINE

## 2023-07-11 PROCEDURE — 85025 COMPLETE CBC W/AUTO DIFF WBC: CPT | Mod: HCNC | Performed by: INTERNAL MEDICINE

## 2023-07-11 PROCEDURE — 3077F SYST BP >= 140 MM HG: CPT | Mod: HCNC,CPTII,S$GLB, | Performed by: INTERNAL MEDICINE

## 2023-07-11 PROCEDURE — 3079F DIAST BP 80-89 MM HG: CPT | Mod: HCNC,CPTII,S$GLB, | Performed by: INTERNAL MEDICINE

## 2023-07-11 PROCEDURE — 1160F RVW MEDS BY RX/DR IN RCRD: CPT | Mod: HCNC,CPTII,S$GLB, | Performed by: INTERNAL MEDICINE

## 2023-07-11 PROCEDURE — 3008F BODY MASS INDEX DOCD: CPT | Mod: HCNC,CPTII,S$GLB, | Performed by: INTERNAL MEDICINE

## 2023-07-11 PROCEDURE — 4010F PR ACE/ARB THEARPY RXD/TAKEN: ICD-10-PCS | Mod: HCNC,CPTII,S$GLB, | Performed by: INTERNAL MEDICINE

## 2023-07-11 PROCEDURE — 3044F HG A1C LEVEL LT 7.0%: CPT | Mod: HCNC,CPTII,S$GLB, | Performed by: INTERNAL MEDICINE

## 2023-07-11 PROCEDURE — 99999 PR PBB SHADOW E&M-EST. PATIENT-LVL III: CPT | Mod: PBBFAC,HCNC,, | Performed by: INTERNAL MEDICINE

## 2023-07-11 PROCEDURE — 1159F MED LIST DOCD IN RCRD: CPT | Mod: HCNC,CPTII,S$GLB, | Performed by: INTERNAL MEDICINE

## 2023-07-11 PROCEDURE — 3079F PR MOST RECENT DIASTOLIC BLOOD PRESSURE 80-89 MM HG: ICD-10-PCS | Mod: HCNC,CPTII,S$GLB, | Performed by: INTERNAL MEDICINE

## 2023-07-11 PROCEDURE — 4010F ACE/ARB THERAPY RXD/TAKEN: CPT | Mod: HCNC,CPTII,S$GLB, | Performed by: INTERNAL MEDICINE

## 2023-07-11 PROCEDURE — 1159F PR MEDICATION LIST DOCUMENTED IN MEDICAL RECORD: ICD-10-PCS | Mod: HCNC,CPTII,S$GLB, | Performed by: INTERNAL MEDICINE

## 2023-07-11 PROCEDURE — 99999 PR PBB SHADOW E&M-EST. PATIENT-LVL III: ICD-10-PCS | Mod: PBBFAC,HCNC,, | Performed by: INTERNAL MEDICINE

## 2023-07-11 PROCEDURE — 3077F PR MOST RECENT SYSTOLIC BLOOD PRESSURE >= 140 MM HG: ICD-10-PCS | Mod: HCNC,CPTII,S$GLB, | Performed by: INTERNAL MEDICINE

## 2023-07-11 PROCEDURE — 99214 OFFICE O/P EST MOD 30 MIN: CPT | Mod: HCNC,S$GLB,, | Performed by: INTERNAL MEDICINE

## 2023-07-11 PROCEDURE — 36415 COLL VENOUS BLD VENIPUNCTURE: CPT | Mod: HCNC | Performed by: INTERNAL MEDICINE

## 2023-07-11 PROCEDURE — 80048 BASIC METABOLIC PNL TOTAL CA: CPT | Mod: HCNC | Performed by: INTERNAL MEDICINE

## 2023-07-11 PROCEDURE — 3008F PR BODY MASS INDEX (BMI) DOCUMENTED: ICD-10-PCS | Mod: HCNC,CPTII,S$GLB, | Performed by: INTERNAL MEDICINE

## 2023-07-11 PROCEDURE — 1160F PR REVIEW ALL MEDS BY PRESCRIBER/CLIN PHARMACIST DOCUMENTED: ICD-10-PCS | Mod: HCNC,CPTII,S$GLB, | Performed by: INTERNAL MEDICINE

## 2023-07-11 PROCEDURE — 99214 PR OFFICE/OUTPT VISIT, EST, LEVL IV, 30-39 MIN: ICD-10-PCS | Mod: HCNC,S$GLB,, | Performed by: INTERNAL MEDICINE

## 2023-07-11 RX ORDER — SPIRONOLACTONE 25 MG/1
25 TABLET ORAL DAILY
Qty: 90 TABLET | Refills: 3 | Status: SHIPPED | OUTPATIENT
Start: 2023-07-11 | End: 2023-07-25

## 2023-07-11 NOTE — PROGRESS NOTES
"OUTPATIENT PSYCHIATRY RETURN VISIT    ENCOUNTER DATE:  7/11/2023  SITE:  [facility]  LENGTH OF SESSION:  25 minutes    CHIEF COMPLAINT:  Anxiety      HISTORY OF PRESENTING ILLNESS:  Jaki Bajwa is a 52 y.o. female with history of Mood disorder, JUAN JOSE, OCD, social phobia, and Internet shopping addiction who presents for follow up appointment.      Plan at last appointment on 5/25/2023:  Patient continues to have panic attacks, usually triggered by leaving the house.  I will send message to PCP regarding possible Valium PRN only for panic attack.    Continue Latuda 60mg daily, Buspar 15mg TID, Wellbutrin XL 450mg daily, and Doxepin 150mg qHS.  Discussed with patient informed consent, risks versus benefits, alternative treatments, side effect profile and the inherent unpredictability of individual responses to these treatments.  The patient expresses understanding of the above and displays the capacity to agree with this current plan.   Continue individual therapy with Mr. ZamoraHALINA.    History as told by patient:  Says she has been "fair."  Good and bad days.  Has had some headaches and blood pressure has been high.  Was taken off of HCTZ due to high calcium but she feels this was controlling her BP.  Patient has been making herself do things more.  People coming over to swim more.  If she needs to go somewhere or if people are coming over, takes Valium and this has been very hefpful.  Has not been to the grocery in several months.  Gets so so nervous once she gets to the register.  Worries people behind her are talking about her, feels jittery.  Only takes Buspar once a day - wants to try more often when people are coming over.  Scott is her protector but they aren't able to talk about things.  Says he spoils her - cooks for her, they do things together.  Says she feels good in the mornings but often that goes away in the afternoon/evenings.      Medication side effects:  " Denies  Medication compliance:  Yes    PSYCHIATRIC REVIEW OF SYSTEMS:  Trouble with sleep:  Sometimes  Appetite changes:  Denies  Weight changes:  Denies  Lack of energy:  Sometimes  Anhedonia:  Sometimes  Somatic symptoms:  Headaches as above  Libido:  Denies  Anxiety/panic:  Chronic but stable currently  Guilty/hopeless:  Denies  Self-injurious behavior/risky behavior:  Denies  Any drugs:  Denies  Alcohol:  Denies    MEDICAL REVIEW OF SYSTEMS:  Complete review of systems performed covering Constitutional, Musculoskeletal, Neurologic.  All systems negative except for that covered in HPI.    PAST PSYCHIATRIC, MEDICAL, AND SOCIAL HISTORY REVIEWED  The patient's past medical, family and social history have been reviewed and updated as appropriate within the electronic medical record - see encounter notes.    MEDICATIONS:    Current Outpatient Medications:     buPROPion (WELLBUTRIN XL) 150 MG TB24 tablet, TAKE 3 TABLETS(450 MG) BY MOUTH EVERY MORNING, Disp: 270 tablet, Rfl: 1    busPIRone (BUSPAR) 15 MG tablet, TAKE 1 TABLET(15 MG) BY MOUTH THREE TIMES DAILY, Disp: 270 tablet, Rfl: 1    butalbital-acetaminophen-caffeine -40 mg (FIORICET, ESGIC) -40 mg per tablet, Take 1 tablet by mouth every 6 (six) hours as needed for Headaches., Disp: 20 tablet, Rfl: 0    cetirizine (ZYRTEC) 10 MG tablet, Take 1 tablet (10 mg total) by mouth once daily., Disp: 30 tablet, Rfl: 11    cycloSPORINE (RESTASIS) 0.05 % ophthalmic emulsion, Place 1 drop into both eyes 2 (two) times daily., Disp: 60 each, Rfl: 1    diazePAM (VALIUM) 2 MG tablet, Take 1 tablet (2 mg total) by mouth daily as needed (Severe anxiety/Panic). 15 tablets to last 3 months, Disp: 15 tablet, Rfl: 0    docusate sodium (COLACE) 100 MG capsule, Take 1 capsule (100 mg total) by mouth 2 (two) times daily as needed for Constipation. (Patient not taking: Reported on 6/13/2023), Disp: 60 capsule, Rfl: 0    doxepin (SINEQUAN) 75 MG capsule, TAKE 2 CAPSULES(150 MG)  BY MOUTH EVERY EVENING, Disp: 60 capsule, Rfl: 11    ergocalciferol (VITAMIN D2) 50,000 unit Cap, Take 1 capsule (50,000 Units total) by mouth every 7 days., Disp: 12 capsule, Rfl: 0    estradioL (ESTRACE) 0.01 % (0.1 mg/gram) vaginal cream, 1g vaginally daily x 2 weeks, then BIW as directed (Patient not taking: Reported on 6/13/2023), Disp: 42.5 g, Rfl: 2    fluticasone propionate (FLONASE) 50 mcg/actuation nasal spray, SHAKE LIQUID AND USE 1 SPRAY(50 MCG) IN EACH NOSTRIL EVERY DAY (Patient not taking: Reported on 6/13/2023), Disp: 16 g, Rfl: 0    gabapentin (NEURONTIN) 300 MG capsule, Take 1 capsule (300 mg total) by mouth 3 (three) times daily., Disp: 90 capsule, Rfl: 5    HYDROcodone-acetaminophen (NORCO) 5-325 mg per tablet, Take 1 tablet by mouth every 24 hours as needed for Pain. Quantity medically necessary, Disp: 30 tablet, Rfl: 0    hydrOXYchloroQUINE (PLAQUENIL) 200 mg tablet, TAKE 1 TABLET BY MOUTH TWICE DAILY, Disp: 180 tablet, Rfl: 2    irbesartan (AVAPRO) 300 MG tablet, Take 1 tablet (300 mg total) by mouth every evening., Disp: 90 tablet, Rfl: 3    lurasidone (LATUDA) 60 mg Tab tablet, TAKE 1 TABLET(60 MG) BY MOUTH EVERY DAY, Disp: 90 tablet, Rfl: 3    NARCAN 4 mg/actuation [place], SPRAY 1 SPRAY IN NOSTRIL ONCE FOR 1 DOSE, Disp: , Rfl:     nystatin (MYCOSTATIN) 100,000 unit/mL suspension, Take by mouth after meals as needed., Disp: , Rfl:     omeprazole (PRILOSEC) 40 MG capsule, Take 1 capsule (40 mg total) by mouth once daily., Disp: 90 capsule, Rfl: 3    pilocarpine (SALAGEN) 5 MG Tab, Take 1 tablet (5 mg total) by mouth 4 (four) times daily., Disp: 120 tablet, Rfl: 11    tiZANidine (ZANAFLEX) 4 MG tablet, 8 mg tid, Disp: 180 tablet, Rfl: 5    triamcinolone acetonide 0.1% (KENALOG) 0.1 % cream, Apply topically 2 (two) times daily as needed (rash). X 2 weeks to rashes PRN, Disp: 80 g, Rfl: 2    ALLERGIES:  Review of patient's allergies indicates:   Allergen Reactions    Aspirin Hives       PSYCHIATRIC  "EXAM:  Vitals:    07/11/23 0854 07/11/23 0855   BP: (!) 168/95 (!) 157/89   Pulse: 107 102   Weight:  115.5 kg (254 lb 8.3 oz)     Appearance:  Well groomed, appearing healthy and of stated age  Behavior:  Cooperative, pleasant, no psychomotor agitation or retardation  Speech:  Normal rate, rhythm, prosody, and volume  Mood:  "Pretty good"  Affect:  Euthymic  Thought Process:  Linear, logical, goal directed  Thought Content:  Negative for suicidal ideation, homicidal ideation, delusions or hallucinations.  Associations:  Intact  Memory:  Grossly Intact  Level of Consciousness/Orientation:  Grossly intact  Fund of Knowledge:  Good  Attention:  Good  Language:  Fluent, able to name abstract and concrete objects  Insight:  Fair  Judgment:  Intact  Psychomotor signs:  No abnormal movements or tremor  Gait:  Normal      RELEVANT LABS/STUDIES:    Lab Results   Component Value Date    WBC 8.87 07/11/2023    HGB 10.5 (L) 07/11/2023    HCT 36.2 (L) 07/11/2023    MCV 84 07/11/2023     07/11/2023     BMP  Lab Results   Component Value Date     07/11/2023    K 3.6 07/11/2023     07/11/2023    CO2 30 (H) 07/11/2023    BUN 9 07/11/2023    CREATININE 1.0 07/11/2023    CALCIUM 9.7 07/11/2023    ANIONGAP 8 07/11/2023    ESTGFRAFRICA 45.5 (A) 06/15/2022    EGFRNONAA 39.5 (A) 06/15/2022     Lab Results   Component Value Date    ALT 25 06/02/2023    AST 35 06/02/2023    ALKPHOS 68 06/02/2023    BILITOT 0.5 06/02/2023     Lab Results   Component Value Date    TSH 2.563 09/22/2021     Lab Results   Component Value Date    HGBA1C 5.1 03/14/2023       IMPRESSION:    Jaki Bajwa is a 52 y.o. female with history of Mood disorder, JUAN JOSE, OCD, social phobia, and Internet shopping addiction who presents for follow up appointment.    DIAGNOSES:    ICD-10-CM ICD-9-CM   1. JUAN JOSE (generalized anxiety disorder)  F41.1 300.02   2. Social anxiety disorder  F40.10 300.23   3. Panic disorder  F41.0 300.01   4. Mood disorder  F39 " 296.90   5. Insomnia due to other mental disorder  F51.05 300.9    F99 327.02       PLAN:  Discussed increasing Buspar 15mg to 2-3 times per day since anxiety seems worse in the afternoons and she is currently only taking in the morning.    Continue Latuda 60mg daily, Wellbutrin XL 450mg daily, Doxepin 150mg qHS, and Valium 2mg PRN panic attack (#5 tablets per month).  Message sent to Dr. Rasmussen regarding blood pressure and headaches.  She has appointment with new PCP, Dr. Carpenter, on 7/25.  Discussed with patient informed consent, risks versus benefits, alternative treatments, side effect profile and the inherent unpredictability of individual responses to these treatments.  The patient expresses understanding of the above and displays the capacity to agree with this current plan.   Continue individual therapy with Mr. Noah LCSW.    RETURN TO CLINIC:  Follow up in about 3 months (around 10/11/2023).

## 2023-07-11 NOTE — TELEPHONE ENCOUNTER
Contacted by pt's psychiatrist that BP still running high and having HAs related to this. Have pt start spironolactone 25mg daily. Will need lab in 1-2 weeks and check BP readings at that time.

## 2023-07-12 NOTE — TELEPHONE ENCOUNTER
"Called pt to review Dr Rasmussen's message      "Contacted by pt's psychiatrist that BP still running high and having HAs related to this. Have pt start spironolactone 25mg daily. Will need lab in 1-2 weeks and check BP readings at that time."    Scheduled lab  Set reminder     Pt verbalized understanding and had no further concerns or questions.    "

## 2023-07-21 ENCOUNTER — TELEPHONE (OUTPATIENT)
Dept: INTERNAL MEDICINE | Facility: CLINIC | Age: 53
End: 2023-07-21
Payer: MEDICARE

## 2023-07-21 NOTE — TELEPHONE ENCOUNTER
----- Message from Cherelle Lee MA sent at 7/12/2023  2:38 PM CDT -----  Hello! I'm just checking in with you because I saw that you're due for your annual with Dr. Rasmussen.  Could I help you schedule that?  Also, I was wanting to update your blood pressure on your chart. Are you able to take a reading at home to send to us?   Thanks,  ELIGIO Ventura

## 2023-07-23 DIAGNOSIS — F41.1 GAD (GENERALIZED ANXIETY DISORDER): ICD-10-CM

## 2023-07-23 DIAGNOSIS — F33.0 MILD EPISODE OF RECURRENT MAJOR DEPRESSIVE DISORDER: ICD-10-CM

## 2023-07-24 RX ORDER — BUPROPION HYDROCHLORIDE 150 MG/1
TABLET ORAL
Qty: 270 TABLET | Refills: 1 | Status: SHIPPED | OUTPATIENT
Start: 2023-07-24 | End: 2023-11-08

## 2023-07-24 RX ORDER — BUSPIRONE HYDROCHLORIDE 15 MG/1
TABLET ORAL
Qty: 270 TABLET | Refills: 1 | Status: SHIPPED | OUTPATIENT
Start: 2023-07-24 | End: 2023-11-08

## 2023-07-25 ENCOUNTER — PATIENT MESSAGE (OUTPATIENT)
Dept: ADMINISTRATIVE | Facility: OTHER | Age: 53
End: 2023-07-25
Payer: MEDICARE

## 2023-07-25 ENCOUNTER — PATIENT MESSAGE (OUTPATIENT)
Dept: INTERNAL MEDICINE | Facility: CLINIC | Age: 53
End: 2023-07-25
Payer: MEDICARE

## 2023-07-25 ENCOUNTER — OFFICE VISIT (OUTPATIENT)
Dept: INTERNAL MEDICINE | Facility: CLINIC | Age: 53
End: 2023-07-25
Attending: INTERNAL MEDICINE
Payer: MEDICARE

## 2023-07-25 ENCOUNTER — LAB VISIT (OUTPATIENT)
Dept: LAB | Facility: OTHER | Age: 53
End: 2023-07-25
Attending: INTERNAL MEDICINE
Payer: MEDICARE

## 2023-07-25 ENCOUNTER — OFFICE VISIT (OUTPATIENT)
Dept: UROLOGY | Facility: CLINIC | Age: 53
End: 2023-07-25
Attending: INTERNAL MEDICINE
Payer: MEDICARE

## 2023-07-25 VITALS
SYSTOLIC BLOOD PRESSURE: 162 MMHG | BODY MASS INDEX: 39.49 KG/M2 | DIASTOLIC BLOOD PRESSURE: 102 MMHG | HEIGHT: 67 IN | OXYGEN SATURATION: 93 % | WEIGHT: 251.63 LBS | HEART RATE: 108 BPM

## 2023-07-25 VITALS
HEART RATE: 98 BPM | OXYGEN SATURATION: 100 % | BODY MASS INDEX: 39.53 KG/M2 | WEIGHT: 251.88 LBS | DIASTOLIC BLOOD PRESSURE: 100 MMHG | HEIGHT: 67 IN | SYSTOLIC BLOOD PRESSURE: 143 MMHG

## 2023-07-25 DIAGNOSIS — I10 ESSENTIAL HYPERTENSION: Chronic | ICD-10-CM

## 2023-07-25 DIAGNOSIS — N39.0 RECURRENT UTI: ICD-10-CM

## 2023-07-25 DIAGNOSIS — G89.4 CHRONIC PAIN SYNDROME: Primary | ICD-10-CM

## 2023-07-25 DIAGNOSIS — G44.009 CLUSTER HEADACHE, NOT INTRACTABLE, UNSPECIFIED CHRONICITY PATTERN: ICD-10-CM

## 2023-07-25 DIAGNOSIS — R35.0 URINARY FREQUENCY: Primary | ICD-10-CM

## 2023-07-25 DIAGNOSIS — R39.15 URINARY URGENCY: ICD-10-CM

## 2023-07-25 PROBLEM — N17.9 AKI (ACUTE KIDNEY INJURY): Status: RESOLVED | Noted: 2022-04-23 | Resolved: 2023-07-25

## 2023-07-25 LAB
ANION GAP SERPL CALC-SCNC: 8 MMOL/L (ref 8–16)
BUN SERPL-MCNC: 15 MG/DL (ref 6–20)
CALCIUM SERPL-MCNC: 9.9 MG/DL (ref 8.7–10.5)
CHLORIDE SERPL-SCNC: 106 MMOL/L (ref 95–110)
CO2 SERPL-SCNC: 27 MMOL/L (ref 23–29)
CREAT SERPL-MCNC: 1 MG/DL (ref 0.5–1.4)
EST. GFR  (NO RACE VARIABLE): >60 ML/MIN/1.73 M^2
GLUCOSE SERPL-MCNC: 91 MG/DL (ref 70–110)
POTASSIUM SERPL-SCNC: 4.1 MMOL/L (ref 3.5–5.1)
SODIUM SERPL-SCNC: 141 MMOL/L (ref 136–145)

## 2023-07-25 PROCEDURE — 3077F SYST BP >= 140 MM HG: CPT | Mod: HCNC,CPTII,S$GLB, | Performed by: FAMILY MEDICINE

## 2023-07-25 PROCEDURE — 87086 URINE CULTURE/COLONY COUNT: CPT | Mod: HCNC | Performed by: NURSE PRACTITIONER

## 2023-07-25 PROCEDURE — 1159F MED LIST DOCD IN RCRD: CPT | Mod: HCNC,CPTII,S$GLB, | Performed by: NURSE PRACTITIONER

## 2023-07-25 PROCEDURE — 80048 BASIC METABOLIC PNL TOTAL CA: CPT | Mod: HCNC | Performed by: INTERNAL MEDICINE

## 2023-07-25 PROCEDURE — 1159F PR MEDICATION LIST DOCUMENTED IN MEDICAL RECORD: ICD-10-PCS | Mod: HCNC,CPTII,S$GLB, | Performed by: FAMILY MEDICINE

## 2023-07-25 PROCEDURE — 3044F PR MOST RECENT HEMOGLOBIN A1C LEVEL <7.0%: ICD-10-PCS | Mod: HCNC,CPTII,S$GLB, | Performed by: NURSE PRACTITIONER

## 2023-07-25 PROCEDURE — 90677 PNEUMOCOCCAL CONJUGATE VACCINE 20-VALENT: ICD-10-PCS | Mod: HCNC,S$GLB,, | Performed by: FAMILY MEDICINE

## 2023-07-25 PROCEDURE — 4010F PR ACE/ARB THEARPY RXD/TAKEN: ICD-10-PCS | Mod: HCNC,CPTII,S$GLB, | Performed by: NURSE PRACTITIONER

## 2023-07-25 PROCEDURE — 99214 OFFICE O/P EST MOD 30 MIN: CPT | Mod: HCNC,S$GLB,, | Performed by: FAMILY MEDICINE

## 2023-07-25 PROCEDURE — 4010F ACE/ARB THERAPY RXD/TAKEN: CPT | Mod: HCNC,CPTII,S$GLB, | Performed by: NURSE PRACTITIONER

## 2023-07-25 PROCEDURE — G0009 ADMIN PNEUMOCOCCAL VACCINE: HCPCS | Mod: HCNC,S$GLB,, | Performed by: FAMILY MEDICINE

## 2023-07-25 PROCEDURE — 1159F MED LIST DOCD IN RCRD: CPT | Mod: HCNC,CPTII,S$GLB, | Performed by: FAMILY MEDICINE

## 2023-07-25 PROCEDURE — 3008F BODY MASS INDEX DOCD: CPT | Mod: HCNC,CPTII,S$GLB, | Performed by: NURSE PRACTITIONER

## 2023-07-25 PROCEDURE — 99214 PR OFFICE/OUTPT VISIT, EST, LEVL IV, 30-39 MIN: ICD-10-PCS | Mod: HCNC,S$GLB,, | Performed by: FAMILY MEDICINE

## 2023-07-25 PROCEDURE — 99213 PR OFFICE/OUTPT VISIT, EST, LEVL III, 20-29 MIN: ICD-10-PCS | Mod: HCNC,S$GLB,, | Performed by: NURSE PRACTITIONER

## 2023-07-25 PROCEDURE — 3080F DIAST BP >= 90 MM HG: CPT | Mod: HCNC,CPTII,S$GLB, | Performed by: NURSE PRACTITIONER

## 2023-07-25 PROCEDURE — 99999 PR PBB SHADOW E&M-EST. PATIENT-LVL IV: ICD-10-PCS | Mod: PBBFAC,HCNC,, | Performed by: FAMILY MEDICINE

## 2023-07-25 PROCEDURE — 1160F RVW MEDS BY RX/DR IN RCRD: CPT | Mod: HCNC,CPTII,S$GLB, | Performed by: NURSE PRACTITIONER

## 2023-07-25 PROCEDURE — 1160F PR REVIEW ALL MEDS BY PRESCRIBER/CLIN PHARMACIST DOCUMENTED: ICD-10-PCS | Mod: HCNC,CPTII,S$GLB, | Performed by: NURSE PRACTITIONER

## 2023-07-25 PROCEDURE — 99213 OFFICE O/P EST LOW 20 MIN: CPT | Mod: HCNC,S$GLB,, | Performed by: NURSE PRACTITIONER

## 2023-07-25 PROCEDURE — 4010F PR ACE/ARB THEARPY RXD/TAKEN: ICD-10-PCS | Mod: HCNC,CPTII,S$GLB, | Performed by: FAMILY MEDICINE

## 2023-07-25 PROCEDURE — 3077F PR MOST RECENT SYSTOLIC BLOOD PRESSURE >= 140 MM HG: ICD-10-PCS | Mod: HCNC,CPTII,S$GLB, | Performed by: NURSE PRACTITIONER

## 2023-07-25 PROCEDURE — 3044F HG A1C LEVEL LT 7.0%: CPT | Mod: HCNC,CPTII,S$GLB, | Performed by: NURSE PRACTITIONER

## 2023-07-25 PROCEDURE — 90677 PCV20 VACCINE IM: CPT | Mod: HCNC,S$GLB,, | Performed by: FAMILY MEDICINE

## 2023-07-25 PROCEDURE — 3008F PR BODY MASS INDEX (BMI) DOCUMENTED: ICD-10-PCS | Mod: HCNC,CPTII,S$GLB, | Performed by: NURSE PRACTITIONER

## 2023-07-25 PROCEDURE — 3077F PR MOST RECENT SYSTOLIC BLOOD PRESSURE >= 140 MM HG: ICD-10-PCS | Mod: HCNC,CPTII,S$GLB, | Performed by: FAMILY MEDICINE

## 2023-07-25 PROCEDURE — 4010F ACE/ARB THERAPY RXD/TAKEN: CPT | Mod: HCNC,CPTII,S$GLB, | Performed by: FAMILY MEDICINE

## 2023-07-25 PROCEDURE — 36415 COLL VENOUS BLD VENIPUNCTURE: CPT | Mod: HCNC | Performed by: INTERNAL MEDICINE

## 2023-07-25 PROCEDURE — G0009 PNEUMOCOCCAL CONJUGATE VACCINE 20-VALENT: ICD-10-PCS | Mod: HCNC,S$GLB,, | Performed by: FAMILY MEDICINE

## 2023-07-25 PROCEDURE — 3044F HG A1C LEVEL LT 7.0%: CPT | Mod: HCNC,CPTII,S$GLB, | Performed by: FAMILY MEDICINE

## 2023-07-25 PROCEDURE — 3044F PR MOST RECENT HEMOGLOBIN A1C LEVEL <7.0%: ICD-10-PCS | Mod: HCNC,CPTII,S$GLB, | Performed by: FAMILY MEDICINE

## 2023-07-25 PROCEDURE — 3080F PR MOST RECENT DIASTOLIC BLOOD PRESSURE >= 90 MM HG: ICD-10-PCS | Mod: HCNC,CPTII,S$GLB, | Performed by: NURSE PRACTITIONER

## 2023-07-25 PROCEDURE — 1159F PR MEDICATION LIST DOCUMENTED IN MEDICAL RECORD: ICD-10-PCS | Mod: HCNC,CPTII,S$GLB, | Performed by: NURSE PRACTITIONER

## 2023-07-25 PROCEDURE — 3008F BODY MASS INDEX DOCD: CPT | Mod: HCNC,CPTII,S$GLB, | Performed by: FAMILY MEDICINE

## 2023-07-25 PROCEDURE — 99999 PR PBB SHADOW E&M-EST. PATIENT-LVL IV: CPT | Mod: PBBFAC,HCNC,, | Performed by: FAMILY MEDICINE

## 2023-07-25 PROCEDURE — 3080F DIAST BP >= 90 MM HG: CPT | Mod: HCNC,CPTII,S$GLB, | Performed by: FAMILY MEDICINE

## 2023-07-25 PROCEDURE — 3077F SYST BP >= 140 MM HG: CPT | Mod: HCNC,CPTII,S$GLB, | Performed by: NURSE PRACTITIONER

## 2023-07-25 PROCEDURE — 3080F PR MOST RECENT DIASTOLIC BLOOD PRESSURE >= 90 MM HG: ICD-10-PCS | Mod: HCNC,CPTII,S$GLB, | Performed by: FAMILY MEDICINE

## 2023-07-25 PROCEDURE — 3008F PR BODY MASS INDEX (BMI) DOCUMENTED: ICD-10-PCS | Mod: HCNC,CPTII,S$GLB, | Performed by: FAMILY MEDICINE

## 2023-07-25 RX ORDER — NITROFURANTOIN MACROCRYSTALS 50 MG/1
50 CAPSULE ORAL NIGHTLY
Qty: 90 CAPSULE | Refills: 0 | Status: SHIPPED | OUTPATIENT
Start: 2023-07-25 | End: 2023-10-23

## 2023-07-25 RX ORDER — HYDROCHLOROTHIAZIDE 12.5 MG/1
12.5 TABLET ORAL DAILY
Qty: 90 TABLET | Refills: 3 | Status: SHIPPED | OUTPATIENT
Start: 2023-07-25 | End: 2024-07-24

## 2023-07-25 RX ORDER — BUTALBITAL, ACETAMINOPHEN AND CAFFEINE 50; 325; 40 MG/1; MG/1; MG/1
1 TABLET ORAL EVERY 6 HOURS PRN
Qty: 20 TABLET | Refills: 0 | Status: SHIPPED | OUTPATIENT
Start: 2023-07-25 | End: 2023-08-24 | Stop reason: SDUPTHER

## 2023-07-25 NOTE — ASSESSMENT & PLAN NOTE
Stopping aldactone and will start on hctz again with plan to titrate up. Referred to digital med and o-bar. Will have her send readings in 2 weeks.

## 2023-07-25 NOTE — PROGRESS NOTES
Subjective:       Patient ID: Jaki Bajwa is a 52 y.o. female.    Chief Complaint: Annual Exam    HPI  Came in today for annual exam and concerns about blood pressure.  Her blood pressure has been running elevated around 160 systolic and she has been having associated headaches with this.  Requesting refill on sure said to help address the headaches but also wondering what she needs to do for blood pressure medication.  She has been consistently taking the irbesartan and has been on spironolactone for the last few months.  Says that she was taken off of hydrochlorothiazide in the past because blood pressure was too low.  Never had any issues with renal function.    Requesting ongoing tx with norco for chronic pain.  reviewed.     Due for pneumococcal vaccine.    Tests to Keep You Healthy    Mammogram: Met on 1/20/2023  Colon Cancer Screening: Met on 1/28/2021  Last Blood Pressure <= 139/89 (7/25/2023): NO      Social History     Social History Narrative    Not on file       Family History   Problem Relation Age of Onset    Heart disease Mother     Hypertension Mother     Cancer Father         colon ca    Colon cancer Father     Depression Father     Schizophrenia Father     Anxiety disorder Father     Arthritis Father     Mental illness Father     Liver cancer Sister     Cancer Sister     Depression Sister     Anxiety disorder Sister     Heart attack Sister     COPD Sister     Cancer Sister         throat and colon    Depression Sister     Miscarriages / Stillbirths Sister     Pancreatic cancer Brother     Depression Brother     Alcohol abuse Brother     No Known Problems Daughter     Asthma Son     Pancreatic cancer Other     Liver cancer Other     Suicide Neg Hx     Ovarian cancer Neg Hx     Breast cancer Neg Hx        Current Outpatient Medications:     buPROPion (WELLBUTRIN XL) 150 MG TB24 tablet, TAKE 3 TABLETS(450 MG) BY MOUTH EVERY MORNING, Disp: 270 tablet, Rfl: 1     busPIRone (BUSPAR) 15 MG tablet, TAKE 1 TABLET(15 MG) BY MOUTH THREE TIMES DAILY, Disp: 270 tablet, Rfl: 1    cycloSPORINE (RESTASIS) 0.05 % ophthalmic emulsion, Place 1 drop into both eyes 2 (two) times daily., Disp: 60 each, Rfl: 1    diazePAM (VALIUM) 2 MG tablet, Take 1 tablet (2 mg total) by mouth daily as needed (Severe anxiety/Panic). 15 tablets to last 3 months, Disp: 15 tablet, Rfl: 0    docusate sodium (COLACE) 100 MG capsule, Take 1 capsule (100 mg total) by mouth 2 (two) times daily as needed for Constipation., Disp: 60 capsule, Rfl: 0    doxepin (SINEQUAN) 75 MG capsule, TAKE 2 CAPSULES(150 MG) BY MOUTH EVERY EVENING, Disp: 60 capsule, Rfl: 11    ergocalciferol (VITAMIN D2) 50,000 unit Cap, Take 1 capsule (50,000 Units total) by mouth every 7 days., Disp: 12 capsule, Rfl: 0    gabapentin (NEURONTIN) 300 MG capsule, Take 1 capsule (300 mg total) by mouth 3 (three) times daily., Disp: 90 capsule, Rfl: 5    HYDROcodone-acetaminophen (NORCO) 5-325 mg per tablet, Take 1 tablet by mouth every 24 hours as needed for Pain. Quantity medically necessary, Disp: 30 tablet, Rfl: 0    hydrOXYchloroQUINE (PLAQUENIL) 200 mg tablet, TAKE 1 TABLET BY MOUTH TWICE DAILY, Disp: 180 tablet, Rfl: 2    irbesartan (AVAPRO) 300 MG tablet, Take 1 tablet (300 mg total) by mouth every evening., Disp: 90 tablet, Rfl: 3    lurasidone (LATUDA) 60 mg Tab tablet, TAKE 1 TABLET(60 MG) BY MOUTH EVERY DAY, Disp: 90 tablet, Rfl: 3    NARCAN 4 mg/actuation [place], SPRAY 1 SPRAY IN NOSTRIL ONCE FOR 1 DOSE, Disp: , Rfl:     nitrofurantoin (MACRODANTIN) 50 MG capsule, Take 1 capsule (50 mg total) by mouth every evening., Disp: 90 capsule, Rfl: 0    nystatin (MYCOSTATIN) 100,000 unit/mL suspension, Take by mouth after meals as needed., Disp: , Rfl:     omeprazole (PRILOSEC) 40 MG capsule, Take 1 capsule (40 mg total) by mouth once daily., Disp: 90 capsule, Rfl: 3    pilocarpine (SALAGEN) 5 MG Tab, Take 1 tablet (5 mg total) by mouth  "4 (four) times daily., Disp: 120 tablet, Rfl: 11    tiZANidine (ZANAFLEX) 4 MG tablet, 8 mg tid, Disp: 180 tablet, Rfl: 5    triamcinolone acetonide 0.1% (KENALOG) 0.1 % cream, Apply topically 2 (two) times daily as needed (rash). X 2 weeks to rashes PRN, Disp: 80 g, Rfl: 2    butalbital-acetaminophen-caffeine -40 mg (FIORICET, ESGIC) -40 mg per tablet, Take 1 tablet by mouth every 6 (six) hours as needed for Headaches., Disp: 20 tablet, Rfl: 0    cetirizine (ZYRTEC) 10 MG tablet, Take 1 tablet (10 mg total) by mouth once daily. (Patient not taking: Reported on 7/25/2023), Disp: 30 tablet, Rfl: 11    estradioL (ESTRACE) 0.01 % (0.1 mg/gram) vaginal cream, 1g vaginally daily x 2 weeks, then BIW as directed (Patient not taking: Reported on 6/13/2023), Disp: 42.5 g, Rfl: 2    fluticasone propionate (FLONASE) 50 mcg/actuation nasal spray, SHAKE LIQUID AND USE 1 SPRAY(50 MCG) IN EACH NOSTRIL EVERY DAY (Patient not taking: Reported on 6/13/2023), Disp: 16 g, Rfl: 0    hydroCHLOROthiazide (HYDRODIURIL) 12.5 MG Tab, Take 1 tablet (12.5 mg total) by mouth once daily. For blood pressure, Disp: 90 tablet, Rfl: 3    Review of Systems   Constitutional:  Negative for chills and fever.   Respiratory:  Negative for cough and shortness of breath.    Cardiovascular:  Negative for chest pain.   Gastrointestinal:  Negative for abdominal pain.   Skin:  Negative for rash.   Neurological:  Negative for dizziness.     Objective:   BP (!) 143/100   Pulse 98   Ht 5' 7" (1.702 m)   Wt 114.3 kg (251 lb 14 oz)   LMP 07/11/2012 Comment: partial   SpO2 100%   BMI 39.45 kg/m²      Physical Exam  Vitals reviewed.   Constitutional:       Appearance: She is well-developed.   HENT:      Head: Normocephalic and atraumatic.   Eyes:      Conjunctiva/sclera: Conjunctivae normal.   Cardiovascular:      Rate and Rhythm: Normal rate.   Pulmonary:      Effort: Pulmonary effort is normal. No respiratory distress.   Skin:     General: " Skin is warm and dry.      Findings: No rash.   Neurological:      Mental Status: She is alert and oriented to person, place, and time.      Coordination: Coordination normal.   Psychiatric:         Behavior: Behavior normal.       Assessment & Plan     Problem List Items Addressed This Visit          Neuro    Chronic pain - Primary    Relevant Orders    Pain Clinic Drug Screen       Cardiac/Vascular    Essential hypertension (Chronic)    Current Assessment & Plan     Stopping aldactone and will start on hctz again with plan to titrate up. Referred to digital med and o-bar. Will have her send readings in 2 weeks.          Relevant Medications    hydroCHLOROthiazide (HYDRODIURIL) 12.5 MG Tab    Other Relevant Orders    Hypertension Digital Medicine (HDMP) Enrollment Order (Completed)     Other Visit Diagnoses       Cluster headache, not intractable, unspecified chronicity pattern        Relevant Medications    butalbital-acetaminophen-caffeine -40 mg (FIORICET, ESGIC) -40 mg per tablet              Immunizations Administered on Date of Encounter - 7/25/2023       Name Date Dose VIS Date Route Exp Date    Pneumococcal Conjugate - 20 Valent 7/25/2023 10:39 AM 0.5 mL 5/12/2023 Intramuscular 10/1/2024    Site: Left deltoid     Given By: Katheryn Stanley LPN     : [employer]     Lot: [id number]              No follow-ups on file.      Disclaimer:  This note may have been prepared using voice recognition software, it may have not been extensively proofed, as such there could be errors within the text such as sound alike errors.

## 2023-07-25 NOTE — PROGRESS NOTES
After obtaining verbal consent from the patient, and per orders of Dr. Carpenter, injection of prevnar 20 Lot [id number] 10/30/24 given in the LD by ONUR MOTA. Patient tolerated well and band aid applied. Patient instructed to remain in clinic for 15 minutes afterwards, and to report any adverse reaction to me immediately.

## 2023-07-25 NOTE — PROGRESS NOTES
Subjective:      Jaki Bajwa is a 52 y.o. female who returns today regarding her urinary symptoms.     Recurrent UTI workup in 2017- MAY showed no stones and cysto showed bladder sediment suggestive of incomplete bladder emptying.      Began macrodantin prophylaxis in March.     She presents today reporting urinary frequency, urgency and concentrated/dark urine that developed several weeks ago. She denies dysuria, gross hematuria, flank pain and fever/chills. Admits to not drinking enough water.     Using estrace PRN.      The following portions of the patient's history were reviewed and updated as appropriate: allergies, current medications, past family history, past medical history, past social history, past surgical history and problem list.    Review of Systems  Constitutional: no fever or chills  ENT: no nasal congestion or sore throat  Respiratory: no cough or shortness of breath  Cardiovascular: no chest pain or palpitations  Gastrointestinal: no nausea or vomiting, tolerating diet  Genitourinary: as per HPI  Hematologic/Lymphatic: no easy bruising or lymphadenopathy  Musculoskeletal: no arthralgias or myalgias  Neurological: no seizures or tremors  Behavioral/Psych: no auditory or visual hallucinations     Objective:   Vital Signs:  Vitals:    07/25/23 0822   BP: (!) 162/102   Pulse: 108     Physical Exam   General: alert and oriented, no acute distress  Head: normocephalic, atraumatic  Neck: supple, normal ROM  Respiratory: Symmetric expansion, non-labored breathing  Cardiovascular: regular rate and rhythm  Abdomen: soft, non tender, non distended  Pelvic: deferred  Skin: normal coloration and turgor, no rashes, no suspicious skin lesions noted  Neuro: alert and oriented x3, no gross deficits  Psych: normal judgment and insight, normal mood/affect, and non-anxious    Lab Review   Urinalysis demonstrates negative for all components  Lab Results   Component Value Date    WBC 8.87 07/11/2023    HGB 10.5  (L) 07/11/2023    HCT 36.2 (L) 07/11/2023    HCT 39 05/31/2023    MCV 84 07/11/2023     07/11/2023     Lab Results   Component Value Date    CREATININE 1.0 07/11/2023    BUN 9 07/11/2023       Imaging   None    Assessment:     1. Urinary frequency    2. Urinary urgency    3. Recurrent UTI      Plan:   Jaki was seen today for urinary tract infection.    Diagnoses and all orders for this visit:    Urinary frequency  -     Urine culture  -     nitrofurantoin (MACRODANTIN) 50 MG capsule; Take 1 capsule (50 mg total) by mouth every evening.    Urinary urgency    Recurrent UTI    Plan:  --UA appears negative, sent for culture  --Continue macrodantin prophylaxis  --If culture is negative will consider a trial of vesicare for OAB symptoms

## 2023-07-26 ENCOUNTER — TELEPHONE (OUTPATIENT)
Dept: INTERNAL MEDICINE | Facility: CLINIC | Age: 53
End: 2023-07-26
Payer: MEDICARE

## 2023-07-26 ENCOUNTER — PATIENT MESSAGE (OUTPATIENT)
Dept: INTERNAL MEDICINE | Facility: CLINIC | Age: 53
End: 2023-07-26
Payer: MEDICARE

## 2023-07-26 LAB
BACTERIA UR CULT: NORMAL
BACTERIA UR CULT: NORMAL

## 2023-07-26 NOTE — TELEPHONE ENCOUNTER
----- Message from Radha Blair sent at 7/25/2023  3:09 PM CDT -----  Contact: Patient  Type:  Patient Call          Who Called: Patient         Does the patient know what this is regarding?: Requesting a call back about her test results from today ; please advise           Would the patient rather a call back or a response via [facility]? Call           Best Call Back Number:[phone] (home)              Additional Information:

## 2023-07-27 DIAGNOSIS — N32.81 OAB (OVERACTIVE BLADDER): Primary | ICD-10-CM

## 2023-07-27 RX ORDER — SOLIFENACIN SUCCINATE 5 MG/1
5 TABLET, FILM COATED ORAL DAILY
Qty: 30 TABLET | Refills: 11 | Status: SHIPPED | OUTPATIENT
Start: 2023-07-27 | End: 2024-07-26

## 2023-07-31 ENCOUNTER — TELEPHONE (OUTPATIENT)
Dept: INTERNAL MEDICINE | Facility: CLINIC | Age: 53
End: 2023-07-31
Payer: MEDICARE

## 2023-07-31 NOTE — TELEPHONE ENCOUNTER
----- Message from Shu Howard sent at 7/31/2023  9:37 AM CDT -----  Regarding: Drug screen test  Name of caller: Jaki       What is the requesting detail: Pt is requesting a call back regarding a drug screen test. Please advise       Can the clinic reply by [facility]:       What number to call back:[phone]

## 2023-07-31 NOTE — TELEPHONE ENCOUNTER
Patient phoned back, Negative COVID results provided from his 10/04/2021 visit.   Returned call to Ms. Bajwa. Hospitals in [place] cannot find her drug screen results.    Called lab  who states urine specimen sent out 07/26 to [place]; the turn around time for results would be about 4 days.Patient given this information

## 2023-08-03 RX ORDER — FLUTICASONE PROPIONATE 50 MCG
SPRAY, SUSPENSION (ML) NASAL
Qty: 16 G | Refills: 0 | Status: SHIPPED | OUTPATIENT
Start: 2023-08-03 | End: 2023-10-02

## 2023-08-03 NOTE — TELEPHONE ENCOUNTER
No care due was identified.  [facility] Embedded Care Due Messages. Reference number: [id number].   8/03/2023 3:28:38 AM CDT

## 2023-08-03 NOTE — TELEPHONE ENCOUNTER
Refill Routing Note   Medication(s) are not appropriate for processing by [facility] for the following reason(s):      No active prescription written by provider    [facility] action(s):  Defer Care Due:  None identified            Appointments  past 12m or future 3m with PCP    Date Provider   Last Visit   7/25/2023 Weeks, Joes ALONSO, DO   Next Visit   10/25/2023 Weeks, Jose ALONSO, DO   ED visits in past 90 days: 2        Note composed:9:19 AM 08/03/2023

## 2023-08-08 ENCOUNTER — OFFICE VISIT (OUTPATIENT)
Dept: PSYCHIATRY | Facility: CLINIC | Age: 53
End: 2023-08-08
Payer: MEDICARE

## 2023-08-08 DIAGNOSIS — F39 MOOD DISORDER: ICD-10-CM

## 2023-08-08 DIAGNOSIS — F40.10 SOCIAL ANXIETY DISORDER: Primary | ICD-10-CM

## 2023-08-08 DIAGNOSIS — R46.81 OBSESSIVE-COMPULSIVE BEHAVIOR: ICD-10-CM

## 2023-08-08 DIAGNOSIS — F41.0 PANIC DISORDER: ICD-10-CM

## 2023-08-08 DIAGNOSIS — F41.1 GAD (GENERALIZED ANXIETY DISORDER): ICD-10-CM

## 2023-08-08 PROCEDURE — 3044F HG A1C LEVEL LT 7.0%: CPT | Mod: CPTII,95,, | Performed by: SOCIAL WORKER

## 2023-08-08 PROCEDURE — 90832 PSYTX W PT 30 MINUTES: CPT | Mod: 95,,, | Performed by: SOCIAL WORKER

## 2023-08-08 PROCEDURE — 90832 PR PSYCHOTHERAPY W/PATIENT, 30 MIN: ICD-10-PCS | Mod: 95,,, | Performed by: SOCIAL WORKER

## 2023-08-08 PROCEDURE — 3044F PR MOST RECENT HEMOGLOBIN A1C LEVEL <7.0%: ICD-10-PCS | Mod: CPTII,95,, | Performed by: SOCIAL WORKER

## 2023-08-08 PROCEDURE — 4010F ACE/ARB THERAPY RXD/TAKEN: CPT | Mod: CPTII,95,, | Performed by: SOCIAL WORKER

## 2023-08-08 PROCEDURE — 4010F PR ACE/ARB THEARPY RXD/TAKEN: ICD-10-PCS | Mod: CPTII,95,, | Performed by: SOCIAL WORKER
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
## 2023-08-09 ENCOUNTER — TELEPHONE (OUTPATIENT)
Dept: NEUROLOGY | Facility: CLINIC | Age: 53
End: 2023-08-09
Payer: MEDICARE

## 2023-08-09 NOTE — TELEPHONE ENCOUNTER
----- Message from Emilia Srivastava sent at 8/9/2023  8:08 AM CDT -----  Regarding: resche appt  Contact: @ [phone]  Pt is calling to reschedule appointment on today @ 11 am  ...no available dates Pleaes call and adv @ [phone]

## 2023-08-11 ENCOUNTER — PATIENT MESSAGE (OUTPATIENT)
Dept: PSYCHIATRY | Facility: CLINIC | Age: 53
End: 2023-08-11
Payer: MEDICARE

## 2023-08-16 RX ORDER — AMLODIPINE BESYLATE 5 MG/1
5 TABLET ORAL DAILY
Qty: 90 TABLET | Refills: 3 | Status: SHIPPED | OUTPATIENT
Start: 2023-08-16 | End: 2024-08-15

## 2023-08-16 NOTE — TELEPHONE ENCOUNTER
Since continuing to have frequent elevation in diastolic numbers will add low dose amlodipine. Should continue other meds and have her schedule nurse visit in couple weeks.

## 2023-08-20 RX ORDER — GABAPENTIN 300 MG/1
300 CAPSULE ORAL 3 TIMES DAILY
Qty: 90 CAPSULE | Refills: 5 | Status: SHIPPED | OUTPATIENT
Start: 2023-08-20

## 2023-08-22 ENCOUNTER — CLINICAL SUPPORT (OUTPATIENT)
Dept: INTERNAL MEDICINE | Facility: CLINIC | Age: 53
End: 2023-08-22
Payer: MEDICARE

## 2023-08-22 ENCOUNTER — TELEPHONE (OUTPATIENT)
Dept: INTERNAL MEDICINE | Facility: CLINIC | Age: 53
End: 2023-08-22

## 2023-08-22 VITALS — SYSTOLIC BLOOD PRESSURE: 120 MMHG | OXYGEN SATURATION: 99 % | HEART RATE: 91 BPM | DIASTOLIC BLOOD PRESSURE: 78 MMHG

## 2023-08-22 DIAGNOSIS — Z01.30 BP CHECK: Primary | ICD-10-CM

## 2023-08-22 PROCEDURE — 99999 PR PBB SHADOW E&M-EST. PATIENT-LVL III: ICD-10-PCS | Mod: PBBFAC,HCNC,,

## 2023-08-22 PROCEDURE — 99999 PR PBB SHADOW E&M-EST. PATIENT-LVL III: CPT | Mod: PBBFAC,HCNC,,

## 2023-08-22 NOTE — PROGRESS NOTES
Jaki Bajwa 52 y.o. female is here for Blood Pressure check. in person    Manual Blood pressure reading was  120/78, Pulse 90. (Checked at the end of the visit)    If high, was it repeated after 15 minutes? no    Pt's Home blood pressure machine read in office NO, Pulse N/A.   Diagnosed with Hypertension yes.  Patient took blood pressure medication today yes.  Last dose of blood pressure medication was taken at 0600. Patient took 1. Amlodipine 5 mg every morning 2. Hydrochlorothiazide 12.5 mg PO every morning 3. Irbesartan 300 mg every evening ( states she takes the medication in the morning).     All Medications and OTC medication updated yes  Does patient have record of home blood pressure readings / Blood Pressure Log on digital hypertension.  See Digital  08/14 114/104, 08/10 108/87  Does the pt have any complaints today in regards to their blood pressure medication? no. Complains of None . Patient is asymptomatic.   Were you sitting still for 5-10 minutes prior to taking your Blood pressure? yes   Has your blood pressure monitor ever been checked? yes When was last time we checked your blood pressure monitor? On Digital Monitor  Given BP Log Sheet with information on BP ranges  Updated vitals yes  Follow up date is 10/25/2023.   Dr. Carpenter notified.     Creatinine   Date Value Ref Range Status   07/25/2023 1.0 0.5 - 1.4 mg/dL Final     Sodium   Date Value Ref Range Status   07/25/2023 141 136 - 145 mmol/L Final     Potassium   Date Value Ref Range Status   07/25/2023 4.1 3.5 - 5.1 mmol/L Final

## 2023-08-24 DIAGNOSIS — G44.009 CLUSTER HEADACHE, NOT INTRACTABLE, UNSPECIFIED CHRONICITY PATTERN: ICD-10-CM

## 2023-08-24 RX ORDER — BUTALBITAL, ACETAMINOPHEN AND CAFFEINE 50; 325; 40 MG/1; MG/1; MG/1
1 TABLET ORAL EVERY 6 HOURS PRN
Qty: 20 TABLET | Refills: 0 | Status: SHIPPED | OUTPATIENT
Start: 2023-08-24 | End: 2023-09-26 | Stop reason: SDUPTHER

## 2023-08-24 NOTE — TELEPHONE ENCOUNTER
No care due was identified.  [facility] Embedded Care Due Messages. Reference number: [id number].   8/24/2023 10:22:56 AM CDT

## 2023-08-24 NOTE — TELEPHONE ENCOUNTER
Refill Routing Note   Medication(s) are not appropriate for processing by [facility] for the following reason(s):      New or recently adjusted medication: increased to 300 mg 6/14/23    [facility] action(s):  Defer Care Due:  None identified          Appointments  past 12m or future 3m with PCP    Date Provider   Last Visit   7/25/2023 Weeks, Jose ALONSO, DO   Next Visit   10/25/2023 Weeks, Jose ALONSO, DO   ED visits in past 90 days: 2        Note composed:1:39 PM 08/24/2023

## 2023-08-25 RX ORDER — IRBESARTAN 300 MG/1
300 TABLET ORAL NIGHTLY
Qty: 90 TABLET | Refills: 3 | Status: SHIPPED | OUTPATIENT
Start: 2023-08-25

## 2023-08-29 DIAGNOSIS — H16.229 KERATOCONJUNCT SICCA, NOT SPECIFIED AS SJOGREN'S, UNSP EYE: ICD-10-CM

## 2023-08-29 RX ORDER — CYCLOSPORINE 0.5 MG/ML
1 EMULSION OPHTHALMIC 2 TIMES DAILY
Qty: 60 EACH | Refills: 1 | Status: SHIPPED | OUTPATIENT
Start: 2023-08-29 | End: 2023-11-08

## 2023-09-05 DIAGNOSIS — G89.4 CHRONIC PAIN SYNDROME: ICD-10-CM

## 2023-09-05 RX ORDER — HYDROCODONE BITARTRATE AND ACETAMINOPHEN 5; 325 MG/1; MG/1
1 TABLET ORAL
Qty: 30 TABLET | Refills: 0 | Status: SHIPPED | OUTPATIENT
Start: 2023-09-05 | End: 2023-10-04 | Stop reason: SDUPTHER

## 2023-09-05 NOTE — TELEPHONE ENCOUNTER
No care due was identified.  [facility] Embedded Care Due Messages. Reference number: [id number].   9/05/2023 8:12:13 AM CDT

## 2023-09-05 NOTE — TELEPHONE ENCOUNTER
----- Message from Fernando Hoffman sent at 9/5/2023 11:42 AM CDT -----  Who Called:PENG MAYO [[id number]]           New Prescription or Refill : Refill      RX Name and Strength:  HYDROcodone-acetaminophen (NORCO) 5-325 mg per tablet       30 day or 90 day RX:30           Local or Mail Order : Local   Mail Order            Preferred Pharmacy:[facility] [place] [id number] [place] [id number] [address]  [address] [facility]      Would the patient rather a call back or a response via [facility]? Yes        Best Call Back Number:  [phone]        Additional Information:none

## 2023-09-11 ENCOUNTER — OFFICE VISIT (OUTPATIENT)
Dept: PSYCHIATRY | Facility: CLINIC | Age: 53
End: 2023-09-11
Payer: MEDICARE

## 2023-09-11 ENCOUNTER — TELEPHONE (OUTPATIENT)
Dept: ENDOCRINOLOGY | Facility: CLINIC | Age: 53
End: 2023-09-11
Payer: MEDICARE

## 2023-09-11 DIAGNOSIS — F40.10 SOCIAL ANXIETY DISORDER: Primary | ICD-10-CM

## 2023-09-11 DIAGNOSIS — F39 MOOD DISORDER: ICD-10-CM

## 2023-09-11 DIAGNOSIS — R46.81 OBSESSIVE-COMPULSIVE BEHAVIOR: ICD-10-CM

## 2023-09-11 DIAGNOSIS — F41.0 PANIC DISORDER: ICD-10-CM

## 2023-09-11 DIAGNOSIS — F41.1 GAD (GENERALIZED ANXIETY DISORDER): ICD-10-CM

## 2023-09-11 PROCEDURE — 90832 PR PSYCHOTHERAPY W/PATIENT, 30 MIN: ICD-10-PCS | Mod: HCNC,95,, | Performed by: SOCIAL WORKER

## 2023-09-11 PROCEDURE — 4010F ACE/ARB THERAPY RXD/TAKEN: CPT | Mod: HCNC,CPTII,95, | Performed by: SOCIAL WORKER

## 2023-09-11 PROCEDURE — 4010F PR ACE/ARB THEARPY RXD/TAKEN: ICD-10-PCS | Mod: HCNC,CPTII,95, | Performed by: SOCIAL WORKER

## 2023-09-11 PROCEDURE — 3044F PR MOST RECENT HEMOGLOBIN A1C LEVEL <7.0%: ICD-10-PCS | Mod: HCNC,CPTII,95, | Performed by: SOCIAL WORKER

## 2023-09-11 PROCEDURE — 3044F HG A1C LEVEL LT 7.0%: CPT | Mod: HCNC,CPTII,95, | Performed by: SOCIAL WORKER

## 2023-09-11 PROCEDURE — 90832 PSYTX W PT 30 MINUTES: CPT | Mod: HCNC,95,, | Performed by: SOCIAL WORKER
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
## 2023-09-15 ENCOUNTER — OFFICE VISIT (OUTPATIENT)
Dept: NEUROLOGY | Facility: CLINIC | Age: 53
End: 2023-09-15
Payer: MEDICARE

## 2023-09-15 DIAGNOSIS — N18.32 STAGE 3B CHRONIC KIDNEY DISEASE: ICD-10-CM

## 2023-09-15 DIAGNOSIS — E66.01 MORBID OBESITY: ICD-10-CM

## 2023-09-15 DIAGNOSIS — R51.9 CHRONIC DAILY HEADACHE: ICD-10-CM

## 2023-09-15 DIAGNOSIS — G43.709 CHRONIC MIGRAINE WITHOUT AURA WITHOUT STATUS MIGRAINOSUS, NOT INTRACTABLE: Primary | ICD-10-CM

## 2023-09-15 DIAGNOSIS — R51.9 NONINTRACTABLE EPISODIC HEADACHE, UNSPECIFIED HEADACHE TYPE: ICD-10-CM

## 2023-09-15 DIAGNOSIS — I10 ESSENTIAL HYPERTENSION: Chronic | ICD-10-CM

## 2023-09-15 DIAGNOSIS — R29.818 SUSPECTED SLEEP APNEA: ICD-10-CM

## 2023-09-15 PROCEDURE — 4010F PR ACE/ARB THEARPY RXD/TAKEN: ICD-10-PCS | Mod: HCNC,CPTII,95, | Performed by: PHYSICIAN ASSISTANT

## 2023-09-15 PROCEDURE — 1160F PR REVIEW ALL MEDS BY PRESCRIBER/CLIN PHARMACIST DOCUMENTED: ICD-10-PCS | Mod: HCNC,CPTII,95, | Performed by: PHYSICIAN ASSISTANT

## 2023-09-15 PROCEDURE — 99215 PR OFFICE/OUTPT VISIT, EST, LEVL V, 40-54 MIN: ICD-10-PCS | Mod: HCNC,95,, | Performed by: PHYSICIAN ASSISTANT

## 2023-09-15 PROCEDURE — 4010F ACE/ARB THERAPY RXD/TAKEN: CPT | Mod: HCNC,CPTII,95, | Performed by: PHYSICIAN ASSISTANT

## 2023-09-15 PROCEDURE — 99215 OFFICE O/P EST HI 40 MIN: CPT | Mod: HCNC,95,, | Performed by: PHYSICIAN ASSISTANT

## 2023-09-15 PROCEDURE — 1159F MED LIST DOCD IN RCRD: CPT | Mod: HCNC,CPTII,95, | Performed by: PHYSICIAN ASSISTANT

## 2023-09-15 PROCEDURE — 1160F RVW MEDS BY RX/DR IN RCRD: CPT | Mod: HCNC,CPTII,95, | Performed by: PHYSICIAN ASSISTANT

## 2023-09-15 PROCEDURE — 1159F PR MEDICATION LIST DOCUMENTED IN MEDICAL RECORD: ICD-10-PCS | Mod: HCNC,CPTII,95, | Performed by: PHYSICIAN ASSISTANT

## 2023-09-15 PROCEDURE — 3044F PR MOST RECENT HEMOGLOBIN A1C LEVEL <7.0%: ICD-10-PCS | Mod: HCNC,CPTII,95, | Performed by: PHYSICIAN ASSISTANT

## 2023-09-15 PROCEDURE — 3044F HG A1C LEVEL LT 7.0%: CPT | Mod: HCNC,CPTII,95, | Performed by: PHYSICIAN ASSISTANT

## 2023-09-15 RX ORDER — UBROGEPANT 50 MG/1
TABLET ORAL
Qty: 16 TABLET | Refills: 5 | Status: SHIPPED | OUTPATIENT
Start: 2023-09-15 | End: 2023-10-25

## 2023-09-15 RX ORDER — TOPIRAMATE 25 MG/1
TABLET ORAL
Qty: 90 TABLET | Refills: 0 | Status: SHIPPED | OUTPATIENT
Start: 2023-09-15 | End: 2023-11-08

## 2023-09-15 NOTE — PROGRESS NOTES
New Patient     The patient location is: [place]  The chief complaint leading to consultation is: headache     Visit type: audiovisual    Face to Face time with patient: 20 min  40 minutes of total time spent on the encounter, which includes face to face time and non-face to face time preparing to see the patient (eg, review of tests), Obtaining and/or reviewing separately obtained history, Documenting clinical information in the electronic or other health record, Independently interpreting results (not separately reported) and communicating results to the patient/family/caregiver, or Care coordination (not separately reported).     Each patient to whom he or she provides medical services by telemedicine is:  (1) informed of the relationship between the physician and patient and the respective role of any other health care provider with respect to management of the patient; and (2) notified that he or she may decline to receive medical services by telemedicine and may withdraw from such care at any time.    Notes:       SUBJECTIVE:  Patient ID: Jaki Bajwa   MRN: [id number]  Referred By: Dr. Roosevelt Nelson III  Chief Complaint: Headache      History of Present Illness:   52 y.o. female with migraines, anxiety, depression, ckd stage 3, [place], obesity, low back pain, sjogrens, chronic pain, fibro, TMJ, HTN, uterine fibroids, who presents to virtual visit alone for evaluation of headaches.   PMHx negative for TBI, Meningitis, Aneurysms, Kidney Stones, asthma, GI bleed, osteoporosis, CAD/MI, CVA/TIA, DM, cancer  Family Hx + for Migraines in father, niece, grandson    Pt has a h/o ha's since her 30s that worsened over the last 4-5 months around the time of uncontrolled bps (which are now improving), changes in BP meds, and increased stress.  She was recently given fioricet which helps, takes it 2-3 days a week.    Location/Radiation - b/l frontalis, ears, around nose  Quality - pulsating, pressure  Duration - few  "hours  Intensity (range) - 3-8/10  Frequency - 30/30 ha days per month, upon awakening every morning, 8-12/30 continue throughout the day  Triggers - tension, smells, perfumes, stress  Aggravating Factors - smells, lights  Prodrome/Aura - no  Time of day of most headaches- every monring upon awakening or awakens pt from sleep  Sleep - 6 hrs of interrupted sleep a night, +snoring  Associated symptoms with the headache: osmophobia, photophobia, nausea in the past, dizziness, imbalance, tinnitus.   Denies all other ROS including phonophobia, exercise intolerance, rhinorrhea, nasal congestion, visual changes, blurry vision, diplopia, spots in vision, confusion, impaired concentration, neck pain, swollen/droopy eyelid, swelling around the eye, excessive eye tearing, conjunctival injection, red/flushed face, facial sweating,  dysarthria, ataxia, paresthesias, loc, sz's, weakness, trauma, fevers, jaw claudication, postural component.     Treatments Tried   Amlodopine  Wellbutrin  Gabapentin  Fioricet - helps  Tylenol     Social History  Alcohol - denies  Smoke - denies  Recreational Drug Use- denies  Occupation - "disabled"  Last eye exam - 1 yr, pending upcoming appt    Current Medications:    Current Outpatient Medications:     amLODIPine (NORVASC) 5 MG tablet, Take 1 tablet (5 mg total) by mouth once daily., Disp: 90 tablet, Rfl: 3    buPROPion (WELLBUTRIN XL) 150 MG TB24 tablet, TAKE 3 TABLETS(450 MG) BY MOUTH EVERY MORNING, Disp: 270 tablet, Rfl: 1    busPIRone (BUSPAR) 15 MG tablet, TAKE 1 TABLET(15 MG) BY MOUTH THREE TIMES DAILY, Disp: 270 tablet, Rfl: 1    butalbital-acetaminophen-caffeine -40 mg (FIORICET, ESGIC) -40 mg per tablet, Take 1 tablet by mouth every 6 (six) hours as needed for Headaches., Disp: 20 tablet, Rfl: 0    cetirizine (ZYRTEC) 10 MG tablet, Take 1 tablet (10 mg total) by mouth once daily. (Patient not taking: Reported on 7/25/2023), Disp: 30 tablet, Rfl: 11    diazePAM (VALIUM) 2 MG " tablet, Take 1 tablet (2 mg total) by mouth daily as needed (Severe anxiety/Panic). 15 tablets to last 3 months, Disp: 15 tablet, Rfl: 0    docusate sodium (COLACE) 100 MG capsule, Take 1 capsule (100 mg total) by mouth 2 (two) times daily as needed for Constipation. (Patient not taking: Reported on 8/22/2023), Disp: 60 capsule, Rfl: 0    doxepin (SINEQUAN) 75 MG capsule, TAKE 2 CAPSULES(150 MG) BY MOUTH EVERY EVENING, Disp: 60 capsule, Rfl: 11    ergocalciferol (VITAMIN D2) 50,000 unit Cap, Take 1 capsule (50,000 Units total) by mouth every 7 days., Disp: 12 capsule, Rfl: 0    estradioL (ESTRACE) 0.01 % (0.1 mg/gram) vaginal cream, 1g vaginally daily x 2 weeks, then BIW as directed (Patient not taking: Reported on 6/13/2023), Disp: 42.5 g, Rfl: 2    fluticasone propionate (FLONASE) 50 mcg/actuation nasal spray, SHAKE LIQUID AND USE 1 SPRAY(50 MCG) IN EACH NOSTRIL EVERY DAY (Patient not taking: Reported on 8/22/2023), Disp: 16 g, Rfl: 0    gabapentin (NEURONTIN) 300 MG capsule, TAKE 1 CAPSULE(300 MG) BY MOUTH THREE TIMES DAILY, Disp: 90 capsule, Rfl: 5    hydroCHLOROthiazide (HYDRODIURIL) 12.5 MG Tab, Take 1 tablet (12.5 mg total) by mouth once daily. For blood pressure, Disp: 90 tablet, Rfl: 3    HYDROcodone-acetaminophen (NORCO) 5-325 mg per tablet, Take 1 tablet by mouth every 24 hours as needed for Pain. Quantity medically necessary, Disp: 30 tablet, Rfl: 0    hydrOXYchloroQUINE (PLAQUENIL) 200 mg tablet, TAKE 1 TABLET BY MOUTH TWICE DAILY, Disp: 180 tablet, Rfl: 2    irbesartan (AVAPRO) 300 MG tablet, Take 1 tablet (300 mg total) by mouth every evening., Disp: 90 tablet, Rfl: 3    lurasidone (LATUDA) 60 mg Tab tablet, TAKE 1 TABLET(60 MG) BY MOUTH EVERY DAY, Disp: 90 tablet, Rfl: 3    NARCAN 4 mg/actuation [place], SPRAY 1 SPRAY IN NOSTRIL ONCE FOR 1 DOSE, Disp: , Rfl:     nitrofurantoin (MACRODANTIN) 50 MG capsule, Take 1 capsule (50 mg total) by mouth every evening. (Patient not taking: Reported on 8/22/2023),  Disp: 90 capsule, Rfl: 0    nystatin (MYCOSTATIN) 100,000 unit/mL suspension, Take by mouth after meals as needed., Disp: , Rfl:     omeprazole (PRILOSEC) 40 MG capsule, Take 1 capsule (40 mg total) by mouth once daily., Disp: 90 capsule, Rfl: 3    pilocarpine (SALAGEN) 5 MG Tab, Take 1 tablet (5 mg total) by mouth 4 (four) times daily., Disp: 120 tablet, Rfl: 11    RESTASIS 0.05 % ophthalmic emulsion, INSTILL 1 DROP IN BOTH EYES TWICE DAILY, Disp: 60 each, Rfl: 1    solifenacin (VESICARE) 5 MG tablet, Take 1 tablet (5 mg total) by mouth once daily., Disp: 30 tablet, Rfl: 11    tiZANidine (ZANAFLEX) 4 MG tablet, 8 mg tid, Disp: 180 tablet, Rfl: 5    topiramate (TOPAMAX) 25 MG tablet, Take 1 tablet (25 mg total) by mouth once daily for 30 days, THEN 2 tablets (50 mg total) once daily. Take one tablet daily for 2 weeks, then two tablets daily afterwards., Disp: 90 tablet, Rfl: 0    triamcinolone acetonide 0.1% (KENALOG) 0.1 % cream, Apply topically 2 (two) times daily as needed (rash). X 2 weeks to rashes PRN, Disp: 80 g, Rfl: 2    ubrogepant (UBRELVY) 50 mg tablet, Take 1 tablet by mouth at the onset of a headache. May repeat based on response and tolerability after more than 2 hours if needed. Do not take more than 200mg in a 24 hour span., Disp: 16 tablet, Rfl: 5    Review of Systems - as per HPI, otherwise a balanced 10 systems review is negative.    OBJECTIVE:  Vitals:  LMP 07/11/2012 Comment: partial     Physical Exam: certain limitations due to video visit platform, able to perform the following with the patient's assistance:  Constitutional: she appears well-developed and well-nourished. she is well groomed. NAD   HENT:    Head: Normocephalic and atraumatic  Eyes: Conjunctivae and EOM are normal  Musculoskeletal: Normal range of motion. No joint stiffness.   Psychiatric: Mood and affect are normal    Neuro: Patient is alert and oriented to person, place, and time. Language is intact and fluent. Speech is clear  and fluent. Recent and remote memory are intact.  Normal attention and concentration.  Facial movement is symmetric. Moves all 4 extremities against gravity.    Review of Data:   Labs:  Lab Visit on 07/25/2023   Component Date Value Ref Range Status    DRUGS EXPECTED 07/25/2023 See Interp   Final    PAIN MANAGEMENT DRUG PANEL INTERP 07/25/2023 See Note   Final    CODEINE 07/25/2023 Not Detected   Final    MORPHINE 07/25/2023 Not Detected   Final    6-ACETYLMORPHINE 07/25/2023 Not Detected   Final    OXYCODONE 07/25/2023 Not Detected   Final    NOROYXCODONE 07/25/2023 Not Detected   Final    OXYMORPHONE 07/25/2023 Not Detected   Final    NOROXYMORPHONE 07/25/2023 Not Detected   Final    HYDROCODONE 07/25/2023 Present   Final    NORHYDROCODONE 07/25/2023 Present   Final    HYDROMORPHONE 07/25/2023 Present   Final    BUPRENORPHINE 07/25/2023 Not Detected   Final    NORUBPRENORPHINE 07/25/2023 Not Detected   Final    FENTANYL 07/25/2023 Not Detected   Final    NORFENTANYL 07/25/2023 Not Detected   Final    MEPERIDINE METABOLITE 07/25/2023 Not Detected   Final    TAPENTADOL 07/25/2023 Not Detected   Final    TAPENTADOL-O-SULF 07/25/2023 Not Detected   Final    METHADONE 07/25/2023 Not Detected   Final    TRAMADOL 07/25/2023 Not Detected   Final    AMPHETAMINE 07/25/2023 Not Detected   Final    METHAMPHETAMINE 07/25/2023 Not Detected   Final    MDMA- ECSTASY 07/25/2023 Not Detected   Final    MDA 07/25/2023 Not Detected   Final    MDEA- [place] 07/25/2023 Not Detected   Final    METHYLPHENIDATE 07/25/2023 Not Detected   Final    PHENTERMINE 07/25/2023 Not Detected   Final    BENZOYLECGONINE 07/25/2023 Not Detected   Final    ALPRAZOLAM 07/25/2023 Not Detected   Final    ALPHA-OH-ALPRAZOLAM 07/25/2023 Not Detected   Final    CLONAZEPAM 07/25/2023 Not Detected   Final    7-AMINOCLONAZEPAM 07/25/2023 Not Detected   Final    DIAZEPAM 07/25/2023 Not Detected   Final    NORDIAZEPAM 07/25/2023 Not Detected   Final    OXAZEPAM  07/25/2023 Not Detected   Final    TEMAZEPAM 07/25/2023 Not Detected   Final    Lorazepam 07/25/2023 Not Detected   Final    MIDAZOLAM 07/25/2023 Not Detected   Final    ZOLPIDEM 07/25/2023 Not Detected   Final    BARBITURATES 07/25/2023 Present   Final    Creatinine, Urine 07/25/2023 60.0  20.0 - 400.0 mg/dL Final    ETHYL GLUCURONIDE 07/25/2023 Not Detected   Final    MARIJUANA METABOLITE 07/25/2023 Not Detected   Final    PCP 07/25/2023 Not Detected   Final    CARISOPRODOL 07/25/2023 Not Detected   Final    Naloxone 07/25/2023 Not Detected   Final    Gabapentin 07/25/2023 Present   Final    Pregabalin 07/25/2023 Not Detected   Final    Alpha-OH-Midazolam 07/25/2023 Not Detected   Final    Zolpidem Metabolite 07/25/2023 Not Detected   Final    PAIN MANAGEMENT DRUG PANEL 07/25/2023 See Below   Final    EER PAIN MGT FLORES,HIGH RES/EMIT, IN* 07/25/2023 See Note   Final   Lab Visit on 07/25/2023   Component Date Value Ref Range Status    Sodium 07/25/2023 141  136 - 145 mmol/L Final    Potassium 07/25/2023 4.1  3.5 - 5.1 mmol/L Final    Chloride 07/25/2023 106  95 - 110 mmol/L Final    CO2 07/25/2023 27  23 - 29 mmol/L Final    Glucose 07/25/2023 91  70 - 110 mg/dL Final    BUN 07/25/2023 15  6 - 20 mg/dL Final    Creatinine 07/25/2023 1.0  0.5 - 1.4 mg/dL Final    Calcium 07/25/2023 9.9  8.7 - 10.5 mg/dL Final    Anion Gap 07/25/2023 8  8 - 16 mmol/L Final    eGFR 07/25/2023 >60  >60 mL/min/1.73 m^2 Final   Office Visit on 07/25/2023   Component Date Value Ref Range Status    Urine Culture, Routine 07/25/2023 Multiple organisms isolated. None in predominance.  Repeat if   Final    Urine Culture, Routine 07/25/2023 clinically necessary.   Final   Lab Visit on 07/11/2023   Component Date Value Ref Range Status    Sodium 07/11/2023 143  136 - 145 mmol/L Final    Potassium 07/11/2023 3.6  3.5 - 5.1 mmol/L Final    Chloride 07/11/2023 105  95 - 110 mmol/L Final    CO2 07/11/2023 30 (H)  23 - 29 mmol/L Final    Glucose  07/11/2023 89  70 - 110 mg/dL Final    BUN 07/11/2023 9  6 - 20 mg/dL Final    Creatinine 07/11/2023 1.0  0.5 - 1.4 mg/dL Final    Calcium 07/11/2023 9.7  8.7 - 10.5 mg/dL Final    Anion Gap 07/11/2023 8  8 - 16 mmol/L Final    eGFR 07/11/2023 >60.0  >60 mL/min/1.73 m^2 Final    WBC 07/11/2023 8.87  3.90 - 12.70 K/uL Final    RBC 07/11/2023 4.29  4.00 - 5.40 M/uL Final    Hemoglobin 07/11/2023 10.5 (L)  12.0 - 16.0 g/dL Final    Hematocrit 07/11/2023 36.2 (L)  37.0 - 48.5 % Final    MCV 07/11/2023 84  82 - 98 fL Final    MCH 07/11/2023 24.5 (L)  27.0 - 31.0 pg Final    MCHC 07/11/2023 29.0 (L)  32.0 - 36.0 g/dL Final    RDW 07/11/2023 15.6 (H)  11.5 - 14.5 % Final    Platelets 07/11/2023 338  150 - 450 K/uL Final    MPV 07/11/2023 11.7  9.2 - 12.9 fL Final    Immature Granulocytes 07/11/2023 0.3  0.0 - 0.5 % Final    Gran # (ANC) 07/11/2023 5.1  1.8 - 7.7 K/uL Final    Immature Grans (Abs) 07/11/2023 0.03  0.00 - 0.04 K/uL Final    Lymph # 07/11/2023 2.9  1.0 - 4.8 K/uL Final    Mono # 07/11/2023 0.5  0.3 - 1.0 K/uL Final    Eos # 07/11/2023 0.3  0.0 - 0.5 K/uL Final    Baso # 07/11/2023 0.05  0.00 - 0.20 K/uL Final    nRBC 07/11/2023 0  0 /100 WBC Final    Gran % 07/11/2023 57.4  38.0 - 73.0 % Final    Lymph % 07/11/2023 32.5  18.0 - 48.0 % Final    Mono % 07/11/2023 6.0  4.0 - 15.0 % Final    Eosinophil % 07/11/2023 3.2  0.0 - 8.0 % Final    Basophil % 07/11/2023 0.6  0.0 - 1.9 % Final    Differential Method 07/11/2023 Automated   Final   Lab Visit on 07/11/2023   Component Date Value Ref Range Status    Protein, Urine Random 07/11/2023 <7  0 - 15 mg/dL Final    Creatinine, Urine 07/11/2023 108.0  15.0 - 325.0 mg/dL Final    Prot/Creat Ratio, Urine 07/11/2023 Unable to calculate  0.00 - 0.20 Final    Specimen UA 07/11/2023 Urine, Clean Catch   Final    Color, UA 07/11/2023 Yellow  Yellow, Straw, [place] Final    Appearance, UA 07/11/2023 Clear  Clear Final    pH, UA 07/11/2023 5.0  5.0 - 8.0 Final    Specific  [place], UA 07/11/2023 1.010  1.005 - 1.030 Final    Protein, UA 07/11/2023 Negative  Negative Final    Glucose, UA 07/11/2023 Negative  Negative Final    Ketones, UA 07/11/2023 Negative  Negative Final    Bilirubin (UA) 07/11/2023 Negative  Negative Final    Occult Blood UA 07/11/2023 Negative  Negative Final    Nitrite, UA 07/11/2023 Negative  Negative Final    Leukocytes, UA 07/11/2023 Negative  Negative Final   Lab Visit on 06/23/2023   Component Date Value Ref Range Status    Sodium 06/23/2023 144  136 - 145 mmol/L Final    Potassium 06/23/2023 4.3  3.5 - 5.1 mmol/L Final    Chloride 06/23/2023 106  95 - 110 mmol/L Final    CO2 06/23/2023 31 (H)  23 - 29 mmol/L Final    Glucose 06/23/2023 96  70 - 110 mg/dL Final    BUN 06/23/2023 16  6 - 20 mg/dL Final    Creatinine 06/23/2023 1.0  0.5 - 1.4 mg/dL Final    Calcium 06/23/2023 9.5  8.7 - 10.5 mg/dL Final    Anion Gap 06/23/2023 7 (L)  8 - 16 mmol/L Final    eGFR 06/23/2023 >60.0  >60 mL/min/1.73 m^2 Final    PTH, Intact 06/23/2023 124.3 (H)  9.0 - 77.0 pg/mL Final    Vit D, 25-Hydroxy 06/23/2023 10 (L)  30 - 96 ng/mL Final   Lab Visit on 06/23/2023   Component Date Value Ref Range Status    Protein, Urine Random 06/23/2023 12  0 - 15 mg/dL Final    Creatinine, Urine 06/23/2023 174.0  15.0 - 325.0 mg/dL Final    Prot/Creat Ratio, Urine 06/23/2023 0.07  0.00 - 0.20 Final     Imaging:  Results for orders placed or performed during the hospital encounter of 06/02/23   MRI Brain Without Contrast    Narrative    EXAMINATION:  MRI BRAIN WITHOUT CONTRAST    CLINICAL HISTORY:  Stroke, follow up;    TECHNIQUE:  Multiplanar multisequence MR imaging of the brain was performed without contrast.    COMPARISON:  CT head dated 06/02/2023.    FINDINGS:  The craniocervical junction is intact.  The sellar and parasellar structures are unremarkable.  The midline structures are unremarkable.  The intracranial flow voids are within normal limits.    No diffusion-weighted signal  abnormality is present.  The ventricles and sulci are within normal limits.  There are minimal T2/FLAIR signal hyperintense within the subcortical white matter.  There is no evidence of intracranial hemorrhage.  There are no extra-axial fluid collections.  There is no evidence of mass effect.    The orbits and intraorbital contents are unremarkable.  There is a mucous retention cyst within the right maxillary sinus.      Impression    No MR evidence of acute infarction.    No acute intracranial process.      Electronically signed by: Preet Avery MD  Date:    06/03/2023  Time:    01:29   CT Head Without Contrast    Narrative    EXAMINATION:  CT HEAD WITHOUT CONTRAST    CLINICAL HISTORY:  Neuro deficit, acute, stroke suspected;    TECHNIQUE:  Low dose axial CT images obtained throughout the head without intravenous contrast. Sagittal and coronal reconstructions were performed.    COMPARISON:  Head CT 12/09/2011    FINDINGS:  Intracranial compartment:    Ventricles and sulci are normal in size for age without evidence of hydrocephalus, noting cavum septum pellucidum.  No extra-axial blood or fluid collections.    The brain parenchyma appears normal. No parenchymal mass, hemorrhage, edema or major vascular distribution infarct.    Skull/extracranial contents (limited evaluation): New 7-8 mm intermediate density nodule within the superficial and mid depth subcutaneous fat of the scalp overlying the left frontal calvarium which abuts the overlying skin.  No adjacent inflammatory change, calcification or internal gas foci.  No fracture. Mastoid air cells and paranasal sinuses are essentially clear.      Impression    1. No acute large vascular territory infarct or intracranial hemorrhage identified.  If persistent neurologic deficit, MRI brain can be obtained.  2. Frontal scalp new 7-8 mm soft tissue nodule abutting the overlying skin.  This could reflect complex sebaceous cyst, but remains indeterminate.  Clinical  correlation advised.      Electronically signed by: Sunny Morillo MD  Date:    06/02/2023  Time:    18:40     *Note: Due to a large number of results and/or encounters for the requested time period, some results have not been displayed. A complete set of results can be found in Results Review.     Note: I have independently reviewed any/all imaging/labs/tests and agree with the report (s) as documented.  Any discrepancies will be as noted/demarcated by free text.  KELLY CERVANTES 9/15/2023    ASSESSMENT:  1. Chronic migraine without aura without status migrainosus, not intractable    2. Nonintractable episodic headache, unspecified headache type    3. Suspected sleep apnea    4. Chronic daily headache    5. Essential hypertension    6. Stage 3b chronic kidney disease    7. Morbid obesity          PLAN:  - Discussed symptoms appear to be consistent with migraines complicated by ?scottie. Discussed this with patient along with treatment options and patient agreed with the following plan  - ppx - start tpx titrating to 50mg by next visit  - abortive - try ubrelvy 50mg prn  - see eye doc as it has been > 1 yr  - pt obtained imaging recently, listed above  - consider ENT referral for tinnitus and mucous retention cyst seen on imaging  - referral to sleep med to r/o scottie  - HTN - avoid triptans 2/2 uncontrolled bp's, mgmt per pcp  - ckd stage 3 - avoid nsaids, mgmt per nephro  - obesity - start tpx for dual purpose, discussed importance of wt loss strategies w/ her conditions, mgmt per pcp  - risks, benefits, and potential side effects of tpx, ubrelvy discussed   - alternative treatment options offered   - importance of healthy diet, regular exercise and sleep hygiene in the treatment of headaches    - Start tracking headaches via Migraine Dev skyler on phone   - RTC in 2-3 mo     Orders Placed This Encounter    Ambulatory referral/consult to Sleep Disorders    topiramate (TOPAMAX) 25 MG tablet    ubrogepant (UBRELVY) 50 mg tablet        I have discussed realistic goals of care with patient at length as well as medication options, and need for lifestyle adjustment. I have explained that treatment will take time. We have agreed that the goal will be to reduce frequency/intensity/quantity of HA, not to be completely HA free. I have explained my non narcotic policy regarding headache treatment.    Patient agreeable to work on lifestyle adjustments.    Discussed potential for teratogenicity with treatment, patient understands if her family planning status should change she will contact office immediately and we will safely adjust medications as needed.     Questions and concerns were sought and answered to the patient's stated verbal satisfaction.  The patient verbalizes understanding and agreement with the above stated treatment plan.     CC: Jose Carpenter, DO Keisha Jarquin PA-C  [facility]  [phone]    Dr. Verde was available during today's encounter.

## 2023-09-19 DIAGNOSIS — D50.9 IRON DEFICIENCY ANEMIA, UNSPECIFIED IRON DEFICIENCY ANEMIA TYPE: Primary | ICD-10-CM

## 2023-09-25 ENCOUNTER — PATIENT MESSAGE (OUTPATIENT)
Dept: PSYCHIATRY | Facility: CLINIC | Age: 53
End: 2023-09-25
Payer: MEDICARE

## 2023-09-26 DIAGNOSIS — F40.10 SOCIAL PHOBIA: ICD-10-CM

## 2023-09-26 DIAGNOSIS — G44.009 CLUSTER HEADACHE, NOT INTRACTABLE, UNSPECIFIED CHRONICITY PATTERN: ICD-10-CM

## 2023-09-26 DIAGNOSIS — F33.42 RECURRENT MAJOR DEPRESSIVE DISORDER, IN FULL REMISSION: ICD-10-CM

## 2023-09-26 DIAGNOSIS — F41.1 GAD (GENERALIZED ANXIETY DISORDER): ICD-10-CM

## 2023-09-26 DIAGNOSIS — R46.81 OBSESSIVE-COMPULSIVE BEHAVIOR: ICD-10-CM

## 2023-09-26 RX ORDER — DOXEPIN HYDROCHLORIDE 75 MG/1
CAPSULE ORAL
Qty: 60 CAPSULE | Refills: 11 | Status: SHIPPED | OUTPATIENT
Start: 2023-09-26

## 2023-09-26 NOTE — TELEPHONE ENCOUNTER
----- Message from Juanjose Benjamin sent at 9/26/2023  1:19 PM CDT -----  Name of Who is Calling:PENG MAYO [[id number]]           What is the request in detail:Pt would like to get her meds changed back to butalbital-acetaminophen-caffeine -40 mg (FIORICET, ESGIC) -40 mg per tablet  to other meds were not working for her.         Can the clinic reply by [facility]: No             What Number to [facility] Back if not [facility]:[phone]

## 2023-09-26 NOTE — TELEPHONE ENCOUNTER
No care due was identified.  [facility] Embedded Care Due Messages. Reference number: [id number].   9/26/2023 1:37:53 PM CDT

## 2023-09-27 RX ORDER — BUTALBITAL, ACETAMINOPHEN AND CAFFEINE 50; 325; 40 MG/1; MG/1; MG/1
1 TABLET ORAL EVERY 6 HOURS PRN
Qty: 20 TABLET | Refills: 0 | Status: SHIPPED | OUTPATIENT
Start: 2023-09-27 | End: 2023-10-25 | Stop reason: SDUPTHER

## 2023-10-02 RX ORDER — FLUTICASONE PROPIONATE 50 MCG
SPRAY, SUSPENSION (ML) NASAL
Qty: 32 G | Refills: 3 | Status: SHIPPED | OUTPATIENT
Start: 2023-10-02

## 2023-10-02 NOTE — TELEPHONE ENCOUNTER
No care due was identified.  [facility] Embedded Care Due Messages. Reference number: [id number].   10/02/2023 3:41:47 AM CDT

## 2023-10-02 NOTE — TELEPHONE ENCOUNTER
Refill Decision Note      Refill Decision Note   Jaki Bajwa  is requesting a refill authorization.  Brief Assessment and Rationale for Refill:  Approve     Medication Therapy Plan:         Comments:     Note composed:6:11 AM 10/02/2023             Appointments     Last Visit   7/25/2023 Weeks, Jose ALONSO, DO   Next Visit   10/25/2023 Weeks, Jose ALONSO, DO

## 2023-10-13 ENCOUNTER — PATIENT MESSAGE (OUTPATIENT)
Dept: PSYCHIATRY | Facility: CLINIC | Age: 53
End: 2023-10-13
Payer: MEDICARE

## 2023-10-16 RX ORDER — TIZANIDINE 4 MG/1
TABLET ORAL
Qty: 90 TABLET | Refills: 0 | Status: SHIPPED | OUTPATIENT
Start: 2023-10-16 | End: 2023-11-03

## 2023-10-24 ENCOUNTER — OFFICE VISIT (OUTPATIENT)
Dept: PSYCHIATRY | Facility: CLINIC | Age: 53
End: 2023-10-24
Payer: MEDICAID

## 2023-10-24 DIAGNOSIS — F40.10 SOCIAL ANXIETY DISORDER: ICD-10-CM

## 2023-10-24 DIAGNOSIS — F41.0 PANIC DISORDER: ICD-10-CM

## 2023-10-24 DIAGNOSIS — F41.1 GAD (GENERALIZED ANXIETY DISORDER): Primary | ICD-10-CM

## 2023-10-24 DIAGNOSIS — F33.0 MILD EPISODE OF RECURRENT MAJOR DEPRESSIVE DISORDER: ICD-10-CM

## 2023-10-24 PROCEDURE — 90832 PR PSYCHOTHERAPY W/PATIENT, 30 MIN: ICD-10-PCS | Mod: AJ,HB,95, | Performed by: SOCIAL WORKER

## 2023-10-24 PROCEDURE — 4010F ACE/ARB THERAPY RXD/TAKEN: CPT | Mod: CPTII,95,, | Performed by: SOCIAL WORKER

## 2023-10-24 PROCEDURE — 3044F PR MOST RECENT HEMOGLOBIN A1C LEVEL <7.0%: ICD-10-PCS | Mod: CPTII,95,, | Performed by: SOCIAL WORKER

## 2023-10-24 PROCEDURE — 90832 PSYTX W PT 30 MINUTES: CPT | Mod: AJ,HB,95, | Performed by: SOCIAL WORKER

## 2023-10-24 PROCEDURE — 3044F HG A1C LEVEL LT 7.0%: CPT | Mod: CPTII,95,, | Performed by: SOCIAL WORKER

## 2023-10-24 PROCEDURE — 4010F PR ACE/ARB THEARPY RXD/TAKEN: ICD-10-PCS | Mod: CPTII,95,, | Performed by: SOCIAL WORKER
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
## 2023-10-25 ENCOUNTER — OFFICE VISIT (OUTPATIENT)
Dept: INTERNAL MEDICINE | Facility: CLINIC | Age: 53
End: 2023-10-25
Payer: MEDICARE

## 2023-10-25 ENCOUNTER — LAB VISIT (OUTPATIENT)
Dept: LAB | Facility: OTHER | Age: 53
End: 2023-10-25
Attending: NURSE PRACTITIONER
Payer: MEDICARE

## 2023-10-25 VITALS
HEIGHT: 67 IN | BODY MASS INDEX: 39.44 KG/M2 | SYSTOLIC BLOOD PRESSURE: 118 MMHG | HEART RATE: 110 BPM | DIASTOLIC BLOOD PRESSURE: 80 MMHG | WEIGHT: 251.31 LBS | OXYGEN SATURATION: 97 %

## 2023-10-25 DIAGNOSIS — Z12.31 ENCOUNTER FOR SCREENING MAMMOGRAM FOR BREAST CANCER: Primary | ICD-10-CM

## 2023-10-25 DIAGNOSIS — G89.4 CHRONIC PAIN SYNDROME: ICD-10-CM

## 2023-10-25 DIAGNOSIS — D50.9 IRON DEFICIENCY ANEMIA, UNSPECIFIED IRON DEFICIENCY ANEMIA TYPE: ICD-10-CM

## 2023-10-25 DIAGNOSIS — G43.709 CHRONIC MIGRAINE WITHOUT AURA WITHOUT STATUS MIGRAINOSUS, NOT INTRACTABLE: ICD-10-CM

## 2023-10-25 DIAGNOSIS — I10 ESSENTIAL HYPERTENSION: Chronic | ICD-10-CM

## 2023-10-25 DIAGNOSIS — G44.009 CLUSTER HEADACHE, NOT INTRACTABLE, UNSPECIFIED CHRONICITY PATTERN: ICD-10-CM

## 2023-10-25 LAB
BASOPHILS # BLD AUTO: 0.08 K/UL (ref 0–0.2)
BASOPHILS NFR BLD: 0.9 % (ref 0–1.9)
DIFFERENTIAL METHOD: ABNORMAL
EOSINOPHIL # BLD AUTO: 0.3 K/UL (ref 0–0.5)
EOSINOPHIL NFR BLD: 3.3 % (ref 0–8)
ERYTHROCYTE [DISTWIDTH] IN BLOOD BY AUTOMATED COUNT: 15.3 % (ref 11.5–14.5)
FERRITIN SERPL-MCNC: 6 NG/ML (ref 20–300)
HCT VFR BLD AUTO: 29.4 % (ref 37–48.5)
HGB BLD-MCNC: 8.3 G/DL (ref 12–16)
IMM GRANULOCYTES # BLD AUTO: 0.02 K/UL (ref 0–0.04)
IMM GRANULOCYTES NFR BLD AUTO: 0.2 % (ref 0–0.5)
IRON SERPL-MCNC: 21 UG/DL (ref 30–160)
LYMPHOCYTES # BLD AUTO: 3 K/UL (ref 1–4.8)
LYMPHOCYTES NFR BLD: 36 % (ref 18–48)
MCH RBC QN AUTO: 21.6 PG (ref 27–31)
MCHC RBC AUTO-ENTMCNC: 28.2 G/DL (ref 32–36)
MCV RBC AUTO: 77 FL (ref 82–98)
MONOCYTES # BLD AUTO: 0.7 K/UL (ref 0.3–1)
MONOCYTES NFR BLD: 7.9 % (ref 4–15)
NEUTROPHILS # BLD AUTO: 4.4 K/UL (ref 1.8–7.7)
NEUTROPHILS NFR BLD: 51.7 % (ref 38–73)
NRBC BLD-RTO: 0 /100 WBC
PLATELET # BLD AUTO: 369 K/UL (ref 150–450)
PMV BLD AUTO: 11.5 FL (ref 9.2–12.9)
RBC # BLD AUTO: 3.84 M/UL (ref 4–5.4)
SATURATED IRON: 5 % (ref 20–50)
TOTAL IRON BINDING CAPACITY: 462 UG/DL (ref 250–450)
TRANSFERRIN SERPL-MCNC: 312 MG/DL (ref 200–375)
WBC # BLD AUTO: 8.44 K/UL (ref 3.9–12.7)

## 2023-10-25 PROCEDURE — 99999 PR PBB SHADOW E&M-EST. PATIENT-LVL IV: CPT | Mod: PBBFAC,HCNC,, | Performed by: FAMILY MEDICINE

## 2023-10-25 PROCEDURE — 99214 PR OFFICE/OUTPT VISIT, EST, LEVL IV, 30-39 MIN: ICD-10-PCS | Mod: HCNC,S$GLB,, | Performed by: FAMILY MEDICINE

## 2023-10-25 PROCEDURE — 36415 COLL VENOUS BLD VENIPUNCTURE: CPT | Mod: HCNC | Performed by: NURSE PRACTITIONER

## 2023-10-25 PROCEDURE — 82728 ASSAY OF FERRITIN: CPT | Mod: HCNC | Performed by: NURSE PRACTITIONER

## 2023-10-25 PROCEDURE — 3079F PR MOST RECENT DIASTOLIC BLOOD PRESSURE 80-89 MM HG: ICD-10-PCS | Mod: HCNC,CPTII,S$GLB, | Performed by: FAMILY MEDICINE

## 2023-10-25 PROCEDURE — 99999 PR PBB SHADOW E&M-EST. PATIENT-LVL IV: ICD-10-PCS | Mod: PBBFAC,HCNC,, | Performed by: FAMILY MEDICINE

## 2023-10-25 PROCEDURE — 3079F DIAST BP 80-89 MM HG: CPT | Mod: HCNC,CPTII,S$GLB, | Performed by: FAMILY MEDICINE

## 2023-10-25 PROCEDURE — 4010F ACE/ARB THERAPY RXD/TAKEN: CPT | Mod: HCNC,CPTII,S$GLB, | Performed by: FAMILY MEDICINE

## 2023-10-25 PROCEDURE — 4010F PR ACE/ARB THEARPY RXD/TAKEN: ICD-10-PCS | Mod: HCNC,CPTII,S$GLB, | Performed by: FAMILY MEDICINE

## 2023-10-25 PROCEDURE — 3008F BODY MASS INDEX DOCD: CPT | Mod: HCNC,CPTII,S$GLB, | Performed by: FAMILY MEDICINE

## 2023-10-25 PROCEDURE — 1159F PR MEDICATION LIST DOCUMENTED IN MEDICAL RECORD: ICD-10-PCS | Mod: HCNC,CPTII,S$GLB, | Performed by: FAMILY MEDICINE

## 2023-10-25 PROCEDURE — 3074F PR MOST RECENT SYSTOLIC BLOOD PRESSURE < 130 MM HG: ICD-10-PCS | Mod: HCNC,CPTII,S$GLB, | Performed by: FAMILY MEDICINE

## 2023-10-25 PROCEDURE — 3044F HG A1C LEVEL LT 7.0%: CPT | Mod: HCNC,CPTII,S$GLB, | Performed by: FAMILY MEDICINE

## 2023-10-25 PROCEDURE — 85025 COMPLETE CBC W/AUTO DIFF WBC: CPT | Mod: HCNC | Performed by: NURSE PRACTITIONER

## 2023-10-25 PROCEDURE — 3044F PR MOST RECENT HEMOGLOBIN A1C LEVEL <7.0%: ICD-10-PCS | Mod: HCNC,CPTII,S$GLB, | Performed by: FAMILY MEDICINE

## 2023-10-25 PROCEDURE — 1159F MED LIST DOCD IN RCRD: CPT | Mod: HCNC,CPTII,S$GLB, | Performed by: FAMILY MEDICINE

## 2023-10-25 PROCEDURE — 3074F SYST BP LT 130 MM HG: CPT | Mod: HCNC,CPTII,S$GLB, | Performed by: FAMILY MEDICINE

## 2023-10-25 PROCEDURE — 99214 OFFICE O/P EST MOD 30 MIN: CPT | Mod: HCNC,S$GLB,, | Performed by: FAMILY MEDICINE

## 2023-10-25 PROCEDURE — 3008F PR BODY MASS INDEX (BMI) DOCUMENTED: ICD-10-PCS | Mod: HCNC,CPTII,S$GLB, | Performed by: FAMILY MEDICINE

## 2023-10-25 PROCEDURE — 84466 ASSAY OF TRANSFERRIN: CPT | Mod: HCNC | Performed by: NURSE PRACTITIONER

## 2023-10-25 RX ORDER — BUTALBITAL, ACETAMINOPHEN AND CAFFEINE 50; 325; 40 MG/1; MG/1; MG/1
1 TABLET ORAL EVERY 6 HOURS PRN
Qty: 40 TABLET | Refills: 0 | Status: SHIPPED | OUTPATIENT
Start: 2023-10-25 | End: 2023-12-31 | Stop reason: SDUPTHER

## 2023-10-25 RX ORDER — SCOLOPAMINE TRANSDERMAL SYSTEM 1 MG/1
1 PATCH, EXTENDED RELEASE TRANSDERMAL
Qty: 4 PATCH | Refills: 0 | Status: SHIPPED | OUTPATIENT
Start: 2023-10-25 | End: 2024-02-13

## 2023-10-25 NOTE — PROGRESS NOTES
The patient location is: Home  The chief complaint leading to consultation is: Anemia f/u    Visit type: audiovisual    Face to Face time with patient: 20 minute  30 minutes of total time spent on the encounter, which includes face to face time and non-face to face time preparing to see the patient (eg, review of tests), Obtaining and/or reviewing separately obtained history, Documenting clinical information in the electronic or other health record, Independently interpreting results (not separately reported) and communicating results to the patient/family/caregiver, or Care coordination (not separately reported).         Each patient to whom he or she provides medical services by telemedicine is:  (1) informed of the relationship between the physician and patient and the respective role of any other health care provider with respect to management of the patient; and (2) notified that he or she may decline to receive medical services by telemedicine and may withdraw from such care at any time.    Notes:       CC: Anemia, hematology follow up        HPI: Ms. Bajwa is a 51-year-old woman here for follow up for anemia.  She has been evaluated previously here by glenn Giraldo with a bone marrow biopsy. Her hemoglobin in 2011 was 13.2.  Since May 2012,  hemoglobin values have been between 10.1-11.6. She has a diagnosis of Sjogren syndrome. Bone marrow biopsy in 2014 was naagtive for malignancy/ dysplasia. MDS FISH was negative.  She had hysterectomy and no ongoing menstrual losses. No s/s of mal-absorption. No recent weight loss. No overt bleeding. No melena. She has fatigue. She has heart burn.        Interval history: She is here for a follow up.  She received iv injectafer on 9/30 and 10/7/20.  She received iv venofer between 3/3/22-4/21/22( 8 treatments, cumulative iron dose of 1600 mg). Recent iron studies consistent with [place]. She denies upper or lower GI bleeding, hematuria, epistaxis or hemoptysis.      Review of Systems   Constitutional:  Positive for malaise/fatigue. Negative for chills, fever and weight loss.   HENT:  Negative for ear discharge, ear pain, hearing loss and tinnitus.    Eyes:  Negative for double vision, photophobia and discharge.   Cardiovascular:  Negative for chest pain, palpitations and orthopnea.   Gastrointestinal:  Negative for blood in stool, heartburn, melena, nausea and vomiting.   Genitourinary:  Negative for frequency and urgency.   Musculoskeletal:  Negative for back pain, myalgias and neck pain.   Neurological:  Negative for tingling, tremors, sensory change, speech change and focal weakness.   Endo/Heme/Allergies:  Negative for environmental allergies. Does not bruise/bleed easily.   Psychiatric/Behavioral:  Negative for depression, hallucinations, substance abuse and suicidal ideas. The patient is not nervous/anxious.      Current Outpatient Medications   Medication Sig    amLODIPine (NORVASC) 5 MG tablet Take 1 tablet (5 mg total) by mouth once daily.    buPROPion (WELLBUTRIN XL) 150 MG TB24 tablet TAKE 3 TABLETS(450 MG) BY MOUTH EVERY MORNING    busPIRone (BUSPAR) 15 MG tablet TAKE 1 TABLET(15 MG) BY MOUTH THREE TIMES DAILY    butalbital-acetaminophen-caffeine -40 mg (FIORICET, ESGIC) -40 mg per tablet Take 1 tablet by mouth every 6 (six) hours as needed for Headaches.    cetirizine (ZYRTEC) 10 MG tablet Take 1 tablet (10 mg total) by mouth once daily.    diazePAM (VALIUM) 2 MG tablet Take 1 tablet (2 mg total) by mouth daily as needed (Severe anxiety/Panic). 15 tablets to last 3 months    docusate sodium (COLACE) 100 MG capsule Take 1 capsule (100 mg total) by mouth 2 (two) times daily as needed for Constipation.    doxepin (SINEQUAN) 75 MG capsule TAKE 2 CAPSULES(150 MG) BY MOUTH EVERY EVENING    ergocalciferol (VITAMIN D2) 50,000 unit Cap Take 1 capsule (50,000 Units total) by mouth every 7 days. (Patient not taking: Reported on 10/25/2023)    estradioL  (ESTRACE) 0.01 % (0.1 mg/gram) vaginal cream 1g vaginally daily x 2 weeks, then BIW as directed (Patient not taking: Reported on 6/13/2023)    flu vacc [id number] 6mos up,PF, (FLUARIX QUAD 2926-7975, PF,) 60 mcg (15 mcg x 4)/0.5 mL Syrg Inject 0.5 mLs into the muscle once. for 1 dose    fluticasone propionate (FLONASE) 50 mcg/actuation nasal spray SHAKE LIQUID AND USE 1 SPRAY(50 MCG) IN EACH NOSTRIL EVERY DAY    gabapentin (NEURONTIN) 300 MG capsule TAKE 1 CAPSULE(300 MG) BY MOUTH THREE TIMES DAILY    hydroCHLOROthiazide (HYDRODIURIL) 12.5 MG Tab Take 1 tablet (12.5 mg total) by mouth once daily. For blood pressure    HYDROcodone-acetaminophen (NORCO) 5-325 mg per tablet Take 1 tablet by mouth every 24 hours as needed for Pain. Quantity medically necessary    hydrOXYchloroQUINE (PLAQUENIL) 200 mg tablet TAKE 1 TABLET BY MOUTH TWICE DAILY    irbesartan (AVAPRO) 300 MG tablet Take 1 tablet (300 mg total) by mouth every evening.    lurasidone (LATUDA) 60 mg Tab tablet TAKE 1 TABLET(60 MG) BY MOUTH EVERY DAY    NARCAN 4 mg/actuation [place] SPRAY 1 SPRAY IN NOSTRIL ONCE FOR 1 DOSE    nystatin (MYCOSTATIN) 100,000 unit/mL suspension Take by mouth after meals as needed.    omeprazole (PRILOSEC) 40 MG capsule Take 1 capsule (40 mg total) by mouth once daily.    pilocarpine (SALAGEN) 5 MG Tab Take 1 tablet (5 mg total) by mouth 4 (four) times daily.    RESTASIS 0.05 % ophthalmic emulsion INSTILL 1 DROP IN BOTH EYES TWICE DAILY    sars-cov-2, covid-19, (SPIKEVAX, MODERNA,, 12YRS AND UP 2023,) 50 mcg/0.5 mL injection Inject 0.5 mLs into the muscle once. Inject 0.5 mL into the muscle once. for 1 dose    scopolamine (TRANSDERM-SCOP) 1.3-1.5 mg (1 mg over 3 days) Place 1 patch onto the skin every 72 hours.    solifenacin (VESICARE) 5 MG tablet Take 1 tablet (5 mg total) by mouth once daily.    tiZANidine (ZANAFLEX) 4 MG tablet 8 mg tid    topiramate (TOPAMAX) 25 MG tablet Take 1 tablet (25 mg total) by mouth once daily for 30 days,  THEN 2 tablets (50 mg total) once daily. Take one tablet daily for 2 weeks, then two tablets daily afterwards. (Patient not taking: Reported on 10/25/2023)     No current facility-administered medications for this visit.          There were no vitals filed for this visit.      Physical Exam deferred due to nature of viist    8/12/14 BONE MARROW ASPIRATE, BONE MARROW CLOT, AND BONE MARROW CORE BIOPSY WITH:     CELLULARITY=70-80%, TRILINEAGE HEMATOPOIETIC ACTIVITY (M/E=2:1).  DECREASED STORAGE IRON.  ADEQUATE NUMBER OF MEGAKARYOCYTES.  COMMENT: Flow cytometry analysis of bone marrow aspirate shows lymph gate(11.5%) containing T and B cells. No B cell clonality or T-cell aberrancy is evident. Blast gate is not increased. Correlate clinically and with a  cytogenetics report.     No significant increased reticular fibrosis is evident by special stain(reticulin) with adequate positive and negative controls.     Bone marrow karyotype results: 46, XX [20], female karyotype.     FISH studies for the MDS panel are normal     Lab Results   Component Value Date    WBC 8.44 10/25/2023    HGB 8.3 (L) 10/25/2023    HCT 29.4 (L) 10/25/2023    MCV 77 (L) 10/25/2023     10/25/2023       Lab Results   Component Value Date    IRON 21 (L) 10/25/2023    TRANSFERRIN 312 10/25/2023    TIBC 462 (H) 10/25/2023    FESATURATED 5 (L) 10/25/2023       Lab Results   Component Value Date    FERRITIN 6 (L) 10/25/2023        Assessment:     1. Microcytic anemia  2. Iron deficiency  3. Chronic fatigue  4. Fibromyalgia  5. Sjogren syndrome  6. Essential HTn        Plan:     1,2: She has been evaluated previously here by , including with a bone marrow biopsy. Her hemoglobin in 2011 was 13.2g/dl . Since May 2012,  hemoglobin values have been between 10.1-11.6. She has a diagnosis of Sjogren syndrome. Bone marrow biopsy in 2014 was negative for malignancy/ dysplasia. MDS FISH was negative.  She had hysterectomy and has no ongoing menstrual  losses. No s/s of mal-absorption. Diet is normal/regular.   No recent weight loss. No overt bleeding. No melena. She has fatigue and heart burn.  Stool OB negative in Jan 2020. UA negative for blood/ RBC in Aug 2020.    Last PAP smear was in 2016, and was negative.  She follows with GYN here, last evaluation in Sept 2020.   She received iv injectafer on 9/30 and 10/7/20. GIGI previously negative.She received iv venofer between 3/3/22-4/21/22( 8 treatments, cumulative iron dose of 1600 mg).  Colonoscopy was normal in Jan 2021 except for sigmoid colon diverticulosis.   Recent Ferritin -6 ng/ml  on 10/25/23She is not on oral iron now. We will repeat cycle of venofer on next available dose           4,5: on Plaquenil. Follows with rheumatology.         6. Follows with her PCP      BMT Chart Routing      Follow up with physician . 6 months with    Follow up with [facility]    Provider visit type    Infusion scheduling note    Injection scheduling note iron infusion venofer, once a  week x 8   Labs   Scheduling:  Preferred lab:  Lab interval:  Cbc, ferritin, iron and tibc 6-8 weeks from last dose of iron infusion  and in 6 months   Imaging    Pharmacy appointment    Other referrals                Nancie Dobbs NP  Hematology/Oncology/BMT

## 2023-10-25 NOTE — PROGRESS NOTES
Subjective:       Patient ID: Jaki Bajwa is a 53 y.o. female.    Chief Complaint: Back Pain and Follow-up (3mos check-up)    HPI  Came in today for routine 3 month follow-up on monitoring chronic.  She has been taking Norco is continues to help have better quality of life..  Has not had any new concerns.    She will be going on a cruise to [place].  This is 1st cruise ever.  She is a little bit nervous about motion sickness and was requesting scopolamine to use if she starts having issues.  All of your core healthy metrics are met.      Social History     Social History Narrative    Not on file       Family History   Problem Relation Age of Onset    Heart disease Mother     Hypertension Mother     Cancer Father         colon ca    Colon cancer Father     Depression Father     Schizophrenia Father     Anxiety disorder Father     Arthritis Father     Mental illness Father     Liver cancer Sister     Cancer Sister     Depression Sister     Anxiety disorder Sister     Heart attack Sister     COPD Sister     Cancer Sister         throat and colon    Depression Sister     Miscarriages / Stillbirths Sister     Pancreatic cancer Brother     Depression Brother     Alcohol abuse Brother     No Known Problems Daughter     Asthma Son     Pancreatic cancer Other     Liver cancer Other     Suicide Neg Hx     Ovarian cancer Neg Hx     Breast cancer Neg Hx        Current Outpatient Medications:     amLODIPine (NORVASC) 5 MG tablet, Take 1 tablet (5 mg total) by mouth once daily., Disp: 90 tablet, Rfl: 3    buPROPion (WELLBUTRIN XL) 150 MG TB24 tablet, TAKE 3 TABLETS(450 MG) BY MOUTH EVERY MORNING, Disp: 270 tablet, Rfl: 1    busPIRone (BUSPAR) 15 MG tablet, TAKE 1 TABLET(15 MG) BY MOUTH THREE TIMES DAILY, Disp: 270 tablet, Rfl: 1    cetirizine (ZYRTEC) 10 MG tablet, Take 1 tablet (10 mg total) by mouth once daily., Disp: 30 tablet, Rfl: 11    diazePAM (VALIUM) 2 MG tablet, Take 1 tablet (2 mg total) by mouth daily as needed  (Severe anxiety/Panic). 15 tablets to last 3 months, Disp: 15 tablet, Rfl: 0    docusate sodium (COLACE) 100 MG capsule, Take 1 capsule (100 mg total) by mouth 2 (two) times daily as needed for Constipation., Disp: 60 capsule, Rfl: 0    doxepin (SINEQUAN) 75 MG capsule, TAKE 2 CAPSULES(150 MG) BY MOUTH EVERY EVENING, Disp: 60 capsule, Rfl: 11    fluticasone propionate (FLONASE) 50 mcg/actuation nasal spray, SHAKE LIQUID AND USE 1 SPRAY(50 MCG) IN EACH NOSTRIL EVERY DAY, Disp: 32 g, Rfl: 3    gabapentin (NEURONTIN) 300 MG capsule, TAKE 1 CAPSULE(300 MG) BY MOUTH THREE TIMES DAILY, Disp: 90 capsule, Rfl: 5    hydroCHLOROthiazide (HYDRODIURIL) 12.5 MG Tab, Take 1 tablet (12.5 mg total) by mouth once daily. For blood pressure, Disp: 90 tablet, Rfl: 3    HYDROcodone-acetaminophen (NORCO) 5-325 mg per tablet, Take 1 tablet by mouth every 24 hours as needed for Pain. Quantity medically necessary, Disp: 30 tablet, Rfl: 0    hydrOXYchloroQUINE (PLAQUENIL) 200 mg tablet, TAKE 1 TABLET BY MOUTH TWICE DAILY, Disp: 180 tablet, Rfl: 2    irbesartan (AVAPRO) 300 MG tablet, Take 1 tablet (300 mg total) by mouth every evening., Disp: 90 tablet, Rfl: 3    lurasidone (LATUDA) 60 mg Tab tablet, TAKE 1 TABLET(60 MG) BY MOUTH EVERY DAY, Disp: 90 tablet, Rfl: 3    NARCAN 4 mg/actuation [place], SPRAY 1 SPRAY IN NOSTRIL ONCE FOR 1 DOSE, Disp: , Rfl:     nystatin (MYCOSTATIN) 100,000 unit/mL suspension, Take by mouth after meals as needed., Disp: , Rfl:     omeprazole (PRILOSEC) 40 MG capsule, Take 1 capsule (40 mg total) by mouth once daily., Disp: 90 capsule, Rfl: 3    pilocarpine (SALAGEN) 5 MG Tab, Take 1 tablet (5 mg total) by mouth 4 (four) times daily., Disp: 120 tablet, Rfl: 11    RESTASIS 0.05 % ophthalmic emulsion, INSTILL 1 DROP IN BOTH EYES TWICE DAILY, Disp: 60 each, Rfl: 1    solifenacin (VESICARE) 5 MG tablet, Take 1 tablet (5 mg total) by mouth once daily., Disp: 30 tablet, Rfl: 11    tiZANidine (ZANAFLEX) 4 MG tablet, 8 mg  "tid, Disp: 90 tablet, Rfl: 0    butalbital-acetaminophen-caffeine -40 mg (FIORICET, ESGIC) -40 mg per tablet, Take 1 tablet by mouth every 6 (six) hours as needed for Headaches., Disp: 40 tablet, Rfl: 0    ergocalciferol (VITAMIN D2) 50,000 unit Cap, Take 1 capsule (50,000 Units total) by mouth every 7 days. (Patient not taking: Reported on 10/25/2023), Disp: 12 capsule, Rfl: 0    estradioL (ESTRACE) 0.01 % (0.1 mg/gram) vaginal cream, 1g vaginally daily x 2 weeks, then BIW as directed (Patient not taking: Reported on 6/13/2023), Disp: 42.5 g, Rfl: 2    flu vacc [id number] 6mos up,PF, (FLUARIX QUAD 1493-7308, PF,) 60 mcg (15 mcg x 4)/0.5 mL Syrg, Inject 0.5 mLs into the muscle once. for 1 dose, Disp: 0.5 mL, Rfl: 0    sars-cov-2, covid-19, (SPIKEVAX, MODERNA,, 12YRS AND UP 2023,) 50 mcg/0.5 mL injection, Inject 0.5 mLs into the muscle once. Inject 0.5 mL into the muscle once. for 1 dose, Disp: 0.5 mL, Rfl: 0    scopolamine (TRANSDERM-SCOP) 1.3-1.5 mg (1 mg over 3 days), Place 1 patch onto the skin every 72 hours., Disp: 4 patch, Rfl: 0    topiramate (TOPAMAX) 25 MG tablet, Take 1 tablet (25 mg total) by mouth once daily for 30 days, THEN 2 tablets (50 mg total) once daily. Take one tablet daily for 2 weeks, then two tablets daily afterwards. (Patient not taking: Reported on 10/25/2023), Disp: 90 tablet, Rfl: 0    Review of Systems   Constitutional:  Negative for chills and fever.   Respiratory:  Negative for cough and shortness of breath.    Cardiovascular:  Negative for chest pain.   Gastrointestinal:  Negative for abdominal pain.   Skin:  Negative for rash.   Neurological:  Negative for dizziness.       Objective:   /80 (BP Location: Left arm, Patient Position: Sitting, BP Method: Medium (Manual))   Pulse 110   Ht 5' 7" (1.702 m)   Wt 114 kg (251 lb 5.2 oz)   LMP 07/11/2012 Comment: partial   SpO2 97%   BMI 39.36 kg/m²      Physical Exam  Vitals reviewed.   Constitutional:       Appearance: " She is well-developed.   HENT:      Head: Normocephalic and atraumatic.   Eyes:      Conjunctiva/sclera: Conjunctivae normal.   Cardiovascular:      Rate and Rhythm: Normal rate.   Pulmonary:      Effort: Pulmonary effort is normal. No respiratory distress.   Skin:     General: Skin is warm and dry.      Findings: No rash.   Neurological:      Mental Status: She is alert and oriented to person, place, and time.      Coordination: Coordination normal.   Psychiatric:         Behavior: Behavior normal.         Assessment & Plan     Problem List Items Addressed This Visit          Neuro    Chronic pain    Current Assessment & Plan     Continues to manage with intermittent use of Norco.  I reviewed the  and there were no concerns.         Chronic migraine without aura without status migrainosus, not intractable    Current Assessment & Plan     She requested refill of Fioricet which helps more for her migraines.  She was recommended to try Topamax for prophylaxis but gave up after only taking a couple weeks and said that the help.  I reassured her that she should consider trying it again because it would not shows benefits after only a couple weeks            Cardiac/Vascular    Essential hypertension (Chronic)    Current Assessment & Plan     Blood pressure well controlled.  Continue same medication.          Other Visit Diagnoses       Encounter for screening mammogram for breast cancer    -  Primary    Relevant Orders    Mammo Digital Screening Bilat    Cluster headache, not intractable, unspecified chronicity pattern        Relevant Medications    butalbital-acetaminophen-caffeine -40 mg (FIORICET, ESGIC) -40 mg per tablet              Immunizations Administered on Date of Encounter - 10/25/2023       Name Date Dose VIS Date Route Exp Date    COVID-19, mRNA, LNP-S, PF, 50 mcg/0.5 mL PF(MODERNA 2023 Ages 12+) 10/25/2023 10:46 AM 0.5 mL 9/13/2023 Intramuscular 4/17/2024    Site: Right deltoid     Given  By: Evette Higgins, PharmD     : [employer]     Lot: [id number]     Influenza - Quadrivalent - PF *Preferred* (6 months and older) 10/25/2023 10:46 AM 0.5 mL 8/6/2021 Intramuscular 6/30/2024    Site: Right deltoid     Given By: Evette Higgins, PharmD     : [employer]     Lot: [id number]              No follow-ups on file.      Disclaimer:  This note may have been prepared using voice recognition software, it may have not been extensively proofed, as such there could be errors within the text such as sound alike errors.

## 2023-10-25 NOTE — ASSESSMENT & PLAN NOTE
She requested refill of Fioricet which helps more for her migraines.  She was recommended to try Topamax for prophylaxis but gave up after only taking a couple weeks and said that the help.  I reassured her that she should consider trying it again because it would not shows benefits after only a couple weeks

## 2023-10-26 ENCOUNTER — OFFICE VISIT (OUTPATIENT)
Dept: PSYCHIATRY | Facility: CLINIC | Age: 53
End: 2023-10-26
Payer: MEDICARE

## 2023-10-26 ENCOUNTER — OFFICE VISIT (OUTPATIENT)
Dept: HEMATOLOGY/ONCOLOGY | Facility: CLINIC | Age: 53
End: 2023-10-26
Payer: MEDICARE

## 2023-10-26 DIAGNOSIS — F40.10 SOCIAL ANXIETY DISORDER: ICD-10-CM

## 2023-10-26 DIAGNOSIS — F41.0 PANIC DISORDER: ICD-10-CM

## 2023-10-26 DIAGNOSIS — F41.1 GAD (GENERALIZED ANXIETY DISORDER): Primary | ICD-10-CM

## 2023-10-26 DIAGNOSIS — D50.9 IRON DEFICIENCY ANEMIA, UNSPECIFIED IRON DEFICIENCY ANEMIA TYPE: Primary | ICD-10-CM

## 2023-10-26 DIAGNOSIS — I10 ESSENTIAL HYPERTENSION: ICD-10-CM

## 2023-10-26 DIAGNOSIS — F39 MOOD DISORDER: ICD-10-CM

## 2023-10-26 DIAGNOSIS — M79.7 FIBROMYALGIA: ICD-10-CM

## 2023-10-26 DIAGNOSIS — M35.01 SJOGREN'S SYNDROME WITH KERATOCONJUNCTIVITIS SICCA: ICD-10-CM

## 2023-10-26 DIAGNOSIS — F99 INSOMNIA DUE TO OTHER MENTAL DISORDER: ICD-10-CM

## 2023-10-26 DIAGNOSIS — F51.05 INSOMNIA DUE TO OTHER MENTAL DISORDER: ICD-10-CM

## 2023-10-26 PROCEDURE — 4010F ACE/ARB THERAPY RXD/TAKEN: CPT | Mod: HCNC,CPTII,95, | Performed by: NURSE PRACTITIONER

## 2023-10-26 PROCEDURE — 4010F PR ACE/ARB THEARPY RXD/TAKEN: ICD-10-PCS | Mod: HCNC,CPTII,95, | Performed by: INTERNAL MEDICINE

## 2023-10-26 PROCEDURE — 4010F ACE/ARB THERAPY RXD/TAKEN: CPT | Mod: HCNC,CPTII,95, | Performed by: INTERNAL MEDICINE

## 2023-10-26 PROCEDURE — 3044F HG A1C LEVEL LT 7.0%: CPT | Mod: HCNC,CPTII,95, | Performed by: INTERNAL MEDICINE

## 2023-10-26 PROCEDURE — 1160F PR REVIEW ALL MEDS BY PRESCRIBER/CLIN PHARMACIST DOCUMENTED: ICD-10-PCS | Mod: HCNC,CPTII,95, | Performed by: INTERNAL MEDICINE

## 2023-10-26 PROCEDURE — 99214 OFFICE O/P EST MOD 30 MIN: CPT | Mod: HCNC,95,, | Performed by: NURSE PRACTITIONER

## 2023-10-26 PROCEDURE — 3044F PR MOST RECENT HEMOGLOBIN A1C LEVEL <7.0%: ICD-10-PCS | Mod: HCNC,CPTII,95, | Performed by: NURSE PRACTITIONER

## 2023-10-26 PROCEDURE — 1160F RVW MEDS BY RX/DR IN RCRD: CPT | Mod: HCNC,CPTII,95, | Performed by: INTERNAL MEDICINE

## 2023-10-26 PROCEDURE — 1159F PR MEDICATION LIST DOCUMENTED IN MEDICAL RECORD: ICD-10-PCS | Mod: HCNC,CPTII,95, | Performed by: NURSE PRACTITIONER

## 2023-10-26 PROCEDURE — 3044F HG A1C LEVEL LT 7.0%: CPT | Mod: HCNC,CPTII,95, | Performed by: NURSE PRACTITIONER

## 2023-10-26 PROCEDURE — 99214 PR OFFICE/OUTPT VISIT, EST, LEVL IV, 30-39 MIN: ICD-10-PCS | Mod: HCNC,95,, | Performed by: INTERNAL MEDICINE

## 2023-10-26 PROCEDURE — 1159F MED LIST DOCD IN RCRD: CPT | Mod: HCNC,CPTII,95, | Performed by: INTERNAL MEDICINE

## 2023-10-26 PROCEDURE — 4010F PR ACE/ARB THEARPY RXD/TAKEN: ICD-10-PCS | Mod: HCNC,CPTII,95, | Performed by: NURSE PRACTITIONER

## 2023-10-26 PROCEDURE — 99214 OFFICE O/P EST MOD 30 MIN: CPT | Mod: HCNC,95,, | Performed by: INTERNAL MEDICINE

## 2023-10-26 PROCEDURE — 1160F PR REVIEW ALL MEDS BY PRESCRIBER/CLIN PHARMACIST DOCUMENTED: ICD-10-PCS | Mod: HCNC,CPTII,95, | Performed by: NURSE PRACTITIONER

## 2023-10-26 PROCEDURE — 99214 PR OFFICE/OUTPT VISIT, EST, LEVL IV, 30-39 MIN: ICD-10-PCS | Mod: HCNC,95,, | Performed by: NURSE PRACTITIONER

## 2023-10-26 PROCEDURE — 1160F RVW MEDS BY RX/DR IN RCRD: CPT | Mod: HCNC,CPTII,95, | Performed by: NURSE PRACTITIONER

## 2023-10-26 PROCEDURE — 1159F PR MEDICATION LIST DOCUMENTED IN MEDICAL RECORD: ICD-10-PCS | Mod: HCNC,CPTII,95, | Performed by: INTERNAL MEDICINE

## 2023-10-26 PROCEDURE — 3044F PR MOST RECENT HEMOGLOBIN A1C LEVEL <7.0%: ICD-10-PCS | Mod: HCNC,CPTII,95, | Performed by: INTERNAL MEDICINE

## 2023-10-26 PROCEDURE — 1159F MED LIST DOCD IN RCRD: CPT | Mod: HCNC,CPTII,95, | Performed by: NURSE PRACTITIONER

## 2023-10-26 RX ORDER — DIPHENHYDRAMINE HYDROCHLORIDE 50 MG/ML
50 INJECTION INTRAMUSCULAR; INTRAVENOUS ONCE AS NEEDED
Status: CANCELLED | OUTPATIENT
Start: 2023-12-19

## 2023-10-26 RX ORDER — EPINEPHRINE 0.3 MG/.3ML
0.3 INJECTION SUBCUTANEOUS ONCE AS NEEDED
Status: CANCELLED | OUTPATIENT
Start: 2023-11-28

## 2023-10-26 RX ORDER — DIPHENHYDRAMINE HYDROCHLORIDE 50 MG/ML
50 INJECTION INTRAMUSCULAR; INTRAVENOUS ONCE AS NEEDED
Status: CANCELLED | OUTPATIENT
Start: 2023-12-26

## 2023-10-26 RX ORDER — SODIUM CHLORIDE 0.9 % (FLUSH) 0.9 %
10 SYRINGE (ML) INJECTION
Status: CANCELLED | OUTPATIENT
Start: 2023-12-19

## 2023-10-26 RX ORDER — DIPHENHYDRAMINE HYDROCHLORIDE 50 MG/ML
50 INJECTION INTRAMUSCULAR; INTRAVENOUS ONCE AS NEEDED
Status: CANCELLED | OUTPATIENT
Start: 2024-01-02

## 2023-10-26 RX ORDER — EPINEPHRINE 0.3 MG/.3ML
0.3 INJECTION SUBCUTANEOUS ONCE AS NEEDED
Status: CANCELLED | OUTPATIENT
Start: 2023-12-19

## 2023-10-26 RX ORDER — HEPARIN 100 UNIT/ML
500 SYRINGE INTRAVENOUS
Status: CANCELLED | OUTPATIENT
Start: 2024-01-02

## 2023-10-26 RX ORDER — SODIUM CHLORIDE 0.9 % (FLUSH) 0.9 %
10 SYRINGE (ML) INJECTION
Status: CANCELLED | OUTPATIENT
Start: 2024-01-02

## 2023-10-26 RX ORDER — EPINEPHRINE 0.3 MG/.3ML
0.3 INJECTION SUBCUTANEOUS ONCE AS NEEDED
Status: CANCELLED | OUTPATIENT
Start: 2024-01-02

## 2023-10-26 RX ORDER — HEPARIN 100 UNIT/ML
500 SYRINGE INTRAVENOUS
Status: CANCELLED | OUTPATIENT
Start: 2023-12-19

## 2023-10-26 RX ORDER — EPINEPHRINE 0.3 MG/.3ML
0.3 INJECTION SUBCUTANEOUS ONCE AS NEEDED
Status: CANCELLED | OUTPATIENT
Start: 2023-11-21

## 2023-10-26 RX ORDER — DIPHENHYDRAMINE HYDROCHLORIDE 50 MG/ML
50 INJECTION INTRAMUSCULAR; INTRAVENOUS ONCE AS NEEDED
Status: CANCELLED | OUTPATIENT
Start: 2023-12-12

## 2023-10-26 RX ORDER — DIPHENHYDRAMINE HYDROCHLORIDE 50 MG/ML
50 INJECTION INTRAMUSCULAR; INTRAVENOUS ONCE AS NEEDED
Status: CANCELLED | OUTPATIENT
Start: 2023-10-27

## 2023-10-26 RX ORDER — EPINEPHRINE 0.3 MG/.3ML
0.3 INJECTION SUBCUTANEOUS ONCE AS NEEDED
Status: CANCELLED | OUTPATIENT
Start: 2023-12-12

## 2023-10-26 RX ORDER — SODIUM CHLORIDE 0.9 % (FLUSH) 0.9 %
10 SYRINGE (ML) INJECTION
Status: CANCELLED | OUTPATIENT
Start: 2023-11-28

## 2023-10-26 RX ORDER — DIPHENHYDRAMINE HYDROCHLORIDE 50 MG/ML
50 INJECTION INTRAMUSCULAR; INTRAVENOUS ONCE AS NEEDED
Status: CANCELLED | OUTPATIENT
Start: 2023-11-28

## 2023-10-26 RX ORDER — HEPARIN 100 UNIT/ML
500 SYRINGE INTRAVENOUS
Status: CANCELLED | OUTPATIENT
Start: 2023-12-05

## 2023-10-26 RX ORDER — HEPARIN 100 UNIT/ML
500 SYRINGE INTRAVENOUS
Status: CANCELLED | OUTPATIENT
Start: 2023-11-21

## 2023-10-26 RX ORDER — DIPHENHYDRAMINE HYDROCHLORIDE 50 MG/ML
50 INJECTION INTRAMUSCULAR; INTRAVENOUS ONCE AS NEEDED
Status: CANCELLED | OUTPATIENT
Start: 2023-11-21

## 2023-10-26 RX ORDER — HEPARIN 100 UNIT/ML
500 SYRINGE INTRAVENOUS
Status: CANCELLED | OUTPATIENT
Start: 2023-10-27

## 2023-10-26 RX ORDER — HEPARIN 100 UNIT/ML
500 SYRINGE INTRAVENOUS
Status: CANCELLED | OUTPATIENT
Start: 2023-12-26

## 2023-10-26 RX ORDER — SODIUM CHLORIDE 0.9 % (FLUSH) 0.9 %
10 SYRINGE (ML) INJECTION
Status: CANCELLED | OUTPATIENT
Start: 2023-11-21

## 2023-10-26 RX ORDER — EPINEPHRINE 0.3 MG/.3ML
0.3 INJECTION SUBCUTANEOUS ONCE AS NEEDED
Status: CANCELLED | OUTPATIENT
Start: 2023-12-26

## 2023-10-26 RX ORDER — EPINEPHRINE 0.3 MG/.3ML
0.3 INJECTION SUBCUTANEOUS ONCE AS NEEDED
Status: CANCELLED | OUTPATIENT
Start: 2023-10-27

## 2023-10-26 RX ORDER — SODIUM CHLORIDE 0.9 % (FLUSH) 0.9 %
10 SYRINGE (ML) INJECTION
Status: CANCELLED | OUTPATIENT
Start: 2023-12-12

## 2023-10-26 RX ORDER — SODIUM CHLORIDE 0.9 % (FLUSH) 0.9 %
10 SYRINGE (ML) INJECTION
Status: CANCELLED | OUTPATIENT
Start: 2023-12-05

## 2023-10-26 RX ORDER — SODIUM CHLORIDE 0.9 % (FLUSH) 0.9 %
10 SYRINGE (ML) INJECTION
Status: CANCELLED | OUTPATIENT
Start: 2023-12-26

## 2023-10-26 RX ORDER — DIPHENHYDRAMINE HYDROCHLORIDE 50 MG/ML
50 INJECTION INTRAMUSCULAR; INTRAVENOUS ONCE AS NEEDED
Status: CANCELLED | OUTPATIENT
Start: 2023-12-05

## 2023-10-26 RX ORDER — HEPARIN 100 UNIT/ML
500 SYRINGE INTRAVENOUS
Status: CANCELLED | OUTPATIENT
Start: 2023-12-12

## 2023-10-26 RX ORDER — EPINEPHRINE 0.3 MG/.3ML
0.3 INJECTION SUBCUTANEOUS ONCE AS NEEDED
Status: CANCELLED | OUTPATIENT
Start: 2023-12-05

## 2023-10-26 RX ORDER — SODIUM CHLORIDE 0.9 % (FLUSH) 0.9 %
10 SYRINGE (ML) INJECTION
Status: CANCELLED | OUTPATIENT
Start: 2023-10-27

## 2023-10-26 RX ORDER — HEPARIN 100 UNIT/ML
500 SYRINGE INTRAVENOUS
Status: CANCELLED | OUTPATIENT
Start: 2023-11-28

## 2023-10-26 NOTE — PROGRESS NOTES
OUTPATIENT PSYCHIATRY RETURN VISIT    ENCOUNTER DATE:  11/7/2023  SITE:  [facility]  LENGTH OF SESSION:  12 minutes    The patient location is:  [place], not in a healthcare facility  Visit type:  audiovisual    Face to Face time with patient:  12 minutes  18 minutes of total time spent on the encounter, which includes face to face time and non-face to face time preparing to see the patient (eg, review of tests), Obtaining and/or reviewing separately obtained history, Documenting clinical information in the electronic or other health record, Independently interpreting results (not separately reported) and communicating results to the patient/family/caregiver, or Care coordination (not separately reported).     Each patient to whom he or she provides medical services by telemedicine is:  (1) informed of the relationship between the physician and patient and the respective role of any other health care provider with respect to management of the patient; and (2) notified that he or she may decline to receive medical services by telemedicine and may withdraw from such care at any time.    CHIEF COMPLAINT:  Mood      HISTORY OF PRESENTING ILLNESS:  Jaki Bajwa is a 53 y.o. female with history of Mood disorder, JUAN JOSE, OCD, social phobia, and Internet shopping addiction who presents for follow up appointment.      Plan at last appointment on 7/11/2023:  Discussed increasing Buspar 15mg to 2-3 times per day since anxiety seems worse in the afternoons and she is currently only taking in the morning.    Continue Latuda 60mg daily, Wellbutrin XL 450mg daily, Doxepin 150mg qHS, and Valium 2mg PRN panic attack (#5 tablets per month).  Message sent to Dr. Rasmussen regarding blood pressure and headaches.  She has appointment with new PCP, Dr. Carpenter, on 7/25.  Discussed with patient informed consent, risks versus benefits, alternative treatments, side effect profile and the inherent unpredictability  of individual responses to these treatments.  The patient expresses understanding of the above and displays the capacity to agree with this current plan.   Continue individual therapy with Mr. Zamora, HALINA.    History as told by patient:  Says her mood has been good.  Has been getting more rest and eating better.  New grandson that is 1 month old - that makes her happy.  Helping out her daughter a whole lot.  Going back this weekend.  Tried but wants to help.  Anxiety still when she goes out of the house, takes PRN and it works.  Avoiding [employer].  Just got back from [place] - was calm with her medication.  She is going to [place] in 2 weeks with Scott.  Sleeping well.  Shopping remains unchanged.      Medication side effects:  Denies  Medication compliance:  Yes    PSYCHIATRIC REVIEW OF SYSTEMS:  Trouble with sleep:  Denies  Appetite changes:  Denies  Weight changes:  Denies  Lack of energy:  Denies  Anhedonia:  Denies  Somatic symptoms:  Chronic pain  Libido:  Denies  Anxiety/panic:  Chronic but stable, triggered by leaving the house  Guilty/hopeless:  Denies  Self-injurious behavior/risky behavior:  Denies  Any drugs:  Denies  Alcohol:  Denies    MEDICAL REVIEW OF SYSTEMS:  Complete review of systems performed covering Constitutional, Musculoskeletal, Neurologic.  All systems negative except for that covered in HPI.    PAST PSYCHIATRIC, MEDICAL, AND SOCIAL HISTORY REVIEWED  The patient's past medical, family and social history have been reviewed and updated as appropriate within the electronic medical record - see encounter notes.    MEDICATIONS:    Current Outpatient Medications:     amLODIPine (NORVASC) 5 MG tablet, Take 1 tablet (5 mg total) by mouth once daily., Disp: 90 tablet, Rfl: 3    buPROPion (WELLBUTRIN XL) 150 MG TB24 tablet, TAKE 3 TABLETS(450 MG) BY MOUTH EVERY MORNING, Disp: 270 tablet, Rfl: 1    busPIRone (BUSPAR) 15 MG tablet, TAKE 1 TABLET(15 MG) BY MOUTH THREE TIMES DAILY, Disp: 270  tablet, Rfl: 1    butalbital-acetaminophen-caffeine -40 mg (FIORICET, ESGIC) -40 mg per tablet, Take 1 tablet by mouth every 6 (six) hours as needed for Headaches., Disp: 40 tablet, Rfl: 0    cetirizine (ZYRTEC) 10 MG tablet, Take 1 tablet (10 mg total) by mouth once daily., Disp: 30 tablet, Rfl: 11    diazePAM (VALIUM) 2 MG tablet, Take 1 tablet (2 mg total) by mouth daily as needed (Severe anxiety/Panic). 15 tablets to last 3 months, Disp: 15 tablet, Rfl: 0    docusate sodium (COLACE) 100 MG capsule, Take 1 capsule (100 mg total) by mouth 2 (two) times daily as needed for Constipation., Disp: 60 capsule, Rfl: 0    doxepin (SINEQUAN) 75 MG capsule, TAKE 2 CAPSULES(150 MG) BY MOUTH EVERY EVENING, Disp: 60 capsule, Rfl: 11    ergocalciferol (VITAMIN D2) 50,000 unit Cap, Take 1 capsule (50,000 Units total) by mouth every 7 days. (Patient not taking: Reported on 10/25/2023), Disp: 12 capsule, Rfl: 0    estradioL (ESTRACE) 0.01 % (0.1 mg/gram) vaginal cream, 1g vaginally daily x 2 weeks, then BIW as directed (Patient not taking: Reported on 6/13/2023), Disp: 42.5 g, Rfl: 2    fluticasone propionate (FLONASE) 50 mcg/actuation nasal spray, SHAKE LIQUID AND USE 1 SPRAY(50 MCG) IN EACH NOSTRIL EVERY DAY, Disp: 32 g, Rfl: 3    gabapentin (NEURONTIN) 300 MG capsule, TAKE 1 CAPSULE(300 MG) BY MOUTH THREE TIMES DAILY, Disp: 90 capsule, Rfl: 5    hydroCHLOROthiazide (HYDRODIURIL) 12.5 MG Tab, Take 1 tablet (12.5 mg total) by mouth once daily. For blood pressure, Disp: 90 tablet, Rfl: 3    HYDROcodone-acetaminophen (NORCO) 5-325 mg per tablet, Take 1 tablet by mouth every 24 hours as needed for Pain. Quantity medically necessary, Disp: 30 tablet, Rfl: 0    hydroxychloroquine (PLAQUENIL) 200 mg tablet, TAKE 1 TABLET BY MOUTH TWICE DAILY, Disp: 180 tablet, Rfl: 2    irbesartan (AVAPRO) 300 MG tablet, Take 1 tablet (300 mg total) by mouth every evening., Disp: 90 tablet, Rfl: 3    lurasidone (LATUDA) 60 mg Tab tablet,  "TAKE 1 TABLET(60 MG) BY MOUTH EVERY DAY, Disp: 90 tablet, Rfl: 3    NARCAN 4 mg/actuation [place], SPRAY 1 SPRAY IN NOSTRIL ONCE FOR 1 DOSE, Disp: , Rfl:     nystatin (MYCOSTATIN) 100,000 unit/mL suspension, Take by mouth after meals as needed., Disp: , Rfl:     omeprazole (PRILOSEC) 40 MG capsule, Take 1 capsule (40 mg total) by mouth once daily., Disp: 90 capsule, Rfl: 3    pilocarpine (SALAGEN) 5 MG Tab, Take 1 tablet (5 mg total) by mouth 4 (four) times daily., Disp: 120 tablet, Rfl: 11    RESTASIS 0.05 % ophthalmic emulsion, INSTILL 1 DROP IN BOTH EYES TWICE DAILY, Disp: 60 each, Rfl: 1    scopolamine (TRANSDERM-SCOP) 1.3-1.5 mg (1 mg over 3 days), Place 1 patch onto the skin every 72 hours., Disp: 4 patch, Rfl: 0    solifenacin (VESICARE) 5 MG tablet, Take 1 tablet (5 mg total) by mouth once daily., Disp: 30 tablet, Rfl: 11    tiZANidine (ZANAFLEX) 4 MG tablet, 8 mg tid, Disp: 180 tablet, Rfl: 5    topiramate (TOPAMAX) 25 MG tablet, Take 1 tablet (25 mg total) by mouth once daily for 30 days, THEN 2 tablets (50 mg total) once daily. Take one tablet daily for 2 weeks, then two tablets daily afterwards. (Patient not taking: Reported on 10/25/2023), Disp: 90 tablet, Rfl: 0    ALLERGIES:  Review of patient's allergies indicates:   Allergen Reactions    Aspirin Hives       PSYCHIATRIC EXAM:  There were no vitals filed for this visit.    Appearance:  Well groomed, appearing healthy and of stated age  Behavior:  Cooperative, pleasant, no psychomotor agitation or retardation  Speech:  Normal rate, rhythm, prosody, and volume  Mood:  "Good"  Affect:  Euthymic  Thought Process:  Linear, logical, goal directed  Thought Content:  Negative for suicidal ideation, homicidal ideation, delusions or hallucinations.  Associations:  Intact  Memory:  Grossly Intact  Level of Consciousness/Orientation:  Grossly intact  Fund of Knowledge:  Good  Attention:  Good  Language:  Fluent, able to name abstract and concrete objects  Insight:  " Fair  Judgment:  Intact  Psychomotor signs:  No abnormal movements of face  Gait:  Unable to assess via virtual visit      RELEVANT LABS/STUDIES:    Lab Results   Component Value Date    WBC 8.44 10/25/2023    HGB 8.3 (L) 10/25/2023    HCT 29.4 (L) 10/25/2023    MCV 77 (L) 10/25/2023     10/25/2023     BMP  Lab Results   Component Value Date     07/25/2023    K 4.1 07/25/2023     07/25/2023    CO2 27 07/25/2023    BUN 15 07/25/2023    CREATININE 1.0 07/25/2023    CALCIUM 9.9 07/25/2023    ANIONGAP 8 07/25/2023    ESTGFRAFRICA 45.5 (A) 06/15/2022    EGFRNONAA 39.5 (A) 06/15/2022     Lab Results   Component Value Date    ALT 25 06/02/2023    AST 35 06/02/2023    ALKPHOS 68 06/02/2023    BILITOT 0.5 06/02/2023     Lab Results   Component Value Date    TSH 2.563 09/22/2021     Lab Results   Component Value Date    HGBA1C 5.1 03/14/2023       IMPRESSION:    Jaki Bajwa is a 53 y.o. female with history of Mood disorder, JUAN JOSE, OCD, social phobia, and Internet shopping addiction who presents for follow up appointment.    DIAGNOSES:    ICD-10-CM ICD-9-CM   1. JUAN JOSE (generalized anxiety disorder)  F41.1 300.02   2. Panic disorder  F41.0 300.01   3. Social anxiety disorder  F40.10 300.23   4. Mood disorder  F39 296.90   5. Insomnia due to other mental disorder  F51.05 300.9    F99 327.02   6. Fibromyalgia  M79.7 729.1       PLAN:  Mood is currently stable.  No medication changes today.   Continue Latuda 60mg daily, Wellbutrin XL 450mg daily, Buspar 15mg BID-TID, Doxepin 150mg qHS, and Valium 2mg PRN panic attack (#5 tablets per month).  Discussed with patient informed consent, risks versus benefits, alternative treatments, side effect profile and the inherent unpredictability of individual responses to these treatments.  The patient expresses understanding of the above and displays the capacity to agree with this current plan.   Continue individual therapy with Mr. Noah LCSW.    RETURN TO CLINIC:   Follow up in about 3 months (around 1/26/2024).

## 2023-10-29 RX ORDER — HYDROXYCHLOROQUINE SULFATE 200 MG/1
TABLET, FILM COATED ORAL
Qty: 180 TABLET | Refills: 2 | Status: SHIPPED | OUTPATIENT
Start: 2023-10-29

## 2023-11-03 ENCOUNTER — OFFICE VISIT (OUTPATIENT)
Dept: RHEUMATOLOGY | Facility: CLINIC | Age: 53
End: 2023-11-03
Payer: MEDICARE

## 2023-11-03 DIAGNOSIS — M35.01 SJOGREN'S SYNDROME WITH KERATOCONJUNCTIVITIS SICCA: ICD-10-CM

## 2023-11-03 DIAGNOSIS — Z13.820 SCREENING FOR OSTEOPOROSIS: Primary | ICD-10-CM

## 2023-11-03 DIAGNOSIS — Z79.899 LONG-TERM USE OF PLAQUENIL: ICD-10-CM

## 2023-11-03 DIAGNOSIS — G89.4 CHRONIC PAIN SYNDROME: ICD-10-CM

## 2023-11-03 PROCEDURE — 1159F PR MEDICATION LIST DOCUMENTED IN MEDICAL RECORD: ICD-10-PCS | Mod: HCNC,CPTII,95, | Performed by: INTERNAL MEDICINE

## 2023-11-03 PROCEDURE — 4010F PR ACE/ARB THEARPY RXD/TAKEN: ICD-10-PCS | Mod: HCNC,CPTII,95, | Performed by: INTERNAL MEDICINE

## 2023-11-03 PROCEDURE — 99214 OFFICE O/P EST MOD 30 MIN: CPT | Mod: HCNC,95,, | Performed by: INTERNAL MEDICINE

## 2023-11-03 PROCEDURE — 3044F HG A1C LEVEL LT 7.0%: CPT | Mod: HCNC,CPTII,95, | Performed by: INTERNAL MEDICINE

## 2023-11-03 PROCEDURE — 1160F PR REVIEW ALL MEDS BY PRESCRIBER/CLIN PHARMACIST DOCUMENTED: ICD-10-PCS | Mod: HCNC,CPTII,95, | Performed by: INTERNAL MEDICINE

## 2023-11-03 PROCEDURE — 1159F MED LIST DOCD IN RCRD: CPT | Mod: HCNC,CPTII,95, | Performed by: INTERNAL MEDICINE

## 2023-11-03 PROCEDURE — 4010F ACE/ARB THERAPY RXD/TAKEN: CPT | Mod: HCNC,CPTII,95, | Performed by: INTERNAL MEDICINE

## 2023-11-03 PROCEDURE — 99214 PR OFFICE/OUTPT VISIT, EST, LEVL IV, 30-39 MIN: ICD-10-PCS | Mod: HCNC,95,, | Performed by: INTERNAL MEDICINE

## 2023-11-03 PROCEDURE — 3044F PR MOST RECENT HEMOGLOBIN A1C LEVEL <7.0%: ICD-10-PCS | Mod: HCNC,CPTII,95, | Performed by: INTERNAL MEDICINE

## 2023-11-03 PROCEDURE — 1160F RVW MEDS BY RX/DR IN RCRD: CPT | Mod: HCNC,CPTII,95, | Performed by: INTERNAL MEDICINE

## 2023-11-03 RX ORDER — TIZANIDINE 4 MG/1
TABLET ORAL
Qty: 90 TABLET | Refills: 0 | Status: SHIPPED | OUTPATIENT
Start: 2023-11-03 | End: 2023-11-03

## 2023-11-03 RX ORDER — TIZANIDINE 4 MG/1
TABLET ORAL
Qty: 180 TABLET | Refills: 5 | Status: SHIPPED | OUTPATIENT
Start: 2023-11-03 | End: 2023-11-20

## 2023-11-03 RX ORDER — HYDROCODONE BITARTRATE AND ACETAMINOPHEN 5; 325 MG/1; MG/1
1 TABLET ORAL
Qty: 30 TABLET | Refills: 0 | Status: SHIPPED | OUTPATIENT
Start: 2023-11-03 | End: 2023-11-21 | Stop reason: SDUPTHER

## 2023-11-03 NOTE — TELEPHONE ENCOUNTER
No care due was identified.  [facility] Embedded Care Due Messages. Reference number: [id number].   11/03/2023 8:16:29 AM CDT

## 2023-11-03 NOTE — PROGRESS NOTES
Chief Complaint   Patient presents with    Disease Management           The patient location is: Home  The chief complaint leading to consultation is: sjogren's syndrome    Visit type: audiovisual    Face to Face time with patient: 20   minutes of total time spent on the encounter, which includes face to face time and non-face to face time preparing to see the patient (eg, review of tests), Obtaining and/or reviewing separately obtained history, Documenting clinical information in the electronic or other health record, Independently interpreting results (not separately reported) and communicating results to the patient/family/caregiver, or Care coordination (not separately reported).         Each patient to whom he or she provides medical services by telemedicine is:  (1) informed of the relationship between the physician and patient and the respective role of any other health care provider with respect to management of the patient; and (2) notified that he or she may decline to receive medical services by telemedicine and may withdraw from such care at any time.    Notes:     History of presenting illness    52  year old black female has     Joint pains in the knees and ankles   Back pain   Diffuse aching  Nocturnal leg cramps  Worse pain with activity  Hand paresthesias+  Morning pain but no morning stiffness  No joint swelling     She now has a diagnosis of fibromyalgia  Diagnosis + confirmed by atleast 2 rheumatologists  On gabapentin 300 mg tid  on naprosyn 500 mg daily  Tried and failed flexeril  On zanaflex 4 mg bid prn for TMJ syndrome and muscle spasms   Takes doxepin at night 150 mg for insomnia   She follows with pain clinic and takes pain medications  She gets migraines but not severe  She has tested negative for sleep apnea    She has anxiety and depression : on wellbutrin 150 to 300 mg and buspar  5 mg bid   Valium is prn only  On latuda 60 mg     Dry eyes and dry mouth and inflammatory  arthritis/arthralgia   Abnormal labs   Sjogren's syndrome +   On plaquenil 200 mg bid   Pilocarpine makes her nauseated   So we gave cevelimine   Dry mouth is severe at night   Dry eyes severe /but if she uses restasis she has symptom control  No recurrent conjunctivitis or uveitis or scleritis or episcleritis but she remembers once they told her she has inflammation in the eyes due to dry eyes   Sees ophthalmology     She has dry patches of skin   Areas of pigmentation on the elbows and thighs   Butterfly like pigmentation on the eyelids and underneath the eyes   Dermatology says : nummular dermatitis and seborrheic keratosis   No other skin rashes,photosensitivity   No telangiectasias   No calcinosis   No psoriasis   She does have patchy alopecia ,b/l frontally     No oral and nasal ulcers     No pleurisy or any cardiopulmonary complaints     No dysphagia,diplopia and dysphonia and muscle weakness     No v/d/c   Nausea and  acid reflux+ on PPIs   [facility]  She has hiatal hernia     She has raynaud's+   No digital ulcers     No cytopenias   Mostly irom deficiency anemia  White count and plt count ok  No menorrhagia /hysterectomy  Diet is poor    No renal issues     No blood clots     No fever,chills,night sweats,weight loss and loss of appetite  No unexplained lymphadenopathy and parotitis      No pregnancy losses/pre term deliveries /pregnancy complications     No new onset headaches     No chronic or bloody diarrhea with no u colitis or crohn's /inflammatory bowel disease     No vaginal or  urethral  d/c/STDs/no ulcers     No unexplained neurologic symptoms       Labs     White count always normal    Anemia     ESR 25/23  CRP 3.3/6.8    Creat and GFR always normal  LFTs ok    OLINDA negative     Hep A,B,C negative   HIV negative    Has had UTIs and proteinuria and hematuria  She has to see urology  The UTIs are always s/p intercourse    8/2020 labs     OLINDA neg  SSA positive 2.19  nml complements    Dsdna neg  cryos nml  myomarker panel neg  Ck,aldolase nml  RF  Urine nml  UPCR neg  GFR 54.9  Immunoglobulins elevated 2088  GIGI neg,LDH nml  B12,folate nml  retic nml      First follow up  9/2021    Hands hurt/ache  Foot and ankles : swell now and then   It comes and goes away  She takes a diuretic     Dry eyes and dry mouth-ok    On plaquenil 200 mg bid     On evoxac 30 mg tid-causes stomach upset    1/2022    Some aches and pains    On plaquenil 200 mg bid  On evoxac 30 mg daily-cant tolerate more   Dry eyes and dry mouth-ok    5/2022    Iron infusions let to constipation  She went to the ER  GFR dropped to 28.7  NOW UPTO >60  UA -uti  On antibiotics    Dry eyes and mouth- does well  Drinks well    On plaquenil 200 mg bid  Eye exam-pending    Doesn't want evoxac  Will try pilocarpine     White count nml  H/h 10.5/36.9  Iron deficiency  plts nml    Sees hematology     2/2022    Complements,cryos,dsdna,CRP nml  UA,UPCR nml      11/2022    Nov-tibial fracture-Healing well-fell down the steps-mechanical fall  Will need a dexa    Dry eyes-eye drops and restasis helps  Dry mouth -pilocarpine helps    On plaquenil 200 mg bid-no major joint pains  No gland enlargement,lymph node enlargement,rashes     Anemia- 10.7/36.1  Stopped iron infusions  Nml white count and plts    GFR 35  UA 1+ protein  Iron -normal in June 2022  We dont know the cause of the low GFR    3/2023    Few problems with the tongue  Tongue was cracked with blisters  Her mouth was very dry  She takes pilocarpine 5 mg tid    Dexa not done    Dry eyes comes as flares  Eye pain- needs evaluation      On plaquenil 200 mg bid-no major joint pains  No gland enlargement,lymph node enlargement,rashes     11/2023    Pilocarpine 5 mg qid is helping  Dry mouth better    Dry eyes are better  Ophthalmology is seeing her    Seeing dentist  Using nystatin swish and swallow    She needs her annual eye exam soon    Dentist appointment also pending  Still has mouth  sores  She eats ice and crunches on it all day-she suspects that this is the cause  She will update me    On plaquenil 200 mg bid-no major joint pains    Past history : sjogren's,fibromyalgia     Family history : lupus cousin and cancers    Social history : not a smoker,alcohol user        Review of Systems   Constitutional:  Negative for fever and unexpected weight change.   HENT:  Negative for mouth sores and trouble swallowing.    Eyes:  Negative for redness.   Respiratory:  Negative for cough and shortness of breath.    Cardiovascular:  Negative for chest pain.   Gastrointestinal:  Negative for constipation and diarrhea.   Genitourinary:  Negative for dysuria and genital sores.   Skin:  Positive for rash.   Neurological:  Negative for headaches.   Hematological:  Does not bruise/bleed easily.       As detailed above     MSK exam  nml    Physical Exam   Constitutional: She is oriented to person, place, and time. No distress.   HENT:   Head: Normocephalic.   Mouth/Throat: Oropharynx is clear and moist.   Eyes: Pupils are equal, round, and reactive to light. Conjunctivae are normal. Right eye exhibits no discharge. Left eye exhibits no discharge. No scleral icterus.   Neck: No thyromegaly present.   Cardiovascular: Normal rate, regular rhythm and normal heart sounds.   Pulmonary/Chest: Effort normal and breath sounds normal. No stridor.   Abdominal: Soft. Bowel sounds are normal.   Musculoskeletal:         General: Normal range of motion.      Cervical back: Normal range of motion.   Lymphadenopathy:     She has no cervical adenopathy.   Neurological: She is alert and oriented to person, place, and time.   Skin: Skin is warm. No rash noted. She is not diaphoretic.   Psychiatric: Affect and judgment normal.       She has lost a lot of hair frontally   Its like a bald patch  Its chronic  There is a mole as well    She has dry hyperpigmented skin on the elbows and dry patches on thighs for evaluation  She has frontal  baldness  She has a mole like lesion   She has hyperpigmented rash on the eyelids and under the eyes       Assessment     52 year old black female with    -Sjogren's syndrome  Sicca symptoms and inflammatory arthritis    Fibromyalgia   -chronic pain + RLS   -insomnia/no sleep apnea  -anxiety and depression     Joint pains  Hands,feet,ankles and knees    Fluid retention needing diuretics and compression stockings    CTS braces help    Anemia- sees hematology ,completed iron infusions      3/2023    Few problems with the tongue  Tongue was cracked with blisters  Her mouth was very dry  She takes pilocarpine 5 mg tid    Dexa not done    Dry eyes comes as flares  Eye pain- needs evaluation      On plaquenil 200 mg bid-no major joint pains  No gland enlargement,lymph node enlargement,rashes     Hands-when she places it a certain way- it goes numb and dead  She drops things easily    GFR 35- 48.9  UA,UPCR nml  Nephro saw her  11/2023    Pilocarpine 5 mg qid is helping  Dry mouth better    Dry eyes are better  Ophthalmology is seeing her    Seeing dentist  Using nystatin swish and swallow    She needs her annual eye exam soon    Dentist appointment also pending  Still has mouth sores  She eats ice and crunches on it all day-she suspects that this is the cause  She will update me    On plaquenil 200 mg bid-no major joint pains    EMG/NCS UE pending    Iron deficiency needing infusions  Hb 8.3/29.4  White count nml  Plts nml  BMP nml    SPEP utd    1. Screening for osteoporosis    2. Sjogren's syndrome with keratoconjunctivitis sicca    3. Long-term use of Plaquenil              Reviewed labs/xrays  Reviewed medications    Ordered labs /xrays    F/u     Plan      She does need EMG/NCS B/L UE    Dexa denied by insurance     Do Sjogren's labs next time    Nephrology- f/u  She gets recurrent UTIs-sees urology    Dry eye management   -on restasis  Opthal     Dry mouth management   Cant tolerate evoxac 30 mg tid -cant tolerate 30  mg bid  Continue pilocarpine 5 mg qid' nystatin swish and swallow    Skin issues : dry hyperpigmented skin on the elbows and dry patches on thighs   She has frontal baldness  She has a mole like lesion   She has hyperpigmented rash on the eyelids and under the eyes   She has nummular dermatitis and seborrheic dermatitis   Dermatology saw her,on topical medications   Has more hair fall recently she will talk to them    Counselled extraglandular manifestations   Counselled risk of lymphoma    Use of vaginal lubricants/estrogen creams   Seeing Gyn    Use hypoallergenic soaps/lotions for the skin  Avoid drafts from air conditioners/heaters/radiators  Avoid detergents,deodorant soaps,very hot water  Use humidifiers    Have to order CBC,CMP,ESR,CRP,urine analysis,urine creatinine,urine protein,cryos and complements,globulins,quantitative immunoglobulins    Anemia being addressed-she sees hematology  Bone marrow biopsy 2014 : [facility]  Hematology note :  Since May 2012,  hemoglobin values have been between 10.1-11.6.   She had hysterectomy and has no ongoing menstrual losses. No s/s of mal-absorption. Diet is normal/regular.   No overt bleeding. No melena. She has fatigue and heart burn.  Stool OB negative in Jan 2020. UA negative for blood/ RBC in Aug 2020.    She received iv injectafer on 9/30 and 10/7/20. Serum ferritin now normal. LDH, reticuloyte count, b12, folate all normal today. Hemoglobin 11.4g/dl. GIGI negative.  Oral iron did not help so doing IV iron     White count always normal  Always anemic   plt count always normal    Continue plaquenil 200 mg bid  Eye exam and routine eval once a year    Pain management :   Narcotics at pain clinic  Gabapentin 300 mg tid  zanaflex dose increased to 8 mg tid    Insomnia management : doxepin  No sleep apnea    Anxiety and depression management  On latuda,wellbutrin and buspar       Jaki was seen today for disease management.    Diagnoses and all orders for this  visit:    Screening for osteoporosis    Sjogren's syndrome with keratoconjunctivitis sicca  -     CBC Auto Differential; Future  -     Comprehensive Metabolic Panel; Future  -     Sedimentation rate; Future  -     C-Reactive Protein; Future  -     C3 Complement; Future  -     C4 Complement; Future  -     Cryoglobulin; Future  -     Urinalysis; Future  -     Protein/Creatinine Ratio, Urine; Future  -     IMMUNOGLOBULINS (IGG, IGA, IGM) QUANTITATIVE; Future    Long-term use of Plaquenil  -     CBC Auto Differential; Future  -     Comprehensive Metabolic Panel; Future  -     Sedimentation rate; Future  -     C-Reactive Protein; Future  -     C3 Complement; Future  -     C4 Complement; Future  -     Cryoglobulin; Future  -     Urinalysis; Future  -     Protein/Creatinine Ratio, Urine; Future  -     IMMUNOGLOBULINS (IGG, IGA, IGM) QUANTITATIVE; Future    Other orders  -     Discontinue: tiZANidine (ZANAFLEX) 4 MG tablet; 8 mg tid  -     tiZANidine (ZANAFLEX) 4 MG tablet; 8 mg tid              Every 6 months to

## 2023-11-03 NOTE — TELEPHONE ENCOUNTER
----- Message from Breonna Gan sent at 11/3/2023 10:54 AM CDT -----  Regarding: refill  Type:  RX Refill Request    Who Called: Jaki Thompson or New Rx:refill  RX Name and Strength:HYDROcodone-acetaminophen (NORCO) 5-325 mg per tablet  How is the patient currently taking it? (ex. 1XDay):  Is this a 30 day or 90 day RX:  Preferred Pharmacy with phone number:[id number] [place] [id number] [facility] [address] [facility]  Local or Mail Order:local  Ordering Provider:  Would the patient rather a call back or a response via [facility]? call  Best Call Back Number:[phone]  Additional Information:       LOV 10/25/23

## 2023-11-08 ENCOUNTER — OFFICE VISIT (OUTPATIENT)
Dept: PSYCHIATRY | Facility: CLINIC | Age: 53
End: 2023-11-08
Payer: MEDICARE

## 2023-11-08 DIAGNOSIS — H16.229 KERATOCONJUNCT SICCA, NOT SPECIFIED AS SJOGREN'S, UNSP EYE: ICD-10-CM

## 2023-11-08 DIAGNOSIS — F33.0 MILD EPISODE OF RECURRENT MAJOR DEPRESSIVE DISORDER: ICD-10-CM

## 2023-11-08 DIAGNOSIS — F41.0 PANIC DISORDER: ICD-10-CM

## 2023-11-08 DIAGNOSIS — F41.1 GAD (GENERALIZED ANXIETY DISORDER): Primary | ICD-10-CM

## 2023-11-08 DIAGNOSIS — F41.1 GAD (GENERALIZED ANXIETY DISORDER): ICD-10-CM

## 2023-11-08 PROCEDURE — 3044F PR MOST RECENT HEMOGLOBIN A1C LEVEL <7.0%: ICD-10-PCS | Mod: HCNC,CPTII,95, | Performed by: SOCIAL WORKER

## 2023-11-08 PROCEDURE — 4010F ACE/ARB THERAPY RXD/TAKEN: CPT | Mod: HCNC,CPTII,95, | Performed by: SOCIAL WORKER

## 2023-11-08 PROCEDURE — 3044F HG A1C LEVEL LT 7.0%: CPT | Mod: HCNC,CPTII,95, | Performed by: SOCIAL WORKER

## 2023-11-08 PROCEDURE — 4010F PR ACE/ARB THEARPY RXD/TAKEN: ICD-10-PCS | Mod: HCNC,CPTII,95, | Performed by: SOCIAL WORKER

## 2023-11-08 PROCEDURE — 90832 PSYTX W PT 30 MINUTES: CPT | Mod: HCNC,95,, | Performed by: SOCIAL WORKER

## 2023-11-08 PROCEDURE — 90832 PR PSYCHOTHERAPY W/PATIENT, 30 MIN: ICD-10-PCS | Mod: HCNC,95,, | Performed by: SOCIAL WORKER

## 2023-11-08 RX ORDER — TOPIRAMATE 25 MG/1
TABLET ORAL
Qty: 155 TABLET | Refills: 0 | Status: SHIPPED | OUTPATIENT
Start: 2023-11-08 | End: 2024-01-31

## 2023-11-08 RX ORDER — CYCLOSPORINE 0.5 MG/ML
1 EMULSION OPHTHALMIC 2 TIMES DAILY
Qty: 60 EACH | Refills: 1 | Status: SHIPPED | OUTPATIENT
Start: 2023-11-08 | End: 2023-11-08

## 2023-11-08 RX ORDER — BUSPIRONE HYDROCHLORIDE 15 MG/1
TABLET ORAL
Qty: 270 TABLET | Refills: 1 | Status: SHIPPED | OUTPATIENT
Start: 2023-11-08

## 2023-11-08 RX ORDER — BUPROPION HYDROCHLORIDE 150 MG/1
TABLET ORAL
Qty: 270 TABLET | Refills: 1 | Status: SHIPPED | OUTPATIENT
Start: 2023-11-08

## 2023-11-08 RX ORDER — CYCLOSPORINE 0.5 MG/ML
1 EMULSION OPHTHALMIC 2 TIMES DAILY
Qty: 180 EACH | Refills: 1 | Status: SHIPPED | OUTPATIENT
Start: 2023-11-08 | End: 2024-02-21
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
## 2023-11-09 NOTE — TELEPHONE ENCOUNTER
----- Message from Gin Neil sent at 11/8/2023  4:25 PM CST -----  Regarding: Refill Request    Who Called: Patient        New Prescription or Refill : Refill    RX Name and Strength:   naproxen (NAPROSYN) 500 MG tablet      RX Name and Strength:         RX Name and Strength:      30 day or 90 day RX:     Preferred Pharmacy:[facility] #[id number] [address] AT [facility] [address]           Would the patient rather a call back or a response via [facility]?    Best Call Back Number:  [phone]    Additional Information:Patient would need Rx by Friday please she will be out of town after, This Rx was prescribed by Faviola Gallo, Cali Carpenter fill this Rx

## 2023-11-09 NOTE — TELEPHONE ENCOUNTER
----- Message from Michael Bhardwaj sent at 11/9/2023  9:42 AM CST -----  Regarding: REFILL  Who Called:PENG MAYO [[id number]]          RX Name and Strength:naproxin 500mg       Couldn't find in chart    Is this a 30 day or 90 day RX: 90          Preferred Pharmacy with phone number:  [facility] #[id number] [place] [id number] AT [facility] [address]           Local or Mail Order: LOCAL        Going on a cruise need asap

## 2023-11-13 ENCOUNTER — TELEPHONE (OUTPATIENT)
Dept: OPHTHALMOLOGY | Facility: CLINIC | Age: 53
End: 2023-11-13
Payer: MEDICARE

## 2023-11-13 NOTE — TELEPHONE ENCOUNTER
RIKM informing pt that she is schedule to see Dr. Simons for Jan 8th. Testing is at 9:15am and the appt is afterwards.        ----- Message from Nakia Tracy MA sent at 11/9/2023  6:07 PM CST -----  Contact: pt @ [phone]    ----- Message -----  From: Brianna Damon  Sent: 11/9/2023   3:51 PM CST  To: Ronak FAJARDO Staff    PENG MAYO calling regarding Appointment Access  (message) for #pt is calling to get appt for 1 year devyn f/u with OCT Mac and HVF 10-2, asking for call back

## 2023-11-20 RX ORDER — TIZANIDINE 4 MG/1
TABLET ORAL
Qty: 90 TABLET | Refills: 0 | Status: SHIPPED | OUTPATIENT
Start: 2023-11-20 | End: 2024-02-11 | Stop reason: SDUPTHER

## 2023-11-21 ENCOUNTER — INFUSION (OUTPATIENT)
Dept: INFUSION THERAPY | Facility: HOSPITAL | Age: 53
End: 2023-11-21
Payer: MEDICARE

## 2023-11-21 ENCOUNTER — OFFICE VISIT (OUTPATIENT)
Dept: INTERNAL MEDICINE | Facility: CLINIC | Age: 53
End: 2023-11-21
Payer: MEDICARE

## 2023-11-21 VITALS
BODY MASS INDEX: 39.44 KG/M2 | TEMPERATURE: 98 F | DIASTOLIC BLOOD PRESSURE: 70 MMHG | WEIGHT: 251.31 LBS | SYSTOLIC BLOOD PRESSURE: 146 MMHG | HEART RATE: 109 BPM | HEIGHT: 67 IN | RESPIRATION RATE: 18 BRPM

## 2023-11-21 VITALS — SYSTOLIC BLOOD PRESSURE: 118 MMHG | DIASTOLIC BLOOD PRESSURE: 80 MMHG

## 2023-11-21 DIAGNOSIS — D50.9 IRON DEFICIENCY ANEMIA, UNSPECIFIED IRON DEFICIENCY ANEMIA TYPE: Primary | ICD-10-CM

## 2023-11-21 DIAGNOSIS — G89.4 CHRONIC PAIN SYNDROME: ICD-10-CM

## 2023-11-21 DIAGNOSIS — I10 ESSENTIAL HYPERTENSION: Primary | Chronic | ICD-10-CM

## 2023-11-21 DIAGNOSIS — N18.32 STAGE 3B CHRONIC KIDNEY DISEASE: ICD-10-CM

## 2023-11-21 PROCEDURE — 3074F PR MOST RECENT SYSTOLIC BLOOD PRESSURE < 130 MM HG: ICD-10-PCS | Mod: HCNC,CPTII,95, | Performed by: FAMILY MEDICINE

## 2023-11-21 PROCEDURE — 4010F ACE/ARB THERAPY RXD/TAKEN: CPT | Mod: HCNC,CPTII,95, | Performed by: FAMILY MEDICINE

## 2023-11-21 PROCEDURE — 25000003 PHARM REV CODE 250: Mod: HCNC | Performed by: NURSE PRACTITIONER

## 2023-11-21 PROCEDURE — 4010F PR ACE/ARB THEARPY RXD/TAKEN: ICD-10-PCS | Mod: HCNC,CPTII,95, | Performed by: FAMILY MEDICINE

## 2023-11-21 PROCEDURE — 3079F DIAST BP 80-89 MM HG: CPT | Mod: HCNC,CPTII,95, | Performed by: FAMILY MEDICINE

## 2023-11-21 PROCEDURE — 3044F HG A1C LEVEL LT 7.0%: CPT | Mod: HCNC,CPTII,95, | Performed by: FAMILY MEDICINE

## 2023-11-21 PROCEDURE — 96365 THER/PROPH/DIAG IV INF INIT: CPT | Mod: HCNC

## 2023-11-21 PROCEDURE — 1159F PR MEDICATION LIST DOCUMENTED IN MEDICAL RECORD: ICD-10-PCS | Mod: HCNC,CPTII,95, | Performed by: FAMILY MEDICINE

## 2023-11-21 PROCEDURE — 63600175 PHARM REV CODE 636 W HCPCS: Mod: HCNC | Performed by: NURSE PRACTITIONER

## 2023-11-21 PROCEDURE — 99214 PR OFFICE/OUTPT VISIT, EST, LEVL IV, 30-39 MIN: ICD-10-PCS | Mod: HCNC,95,, | Performed by: FAMILY MEDICINE

## 2023-11-21 PROCEDURE — 99214 OFFICE O/P EST MOD 30 MIN: CPT | Mod: HCNC,95,, | Performed by: FAMILY MEDICINE

## 2023-11-21 PROCEDURE — 3074F SYST BP LT 130 MM HG: CPT | Mod: HCNC,CPTII,95, | Performed by: FAMILY MEDICINE

## 2023-11-21 PROCEDURE — 3044F PR MOST RECENT HEMOGLOBIN A1C LEVEL <7.0%: ICD-10-PCS | Mod: HCNC,CPTII,95, | Performed by: FAMILY MEDICINE

## 2023-11-21 PROCEDURE — 3079F PR MOST RECENT DIASTOLIC BLOOD PRESSURE 80-89 MM HG: ICD-10-PCS | Mod: HCNC,CPTII,95, | Performed by: FAMILY MEDICINE

## 2023-11-21 PROCEDURE — 1159F MED LIST DOCD IN RCRD: CPT | Mod: HCNC,CPTII,95, | Performed by: FAMILY MEDICINE

## 2023-11-21 RX ORDER — HYDROCODONE BITARTRATE AND ACETAMINOPHEN 5; 325 MG/1; MG/1
1 TABLET ORAL
Qty: 30 TABLET | Refills: 0 | Status: SHIPPED | OUTPATIENT
Start: 2023-11-29 | End: 2023-11-29 | Stop reason: SDUPTHER

## 2023-11-21 RX ORDER — SODIUM CHLORIDE 0.9 % (FLUSH) 0.9 %
10 SYRINGE (ML) INJECTION
Status: CANCELLED | OUTPATIENT
Start: 2023-11-21

## 2023-11-21 RX ORDER — EPINEPHRINE 0.3 MG/.3ML
0.3 INJECTION SUBCUTANEOUS ONCE AS NEEDED
Status: DISCONTINUED | OUTPATIENT
Start: 2023-11-21 | End: 2023-11-21 | Stop reason: HOSPADM

## 2023-11-21 RX ORDER — HEPARIN 100 UNIT/ML
500 SYRINGE INTRAVENOUS
Status: DISCONTINUED | OUTPATIENT
Start: 2023-11-21 | End: 2023-11-21 | Stop reason: HOSPADM

## 2023-11-21 RX ORDER — HEPARIN 100 UNIT/ML
500 SYRINGE INTRAVENOUS
Status: CANCELLED | OUTPATIENT
Start: 2023-11-21

## 2023-11-21 RX ORDER — EPINEPHRINE 0.3 MG/.3ML
0.3 INJECTION SUBCUTANEOUS ONCE AS NEEDED
Status: CANCELLED | OUTPATIENT
Start: 2023-11-21

## 2023-11-21 RX ORDER — DIPHENHYDRAMINE HYDROCHLORIDE 50 MG/ML
50 INJECTION INTRAMUSCULAR; INTRAVENOUS ONCE AS NEEDED
Status: CANCELLED | OUTPATIENT
Start: 2023-11-21

## 2023-11-21 RX ORDER — DIPHENHYDRAMINE HYDROCHLORIDE 50 MG/ML
50 INJECTION INTRAMUSCULAR; INTRAVENOUS ONCE AS NEEDED
Status: DISCONTINUED | OUTPATIENT
Start: 2023-11-21 | End: 2023-11-21 | Stop reason: HOSPADM

## 2023-11-21 RX ORDER — SODIUM CHLORIDE 0.9 % (FLUSH) 0.9 %
10 SYRINGE (ML) INJECTION
Status: DISCONTINUED | OUTPATIENT
Start: 2023-11-21 | End: 2023-11-21 | Stop reason: HOSPADM

## 2023-11-21 RX ORDER — NAPROXEN 500 MG/1
500 TABLET ORAL 2 TIMES DAILY PRN
Qty: 60 TABLET | Refills: 1 | Status: SHIPPED | OUTPATIENT
Start: 2023-11-21 | End: 2024-02-02 | Stop reason: SDUPTHER

## 2023-11-21 RX ADMIN — IRON SUCROSE 200 MG: 20 INJECTION, SOLUTION INTRAVENOUS at 01:11

## 2023-11-21 RX ADMIN — SODIUM CHLORIDE: 9 INJECTION, SOLUTION INTRAVENOUS at 01:11

## 2023-11-21 NOTE — PLAN OF CARE
1415 pt tolerated tx without issue. Completed post infusion observation, no distress noted. Pt d/c to ome.

## 2023-11-21 NOTE — PROGRESS NOTES
Subjective:       Patient ID: Jaki Bajwa is a 53 y.o. female.    The patient location is: [place]  The chief complaint leading to consultation is: Medication Refill    Visit type: audiovisual  Total time spent with patient: 10 min  Each patient to whom he or she provides medical services by telemedicine is:  (1) informed of the relationship between the physician and patient and the respective role of any other health care provider with respect to management of the patient; and (2) notified that he or she may decline to receive medical services by telemedicine and may withdraw from such care at any time.    Medication Refill  Associated symptoms include arthralgias, headaches, numbness and weakness. Pertinent negatives include no abdominal pain, chest pain or fever.   Back Pain  This is a chronic problem. The current episode started more than 1 year ago. The problem occurs daily. The problem is unchanged. The pain is present in the sacro-iliac and thoracic spine. The quality of the pain is described as burning and shooting. The pain radiates to the left knee and right knee. The pain is at a severity of 8/10. The pain is moderate. The pain is The same all the time. The symptoms are aggravated by bending, position and twisting. Stiffness is present In the morning. Associated symptoms include headaches, leg pain, numbness, paresthesias and weakness. Pertinent negatives include no abdominal pain, bladder incontinence, bowel incontinence, chest pain, dysuria, fever, pelvic pain, perianal numbness, tingling or weight loss. The treatment provided significant relief.     Since coming off of a cruise she has seen a dentist for gum infection.  She had been taking more of her pain medication to help compensate.  She is also now on Augmentin.  Requesting refill on naproxen.  Says that she has been taking naproxen 500 mg twice a day for quite a long time.  She uses this with gabapentin help manage pain.  I discussed with her  that it is not ideal for naproxen to be taken daily or even twice a day indefinitely.  Warned of the potential renal and stomach complications.  She already does have iron-deficiency anemia.  Is on omeprazole which is somewhat protective but I recommended that she try to limit the naproxen to more as needed and try not to take more than 1 dose per day.    Has iron infusion today   Family History   Problem Relation Age of Onset    Heart disease Mother     Hypertension Mother     Cancer Father         colon ca    Colon cancer Father     Depression Father     Schizophrenia Father     Anxiety disorder Father     Arthritis Father     Mental illness Father     Liver cancer Sister     Cancer Sister     Depression Sister     Anxiety disorder Sister     Heart attack Sister     COPD Sister     Cancer Sister         throat and colon    Depression Sister     Miscarriages / Stillbirths Sister     Pancreatic cancer Brother     Depression Brother     Alcohol abuse Brother     No Known Problems Daughter     Asthma Son     Pancreatic cancer Other     Liver cancer Other     Suicide Neg Hx     Ovarian cancer Neg Hx     Breast cancer Neg Hx        Current Outpatient Medications:     amLODIPine (NORVASC) 5 MG tablet, Take 1 tablet (5 mg total) by mouth once daily., Disp: 90 tablet, Rfl: 3    buPROPion (WELLBUTRIN XL) 150 MG TB24 tablet, TAKE 3 TABLETS(450 MG) BY MOUTH EVERY MORNING, Disp: 270 tablet, Rfl: 1    busPIRone (BUSPAR) 15 MG tablet, TAKE 1 TABLET(15 MG) BY MOUTH THREE TIMES DAILY, Disp: 270 tablet, Rfl: 1    butalbital-acetaminophen-caffeine -40 mg (FIORICET, ESGIC) -40 mg per tablet, Take 1 tablet by mouth every 6 (six) hours as needed for Headaches., Disp: 40 tablet, Rfl: 0    cetirizine (ZYRTEC) 10 MG tablet, Take 1 tablet (10 mg total) by mouth once daily., Disp: 30 tablet, Rfl: 11    cycloSPORINE (RESTASIS) 0.05 % ophthalmic emulsion, INSTILL 1 DROP IN BOTH EYES TWICE DAILY, Disp: 180 each, Rfl: 1    docusate  sodium (COLACE) 100 MG capsule, Take 1 capsule (100 mg total) by mouth 2 (two) times daily as needed for Constipation., Disp: 60 capsule, Rfl: 0    doxepin (SINEQUAN) 75 MG capsule, TAKE 2 CAPSULES(150 MG) BY MOUTH EVERY EVENING, Disp: 60 capsule, Rfl: 11    fluticasone propionate (FLONASE) 50 mcg/actuation nasal spray, SHAKE LIQUID AND USE 1 SPRAY(50 MCG) IN EACH NOSTRIL EVERY DAY, Disp: 32 g, Rfl: 3    gabapentin (NEURONTIN) 300 MG capsule, TAKE 1 CAPSULE(300 MG) BY MOUTH THREE TIMES DAILY, Disp: 90 capsule, Rfl: 5    hydroCHLOROthiazide (HYDRODIURIL) 12.5 MG Tab, Take 1 tablet (12.5 mg total) by mouth once daily. For blood pressure, Disp: 90 tablet, Rfl: 3    hydroxychloroquine (PLAQUENIL) 200 mg tablet, TAKE 1 TABLET BY MOUTH TWICE DAILY, Disp: 180 tablet, Rfl: 2    lurasidone (LATUDA) 60 mg Tab tablet, TAKE 1 TABLET(60 MG) BY MOUTH EVERY DAY, Disp: 90 tablet, Rfl: 3    NARCAN 4 mg/actuation [place], SPRAY 1 SPRAY IN NOSTRIL ONCE FOR 1 DOSE, Disp: , Rfl:     nystatin (MYCOSTATIN) 100,000 unit/mL suspension, Take by mouth after meals as needed., Disp: , Rfl:     omeprazole (PRILOSEC) 40 MG capsule, Take 1 capsule (40 mg total) by mouth once daily., Disp: 90 capsule, Rfl: 3    pilocarpine (SALAGEN) 5 MG Tab, Take 1 tablet (5 mg total) by mouth 4 (four) times daily., Disp: 120 tablet, Rfl: 11    scopolamine (TRANSDERM-SCOP) 1.3-1.5 mg (1 mg over 3 days), Place 1 patch onto the skin every 72 hours., Disp: 4 patch, Rfl: 0    solifenacin (VESICARE) 5 MG tablet, Take 1 tablet (5 mg total) by mouth once daily., Disp: 30 tablet, Rfl: 11    tiZANidine (ZANAFLEX) 4 MG tablet, TAKE 2 TABLETS BY MOUTH THREE TIMES DAILY, Disp: 90 tablet, Rfl: 0    topiramate (TOPAMAX) 25 MG tablet, TAKE 1 TABLET BY MOUTH EVERY DAY FOR 2 WEEKS THEN 2 TABLETS EVERY DAY THEREAFTER, Disp: 155 tablet, Rfl: 0    diazePAM (VALIUM) 2 MG tablet, Take 1 tablet (2 mg total) by mouth daily as needed (Severe anxiety/Panic). 15 tablets to last 3 months, Disp:  15 tablet, Rfl: 0    ergocalciferol (VITAMIN D2) 50,000 unit Cap, Take 1 capsule (50,000 Units total) by mouth every 7 days. (Patient not taking: Reported on 10/25/2023), Disp: 12 capsule, Rfl: 0    estradioL (ESTRACE) 0.01 % (0.1 mg/gram) vaginal cream, 1g vaginally daily x 2 weeks, then BIW as directed (Patient not taking: Reported on 6/13/2023), Disp: 42.5 g, Rfl: 2    [START ON 11/29/2023] HYDROcodone-acetaminophen (NORCO) 5-325 mg per tablet, Take 1 tablet by mouth every 24 hours as needed for Pain. Quantity medically necessary, Disp: 30 tablet, Rfl: 0    irbesartan (AVAPRO) 300 MG tablet, Take 1 tablet (300 mg total) by mouth every evening., Disp: 90 tablet, Rfl: 3    naproxen (NAPROSYN) 500 MG tablet, Take 1 tablet (500 mg total) by mouth 2 (two) times daily as needed (pain)., Disp: 60 tablet, Rfl: 1    Review of Systems   Constitutional:  Negative for fever and weight loss.   Cardiovascular:  Negative for chest pain.   Gastrointestinal:  Negative for abdominal pain and bowel incontinence.   Genitourinary:  Negative for bladder incontinence, dysuria, hematuria and pelvic pain.   Musculoskeletal:  Positive for arthralgias and back pain.   Neurological:  Positive for weakness, numbness, headaches and paresthesias. Negative for tingling.       Objective:   /80   LMP 07/11/2012 Comment: partial        Physical Exam  Constitutional:       General: She is not in acute distress.     Appearance: She is well-developed.   HENT:      Head: Normocephalic.   Pulmonary:      Effort: Pulmonary effort is normal. No respiratory distress.   Neurological:      Mental Status: She is alert and oriented to person, place, and time.   Psychiatric:         Behavior: Behavior normal.         Assessment & Plan     Problem List Items Addressed This Visit          Neuro    Chronic pain    Current Assessment & Plan     Since she had a dental infection I put a note to refill her Norco 1 week earlier than normal.         Relevant  Medications    HYDROcodone-acetaminophen (NORCO) 5-325 mg per tablet (Start on 11/29/2023)       Cardiac/Vascular    Essential hypertension - Primary (Chronic)    Current Assessment & Plan     Blood pressure well controlled.  Continue on same medications              Renal/    Stage 3b chronic kidney disease    Current Assessment & Plan     Most recent GFR was greater than 60 on the last couple of evaluations.              No follow-ups on file.    Disclaimer:  This note may have been prepared using voice recognition software, it may have not been extensively proofed, as such there could be errors within the text such as sound alike errors.

## 2023-11-21 NOTE — PROGRESS NOTES
Iron sucrose orders signed. Cycle 1 orders were signed by Nancie Dobbs NP who no longer works here so infusion RN was unable to release orders. Entire cycle was completed. This NP signed cycle 2, day 1 orders    Roxy Moyer NP  Hematology/Oncology/BMT

## 2023-11-27 ENCOUNTER — TELEPHONE (OUTPATIENT)
Dept: INTERNAL MEDICINE | Facility: CLINIC | Age: 53
End: 2023-11-27
Payer: MEDICARE

## 2023-11-27 NOTE — TELEPHONE ENCOUNTER
----- Message from Jackie Martin sent at 11/27/2023  8:13 AM CST -----  Regarding: Self .[phone]  Type: Patient Call Back     What is the request in detail: Pt is requesting a call back with clarification on why her appt was cancelled for 01/19. She would like to know when she can schedule her 3 month FU.     Can the clinic reply by [facility]? No     Would the patient rather a call back or a response via [facility]? Call back    Best call back number: .[phone]      Additional Information:    Thank you.

## 2023-11-27 NOTE — TELEPHONE ENCOUNTER
Advised patient that I don't know why her appointment was canceled but I went ahead and rescheduled it for her.

## 2023-11-28 ENCOUNTER — INFUSION (OUTPATIENT)
Dept: INFUSION THERAPY | Facility: HOSPITAL | Age: 53
End: 2023-11-28
Payer: MEDICARE

## 2023-11-28 VITALS
DIASTOLIC BLOOD PRESSURE: 74 MMHG | OXYGEN SATURATION: 95 % | SYSTOLIC BLOOD PRESSURE: 128 MMHG | HEIGHT: 67 IN | BODY MASS INDEX: 39.44 KG/M2 | WEIGHT: 251.31 LBS | TEMPERATURE: 99 F | HEART RATE: 108 BPM | RESPIRATION RATE: 18 BRPM

## 2023-11-28 DIAGNOSIS — D50.9 IRON DEFICIENCY ANEMIA, UNSPECIFIED IRON DEFICIENCY ANEMIA TYPE: Primary | ICD-10-CM

## 2023-11-28 PROCEDURE — 25000003 PHARM REV CODE 250: Mod: HCNC | Performed by: INTERNAL MEDICINE

## 2023-11-28 PROCEDURE — 63600175 PHARM REV CODE 636 W HCPCS: Mod: HCNC | Performed by: INTERNAL MEDICINE

## 2023-11-28 PROCEDURE — 96374 THER/PROPH/DIAG INJ IV PUSH: CPT | Mod: HCNC

## 2023-11-28 RX ORDER — EPINEPHRINE 0.3 MG/.3ML
0.3 INJECTION SUBCUTANEOUS ONCE AS NEEDED
Status: DISCONTINUED | OUTPATIENT
Start: 2023-11-28 | End: 2023-11-28 | Stop reason: HOSPADM

## 2023-11-28 RX ORDER — DIPHENHYDRAMINE HYDROCHLORIDE 50 MG/ML
50 INJECTION INTRAMUSCULAR; INTRAVENOUS ONCE AS NEEDED
Status: DISCONTINUED | OUTPATIENT
Start: 2023-11-28 | End: 2023-11-28 | Stop reason: HOSPADM

## 2023-11-28 RX ADMIN — SODIUM CHLORIDE: 9 INJECTION, SOLUTION INTRAVENOUS at 08:11

## 2023-11-28 RX ADMIN — IRON SUCROSE 200 MG: 20 INJECTION, SOLUTION INTRAVENOUS at 08:11

## 2023-11-29 ENCOUNTER — TELEPHONE (OUTPATIENT)
Dept: INTERNAL MEDICINE | Facility: CLINIC | Age: 53
End: 2023-11-29
Payer: MEDICARE

## 2023-11-29 DIAGNOSIS — G89.4 CHRONIC PAIN SYNDROME: ICD-10-CM

## 2023-11-29 RX ORDER — HYDROCODONE BITARTRATE AND ACETAMINOPHEN 5; 325 MG/1; MG/1
1 TABLET ORAL
Qty: 30 TABLET | Refills: 0 | Status: SHIPPED | OUTPATIENT
Start: 2023-11-29 | End: 2023-11-29 | Stop reason: SDUPTHER

## 2023-11-29 RX ORDER — HYDROCODONE BITARTRATE AND ACETAMINOPHEN 5; 325 MG/1; MG/1
1 TABLET ORAL
Qty: 30 TABLET | Refills: 0 | Status: SHIPPED | OUTPATIENT
Start: 2023-11-29 | End: 2024-01-04 | Stop reason: SDUPTHER

## 2023-11-29 NOTE — TELEPHONE ENCOUNTER
----- Message from Jackie Martin sent at 11/29/2023  2:00 PM CST -----  Regarding: Self .[phone]   Type: RX Refill Request    Who Called: Self     Have you contacted your pharmacy: yes ( pharmacy stated they dont have the script )     Refill    RX Name and Strength:HYDROcodone-acetaminophen (NORCO) 5-325 mg per tablet    Preferred Pharmacy with phone number: .  [facility][id number] [place] [id number] [facility]  [facility]  [facility] [address]  Phone: [phone] Fax: [phone]        Local or Mail Order: local     Would the patient rather a call back or a response via [facility]? Call back     Best Call Back Number: .[phone]      Additional Information:  Pt stated she left a msg this morning and script is still not at pharmacy.     Thank you.

## 2023-11-29 NOTE — TELEPHONE ENCOUNTER
----- Message from Asha Mckeon sent at 11/29/2023 10:24 AM CST -----  Regarding: refill  Name of Who is Calling:PENG MAYO [[id number]]          What is the request in detail: Pt called [facility] to  her rx of  HYDROcodone-acetaminophen (NORCO) 5-325 mg per tablet   [facility] said that they never got the rx. Can you please call in back into:        [facility] #[id number] [place] [address] AT [facility] [address]   Phone: [phone]  Fax: [phone]              Can the clinic reply by [facility]:          What Number to Call Back if not in [facility]: [phone]

## 2023-12-04 DIAGNOSIS — F41.1 GAD (GENERALIZED ANXIETY DISORDER): ICD-10-CM

## 2023-12-04 DIAGNOSIS — F40.10 SOCIAL ANXIETY DISORDER: ICD-10-CM

## 2023-12-04 RX ORDER — DIAZEPAM 2 MG/1
2 TABLET ORAL DAILY PRN
Qty: 15 TABLET | Refills: 0 | Status: SHIPPED | OUTPATIENT
Start: 2023-12-04 | End: 2024-02-27 | Stop reason: SDUPTHER

## 2023-12-05 ENCOUNTER — INFUSION (OUTPATIENT)
Dept: INFUSION THERAPY | Facility: HOSPITAL | Age: 53
End: 2023-12-05
Payer: MEDICARE

## 2023-12-05 VITALS
DIASTOLIC BLOOD PRESSURE: 71 MMHG | HEART RATE: 94 BPM | TEMPERATURE: 98 F | OXYGEN SATURATION: 99 % | SYSTOLIC BLOOD PRESSURE: 121 MMHG | RESPIRATION RATE: 18 BRPM

## 2023-12-05 DIAGNOSIS — D50.9 IRON DEFICIENCY ANEMIA, UNSPECIFIED IRON DEFICIENCY ANEMIA TYPE: Primary | ICD-10-CM

## 2023-12-05 PROCEDURE — 63600175 PHARM REV CODE 636 W HCPCS: Mod: HCNC | Performed by: INTERNAL MEDICINE

## 2023-12-05 PROCEDURE — 96374 THER/PROPH/DIAG INJ IV PUSH: CPT | Mod: HCNC

## 2023-12-05 RX ORDER — SODIUM CHLORIDE 0.9 % (FLUSH) 0.9 %
10 SYRINGE (ML) INJECTION
Status: CANCELLED | OUTPATIENT
Start: 2023-12-05

## 2023-12-05 RX ORDER — DIPHENHYDRAMINE HYDROCHLORIDE 50 MG/ML
50 INJECTION INTRAMUSCULAR; INTRAVENOUS ONCE AS NEEDED
Status: CANCELLED | OUTPATIENT
Start: 2023-12-26

## 2023-12-05 RX ORDER — SODIUM CHLORIDE 0.9 % (FLUSH) 0.9 %
10 SYRINGE (ML) INJECTION
Status: CANCELLED | OUTPATIENT
Start: 2024-01-03

## 2023-12-05 RX ORDER — HEPARIN 100 UNIT/ML
500 SYRINGE INTRAVENOUS
Status: CANCELLED | OUTPATIENT
Start: 2023-12-12

## 2023-12-05 RX ORDER — HEPARIN 100 UNIT/ML
500 SYRINGE INTRAVENOUS
Status: CANCELLED | OUTPATIENT
Start: 2024-01-03

## 2023-12-05 RX ORDER — EPINEPHRINE 0.3 MG/.3ML
0.3 INJECTION SUBCUTANEOUS ONCE AS NEEDED
Status: CANCELLED | OUTPATIENT
Start: 2023-12-12

## 2023-12-05 RX ORDER — DIPHENHYDRAMINE HYDROCHLORIDE 50 MG/ML
50 INJECTION INTRAMUSCULAR; INTRAVENOUS ONCE AS NEEDED
Status: CANCELLED | OUTPATIENT
Start: 2024-01-03

## 2023-12-05 RX ORDER — SODIUM CHLORIDE 0.9 % (FLUSH) 0.9 %
10 SYRINGE (ML) INJECTION
Status: CANCELLED | OUTPATIENT
Start: 2023-12-26

## 2023-12-05 RX ORDER — EPINEPHRINE 0.3 MG/.3ML
0.3 INJECTION SUBCUTANEOUS ONCE AS NEEDED
Status: CANCELLED | OUTPATIENT
Start: 2023-12-19

## 2023-12-05 RX ORDER — DIPHENHYDRAMINE HYDROCHLORIDE 50 MG/ML
50 INJECTION INTRAMUSCULAR; INTRAVENOUS ONCE AS NEEDED
Status: CANCELLED | OUTPATIENT
Start: 2024-01-10

## 2023-12-05 RX ORDER — HEPARIN 100 UNIT/ML
500 SYRINGE INTRAVENOUS
Status: CANCELLED | OUTPATIENT
Start: 2023-12-05

## 2023-12-05 RX ORDER — EPINEPHRINE 0.3 MG/.3ML
0.3 INJECTION SUBCUTANEOUS ONCE AS NEEDED
Status: CANCELLED | OUTPATIENT
Start: 2024-01-10

## 2023-12-05 RX ORDER — SODIUM CHLORIDE 0.9 % (FLUSH) 0.9 %
10 SYRINGE (ML) INJECTION
Status: CANCELLED | OUTPATIENT
Start: 2023-12-12

## 2023-12-05 RX ORDER — EPINEPHRINE 0.3 MG/.3ML
0.3 INJECTION SUBCUTANEOUS ONCE AS NEEDED
Status: CANCELLED | OUTPATIENT
Start: 2023-12-05

## 2023-12-05 RX ORDER — EPINEPHRINE 0.3 MG/.3ML
0.3 INJECTION SUBCUTANEOUS ONCE AS NEEDED
Status: CANCELLED | OUTPATIENT
Start: 2023-12-26

## 2023-12-05 RX ORDER — SODIUM CHLORIDE 0.9 % (FLUSH) 0.9 %
10 SYRINGE (ML) INJECTION
Status: CANCELLED | OUTPATIENT
Start: 2023-12-19

## 2023-12-05 RX ORDER — EPINEPHRINE 0.3 MG/.3ML
0.3 INJECTION SUBCUTANEOUS ONCE AS NEEDED
Status: DISCONTINUED | OUTPATIENT
Start: 2023-12-05 | End: 2023-12-05 | Stop reason: HOSPADM

## 2023-12-05 RX ORDER — HEPARIN 100 UNIT/ML
500 SYRINGE INTRAVENOUS
Status: CANCELLED | OUTPATIENT
Start: 2023-12-19

## 2023-12-05 RX ORDER — HEPARIN 100 UNIT/ML
500 SYRINGE INTRAVENOUS
Status: CANCELLED | OUTPATIENT
Start: 2023-12-26

## 2023-12-05 RX ORDER — SODIUM CHLORIDE 0.9 % (FLUSH) 0.9 %
10 SYRINGE (ML) INJECTION
Status: CANCELLED | OUTPATIENT
Start: 2024-01-10

## 2023-12-05 RX ORDER — SODIUM CHLORIDE 0.9 % (FLUSH) 0.9 %
10 SYRINGE (ML) INJECTION
Status: DISCONTINUED | OUTPATIENT
Start: 2023-12-05 | End: 2023-12-05 | Stop reason: HOSPADM

## 2023-12-05 RX ORDER — HEPARIN 100 UNIT/ML
500 SYRINGE INTRAVENOUS
Status: DISCONTINUED | OUTPATIENT
Start: 2023-12-05 | End: 2023-12-05 | Stop reason: HOSPADM

## 2023-12-05 RX ORDER — EPINEPHRINE 0.3 MG/.3ML
0.3 INJECTION SUBCUTANEOUS ONCE AS NEEDED
Status: CANCELLED | OUTPATIENT
Start: 2024-01-03

## 2023-12-05 RX ORDER — DIPHENHYDRAMINE HYDROCHLORIDE 50 MG/ML
50 INJECTION INTRAMUSCULAR; INTRAVENOUS ONCE AS NEEDED
Status: CANCELLED | OUTPATIENT
Start: 2023-12-05

## 2023-12-05 RX ORDER — DIPHENHYDRAMINE HYDROCHLORIDE 50 MG/ML
50 INJECTION INTRAMUSCULAR; INTRAVENOUS ONCE AS NEEDED
Status: CANCELLED | OUTPATIENT
Start: 2023-12-19

## 2023-12-05 RX ORDER — DIPHENHYDRAMINE HYDROCHLORIDE 50 MG/ML
50 INJECTION INTRAMUSCULAR; INTRAVENOUS ONCE AS NEEDED
Status: DISCONTINUED | OUTPATIENT
Start: 2023-12-05 | End: 2023-12-05 | Stop reason: HOSPADM

## 2023-12-05 RX ORDER — DIPHENHYDRAMINE HYDROCHLORIDE 50 MG/ML
50 INJECTION INTRAMUSCULAR; INTRAVENOUS ONCE AS NEEDED
Status: CANCELLED | OUTPATIENT
Start: 2023-12-12

## 2023-12-05 RX ORDER — HEPARIN 100 UNIT/ML
500 SYRINGE INTRAVENOUS
Status: CANCELLED | OUTPATIENT
Start: 2024-01-10

## 2023-12-05 RX ADMIN — IRON SUCROSE 200 MG: 20 INJECTION, SOLUTION INTRAVENOUS at 08:12

## 2023-12-05 NOTE — PLAN OF CARE
0800-Labs, hx, and medications reviewed, pt meets parameters for treatment today. Assessment completed and plan of care reviewed. Pt verbalized understanding. Pt voices no new complaints or concerns, will continue to monitor for safety.

## 2023-12-05 NOTE — PLAN OF CARE
09:15-Pt tolerated venofer 200mg IVP well today, no complaints or complications,. Stayed for 30 min post IVP observation. VSS through duration of treatment. Pt aware to call provider with any questions or concerns and is aware of upcoming appts. Pt ambulatory from clinic with steady gait, no distress noted.

## 2023-12-13 ENCOUNTER — OFFICE VISIT (OUTPATIENT)
Dept: PSYCHIATRY | Facility: CLINIC | Age: 53
End: 2023-12-13
Payer: MEDICARE

## 2023-12-13 DIAGNOSIS — F39 MOOD DISORDER: ICD-10-CM

## 2023-12-13 DIAGNOSIS — F41.0 PANIC DISORDER: ICD-10-CM

## 2023-12-13 DIAGNOSIS — F41.1 GAD (GENERALIZED ANXIETY DISORDER): Primary | ICD-10-CM

## 2023-12-13 DIAGNOSIS — F40.10 SOCIAL ANXIETY DISORDER: ICD-10-CM

## 2023-12-13 PROCEDURE — 3044F HG A1C LEVEL LT 7.0%: CPT | Mod: HCNC,CPTII,95, | Performed by: SOCIAL WORKER

## 2023-12-13 PROCEDURE — 90834 PR PSYCHOTHERAPY W/PATIENT, 45 MIN: ICD-10-PCS | Mod: HCNC,95,, | Performed by: SOCIAL WORKER

## 2023-12-13 PROCEDURE — 3044F PR MOST RECENT HEMOGLOBIN A1C LEVEL <7.0%: ICD-10-PCS | Mod: HCNC,CPTII,95, | Performed by: SOCIAL WORKER

## 2023-12-13 PROCEDURE — 4010F ACE/ARB THERAPY RXD/TAKEN: CPT | Mod: HCNC,CPTII,95, | Performed by: SOCIAL WORKER

## 2023-12-13 PROCEDURE — 90834 PSYTX W PT 45 MINUTES: CPT | Mod: HCNC,95,, | Performed by: SOCIAL WORKER

## 2023-12-13 PROCEDURE — 4010F PR ACE/ARB THEARPY RXD/TAKEN: ICD-10-PCS | Mod: HCNC,CPTII,95, | Performed by: SOCIAL WORKER
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
## 2023-12-14 ENCOUNTER — INFUSION (OUTPATIENT)
Dept: INFUSION THERAPY | Facility: HOSPITAL | Age: 53
End: 2023-12-14
Payer: MEDICARE

## 2023-12-14 VITALS
DIASTOLIC BLOOD PRESSURE: 64 MMHG | OXYGEN SATURATION: 99 % | HEART RATE: 110 BPM | HEIGHT: 67 IN | RESPIRATION RATE: 18 BRPM | TEMPERATURE: 98 F | WEIGHT: 251.31 LBS | SYSTOLIC BLOOD PRESSURE: 126 MMHG | BODY MASS INDEX: 39.44 KG/M2

## 2023-12-14 DIAGNOSIS — D50.9 IRON DEFICIENCY ANEMIA, UNSPECIFIED IRON DEFICIENCY ANEMIA TYPE: Primary | ICD-10-CM

## 2023-12-14 PROCEDURE — 25000003 PHARM REV CODE 250: Mod: HCNC | Performed by: INTERNAL MEDICINE

## 2023-12-14 PROCEDURE — 63600175 PHARM REV CODE 636 W HCPCS: Mod: HCNC | Performed by: INTERNAL MEDICINE

## 2023-12-14 PROCEDURE — 96374 THER/PROPH/DIAG INJ IV PUSH: CPT | Mod: HCNC

## 2023-12-14 RX ORDER — EPINEPHRINE 0.3 MG/.3ML
0.3 INJECTION SUBCUTANEOUS ONCE AS NEEDED
Status: DISCONTINUED | OUTPATIENT
Start: 2023-12-14 | End: 2023-12-14 | Stop reason: HOSPADM

## 2023-12-14 RX ORDER — SODIUM CHLORIDE 0.9 % (FLUSH) 0.9 %
10 SYRINGE (ML) INJECTION
Status: DISCONTINUED | OUTPATIENT
Start: 2023-12-14 | End: 2023-12-14 | Stop reason: HOSPADM

## 2023-12-14 RX ORDER — HEPARIN 100 UNIT/ML
500 SYRINGE INTRAVENOUS
Status: DISCONTINUED | OUTPATIENT
Start: 2023-12-14 | End: 2023-12-14 | Stop reason: HOSPADM

## 2023-12-14 RX ORDER — DIPHENHYDRAMINE HYDROCHLORIDE 50 MG/ML
50 INJECTION INTRAMUSCULAR; INTRAVENOUS ONCE AS NEEDED
Status: DISCONTINUED | OUTPATIENT
Start: 2023-12-14 | End: 2023-12-14 | Stop reason: HOSPADM

## 2023-12-14 RX ADMIN — IRON SUCROSE 200 MG: 20 INJECTION, SOLUTION INTRAVENOUS at 09:12

## 2023-12-14 RX ADMIN — SODIUM CHLORIDE: 9 INJECTION, SOLUTION INTRAVENOUS at 08:12

## 2023-12-14 NOTE — PLAN OF CARE
PT updated on plan of care. Verbalized understanding on plan.  Updated on any issues.  All needs met at this time.  Encouraged to notify nurse of needs.

## 2023-12-14 NOTE — NURSING
Introduced self to pt, tolerated infusion well.  Pt denies any signs of past reactions.  Pt discharged by self, [facility].

## 2023-12-21 ENCOUNTER — INFUSION (OUTPATIENT)
Dept: INFUSION THERAPY | Facility: HOSPITAL | Age: 53
End: 2023-12-21
Payer: MEDICARE

## 2023-12-21 VITALS
BODY MASS INDEX: 39.44 KG/M2 | TEMPERATURE: 99 F | DIASTOLIC BLOOD PRESSURE: 67 MMHG | HEIGHT: 67 IN | HEART RATE: 98 BPM | WEIGHT: 251.31 LBS | RESPIRATION RATE: 18 BRPM | SYSTOLIC BLOOD PRESSURE: 109 MMHG

## 2023-12-21 DIAGNOSIS — D50.9 IRON DEFICIENCY ANEMIA, UNSPECIFIED IRON DEFICIENCY ANEMIA TYPE: Primary | ICD-10-CM

## 2023-12-21 PROCEDURE — 63600175 PHARM REV CODE 636 W HCPCS: Mod: HCNC | Performed by: INTERNAL MEDICINE

## 2023-12-21 PROCEDURE — 96374 THER/PROPH/DIAG INJ IV PUSH: CPT | Mod: HCNC

## 2023-12-21 PROCEDURE — 25000003 PHARM REV CODE 250: Mod: HCNC | Performed by: INTERNAL MEDICINE

## 2023-12-21 RX ORDER — EPINEPHRINE 0.3 MG/.3ML
0.3 INJECTION SUBCUTANEOUS ONCE AS NEEDED
Status: DISCONTINUED | OUTPATIENT
Start: 2023-12-21 | End: 2023-12-21 | Stop reason: HOSPADM

## 2023-12-21 RX ORDER — DIPHENHYDRAMINE HYDROCHLORIDE 50 MG/ML
50 INJECTION INTRAMUSCULAR; INTRAVENOUS ONCE AS NEEDED
Status: DISCONTINUED | OUTPATIENT
Start: 2023-12-21 | End: 2023-12-21 | Stop reason: HOSPADM

## 2023-12-21 RX ORDER — SODIUM CHLORIDE 0.9 % (FLUSH) 0.9 %
10 SYRINGE (ML) INJECTION
Status: DISCONTINUED | OUTPATIENT
Start: 2023-12-21 | End: 2023-12-21 | Stop reason: HOSPADM

## 2023-12-21 RX ORDER — HEPARIN 100 UNIT/ML
500 SYRINGE INTRAVENOUS
Status: DISCONTINUED | OUTPATIENT
Start: 2023-12-21 | End: 2023-12-21 | Stop reason: HOSPADM

## 2023-12-21 RX ADMIN — SODIUM CHLORIDE: 9 INJECTION, SOLUTION INTRAVENOUS at 08:12

## 2023-12-21 RX ADMIN — IRON SUCROSE 200 MG: 20 INJECTION, SOLUTION INTRAVENOUS at 08:12

## 2023-12-27 ENCOUNTER — TELEPHONE (OUTPATIENT)
Dept: INFUSION THERAPY | Facility: HOSPITAL | Age: 53
End: 2023-12-27
Payer: MEDICARE

## 2023-12-28 ENCOUNTER — TELEPHONE (OUTPATIENT)
Dept: INFUSION THERAPY | Facility: HOSPITAL | Age: 53
End: 2023-12-28
Payer: MEDICARE

## 2023-12-29 ENCOUNTER — TELEPHONE (OUTPATIENT)
Dept: INFUSION THERAPY | Facility: HOSPITAL | Age: 53
End: 2023-12-29
Payer: MEDICARE

## 2023-12-31 DIAGNOSIS — G44.009 CLUSTER HEADACHE, NOT INTRACTABLE, UNSPECIFIED CHRONICITY PATTERN: ICD-10-CM

## 2023-12-31 NOTE — TELEPHONE ENCOUNTER
No care due was identified.  [facility] Embedded Care Due Messages. Reference number: [id number].   12/31/2023 1:13:32 PM CST

## 2024-01-02 ENCOUNTER — INFUSION (OUTPATIENT)
Dept: INFUSION THERAPY | Facility: HOSPITAL | Age: 54
End: 2024-01-02
Payer: MEDICARE

## 2024-01-02 VITALS
TEMPERATURE: 98 F | DIASTOLIC BLOOD PRESSURE: 79 MMHG | RESPIRATION RATE: 18 BRPM | HEART RATE: 106 BPM | SYSTOLIC BLOOD PRESSURE: 135 MMHG | OXYGEN SATURATION: 100 %

## 2024-01-02 DIAGNOSIS — D50.9 IRON DEFICIENCY ANEMIA, UNSPECIFIED IRON DEFICIENCY ANEMIA TYPE: Primary | ICD-10-CM

## 2024-01-02 PROCEDURE — 96375 TX/PRO/DX INJ NEW DRUG ADDON: CPT | Mod: HCNC

## 2024-01-02 PROCEDURE — 96374 THER/PROPH/DIAG INJ IV PUSH: CPT | Mod: HCNC

## 2024-01-02 PROCEDURE — 63600175 PHARM REV CODE 636 W HCPCS: Mod: HCNC | Performed by: INTERNAL MEDICINE

## 2024-01-02 PROCEDURE — 96376 TX/PRO/DX INJ SAME DRUG ADON: CPT | Mod: HCNC

## 2024-01-02 RX ORDER — BUTALBITAL, ACETAMINOPHEN AND CAFFEINE 50; 325; 40 MG/1; MG/1; MG/1
1 TABLET ORAL EVERY 6 HOURS PRN
Qty: 40 TABLET | Refills: 0 | Status: SHIPPED | OUTPATIENT
Start: 2024-01-02

## 2024-01-02 RX ORDER — DIPHENHYDRAMINE HYDROCHLORIDE 50 MG/ML
50 INJECTION, SOLUTION INTRAMUSCULAR; INTRAVENOUS ONCE AS NEEDED
Status: DISCONTINUED | OUTPATIENT
Start: 2024-01-02 | End: 2024-01-02 | Stop reason: HOSPADM

## 2024-01-02 RX ORDER — EPINEPHRINE 0.3 MG/.3ML
0.3 INJECTION SUBCUTANEOUS ONCE AS NEEDED
Status: DISCONTINUED | OUTPATIENT
Start: 2024-01-02 | End: 2024-01-02 | Stop reason: HOSPADM

## 2024-01-02 RX ORDER — HEPARIN 100 UNIT/ML
500 SYRINGE INTRAVENOUS
Status: DISCONTINUED | OUTPATIENT
Start: 2024-01-02 | End: 2024-01-02 | Stop reason: HOSPADM

## 2024-01-02 RX ORDER — SODIUM CHLORIDE 0.9 % (FLUSH) 0.9 %
10 SYRINGE (ML) INJECTION
Status: DISCONTINUED | OUTPATIENT
Start: 2024-01-02 | End: 2024-01-02 | Stop reason: HOSPADM

## 2024-01-02 RX ADMIN — IRON SUCROSE 200 MG: 20 INJECTION, SOLUTION INTRAVENOUS at 08:01

## 2024-01-02 NOTE — NURSING
Patient seated in chair, VSS, assessment done.  PIV inserted, flushed, blood return noted, fluashed, venofer 200 mg IVP administered, flushed w/10 cc NS .  PIV discontinued without issue.  Patient ambulated off floor to discharge home with .  RTC 1/9/24

## 2024-01-04 DIAGNOSIS — G89.4 CHRONIC PAIN SYNDROME: ICD-10-CM

## 2024-01-04 RX ORDER — HYDROCODONE BITARTRATE AND ACETAMINOPHEN 5; 325 MG/1; MG/1
1 TABLET ORAL
Qty: 30 TABLET | Refills: 0 | Status: SHIPPED | OUTPATIENT
Start: 2024-01-04 | End: 2024-02-02 | Stop reason: SDUPTHER

## 2024-01-04 NOTE — TELEPHONE ENCOUNTER
No care due was identified.  [facility] Embedded Care Due Messages. Reference number: [id number].   1/04/2024 8:11:33 AM CST

## 2024-01-09 ENCOUNTER — INFUSION (OUTPATIENT)
Dept: INFUSION THERAPY | Facility: HOSPITAL | Age: 54
End: 2024-01-09
Payer: MEDICARE

## 2024-01-09 VITALS
WEIGHT: 251.31 LBS | BODY MASS INDEX: 39.44 KG/M2 | DIASTOLIC BLOOD PRESSURE: 75 MMHG | RESPIRATION RATE: 18 BRPM | HEIGHT: 67 IN | HEART RATE: 106 BPM | SYSTOLIC BLOOD PRESSURE: 126 MMHG | TEMPERATURE: 98 F

## 2024-01-09 DIAGNOSIS — D50.9 IRON DEFICIENCY ANEMIA, UNSPECIFIED IRON DEFICIENCY ANEMIA TYPE: Primary | ICD-10-CM

## 2024-01-09 PROCEDURE — 25000003 PHARM REV CODE 250: Mod: HCNC | Performed by: INTERNAL MEDICINE

## 2024-01-09 PROCEDURE — 63600175 PHARM REV CODE 636 W HCPCS: Mod: HCNC | Performed by: INTERNAL MEDICINE

## 2024-01-09 PROCEDURE — 96374 THER/PROPH/DIAG INJ IV PUSH: CPT | Mod: HCNC

## 2024-01-09 RX ORDER — DIPHENHYDRAMINE HYDROCHLORIDE 50 MG/ML
50 INJECTION, SOLUTION INTRAMUSCULAR; INTRAVENOUS ONCE AS NEEDED
Status: DISCONTINUED | OUTPATIENT
Start: 2024-01-09 | End: 2024-01-09 | Stop reason: HOSPADM

## 2024-01-09 RX ORDER — EPINEPHRINE 0.3 MG/.3ML
0.3 INJECTION SUBCUTANEOUS ONCE AS NEEDED
Status: DISCONTINUED | OUTPATIENT
Start: 2024-01-09 | End: 2024-01-09 | Stop reason: HOSPADM

## 2024-01-09 RX ORDER — SODIUM CHLORIDE 0.9 % (FLUSH) 0.9 %
10 SYRINGE (ML) INJECTION
Status: DISCONTINUED | OUTPATIENT
Start: 2024-01-09 | End: 2024-01-09 | Stop reason: HOSPADM

## 2024-01-09 RX ORDER — HEPARIN 100 UNIT/ML
500 SYRINGE INTRAVENOUS
Status: DISCONTINUED | OUTPATIENT
Start: 2024-01-09 | End: 2024-01-09 | Stop reason: HOSPADM

## 2024-01-09 RX ADMIN — SODIUM CHLORIDE: 9 INJECTION, SOLUTION INTRAVENOUS at 08:01

## 2024-01-09 RX ADMIN — IRON SUCROSE 200 MG: 20 INJECTION, SOLUTION INTRAVENOUS at 08:01

## 2024-01-09 NOTE — PLAN OF CARE
0800 pt here for tx. Pts chart reviewed allergies, meds, tx, hx. Pt reclined in chair. No distress noted.

## 2024-01-11 ENCOUNTER — OFFICE VISIT (OUTPATIENT)
Dept: PSYCHIATRY | Facility: CLINIC | Age: 54
End: 2024-01-11
Payer: MEDICARE

## 2024-01-11 DIAGNOSIS — F51.05 INSOMNIA DUE TO OTHER MENTAL DISORDER: ICD-10-CM

## 2024-01-11 DIAGNOSIS — F39 MOOD DISORDER: ICD-10-CM

## 2024-01-11 DIAGNOSIS — F63.89 INTERNET ADDICTION: ICD-10-CM

## 2024-01-11 DIAGNOSIS — F41.0 PANIC DISORDER: Primary | ICD-10-CM

## 2024-01-11 DIAGNOSIS — F99 INSOMNIA DUE TO OTHER MENTAL DISORDER: ICD-10-CM

## 2024-01-11 DIAGNOSIS — F40.10 SOCIAL ANXIETY DISORDER: ICD-10-CM

## 2024-01-11 DIAGNOSIS — F41.1 GAD (GENERALIZED ANXIETY DISORDER): ICD-10-CM

## 2024-01-11 PROCEDURE — 99214 OFFICE O/P EST MOD 30 MIN: CPT | Mod: HCNC,95,, | Performed by: INTERNAL MEDICINE

## 2024-01-11 PROCEDURE — 1160F RVW MEDS BY RX/DR IN RCRD: CPT | Mod: HCNC,CPTII,95, | Performed by: INTERNAL MEDICINE

## 2024-01-11 PROCEDURE — 1159F MED LIST DOCD IN RCRD: CPT | Mod: HCNC,CPTII,95, | Performed by: INTERNAL MEDICINE

## 2024-01-11 NOTE — PROGRESS NOTES
OUTPATIENT PSYCHIATRY RETURN VISIT    ENCOUNTER DATE:  1/11/2024  SITE:  [facility], [facility]  LENGTH OF SESSION:  12 minutes    The patient location is:  [place], not in a healthcare facility  Visit type:  audiovisual    Face to Face time with patient:  12 minutes  18 minutes of total time spent on the encounter, which includes face to face time and non-face to face time preparing to see the patient (eg, review of tests), Obtaining and/or reviewing separately obtained history, Documenting clinical information in the electronic or other health record, Independently interpreting results (not separately reported) and communicating results to the patient/family/caregiver, or Care coordination (not separately reported).     Each patient to whom he or she provides medical services by telemedicine is:  (1) informed of the relationship between the physician and patient and the respective role of any other health care provider with respect to management of the patient; and (2) notified that he or she may decline to receive medical services by telemedicine and may withdraw from such care at any time.    CHIEF COMPLAINT:  Anxiety      HISTORY OF PRESENTING ILLNESS:  Jaki Bajwa is a 53 y.o. female with history of Mood disorder, JUAN JOSE, OCD, social phobia, and Internet shopping addiction who presents for follow up appointment.      Plan at last appointment on 10/26/2023:  Mood is currently stable.  No medication changes today.   Continue Latuda 60mg daily, Wellbutrin XL 450mg daily, Buspar 15mg BID-TID, Doxepin 150mg qHS, and Valium 2mg PRN panic attack (#5 tablets per month).  Discussed with patient informed consent, risks versus benefits, alternative treatments, side effect profile and the inherent unpredictability of individual responses to these treatments.  The patient expresses understanding of the above and displays the capacity to agree with this current plan.   Continue individual therapy with  Mr. Zamora, LCSW.    History as told by patient:  Says she has been doing ok.  A little overwhelmed with the baby.  They can't put him down for a nap - he cries so they hold him when he sleeps.  He is running the house.  Running back and forth between her house and her daughter (45 [place] [address]).  After this month, her daughter is going to bring him to her.  Not every day but will keeping him for 12 hour stretches - daughter is a nurse in the NICU at [employer].  Son is now 4 months old.  Won't sleep in the bed.  Patient says she doesn't like him crying, worsens her nerve.  Went to [place].  Says it was alright - too long, had some anxiety.  Scott had a great time though.  She stayed in the room.  Overall she feels medications are controlling her symptoms really well.      Medication side effects:  Denies  Medication compliance:  Yes    PSYCHIATRIC REVIEW OF SYSTEMS:  Trouble with sleep:  Denies  Appetite changes:  Denies  Weight changes:  Denies  Lack of energy:  Sometimes  Anhedonia:  Denies  Somatic symptoms:  Chronic pain  Libido:  Denies  Anxiety/panic:  Yes but stable, triggered by leaving the house  Guilty/hopeless:  Denies  Self-injurious behavior/risky behavior:  Denies  Any drugs:  Denies  Alcohol:  Denies    MEDICAL REVIEW OF SYSTEMS:  Complete review of systems performed covering Constitutional, Musculoskeletal, Neurologic.  All systems negative except for that covered in HPI.    PAST PSYCHIATRIC, MEDICAL, AND SOCIAL HISTORY REVIEWED  The patient's past medical, family and social history have been reviewed and updated as appropriate within the electronic medical record - see encounter notes.    MEDICATIONS:    Current Outpatient Medications:     amLODIPine (NORVASC) 5 MG tablet, Take 1 tablet (5 mg total) by mouth once daily., Disp: 90 tablet, Rfl: 3    buPROPion (WELLBUTRIN XL) 150 MG TB24 tablet, TAKE 3 TABLETS(450 MG) BY MOUTH EVERY MORNING, Disp: 270 tablet, Rfl: 1    busPIRone (BUSPAR) 15 MG  tablet, TAKE 1 TABLET(15 MG) BY MOUTH THREE TIMES DAILY, Disp: 270 tablet, Rfl: 1    butalbital-acetaminophen-caffeine -40 mg (FIORICET, ESGIC) -40 mg per tablet, Take 1 tablet by mouth every 6 (six) hours as needed for Headaches., Disp: 40 tablet, Rfl: 0    cetirizine (ZYRTEC) 10 MG tablet, Take 1 tablet (10 mg total) by mouth once daily., Disp: 30 tablet, Rfl: 11    cycloSPORINE (RESTASIS) 0.05 % ophthalmic emulsion, INSTILL 1 DROP IN BOTH EYES TWICE DAILY, Disp: 180 each, Rfl: 1    diazePAM (VALIUM) 2 MG tablet, Take 1 tablet (2 mg total) by mouth daily as needed (Severe anxiety/Panic). 15 tablets to last 3 months, Disp: 15 tablet, Rfl: 0    docusate sodium (COLACE) 100 MG capsule, Take 1 capsule (100 mg total) by mouth 2 (two) times daily as needed for Constipation., Disp: 60 capsule, Rfl: 0    doxepin (SINEQUAN) 75 MG capsule, TAKE 2 CAPSULES(150 MG) BY MOUTH EVERY EVENING, Disp: 60 capsule, Rfl: 11    ergocalciferol (VITAMIN D2) 50,000 unit Cap, Take 1 capsule (50,000 Units total) by mouth every 7 days. (Patient not taking: Reported on 10/25/2023), Disp: 12 capsule, Rfl: 0    estradioL (ESTRACE) 0.01 % (0.1 mg/gram) vaginal cream, 1g vaginally daily x 2 weeks, then BIW as directed (Patient not taking: Reported on 6/13/2023), Disp: 42.5 g, Rfl: 2    fluticasone propionate (FLONASE) 50 mcg/actuation nasal spray, SHAKE LIQUID AND USE 1 SPRAY(50 MCG) IN EACH NOSTRIL EVERY DAY, Disp: 32 g, Rfl: 3    gabapentin (NEURONTIN) 300 MG capsule, TAKE 1 CAPSULE(300 MG) BY MOUTH THREE TIMES DAILY, Disp: 90 capsule, Rfl: 5    hydroCHLOROthiazide (HYDRODIURIL) 12.5 MG Tab, Take 1 tablet (12.5 mg total) by mouth once daily. For blood pressure, Disp: 90 tablet, Rfl: 3    HYDROcodone-acetaminophen (NORCO) 5-325 mg per tablet, Take 1 tablet by mouth every 24 hours as needed for Pain. Quantity medically necessary, Disp: 30 tablet, Rfl: 0    hydroxychloroquine (PLAQUENIL) 200 mg tablet, TAKE 1 TABLET BY MOUTH TWICE  "DAILY, Disp: 180 tablet, Rfl: 2    irbesartan (AVAPRO) 300 MG tablet, Take 1 tablet (300 mg total) by mouth every evening., Disp: 90 tablet, Rfl: 3    lurasidone (LATUDA) 60 mg Tab tablet, TAKE 1 TABLET(60 MG) BY MOUTH EVERY DAY, Disp: 90 tablet, Rfl: 3    naproxen (NAPROSYN) 500 MG tablet, Take 1 tablet (500 mg total) by mouth 2 (two) times daily as needed (pain)., Disp: 60 tablet, Rfl: 1    NARCAN 4 mg/actuation [place], SPRAY 1 SPRAY IN NOSTRIL ONCE FOR 1 DOSE, Disp: , Rfl:     nystatin (MYCOSTATIN) 100,000 unit/mL suspension, Take by mouth after meals as needed., Disp: , Rfl:     omeprazole (PRILOSEC) 40 MG capsule, Take 1 capsule (40 mg total) by mouth once daily., Disp: 90 capsule, Rfl: 3    pilocarpine (SALAGEN) 5 MG Tab, Take 1 tablet (5 mg total) by mouth 4 (four) times daily., Disp: 120 tablet, Rfl: 11    scopolamine (TRANSDERM-SCOP) 1.3-1.5 mg (1 mg over 3 days), Place 1 patch onto the skin every 72 hours., Disp: 4 patch, Rfl: 0    solifenacin (VESICARE) 5 MG tablet, Take 1 tablet (5 mg total) by mouth once daily., Disp: 30 tablet, Rfl: 11    tiZANidine (ZANAFLEX) 4 MG tablet, TAKE 2 TABLETS BY MOUTH THREE TIMES DAILY, Disp: 90 tablet, Rfl: 0    topiramate (TOPAMAX) 25 MG tablet, TAKE 1 TABLET BY MOUTH EVERY DAY FOR 2 WEEKS THEN 2 TABLETS EVERY DAY THEREAFTER, Disp: 155 tablet, Rfl: 0    ALLERGIES:  Review of patient's allergies indicates:   Allergen Reactions    Aspirin Hives       PSYCHIATRIC EXAM:  There were no vitals filed for this visit.    Appearance:  Well groomed, appearing healthy and of stated age  Behavior:  Cooperative, pleasant, no psychomotor agitation or retardation  Speech:  Normal rate, rhythm, prosody, and volume  Mood:  "Ok"  Affect:  Euthymic  Thought Process:  Linear, logical, goal directed  Thought Content:  Negative for suicidal ideation, homicidal ideation, delusions or hallucinations.  Associations:  Intact  Memory:  Grossly Intact  Level of Consciousness/Orientation:  Grossly " intact  Fund of Knowledge:  Good  Attention:  Good  Language:  Fluent, able to name abstract and concrete objects  Insight:  Fair  Judgment:  Intact  Psychomotor signs:  No abnormal movements of face  Gait:  Unable to assess via virtual visit      RELEVANT LABS/STUDIES:    Lab Results   Component Value Date    WBC 7.62 12/14/2023    HGB 8.2 (L) 12/14/2023    HCT 27.9 (L) 12/14/2023    MCV 73 (L) 12/14/2023     12/14/2023     BMP  Lab Results   Component Value Date     07/25/2023    K 4.1 07/25/2023     07/25/2023    CO2 27 07/25/2023    BUN 15 07/25/2023    CREATININE 1.0 07/25/2023    CALCIUM 9.9 07/25/2023    ANIONGAP 8 07/25/2023    ESTGFRAFRICA 45.5 (A) 06/15/2022    EGFRNONAA 39.5 (A) 06/15/2022     Lab Results   Component Value Date    ALT 25 06/02/2023    AST 35 06/02/2023    ALKPHOS 68 06/02/2023    BILITOT 0.5 06/02/2023     Lab Results   Component Value Date    TSH 2.563 09/22/2021     Lab Results   Component Value Date    HGBA1C 5.1 03/14/2023       IMPRESSION:    Jaki Bajwa is a 53 y.o. female with history of Mood disorder, JUAN JOSE, OCD, social phobia, and Internet shopping addiction who presents for follow up appointment.    Status/Progress:  Based on the examination today, the patient's problem(s) is/are adequately but not ideally controlled.  New problems have not been presented today.    Risk Parameters:  Patient reports no suicidal ideation  Patient reports no homicidal ideation  Patient reports no self-injurious behavior  Patient reports no violent behavior    DIAGNOSES:    ICD-10-CM ICD-9-CM   1. Panic disorder  F41.0 300.01   2. Social anxiety disorder  F40.10 300.23   3. Mood disorder  F39 296.90   4. JUAN JOSE (generalized anxiety disorder)  F41.1 300.02   5. Internet shopping addiction  F63.89 301.89   6. Insomnia due to other mental disorder  F51.05 300.9    F99 327.02     PLAN:  Mood is currently stable other than occasional flares of anxiety.  No medication changes today.    Continue Latuda 60mg daily, Wellbutrin XL 450mg daily, Buspar 15mg BID-TID, Doxepin 150mg qHS, and Valium 2mg PRN panic attack (#5 tablets per month).  Discussed with patient informed consent, risks versus benefits, alternative treatments, side effect profile and the inherent unpredictability of individual responses to these treatments.  The patient expresses understanding of the above and displays the capacity to agree with this current plan.   Continue individual therapy with Mr. Noah LCSW.    RETURN TO CLINIC:  Follow up in about 3 months (around 4/11/2024).

## 2024-01-19 ENCOUNTER — INFUSION (OUTPATIENT)
Dept: INFUSION THERAPY | Facility: HOSPITAL | Age: 54
End: 2024-01-19
Payer: MEDICARE

## 2024-01-19 VITALS
DIASTOLIC BLOOD PRESSURE: 84 MMHG | HEART RATE: 100 BPM | SYSTOLIC BLOOD PRESSURE: 133 MMHG | TEMPERATURE: 99 F | OXYGEN SATURATION: 100 % | RESPIRATION RATE: 18 BRPM

## 2024-01-19 DIAGNOSIS — D50.9 IRON DEFICIENCY ANEMIA, UNSPECIFIED IRON DEFICIENCY ANEMIA TYPE: Primary | ICD-10-CM

## 2024-01-19 PROCEDURE — 25000003 PHARM REV CODE 250: Mod: HCNC | Performed by: INTERNAL MEDICINE

## 2024-01-19 PROCEDURE — 63600175 PHARM REV CODE 636 W HCPCS: Mod: HCNC | Performed by: INTERNAL MEDICINE

## 2024-01-19 PROCEDURE — 96374 THER/PROPH/DIAG INJ IV PUSH: CPT | Mod: HCNC

## 2024-01-19 RX ORDER — DIPHENHYDRAMINE HYDROCHLORIDE 50 MG/ML
50 INJECTION INTRAMUSCULAR; INTRAVENOUS ONCE AS NEEDED
Status: DISCONTINUED | OUTPATIENT
Start: 2024-01-19 | End: 2024-01-19 | Stop reason: HOSPADM

## 2024-01-19 RX ORDER — HEPARIN 100 UNIT/ML
500 SYRINGE INTRAVENOUS
Status: DISCONTINUED | OUTPATIENT
Start: 2024-01-19 | End: 2024-01-19 | Stop reason: HOSPADM

## 2024-01-19 RX ORDER — SODIUM CHLORIDE 0.9 % (FLUSH) 0.9 %
10 SYRINGE (ML) INJECTION
Status: DISCONTINUED | OUTPATIENT
Start: 2024-01-19 | End: 2024-01-19 | Stop reason: HOSPADM

## 2024-01-19 RX ORDER — EPINEPHRINE 0.3 MG/.3ML
0.3 INJECTION SUBCUTANEOUS ONCE AS NEEDED
Status: DISCONTINUED | OUTPATIENT
Start: 2024-01-19 | End: 2024-01-19 | Stop reason: HOSPADM

## 2024-01-19 RX ADMIN — IRON SUCROSE 200 MG: 20 INJECTION, SOLUTION INTRAVENOUS at 07:01

## 2024-01-19 RX ADMIN — SODIUM CHLORIDE: 0.9 INJECTION, SOLUTION INTRAVENOUS at 07:01

## 2024-01-19 NOTE — PLAN OF CARE
Problem: Anemia  Goal: Anemia Symptom Improvement  Outcome: Ongoing, Progressing   Ambulatory to clinic with no c/o adverse effects or s/s of infection.  PIV inserted, flushed with out difficulty, blood return noted and infused with out problems.  Venofer IVP tolerated with no problems.  Patient could not stay for 30 min obs but agreed to 10 min obs.  Ambulatory home with NAD noted.

## 2024-01-31 RX ORDER — TOPIRAMATE 50 MG/1
50 TABLET, FILM COATED ORAL DAILY
Qty: 30 TABLET | Refills: 2 | Status: SHIPPED | OUTPATIENT
Start: 2024-01-31 | End: 2024-02-13

## 2024-02-02 ENCOUNTER — OFFICE VISIT (OUTPATIENT)
Dept: INTERNAL MEDICINE | Facility: CLINIC | Age: 54
End: 2024-02-02
Payer: MEDICARE

## 2024-02-02 VITALS — DIASTOLIC BLOOD PRESSURE: 84 MMHG | SYSTOLIC BLOOD PRESSURE: 133 MMHG

## 2024-02-02 DIAGNOSIS — G89.4 CHRONIC PAIN SYNDROME: ICD-10-CM

## 2024-02-02 DIAGNOSIS — N18.32 STAGE 3B CHRONIC KIDNEY DISEASE: ICD-10-CM

## 2024-02-02 DIAGNOSIS — F11.20 CHRONIC NARCOTIC DEPENDENCE: ICD-10-CM

## 2024-02-02 DIAGNOSIS — I10 ESSENTIAL HYPERTENSION: Primary | Chronic | ICD-10-CM

## 2024-02-02 PROCEDURE — 3079F DIAST BP 80-89 MM HG: CPT | Mod: HCNC,CPTII,95, | Performed by: FAMILY MEDICINE

## 2024-02-02 PROCEDURE — 99214 OFFICE O/P EST MOD 30 MIN: CPT | Mod: HCNC,95,, | Performed by: FAMILY MEDICINE

## 2024-02-02 PROCEDURE — 1159F MED LIST DOCD IN RCRD: CPT | Mod: HCNC,CPTII,95, | Performed by: FAMILY MEDICINE

## 2024-02-02 PROCEDURE — 3075F SYST BP GE 130 - 139MM HG: CPT | Mod: HCNC,CPTII,95, | Performed by: FAMILY MEDICINE

## 2024-02-02 RX ORDER — HYDROCODONE BITARTRATE AND ACETAMINOPHEN 5; 325 MG/1; MG/1
1 TABLET ORAL
Qty: 30 TABLET | Refills: 0 | Status: SHIPPED | OUTPATIENT
Start: 2024-02-02 | End: 2024-03-06 | Stop reason: SDUPTHER

## 2024-02-02 RX ORDER — NAPROXEN 500 MG/1
500 TABLET ORAL DAILY PRN
Qty: 60 TABLET | Refills: 1 | Status: SHIPPED | OUTPATIENT
Start: 2024-02-02 | End: 2024-05-31

## 2024-02-02 NOTE — ASSESSMENT & PLAN NOTE
Monitoring closely.  If any decline in GFR was certainly stress she should stop the naproxen.  Nothing else has really been helpful for her to control chronic pain

## 2024-02-02 NOTE — ASSESSMENT & PLAN NOTE
Continued managing with chronic Norco daily, naproxen and gabapentin.  Having her come back in to monitor renal function.

## 2024-02-02 NOTE — PROGRESS NOTES
Subjective:       Patient ID: Jaki Bajwa is a 53 y.o. female.    Chief Complaint: Follow-up (3m)    Follow-up  Associated symptoms include headaches. Pertinent negatives include no arthralgias, chest pain, joint swelling, neck pain, vomiting or weakness.     Following up today to discuss refill of naproxen.  I had refused recent refill because she was given 60 tablets in late November with 1 refill.  We discussed at the last visit the less than ideal situation of taking this indefinitely once or twice a day.  She uses this as well as Norco to manage chronic pain.    Taking naproxen once daily and still on omeprazole.    Finished iron infusions. Had 8 weeks in a row and feels great.    Tests to Keep You Healthy    Mammogram: SCHEDULED  Colon Cancer Screening: Met on 1/28/2021  Last Blood Pressure <= 139/89 (2/2/2024): Yes      Social History     Social History Narrative    Not on file       Family History   Problem Relation Age of Onset    Heart disease Mother     Hypertension Mother     Cancer Father         colon ca    Colon cancer Father     Depression Father     Schizophrenia Father     Anxiety disorder Father     Arthritis Father     Mental illness Father     Liver cancer Sister     Cancer Sister     Depression Sister     Anxiety disorder Sister     Heart attack Sister     COPD Sister     Cancer Sister         throat and colon    Depression Sister     Miscarriages / Stillbirths Sister     Pancreatic cancer Brother     Depression Brother     Alcohol abuse Brother     No Known Problems Daughter     Asthma Son     Pancreatic cancer Other     Liver cancer Other     Suicide Neg Hx     Ovarian cancer Neg Hx     Breast cancer Neg Hx        Current Outpatient Medications:     amLODIPine (NORVASC) 5 MG tablet, Take 1 tablet (5 mg total) by mouth once daily., Disp: 90 tablet, Rfl: 3    buPROPion (WELLBUTRIN XL) 150 MG TB24 tablet, TAKE 3 TABLETS(450 MG) BY MOUTH EVERY MORNING, Disp: 270 tablet, Rfl: 1    busPIRone  (BUSPAR) 15 MG tablet, TAKE 1 TABLET(15 MG) BY MOUTH THREE TIMES DAILY, Disp: 270 tablet, Rfl: 1    butalbital-acetaminophen-caffeine -40 mg (FIORICET, ESGIC) -40 mg per tablet, Take 1 tablet by mouth every 6 (six) hours as needed for Headaches., Disp: 40 tablet, Rfl: 0    cetirizine (ZYRTEC) 10 MG tablet, Take 1 tablet (10 mg total) by mouth once daily., Disp: 30 tablet, Rfl: 11    cycloSPORINE (RESTASIS) 0.05 % ophthalmic emulsion, INSTILL 1 DROP IN BOTH EYES TWICE DAILY, Disp: 180 each, Rfl: 1    docusate sodium (COLACE) 100 MG capsule, Take 1 capsule (100 mg total) by mouth 2 (two) times daily as needed for Constipation., Disp: 60 capsule, Rfl: 0    doxepin (SINEQUAN) 75 MG capsule, TAKE 2 CAPSULES(150 MG) BY MOUTH EVERY EVENING, Disp: 60 capsule, Rfl: 11    fluticasone propionate (FLONASE) 50 mcg/actuation nasal spray, SHAKE LIQUID AND USE 1 SPRAY(50 MCG) IN EACH NOSTRIL EVERY DAY, Disp: 32 g, Rfl: 3    gabapentin (NEURONTIN) 300 MG capsule, TAKE 1 CAPSULE(300 MG) BY MOUTH THREE TIMES DAILY, Disp: 90 capsule, Rfl: 5    hydroCHLOROthiazide (HYDRODIURIL) 12.5 MG Tab, Take 1 tablet (12.5 mg total) by mouth once daily. For blood pressure, Disp: 90 tablet, Rfl: 3    hydroxychloroquine (PLAQUENIL) 200 mg tablet, TAKE 1 TABLET BY MOUTH TWICE DAILY, Disp: 180 tablet, Rfl: 2    irbesartan (AVAPRO) 300 MG tablet, Take 1 tablet (300 mg total) by mouth every evening., Disp: 90 tablet, Rfl: 3    lurasidone (LATUDA) 60 mg Tab tablet, TAKE 1 TABLET(60 MG) BY MOUTH EVERY DAY, Disp: 90 tablet, Rfl: 3    NARCAN 4 mg/actuation [place], SPRAY 1 SPRAY IN NOSTRIL ONCE FOR 1 DOSE, Disp: , Rfl:     nystatin (MYCOSTATIN) 100,000 unit/mL suspension, Take by mouth after meals as needed., Disp: , Rfl:     omeprazole (PRILOSEC) 40 MG capsule, Take 1 capsule (40 mg total) by mouth once daily., Disp: 90 capsule, Rfl: 3    pilocarpine (SALAGEN) 5 MG Tab, Take 1 tablet (5 mg total) by mouth 4 (four) times daily., Disp: 120 tablet,  Rfl: 11    scopolamine (TRANSDERM-SCOP) 1.3-1.5 mg (1 mg over 3 days), Place 1 patch onto the skin every 72 hours., Disp: 4 patch, Rfl: 0    solifenacin (VESICARE) 5 MG tablet, Take 1 tablet (5 mg total) by mouth once daily., Disp: 30 tablet, Rfl: 11    tiZANidine (ZANAFLEX) 4 MG tablet, TAKE 2 TABLETS BY MOUTH THREE TIMES DAILY, Disp: 90 tablet, Rfl: 0    topiramate (TOPAMAX) 50 MG tablet, Take 1 tablet (50 mg total) by mouth once daily., Disp: 30 tablet, Rfl: 2    diazePAM (VALIUM) 2 MG tablet, Take 1 tablet (2 mg total) by mouth daily as needed (Severe anxiety/Panic). 15 tablets to last 3 months, Disp: 15 tablet, Rfl: 0    estradioL (ESTRACE) 0.01 % (0.1 mg/gram) vaginal cream, 1g vaginally daily x 2 weeks, then BIW as directed (Patient not taking: Reported on 6/13/2023), Disp: 42.5 g, Rfl: 2    HYDROcodone-acetaminophen (NORCO) 5-325 mg per tablet, Take 1 tablet by mouth every 24 hours as needed for Pain. Quantity medically necessary, Disp: 30 tablet, Rfl: 0    naproxen (NAPROSYN) 500 MG tablet, Take 1 tablet (500 mg total) by mouth daily as needed (pain)., Disp: 60 tablet, Rfl: 1    Review of Systems   Constitutional:  Negative for activity change and unexpected weight change.   HENT:  Negative for hearing loss, rhinorrhea and trouble swallowing.    Eyes:  Negative for discharge and visual disturbance.   Respiratory:  Negative for chest tightness and wheezing.    Cardiovascular:  Negative for chest pain and palpitations.   Gastrointestinal:  Negative for blood in stool, constipation, diarrhea and vomiting.   Endocrine: Negative for polydipsia and polyuria.   Genitourinary:  Negative for difficulty urinating, dysuria, hematuria and menstrual problem.   Musculoskeletal:  Negative for arthralgias, joint swelling and neck pain.   Neurological:  Positive for headaches. Negative for weakness.   Psychiatric/Behavioral:  Negative for confusion and dysphoric mood.        Objective:   /84 Comment: recent reading   LMP 07/11/2012 Comment: partial      Physical Exam  Constitutional:       General: She is not in acute distress.     Appearance: She is well-developed.   HENT:      Head: Normocephalic.   Pulmonary:      Effort: Pulmonary effort is normal. No respiratory distress.   Neurological:      Mental Status: She is alert and oriented to person, place, and time.   Psychiatric:         Behavior: Behavior normal.         Assessment & Plan     Problem List Items Addressed This Visit          Neuro    Chronic pain    Current Assessment & Plan     Continued managing with chronic Norco daily, naproxen and gabapentin.  Having her come back in to monitor renal function.         Relevant Medications    HYDROcodone-acetaminophen (NORCO) 5-325 mg per tablet    Other Relevant Orders    Comprehensive Metabolic Panel    CBC Auto Differential       Psychiatric    Chronic narcotic dependence    Current Assessment & Plan     Reviewed .  She has been consistent on Norco once a day.  Sometimes able to skip days.         Relevant Medications    HYDROcodone-acetaminophen (NORCO) 5-325 mg per tablet       Cardiac/Vascular    Essential hypertension - Primary (Chronic)    Current Assessment & Plan     Controlled.  Continue on same medications.         Relevant Orders    Comprehensive Metabolic Panel    CBC Auto Differential       Renal/    Stage 3b chronic kidney disease    Current Assessment & Plan     Monitoring closely.  If any decline in GFR was certainly stress she should stop the naproxen.  Nothing else has really been helpful for her to control chronic pain         Relevant Orders    Comprehensive Metabolic Panel         Immunizations Administered on Date of Encounter - 2/2/2024       No immunizations on file.             No follow-ups on file.      Disclaimer:  This note may have been prepared using voice recognition software, it may have not been extensively proofed, as such there could be errors within the text such as sound alike  errors.

## 2024-02-09 ENCOUNTER — TELEPHONE (OUTPATIENT)
Dept: UROLOGY | Facility: CLINIC | Age: 54
End: 2024-02-09
Payer: MEDICARE

## 2024-02-09 NOTE — TELEPHONE ENCOUNTER
Returned call. Informed pt standing order already placed for urine culture. Pt verbalized understanding.     ----- Message from Radha Blair sent at 2/9/2024  8:24 AM CST -----  Contact: patient  Type:  Patient Call          Who Called: patient         Does the patient know what this is regarding?: Requesting a call back pt is having a concern and would like to knoww if the Dr can put in orders for a urinalysis ; please advise            Would the patient rather a call back or a response via [facility]?call           Best Call Back Number: [phone]             Additional Information: Pt said that her matter is urgent

## 2024-02-11 RX ORDER — TIZANIDINE 4 MG/1
TABLET ORAL
Qty: 90 TABLET | Refills: 0 | Status: SHIPPED | OUTPATIENT
Start: 2024-02-11 | End: 2024-04-02

## 2024-02-12 ENCOUNTER — TELEPHONE (OUTPATIENT)
Dept: INTERNAL MEDICINE | Facility: CLINIC | Age: 54
End: 2024-02-12
Payer: MEDICARE

## 2024-02-12 ENCOUNTER — HOSPITAL ENCOUNTER (OUTPATIENT)
Dept: RADIOLOGY | Facility: OTHER | Age: 54
Discharge: HOME | DRG: 683 | End: 2024-02-12
Attending: FAMILY MEDICINE
Payer: MEDICARE

## 2024-02-12 ENCOUNTER — HOSPITAL ENCOUNTER (INPATIENT)
Facility: HOSPITAL | Age: 54
LOS: 1 days | Discharge: HOME | DRG: 683 | End: 2024-02-14
Attending: EMERGENCY MEDICINE | Admitting: HOSPITALIST
Payer: MEDICARE

## 2024-02-12 DIAGNOSIS — N17.9 AKI (ACUTE KIDNEY INJURY): ICD-10-CM

## 2024-02-12 DIAGNOSIS — N39.0 URINARY TRACT INFECTION WITHOUT HEMATURIA, SITE UNSPECIFIED: ICD-10-CM

## 2024-02-12 DIAGNOSIS — Z12.31 ENCOUNTER FOR SCREENING MAMMOGRAM FOR BREAST CANCER: ICD-10-CM

## 2024-02-12 DIAGNOSIS — R07.9 CHEST PAIN: ICD-10-CM

## 2024-02-12 DIAGNOSIS — J98.11 COMPRESSIVE ATELECTASIS: ICD-10-CM

## 2024-02-12 DIAGNOSIS — R91.1 PULMONARY NODULE LESS THAN 6 MM DETERMINED BY COMPUTED TOMOGRAPHY OF LUNG: Primary | ICD-10-CM

## 2024-02-12 LAB
ALBUMIN SERPL BCP-MCNC: 4 G/DL (ref 3.5–5.2)
ALP SERPL-CCNC: 83 U/L (ref 55–135)
ALT SERPL W/O P-5'-P-CCNC: 13 U/L (ref 10–44)
ANION GAP SERPL CALC-SCNC: 9 MMOL/L (ref 8–16)
AST SERPL-CCNC: 18 U/L (ref 10–40)
BACTERIA #/AREA URNS AUTO: ABNORMAL /HPF
BASOPHILS # BLD AUTO: 0.04 K/UL (ref 0–0.2)
BASOPHILS NFR BLD: 0.5 % (ref 0–1.9)
BILIRUB SERPL-MCNC: 0.2 MG/DL (ref 0.1–1)
BILIRUB UR QL STRIP: NEGATIVE
BUN SERPL-MCNC: 31 MG/DL (ref 6–20)
CALCIUM SERPL-MCNC: 9.9 MG/DL (ref 8.7–10.5)
CHLORIDE SERPL-SCNC: 102 MMOL/L (ref 95–110)
CLARITY UR REFRACT.AUTO: ABNORMAL
CO2 SERPL-SCNC: 26 MMOL/L (ref 23–29)
COLOR UR AUTO: YELLOW
CREAT SERPL-MCNC: 2.5 MG/DL (ref 0.5–1.4)
DIFFERENTIAL METHOD BLD: ABNORMAL
EOSINOPHIL # BLD AUTO: 0.3 K/UL (ref 0–0.5)
EOSINOPHIL NFR BLD: 3.6 % (ref 0–8)
ERYTHROCYTE [DISTWIDTH] IN BLOOD BY AUTOMATED COUNT: 18 % (ref 11.5–14.5)
EST. GFR  (NO RACE VARIABLE): 22.2 ML/MIN/1.73 M^2
GLUCOSE SERPL-MCNC: 78 MG/DL (ref 70–110)
GLUCOSE UR QL STRIP: NEGATIVE
HCT VFR BLD AUTO: 36.8 % (ref 37–48.5)
HGB BLD-MCNC: 10.7 G/DL (ref 12–16)
HGB UR QL STRIP: NEGATIVE
HYALINE CASTS UR QL AUTO: 1 /LPF
IMM GRANULOCYTES # BLD AUTO: 0.02 K/UL (ref 0–0.04)
IMM GRANULOCYTES NFR BLD AUTO: 0.3 % (ref 0–0.5)
KETONES UR QL STRIP: NEGATIVE
LEUKOCYTE ESTERASE UR QL STRIP: ABNORMAL
LYMPHOCYTES # BLD AUTO: 2.6 K/UL (ref 1–4.8)
LYMPHOCYTES NFR BLD: 33.4 % (ref 18–48)
MCH RBC QN AUTO: 23.8 PG (ref 27–31)
MCHC RBC AUTO-ENTMCNC: 29.1 G/DL (ref 32–36)
MCV RBC AUTO: 82 FL (ref 82–98)
MICROSCOPIC COMMENT: ABNORMAL
MONOCYTES # BLD AUTO: 0.6 K/UL (ref 0.3–1)
MONOCYTES NFR BLD: 7.1 % (ref 4–15)
NEUTROPHILS # BLD AUTO: 4.2 K/UL (ref 1.8–7.7)
NEUTROPHILS NFR BLD: 55.1 % (ref 38–73)
NITRITE UR QL STRIP: NEGATIVE
NRBC BLD-RTO: 0 /100 WBC
PH UR STRIP: 6 [PH] (ref 5–8)
PLATELET # BLD AUTO: 255 K/UL (ref 150–450)
PMV BLD AUTO: 11.3 FL (ref 9.2–12.9)
POTASSIUM SERPL-SCNC: 4.3 MMOL/L (ref 3.5–5.1)
PROT SERPL-MCNC: 8 G/DL (ref 6–8.4)
PROT UR QL STRIP: ABNORMAL
RBC # BLD AUTO: 4.5 M/UL (ref 4–5.4)
RBC #/AREA URNS AUTO: 5 /HPF (ref 0–4)
SODIUM SERPL-SCNC: 137 MMOL/L (ref 136–145)
SP GR UR STRIP: 1.02 (ref 1–1.03)
SQUAMOUS #/AREA URNS AUTO: 8 /HPF
URN SPEC COLLECT METH UR: ABNORMAL
WBC # BLD AUTO: 7.7 K/UL (ref 3.9–12.7)
WBC #/AREA URNS AUTO: 76 /HPF (ref 0–5)

## 2024-02-12 PROCEDURE — 96372 THER/PROPH/DIAG INJ SC/IM: CPT | Performed by: PHYSICIAN ASSISTANT

## 2024-02-12 PROCEDURE — 77067 SCR MAMMO BI INCL CAD: CPT | Mod: 26,HCNC,, | Performed by: RADIOLOGY

## 2024-02-12 PROCEDURE — 81001 URINALYSIS AUTO W/SCOPE: CPT | Mod: HCNC | Performed by: PHYSICIAN ASSISTANT

## 2024-02-12 PROCEDURE — G0378 HOSPITAL OBSERVATION PER HR: HCPCS | Mod: HCNC

## 2024-02-12 PROCEDURE — 80053 COMPREHEN METABOLIC PANEL: CPT | Mod: 91,HCNC | Performed by: PHYSICIAN ASSISTANT

## 2024-02-12 PROCEDURE — 96361 HYDRATE IV INFUSION ADD-ON: CPT | Mod: HCNC

## 2024-02-12 PROCEDURE — 99285 EMERGENCY DEPT VISIT HI MDM: CPT | Mod: 25,HCNC

## 2024-02-12 PROCEDURE — 99285 EMERGENCY DEPT VISIT HI MDM: CPT | Mod: 25,27,HCNC

## 2024-02-12 PROCEDURE — 96365 THER/PROPH/DIAG IV INF INIT: CPT | Mod: HCNC

## 2024-02-12 PROCEDURE — 87088 URINE BACTERIA CULTURE: CPT | Mod: HCNC | Performed by: PHYSICIAN ASSISTANT

## 2024-02-12 PROCEDURE — 25000003 PHARM REV CODE 250: Mod: HCNC | Performed by: PHYSICIAN ASSISTANT

## 2024-02-12 PROCEDURE — BH02ZZZ PLAIN RADIOGRAPHY OF BILATERAL BREASTS: ICD-10-PCS | Performed by: FAMILY MEDICINE

## 2024-02-12 PROCEDURE — 87186 SC STD MICRODIL/AGAR DIL: CPT | Mod: HCNC | Performed by: PHYSICIAN ASSISTANT

## 2024-02-12 PROCEDURE — 77067 SCR MAMMO BI INCL CAD: CPT | Mod: TC,HCNC

## 2024-02-12 PROCEDURE — 63600175 PHARM REV CODE 636 W HCPCS: Mod: HCNC | Performed by: PHYSICIAN ASSISTANT

## 2024-02-12 PROCEDURE — 77063 BREAST TOMOSYNTHESIS BI: CPT | Mod: 26,HCNC,, | Performed by: RADIOLOGY

## 2024-02-12 PROCEDURE — 87086 URINE CULTURE/COLONY COUNT: CPT | Mod: 59,HCNC | Performed by: PHYSICIAN ASSISTANT

## 2024-02-12 PROCEDURE — 85025 COMPLETE CBC W/AUTO DIFF WBC: CPT | Mod: 91,HCNC | Performed by: PHYSICIAN ASSISTANT

## 2024-02-12 PROCEDURE — 87077 CULTURE AEROBIC IDENTIFY: CPT | Mod: HCNC | Performed by: PHYSICIAN ASSISTANT

## 2024-02-12 RX ORDER — HEPARIN SODIUM 5000 [USP'U]/ML
5000 INJECTION, SOLUTION INTRAVENOUS; SUBCUTANEOUS EVERY 8 HOURS
Status: DISCONTINUED | OUTPATIENT
Start: 2024-02-12 | End: 2024-02-14 | Stop reason: HOSPADM

## 2024-02-12 RX ORDER — BUPROPION HYDROCHLORIDE 150 MG/1
450 TABLET ORAL DAILY
Status: DISCONTINUED | OUTPATIENT
Start: 2024-02-13 | End: 2024-02-14 | Stop reason: HOSPADM

## 2024-02-12 RX ORDER — DOXEPIN HYDROCHLORIDE 75 MG/1
150 CAPSULE ORAL NIGHTLY PRN
Status: DISCONTINUED | OUTPATIENT
Start: 2024-02-12 | End: 2024-02-13

## 2024-02-12 RX ORDER — HYDROCODONE BITARTRATE AND ACETAMINOPHEN 5; 325 MG/1; MG/1
1 TABLET ORAL EVERY 8 HOURS PRN
Status: DISCONTINUED | OUTPATIENT
Start: 2024-02-12 | End: 2024-02-14 | Stop reason: HOSPADM

## 2024-02-12 RX ORDER — PROMETHAZINE HYDROCHLORIDE 25 MG/1
25 TABLET ORAL EVERY 6 HOURS PRN
Status: DISCONTINUED | OUTPATIENT
Start: 2024-02-12 | End: 2024-02-14 | Stop reason: HOSPADM

## 2024-02-12 RX ORDER — TALC
6 POWDER (GRAM) TOPICAL NIGHTLY PRN
Status: DISCONTINUED | OUTPATIENT
Start: 2024-02-12 | End: 2024-02-12

## 2024-02-12 RX ORDER — METHOCARBAMOL 500 MG/1
500 TABLET, FILM COATED ORAL 3 TIMES DAILY
Status: DISCONTINUED | OUTPATIENT
Start: 2024-02-12 | End: 2024-02-14 | Stop reason: HOSPADM

## 2024-02-12 RX ORDER — ACETAMINOPHEN 500 MG
1000 TABLET ORAL
Status: COMPLETED | OUTPATIENT
Start: 2024-02-12 | End: 2024-02-12

## 2024-02-12 RX ORDER — DIAZEPAM 2 MG/1
2 TABLET ORAL DAILY PRN
Status: DISCONTINUED | OUTPATIENT
Start: 2024-02-12 | End: 2024-02-14 | Stop reason: HOSPADM

## 2024-02-12 RX ORDER — IBUPROFEN 200 MG
16 TABLET ORAL
Status: DISCONTINUED | OUTPATIENT
Start: 2024-02-12 | End: 2024-02-14 | Stop reason: HOSPADM

## 2024-02-12 RX ORDER — IPRATROPIUM BROMIDE AND ALBUTEROL SULFATE 2.5; .5 MG/3ML; MG/3ML
3 SOLUTION RESPIRATORY (INHALATION) EVERY 4 HOURS PRN
Status: DISCONTINUED | OUTPATIENT
Start: 2024-02-12 | End: 2024-02-14 | Stop reason: HOSPADM

## 2024-02-12 RX ORDER — SODIUM CHLORIDE, SODIUM LACTATE, POTASSIUM CHLORIDE, CALCIUM CHLORIDE 600; 310; 30; 20 MG/100ML; MG/100ML; MG/100ML; MG/100ML
INJECTION, SOLUTION INTRAVENOUS CONTINUOUS
Status: ACTIVE | OUTPATIENT
Start: 2024-02-13 | End: 2024-02-13

## 2024-02-12 RX ORDER — IBUPROFEN 200 MG
24 TABLET ORAL
Status: DISCONTINUED | OUTPATIENT
Start: 2024-02-12 | End: 2024-02-14 | Stop reason: HOSPADM

## 2024-02-12 RX ORDER — ONDANSETRON 8 MG/1
8 TABLET, ORALLY DISINTEGRATING ORAL EVERY 8 HOURS PRN
Status: DISCONTINUED | OUTPATIENT
Start: 2024-02-12 | End: 2024-02-14 | Stop reason: HOSPADM

## 2024-02-12 RX ORDER — GLUCAGON 1 MG
1 KIT INJECTION
Status: DISCONTINUED | OUTPATIENT
Start: 2024-02-12 | End: 2024-02-14 | Stop reason: HOSPADM

## 2024-02-12 RX ORDER — GABAPENTIN 300 MG/1
300 CAPSULE ORAL 3 TIMES DAILY
Status: DISCONTINUED | OUTPATIENT
Start: 2024-02-12 | End: 2024-02-14 | Stop reason: HOSPADM

## 2024-02-12 RX ORDER — POLYETHYLENE GLYCOL 3350 17 G/17G
17 POWDER, FOR SOLUTION ORAL DAILY PRN
Status: DISCONTINUED | OUTPATIENT
Start: 2024-02-12 | End: 2024-02-14 | Stop reason: HOSPADM

## 2024-02-12 RX ORDER — BISACODYL 10 MG/1
10 SUPPOSITORY RECTAL DAILY PRN
Status: DISCONTINUED | OUTPATIENT
Start: 2024-02-12 | End: 2024-02-14 | Stop reason: HOSPADM

## 2024-02-12 RX ORDER — ACETAMINOPHEN 325 MG/1
650 TABLET ORAL EVERY 4 HOURS PRN
Status: DISCONTINUED | OUTPATIENT
Start: 2024-02-12 | End: 2024-02-14 | Stop reason: HOSPADM

## 2024-02-12 RX ORDER — PILOCARPINE HYDROCHLORIDE 5 MG/1
5 TABLET, FILM COATED ORAL 4 TIMES DAILY PRN
Status: DISCONTINUED | OUTPATIENT
Start: 2024-02-12 | End: 2024-02-14 | Stop reason: HOSPADM

## 2024-02-12 RX ADMIN — GABAPENTIN 300 MG: 300 CAPSULE ORAL at 10:02

## 2024-02-12 RX ADMIN — METHOCARBAMOL 500 MG: 500 TABLET ORAL at 10:02

## 2024-02-12 RX ADMIN — SODIUM CHLORIDE 500 ML: 9 INJECTION, SOLUTION INTRAVENOUS at 05:02

## 2024-02-12 RX ADMIN — CEFTRIAXONE 1 G: 1 INJECTION, POWDER, FOR SOLUTION INTRAMUSCULAR; INTRAVENOUS at 06:02

## 2024-02-12 RX ADMIN — SODIUM CHLORIDE 500 ML: 9 INJECTION, SOLUTION INTRAVENOUS at 09:02

## 2024-02-12 RX ADMIN — ACETAMINOPHEN 1000 MG: 500 TABLET ORAL at 06:02

## 2024-02-12 RX ADMIN — HEPARIN SODIUM 5000 UNITS: 5000 INJECTION INTRAVENOUS; SUBCUTANEOUS at 10:02

## 2024-02-12 NOTE — Clinical Note
Diagnosis: CANDELARIA (acute kidney injury) [[id number]]   Future Attending Provider: JANENE JEFFERSON [[id number]]

## 2024-02-12 NOTE — ED NOTES
Patient identifiers verified and correct for Ms Bajwa  C/C: ABdnormal labs SEE NN  APPEARANCE: awake and alert in NAD. PAIN  6/10  SKIN: warm, dry and intact. No breakdown or bruising.  MUSCULOSKELETAL: Patient moving all extremities spontaneously, no obvious swelling or deformities noted. Ambulates independently.  RESPIRATORY: Denies shortness of breath.Respirations unlabored.   CARDIAC: Denies CP, 2+ distal pulses; no peripheral edema  ABDOMEN: S/ND/NT, Denies nausea  : voids spontaneously, denies difficulty  Neurologic: AAO x 4; follows commands equal strength in all extremities; denies numbness/tingling. Denies dizziness Denies new weakness

## 2024-02-12 NOTE — ED PROVIDER NOTES
"Encounter Date: 2024       History     Chief Complaint   Patient presents with    Abnormal Lab     Elevated BUN and creatinine     53 y.o. female with a PMHx of HTN, lupus, sjogren's syndrome, anemia, chronic back pain presents to ED with abnormal kidney function on labs this morning.  Labs performed by her PCP significant for elevated BUN/CR and decrease in her GFR.  She takes naproxen 1-2 times daily for several months for chronic back pain.  Over the past week she was only been taking it every other day as instructed by her PCP.  She last took it this morning.  She also complains of right flank pain for the past week.  It radiates around her lower abdomen.  She was associated urinary frequency and urgency.  Denies fever, nausea, vomiting, decrease urine output, dysuria, hematuria, fever, chest pain, shortness of breath, generalized weakness.        Review of patient's allergies indicates:   Allergen Reactions    Aspirin Hives     Past Medical History:   Diagnosis Date    CANDELARIA (acute kidney injury) 2022    Anemia     Anxiety     Depression     History of psychiatric hospitalization     [place] x 1 week for depression    History of ventral hernia repair     Hypertension     Lupus     patient reports that she is being treated "like I have Lupus"    Mental disorder     Morbid obesity 12/15/2017    Psychiatric problem     Sjogren's syndrome 2013    Therapy     TMJ (temporomandibular joint disorder)      Past Surgical History:   Procedure Laterality Date    breast reduction      BREAST SURGERY  2005     SECTION      x 2    HYSTERECTOMY  2005    INGUINAL HERNIA REPAIR      LAPAROSCOPIC TOTAL HYSTERECTOMY  2012    ASA    TOTAL REDUCTION MAMMOPLASTY      TUBAL LIGATION      VENTRAL HERNIA REPAIR       Family History   Problem Relation Age of Onset    Heart disease Mother     Hypertension Mother     Cancer Father         colon ca    Colon cancer Father     Depression Father     " Schizophrenia Father     Anxiety disorder Father     Arthritis Father     Mental illness Father     Liver cancer Sister     Cancer Sister     Depression Sister     Anxiety disorder Sister     Heart attack Sister     COPD Sister     Cancer Sister         throat and colon    Depression Sister     Miscarriages / Stillbirths Sister     Pancreatic cancer Brother     Depression Brother     Alcohol abuse Brother     No Known Problems Daughter     Asthma Son     Pancreatic cancer Other     Liver cancer Other     Suicide Neg Hx     Ovarian cancer Neg Hx     Breast cancer Neg Hx      Social History     Tobacco Use    Smoking status: Never    Smokeless tobacco: Never   Substance Use Topics    Alcohol use: Not Currently     Alcohol/week: 1.0 standard drink of alcohol     Types: 1 Glasses of wine per week     Comment: rarely     Drug use: Not Currently     Types: Barbituates, Hydrocodone     Comment: rx hydrocodone     Review of Systems   Constitutional:  Negative for fever.   Cardiovascular:  Negative for chest pain.   Gastrointestinal:  Negative for abdominal pain, nausea and vomiting.   Genitourinary:  Positive for flank pain, frequency and urgency. Negative for dysuria and hematuria.   Neurological:  Negative for weakness.       Physical Exam     Initial Vitals [02/12/24 1512]   BP Pulse Resp Temp SpO2   90/60 87 17 98.3 °F (36.8 °C) 98 %      MAP       --         Physical Exam    Nursing note and vitals reviewed.  Constitutional: She appears well-developed and well-nourished. She is not diaphoretic. No distress.   HENT:   Head: Normocephalic and atraumatic.   Nose: Nose normal.   Eyes: Conjunctivae and EOM are normal.   Neck: Neck supple.   Cardiovascular:  Normal rate.           Pulmonary/Chest: No respiratory distress.   Abdominal: There is abdominal tenderness in the right lower quadrant and suprapubic area.   There is right CVA tenderness.  Musculoskeletal:      Cervical back: Neck supple.     Neurological: She is alert  and oriented to person, place, and time. Gait normal.   Skin: No rash noted.   Psychiatric: She has a normal mood and affect. Her behavior is normal. Judgment and thought content normal.         ED Course   Procedures  Labs Reviewed   CBC W/ AUTO DIFFERENTIAL - Abnormal; Notable for the following components:       Result Value    Hemoglobin 10.7 (*)     Hematocrit 36.8 (*)     MCH 23.8 (*)     MCHC 29.1 (*)     RDW 18.0 (*)     All other components within normal limits   COMPREHENSIVE METABOLIC PANEL - Abnormal; Notable for the following components:    BUN 31 (*)     Creatinine 2.5 (*)     eGFR 22.2 (*)     All other components within normal limits   URINALYSIS, REFLEX TO URINE CULTURE - Abnormal; Notable for the following components:    Appearance, UA Hazy (*)     Protein, UA 1+ (*)     Leukocytes, UA 3+ (*)     All other components within normal limits    Narrative:     Specimen Source->Urine   URINALYSIS MICROSCOPIC - Abnormal; Notable for the following components:    RBC, UA 5 (*)     WBC, UA 76 (*)     All other components within normal limits    Narrative:     Specimen Source->Urine   CULTURE, URINE          Imaging Results               CT Renal Stone Study ABD Pelvis WO (Final result)  Result time 02/12/24 17:57:00      Final result by Sunny Morillo MD (02/12/24 17:57:00)                   Impression:      No acute process or CT findings identified to explain patient's symptoms of flank pain on this noncontrast study.  Specifically, no radiodense calculus in the urinary tract or obstructive uropathy.    Right lower lobe compressive atelectasis.    Lower lobe 3 mm solid pulmonary nodule.  For a solid nodule <6 mm, Fleischner Society 2017 guidelines recommend no routine follow up for a low risk patient, or follow-up with non-contrast chest CT at 12 months in a high risk patient.    Few additional findings as above.    This report was flagged in Epic as containing an incidental finding.      Electronically  signed by: Sunny Morillo MD  Date:    02/12/2024  Time:    17:57               Narrative:    EXAMINATION:  CT RENAL STONE STUDY ABD PELVIS WO    CLINICAL HISTORY:  Flank pain, kidney stone suspected;    TECHNIQUE:  Low dose axial images, sagittal and coronal reformations were obtained from the lung bases to the pubic symphysis.  Contrast was not administered.    COMPARISON:  Chest radiograph 09/13/2016, upper GI swallow study 05/09/2019 and renal ultrasound 06/02/2017    FINDINGS:  Mild volume loss with air bronchograms at the right lower lobe likely secondary to nonspecific elevation of the right hemidiaphragm.  Few scattered linear opacities in the lung bases consistent with mild platelike scarring versus atelectasis.  There is a 3 mm solid nodule in the posterolateral left lower lobe.  No pleural effusion.    Base of the heart is within normal limits.    Noncontrast appearance of the liver, gallbladder, pancreas, spleen, stomach, duodenum and bilateral adrenal glands are within normal limits.  No biliary ductal dilatation.    Bilateral kidneys are normal in size, shape and location.  No radiodense calculus seen within the urinary tract.  No hydronephrosis or significant perinephric stranding.  Ureters are normal in course and caliber.  Urinary bladder is within normal limits.  Uterus not identified and likely surgically absent versus atrophic.  No adnexal mass or significant volume free pelvic fluid.  Pelvic phleboliths noted.    Appendix and terminal ileum are within normal limits.  No evidence of bowel obstruction or acute inflammation.  No pneumatosis or portal venous gas.    No ascites, free air or lymphadenopathy by CT criteria.  No significant calcific atherosclerosis.  Aorta tapers normally.    Small fat containing umbilical hernia.  Osseous structures show age-related overall mild degenerative change and chronic appearing minimal to mild anterior wedge deformity of a few mid to lower thoracic vertebral  bodies without acute or destructive process seen.  Small bone island within S1 sacral vertebral body.                                       Medications   sodium chloride 0.9% bolus 500 mL 500 mL (0 mLs Intravenous Stopped 2/12/24 1839)   cefTRIAXone (Rocephin) 1 g in dextrose 5 % in water (D5W) 100 mL IVPB (MB+) (0 g Intravenous Stopped 2/12/24 2000)   acetaminophen tablet 1,000 mg (1,000 mg Oral Given 2/12/24 1853)     Medical Decision Making  53 y.o. female with a PMHx of HTN, lupus, sjogren's syndrome, anemia, chronic back pain presents to ED with abnormal kidney function on labs this morning. Nontoxic appearing. Blood pressure is soft. Afebrile. Exam as above. I will initiate workup and reassess.    Ddx:  Acute renal failure secondary to naproxen use, obstructive uropathy, UTI, pyelonephritis dehydration    - Labs concerning for acute renal failure. Creatinine of 2.5 up from 1.  IV fluids given. May consider 2/2 naproxen use  - Urine with 3+ leukocytes.  76 WBCs with occasional bacteria noted on microscopic.  No leukocytosis. Given symptoms and laboratory findings will treat for UTI.  CT negative for obstructive uropathy.  - Right-sided compressive atelectasis and Right 3 mm pulmonary nodule noted of CT study  - Given acute decline in kidney function, I will admit to  for observation for further workup and evaluation     Amount and/or Complexity of Data Reviewed  Labs: ordered. Decision-making details documented in ED Course.  Radiology: ordered.    Risk  OTC drugs.               ED Course as of 02/12/24 2002 Mon Feb 12, 2024   1744 WBC: 7.70 [HM]   1744 Hemoglobin(!): 10.7 [HM]   1744 Hematocrit(!): 36.8 [HM]   1757 Creatinine(!): 2.5 [HM]   1757 BUN(!): 31 [HM]   1817 Leukocyte Esterase, UA(!): 3+ [HM]   1817 WBC, UA(!): 76 [HM]   1817 Bacteria, UA: Occasional [HM]   1817 WBC, UA(!): 76 [HM]   1817 RBC, UA(!): 5 []      ED Course User Index  [] Kallie Peacock PA-C                            Clinical Impression:  Final diagnoses:  [N17.9] CANDELARIA (acute kidney injury)  [R91.1] Pulmonary nodule less than 6 mm determined by computed tomography of lung (Primary)  [J98.11] Compressive atelectasis  [N39.0] Urinary tract infection without hematuria, site unspecified          ED Disposition Condition    Observation Stable                Kallie Peacock PA-C  02/12/24 2003

## 2024-02-12 NOTE — TELEPHONE ENCOUNTER
Spoke to pt and informed her of her decreased kidney function. Instructed her to report to the ED for further evaluation. Pt verbalized understanding and reports she will go to main campus ED.

## 2024-02-13 LAB
ANION GAP SERPL CALC-SCNC: 8 MMOL/L (ref 8–16)
BACTERIA #/AREA URNS AUTO: ABNORMAL /HPF
BASOPHILS # BLD AUTO: 0.04 K/UL (ref 0–0.2)
BASOPHILS NFR BLD: 0.6 % (ref 0–1.9)
BILIRUB UR QL STRIP: NEGATIVE
BUN SERPL-MCNC: 26 MG/DL (ref 6–20)
CALCIUM SERPL-MCNC: 9.6 MG/DL (ref 8.7–10.5)
CHLORIDE SERPL-SCNC: 103 MMOL/L (ref 95–110)
CLARITY UR REFRACT.AUTO: CLEAR
CO2 SERPL-SCNC: 26 MMOL/L (ref 23–29)
COLOR UR AUTO: ABNORMAL
CREAT SERPL-MCNC: 1.6 MG/DL (ref 0.5–1.4)
DIFFERENTIAL METHOD BLD: ABNORMAL
EOSINOPHIL # BLD AUTO: 0.3 K/UL (ref 0–0.5)
EOSINOPHIL NFR BLD: 3.5 % (ref 0–8)
ERYTHROCYTE [DISTWIDTH] IN BLOOD BY AUTOMATED COUNT: 18 % (ref 11.5–14.5)
EST. GFR  (NO RACE VARIABLE): 37.9 ML/MIN/1.73 M^2
ESTIMATED AVG GLUCOSE: 94 MG/DL (ref 68–131)
GLUCOSE SERPL-MCNC: 148 MG/DL (ref 70–110)
GLUCOSE UR QL STRIP: NEGATIVE
HBA1C MFR BLD: 4.9 % (ref 4–5.6)
HCT VFR BLD AUTO: 33.1 % (ref 37–48.5)
HGB BLD-MCNC: 9.9 G/DL (ref 12–16)
HGB UR QL STRIP: NEGATIVE
IMM GRANULOCYTES # BLD AUTO: 0.02 K/UL (ref 0–0.04)
IMM GRANULOCYTES NFR BLD AUTO: 0.3 % (ref 0–0.5)
KETONES UR QL STRIP: NEGATIVE
LEUKOCYTE ESTERASE UR QL STRIP: ABNORMAL
LYMPHOCYTES # BLD AUTO: 3 K/UL (ref 1–4.8)
LYMPHOCYTES NFR BLD: 41.8 % (ref 18–48)
MAGNESIUM SERPL-MCNC: 1.8 MG/DL (ref 1.6–2.6)
MCH RBC QN AUTO: 24.4 PG (ref 27–31)
MCHC RBC AUTO-ENTMCNC: 29.9 G/DL (ref 32–36)
MCV RBC AUTO: 82 FL (ref 82–98)
MICROSCOPIC COMMENT: ABNORMAL
MONOCYTES # BLD AUTO: 0.4 K/UL (ref 0.3–1)
MONOCYTES NFR BLD: 5.7 % (ref 4–15)
NEUTROPHILS # BLD AUTO: 3.4 K/UL (ref 1.8–7.7)
NEUTROPHILS NFR BLD: 48.1 % (ref 38–73)
NITRITE UR QL STRIP: NEGATIVE
NRBC BLD-RTO: 0 /100 WBC
PH UR STRIP: 6 [PH] (ref 5–8)
PHOSPHATE SERPL-MCNC: 3.2 MG/DL (ref 2.7–4.5)
PLATELET # BLD AUTO: 227 K/UL (ref 150–450)
PMV BLD AUTO: 11.5 FL (ref 9.2–12.9)
POTASSIUM SERPL-SCNC: 3.8 MMOL/L (ref 3.5–5.1)
PROT UR QL STRIP: NEGATIVE
RBC # BLD AUTO: 4.05 M/UL (ref 4–5.4)
RBC #/AREA URNS AUTO: 9 /HPF (ref 0–4)
SODIUM SERPL-SCNC: 137 MMOL/L (ref 136–145)
SP GR UR STRIP: 1.01 (ref 1–1.03)
SQUAMOUS #/AREA URNS AUTO: 1 /HPF
URN SPEC COLLECT METH UR: ABNORMAL
WBC # BLD AUTO: 7.06 K/UL (ref 3.9–12.7)
WBC #/AREA URNS AUTO: 21 /HPF (ref 0–5)

## 2024-02-13 PROCEDURE — 96361 HYDRATE IV INFUSION ADD-ON: CPT | Mod: HCNC

## 2024-02-13 PROCEDURE — 85025 COMPLETE CBC W/AUTO DIFF WBC: CPT | Mod: HCNC | Performed by: PHYSICIAN ASSISTANT

## 2024-02-13 PROCEDURE — 63600175 PHARM REV CODE 636 W HCPCS: Mod: HCNC | Performed by: PHYSICIAN ASSISTANT

## 2024-02-13 PROCEDURE — 11000001 HC ACUTE MED/SURG PRIVATE ROOM: Mod: HCNC

## 2024-02-13 PROCEDURE — 25000003 PHARM REV CODE 250: Mod: HCNC | Performed by: PHYSICIAN ASSISTANT

## 2024-02-13 PROCEDURE — 83735 ASSAY OF MAGNESIUM: CPT | Mod: HCNC | Performed by: PHYSICIAN ASSISTANT

## 2024-02-13 PROCEDURE — 83036 HEMOGLOBIN GLYCOSYLATED A1C: CPT | Mod: HCNC | Performed by: PHYSICIAN ASSISTANT

## 2024-02-13 PROCEDURE — 84100 ASSAY OF PHOSPHORUS: CPT | Mod: HCNC | Performed by: PHYSICIAN ASSISTANT

## 2024-02-13 PROCEDURE — 96372 THER/PROPH/DIAG INJ SC/IM: CPT | Performed by: PHYSICIAN ASSISTANT

## 2024-02-13 PROCEDURE — 80048 BASIC METABOLIC PNL TOTAL CA: CPT | Mod: HCNC | Performed by: PHYSICIAN ASSISTANT

## 2024-02-13 PROCEDURE — 87086 URINE CULTURE/COLONY COUNT: CPT | Mod: HCNC

## 2024-02-13 PROCEDURE — 25000003 PHARM REV CODE 250: Mod: HCNC

## 2024-02-13 PROCEDURE — 94761 N-INVAS EAR/PLS OXIMETRY MLT: CPT | Mod: HCNC

## 2024-02-13 PROCEDURE — 81001 URINALYSIS AUTO W/SCOPE: CPT | Mod: HCNC

## 2024-02-13 RX ORDER — DOXEPIN HYDROCHLORIDE 75 MG/1
150 CAPSULE ORAL NIGHTLY
Status: DISCONTINUED | OUTPATIENT
Start: 2024-02-13 | End: 2024-02-14 | Stop reason: HOSPADM

## 2024-02-13 RX ORDER — DOXEPIN HYDROCHLORIDE 25 MG/1
75 CAPSULE ORAL NIGHTLY
Status: DISCONTINUED | OUTPATIENT
Start: 2024-02-13 | End: 2024-02-13

## 2024-02-13 RX ORDER — NITROFURANTOIN 25; 75 MG/1; MG/1
100 CAPSULE ORAL EVERY 12 HOURS
Status: DISCONTINUED | OUTPATIENT
Start: 2024-02-13 | End: 2024-02-14

## 2024-02-13 RX ADMIN — GABAPENTIN 300 MG: 300 CAPSULE ORAL at 08:02

## 2024-02-13 RX ADMIN — ACETAMINOPHEN 650 MG: 325 TABLET ORAL at 05:02

## 2024-02-13 RX ADMIN — METHOCARBAMOL 500 MG: 500 TABLET ORAL at 08:02

## 2024-02-13 RX ADMIN — DOXEPIN HYDROCHLORIDE 75 MG: 75 CAPSULE ORAL at 01:02

## 2024-02-13 RX ADMIN — HYDROCODONE BITARTRATE AND ACETAMINOPHEN 1 TABLET: 5; 325 TABLET ORAL at 08:02

## 2024-02-13 RX ADMIN — DIAZEPAM 2 MG: 2 TABLET ORAL at 01:02

## 2024-02-13 RX ADMIN — BUSPIRONE HYDROCHLORIDE 15 MG: 10 TABLET ORAL at 08:02

## 2024-02-13 RX ADMIN — SODIUM CHLORIDE, POTASSIUM CHLORIDE, SODIUM LACTATE AND CALCIUM CHLORIDE: 600; 310; 30; 20 INJECTION, SOLUTION INTRAVENOUS at 12:02

## 2024-02-13 RX ADMIN — NITROFURANTOIN MONOHYDRATE/MACROCRYSTALS 100 MG: 25; 75 CAPSULE ORAL at 08:02

## 2024-02-13 RX ADMIN — LURASIDONE HYDROCHLORIDE 60 MG: 40 TABLET, FILM COATED ORAL at 09:02

## 2024-02-13 RX ADMIN — HEPARIN SODIUM 5000 UNITS: 5000 INJECTION INTRAVENOUS; SUBCUTANEOUS at 02:02

## 2024-02-13 RX ADMIN — GABAPENTIN 300 MG: 300 CAPSULE ORAL at 02:02

## 2024-02-13 RX ADMIN — HEPARIN SODIUM 5000 UNITS: 5000 INJECTION INTRAVENOUS; SUBCUTANEOUS at 09:02

## 2024-02-13 RX ADMIN — HYDROCODONE BITARTRATE AND ACETAMINOPHEN 1 TABLET: 5; 325 TABLET ORAL at 09:02

## 2024-02-13 RX ADMIN — DOXEPIN HYDROCHLORIDE 150 MG: 75 CAPSULE ORAL at 08:02

## 2024-02-13 RX ADMIN — BUSPIRONE HYDROCHLORIDE 15 MG: 10 TABLET ORAL at 02:02

## 2024-02-13 RX ADMIN — HEPARIN SODIUM 5000 UNITS: 5000 INJECTION INTRAVENOUS; SUBCUTANEOUS at 06:02

## 2024-02-13 RX ADMIN — METHOCARBAMOL 500 MG: 500 TABLET ORAL at 02:02

## 2024-02-13 RX ADMIN — BUPROPION HYDROCHLORIDE 450 MG: 150 TABLET, EXTENDED RELEASE ORAL at 08:02

## 2024-02-13 NOTE — HOSPITAL COURSE
Jaki Bajwa is a 53 y.o. female who was admitted to hospital medicine for a UTI and CANDELARIA. Cr 2.5 >> 1.6 >> 1.3, improving following IVF. Initially starting on rocephin but changed to Augmentin per previous culture sensitivities. Urine culture growing gram negative rods. Pt was seen and evaluated by me this morning, reports feeling well, and is eager to discharge home. All questions were answered. Patient acknowledged understanding of discharge instructions and feels safe to discharge home. Patient was discharged on 2/14/2024 in stable condition with urology follow-up. Education regarding condition provided and return precautions given.     Physical Exam  Gen: in NAD, appears stated age  Neuro: AAOx3, motor, sensory, and strength grossly intact BL  HEENT: EOMI, PERRLA; no JVD appreciated  CVS: RRR  Resp: lungs CTAB, no w/r/r; no belabored breathing or accessory muscle use appreciated   Abd: NTND, soft to palpation  Extrem: no UE or LE edema BL

## 2024-02-13 NOTE — HPI
Jaki Bajwa is a 53 y.o. female with a PMHx of Sjogren's syndrome, lupus on plaquenil, anxiety with panic attacks, HTN, obesity,  who [facility] evaluation of abnormal labs. Patient had routine labs done by her PCP which showed elevated Cr to 2.5 from baseline ~1 so she was advsided to present to the ED for further eval. Patient takes naproxen 1-2 times daily for several months for chronic back pain. Over the past week she was only been taking it every other day as instructed by her PCP. She complains of right sided flank pain that radiate to her abdomen for the past few days. Endorses associated urinary frequency. Also has intermittent lightheadedness for the last few days which usually occurs with position changes or with walking. Denies fever, chills, chest pain, SOB, LE swelling, HA, vision changes or syncope.    ED: AFVSS. No leukocytosis. Cr 2.5, baseline ~1.0. UA infectious. CT abd/pelvis negative for acute findings. Given 1g IV rocephin and 500cc IVFs.

## 2024-02-13 NOTE — H&P
"[facility] - Emergency Dept  Hospital Medicine  History & Physical    Patient Name: Jaki Bajwa  MRN: [id number]  Patient Class: OP- Observation  Admission Date: 2/12/2024  Attending Physician: Benito Long MD   Primary Care Provider: Jose Carpenter DO         Patient information was obtained from patient, past medical records, and ER records.     Subjective:     Principal Problem:CANDELARIA (acute kidney injury)    Chief Complaint:   Chief Complaint   Patient presents with    Abnormal Lab     Elevated BUN and creatinine        HPI: Jaki Bajwa is a 53 y.o. female with a PMHx of Sjogren's syndrome, lupus on plaquenil, anxiety with panic attacks, HTN, obesity,  who presents to [facility] for evaluation of abnormal labs. Patient had routine labs done by her PCP which showed elevated Cr to 2.5 from baseline ~1 so she was advsided to present to the ED for further eval. Patient takes naproxen 1-2 times daily for several months for chronic back pain. Over the past week she was only been taking it every other day as instructed by her PCP. She complains of right sided flank pain that radiate to her abdomen for the past few days. Endorses associated urinary frequency. Also has intermittent lightheadedness for the last few days which usually occurs with position changes or with walking. Denies fever, chills, chest pain, SOB, LE swelling, HA, vision changes or syncope.    ED: AFVSS. No leukocytosis. Cr 2.5, baseline ~1.0. UA infectious. CT abd/pelvis negative for acute findings. Given 1g IV rocephin and 500cc IVFs.    Past Medical History:   Diagnosis Date    CANDELARIA (acute kidney injury) 4/23/2022    Anemia     Anxiety     Depression     History of psychiatric hospitalization 2000    [place] x 1 week for depression    History of ventral hernia repair     Hypertension     Lupus     patient reports that she is being treated "like I have Lupus"    Mental disorder     Morbid obesity 12/15/2017    Psychiatric problem     " Sjogren's syndrome 2013    Therapy     TMJ (temporomandibular joint disorder)        Past Surgical History:   Procedure Laterality Date    breast reduction      BREAST SURGERY  2005     SECTION      x 2    HYSTERECTOMY  2005    INGUINAL HERNIA REPAIR      LAPAROSCOPIC TOTAL HYSTERECTOMY  2012    ASA    TOTAL REDUCTION MAMMOPLASTY      TUBAL LIGATION      VENTRAL HERNIA REPAIR         Review of patient's allergies indicates:   Allergen Reactions    Aspirin Hives       No current facility-administered medications on file prior to encounter.     Current Outpatient Medications on File Prior to Encounter   Medication Sig    amLODIPine (NORVASC) 5 MG tablet Take 1 tablet (5 mg total) by mouth once daily.    buPROPion (WELLBUTRIN XL) 150 MG TB24 tablet TAKE 3 TABLETS(450 MG) BY MOUTH EVERY MORNING    doxepin (SINEQUAN) 75 MG capsule TAKE 2 CAPSULES(150 MG) BY MOUTH EVERY EVENING    gabapentin (NEURONTIN) 300 MG capsule TAKE 1 CAPSULE(300 MG) BY MOUTH THREE TIMES DAILY    hydroCHLOROthiazide (HYDRODIURIL) 12.5 MG Tab Take 1 tablet (12.5 mg total) by mouth once daily. For blood pressure    hydroxychloroquine (PLAQUENIL) 200 mg tablet TAKE 1 TABLET BY MOUTH TWICE DAILY    irbesartan (AVAPRO) 300 MG tablet Take 1 tablet (300 mg total) by mouth every evening.    lurasidone (LATUDA) 60 mg Tab tablet TAKE 1 TABLET(60 MG) BY MOUTH EVERY DAY    omeprazole (PRILOSEC) 40 MG capsule Take 1 capsule (40 mg total) by mouth once daily.    pilocarpine (SALAGEN) 5 MG Tab Take 1 tablet (5 mg total) by mouth 4 (four) times daily.    solifenacin (VESICARE) 5 MG tablet Take 1 tablet (5 mg total) by mouth once daily.    busPIRone (BUSPAR) 15 MG tablet TAKE 1 TABLET(15 MG) BY MOUTH THREE TIMES DAILY    butalbital-acetaminophen-caffeine -40 mg (FIORICET, ESGIC) -40 mg per tablet Take 1 tablet by mouth every 6 (six) hours as needed for Headaches.    cetirizine (ZYRTEC) 10 MG tablet Take 1 tablet (10 mg total) by mouth  once daily.    cycloSPORINE (RESTASIS) 0.05 % ophthalmic emulsion INSTILL 1 DROP IN BOTH EYES TWICE DAILY    diazePAM (VALIUM) 2 MG tablet Take 1 tablet (2 mg total) by mouth daily as needed (Severe anxiety/Panic). 15 tablets to last 3 months    docusate sodium (COLACE) 100 MG capsule Take 1 capsule (100 mg total) by mouth 2 (two) times daily as needed for Constipation.    estradioL (ESTRACE) 0.01 % (0.1 mg/gram) vaginal cream 1g vaginally daily x 2 weeks, then BIW as directed (Patient not taking: Reported on 6/13/2023)    fluticasone propionate (FLONASE) 50 mcg/actuation nasal spray SHAKE LIQUID AND USE 1 SPRAY(50 MCG) IN EACH NOSTRIL EVERY DAY    HYDROcodone-acetaminophen (NORCO) 5-325 mg per tablet Take 1 tablet by mouth every 24 hours as needed for Pain. Quantity medically necessary    naproxen (NAPROSYN) 500 MG tablet Take 1 tablet (500 mg total) by mouth daily as needed (pain).    NARCAN 4 mg/actuation [place] SPRAY 1 SPRAY IN NOSTRIL ONCE FOR 1 DOSE    nystatin (MYCOSTATIN) 100,000 unit/mL suspension Take by mouth after meals as needed.    scopolamine (TRANSDERM-SCOP) 1.3-1.5 mg (1 mg over 3 days) Place 1 patch onto the skin every 72 hours.    tiZANidine (ZANAFLEX) 4 MG tablet TAKE 2 TABLETS BY MOUTH THREE TIMES DAILY    topiramate (TOPAMAX) 50 MG tablet Take 1 tablet (50 mg total) by mouth once daily.     Family History       Problem Relation (Age of Onset)    Alcohol abuse Brother    Anxiety disorder Father, Sister    Arthritis Father    Asthma Son    COPD Sister    Cancer Father, Sister, Sister    Colon cancer Father    Depression Father, Sister, Sister, Brother    Heart attack Sister    Heart disease Mother    Hypertension Mother    Liver cancer Sister, Other    Mental illness Father    Miscarriages / Stillbirths Sister    No Known Problems Daughter    Pancreatic cancer Brother, Other    Schizophrenia Father          Tobacco Use    Smoking status: Never    Smokeless tobacco: Never   Substance and Sexual  Activity    Alcohol use: Not Currently     Alcohol/week: 1.0 standard drink of alcohol     Types: 1 Glasses of wine per week     Comment: rarely     Drug use: Not Currently     Types: Barbituates, Hydrocodone     Comment: rx hydrocodone    Sexual activity: Not Currently     Partners: Male     Birth control/protection: Abstinence, Condom, Patch     Review of Systems   Constitutional:  Negative for appetite change, chills and fever.   HENT:  Negative for congestion, trouble swallowing and voice change.    Respiratory:  Negative for cough, chest tightness, shortness of breath and wheezing.    Cardiovascular:  Negative for chest pain, palpitations and leg swelling.   Gastrointestinal:  Negative for abdominal pain, blood in stool, constipation, nausea and vomiting.   Genitourinary:  Positive for flank pain and frequency. Negative for decreased urine volume, dysuria and hematuria.   Musculoskeletal:  Negative for arthralgias, back pain and gait problem.   Skin:  Negative for color change and wound.   Neurological:  Positive for dizziness and light-headedness. Negative for seizures, syncope, speech difficulty and weakness.   Psychiatric/Behavioral:  Negative for behavioral problems, confusion and decreased concentration.      Objective:     Vital Signs (Most Recent):  Temp: 97.9 °F (36.6 °C) (02/12/24 2219)  Pulse: 93 (02/12/24 2219)  Resp: 16 (02/12/24 2219)  BP: 116/70 (02/12/24 2219)  SpO2: 98 % (02/12/24 2219) Vital Signs (24h Range):  Temp:  [97.5 °F (36.4 °C)-98.3 °F (36.8 °C)] 97.9 °F (36.6 °C)  Pulse:  [84-93] 93  Resp:  [16-18] 16  SpO2:  [96 %-99 %] 98 %  BP: ()/(60-79) 116/70     Weight: 104.3 kg (230 lb)  Body mass index is 36.02 kg/m².     Physical Exam  Vitals and nursing note reviewed.   Constitutional:       General: She is not in acute distress.     Appearance: She is well-developed.   HENT:      Head: Normocephalic and atraumatic.      Mouth/Throat:      Pharynx: No oropharyngeal exudate.   Eyes:       General: No scleral icterus.     Conjunctiva/sclera: Conjunctivae normal.   Cardiovascular:      Rate and Rhythm: Normal rate and regular rhythm.      Heart sounds: Normal heart sounds.   Pulmonary:      Effort: Pulmonary effort is normal. No respiratory distress.      Breath sounds: Normal breath sounds. No wheezing.   Abdominal:      General: Bowel sounds are normal. There is no distension.      Palpations: Abdomen is soft.      Tenderness: There is no abdominal tenderness. There is no right CVA tenderness or left CVA tenderness.   Musculoskeletal:         General: Tenderness (mild, R lower back) present. Normal range of motion.      Cervical back: Normal range of motion and neck supple.   Lymphadenopathy:      Cervical: No cervical adenopathy.   Skin:     General: Skin is warm and dry.      Capillary Refill: Capillary refill takes less than 2 seconds.      Findings: No rash.   Neurological:      Mental Status: She is alert and oriented to person, place, and time.      Cranial Nerves: No cranial nerve deficit.      Sensory: No sensory deficit.      Coordination: Coordination normal.   Psychiatric:         Behavior: Behavior normal.         Thought Content: Thought content normal.         Judgment: Judgment normal.                Significant Labs: All pertinent labs within the past 24 hours have been reviewed.  CBC:   Recent Labs   Lab 02/12/24  0915 02/12/24  1703   WBC 8.42 7.70   HGB 11.0* 10.7*   HCT 38.0 36.8*    255     CMP:   Recent Labs   Lab 02/12/24  0915 02/12/24  1703    137   K 3.9 4.3   CL 99 102   CO2 28 26    78   BUN 28* 31*   CREATININE 2.1* 2.5*   CALCIUM 10.1 9.9   PROT 8.2 8.0   ALBUMIN 4.2 4.0   BILITOT 0.3 0.2   ALKPHOS 77 83   AST 16 18   ALT 13 13   ANIONGAP 10 9       Significant Imaging: I have reviewed all pertinent imaging results/findings within the past 24 hours.  CT Renal Stone Study ABD Pelvis WO  Narrative: EXAMINATION:  CT RENAL STONE STUDY ABD PELVIS  WO    CLINICAL HISTORY:  Flank pain, kidney stone suspected;    TECHNIQUE:  Low dose axial images, sagittal and coronal reformations were obtained from the lung bases to the pubic symphysis.  Contrast was not administered.    COMPARISON:  Chest radiograph 09/13/2016, upper GI swallow study 05/09/2019 and renal ultrasound 06/02/2017    FINDINGS:  Mild volume loss with air bronchograms at the right lower lobe likely secondary to nonspecific elevation of the right hemidiaphragm.  Few scattered linear opacities in the lung bases consistent with mild platelike scarring versus atelectasis.  There is a 3 mm solid nodule in the posterolateral left lower lobe.  No pleural effusion.    Base of the heart is within normal limits.    Noncontrast appearance of the liver, gallbladder, pancreas, spleen, stomach, duodenum and bilateral adrenal glands are within normal limits.  No biliary ductal dilatation.    Bilateral kidneys are normal in size, shape and location.  No radiodense calculus seen within the urinary tract.  No hydronephrosis or significant perinephric stranding.  Ureters are normal in course and caliber.  Urinary bladder is within normal limits.  Uterus not identified and likely surgically absent versus atrophic.  No adnexal mass or significant volume free pelvic fluid.  Pelvic phleboliths noted.    Appendix and terminal ileum are within normal limits.  No evidence of bowel obstruction or acute inflammation.  No pneumatosis or portal venous gas.    No ascites, free air or lymphadenopathy by CT criteria.  No significant calcific atherosclerosis.  Aorta tapers normally.    Small fat containing umbilical hernia.  Osseous structures show age-related overall mild degenerative change and chronic appearing minimal to mild anterior wedge deformity of a few mid to lower thoracic vertebral bodies without acute or destructive process seen.  Small bone island within S1 sacral vertebral body.  Impression: No acute process or CT  findings identified to explain patient's symptoms of flank pain on this noncontrast study.  Specifically, no radiodense calculus in the urinary tract or obstructive uropathy.    Right lower lobe compressive atelectasis.    Lower lobe 3 mm solid pulmonary nodule.  For a solid nodule <6 mm, Fleischner Society 2017 guidelines recommend no routine follow up for a low risk patient, or follow-up with non-contrast chest CT at 12 months in a high risk patient.    Few additional findings as above.    This report was flagged in Epic as containing an incidental finding.    Electronically signed by: Sunny Morillo MD  Date:    02/12/2024  Time:    17:57      Assessment/Plan:     * CANDELRAIA (acute kidney injury)  Stage 3b chronic kidney disease  - Cr 2.4, baseline ~1.0  - suspect 2/2 NSAID use and UTI  - s/p 500cc NS in the ED  - bolus an additional 500cc then place on cIVFs overnight  - treat UTI  - avoid nephrotoxins, renally dose meds  - trend BMP    Recurrent UTI (urinary tract infection)  - hx recurrent UTIs but has been without one for 2 years  - afebrile without leykocytosis  - CT A/P negative for acute findings  - continue rocephine 1g q24hrs  - follow UCx  - low threshold to broaden abx given hx enterococcus UTIs in the past    Chronic narcotic dependence  - continue home PRN norco    Sjogren's syndrome  - continue plaquenil and pilocarpine    JUAN JOSE (generalized anxiety disorder)  - continue buspar, wellbutrin, doxepin, and valium     Essential hypertension  - normotensive here  - hold irbesartan and HCTZ      VTE Risk Mitigation (From admission, onward)           Ordered     heparin (porcine) injection 5,000 Units  Every 8 hours         02/12/24 2025     IP VTE HIGH RISK PATIENT  Once         02/12/24 2025                         On 02/13/2024, patient should be placed in hospital observation services under my care in collaboration with Dr. Sunny Nunez.           Kenia Mac PA-C  Department [facility]  Medicine  [facility] - Emergency Dept

## 2024-02-13 NOTE — ASSESSMENT & PLAN NOTE
- hx recurrent UTIs but has been without one for 2 years  - afebrile without leykocytosis  - CT A/P negative for acute findings  - continue rocephine 1g q24hrs  - follow UCx  - low threshold to broaden abx given hx enterococcus UTIs in the past

## 2024-02-13 NOTE — PHARMACY MED REC
"    Admission Medication History     The home medication history was taken by Lore Davies.    You may go to "Admission" then "Reconcile Home Medications" tabs to review and/or act upon these items.     The home medication list has been updated by the Pharmacy department.   Please read ALL comments highlighted in yellow.   Please address this information as you see fit.    Feel free to contact us if you have any questions or require assistance.      The medications listed below were removed from the home medication list. Please reorder if appropriate:  Patient reports no longer taking the following medication(s):  Cetirizine (ZYRTEC) 10 MG tablet  Nystatin (MYCOSTATIN) 100,000 unit/mL suspension  Scopolamine (TRANSDERM-SCOP) 1.3-1.5 mg (1 mg over 3 days)  Topiramate (TOPAMAX) 50 MG tablet    Medications listed below were obtained from: Patient/family and Analytic software- [employer]    Current Outpatient Medications on File Prior to Encounter   Medication Sig    amLODIPine (NORVASC) 5 MG tablet   Take 1 tablet (5 mg total) by mouth once daily.    buPROPion (WELLBUTRIN XL) 150 MG TB24 tablet   TAKE 3 TABLETS(450 MG) BY MOUTH EVERY MORNING    busPIRone (BUSPAR) 15 MG tablet   TAKE 1 TABLET(15 MG) BY MOUTH THREE TIMES DAILY    butalbital-acetaminophen-caffeine -40 mg (FIORICET, ESGIC) -40 mg per tablet   Take 1 tablet by mouth every 6 (six) hours as needed for Headaches.    cycloSPORINE (RESTASIS) 0.05 % ophthalmic emulsion   INSTILL 1 DROP IN BOTH EYES TWICE DAILY    docusate sodium (COLACE) 100 MG capsule   Take 1 capsule (100 mg total) by mouth 2 (two) times daily as needed for Constipation.    doxepin (SINEQUAN) 75 MG capsule   TAKE 2 CAPSULES(150 MG) BY MOUTH EVERY EVENING    fluticasone propionate (FLONASE) 50 mcg/actuation nasal spray   1 spray by Each Nostril route as needed for Rhinitis or Allergies.)    gabapentin (NEURONTIN) 300 MG capsule   TAKE 1 CAPSULE(300 MG) BY MOUTH THREE TIMES DAILY    " hydroCHLOROthiazide (HYDRODIURIL) 12.5 MG Tab   Take 1 tablet (12.5 mg total) by mouth once daily. For blood pressure    HYDROcodone-acetaminophen (NORCO) 5-325 mg per tablet   Take 1 tablet by mouth every 24 hours as needed for Pain. Quantity medically necessary    hydroxychloroquine (PLAQUENIL) 200 mg tablet   TAKE 1 TABLET BY MOUTH TWICE DAILY    irbesartan (AVAPRO) 300 MG tablet   Take 1 tablet (300 mg total) by mouth every evening.    lurasidone (LATUDA) 60 mg Tab tablet   TAKE 1 TABLET(60 MG) BY MOUTH EVERY DAY    naproxen (NAPROSYN) 500 MG tablet   Take 1 tablet (500 mg total) by mouth daily as needed (pain).    NARCAN 4 mg/actuation [place]   SPRAY 1 SPRAY IN NOSTRIL ONCE FOR 1 DOSE    omeprazole (PRILOSEC) 40 MG capsule   Take 1 capsule (40 mg total) by mouth once daily.    pilocarpine (SALAGEN) 5 MG Tab   Take 1 tablet (5 mg total) by mouth 4 (four) times daily.    solifenacin (VESICARE) 5 MG tablet   Take 1 tablet (5 mg total) by mouth once daily.    tiZANidine (ZANAFLEX) 4 MG tablet   TAKE 2 TABLETS BY MOUTH THREE TIMES DAILY    diazePAM (VALIUM) 2 MG tablet   Take 0.5 (1 mg ) tablet by mouth once daily.)    estradioL (ESTRACE) 0.01 % (0.1 mg/gram) vaginal cream   1g vaginally daily x 2 weeks, then BIW as directed (Patient not taking: Reported on 6/13/2023)     Lore Burfect  [id number]             .

## 2024-02-13 NOTE — SUBJECTIVE & OBJECTIVE
"Past Medical History:   Diagnosis Date    CANDELARIA (acute kidney injury) 2022    Anemia     Anxiety     Depression     History of psychiatric hospitalization     [place] x 1 week for depression    History of ventral hernia repair     Hypertension     Lupus     patient reports that she is being treated "like I have Lupus"    Mental disorder     Morbid obesity 12/15/2017    Psychiatric problem     Sjogren's syndrome 2013    Therapy     TMJ (temporomandibular joint disorder)        Past Surgical History:   Procedure Laterality Date    breast reduction      BREAST SURGERY  2005     SECTION      x 2    HYSTERECTOMY  2005    INGUINAL HERNIA REPAIR      LAPAROSCOPIC TOTAL HYSTERECTOMY      ASA    TOTAL REDUCTION MAMMOPLASTY      TUBAL LIGATION      VENTRAL HERNIA REPAIR         Review of patient's allergies indicates:   Allergen Reactions    Aspirin Hives       No current facility-administered medications on file prior to encounter.     Current Outpatient Medications on File Prior to Encounter   Medication Sig    amLODIPine (NORVASC) 5 MG tablet Take 1 tablet (5 mg total) by mouth once daily.    buPROPion (WELLBUTRIN XL) 150 MG TB24 tablet TAKE 3 TABLETS(450 MG) BY MOUTH EVERY MORNING    doxepin (SINEQUAN) 75 MG capsule TAKE 2 CAPSULES(150 MG) BY MOUTH EVERY EVENING    gabapentin (NEURONTIN) 300 MG capsule TAKE 1 CAPSULE(300 MG) BY MOUTH THREE TIMES DAILY    hydroCHLOROthiazide (HYDRODIURIL) 12.5 MG Tab Take 1 tablet (12.5 mg total) by mouth once daily. For blood pressure    hydroxychloroquine (PLAQUENIL) 200 mg tablet TAKE 1 TABLET BY MOUTH TWICE DAILY    irbesartan (AVAPRO) 300 MG tablet Take 1 tablet (300 mg total) by mouth every evening.    lurasidone (LATUDA) 60 mg Tab tablet TAKE 1 TABLET(60 MG) BY MOUTH EVERY DAY    omeprazole (PRILOSEC) 40 MG capsule Take 1 capsule (40 mg total) by mouth once daily.    pilocarpine (SALAGEN) 5 MG Tab Take 1 tablet (5 mg total) by mouth 4 (four) times " daily.    solifenacin (VESICARE) 5 MG tablet Take 1 tablet (5 mg total) by mouth once daily.    busPIRone (BUSPAR) 15 MG tablet TAKE 1 TABLET(15 MG) BY MOUTH THREE TIMES DAILY    butalbital-acetaminophen-caffeine -40 mg (FIORICET, ESGIC) -40 mg per tablet Take 1 tablet by mouth every 6 (six) hours as needed for Headaches.    cetirizine (ZYRTEC) 10 MG tablet Take 1 tablet (10 mg total) by mouth once daily.    cycloSPORINE (RESTASIS) 0.05 % ophthalmic emulsion INSTILL 1 DROP IN BOTH EYES TWICE DAILY    diazePAM (VALIUM) 2 MG tablet Take 1 tablet (2 mg total) by mouth daily as needed (Severe anxiety/Panic). 15 tablets to last 3 months    docusate sodium (COLACE) 100 MG capsule Take 1 capsule (100 mg total) by mouth 2 (two) times daily as needed for Constipation.    estradioL (ESTRACE) 0.01 % (0.1 mg/gram) vaginal cream 1g vaginally daily x 2 weeks, then BIW as directed (Patient not taking: Reported on 6/13/2023)    fluticasone propionate (FLONASE) 50 mcg/actuation nasal spray SHAKE LIQUID AND USE 1 SPRAY(50 MCG) IN EACH NOSTRIL EVERY DAY    HYDROcodone-acetaminophen (NORCO) 5-325 mg per tablet Take 1 tablet by mouth every 24 hours as needed for Pain. Quantity medically necessary    naproxen (NAPROSYN) 500 MG tablet Take 1 tablet (500 mg total) by mouth daily as needed (pain).    NARCAN 4 mg/actuation [place] SPRAY 1 SPRAY IN NOSTRIL ONCE FOR 1 DOSE    nystatin (MYCOSTATIN) 100,000 unit/mL suspension Take by mouth after meals as needed.    scopolamine (TRANSDERM-SCOP) 1.3-1.5 mg (1 mg over 3 days) Place 1 patch onto the skin every 72 hours.    tiZANidine (ZANAFLEX) 4 MG tablet TAKE 2 TABLETS BY MOUTH THREE TIMES DAILY    topiramate (TOPAMAX) 50 MG tablet Take 1 tablet (50 mg total) by mouth once daily.     Family History       Problem Relation (Age of Onset)    Alcohol abuse Brother    Anxiety disorder Father, Sister    Arthritis Father    Asthma Son    COPD Sister    Cancer Father, Sister, Sister    Colon  cancer Father    Depression Father, Sister, Sister, Brother    Heart attack Sister    Heart disease Mother    Hypertension Mother    Liver cancer Sister, Other    Mental illness Father    Miscarriages / Stillbirths Sister    No Known Problems Daughter    Pancreatic cancer Brother, Other    Schizophrenia Father          Tobacco Use    Smoking status: Never    Smokeless tobacco: Never   Substance and Sexual Activity    Alcohol use: Not Currently     Alcohol/week: 1.0 standard drink of alcohol     Types: 1 Glasses of wine per week     Comment: rarely     Drug use: Not Currently     Types: Barbituates, Hydrocodone     Comment: rx hydrocodone    Sexual activity: Not Currently     Partners: Male     Birth control/protection: Abstinence, Condom, Patch     Review of Systems   Constitutional:  Negative for appetite change, chills and fever.   HENT:  Negative for congestion, trouble swallowing and voice change.    Respiratory:  Negative for cough, chest tightness, shortness of breath and wheezing.    Cardiovascular:  Negative for chest pain, palpitations and leg swelling.   Gastrointestinal:  Negative for abdominal pain, blood in stool, constipation, nausea and vomiting.   Genitourinary:  Positive for flank pain and frequency. Negative for decreased urine volume, dysuria and hematuria.   Musculoskeletal:  Negative for arthralgias, back pain and gait problem.   Skin:  Negative for color change and wound.   Neurological:  Positive for dizziness and light-headedness. Negative for seizures, syncope, speech difficulty and weakness.   Psychiatric/Behavioral:  Negative for behavioral problems, confusion and decreased concentration.      Objective:     Vital Signs (Most Recent):  Temp: 97.9 °F (36.6 °C) (02/12/24 2219)  Pulse: 93 (02/12/24 2219)  Resp: 16 (02/12/24 2219)  BP: 116/70 (02/12/24 2219)  SpO2: 98 % (02/12/24 2219) Vital Signs (24h Range):  Temp:  [97.5 °F (36.4 °C)-98.3 °F (36.8 °C)] 97.9 °F (36.6 °C)  Pulse:  [84-93]  93  Resp:  [16-18] 16  SpO2:  [96 %-99 %] 98 %  BP: ()/(60-79) 116/70     Weight: 104.3 kg (230 lb)  Body mass index is 36.02 kg/m².     Physical Exam  Vitals and nursing note reviewed.   Constitutional:       General: She is not in acute distress.     Appearance: She is well-developed.   HENT:      Head: Normocephalic and atraumatic.      Mouth/Throat:      Pharynx: No oropharyngeal exudate.   Eyes:      General: No scleral icterus.     Conjunctiva/sclera: Conjunctivae normal.   Cardiovascular:      Rate and Rhythm: Normal rate and regular rhythm.      Heart sounds: Normal heart sounds.   Pulmonary:      Effort: Pulmonary effort is normal. No respiratory distress.      Breath sounds: Normal breath sounds. No wheezing.   Abdominal:      General: Bowel sounds are normal. There is no distension.      Palpations: Abdomen is soft.      Tenderness: There is no abdominal tenderness. There is no right CVA tenderness or left CVA tenderness.   Musculoskeletal:         General: Tenderness (mild, R lower back) present. Normal range of motion.      Cervical back: Normal range of motion and neck supple.   Lymphadenopathy:      Cervical: No cervical adenopathy.   Skin:     General: Skin is warm and dry.      Capillary Refill: Capillary refill takes less than 2 seconds.      Findings: No rash.   Neurological:      Mental Status: She is alert and oriented to person, place, and time.      Cranial Nerves: No cranial nerve deficit.      Sensory: No sensory deficit.      Coordination: Coordination normal.   Psychiatric:         Behavior: Behavior normal.         Thought Content: Thought content normal.         Judgment: Judgment normal.                Significant Labs: All pertinent labs within the past 24 hours have been reviewed.  CBC:   Recent Labs   Lab 02/12/24  0915 02/12/24  1703   WBC 8.42 7.70   HGB 11.0* 10.7*   HCT 38.0 36.8*    255     CMP:   Recent Labs   Lab 02/12/24  0915 02/12/24  1703    137   K 3.9  4.3   CL 99 102   CO2 28 26    78   BUN 28* 31*   CREATININE 2.1* 2.5*   CALCIUM 10.1 9.9   PROT 8.2 8.0   ALBUMIN 4.2 4.0   BILITOT 0.3 0.2   ALKPHOS 77 83   AST 16 18   ALT 13 13   ANIONGAP 10 9       Significant Imaging: I have reviewed all pertinent imaging results/findings within the past 24 hours.  CT Renal Stone Study ABD Pelvis WO  Narrative: EXAMINATION:  CT RENAL STONE STUDY ABD PELVIS WO    CLINICAL HISTORY:  Flank pain, kidney stone suspected;    TECHNIQUE:  Low dose axial images, sagittal and coronal reformations were obtained from the lung bases to the pubic symphysis.  Contrast was not administered.    COMPARISON:  Chest radiograph 09/13/2016, upper GI swallow study 05/09/2019 and renal ultrasound 06/02/2017    FINDINGS:  Mild volume loss with air bronchograms at the right lower lobe likely secondary to nonspecific elevation of the right hemidiaphragm.  Few scattered linear opacities in the lung bases consistent with mild platelike scarring versus atelectasis.  There is a 3 mm solid nodule in the posterolateral left lower lobe.  No pleural effusion.    Base of the heart is within normal limits.    Noncontrast appearance of the liver, gallbladder, pancreas, spleen, stomach, duodenum and bilateral adrenal glands are within normal limits.  No biliary ductal dilatation.    Bilateral kidneys are normal in size, shape and location.  No radiodense calculus seen within the urinary tract.  No hydronephrosis or significant perinephric stranding.  Ureters are normal in course and caliber.  Urinary bladder is within normal limits.  Uterus not identified and likely surgically absent versus atrophic.  No adnexal mass or significant volume free pelvic fluid.  Pelvic phleboliths noted.    Appendix and terminal ileum are within normal limits.  No evidence of bowel obstruction or acute inflammation.  No pneumatosis or portal venous gas.    No ascites, free air or lymphadenopathy by CT criteria.  No significant  calcific atherosclerosis.  Aorta tapers normally.    Small fat containing umbilical hernia.  Osseous structures show age-related overall mild degenerative change and chronic appearing minimal to mild anterior wedge deformity of a few mid to lower thoracic vertebral bodies without acute or destructive process seen.  Small bone island within S1 sacral vertebral body.  Impression: No acute process or CT findings identified to explain patient's symptoms of flank pain on this noncontrast study.  Specifically, no radiodense calculus in the urinary tract or obstructive uropathy.    Right lower lobe compressive atelectasis.    Lower lobe 3 mm solid pulmonary nodule.  For a solid nodule <6 mm, Fleischner Society 2017 guidelines recommend no routine follow up for a low risk patient, or follow-up with non-contrast chest CT at 12 months in a high risk patient.    Few additional findings as above.    This report was flagged in Epic as containing an incidental finding.    Electronically signed by: Sunny Morillo MD  Date:    02/12/2024  Time:    17:57

## 2024-02-13 NOTE — ASSESSMENT & PLAN NOTE
Stage 3b chronic kidney disease  - Cr 2.4, baseline ~1.0  - suspect 2/2 NSAID use and UTI  - s/p 500cc NS in the ED  - bolus an additional 500cc then place on cIVFs overnight  - treat UTI  - avoid nephrotoxins, renally dose meds  - trend BMP

## 2024-02-13 NOTE — ED NOTES
Patient is scheduled for colonoscopy on 2/19/2024 [place] [facility] [place] [facility] with Eliseo Lai DO.  Instructions reviewed with patient and written instructions sent via [employer] jyoti.  Patient had physical on 8/30/2023 with Dr Haley - Eliseo Lai DO to update. Patient verbalizes understanding and denies questions at this time.   Telemetry Confirmation    Box#: [id number]  Rhythm: Sinus tach   Rate: 105      Awaiting escort for transport

## 2024-02-13 NOTE — PROGRESS NOTES
[facility] - Emergency Dept  [facility]  Progress Note    Patient Name: Jaki Bajwa  MRN: [id number]  Patient Class: IP- Inpatient   Admission Date: 2/12/2024  Length of Stay: 0 days  Attending Physician: Benito Long MD  Primary Care Provider: Jose Carpenter DO        Subjective:     Principal Problem:CANDELARIA (acute kidney injury)        HPI:  Jaki Bajwa is a 53 y.o. female with a PMHx of Sjogren's syndrome, lupus on plaquenil, anxiety with panic attacks, HTN, obesity,  who presents to [facility] [place] for evaluation of abnormal labs. Patient had routine labs done by her PCP which showed elevated Cr to 2.5 from baseline ~1 so she was advsided to present to the ED for further eval. Patient takes naproxen 1-2 times daily for several months for chronic back pain. Over the past week she was only been taking it every other day as instructed by her PCP. She complains of right sided flank pain that radiate to her abdomen for the past few days. Endorses associated urinary frequency. Also has intermittent lightheadedness for the last few days which usually occurs with position changes or with walking. Denies fever, chills, chest pain, SOB, LE swelling, HA, vision changes or syncope.    ED: AFVSS. No leukocytosis. Cr 2.5, baseline ~1.0. UA infectious. CT abd/pelvis negative for acute findings. Given 1g IV rocephin and 500cc IVFs.    Overview/Hospital Course:  Jaki Bajwa is a 53 y.o. female who was admitted to hospital medicine for a UTI and CANDELARIA. Cr 2.5 >> 1.6, improving following IVF. Initially starting on rocephin but changed to macrobid per previous culture sensitivities. Patient will be discharged from the hospital when medically ready.     Interval History: ROLANDO MARTINEZ. Endorses intermittent back pain. Continuing abx, following urine culture. Repeat Cr in the AM.     Review of Systems   Constitutional:  Negative for chills and fever.   Respiratory:  Negative for chest tightness and shortness of breath.     Cardiovascular:  Negative for chest pain and leg swelling.   Gastrointestinal:  Positive for abdominal pain. Negative for nausea.   Genitourinary:  Positive for frequency.   Musculoskeletal:  Positive for back pain.   Neurological:  Negative for dizziness and weakness.     Objective:     Vital Signs (Most Recent):  Temp: 98.1 °F (36.7 °C) (02/13/24 1039)  Pulse: 100 (02/13/24 1039)  Resp: 18 (02/13/24 1039)  BP: 114/79 (02/13/24 1039)  SpO2: 96 % (02/13/24 1039) Vital Signs (24h Range):  Temp:  [97.5 °F (36.4 °C)-98.3 °F (36.8 °C)] 98.1 °F (36.7 °C)  Pulse:  [] 100  Resp:  [16-18] 18  SpO2:  [96 %-99 %] 96 %  BP: ()/(60-80) 114/79     Weight: 104.3 kg (230 lb)  Body mass index is 36.02 kg/m².    Intake/Output Summary (Last 24 hours) at 2/13/2024 1248  Last data filed at 2/12/2024 2000  Gross per 24 hour   Intake 598 ml   Output --   Net 598 ml         Physical Exam  Vitals and nursing note reviewed.   Constitutional:       Appearance: She is well-developed.   Eyes:      Pupils: Pupils are equal, round, and reactive to light.   Cardiovascular:      Rate and Rhythm: Normal rate and regular rhythm.   Pulmonary:      Effort: Pulmonary effort is normal.      Breath sounds: Normal breath sounds.   Abdominal:      Palpations: Abdomen is soft.      Tenderness: There is abdominal tenderness (mild suprapubic tenderness). There is no right CVA tenderness or left CVA tenderness.   Musculoskeletal:         General: No tenderness.   Skin:     General: Skin is warm and dry.   Neurological:      Mental Status: She is alert and oriented to person, place, and time.   Psychiatric:         Behavior: Behavior normal.             Significant Labs: All pertinent labs within the past 24 hours have been reviewed.  CBC:   Recent Labs   Lab 02/12/24  0915 02/12/24  1703 02/13/24  0348   WBC 8.42 7.70 7.06   HGB 11.0* 10.7* 9.9*   HCT 38.0 36.8* 33.1*    255 227     CMP:   Recent Labs   Lab 02/12/24  0915 02/12/24  1701  02/13/24  0348    137 137   K 3.9 4.3 3.8   CL 99 102 103   CO2 28 26 26    78 148*   BUN 28* 31* 26*   CREATININE 2.1* 2.5* 1.6*   CALCIUM 10.1 9.9 9.6   PROT 8.2 8.0  --    ALBUMIN 4.2 4.0  --    BILITOT 0.3 0.2  --    ALKPHOS 77 83  --    AST 16 18  --    ALT 13 13  --    ANIONGAP 10 9 8     Urine Studies:   Recent Labs   Lab 02/12/24  1735 02/13/24  0921   COLORU Yellow Straw   APPEARANCEUA Hazy* Clear   PHUR 6.0 6.0   SPECGRAV 1.020 1.010   PROTEINUA 1+* Negative   GLUCUA Negative Negative   KETONESU Negative Negative   BILIRUBINUA Negative Negative   OCCULTUA Negative Negative   NITRITE Negative Negative   LEUKOCYTESUR 3+* 2+*   RBCUA 5* 9*   WBCUA 76* 21*   BACTERIA Occasional Rare   SQUAMEPITHEL 8 1   HYALINECASTS 1  --        Significant Imaging: I have reviewed all pertinent imaging results/findings within the past 24 hours.    Assessment/Plan:      * CANDELARIA (acute kidney injury)  Stage 3b chronic kidney disease  - Cr 2.4, baseline ~1.0   - improved to 1.6  - suspect 2/2 NSAID use and UTI  - s/p 500cc NS in the ED  - bolus an additional 500cc then place on cIVFs overnight  - treat UTI  - avoid nephrotoxins, renally dose meds  - trend BMP    Chronic narcotic dependence  - continue home PRN norco    Recurrent UTI (urinary tract infection)  - hx recurrent UTIs but has been without one for 2 years  - afebrile without leykocytosis  - CT A/P negative for acute findings  - continue rocephine 1g q24hrs   - changed to macrobid based on previous cultures  - follow UCx  - low threshold to broaden abx given hx enterococcus UTIs in the past    Sjogren's syndrome  - continue plaquenil and pilocarpine    JUAN JOSE (generalized anxiety disorder)  - continue buspar, wellbutrin, doxepin, and valium     Essential hypertension  - normotensive here  - hold irbesartan and HCTZ      VTE Risk Mitigation (From admission, onward)           Ordered     heparin (porcine) injection 5,000 Units  Every 8 hours         02/12/24 2025      IP VTE HIGH RISK PATIENT  Once         02/12/24 2025                    Discharge Planning   RAY: 2/13/2024     Code Status: Full Code   Is the patient medically ready for discharge?: No    Reason for patient still in hospital (select all that apply): Patient trending condition, Laboratory test, and Treatment  Discharge Plan A: Home                  Amalia Youngblood PA-C  Department of Hospital Medicine   [facility] - Emergency Dept

## 2024-02-13 NOTE — ASSESSMENT & PLAN NOTE
Stage 3b chronic kidney disease  - Cr 2.4, baseline ~1.0   - improved to 1.6  - suspect 2/2 NSAID use and UTI  - s/p 500cc NS in the ED  - bolus an additional 500cc then place on cIVFs overnight  - treat UTI  - avoid nephrotoxins, renally dose meds  - trend BMP

## 2024-02-13 NOTE — ED TRIAGE NOTES
"Jaki Bajwa, a 53 y.o. female presents to the ED w/ complaint of abnormal lab. Reports elevated BUN and Cr    Adult Physical Assessment  LOC: Jaki Bajwa, 53 y.o. female verified via two identifiers.  The patient is awake, alert, oriented and speaking appropriately at this time.  APPEARANCE: Patient resting comfortably and appears to be in no acute distress at this time. Patient is clean and well groomed, patient's clothing is properly fastened.  SKIN:The skin is warm and dry, color consistent with ethnicity, patient has normal skin turgor and moist mucus membranes, skin intact, no breakdown or brusing noted.  MUSCULOSKELETAL: Patient moving all extremities well, no obvious swelling or deformities noted.  RESPIRATORY: Airway is open and patent, respirations are spontaneous, patient has a normal effort and rate, no accessory muscle use noted.  CARDIAC: Patient has a normal rate and rhythm, no periphreal edema noted in any extremity, capillary refill < 3 seconds in all extremities  ABDOMEN: Soft and non tender to palpation, no abdominal distention noted. Bowel sounds present in all four quadrants.  NEUROLOGIC: Eyes open spontaneously, behavior appropriate to situation, follows commands, facial expression symmetrical, bilateral hand grasp equal and even, purposeful motor response noted, normal sensation in all extremities when touched with a finger.      Triage note:  Chief Complaint   Patient presents with    Abnormal Lab     Elevated BUN and creatinine     Review of patient's allergies indicates:   Allergen Reactions    Aspirin Hives     Past Medical History:   Diagnosis Date    CANDELARIA (acute kidney injury) 4/23/2022    Anemia     Anxiety     Depression     History of psychiatric hospitalization 2000    [place] x 1 week for depression    History of ventral hernia repair     Hypertension     Lupus     patient reports that she is being treated "like I have Lupus"    Mental disorder     Morbid obesity " 12/15/2017    Psychiatric problem     Sjogren's syndrome 2013    Therapy     TMJ (temporomandibular joint disorder)

## 2024-02-13 NOTE — SUBJECTIVE & OBJECTIVE
Interval History: ROLANDO MARTINEZ. Endorses intermittent back pain. Continuing abx, following urine culture. Repeat Cr in the AM.     Review of Systems   Constitutional:  Negative for chills and fever.   Respiratory:  Negative for chest tightness and shortness of breath.    Cardiovascular:  Negative for chest pain and leg swelling.   Gastrointestinal:  Positive for abdominal pain. Negative for nausea.   Genitourinary:  Positive for frequency.   Musculoskeletal:  Positive for back pain.   Neurological:  Negative for dizziness and weakness.     Objective:     Vital Signs (Most Recent):  Temp: 98.1 °F (36.7 °C) (02/13/24 1039)  Pulse: 100 (02/13/24 1039)  Resp: 18 (02/13/24 1039)  BP: 114/79 (02/13/24 1039)  SpO2: 96 % (02/13/24 1039) Vital Signs (24h Range):  Temp:  [97.5 °F (36.4 °C)-98.3 °F (36.8 °C)] 98.1 °F (36.7 °C)  Pulse:  [] 100  Resp:  [16-18] 18  SpO2:  [96 %-99 %] 96 %  BP: ()/(60-80) 114/79     Weight: 104.3 kg (230 lb)  Body mass index is 36.02 kg/m².    Intake/Output Summary (Last 24 hours) at 2/13/2024 1248  Last data filed at 2/12/2024 2000  Gross per 24 hour   Intake 598 ml   Output --   Net 598 ml         Physical Exam  Vitals and nursing note reviewed.   Constitutional:       Appearance: She is well-developed.   Eyes:      Pupils: Pupils are equal, round, and reactive to light.   Cardiovascular:      Rate and Rhythm: Normal rate and regular rhythm.   Pulmonary:      Effort: Pulmonary effort is normal.      Breath sounds: Normal breath sounds.   Abdominal:      Palpations: Abdomen is soft.      Tenderness: There is abdominal tenderness (mild suprapubic tenderness). There is no right CVA tenderness or left CVA tenderness.   Musculoskeletal:         General: No tenderness.   Skin:     General: Skin is warm and dry.   Neurological:      Mental Status: She is alert and oriented to person, place, and time.   Psychiatric:         Behavior: Behavior normal.             Significant Labs: All pertinent  labs within the past 24 hours have been reviewed.  CBC:   Recent Labs   Lab 02/12/24  0915 02/12/24  1703 02/13/24  0348   WBC 8.42 7.70 7.06   HGB 11.0* 10.7* 9.9*   HCT 38.0 36.8* 33.1*    255 227     CMP:   Recent Labs   Lab 02/12/24  0915 02/12/24  1703 02/13/24  0348    137 137   K 3.9 4.3 3.8   CL 99 102 103   CO2 28 26 26    78 148*   BUN 28* 31* 26*   CREATININE 2.1* 2.5* 1.6*   CALCIUM 10.1 9.9 9.6   PROT 8.2 8.0  --    ALBUMIN 4.2 4.0  --    BILITOT 0.3 0.2  --    ALKPHOS 77 83  --    AST 16 18  --    ALT 13 13  --    ANIONGAP 10 9 8     Urine Studies:   Recent Labs   Lab 02/12/24  1735 02/13/24  0921   COLORU Yellow Straw   APPEARANCEUA Hazy* Clear   PHUR 6.0 6.0   SPECGRAV 1.020 1.010   PROTEINUA 1+* Negative   GLUCUA Negative Negative   KETONESU Negative Negative   BILIRUBINUA Negative Negative   OCCULTUA Negative Negative   NITRITE Negative Negative   LEUKOCYTESUR 3+* 2+*   RBCUA 5* 9*   WBCUA 76* 21*   BACTERIA Occasional Rare   SQUAMEPITHEL 8 1   HYALINECASTS 1  --        Significant Imaging: I have reviewed all pertinent imaging results/findings within the past 24 hours.

## 2024-02-13 NOTE — PLAN OF CARE
"[facility]  Initial Discharge Assessment       Primary Care Provider: Jose Carpenter DO    Admission Diagnosis: CANDELARIA (acute kidney injury) [N17.9]    Admission Date: 2/12/2024  Expected Discharge Date:     Transition of Care Barriers: (P) None    Payor: HU[employer] MANAGED MEDICARE / Plan: HUMANA SNP HMO PPO SPECIAL NEEDS / Product Type: Medicare Advantage /     Extended Emergency Contact Information  Primary Emergency Contact: Halina Bajwa  Address: [address]           [place] [address] [place]  Mobile Phone: [phone]  Relation: Daughter    Discharge Plan A: (P) Home  Discharge Plan B: (P) Home      [id number] [place], [place] - [address] AT [facility] [address]  [address]  [facility] [address]  Phone: [phone] Fax: [phone]    [id number] [address] [facility] [address]  [address]  [facility] [address]  Phone: [phone] Fax: [phone]      Initial Assessment (most recent)       Adult Discharge Assessment - 02/13/24 0034          Discharge Assessment    Assessment Type Discharge Planning Assessment (P)      Confirmed/corrected address, phone number and insurance Yes (P)      Confirmed Demographics Correct on Facesheet (P)      Source of Information patient (P)      When was your last doctors appointment? -- (P)    Last month    Does patient/caregiver understand observation status Yes (P)      Communicated RAY with patient/caregiver Yes (P)      Reason For Admission "Increase in kidney lab" (P)      People in Home alone (P)      Facility Arrived From: Home (P)      Do you expect to return to your current living situation? Yes (P)      Do you have help at home or someone to help you manage your care at home? No (P)      Prior to hospitilization cognitive status: Alert/Oriented (P)      Current cognitive status: Alert/Oriented (P)      Walking " or Climbing Stairs Difficulty no (P)      Dressing/Bathing Difficulty no (P)      Home Accessibility wheelchair accessible (P)      Home Layout Able to live on 1st floor (P)      Equipment Currently Used at Home none (P)      Readmission within 30 days? No (P)      Patient currently being followed by outpatient case management? No (P)      Do you currently have service(s) that help you manage your care at home? No (P)      Do you take prescription medications? Yes (P)      Do you have prescription coverage? Yes (P)      Coverage Medicare (P)      Do you have any problems affording any of your prescribed medications? No (P)      Is the patient taking medications as prescribed? yes (P)      Who is going to help you get home at discharge? Scott (P)      How do you get to doctors appointments? family or friend will provide (P)      Are you on dialysis? No (P)      Do you take coumadin? No (P)      Discharge Plan A Home (P)      Discharge Plan B Home (P)      DME Needed Upon Discharge  none (P)      Discharge Plan discussed with: Patient (P)      Transition of Care Barriers None (P)         Physical Activity    On average, how many days per week do you engage in moderate to strenuous exercise (like a brisk walk)? 0 days (P)      On average, how many minutes do you engage in exercise at this level? 0 min (P)         Financial Resource Strain    How hard is it for you to pay for the very basics like food, housing, medical care, and heating? Not hard at all (P)         Housing Stability    In the last 12 months, was there a time when you were not able to pay the mortgage or rent on time? No (P)      In the last 12 months, how many places have you lived? 1 (P)      In the last 12 months, was there a time when you did not have a steady place to sleep or slept in a shelter (including now)? No (P)         Transportation Needs    In the past 12 months, has lack of transportation kept you from medical appointments or from  getting medications? No (P)      In the past 12 months, has lack of transportation kept you from meetings, work, or from getting things needed for daily living? No (P)         Food Insecurity    Within the past 12 months, you worried that your food would run out before you got the money to buy more. Never true (P)      Within the past 12 months, the food you bought just didn't last and you didn't have money to get more. Never true (P)         Stress    Do you feel stress - tense, restless, nervous, or anxious, or unable to sleep at night because your mind is troubled all the time - these days? Not at all (P)         Social Connections    In a typical week, how many times do you talk on the phone with family, friends, or neighbors? More than three times a week (P)      How often do you get together with friends or relatives? Twice a week (P)      How often do you attend Latter-day or Holiness services? Never (P)      Do you belong to any clubs or organizations such as Latter-day groups, unions, fraternal or athletic groups, or school groups? No (P)      How often do you attend meetings of the clubs or organizations you belong to? Never (P)      Are you , , , , never , or living with a partner?  (P)         Alcohol Use    Q1: How often do you have a drink containing alcohol? Never (P)      Q2: How many drinks containing alcohol do you have on a typical day when you are drinking? Patient does not drink (P)      Q3: How often do you have six or more drinks on one occasion? Never (P)

## 2024-02-14 VITALS
RESPIRATION RATE: 18 BRPM | HEIGHT: 67 IN | HEART RATE: 103 BPM | OXYGEN SATURATION: 98 % | DIASTOLIC BLOOD PRESSURE: 75 MMHG | TEMPERATURE: 98 F | WEIGHT: 229.94 LBS | SYSTOLIC BLOOD PRESSURE: 126 MMHG | BODY MASS INDEX: 36.09 KG/M2

## 2024-02-14 LAB
ANION GAP SERPL CALC-SCNC: 6 MMOL/L (ref 8–16)
BACTERIA UR CULT: ABNORMAL
BACTERIA UR CULT: NO GROWTH
BASOPHILS # BLD AUTO: 0.05 K/UL (ref 0–0.2)
BASOPHILS NFR BLD: 0.8 % (ref 0–1.9)
BUN SERPL-MCNC: 19 MG/DL (ref 6–20)
CALCIUM SERPL-MCNC: 9.6 MG/DL (ref 8.7–10.5)
CHLORIDE SERPL-SCNC: 102 MMOL/L (ref 95–110)
CO2 SERPL-SCNC: 30 MMOL/L (ref 23–29)
CREAT SERPL-MCNC: 1.3 MG/DL (ref 0.5–1.4)
DIFFERENTIAL METHOD BLD: ABNORMAL
EOSINOPHIL # BLD AUTO: 0.3 K/UL (ref 0–0.5)
EOSINOPHIL NFR BLD: 4.5 % (ref 0–8)
ERYTHROCYTE [DISTWIDTH] IN BLOOD BY AUTOMATED COUNT: 17.6 % (ref 11.5–14.5)
EST. GFR  (NO RACE VARIABLE): 48.6 ML/MIN/1.73 M^2
GLUCOSE SERPL-MCNC: 101 MG/DL (ref 70–110)
HCT VFR BLD AUTO: 36.2 % (ref 37–48.5)
HGB BLD-MCNC: 10.3 G/DL (ref 12–16)
IMM GRANULOCYTES # BLD AUTO: 0.01 K/UL (ref 0–0.04)
IMM GRANULOCYTES NFR BLD AUTO: 0.2 % (ref 0–0.5)
LYMPHOCYTES # BLD AUTO: 2.7 K/UL (ref 1–4.8)
LYMPHOCYTES NFR BLD: 44.9 % (ref 18–48)
MAGNESIUM SERPL-MCNC: 1.8 MG/DL (ref 1.6–2.6)
MCH RBC QN AUTO: 23.8 PG (ref 27–31)
MCHC RBC AUTO-ENTMCNC: 28.5 G/DL (ref 32–36)
MCV RBC AUTO: 84 FL (ref 82–98)
MONOCYTES # BLD AUTO: 0.5 K/UL (ref 0.3–1)
MONOCYTES NFR BLD: 8.5 % (ref 4–15)
NEUTROPHILS # BLD AUTO: 2.5 K/UL (ref 1.8–7.7)
NEUTROPHILS NFR BLD: 41.1 % (ref 38–73)
NRBC BLD-RTO: 0 /100 WBC
PHOSPHATE SERPL-MCNC: 3.8 MG/DL (ref 2.7–4.5)
PLATELET # BLD AUTO: 243 K/UL (ref 150–450)
PMV BLD AUTO: 11.5 FL (ref 9.2–12.9)
POTASSIUM SERPL-SCNC: 4.3 MMOL/L (ref 3.5–5.1)
RBC # BLD AUTO: 4.32 M/UL (ref 4–5.4)
SODIUM SERPL-SCNC: 138 MMOL/L (ref 136–145)
WBC # BLD AUTO: 6.02 K/UL (ref 3.9–12.7)

## 2024-02-14 PROCEDURE — 25000003 PHARM REV CODE 250: Mod: HCNC | Performed by: PHYSICIAN ASSISTANT

## 2024-02-14 PROCEDURE — 63600175 PHARM REV CODE 636 W HCPCS: Mod: HCNC | Performed by: PHYSICIAN ASSISTANT

## 2024-02-14 PROCEDURE — 85025 COMPLETE CBC W/AUTO DIFF WBC: CPT | Mod: HCNC | Performed by: PHYSICIAN ASSISTANT

## 2024-02-14 PROCEDURE — 36415 COLL VENOUS BLD VENIPUNCTURE: CPT | Mod: HCNC | Performed by: PHYSICIAN ASSISTANT

## 2024-02-14 PROCEDURE — 25000003 PHARM REV CODE 250: Mod: HCNC

## 2024-02-14 PROCEDURE — 80048 BASIC METABOLIC PNL TOTAL CA: CPT | Mod: HCNC | Performed by: PHYSICIAN ASSISTANT

## 2024-02-14 PROCEDURE — 83735 ASSAY OF MAGNESIUM: CPT | Mod: HCNC | Performed by: PHYSICIAN ASSISTANT

## 2024-02-14 PROCEDURE — 84100 ASSAY OF PHOSPHORUS: CPT | Mod: HCNC | Performed by: PHYSICIAN ASSISTANT

## 2024-02-14 RX ORDER — AMOXICILLIN AND CLAVULANATE POTASSIUM 875; 125 MG/1; MG/1
1 TABLET, FILM COATED ORAL EVERY 12 HOURS
Qty: 20 TABLET | Refills: 0 | Status: SHIPPED | OUTPATIENT
Start: 2024-02-14 | End: 2024-02-24

## 2024-02-14 RX ADMIN — HEPARIN SODIUM 5000 UNITS: 5000 INJECTION INTRAVENOUS; SUBCUTANEOUS at 06:02

## 2024-02-14 RX ADMIN — METHOCARBAMOL 500 MG: 500 TABLET ORAL at 08:02

## 2024-02-14 RX ADMIN — HYDROCODONE BITARTRATE AND ACETAMINOPHEN 1 TABLET: 5; 325 TABLET ORAL at 11:02

## 2024-02-14 RX ADMIN — LURASIDONE HYDROCHLORIDE 60 MG: 40 TABLET, FILM COATED ORAL at 10:02

## 2024-02-14 RX ADMIN — BUSPIRONE HYDROCHLORIDE 15 MG: 10 TABLET ORAL at 08:02

## 2024-02-14 RX ADMIN — NITROFURANTOIN MONOHYDRATE/MACROCRYSTALS 100 MG: 25; 75 CAPSULE ORAL at 08:02

## 2024-02-14 RX ADMIN — GABAPENTIN 300 MG: 300 CAPSULE ORAL at 08:02

## 2024-02-14 RX ADMIN — BUPROPION HYDROCHLORIDE 450 MG: 150 TABLET, EXTENDED RELEASE ORAL at 08:02

## 2024-02-14 NOTE — PLAN OF CARE
Problem: Adult Inpatient Plan of Care  Goal: Plan of Care Review  Outcome: Ongoing, Progressing  Goal: Patient-Specific Goal (Individualized)  Outcome: Ongoing, Progressing  Goal: Absence of Hospital-Acquired Illness or Injury  Outcome: Ongoing, Progressing  Goal: Optimal Comfort and Wellbeing  Outcome: Ongoing, Progressing  Goal: Readiness for Transition of Care  Outcome: Ongoing, Progressing     Problem: Infection  Goal: Absence of Infection Signs and Symptoms  Outcome: Ongoing, Progressing     Problem: Fluid and Electrolyte Imbalance (Acute Kidney Injury/Impairment)  Goal: Fluid and Electrolyte Balance  Outcome: Ongoing, Progressing     Problem: Oral Intake Inadequate (Acute Kidney Injury/Impairment)  Goal: Optimal Nutrition Intake  Outcome: Ongoing, Progressing     Problem: Renal Function Impairment (Acute Kidney Injury/Impairment)  Goal: Effective Renal Function  Outcome: Ongoing, Progressing     Problem: Hypertension Acute  Goal: Blood Pressure Within Desired Range  Outcome: Ongoing, Progressing     Problem: Activity and Energy Impairment (Anxiety Signs/Symptoms)  Goal: Optimized Energy Level (Anxiety Signs/Symptoms)  Outcome: Ongoing, Progressing     Problem: Cognitive Impairment (Anxiety Signs/Symptoms)  Goal: Optimized Cognitive Function (Anxiety Signs/Symptoms)  Outcome: Ongoing, Progressing     Problem: Mood Impairment (Anxiety Signs/Symptoms)  Goal: Improved Mood Symptoms (Anxiety Signs/Symptoms)  Outcome: Ongoing, Progressing     Problem: Sleep Impairment (Anxiety Signs/Symptoms)  Goal: Improved Sleep (Anxiety Signs/Symptoms)  Outcome: Ongoing, Progressing     Problem: Social, Occupational or Functional Impairment (Anxiety Signs/Symptoms)  Goal: Enhanced Social, Occupational or Functional Skills (Anxiety Signs/Symptoms)  Outcome: Ongoing, Progressing     Problem: Somatic Disturbance (Anxiety Signs/Symptoms)  Goal: Improved Somatic Symptoms (Anxiety Signs/Symptoms)  Outcome: Ongoing, Progressing      Problem: Anxiety  Goal: Anxiety Reduction or Resolution  Outcome: Ongoing, Progressing     Problem: Activity and Energy Impairment (Excessive Substance Use)  Goal: Optimized Energy Level (Excessive Substance Use)  Outcome: Ongoing, Progressing     Problem: Behavior Regulation Impairment (Excessive Substance Use)  Goal: Improved Behavioral Control (Excessive Substance Use)  Outcome: Ongoing, Progressing     Problem: Decreased Participation and Engagement (Excessive Substance Use)  Goal: Increased Participation and Engagement (Excessive Substance Use)  Outcome: Ongoing, Progressing     Problem: Physiologic Impairment (Excessive Substance Use)  Goal: Improved Physiologic Symptoms (Excessive Substance Use)  Outcome: Ongoing, Progressing     Problem: Social, Occupational or Functional Impairment (Excessive Substance Use)  Goal: Enhanced Social, Occupational or Functional Skills (Excessive Substance Use)  Outcome: Ongoing, Progressing

## 2024-02-14 NOTE — NURSING
Patient alert/ox4 plan of care reviewed, patient verbalized understanding. Call system reviewed with patient, and full admit assessment completed. Bed lowest position, call light within reach, verbalizes understanding to call if any assistance needed.

## 2024-02-14 NOTE — PLAN OF CARE
[facility] - Observation 11H  Discharge Final Note    Primary Care Provider: Jose Carpenter DO    Expected Discharge Date: 2/14/2024    Patient discharged to home via personal transportation.     Patient's bedside nurse and patient notified of the above.    Discharge Plan A and Plan B have been determined by review of patient's clinical status, future medical and therapeutic needs, and coverage/benefits for post-acute care in coordination with multidisciplinary team members.        Final Discharge Note (most recent)       Final Note - 02/14/24 1601          Final Note    Assessment Type Final Discharge Note (P)      Anticipated Discharge Disposition Home or Self Care (P)         Post-Acute Status    Post-Acute Authorization Other (P)      Other Status No Post-Acute Service Needs (P)                      Important Message from Medicare                 Future Appointments   Date Time Provider Department Center   2/16/2024  8:30 AM Amalia Phillips, NP [facility] UROLOGY [facility]   3/7/2024  3:00 PM Gordo Zamora LCSW [facility] SOCL WK [facility]   3/26/2024  2:00 PM WEST, VISUAL FIELDS [facility] OPHTHAL [facility]   3/26/2024  2:40 PM Lupe Simons, OD [facility] OPTOMTY [facility]   4/11/2024  3:00 PM Najma Doyle MD [facility] PSYCH [facility]   4/29/2024  2:00 PM [facility] LAB BMT [facility] LABBMT [facility]   4/29/2024  3:00 PM Raoul Wren MD [facility] HC BMT [facility]        scheduled post-discharge follow-up appointment and information added to AVS.     Brenda Loomis, GERRY  [facility]  Ext. [phone]

## 2024-02-14 NOTE — NURSING
Nurses Note -- 4 Eyes      2/14/2024   4:05 AM      Skin assessed during: Admit      [x] No Altered Skin Integrity Present    []Prevention Measures Documented      [] Yes- Altered Skin Integrity Present or Discovered   [] LDA Added if Not in Epic (Describe Wound)   [] New Altered Skin Integrity was Present on Admit and Documented in LDA   [] Wound Image Taken    Wound Care Consulted? No    Attending Nurse:  Sagrario Tinsley RN/Staff Member:   Olesya RICK

## 2024-02-14 NOTE — NURSING
Pt discharged to home with spouse via private car. DC instructions reviewed with patient. All questions answered.

## 2024-02-14 NOTE — DISCHARGE SUMMARY
[facility] [address] [place] [address] Medicine  Discharge Summary      Patient Name: Jaki Bajwa  MRN: [id number]  KIRILL: [id number]  Patient Class: IP- Inpatient  Admission Date: 2/12/2024  Hospital Length of Stay: 1 days  Discharge Date and Time: 2/14/2024  3:25 PM  Attending Physician: Christina att. providers found   Discharging Provider: Amalia Youngblood PA-C  Primary Care Provider: Jose Carpenter [facility] Medicine Team: [facility]OS[facility] MED Y Amalia Youngblood PA-C  Primary Care Team: [facility] MED Y    HPI:   Jaki Bajwa is a 53 y.o. female with a PMHx of Sjogren's syndrome, lupus on plaquenil, anxiety with panic attacks, HTN, obesity,  who presents to [facility] for evaluation of abnormal labs. Patient had routine labs done by her PCP which showed elevated Cr to 2.5 from baseline ~1 so she was advsided to present to the ED for further eval. Patient takes naproxen 1-2 times daily for several months for chronic back pain. Over the past week she was only been taking it every other day as instructed by her PCP. She complains of right sided flank pain that radiate to her abdomen for the past few days. Endorses associated urinary frequency. Also has intermittent lightheadedness for the last few days which usually occurs with position changes or with walking. Denies fever, chills, chest pain, SOB, LE swelling, HA, vision changes or syncope.    ED: AFVSS. No leukocytosis. Cr 2.5, baseline ~1.0. UA infectious. CT abd/pelvis negative for acute findings. Given 1g IV rocephin and 500cc IVFs.    * No surgery found *      Hospital Course:   Jaki Bajwa is a 53 y.o. female who was admitted to hospital medicine for a UTI and CANDELARIA. Cr 2.5 >> 1.6 >> 1.3, improving following IVF. Initially starting on rocephin but changed to Augmentin per previous culture sensitivities. Urine culture growing gram negative rods. Pt was seen and evaluated by me this morning, reports feeling well, and is eager to discharge home. All questions were  answered. Patient acknowledged understanding of discharge instructions and feels safe to discharge home. Patient was discharged on 2/14/2024 in stable condition with urology follow-up. Education regarding condition provided and return precautions given.     Physical Exam  Gen: in NAD, appears stated age  Neuro: AAOx3, motor, sensory, and strength grossly intact BL  HEENT: EOMI, PERRLA; no JVD appreciated  CVS: RRR  Resp: lungs CTAB, no w/r/r; no belabored breathing or accessory muscle use appreciated   Abd: NTND, soft to palpation  Extrem: no UE or LE edema BL       Goals of Care Treatment Preferences:  Code Status: Full Code      Consults:     No new Assessment & Plan notes have been filed under this hospital service since the last note was generated.  Service: Hospital Medicine    Final Active Diagnoses:    Diagnosis Date Noted POA    PRINCIPAL PROBLEM:  CANDELARIA (acute kidney injury) [N17.9] 04/23/2022 Yes    Chronic narcotic dependence [F11.20] 03/13/2023 Yes    Stage 3b chronic kidney disease [N18.32] 03/08/2022 Yes    Recurrent UTI (urinary tract infection) [N39.0] 09/20/2017 Yes    Sjogren's syndrome [M35.00] 05/14/2014 Yes    JUAN JOSE (generalized anxiety disorder) [F41.1] 09/03/2013 Yes    Essential hypertension [I10] 08/20/2012 Yes     Chronic      Problems Resolved During this Admission:       Discharged Condition: stable    Disposition: Home or Self Care    Follow Up:    Patient Instructions:      Ambulatory referral/consult to Outpatient Case Management   Referral Priority: Routine Referral Type: Consultation   Referral Reason: Specialty Services Required   Number of Visits Requested: 1     Ambulatory referral/consult to Urology   Standing Status: Future   Referral Priority: Urgent Referral Type: Consultation   Referral Reason: Specialty Services Required   Requested Specialty: Urology   Number of Visits Requested: 1     Notify your health care provider if you experience any of the following:  temperature >100.4      Notify your health care provider if you experience any of the following:  increased confusion or weakness     Activity as tolerated       Significant Diagnostic Studies: Microbiology: Urine Culture    Lab Results   Component Value Date    LABURIN No growth 02/13/2024       Pending Diagnostic Studies:       None           Medications:  Reconciled Home Medications:      Medication List        START taking these medications      amoxicillin-clavulanate 875-125mg 875-125 mg per tablet  Commonly known as: AUGMENTIN  Take 1 tablet by mouth every 12 (twelve) hours. for 10 days            CHANGE how you take these medications      diazePAM 2 MG tablet  Commonly known as: VALIUM  Take 1 tablet (2 mg total) by mouth daily as needed (Severe anxiety/Panic). 15 tablets to last 3 months  What changed:   when to take this  additional instructions     fluticasone propionate 50 mcg/actuation nasal spray  Commonly known as: FLONASE  SHAKE LIQUID AND USE 1 SPRAY(50 MCG) IN EACH NOSTRIL EVERY DAY  What changed: See the new instructions.            CONTINUE taking these medications      amLODIPine 5 MG tablet  Commonly known as: NORVASC  Take 1 tablet (5 mg total) by mouth once daily.     buPROPion 150 MG TB24 tablet  Commonly known as: WELLBUTRIN XL  TAKE 3 TABLETS(450 MG) BY MOUTH EVERY MORNING     busPIRone 15 MG tablet  Commonly known as: BUSPAR  TAKE 1 TABLET(15 MG) BY MOUTH THREE TIMES DAILY     butalbital-acetaminophen-caffeine -40 mg -40 mg per tablet  Commonly known as: FIORICET, ESGIC  Take 1 tablet by mouth every 6 (six) hours as needed for Headaches.     cycloSPORINE 0.05 % ophthalmic emulsion  Commonly known as: RESTASIS  INSTILL 1 DROP IN BOTH EYES TWICE DAILY     docusate sodium 100 MG capsule  Commonly known as: COLACE  Take 1 capsule (100 mg total) by mouth 2 (two) times daily as needed for Constipation.     doxepin 75 MG capsule  Commonly known as: SINEQUAN  TAKE 2 CAPSULES(150 MG) BY MOUTH EVERY  EVENING     gabapentin 300 MG capsule  Commonly known as: NEURONTIN  TAKE 1 CAPSULE(300 MG) BY MOUTH THREE TIMES DAILY     hydroCHLOROthiazide 12.5 MG Tab  Commonly known as: HYDRODIURIL  Take 1 tablet (12.5 mg total) by mouth once daily. For blood pressure     HYDROcodone-acetaminophen 5-325 mg per tablet  Commonly known as: NORCO  Take 1 tablet by mouth every 24 hours as needed for Pain. Quantity medically necessary     hydroxychloroquine 200 mg tablet  Commonly known as: PLAQUENIL  TAKE 1 TABLET BY MOUTH TWICE DAILY     irbesartan 300 MG tablet  Commonly known as: AVAPRO  Take 1 tablet (300 mg total) by mouth every evening.     lurasidone 60 mg Tab tablet  Commonly known as: LATUDA  TAKE 1 TABLET(60 MG) BY MOUTH EVERY DAY     naproxen 500 MG tablet  Commonly known as: NAPROSYN  Take 1 tablet (500 mg total) by mouth daily as needed (pain).     NARCAN 4 mg/actuation Spry  Generic drug: naloxone  SPRAY 1 SPRAY IN NOSTRIL ONCE FOR 1 DOSE     omeprazole 40 MG capsule  Commonly known as: PRILOSEC  Take 1 capsule (40 mg total) by mouth once daily.     pilocarpine 5 MG Tab  Commonly known as: SALAGEN  Take 1 tablet (5 mg total) by mouth 4 (four) times daily.     solifenacin 5 MG tablet  Commonly known as: VESICARE  Take 1 tablet (5 mg total) by mouth once daily.     tiZANidine 4 MG tablet  Commonly known as: ZANAFLEX  TAKE 2 TABLETS BY MOUTH THREE TIMES DAILY              Indwelling Lines/Drains at time of discharge:   Lines/Drains/Airways       None                   Time spent on the discharge of patient: 36 minutes         Amalia Youngblood PA-C  Department of Hospital Medicine  [facility]

## 2024-02-14 NOTE — PLAN OF CARE
Problem: Adult Inpatient Plan of Care  Goal: Plan of Care Review  2/14/2024 1425 by Carola Baum RN  Outcome: Met  2/14/2024 1139 by Carola Baum RN  Outcome: Ongoing, Progressing  Goal: Patient-Specific Goal (Individualized)  2/14/2024 1425 by Carola Baum RN  Outcome: Met  2/14/2024 1139 by Carola Baum RN  Outcome: Ongoing, Progressing  Goal: Absence of Hospital-Acquired Illness or Injury  2/14/2024 1425 by Carola Baum RN  Outcome: Met  2/14/2024 1139 by Carola Baum RN  Outcome: Ongoing, Progressing  Goal: Optimal Comfort and Wellbeing  2/14/2024 1425 by Carola Baum RN  Outcome: Met  2/14/2024 1139 by Carola Baum RN  Outcome: Ongoing, Progressing  Goal: Readiness for Transition of Care  2/14/2024 1425 by Carola Baum RN  Outcome: Met  2/14/2024 1139 by Carola Baum RN  Outcome: Ongoing, Progressing     Problem: Infection  Goal: Absence of Infection Signs and Symptoms  2/14/2024 1425 by Carola Baum RN  Outcome: Met  2/14/2024 1139 by Carola Baum RN  Outcome: Ongoing, Progressing     Problem: Fluid and Electrolyte Imbalance (Acute Kidney Injury/Impairment)  Goal: Fluid and Electrolyte Balance  2/14/2024 1425 by Carola Baum RN  Outcome: Met  2/14/2024 1139 by Carola Baum RN  Outcome: Ongoing, Progressing     Problem: Oral Intake Inadequate (Acute Kidney Injury/Impairment)  Goal: Optimal Nutrition Intake  2/14/2024 1425 by Carola Baum RN  Outcome: Met  2/14/2024 1139 by Carola Baum RN  Outcome: Ongoing, Progressing     Problem: Renal Function Impairment (Acute Kidney Injury/Impairment)  Goal: Effective Renal Function  2/14/2024 1425 by Carola Baum RN  Outcome: Met  2/14/2024 1139 by Carola Baum RN  Outcome: Ongoing, Progressing     Problem: Hypertension Acute  Goal: Blood Pressure Within Desired  Range  2/14/2024 1425 by Carola Baum RN  Outcome: Met  2/14/2024 1139 by Carola Baum RN  Outcome: Ongoing, Progressing     Problem: Activity and Energy Impairment (Anxiety Signs/Symptoms)  Goal: Optimized Energy Level (Anxiety Signs/Symptoms)  2/14/2024 1425 by Carola Baum RN  Outcome: Met  2/14/2024 1139 by Carola Baum RN  Outcome: Ongoing, Progressing     Problem: Cognitive Impairment (Anxiety Signs/Symptoms)  Goal: Optimized Cognitive Function (Anxiety Signs/Symptoms)  2/14/2024 1425 by Carola Baum RN  Outcome: Met  2/14/2024 1139 by Carola Baum RN  Outcome: Ongoing, Progressing     Problem: Mood Impairment (Anxiety Signs/Symptoms)  Goal: Improved Mood Symptoms (Anxiety Signs/Symptoms)  2/14/2024 1425 by Carola Baum RN  Outcome: Met  2/14/2024 1139 by Carola Baum RN  Outcome: Ongoing, Progressing     Problem: Sleep Impairment (Anxiety Signs/Symptoms)  Goal: Improved Sleep (Anxiety Signs/Symptoms)  2/14/2024 1425 by Carola Baum RN  Outcome: Met  2/14/2024 1139 by Carola Baum RN  Outcome: Ongoing, Progressing     Problem: Social, Occupational or Functional Impairment (Anxiety Signs/Symptoms)  Goal: Enhanced Social, Occupational or Functional Skills (Anxiety Signs/Symptoms)  2/14/2024 1425 by Carola Baum RN  Outcome: Met  2/14/2024 1139 by Carola Baum RN  Outcome: Ongoing, Progressing     Problem: Somatic Disturbance (Anxiety Signs/Symptoms)  Goal: Improved Somatic Symptoms (Anxiety Signs/Symptoms)  2/14/2024 1425 by Carola aBum RN  Outcome: Met  2/14/2024 1139 by Carola Baum RN  Outcome: Ongoing, Progressing     Problem: Anxiety  Goal: Anxiety Reduction or Resolution  2/14/2024 1425 by Carola Baum RN  Outcome: Met  2/14/2024 1139 by Carola Baum RN  Outcome: Ongoing, Progressing     Problem: Activity and Energy Impairment  (Excessive Substance Use)  Goal: Optimized Energy Level (Excessive Substance Use)  2/14/2024 1425 by Carola Baum RN  Outcome: Met  2/14/2024 1139 by Carola Baum RN  Outcome: Ongoing, Progressing     Problem: Behavior Regulation Impairment (Excessive Substance Use)  Goal: Improved Behavioral Control (Excessive Substance Use)  2/14/2024 1425 by Carola Baum RN  Outcome: Met  2/14/2024 1139 by Carola Baum RN  Outcome: Ongoing, Progressing     Problem: Decreased Participation and Engagement (Excessive Substance Use)  Goal: Increased Participation and Engagement (Excessive Substance Use)  2/14/2024 1425 by Carola Baum RN  Outcome: Met  2/14/2024 1139 by Carola Baum RN  Outcome: Ongoing, Progressing     Problem: Physiologic Impairment (Excessive Substance Use)  Goal: Improved Physiologic Symptoms (Excessive Substance Use)  2/14/2024 1425 by Carola Baum RN  Outcome: Met  2/14/2024 1139 by Carola Baum RN  Outcome: Ongoing, Progressing     Problem: Social, Occupational or Functional Impairment (Excessive Substance Use)  Goal: Enhanced Social, Occupational or Functional Skills (Excessive Substance Use)  2/14/2024 1425 by Carloa Bamu RN  Outcome: Met  2/14/2024 1139 by Carola Baum RN  Outcome: Ongoing, Progressing

## 2024-02-15 ENCOUNTER — OUTPATIENT CASE MANAGEMENT (OUTPATIENT)
Dept: ADMINISTRATIVE | Facility: OTHER | Age: 54
End: 2024-02-15
Payer: MEDICARE

## 2024-02-15 NOTE — LETTER
Jaki Bajwa  [address] [facility] [address]      Dear Jaki Bajwa,     I work with [facility] Outpatient Care Management Department. We received a referral to call you to discuss your medical history. These services are free of charge and are offered to [facility] patients who have recently been discharged from any of our facilities or who have medical conditions that may require the skill of a nurse to assist with management.     I am a Registered Nurse who specializes in connecting patients with available medical and financial resources as well as addressing any educational needs that may be indicated.    I attempted to reach you by telephone, but I was unsuccessful. Please call our department so that we can go over some questions with you, regarding your health.    The Outpatient Care Management Department can be reached at [phone], from 8:00AM to 4:30 PM, on Monday thru Friday.     Additionally, [facility] also has a program where a nurse is available 24/7 to answer questions or provide medical advice, their number is [phone].      Thanks,    Benita Machuca RN  Outpatient Care Management  Phone #: [phone]

## 2024-02-16 NOTE — PROGRESS NOTES
Your mammogram is normal and you need to repeat in 1 year. Let us know if any questions.     Neal Martines and Dr. Carpenter

## 2024-02-21 DIAGNOSIS — H16.229 KERATOCONJUNCT SICCA, NOT SPECIFIED AS SJOGREN'S, UNSP EYE: ICD-10-CM

## 2024-02-21 RX ORDER — CYCLOSPORINE 0.5 MG/ML
1 EMULSION OPHTHALMIC 2 TIMES DAILY
Qty: 180 EACH | Refills: 1 | Status: SHIPPED | OUTPATIENT
Start: 2024-02-21

## 2024-02-22 ENCOUNTER — OFFICE VISIT (OUTPATIENT)
Dept: UROLOGY | Facility: CLINIC | Age: 54
End: 2024-02-22
Payer: MEDICARE

## 2024-02-22 ENCOUNTER — OUTPATIENT CASE MANAGEMENT (OUTPATIENT)
Dept: ADMINISTRATIVE | Facility: OTHER | Age: 54
End: 2024-02-22
Payer: MEDICARE

## 2024-02-22 VITALS
HEART RATE: 97 BPM | WEIGHT: 229 LBS | HEIGHT: 67 IN | OXYGEN SATURATION: 100 % | BODY MASS INDEX: 35.94 KG/M2 | RESPIRATION RATE: 12 BRPM | SYSTOLIC BLOOD PRESSURE: 123 MMHG | DIASTOLIC BLOOD PRESSURE: 88 MMHG

## 2024-02-22 DIAGNOSIS — N39.0 URINARY TRACT INFECTION WITHOUT HEMATURIA, SITE UNSPECIFIED: ICD-10-CM

## 2024-02-22 PROCEDURE — 3008F BODY MASS INDEX DOCD: CPT | Mod: HCNC,CPTII,S$GLB, | Performed by: NURSE PRACTITIONER

## 2024-02-22 PROCEDURE — 1111F DSCHRG MED/CURRENT MED MERGE: CPT | Mod: HCNC,CPTII,S$GLB, | Performed by: NURSE PRACTITIONER

## 2024-02-22 PROCEDURE — 3079F DIAST BP 80-89 MM HG: CPT | Mod: HCNC,CPTII,S$GLB, | Performed by: NURSE PRACTITIONER

## 2024-02-22 PROCEDURE — 3074F SYST BP LT 130 MM HG: CPT | Mod: HCNC,CPTII,S$GLB, | Performed by: NURSE PRACTITIONER

## 2024-02-22 PROCEDURE — 99213 OFFICE O/P EST LOW 20 MIN: CPT | Mod: HCNC,S$GLB,, | Performed by: NURSE PRACTITIONER

## 2024-02-22 PROCEDURE — 1159F MED LIST DOCD IN RCRD: CPT | Mod: HCNC,CPTII,S$GLB, | Performed by: NURSE PRACTITIONER

## 2024-02-22 PROCEDURE — 3044F HG A1C LEVEL LT 7.0%: CPT | Mod: HCNC,CPTII,S$GLB, | Performed by: NURSE PRACTITIONER

## 2024-02-22 RX ORDER — CEPHALEXIN 250 MG/1
250 CAPSULE ORAL DAILY PRN
Qty: 90 CAPSULE | Refills: 1 | Status: SHIPPED | OUTPATIENT
Start: 2024-02-22 | End: 2024-04-24

## 2024-02-22 NOTE — LETTER
Jaki Bajwa  [address] [facility] [address]    Dear Jaki Bajwa,     Welcome to [facility] Outpatient Care Management Program. We are here to assist patients with multiple long-term (chronic) conditions who often need more personalized healthcare.    It was a pleasure talking with you today. My name is Benita Machuca RN. I look forward to working with you as your Care Manager. I will be contacting you by telephone routinely to help coordinate care and resolve issues.    My goal is to help you function at the healthiest and highest level possible. You can contact me directly at [phone].    As an [facility] patient with [employer], some of the services we provide, at no cost to you, include:     Development of an individualized care plan with a Registered Nurse   Connection with a   Assistance from a Community Health Worker  Connection with available resources and services    Coordinate communication among your care team members   Provide coaching and education  Help you understand your doctor's treatment plan  Help you obtain information about your insurance coverage.    All services provided by [facility]s Outpatient Care Managers and other care team members are coordinated with and communicated to your primary care team.      As part of your enrollment, you will be receiving education materials and more information about these services in your [facility] account, by phone, or through the mail. If you do not wish to participate or receive information, you can Opt Out by contacting our office at [phone].      Sincerely,    Benita Machuca RN  [facility]   Outpatient Care Management

## 2024-02-22 NOTE — PROGRESS NOTES
Outpatient Care Management  Initial Patient Assessment    Patient: Jaki Bajwa  MRN: [id number]  Date of Service: 02/22/2024  Completed by: Yaneli Machuca RN  Referral Date: 02/14/2024  Date of Eligibility: 2/15/2024  Program: High Risk  Status: Ongoing  Effective Dates: 2/22/2024 - present  Responsible Staff: Yaneli Machuca RN        Reason for Visit   Patient presents with    Nursing Assessment    OPCM Enrollment Call       Brief Summary:  Jaki Bajwa was referred by Dr. Benito Long for Pulmonary Nodule < 6 mm. Patient qualifies for program based on risk score of 89.3%.   Active problem list, medical, surgical and social history reviewed. Active comorbidities include Lupus, Anxiety, HTN, CKD3b, AMERICA. Areas of need identified by patient include CKD 3b.     CM referred patient to Rachell Bolaños W for SDOH.    Next steps:   Follow up if received educational materials  Follow up on low sodium diet  Follow up on reading nutritional labels  Educate on S&S of CKD  Educate on self care      Disability Status  Is the patient alert and oriented (person, place, time, and situation)?: Alert and oriented x 4  Hearing Difficulty or Deaf: no  Visual Difficulty or Blind: no  Visual and Hearing Needs Conclusion: No difficulty with hearing or vision except for dry eyes uses restasis  Difficulty Concentrating, Remembering or Making Decisions: yes  Concentration Management: Writes down things to remember  Communication Difficulty: no  Eating/Swallowing Difficulty: no  Walking or Climbing Stairs Difficulty: no  Dressing/Bathing Difficulty: no  Toileting : Independent  Continence : Continence - Not a problem  Difficulty Managing Errands Independently: yes  Errands Management: depends on others for rides, boyfriend  Equipment Currently Used at Home: none  ADL Conclusion Statement: Very independent person  Change in Functional Status Since Onset of Current Illness/Injury: no        Spiritual Beliefs  Spiritual,  Cultural Beliefs, Confucianism Practices, Values that Affect Care: no      Social History     Socioeconomic History    Marital status: Single    Number of children: 2   Occupational History     Comment: [employer]   Tobacco Use    Smoking status: Never    Smokeless tobacco: Never   Substance and Sexual Activity    Alcohol use: Not Currently     Alcohol/week: 1.0 standard drink of alcohol     Types: 1 Glasses of wine per week     Comment: rarely     Drug use: Not Currently     Types: Barbituates, Hydrocodone     Comment: rx hydrocodone    Sexual activity: Not Currently     Partners: Male     Birth control/protection: Abstinence, Condom, Patch   Other Topics Concern    Patient feels they ought to cut down on drinking/drug use No    Patient annoyed by others criticizing their drinking/drug use No    Patient has felt bad or guilty about drinking/drug use No    Patient has had a drink/used drugs as an eye opener in the AM No     Social Determinants of Health     Financial Resource Strain: Low Risk  (2/13/2024)    Overall Financial Resource Strain (CARDIA)     Difficulty of Paying Living Expenses: Not hard at all   Food Insecurity: No Food Insecurity (2/13/2024)    Hunger Vital Sign     Worried About Running Out of Food in the Last Year: Never true     Ran Out of Food in the Last Year: Never true   Transportation Needs: No Transportation Needs (2/13/2024)    PRAPARE - Transportation     Lack of Transportation (Medical): No     Lack of Transportation (Non-Medical): No   Physical Activity: Inactive (2/13/2024)    Exercise Vital Sign     Days of Exercise per Week: 0 days     Minutes of Exercise per Session: 0 min   Stress: No Stress Concern Present (2/13/2024)    [employer] - Occupational Stress Questionnaire     Feeling of Stress : Not at all   Social Connections: Socially Isolated (2/13/2024)    Social Connection and Isolation Panel [NHANES]     Frequency of Communication with Friends and Family: More  than three times a week     Frequency of Social Gatherings with Friends and Family: Twice a week     Attends Confucianism Services: Never     Active Member of Clubs or Organizations: No     Attends Club or Organization Meetings: Never     Marital Status:    Housing Stability: Low Risk  (2/13/2024)    Housing Stability Vital Sign     Unable to Pay for Housing in the Last Year: No     Number of Places Lived in the Last Year: 1     Unstable Housing in the Last Year: No       Roles and Relationships  Primary Source of Support/Comfort: child(monserrat)  Name of Support/Comfort Primary Source: Halina  Primary Roles/Responsibilities: disabled  Secondary Source of Support/Comfort: significant other  Name of Support/Comfort Secondary Source: Scott      Advance Directives (For Healthcare)  Advance Directive  (If Adv Dir status is received, view document under Adv Dir in header or Chart Review Media tab): Patient does not have Advance Directive, requests information.  Patient Requests Assistance: Handouts provided        Patient Reported Insurance  Verified current insurance plan:: [employer]vantage; Medicaid  [employer] benefits discussed:: [facility]; Mail Order Pharmacy            2/22/2024     8:11 AM 2/2/2024     8:37 AM 11/21/2023     8:29 AM 10/25/2023     9:27 AM 7/25/2023     9:21 AM 6/13/2023     9:02 AM 5/24/2023    10:05 AM   Depression Patient Health Questionnaire   Over the last two weeks how often have you been bothered by little interest or pleasure in doing things Not at all Not at all Not at all Not at all Not at all Not at all Not at all   Over the last two weeks how often have you been bothered by feeling down, depressed or hopeless Not at all Not at all Not at all Not at all Not at all Not at all Not at all   PHQ-2 Total Score 0 0 0 0 0 0 0       Learning Assessment       02/22/2024 0918 [facility] (2/22/2024 - Present)   Created by Yaneli Machuca RN -  (Nurse) Status:  Complete                 PRIMARY LEARNER     Primary Learner Name:  Jaki Bajwa DB - 02/22/2024 0918    Relationship:  Patient DB - 02/22/2024 0918    Does the primary learner have any barriers to learning?:  No Barriers DB - 02/22/2024 0918    What is the preferred language of the primary learner?:  English DB - 02/22/2024 0918    Is an  required?:  No DB - 02/22/2024 0918    How does the primary learner prefer to learn new concepts?:  Listening, Reading DB - 02/22/2024 0918    How often do you need to have someone help you read instructions, pamphlets, or written material from your doctor or pharmacy?:  Rarely DB - 02/22/2024 0918        CO-LEARNER #1     No question answered        CO-LEARNER #2     No question answered        SPECIAL TOPICS     No question answered        ANSWERED BY:     No question answered        Edit History       Yaneli Machuca, RN -  (Nurse)   02/22/2024 0918

## 2024-02-22 NOTE — PROGRESS NOTES
"Subjective:      Jaki Bajwa is a 53 y.o. female who returns today regarding her recent UTI.     Recurrent UTI workup in 2017- MAY showed no stones and cysto showed bladder sediment suggestive of incomplete bladder emptying.      Previously on macrodantin prophylaxis.     Treated in the ED for her most recent infection. Remains on Augmentin.     CTRSS-"bilateral kidneys are normal in size, shape and location. No radiodense calculus seen within the urinary tract. No hydronephrosis or significant perinephric stranding. Ureters are normal in course and caliber. Urinary bladder is within normal limits. Uterus not identified and likely surgically absent versus atrophic. No adnexal mass or significant volume free pelvic fluid. Pelvic phleboliths noted."     Urine Culture   2/12/2024 ESCHERICHIA COLI !     Multiple organisms isolated. None in predominance. Repeat if     clinically necessary.    2/13/2024 No growth       Using estrace PRN.     The following portions of the patient's history were reviewed and updated as appropriate: allergies, current medications, past family history, past medical history, past social history, past surgical history and problem list.    Review of Systems  Constitutional: no fever or chills  ENT: no nasal congestion or sore throat  Respiratory: no cough or shortness of breath  Cardiovascular: no chest pain or palpitations  Gastrointestinal: no nausea or vomiting, tolerating diet  Genitourinary: as per HPI  Hematologic/Lymphatic: no easy bruising or lymphadenopathy  Musculoskeletal: no arthralgias or myalgias  Neurological: no seizures or tremors  Behavioral/Psych: no auditory or visual hallucinations     Objective:   Vital Signs:/88 (BP Location: Left arm, Patient Position: Sitting, BP Method: Medium (Automatic))   Pulse 97   Resp 12   Ht 5' 7" (1.702 m)   Wt 103.9 kg (229 lb)   LMP 07/11/2012 Comment: partial   SpO2 100%   BMI 35.87 kg/m²     Physical Exam   General: " alert and oriented, no acute distress  Head: normocephalic, atraumatic  Neck: supple, normal ROM  Respiratory: Symmetric expansion, non-labored breathing  Cardiovascular: regular rate and rhythm  Abdomen: soft, non tender, non distended  Pelvic: deferred   Skin: normal coloration and turgor, no rashes, no suspicious skin lesions noted  Neuro: alert and oriented x3, no gross deficits  Psych: normal judgment and insight, normal mood/affect, and non-anxious    Lab Review   Urinalysis demonstrates negative for all components  Lab Results   Component Value Date    WBC 6.02 02/14/2024    HGB 10.3 (L) 02/14/2024    HCT 36.2 (L) 02/14/2024    HCT 39 05/31/2023    MCV 84 02/14/2024     02/14/2024     Lab Results   Component Value Date    CREATININE 1.3 02/14/2024    BUN 19 02/14/2024       Imaging   None    Assessment:     1. Urinary tract infection without hematuria, site unspecified      Plan:   Diagnoses and all orders for this visit:    Urinary tract infection without hematuria, site unspecified  -     Ambulatory referral/consult to Urology  -     Urine culture  -     cephALEXin (KEFLEX) 250 MG capsule; Take 1 capsule (250 mg total) by mouth daily as needed (recurrent uti).  -     Urine culture; Standing    Plan:  --Complete augmentin as prescribed  --Resume keflex prophylaxis  --Standing order for urine cultures if symptomatic in the future  --increase water intake!  --estrace twice weekly

## 2024-02-26 DIAGNOSIS — B37.31 VAGINAL YEAST INFECTION: Primary | ICD-10-CM

## 2024-02-26 RX ORDER — FLUCONAZOLE 150 MG/1
150 TABLET ORAL
Qty: 2 TABLET | Refills: 0 | Status: SHIPPED | OUTPATIENT
Start: 2024-02-26 | End: 2024-03-06 | Stop reason: SDUPTHER

## 2024-02-27 DIAGNOSIS — F40.10 SOCIAL ANXIETY DISORDER: ICD-10-CM

## 2024-02-27 DIAGNOSIS — F41.1 GAD (GENERALIZED ANXIETY DISORDER): ICD-10-CM

## 2024-02-27 RX ORDER — DIAZEPAM 2 MG/1
2 TABLET ORAL DAILY PRN
Qty: 15 TABLET | Refills: 0 | Status: SHIPPED | OUTPATIENT
Start: 2024-02-27 | End: 2024-05-27 | Stop reason: SDUPTHER

## 2024-02-29 ENCOUNTER — OUTPATIENT CASE MANAGEMENT (OUTPATIENT)
Dept: ADMINISTRATIVE | Facility: OTHER | Age: 54
End: 2024-02-29
Payer: MEDICARE

## 2024-02-29 ENCOUNTER — PATIENT OUTREACH (OUTPATIENT)
Dept: ADMINISTRATIVE | Facility: OTHER | Age: 54
End: 2024-02-29
Payer: MEDICARE

## 2024-02-29 NOTE — PROGRESS NOTES
This Community Health Worker (CHW) verified the Social Determinant of Health (SDOH)  Questionnaire was accurate with patient/caregiver via telephone today.  Notified OPCM KIANA MOORE RN, of verification.      Patient denied any SDOH needs at this time.

## 2024-02-29 NOTE — PROGRESS NOTES
"Outpatient Care Management  Plan of Care Follow Up Visit    Patient: Jaki Bajwa  MRN: [id number]  Date of Service: 02/29/2024  Completed by: Yaneli Machuca RN  Referral Date: 02/14/2024    Reason for Visit   Patient presents with    OPCM RN Follow Up Call       Brief Summary: Patient states that she is following a low sodium diet.  She states she is not reading food labels for sodium contents though.  She states she doesn't have a scale therefore she does not weigh herself.  Her last weight was 229 lbs on 2/22.  She states she is drinking plenty of water, two arnaud cups a day that is 32 ozs in size.  Patient states whenever she puts on jeans or elastic over her stomach like jogging pants, she has abdominal pain that is a 7 of 10.  She states it gets so bad that it travels down and it feels like "my vagina is about to fall out".  She states she's not sure if the UTI is gone yet but it is a soreness that is really bad.  She states she has not pain without the pressure on her abdomen.  She takes her pain medication, Norco 5/325, when it gets really bad.  She states she walks around the neighborhood once every couple of weeks if she is not feeling bad.  She states she does not smoke and drinks only socially on special occasions.  She states the signs and symptoms of CKD are frequently going to the bathroom and abdominal and back pain.    CM reviewed how to find the serving size and the sodium content on a nutritional label.  CM reviewed with patient that a low sodium diet is 1303-6240 mg per day of sodium.  CM also encouraged patient to get a scale and weigh herself on a regular basis.  CM informed her it is important to know if she is retaining fluid, especially in her abdomen since the abdomen can hide a lot of fluid before she realizes it.  CM encouraged patient to continue to drink her water and to stay well hydrated for her kidneys.  CM will send a note to Amalia Phillips NP about the abdominal soreness " that the patient is having with pressure on her abdomin and the vaginal pain.  CM reviewed the self care including weight management, blood pressure management, not drinking, to exercise more frequently, to follow her low sodium diet and to take all her medications as prescribed.  CM reviewed the signs and symptoms of CKD including anorexia; fatigue; weakness; difficulty sleeping; confusion; frequent urination, especially at night; muscle cramps at night; edema periorbital edema, especially in the morning; nausea and vomiting; itchy skin (pruritus); weight gain or decreased urine output.    SDOH completed by Rachell ADAMSON and reviewed by this CM.  No needs noted at this time.    Next steps:   Follow up if received educational materials  Follow up if following a low sodium diet  Follow up if reading nutritional labels  Follow up if got scale for weighing herself  Educate on S&S of CKD  Educate on self care

## 2024-03-06 ENCOUNTER — PATIENT MESSAGE (OUTPATIENT)
Dept: INTERNAL MEDICINE | Facility: CLINIC | Age: 54
End: 2024-03-06

## 2024-03-06 ENCOUNTER — OFFICE VISIT (OUTPATIENT)
Dept: INTERNAL MEDICINE | Facility: CLINIC | Age: 54
End: 2024-03-06
Payer: MEDICARE

## 2024-03-06 ENCOUNTER — PATIENT MESSAGE (OUTPATIENT)
Dept: INTERNAL MEDICINE | Facility: CLINIC | Age: 54
End: 2024-03-06
Payer: MEDICARE

## 2024-03-06 VITALS — SYSTOLIC BLOOD PRESSURE: 123 MMHG | DIASTOLIC BLOOD PRESSURE: 88 MMHG

## 2024-03-06 DIAGNOSIS — B37.31 VAGINAL YEAST INFECTION: ICD-10-CM

## 2024-03-06 DIAGNOSIS — G44.009 CLUSTER HEADACHE, NOT INTRACTABLE, UNSPECIFIED CHRONICITY PATTERN: ICD-10-CM

## 2024-03-06 DIAGNOSIS — E66.01 SEVERE OBESITY (BMI 35.0-35.9 WITH COMORBIDITY): ICD-10-CM

## 2024-03-06 DIAGNOSIS — F11.20 CHRONIC NARCOTIC DEPENDENCE: ICD-10-CM

## 2024-03-06 DIAGNOSIS — G89.4 CHRONIC PAIN SYNDROME: ICD-10-CM

## 2024-03-06 DIAGNOSIS — F31.9 BIPOLAR AFFECTIVE DISORDER, REMISSION STATUS UNSPECIFIED: ICD-10-CM

## 2024-03-06 PROCEDURE — 99214 OFFICE O/P EST MOD 30 MIN: CPT | Mod: HCNC,95,, | Performed by: FAMILY MEDICINE

## 2024-03-06 PROCEDURE — 3074F SYST BP LT 130 MM HG: CPT | Mod: HCNC,CPTII,95, | Performed by: FAMILY MEDICINE

## 2024-03-06 PROCEDURE — 3044F HG A1C LEVEL LT 7.0%: CPT | Mod: HCNC,CPTII,95, | Performed by: FAMILY MEDICINE

## 2024-03-06 PROCEDURE — 1111F DSCHRG MED/CURRENT MED MERGE: CPT | Mod: HCNC,CPTII,95, | Performed by: FAMILY MEDICINE

## 2024-03-06 PROCEDURE — 4010F ACE/ARB THERAPY RXD/TAKEN: CPT | Mod: HCNC,CPTII,95, | Performed by: FAMILY MEDICINE

## 2024-03-06 PROCEDURE — 3079F DIAST BP 80-89 MM HG: CPT | Mod: HCNC,CPTII,95, | Performed by: FAMILY MEDICINE

## 2024-03-06 PROCEDURE — 1159F MED LIST DOCD IN RCRD: CPT | Mod: HCNC,CPTII,95, | Performed by: FAMILY MEDICINE

## 2024-03-06 RX ORDER — HYDROCODONE BITARTRATE AND ACETAMINOPHEN 5; 325 MG/1; MG/1
1 TABLET ORAL
Qty: 30 TABLET | Refills: 0 | Status: SHIPPED | OUTPATIENT
Start: 2024-03-06 | End: 2024-04-09 | Stop reason: SDUPTHER

## 2024-03-06 RX ORDER — FLUCONAZOLE 150 MG/1
150 TABLET ORAL
Qty: 2 TABLET | Refills: 0 | Status: SHIPPED | OUTPATIENT
Start: 2024-03-06 | End: 2024-04-24

## 2024-03-06 RX ORDER — BUTALBITAL, ACETAMINOPHEN AND CAFFEINE 50; 325; 40 MG/1; MG/1; MG/1
1 TABLET ORAL EVERY 6 HOURS PRN
Qty: 40 TABLET | Refills: 2 | Status: SHIPPED | OUTPATIENT
Start: 2024-03-06

## 2024-03-06 NOTE — PROGRESS NOTES
Patient ID: Jaki Bajwa is a 53 y.o. female.    Chief Complaint: Follow-up (3m)    History of Present Illness  The patient is a 53-year-old female here on virtual visit for follow-up.    She needs refill on her pain medication and headache medication. She is doing fine with those medications.    She was in the hospital about 2 weeks ago for 3 days for UTI. Her UTI has cleared up. She took a test for Dr. Amalia Trevizo and it was negative.    She had a yeast infection. She has a rash in her navel. It is red. She was given Diflucan 1 pill once. The vaginal area is gone.    Physical Exam      Results  Laboratory Studies  Labs were reviewed with the patient.    Assessment & Plan  1. Medication refill.  I will refill her pain medication and headache medication.    2. Yeast infection.  Her urine culture showed yeast. I will prescribe Diflucan. If Diflucan does not clear it up in the next couple of days, we will make sure it is nothing else.    Follow-up  Once I review everything from the hospital, we will let her know.  Answers submitted by the patient for this visit:  Back Pain Questionnaire (Submitted on 3/6/2024)  Chief Complaint: Back pain  Chronicity: chronic  Onset: in the past 7 days  Frequency: daily  Progression since onset: waxing and waning  Pain location: sacro-iliac, costovertebral angle  Pain quality: burning, shooting  Radiates to: right knee  Pain - numeric: 5/10  Pain is: the same all the time  Aggravated by: standing  Stiffness is present: in the morning  abdominal pain: No  bladder incontinence: No  bowel incontinence: No  chest pain: No  dysuria: No  fever: No  headaches: Yes  leg pain: No  numbness: No  paresis: No  paresthesias: No  pelvic pain: No  perianal numbness: No  tingling: No  weakness: No  weight loss: No  genital pain: No  hematuria: No  Pain severity: moderate  Improvement on treatment: significant      No follow-ups on file.    This note was generated with the assistance of ambient  listening technology. Verbal consent was obtained by the patient and accompanying visitor(s) for the recording of patient appointment to facilitate this note. I attest to having reviewed and edited the generated note for accuracy, though some syntax or spelling errors may persist. Please contact the author of this note for any clarification.

## 2024-03-07 ENCOUNTER — OFFICE VISIT (OUTPATIENT)
Dept: PSYCHIATRY | Facility: CLINIC | Age: 54
End: 2024-03-07
Payer: MEDICARE

## 2024-03-07 DIAGNOSIS — F40.10 SOCIAL ANXIETY DISORDER: ICD-10-CM

## 2024-03-07 DIAGNOSIS — F41.1 GAD (GENERALIZED ANXIETY DISORDER): ICD-10-CM

## 2024-03-07 DIAGNOSIS — F41.0 PANIC DISORDER: Primary | ICD-10-CM

## 2024-03-07 DIAGNOSIS — F39 MOOD DISORDER: ICD-10-CM

## 2024-03-07 PROCEDURE — 4010F ACE/ARB THERAPY RXD/TAKEN: CPT | Mod: HCNC,CPTII,95, | Performed by: SOCIAL WORKER

## 2024-03-07 PROCEDURE — 90834 PSYTX W PT 45 MINUTES: CPT | Mod: HCNC,95,, | Performed by: SOCIAL WORKER

## 2024-03-07 PROCEDURE — 3044F HG A1C LEVEL LT 7.0%: CPT | Mod: HCNC,CPTII,95, | Performed by: SOCIAL WORKER
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
## 2024-03-15 ENCOUNTER — OUTPATIENT CASE MANAGEMENT (OUTPATIENT)
Dept: ADMINISTRATIVE | Facility: OTHER | Age: 54
End: 2024-03-15
Payer: MEDICARE

## 2024-03-15 NOTE — LETTER
Jaki Bajwa  [address] [facility] [address]      Dear Jaki Bajwa,     I am your nurse with [facility]s Outpatient Care Management Department. I have been unsuccessful at reaching you since we spoke on 2/29.  At your earliest convenience, I would like to discuss your healthcare progress.      Please contact me at [phone] from 8:00AM to 4:30 PM on Monday thru Friday.     As you know, [facility] is a program offered to you through [facility] where a nurse is available 24/7 to answer questions or provide medical advice, their number is [phone].    Thanks,    Benita Machuca RN  Outpatient Care Management

## 2024-03-21 ENCOUNTER — OUTPATIENT CASE MANAGEMENT (OUTPATIENT)
Dept: ADMINISTRATIVE | Facility: OTHER | Age: 54
End: 2024-03-21
Payer: MEDICARE

## 2024-03-21 NOTE — PROGRESS NOTES
Outpatient Care Management  Plan of Care Follow Up Visit    Patient: Jaki Bajwa  MRN: [id number]  Date of Service: 03/21/2024  Completed by: Yaneli Machuca RN  Referral Date: 02/14/2024    Reason for Visit   Patient presents with    OPCM RN Follow Up Call       Brief Summary: Patient states she had oral surgery last week because she had a painful growth on her gums.  She states she is going to get her stiches out today.  She has only been eating low sodium chicken and beef broth, mashed potatoes, smoothies and yogurt.  She has not been weighing herself because she has not purchased a scale yet.  She is still drinking two 32 oz arnaud cups of water a day.  She also drinks lemonade and cranberry juice.  Patient states she got the educational materials and has started going through them.  Patient states the only sign or symptom of CKD she remembers is frequent urination.  She states she is not active but she also doesn't smoke and only drinks socially.  She states she does not have a lot of stress in her life and doesn't check her blood pressure like she should.     CM encouraged patient to drink supplement shakes of ensure or boost type shakes or smoothies with protein to get more nutrition until the oral surgeon releases her to eat a normal diet.  CM encouraged patient to purchase a scale and to start weighing herself to know if she is gaining weight from fluid retention.  CM also commended patient on drinking plenty of water and fluids to help flush her kidneys.  CM reiterated the signs and symptoms of CKD including anorexia; fatigue; weakness; difficulty sleeping; confusion; frequent urination, especially at night; muscle cramps at night; edema; periorbital edema, especially in the morning; nausea and vomiting; itchy skin (pruritus); weight gain or decreased urine output.  CM commended patient for not smoking and only drinking socially.  CM also informed patient that she should be taking her blood  pressure at least several times a day so she knows if it is high because high blood pressure can worsen her kidney function.  CM reviewed pursed lip breathing in detail in case she is ever having stress in her life or in pain.    Next steps:   Follow up if following a low sodium diet/fluid intake  Follow up if reading nutritional labels  Follow up if got scale for weighing herself  Follow up on S&S of CKD  Follow up on taking blood pressures several times a week  Follow up if drinking supplements or smoothies  Follow up on activity  Follow up on pursed lip breathing

## 2024-04-02 RX ORDER — TIZANIDINE 4 MG/1
8 TABLET ORAL 3 TIMES DAILY
Qty: 90 TABLET | Refills: 1 | Status: SHIPPED | OUTPATIENT
Start: 2024-04-02 | End: 2024-04-11

## 2024-04-03 ENCOUNTER — OFFICE VISIT (OUTPATIENT)
Dept: INTERNAL MEDICINE | Facility: CLINIC | Age: 54
End: 2024-04-03
Payer: MEDICARE

## 2024-04-03 ENCOUNTER — LAB VISIT (OUTPATIENT)
Dept: LAB | Facility: OTHER | Age: 54
End: 2024-04-03
Attending: FAMILY MEDICINE
Payer: MEDICARE

## 2024-04-03 VITALS
HEART RATE: 98 BPM | HEIGHT: 67 IN | DIASTOLIC BLOOD PRESSURE: 80 MMHG | WEIGHT: 248.88 LBS | SYSTOLIC BLOOD PRESSURE: 122 MMHG | OXYGEN SATURATION: 99 % | BODY MASS INDEX: 39.06 KG/M2

## 2024-04-03 DIAGNOSIS — R30.0 DYSURIA: ICD-10-CM

## 2024-04-03 DIAGNOSIS — M54.9 BACK PAIN, UNSPECIFIED BACK LOCATION, UNSPECIFIED BACK PAIN LATERALITY, UNSPECIFIED CHRONICITY: ICD-10-CM

## 2024-04-03 DIAGNOSIS — N39.0 URINARY TRACT INFECTION WITHOUT HEMATURIA, SITE UNSPECIFIED: ICD-10-CM

## 2024-04-03 DIAGNOSIS — B37.2 CANDIDAL SKIN INFECTION: Primary | ICD-10-CM

## 2024-04-03 LAB
ALBUMIN SERPL BCP-MCNC: 4 G/DL (ref 3.5–5.2)
ALP SERPL-CCNC: 74 U/L (ref 55–135)
ALT SERPL W/O P-5'-P-CCNC: 16 U/L (ref 10–44)
ANION GAP SERPL CALC-SCNC: 7 MMOL/L (ref 8–16)
AST SERPL-CCNC: 17 U/L (ref 10–40)
BASOPHILS # BLD AUTO: 0.06 K/UL (ref 0–0.2)
BASOPHILS NFR BLD: 0.9 % (ref 0–1.9)
BILIRUB SERPL-MCNC: 0.2 MG/DL (ref 0.1–1)
BILIRUB SERPL-MCNC: ABNORMAL MG/DL
BILIRUB UR QL STRIP: NEGATIVE
BLOOD URINE, POC: ABNORMAL
BUN SERPL-MCNC: 16 MG/DL (ref 6–20)
CALCIUM SERPL-MCNC: 9.7 MG/DL (ref 8.7–10.5)
CHLORIDE SERPL-SCNC: 106 MMOL/L (ref 95–110)
CLARITY UR REFRACT.AUTO: ABNORMAL
CLARITY, POC UA: CLEAR
CO2 SERPL-SCNC: 28 MMOL/L (ref 23–29)
COLOR UR AUTO: YELLOW
COLOR, POC UA: ABNORMAL
CREAT SERPL-MCNC: 1.2 MG/DL (ref 0.5–1.4)
DIFFERENTIAL METHOD BLD: ABNORMAL
EOSINOPHIL # BLD AUTO: 0.2 K/UL (ref 0–0.5)
EOSINOPHIL NFR BLD: 3.6 % (ref 0–8)
ERYTHROCYTE [DISTWIDTH] IN BLOOD BY AUTOMATED COUNT: 14.8 % (ref 11.5–14.5)
EST. GFR  (NO RACE VARIABLE): 53 ML/MIN/1.73 M^2
GLUCOSE SERPL-MCNC: 91 MG/DL (ref 70–110)
GLUCOSE UR QL STRIP: NEGATIVE
GLUCOSE UR QL STRIP: NORMAL
HCT VFR BLD AUTO: 35.6 % (ref 37–48.5)
HGB BLD-MCNC: 10.4 G/DL (ref 12–16)
HGB UR QL STRIP: NEGATIVE
IMM GRANULOCYTES # BLD AUTO: 0.01 K/UL (ref 0–0.04)
IMM GRANULOCYTES NFR BLD AUTO: 0.2 % (ref 0–0.5)
KETONES UR QL STRIP: ABNORMAL
KETONES UR QL STRIP: NEGATIVE
LEUKOCYTE ESTERASE UR QL STRIP: NEGATIVE
LEUKOCYTE ESTERASE URINE, POC: ABNORMAL
LYMPHOCYTES # BLD AUTO: 2.8 K/UL (ref 1–4.8)
LYMPHOCYTES NFR BLD: 44.2 % (ref 18–48)
MCH RBC QN AUTO: 24 PG (ref 27–31)
MCHC RBC AUTO-ENTMCNC: 29.2 G/DL (ref 32–36)
MCV RBC AUTO: 82 FL (ref 82–98)
MONOCYTES # BLD AUTO: 0.5 K/UL (ref 0.3–1)
MONOCYTES NFR BLD: 7.3 % (ref 4–15)
NEUTROPHILS # BLD AUTO: 2.8 K/UL (ref 1.8–7.7)
NEUTROPHILS NFR BLD: 43.8 % (ref 38–73)
NITRITE UR QL STRIP: NEGATIVE
NITRITE, POC UA: ABNORMAL
NRBC BLD-RTO: 0 /100 WBC
PH UR STRIP: 6 [PH] (ref 5–8)
PH, POC UA: 5
PLATELET # BLD AUTO: 217 K/UL (ref 150–450)
PMV BLD AUTO: 11.8 FL (ref 9.2–12.9)
POTASSIUM SERPL-SCNC: 4.4 MMOL/L (ref 3.5–5.1)
PROT SERPL-MCNC: 7.9 G/DL (ref 6–8.4)
PROT UR QL STRIP: NEGATIVE
PROTEIN, POC: ABNORMAL
RBC # BLD AUTO: 4.33 M/UL (ref 4–5.4)
SODIUM SERPL-SCNC: 141 MMOL/L (ref 136–145)
SP GR UR STRIP: 1.02 (ref 1–1.03)
SPECIFIC GRAVITY, POC UA: 1.02
URN SPEC COLLECT METH UR: ABNORMAL
UROBILINOGEN, POC UA: NORMAL
WBC # BLD AUTO: 6.42 K/UL (ref 3.9–12.7)

## 2024-04-03 PROCEDURE — 4010F ACE/ARB THERAPY RXD/TAKEN: CPT | Mod: HCNC,CPTII,S$GLB,

## 2024-04-03 PROCEDURE — 99213 OFFICE O/P EST LOW 20 MIN: CPT | Mod: HCNC,S$GLB,,

## 2024-04-03 PROCEDURE — 3044F HG A1C LEVEL LT 7.0%: CPT | Mod: HCNC,CPTII,S$GLB,

## 2024-04-03 PROCEDURE — 81002 URINALYSIS NONAUTO W/O SCOPE: CPT | Mod: HCNC,S$GLB,,

## 2024-04-03 PROCEDURE — 99999 PR PBB SHADOW E&M-EST. PATIENT-LVL IV: CPT | Mod: PBBFAC,HCNC,,

## 2024-04-03 PROCEDURE — 81003 URINALYSIS AUTO W/O SCOPE: CPT | Mod: HCNC

## 2024-04-03 PROCEDURE — 3008F BODY MASS INDEX DOCD: CPT | Mod: HCNC,CPTII,S$GLB,

## 2024-04-03 PROCEDURE — 36415 COLL VENOUS BLD VENIPUNCTURE: CPT | Mod: HCNC

## 2024-04-03 PROCEDURE — 80053 COMPREHEN METABOLIC PANEL: CPT | Mod: HCNC

## 2024-04-03 PROCEDURE — 3079F DIAST BP 80-89 MM HG: CPT | Mod: HCNC,CPTII,S$GLB,

## 2024-04-03 PROCEDURE — 3074F SYST BP LT 130 MM HG: CPT | Mod: HCNC,CPTII,S$GLB,

## 2024-04-03 PROCEDURE — 85025 COMPLETE CBC W/AUTO DIFF WBC: CPT | Mod: HCNC

## 2024-04-03 RX ORDER — CHLORHEXIDINE GLUCONATE ORAL RINSE 1.2 MG/ML
15 SOLUTION DENTAL 2 TIMES DAILY
COMMUNITY
Start: 2023-11-20 | End: 2024-04-24

## 2024-04-03 RX ORDER — NYSTATIN 100000 U/G
OINTMENT TOPICAL 2 TIMES DAILY
Qty: 15 G | Refills: 1 | Status: SHIPPED | OUTPATIENT
Start: 2024-04-03 | End: 2024-06-19

## 2024-04-03 RX ORDER — NITROFURANTOIN 25; 75 MG/1; MG/1
100 CAPSULE ORAL 2 TIMES DAILY
Qty: 14 CAPSULE | Refills: 0 | Status: SHIPPED | OUTPATIENT
Start: 2024-04-03 | End: 2024-04-24

## 2024-04-03 RX ORDER — HYDROCODONE BITARTRATE AND ACETAMINOPHEN 5; 325 MG/1; MG/1
1 TABLET ORAL
Qty: 5 TABLET | Refills: 0 | Status: SHIPPED | OUTPATIENT
Start: 2024-04-03 | End: 2024-04-09

## 2024-04-03 RX ORDER — IRON SUCROSE 20 MG/ML
INJECTION, SOLUTION INTRAVENOUS
COMMUNITY
Start: 2024-01-19

## 2024-04-03 NOTE — PROGRESS NOTES
"    CHIEF COMPLAINT     Chief Complaint   Patient presents with    Rash       HPI     Jaki Bajwa is a 53 y.o. female who presents for multiple issues today.    PCP is Jose Carpenter DO, patient is new to me. Reports symptoms started one week ago. It has resolved but is still having itching. Rash is under her breasts. Was red and raw, but is not healed over but still red and irritated.    Reports increased urination with "thick" urine and irritation. Symptoms started one week ago. Pt was hospitalized with UTI 2 months ago and reports these symptoms are similar. Denies fever, but reports flank pain.    Pt also reports history fo chronic back pain. PCP is managing but is out of town. Will fill 5 days worth until her PCP returns.    Past Medical History:  Past Medical History:   Diagnosis Date    CANDELARIA (acute kidney injury) 4/23/2022    Anemia     Anxiety     Depression     History of psychiatric hospitalization 2000    [place] x 1 week for depression    History of ventral hernia repair     Hypertension     Lupus     patient reports that she is being treated "like I have Lupus"    Mental disorder     Morbid obesity 12/15/2017    Psychiatric problem     Sjogren's syndrome 2013    Therapy     TMJ (temporomandibular joint disorder)        Home Medications:  Prior to Admission medications    Medication Sig Start Date End Date Taking? Authorizing Provider   amLODIPine (NORVASC) 5 MG tablet Take 1 tablet (5 mg total) by mouth once daily. 8/16/23 8/15/24 Yes Jose Carpenter DO   buPROPion (WELLBUTRIN XL) 150 MG TB24 tablet TAKE 3 TABLETS(450 MG) BY MOUTH EVERY MORNING 11/8/23  Yes Najma Doyle MD   busPIRone (BUSPAR) 15 MG tablet TAKE 1 TABLET(15 MG) BY MOUTH THREE TIMES DAILY 11/8/23  Yes Najma Doyle MD   butalbital-acetaminophen-caffeine -40 mg (FIORICET, ESGIC) -40 mg per tablet Take 1 tablet by mouth every 6 (six) hours as needed for Headaches. 3/6/24  Yes Jose Carpenter DO "   chlorhexidine (PERIDEX) 0.12 % solution 15 mLs 2 (two) times daily. 11/20/23  Yes Provider, Historical   cycloSPORINE (RESTASIS) 0.05 % ophthalmic emulsion INSTILL 1 DROP IN BOTH EYES TWICE DAILY 2/21/24  Yes Branden Cameron MD   diazePAM (VALIUM) 2 MG tablet Take 1 tablet (2 mg total) by mouth daily as needed (Severe anxiety/Panic). 15 tablets to last 3 months 2/27/24 4/3/24 Yes Najma Doyle MD   doxepin (SINEQUAN) 75 MG capsule TAKE 2 CAPSULES(150 MG) BY MOUTH EVERY EVENING 9/26/23  Yes Najma Doyle MD   fluticasone propionate (FLONASE) 50 mcg/actuation nasal spray SHAKE LIQUID AND USE 1 SPRAY(50 MCG) IN EACH NOSTRIL EVERY DAY  Patient taking differently: 1 spray by Each Nostril route as needed for Rhinitis or Allergies. 10/2/23  Yes WeeksJose,    gabapentin (NEURONTIN) 300 MG capsule TAKE 1 CAPSULE(300 MG) BY MOUTH THREE TIMES DAILY 8/20/23  Yes Branden Cameron MD   hydroCHLOROthiazide (HYDRODIURIL) 12.5 MG Tab Take 1 tablet (12.5 mg total) by mouth once daily. For blood pressure 7/25/23 7/24/24 Yes Jose Carpenter,    HYDROcodone-acetaminophen (NORCO) 5-325 mg per tablet Take 1 tablet by mouth every 24 hours as needed for Pain. Quantity medically necessary 3/6/24  Yes Weeks, Jose ALONSO,    hydroxychloroquine (PLAQUENIL) 200 mg tablet TAKE 1 TABLET BY MOUTH TWICE DAILY 10/29/23  Yes Branden Cameron MD   irbesartan (AVAPRO) 300 MG tablet Take 1 tablet (300 mg total) by mouth every evening. 8/25/23  Yes Jose Carpenter,    lurasidone (LATUDA) 60 mg Tab tablet TAKE 1 TABLET(60 MG) BY MOUTH EVERY DAY 6/21/23  Yes Najma Doyle MD   naproxen (NAPROSYN) 500 MG tablet Take 1 tablet (500 mg total) by mouth daily as needed (pain). 2/2/24  Yes Weeks, Jose ALONSO,    NARCAN 4 mg/actuation [place] SPRAY 1 SPRAY IN NOSTRIL ONCE FOR 1 DOSE 9/25/21  Yes Provider, Historical   omeprazole (PRILOSEC) 40 MG capsule Take 1 capsule (40 mg total) by mouth  "once daily. 5/1/23  Yes Pratik Rasmussen MD   pilocarpine (SALAGEN) 5 MG Tab Take 1 tablet (5 mg total) by mouth 4 (four) times daily. 3/10/23 4/3/24 Yes Branden Cameron MD   tiZANidine (ZANAFLEX) 4 MG tablet Take 2 tablets (8 mg total) by mouth 3 (three) times daily. 4/2/24  Yes Branden Cameron MD   VENOFER 100 mg iron/5 mL injection  1/19/24  Yes Provider, Historical   cephALEXin (KEFLEX) 250 MG capsule Take 1 capsule (250 mg total) by mouth daily as needed (recurrent uti).  Patient not taking: Reported on 4/3/2024 2/22/24 8/20/24  Amalia Phillips NP   docusate sodium (COLACE) 100 MG capsule Take 1 capsule (100 mg total) by mouth 2 (two) times daily as needed for Constipation.  Patient not taking: Reported on 4/3/2024 7/13/22   Pratik Rasmussen MD   fluconazole (DIFLUCAN) 150 MG Tab Take 1 tablet (150 mg total) by mouth every 72 hours as needed (vaginal yeast).  Patient not taking: Reported on 4/3/2024 3/6/24   Jose Carpenter DO   solifenacin (VESICARE) 5 MG tablet Take 1 tablet (5 mg total) by mouth once daily.  Patient not taking: Reported on 4/3/2024 7/27/23 7/26/24  Amalia Phillips NP       Review of Systems:  Review of Systems   Constitutional: Negative.    Respiratory: Negative.     Cardiovascular: Negative.    Genitourinary:  Positive for dysuria, frequency and urgency.   Skin:  Positive for rash.       Health Maintainence:   Immunizations:  Health Maintenance         Date Due Completion Date    Annual UACr Never done ---    Urine Drug Screen 07/25/2024 7/25/2023    Mammogram 02/12/2025 2/12/2024    Hemoglobin A1c (Prediabetes) 02/13/2025 2/13/2024    Colorectal Cancer Screening 01/28/2026 1/28/2021    TETANUS VACCINE 04/18/2027 4/18/2017    Lipid Panel 09/12/2027 9/12/2022             PHYSICAL EXAM     /80   Pulse 98   Ht 5' 7" (1.702 m)   Wt 112.9 kg (248 lb 14.4 oz)   LMP 07/11/2012 Comment: partial   SpO2 99%   BMI 38.98 kg/m²     Physical " Exam  Vitals reviewed.   Constitutional:       Appearance: She is well-developed.   HENT:      Head: Normocephalic and atraumatic.   Eyes:      Conjunctiva/sclera: Conjunctivae normal.   Cardiovascular:      Rate and Rhythm: Normal rate.   Pulmonary:      Effort: Pulmonary effort is normal. No respiratory distress.   Skin:     General: Skin is warm and dry.      Findings: Rash present.          Neurological:      Mental Status: She is alert and oriented to person, place, and time.      Coordination: Coordination normal.   Psychiatric:         Behavior: Behavior normal.         LABS     Lab Results   Component Value Date    HGBA1C 4.9 02/13/2024     CMP  Sodium   Date Value Ref Range Status   02/14/2024 138 136 - 145 mmol/L Final     Potassium   Date Value Ref Range Status   02/14/2024 4.3 3.5 - 5.1 mmol/L Final     Chloride   Date Value Ref Range Status   02/14/2024 102 95 - 110 mmol/L Final     CO2   Date Value Ref Range Status   02/14/2024 30 (H) 23 - 29 mmol/L Final     Glucose   Date Value Ref Range Status   02/14/2024 101 70 - 110 mg/dL Final     BUN   Date Value Ref Range Status   02/14/2024 19 6 - 20 mg/dL Final     Creatinine   Date Value Ref Range Status   02/14/2024 1.3 0.5 - 1.4 mg/dL Final     Calcium   Date Value Ref Range Status   02/14/2024 9.6 8.7 - 10.5 mg/dL Final     Total Protein   Date Value Ref Range Status   02/12/2024 8.0 6.0 - 8.4 g/dL Final     Albumin   Date Value Ref Range Status   02/12/2024 4.0 3.5 - 5.2 g/dL Final     Total Bilirubin   Date Value Ref Range Status   02/12/2024 0.2 0.1 - 1.0 mg/dL Final     Comment:     For infants and newborns, interpretation of results should be based  on gestational age, weight and in agreement with clinical  observations.    Premature Infant recommended reference ranges:  Up to 24 hours.............<8.0 mg/dL  Up to 48 hours............<12.0 mg/dL  3-5 days..................<15.0 mg/dL  6-29 days.................<15.0 mg/dL       Alkaline Phosphatase    Date Value Ref Range Status   02/12/2024 83 55 - 135 U/L Final     AST   Date Value Ref Range Status   02/12/2024 18 10 - 40 U/L Final     ALT   Date Value Ref Range Status   02/12/2024 13 10 - 44 U/L Final     Anion Gap   Date Value Ref Range Status   02/14/2024 6 (L) 8 - 16 mmol/L Final     eGFR if    Date Value Ref Range Status   06/15/2022 45.5 (A) >60 mL/min/1.73 m^2 Final     eGFR if non    Date Value Ref Range Status   06/15/2022 39.5 (A) >60 mL/min/1.73 m^2 Final     Comment:     Calculation used to obtain the estimated glomerular filtration  rate (eGFR) is the CKD-EPI equation.        Lab Results   Component Value Date    WBC 6.02 02/14/2024    HGB 10.3 (L) 02/14/2024    HCT 36.2 (L) 02/14/2024    MCV 84 02/14/2024     02/14/2024     Lab Results   Component Value Date    CHOL 156 09/12/2022    CHOL 141 09/22/2021    CHOL 129 09/03/2020     Lab Results   Component Value Date    HDL 61 09/12/2022    HDL 56 09/22/2021    HDL 60 09/03/2020     Lab Results   Component Value Date    LDLCALC 76.0 09/12/2022    LDLCALC 60.8 (L) 09/22/2021    LDLCALC 55.6 (L) 09/03/2020     Lab Results   Component Value Date    TRIG 95 09/12/2022    TRIG 121 09/22/2021    TRIG 67 09/03/2020     Lab Results   Component Value Date    CHOLHDL 39.1 09/12/2022    CHOLHDL 39.7 09/22/2021    CHOLHDL 46.5 09/03/2020     Lab Results   Component Value Date    TSH 2.563 09/22/2021       ASSESSMENT/PLAN   1. Candidal skin infection  -     nystatin (MYCOSTATIN) ointment; Apply topically 2 (two) times daily.  Dispense: 15 g; Refill: 1    2. Back pain, unspecified back location, unspecified back pain laterality, unspecified chronicity  -     HYDROcodone-acetaminophen (NORCO) 5-325 mg per tablet; Take 1 tablet by mouth every 24 hours as needed for Pain.  Dispense: 5 tablet; Refill: 0    3. Dysuria  -     POCT urine dipstick without microscope  -     CBC Auto Differential; Future; Expected date: 04/03/2024  -      Comprehensive Metabolic Panel; Future; Expected date: 04/03/2024             Andrea Barlow NP   Department of Internal Medicine - [facility]  1:27 PM

## 2024-04-09 ENCOUNTER — OFFICE VISIT (OUTPATIENT)
Dept: INTERNAL MEDICINE | Facility: CLINIC | Age: 54
End: 2024-04-09
Payer: MEDICARE

## 2024-04-09 VITALS — DIASTOLIC BLOOD PRESSURE: 80 MMHG | SYSTOLIC BLOOD PRESSURE: 122 MMHG

## 2024-04-09 DIAGNOSIS — Z78.0 MENOPAUSE: Primary | ICD-10-CM

## 2024-04-09 DIAGNOSIS — R12 HEARTBURN: ICD-10-CM

## 2024-04-09 DIAGNOSIS — G89.4 CHRONIC PAIN SYNDROME: ICD-10-CM

## 2024-04-09 DIAGNOSIS — F11.20 CHRONIC NARCOTIC DEPENDENCE: ICD-10-CM

## 2024-04-09 PROCEDURE — 1159F MED LIST DOCD IN RCRD: CPT | Mod: HCNC,CPTII,95, | Performed by: FAMILY MEDICINE

## 2024-04-09 PROCEDURE — 3074F SYST BP LT 130 MM HG: CPT | Mod: HCNC,CPTII,95, | Performed by: FAMILY MEDICINE

## 2024-04-09 PROCEDURE — 3079F DIAST BP 80-89 MM HG: CPT | Mod: HCNC,CPTII,95, | Performed by: FAMILY MEDICINE

## 2024-04-09 PROCEDURE — 99214 OFFICE O/P EST MOD 30 MIN: CPT | Mod: HCNC,95,, | Performed by: FAMILY MEDICINE

## 2024-04-09 PROCEDURE — 4010F ACE/ARB THERAPY RXD/TAKEN: CPT | Mod: HCNC,CPTII,95, | Performed by: FAMILY MEDICINE

## 2024-04-09 PROCEDURE — 3044F HG A1C LEVEL LT 7.0%: CPT | Mod: HCNC,CPTII,95, | Performed by: FAMILY MEDICINE

## 2024-04-09 RX ORDER — OMEPRAZOLE 40 MG/1
40 CAPSULE, DELAYED RELEASE ORAL DAILY
Qty: 90 CAPSULE | Refills: 3 | Status: SHIPPED | OUTPATIENT
Start: 2024-04-09

## 2024-04-09 RX ORDER — HYDROCODONE BITARTRATE AND ACETAMINOPHEN 5; 325 MG/1; MG/1
1 TABLET ORAL
Qty: 30 TABLET | Refills: 0 | Status: SHIPPED | OUTPATIENT
Start: 2024-04-09 | End: 2024-05-07 | Stop reason: SDUPTHER

## 2024-04-09 NOTE — PROGRESS NOTES
Subjective:       Patient ID: Jaki Bajwa is a 53 y.o. female.    The patient location is: [place]  The chief complaint leading to consultation is: Medication Refill    Visit type: audiovisual  Total time spent with patient: 10  Each patient to whom he or she provides medical services by telemedicine is:  (1) informed of the relationship between the physician and patient and the respective role of any other health care provider with respect to management of the patient; and (2) notified that he or she may decline to receive medical services by telemedicine and may withdraw from such care at any time.    Medication Refill  Associated symptoms include headaches. Pertinent negatives include no abdominal pain, chest pain, fever, numbness or weakness.   Back Pain  This is a recurrent problem. The current episode started 1 to 4 weeks ago. The problem occurs every several days. The problem has been waxing and waning since onset. The pain is present in the lumbar spine, thoracic spine and costovertebral angle. The quality of the pain is described as aching and burning. The pain radiates to the left knee and right thigh. The pain is at a severity of 5/10. The pain is moderate. The pain is Worse during the day. The symptoms are aggravated by position. Stiffness is present In the morning. Associated symptoms include headaches, leg pain and weight loss. Pertinent negatives include no abdominal pain, bladder incontinence, bowel incontinence, chest pain, dysuria, fever, numbness, paresis, paresthesias, pelvic pain, perianal numbness, tingling or weakness. The treatment provided moderate relief.     Still with urinary frequency but no other symptoms.   Reviewed labs: stable anemia and improving GFR    Family History   Problem Relation Age of Onset    Heart disease Mother     Hypertension Mother     Cancer Father         colon ca    Colon cancer Father     Depression Father     Schizophrenia Father     Anxiety disorder Father      Arthritis Father     Mental illness Father     Liver cancer Sister     Cancer Sister     Depression Sister     Anxiety disorder Sister     Heart attack Sister     COPD Sister     Cancer Sister         throat and colon    Depression Sister     Miscarriages / Stillbirths Sister     Pancreatic cancer Brother     Depression Brother     Alcohol abuse Brother     No Known Problems Daughter     Asthma Son     Pancreatic cancer Other     Liver cancer Other     Suicide Neg Hx     Ovarian cancer Neg Hx     Breast cancer Neg Hx        Current Outpatient Medications:     buPROPion (WELLBUTRIN XL) 150 MG TB24 tablet, TAKE 3 TABLETS(450 MG) BY MOUTH EVERY MORNING, Disp: 270 tablet, Rfl: 1    busPIRone (BUSPAR) 15 MG tablet, TAKE 1 TABLET(15 MG) BY MOUTH THREE TIMES DAILY, Disp: 270 tablet, Rfl: 1    butalbital-acetaminophen-caffeine -40 mg (FIORICET, ESGIC) -40 mg per tablet, Take 1 tablet by mouth every 6 (six) hours as needed for Headaches., Disp: 40 tablet, Rfl: 2    chlorhexidine (PERIDEX) 0.12 % solution, 15 mLs 2 (two) times daily., Disp: , Rfl:     cycloSPORINE (RESTASIS) 0.05 % ophthalmic emulsion, INSTILL 1 DROP IN BOTH EYES TWICE DAILY, Disp: 180 each, Rfl: 1    docusate sodium (COLACE) 100 MG capsule, Take 1 capsule (100 mg total) by mouth 2 (two) times daily as needed for Constipation., Disp: 60 capsule, Rfl: 0    doxepin (SINEQUAN) 75 MG capsule, TAKE 2 CAPSULES(150 MG) BY MOUTH EVERY EVENING, Disp: 60 capsule, Rfl: 11    fluticasone propionate (FLONASE) 50 mcg/actuation nasal spray, SHAKE LIQUID AND USE 1 SPRAY(50 MCG) IN EACH NOSTRIL EVERY DAY (Patient taking differently: 1 spray by Each Nostril route as needed for Rhinitis or Allergies.), Disp: 32 g, Rfl: 3    gabapentin (NEURONTIN) 300 MG capsule, TAKE 1 CAPSULE(300 MG) BY MOUTH THREE TIMES DAILY, Disp: 90 capsule, Rfl: 5    hydroCHLOROthiazide (HYDRODIURIL) 12.5 MG Tab, Take 1 tablet (12.5 mg total) by mouth once daily. For blood pressure, Disp: 90  tablet, Rfl: 3    hydroxychloroquine (PLAQUENIL) 200 mg tablet, TAKE 1 TABLET BY MOUTH TWICE DAILY, Disp: 180 tablet, Rfl: 2    irbesartan (AVAPRO) 300 MG tablet, Take 1 tablet (300 mg total) by mouth every evening., Disp: 90 tablet, Rfl: 3    lurasidone (LATUDA) 60 mg Tab tablet, TAKE 1 TABLET(60 MG) BY MOUTH EVERY DAY, Disp: 90 tablet, Rfl: 3    naproxen (NAPROSYN) 500 MG tablet, Take 1 tablet (500 mg total) by mouth daily as needed (pain)., Disp: 60 tablet, Rfl: 1    NARCAN 4 mg/actuation [place], SPRAY 1 SPRAY IN NOSTRIL ONCE FOR 1 DOSE, Disp: , Rfl:     nitrofurantoin, macrocrystal-monohydrate, (MACROBID) 100 MG capsule, Take 1 capsule (100 mg total) by mouth 2 (two) times daily., Disp: 14 capsule, Rfl: 0    nystatin (MYCOSTATIN) ointment, Apply topically 2 (two) times daily., Disp: 15 g, Rfl: 1    tiZANidine (ZANAFLEX) 4 MG tablet, Take 2 tablets (8 mg total) by mouth 3 (three) times daily., Disp: 90 tablet, Rfl: 1    VENOFER 100 mg iron/5 mL injection, , Disp: , Rfl:     amLODIPine (NORVASC) 5 MG tablet, Take 1 tablet (5 mg total) by mouth once daily., Disp: 90 tablet, Rfl: 3    cephALEXin (KEFLEX) 250 MG capsule, Take 1 capsule (250 mg total) by mouth daily as needed (recurrent uti). (Patient not taking: Reported on 4/3/2024), Disp: 90 capsule, Rfl: 1    diazePAM (VALIUM) 2 MG tablet, Take 1 tablet (2 mg total) by mouth daily as needed (Severe anxiety/Panic). 15 tablets to last 3 months, Disp: 15 tablet, Rfl: 0    fluconazole (DIFLUCAN) 150 MG Tab, Take 1 tablet (150 mg total) by mouth every 72 hours as needed (vaginal yeast)., Disp: 2 tablet, Rfl: 0    HYDROcodone-acetaminophen (NORCO) 5-325 mg per tablet, Take 1 tablet by mouth every 24 hours as needed for Pain. Quantity medically necessary, Disp: 30 tablet, Rfl: 0    omeprazole (PRILOSEC) 40 MG capsule, Take 1 capsule (40 mg total) by mouth once daily., Disp: 90 capsule, Rfl: 3    pilocarpine (SALAGEN) 5 MG Tab, Take 1 tablet (5 mg total) by mouth 4 (four)  times daily., Disp: 120 tablet, Rfl: 11    solifenacin (VESICARE) 5 MG tablet, Take 1 tablet (5 mg total) by mouth once daily. (Patient not taking: Reported on 4/3/2024), Disp: 30 tablet, Rfl: 11    Review of Systems   Constitutional:  Positive for weight loss. Negative for fever.   Cardiovascular:  Negative for chest pain.   Gastrointestinal:  Negative for abdominal pain and bowel incontinence.   Genitourinary:  Negative for bladder incontinence, dysuria, hematuria and pelvic pain.   Musculoskeletal:  Positive for back pain.   Neurological:  Positive for headaches. Negative for tingling, weakness, numbness and paresthesias.       Objective:   /80   LMP 07/11/2012 Comment: partial        Physical Exam  Constitutional:       General: She is not in acute distress.     Appearance: She is well-developed.   HENT:      Head: Normocephalic.   Pulmonary:      Effort: Pulmonary effort is normal. No respiratory distress.   Neurological:      Mental Status: She is alert and oriented to person, place, and time.   Psychiatric:         Behavior: Behavior normal.         Assessment & Plan     Problem List Items Addressed This Visit          Neuro    Chronic pain    Relevant Medications    HYDROcodone-acetaminophen (NORCO) 5-325 mg per tablet       Psychiatric    Chronic narcotic dependence    Relevant Medications    HYDROcodone-acetaminophen (NORCO) 5-325 mg per tablet     Other Visit Diagnoses       Menopause    -  Primary    Relevant Orders    Ambulatory referral/consult to Obstetrics / Gynecology    Heartburn        Relevant Medications    omeprazole (PRILOSEC) 40 MG capsule              Follow up in about 3 months (around 7/9/2024) for Follow Up in person for pain.    Disclaimer:  This note may have been prepared using voice recognition software, it may have not been extensively proofed, as such there could be errors within the text such as sound alike errors.

## 2024-04-11 ENCOUNTER — PATIENT MESSAGE (OUTPATIENT)
Dept: PSYCHIATRY | Facility: CLINIC | Age: 54
End: 2024-04-11

## 2024-04-11 ENCOUNTER — OFFICE VISIT (OUTPATIENT)
Dept: PSYCHIATRY | Facility: CLINIC | Age: 54
End: 2024-04-11
Payer: MEDICARE

## 2024-04-11 ENCOUNTER — TELEPHONE (OUTPATIENT)
Dept: OBSTETRICS AND GYNECOLOGY | Facility: CLINIC | Age: 54
End: 2024-04-11
Payer: MEDICARE

## 2024-04-11 ENCOUNTER — OUTPATIENT CASE MANAGEMENT (OUTPATIENT)
Dept: ADMINISTRATIVE | Facility: OTHER | Age: 54
End: 2024-04-11
Payer: MEDICARE

## 2024-04-11 ENCOUNTER — TELEPHONE (OUTPATIENT)
Dept: INTERNAL MEDICINE | Facility: CLINIC | Age: 54
End: 2024-04-11
Payer: MEDICARE

## 2024-04-11 DIAGNOSIS — R23.2 HOT FLASHES: ICD-10-CM

## 2024-04-11 DIAGNOSIS — F51.05 INSOMNIA DUE TO OTHER MENTAL DISORDER: ICD-10-CM

## 2024-04-11 DIAGNOSIS — F40.10 SOCIAL ANXIETY DISORDER: ICD-10-CM

## 2024-04-11 DIAGNOSIS — F41.0 PANIC DISORDER: Primary | ICD-10-CM

## 2024-04-11 DIAGNOSIS — F41.1 GAD (GENERALIZED ANXIETY DISORDER): ICD-10-CM

## 2024-04-11 DIAGNOSIS — F31.32 BIPOLAR AFFECTIVE DISORDER, CURRENTLY DEPRESSED, MODERATE: Primary | ICD-10-CM

## 2024-04-11 DIAGNOSIS — F99 INSOMNIA DUE TO OTHER MENTAL DISORDER: ICD-10-CM

## 2024-04-11 DIAGNOSIS — F41.0 PANIC DISORDER: ICD-10-CM

## 2024-04-11 DIAGNOSIS — M79.7 FIBROMYALGIA: ICD-10-CM

## 2024-04-11 DIAGNOSIS — F33.0 MILD EPISODE OF RECURRENT MAJOR DEPRESSIVE DISORDER: ICD-10-CM

## 2024-04-11 DIAGNOSIS — F63.89 INTERNET ADDICTION: ICD-10-CM

## 2024-04-11 PROCEDURE — 3044F HG A1C LEVEL LT 7.0%: CPT | Mod: HCNC,CPTII,95, | Performed by: INTERNAL MEDICINE

## 2024-04-11 PROCEDURE — 3044F HG A1C LEVEL LT 7.0%: CPT | Mod: HCNC,CPTII,95, | Performed by: SOCIAL WORKER

## 2024-04-11 PROCEDURE — 4010F ACE/ARB THERAPY RXD/TAKEN: CPT | Mod: HCNC,CPTII,95, | Performed by: INTERNAL MEDICINE

## 2024-04-11 PROCEDURE — 90834 PSYTX W PT 45 MINUTES: CPT | Mod: HCNC,95,, | Performed by: SOCIAL WORKER

## 2024-04-11 PROCEDURE — 1160F RVW MEDS BY RX/DR IN RCRD: CPT | Mod: HCNC,CPTII,95, | Performed by: INTERNAL MEDICINE

## 2024-04-11 PROCEDURE — 4010F ACE/ARB THERAPY RXD/TAKEN: CPT | Mod: HCNC,CPTII,95, | Performed by: SOCIAL WORKER

## 2024-04-11 PROCEDURE — 1159F MED LIST DOCD IN RCRD: CPT | Mod: HCNC,CPTII,95, | Performed by: INTERNAL MEDICINE

## 2024-04-11 PROCEDURE — 99214 OFFICE O/P EST MOD 30 MIN: CPT | Mod: HCNC,95,, | Performed by: INTERNAL MEDICINE

## 2024-04-11 RX ORDER — TIZANIDINE 4 MG/1
8 TABLET ORAL 3 TIMES DAILY
Qty: 90 TABLET | Refills: 0 | Status: SHIPPED | OUTPATIENT
Start: 2024-04-11 | End: 2024-04-30 | Stop reason: SDUPTHER

## 2024-04-11 NOTE — TELEPHONE ENCOUNTER
----- Message from Kayli Villegas PA-C sent at 4/11/2024  1:47 PM CDT -----  Regarding: RE: Severe depression  Lets call and offer. Or if she wants to see Swathi? Thank you  ----- Message -----  From: Terry Gibson MA  Sent: 4/11/2024   1:14 PM CDT  To: Kayli Villegas PA-C  Subject: RE: Severe depression                            The soonest would be the week before her scheduled appointment.   ----- Message -----  From: Kayli Villegas PA-C  Sent: 4/11/2024  12:17 PM CDT  To: Terry Gibson MA  Subject: FW: Severe depression                            Do I have anything sooner for HRT consult? Definitely need 30 minutes. Thanks  ----- Message -----  From: Yaneli Machuca RN  Sent: 4/11/2024  10:30 AM CDT  To: Kayli Villegas PA-C  Subject: Severe depression                                Good morning,    I am this patient's Outpatient .  I just hung up the phone with her and wanted to let you know that she has gotten severely depressed.  I did a PHQ 2 which she scored a 6 and a PHQ 9 which she scored a 21.  She states she does not have any SI or HI.  She states she is not taking care of herself.  She has not been eating, has not been getting dressed and has not been taking showers. She is very concerned about her relationship because of the depression being so bad right now.  She feels like this is all hormone related to her premenopausal status.  She has an appointment with you on 5/30 but feels like she needs to be seen right away.  Is there anything that can be done to get her in to see you sometime in the next week?  I thought you should be aware of what is going on with her.  Please let me know if I can be of assistance with anything.  I will be on vacation and out of the office from 4/16-4/24, returning on 4/25.  If you need any assistance during that time frame, please contact Allie Clayton, my supervisor.      Thank you,    Benita Machuca RN  RN Case Manager  Outpatient  Care Management  [facility]  [phone]  [email or web address]

## 2024-04-11 NOTE — PROGRESS NOTES
Outpatient Care Management  Plan of Care Follow Up Visit    Patient: Jaki Bajwa  MRN: [id number]  Date of Service: 04/11/2024  Completed by: Yaneli Machuca RN  Referral Date: 02/14/2024    Reason for Visit   Patient presents with    OPCM RN Follow Up Call       Brief Summary: Patient states that she is currently not eating much of anything.  She states she is very depressed.  She states when she was eating she did cut out salt.  She states she lost some weight from what she used to weigh and she is now 248 lbs on 4/3. She doesn't have a scale yet but states she can get one from [employer].  She states she gets $200 every quarter to spend and she thinks she gets it at the end of April.  CM explained that the second quarter began on April 1 and she should have the $200 available to her now.  She states she's been too depressed to go to the store and get one.  Patient states she has not been checking her blood pressure because she has been too depressed to do it.  She has been anxious lately also.  She states she doesn't want to go anywhere and is concerned for her relationship with her boyfriend, Scott, because of this.  She states she has tried the pursed lip breathing but it doesn't help her much at all.  She states she doesn't want to shower, get dressed or take care of herself and wants to cancel her virtual appointments this afternoon with her , Gordo Zamora, and her psychiatrist, Najma Doyle.  She states she knows a lot of it has to do with her hormones and being premenopausal.  She states she doesn't have an appointment with Kayli NOBLE with women's services until 5/30.    CM encouraged patient to try to at least drink supplements or smoothies if she is not able to eat anything at this time.  CM encouraged her to eat if she gets the urge to as soon as it happens, not to wait.  CM offered to order scale from [employer] for patient.  She stated she would like the talking bathroom  scale.  CM called [facility] [phone] and ordered the talking bathroom scale product code 461, order number is [id number].  CM gave patient's boyfriend's, Scott's, address [address], [place] [address] for delivery per patient request.  I should arrive in 10-14 business days.  CM strongly encouraged patient to keep her virtual appointments this afternoon especially because of her depression.  CM reminded her that the SW and psychiatrist have the tools to help her with what she is going through.  CM sent an inbox message to both Gordo MEADE and Dr. Doyle concerning all of the findings with today's phone call and that she has the appointments to virtually see both of them today.  CM also messaged Kayli NOBLE to see if patient can be seen earlier than 5/30.  Message received back from Kayli NOBLE that she will try to see her sooner but patient should see her PCP in the meantime.  CM forwarded message to Dr. Jose Carpenter her PCP.     Next steps:   Follow up on depression  Follow up on message to Kayli Villegas about 5/30 appt moved up  Follow up if got [employer] talking scale  Follow up if following a low sodium diet/fluid intake  Follow up if reading nutritional labels  Follow up if got scale for weighing herself  Follow up on S&S of CKD  Follow up on taking blood pressures several times a week  Follow up if drinking supplements or smoothies  Follow up on activity  Follow up on pursed lip breathing  Follow up on appointment with Dr. Doyle  Follow up on appointment with Gordo LARSON

## 2024-04-11 NOTE — PROGRESS NOTES
OUTPATIENT PSYCHIATRY RETURN VISIT    ENCOUNTER DATE:  4/20/2024  SITE:  [facility]  LENGTH OF SESSION:  16 minutes    The patient location is:  [place], not in a healthcare facility  Visit type:  audiovisual    Face to Face time with patient:  16 minutes  22 minutes of total time spent on the encounter, which includes face to face time and non-face to face time preparing to see the patient (eg, review of tests), Obtaining and/or reviewing separately obtained history, Documenting clinical information in the electronic or other health record, Independently interpreting results (not separately reported) and communicating results to the patient/family/caregiver, or Care coordination (not separately reported).     Each patient to whom he or she provides medical services by telemedicine is:  (1) informed of the relationship between the physician and patient and the respective role of any other health care provider with respect to management of the patient; and (2) notified that he or she may decline to receive medical services by telemedicine and may withdraw from such care at any time.    CHIEF COMPLAINT:  Depression and Hot Flashes      HISTORY OF PRESENTING ILLNESS:  Jaki Bajwa is a 53 y.o. female with history of Mood disorder, JUAN JOSE, OCD, social phobia, and Internet shopping addiction who presents for follow up appointment.      Plan at last appointment on 1/11/2024:  Mood is currently stable other than occasional flares of anxiety.  No medication changes today.   Continue Latuda 60mg daily, Wellbutrin XL 450mg daily, Buspar 15mg BID-TID, Doxepin 150mg qHS, and Valium 2mg PRN panic attack (#5 tablets per month).  Discussed with patient informed consent, risks versus benefits, alternative treatments, side effect profile and the inherent unpredictability of individual responses to these treatments.  The patient expresses understanding of the above and displays the capacity to agree  with this current plan.   Continue individual therapy with Mr. Zamora, HALINA.    History as told by patient:  More overwhelmed, more emotional.  Having night sweats, gets really hot and then cold, trouble sleeping - started about 2 weeks ago.  Crying more easily.  Mood decline did start at same time as night sweats.  Now wondering if it is hormonal - feels exactly the same as when she went through menopause years ago - has been on estradiol patch in the past and it worked very well.  She is unsure why she is no longer on it.  Decreased appetite the last 2 weeks too.  Always has headaches but headaches have also worsened the last few weeks.  Still caring for baby - usually 2 days per week.  12 hours straight - he is very clingy, cries if she puts him down.  Wakes up if she puts him down for a nap.  He is very cute and he does make her happy.  He will be 7 months on Sunday.  Not feeling like bathing or getting dressed.  No interest in doing things.  Energy is low.  When they  grandson, she is so exhausted.  Denies SI or feeling hopeless.  Knows this will get better.  Mood is not better depending on whether she keeps grandson or not.  Feeling better talking to Brandon and myself today.  Talked with Brandon about getting out of the house more.  Going to the [place] this weekend even though she doesn't want to go.  Wants to get out more in general - knows this will help.  No changes with her shopping or with Scott.  Scott is not as supportive as she wishes.  Wishes he would sit and talk with her, not yet, and just sit and understand her.  Denies SI.    Medication side effects:  Denies  Medication compliance:  Yes    PSYCHIATRIC REVIEW OF SYSTEMS:  Trouble with sleep:  Yes, night sweats  Appetite changes:  Decreased the last 2 weeks  Weight changes:  Denies  Lack of energy:  Yes  Anhedonia:  Yes  Somatic symptoms:  Chronic pain  Libido:  Denies  Anxiety/panic:  Yes but stable  Guilty/hopeless:   Denies  Self-injurious behavior/risky behavior:  Denies  Any drugs:  Denies  Alcohol:  Denies    MEDICAL REVIEW OF SYSTEMS:  Complete review of systems performed covering Constitutional, Musculoskeletal, Neurologic.  All systems negative except for that covered in HPI.    PAST PSYCHIATRIC, MEDICAL, AND SOCIAL HISTORY REVIEWED  The patient's past medical, family and social history have been reviewed and updated as appropriate within the electronic medical record - see encounter notes.    MEDICATIONS:    Current Outpatient Medications:     amLODIPine (NORVASC) 5 MG tablet, Take 1 tablet (5 mg total) by mouth once daily., Disp: 90 tablet, Rfl: 3    buPROPion (WELLBUTRIN XL) 150 MG TB24 tablet, TAKE 3 TABLETS(450 MG) BY MOUTH EVERY MORNING, Disp: 270 tablet, Rfl: 1    busPIRone (BUSPAR) 15 MG tablet, TAKE 1 TABLET(15 MG) BY MOUTH THREE TIMES DAILY, Disp: 270 tablet, Rfl: 1    butalbital-acetaminophen-caffeine -40 mg (FIORICET, ESGIC) -40 mg per tablet, Take 1 tablet by mouth every 6 (six) hours as needed for Headaches., Disp: 40 tablet, Rfl: 2    cephALEXin (KEFLEX) 250 MG capsule, Take 1 capsule (250 mg total) by mouth daily as needed (recurrent uti). (Patient not taking: Reported on 4/3/2024), Disp: 90 capsule, Rfl: 1    chlorhexidine (PERIDEX) 0.12 % solution, 15 mLs 2 (two) times daily., Disp: , Rfl:     cycloSPORINE (RESTASIS) 0.05 % ophthalmic emulsion, INSTILL 1 DROP IN BOTH EYES TWICE DAILY, Disp: 180 each, Rfl: 1    diazePAM (VALIUM) 2 MG tablet, Take 1 tablet (2 mg total) by mouth daily as needed (Severe anxiety/Panic). 15 tablets to last 3 months, Disp: 15 tablet, Rfl: 0    docusate sodium (COLACE) 100 MG capsule, Take 1 capsule (100 mg total) by mouth 2 (two) times daily as needed for Constipation., Disp: 60 capsule, Rfl: 0    doxepin (SINEQUAN) 75 MG capsule, TAKE 2 CAPSULES(150 MG) BY MOUTH EVERY EVENING, Disp: 60 capsule, Rfl: 11    fluconazole (DIFLUCAN) 150 MG Tab, Take 1 tablet (150 mg  total) by mouth every 72 hours as needed (vaginal yeast)., Disp: 2 tablet, Rfl: 0    fluticasone propionate (FLONASE) 50 mcg/actuation nasal spray, SHAKE LIQUID AND USE 1 SPRAY(50 MCG) IN EACH NOSTRIL EVERY DAY (Patient taking differently: 1 spray by Each Nostril route as needed for Rhinitis or Allergies.), Disp: 32 g, Rfl: 3    gabapentin (NEURONTIN) 300 MG capsule, TAKE 1 CAPSULE(300 MG) BY MOUTH THREE TIMES DAILY, Disp: 90 capsule, Rfl: 5    hydroCHLOROthiazide (HYDRODIURIL) 12.5 MG Tab, Take 1 tablet (12.5 mg total) by mouth once daily. For blood pressure, Disp: 90 tablet, Rfl: 3    HYDROcodone-acetaminophen (NORCO) 5-325 mg per tablet, Take 1 tablet by mouth every 24 hours as needed for Pain. Quantity medically necessary, Disp: 30 tablet, Rfl: 0    hydroxychloroquine (PLAQUENIL) 200 mg tablet, TAKE 1 TABLET BY MOUTH TWICE DAILY, Disp: 180 tablet, Rfl: 2    irbesartan (AVAPRO) 300 MG tablet, Take 1 tablet (300 mg total) by mouth every evening., Disp: 90 tablet, Rfl: 3    lurasidone (LATUDA) 60 mg Tab tablet, TAKE 1 TABLET(60 MG) BY MOUTH EVERY DAY, Disp: 90 tablet, Rfl: 3    naproxen (NAPROSYN) 500 MG tablet, Take 1 tablet (500 mg total) by mouth daily as needed (pain)., Disp: 60 tablet, Rfl: 1    NARCAN 4 mg/actuation [place], SPRAY 1 SPRAY IN NOSTRIL ONCE FOR 1 DOSE, Disp: , Rfl:     nitrofurantoin, macrocrystal-monohydrate, (MACROBID) 100 MG capsule, Take 1 capsule (100 mg total) by mouth 2 (two) times daily., Disp: 14 capsule, Rfl: 0    nystatin (MYCOSTATIN) ointment, Apply topically 2 (two) times daily., Disp: 15 g, Rfl: 1    omeprazole (PRILOSEC) 40 MG capsule, Take 1 capsule (40 mg total) by mouth once daily., Disp: 90 capsule, Rfl: 3    pilocarpine (SALAGEN) 5 MG Tab, Take 1 tablet (5 mg total) by mouth 4 (four) times daily., Disp: 120 tablet, Rfl: 11    solifenacin (VESICARE) 5 MG tablet, Take 1 tablet (5 mg total) by mouth once daily. (Patient not taking: Reported on 4/3/2024), Disp: 30 tablet, Rfl: 11     "tiZANidine (ZANAFLEX) 4 MG tablet, TAKE 2 TABLETS BY MOUTH THREE TIMES DAILY, Disp: 90 tablet, Rfl: 0    VENOFER 100 mg iron/5 mL injection, , Disp: , Rfl:     ALLERGIES:  Review of patient's allergies indicates:   Allergen Reactions    Aspirin Hives       PSYCHIATRIC EXAM:  There were no vitals filed for this visit.    Appearance:  Well groomed, appearing healthy and of stated age  Behavior:  Cooperative, pleasant, no psychomotor agitation or retardation  Speech:  Normal rate, rhythm, prosody, and volume  Mood:  "Down"  Affect:  Congruent  Thought Process:  Linear, logical, goal directed  Thought Content:  Negative for suicidal ideation, homicidal ideation, delusions or hallucinations.  Associations:  Intact  Memory:  Grossly Intact  Level of Consciousness/Orientation:  Grossly intact  Fund of Knowledge:  Good  Attention:  Good  Language:  Fluent, able to name abstract and concrete objects  Insight:  Fair  Judgment:  Intact  Psychomotor signs:  No abnormal movements of face  Gait:  Unable to assess via virtual visit      RELEVANT LABS/STUDIES:    Lab Results   Component Value Date    WBC 6.42 04/03/2024    HGB 10.4 (L) 04/03/2024    HCT 35.6 (L) 04/03/2024    MCV 82 04/03/2024     04/03/2024     BMP  Lab Results   Component Value Date     04/03/2024    K 4.4 04/03/2024     04/03/2024    CO2 28 04/03/2024    BUN 16 04/03/2024    CREATININE 1.2 04/03/2024    CALCIUM 9.7 04/03/2024    ANIONGAP 7 (L) 04/03/2024    ESTGFRAFRICA 45.5 (A) 06/15/2022    EGFRNONAA 39.5 (A) 06/15/2022     Lab Results   Component Value Date    ALT 16 04/03/2024    AST 17 04/03/2024    ALKPHOS 74 04/03/2024    BILITOT 0.2 04/03/2024     Lab Results   Component Value Date    TSH 2.563 09/22/2021     Lab Results   Component Value Date    HGBA1C 4.9 02/13/2024       IMPRESSION:    Jaki Bajwa is a 53 y.o. female with history of Mood disorder, JUAN JOSE, OCD, social phobia, and Internet shopping addiction who presents for " follow up appointment.    Status/Progress:  Based on the examination today, the patient's problem(s) is/are inadequately controlled.  New problems have been presented today.    Risk Parameters:  Patient reports no suicidal ideation  Patient reports no homicidal ideation  Patient reports no self-injurious behavior  Patient reports no violent behavior        DIAGNOSES:    ICD-10-CM ICD-9-CM   1. Bipolar affective disorder, currently depressed, moderate  F31.32 296.52   2. Hot flashes  R23.2 782.62   3. JUAN JOSE (generalized anxiety disorder)  F41.1 300.02   4. Panic disorder  F41.0 300.01   5. Social anxiety disorder  F40.10 300.23   6. Internet shopping addiction  F63.89 301.89   7. Insomnia due to other mental disorder  F51.05 300.9    F99 327.02   8. Fibromyalgia  M79.7 729.1       PLAN:  Patient feels that her current symptoms are due to menopausal symptoms.  She has upcoming appointment with REAL Matute - HUMA will send her a message to see if she can see patient sooner.  Continue Latuda 60mg daily, Wellbutrin XL 450mg daily, Buspar 15mg BID-TID, Doxepin 150mg qHS, and Valium 2mg PRN panic attack (#5 tablets per month).  Discussed with patient informed consent, risks versus benefits, alternative treatments, side effect profile and the inherent unpredictability of individual responses to these treatments.  The patient expresses understanding of the above and displays the capacity to agree with this current plan.   Continue individual therapy with Mr. Noah LCSW.    RETURN TO CLINIC:  Follow up in about 4 weeks (around 5/9/2024).

## 2024-04-11 NOTE — TELEPHONE ENCOUNTER
----- Message from Yaneli Machuca RN sent at 4/11/2024  4:21 PM CDT -----  Regarding: FW: Severe depression  Good afternoon,    This is a message I sent to the patient's women's services PA and the response I received.  The patient has an appointment to see you on 5/17.  She also had a virtual appointment today with the  and the psychiatrist, just so you know.  Not sure if you want to see her sooner than 5/17 or not but thought you should know what is going on with this patient.    Thank you,  Benita Machuca RN  RN Case Manager  Outpatient Care Management  [facility]  [phone]  [email or web address]       ----- Message -----  From: Kayli Villegas PA-C  Sent: 4/11/2024  12:16 PM CDT  To: Yaneli Machuca RN  Subject: RE: Severe depression                            We will try to get her in sooner, but she needs follow up with her PCP in the meantime? Quan Milan  ----- Message -----  From: Yaneli Machuca RN  Sent: 4/11/2024  10:30 AM CDT  To: Kayli Villegas PA-C  Subject: Severe depression                                Good morning,    I am this patient's Outpatient .  I just hung up the phone with her and wanted to let you know that she has gotten severely depressed.  I did a PHQ 2 which she scored a 6 and a PHQ 9 which she scored a 21.  She states she does not have any SI or HI.  She states she is not taking care of herself.  She has not been eating, has not been getting dressed and has not been taking showers. She is very concerned about her relationship because of the depression being so bad right now.  She feels like this is all hormone related to her premenopausal status.  She has an appointment with you on 5/30 but feels like she needs to be seen right away.  Is there anything that can be done to get her in to see you sometime in the next week?  I thought you should be aware of what is going on with her.  Please let me know if I can be of assistance  with anything.  I will be on vacation and out of the office from 4/16-4/24, returning on 4/25.  If you need any assistance during that time frame, please contact Allie Clayton, my supervisor.      Thank you,    Benita Machuca RN  RN Case Manager  Outpatient Care Management  [facility]  [phone]  [email or web address]

## 2024-04-11 NOTE — LETTER
Jaki Bajwa  [address] [address]      Dear Jaki Bajwa,     I am your nurse with [facility]s Outpatient Care Management Department. I have been unsuccessful at reaching you since we spoke on 3/21.  At your earliest convenience, I would like to discuss your healthcare progress.      Please contact me at [phone] from 8:00AM to 4:30 PM on Monday thru Friday.     As you know, [facility] is a program offered to you through [facility] where a nurse is available 24/7 to answer questions or provide medical advice, their number is [phone].    Thanks,    Benita Machuca RN  Outpatient Care Management
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
## 2024-04-12 ENCOUNTER — TELEPHONE (OUTPATIENT)
Dept: OBSTETRICS AND GYNECOLOGY | Facility: CLINIC | Age: 54
End: 2024-04-12
Payer: MEDICARE

## 2024-04-19 ENCOUNTER — TELEPHONE (OUTPATIENT)
Dept: OBSTETRICS AND GYNECOLOGY | Facility: CLINIC | Age: 54
End: 2024-04-19
Payer: MEDICARE

## 2024-04-19 NOTE — TELEPHONE ENCOUNTER
----- Message from Cuadraconor Ohara Argenis sent at 4/19/2024  3:42 PM CDT -----  Regarding: call back  Type: Patient Call Back    Who called: pt    What is the request in detail: requesting a call back to discuss medication treatment     Can the clinic reply by [facility]?no    Would the patient rather a call back or a response via [facility]? call    Best call back number: [phone]     Additional Information:

## 2024-04-24 ENCOUNTER — OFFICE VISIT (OUTPATIENT)
Dept: OBSTETRICS AND GYNECOLOGY | Facility: CLINIC | Age: 54
End: 2024-04-24
Payer: MEDICARE

## 2024-04-24 VITALS
BODY MASS INDEX: 39.12 KG/M2 | WEIGHT: 249.25 LBS | SYSTOLIC BLOOD PRESSURE: 120 MMHG | HEIGHT: 67 IN | DIASTOLIC BLOOD PRESSURE: 74 MMHG

## 2024-04-24 DIAGNOSIS — N95.1 MENOPAUSAL SYMPTOM: ICD-10-CM

## 2024-04-24 DIAGNOSIS — R35.0 URINARY FREQUENCY: Primary | ICD-10-CM

## 2024-04-24 LAB
BACTERIA #/AREA URNS HPF: ABNORMAL /HPF
BILIRUB UR QL STRIP: NEGATIVE
CLARITY UR: ABNORMAL
COLOR UR: YELLOW
GLUCOSE UR QL STRIP: NEGATIVE
HGB UR QL STRIP: NEGATIVE
HYALINE CASTS #/AREA URNS LPF: 30 /LPF
KETONES UR QL STRIP: NEGATIVE
LEUKOCYTE ESTERASE UR QL STRIP: ABNORMAL
MICROSCOPIC COMMENT: ABNORMAL
NITRITE UR QL STRIP: NEGATIVE
PH UR STRIP: 6 [PH] (ref 5–8)
PROT UR QL STRIP: ABNORMAL
RBC #/AREA URNS HPF: 3 /HPF (ref 0–4)
SP GR UR STRIP: 1.02 (ref 1–1.03)
URN SPEC COLLECT METH UR: ABNORMAL
UROBILINOGEN UR STRIP-ACNC: NEGATIVE EU/DL
WBC #/AREA URNS HPF: 0 /HPF (ref 0–5)
YEAST URNS QL MICRO: ABNORMAL

## 2024-04-24 PROCEDURE — 4010F ACE/ARB THERAPY RXD/TAKEN: CPT | Mod: HCNC,CPTII,S$GLB,

## 2024-04-24 PROCEDURE — 3044F HG A1C LEVEL LT 7.0%: CPT | Mod: HCNC,CPTII,S$GLB,

## 2024-04-24 PROCEDURE — 81000 URINALYSIS NONAUTO W/SCOPE: CPT | Mod: HCNC

## 2024-04-24 PROCEDURE — 81514 NFCT DS BV&VAGINITIS DNA ALG: CPT | Mod: HCNC

## 2024-04-24 PROCEDURE — 3078F DIAST BP <80 MM HG: CPT | Mod: HCNC,CPTII,S$GLB,

## 2024-04-24 PROCEDURE — 87491 CHLMYD TRACH DNA AMP PROBE: CPT | Mod: HCNC

## 2024-04-24 PROCEDURE — 3074F SYST BP LT 130 MM HG: CPT | Mod: HCNC,CPTII,S$GLB,

## 2024-04-24 PROCEDURE — 99999 PR PBB SHADOW E&M-EST. PATIENT-LVL IV: CPT | Mod: PBBFAC,HCNC,,

## 2024-04-24 PROCEDURE — 99213 OFFICE O/P EST LOW 20 MIN: CPT | Mod: HCNC,S$GLB,,

## 2024-04-24 PROCEDURE — 3008F BODY MASS INDEX DOCD: CPT | Mod: HCNC,CPTII,S$GLB,

## 2024-04-24 PROCEDURE — 1159F MED LIST DOCD IN RCRD: CPT | Mod: HCNC,CPTII,S$GLB,

## 2024-04-24 RX ORDER — ESTRADIOL 0.03 MG/D
1 PATCH TRANSDERMAL WEEKLY
Qty: 4 PATCH | Refills: 11 | Status: SHIPPED | OUTPATIENT
Start: 2024-04-24 | End: 2025-04-24

## 2024-04-24 NOTE — PROGRESS NOTES
CC: Dysuria    HPI: Pt is a 53 y.o.  female who presents for evaluation of her UTI symptoms.   The patient presents today with frequency and dark colored urine for the past week. The patient denies dysuria, flank pain, fever, nausea, vomiting, and hematuria. She denies frequent or recurrent UTIs.       She also would like a refill on her HRT- patches. She reports that she has been starting to have hot flashes and mood changes.     ROS:  GENERAL: Feeling well overall. Denies fever or chills.   SKIN: Denies rash or lesions.   HEAD: Denies head injury or headache.   NODES: Denies enlarged lymph nodes.   CHEST: Denies chest pain or shortness of breath.   CARDIOVASCULAR: Denies palpitations or left sided chest pain.   ABDOMEN: No abdominal pain, constipation, diarrhea, nausea, vomiting or rectal bleeding.   URINARY: + dysuria, hematuria, or burning on urination.  REPRODUCTIVE: See HPI.   BREASTS: Denies pain, lumps, or nipple discharge.   HEMATOLOGIC: No easy bruisability or excessive bleeding.   MUSCULOSKELETAL: Denies joint pain or swelling.   NEUROLOGIC: Denies syncope or weakness.   PSYCHIATRIC: Denies depression, anxiety or mood swings.    PE:   APPEARANCE: Well nourished, well developed, Black or  female in no acute distress.  PELVIS: Deferred  ABDOMEN: No suprapubic tenderness  MUSCULOSKELETAL: No CVA tenderness      Diagnosis:  1. Urinary frequency    2. Menopausal symptom        Plan:   1. Urinary frequency  - C. trachomatis/N. gonorrhoeae by AMP DNA  - Vaginosis Screen by DNA Probe  - Urinalysis, Reflex to Urine Culture Urine, Clean Catch    2. Menopausal symptom  - estradiol 0.025 mg/24 hr 0.025 mg/24 hr; Place 1 patch onto the skin once a week.  Dispense: 4 patch; Refill: 11         Orders Placed This Encounter    C. trachomatis/N. gonorrhoeae by AMP DNA    Vaginosis Screen by DNA Probe    Urinalysis, Reflex to Urine Culture Urine, Clean Catch    estradiol 0.025 mg/24 hr 0.025 mg/24 hr        UTI Causes  Bladder infections are not contagious. You can't get one from someone else, from a toilet seat, or from sharing a bath.  The most common cause of bladder infections is bacteria from the bowels. The bacteria get onto the skin around the opening of the urethra. From there, they can get into the urine and travel up to the bladder, causing inflammation and infection. This usually happens because of:  Wiping improperly after urinating. Always wipe from front to back.  Bowel incontinence  Pregnancy  Procedures such as having a catheter inserted  Older age  Not emptying your bladder. This can allow bacteria a chance to grow in your urine.  Dehydration  Constipation  Sex  Use of a diaphragm for birth control      UTI Care and prevention  These self-care steps can help prevent future infections:  Drink plenty of fluids to prevent dehydration and flush out your bladder. Do this unless you must restrict fluids for other health reasons, or your doctor told you not to.  Proper cleaning after going to the bathroom is important. Wipe from front to back after using the toilet to prevent the spread of bacteria.  Establish regular bladder habits. Urinate more often. Don't try to hold urine in for a long time.  Wear loose-fitting clothes and cotton underwear. Avoid tight-fitting pants.  Avoid vulvovaginal irritants  Improve your diet and prevent constipation. Eat more fresh fruit and vegetables, and fiber, and less junk and fatty foods.  Avoid sex until your symptoms are gone.  Increase fluid intake but avoid caffeine, alcohol, and spicy foods. These can irritate your bladder.  Drink water prior to intercourse and urinate afterwards and avoid certain positions which could increase likelyhood of UTIs,  If you use birth control pills and have frequent bladder infections, discuss it with your doctor.  Signs of pylonephritis: Go to the ED if develops fever, chills, n/v, back pain, worsening dyuria, hematuria  Pelvic rest  until symptoms resolve    Bladder Irritants  Alcoholic beverages   Apples and apple juice  Cantaloupe    Carbonated beverages  Chili and spicy foods   Chocolate  Citrus fruit    Coffee (including decaffeinated)  Cranberries and cranberry juice Grapes  Guava     Milk Products: milk, cheese, cottage cheese, yogurt, ice cream  Peaches    Pineapple  Plums     Strawberries  Sugar especially artificial sweeteners, saccharin, aspartame, corn sweeteners, honey, fructose, sucrose, lactose  Tea     Tomatoes and tomato juice  Vitamin B complex   Vinegar  *Most people are not sensitive to ALL of these products; your goal is to find the foods that make YOUR symptoms worse     Certain foods and drinks have been associated with worsening symptoms of urinary frequency, urgency, urge incontinence, or bladder pain. If you suffer from any of these conditions, you may wish to try eliminating one or more of these foods from your diet and see if your symptoms improve. If bladder symptoms are related to dietary factors, strict adherence to a diet that eliminates the food should bring marked relief in 10 days. Once you are feeling better, you can begin to add foods back into your diet, one at a time. If symptoms return, you will be able to identify the irritant. As you add foods back to your diet it is very important that you drink significant amounts of water. Low-acid fruit substitutions include apricots, papaya, pears and watermelon. Coffee drinkers can drink Kava or other lowacid instant drinks. Tea drinkers can substitute non-citrus herbal and sun brewed teas. Calcium carbonate co-buffered with calcium ascorbate can be substituted for Vitamin C. Prelief is a dietary supplement that works as an acid blocker for the bladder.       Follow-up PRN no resolution of symptoms.

## 2024-04-26 ENCOUNTER — PATIENT MESSAGE (OUTPATIENT)
Dept: OBSTETRICS AND GYNECOLOGY | Facility: CLINIC | Age: 54
End: 2024-04-26
Payer: MEDICARE

## 2024-04-26 ENCOUNTER — OUTPATIENT CASE MANAGEMENT (OUTPATIENT)
Dept: ADMINISTRATIVE | Facility: OTHER | Age: 54
End: 2024-04-26
Payer: MEDICARE

## 2024-04-26 DIAGNOSIS — B37.9 YEAST INFECTION: Primary | ICD-10-CM

## 2024-04-26 LAB
BACTERIAL VAGINOSIS DNA: NEGATIVE
C TRACH DNA SPEC QL NAA+PROBE: NOT DETECTED
CANDIDA GLABRATA DNA: POSITIVE
CANDIDA KRUSEI DNA: NEGATIVE
CANDIDA RRNA VAG QL PROBE: POSITIVE
N GONORRHOEA DNA SPEC QL NAA+PROBE: NOT DETECTED
T VAGINALIS RRNA GENITAL QL PROBE: NEGATIVE

## 2024-04-26 RX ORDER — FLUCONAZOLE 150 MG/1
150 TABLET ORAL
Qty: 2 TABLET | Refills: 0 | Status: SHIPPED | OUTPATIENT
Start: 2024-04-26 | End: 2024-06-19 | Stop reason: SDUPTHER

## 2024-04-26 NOTE — LETTER
Jaki Bajwa  [address] [facility] [address]      Dear Jaki Bajwa,     I am your nurse with [facility]. I have been unsuccessful at reaching you since we spoke on 4/11.  At your earliest convenience, I would like to discuss your healthcare progress.      Please contact me at [phone] from 8:00AM to 4:30 PM on Monday thru Friday.     As you know, [facility] is a program offered to you through [facility] where a nurse is available 24/7 to answer questions or provide medical advice, their number is [phone].    Thanks,    Benita Machuca RN  Outpatient Care Management      Continue Regimen: Wart peel nightly at home (has at home) Detail Level: Zone Render In Strict Bullet Format?: No Discontinue Regimen: Hydrogen peroxide \\nAxe body wash \\nPolysporin Initiate Treatment: mupirocin 2 % topical ointment \\nQuantity: 22.0 g  Days Supply: 30\\nSig: Apply bid to wound on left leg until healed.\\n\\n\\nDove for bathing \\n\\nApply water proof bandaid while swimming

## 2024-04-29 ENCOUNTER — TELEPHONE (OUTPATIENT)
Dept: HEMATOLOGY/ONCOLOGY | Facility: CLINIC | Age: 54
End: 2024-04-29
Payer: MEDICARE

## 2024-04-29 NOTE — TELEPHONE ENCOUNTER
----- Message from [place] sent at 4/29/2024  9:13 AM CDT -----  Regarding: Reschedule Existing Appointment  Contact: Jaki Bajwa  Reschedule Existing Appointment    Current appt date: 4/29/2024    Type of appt :  Non fasting lab and a follow up    Physician: Siena    Reason for rescheduling: Patient calling to reschedule her appt due to expected bad weather today. Patient would like anything around 5/14-15/2024 mornings    Caller: Jaki Bajwa     Contact Preference: [phone] (home)

## 2024-04-30 RX ORDER — TIZANIDINE 4 MG/1
8 TABLET ORAL 3 TIMES DAILY
Qty: 90 TABLET | Refills: 0 | Status: SHIPPED | OUTPATIENT
Start: 2024-04-30 | End: 2024-05-31 | Stop reason: SDUPTHER

## 2024-05-01 ENCOUNTER — OUTPATIENT CASE MANAGEMENT (OUTPATIENT)
Dept: ADMINISTRATIVE | Facility: OTHER | Age: 54
End: 2024-05-01
Payer: MEDICARE

## 2024-05-01 NOTE — PROGRESS NOTES
Outpatient Care Management  Plan of Care Follow Up Visit    Patient: Jaki Bajwa  MRN: [id number]  Date of Service: 05/01/2024  Completed by: Yaneli Machuca RN  Referral Date: 02/14/2024    Reason for Visit   Patient presents with    OPCM RN Follow Up Call       Brief Summary: Patient states she still has some depression and she now has a cold type illness that is making her feel terrible.  She has no taste so she is going to do a Covid test to be sure it is not that.  She states she did get out to the [place] [employer] but stayed on a porch most of the time sitting in a chair with strangers.  She states she got the scale from [employer] but still has not opened it.  She states she has not been eating very much still.  She states she had a smoothie the other day and is eating a lot of soup since she has been ill.  Patient states the signs and symptoms of CKD are frequent urination and swelling in legs.  She states she hasn't watched her grandson all week because she has been sick and her daughter does not want her son to get sick.  She states she has tried pursed lip breathing for stress/anxiety and it helped her.  She states her last BP was 120/74 from what she remembers.    CM encouraged patient to continue to go to her psychiatry and  appointments she has scheduled.  CM encouraged patient to drink supplements and smoothies for her to get some protein while she is not eating very much at this time.  CM encouraged patient to open her scale and start weighing herself.  Kayli Villegas unable to move up appointment for patient before 5/30.  CM reviewed the signs and symptoms of CKD including anorexia; fatigue; weakness; difficulty sleeping; confusion; frequent urination, especially at night; muscle cramps at night; edema; periorbital edema, especially in the morning; nausea and vomiting; itchy skin (pruritus); weight gain or decreased urine output.  CM encouraged patient to continue to use the pursed  lip breathing.  CM also reviewed guided imagery in detail with the patient and encouraged her to also try this for stress/anxiety.    Next steps:   Follow up on depression  Follow up on message to Kayli Villegas about 5/30 appt moved up  Follow up if opened [employer] talking scale  Follow up if following a low sodium diet/fluid intake  Follow up if reading nutritional labels  Follow up on S&S of CKD  Follow up on taking blood pressures several times a week  Follow up if drinking supplements or smoothies  Follow up on activity  Follow up on pursed lip breathing and guided imagery

## 2024-05-06 RX ORDER — HYDROXYCHLOROQUINE SULFATE 200 MG/1
TABLET, FILM COATED ORAL
Qty: 180 TABLET | Refills: 0 | Status: SHIPPED | OUTPATIENT
Start: 2024-05-06

## 2024-05-07 ENCOUNTER — TELEPHONE (OUTPATIENT)
Dept: INTERNAL MEDICINE | Facility: CLINIC | Age: 54
End: 2024-05-07
Payer: MEDICARE

## 2024-05-07 DIAGNOSIS — G89.4 CHRONIC PAIN SYNDROME: ICD-10-CM

## 2024-05-07 DIAGNOSIS — F11.20 CHRONIC NARCOTIC DEPENDENCE: ICD-10-CM

## 2024-05-07 RX ORDER — HYDROCODONE BITARTRATE AND ACETAMINOPHEN 5; 325 MG/1; MG/1
1 TABLET ORAL
Qty: 30 TABLET | Refills: 0 | Status: SHIPPED | OUTPATIENT
Start: 2024-05-07 | End: 2024-06-06 | Stop reason: SDUPTHER

## 2024-05-07 RX ORDER — AMLODIPINE BESYLATE 5 MG/1
5 TABLET ORAL DAILY
Qty: 90 TABLET | Refills: 3 | Status: SHIPPED | OUTPATIENT
Start: 2024-05-07

## 2024-05-07 NOTE — TELEPHONE ENCOUNTER
No care due was identified.  [facility] Embedded Care Due Messages. Reference number: [id number].   5/07/2024 8:44:03 AM CDT

## 2024-05-07 NOTE — TELEPHONE ENCOUNTER
Refill Routing Note   Medication(s) are not appropriate for processing by [facility] for the following reason(s):        Outside of protocol: non-delegated    [facility] action(s):  Route  Approve      Medication Therapy Plan:         Appointments  past 12m or future 3m with PCP    Date Provider   Last Visit   4/9/2024 Weeks, Jose ALONSO, DO   Next Visit   5/17/2024 Weeks, Jose AOLNSO, DO   ED visits in past 90 days: 0        Note composed:11:12 AM 05/07/2024

## 2024-05-07 NOTE — TELEPHONE ENCOUNTER
----- Message from Juanjose Benjamin sent at 5/7/2024 12:06 PM CDT -----  Name of Who is Calling:PENG MAYO [[id number]]                   What is the request in detail: PT wants a call back to know why she didn't get one of her meds please assist                   Can the clinic reply by [facility]: No                   What [facility] to [facility] Back [facility] not [facility]:[phone]

## 2024-05-08 NOTE — TELEPHONE ENCOUNTER
----- Message from Juanjose Benjamin sent at 5/7/2024 12:06 PM CDT -----  Name of Who is Calling:PENG MAYO [[id number]]                   What is the request in detail: PT wants a call back to know why she didn't get one of her meds please assist                   Can the clinic reply by [facility]: No                   What Number to [facility] Back [facility] not [facility]:[phone]

## 2024-05-13 PROBLEM — N39.0 RECURRENT UTI (URINARY TRACT INFECTION): Status: RESOLVED | Noted: 2017-09-20 | Resolved: 2024-05-13

## 2024-05-13 PROBLEM — N17.9 AKI (ACUTE KIDNEY INJURY): Status: RESOLVED | Noted: 2022-04-23 | Resolved: 2024-05-13

## 2024-05-20 ENCOUNTER — LAB VISIT (OUTPATIENT)
Dept: LAB | Facility: HOSPITAL | Age: 54
End: 2024-05-20
Payer: MEDICARE

## 2024-05-20 ENCOUNTER — OFFICE VISIT (OUTPATIENT)
Dept: HEMATOLOGY/ONCOLOGY | Facility: CLINIC | Age: 54
End: 2024-05-20
Payer: MEDICARE

## 2024-05-20 ENCOUNTER — TELEPHONE (OUTPATIENT)
Dept: HEMATOLOGY/ONCOLOGY | Facility: CLINIC | Age: 54
End: 2024-05-20

## 2024-05-20 VITALS
OXYGEN SATURATION: 98 % | RESPIRATION RATE: 18 BRPM | WEIGHT: 252.63 LBS | DIASTOLIC BLOOD PRESSURE: 69 MMHG | SYSTOLIC BLOOD PRESSURE: 110 MMHG | HEIGHT: 67 IN | BODY MASS INDEX: 39.65 KG/M2 | HEART RATE: 87 BPM | TEMPERATURE: 98 F

## 2024-05-20 DIAGNOSIS — E66.01 SEVERE OBESITY (BMI 35.0-35.9 WITH COMORBIDITY): ICD-10-CM

## 2024-05-20 DIAGNOSIS — D64.9 ANEMIA OF UNKNOWN ETIOLOGY: ICD-10-CM

## 2024-05-20 DIAGNOSIS — G43.709 CHRONIC MIGRAINE WITHOUT AURA WITHOUT STATUS MIGRAINOSUS, NOT INTRACTABLE: ICD-10-CM

## 2024-05-20 DIAGNOSIS — N18.32 STAGE 3B CHRONIC KIDNEY DISEASE: ICD-10-CM

## 2024-05-20 DIAGNOSIS — I10 ESSENTIAL HYPERTENSION: Chronic | ICD-10-CM

## 2024-05-20 DIAGNOSIS — F41.1 GAD (GENERALIZED ANXIETY DISORDER): Primary | ICD-10-CM

## 2024-05-20 DIAGNOSIS — D50.9 IRON DEFICIENCY ANEMIA, UNSPECIFIED IRON DEFICIENCY ANEMIA TYPE: ICD-10-CM

## 2024-05-20 LAB
BASOPHILS # BLD AUTO: 0.04 K/UL (ref 0–0.2)
BASOPHILS NFR BLD: 0.6 % (ref 0–1.9)
DIFFERENTIAL METHOD BLD: ABNORMAL
EOSINOPHIL # BLD AUTO: 0.2 K/UL (ref 0–0.5)
EOSINOPHIL NFR BLD: 3.4 % (ref 0–8)
ERYTHROCYTE [DISTWIDTH] IN BLOOD BY AUTOMATED COUNT: 14.5 % (ref 11.5–14.5)
FERRITIN SERPL-MCNC: 24 NG/ML (ref 20–300)
HCT VFR BLD AUTO: 33.4 % (ref 37–48.5)
HGB BLD-MCNC: 9.7 G/DL (ref 12–16)
IMM GRANULOCYTES # BLD AUTO: 0.02 K/UL (ref 0–0.04)
IMM GRANULOCYTES NFR BLD AUTO: 0.3 % (ref 0–0.5)
IRON SERPL-MCNC: 39 UG/DL (ref 30–160)
LYMPHOCYTES # BLD AUTO: 2.1 K/UL (ref 1–4.8)
LYMPHOCYTES NFR BLD: 32.9 % (ref 18–48)
MCH RBC QN AUTO: 25.1 PG (ref 27–31)
MCHC RBC AUTO-ENTMCNC: 29 G/DL (ref 32–36)
MCV RBC AUTO: 87 FL (ref 82–98)
MONOCYTES # BLD AUTO: 0.5 K/UL (ref 0.3–1)
MONOCYTES NFR BLD: 7.5 % (ref 4–15)
NEUTROPHILS # BLD AUTO: 3.6 K/UL (ref 1.8–7.7)
NEUTROPHILS NFR BLD: 55.3 % (ref 38–73)
NRBC BLD-RTO: 0 /100 WBC
PLATELET # BLD AUTO: 227 K/UL (ref 150–450)
PMV BLD AUTO: 12 FL (ref 9.2–12.9)
RBC # BLD AUTO: 3.86 M/UL (ref 4–5.4)
SATURATED IRON: 12 % (ref 20–50)
TOTAL IRON BINDING CAPACITY: 326 UG/DL (ref 250–450)
TRANSFERRIN SERPL-MCNC: 220 MG/DL (ref 200–375)
WBC # BLD AUTO: 6.51 K/UL (ref 3.9–12.7)

## 2024-05-20 PROCEDURE — 3074F SYST BP LT 130 MM HG: CPT | Mod: HCNC,CPTII,S$GLB, | Performed by: INTERNAL MEDICINE

## 2024-05-20 PROCEDURE — 82728 ASSAY OF FERRITIN: CPT | Mod: HCNC | Performed by: NURSE PRACTITIONER

## 2024-05-20 PROCEDURE — 83540 ASSAY OF IRON: CPT | Mod: HCNC | Performed by: NURSE PRACTITIONER

## 2024-05-20 PROCEDURE — 3008F BODY MASS INDEX DOCD: CPT | Mod: HCNC,CPTII,S$GLB, | Performed by: INTERNAL MEDICINE

## 2024-05-20 PROCEDURE — 36415 COLL VENOUS BLD VENIPUNCTURE: CPT | Mod: HCNC | Performed by: NURSE PRACTITIONER

## 2024-05-20 PROCEDURE — 85025 COMPLETE CBC W/AUTO DIFF WBC: CPT | Mod: HCNC | Performed by: NURSE PRACTITIONER

## 2024-05-20 PROCEDURE — 4010F ACE/ARB THERAPY RXD/TAKEN: CPT | Mod: HCNC,CPTII,S$GLB, | Performed by: INTERNAL MEDICINE

## 2024-05-20 PROCEDURE — 99213 OFFICE O/P EST LOW 20 MIN: CPT | Mod: HCNC,S$GLB,, | Performed by: INTERNAL MEDICINE

## 2024-05-20 PROCEDURE — 1159F MED LIST DOCD IN RCRD: CPT | Mod: HCNC,CPTII,S$GLB, | Performed by: INTERNAL MEDICINE

## 2024-05-20 PROCEDURE — 3078F DIAST BP <80 MM HG: CPT | Mod: HCNC,CPTII,S$GLB, | Performed by: INTERNAL MEDICINE

## 2024-05-20 PROCEDURE — 99999 PR PBB SHADOW E&M-EST. PATIENT-LVL IV: CPT | Mod: PBBFAC,HCNC,, | Performed by: INTERNAL MEDICINE

## 2024-05-20 PROCEDURE — 3044F HG A1C LEVEL LT 7.0%: CPT | Mod: HCNC,CPTII,S$GLB, | Performed by: INTERNAL MEDICINE

## 2024-05-20 NOTE — TELEPHONE ENCOUNTER
----- Message from Birgit Hollingsworth sent at 5/20/2024  9:59 AM CDT -----  Regarding: Results  Contact: Pt  [phone]            Name of Caller:  Jean Claude     Contact Preference: [phone]    Nature of Call:  Requesting a call to go over results and to get next steps

## 2024-05-20 NOTE — PROGRESS NOTES
CC: Anemia, hematology follow up        HPI: Ms. Bajwa is a 53-year-old woman here for follow up for anemia.  She has been evaluated previously here by glenn Giraldo with a bone marrow biopsy. Her hemoglobin in 2011 was 13.2.  Since May 2012,  hemoglobin values have been between 10.1-11.6. She has a diagnosis of Sjogren syndrome. Bone marrow biopsy in 2014 was naagtive for malignancy/ dysplasia. MDS FISH was negative.  She had hysterectomy and no ongoing menstrual losses. No s/s of mal-absorption. No recent weight loss. No overt bleeding. No melena. She has fatigue. She has heart burn.        Interval history: She is here for a follow up.  She received iv injectafer on 9/30 and 10/7/20, iv venofer between 3/3/22-4/21/22( 8 treatments, cumulative iron dose of 1600 mg) and again, between Nov 2023-Jan 2024 ( 8 infusions/ 1600mg cumulatively)  She denies upper or lower GI bleeding, hematuria, epistaxis or hemoptysis.     Review of Systems   Constitutional:  Positive for malaise/fatigue. Negative for chills, fever and weight loss.   HENT:  Negative for ear discharge, ear pain, hearing loss and tinnitus.    Eyes:  Negative for double vision, photophobia and discharge.   Cardiovascular:  Negative for chest pain, palpitations and orthopnea.   Gastrointestinal:  Negative for blood in stool, heartburn, melena, nausea and vomiting.   Genitourinary:  Negative for frequency and urgency.   Musculoskeletal:  Negative for back pain, myalgias and neck pain.   Neurological:  Negative for tingling, tremors, sensory change, speech change and focal weakness.   Endo/Heme/Allergies:  Negative for environmental allergies. Does not bruise/bleed easily.   Psychiatric/Behavioral:  Negative for depression, hallucinations, substance abuse and suicidal ideas. The patient is not nervous/anxious.      Current Outpatient Medications   Medication Sig    amLODIPine (NORVASC) 5 MG tablet Take 1 tablet (5 mg total) by mouth once daily.     buPROPion (WELLBUTRIN XL) 150 MG TB24 tablet TAKE 3 TABLETS(450 MG) BY MOUTH EVERY MORNING    busPIRone (BUSPAR) 15 MG tablet TAKE 1 TABLET(15 MG) BY MOUTH THREE TIMES DAILY    butalbital-acetaminophen-caffeine -40 mg (FIORICET, ESGIC) -40 mg per tablet Take 1 tablet by mouth every 6 (six) hours as needed for Headaches.    cycloSPORINE (RESTASIS) 0.05 % ophthalmic emulsion INSTILL 1 DROP IN BOTH EYES TWICE DAILY    docusate sodium (COLACE) 100 MG capsule Take 1 capsule (100 mg total) by mouth 2 (two) times daily as needed for Constipation.    doxepin (SINEQUAN) 75 MG capsule TAKE 2 CAPSULES(150 MG) BY MOUTH EVERY EVENING    estradiol 0.025 mg/24 hr 0.025 mg/24 hr Place 1 patch onto the skin once a week.    fluconazole (DIFLUCAN) 150 MG Tab Take 1 tablet (150 mg total) by mouth every 72 hours as needed (yeast infection symptoms).    fluticasone propionate (FLONASE) 50 mcg/actuation nasal spray SHAKE LIQUID AND USE 1 SPRAY(50 MCG) IN EACH NOSTRIL EVERY DAY (Patient taking differently: 1 spray by Each Nostril route as needed for Rhinitis or Allergies.)    gabapentin (NEURONTIN) 300 MG capsule TAKE 1 CAPSULE(300 MG) BY MOUTH THREE TIMES DAILY    hydroCHLOROthiazide (HYDRODIURIL) 12.5 MG Tab TAKE 1 TABLET(12.5 MG) BY MOUTH EVERY DAY FOR BLOOD PRESSURE    HYDROcodone-acetaminophen (NORCO) 5-325 mg per tablet Take 1 tablet by mouth every 24 hours as needed for Pain. Quantity medically necessary    hydroxychloroquine (PLAQUENIL) 200 mg tablet TAKE 1 TABLET BY MOUTH TWICE DAILY    irbesartan (AVAPRO) 300 MG tablet Take 1 tablet (300 mg total) by mouth every evening.    lurasidone (LATUDA) 60 mg Tab tablet TAKE 1 TABLET(60 MG) BY MOUTH EVERY DAY    naproxen (NAPROSYN) 500 MG tablet Take 1 tablet (500 mg total) by mouth daily as needed (pain).    NARCAN 4 mg/actuation Elrod SPRAY 1 SPRAY IN NOSTRIL ONCE FOR 1 DOSE    nystatin (MYCOSTATIN) ointment Apply topically 2 (two) times daily.    omeprazole (PRILOSEC) 40 MG  capsule Take 1 capsule (40 mg total) by mouth once daily.    tiZANidine (ZANAFLEX) 4 MG tablet Take 2 tablets (8 mg total) by mouth 3 (three) times daily.    VENOFER 100 mg iron/5 mL injection     diazePAM (VALIUM) 2 MG tablet Take 1 tablet (2 mg total) by mouth daily as needed (Severe anxiety/Panic). 15 tablets to last 3 months     No current facility-administered medications for this visit.          Vitals:    05/20/24 0835   BP: 110/69   Pulse: 87   Resp: 18   Temp: 97.6 °F (36.4 °C)       Physical Exam  Constitutional:       Appearance: She is obese.   HENT:      Head: Normocephalic and atraumatic.   Eyes:      General: No scleral icterus.  Cardiovascular:      Rate and Rhythm: Normal rate and regular rhythm.      Pulses: Normal pulses.      Heart sounds: Normal heart sounds. No murmur heard.  Pulmonary:      Effort: Pulmonary effort is normal.      Breath sounds: No stridor. No rhonchi.   Abdominal:      General: There is no distension.      Palpations: There is no mass.      Hernia: No hernia is present.   Skin:     Coloration: Skin is not jaundiced.   Neurological:      Mental Status: She is alert.          8/12/14 BONE MARROW ASPIRATE, BONE MARROW CLOT, AND BONE MARROW CORE BIOPSY WITH:     CELLULARITY=70-80%, TRILINEAGE HEMATOPOIETIC ACTIVITY (M/E=2:1).  DECREASED STORAGE IRON.  ADEQUATE NUMBER OF MEGAKARYOCYTES.  COMMENT: Flow cytometry analysis of bone marrow aspirate shows lymph gate(11.5%) containing T and B cells. No B cell clonality or T-cell aberrancy is evident. Blast gate is not increased. Correlate clinically and with a  cytogenetics report.     No significant increased reticular fibrosis is evident by special stain(reticulin) with adequate positive and negative controls.     Bone marrow karyotype results: 46, XX [20], female karyotype.     FISH studies for the MDS panel are normal     Lab Results   Component Value Date    WBC 6.51 05/20/2024    HGB 9.7 (L) 05/20/2024    HCT 33.4 (L) 05/20/2024     MCV 87 05/20/2024     05/20/2024       Lab Results   Component Value Date    IRON 26 (L) 12/14/2023    TRANSFERRIN 236 12/14/2023    TIBC 349 12/14/2023    FESATURATED 7 (L) 12/14/2023       Lab Results   Component Value Date    FERRITIN 86 12/14/2023        Assessment:     1. Microcytic anemia  2. Iron deficiency  3. Chronic fatigue  4. Fibromyalgia  5. Sjogren syndrome  6. Essential HTn        Plan:     1,2: She has been evaluated previously here by , including with a bone marrow biopsy. Her hemoglobin in 2011 was 13.2g/dl . Since May 2012,  hemoglobin values have been between 10.1-11.6. She has a diagnosis of Sjogren syndrome. Bone marrow biopsy in 2014 was negative for malignancy/ dysplasia. MDS FISH was negative.  She had hysterectomy and has no ongoing menstrual losses. No s/s of mal-absorption. Diet is normal/regular.   No recent weight loss. No overt bleeding. No melena. She has fatigue and heart burn.  Stool OB negative in Jan 2020. UA negative for blood/ RBC in Aug 2020.    Last PAP smear was in 2016, and was negative.  She follows with GYN here, last evaluation in Sept 2020.   She received iv injectafer on 9/30 and 10/7/20. GIGI previously negative.She received iv venofer between 3/3/22-4/21/22( 8 treatments, cumulative iron dose of 1600 mg).           4,5: on Plaquenil. Follows with rheumatology.         6. Follows with her PCP            BMT Chart Routing      Follow up with physician    Follow up with [facility] 3 months.   Provider visit type    Infusion scheduling note    Injection scheduling note    Labs CBC, ferritin and iron and TIBC   Scheduling:  Preferred lab:  Lab interval:     Imaging    Pharmacy appointment    Other referrals

## 2024-05-20 NOTE — TELEPHONE ENCOUNTER
Spoke to pt via phone and informed her that Dr. Wren said no need for iron infusions at this time, f/u in 3 months with labs prior to appt.

## 2024-05-22 ENCOUNTER — OUTPATIENT CASE MANAGEMENT (OUTPATIENT)
Dept: ADMINISTRATIVE | Facility: OTHER | Age: 54
End: 2024-05-22
Payer: MEDICARE

## 2024-05-22 NOTE — LETTER
Jaki Bajwa  [address] [facility] [address]      Dear Jaki Bajwa,     I am your nurse with [facility] Department. I was unsuccessful in reaching you today. At your earliest convenience, I would like to discuss your healthcare progress.      Please contact me at [phone] from 8:00AM to 4:30 PM on Monday thru Friday.     As you know[facility] offered to you through [facility] where a nurse is available 24/7 to answer questions or provide medical advice, their number is [phone].    Thanks,    Benita Machuca, RN  Outpatient Care Management

## 2024-05-27 ENCOUNTER — OFFICE VISIT (OUTPATIENT)
Dept: PSYCHIATRY | Facility: CLINIC | Age: 54
End: 2024-05-27
Payer: MEDICARE

## 2024-05-27 DIAGNOSIS — F41.1 GAD (GENERALIZED ANXIETY DISORDER): Primary | ICD-10-CM

## 2024-05-27 DIAGNOSIS — F99 INSOMNIA DUE TO OTHER MENTAL DISORDER: ICD-10-CM

## 2024-05-27 DIAGNOSIS — F63.89 INTERNET ADDICTION: ICD-10-CM

## 2024-05-27 DIAGNOSIS — F41.0 PANIC DISORDER: ICD-10-CM

## 2024-05-27 DIAGNOSIS — F51.05 INSOMNIA DUE TO OTHER MENTAL DISORDER: ICD-10-CM

## 2024-05-27 DIAGNOSIS — F39 MOOD DISORDER: ICD-10-CM

## 2024-05-27 DIAGNOSIS — M79.7 FIBROMYALGIA: ICD-10-CM

## 2024-05-27 DIAGNOSIS — F40.10 SOCIAL ANXIETY DISORDER: ICD-10-CM

## 2024-05-27 PROCEDURE — 3044F HG A1C LEVEL LT 7.0%: CPT | Mod: HCNC,CPTII,95, | Performed by: INTERNAL MEDICINE

## 2024-05-27 PROCEDURE — 99214 OFFICE O/P EST MOD 30 MIN: CPT | Mod: HCNC,95,, | Performed by: INTERNAL MEDICINE

## 2024-05-27 PROCEDURE — 4010F ACE/ARB THERAPY RXD/TAKEN: CPT | Mod: HCNC,CPTII,95, | Performed by: INTERNAL MEDICINE

## 2024-05-27 PROCEDURE — 1160F RVW MEDS BY RX/DR IN RCRD: CPT | Mod: HCNC,CPTII,95, | Performed by: INTERNAL MEDICINE

## 2024-05-27 PROCEDURE — 1159F MED LIST DOCD IN RCRD: CPT | Mod: HCNC,CPTII,95, | Performed by: INTERNAL MEDICINE

## 2024-05-27 RX ORDER — DIAZEPAM 2 MG/1
2 TABLET ORAL DAILY PRN
Qty: 15 TABLET | Refills: 0 | Status: SHIPPED | OUTPATIENT
Start: 2024-05-27 | End: 2024-06-26

## 2024-05-27 NOTE — PROGRESS NOTES
OUTPATIENT PSYCHIATRY RETURN VISIT    ENCOUNTER DATE:  6/3/2024  SITE:  [facility], [facility]  LENGTH OF SESSION:  10 minutes    The patient location is:  [place], not in a healthcare facility  Visit type:  audiovisual    Face to Face time with patient:  10 minutes  15 minutes of total time spent on the encounter, which includes face to face time and non-face to face time preparing to see the patient (eg, review of tests), Obtaining and/or reviewing separately obtained history, Documenting clinical information in the electronic or other health record, Independently interpreting results (not separately reported) and communicating results to the patient/family/caregiver, or Care coordination (not separately reported).     Each patient to whom he or she provides medical services by telemedicine is:  (1) informed of the relationship between the physician and patient and the respective role of any other health care provider with respect to management of the patient; and (2) notified that he or she may decline to receive medical services by telemedicine and may withdraw from such care at any time.    CHIEF COMPLAINT:  Anxiety and Mood      HISTORY OF PRESENTING ILLNESS:  Jaki Bajwa is a 53 y.o. female with history of Mood disorder, JUAN JOSE, OCD, social phobia, and Internet shopping addiction who presents for follow up appointment.      Plan at last appointment on 4/11/2024:  Patient feels that her current symptoms are due to menopausal symptoms.  She has upcoming appointment with [facility] - HUMA will send her a message to see if she can see patient sooner.  Continue Latuda 60mg daily, Wellbutrin XL 450mg daily, Buspar 15mg BID-TID, Doxepin 150mg qHS, and Valium 2mg PRN panic attack (#5 tablets per month).  Discussed with patient informed consent, risks versus benefits, alternative treatments, side effect profile and the inherent unpredictability of individual responses to these treatments.  The  patient expresses understanding of the above and displays the capacity to agree with this current plan.   Continue individual therapy with Mr. Zamora, LCSW.    History as told by patient:  Saw ObGyn and they put her back on the patch.  Has only been on for about a month.  Still having night sweats and issues sleeping.  Still having some depression and anxiety.  Mood is better than last appointment - not crying anymore, does have hope that the patch will help.  Yesterday felt down and just couldn't get out of it.  Today is a little bit better.  Still overwhelming keeping grandson a few times per week - 12 hours at a time.  But her  has been helping too.  She and Scott have been arguing.  She feels she picks at him a lot when she is down.      Medication side effects:  Denies  Medication compliance:  Yes    PSYCHIATRIC REVIEW OF SYSTEMS:  Trouble with sleep:  Yes, night sweats  Appetite changes:  Decreased  Weight changes:  Denies  Lack of energy:  Yes  Anhedonia:  Yes  Somatic symptoms:  Chronic pain  Libido:  Denies  Anxiety/panic:  Yes but stable  Guilty/hopeless:  Denies  Self-injurious behavior/risky behavior:  Denies  Any drugs:  Denies  Alcohol:  Denies    MEDICAL REVIEW OF SYSTEMS:  Complete review of systems performed covering Constitutional, Musculoskeletal, Neurologic.  All systems negative except for that covered in HPI.    PAST PSYCHIATRIC, MEDICAL, AND SOCIAL HISTORY REVIEWED  The patient's past medical, family and social history have been reviewed and updated as appropriate within the electronic medical record - see encounter notes.    MEDICATIONS:    Current Outpatient Medications:     amLODIPine (NORVASC) 5 MG tablet, Take 1 tablet (5 mg total) by mouth once daily., Disp: 90 tablet, Rfl: 3    buPROPion (WELLBUTRIN XL) 150 MG TB24 tablet, TAKE 3 TABLETS(450 MG) BY MOUTH EVERY MORNING, Disp: 270 tablet, Rfl: 1    busPIRone (BUSPAR) 15 MG tablet, TAKE 1 TABLET(15 MG) BY MOUTH THREE TIMES  DAILY, Disp: 270 tablet, Rfl: 1    butalbital-acetaminophen-caffeine -40 mg (FIORICET, ESGIC) -40 mg per tablet, Take 1 tablet by mouth every 6 (six) hours as needed for Headaches., Disp: 40 tablet, Rfl: 2    cycloSPORINE (RESTASIS) 0.05 % ophthalmic emulsion, INSTILL 1 DROP IN BOTH EYES TWICE DAILY, Disp: 180 each, Rfl: 1    diazePAM (VALIUM) 2 MG tablet, Take 1 tablet (2 mg total) by mouth daily as needed (Severe anxiety/Panic). 15 tablets to last 3 months, Disp: 15 tablet, Rfl: 0    docusate sodium (COLACE) 100 MG capsule, Take 1 capsule (100 mg total) by mouth 2 (two) times daily as needed for Constipation., Disp: 60 capsule, Rfl: 0    doxepin (SINEQUAN) 75 MG capsule, TAKE 2 CAPSULES(150 MG) BY MOUTH EVERY EVENING, Disp: 60 capsule, Rfl: 11    estradiol 0.025 mg/24 hr 0.025 mg/24 hr, Place 1 patch onto the skin once a week., Disp: 4 patch, Rfl: 11    fluconazole (DIFLUCAN) 150 MG Tab, Take 1 tablet (150 mg total) by mouth every 72 hours as needed (yeast infection symptoms)., Disp: 2 tablet, Rfl: 0    fluticasone propionate (FLONASE) 50 mcg/actuation nasal spray, SHAKE LIQUID AND USE 1 SPRAY(50 MCG) IN EACH NOSTRIL EVERY DAY (Patient taking differently: 1 spray by Each Nostril route as needed for Rhinitis or Allergies.), Disp: 32 g, Rfl: 3    gabapentin (NEURONTIN) 300 MG capsule, TAKE 1 CAPSULE(300 MG) BY MOUTH THREE TIMES DAILY, Disp: 90 capsule, Rfl: 5    hydroCHLOROthiazide (HYDRODIURIL) 12.5 MG Tab, TAKE 1 TABLET(12.5 MG) BY MOUTH EVERY DAY FOR BLOOD PRESSURE, Disp: 90 tablet, Rfl: 3    HYDROcodone-acetaminophen (NORCO) 5-325 mg per tablet, Take 1 tablet by mouth every 24 hours as needed for Pain. Quantity medically necessary, Disp: 30 tablet, Rfl: 0    hydroxychloroquine (PLAQUENIL) 200 mg tablet, TAKE 1 TABLET BY MOUTH TWICE DAILY, Disp: 180 tablet, Rfl: 0    irbesartan (AVAPRO) 300 MG tablet, Take 1 tablet (300 mg total) by mouth every evening., Disp: 90 tablet, Rfl: 3    lurasidone (LATUDA)  "60 mg Tab tablet, TAKE 1 TABLET(60 MG) BY MOUTH EVERY DAY, Disp: 90 tablet, Rfl: 3    naproxen (NAPROSYN) 500 MG tablet, TAKE 1 TABLET(500 MG) BY MOUTH DAILY AS NEEDED FOR PAIN, Disp: 60 tablet, Rfl: 1    NARCAN 4 mg/actuation [place], SPRAY 1 SPRAY IN NOSTRIL ONCE FOR 1 DOSE, Disp: , Rfl:     nystatin (MYCOSTATIN) ointment, Apply topically 2 (two) times daily., Disp: 15 g, Rfl: 1    omeprazole (PRILOSEC) 40 MG capsule, Take 1 capsule (40 mg total) by mouth once daily., Disp: 90 capsule, Rfl: 3    tiZANidine (ZANAFLEX) 4 MG tablet, Take 2 tablets (8 mg total) by mouth 3 (three) times daily., Disp: 90 tablet, Rfl: 0    VENOFER 100 mg iron/5 mL injection, , Disp: , Rfl:     ALLERGIES:  Review of patient's allergies indicates:   Allergen Reactions    Aspirin Hives       PSYCHIATRIC EXAM:  There were no vitals filed for this visit.    Appearance:  Well groomed, appearing healthy and of stated age  Behavior:  Cooperative, pleasant, no psychomotor agitation or retardation  Speech:  Normal rate, rhythm, prosody, and volume  Mood:  "A little better"  Affect:  Euthymic  Thought Process:  Linear, logical, goal directed  Thought Content:  Negative for suicidal ideation, homicidal ideation, delusions or hallucinations.  Associations:  Intact  Memory:  Grossly Intact  Level of Consciousness/Orientation:  Grossly intact  Fund of Knowledge:  Good  Attention:  Good  Language:  Fluent, able to name abstract and concrete objects  Insight:  Fair  Judgment:  Intact  Psychomotor signs:  No abnormal movements of face  Gait:  Unable to assess via virtual visit      RELEVANT LABS/STUDIES:    Lab Results   Component Value Date    WBC 6.51 05/20/2024    HGB 9.7 (L) 05/20/2024    HCT 33.4 (L) 05/20/2024    MCV 87 05/20/2024     05/20/2024     BMP  Lab Results   Component Value Date     04/03/2024    K 4.4 04/03/2024     04/03/2024    CO2 28 04/03/2024    BUN 16 04/03/2024    CREATININE 1.2 04/03/2024    CALCIUM 9.7 04/03/2024 "    ANIONGAP 7 (L) 04/03/2024    ESTGFRAFRICA 45.5 (A) 06/15/2022    EGFRNONAA 39.5 (A) 06/15/2022     Lab Results   Component Value Date    ALT 16 04/03/2024    AST 17 04/03/2024    ALKPHOS 74 04/03/2024    BILITOT 0.2 04/03/2024     Lab Results   Component Value Date    TSH 2.563 09/22/2021     Lab Results   Component Value Date    HGBA1C 4.9 02/13/2024       IMPRESSION:    Jaki Bajwa is a 53 y.o. female with history of Mood disorder, JUAN JOSE, OCD, social phobia, and Internet shopping addiction who presents for follow up appointment.    Status/Progress:  Based on the examination today, the patient's problem(s) is/are inadequately controlled.  New problems have been presented today.    Risk Parameters:  Patient reports no suicidal ideation  Patient reports no homicidal ideation  Patient reports no self-injurious behavior  Patient reports no violent behavior        DIAGNOSES:    ICD-10-CM ICD-9-CM   1. JUAN JOSE (generalized anxiety disorder)  F41.1 300.02   2. Social anxiety disorder  F40.10 300.23   3. Panic disorder  F41.0 300.01   4. Internet shopping addiction  F63.89 301.89   5. Fibromyalgia  M79.7 729.1   6. Insomnia due to other mental disorder  F51.05 300.9    F99 327.02   7. Mood disorder  F39 296.90         PLAN:  No medication changes today.  She would like to wait a few months to see if hormone replacement is helpful.  Continue Latuda 60mg daily, Wellbutrin XL 450mg daily, Buspar 15mg BID-TID, Doxepin 150mg qHS, and Valium 2mg PRN panic attack (#5 tablets per month).  Discussed with patient informed consent, risks versus benefits, alternative treatments, side effect profile and the inherent unpredictability of individual responses to these treatments.  The patient expresses understanding of the above and displays the capacity to agree with this current plan.   Continue individual therapy with Mr. WillamiraHALINA.    RETURN TO CLINIC:  Follow up in about 2 months (around 7/27/2024).

## 2024-05-31 RX ORDER — TIZANIDINE 4 MG/1
8 TABLET ORAL 3 TIMES DAILY
Qty: 90 TABLET | Refills: 0 | Status: SHIPPED | OUTPATIENT
Start: 2024-05-31 | End: 2024-06-18 | Stop reason: SDUPTHER

## 2024-05-31 RX ORDER — NAPROXEN 500 MG/1
TABLET ORAL
Qty: 60 TABLET | Refills: 1 | Status: SHIPPED | OUTPATIENT
Start: 2024-05-31

## 2024-05-31 NOTE — TELEPHONE ENCOUNTER
No care due was identified.  [facility] Embedded Care Due Messages. Reference number: [id number].   5/31/2024 3:43:27 AM CDT

## 2024-06-05 ENCOUNTER — OUTPATIENT CASE MANAGEMENT (OUTPATIENT)
Dept: ADMINISTRATIVE | Facility: OTHER | Age: 54
End: 2024-06-05
Payer: MEDICARE

## 2024-06-05 NOTE — PROGRESS NOTES
Outpatient Care Management  Plan of Care Follow Up Visit    Patient: Jaki Bajwa  MRN: [id number]  Date of Service: 06/05/2024  Completed by: Yaneli Machuca RN  Referral Date: 02/14/2024    Reason for Visit   Patient presents with    Case Closure       Brief Summary: Case closure, unable to reach patient.

## 2024-06-06 DIAGNOSIS — G89.4 CHRONIC PAIN SYNDROME: ICD-10-CM

## 2024-06-06 DIAGNOSIS — F11.20 CHRONIC NARCOTIC DEPENDENCE: ICD-10-CM

## 2024-06-06 RX ORDER — HYDROCODONE BITARTRATE AND ACETAMINOPHEN 5; 325 MG/1; MG/1
1 TABLET ORAL
Qty: 30 TABLET | Refills: 0 | Status: SHIPPED | OUTPATIENT
Start: 2024-06-06

## 2024-06-06 NOTE — TELEPHONE ENCOUNTER
No care due was identified.  [facility] Embedded Care Due Messages. Reference number: [id number].   6/06/2024 8:22:59 AM CDT

## 2024-06-09 DIAGNOSIS — F39 MOOD DISORDER: ICD-10-CM

## 2024-06-10 ENCOUNTER — PATIENT MESSAGE (OUTPATIENT)
Dept: INTERNAL MEDICINE | Facility: CLINIC | Age: 54
End: 2024-06-10
Payer: MEDICARE

## 2024-06-10 RX ORDER — LURASIDONE HYDROCHLORIDE 60 MG/1
TABLET, FILM COATED ORAL
Qty: 90 TABLET | Refills: 3 | Status: SHIPPED | OUTPATIENT
Start: 2024-06-10

## 2024-06-11 ENCOUNTER — OFFICE VISIT (OUTPATIENT)
Dept: PSYCHIATRY | Facility: CLINIC | Age: 54
End: 2024-06-11
Payer: MEDICARE

## 2024-06-11 DIAGNOSIS — F41.0 PANIC DISORDER: ICD-10-CM

## 2024-06-11 DIAGNOSIS — F41.1 GAD (GENERALIZED ANXIETY DISORDER): Primary | ICD-10-CM

## 2024-06-11 DIAGNOSIS — F40.10 SOCIAL ANXIETY DISORDER: ICD-10-CM

## 2024-06-11 PROCEDURE — 3044F HG A1C LEVEL LT 7.0%: CPT | Mod: HCNC,CPTII,95, | Performed by: SOCIAL WORKER

## 2024-06-11 PROCEDURE — 4010F ACE/ARB THERAPY RXD/TAKEN: CPT | Mod: HCNC,CPTII,95, | Performed by: SOCIAL WORKER

## 2024-06-11 PROCEDURE — 90832 PSYTX W PT 30 MINUTES: CPT | Mod: HCNC,95,, | Performed by: SOCIAL WORKER
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
## 2024-06-14 ENCOUNTER — TELEPHONE (OUTPATIENT)
Dept: INTERNAL MEDICINE | Facility: CLINIC | Age: 54
End: 2024-06-14
Payer: MEDICARE

## 2024-06-14 DIAGNOSIS — R35.0 FREQUENT URINATION: Primary | ICD-10-CM

## 2024-06-14 NOTE — TELEPHONE ENCOUNTER
----- Message from Nicole Andrews sent at 6/14/2024  8:08 AM CDT -----  Type : Patient Call          Who Called : Patient          Does the patient know what this is regarding?: Pt annual is scheduled for the 31st ; pt is wanting to move annual to the 7/17 ; Pt took home UTI test , pts results was positive for UTI; pt feels she may possibly have a UTI ; pt is requesting orders be placed so that she can see if its effecting her kidneys ;  please advise    Symptoms   Frequent urination   Rapid heartbeat  Chills  Swelling in ankles            Would the patient rather a call back or a response via [facility]? Call                Best Call Back Number: [phone]              Additional Information:

## 2024-06-15 ENCOUNTER — LAB VISIT (OUTPATIENT)
Dept: LAB | Facility: HOSPITAL | Age: 54
End: 2024-06-15
Attending: FAMILY MEDICINE
Payer: MEDICARE

## 2024-06-15 DIAGNOSIS — R35.0 FREQUENT URINATION: ICD-10-CM

## 2024-06-15 LAB
BACTERIA #/AREA URNS AUTO: NORMAL /HPF
MICROSCOPIC COMMENT: NORMAL
RBC #/AREA URNS AUTO: 0 /HPF (ref 0–4)
SQUAMOUS #/AREA URNS AUTO: 1 /HPF
WBC #/AREA URNS AUTO: 1 /HPF (ref 0–5)

## 2024-06-15 PROCEDURE — 81001 URINALYSIS AUTO W/SCOPE: CPT | Mod: HCNC | Performed by: FAMILY MEDICINE

## 2024-06-18 RX ORDER — TIZANIDINE 4 MG/1
8 TABLET ORAL 3 TIMES DAILY
Qty: 90 TABLET | Refills: 0 | Status: SHIPPED | OUTPATIENT
Start: 2024-06-18

## 2024-06-18 NOTE — TELEPHONE ENCOUNTER
----- Message from Emilia Srivastava sent at 6/18/2024 12:54 PM CDT -----  Regarding: refill  Contact: @ [phone]  Pt is calling to get a refill on the following tiZANidine (ZANAFLEX) 4 MG tablet...Please call and adv @ [phone]    [id number] [place], [place] - [address] AT [facility] [address]  [facility]  [facility] [address]  Phone: [phone] Fax: [phone]

## 2024-06-19 ENCOUNTER — OFFICE VISIT (OUTPATIENT)
Dept: INTERNAL MEDICINE | Facility: CLINIC | Age: 54
End: 2024-06-19
Payer: MEDICARE

## 2024-06-19 ENCOUNTER — LAB VISIT (OUTPATIENT)
Dept: LAB | Facility: OTHER | Age: 54
End: 2024-06-19
Attending: FAMILY MEDICINE
Payer: MEDICARE

## 2024-06-19 VITALS
OXYGEN SATURATION: 98 % | BODY MASS INDEX: 38.4 KG/M2 | SYSTOLIC BLOOD PRESSURE: 121 MMHG | HEART RATE: 104 BPM | DIASTOLIC BLOOD PRESSURE: 83 MMHG | WEIGHT: 244.69 LBS | HEIGHT: 67 IN

## 2024-06-19 DIAGNOSIS — Z79.891 LONG TERM (CURRENT) USE OF OPIATE ANALGESIC: ICD-10-CM

## 2024-06-19 DIAGNOSIS — B37.2 CANDIDAL SKIN INFECTION: ICD-10-CM

## 2024-06-19 DIAGNOSIS — B37.9 YEAST INFECTION: ICD-10-CM

## 2024-06-19 DIAGNOSIS — G89.4 CHRONIC PAIN SYNDROME: ICD-10-CM

## 2024-06-19 DIAGNOSIS — N18.32 STAGE 3B CHRONIC KIDNEY DISEASE: ICD-10-CM

## 2024-06-19 DIAGNOSIS — R30.0 DYSURIA: ICD-10-CM

## 2024-06-19 DIAGNOSIS — R30.0 DYSURIA: Primary | ICD-10-CM

## 2024-06-19 LAB
ALBUMIN/CREAT UR: 8.3 UG/MG (ref 0–30)
CREAT UR-MCNC: 72.5 MG/DL (ref 15–325)
MICROALBUMIN UR DL<=1MG/L-MCNC: 6 UG/ML

## 2024-06-19 PROCEDURE — 3061F NEG MICROALBUMINURIA REV: CPT | Mod: HCNC,CPTII,S$GLB, | Performed by: FAMILY MEDICINE

## 2024-06-19 PROCEDURE — 80364 OPIOID &OPIATE ANALOG 5/MORE: CPT | Mod: HCNC | Performed by: FAMILY MEDICINE

## 2024-06-19 PROCEDURE — 4010F ACE/ARB THERAPY RXD/TAKEN: CPT | Mod: HCNC,CPTII,S$GLB, | Performed by: FAMILY MEDICINE

## 2024-06-19 PROCEDURE — 3074F SYST BP LT 130 MM HG: CPT | Mod: HCNC,CPTII,S$GLB, | Performed by: FAMILY MEDICINE

## 2024-06-19 PROCEDURE — 99999 PR PBB SHADOW E&M-EST. PATIENT-LVL IV: CPT | Mod: PBBFAC,HCNC,, | Performed by: FAMILY MEDICINE

## 2024-06-19 PROCEDURE — 3066F NEPHROPATHY DOC TX: CPT | Mod: HCNC,CPTII,S$GLB, | Performed by: FAMILY MEDICINE

## 2024-06-19 PROCEDURE — 82570 ASSAY OF URINE CREATININE: CPT | Mod: 59,HCNC | Performed by: FAMILY MEDICINE

## 2024-06-19 PROCEDURE — 1159F MED LIST DOCD IN RCRD: CPT | Mod: HCNC,CPTII,S$GLB, | Performed by: FAMILY MEDICINE

## 2024-06-19 PROCEDURE — 3008F BODY MASS INDEX DOCD: CPT | Mod: HCNC,CPTII,S$GLB, | Performed by: FAMILY MEDICINE

## 2024-06-19 PROCEDURE — 3044F HG A1C LEVEL LT 7.0%: CPT | Mod: HCNC,CPTII,S$GLB, | Performed by: FAMILY MEDICINE

## 2024-06-19 PROCEDURE — 3079F DIAST BP 80-89 MM HG: CPT | Mod: HCNC,CPTII,S$GLB, | Performed by: FAMILY MEDICINE

## 2024-06-19 PROCEDURE — 87086 URINE CULTURE/COLONY COUNT: CPT | Mod: HCNC | Performed by: FAMILY MEDICINE

## 2024-06-19 PROCEDURE — 80326 AMPHETAMINES 5 OR MORE: CPT | Mod: HCNC | Performed by: FAMILY MEDICINE

## 2024-06-19 PROCEDURE — 99214 OFFICE O/P EST MOD 30 MIN: CPT | Mod: HCNC,S$GLB,, | Performed by: FAMILY MEDICINE

## 2024-06-19 RX ORDER — CLOTRIMAZOLE 1 %
CREAM (GRAM) TOPICAL 2 TIMES DAILY
Qty: 28 G | Refills: 2 | Status: SHIPPED | OUTPATIENT
Start: 2024-06-19

## 2024-06-19 RX ORDER — FLUCONAZOLE 150 MG/1
150 TABLET ORAL
Qty: 2 TABLET | Refills: 0 | Status: SHIPPED | OUTPATIENT
Start: 2024-06-19

## 2024-06-19 NOTE — PROGRESS NOTES
Subjective:       Patient ID: Jaki Bajwa is a 53 y.o. female.    Chief Complaint: Follow-up (UTI Results)    Follow-up  Pertinent negatives include no abdominal pain, chest pain, chills, coughing, fever or rash.     History of Present Illness  The patient presents for evaluation of urinary symptoms.    The patient began experiencing urinary symptoms last week, prompting her to conduct a home test which had suggested positive nitrites. But urinalysis in clinic did not show this.. Prior to this, she had not initiated any pharmacological treatment. Currently, she continues to experience urinary urgency, with difficulty reaching the bathroom in time. She experienced edema in her ankles, which has since subsided. She also experienced back and side aches. The edema ceased, however, she began experiencing headaches. She suspects a possible yeast infection, but denies any discharge. She has a history of a yeast infection beneath her breasts, which is currently healing. However, she continues to experience pruritus and discomfort.    Has had vaginal yeast issues in the past also.     Reviewed . Continues to be stable with current dose of pain medication which she feels is helpful      All of your core healthy metrics are met.      Social History     Social History Narrative    Not on file       Family History   Problem Relation Name Age of Onset    Heart disease Mother Amalia Bajwa     Hypertension Mother Amalia Bajwa     Cancer Father Demarcus Shanks sr.         colon ca    Colon cancer Father Demarcus Shanks sr.     Depression Father Demarcus Shanks sr.     Schizophrenia Father Demarcus Shanks sr.     Anxiety disorder Father Demarcus Shanks sr.     Arthritis Father Demarcus Shanks sr.     Mental illness Father Demarcus Shanks sr.     Liver cancer Sister Fifi     Cancer Sister Fifi     Depression Sister Fifi     Anxiety disorder Sister Fifi     Heart attack Sister Fifi     COPD Sister Fifi     Cancer Sister Madeline          throat and colon    Depression Sister Madeline     Miscarriages / Stillbirths Sister Madeline     Pancreatic cancer Brother Demarcus barnett     Depression Brother Demarcus barnett     Alcohol abuse Brother Demarcus barnett     No Known Problems Daughter Ikkarla     Asthma Son Scott     Pancreatic cancer Other      Liver cancer Other      Suicide Neg Hx      Ovarian cancer Neg Hx      Breast cancer Neg Hx         Current Outpatient Medications:     amLODIPine (NORVASC) 5 MG tablet, Take 1 tablet (5 mg total) by mouth once daily., Disp: 90 tablet, Rfl: 3    buPROPion (WELLBUTRIN XL) 150 MG TB24 tablet, TAKE 3 TABLETS(450 MG) BY MOUTH EVERY MORNING, Disp: 270 tablet, Rfl: 1    butalbital-acetaminophen-caffeine -40 mg (FIORICET, ESGIC) -40 mg per tablet, Take 1 tablet by mouth every 6 (six) hours as needed for Headaches., Disp: 40 tablet, Rfl: 2    cycloSPORINE (RESTASIS) 0.05 % ophthalmic emulsion, INSTILL 1 DROP IN BOTH EYES TWICE DAILY, Disp: 180 each, Rfl: 1    diazePAM (VALIUM) 2 MG tablet, Take 1 tablet (2 mg total) by mouth daily as needed (Severe anxiety/Panic). 15 tablets to last 3 months, Disp: 15 tablet, Rfl: 0    docusate sodium (COLACE) 100 MG capsule, Take 1 capsule (100 mg total) by mouth 2 (two) times daily as needed for Constipation., Disp: 60 capsule, Rfl: 0    doxepin (SINEQUAN) 75 MG capsule, TAKE 2 CAPSULES(150 MG) BY MOUTH EVERY EVENING, Disp: 60 capsule, Rfl: 11    estradiol 0.025 mg/24 hr 0.025 mg/24 hr, Place 1 patch onto the skin once a week., Disp: 4 patch, Rfl: 11    fluticasone propionate (FLONASE) 50 mcg/actuation nasal spray, SHAKE LIQUID AND USE 1 SPRAY(50 MCG) IN EACH NOSTRIL EVERY DAY (Patient taking differently: 1 spray by Each Nostril route as needed for Rhinitis or Allergies.), Disp: 32 g, Rfl: 3    gabapentin (NEURONTIN) 300 MG capsule, TAKE 1 CAPSULE(300 MG) BY MOUTH THREE TIMES DAILY, Disp: 90 capsule, Rfl: 5    hydroCHLOROthiazide (HYDRODIURIL) 12.5 MG Tab, TAKE 1 TABLET(12.5 MG) BY  "MOUTH EVERY DAY FOR BLOOD PRESSURE, Disp: 90 tablet, Rfl: 3    HYDROcodone-acetaminophen (NORCO) 5-325 mg per tablet, Take 1 tablet by mouth every 24 hours as needed for Pain. Quantity medically necessary, Disp: 30 tablet, Rfl: 0    hydroxychloroquine (PLAQUENIL) 200 mg tablet, TAKE 1 TABLET BY MOUTH TWICE DAILY, Disp: 180 tablet, Rfl: 0    irbesartan (AVAPRO) 300 MG tablet, Take 1 tablet (300 mg total) by mouth every evening., Disp: 90 tablet, Rfl: 3    lurasidone (LATUDA) 60 mg Tab tablet, TAKE 1 TABLET(60 MG) BY MOUTH EVERY DAY, Disp: 90 tablet, Rfl: 3    naproxen (NAPROSYN) 500 MG tablet, TAKE 1 TABLET(500 MG) BY MOUTH DAILY AS NEEDED FOR PAIN, Disp: 60 tablet, Rfl: 1    NARCAN 4 mg/actuation [place], SPRAY 1 SPRAY IN NOSTRIL ONCE FOR 1 DOSE, Disp: , Rfl:     omeprazole (PRILOSEC) 40 MG capsule, Take 1 capsule (40 mg total) by mouth once daily., Disp: 90 capsule, Rfl: 3    tiZANidine (ZANAFLEX) 4 MG tablet, Take 2 tablets (8 mg total) by mouth 3 (three) times daily., Disp: 90 tablet, Rfl: 0    tiZANidine (ZANAFLEX) 4 MG tablet, TAKE 2 TABLETS BY MOUTH THREE TIMES DAILY, Disp: 90 tablet, Rfl: 0    VENOFER 100 mg iron/5 mL injection, , Disp: , Rfl:     busPIRone (BUSPAR) 15 MG tablet, TAKE 1 TABLET(15 MG) BY MOUTH THREE TIMES DAILY, Disp: 270 tablet, Rfl: 1    clotrimazole (LOTRIMIN) 1 % cream, Apply topically 2 (two) times daily., Disp: 28 g, Rfl: 2    fluconazole (DIFLUCAN) 150 MG Tab, Take 1 tablet (150 mg total) by mouth every 72 hours as needed (yeast infection symptoms)., Disp: 2 tablet, Rfl: 0    Review of Systems   Constitutional:  Negative for chills and fever.   Respiratory:  Negative for cough and shortness of breath.    Cardiovascular:  Negative for chest pain.   Gastrointestinal:  Negative for abdominal pain.   Skin:  Negative for rash.   Neurological:  Negative for dizziness.       Objective:   /83 (BP Location: Left arm, Patient Position: Sitting)   Pulse 104   Ht 5' 7" (1.702 m)   Wt 111 kg " (244 lb 11.4 oz)   LMP 07/11/2012 Comment: partial   SpO2 98%   BMI 38.33 kg/m²      Physical Exam  Vitals reviewed.   Constitutional:       Appearance: She is well-developed.   HENT:      Head: Normocephalic and atraumatic.   Eyes:      Conjunctiva/sclera: Conjunctivae normal.   Cardiovascular:      Rate and Rhythm: Normal rate.   Pulmonary:      Effort: Pulmonary effort is normal. No respiratory distress.   Skin:     General: Skin is warm and dry.      Findings: No rash.   Neurological:      Mental Status: She is alert and oriented to person, place, and time.      Coordination: Coordination normal.   Psychiatric:         Behavior: Behavior normal.         Physical Exam      @resultssec@  Assessment & Plan   Assessment & Plan  1. Urinary tract infection.  Upon examination, the microscopic examination did not reveal any signs of infection. A urine culture will be sent for further analysis. Concurrently, a treatment for a yeast infection will be initiated. Clotrimazole cream will be prescribed, for intertrigo to be applied twice daily until the infection subsides. Additionally, a pill will be prescribed for potential vaginal yeast.    Problem List Items Addressed This Visit          Neuro    Chronic pain    Relevant Orders    Pain Clinic Drug Screen       Renal/    Stage 3b chronic kidney disease    Relevant Orders    Microalbumin/Creatinine Ratio, Urine (Completed)     Other Visit Diagnoses       Dysuria    -  Primary    Relevant Orders    CULTURE, URINE (Completed)    Yeast infection        Relevant Medications    fluconazole (DIFLUCAN) 150 MG Tab    Candidal skin infection        Long term (current) use of opiate analgesic        Relevant Orders    Pain Clinic Drug Screen              Immunizations Administered on Date of Encounter - 6/19/2024       No immunizations on file.             No follow-ups on file.        Disclaimer:  This note may have been prepared using voice recognition software, it may have  not been extensively proofed, as such there could be errors within the text such as sound alike errors.

## 2024-06-20 LAB
BACTERIA UR CULT: NORMAL
BACTERIA UR CULT: NORMAL

## 2024-06-24 ENCOUNTER — TELEPHONE (OUTPATIENT)
Dept: INTERNAL MEDICINE | Facility: CLINIC | Age: 54
End: 2024-06-24

## 2024-06-24 ENCOUNTER — PATIENT MESSAGE (OUTPATIENT)
Dept: PSYCHIATRY | Facility: CLINIC | Age: 54
End: 2024-06-24
Payer: MEDICARE

## 2024-06-24 LAB
1OH-MIDAZOLAM UR QL SCN: NOT DETECTED
6MAM UR QL: NOT DETECTED
7AMINOCLONAZEPAM UR QL: NOT DETECTED
A-OH ALPRAZ UR QL: NOT DETECTED
ALPRAZ UR QL: NOT DETECTED
AMPHET UR QL SCN: NOT DETECTED
ANNOTATION COMMENT IMP: NORMAL
BARBITURATES UR QL: NORMAL
BUPRENORPHINE UR QL: NOT DETECTED
BZE UR QL: NEGATIVE
CARBOXYTHC UR QL: NEGATIVE
CARISOPRODOL UR QL: NEGATIVE
CLONAZEPAM UR QL: NOT DETECTED
CODEINE UR QL: NOT DETECTED
CREAT UR-MCNC: 76.2 MG/DL (ref 20–400)
DIAZEPAM UR QL: NOT DETECTED
ETHYL GLUCURONIDE UR QL: NORMAL
FENTANYL UR QL: NOT DETECTED
GABAPENTIN UR QL CFM: PRESENT
HYDROCODONE UR QL: NOT DETECTED
HYDROMORPHONE UR QL: PRESENT
LORAZEPAM UR QL: NOT DETECTED
MDA UR QL: NOT DETECTED
MDEA UR QL: NOT DETECTED
MDMA UR QL: NOT DETECTED
ME-PHENIDATE UR QL: NOT DETECTED
METHADONE UR QL: NEGATIVE
METHAMPHET UR QL: NOT DETECTED
MIDAZOLAM UR QL SCN: NOT DETECTED
MORPHINE UR QL: NOT DETECTED
NALOXONE UR QL CFM: NOT DETECTED
NORBUPRENORPHINE UR QL CFM: NOT DETECTED
NORDIAZEPAM UR QL: PRESENT
NORFENTANYL UR QL: NOT DETECTED
NORHYDROCODONE UR QL CFM: PRESENT
NORMEPERIDINE UR QL CFM: NOT DETECTED
NOROXYCODONE UR QL CFM: NOT DETECTED
NOROXYMORPHONE UR QL SCN: NOT DETECTED
OXAZEPAM UR QL: PRESENT
OXYCODONE UR QL: NOT DETECTED
OXYMORPHONE UR QL: NOT DETECTED
PATHOLOGY STUDY: NORMAL
PCP UR QL: NEGATIVE
PHENTERMINE UR QL: NOT DETECTED
PREGABALIN UR QL CFM: NOT DETECTED
SERVICE CMNT-IMP: NORMAL
TAPENTADOL UR QL SCN: NOT DETECTED
TAPENTADOL UR QL SCN: NOT DETECTED
TEMAZEPAM UR QL: PRESENT
TRAMADOL UR QL: NEGATIVE
ZOLPIDEM PHENYL-4-CARB UR QL SCN: NOT DETECTED
ZOLPIDEM UR QL: NOT DETECTED

## 2024-06-24 NOTE — TELEPHONE ENCOUNTER
"Spoke to patient, she states she takes Diazepam. She would like to see why diazepam is "not detected". And get clarification on Nordiazepam and Oxazepam. She is unsure what these two medications are.   "

## 2024-06-24 NOTE — TELEPHONE ENCOUNTER
----- Message from Gin Neil sent at 6/24/2024  2:06 PM CDT -----  Regarding: Test Results  Name of Who is Calling:  Patient          What is the request in detail:  Dr. Carpenter is patient provider, Patient would like to have a call back about test results. Dr. Carpenter is on vacation and she would like to know who can call her back with her results            Can the clinic reply by [facility]: Yes            What Number to Call Back if not in [facility]:[phone]

## 2024-06-24 NOTE — TELEPHONE ENCOUNTER
----- Message from Gin Neil sent at 6/24/2024  2:06 PM CDT -----  Regarding: Test Results  Name of Who is Calling:  Patient          What is the request in detail:  Dr. Carpenter is patient provider, Patient would like to have a call back about test results. Dr. Carpenter is on vacation and she would like to know who can call her back with her results            Can the clinic reply by [facility]: Yes            What Number to Call Back if not in [facility]:[phone]

## 2024-06-24 NOTE — TELEPHONE ENCOUNTER
----- Message from Gin Neil sent at 6/24/2024  2:06 PM CDT -----  Regarding: Test Results  Name of Who is Calling:  Patient          What is the request in detail:  Dr. Carpenter is patient provider, Patient would like to have a call back about test results. Dr. Carpenter is on vacation and she would like to know who can call her back with her results            Can the clinic reply by [facility]: Yes            What Number to Call Back if not in [facility]:[phone]

## 2024-06-25 NOTE — PROGRESS NOTES
Called [facility] and discussed ressults with pathology resident there.  They stated that diazepam is not metabolized by the kidneys and not expected to be detected in urine however detection of nordiazepam, oxazepam, and temazepam indicates diazepam use because they are all metabolites of diazepam.    Similarly hydromorphone and norhydrocodone are metabolize of hydrocodone.  On this specific sample there was some signal interference with the hydrocodone which is why it was reported as not detected.  Since norhydrocodone is not available for use in the [place] this is consistent with hydrocodone use.    Ayush Pineda MD  Internal Medicine  [facility]  [facility]  06/25/2024 3:52 PM

## 2024-06-25 NOTE — TELEPHONE ENCOUNTER
----- Message from Huongadryan Jurado sent at 6/25/2024  9:05 AM CDT -----  Regarding: urine test  Type: Patient Call Back    Who called:    What is the request in detail: p/t is calling b/c she would like to repeat her urine test due to error in the lab. Please call to assist.     Can the clinic reply by [facility]? Yes     Would the patient rather a call back or a response via [facility]? Either or     Best call back number: [phone] (home)       Additional Information:

## 2024-07-03 ENCOUNTER — PATIENT OUTREACH (OUTPATIENT)
Dept: ADMINISTRATIVE | Facility: HOSPITAL | Age: 54
End: 2024-07-03
Payer: MEDICARE

## 2024-07-08 ENCOUNTER — OFFICE VISIT (OUTPATIENT)
Dept: INTERNAL MEDICINE | Facility: CLINIC | Age: 54
End: 2024-07-08
Payer: MEDICARE

## 2024-07-08 DIAGNOSIS — G89.4 CHRONIC PAIN SYNDROME: ICD-10-CM

## 2024-07-08 DIAGNOSIS — L30.9 DERMATITIS: Primary | ICD-10-CM

## 2024-07-08 DIAGNOSIS — F11.20 CHRONIC NARCOTIC DEPENDENCE: ICD-10-CM

## 2024-07-08 DIAGNOSIS — G43.709 CHRONIC MIGRAINE WITHOUT AURA WITHOUT STATUS MIGRAINOSUS, NOT INTRACTABLE: ICD-10-CM

## 2024-07-08 PROCEDURE — 99215 OFFICE O/P EST HI 40 MIN: CPT | Mod: HCNC,95,, | Performed by: INTERNAL MEDICINE

## 2024-07-08 PROCEDURE — 1160F RVW MEDS BY RX/DR IN RCRD: CPT | Mod: HCNC,CPTII,95, | Performed by: INTERNAL MEDICINE

## 2024-07-08 PROCEDURE — 4010F ACE/ARB THERAPY RXD/TAKEN: CPT | Mod: HCNC,CPTII,95, | Performed by: INTERNAL MEDICINE

## 2024-07-08 PROCEDURE — 3044F HG A1C LEVEL LT 7.0%: CPT | Mod: HCNC,CPTII,95, | Performed by: INTERNAL MEDICINE

## 2024-07-08 PROCEDURE — 3061F NEG MICROALBUMINURIA REV: CPT | Mod: HCNC,CPTII,95, | Performed by: INTERNAL MEDICINE

## 2024-07-08 PROCEDURE — 3066F NEPHROPATHY DOC TX: CPT | Mod: HCNC,CPTII,95, | Performed by: INTERNAL MEDICINE

## 2024-07-08 PROCEDURE — 1159F MED LIST DOCD IN RCRD: CPT | Mod: HCNC,CPTII,95, | Performed by: INTERNAL MEDICINE

## 2024-07-08 RX ORDER — HYDROCODONE BITARTRATE AND ACETAMINOPHEN 5; 325 MG/1; MG/1
1 TABLET ORAL
Qty: 30 TABLET | Refills: 0 | Status: SHIPPED | OUTPATIENT
Start: 2024-07-08

## 2024-07-08 RX ORDER — HYDROCORTISONE 25 MG/G
CREAM TOPICAL 2 TIMES DAILY PRN
Qty: 20 G | Refills: 0 | Status: SHIPPED | OUTPATIENT
Start: 2024-07-08

## 2024-07-08 NOTE — TELEPHONE ENCOUNTER
Called pt   Spoke w/ her   She states she is requesting refill  I let her know I'll send to the doctor    They verbalized understanding and had no further concerns or questions.

## 2024-07-08 NOTE — TELEPHONE ENCOUNTER
----- Message from Gabriela Alas sent at 7/5/2024  3:52 PM CDT -----   Name of Who is Calling:     What is the request in detail:  patient request call back in reference to medication  HYDROcodone-acetaminophen (NORCO) 5-325 mg per tablet     Please contact to further discuss and advise      Can the clinic reply by [facility]:     What Number to Call Back if not in [facility]:   [phone]

## 2024-07-08 NOTE — PATIENT INSTRUCTIONS
All of your core healthy metrics are met.      Alexis Pollack,     If you are due for any health screening(s) below please notify me so we can arrange them to be ordered and scheduled to maintain your health. Most healthy patients complete it. Don't lose out on improving your health.     All of your core healthy metrics are met.

## 2024-07-08 NOTE — PROGRESS NOTES
The patient location is: [place]  The chief complaint leading to consultation is: Migraine (Med refill)    Visit type: Virtual visit with synchronous audio and video  Contact time spent with patient: 24 minutes  Each patient to whom he or she provides medical services by telemedicine is:  (1) informed of the relationship between the physician and patient and the respective role of any other health care provider with respect to management of the patient; and (2) notified that he or she may decline to receive medical services by telemedicine and may withdraw from such care at any time.    Subjective:       Patient ID: Jaki Bajwa is a 53 y.o. female who  has a past medical history of CANDELARIA (acute kidney injury) (4/23/2022), Anemia, Anxiety, Depression, History of psychiatric hospitalization (2000), History of ventral hernia repair, Hypertension, Lupus, Mental disorder, Morbid obesity (12/15/2017), Psychiatric problem, Sjogren's syndrome (2013), Therapy, and TMJ (temporomandibular joint disorder).    Chief Complaint: Migraine (Med refill)     History was obtained from the patient and supplemented through chart review.  She is a patient of Jose Carpenter DO.  Was last seen  for dysuria.    Back Pain  This is a chronic problem. The problem occurs daily. The problem has been waxing and waning since onset. The pain is present in the lumbar spine and costovertebral angle. The quality of the pain is described as aching. The pain radiates to the left thigh and right thigh. The pain is at a severity of 5/10. The pain is severe. The pain is The same all the time. The symptoms are aggravated by bending and position. Stiffness is present In the morning and all day. Associated symptoms include headaches, leg pain and numbness. Pertinent negatives include no abdominal pain, bladder incontinence, bowel incontinence, chest pain, dysuria, fever, paresis, paresthesias, pelvic pain, perianal numbness, tingling, weakness or  weight loss. The treatment provided significant relief.     H/o Sjogren's.  She rubbed her eye, resulting in irritation of the L lower eyelid.  The skin there feels rough.  No scleral injection.  No discharge.  No grittiness  Does not wear contacts. Applied makeup a few days ago.  No visual changes.      Migraine:  H/o HA since her 30s.  No issues for a while, but returned since 2013.    She is requesting medication refill of Fioricet.  Has been on this since at least a year.   reviewed.  She filled 50 tablets 05/09/2024.  Filled 40 tabs 03/06/2024.    She takes this at least 1-2/week.    Bifrontal headaches. HA occurs 1-3/week.   Some photophobia, no phonophobia. No nausea, vomiting.   Lasts the whole day s tx.  Resolves in 10 minutes after Fioricet.    Not associated with time of day.    Triggers: stress  No other aggravating or alleviating factors.    No neck pain.     Not worsened by recumbency.  There is no weakness.      Denies sinus/allergies/rhinitis issues.  Denies rhinorrhea.  Uses Flonase PRN.     Not taking NSAIDs.  Has tried Excedrin (not taking d/t ASA).    Saw Neuro in 9/2023. C/w migraine, possible JUANIS.  Recommended Topamax 50 for ppx and Ubrevly 50 mg.  She states that no meds have worked for her in the past.  She does not quite recall this, but thinks that she took Topamax for for 3-4 months.  Per PCP note, she gave up on Topamax after a few weeks.  No SE to either meds.    No snoring, apnea, daytime fatigue.  Feels refreshed after adequate sleep.  Does not fall asleep during inappropriate times.  Stop Bang score 3-4.  Insurance didn't cover PSG or sleep clinic?  She has never had PSG before.    CT head 6/2023 without acute abnormality.    Review of Systems   Constitutional:  Negative for fever and weight loss.   Cardiovascular:  Negative for chest pain.   Gastrointestinal:  Negative for abdominal pain and bowel incontinence.   Genitourinary:  Negative for bladder incontinence, dysuria,  "hematuria and pelvic pain.   Musculoskeletal:  Positive for back pain.   Neurological:  Positive for numbness and headaches. Negative for tingling, weakness and paresthesias.       Past Medical History:   Diagnosis Date    CANDELARIA (acute kidney injury) 2022    Anemia     Anxiety     Depression     History of psychiatric hospitalization     [place] x 1 week for depression    History of ventral hernia repair     Hypertension     Lupus     patient reports that she is being treated "like I have Lupus"    Mental disorder     Morbid obesity 12/15/2017    Psychiatric problem     Sjogren's syndrome 2013    Therapy     TMJ (temporomandibular joint disorder)      Past Surgical History:   Procedure Laterality Date    breast reduction      BREAST SURGERY  2005     SECTION      x 2    HYSTERECTOMY  2005    INGUINAL HERNIA REPAIR      LAPAROSCOPIC TOTAL HYSTERECTOMY      ASA    TOTAL REDUCTION MAMMOPLASTY      TUBAL LIGATION      VENTRAL HERNIA REPAIR       Family History   Problem Relation Name Age of Onset    Heart disease Mother Amalia Bajwa     Hypertension Mother Amalia Bajwa     Cancer Father Demarcus Bajwa sr.         colon ca    Colon cancer Father Demarcus Bajwa sr.     Depression Father Demarcus Bajwa sr.     Schizophrenia Father Demarcus Lawtons sr.     Anxiety disorder Father Demarcus Bajwa sr.     Arthritis Father Demarcus Bajwa sr.     Mental illness Father Demarcus Bajwa sr.     Liver cancer Sister Fifi     Cancer Sister Fifi     Depression Sister Fifi     Anxiety disorder Sister Fifi     Heart attack Sister Fifi     COPD Sister Fifi     Cancer Sister Madeline         throat and colon    Depression Sister Madeline     Miscarriages / Stillbirths Sister Madeline     Pancreatic cancer Brother Demarcus bajwa     Depression Brother Demarcus bajwa     Alcohol abuse Brother Demarcus bajwa     No Known Problems Daughter Ikea     Asthma Son Scott     Pancreatic cancer Other      Liver cancer Other      Suicide " Neg Hx      Ovarian cancer Neg Hx      Breast cancer Neg Hx       Social History     Socioeconomic History    Marital status: Single    Number of children: 2   Occupational History     Comment: [employer]   Tobacco Use    Smoking status: Never    Smokeless tobacco: Never   Substance and Sexual Activity    Alcohol use: Not Currently     Alcohol/week: 1.0 standard drink of alcohol     Types: 1 Glasses of wine per week     Comment: rarely     Drug use: Not Currently     Types: Barbituates, Hydrocodone     Comment: rx hydrocodone    Sexual activity: Not Currently     Partners: Male     Birth control/protection: Abstinence, Condom, Patch   Other Topics Concern    Patient feels they ought to cut down on drinking/drug use No    Patient annoyed by others criticizing their drinking/drug use No    Patient has felt bad or guilty about drinking/drug use No    Patient has had a drink/used drugs as an eye opener in the AM No     Social Determinants of Health     Financial Resource Strain: Low Risk  (2/13/2024)    Overall Financial Resource Strain (CARDIA)     Difficulty of Paying Living Expenses: Not hard at all   Food Insecurity: No Food Insecurity (2/13/2024)    Hunger Vital Sign     Worried About Running Out of Food in the Last Year: Never true     Ran Out of Food in the Last Year: Never true   Transportation Needs: No Transportation Needs (2/13/2024)    PRAPARE - Transportation     Lack of Transportation (Medical): No     Lack of Transportation (Non-Medical): No   Physical Activity: Inactive (2/13/2024)    Exercise Vital Sign     Days of Exercise per Week: 0 days     Minutes of Exercise per Session: 0 min   Stress: No Stress Concern Present (2/13/2024)    [employer] - Occupational Stress Questionnaire     Feeling of Stress : Not at all   Housing Stability: Low Risk  (2/13/2024)    Housing Stability Vital Sign     Unable to Pay for Housing in the Last Year: No     Number of Places Lived in the Last Year:  1     Unstable Housing in the Last Year: No     Objective:      There were no vitals filed for this visit.   Physical Exam  Constitutional:       General: She is not in acute distress.     Appearance: She is well-developed. She is not ill-appearing or diaphoretic.   HENT:      Head: Normocephalic.      Comments: Slight left lower eyelid irritation.  See photo.  Eyes:      General: No scleral icterus.        Right eye: No discharge.         Left eye: No discharge.   Pulmonary:      Effort: Pulmonary effort is normal. No respiratory distress.   Skin:     Coloration: Skin is not pale.      Findings: No erythema.   Neurological:      Mental Status: She is alert.   Psychiatric:         Behavior: Behavior normal.         Lab Results   Component Value Date    WBC 6.51 05/20/2024    HGB 9.7 (L) 05/20/2024    HCT 33.4 (L) 05/20/2024     05/20/2024    CHOL 156 09/12/2022    TRIG 95 09/12/2022    HDL 61 09/12/2022    ALT 16 04/03/2024    AST 17 04/03/2024     04/03/2024    K 4.4 04/03/2024     04/03/2024    CREATININE 1.2 04/03/2024    BUN 16 04/03/2024    CO2 28 04/03/2024    TSH 2.563 09/22/2021    INR 1.0 04/23/2022    HGBA1C 4.9 02/13/2024       The 10-year ASCVD risk score (Emilia STEELE, et al., 2019) is: 2.9%    Values used to calculate the score:      Age: 53 years      Sex: Female      Is Non- : Yes      Diabetic: No      Tobacco smoker: No      Systolic Blood Pressure: 121 mmHg      Is BP treated: Yes      HDL Cholesterol: 61 mg/dL      Total Cholesterol: 156 mg/dL    (Imaging have been independently reviewed)  CT head 6/2023 without acute abnormality.    Assessment:       1. Dermatitis    2. Chronic migraine without aura without status migrainosus, not intractable      Plan:       Jaki was seen today for migraine.    Diagnoses and all orders for this visit:    Dermatitis  Comments:  Rx low potency topical steroid.  Discussed SE.  Counseled against long-term  use.  Orders:  -     hydrocortisone 2.5 % cream; Apply topically 2 (two) times daily as needed (rash, eyelid irritation). For 1 week at a time.    Chronic migraine without aura without status migrainosus, not intractable  Comments:  Most likely med overuse HA. Advised to stop Fioricet; unclear if she will do this. Ubrevly 100mg at onset of HA. JUANIS eval. Refer to neurology. Consider PPX tx.  Orders:  -     Ambulatory referral/consult to Neurology; Future  -     Ambulatory referral/consult to Sleep Disorders; Future  -     ubrogepant (UBRELVY) 100 mg tablet; Take 1 tablet (100 mg total) by mouth as needed for Migraine. If symptoms persist or return, may repeat dose after 2 hours. Maximum: 200 mg per 24 hours     Side effects of medication(s) were discussed in detail and patient voiced understanding.  Patient will call back for any issues or complications.     I have spent a total of 54 minutes with the patient as well as reviewing the chart/medical record and placing orders on the day of the visit. This includes face to face time and non-face to face time preparing to see the patient (eg, review of tests), obtaining and/or reviewing separately obtained history, documenting clinical information in the electronic or other health record, independently interpreting results and communicating results to the patient/family/caregiver, or care coordinator.    RTC PRN.  Has appointment with PCP next week.

## 2024-07-08 NOTE — Clinical Note
GERARDOI:  I am almost certain she has medication overuse headache from Fioricet.  I advised her to stop this, but it is doubtful that she will.  I prescribed Ubrevly 100 mg p.r.n..  Referred to Neurology and for JUANIS evaluation.  She might need ppx treatment.

## 2024-07-08 NOTE — TELEPHONE ENCOUNTER
No care due was identified.  [facility] Embedded Care Due Messages. Reference number: [id number].   7/08/2024 9:57:07 AM CDT

## 2024-07-11 DIAGNOSIS — R46.81 OBSESSIVE-COMPULSIVE BEHAVIOR: ICD-10-CM

## 2024-07-11 DIAGNOSIS — F40.10 SOCIAL PHOBIA: ICD-10-CM

## 2024-07-11 DIAGNOSIS — F41.1 GAD (GENERALIZED ANXIETY DISORDER): ICD-10-CM

## 2024-07-11 DIAGNOSIS — F33.42 RECURRENT MAJOR DEPRESSIVE DISORDER, IN FULL REMISSION: ICD-10-CM

## 2024-07-11 RX ORDER — DOXEPIN HYDROCHLORIDE 75 MG/1
CAPSULE ORAL
Qty: 60 CAPSULE | Refills: 11 | Status: SHIPPED | OUTPATIENT
Start: 2024-07-11

## 2024-07-12 ENCOUNTER — OFFICE VISIT (OUTPATIENT)
Dept: INTERNAL MEDICINE | Facility: CLINIC | Age: 54
End: 2024-07-12
Payer: MEDICARE

## 2024-07-12 ENCOUNTER — LAB VISIT (OUTPATIENT)
Dept: LAB | Facility: OTHER | Age: 54
End: 2024-07-12
Attending: FAMILY MEDICINE
Payer: MEDICARE

## 2024-07-12 VITALS
WEIGHT: 253.5 LBS | DIASTOLIC BLOOD PRESSURE: 80 MMHG | BODY MASS INDEX: 39.79 KG/M2 | OXYGEN SATURATION: 100 % | HEIGHT: 67 IN | HEART RATE: 91 BPM | SYSTOLIC BLOOD PRESSURE: 131 MMHG

## 2024-07-12 DIAGNOSIS — E21.3 HYPERPARATHYROIDISM: Primary | ICD-10-CM

## 2024-07-12 DIAGNOSIS — E21.3 HYPERPARATHYROIDISM: ICD-10-CM

## 2024-07-12 DIAGNOSIS — Z00.00 ROUTINE HEALTH MAINTENANCE: ICD-10-CM

## 2024-07-12 LAB
ALBUMIN SERPL BCP-MCNC: 3.9 G/DL (ref 3.5–5.2)
ALP SERPL-CCNC: 84 U/L (ref 55–135)
ALT SERPL W/O P-5'-P-CCNC: 18 U/L (ref 10–44)
ANION GAP SERPL CALC-SCNC: 8 MMOL/L (ref 8–16)
AST SERPL-CCNC: 20 U/L (ref 10–40)
BILIRUB SERPL-MCNC: 0.4 MG/DL (ref 0.1–1)
BUN SERPL-MCNC: 17 MG/DL (ref 6–20)
CALCIUM SERPL-MCNC: 9.8 MG/DL (ref 8.7–10.5)
CHLORIDE SERPL-SCNC: 107 MMOL/L (ref 95–110)
CO2 SERPL-SCNC: 27 MMOL/L (ref 23–29)
CREAT SERPL-MCNC: 1.5 MG/DL (ref 0.5–1.4)
EST. GFR  (NO RACE VARIABLE): 41 ML/MIN/1.73 M^2
GLUCOSE SERPL-MCNC: 91 MG/DL (ref 70–110)
POTASSIUM SERPL-SCNC: 4.7 MMOL/L (ref 3.5–5.1)
PROT SERPL-MCNC: 7.8 G/DL (ref 6–8.4)
PTH-INTACT SERPL-MCNC: 122.8 PG/ML (ref 9–77)
SODIUM SERPL-SCNC: 142 MMOL/L (ref 136–145)

## 2024-07-12 PROCEDURE — 83970 ASSAY OF PARATHORMONE: CPT | Mod: HCNC | Performed by: FAMILY MEDICINE

## 2024-07-12 PROCEDURE — 80053 COMPREHEN METABOLIC PANEL: CPT | Mod: HCNC | Performed by: FAMILY MEDICINE

## 2024-07-12 PROCEDURE — 99999 PR PBB SHADOW E&M-EST. PATIENT-LVL IV: CPT | Mod: PBBFAC,HCNC,, | Performed by: FAMILY MEDICINE

## 2024-07-12 PROCEDURE — 36415 COLL VENOUS BLD VENIPUNCTURE: CPT | Mod: HCNC | Performed by: FAMILY MEDICINE

## 2024-07-12 NOTE — PROGRESS NOTES
Subjective:       Patient ID: Jaki Bajwa is a 53 y.o. female.    Chief Complaint: Annual Exam and Medication Problem    HPI  History of Present Illness  The patient presents for evaluation of multiple medical concerns.    The patient was last evaluated on 06/19/2024, during which her urinary symptoms have since resolved. She consulted with Dr. Gamboa, who discontinued her fioricet and prescribed Ubrelvy. She inquired about the possibility of using Fioricet in conjunction with that. She reports experiencing headaches, characterized by a racing heartbeat in her head, rather than migraines.    The patient reports infrequent bowel movements, which occur once or twice a week. However, during defecation, she experiences vomiting. This issue has been ongoing for several years. She also reports straining during bowel movements. She has iron pills at home, but has not been taking them since her iron infusion.  Could use more hydration and fiber in diet    Supplemental Information  She has Sjogren's.      All of your core healthy metrics are met.      Social History     Social History Narrative    Not on file       Family History   Problem Relation Name Age of Onset    Heart disease Mother Amalia Bajwa     Hypertension Mother Amalia Bajwa     Cancer Father Demarcus Bustamantes sr.         colon ca    Colon cancer Father Demarcus Shanks sr.     Depression Father Demarcus Shanks sr.     Schizophrenia Father Demarcus Shanks sr.     Anxiety disorder Father Demarcus Shanks sr.     Arthritis Father Demarcus Shanks sr.     Mental illness Father Demarcus Shanks sr.     Liver cancer Sister Fifi     Cancer Sister Fifi     Depression Sister Fifi     Anxiety disorder Sister Fifi     Heart attack Sister Fifi     COPD Sister Fifi     Cancer Sister Madeline         throat and colon    Depression Sister Madeline     Miscarriages / Stillbirths Sister Madeline     Pancreatic cancer Brother Demarcus bajwa     Depression Brother Demarcus bustamantes     Alcohol abuse  Brother Demarcus barnett     No Known Problems Daughter Phyliciaea     Asthma Son Scott     Pancreatic cancer Other      Liver cancer Other      Suicide Neg Hx      Ovarian cancer Neg Hx      Breast cancer Neg Hx         Current Outpatient Medications:     amLODIPine (NORVASC) 5 MG tablet, Take 1 tablet (5 mg total) by mouth once daily., Disp: 90 tablet, Rfl: 3    buPROPion (WELLBUTRIN XL) 150 MG TB24 tablet, TAKE 3 TABLETS(450 MG) BY MOUTH EVERY MORNING, Disp: 270 tablet, Rfl: 1    busPIRone (BUSPAR) 15 MG tablet, TAKE 1 TABLET(15 MG) BY MOUTH THREE TIMES DAILY, Disp: 270 tablet, Rfl: 1    butalbital-acetaminophen-caffeine -40 mg (FIORICET, ESGIC) -40 mg per tablet, Take 1 tablet by mouth every 6 (six) hours as needed for Headaches., Disp: 40 tablet, Rfl: 2    clotrimazole (LOTRIMIN) 1 % cream, Apply topically 2 (two) times daily., Disp: 28 g, Rfl: 2    cycloSPORINE (RESTASIS) 0.05 % ophthalmic emulsion, INSTILL 1 DROP IN BOTH EYES TWICE DAILY, Disp: 180 each, Rfl: 1    diazePAM (VALIUM) 2 MG tablet, Take 1 tablet (2 mg total) by mouth daily as needed (Severe anxiety/Panic). 15 tablets to last 3 months, Disp: 15 tablet, Rfl: 0    docusate sodium (COLACE) 100 MG capsule, Take 1 capsule (100 mg total) by mouth 2 (two) times daily as needed for Constipation., Disp: 60 capsule, Rfl: 0    doxepin (SINEQUAN) 75 MG capsule, TAKE 2 CAPSULES(150 MG) BY MOUTH EVERY EVENING, Disp: 60 capsule, Rfl: 11    estradiol 0.025 mg/24 hr 0.025 mg/24 hr, Place 1 patch onto the skin once a week., Disp: 4 patch, Rfl: 11    gabapentin (NEURONTIN) 300 MG capsule, TAKE 1 CAPSULE(300 MG) BY MOUTH THREE TIMES DAILY, Disp: 90 capsule, Rfl: 5    hydroCHLOROthiazide (HYDRODIURIL) 12.5 MG Tab, TAKE 1 TABLET(12.5 MG) BY MOUTH EVERY DAY FOR BLOOD PRESSURE, Disp: 90 tablet, Rfl: 3    HYDROcodone-acetaminophen (NORCO) 5-325 mg per tablet, Take 1 tablet by mouth every 24 hours as needed for Pain. Quantity medically necessary, Disp: 30 tablet, Rfl:  "0    hydrocortisone 2.5 % cream, Apply topically 2 (two) times daily as needed (rash, eyelid irritation). For 1 week at a time., Disp: 20 g, Rfl: 0    hydroxychloroquine (PLAQUENIL) 200 mg tablet, TAKE 1 TABLET BY MOUTH TWICE DAILY, Disp: 180 tablet, Rfl: 0    irbesartan (AVAPRO) 300 MG tablet, Take 1 tablet (300 mg total) by mouth every evening., Disp: 90 tablet, Rfl: 3    lurasidone (LATUDA) 60 mg Tab tablet, TAKE 1 TABLET(60 MG) BY MOUTH EVERY DAY, Disp: 90 tablet, Rfl: 3    naproxen (NAPROSYN) 500 MG tablet, TAKE 1 TABLET(500 MG) BY MOUTH DAILY AS NEEDED FOR PAIN, Disp: 60 tablet, Rfl: 1    NARCAN 4 mg/actuation Kerrick, SPRAY 1 SPRAY IN NOSTRIL ONCE FOR 1 DOSE, Disp: , Rfl:     omeprazole (PRILOSEC) 40 MG capsule, Take 1 capsule (40 mg total) by mouth once daily., Disp: 90 capsule, Rfl: 3    tiZANidine (ZANAFLEX) 4 MG tablet, Take 2 tablets (8 mg total) by mouth 3 (three) times daily., Disp: 90 tablet, Rfl: 0    tiZANidine (ZANAFLEX) 4 MG tablet, TAKE 2 TABLETS BY MOUTH THREE TIMES DAILY, Disp: 90 tablet, Rfl: 0    ubrogepant (UBRELVY) 100 mg tablet, Take 1 tablet (100 mg total) by mouth as needed for Migraine. If symptoms persist or return, may repeat dose after 2 hours. Maximum: 200 mg per 24 hours, Disp: 30 tablet, Rfl: 2    VENOFER 100 mg iron/5 mL injection, , Disp: , Rfl:     Review of Systems   Constitutional:  Negative for chills and fever.   Respiratory:  Negative for cough and shortness of breath.    Cardiovascular:  Negative for chest pain.   Gastrointestinal:  Negative for abdominal pain.   Skin:  Negative for rash.   Neurological:  Negative for dizziness.       Objective:   /80 (BP Location: Left arm, Patient Position: Sitting)   Pulse 91   Ht 5' 7" (1.702 m)   Wt 115 kg (253 lb 8.5 oz)   LMP 07/11/2012 Comment: partial   SpO2 100%   BMI 39.71 kg/m²      Physical Exam  Constitutional:       General: She is not in acute distress.     Appearance: She is well-developed. She is not " diaphoretic.   HENT:      Head: Normocephalic and atraumatic.      Nose: Nose normal.   Eyes:      General:         Right eye: No discharge.         Left eye: No discharge.      Conjunctiva/sclera: Conjunctivae normal.      Pupils: Pupils are equal, round, and reactive to light.   Neck:      Thyroid: No thyromegaly.   Cardiovascular:      Rate and Rhythm: Normal rate and regular rhythm.      Heart sounds: No murmur heard.  Pulmonary:      Effort: Pulmonary effort is normal. No respiratory distress.      Breath sounds: Normal breath sounds.   Abdominal:      General: There is no distension.      Palpations: Abdomen is soft.   Skin:     Findings: No rash.   Neurological:      Mental Status: She is alert and oriented to person, place, and time.   Psychiatric:         Behavior: Behavior normal.         Physical Exam      @resultssec@  Assessment & Plan     Assessment & Plan  1. Headaches.  The patient was advised to try Ubrelvy first, followed by Fioricet.    2. Constipation.  The patient's blood pressure readings are within the normal range. The patient was counseled to maintain adequate hydration and incorporate Metamucil into her diet. MiraLAX was recommended for use if bowel movements are elusive for more than 3 days.    3. Health maintenance.  The patient's parathyroid hormone was slightly elevated a year ago. However, her calcium levels were within the normal range in 04/2023. Laboratory tests will be conducted today.    Follow-up  A follow-up appointment is scheduled for 3 months from now.    Problem List Items Addressed This Visit          Endocrine    Hyperparathyroidism - Primary    Relevant Orders    PTH, intact    Comprehensive Metabolic Panel     Other Visit Diagnoses       Routine health maintenance                  Immunizations Administered on Date of Encounter - 7/12/2024       No immunizations on file.             Follow up in about 3 months (around 10/12/2024).    This note was generated with the  assistance of ambient listening technology. Verbal consent was obtained by the patient and accompanying visitor(s) for the recording of patient appointment to facilitate this note. I attest to having reviewed and edited the generated note for accuracy, though some syntax or spelling errors may persist. Please contact the author of this note for any clarification.      Disclaimer:  This note may have been prepared using voice recognition software, it may have not been extensively proofed, as such there could be errors within the text such as sound alike errors.

## 2024-07-16 ENCOUNTER — PATIENT MESSAGE (OUTPATIENT)
Dept: INTERNAL MEDICINE | Facility: CLINIC | Age: 54
End: 2024-07-16
Payer: MEDICARE

## 2024-07-16 DIAGNOSIS — E21.3 HYPERPARATHYROIDISM: Primary | ICD-10-CM

## 2024-07-16 PROCEDURE — 99452 NTRPROF PH1/NTRNET/EHR RFRL: CPT | Mod: S$GLB,,, | Performed by: FAMILY MEDICINE

## 2024-07-17 ENCOUNTER — TELEPHONE (OUTPATIENT)
Dept: INTERNAL MEDICINE | Facility: CLINIC | Age: 54
End: 2024-07-17
Payer: MEDICARE

## 2024-07-17 ENCOUNTER — E-CONSULT (OUTPATIENT)
Dept: ENDOCRINOLOGY | Facility: CLINIC | Age: 54
End: 2024-07-17
Payer: MEDICARE

## 2024-07-17 DIAGNOSIS — E21.3 HYPERPARATHYROIDISM: Primary | ICD-10-CM

## 2024-07-17 PROCEDURE — 99451 NTRPROF PH1/NTRNET/EHR 5/>: CPT | Mod: S$GLB,,, | Performed by: INTERNAL MEDICINE

## 2024-07-17 NOTE — CONSULTS
[facility]  Response for E-Consult     Patient Name: Jaki Bajwa  MRN: [id number]  Primary Care Provider: Jose Carpenter DO   Requesting Provider: Jose Carpenter DO  E-Consult to Endocrinology  Consult performed by: Melo Barraza MD  Consult ordered by: Jose Carpenter DO  Reason for consult: Hyperparathyroidism  Assessment/Recommendations: Patient with mild intermittent hypercalcemia in the setting of an elevated PTH. Also noted to have very low vitamin D in the past, and also on thiazide diuretic. Would first replete vitamin D to near normal, then hold thiazide for at least 12 weeks and get a 24 hr urine for calcium and creatinine with repeat renal panel, PTH and vitamin D at that time to elucidate diagnosis. Mild calcium elevation could be from thiazide, and elevated PTH could be secondary to vitamin d deficiency so both must be ruled out as causative factors. If patient appears to have primary HPT after this would refer to endocrine as she would meet surgical criteria.           Recommendation:   Patient with mild intermittent hypercalcemia in the setting of an elevated PTH. Also noted to have very low vitamin D in the past, and also on thiazide diuretic. Would first replete vitamin D to near normal, then hold thiazide for at least 12 weeks and get a 24 hr urine for calcium and creatinine with repeat renal panel, PTH and vitamin D at that time to elucidate diagnosis. Mild calcium elevation could be from thiazide, and elevated PTH could be secondary to vitamin d deficiency so both must be ruled out as causative factors. If patient appears to have primary HPT after this would refer to endocrine as she would meet surgical criteria.     Total time of Consultation: 10 minute    I did not speak to the requesting provider verbally about this.     *This eConsult is based on the clinical data available to me and is furnished without benefit of a physical examination. The eConsult  will need to be interpreted in light of any clinical issues or changes in patient status not available to me at the time of filing this eConsults. Significant changes in patient condition or level of acuity should result in immediate formal consultation and reevaluation. Please alert me if you have further questions.    Thank you for this eConsult referral.     MD [facility]

## 2024-07-17 NOTE — TELEPHONE ENCOUNTER
----- Message from Amina Russell sent at 7/16/2024  3:56 PM CDT -----  Name of Who is calling :PENG MAYO [[id number]]        What is the request in detail:  Pt  was returning a call . Please assist       Can the clinic reply by [facility]:no            What number [facility]: [phone]

## 2024-07-19 ENCOUNTER — TELEPHONE (OUTPATIENT)
Dept: INTERNAL MEDICINE | Facility: CLINIC | Age: 54
End: 2024-07-19
Payer: MEDICARE

## 2024-07-19 DIAGNOSIS — E21.3 HYPERPARATHYROIDISM: Primary | ICD-10-CM

## 2024-07-19 NOTE — TELEPHONE ENCOUNTER
----- Message from Sherry Terrell MA sent at 7/19/2024  1:27 PM CDT -----  Name of Who is Calling:PENG MAYO [[id number]]                What is the request in detail: Pt is requesting a call back to discuss getting a referral for a endo dr. No one contacted her regarding this. Please assist.                Can the clinic reply by [facility]: No                What Number to Call Back if not in [facility]: [phone]

## 2024-07-23 ENCOUNTER — TELEPHONE (OUTPATIENT)
Dept: ORTHOPEDICS | Facility: CLINIC | Age: 54
End: 2024-07-23
Payer: MEDICARE

## 2024-07-29 RX ORDER — TIZANIDINE 4 MG/1
8 TABLET ORAL 3 TIMES DAILY
Qty: 90 TABLET | Refills: 0 | Status: SHIPPED | OUTPATIENT
Start: 2024-07-29 | End: 2024-07-31

## 2024-07-31 ENCOUNTER — OFFICE VISIT (OUTPATIENT)
Dept: PSYCHIATRY | Facility: CLINIC | Age: 54
End: 2024-07-31
Payer: MEDICARE

## 2024-07-31 ENCOUNTER — OFFICE VISIT (OUTPATIENT)
Dept: RHEUMATOLOGY | Facility: CLINIC | Age: 54
End: 2024-07-31
Payer: MEDICARE

## 2024-07-31 DIAGNOSIS — F40.10 SOCIAL ANXIETY DISORDER: ICD-10-CM

## 2024-07-31 DIAGNOSIS — F51.05 INSOMNIA DUE TO OTHER MENTAL DISORDER: ICD-10-CM

## 2024-07-31 DIAGNOSIS — F41.0 PANIC DISORDER: ICD-10-CM

## 2024-07-31 DIAGNOSIS — F39 MOOD DISORDER: ICD-10-CM

## 2024-07-31 DIAGNOSIS — M35.01 SJOGREN'S SYNDROME WITH KERATOCONJUNCTIVITIS SICCA: Primary | ICD-10-CM

## 2024-07-31 DIAGNOSIS — F41.1 GAD (GENERALIZED ANXIETY DISORDER): Primary | ICD-10-CM

## 2024-07-31 DIAGNOSIS — Z79.899 LONG-TERM USE OF PLAQUENIL: ICD-10-CM

## 2024-07-31 DIAGNOSIS — F63.89 INTERNET ADDICTION: ICD-10-CM

## 2024-07-31 DIAGNOSIS — H16.229 KERATOCONJUNCT SICCA, NOT SPECIFIED AS SJOGREN'S, UNSP EYE: ICD-10-CM

## 2024-07-31 DIAGNOSIS — F99 INSOMNIA DUE TO OTHER MENTAL DISORDER: ICD-10-CM

## 2024-07-31 PROCEDURE — 3066F NEPHROPATHY DOC TX: CPT | Mod: HCNC,CPTII,95, | Performed by: INTERNAL MEDICINE

## 2024-07-31 PROCEDURE — 3061F NEG MICROALBUMINURIA REV: CPT | Mod: HCNC,CPTII,95, | Performed by: INTERNAL MEDICINE

## 2024-07-31 PROCEDURE — 4010F ACE/ARB THERAPY RXD/TAKEN: CPT | Mod: HCNC,CPTII,95, | Performed by: INTERNAL MEDICINE

## 2024-07-31 PROCEDURE — 1159F MED LIST DOCD IN RCRD: CPT | Mod: HCNC,CPTII,95, | Performed by: INTERNAL MEDICINE

## 2024-07-31 PROCEDURE — 3044F HG A1C LEVEL LT 7.0%: CPT | Mod: HCNC,CPTII,95, | Performed by: INTERNAL MEDICINE

## 2024-07-31 PROCEDURE — 1160F RVW MEDS BY RX/DR IN RCRD: CPT | Mod: HCNC,CPTII,95, | Performed by: INTERNAL MEDICINE

## 2024-07-31 PROCEDURE — 99214 OFFICE O/P EST MOD 30 MIN: CPT | Mod: HCNC,95,, | Performed by: INTERNAL MEDICINE

## 2024-07-31 RX ORDER — PILOCARPINE HYDROCHLORIDE 5 MG/1
5 TABLET, FILM COATED ORAL 4 TIMES DAILY
Qty: 360 TABLET | Refills: 1 | Status: SHIPPED | OUTPATIENT
Start: 2024-07-31 | End: 2025-01-27

## 2024-07-31 RX ORDER — HYDROXYCHLOROQUINE SULFATE 200 MG/1
TABLET, FILM COATED ORAL
Qty: 60 TABLET | Refills: 0 | Status: SHIPPED | OUTPATIENT
Start: 2024-07-31

## 2024-07-31 RX ORDER — NYSTATIN 100000 [USP'U]/ML
4 SUSPENSION ORAL 4 TIMES DAILY
Qty: 480 ML | Refills: 5 | Status: SHIPPED | OUTPATIENT
Start: 2024-07-31 | End: 2025-01-27

## 2024-07-31 RX ORDER — PILOCARPINE HYDROCHLORIDE 5 MG/1
5 TABLET, FILM COATED ORAL 2 TIMES DAILY
Qty: 60 TABLET | Refills: 11 | Status: SHIPPED | OUTPATIENT
Start: 2024-07-31 | End: 2024-07-31

## 2024-07-31 RX ORDER — TIZANIDINE 4 MG/1
8 TABLET ORAL 3 TIMES DAILY
Qty: 540 TABLET | Refills: 1 | Status: SHIPPED | OUTPATIENT
Start: 2024-07-31 | End: 2025-01-27

## 2024-07-31 NOTE — PROGRESS NOTES
Chief Complaint   Patient presents with    Disease Management           The patient location is: Home  The chief complaint leading to consultation is: sjogren's syndrome    Visit type: audiovisual    Face to Face time with patient: 20   minutes of total time spent on the encounter, which includes face to face time and non-face to face time preparing to see the patient (eg, review of tests), Obtaining and/or reviewing separately obtained history, Documenting clinical information in the electronic or other health record, Independently interpreting results (not separately reported) and communicating results to the patient/family/caregiver, or Care coordination (not separately reported).         Each patient to whom he or she provides medical services by telemedicine is:  (1) informed of the relationship between the physician and patient and the respective role of any other health care provider with respect to management of the patient; and (2) notified that he or she may decline to receive medical services by telemedicine and may withdraw from such care at any time.    Notes:     History of presenting illness    53  year old black female has     Joint pains in the knees and ankles   Back pain   Diffuse aching  Nocturnal leg cramps  Worse pain with activity  Hand paresthesias+  Morning pain but no morning stiffness  No joint swelling     She now has a diagnosis of fibromyalgia  Diagnosis + confirmed by atleast 2 rheumatologists  On gabapentin 300 mg tid  on naprosyn 500 mg daily  Tried and failed flexeril  On zanaflex 4 mg bid prn for TMJ syndrome and muscle spasms   Takes doxepin at night 150 mg for insomnia   She follows with pain clinic and takes pain medications  She gets migraines but not severe  She has tested negative for sleep apnea    She has anxiety and depression : on wellbutrin 150 to 300 mg and buspar  5 mg bid   Valium is prn only  On latuda 60 mg     Dry eyes and dry mouth and inflammatory  arthritis/arthralgia   Abnormal labs   Sjogren's syndrome +   On plaquenil 200 mg bid   Pilocarpine makes her nauseated   So we gave cevelimine   Dry mouth is severe at night   Dry eyes severe /but if she uses restasis she has symptom control  No recurrent conjunctivitis or uveitis or scleritis or episcleritis but she remembers once they told her she has inflammation in the eyes due to dry eyes   Sees ophthalmology     She has dry patches of skin   Areas of pigmentation on the elbows and thighs   Butterfly like pigmentation on the eyelids and underneath the eyes   Dermatology says : nummular dermatitis and seborrheic keratosis   No other skin rashes,photosensitivity   No telangiectasias   No calcinosis   No psoriasis   She does have patchy alopecia ,b/l frontally     No oral and nasal ulcers     No pleurisy or any cardiopulmonary complaints     No dysphagia,diplopia and dysphonia and muscle weakness     No v/d/c   Nausea and  acid reflux+ on PPIs   [facility]  She has hiatal hernia     She has raynaud's+   No digital ulcers     No cytopenias   Mostly irom deficiency anemia  White count and plt count ok  No menorrhagia /hysterectomy  Diet is poor    No renal issues     No blood clots     No fever,chills,night sweats,weight loss and loss of appetite  No unexplained lymphadenopathy and parotitis      No pregnancy losses/pre term deliveries /pregnancy complications     No new onset headaches     No chronic or bloody diarrhea with no u colitis or crohn's /inflammatory bowel disease     No vaginal or  urethral  d/c/STDs/no ulcers     No unexplained neurologic symptoms       Labs     White count always normal    Anemia     ESR 25/23  CRP 3.3/6.8    Creat and GFR always normal  LFTs ok    OLINDA negative     Hep A,B,C negative   HIV negative    Has had UTIs and proteinuria and hematuria  She has to see urology  The UTIs are always s/p intercourse    8/2020 labs     OLINDA neg  SSA positive 2.19  nml complements    Dsdna neg  cryos nml  myomarker panel neg  Ck,aldolase nml  RF  Urine nml  UPCR neg  GFR 54.9  Immunoglobulins elevated 2088  GIGI neg,LDH nml  B12,folate nml  retic nml      First follow up  9/2021    Hands hurt/ache  Foot and ankles : swell now and then   It comes and goes away  She takes a diuretic     Dry eyes and dry mouth-ok    On plaquenil 200 mg bid     On evoxac 30 mg tid-causes stomach upset    1/2022    Some aches and pains    On plaquenil 200 mg bid  On evoxac 30 mg daily-cant tolerate more   Dry eyes and dry mouth-ok    5/2022    Iron infusions let to constipation  She went to the ER  GFR dropped to 28.7  NOW UPTO >60  UA -uti  On antibiotics    Dry eyes and mouth- does well  Drinks well    On plaquenil 200 mg bid  Eye exam-pending    Doesn't want evoxac  Will try pilocarpine     White count nml  H/h 10.5/36.9  Iron deficiency  plts nml    Sees hematology     2/2022    Complements,cryos,dsdna,CRP nml  UA,UPCR nml      11/2022    Nov-tibial fracture-Healing well-fell down the steps-mechanical fall  Will need a dexa    Dry eyes-eye drops and restasis helps  Dry mouth -pilocarpine helps    On plaquenil 200 mg bid-no major joint pains  No gland enlargement,lymph node enlargement,rashes     Anemia- 10.7/36.1  Stopped iron infusions  Nml white count and plts    GFR 35  UA 1+ protein  Iron -normal in June 2022  We dont know the cause of the low GFR    3/2023    Few problems with the tongue  Tongue was cracked with blisters  Her mouth was very dry  She takes pilocarpine 5 mg tid    Dexa not done    Dry eyes comes as flares  Eye pain- needs evaluation      On plaquenil 200 mg bid-no major joint pains  No gland enlargement,lymph node enlargement,rashes     11/2023    Pilocarpine 5 mg qid is helping  Dry mouth better    Dry eyes are better  Ophthalmology is seeing her    Seeing dentist  Using nystatin swish and swallow    She needs her annual eye exam soon    Dentist appointment also pending  Still has mouth  sores  She eats ice and crunches on it all day-she suspects that this is the cause  She will update me    On plaquenil 200 mg bid-no major joint pains    Past history : sjogren's,fibromyalgia     Family history : lupus cousin and cancers    Social history : not a smoker,alcohol user        Review of Systems   Constitutional:  Negative for fever and unexpected weight change.   HENT:  Negative for mouth sores and trouble swallowing.    Eyes:  Negative for redness.   Respiratory:  Negative for cough and shortness of breath.    Cardiovascular:  Negative for chest pain.   Gastrointestinal:  Negative for constipation and diarrhea.   Genitourinary:  Negative for dysuria and genital sores.   Skin:  Positive for rash.   Neurological:  Negative for headaches.   Hematological:  Does not bruise/bleed easily.       As detailed above     MSK exam  nml    Physical Exam   Constitutional: She is oriented to person, place, and time. No distress.   HENT:   Head: Normocephalic.   Mouth/Throat: Oropharynx is clear and moist.   Eyes: Pupils are equal, round, and reactive to light. Conjunctivae are normal. Right eye exhibits no discharge. Left eye exhibits no discharge. No scleral icterus.   Neck: No thyromegaly present.   Cardiovascular: Normal rate, regular rhythm and normal heart sounds.   Pulmonary/Chest: Effort normal and breath sounds normal. No stridor.   Abdominal: Soft. Bowel sounds are normal.   Musculoskeletal:         General: Normal range of motion.      Cervical back: Normal range of motion.   Lymphadenopathy:     She has no cervical adenopathy.   Neurological: She is alert and oriented to person, place, and time.   Skin: Skin is warm. No rash noted. She is not diaphoretic.   Psychiatric: Affect and judgment normal.       She has lost a lot of hair frontally   Its like a bald patch  Its chronic  There is a mole as well    She has dry hyperpigmented skin on the elbows and dry patches on thighs for evaluation  She has frontal  baldness  She has a mole like lesion   She has hyperpigmented rash on the eyelids and under the eyes       Assessment     52 year old black female with    -Sjogren's syndrome  Sicca symptoms and inflammatory arthritis    Fibromyalgia   -chronic pain + RLS   -insomnia/no sleep apnea  -anxiety and depression     Joint pains  Hands,feet,ankles and knees    Fluid retention needing diuretics and compression stockings    CTS braces help    Anemia- sees hematology ,completed iron infusions      3/2023    Few problems with the tongue  Tongue was cracked with blisters  Her mouth was very dry  She takes pilocarpine 5 mg tid    Dexa not done    Dry eyes comes as flares  Eye pain- needs evaluation      On plaquenil 200 mg bid-no major joint pains  No gland enlargement,lymph node enlargement,rashes     Hands-when she places it a certain way- it goes numb and dead  She drops things easily    GFR 35- 48.9  UA,UPCR nml  Nephro saw her  11/2023    Pilocarpine 5 mg qid is helping  Dry mouth better    Dry eyes are better  Ophthalmology is seeing her    Seeing dentist  Using nystatin swish and swallow    She needs her annual eye exam soon    Dentist appointment also pending  Still has mouth sores  She eats ice and crunches on it all day-she suspects that this is the cause  She will update me    On plaquenil 200 mg bid-no major joint pains    EMG/NCS UE pending    Iron deficiency needing infusions  Hb 8.3/29.4  White count nml  Plts nml  BMP nml    SPEP utd    5/2024    Iron deficiency  H/h 9.7/33.4  White count and plts nml      7/2024    On pilocarpine 5 mg qid    Eye exam- OCT/visual field aug 2024  On plaquenil 200 mg bid    CMP- GFR 41    Emg/ncs never got done      1. Sjogren's syndrome with keratoconjunctivitis sicca    2. Keratoconjunct sicca, not specified as Sjogren's, unsp eye    3. Long-term use of Plaquenil                Reviewed labs/xrays  Reviewed medications    Ordered labs /xrays    F/u      Plan    Cryos,complements,UA,UPCR  Immunoglobulins    She does need EMG/NCS B/L UE- she defers it to a later date    Dexa denied by insurance     Nephrology- f/u  She gets recurrent UTIs-sees urology    Dry eye management   -on restasis  Opthal     Dry mouth management   Cant tolerate evoxac 30 mg tid -cant tolerate 30 mg bid  Continue pilocarpine 5 mg qid' nystatin swish and swallow    Skin issues : dry hyperpigmented skin on the elbows and dry patches on thighs   She has frontal baldness  She has a mole like lesion   She has hyperpigmented rash on the eyelids and under the eyes   She has nummular dermatitis and seborrheic dermatitis   Dermatology saw her,on topical medications   Hair fall-also follows with dermatology    Counselled extraglandular manifestations   Counselled risk of lymphoma    Use of vaginal lubricants/estrogen creams   Seeing Gyn    Use hypoallergenic soaps/lotions for the skin  Avoid drafts from air conditioners/heaters/radiators  Avoid detergents,deodorant soaps,very hot water  Use humidifiers    Have to order CBC,CMP,ESR,CRP,urine analysis,urine creatinine,urine protein,cryos and complements,globulins,quantitative immunoglobulins    Anemia being addressed-she sees hematology  Bone marrow biopsy 2014 : nml  Hematology note :  Since May 2012,  hemoglobin values have been between 10.1-11.6.   She had hysterectomy and has no ongoing menstrual losses. No s/s of mal-absorption. Diet is normal/regular.   No overt bleeding. No melena. She has fatigue and heart burn.  Stool OB negative in Jan 2020. UA negative for blood/ RBC in Aug 2020.    She received iv injectafer on 9/30 and 10/7/20. Serum ferritin now normal. LDH, reticuloyte count, b12, folate all normal today. Hemoglobin 11.4g/dl. GIGI negative.  Oral iron did not help so doing IV iron     White count always normal  Always anemic   plt count always normal    Continue plaquenil 200 mg bid  Eye exam and routine eval once a year    Pain  management :   Narcotics at pain clinic  Gabapentin 300 mg tid  zanaflex dose increased to 8 mg tid    Insomnia management : doxepin  No sleep apnea    Anxiety and depression management  On latuda,wellbutrin and buspar       Jaik was seen today for disease management.    Diagnoses and all orders for this visit:    Sjogren's syndrome with keratoconjunctivitis sicca  -     Cryoglobulin; Future  -     IMMUNOGLOBULINS (IGG, IGA, IGM) QUANTITATIVE; Future  -     C3 Complement; Future  -     C4 Complement; Future  -     Protein/Creatinine Ratio, Urine; Future  -     Urinalysis; Future    Keratoconjunct sicca, not specified as Sjogren's, unsp eye    Long-term use of Plaquenil    Other orders  -     nystatin (MYCOSTATIN) 100,000 unit/mL suspension; Take 4 mLs (400,000 Units total) by mouth 4 (four) times daily.  -     Discontinue: pilocarpine (SALAGEN) 5 MG Tab; Take 1 tablet (5 mg total) by mouth 2 (two) times daily.  -     pilocarpine (SALAGEN) 5 MG Tab; Take 1 tablet (5 mg total) by mouth 4 (four) times daily.  -     hydroxychloroquine (PLAQUENIL) 200 mg tablet; TAKE 1 TABLET BY MOUTH TWICE DAILY  -     tiZANidine (ZANAFLEX) 4 MG tablet; Take 2 tablets (8 mg total) by mouth 3 (three) times daily.                Every 6 months to

## 2024-08-02 ENCOUNTER — TELEPHONE (OUTPATIENT)
Dept: OPTOMETRY | Facility: CLINIC | Age: 54
End: 2024-08-02
Payer: MEDICARE

## 2024-08-05 DIAGNOSIS — G89.4 CHRONIC PAIN SYNDROME: ICD-10-CM

## 2024-08-05 DIAGNOSIS — G44.009 CLUSTER HEADACHE, NOT INTRACTABLE, UNSPECIFIED CHRONICITY PATTERN: ICD-10-CM

## 2024-08-05 DIAGNOSIS — F11.20 CHRONIC NARCOTIC DEPENDENCE: ICD-10-CM

## 2024-08-05 RX ORDER — BUTALBITAL, ACETAMINOPHEN AND CAFFEINE 50; 325; 40 MG/1; MG/1; MG/1
1 TABLET ORAL EVERY 6 HOURS PRN
Qty: 40 TABLET | Refills: 0 | Status: SHIPPED | OUTPATIENT
Start: 2024-08-05

## 2024-08-05 RX ORDER — HYDROCODONE BITARTRATE AND ACETAMINOPHEN 5; 325 MG/1; MG/1
1 TABLET ORAL
Qty: 30 TABLET | Refills: 0 | Status: SHIPPED | OUTPATIENT
Start: 2024-08-05

## 2024-08-07 RX ORDER — NAPROXEN 500 MG/1
TABLET ORAL
Qty: 60 TABLET | Refills: 1 | Status: SHIPPED | OUTPATIENT
Start: 2024-08-07

## 2024-08-09 RX ORDER — TIZANIDINE 4 MG/1
TABLET ORAL
Qty: 90 TABLET | Refills: 1 | Status: SHIPPED | OUTPATIENT
Start: 2024-08-09

## 2024-08-11 RX ORDER — GABAPENTIN 300 MG/1
300 CAPSULE ORAL 3 TIMES DAILY
Qty: 90 CAPSULE | Refills: 5 | Status: SHIPPED | OUTPATIENT
Start: 2024-08-11

## 2024-08-15 ENCOUNTER — TELEPHONE (OUTPATIENT)
Dept: HEMATOLOGY/ONCOLOGY | Facility: CLINIC | Age: 54
End: 2024-08-15
Payer: MEDICARE

## 2024-08-15 NOTE — TELEPHONE ENCOUNTER
----- Message from Henny Escobar sent at 8/15/2024 12:58 PM CDT -----  Regarding: R/S Appt  Contact: PENG MAYO [[id number]]  RESCHEDULE/APPTS    Current Appt Date:  08/20/2024    Type of Appt:  Lab and [facility] Appt    Physician:  S[place]a    Reason for Scheduling:  Pt would like to r/s to 08/22/2024    Caller:  PENG MAYO [[id number]]    Contact Preference:  [phone] (home)

## 2024-08-27 ENCOUNTER — OFFICE VISIT (OUTPATIENT)
Dept: INTERNAL MEDICINE | Facility: CLINIC | Age: 54
End: 2024-08-27
Payer: MEDICARE

## 2024-08-27 ENCOUNTER — TELEPHONE (OUTPATIENT)
Dept: INTERNAL MEDICINE | Facility: CLINIC | Age: 54
End: 2024-08-27

## 2024-08-27 ENCOUNTER — TELEPHONE (OUTPATIENT)
Dept: INTERNAL MEDICINE | Facility: CLINIC | Age: 54
End: 2024-08-27
Payer: MEDICARE

## 2024-08-27 DIAGNOSIS — M54.50 ACUTE RIGHT-SIDED LOW BACK PAIN WITHOUT SCIATICA: ICD-10-CM

## 2024-08-27 DIAGNOSIS — N39.0 RECURRENT UTI: ICD-10-CM

## 2024-08-27 DIAGNOSIS — K59.00 CONSTIPATION, UNSPECIFIED CONSTIPATION TYPE: ICD-10-CM

## 2024-08-27 DIAGNOSIS — N39.0 UTI (URINARY TRACT INFECTION), UNCOMPLICATED: Primary | ICD-10-CM

## 2024-08-27 DIAGNOSIS — H16.229 KERATOCONJUNCT SICCA, NOT SPECIFIED AS SJOGREN'S, UNSP EYE: ICD-10-CM

## 2024-08-27 DIAGNOSIS — Z00.00 ANNUAL PHYSICAL EXAM: ICD-10-CM

## 2024-08-27 PROCEDURE — 3061F NEG MICROALBUMINURIA REV: CPT | Mod: HCNC,CPTII,95, | Performed by: INTERNAL MEDICINE

## 2024-08-27 PROCEDURE — 99215 OFFICE O/P EST HI 40 MIN: CPT | Mod: HCNC,95,, | Performed by: INTERNAL MEDICINE

## 2024-08-27 PROCEDURE — 4010F ACE/ARB THERAPY RXD/TAKEN: CPT | Mod: HCNC,CPTII,95, | Performed by: INTERNAL MEDICINE

## 2024-08-27 PROCEDURE — 1159F MED LIST DOCD IN RCRD: CPT | Mod: HCNC,CPTII,95, | Performed by: INTERNAL MEDICINE

## 2024-08-27 PROCEDURE — 3044F HG A1C LEVEL LT 7.0%: CPT | Mod: HCNC,CPTII,95, | Performed by: INTERNAL MEDICINE

## 2024-08-27 PROCEDURE — 3066F NEPHROPATHY DOC TX: CPT | Mod: HCNC,CPTII,95, | Performed by: INTERNAL MEDICINE

## 2024-08-27 PROCEDURE — 1160F RVW MEDS BY RX/DR IN RCRD: CPT | Mod: HCNC,CPTII,95, | Performed by: INTERNAL MEDICINE

## 2024-08-27 RX ORDER — NITROFURANTOIN (MACROCRYSTALS) 100 MG/1
100 CAPSULE ORAL 2 TIMES DAILY
Qty: 10 CAPSULE | Refills: 0 | Status: SHIPPED | OUTPATIENT
Start: 2024-08-27 | End: 2024-09-01

## 2024-08-27 NOTE — PATIENT INSTRUCTIONS
"Patient information:    Wash your hands with soap and warm water.    GIRLS AND WOMEN    Girls and women need to wash the area between the vagina "lips" (labia). You may be given a special clean-catch kit that contains sterile wipes.    Sit on the toilet with your legs spread apart. Use two fingers to spread open your labia.  Use the first wipe to clean the inner folds of the labia. Wipe from the front to the back.  Use a second wipe to clean over the opening where urine comes out (urethra), just above the opening of the vagina.    To collect the urine sample:    Keeping your labia spread open, begin to urinate into the toilet.  Pass the collection container into your urine stream to catch the "mid-stream" of the urine in the sterile container.    Urinate until the cup is about half full.    You may finish urinating into the toilet bowl.      All of your core healthy metrics are met.      Alexis Pollack,     If you are due for any health screening(s) below please notify me so we can arrange them to be ordered and scheduled to maintain your health. Most healthy patients complete it. Don't lose out on improving your health.     All of your core healthy metrics are met.                             "

## 2024-08-27 NOTE — TELEPHONE ENCOUNTER
----- Message from Zehra Velasco sent at 8/27/2024  8:15 AM CDT -----  Regarding: Patient advice                Name of Who is Calling:  Jaki Bajwa    Who Left The Message:  Jaki Bajwa      What is the request in detail:          Patient called requesting to have Orders put in for a UTI. Patient would like to go to the lab to leave a urine specimen .  Please give a call back at your earliest convenience and further advise.     Thank you      Reply by [facility]: NO      Patient's Preferred Call Back  : [phone] (m)

## 2024-08-27 NOTE — TELEPHONE ENCOUNTER
----- Message from Irma Gamboa MD sent at 8/27/2024  9:58 AM CDT -----  1. I ordered urine studies. ##Please make sure the urine tests are linked together, especially the urine culture.## Thank you!  2. Reschedule with Urology.   3.  Schedule CT scan. Thank you!

## 2024-08-27 NOTE — PROGRESS NOTES
"The patient location is: [place]  The chief complaint leading to consultation is: Urinary Tract Infection    Visit type: Virtual visit with synchronous audio and video  Contact time spent with patient: 18 minutes  Each patient to whom he or she provides medical services by telemedicine is:  (1) informed of the relationship between the physician and patient and the respective role of any other health care provider with respect to management of the patient; and (2) notified that he or she may decline to receive medical services by telemedicine and may withdraw from such care at any time.    Subjective:       Patient ID: Jaki Bajwa is a 53 y.o. female who  has a past medical history of CANDELARIA (acute kidney injury) (4/23/2022), Anemia, Anxiety, Depression, History of psychiatric hospitalization (2000), History of ventral hernia repair, Hypertension, Lupus, Mental disorder, Morbid obesity (12/15/2017), Psychiatric problem, Sjogren's syndrome (2013), Therapy, and TMJ (temporomandibular joint disorder).    Chief Complaint: Urinary Tract Infection     History was obtained from the patient and supplemented through chart review.  She is a patient of Jose Carpenter DO.  Was last seen  for migraine, constipation.    Urinary Tract Infection   Associated symptoms include flank pain, frequency and urgency. Pertinent negatives include no chills, hematuria, nausea, vomiting, constipation or rash.     Symptoms started a few days ago.  Urine is a "beer" color and orange.  Slight dysuria, +urinary urgency, frequency.  No urinary odor.  No fever.  Some nausea.  Last vomited 4 days ago.  Nausea resolved.  No abd/pelvic pain.      She did a home urine test, which was + for UTI.    Has some brown vaginal discharge.  No genital ulcers/sores.    Also has RLE near the buttock, which is new and started at the same time as her urinary symptoms.  Achy pain.  Feels tight.  Pain occurs from sitting to standing.  Pain also occurs when " carrying her 11 mo old grandbaby.  No radiation.  Pain when twerking/rocking her hips.    Takes Tizanidine TID PRN for TMJ and FMA.    H/o recurrent UTI.  Saw Urology .  Was on Macrodantin, Keflex for prophylaxis in the past.  Currently on no antibiotic prophylaxis.    She had CT  due to flank pain.  No nephrolithiasis.    Hasn't been using Estrace.  Had pelvic exam with OBGYN .    Urine culture:    with multiple organisms.   with Candida, >100 k CFU.  Was treated with Diflucan.   with E coli, <100k CFU.  Resistant to ampicillin, Bactrim.   with pneumococcus faecalis sensitive.   with Enterococcus faecalis, sensitive.  , , , ,  with E coli, pansensitive    Allergies:  None to antibiotics    Constipation:  Has bowel movements once or twice a week.  + straining.    Lab Results   Component Value Date    TSH 2.563 09/22/2021               Not addressed today.  H/o Sjogren's.  Follows with Rheumatology.    Migraine:  H/o HA since her 30s.  No issues for a while, but returned since 2013.    Has been on Fioricet since at least a year.   reviewed.  She filled 50 tablets 05/09/2024.  Filled 40 tabs 03/06/2024.    She takes this at least 1-2/week.    Bifrontal headaches. HA occurs 1-3/week.   Some photophobia, no phonophobia. No nausea, vomiting.   Lasts the whole day s tx.  Resolves in 10 minutes after Fioricet.    Not associated with time of day.    Triggers: stress  No other aggravating or alleviating factors.    No neck pain.     Not worsened by recumbency.  There is no weakness.      Denies sinus/allergies/rhinitis issues.  Denies rhinorrhea.  Uses Flonase PRN.     Not taking NSAIDs.  Has tried Excedrin (not taking d/t ASA).    Saw Neuro in 9/2023. C/w migraine, possible JUANIS.  Recommended Topamax 50 for ppx and Ubrevly 50 mg.  She states that no meds have worked for her in the past.  She does not quite recall this, but thinks  that she took Topamax for for 3-4 months.  Per PCP note, she gave up on Topamax after a few weeks.  No SE to either meds.    No snoring, apnea, daytime fatigue.  Feels refreshed after adequate sleep.  Does not fall asleep during inappropriate times.  Stop Bang score 3-4.  Insurance didn't cover PSG or sleep clinic?  She has never had PSG before.    CT head 6/2023 without acute abnormality.   Most likely med overuse HA. Advised to stop Fioricet; unclear if she will do this. Ubrevly 100mg at onset of HA. JUANIS eval. Refer to neurology. Consider PPX tx.     Review of Systems   Constitutional:  Negative for chills.   Gastrointestinal:  Negative for constipation, nausea and vomiting.   Genitourinary:  Positive for dysuria, flank pain, frequency and urgency. Negative for hematuria.   Skin:  Negative for rash.       I personally reviewed Past Medical History, Past Surgical History, Social History, and Family History.    Objective:      There were no vitals filed for this visit.   Physical Exam  Constitutional:       General: She is not in acute distress.     Appearance: She is well-developed. She is not ill-appearing or diaphoretic.   HENT:      Head: Normocephalic.   Eyes:      General: No scleral icterus.        Right eye: No discharge.         Left eye: No discharge.   Pulmonary:      Effort: Pulmonary effort is normal. No respiratory distress.   Abdominal:      Tenderness: There is no right CVA tenderness or left CVA tenderness.   Skin:     Coloration: Skin is not pale.      Findings: No erythema.   Neurological:      Mental Status: She is alert.   Psychiatric:         Behavior: Behavior normal.         Lab Results   Component Value Date    WBC 6.51 05/20/2024    HGB 9.7 (L) 05/20/2024    HCT 33.4 (L) 05/20/2024     05/20/2024    CHOL 156 09/12/2022    TRIG 95 09/12/2022    HDL 61 09/12/2022    ALT 18 07/12/2024    AST 20 07/12/2024     07/12/2024    K 4.7 07/12/2024     07/12/2024    CREATININE 1.5  (H) 07/12/2024    BUN 17 07/12/2024    CO2 27 07/12/2024    TSH 2.563 09/22/2021    INR 1.0 04/23/2022    HGBA1C 4.9 02/13/2024       The 10-year ASCVD risk score (Emilia STEELE, et al., 2019) is: 3.8%    Values used to calculate the score:      Age: 53 years      Sex: Female      Is Non- : Yes      Diabetic: No      Tobacco smoker: No      Systolic Blood Pressure: 131 mmHg      Is BP treated: Yes      HDL Cholesterol: 61 mg/dL      Total Cholesterol: 156 mg/dL    Assessment:       1. UTI (urinary tract infection), uncomplicated    2. Recurrent UTI    3. Acute right-sided low back pain without sciatica    4. Constipation, unspecified constipation type    5. Annual physical exam      Plan:       Jaki was seen today for urinary tract infection.    Diagnoses and all orders for this visit:    UTI (urinary tract infection), uncomplicated  Comments:  Discussed mid stream clean-catch technique. Submit urine studies, then start Macrobid. Complete entire course. Encouraged hydration.  Orders:  -     nitrofurantoin (MACRODANTIN) 100 MG capsule; Take 1 capsule (100 mg total) by mouth 2 (two) times daily. for 5 days  -     Urinalysis; Future  -     Urine culture; Future  -     Urinalysis Microscopic; Future  -     CT Renal Stone Study ABD Pelvis WO; Future    Recurrent UTI  Comments:  Reschedule with Urology; consider ppx tx, eval for atrophic vaginitis.  Orders:  -     nitrofurantoin (MACRODANTIN) 100 MG capsule; Take 1 capsule (100 mg total) by mouth 2 (two) times daily. for 5 days  -     Urinalysis; Future  -     Urine culture; Future  -     Urinalysis Microscopic; Future  -     CT Renal Stone Study ABD Pelvis WO; Future    Acute right-sided low back pain without sciatica  Comments:  No CVA TTP, but concern for nephrolithiasis given timing of symptoms. CT renal. If negative, likely m. strain.  May take muscle relaxant p.r.n..  Orders:  -     CT Renal Stone Study ABD Pelvis WO; Future    Constipation,  unspecified constipation type  Comments:  Could be contributing to recurrent UTI.  PCP already discussed adding fiber.    Annual physical exam  -     Urinalysis; Future  -     Urine culture; Future  -     Urinalysis Microscopic; Future     Side effects of medication(s) were discussed in detail and patient voiced understanding.  Patient will call back for any issues or complications.     I have spent a total of 40 minutes with the patient as well as reviewing the chart/medical record and placing orders on the day of the visit. This includes face to face time and non-face to face time preparing to see the patient (eg, review of tests), obtaining and/or reviewing separately obtained history, documenting clinical information in the electronic or other health record, independently interpreting results and communicating results to the patient/family/caregiver, or care coordinator.    RTC PRN.

## 2024-08-28 ENCOUNTER — LAB VISIT (OUTPATIENT)
Dept: LAB | Facility: HOSPITAL | Age: 54
End: 2024-08-28
Attending: INTERNAL MEDICINE
Payer: MEDICARE

## 2024-08-28 ENCOUNTER — OFFICE VISIT (OUTPATIENT)
Dept: HEMATOLOGY/ONCOLOGY | Facility: CLINIC | Age: 54
End: 2024-08-28
Payer: MEDICARE

## 2024-08-28 VITALS
BODY MASS INDEX: 38.89 KG/M2 | DIASTOLIC BLOOD PRESSURE: 87 MMHG | RESPIRATION RATE: 20 BRPM | TEMPERATURE: 98 F | HEART RATE: 99 BPM | OXYGEN SATURATION: 95 % | SYSTOLIC BLOOD PRESSURE: 133 MMHG | HEIGHT: 67 IN | WEIGHT: 247.81 LBS

## 2024-08-28 DIAGNOSIS — I10 ESSENTIAL HYPERTENSION: Primary | Chronic | ICD-10-CM

## 2024-08-28 DIAGNOSIS — D50.9 IRON DEFICIENCY ANEMIA, UNSPECIFIED IRON DEFICIENCY ANEMIA TYPE: ICD-10-CM

## 2024-08-28 DIAGNOSIS — N18.32 STAGE 3B CHRONIC KIDNEY DISEASE: ICD-10-CM

## 2024-08-28 DIAGNOSIS — D64.9 ANEMIA OF UNKNOWN ETIOLOGY: ICD-10-CM

## 2024-08-28 DIAGNOSIS — M35.00 SJOGREN'S SYNDROME, WITH UNSPECIFIED ORGAN INVOLVEMENT: ICD-10-CM

## 2024-08-28 DIAGNOSIS — E66.01 SEVERE OBESITY (BMI 35.0-35.9 WITH COMORBIDITY): ICD-10-CM

## 2024-08-28 LAB
BASOPHILS # BLD AUTO: 0.06 K/UL (ref 0–0.2)
BASOPHILS NFR BLD: 0.7 % (ref 0–1.9)
DIFFERENTIAL METHOD BLD: ABNORMAL
EOSINOPHIL # BLD AUTO: 0.3 K/UL (ref 0–0.5)
EOSINOPHIL NFR BLD: 3.2 % (ref 0–8)
ERYTHROCYTE [DISTWIDTH] IN BLOOD BY AUTOMATED COUNT: 14 % (ref 11.5–14.5)
FERRITIN SERPL-MCNC: 35 NG/ML (ref 20–300)
HCT VFR BLD AUTO: 30.7 % (ref 37–48.5)
HGB BLD-MCNC: 8.9 G/DL (ref 12–16)
IMM GRANULOCYTES # BLD AUTO: 0.02 K/UL (ref 0–0.04)
IMM GRANULOCYTES NFR BLD AUTO: 0.2 % (ref 0–0.5)
IRON SERPL-MCNC: 49 UG/DL (ref 30–160)
LYMPHOCYTES # BLD AUTO: 2.8 K/UL (ref 1–4.8)
LYMPHOCYTES NFR BLD: 33.7 % (ref 18–48)
MCH RBC QN AUTO: 23.4 PG (ref 27–31)
MCHC RBC AUTO-ENTMCNC: 29 G/DL (ref 32–36)
MCV RBC AUTO: 81 FL (ref 82–98)
MONOCYTES # BLD AUTO: 0.8 K/UL (ref 0.3–1)
MONOCYTES NFR BLD: 9.7 % (ref 4–15)
NEUTROPHILS # BLD AUTO: 4.3 K/UL (ref 1.8–7.7)
NEUTROPHILS NFR BLD: 52.5 % (ref 38–73)
NRBC BLD-RTO: 0 /100 WBC
PLATELET # BLD AUTO: 302 K/UL (ref 150–450)
PMV BLD AUTO: 11.9 FL (ref 9.2–12.9)
RBC # BLD AUTO: 3.81 M/UL (ref 4–5.4)
SATURATED IRON: 13 % (ref 20–50)
TOTAL IRON BINDING CAPACITY: 373 UG/DL (ref 250–450)
TRANSFERRIN SERPL-MCNC: 252 MG/DL (ref 200–375)
WBC # BLD AUTO: 8.24 K/UL (ref 3.9–12.7)

## 2024-08-28 PROCEDURE — 3061F NEG MICROALBUMINURIA REV: CPT | Mod: HCNC,CPTII,S$GLB, | Performed by: INTERNAL MEDICINE

## 2024-08-28 PROCEDURE — 4010F ACE/ARB THERAPY RXD/TAKEN: CPT | Mod: HCNC,CPTII,S$GLB, | Performed by: INTERNAL MEDICINE

## 2024-08-28 PROCEDURE — 3079F DIAST BP 80-89 MM HG: CPT | Mod: HCNC,CPTII,S$GLB, | Performed by: INTERNAL MEDICINE

## 2024-08-28 PROCEDURE — 1159F MED LIST DOCD IN RCRD: CPT | Mod: HCNC,CPTII,S$GLB, | Performed by: INTERNAL MEDICINE

## 2024-08-28 PROCEDURE — 82728 ASSAY OF FERRITIN: CPT | Mod: HCNC | Performed by: INTERNAL MEDICINE

## 2024-08-28 PROCEDURE — 36415 COLL VENOUS BLD VENIPUNCTURE: CPT | Mod: HCNC | Performed by: INTERNAL MEDICINE

## 2024-08-28 PROCEDURE — 99999 PR PBB SHADOW E&M-EST. PATIENT-LVL IV: CPT | Mod: PBBFAC,HCNC,, | Performed by: INTERNAL MEDICINE

## 2024-08-28 PROCEDURE — 99213 OFFICE O/P EST LOW 20 MIN: CPT | Mod: HCNC,S$GLB,, | Performed by: INTERNAL MEDICINE

## 2024-08-28 PROCEDURE — 3075F SYST BP GE 130 - 139MM HG: CPT | Mod: HCNC,CPTII,S$GLB, | Performed by: INTERNAL MEDICINE

## 2024-08-28 PROCEDURE — 83540 ASSAY OF IRON: CPT | Mod: HCNC | Performed by: INTERNAL MEDICINE

## 2024-08-28 PROCEDURE — 3066F NEPHROPATHY DOC TX: CPT | Mod: HCNC,CPTII,S$GLB, | Performed by: INTERNAL MEDICINE

## 2024-08-28 PROCEDURE — 3044F HG A1C LEVEL LT 7.0%: CPT | Mod: HCNC,CPTII,S$GLB, | Performed by: INTERNAL MEDICINE

## 2024-08-28 PROCEDURE — 3008F BODY MASS INDEX DOCD: CPT | Mod: HCNC,CPTII,S$GLB, | Performed by: INTERNAL MEDICINE

## 2024-08-28 PROCEDURE — 85025 COMPLETE CBC W/AUTO DIFF WBC: CPT | Mod: HCNC | Performed by: INTERNAL MEDICINE

## 2024-08-28 RX ORDER — CYCLOSPORINE 0.5 MG/ML
1 EMULSION OPHTHALMIC 2 TIMES DAILY
Qty: 180 EACH | Refills: 1 | Status: SHIPPED | OUTPATIENT
Start: 2024-08-28

## 2024-08-28 NOTE — PROGRESS NOTES
CC: Anemia, hematology follow up        HPI: Ms. Bajwa is a 53-year-old woman here for follow up for anemia.  She has been evaluated previously here by glenn Giraldo with a bone marrow biopsy. Her hemoglobin in 2011 was 13.2.  Since May 2012,  hemoglobin values have been between 10.1-11.6. She has a diagnosis of Sjogren syndrome. Bone marrow biopsy in 2014 was naagtive for malignancy/ dysplasia. MDS FISH was negative.  She had hysterectomy and no ongoing menstrual losses. No s/s of mal-absorption. No recent weight loss. No overt bleeding. No melena. She has fatigue. She has heart burn.        Interval history: She is here for a follow up.  She received iv injectafer on 9/30 and 10/7/20, iv venofer between 3/3/22-4/21/22( 8 treatments, cumulative iron dose of 1600 mg) and again, between Nov 2023-Jan 2024 ( 8 infusions/ 1600mg cumulatively). She again received  8 doses of venofer again between 11/2023-1/2024 .  She denies upper or lower GI bleeding, hematuria, epistaxis or hemoptysis.     Review of Systems   Constitutional:  Positive for malaise/fatigue. Negative for chills, fever and weight loss.   HENT:  Negative for ear discharge, ear pain, hearing loss and tinnitus.    Eyes:  Negative for double vision, photophobia and discharge.   Cardiovascular:  Negative for chest pain, palpitations and orthopnea.   Gastrointestinal:  Negative for blood in stool, heartburn, melena, nausea and vomiting.   Genitourinary:  Negative for frequency and urgency.   Musculoskeletal:  Negative for back pain, myalgias and neck pain.   Neurological:  Negative for tingling, tremors, sensory change, speech change and focal weakness.   Endo/Heme/Allergies:  Negative for environmental allergies. Does not bruise/bleed easily.   Psychiatric/Behavioral:  Negative for depression, hallucinations, substance abuse and suicidal ideas. The patient is not nervous/anxious.      Current Outpatient Medications   Medication Sig    amLODIPine  (NORVASC) 5 MG tablet Take 1 tablet (5 mg total) by mouth once daily.    buPROPion (WELLBUTRIN XL) 150 MG TB24 tablet TAKE 3 TABLETS(450 MG) BY MOUTH EVERY MORNING    busPIRone (BUSPAR) 15 MG tablet TAKE 1 TABLET(15 MG) BY MOUTH THREE TIMES DAILY    butalbital-acetaminophen-caffeine -40 mg (FIORICET, ESGIC) -40 mg per tablet Take 1 tablet by mouth every 6 (six) hours as needed for Headaches.    clotrimazole (LOTRIMIN) 1 % cream Apply topically 2 (two) times daily.    docusate sodium (COLACE) 100 MG capsule Take 1 capsule (100 mg total) by mouth 2 (two) times daily as needed for Constipation.    doxepin (SINEQUAN) 75 MG capsule TAKE 2 CAPSULES(150 MG) BY MOUTH EVERY EVENING    ergocalciferol (ERGOCALCIFEROL) 50,000 unit Cap Take 1 capsule (50,000 Units total) by mouth every 7 days.    estradiol 0.025 mg/24 hr 0.025 mg/24 hr Place 1 patch onto the skin once a week.    gabapentin (NEURONTIN) 300 MG capsule TAKE 1 CAPSULE(300 MG) BY MOUTH THREE TIMES DAILY    HYDROcodone-acetaminophen (NORCO) 5-325 mg per tablet Take 1 tablet by mouth every 24 hours as needed for Pain. Quantity medically necessary    hydrocortisone 2.5 % cream Apply topically 2 (two) times daily as needed (rash, eyelid irritation). For 1 week at a time.    hydroxychloroquine (PLAQUENIL) 200 mg tablet TAKE 1 TABLET BY MOUTH TWICE DAILY    irbesartan (AVAPRO) 300 MG tablet Take 1 tablet (300 mg total) by mouth every evening.    lurasidone (LATUDA) 60 mg Tab tablet TAKE 1 TABLET(60 MG) BY MOUTH EVERY DAY    naproxen (NAPROSYN) 500 MG tablet TAKE 1 TABLET(500 MG) BY MOUTH DAILY AS NEEDED FOR PAIN    NARCAN 4 mg/actuation Renningers SPRAY 1 SPRAY IN NOSTRIL ONCE FOR 1 DOSE    nitrofurantoin (MACRODANTIN) 100 MG capsule Take 1 capsule (100 mg total) by mouth 2 (two) times daily. for 5 days    nystatin (MYCOSTATIN) 100,000 unit/mL suspension Take 4 mLs (400,000 Units total) by mouth 4 (four) times daily.    omeprazole (PRILOSEC) 40 MG capsule Take 1  capsule (40 mg total) by mouth once daily.    tiZANidine (ZANAFLEX) 4 MG tablet TAKE 2 TABLETS(8 MG) BY MOUTH THREE TIMES DAILY    ubrogepant (UBRELVY) 100 mg tablet Take 1 tablet (100 mg total) by mouth as needed for Migraine. If symptoms persist or return, may repeat dose after 2 hours. Maximum: 200 mg per 24 hours    VENOFER 100 mg iron/5 mL injection     cycloSPORINE (RESTASIS) 0.05 % ophthalmic emulsion INSTILL 1 DROP IN BOTH EYES TWICE DAILY (Patient not taking: Reported on 8/27/2024)    diazePAM (VALIUM) 2 MG tablet Take 1 tablet (2 mg total) by mouth daily as needed (Severe anxiety/Panic). 15 tablets to last 3 months    pilocarpine (SALAGEN) 5 MG Tab Take 1 tablet (5 mg total) by mouth 4 (four) times daily. (Patient not taking: Reported on 8/27/2024)     No current facility-administered medications for this visit.          Vitals:    08/28/24 0817   BP: 133/87   Pulse: 99   Resp: 20   Temp: 97.5 °F (36.4 °C)       Physical Exam  Constitutional:       Appearance: She is obese.   HENT:      Head: Normocephalic and atraumatic.   Eyes:      General: No scleral icterus.  Cardiovascular:      Rate and Rhythm: Normal rate and regular rhythm.      Pulses: Normal pulses.      Heart sounds: Normal heart sounds. No murmur heard.  Pulmonary:      Effort: Pulmonary effort is normal.      Breath sounds: No stridor. No rhonchi.   Abdominal:      General: There is no distension.      Palpations: There is no mass.      Hernia: No hernia is present.   Skin:     Coloration: Skin is not jaundiced.   Neurological:      Mental Status: She is alert.          8/12/14 BONE MARROW ASPIRATE, BONE MARROW CLOT, AND BONE MARROW CORE BIOPSY WITH:     CELLULARITY=70-80%, TRILINEAGE HEMATOPOIETIC ACTIVITY (M/E=2:1).  DECREASED STORAGE IRON.  ADEQUATE NUMBER OF MEGAKARYOCYTES.  COMMENT: Flow cytometry analysis of bone marrow aspirate shows lymph gate(11.5%) containing T and B cells. No B cell clonality or T-cell aberrancy is evident. Blast  gate is not increased. Correlate clinically and with a  cytogenetics report.     No significant increased reticular fibrosis is evident by special stain(reticulin) with adequate positive and negative controls.     Bone marrow karyotype results: 46, XX [20], female karyotype.     FISH studies for the MDS panel are normal         Component      Latest Ref Rng 2/12/2024 5/20/2024 6/15/2024 8/28/2024   WBC      3.90 - 12.70 K/uL 7.70  6.51   8.24    RBC      4.00 - 5.40 M/uL 4.50  3.86 (L)   3.81 (L)    Hemoglobin      12.0 - 16.0 g/dL 10.7 (L)  9.7 (L)   8.9 (L)    Hematocrit      37.0 - 48.5 % 36.8 (L)  33.4 (L)   30.7 (L)    MCV      82 - 98 fL 82  87   81 (L)    MCH      27.0 - 31.0 pg 23.8 (L)  25.1 (L)   23.4 (L)    MCHC      32.0 - 36.0 g/dL 29.1 (L)  29.0 (L)   29.0 (L)    RDW      11.5 - 14.5 % 18.0 (H)  14.5   14.0    Platelet Count      150 - 450 K/uL 255  227   302    MPV      9.2 - 12.9 fL 11.3  12.0   11.9    Immature Granulocytes      0.0 - 0.5 % 0.3  0.3   0.2    Gran # (ANC)      1.8 - 7.7 K/uL 4.2  3.6   4.3    Immature Grans (Abs)      0.00 - 0.04 K/uL 0.02  0.02   0.02    Lymph #      1.0 - 4.8 K/uL 2.6  2.1   2.8    Mono #      0.3 - 1.0 K/uL 0.6  0.5   0.8    Eos #      0.0 - 0.5 K/uL 0.3  0.2   0.3    Baso #      0.00 - 0.20 K/uL 0.04  0.04   0.06    nRBC      0 /100 WBC 0  0   0    Gran %      38.0 - 73.0 % 55.1  55.3   52.5    Lymph %      18.0 - 48.0 % 33.4  32.9   33.7    Mono %      4.0 - 15.0 % 7.1  7.5   9.7    Eos %      0.0 - 8.0 % 3.6  3.4   3.2    Basophil %      0.0 - 1.9 % 0.5  0.6   0.7    Differential Method Automated  Automated   Automated    RBC, UA      0 - 4 /hpf   0     WBC, UA      0 - 5 /hpf   1     Bacteria, UA      None-Occ /hpf   Rare     Squam Epithel, UA      /hpf   1     Microscopic Comment   SEE COMMENT     Iron      30 - 160 ug/dL  39   49    Transferrin      200 - 375 mg/dL  220   252    TIBC      250 - 450 ug/dL  326   373    Saturated Iron      20 - 50 %  12 (L)    13 (L)    Ferritin      20.0 - 300.0 ng/mL  24   35       Legend:  (L) Low  (H) High    Assessment:     1. Microcytic anemia  2. Iron deficiency  3. Chronic fatigue  4. Fibromyalgia  5. Sjogren syndrome  6. Essential HTn        Plan:     1,2: She has been evaluated previously here by , including with a bone marrow biopsy. Her hemoglobin in 2011 was 13.2g/dl . Since May 2012,  hemoglobin values have been between 10.1-11.6. She has a diagnosis of Sjogren syndrome. Bone marrow biopsy in 2014 was negative for malignancy/ dysplasia. MDS FISH was negative.  She had hysterectomy and has no ongoing menstrual losses. No s/s of mal-absorption. Diet is normal/regular.   No recent weight loss. No overt bleeding. No melena. She has fatigue and heart burn.  Stool OB negative in Jan 2020. UA negative for blood/ RBC in Aug 2020.    Last PAP smear was in 2016, and was negative.  She follows with GYN here, last evaluation in Sept 2020.   She received iv injectafer on 9/30 and 10/7/20. GIGI previously negative.She received iv venofer between 3/3/22-4/21/22( 8 treatments, cumulative iron dose of 1600 mg) 8 doses of venofer again between 11/2023-1/2024 .  She has microcytic anemia again today, but with no clear evidence of iron deficiency ( serum iron normal, TIBC normal, ferritin at the lower end of normal range)   She will have a repeat BM biopsy.        4,5: on Plaquenil. Follows with rheumatology.         6. Follows with her PCP        BMT Chart Routing      Follow up with physician . Sedation bone marrow in 1-2 weeks; follow up with moe blevins MD after   Follow up with [facility]    Provider visit type    Infusion scheduling note    Injection scheduling note    Labs CBC and other   Scheduling:  Preferred lab:  Lab interval:  cbc on day of marrow; stool occult blood test today   Imaging    Pharmacy appointment    Other referrals

## 2024-08-28 NOTE — H&P (VIEW-ONLY)
CC: Anemia, hematology follow up        HPI: Ms. Bajwa is a 53-year-old woman here for follow up for anemia.  She has been evaluated previously here by glenn Giraldo with a bone marrow biopsy. Her hemoglobin in 2011 was 13.2.  Since May 2012,  hemoglobin values have been between 10.1-11.6. She has a diagnosis of Sjogren syndrome. Bone marrow biopsy in 2014 was naagtive for malignancy/ dysplasia. MDS FISH was negative.  She had hysterectomy and no ongoing menstrual losses. No s/s of mal-absorption. No recent weight loss. No overt bleeding. No melena. She has fatigue. She has heart burn.        Interval history: She is here for a follow up.  She received iv injectafer on 9/30 and 10/7/20, iv venofer between 3/3/22-4/21/22( 8 treatments, cumulative iron dose of 1600 mg) and again, between Nov 2023-Jan 2024 ( 8 infusions/ 1600mg cumulatively). She again received  8 doses of venofer again between 11/2023-1/2024 .  She denies upper or lower GI bleeding, hematuria, epistaxis or hemoptysis.     Review of Systems   Constitutional:  Positive for malaise/fatigue. Negative for chills, fever and weight loss.   HENT:  Negative for ear discharge, ear pain, hearing loss and tinnitus.    Eyes:  Negative for double vision, photophobia and discharge.   Cardiovascular:  Negative for chest pain, palpitations and orthopnea.   Gastrointestinal:  Negative for blood in stool, heartburn, melena, nausea and vomiting.   Genitourinary:  Negative for frequency and urgency.   Musculoskeletal:  Negative for back pain, myalgias and neck pain.   Neurological:  Negative for tingling, tremors, sensory change, speech change and focal weakness.   Endo/Heme/Allergies:  Negative for environmental allergies. Does not bruise/bleed easily.   Psychiatric/Behavioral:  Negative for depression, hallucinations, substance abuse and suicidal ideas. The patient is not nervous/anxious.      Current Outpatient Medications   Medication Sig    amLODIPine  (NORVASC) 5 MG tablet Take 1 tablet (5 mg total) by mouth once daily.    buPROPion (WELLBUTRIN XL) 150 MG TB24 tablet TAKE 3 TABLETS(450 MG) BY MOUTH EVERY MORNING    busPIRone (BUSPAR) 15 MG tablet TAKE 1 TABLET(15 MG) BY MOUTH THREE TIMES DAILY    butalbital-acetaminophen-caffeine -40 mg (FIORICET, ESGIC) -40 mg per tablet Take 1 tablet by mouth every 6 (six) hours as needed for Headaches.    clotrimazole (LOTRIMIN) 1 % cream Apply topically 2 (two) times daily.    docusate sodium (COLACE) 100 MG capsule Take 1 capsule (100 mg total) by mouth 2 (two) times daily as needed for Constipation.    doxepin (SINEQUAN) 75 MG capsule TAKE 2 CAPSULES(150 MG) BY MOUTH EVERY EVENING    ergocalciferol (ERGOCALCIFEROL) 50,000 unit Cap Take 1 capsule (50,000 Units total) by mouth every 7 days.    estradiol 0.025 mg/24 hr 0.025 mg/24 hr Place 1 patch onto the skin once a week.    gabapentin (NEURONTIN) 300 MG capsule TAKE 1 CAPSULE(300 MG) BY MOUTH THREE TIMES DAILY    HYDROcodone-acetaminophen (NORCO) 5-325 mg per tablet Take 1 tablet by mouth every 24 hours as needed for Pain. Quantity medically necessary    hydrocortisone 2.5 % cream Apply topically 2 (two) times daily as needed (rash, eyelid irritation). For 1 week at a time.    hydroxychloroquine (PLAQUENIL) 200 mg tablet TAKE 1 TABLET BY MOUTH TWICE DAILY    irbesartan (AVAPRO) 300 MG tablet Take 1 tablet (300 mg total) by mouth every evening.    lurasidone (LATUDA) 60 mg Tab tablet TAKE 1 TABLET(60 MG) BY MOUTH EVERY DAY    naproxen (NAPROSYN) 500 MG tablet TAKE 1 TABLET(500 MG) BY MOUTH DAILY AS NEEDED FOR PAIN    NARCAN 4 mg/actuation [place] SPRAY 1 SPRAY IN NOSTRIL ONCE FOR 1 DOSE    nitrofurantoin (MACRODANTIN) 100 MG capsule Take 1 capsule (100 mg total) by mouth 2 (two) times daily. for 5 days    nystatin (MYCOSTATIN) 100,000 unit/mL suspension Take 4 mLs (400,000 Units total) by mouth 4 (four) times daily.    omeprazole (PRILOSEC) 40 MG capsule Take 1  capsule (40 mg total) by mouth once daily.    tiZANidine (ZANAFLEX) 4 MG tablet TAKE 2 TABLETS(8 MG) BY MOUTH THREE TIMES DAILY    ubrogepant (UBRELVY) 100 mg tablet Take 1 tablet (100 mg total) by mouth as needed for Migraine. If symptoms persist or return, may repeat dose after 2 hours. Maximum: 200 mg per 24 hours    VENOFER 100 mg iron/5 mL injection     cycloSPORINE (RESTASIS) 0.05 % ophthalmic emulsion INSTILL 1 DROP IN BOTH EYES TWICE DAILY (Patient not taking: Reported on 8/27/2024)    diazePAM (VALIUM) 2 MG tablet Take 1 tablet (2 mg total) by mouth daily as needed (Severe anxiety/Panic). 15 tablets to last 3 months    pilocarpine (SALAGEN) 5 MG Tab Take 1 tablet (5 mg total) by mouth 4 (four) times daily. (Patient not taking: Reported on 8/27/2024)     No current facility-administered medications for this visit.          Vitals:    08/28/24 0817   BP: 133/87   Pulse: 99   Resp: 20   Temp: 97.5 °F (36.4 °C)       Physical Exam  Constitutional:       Appearance: She is obese.   HENT:      Head: Normocephalic and atraumatic.   Eyes:      General: No scleral icterus.  Cardiovascular:      Rate and Rhythm: Normal rate and regular rhythm.      Pulses: Normal pulses.      Heart sounds: Normal heart sounds. No murmur heard.  Pulmonary:      Effort: Pulmonary effort is normal.      Breath sounds: No stridor. No rhonchi.   Abdominal:      General: There is no distension.      Palpations: There is no mass.      Hernia: No hernia is present.   Skin:     Coloration: Skin is not jaundiced.   Neurological:      Mental Status: She is alert.          8/12/14 BONE MARROW ASPIRATE, BONE MARROW CLOT, AND BONE MARROW CORE BIOPSY WITH:     CELLULARITY=70-80%, TRILINEAGE HEMATOPOIETIC ACTIVITY (M/E=2:1).  DECREASED STORAGE IRON.  ADEQUATE NUMBER OF MEGAKARYOCYTES.  COMMENT: Flow cytometry analysis of bone marrow aspirate shows lymph gate(11.5%) containing T and B cells. No B cell clonality or T-cell aberrancy is evident. Blast  gate is not increased. Correlate clinically and with a  cytogenetics report.     No significant increased reticular fibrosis is evident by special stain(reticulin) with adequate positive and negative controls.     Bone marrow karyotype results: 46, XX [20], female karyotype.     FISH studies for the MDS panel are normal         Component      Latest Ref Rng 2/12/2024 5/20/2024 6/15/2024 8/28/2024   WBC      3.90 - 12.70 K/uL 7.70  6.51   8.24    RBC      4.00 - 5.40 M/uL 4.50  3.86 (L)   3.81 (L)    Hemoglobin      12.0 - 16.0 g/dL 10.7 (L)  9.7 (L)   8.9 (L)    Hematocrit      37.0 - 48.5 % 36.8 (L)  33.4 (L)   30.7 (L)    MCV      82 - 98 fL 82  87   81 (L)    MCH      27.0 - 31.0 pg 23.8 (L)  25.1 (L)   23.4 (L)    MCHC      32.0 - 36.0 g/dL 29.1 (L)  29.0 (L)   29.0 (L)    RDW      11.5 - 14.5 % 18.0 (H)  14.5   14.0    Platelet Count      150 - 450 K/uL 255  227   302    MPV      9.2 - 12.9 fL 11.3  12.0   11.9    Immature Granulocytes      0.0 - 0.5 % 0.3  0.3   0.2    Gran # (ANC)      1.8 - 7.7 K/uL 4.2  3.6   4.3    Immature Grans (Abs)      0.00 - 0.04 K/uL 0.02  0.02   0.02    Lymph #      1.0 - 4.8 K/uL 2.6  2.1   2.8    Mono #      0.3 - 1.0 K/uL 0.6  0.5   0.8    Eos #      0.0 - 0.5 K/uL 0.3  0.2   0.3    Baso #      0.00 - 0.20 K/uL 0.04  0.04   0.06    nRBC      0 /100 WBC 0  0   0    Gran %      38.0 - 73.0 % 55.1  55.3   52.5    Lymph %      18.0 - 48.0 % 33.4  32.9   33.7    Mono %      4.0 - 15.0 % 7.1  7.5   9.7    Eos %      0.0 - 8.0 % 3.6  3.4   3.2    Basophil %      0.0 - 1.9 % 0.5  0.6   0.7    Differential Method Automated  Automated   Automated    RBC, UA      0 - 4 /hpf   0     WBC, UA      0 - 5 /hpf   1     Bacteria, UA      None-Occ /hpf   Rare     Squam Epithel, UA      /hpf   1     Microscopic Comment   SEE COMMENT     Iron      30 - 160 ug/dL  39   49    Transferrin      200 - 375 mg/dL  220   252    TIBC      250 - 450 ug/dL  326   373    Saturated Iron      20 - 50 %  12 (L)    13 (L)    Ferritin      20.0 - 300.0 ng/mL  24   35       Legend:  (L) Low  (H) High    Assessment:     1. Microcytic anemia  2. Iron deficiency  3. Chronic fatigue  4. Fibromyalgia  5. Sjogren syndrome  6. Essential HTn        Plan:     1,2: She has been evaluated previously here by , including with a bone marrow biopsy. Her hemoglobin in 2011 was 13.2g/dl . Since May 2012,  hemoglobin values have been between 10.1-11.6. She has a diagnosis of Sjogren syndrome. Bone marrow biopsy in 2014 was negative for malignancy/ dysplasia. MDS FISH was negative.  She had hysterectomy and has no ongoing menstrual losses. No s/s of mal-absorption. Diet is normal/regular.   No recent weight loss. No overt bleeding. No melena. She has fatigue and heart burn.  Stool OB negative in Jan 2020. UA negative for blood/ RBC in Aug 2020.    Last PAP smear was in 2016, and was negative.  She follows with GYN here, last evaluation in Sept 2020.   She received iv injectafer on 9/30 and 10/7/20. GIGI previously negative.She received iv venofer between 3/3/22-4/21/22( 8 treatments, cumulative iron dose of 1600 mg) 8 doses of venofer again between 11/2023-1/2024 .  She has microcytic anemia again today, but with no clear evidence of iron deficiency ( serum iron normal, TIBC normal, ferritin at the lower end of normal range)   She will have a repeat BM biopsy.        4,5: on Plaquenil. Follows with rheumatology.         6. Follows with her PCP        BMT Chart Routing      Follow up with physician . Sedation bone marrow in 1-2 weeks; follow up with moe blevins MD after   Follow up with [facility]    Provider visit type    Infusion scheduling note    Injection scheduling note    Labs CBC and other   Scheduling:  Preferred lab:  Lab interval:  cbc on day of marrow; stool occult blood test today   Imaging    Pharmacy appointment    Other referrals

## 2024-08-29 NOTE — TELEPHONE ENCOUNTER
She submitted urine tests yesterday.  However, the urine culture was cancelled on 08/28/2024 at 17:13 by JMTUCKER; no specimen.    Could you please call the micro lab check on the status of this and to see if they can add on the urine culture to her urine specimen.  Thank you!

## 2024-08-29 NOTE — TELEPHONE ENCOUNTER
Spoke to micro lab staff and explained the situation. Staff stated that the reason why the urine culture was canceled was due to there not being enough white blood cells (WBCs) on the urinalysis to proceed to do a culture. Stated there must be at least 10 WBCs in the Urinalysis. Was then transferred to urinalysis lab.     Spoke to urinalysis lab and explained the situation. Urinalysis lab staff, April, stated she would contact specimen processing to see if there is enough specimen sample to do a urine culture. If enough specimen is present then it will be sent to micro lab to be done. Stated I will receive a call later.

## 2024-08-30 ENCOUNTER — TELEPHONE (OUTPATIENT)
Dept: HEMATOLOGY/ONCOLOGY | Facility: CLINIC | Age: 54
End: 2024-08-30
Payer: MEDICARE

## 2024-08-30 DIAGNOSIS — D61.818 PANCYTOPENIA: ICD-10-CM

## 2024-08-30 DIAGNOSIS — D64.9 ANEMIA OF UNKNOWN ETIOLOGY: Primary | ICD-10-CM

## 2024-08-30 NOTE — TELEPHONE ENCOUNTER
Spoke with MsJeanGarett regarding her sedation bone marrow appointment. Pt stated that she was not able to make the appointment on 9/11/24. Pt declined to reschedule for next available bone marrow sedation slot. Pt stated that she needed to check with her daughter regarding appointment and her schedule. Pt was advised that appointment would be left as is until patient called back to advise next steps. Pt verbalized agreement and understanding.

## 2024-08-30 NOTE — TELEPHONE ENCOUNTER
----- Message from Angelica Child sent at 8/30/2024  9:42 AM CDT -----  Regarding: Consult/Advisory  Contact: :Jaki Bajwa     Consult/Advisory     Name Of Caller:Jaki Bajwa         Contact Preference:[phone] (home)       Nature of call:Patient is calling to ask questions about schedule Biopsy on 09/11 and find out the down time. Requesting a call back

## 2024-09-03 ENCOUNTER — PATIENT MESSAGE (OUTPATIENT)
Dept: NEUROLOGY | Facility: CLINIC | Age: 54
End: 2024-09-03
Payer: MEDICARE

## 2024-09-03 ENCOUNTER — PATIENT MESSAGE (OUTPATIENT)
Dept: HEMATOLOGY/ONCOLOGY | Facility: CLINIC | Age: 54
End: 2024-09-03
Payer: MEDICARE

## 2024-09-04 ENCOUNTER — TELEPHONE (OUTPATIENT)
Dept: HEMATOLOGY/ONCOLOGY | Facility: CLINIC | Age: 54
End: 2024-09-04
Payer: MEDICARE

## 2024-09-04 DIAGNOSIS — G89.4 CHRONIC PAIN SYNDROME: ICD-10-CM

## 2024-09-04 DIAGNOSIS — F11.20 CHRONIC NARCOTIC DEPENDENCE: ICD-10-CM

## 2024-09-04 RX ORDER — IRBESARTAN 300 MG/1
300 TABLET ORAL NIGHTLY
Qty: 90 TABLET | Refills: 3 | OUTPATIENT
Start: 2024-09-04

## 2024-09-04 RX ORDER — IRBESARTAN 300 MG/1
300 TABLET ORAL NIGHTLY
Qty: 90 TABLET | Refills: 3 | Status: SHIPPED | OUTPATIENT
Start: 2024-09-04

## 2024-09-04 NOTE — TELEPHONE ENCOUNTER
No care due was identified.  [facility] Embedded Care Due Messages. Reference number: [id number].   9/04/2024 8:25:29 AM CDT

## 2024-09-04 NOTE — TELEPHONE ENCOUNTER
----- Message from Wilner Bedoya sent at 9/4/2024  8:44 AM CDT -----  Regarding: Consult/Advisory  Contact: [phone]  Consult/Advisory     Name Of Caller: pt         Contact Preference:  [phone]     Nature of call: Pt is requesting arrival time for biopsy on 9/25. Please call to advise

## 2024-09-04 NOTE — TELEPHONE ENCOUNTER
----- Message from Sherry Terrell MA sent at 9/4/2024  3:02 PM CDT -----  Regarding: Refill Request  Who Called:PENG MAYO [[id number]]           New Prescription or Refill : Refill      RX Name and Strength:  HYDROcodone-acetaminophen (NORCO) 5-325 mg per tablet              30 day or 90 day RX: 30           Local or Mail Order : local            Preferred Pharmacy:[id number] [place] [address] AT [facility] [address]       Would the patient rather a call back or a response via [facility]? call        Best Call Back Number:  [phone]

## 2024-09-04 NOTE — TELEPHONE ENCOUNTER
Care Due:                  Date            Visit Type   Department     Provider  --------------------------------------------------------------------------------                                EP -                              PRIMARY      [facility] INTERNAL  Last Visit: 07-      CARE ([facility])   MEDICINE       Jose Carpenter                              ESTABLISHED                              PATIENT -    [facility] INTERNAL  Next Visit: 10-      VIRTUAL      MEDICINE       Jose Carpenter                                                            Last  Test          Frequency    Reason                     Performed    Due Date  --------------------------------------------------------------------------------    Vitamin D...  12 months..  ergocalciferol...........  06- 06-    [facility] Embedded Care Due Messages. Reference number: [id number].   9/04/2024 3:47:26 AM CDT

## 2024-09-04 NOTE — TELEPHONE ENCOUNTER
Refill Decision Note   Jaki Garett  is requesting a refill authorization.  Brief Assessment and Rationale for Refill:  Approve     Medication Therapy Plan:         Pharmacist review requested: Yes   Extended chart review required: Yes   Comments:     Note composed:2:45 PM 09/04/2024

## 2024-09-04 NOTE — TELEPHONE ENCOUNTER
No care due was identified.  [facility] Embedded Care Due Messages. Reference number: [id number].   9/04/2024 8:26:08 AM CDT

## 2024-09-04 NOTE — TELEPHONE ENCOUNTER
Refill Routing Note   Medication(s) are not appropriate for processing by [facility] for the following reason(s):        Required labs abnormal    [facility] action(s):  Defer             Pharmacist review requested: Yes     Appointments  past 12m or future 3m with PCP    Date Provider   Last Visit   7/12/2024 Weeks, Jose ALONSO, DO   Next Visit   9/4/2024 Weeks, Jose ALONSO, DO   ED visits in past 90 days: 0        Note composed:2:35 PM 09/04/2024

## 2024-09-05 RX ORDER — HYDROCODONE BITARTRATE AND ACETAMINOPHEN 5; 325 MG/1; MG/1
1 TABLET ORAL
Qty: 30 TABLET | Refills: 0 | Status: SHIPPED | OUTPATIENT
Start: 2024-09-05

## 2024-09-05 NOTE — TELEPHONE ENCOUNTER
Virtual visit scheduled below:    9/6/2024 4:00 PM (15 min)  Adilia    ESTABLISHED PATIENT - VIRTUAL    Authorization not required   [facility] ([facility]   Jose Carpenter, DO

## 2024-09-06 ENCOUNTER — OFFICE VISIT (OUTPATIENT)
Dept: INTERNAL MEDICINE | Facility: CLINIC | Age: 54
End: 2024-09-06
Payer: MEDICARE

## 2024-09-06 VITALS — DIASTOLIC BLOOD PRESSURE: 87 MMHG | SYSTOLIC BLOOD PRESSURE: 133 MMHG

## 2024-09-06 DIAGNOSIS — N18.32 STAGE 3B CHRONIC KIDNEY DISEASE: Primary | ICD-10-CM

## 2024-09-06 DIAGNOSIS — I10 ESSENTIAL HYPERTENSION: Chronic | ICD-10-CM

## 2024-09-06 DIAGNOSIS — R60.0 LOWER EXTREMITY EDEMA: ICD-10-CM

## 2024-09-06 RX ORDER — FUROSEMIDE 20 MG/1
20 TABLET ORAL DAILY
Qty: 5 TABLET | Refills: 0 | Status: SHIPPED | OUTPATIENT
Start: 2024-09-06 | End: 2024-09-11

## 2024-09-06 NOTE — PROGRESS NOTES
Subjective:       Patient ID: Jaki Bajwa is a 53 y.o. female.    The patient location is: [place]  The chief complaint leading to consultation is:   Chief Complaint   Patient presents with    Medication Problem     Patient also complains of swollen legs (both) ankles as well. (Both)         Visit type: audiovisual    Face to Face time with patient: 10 minutes of total time spent on the encounter, which includes face to face time and non-face to face time preparing to see the patient (eg, review of tests), Obtaining and/or reviewing separately obtained history, Documenting clinical information in the electronic or other health record, Independently interpreting results (not separately reported) and communicating results to the patient/family/caregiver, or Care coordination (not separately reported).       Each patient to whom he or she provides medical services by telemedicine is:  (1) informed of the relationship between the physician and patient and the respective role of any other health care provider with respect to management of the patient; and (2) notified that he or she may decline to receive medical services by telemedicine and may withdraw from such care at any time.    Notes:     Back Pain  This is a chronic problem. The current episode started more than 1 year ago. The problem occurs daily. The problem is unchanged. The pain is present in the lumbar spine and thoracic spine. The quality of the pain is described as aching. The pain radiates to the left knee and right knee. The pain is at a severity of 5/10. The pain is moderate. The pain is Worse during the day. The symptoms are aggravated by position. Stiffness is present In the morning. Associated symptoms include headaches, leg pain and numbness. Pertinent negatives include no abdominal pain, bladder incontinence, bowel incontinence, chest pain, dysuria, fever, paresis, paresthesias, pelvic pain, perianal numbness, tingling, weakness or weight loss.  The treatment provided significant relief.     History of Present Illness  The patient presents for evaluation of leg swelling.    She first noticed the swelling in her legs on Saturday, which significantly increased by Sunday. Despite wearing compression stockings overnight, the swelling persisted. The swelling is causing discomfort but is not painful and is localized around her ankles. She has never taken furosemide before.    She believes the swelling started after she was advised to discontinue hydrochlorothiazide 12.5 mg and start taking vitamin D in August 2024. Her blood pressure has been stable, with a reading of 133/87 at the end of August 2024.    She had a bone marrow biopsy done by  on 09/25/2024. Her anemia is not significantly low, but further investigation is ongoing.      All of your core healthy metrics are met.      Social History     Social History Narrative    Not on file       Family History   Problem Relation Name Age of Onset    Heart disease Mother Amalia Bajwa     Hypertension Mother Amalia Bajwa     Cancer Father Demarcus Bustamantes sr.         colon ca    Colon cancer Father Demarcus Bustamantes sr.     Depression Father Demarcus Bustamantes sr.     Schizophrenia Father Demarcus Bustamantes sr.     Anxiety disorder Father Demarcus Shanks sr.     Arthritis Father Demarcus Shanks sr.     Mental illness Father Demarcus Shanks sr.     Liver cancer Sister Fifi     Cancer Sister Fifi     Depression Sister Fifi     Anxiety disorder Sister Fifi     Heart attack Sister Fifi     COPD Sister Fifi     Cancer Sister Madeline         throat and colon    Depression Sister Madeline     Miscarriages / Stillbirths Sister Madeline     Pancreatic cancer Brother Demarcus busatmantes     Depression Brother Demarcus bustamantes     Alcohol abuse Brother Demarcus bustamantes     No Known Problems Daughter Ikea     Asthma Son Scott     Pancreatic cancer Other      Liver cancer Other      Suicide Neg Hx      Ovarian cancer Neg Hx      Breast cancer Neg Hx         Current  Outpatient Medications:     amLODIPine (NORVASC) 5 MG tablet, Take 1 tablet (5 mg total) by mouth once daily., Disp: 90 tablet, Rfl: 3    buPROPion (WELLBUTRIN XL) 150 MG TB24 tablet, TAKE 3 TABLETS(450 MG) BY MOUTH EVERY MORNING, Disp: 270 tablet, Rfl: 1    busPIRone (BUSPAR) 15 MG tablet, TAKE 1 TABLET(15 MG) BY MOUTH THREE TIMES DAILY, Disp: 270 tablet, Rfl: 1    butalbital-acetaminophen-caffeine -40 mg (FIORICET, ESGIC) -40 mg per tablet, Take 1 tablet by mouth every 6 (six) hours as needed for Headaches., Disp: 40 tablet, Rfl: 0    clotrimazole (LOTRIMIN) 1 % cream, Apply topically 2 (two) times daily., Disp: 28 g, Rfl: 2    cycloSPORINE (RESTASIS) 0.05 % ophthalmic emulsion, Place 1 drop into both eyes 2 (two) times daily., Disp: 180 each, Rfl: 1    docusate sodium (COLACE) 100 MG capsule, Take 1 capsule (100 mg total) by mouth 2 (two) times daily as needed for Constipation., Disp: 60 capsule, Rfl: 0    doxepin (SINEQUAN) 75 MG capsule, TAKE 2 CAPSULES(150 MG) BY MOUTH EVERY EVENING, Disp: 60 capsule, Rfl: 11    ergocalciferol (ERGOCALCIFEROL) 50,000 unit Cap, Take 1 capsule (50,000 Units total) by mouth every 7 days., Disp: 4 capsule, Rfl: 2    estradiol 0.025 mg/24 hr 0.025 mg/24 hr, Place 1 patch onto the skin once a week., Disp: 4 patch, Rfl: 11    gabapentin (NEURONTIN) 300 MG capsule, TAKE 1 CAPSULE(300 MG) BY MOUTH THREE TIMES DAILY, Disp: 90 capsule, Rfl: 5    HYDROcodone-acetaminophen (NORCO) 5-325 mg per tablet, Take 1 tablet by mouth every 24 hours as needed for Pain. Quantity medically necessary, Disp: 30 tablet, Rfl: 0    hydrocortisone 2.5 % cream, Apply topically 2 (two) times daily as needed (rash, eyelid irritation). For 1 week at a time., Disp: 20 g, Rfl: 0    hydroxychloroquine (PLAQUENIL) 200 mg tablet, TAKE 1 TABLET BY MOUTH TWICE DAILY, Disp: 60 tablet, Rfl: 0    irbesartan (AVAPRO) 300 MG tablet, TAKE 1 TABLET(300 MG) BY MOUTH EVERY EVENING, Disp: 90 tablet, Rfl: 3     lurasidone (LATUDA) 60 mg Tab tablet, TAKE 1 TABLET(60 MG) BY MOUTH EVERY DAY, Disp: 90 tablet, Rfl: 3    naproxen (NAPROSYN) 500 MG tablet, TAKE 1 TABLET(500 MG) BY MOUTH DAILY AS NEEDED FOR PAIN, Disp: 60 tablet, Rfl: 1    NARCAN 4 mg/actuation [place], SPRAY 1 SPRAY IN NOSTRIL ONCE FOR 1 DOSE, Disp: , Rfl:     nystatin (MYCOSTATIN) 100,000 unit/mL suspension, Take 4 mLs (400,000 Units total) by mouth 4 (four) times daily., Disp: 480 mL, Rfl: 5    omeprazole (PRILOSEC) 40 MG capsule, Take 1 capsule (40 mg total) by mouth once daily., Disp: 90 capsule, Rfl: 3    tiZANidine (ZANAFLEX) 4 MG tablet, TAKE 2 TABLETS(8 MG) BY MOUTH THREE TIMES DAILY, Disp: 90 tablet, Rfl: 1    ubrogepant (UBRELVY) 100 mg tablet, Take 1 tablet (100 mg total) by mouth as needed for Migraine. If symptoms persist or return, may repeat dose after 2 hours. Maximum: 200 mg per 24 hours, Disp: 30 tablet, Rfl: 2    VENOFER 100 mg iron/5 mL injection, , Disp: , Rfl:     diazePAM (VALIUM) 2 MG tablet, Take 1 tablet (2 mg total) by mouth daily as needed (Severe anxiety/Panic). 15 tablets to last 3 months, Disp: 15 tablet, Rfl: 0    furosemide (LASIX) 20 MG tablet, Take 1 tablet (20 mg total) by mouth once daily. for 5 days, Disp: 5 tablet, Rfl: 0    pilocarpine (SALAGEN) 5 MG Tab, Take 1 tablet (5 mg total) by mouth 4 (four) times daily. (Patient not taking: Reported on 8/27/2024), Disp: 360 tablet, Rfl: 1    Review of Systems   Constitutional:  Negative for fever and weight loss.   Cardiovascular:  Negative for chest pain.   Gastrointestinal:  Negative for abdominal pain and bowel incontinence.   Genitourinary:  Negative for bladder incontinence, dysuria, hematuria and pelvic pain.   Musculoskeletal:  Positive for back pain.   Neurological:  Positive for numbness and headaches. Negative for tingling, weakness and paresthesias.       Objective:   /87   LMP 07/11/2012 Comment: partial      Physical Exam  Vitals reviewed.   Constitutional:        Appearance: She is well-developed.   HENT:      Head: Normocephalic and atraumatic.   Eyes:      Conjunctiva/sclera: Conjunctivae normal.   Cardiovascular:      Rate and Rhythm: Normal rate.   Pulmonary:      Effort: Pulmonary effort is normal. No respiratory distress.   Skin:     General: Skin is warm and dry.      Findings: No rash.   Neurological:      Mental Status: She is alert and oriented to person, place, and time.      Coordination: Coordination normal.   Psychiatric:         Behavior: Behavior normal.         Physical Exam      @resultssec@  Assessment & Plan   Assessment & Plan  1. Leg swelling.  The leg swelling is likely due to the discontinuation of hydrochlorothiazide, as her kidney function tests showed a slight decrease in July 2024. The amlodipine she is currently taking could also contribute to the swelling, although it is typically more pronounced with higher doses. Her blood pressure has remained stable since stopping the hydrochlorothiazide. A prescription for furosemide 20 mg will be provided, to be taken once daily in the morning for 5 days. This should help reduce the swelling. Blood work will be ordered to be done either at the end of next week or early the following week. She is advised to keep us updated on her condition. If the swelling persists, discontinuing the amlodipine or reintroducing hydrochlorothiazide 12.5 mg while closely monitoring her kidney function may be considered.    2. Anemia.  A bone marrow biopsy is scheduled for 09/25/2024 to investigate the cause of her anemia, which has shown a slight decrease despite previous treatments. The biopsy will help rule out various conditions and provide a clearer understanding of the underlying cause.        Problem List Items Addressed This Visit          Cardiac/Vascular    Essential hypertension (Chronic)    Current Assessment & Plan     Continue arb/ccb. May add hctz back but need close f/u on GFR.             Renal/    Stage 3b  chronic kidney disease - Primary    Relevant Orders    Comprehensive Metabolic Panel       Other    Lower extremity edema    Current Assessment & Plan     Only 5 days of lasix for symptom relief.         Relevant Medications    furosemide (LASIX) 20 MG tablet         Immunizations Administered on Date of Encounter - 9/6/2024       No immunizations on file.             No follow-ups on file.      Disclaimer:  This note may have been prepared using voice recognition software, it may have not been extensively proofed, as such there could be errors within the text such as sound alike errors.

## 2024-09-08 DIAGNOSIS — R60.0 LOWER EXTREMITY EDEMA: ICD-10-CM

## 2024-09-08 NOTE — TELEPHONE ENCOUNTER
Refill Routing Note   Medication(s) are not appropriate for processing by [facility] for the following reason(s):        New or recently adjusted medication    [facility] action(s):  Defer             Appointments  past 12m or future 3m with PCP    Date Provider   Last Visit   9/6/2024 Weeks, Jose ALONSO, DO   Next Visit   10/11/2024 Weeks, Jose ALONSO, DO   ED visits in past 90 days: 0        Note composed:6:47 PM 09/08/2024

## 2024-09-08 NOTE — TELEPHONE ENCOUNTER
No care due was identified.  [facility] Embedded Care Due Messages. Reference number: [id number].   9/08/2024 3:47:24 AM CDT

## 2024-09-10 RX ORDER — FUROSEMIDE 20 MG/1
TABLET ORAL
Qty: 5 TABLET | Refills: 0 | Status: SHIPPED | OUTPATIENT
Start: 2024-09-10

## 2024-09-17 DIAGNOSIS — R60.0 LOWER EXTREMITY EDEMA: ICD-10-CM

## 2024-09-17 DIAGNOSIS — G44.009 CLUSTER HEADACHE, NOT INTRACTABLE, UNSPECIFIED CHRONICITY PATTERN: ICD-10-CM

## 2024-09-17 NOTE — TELEPHONE ENCOUNTER
No care due was identified.  Health [facility] Embedded Care Due Messages. Reference number: [id number].   9/17/2024 2:28:20 PM CDT

## 2024-09-17 NOTE — TELEPHONE ENCOUNTER
No care due was identified.  [facility] Embedded Care Due Messages. Reference number: [id number].   9/17/2024 12:56:56 PM CDT

## 2024-09-17 NOTE — TELEPHONE ENCOUNTER
Refill Encounter    PCP Visits: Recent Visits  Date Type Provider Dept   09/06/24 Office Visit Weeks, Jose ALONSO, DO [facility]   07/12/24 Office Visit Weeks, oJse ALONSO, DO [facility]   06/19/24 Office Visit Weeks, Jose ALONSO, DO [facility]   04/09/24 Office Visit Weeks, Jose ALONSO, DO [facility]   03/06/24 Office Visit Weeks, Jose ALONSO, DO [facility]   02/02/24 Office Visit Weeks, Jose ALONSO, DO [facility]   11/21/23 Office Visit Weeks, Jose ALONSO, DO [facility]   10/25/23 Office Visit Weeks, Jose ALONSO, DO [facility]   Showing recent visits within past 360 days and meeting all other requirements  Future Appointments  Date Type Provider Dept   10/11/24 Appointment Weeks, Jose ALONSO, DO [facility]   10/11/24 Appointment Weeks, Jose ALONSO, DO [facility]   Showing future appointments within next 720 days and meeting all other requirements     Last 3 Blood Pressure:   BP Readings from Last 3 Encounters:   09/06/24 133/87   08/28/24 133/87   07/12/24 131/80     Preferred Pharmacy:   [id number] [address] [facility] [address]  [facility] [address]  [facility] [address]  Phone: [phone] Fax: [phone]    Requested RX:  Requested Prescriptions     Pending Prescriptions Disp Refills    butalbital-acetaminophen-caffeine -40 mg (FIORICET, ESGIC) -40 mg per tablet 40 tablet 0     Sig: Take 1 tablet by mouth every 6 (six) hours as needed for Headaches.      RX Route: Normal

## 2024-09-18 ENCOUNTER — TELEPHONE (OUTPATIENT)
Dept: HEMATOLOGY/ONCOLOGY | Facility: CLINIC | Age: 54
End: 2024-09-18
Payer: MEDICARE

## 2024-09-18 ENCOUNTER — PATIENT MESSAGE (OUTPATIENT)
Dept: HEMATOLOGY/ONCOLOGY | Facility: CLINIC | Age: 54
End: 2024-09-18
Payer: MEDICARE

## 2024-09-18 RX ORDER — FUROSEMIDE 20 MG/1
20 TABLET ORAL DAILY
Qty: 90 TABLET | Refills: 3 | Status: SHIPPED | OUTPATIENT
Start: 2024-09-18

## 2024-09-18 RX ORDER — BUTALBITAL, ACETAMINOPHEN AND CAFFEINE 50; 325; 40 MG/1; MG/1; MG/1
1 TABLET ORAL EVERY 6 HOURS PRN
Qty: 40 TABLET | Refills: 0 | Status: SHIPPED | OUTPATIENT
Start: 2024-09-18

## 2024-09-18 NOTE — TELEPHONE ENCOUNTER
Refill Routing Note   Medication(s) are not appropriate for processing by [facility] for the following reason(s):        New or recently adjusted medication  Required labs abnormal    [facility] action(s):  Defer             Appointments  past 12m or future 3m with PCP    Date Provider   Last Visit   9/6/2024 Weeks, Jose ALONSO, DO   Next Visit   9/17/2024 Weeks, Jose ALONSO, DO   ED visits in past 90 days: 0        Note composed:10:25 AM 09/18/2024

## 2024-09-18 NOTE — TELEPHONE ENCOUNTER
----- Message from Miley Mckenzie sent at 9/18/2024  9:49 AM CDT -----  Regarding: FW: Procedure Time  Contact: Pt @ [phone]    ----- Message -----  From: Brianne Day  Sent: 9/18/2024   9:23 AM CDT  To: [facility]  Pool  Subject: Procedure Time                                   Pt is calling to speak to someone in the office to, discuss procedure arrival time. Pt states that they have not heard from anyone in the office. Please call to advise. Thanks.  
Detail Level: Detailed

## 2024-09-19 ENCOUNTER — PATIENT MESSAGE (OUTPATIENT)
Dept: INTERNAL MEDICINE | Facility: CLINIC | Age: 54
End: 2024-09-19
Payer: MEDICARE

## 2024-09-19 ENCOUNTER — LAB VISIT (OUTPATIENT)
Dept: LAB | Facility: OTHER | Age: 54
End: 2024-09-19
Attending: FAMILY MEDICINE
Payer: MEDICARE

## 2024-09-19 ENCOUNTER — TELEPHONE (OUTPATIENT)
Dept: ENDOSCOPY | Facility: HOSPITAL | Age: 54
End: 2024-09-19
Payer: MEDICARE

## 2024-09-19 DIAGNOSIS — N18.32 STAGE 3B CHRONIC KIDNEY DISEASE: ICD-10-CM

## 2024-09-19 LAB
ALBUMIN SERPL BCP-MCNC: 4 G/DL (ref 3.5–5.2)
ALP SERPL-CCNC: 77 U/L (ref 55–135)
ALT SERPL W/O P-5'-P-CCNC: 11 U/L (ref 10–44)
ANION GAP SERPL CALC-SCNC: 8 MMOL/L (ref 8–16)
AST SERPL-CCNC: 15 U/L (ref 10–40)
BILIRUB SERPL-MCNC: 0.3 MG/DL (ref 0.1–1)
BUN SERPL-MCNC: 15 MG/DL (ref 6–20)
CALCIUM SERPL-MCNC: 10 MG/DL (ref 8.7–10.5)
CHLORIDE SERPL-SCNC: 107 MMOL/L (ref 95–110)
CO2 SERPL-SCNC: 27 MMOL/L (ref 23–29)
CREAT SERPL-MCNC: 1.5 MG/DL (ref 0.5–1.4)
EST. GFR  (NO RACE VARIABLE): 41 ML/MIN/1.73 M^2
GLUCOSE SERPL-MCNC: 97 MG/DL (ref 70–110)
POTASSIUM SERPL-SCNC: 4.4 MMOL/L (ref 3.5–5.1)
PROT SERPL-MCNC: 7.8 G/DL (ref 6–8.4)
SODIUM SERPL-SCNC: 142 MMOL/L (ref 136–145)

## 2024-09-19 PROCEDURE — 36415 COLL VENOUS BLD VENIPUNCTURE: CPT | Mod: HCNC | Performed by: FAMILY MEDICINE

## 2024-09-19 PROCEDURE — 80053 COMPREHEN METABOLIC PANEL: CPT | Mod: HCNC | Performed by: FAMILY MEDICINE

## 2024-09-19 NOTE — TELEPHONE ENCOUNTER
Spoke to pt for pre-call to confirm scheduled bone marrow biopsy and patient verbalized understanding of the following:       Date of Procedure (s)  verified 9/25/24  Arrival Time 10:35 AM verified.  Location of Procedure(s) [place] 4th Floor verified.  Pt confirmed ride home after procedure if procedure requires anesthesia.      Appointment details are tentative, including check-in time.  If the patient begins taking any blood thinning medications, injectable weight loss/diabetes medications (other than insulin), or Adipex (phentermine) patient was instructed to contact the endoscopy scheduling department as soon as possible.  Patient was advised to call the endoscopy scheduling department if any questions or concerns arise.        Endoscopy Scheduling Department

## 2024-09-24 ENCOUNTER — TELEPHONE (OUTPATIENT)
Dept: HEMATOLOGY/ONCOLOGY | Facility: CLINIC | Age: 54
End: 2024-09-24
Payer: MEDICARE

## 2024-09-24 NOTE — ED NOTES
I-STAT Chem-8+ Results:   Value Reference Range   Sodium 142 136-145 mmol/L   Potassium  3.3 3.5-5.1 mmol/L   Chloride 101  mmol/L   Ionized Calcium 1.22 1.06-1.42 mmol/L   CO2 (measured) 28 23-29 mmol/L   Glucose 91  mg/dL   BUN 14 6-30 mg/dL   Creatinine 1.0 0.5-1.4 mg/dL   Hematocrit 39 36-54%         124

## 2024-09-24 NOTE — TELEPHONE ENCOUNTER
Spoke to pt via phone and confirmed appt for labs tomorrow for a cbc before bmbx under sedation on the 2nd floor of the hospital

## 2024-09-24 NOTE — TELEPHONE ENCOUNTER
----- Message from Lottie Lacy sent at 9/24/2024  4:28 PM CDT -----  Regarding: Consult/Advisory  Contact: pt @ :[phone]  Consult/Advisory     Name Of Caller: Jaki Bajwa    Contact Preference:[phone]     Nature of call: pt is calling to confirm the office still need her to take labs. Pt stated the she took labs about 1-2 wks ago. Asking for a call back

## 2024-09-25 ENCOUNTER — ANESTHESIA EVENT (OUTPATIENT)
Dept: ENDOSCOPY | Facility: HOSPITAL | Age: 54
End: 2024-09-25
Payer: MEDICARE

## 2024-09-25 ENCOUNTER — HOSPITAL ENCOUNTER (OUTPATIENT)
Facility: HOSPITAL | Age: 54
Discharge: HOME | End: 2024-09-25
Attending: INTERNAL MEDICINE | Admitting: INTERNAL MEDICINE
Payer: MEDICARE

## 2024-09-25 ENCOUNTER — ANESTHESIA (OUTPATIENT)
Dept: ENDOSCOPY | Facility: HOSPITAL | Age: 54
End: 2024-09-25
Payer: MEDICARE

## 2024-09-25 VITALS
RESPIRATION RATE: 18 BRPM | SYSTOLIC BLOOD PRESSURE: 117 MMHG | DIASTOLIC BLOOD PRESSURE: 65 MMHG | BODY MASS INDEX: 39.24 KG/M2 | HEIGHT: 67 IN | WEIGHT: 250 LBS | OXYGEN SATURATION: 98 % | HEART RATE: 99 BPM | TEMPERATURE: 98 F

## 2024-09-25 DIAGNOSIS — D64.9 ANEMIA: ICD-10-CM

## 2024-09-25 DIAGNOSIS — D64.9 ANEMIA OF UNKNOWN ETIOLOGY: Primary | ICD-10-CM

## 2024-09-25 LAB
REASON FOR REFERRAL (NARRATIVE): NORMAL
SPECIMEN SOURCE: NORMAL

## 2024-09-25 PROCEDURE — 38222 DX BONE MARROW BX & ASPIR: CPT | Mod: HCNC,RT,, | Performed by: NURSE PRACTITIONER

## 2024-09-25 PROCEDURE — 88311 DECALCIFY TISSUE: CPT | Mod: HCNC | Performed by: PATHOLOGY

## 2024-09-25 PROCEDURE — 88313 SPECIAL STAINS GROUP 2: CPT | Mod: HCNC | Performed by: PATHOLOGY

## 2024-09-25 PROCEDURE — 37000009 HC ANESTHESIA EA ADD 15 MINS: Mod: HCNC | Performed by: INTERNAL MEDICINE

## 2024-09-25 PROCEDURE — 88237 TISSUE CULTURE BONE MARROW: CPT | Mod: HCNC | Performed by: NURSE PRACTITIONER

## 2024-09-25 PROCEDURE — 25000003 PHARM REV CODE 250: Mod: HCNC | Performed by: INTERNAL MEDICINE

## 2024-09-25 PROCEDURE — 88275 CYTOGENETICS 100-300: CPT | Mod: HCNC | Performed by: NURSE PRACTITIONER

## 2024-09-25 PROCEDURE — 37000008 HC ANESTHESIA 1ST 15 MINUTES: Mod: HCNC | Performed by: INTERNAL MEDICINE

## 2024-09-25 PROCEDURE — 88305 TISSUE EXAM BY PATHOLOGIST: CPT | Mod: HCNC | Performed by: PATHOLOGY

## 2024-09-25 PROCEDURE — 88342 IMHCHEM/IMCYTCHM 1ST ANTB: CPT | Mod: HCNC | Performed by: PATHOLOGY

## 2024-09-25 PROCEDURE — 88185 FLOWCYTOMETRY/TC ADD-ON: CPT | Mod: 59,HCNC | Performed by: PATHOLOGY

## 2024-09-25 PROCEDURE — 88299 UNLISTED CYTOGENETIC STUDY: CPT | Mod: HCNC | Performed by: NURSE PRACTITIONER

## 2024-09-25 PROCEDURE — 88184 FLOWCYTOMETRY/ TC 1 MARKER: CPT | Mod: HCNC | Performed by: PATHOLOGY

## 2024-09-25 PROCEDURE — 25000003 PHARM REV CODE 250: Mod: HCNC | Performed by: NURSE ANESTHETIST, CERTIFIED REGISTERED

## 2024-09-25 PROCEDURE — 63600175 PHARM REV CODE 636 W HCPCS: Mod: HCNC | Performed by: NURSE ANESTHETIST, CERTIFIED REGISTERED

## 2024-09-25 PROCEDURE — 81450 HL NEO GSAP 5-50DNA/DNA&RNA: CPT | Mod: HCNC | Performed by: NURSE PRACTITIONER

## 2024-09-25 PROCEDURE — 25000003 PHARM REV CODE 250: Mod: HCNC | Performed by: NURSE PRACTITIONER

## 2024-09-25 PROCEDURE — 38222 DX BONE MARROW BX & ASPIR: CPT | Mod: HCNC | Performed by: INTERNAL MEDICINE

## 2024-09-25 PROCEDURE — 88341 IMHCHEM/IMCYTCHM EA ADD ANTB: CPT | Mod: HCNC | Performed by: PATHOLOGY

## 2024-09-25 PROCEDURE — 88271 CYTOGENETICS DNA PROBE: CPT | Mod: 59,HCNC | Performed by: NURSE PRACTITIONER

## 2024-09-25 RX ORDER — LIDOCAINE HYDROCHLORIDE 20 MG/ML
INJECTION, SOLUTION INFILTRATION; PERINEURAL
Status: COMPLETED | OUTPATIENT
Start: 2024-09-25 | End: 2024-09-25

## 2024-09-25 RX ORDER — LIDOCAINE HYDROCHLORIDE 20 MG/ML
INJECTION INTRAVENOUS
Status: DISCONTINUED | OUTPATIENT
Start: 2024-09-25 | End: 2024-09-25

## 2024-09-25 RX ORDER — PROPOFOL 10 MG/ML
VIAL (ML) INTRAVENOUS
Status: DISCONTINUED | OUTPATIENT
Start: 2024-09-25 | End: 2024-09-25

## 2024-09-25 RX ORDER — SODIUM CHLORIDE 9 MG/ML
INJECTION, SOLUTION INTRAVENOUS CONTINUOUS
Status: DISCONTINUED | OUTPATIENT
Start: 2024-09-25 | End: 2024-09-25 | Stop reason: HOSPADM

## 2024-09-25 RX ADMIN — LIDOCAINE HYDROCHLORIDE 100 MG: 20 INJECTION INTRAVENOUS at 12:09

## 2024-09-25 RX ADMIN — SODIUM CHLORIDE: 0.9 INJECTION, SOLUTION INTRAVENOUS at 12:09

## 2024-09-25 RX ADMIN — PROPOFOL 80 MG: 10 INJECTION, EMULSION INTRAVENOUS at 12:09

## 2024-09-25 NOTE — CARE UPDATE
Pt c/o R sided headache 8/10. Pt reports she gets headaches frequently and had a mild headache before her procedure. Notified. Dr. Gutierrez. Pt reports having her fiorecet in her bag. Per MD, ok for pt to take home med at this time

## 2024-09-25 NOTE — PLAN OF CARE
Discharge criteria met. Reviewed plan of care and discharge instructions w pt. Pt verbalized understanding. Fall precautions maintained.

## 2024-09-25 NOTE — DISCHARGE INSTRUCTIONS
Discharge instructions for having a Bone Marrow Aspiration / Biopsy    Keep Bandage in place for 24 hours.  - Do not shower or take a tube bath during this time. (you may sponge bathe).  - Call the nurse or physician for excessive bleeding or pain at biopsy site.  - You may take Tylenol as needed for pain.    You have received medication to sedate you.  - Do not drive a car or operate heavy machinery for the rest of the day.  - You may resume other activities as tolerated.    You can call [phone] for any problems during the hours of 8:00 AM-5:00PM.    For an emergency after 5:00 PM you can call [phone] and have the  page the Hematologist / Oncologist on call.

## 2024-09-25 NOTE — ANESTHESIA POSTPROCEDURE EVALUATION
Anesthesia Post Evaluation    Patient: Jaki Bajwa    Procedure(s) Performed: Procedure(s) (LRB):  Biopsy-bone marrow (N/A)    Final Anesthesia Type: general      Patient location during evaluation: GI PACU  Patient participation: Yes- Able to Participate  Level of consciousness: awake and alert  Post-procedure vital signs: reviewed and stable  Pain management: adequate  Airway patency: patent    PONV status at discharge: No PONV  Anesthetic complications: no      Cardiovascular status: hemodynamically stable  Respiratory status: unassisted, spontaneous ventilation and room air  Hydration status: euvolemic  Follow-up not needed.          Vitals Value Taken Time   /65 09/25/24 1302   Temp 36.5 °C (97.7 °F) 09/25/24 1230   Pulse 99 09/25/24 1302   Resp 18 09/25/24 1302   SpO2 98 % 09/25/24 1302         Event Time   Out of Recovery 13:04:11         Pain/Saúl Score: Saúl Score: 10 (9/25/2024 12:33 PM)

## 2024-09-25 NOTE — TRANSFER OF CARE
"Anesthesia Transfer of Care Note    Patient: Jaki Bajwa    Procedure(s) Performed: Procedure(s) (LRB):  Biopsy-bone marrow (N/A)    Patient location: GI    Anesthesia Type: general    Transport from OR: Transported from OR on room air with adequate spontaneous ventilation    Post pain: adequate analgesia    Post assessment: no apparent anesthetic complications and tolerated procedure well    Post vital signs: stable    Level of consciousness: awake and oriented    Nausea/Vomiting: no nausea/vomiting    Complications: none    Transfer of care protocol was followed      Last vitals: Visit Vitals  /81 (BP Location: Left arm, Patient Position: Lying)   Pulse (!) 117   Temp 36.7 °C (98.1 °F)   Resp 18   Ht 5' 7" (1.702 m)   Wt 113.4 kg (250 lb)   LMP 07/11/2012   SpO2 98%   Breastfeeding No   BMI 39.16 kg/m²     "

## 2024-09-25 NOTE — ANESTHESIA PREPROCEDURE EVALUATION
"                                                                                                             09/25/2024  Jaki Bajwa is a 53 y.o., female here for BMB.  Past Medical History:   Diagnosis Date    CANDELARIA (acute kidney injury) 4/23/2022    Anemia     Anxiety     Depression     History of psychiatric hospitalization 2000    [place] x 1 week for depression    History of ventral hernia repair     Hypertension     Lupus     patient reports that she is being treated "like I have Lupus"    Mental disorder     Morbid obesity 12/15/2017    Psychiatric problem     Sjogren's syndrome 2013    Therapy     TMJ (temporomandibular joint disorder)       No current facility-administered medications on file prior to encounter.     Current Outpatient Medications on File Prior to Encounter   Medication Sig Dispense Refill    amLODIPine (NORVASC) 5 MG tablet Take 1 tablet (5 mg total) by mouth once daily. 90 tablet 3    buPROPion (WELLBUTRIN XL) 150 MG TB24 tablet TAKE 3 TABLETS(450 MG) BY MOUTH EVERY MORNING 270 tablet 1    busPIRone (BUSPAR) 15 MG tablet TAKE 1 TABLET(15 MG) BY MOUTH THREE TIMES DAILY 270 tablet 1    clotrimazole (LOTRIMIN) 1 % cream Apply topically 2 (two) times daily. 28 g 2    cycloSPORINE (RESTASIS) 0.05 % ophthalmic emulsion Place 1 drop into both eyes 2 (two) times daily. 180 each 1    diazePAM (VALIUM) 2 MG tablet Take 1 tablet (2 mg total) by mouth daily as needed (Severe anxiety/Panic). 15 tablets to last 3 months 15 tablet 0    docusate sodium (COLACE) 100 MG capsule Take 1 capsule (100 mg total) by mouth 2 (two) times daily as needed for Constipation. 60 capsule 0    doxepin (SINEQUAN) 75 MG capsule TAKE 2 CAPSULES(150 MG) BY MOUTH EVERY EVENING 60 capsule 11    ergocalciferol (ERGOCALCIFEROL) 50,000 unit Cap Take 1 capsule (50,000 Units total) by mouth every 7 days. 4 capsule 2    estradiol 0.025 mg/24 hr 0.025 mg/24 hr Place 1 patch onto the skin once a week. 4 " patch 11    gabapentin (NEURONTIN) 300 MG capsule TAKE 1 CAPSULE(300 MG) BY MOUTH THREE TIMES DAILY 90 capsule 5    hydrocortisone 2.5 % cream Apply topically 2 (two) times daily as needed (rash, eyelid irritation). For 1 week at a time. 20 g 0    hydroxychloroquine (PLAQUENIL) 200 mg tablet TAKE 1 TABLET BY MOUTH TWICE DAILY 60 tablet 0    lurasidone (LATUDA) 60 mg Tab tablet TAKE 1 TABLET(60 MG) BY MOUTH EVERY DAY 90 tablet 3    NARCAN 4 mg/actuation [employer] SPRAY 1 SPRAY IN NOSTRIL ONCE FOR 1 DOSE      nystatin (MYCOSTATIN) 100,000 unit/mL suspension Take 4 mLs (400,000 Units total) by mouth 4 (four) times daily. 480 mL 5    omeprazole (PRILOSEC) 40 MG capsule Take 1 capsule (40 mg total) by mouth once daily. 90 capsule 3    pilocarpine (SALAGEN) 5 MG Tab Take 1 tablet (5 mg total) by mouth 4 (four) times daily. (Patient not taking: Reported on 8/27/2024) 360 tablet 1    tiZANidine (ZANAFLEX) 4 MG tablet TAKE 2 TABLETS(8 MG) BY MOUTH THREE TIMES DAILY 90 tablet 1    ubrogepant (UBRELVY) 100 mg tablet Take 1 tablet (100 mg total) by mouth as needed for Migraine. If symptoms persist or return, may repeat dose after 2 hours. Maximum: 200 mg per 24 hours 30 tablet 2    VENOFER 100 mg iron/5 mL injection        Anesthesia Evaluation     Patient summary reviewed and Nursing notes reviewed    Airway   Mallampati: II  TM distance: Normal  Neck ROM: Normal ROM  Dental    (+) Intact    Pulmonary    Cardiovascular   (+) hypertension    Neuro/Psych    (+) neuromuscular disease, headaches, psychiatric history    GI/Hepatic/Renal    (+) chronic renal disease    Endo/Other    Abdominal                       Pre-op Assessment    I have reviewed the Patient Summary Reports.     I have reviewed the Nursing Notes. I have reviewed the NPO Status.   I have reviewed the Medications.     Review of Systems  Anesthesia Hx:  No problems with previous Anesthesia             Denies Family Hx of Anesthesia complications.    Denies  Personal Hx of Anesthesia complications.                    Social:  Non-Smoker, No Alcohol Use       Cardiovascular:     Hypertension                                        Renal/:  Chronic Renal Disease                Neurological:    Neuromuscular Disease,  Headaches                                 Psych:  Psychiatric History                Physical Exam  General: Well nourished, Cooperative, Alert and Oriented    Airway:  Mallampati: II   Mouth Opening: Normal  TM Distance: Normal  Tongue: Normal  Neck ROM: Normal ROM    Dental:  Intact      Anesthesia Plan  Type of Anesthesia, risks & benefits discussed:    Anesthesia Type: Gen Natural Airway  Intra-op Monitoring Plan: Standard ASA Monitors  Post Op Pain Control Plan: multimodal analgesia  Induction:  IV  Informed Consent: Informed consent signed with the Patient and all parties understand the risks and agree with anesthesia plan.  All questions answered.   ASA Score: 2  Day of Surgery Review of History & Physical: H&P Update referred to the surgeon/provider.    Ready For Surgery From Anesthesia Perspective.     .

## 2024-09-25 NOTE — PROCEDURES
"Jaki Bajwa is a 53 y.o. female patient.    Temp: 97.7 °F (36.5 °C) (09/25/24 1230)  Pulse: 104 (09/25/24 1230)  Resp: 18 (09/25/24 1230)  BP: 136/79 (09/25/24 1230)  SpO2: 99 % (09/25/24 1230)  Weight: 113.4 kg (250 lb) (09/25/24 1119)  Height: 5' 7" (170.2 cm) (09/25/24 1119)       Bone Marrow Aspiration & Biopsy    Date/Time: 9/25/2024 12:38 PM    Performed by: Angely Scherer NP  Authorized by: Angely Scherer NP    Aspiration?: Yes   Biopsy?: Yes      PROCEDURE NOTE:  Bone Marrow aspiration and biopsy  Indication: evaluation of anemia  Consent: Informed consent was obtained from patient.  Timeout: Done and documented.  Position: left lateral  Site: right posterior illiac crest.  Prep: Betadine.  Needle used: 11 gauge Jamshidi needle.  Anesthetic: 2% lidocaine 5 cc.  Biopsy: The biopsy needle was introduced into the marrow cavity and 25 cc's of aspirate was obtained without complications and sent for flow, cytogenetics, DNA hold, AML FISH and NGS. Core biopsy obtained without difficulty and sent for routine histologic examination.  Complications: None.  EBL: minimal  Disposition: The patient was discharged home per anesthesia protocol.    Angely Scherer NP  Hematology/BMT   9/25/2024    "

## 2024-09-25 NOTE — DISCHARGE SUMMARY
[facility]  Hematology  Bone Marrow Transplant  Discharge Summary      Patient Name: Jaki Bajwa  MRN: [id number]  Admission Date: 9/25/2024  Hospital Length of Stay: 0 days  Discharge Date and Time: 9/25/2024  1:04 PM  Attending Physician: No att. providers found   Discharging Provider: Angely Scherer NP  Primary Care Provider: Jose Carpenter,     HPI: 53 year old female with chronic anemia presents to endoscopy today for evaluation with bone marrow aspiration and biopsy with sedation. She voices no complaints today.     Procedure(s) (LRB):  Biopsy-bone marrow (N/A)     Hospital Course: Patient admitted to endoscopy today for a bone marrow aspiration and biopsy. Consent was done for a bone marrow aspiration and biopsy. Patient was sedated per anesthesia and a bone marrow biopsy and aspiration was performed in endo suite 5. Patient was then transferred to post op and discharged home when appropriate per anesthesia.     Goals of Care Treatment Preferences:  Code Status: Full Code      Pending Diagnostic Studies:       Procedure Component Value Units Date/Time    AML FISH, Bone Marrow (Ages over 30 yrs) [[id number]] Collected: 09/25/24 1144    Order Status: Sent Lab Status: In process Updated: 09/25/24 1145    Specimen: Bone Marrow     Chromosome Analysis, Bone Marrow Right Posterior Iliac Crest [[id number]] Collected: 09/25/24 1144    Order Status: Sent Lab Status: In process Updated: 09/25/24 1145    Specimen: Bone Marrow     Heme Disorders DNA/RNA Hold, Bone Marrow [[id number]] Collected: 09/25/24 1144    Order Status: Sent Lab Status: In process Updated: 09/25/24 1145    Specimen: Bone Marrow     OncoHeme (NGS) Hematologic Neoplasms, BM Diagnosis or Indication for test: evaluation of anemia [[id number]] Collected: 09/25/24 1144    Order Status: Sent Lab Status: In process Updated: 09/25/24 1145    Specimen: Bone Marrow     Specimen to Pathology, Bone Marrow Aspiration/Biopsy  [[id number]] Collected: 09/25/24 1144    Order Status: Sent Lab Status: In process Updated: 09/25/24 1256    Specimen: Bone Marrow           There are no hospital problems to display for this patient.     Discharged Condition: stable    Disposition: Home or Self Care    Follow Up:   Future Appointments   Date Time Provider Department Center   10/2/2024  8:40 AM Stephnaie Jha, NP [facility] SLEEP [facility]   10/3/2024  8:00 AM Gordo Zamora LCSW [facility] SOCL [facility]   10/3/2024 11:00 AM Roberto Carlos Lr MD [facility] ENDODIA [facility]   10/4/2024  7:45 AM PERIMETRY, HUMPH [facility] OPHTHAL [facility]   10/4/2024  9:00 AM Andrew Mckinney, OD [facility] OPTOMTY [facility]   10/8/2024  2:20 PM Gretchen Galdamez MD [facility] NEURO [facility]   10/11/2024 11:00 AM Jose Carpenter, DO [facility] IM [facility]   10/11/2024  4:15 PM Jose Carpenter, DO [facility] IM [facility]   1/2/2025  9:30 AM Najma Doyle MD [facility] PSYCH [facility]        Patient Instructions:      Notify your health care provider if you experience any of the following:  temperature >100.4     Notify your health care provider if you experience any of the following:  persistent nausea and vomiting or diarrhea     Notify your health care provider if you experience any of the following:  severe uncontrolled pain     Notify your health care provider if you experience any of the following:  redness, tenderness, or signs of infection (pain, swelling, redness, odor or green/yellow discharge around incision site)     Notify your health care provider if you experience any of the following:  difficulty breathing or increased cough     Notify your health care provider if you experience any of the following:  persistent dizziness, light-headedness, or visual disturbances     Remove dressing in 24 hours     Activity as tolerated     Shower on day dressing removed (No bath)     Medications:  Reconciled Home Medications:      Medication List        ASK your doctor about these  medications      amLODIPine 5 MG tablet  Commonly known as: NORVASC  Take 1 tablet (5 mg total) by mouth once daily.     buPROPion 150 MG TB24 tablet  Commonly known as: WELLBUTRIN XL  TAKE 3 TABLETS(450 MG) BY MOUTH EVERY MORNING     busPIRone 15 MG tablet  Commonly known as: BUSPAR  TAKE 1 TABLET(15 MG) BY MOUTH THREE TIMES DAILY     butalbital-acetaminophen-caffeine -40 mg -40 mg per tablet  Commonly known as: FIORICET, ESGIC  Take 1 tablet by mouth every 6 (six) hours as needed for Headaches.     clotrimazole 1 % cream  Commonly known as: LOTRIMIN  Apply topically 2 (two) times daily.     cycloSPORINE 0.05 % ophthalmic emulsion  Commonly known as: RESTASIS  Place 1 drop into both eyes 2 (two) times daily.     diazePAM 2 MG tablet  Commonly known as: VALIUM  Take 1 tablet (2 mg total) by mouth daily as needed (Severe anxiety/Panic). 15 tablets to last 3 months     docusate sodium 100 MG capsule  Commonly known as: COLACE  Take 1 capsule (100 mg total) by mouth 2 (two) times daily as needed for Constipation.     doxepin 75 MG capsule  Commonly known as: SINEQUAN  TAKE 2 CAPSULES(150 MG) BY MOUTH EVERY EVENING     ergocalciferol 50,000 unit Cap  Commonly known as: ERGOCALCIFEROL  Take 1 capsule (50,000 Units total) by mouth every 7 days.     estradiol 0.025 mg/24 hr 0.025 mg/24 hr  Place 1 patch onto the skin once a week.     furosemide 20 MG tablet  Commonly known as: LASIX  Take 1 tablet (20 mg total) by mouth once daily.     gabapentin 300 MG capsule  Commonly known as: NEURONTIN  TAKE 1 CAPSULE(300 MG) BY MOUTH THREE TIMES DAILY     HYDROcodone-acetaminophen 5-325 mg per tablet  Commonly known as: NORCO  Take 1 tablet by mouth every 24 hours as needed for Pain. Quantity medically necessary     hydrocortisone 2.5 % cream  Apply topically 2 (two) times daily as needed (rash, eyelid irritation). For 1 week at a time.     hydroxychloroquine 200 mg tablet  Commonly known as: PLAQUENIL  TAKE 1 TABLET BY  MOUTH TWICE DAILY     irbesartan 300 MG tablet  Commonly known as: AVAPRO  TAKE 1 TABLET(300 MG) BY MOUTH EVERY EVENING     lurasidone 60 mg Tab tablet  Commonly known as: LATUDA  TAKE 1 TABLET(60 MG) BY MOUTH EVERY DAY     NARCAN 4 mg/actuation [employer]  Generic drug: naloxone  SPRAY 1 SPRAY IN NOSTRIL ONCE FOR 1 DOSE     nystatin 100,000 unit/mL suspension  Commonly known as: MYCOSTATIN  Take 4 mLs (400,000 Units total) by mouth 4 (four) times daily.     omeprazole 40 MG capsule  Commonly known as: PRILOSEC  Take 1 capsule (40 mg total) by mouth once daily.     pilocarpine 5 MG Tab  Commonly known as: SALAGEN  Take 1 tablet (5 mg total) by mouth 4 (four) times daily.     tiZANidine 4 MG tablet  Commonly known as: ZANAFLEX  TAKE 2 TABLETS(8 MG) BY MOUTH THREE TIMES DAILY     ubrogepant 100 mg tablet  Commonly known as: UBRELVY  Take 1 tablet (100 mg total) by mouth as needed for Migraine. If symptoms persist or return, may repeat dose after 2 hours. Maximum: 200 mg per 24 hours     VENOFER 100 mg iron/5 mL injection  Generic drug: iron sucrose              Angely Scherer NP  Bone Marrow Transplant  [facility]

## 2024-09-26 LAB
AML FISH REASON FOR REFERRAL (BM): NORMAL
ANNOTATION COMMENT IMP: NORMAL
CELLS W CYTOGENETIC ABNL BLD/T: NORMAL
CHROM ANALY RESULT (ISCN): NORMAL
CLINICAL CYTOGENETICIST REVIEW: NORMAL
DNA/RNA EXTRACT AND HOLD RESULT: NORMAL
DNA/RNA EXTRACTION: NORMAL
EXHR SPECIMEN TYPE: NORMAL
LAB TEST METHOD: NORMAL
MOL DX INTERP BLD/T QL: NORMAL
PROVIDER SIGNING NAME: NORMAL
SPECIMEN SOURCE: NORMAL
TEST PERFORMANCE INFO SPEC: NORMAL

## 2024-09-27 LAB
BODY SITE - BONE MARROW: NORMAL
CLINICAL DIAGNOSIS - BONE MARROW: NORMAL
COMMENT: NORMAL
FINAL PATHOLOGIC DIAGNOSIS: NORMAL
FLOW CYTOMETRY ANTIBODIES ANALYZED - BONE MARROW: NORMAL
FLOW CYTOMETRY COMMENT - BONE MARROW: NORMAL
FLOW CYTOMETRY INTERPRETATION - BONE MARROW: NORMAL
GROSS: NORMAL
Lab: NORMAL
MICROSCOPIC EXAM: NORMAL

## 2024-10-03 ENCOUNTER — TELEPHONE (OUTPATIENT)
Dept: ENDOCRINOLOGY | Facility: CLINIC | Age: 54
End: 2024-10-03
Payer: MEDICARE

## 2024-10-03 ENCOUNTER — OFFICE VISIT (OUTPATIENT)
Dept: PSYCHIATRY | Facility: CLINIC | Age: 54
End: 2024-10-03
Payer: MEDICARE

## 2024-10-03 ENCOUNTER — OFFICE VISIT (OUTPATIENT)
Dept: ENDOCRINOLOGY | Facility: CLINIC | Age: 54
End: 2024-10-03
Payer: MEDICARE

## 2024-10-03 DIAGNOSIS — E21.3 HYPERPARATHYROIDISM: ICD-10-CM

## 2024-10-03 DIAGNOSIS — F41.1 GAD (GENERALIZED ANXIETY DISORDER): Primary | ICD-10-CM

## 2024-10-03 DIAGNOSIS — F40.10 SOCIAL ANXIETY DISORDER: ICD-10-CM

## 2024-10-03 DIAGNOSIS — F39 MOOD DISORDER: ICD-10-CM

## 2024-10-03 DIAGNOSIS — M81.0 AGE-RELATED OSTEOPOROSIS WITHOUT CURRENT PATHOLOGICAL FRACTURE: ICD-10-CM

## 2024-10-03 DIAGNOSIS — E55.9 VITAMIN D DEFICIENCY: Primary | ICD-10-CM

## 2024-10-03 DIAGNOSIS — F41.0 PANIC DISORDER: ICD-10-CM

## 2024-10-03 PROCEDURE — 3061F NEG MICROALBUMINURIA REV: CPT | Mod: HCNC,CPTII,95,GC | Performed by: STUDENT IN AN ORGANIZED HEALTH CARE EDUCATION/TRAINING PROGRAM

## 2024-10-03 PROCEDURE — 1159F MED LIST DOCD IN RCRD: CPT | Mod: HCNC,CPTII,95,GC | Performed by: STUDENT IN AN ORGANIZED HEALTH CARE EDUCATION/TRAINING PROGRAM

## 2024-10-03 PROCEDURE — 1160F RVW MEDS BY RX/DR IN RCRD: CPT | Mod: HCNC,CPTII,95,GC | Performed by: STUDENT IN AN ORGANIZED HEALTH CARE EDUCATION/TRAINING PROGRAM

## 2024-10-03 PROCEDURE — G2211 COMPLEX E/M VISIT ADD ON: HCPCS | Mod: HCNC,95,GC, | Performed by: STUDENT IN AN ORGANIZED HEALTH CARE EDUCATION/TRAINING PROGRAM

## 2024-10-03 PROCEDURE — 4010F ACE/ARB THERAPY RXD/TAKEN: CPT | Mod: HCNC,CPTII,95,GC | Performed by: STUDENT IN AN ORGANIZED HEALTH CARE EDUCATION/TRAINING PROGRAM

## 2024-10-03 PROCEDURE — 99204 OFFICE O/P NEW MOD 45 MIN: CPT | Mod: HCNC,95,GC, | Performed by: STUDENT IN AN ORGANIZED HEALTH CARE EDUCATION/TRAINING PROGRAM

## 2024-10-03 PROCEDURE — 3044F HG A1C LEVEL LT 7.0%: CPT | Mod: HCNC,CPTII,95,GC | Performed by: STUDENT IN AN ORGANIZED HEALTH CARE EDUCATION/TRAINING PROGRAM

## 2024-10-03 PROCEDURE — 3066F NEPHROPATHY DOC TX: CPT | Mod: HCNC,CPTII,95,GC | Performed by: STUDENT IN AN ORGANIZED HEALTH CARE EDUCATION/TRAINING PROGRAM

## 2024-10-03 RX ORDER — HYDROXYCHLOROQUINE SULFATE 200 MG/1
TABLET, FILM COATED ORAL
Qty: 60 TABLET | Refills: 0 | Status: SHIPPED | OUTPATIENT
Start: 2024-10-03

## 2024-10-03 NOTE — ASSESSMENT & PLAN NOTE
Vitamin-D was found to be 10 earlier this year  Patient has been prescribed and compliant with 86555 IU of vitamin-D    -obtain vitamin-D level on this visit, may need to adjust management if appropriate

## 2024-10-03 NOTE — ASSESSMENT & PLAN NOTE
Patient was found to have an elevated PTH of 122.6 however in addition vitamin-D was found to be 10, normal calcium levels.  There was a distant history of mild hypercalcemia in the past.  Since eConsult with Dr. Barraza, the patient has been prescribed vitamin-D 53726 IU which she has been taking for the last month, endorses compliance   Patient has also reportedly discontinued her hydrochlorothiazide medication.    -will obtain vitamin-D level to ensure that it has been repleted prior to consideration of a 24 hour urine calcium and creatinine level  -will obtain a renal panel, PTH on this visit  -eventually patient will need a 24 hour urine calcium and creatinine level in the near future for further assessment, will need to hold off on any diuretics at that time  -patient may meet criteria for parathyroidectomy if patient is found to have osteoporosis on bone density scan  -will obtain bone density scan for evaluation of osteoporosis in the setting of patient reporting fibular fracture in the past
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
## 2024-10-03 NOTE — TELEPHONE ENCOUNTER
----- Message from Chepe sent at 10/3/2024  4:02 PM CDT -----  Regarding: Appointment  Contact: [phone]  Calling to schedule appointment due to 2 bone density tests per orders from the doctor. Please contact patient as soon as possible.

## 2024-10-03 NOTE — PROGRESS NOTES
Endocrinology New Patient Visit    Subjective:      Chief Complaint: No chief complaint on file.      HPI: Jaki Bajwa is a 53 y.o. female who is here for an initial evaluation for hyperparathyroidism.    This is a MyChart video visit.     The patient location is: home in [place]  The chief complaint leading to consultation is: hyperparathyroid  Visit type: Virtual visit with synchronous audio and video  Total time spent with patient: 20 minutes  Each patient to whom he or she provides medical services by telemedicine is:  (1) informed of the relationship between the physician and patient and the respective role of any other health care provider with respect to management of the patient; and (2) notified that he or she may decline to receive medical services by telemedicine and may withdraw from such care at any time.    Patient was last seen by Dr. Perera as an eConsult due to abnormal PTH levels that were obtained.  Patient's PTH level was found to be 122.6, vitamin-D was 10, and calcium was 10.  There is a remote history of mild hypercalcemia in the past.  Recommendations at that time were to replete vitamin-D level, hold patient's thiazide medication and obtain a 24 hour urine calcium and creatinine along with repeat labs.  Patient denies any history of kidney stones, family history of parathyroid or other endocrinopathies, denies diagnosis of osteoporosis however does mention that she had a fibular fracture in the past, was recently told to stop NSAIDs due to downtrending GFR.    Patient states that she has been taking vitamin-D 73609 IU for the past month, she has stopped hydrochlorothiazide since being told to do so in July.  Patient is reporting frequent urination that has been ongoing for a couple of weeks now, denies any kidney stones.  Patient denies ever receiving a bone density scan in the past.      Objective:     There were no vitals filed for this visit.    Patient was seen as a virtual visit, so  neither vitals or physical exam was possible.    Assessment/Plan:     Hyperparathyroidism  Patient was found to have an elevated PTH of 122.6 however in addition vitamin-D was found to be 10, normal calcium levels.  There was a distant history of mild hypercalcemia in the past.  Since eConsult with Dr. Barraza, the patient has been prescribed vitamin-D 60913 IU which she has been taking for the last month, endorses compliance   Patient has also reportedly discontinued her hydrochlorothiazide medication.    -will obtain vitamin-D level to ensure that it has been repleted prior to consideration of a 24 hour urine calcium and creatinine level  -will obtain a renal panel, PTH on this visit  -eventually patient will need a 24 hour urine calcium and creatinine level in the near future for further assessment, will need to hold off on any diuretics at that time  -patient may meet criteria for parathyroidectomy if patient is found to have osteoporosis on bone density scan  -will obtain bone density scan for evaluation of osteoporosis in the setting of patient reporting fibular fracture in the past        Vitamin D deficiency  Vitamin-D was found to be 10 earlier this year  Patient has been prescribed and compliant with 92971 IU of vitamin-D    -obtain vitamin-D level on this visit, may need to adjust management if appropriate    Age-related osteoporosis without current pathological fracture  It appears about 2 years ago patient had a fibula fracture from standing height which would be diagnostic of osteoporosis  No DEXA scans on record, previously ordered however was not approved by insurance    -will go ahead and order a DEXA scan on this visit  -if DEXA scan shows osteoporosis this would be an indication for parathyroidectomy      Follow up in about 6 months (around 4/3/2025).    Visit today included increased complexity associated with the care of evaluation and assessment of hyperparathyroid and possible parathyroidectomy  procedure in the future addressed and managing the longitudinal care of the patient due to the serious and/or complex managed problem(s).     Roberto Carlos Lr MD  [facility]

## 2024-10-03 NOTE — ASSESSMENT & PLAN NOTE
It appears about 2 years ago patient had a fibula fracture from standing height which would be diagnostic of osteoporosis  No DEXA scans on record, previously ordered however was not approved by insurance    -will go ahead and order a DEXA scan on this visit  -if DEXA scan shows osteoporosis this would be an indication for parathyroidectomy

## 2024-10-04 DIAGNOSIS — G89.4 CHRONIC PAIN SYNDROME: ICD-10-CM

## 2024-10-04 DIAGNOSIS — F11.20 CHRONIC NARCOTIC DEPENDENCE: ICD-10-CM

## 2024-10-04 RX ORDER — HYDROCODONE BITARTRATE AND ACETAMINOPHEN 5; 325 MG/1; MG/1
1 TABLET ORAL
Qty: 30 TABLET | Refills: 0 | Status: SHIPPED | OUTPATIENT
Start: 2024-10-04

## 2024-10-04 NOTE — TELEPHONE ENCOUNTER
No care due was identified.  [facility] Embedded Care Due Messages. Reference number: [id number].   10/04/2024 8:26:36 AM CDT

## 2024-10-11 ENCOUNTER — OFFICE VISIT (OUTPATIENT)
Dept: INTERNAL MEDICINE | Facility: CLINIC | Age: 54
End: 2024-10-11
Payer: MEDICARE

## 2024-10-11 ENCOUNTER — LAB VISIT (OUTPATIENT)
Dept: LAB | Facility: OTHER | Age: 54
End: 2024-10-11
Attending: STUDENT IN AN ORGANIZED HEALTH CARE EDUCATION/TRAINING PROGRAM
Payer: MEDICARE

## 2024-10-11 VITALS — HEART RATE: 90 BPM | WEIGHT: 245.13 LBS | OXYGEN SATURATION: 99 % | BODY MASS INDEX: 38.4 KG/M2

## 2024-10-11 DIAGNOSIS — E21.3 HYPERPARATHYROIDISM: ICD-10-CM

## 2024-10-11 DIAGNOSIS — D50.8 OTHER IRON DEFICIENCY ANEMIA: Primary | ICD-10-CM

## 2024-10-11 DIAGNOSIS — I10 ESSENTIAL HYPERTENSION: Chronic | ICD-10-CM

## 2024-10-11 DIAGNOSIS — G89.4 CHRONIC PAIN SYNDROME: ICD-10-CM

## 2024-10-11 DIAGNOSIS — D50.8 OTHER IRON DEFICIENCY ANEMIA: ICD-10-CM

## 2024-10-11 DIAGNOSIS — N18.32 STAGE 3B CHRONIC KIDNEY DISEASE: ICD-10-CM

## 2024-10-11 DIAGNOSIS — Z23 NEED FOR VACCINATION: ICD-10-CM

## 2024-10-11 LAB
ALBUMIN SERPL BCP-MCNC: 3.9 G/DL (ref 3.5–5.2)
ANION GAP SERPL CALC-SCNC: 8 MMOL/L (ref 8–16)
BUN SERPL-MCNC: 11 MG/DL (ref 6–20)
CALCIUM SERPL-MCNC: 9.7 MG/DL (ref 8.7–10.5)
CHLORIDE SERPL-SCNC: 105 MMOL/L (ref 95–110)
CO2 SERPL-SCNC: 28 MMOL/L (ref 23–29)
CREAT SERPL-MCNC: 1.2 MG/DL (ref 0.5–1.4)
EST. GFR  (NO RACE VARIABLE): 53 ML/MIN/1.73 M^2
FERRITIN SERPL-MCNC: 9 NG/ML (ref 20–300)
GLUCOSE SERPL-MCNC: 88 MG/DL (ref 70–110)
IRON SERPL-MCNC: 22 UG/DL (ref 30–160)
PHOSPHATE SERPL-MCNC: 3.4 MG/DL (ref 2.7–4.5)
POTASSIUM SERPL-SCNC: 4 MMOL/L (ref 3.5–5.1)
PTH-INTACT SERPL-MCNC: 130 PG/ML (ref 9–77)
SATURATED IRON: 5 % (ref 20–50)
SODIUM SERPL-SCNC: 141 MMOL/L (ref 136–145)
TOTAL IRON BINDING CAPACITY: 438 UG/DL (ref 250–450)
TRANSFERRIN SERPL-MCNC: 296 MG/DL (ref 200–375)

## 2024-10-11 PROCEDURE — 82306 VITAMIN D 25 HYDROXY: CPT | Mod: HCNC | Performed by: STUDENT IN AN ORGANIZED HEALTH CARE EDUCATION/TRAINING PROGRAM

## 2024-10-11 PROCEDURE — 36415 COLL VENOUS BLD VENIPUNCTURE: CPT | Mod: HCNC | Performed by: STUDENT IN AN ORGANIZED HEALTH CARE EDUCATION/TRAINING PROGRAM

## 2024-10-11 PROCEDURE — 83540 ASSAY OF IRON: CPT | Mod: HCNC | Performed by: FAMILY MEDICINE

## 2024-10-11 PROCEDURE — 99999 PR PBB SHADOW E&M-EST. PATIENT-LVL IV: CPT | Mod: PBBFAC,HCNC,, | Performed by: FAMILY MEDICINE

## 2024-10-11 PROCEDURE — 80069 RENAL FUNCTION PANEL: CPT | Mod: HCNC | Performed by: STUDENT IN AN ORGANIZED HEALTH CARE EDUCATION/TRAINING PROGRAM

## 2024-10-11 PROCEDURE — 82728 ASSAY OF FERRITIN: CPT | Mod: HCNC | Performed by: FAMILY MEDICINE

## 2024-10-11 PROCEDURE — 83970 ASSAY OF PARATHORMONE: CPT | Mod: HCNC | Performed by: STUDENT IN AN ORGANIZED HEALTH CARE EDUCATION/TRAINING PROGRAM

## 2024-10-11 NOTE — PROGRESS NOTES
Subjective:       Patient ID: Jaki Bajwa is a 53 y.o. female.    Chief Complaint: Annual Exam    HPI  History of Present Illness    CHIEF COMPLAINT:  Jaki presents today for annual check-up.    IMMUNIZATIONS:  She requests flu vaccine and agrees to receive COVID booster. She is amenable to receiving both immunizations during the current visit.    MEDICATIONS AND SUPPLEMENTS:  She is currently taking high-dose Vitamin D supplementation at 50,000 units weekly. She denies currently taking oral iron supplements.    ENDOCRINE:  She reports a recent video visit with an endocrinologist who discussed concerns about her thyroid function, mentioning that something was elevated. The endocrinologist expressed interest in checking if her current treatment is effective. A follow-up visit has been scheduled for June 2025.    BONE HEALTH:  She has a bone density scan scheduled for June to assess her bone health.    HEMATOLOGY:  She reports experiencing ice cravings again. She mentions having received iron infusions in the past, which helped alleviate the cravings for a period of time. Her previous CBC showed hemoglobin of 8.9, and iron levels were noted to be low in August. She needs to find a new provider to continue iron infusion treatment as her previous rheumatologist, who managed her iron infusions, is no longer available.    RHEUMATOLOGY:  She reports needing to find a new rheumatologist as her previous provider has left the practice. She expresses concern about the lack of follow-up communication regarding her bone marrow procedure. She has not received any communication regarding the results of this test, but believes that if there were concerning findings, she would have been contacted.      ROS:  General: -fever, -chills, -fatigue, -weight gain, -weight loss  Eyes: -vision changes, -redness, -discharge  ENT: -ear pain, -nasal congestion, -sore throat  Cardiovascular: -chest pain, -palpitations, -lower  extremity edema  Respiratory: -cough, -shortness of breath  Gastrointestinal: -abdominal pain, -nausea, -vomiting, -diarrhea, -constipation, -blood in stool  Genitourinary: -dysuria, -hematuria, -frequency  Musculoskeletal: -joint pain, -muscle pain  Skin: -rash, -lesion  Neurological: -headache, -dizziness, -numbness, -tingling  Psychiatric: -anxiety, -depression, -sleep difficulty           All of your core healthy metrics are met.      Social History     Social History Narrative    Not on file       Family History   Problem Relation Name Age of Onset    Heart disease Mother Amalia Bajwa     Hypertension Mother Amalia Bajwa     Cancer Father Demarcus Lawtons sr.         colon ca    Colon cancer Father Demarcus Lawtons sr.     Depression Father Demarcus Lawtons sr.     Schizophrenia Father Demarcus Lawtons sr.     Anxiety disorder Father Demarcus Lawtons sr.     Arthritis Father Demarcus Lawtons sr.     Mental illness Father Demarcus Lawtons sr.     Liver cancer Sister Fifi     Cancer Sister Fifi     Depression Sister Fifi     Anxiety disorder Sister Fifi     Heart attack Sister Fifi     COPD Sister Fifi     Cancer Sister Madeline         throat and colon    Depression Sister Madeline     Miscarriages / Stillbirths Sister Madeline     Pancreatic cancer Brother Demarcus bajwa     Depression Brother Demarcus bajwa     Alcohol abuse Brother Demarcus bajwa     No Known Problems Daughter Ikea     Asthma Son Scott     Pancreatic cancer Other      Liver cancer Other      Suicide Neg Hx      Ovarian cancer Neg Hx      Breast cancer Neg Hx         Current Outpatient Medications:     amLODIPine (NORVASC) 5 MG tablet, Take 1 tablet (5 mg total) by mouth once daily., Disp: 90 tablet, Rfl: 3    buPROPion (WELLBUTRIN XL) 150 MG TB24 tablet, TAKE 3 TABLETS(450 MG) BY MOUTH EVERY MORNING, Disp: 270 tablet, Rfl: 1    busPIRone (BUSPAR) 15 MG tablet, TAKE 1 TABLET(15 MG) BY MOUTH THREE TIMES DAILY, Disp: 270 tablet, Rfl: 1    butalbital-acetaminophen-caffeine  -40 mg (FIORICET, ESGIC) -40 mg per tablet, Take 1 tablet by mouth every 6 (six) hours as needed for Headaches., Disp: 40 tablet, Rfl: 0    clotrimazole (LOTRIMIN) 1 % cream, Apply topically 2 (two) times daily., Disp: 28 g, Rfl: 2    cycloSPORINE (RESTASIS) 0.05 % ophthalmic emulsion, Place 1 drop into both eyes 2 (two) times daily., Disp: 180 each, Rfl: 1    docusate sodium (COLACE) 100 MG capsule, Take 1 capsule (100 mg total) by mouth 2 (two) times daily as needed for Constipation., Disp: 60 capsule, Rfl: 0    doxepin (SINEQUAN) 75 MG capsule, TAKE 2 CAPSULES(150 MG) BY MOUTH EVERY EVENING, Disp: 60 capsule, Rfl: 11    ergocalciferol (ERGOCALCIFEROL) 50,000 unit Cap, Take 1 capsule (50,000 Units total) by mouth every 7 days., Disp: 4 capsule, Rfl: 2    estradiol 0.025 mg/24 hr 0.025 mg/24 hr, Place 1 patch onto the skin once a week., Disp: 4 patch, Rfl: 11    furosemide (LASIX) 20 MG tablet, Take 1 tablet (20 mg total) by mouth once daily., Disp: 90 tablet, Rfl: 3    gabapentin (NEURONTIN) 300 MG capsule, TAKE 1 CAPSULE(300 MG) BY MOUTH THREE TIMES DAILY, Disp: 90 capsule, Rfl: 5    HYDROcodone-acetaminophen (NORCO) 5-325 mg per tablet, Take 1 tablet by mouth every 24 hours as needed for Pain. Quantity medically necessary, Disp: 30 tablet, Rfl: 0    hydrocortisone 2.5 % cream, Apply topically 2 (two) times daily as needed (rash, eyelid irritation). For 1 week at a time., Disp: 20 g, Rfl: 0    hydroxychloroquine (PLAQUENIL) 200 mg tablet, TAKE 1 TABLET BY MOUTH TWICE DAILY, Disp: 60 tablet, Rfl: 0    irbesartan (AVAPRO) 300 MG tablet, TAKE 1 TABLET(300 MG) BY MOUTH EVERY EVENING, Disp: 90 tablet, Rfl: 3    lurasidone (LATUDA) 60 mg Tab tablet, TAKE 1 TABLET(60 MG) BY MOUTH EVERY DAY, Disp: 90 tablet, Rfl: 3    NARCAN 4 mg/actuation [place], SPRAY 1 SPRAY IN NOSTRIL ONCE FOR 1 DOSE, Disp: , Rfl:     nystatin (MYCOSTATIN) 100,000 unit/mL suspension, Take 4 mLs (400,000 Units total) by mouth 4 (four) times  daily., Disp: 480 mL, Rfl: 5    omeprazole (PRILOSEC) 40 MG capsule, Take 1 capsule (40 mg total) by mouth once daily., Disp: 90 capsule, Rfl: 3    pilocarpine (SALAGEN) 5 MG Tab, Take 1 tablet (5 mg total) by mouth 4 (four) times daily., Disp: 360 tablet, Rfl: 1    tiZANidine (ZANAFLEX) 4 MG tablet, TAKE 2 TABLETS(8 MG) BY MOUTH THREE TIMES DAILY, Disp: 90 tablet, Rfl: 1    ubrogepant (UBRELVY) 100 mg tablet, Take 1 tablet (100 mg total) by mouth as needed for Migraine. If symptoms persist or return, may repeat dose after 2 hours. Maximum: 200 mg per 24 hours, Disp: 30 tablet, Rfl: 2    VENOFER 100 mg iron/5 mL injection, , Disp: , Rfl:     diazePAM (VALIUM) 2 MG tablet, Take 1 tablet (2 mg total) by mouth daily as needed (Severe anxiety/Panic). 15 tablets to last 3 months, Disp: 15 tablet, Rfl: 0  No current facility-administered medications for this visit.    Review of Systems    Objective:   Pulse 90   Wt 111.2 kg (245 lb 2.4 oz)   LMP 07/11/2012 Comment: partial   SpO2 99%   BMI 38.40 kg/m²      Physical Exam  Vitals reviewed.   Constitutional:       Appearance: She is well-developed.   HENT:      Head: Normocephalic and atraumatic.   Eyes:      Conjunctiva/sclera: Conjunctivae normal.   Cardiovascular:      Rate and Rhythm: Normal rate.   Pulmonary:      Effort: Pulmonary effort is normal. No respiratory distress.   Skin:     General: Skin is warm and dry.      Findings: No rash.   Neurological:      Mental Status: She is alert and oriented to person, place, and time.      Coordination: Coordination normal.   Psychiatric:         Behavior: Behavior normal.         Physical Exam             @resultssec@  Assessment & Plan   Assessment & Plan    Reviewed patient's annual health status and preventive care needs  Assessed vitamin D supplementation, considering recent endocrinology consult and pending bone density scan  Evaluated iron levels due to reported ice cravings, noting recent low hemoglobin (8.9) and  slightly low iron from August  Considered need for iron infusion pending new lab results    VITAMIN D DEFICIENCY:  - Continued vitamin D 50,000 IU weekly.  - Ordered vitamin D level.    FLU VACCINATION:  - Administered flu vaccine.    COVID-19 VACCINATION:  - Administered COVID-19 booster.    ANEMIA (SUSPECTED):  - Ordered iron level.  - Follow up after lab results to discuss potential iron infusion.    BREAST CANCER SCREENING:  - Follow up in February for mammogram.         Problem List Items Addressed This Visit          Neuro    Chronic pain       Cardiac/Vascular    Essential hypertension (Chronic)       Renal/    Stage 3b chronic kidney disease       Oncology    Iron deficiency anemia - Primary    Relevant Orders    FERRITIN    IRON AND TIBC     Other Visit Diagnoses       Need for vaccination        Relevant Medications    influenza (Flulaval, Fluzone, Fluarix) 45 mcg/0.5 mL IM vaccine (> or = 6 mo) 0.5 mL (Completed)    COVID-19 (Pfizer) 30 mcg/0.3 mL IM vaccine (>/= 13 yo) 0.3 mL (Completed)              Immunizations Administered on Date of Encounter - 10/11/2024       Name Date Dose VIS Date Route Exp Date    COVID-19, mRNA, LNP-S, PF, sarah-sucrose, 30 mcg/0.3 mL (Pfizer 2023 Ages 12+) 10/11/2024 11:13 AM 0.3 mL 10/19/2023 Intramuscular 4/1/2025    Site: Right deltoid     Given By: Ely Hubbard LPN     : [employer]     Lot: [id number]     Influenza - Trivalent - Fluarix, Flulaval, Fluzone, Afluria - PF 10/11/2024 11:12 AM 0.5 mL 8/6/2021 Intramuscular 6/30/2025    Site: Left deltoid     Given By: Ely Hubbard LPN     : [employer]     Lot: [id number]              No follow-ups on file.    This note was generated with the assistance of ambient listening technology. Verbal consent was obtained by the patient and accompanying visitor(s) for the recording of patient appointment to facilitate this note. I attest to having reviewed and edited the generated note for accuracy, though  some syntax or spelling errors may persist. Please contact the author of this note for any clarification.      Disclaimer:  This note may have been prepared using voice recognition software, it may have not been extensively proofed, as such there could be errors within the text such as sound alike errors.

## 2024-10-11 NOTE — PROGRESS NOTES
TWO PATIENT IDENTIFIERS VERIFIED.  ALLERGIES VERIFIED.    After obtaining verbal consent from the patient, and per orders of Dr. BARRIOS, injection of FLUARIX QUADRIVALENT LOT [id number] 06/30/25 given IM in the LEFT DELTOID by CATHERINE CHO LPN. Patient tolerated well and adhesive bandage applied. Patient instructed to remain in clinic for 15 minutes afterwards, and to report any adverse reaction to me immediately.     After obtaining verbal consent from the patient, and per orders of Dr. BARRIOS, injection of COVID 19 COMIRNATY VACCINE Lot [id number] 2025/04/01 given IM in the RIGHT DELTOID by CATHERINE CHO LPN. Patient tolerated well and band aid applied. Patient instructed to remain in clinic for 15 minutes afterwards, and to report any adverse reaction to me immediately.

## 2024-10-12 LAB — 25(OH)D3+25(OH)D2 SERPL-MCNC: 17 NG/ML (ref 30–96)

## 2024-10-14 ENCOUNTER — TELEPHONE (OUTPATIENT)
Dept: HEMATOLOGY/ONCOLOGY | Facility: CLINIC | Age: 54
End: 2024-10-14
Payer: MEDICARE

## 2024-10-14 NOTE — TELEPHONE ENCOUNTER
----- Message from MARIAMA Corcoran sent at 10/14/2024  8:19 AM CDT -----  Regarding: FW: Pt called states old Dr is no longer here needs to Estab care with another Dr  Contact: [phone]  Who is taking Dr. Wren's patients?  ----- Message -----  From: Ania Denise  Sent: 10/14/2024   8:06 AM CDT  To: #  Subject: Pt called states old Dr is no longer here ne#    Name of Who is Calling:PENG MAYO [[id number]]        What is the request in detail:Pt called states old Dr is no longer here needs to Estab care with another Dr. Please advise         Can the clinic reply by [facility]:No        What Number to Call Back if not in [facility]: Telephone Information:  Mobile          [phone]

## 2024-10-15 ENCOUNTER — TELEPHONE (OUTPATIENT)
Dept: HEMATOLOGY/ONCOLOGY | Facility: CLINIC | Age: 54
End: 2024-10-15
Payer: MEDICARE

## 2024-10-15 NOTE — TELEPHONE ENCOUNTER
----- Message from Radha sent at 10/15/2024 11:37 AM CDT -----  Contact: [phone]  Patient is calling to reschedule appointment. Please call to assist. Unable to come 10/22/2024

## 2024-10-15 NOTE — TELEPHONE ENCOUNTER
Pt agreeable to virtual visit on 10/22 instead of in person. Changed visit to VV and confirmed apt.

## 2024-10-17 ENCOUNTER — PATIENT MESSAGE (OUTPATIENT)
Dept: ENDOCRINOLOGY | Facility: CLINIC | Age: 54
End: 2024-10-17
Payer: MEDICARE

## 2024-10-17 RX ORDER — VIT C/E/ZN/COPPR/LUTEIN/ZEAXAN 250MG-90MG
1000 CAPSULE ORAL DAILY
Start: 2024-10-17

## 2024-10-22 ENCOUNTER — OFFICE VISIT (OUTPATIENT)
Dept: HEMATOLOGY/ONCOLOGY | Facility: CLINIC | Age: 54
End: 2024-10-22
Payer: MEDICARE

## 2024-10-22 DIAGNOSIS — D50.9 IRON DEFICIENCY ANEMIA, UNSPECIFIED IRON DEFICIENCY ANEMIA TYPE: Primary | ICD-10-CM

## 2024-10-22 PROCEDURE — 3066F NEPHROPATHY DOC TX: CPT | Mod: HCNC,CPTII,95, | Performed by: INTERNAL MEDICINE

## 2024-10-22 PROCEDURE — 99214 OFFICE O/P EST MOD 30 MIN: CPT | Mod: HCNC,95,, | Performed by: INTERNAL MEDICINE

## 2024-10-22 PROCEDURE — 3061F NEG MICROALBUMINURIA REV: CPT | Mod: HCNC,CPTII,95, | Performed by: INTERNAL MEDICINE

## 2024-10-22 PROCEDURE — 4010F ACE/ARB THERAPY RXD/TAKEN: CPT | Mod: HCNC,CPTII,95, | Performed by: INTERNAL MEDICINE

## 2024-10-22 PROCEDURE — 3044F HG A1C LEVEL LT 7.0%: CPT | Mod: HCNC,CPTII,95, | Performed by: INTERNAL MEDICINE

## 2024-10-22 RX ORDER — ACETAMINOPHEN 500 MG
1000 TABLET ORAL
OUTPATIENT
Start: 2024-10-29

## 2024-10-22 RX ORDER — EPINEPHRINE 0.3 MG/.3ML
0.3 INJECTION SUBCUTANEOUS ONCE AS NEEDED
OUTPATIENT
Start: 2024-10-29

## 2024-10-22 RX ORDER — DIPHENHYDRAMINE HYDROCHLORIDE 50 MG/ML
50 INJECTION INTRAMUSCULAR; INTRAVENOUS ONCE AS NEEDED
OUTPATIENT
Start: 2024-10-29

## 2024-10-22 RX ORDER — FAMOTIDINE 10 MG/ML
20 INJECTION INTRAVENOUS
OUTPATIENT
Start: 2024-10-29

## 2024-10-22 RX ORDER — HEPARIN 100 UNIT/ML
500 SYRINGE INTRAVENOUS
OUTPATIENT
Start: 2024-10-29

## 2024-10-22 RX ORDER — SODIUM CHLORIDE 9 MG/ML
10 INJECTION, SOLUTION INTRAMUSCULAR; INTRAVENOUS; SUBCUTANEOUS
OUTPATIENT
Start: 2024-10-29

## 2024-10-22 NOTE — PROGRESS NOTES
The patient location is: [place]  The chief complaint leading to consultation is: iron deficiency anemia    Visit type: audiovisual    Face to Face time with patient: 10  30 minutes of total time spent on the encounter, which includes face to face time and non-face to face time preparing to see the patient (eg, review of tests), Obtaining and/or reviewing separately obtained history, Documenting clinical information in the electronic or other health record, Independently interpreting results (not separately reported) and communicating results to the patient/family/caregiver, or Care coordination (not separately reported).         Each patient to whom he or she provides medical services by telemedicine is:  (1) informed of the relationship between the physician and patient and the respective role of any other health care provider with respect to management of the patient; and (2) notified that he or she may decline to receive medical services by telemedicine and may withdraw from such care at any time.    Notes:    Subjective:       Patient ID: Jaki Bajwa is a 54 y.o. female.    Chief Complaint: Anemia    HPI    Patient transfer of care from Dr. Wren for iron deficiency anemia. Bone marrow biopsy normal 8/2014 and 9/2024. No identified source of blood loss. Menopausal, there for now monthly cycles. Normal colonoscopy in 2021. No gross blood loss. Normal UA.  Last received IV iron 10/2023.     Review of Systems   Constitutional:  Negative for appetite change and unexpected weight change.   HENT:  Negative for mouth sores.    Eyes:  Negative for visual disturbance.   Respiratory:  Negative for cough and shortness of breath.    Cardiovascular:  Negative for chest pain.   Gastrointestinal:  Negative for abdominal pain and diarrhea.   Genitourinary:  Negative for frequency.   Musculoskeletal:  Positive for back pain.   Integumentary:  Negative for rash.   Neurological:  Positive for headaches.   Hematological:   Negative for adenopathy.   Psychiatric/Behavioral:  The patient is not nervous/anxious.          Objective:      Physical Exam No exam for telehealth visits    Current Outpatient Medications   Medication Sig Dispense Refill    amLODIPine (NORVASC) 5 MG tablet Take 1 tablet (5 mg total) by mouth once daily. 90 tablet 3    buPROPion (WELLBUTRIN XL) 150 MG TB24 tablet TAKE 3 TABLETS(450 MG) BY MOUTH EVERY MORNING 270 tablet 1    busPIRone (BUSPAR) 15 MG tablet TAKE 1 TABLET(15 MG) BY MOUTH THREE TIMES DAILY 270 tablet 1    butalbital-acetaminophen-caffeine -40 mg (FIORICET, ESGIC) -40 mg per tablet Take 1 tablet by mouth every 6 (six) hours as needed for Headaches. 40 tablet 0    cholecalciferol, vitamin D3, (VITAMIN D3) 25 mcg (1,000 unit) capsule Take 1 capsule (1,000 Units total) by mouth once daily.      clotrimazole (LOTRIMIN) 1 % cream Apply topically 2 (two) times daily. 28 g 2    cycloSPORINE (RESTASIS) 0.05 % ophthalmic emulsion Place 1 drop into both eyes 2 (two) times daily. 180 each 1    diazePAM (VALIUM) 2 MG tablet Take 1 tablet (2 mg total) by mouth daily as needed (Severe anxiety/Panic). 15 tablets to last 3 months 15 tablet 0    docusate sodium (COLACE) 100 MG capsule Take 1 capsule (100 mg total) by mouth 2 (two) times daily as needed for Constipation. 60 capsule 0    doxepin (SINEQUAN) 75 MG capsule TAKE 2 CAPSULES(150 MG) BY MOUTH EVERY EVENING 60 capsule 11    ergocalciferol (ERGOCALCIFEROL) 50,000 unit Cap Take 1 capsule (50,000 Units total) by mouth every 7 days. 4 capsule 2    estradiol 0.025 mg/24 hr 0.025 mg/24 hr Place 1 patch onto the skin once a week. 4 patch 11    furosemide (LASIX) 20 MG tablet Take 1 tablet (20 mg total) by mouth once daily. 90 tablet 3    gabapentin (NEURONTIN) 300 MG capsule TAKE 1 CAPSULE(300 MG) BY MOUTH THREE TIMES DAILY 90 capsule 5    HYDROcodone-acetaminophen (NORCO) 5-325 mg per tablet Take 1 tablet by mouth every 24 hours as needed for Pain.  Quantity medically necessary 30 tablet 0    hydrocortisone 2.5 % cream Apply topically 2 (two) times daily as needed (rash, eyelid irritation). For 1 week at a time. 20 g 0    hydroxychloroquine (PLAQUENIL) 200 mg tablet TAKE 1 TABLET BY MOUTH TWICE DAILY 60 tablet 0    irbesartan (AVAPRO) 300 MG tablet TAKE 1 TABLET(300 MG) BY MOUTH EVERY EVENING 90 tablet 3    lurasidone (LATUDA) 60 mg Tab tablet TAKE 1 TABLET(60 MG) BY MOUTH EVERY DAY 90 tablet 3    NARCAN 4 mg/actuation [place] SPRAY 1 SPRAY IN NOSTRIL ONCE FOR 1 DOSE      nystatin (MYCOSTATIN) 100,000 unit/mL suspension Take 4 mLs (400,000 Units total) by mouth 4 (four) times daily. 480 mL 5    omeprazole (PRILOSEC) 40 MG capsule Take 1 capsule (40 mg total) by mouth once daily. 90 capsule 3    pilocarpine (SALAGEN) 5 MG Tab Take 1 tablet (5 mg total) by mouth 4 (four) times daily. 360 tablet 1    tiZANidine (ZANAFLEX) 4 MG tablet TAKE 2 TABLETS(8 MG) BY MOUTH THREE TIMES DAILY 90 tablet 1    ubrogepant (UBRELVY) 100 mg tablet Take 1 tablet (100 mg total) by mouth as needed for Migraine. If symptoms persist or return, may repeat dose after 2 hours. Maximum: 200 mg per 24 hours 30 tablet 2    VENOFER 100 mg iron/5 mL injection        No current facility-administered medications for this visit.      Lab Results   Component Value Date    WBC 7.43 09/25/2024    HGB 8.9 (L) 09/25/2024    HCT 31.9 (L) 09/25/2024    MCV 81 (L) 09/25/2024     09/25/2024        CMP  Sodium   Date Value Ref Range Status   10/11/2024 141 136 - 145 mmol/L Final     Potassium   Date Value Ref Range Status   10/11/2024 4.0 3.5 - 5.1 mmol/L Final     Chloride   Date Value Ref Range Status   10/11/2024 105 95 - 110 mmol/L Final     CO2   Date Value Ref Range Status   10/11/2024 28 23 - 29 mmol/L Final     Glucose   Date Value Ref Range Status   10/11/2024 88 70 - 110 mg/dL Final     BUN   Date Value Ref Range Status   10/11/2024 11 6 - 20 mg/dL Final     Creatinine   Date Value Ref Range  Status   10/11/2024 1.2 0.5 - 1.4 mg/dL Final     Calcium   Date Value Ref Range Status   10/11/2024 9.7 8.7 - 10.5 mg/dL Final     Total Protein   Date Value Ref Range Status   09/19/2024 7.8 6.0 - 8.4 g/dL Final     Albumin   Date Value Ref Range Status   10/11/2024 3.9 3.5 - 5.2 g/dL Final     Total Bilirubin   Date Value Ref Range Status   09/19/2024 0.3 0.1 - 1.0 mg/dL Final     Comment:     For infants and newborns, interpretation of results should be based  on gestational age, weight and in agreement with clinical  observations.    Premature Infant recommended reference ranges:  Up to 24 hours.............<8.0 mg/dL  Up to 48 hours............<12.0 mg/dL  3-5 days..................<15.0 mg/dL  6-29 days.................<15.0 mg/dL       Alkaline Phosphatase   Date Value Ref Range Status   09/19/2024 77 55 - 135 U/L Final     AST   Date Value Ref Range Status   09/19/2024 15 10 - 40 U/L Final     ALT   Date Value Ref Range Status   09/19/2024 11 10 - 44 U/L Final     Anion Gap   Date Value Ref Range Status   10/11/2024 8 8 - 16 mmol/L Final     eGFR if    Date Value Ref Range Status   06/15/2022 45.5 (A) >60 mL/min/1.73 m^2 Final     eGFR if non    Date Value Ref Range Status   06/15/2022 39.5 (A) >60 mL/min/1.73 m^2 Final     Comment:     Calculation used to obtain the estimated glomerular filtration  rate (eGFR) is the CKD-EPI equation.             Assessment:       Problem List Items Addressed This Visit    None      Plan:       Recommend repeat IV iron for ferritin of 9  Recommend upper endoscopy for ongoing iron deficiency anemia  Return visit with repeat CBC, iron panel and ferritin in 4-6 months      BMT Chart Routing      Follow up with physician . 4-6 months   Follow up with [facility]    Provider visit type    Infusion scheduling note   iron dextran once authorized   Injection scheduling note    Labs CBC, ferritin and iron and TIBC   Scheduling:  Preferred lab:  Lab  interval:  labs 1 week before return visit   Imaging    Pharmacy appointment    Other referrals       Additional referrals needed  EGD case request

## 2024-10-24 ENCOUNTER — TELEPHONE (OUTPATIENT)
Dept: HEMATOLOGY/ONCOLOGY | Facility: CLINIC | Age: 54
End: 2024-10-24
Payer: MEDICARE

## 2024-10-24 ENCOUNTER — TELEPHONE (OUTPATIENT)
Dept: ENDOSCOPY | Facility: HOSPITAL | Age: 54
End: 2024-10-24
Payer: MEDICARE

## 2024-10-24 ENCOUNTER — PATIENT MESSAGE (OUTPATIENT)
Dept: INTERNAL MEDICINE | Facility: CLINIC | Age: 54
End: 2024-10-24
Payer: MEDICARE

## 2024-10-24 DIAGNOSIS — D50.9 IRON DEFICIENCY ANEMIA, UNSPECIFIED IRON DEFICIENCY ANEMIA TYPE: Primary | ICD-10-CM

## 2024-10-24 DIAGNOSIS — D50.0 IRON DEFICIENCY ANEMIA DUE TO CHRONIC BLOOD LOSS: ICD-10-CM

## 2024-10-24 DIAGNOSIS — D50.8 OTHER IRON DEFICIENCY ANEMIA: Primary | ICD-10-CM

## 2024-10-24 NOTE — TELEPHONE ENCOUNTER
.Referral for procedure from Telephone call - direct access patient      Spoke to patient to schedule procedure(s) Upper Endoscopy (EGD)       Physician to perform procedure(s) Dr. SARY Hernández  Date of Procedure (s) 11/9/24  Arrival Time 9:45 AM  Time of Procedure(s) 10:45 AM   Location of Procedure(s) [place] [address] Floor  Type of Rx Prep sent to patient: N/A  Instructions provided to patient via [facility]    Patient was informed on the following information and verbalized understanding. Screening questionnaire reviewed with patient and complete. If procedure requires anesthesia, a responsible adult needs to be present to accompany the patient home, patient cannot drive after receiving anesthesia. Appointment details are tentative, especially check-in time. Patient will receive a prep-op call 7 days prior to confirm check-in time for procedure. If applicable the patient should contact their pharmacy to verify Rx for procedure prep is ready for pick-up. Patient was advised to call the scheduling department at [phone] if pharmacy states no Rx is available. Patient was advised to call the endoscopy scheduling department if any questions or concerns arise.      [facility] Endoscopy Scheduling Department

## 2024-10-24 NOTE — TELEPHONE ENCOUNTER
"----- Message from Emanuel sent at 10/24/2024  9:25 AM CDT -----  Consult/Advisory:    Name Of Caller: Self    Contact Preference?: [phone]     Provider Name: Claude    What is the nature of the call?: Requesting clarification - if 11/11 Infusion is the only one she needs. Stating she usually has around 8 rounds of chemo    Additional Notes:  "Thank you for all that you do for our patients"  "

## 2024-10-24 NOTE — TELEPHONE ENCOUNTER
Pt called to schedule procedure Offer pt a date Pt stated she will called back once she talk with her daughter for a ride

## 2024-10-25 ENCOUNTER — TELEPHONE (OUTPATIENT)
Dept: ENDOSCOPY | Facility: HOSPITAL | Age: 54
End: 2024-10-25
Payer: MEDICARE

## 2024-10-25 NOTE — TELEPHONE ENCOUNTER
----- Message from Spring sent at 10/24/2024  3:41 PM CDT -----  Regarding: FW: CASE REQUEST    ----- Message -----  From: Albania Hoffman  Sent: 10/24/2024   9:40 AM CDT  To: [facility] Endoscopy Schedulers  Subject: CASE REQUEST                                     Good morning,    Patient is calling to get scheduled for EGD.  Not sure how long it will be before you call but a case request has been entered into the system.  Thanks!

## 2024-10-31 ENCOUNTER — PATIENT MESSAGE (OUTPATIENT)
Dept: ADMINISTRATIVE | Facility: CLINIC | Age: 54
End: 2024-10-31
Payer: MEDICARE

## 2024-11-04 ENCOUNTER — TELEPHONE (OUTPATIENT)
Dept: ENDOSCOPY | Facility: HOSPITAL | Age: 54
End: 2024-11-04
Payer: MEDICARE

## 2024-11-04 ENCOUNTER — TELEPHONE (OUTPATIENT)
Dept: ADMINISTRATIVE | Facility: CLINIC | Age: 54
End: 2024-11-04
Payer: MEDICARE

## 2024-11-04 DIAGNOSIS — G44.009 CLUSTER HEADACHE, NOT INTRACTABLE, UNSPECIFIED CHRONICITY PATTERN: ICD-10-CM

## 2024-11-04 DIAGNOSIS — F11.20 CHRONIC NARCOTIC DEPENDENCE: ICD-10-CM

## 2024-11-04 DIAGNOSIS — G89.4 CHRONIC PAIN SYNDROME: ICD-10-CM

## 2024-11-04 RX ORDER — BUTALBITAL, ACETAMINOPHEN AND CAFFEINE 50; 325; 40 MG/1; MG/1; MG/1
1 TABLET ORAL EVERY 6 HOURS PRN
Qty: 40 TABLET | Refills: 0 | Status: SHIPPED | OUTPATIENT
Start: 2024-11-04

## 2024-11-04 RX ORDER — HYDROCODONE BITARTRATE AND ACETAMINOPHEN 5; 325 MG/1; MG/1
1 TABLET ORAL
Qty: 30 TABLET | Refills: 0 | Status: SHIPPED | OUTPATIENT
Start: 2024-11-04

## 2024-11-04 NOTE — TELEPHONE ENCOUNTER
Spoke to patient for pre-call to confirm scheduled Upper Endoscopy (EGD) and patient verbalized understanding of the following:       Date & arrival time of procedure(s) verified 11/9/24, 9:30 AM.  Location of procedure(s) [place] [address]nd Floor verified.  NPO status reinforced. Ok to continue clear liquids until 8:30 AM.   Patient denies use of blood thinners, GLP-1 medications, and weight loss medications.  Patient confirmed receipt of prep instructions.  Instructions provided to patient via [facility].  Patient confirmed ride home after procedure if procedure requires anesthesia.   Pre-call screening questionnaire reviewed and completed with patient.   Appointment details are tentative, including check-in time.  If the patient begins taking any blood thinning medications, injectable weight loss/diabetes medications (other than insulin), or Adipex (phentermine) patient was instructed to contact the endoscopy scheduling department as soon as possible.  Patient was advised to call the endoscopy scheduling department if any questions or concerns arise.      Endoscopy Scheduling Department

## 2024-11-04 NOTE — TELEPHONE ENCOUNTER
No care due was identified.  [facility] Embedded Care Due Messages. Reference number: [id number].   11/04/2024 8:40:57 AM CST

## 2024-11-05 ENCOUNTER — PATIENT OUTREACH (OUTPATIENT)
Dept: FAMILY MEDICINE | Facility: CLINIC | Age: 54
End: 2024-11-05
Payer: MEDICARE

## 2024-11-05 ENCOUNTER — OFFICE VISIT (OUTPATIENT)
Dept: FAMILY MEDICINE | Facility: CLINIC | Age: 54
End: 2024-11-05
Payer: MEDICARE

## 2024-11-05 VITALS — BODY MASS INDEX: 37.83 KG/M2 | HEIGHT: 67 IN | WEIGHT: 241 LBS

## 2024-11-05 DIAGNOSIS — F41.0 PANIC DISORDER: ICD-10-CM

## 2024-11-05 DIAGNOSIS — F32.A DEPRESSIVE DISORDER: ICD-10-CM

## 2024-11-05 DIAGNOSIS — F41.1 GAD (GENERALIZED ANXIETY DISORDER): ICD-10-CM

## 2024-11-05 DIAGNOSIS — M79.7 FIBROMYALGIA: ICD-10-CM

## 2024-11-05 DIAGNOSIS — E66.01 SEVERE OBESITY (BMI 35.0-35.9 WITH COMORBIDITY): ICD-10-CM

## 2024-11-05 DIAGNOSIS — M35.00 SJOGREN'S SYNDROME, WITH UNSPECIFIED ORGAN INVOLVEMENT: ICD-10-CM

## 2024-11-05 DIAGNOSIS — I10 ESSENTIAL HYPERTENSION: Chronic | ICD-10-CM

## 2024-11-05 DIAGNOSIS — M35.01 SJOGREN'S SYNDROME WITH KERATOCONJUNCTIVITIS SICCA: Primary | ICD-10-CM

## 2024-11-05 DIAGNOSIS — F51.05 INSOMNIA DUE TO OTHER MENTAL DISORDER: ICD-10-CM

## 2024-11-05 DIAGNOSIS — M81.0 AGE-RELATED OSTEOPOROSIS WITHOUT CURRENT PATHOLOGICAL FRACTURE: ICD-10-CM

## 2024-11-05 DIAGNOSIS — G89.4 CHRONIC PAIN SYNDROME: ICD-10-CM

## 2024-11-05 DIAGNOSIS — M54.50 CHRONIC BILATERAL LOW BACK PAIN WITHOUT SCIATICA: ICD-10-CM

## 2024-11-05 DIAGNOSIS — F39 MOOD DISORDER: ICD-10-CM

## 2024-11-05 DIAGNOSIS — F11.20 CHRONIC NARCOTIC DEPENDENCE: ICD-10-CM

## 2024-11-05 DIAGNOSIS — E21.3 HYPERPARATHYROIDISM: Chronic | ICD-10-CM

## 2024-11-05 DIAGNOSIS — G43.709 CHRONIC MIGRAINE WITHOUT AURA WITHOUT STATUS MIGRAINOSUS, NOT INTRACTABLE: ICD-10-CM

## 2024-11-05 DIAGNOSIS — G89.29 CHRONIC BILATERAL LOW BACK PAIN WITHOUT SCIATICA: ICD-10-CM

## 2024-11-05 DIAGNOSIS — F99 INSOMNIA DUE TO OTHER MENTAL DISORDER: ICD-10-CM

## 2024-11-05 DIAGNOSIS — E55.9 VITAMIN D DEFICIENCY: ICD-10-CM

## 2024-11-05 DIAGNOSIS — N18.32 STAGE 3B CHRONIC KIDNEY DISEASE: ICD-10-CM

## 2024-11-05 DIAGNOSIS — F40.10 SOCIAL ANXIETY DISORDER: ICD-10-CM

## 2024-11-05 DIAGNOSIS — D50.9 IRON DEFICIENCY ANEMIA, UNSPECIFIED IRON DEFICIENCY ANEMIA TYPE: ICD-10-CM

## 2024-11-05 DIAGNOSIS — M26.629 TMJ SYNDROME: ICD-10-CM

## 2024-11-05 DIAGNOSIS — Z00.00 ENCOUNTER FOR PREVENTIVE HEALTH EXAMINATION: Primary | ICD-10-CM

## 2024-11-05 PROCEDURE — 3044F HG A1C LEVEL LT 7.0%: CPT | Mod: HCNC,CPTII,95,

## 2024-11-05 PROCEDURE — 1159F MED LIST DOCD IN RCRD: CPT | Mod: HCNC,CPTII,95,

## 2024-11-05 PROCEDURE — 3066F NEPHROPATHY DOC TX: CPT | Mod: HCNC,CPTII,95,

## 2024-11-05 PROCEDURE — G9919 SCRN ND POS ND PROV OF REC: HCPCS | Mod: HCNC,CPTII,NDTC,

## 2024-11-05 PROCEDURE — G0439 PPPS, SUBSEQ VISIT: HCPCS | Mod: HCNC,95,,

## 2024-11-05 PROCEDURE — 4010F ACE/ARB THERAPY RXD/TAKEN: CPT | Mod: HCNC,CPTII,95,

## 2024-11-05 PROCEDURE — 3061F NEG MICROALBUMINURIA REV: CPT | Mod: HCNC,CPTII,95,

## 2024-11-05 PROCEDURE — 1160F RVW MEDS BY RX/DR IN RCRD: CPT | Mod: HCNC,CPTII,95,

## 2024-11-05 NOTE — PATIENT INSTRUCTIONS
Counseling and Referral of Other Preventative  (Italic type indicates deductible and co-insurance are waived)    Patient Name: Jaki Bajwa  Today's Date: 11/5/2024    Health Maintenance       Date Due Completion Date    Naloxone Prescription Never done ---    Urine Drug Screen 12/19/2024 6/19/2024    Mammogram 02/12/2025 2/12/2024    Hemoglobin A1c (Prediabetes) 02/13/2025 2/13/2024    Annual UACr 06/19/2025 6/19/2024    Colorectal Cancer Screening 01/28/2026 1/28/2021    TETANUS VACCINE 04/18/2027 4/18/2017    Lipid Panel 09/12/2027 9/12/2022    RSV Vaccine (Age 60+ and Pregnant patients) (1 - 1-dose 75+ series) 10/17/2043 ---        No orders of the defined types were placed in this encounter.    The following information is provided to all patients.  This information is to help you find resources for any of the problems found today that may be affecting your health:                  Living healthy guide: www.[facility].[place].gov      Understanding Diabetes: www.diabetes.org      Eating healthy: www.cdc.gov/healthyweight      CDC home safety checklist: www.cdc.gov/steadi/patient.html      Agency on Aging: www.goea.[place].gov      Alcoholics anonymous (AA): www.aa.org      Physical Activity: www.tracie.nih.gov/il9pxtr      Tobacco use: www.quitwithusla.org

## 2024-11-05 NOTE — PROGRESS NOTES
The patient location is: [place]  The chief complaint leading to consultation is:  Medicare AWV     Visit type: audiovisual    Face to Face time with patient: 20  60 minutes of total time spent on the encounter, which includes face to face time and non-face to face time preparing to see the patient (eg, review of tests), Obtaining and/or reviewing separately obtained history, Documenting clinical information in the electronic or other health record, Independently interpreting results (not separately reported) and communicating results to the patient/family/caregiver, or Care coordination (not separately reported).         Each patient to whom he or she provides medical services by telemedicine is:  (1) informed of the relationship between the physician and patient and the respective role of any other health care provider with respect to management of the patient; and (2) notified that he or she may decline to receive medical services by telemedicine and may withdraw from such care at any time.    Notes:       Jaki Bajwa presented for a  Medicare AWV and comprehensive Health Risk Assessment today. The following components were reviewed and updated:    Medical history  Family History  Social history  Allergies and Current Medications  Health Risk Assessment  Health Maintenance  Care Team     Patient screened moderate and/or high risk for one or more social determinants of health (SDOH). Patient connected to community resources through the ED Navigator.      ** See Completed Assessments for Annual Wellness Visit within the encounter summary.**         The following assessments were completed:  Living Situation  CAGE  Depression Screening  Fall Risk Assessment (MACH 10)  Hearing Assessment(HHI)  Cognitive Function Screening  Nutrition Screening  ADL Screening  PAQ Screening    Opioid documentation:      Patient does have a current opioid prescription.      Patient accepted further discussion regarding opioid  "medication use.      Patient is currently taking hydrocodone narcotic for back pain and leg pain.        Pain level today is 0/10.       In addition to narcotic pain medications, patient is also using acetaminophen for pain control.       Patient is not followed by a specialist currently for their pain and will not be referred today.       Patient's opioid risk potential based on ORT-OUD tool:       Levi each box that applies   No   Yes     Family history of substance abuse   Alcohol [] [x]   Illegal drugs [x] []   Rx drugs [x] []     Personal history of substance abuse   Alcohol [x] []   Illegal drugs [x] []   Rx drugs [x] []     Age between 16-45 years   [x]   []     Patient with ADD, OCD, Bipolar disorder, schizoprenia   []   [x]     Patient with depression   []   [x]                         Scoring total                                                        3         Non-opioid treatment options have been discussed today and added to the patient's after visit summary.        Vitals:    11/05/24 1454   Weight: 109.3 kg (241 lb)   Height: 5' 7" (1.702 m)     Body mass index is 37.75 kg/m².  Physical Exam  Constitutional:       General: She is not in acute distress.     Appearance: Normal appearance. She is well-developed and well-groomed.   Skin:     Coloration: Skin is not pale.   Neurological:      Mental Status: She is alert and oriented to person, place, and time.   Psychiatric:         Mood and Affect: Mood normal.         Speech: Speech normal.         Behavior: Behavior normal. Behavior is cooperative.         Thought Content: Thought content normal.               Diagnoses and health risks identified today and associated recommendations/orders:    1. Encounter for preventive health examination  Screenings performed, as noted above. Personal preventative testing needs reviewed.     2. Severe obesity (BMI 35.0-35.9 with comorbidity)  Work on diet modification and increase in physical activity as tolerated. "   Followed by PCP.     3. Chronic narcotic dependence  4. Chronic pain syndrome  Chronic. Stable with hydrocodone, tizanidine, gabapentin. Followed by PCP.     5. Sjogren's syndrome, with unspecified organ involvement  Chronic; stable on hydroxychloroquine. Followed by Rheumatology.     6. Hyperparathyroidism  7. Stage 3b chronic kidney disease  Chronic. Stable. Followed by PCP.     8. Mood disorder  9. Panic disorder  10. Depressive disorder  11. JUAN JOSE (generalized anxiety disorder)  12. Social anxiety disorder  Chronic. Stable with wellbutrin, buspirone, doxepin, latuda, valium prn. Followed by Psychiatry.     13. Chronic migraine without aura without status migrainosus, not intractable  Chronic. Stable with fioricet and ubrelvy prn. Followed by Neurology.     14. TMJ syndrome  Chronic. Stable. Followed by Neurology.     15. Essential hypertension  Chronic. Stable with amlodipine, irbesartan. Followed by PCP.     16. Iron deficiency anemia, unspecified iron deficiency anemia type  Chronic. Stable with ferrous sulfate. Followed by Hematology.     17. Vitamin D deficiency  Chronic. Stable with oral vitamin D. Followed by PCP.     18. Age-related osteoporosis without current pathological fracture  Chronic. Stable. Followed by Endocrinology.     19. Chronic bilateral low back pain without sciatica  20. Fibromyalgia  Chronic. Stable with hydrocodone, tizanidine, gabapentin. Followed by PCP.     21. Insomnia due to other mental disorder  Chronic. Stable with medications. Followed by Psychiatry.       Provided Jaki with a 5-10 year written screening schedule and personal prevention plan. Recommendations were developed using the USPSTF age appropriate recommendations. Education, counseling, and referrals were provided as needed. After Visit Summary printed and given to patient which includes a list of additional screenings\tests needed.    Follow up in about 1 year (around 11/5/2025) for your next annual wellness  visit.    Deepa Garcia NP      Advance Care Planning     I offered to discuss advanced care planning, including how to pick a person who would make decisions for you if you were unable to make them for yourself, called a health care power of , and what kind of decisions you might make such as use of life sustaining treatments such as ventilators and tube feeding when faced with a life limiting illness recorded on a living will that they will need to know. (How you want to be cared for as you near the end of your natural life)     X Patient is interested in learning more about how to make advanced directives.  I provided them paperwork and offered to discuss this with them.

## 2024-11-06 ENCOUNTER — TELEPHONE (OUTPATIENT)
Dept: ENDOCRINOLOGY | Facility: CLINIC | Age: 54
End: 2024-11-06
Payer: MEDICARE

## 2024-11-06 ENCOUNTER — PATIENT MESSAGE (OUTPATIENT)
Dept: RHEUMATOLOGY | Facility: CLINIC | Age: 54
End: 2024-11-06
Payer: MEDICARE

## 2024-11-06 NOTE — TELEPHONE ENCOUNTER
----- Message from Alberto sent at 11/6/2024  8:20 AM CST -----  Contact: Patient  Type: Patient Call          Who Called: patient      Does the patient know what this is regarding? Pt is requesting a call back. She was prescribed RX cholecalciferol, vitamin D3, (VITAMIN D3) 25 mcg (1,000 unit) capsule by Dr. Lr but it was never sent over to the pharmacy. Please advsie          Does the patient rather a call back or a response via [facility]? call        Best Call Back Number: [phone]         Additional Information:

## 2024-11-07 ENCOUNTER — ANESTHESIA EVENT (OUTPATIENT)
Dept: ENDOSCOPY | Facility: HOSPITAL | Age: 54
End: 2024-11-07
Payer: MEDICARE

## 2024-11-07 NOTE — ANESTHESIA PREPROCEDURE EVALUATION
"                                                                                                             11/07/2024  Jaki Bajwa is a 54 y.o., female.  Procedure: EGD (ESOPHAGOGASTRODUODENOSCOPY) (N/A)         Patient Active Problem List   Diagnosis    Essential hypertension    Uterine fibroids    Pelvic pain     Fibromyalgia    Leg cramps, sleep related    TMJ syndrome    JUAN JOSE (generalized anxiety disorder)    Mood disorder    Chronic pain    Sjogren's syndrome    Low back pain    Recurrent UTI    Insomnia    Severe obesity (BMI 35.0-35.9 with comorbidity)    Vaginal dryness    Incomplete bladder emptying    Internet shopping addiction    Anemia of unknown etiology    Iron deficiency anemia    Stage 3b chronic kidney disease    Bleeding hemorrhoid    Chronic narcotic dependence    Social anxiety disorder    Panic disorder    Chronic migraine without aura without status migrainosus, not intractable    Hyperparathyroidism    Lower extremity edema    Vitamin D deficiency    Age-related osteoporosis without current pathological fracture    Depressive disorder       Past Medical History:   Diagnosis Date    CANDELARIA (acute kidney injury) 4/23/2022    Anemia     Anxiety     Depression     History of psychiatric hospitalization 2000    [place] x 1 week for depression    History of ventral hernia repair     Hypertension     Lupus     patient reports that she is being treated "like I have Lupus"    Mental disorder     Morbid obesity 12/15/2017    Psychiatric problem     Sjogren's syndrome 2013    Therapy     TMJ (temporomandibular joint disorder)        ECHO: No results found for this or any previous visit.      There is no height or weight on file to calculate BMI.    Tobacco Use: Low Risk  (11/5/2024)    Patient History     Smoking Tobacco Use: Never     Smokeless Tobacco Use: Never     Passive Exposure: Not on file       Social History     Substance and Sexual " Activity   Drug Use Not Currently    Types: Barbituates, Hydrocodone    Comment: rx hydrocodone        Alcohol Use: Not At Risk (11/5/2024)    AUDIT-C     Frequency of Alcohol Consumption: Monthly or less     Average Number of Drinks: 1 or 2     Frequency of Binge Drinking: Never       Review of patient's allergies indicates:   Allergen Reactions    Aspirin Hives         Airway:  No value filed.      Pre-op Assessment    I have reviewed the Patient Summary Reports.     I have reviewed the Nursing Notes. I have reviewed the NPO Status.   I have reviewed the Medications.     Review of Systems  Anesthesia Hx:  No problems with previous Anesthesia             Denies Family Hx of Anesthesia complications.    Denies Personal Hx of Anesthesia complications.                    Hematology/Oncology:    Oncology Normal    -- Anemia:                                  EENT/Dental:  EENT/Dental Normal           Cardiovascular:  Exercise tolerance: good   Hypertension              ECG has been reviewed.                            Pulmonary:  Pulmonary Normal                       Renal/:  Chronic Renal Disease                Hepatic/GI:  Hepatic/GI Normal                    Musculoskeletal:  Musculoskeletal Normal                Neurological:    Neuromuscular Disease,  Headaches                                 Endocrine:  Endocrine Normal            Dermatological:  Skin Normal    Psych:  Psychiatric History anxiety                  Anesthesia Plan  Type of Anesthesia, risks & benefits discussed:    Anesthesia Type: Gen Natural Airway  Intra-op Monitoring Plan: Standard ASA Monitors  Post Op Pain Control Plan: multimodal analgesia and IV/PO Opioids PRN  Induction:  IV  Informed Consent: Informed consent signed with the Patient and all parties understand the risks and agree with anesthesia plan.  All questions answered. Patient consented to blood products? No  ASA Score: 2  Day of Surgery Review of History & Physical: H&P  Update referred to the surgeon/provider.    Ready For Surgery From Anesthesia Perspective.   .

## 2024-11-09 ENCOUNTER — ANESTHESIA (OUTPATIENT)
Dept: ENDOSCOPY | Facility: HOSPITAL | Age: 54
End: 2024-11-09
Payer: MEDICARE

## 2024-11-09 ENCOUNTER — HOSPITAL ENCOUNTER (OUTPATIENT)
Facility: HOSPITAL | Age: 54
Discharge: HOME | End: 2024-11-09
Attending: STUDENT IN AN ORGANIZED HEALTH CARE EDUCATION/TRAINING PROGRAM | Admitting: STUDENT IN AN ORGANIZED HEALTH CARE EDUCATION/TRAINING PROGRAM
Payer: MEDICARE

## 2024-11-09 VITALS
HEIGHT: 67 IN | RESPIRATION RATE: 18 BRPM | SYSTOLIC BLOOD PRESSURE: 108 MMHG | BODY MASS INDEX: 37.67 KG/M2 | WEIGHT: 240 LBS | DIASTOLIC BLOOD PRESSURE: 76 MMHG | TEMPERATURE: 98 F | HEART RATE: 107 BPM | OXYGEN SATURATION: 98 %

## 2024-11-09 DIAGNOSIS — D50.9 IRON DEFICIENCY ANEMIA, UNSPECIFIED IRON DEFICIENCY ANEMIA TYPE: Primary | ICD-10-CM

## 2024-11-09 DIAGNOSIS — D50.9 IDA (IRON DEFICIENCY ANEMIA): ICD-10-CM

## 2024-11-09 PROCEDURE — 27201012 HC FORCEPS, HOT/COLD, DISP: Performed by: STUDENT IN AN ORGANIZED HEALTH CARE EDUCATION/TRAINING PROGRAM

## 2024-11-09 PROCEDURE — 43239 EGD BIOPSY SINGLE/MULTIPLE: CPT | Mod: HCNC,,, | Performed by: STUDENT IN AN ORGANIZED HEALTH CARE EDUCATION/TRAINING PROGRAM

## 2024-11-09 PROCEDURE — 88305 TISSUE EXAM BY PATHOLOGIST: CPT | Mod: 59 | Performed by: PATHOLOGY

## 2024-11-09 PROCEDURE — 43239 EGD BIOPSY SINGLE/MULTIPLE: CPT | Performed by: STUDENT IN AN ORGANIZED HEALTH CARE EDUCATION/TRAINING PROGRAM

## 2024-11-09 PROCEDURE — 37000009 HC ANESTHESIA EA ADD 15 MINS: Performed by: STUDENT IN AN ORGANIZED HEALTH CARE EDUCATION/TRAINING PROGRAM

## 2024-11-09 PROCEDURE — 63600175 PHARM REV CODE 636 W HCPCS: Performed by: ANESTHESIOLOGY

## 2024-11-09 PROCEDURE — 37000008 HC ANESTHESIA 1ST 15 MINUTES: Performed by: STUDENT IN AN ORGANIZED HEALTH CARE EDUCATION/TRAINING PROGRAM

## 2024-11-09 PROCEDURE — D9220A PRA ANESTHESIA: Mod: ,,, | Performed by: NURSE ANESTHETIST, CERTIFIED REGISTERED

## 2024-11-09 RX ORDER — PROPOFOL 10 MG/ML
VIAL (ML) INTRAVENOUS
Status: DISCONTINUED | OUTPATIENT
Start: 2024-11-09 | End: 2024-11-12

## 2024-11-09 RX ORDER — PROPOFOL 10 MG/ML
VIAL (ML) INTRAVENOUS CONTINUOUS PRN
Status: DISCONTINUED | OUTPATIENT
Start: 2024-11-09 | End: 2024-11-12

## 2024-11-09 RX ORDER — LIDOCAINE HYDROCHLORIDE 20 MG/ML
INJECTION INTRAVENOUS
Status: DISCONTINUED | OUTPATIENT
Start: 2024-11-09 | End: 2024-11-12

## 2024-11-09 RX ORDER — SODIUM CHLORIDE 9 MG/ML
INJECTION, SOLUTION INTRAVENOUS CONTINUOUS
Status: DISCONTINUED | OUTPATIENT
Start: 2024-11-09 | End: 2024-11-09 | Stop reason: HOSPADM

## 2024-11-09 RX ADMIN — LIDOCAINE HYDROCHLORIDE 100 MG: 20 INJECTION INTRAVENOUS at 09:11

## 2024-11-09 RX ADMIN — PROPOFOL 175 MCG/KG/MIN: 10 INJECTION, EMULSION INTRAVENOUS at 09:11

## 2024-11-09 RX ADMIN — PROPOFOL 100 MG: 10 INJECTION, EMULSION INTRAVENOUS at 09:11

## 2024-11-09 RX ADMIN — GLYCOPYRROLATE 0.2 MG: 0.2 INJECTION, SOLUTION INTRAMUSCULAR; INTRAVENOUS at 09:11

## 2024-11-09 NOTE — PLAN OF CARE
Pt Resting in bed no s/s of distress RR even and unlabored VSS. Discharge instructions given and pt verbalized. IV D/C. Pt ready for discharge home with family reports given to patient. Pt tolerated po. Denies N/V. Denies pain.

## 2024-11-09 NOTE — PROVATION PATIENT INSTRUCTIONS
Discharge Summary/Instructions after an Endoscopic Procedure  Patient Name: Jaki Bajwa  Patient MRN: [id number]  Patient YOB: 1970  Saturday, November 9, 2024  Parth Hernández MD  Dear patient,  As a result of recent federal legislation (The Federal Cures Act), you may   receive lab or pathology results from your procedure in your [facility]   account before your physician is able to contact you. Your physician or   their representative will relay the results to you with their   recommendations at their soonest availability.  Thank you,  RESTRICTIONS:  During your procedure today, you received medications for sedation.  These   medications may affect your judgment, balance and coordination.  Therefore,   for 24 hours, you have the following restrictions:   - DO NOT drive a car, operate machinery, make legal/financial decisions,   sign important papers or drink alcohol.    ACTIVITY:  Today: no heavy lifting, straining or running due to procedural   sedation/anesthesia.  The following day: return to full activity including work.  DIET:  Eat and drink normally unless instructed otherwise.     TREATMENT FOR COMMON SIDE EFFECTS:  - Mild abdominal pain, nausea, belching, bloating or excessive gas:  rest,   eat lightly and use a heating pad.  - Sore Throat: treat with throat lozenges and/or gargle with warm salt   water.  - Because air was used during the procedure, expelling large amounts of air   from your rectum or belching is normal.  - If a bowel prep was taken, you may not have a bowel movement for 1-3 days.    This is normal.  SYMPTOMS TO WATCH FOR AND REPORT TO YOUR PHYSICIAN:  1. Abdominal pain or bloating, other than gas cramps.  2. Chest pain.  3. Back pain.  4. Signs of infection such as: chills or fever occurring within 24 hours   after the procedure.  5. Rectal bleeding, which would show as bright red, maroon, or black stools.   (A tablespoon of blood from the rectum is not serious,  especially if   hemorrhoids are present.)  6. Vomiting.  7. Weakness or dizziness.  GO DIRECTLY TO THE NEAREST EMERGENCY ROOM IF YOU HAVE ANY OF THE FOLLOWING:      Difficulty breathing              Chills and/or fever over 101 F   Persistent vomiting and/or vomiting blood   Severe abdominal pain   Severe chest pain   Black, tarry stools   Bleeding- more than one tablespoon   Any other symptom or condition that you feel may need urgent attention  Your doctor recommends these additional instructions:  If any biopsies were taken, your doctors clinic will contact you in 1 to 2   weeks with any results.  - Await pathology results.   - Discharge patient to home.   - Patient has a contact number available for emergencies.  The signs and   symptoms of potential delayed complications were discussed with the   patient.  Return to normal activities tomorrow.  Written discharge   instructions were provided to the patient.   - The findings and recommendations were discussed with the patient.  For questions, problems or results please call your physician - Parth Hernández MD at Work:  [phone].  [facility], EMERGENCY ROOM PHONE NUMBER: [phone]  IF A COMPLICATION OR EMERGENCY SITUATION ARISES AND YOU ARE UNABLE TO REACH   YOUR PHYSICIAN - GO DIRECTLY TO THE EMERGENCY ROOM.  Parth Hernández MD  11/9/2024 9:36:56 AM  This report has been verified and signed electronically.  Dear patient,  As a result of recent federal legislation (The Federal Cures Act), you may   receive lab or pathology results from your procedure in your [facility]   account before your physician is able to contact you. Your physician or   their representative will relay the results to you with their   recommendations at their soonest availability.  Thank you,  PROVATION

## 2024-11-09 NOTE — ANESTHESIA POSTPROCEDURE EVALUATION
Anesthesia Post Evaluation    Patient: Jaki Bajwa    Procedure(s) Performed: Procedure(s) (LRB):  EGD (ESOPHAGOGASTRODUODENOSCOPY) (N/A)    Final Anesthesia Type: general      Patient location during evaluation: PACU  Patient participation: Yes- Able to Participate  Level of consciousness: awake and alert  Post-procedure vital signs: reviewed and stable  Pain management: adequate  Airway patency: patent    PONV status at discharge: No PONV  Anesthetic complications: no      Cardiovascular status: blood pressure returned to baseline  Respiratory status: unassisted  Hydration status: euvolemic            Vitals Value Taken Time   /76 11/09/24 0951   Temp 36.5 °C (97.7 °F) 11/09/24 0945   Pulse 106 11/09/24 0954   Resp 31 11/09/24 0954   SpO2 99 % 11/09/24 0954   Vitals shown include unfiled device data.      Event Time   Out of Recovery 09:56:00         Pain/Saúl Score: Saúl Score: 10 (11/9/2024  9:56 AM)

## 2024-11-09 NOTE — H&P
Short Stay Endoscopy History and Physical    PCP - Jose Carpenter DO  Referring Physician - Latrice Arce MD  [address] [place] [address]    Procedure - EGD  ASA - per anesthesia  Mallampati - per anesthesia  History of Anesthesia problems - no  Family history Anesthesia problems -  no   Plan of anesthesia - General    HPI  54 y.o. female  Reason for procedure:  Iron deficiency anemia, unspecified iron deficiency anemia type [D50.9]      ROS:  Constitutional: No fevers, chills, No weight loss  CV: No chest pain  Pulm: No cough, No shortness of breath  GI: see HPI    Medical History:  has a past medical history of CANDELARIA (acute kidney injury) (2022), Anemia, Anxiety, Depression, History of psychiatric hospitalization (), History of ventral hernia repair, Hypertension, Lupus, Mental disorder, Morbid obesity (12/15/2017), Psychiatric problem, Sjogren's syndrome (), Therapy, and TMJ (temporomandibular joint disorder).    Surgical History:  has a past surgical history that includes Inguinal hernia repair; breast reduction; Tubal ligation;  section; Ventral hernia repair; Total Reduction Mammoplasty; Laparoscopic total hysterectomy (); Breast surgery (2005); Hysterectomy (2005); and Bone marrow biopsy (N/A, 2024).    Family History: family history includes Alcohol abuse in her brother; Anxiety disorder in her father and sister; Arthritis in her father; Asthma in her son; COPD in her sister; Cancer in her father, sister, and sister; Colon cancer in her father; Depression in her brother, father, sister, and sister; Heart attack in her sister; Heart disease in her mother; Hypertension in her mother; Liver cancer in her sister and another family member; Mental illness in her father; Miscarriages / Stillbirths in her sister; No Known Problems in her daughter; Pancreatic cancer in her brother and another family member; Schizophrenia in her father..    Social History:   reports that she has never smoked. She has never used smokeless tobacco. She reports that she does not currently use alcohol after a past usage of about 1.0 standard drink of alcohol per week. She reports that she does not currently use drugs after having used the following drugs: Barbituates and Hydrocodone.    Review of patient's allergies indicates:   Allergen Reactions    Aspirin Hives       Medications:   Medications Prior to Admission   Medication Sig Dispense Refill Last Dose/Taking    amLODIPine (NORVASC) 5 MG tablet Take 1 tablet (5 mg total) by mouth once daily. 90 tablet 3 11/9/2024 Morning    buPROPion (WELLBUTRIN XL) 150 MG TB24 tablet TAKE 3 TABLETS(450 MG) BY MOUTH EVERY MORNING 270 tablet 1 11/9/2024 Morning    busPIRone (BUSPAR) 15 MG tablet TAKE 1 TABLET(15 MG) BY MOUTH THREE TIMES DAILY 270 tablet 1 11/9/2024    butalbital-acetaminophen-caffeine -40 mg (FIORICET, ESGIC) -40 mg per tablet Take 1 tablet by mouth every 6 (six) hours as needed for Headaches. 40 tablet 0 11/8/2024    doxepin (SINEQUAN) 75 MG capsule TAKE 2 CAPSULES(150 MG) BY MOUTH EVERY EVENING 60 capsule 11 11/8/2024 Evening    ergocalciferol (ERGOCALCIFEROL) 50,000 unit Cap Take 1 capsule (50,000 Units total) by mouth every 7 days. 4 capsule 2 Past Week    estradiol 0.025 mg/24 hr 0.025 mg/24 hr Place 1 patch onto the skin once a week. 4 patch 11 11/9/2024 Morning    gabapentin (NEURONTIN) 300 MG capsule TAKE 1 CAPSULE(300 MG) BY MOUTH THREE TIMES DAILY 90 capsule 5 11/9/2024    HYDROcodone-acetaminophen (NORCO) 5-325 mg per tablet Take 1 tablet by mouth every 24 hours as needed for Pain. Quantity medically necessary 30 tablet 0 11/9/2024    hydroxychloroquine (PLAQUENIL) 200 mg tablet TAKE 1 TABLET BY MOUTH TWICE DAILY 60 tablet 0 11/9/2024    irbesartan (AVAPRO) 300 MG tablet TAKE 1 TABLET(300 MG) BY MOUTH EVERY EVENING 90 tablet 3 11/9/2024    lurasidone (LATUDA) 60 mg Tab tablet TAKE 1 TABLET(60 MG) BY MOUTH EVERY  DAY 90 tablet 3 11/9/2024    omeprazole (PRILOSEC) 40 MG capsule Take 1 capsule (40 mg total) by mouth once daily. 90 capsule 3 11/9/2024    ubrogepant (UBRELVY) 100 mg tablet Take 1 tablet (100 mg total) by mouth as needed for Migraine. If symptoms persist or return, may repeat dose after 2 hours. Maximum: 200 mg per 24 hours 30 tablet 2 Past Week    cholecalciferol, vitamin D3, (VITAMIN D3) 25 mcg (1,000 unit) capsule Take 1 capsule (1,000 Units total) by mouth once daily.   More than a month    clotrimazole (LOTRIMIN) 1 % cream Apply topically 2 (two) times daily. 28 g 2 More than a month    cycloSPORINE (RESTASIS) 0.05 % ophthalmic emulsion Place 1 drop into both eyes 2 (two) times daily. 180 each 1 More than a month    diazePAM (VALIUM) 2 MG tablet Take 1 tablet (2 mg total) by mouth daily as needed (Severe anxiety/Panic). 15 tablets to last 3 months 15 tablet 0 More than a month    docusate sodium (COLACE) 100 MG capsule Take 1 capsule (100 mg total) by mouth 2 (two) times daily as needed for Constipation. 60 capsule 0 More than a month    ferrous sulfate, dried (SLOW FE) 160 mg (50 mg iron) TbSR Take 1 tablet (160 mg total) by mouth once daily. 90 tablet 2 More than a month    furosemide (LASIX) 20 MG tablet Take 1 tablet (20 mg total) by mouth once daily. 90 tablet 3 More than a month    NARCAN 4 mg/actuation [place] SPRAY 1 SPRAY IN NOSTRIL ONCE FOR 1 DOSE (Patient not taking: Reported on 11/5/2024)       nystatin (MYCOSTATIN) 100,000 unit/mL suspension Take 4 mLs (400,000 Units total) by mouth 4 (four) times daily. 480 mL 5 More than a month    pilocarpine (SALAGEN) 5 MG Tab Take 1 tablet (5 mg total) by mouth 4 (four) times daily. 360 tablet 1 Unknown    tiZANidine (ZANAFLEX) 4 MG tablet TAKE 2 TABLETS(8 MG) BY MOUTH THREE TIMES DAILY 90 tablet 1 11/7/2024    VENOFER 100 mg iron/5 mL injection           Physical Exam:    Vital Signs: There were no vitals filed for this visit.    General Appearance: Well  appearing in no acute distress  Abdomen: Soft, non tender, non distended with normal bowel sounds, no masses      Labs:  Lab Results   Component Value Date    WBC 7.43 09/25/2024    HGB 8.9 (L) 09/25/2024    HCT 31.9 (L) 09/25/2024     09/25/2024    CHOL 156 09/12/2022    TRIG 95 09/12/2022    HDL 61 09/12/2022    ALT 11 09/19/2024    AST 15 09/19/2024     10/11/2024    K 4.0 10/11/2024     10/11/2024    CREATININE 1.2 10/11/2024    BUN 11 10/11/2024    CO2 28 10/11/2024    TSH 2.563 09/22/2021    INR 1.0 04/23/2022    HGBA1C 4.9 02/13/2024       I have explained the risks and benefits of this endoscopic procedure to the patient including but not limited to bleeding, inflammation, infection, perforation, and death.      Parth Hernández MD

## 2024-11-11 ENCOUNTER — INFUSION (OUTPATIENT)
Dept: INFUSION THERAPY | Facility: HOSPITAL | Age: 54
End: 2024-11-11
Payer: MEDICARE

## 2024-11-11 ENCOUNTER — PATIENT MESSAGE (OUTPATIENT)
Dept: RHEUMATOLOGY | Facility: CLINIC | Age: 54
End: 2024-11-11
Payer: MEDICARE

## 2024-11-11 VITALS
BODY MASS INDEX: 38.54 KG/M2 | TEMPERATURE: 99 F | SYSTOLIC BLOOD PRESSURE: 141 MMHG | HEIGHT: 67 IN | HEART RATE: 88 BPM | RESPIRATION RATE: 18 BRPM | WEIGHT: 245.56 LBS | DIASTOLIC BLOOD PRESSURE: 80 MMHG

## 2024-11-11 DIAGNOSIS — D50.0 IRON DEFICIENCY ANEMIA DUE TO CHRONIC BLOOD LOSS: Primary | ICD-10-CM

## 2024-11-11 PROCEDURE — 96375 TX/PRO/DX INJ NEW DRUG ADDON: CPT | Mod: HCNC

## 2024-11-11 PROCEDURE — 96367 TX/PROPH/DG ADDL SEQ IV INF: CPT | Mod: HCNC

## 2024-11-11 PROCEDURE — 96365 THER/PROPH/DIAG IV INF INIT: CPT | Mod: HCNC

## 2024-11-11 PROCEDURE — 25000003 PHARM REV CODE 250: Mod: HCNC | Performed by: INTERNAL MEDICINE

## 2024-11-11 PROCEDURE — 63600175 PHARM REV CODE 636 W HCPCS: Mod: JZ,JG,HCNC | Performed by: INTERNAL MEDICINE

## 2024-11-11 PROCEDURE — 96376 TX/PRO/DX INJ SAME DRUG ADON: CPT | Mod: HCNC

## 2024-11-11 RX ORDER — ACETAMINOPHEN 500 MG
1000 TABLET ORAL
Status: COMPLETED | OUTPATIENT
Start: 2024-11-11 | End: 2024-11-11

## 2024-11-11 RX ORDER — FAMOTIDINE 10 MG/ML
20 INJECTION INTRAVENOUS
Status: CANCELLED | OUTPATIENT
Start: 2024-11-11

## 2024-11-11 RX ORDER — ACETAMINOPHEN 500 MG
1000 TABLET ORAL
Status: CANCELLED | OUTPATIENT
Start: 2024-11-11

## 2024-11-11 RX ORDER — DIPHENHYDRAMINE HYDROCHLORIDE 50 MG/ML
50 INJECTION INTRAMUSCULAR; INTRAVENOUS ONCE AS NEEDED
OUTPATIENT
Start: 2024-11-11

## 2024-11-11 RX ORDER — SODIUM CHLORIDE 9 MG/ML
10 INJECTION, SOLUTION INTRAMUSCULAR; INTRAVENOUS; SUBCUTANEOUS
Status: DISCONTINUED | OUTPATIENT
Start: 2024-11-11 | End: 2024-11-11 | Stop reason: HOSPADM

## 2024-11-11 RX ORDER — FAMOTIDINE 10 MG/ML
20 INJECTION INTRAVENOUS
Status: COMPLETED | OUTPATIENT
Start: 2024-11-11 | End: 2024-11-11

## 2024-11-11 RX ORDER — EPINEPHRINE 0.3 MG/.3ML
0.3 INJECTION SUBCUTANEOUS ONCE AS NEEDED
OUTPATIENT
Start: 2024-11-11

## 2024-11-11 RX ORDER — SODIUM CHLORIDE 9 MG/ML
10 INJECTION, SOLUTION INTRAMUSCULAR; INTRAVENOUS; SUBCUTANEOUS
Status: CANCELLED | OUTPATIENT
Start: 2024-11-11

## 2024-11-11 RX ORDER — HEPARIN 100 UNIT/ML
500 SYRINGE INTRAVENOUS
OUTPATIENT
Start: 2024-11-11

## 2024-11-11 RX ADMIN — ACETAMINOPHEN 1000 MG: 500 TABLET ORAL at 08:11

## 2024-11-11 RX ADMIN — SODIUM CHLORIDE 25 MG: 9 INJECTION, SOLUTION INTRAVENOUS at 09:11

## 2024-11-11 RX ADMIN — SODIUM CHLORIDE 975 MG: 9 INJECTION, SOLUTION INTRAVENOUS at 10:11

## 2024-11-11 RX ADMIN — DIPHENHYDRAMINE HYDROCHLORIDE 50 MG: 50 INJECTION, SOLUTION INTRAMUSCULAR; INTRAVENOUS at 08:11

## 2024-11-11 RX ADMIN — FAMOTIDINE 20 MG: 10 INJECTION INTRAVENOUS at 08:11

## 2024-11-11 NOTE — PROGRESS NOTES
Ms. Bajwa was referred to the I program by Dr. Pratik Medina, her Primary Care Provider (PCP).  Ms. Bajwa scored a 10 on the JUAN JOSE 7 and 4 on the PHQ-9.  Based upon her scores Ms. Bajwa is eligible for the (Behavioral Health Integration) and provided verbal consent to join the program.  CHW completed the intake, Social Determinants of Health  and scheduled PT with Mr. Kenney Marie on 8/8/2019.    
normal/cranial nerves II-XII intact/sensation intact

## 2024-11-12 ENCOUNTER — PATIENT MESSAGE (OUTPATIENT)
Dept: GASTROENTEROLOGY | Facility: CLINIC | Age: 54
End: 2024-11-12
Payer: MEDICARE

## 2024-11-12 DIAGNOSIS — M35.00 SJOGREN'S SYNDROME, WITH UNSPECIFIED ORGAN INVOLVEMENT: Primary | ICD-10-CM

## 2024-11-12 LAB
FINAL PATHOLOGIC DIAGNOSIS: NORMAL
GROSS: NORMAL
Lab: NORMAL

## 2024-11-12 RX ORDER — OMEPRAZOLE 40 MG/1
40 CAPSULE, DELAYED RELEASE ORAL 2 TIMES DAILY
Qty: 28 CAPSULE | Refills: 0 | Status: SHIPPED | OUTPATIENT
Start: 2024-11-12 | End: 2024-11-26

## 2024-11-12 RX ORDER — DOXYCYCLINE 100 MG/1
100 CAPSULE ORAL EVERY 12 HOURS
Qty: 28 CAPSULE | Refills: 0 | Status: SHIPPED | OUTPATIENT
Start: 2024-11-12 | End: 2024-11-26

## 2024-11-12 RX ORDER — METRONIDAZOLE 500 MG/1
500 TABLET ORAL 3 TIMES DAILY
Qty: 42 TABLET | Refills: 0 | Status: SHIPPED | OUTPATIENT
Start: 2024-11-12 | End: 2024-11-26

## 2024-11-12 RX ORDER — BISMUTH SUBSALICYLATE 262 MG/1
1 TABLET ORAL 4 TIMES DAILY
Qty: 56 TABLET | Refills: 0 | Status: SHIPPED | OUTPATIENT
Start: 2024-11-12 | End: 2024-11-26

## 2024-11-12 RX ORDER — HYDROXYCHLOROQUINE SULFATE 200 MG/1
TABLET, FILM COATED ORAL
Qty: 60 TABLET | Refills: 0 | OUTPATIENT
Start: 2024-11-12

## 2024-11-12 NOTE — TELEPHONE ENCOUNTER
No care due was identified.  [facility] Embedded Care Due Messages. Reference number: [id number].   11/12/2024 2:26:01 PM CST

## 2024-11-12 NOTE — TELEPHONE ENCOUNTER
----- Message from Venecia sent at 11/12/2024 10:06 AM CST -----  Regarding: call back  Type: Patient Call Back    Who called: pt     What is the request in detail: requesting call to discuss medication     Can the clinic reply by [facility]?no    Would the patient rather a call back or a response via [facility]? call    Best call back number: [phone]     Additional Information:

## 2024-11-12 NOTE — TRANSFER OF CARE
"Anesthesia Transfer of Care Note    Patient: Jaki Bajwa    Procedure(s) Performed: Procedure(s) (LRB):  EGD (ESOPHAGOGASTRODUODENOSCOPY) (N/A)    Patient location: PACU    Anesthesia Type: general    Transport from OR: Transported from OR on 100% O2 by closed face mask with adequate spontaneous ventilation    Post pain: adequate analgesia    Post assessment: no apparent anesthetic complications    Post vital signs: stable    Level of consciousness: sedated    Nausea/Vomiting: no nausea/vomiting    Complications: none    Transfer of care protocol was followed    Last vitals: Visit Vitals  /76 (BP Location: Left arm, Patient Position: Lying)   Pulse 107   Temp 36.5 °C (97.7 °F) (Temporal)   Resp 18   Ht 5' 7" (1.702 m)   Wt 108.9 kg (240 lb)   LMP 07/11/2012   SpO2 98%   Breastfeeding No   BMI 37.59 kg/m²     "

## 2024-11-12 NOTE — TELEPHONE ENCOUNTER
Spoke with pt she stated that she need a refill on her medication due to the [facility].    Medication and Referral is Pending

## 2024-11-13 ENCOUNTER — TELEPHONE (OUTPATIENT)
Dept: RHEUMATOLOGY | Facility: CLINIC | Age: 54
End: 2024-11-13
Payer: MEDICARE

## 2024-11-13 ENCOUNTER — TELEPHONE (OUTPATIENT)
Dept: OPHTHALMOLOGY | Facility: CLINIC | Age: 54
End: 2024-11-13
Payer: MEDICARE

## 2024-11-13 RX ORDER — HYDROXYCHLOROQUINE SULFATE 200 MG/1
TABLET, FILM COATED ORAL
Qty: 60 TABLET | Refills: 0 | Status: SHIPPED | OUTPATIENT
Start: 2024-11-13

## 2024-11-13 NOTE — TELEPHONE ENCOUNTER
Pt of Dr. FAJARDO requesting hydroxychloroquine. Hasn't had Optometry check with Dr. Simons since 10/22. Also has no f/u appt.  Please schedule all Thank you NAZIA

## 2024-11-21 ENCOUNTER — TELEPHONE (OUTPATIENT)
Dept: GASTROENTEROLOGY | Facility: CLINIC | Age: 54
End: 2024-11-21
Payer: MEDICARE

## 2024-11-21 NOTE — TELEPHONE ENCOUNTER
----- Message from Yasir sent at 11/21/2024 10:32 AM CST -----  Type: RX Refill Request     Who Called:   Pt  Have you contacted your pharmacy:     Refill     RX Name and Strength:   fluconazole (DIFLUCAN) 150 MG Tab  Preferred Pharmacy with phone number:     [id number] - [place] [address] AT [facility] [address]  [facility]  [facility] [address]  Phone: [phone] Fax: [phone]    Local or Mail Order:local     Would the patient rather a call back or a response via [facility]?   Call back  Best Call Back Number:   Telephone Information:  Mobile          [phone]    Additional Information:   Pt is requesting this med due to the antibodies she's taking.  Thank you.

## 2024-11-22 ENCOUNTER — HOSPITAL ENCOUNTER (OUTPATIENT)
Dept: RADIOLOGY | Facility: HOSPITAL | Age: 54
Discharge: HOME | End: 2024-11-22
Attending: NURSE PRACTITIONER
Payer: MEDICARE

## 2024-11-22 ENCOUNTER — OFFICE VISIT (OUTPATIENT)
Dept: ORTHOPEDICS | Facility: CLINIC | Age: 54
End: 2024-11-22
Payer: MEDICARE

## 2024-11-22 DIAGNOSIS — M25.561 ACUTE PAIN OF RIGHT KNEE: Primary | ICD-10-CM

## 2024-11-22 DIAGNOSIS — M25.561 ACUTE PAIN OF RIGHT KNEE: ICD-10-CM

## 2024-11-22 DIAGNOSIS — M25.571 ACUTE RIGHT ANKLE PAIN: ICD-10-CM

## 2024-11-22 PROCEDURE — 73562 X-RAY EXAM OF KNEE 3: CPT | Mod: 26,59,HCNC,LT | Performed by: RADIOLOGY

## 2024-11-22 PROCEDURE — 99999 PR PBB SHADOW E&M-EST. PATIENT-LVL IV: CPT | Mod: PBBFAC,HCNC,, | Performed by: NURSE PRACTITIONER

## 2024-11-22 PROCEDURE — 73562 X-RAY EXAM OF KNEE 3: CPT | Mod: TC,HCNC,LT

## 2024-11-22 PROCEDURE — 73564 X-RAY EXAM KNEE 4 OR MORE: CPT | Mod: 26,HCNC,RT, | Performed by: RADIOLOGY

## 2024-11-22 NOTE — PROGRESS NOTES
"  SUBJECTIVE:   History of Present Illness    CHIEF COMPLAINT:  - Jaki presents today for initial evaluation of right knee pain after tripping over a toy approximately two weeks ago.    HPI:  Jaki presents with right knee pain that started approximately two weeks ago when she tripped over a toy. Initially experiencing pain in the upper part of the knee, it later shifted to the inside. She describes intermittent pain, particularly when lying on her abdomen or when squatting. She states, "I cannot apply too much pressure on it now." To manage the pain, she has been taking hot baths and applying Bengay, which provides temporary relief. Jaki reports a history of back pain but is not currently seeing anyone for it. She was previously on Naproxen for inflammation but was taken off due to concerns about it affecting her vitamin D levels. She denies any previous knee injuries, numbness, or tingling down her leg. She denies any hip pain.    MEDICATIONS:  - Proxon: Discontinued due to concerns about interfering with vitamin D levels  - Bengay: As needed for knee pain  - Generic muscle rub: After showers for knee pain - helpful    SOCIAL HISTORY:  - Marital Status:       ROS:  Constitutional: -fever, -chills  Eyes: -visual changes  ENT: -nasal congestion, -sore throat  Respiratory: -cough, -shortness of breath  Cardiovascular: -chest pain, -palpitations  Gastrointestinal: -nausea, -vomiting  Genitourinary: -hematuria, -dysuria  Integument/Breast: -rash, -pruritus, -breast skin changes  Hematologic/Lymphatic: -easy bruising, -lymphadenopathy  Musculoskeletal: +arthralgia, +myalgia, +back pain  Neurological: -seizures, -tremors, -numbness, -tingling  Behavioral/Psych: -hallucinations  Endocrine: -heat intolerance, -cold intolerance         Review of patient's allergies indicates:   Allergen Reactions    Aspirin Hives         Current Outpatient Medications   Medication Sig Dispense Refill    amLODIPine " (NORVASC) 5 MG tablet Take 1 tablet (5 mg total) by mouth once daily. 90 tablet 3    bismuth subsalicylate (BISMUTH) 262 mg Chew Take 1 tablet (262 mg total) by mouth 4 (four) times daily. for 14 days 56 tablet 0    buPROPion (WELLBUTRIN XL) 150 MG TB24 tablet TAKE 3 TABLETS(450 MG) BY MOUTH EVERY MORNING 270 tablet 1    busPIRone (BUSPAR) 15 MG tablet TAKE 1 TABLET(15 MG) BY MOUTH THREE TIMES DAILY 270 tablet 1    butalbital-acetaminophen-caffeine -40 mg (FIORICET, ESGIC) -40 mg per tablet Take 1 tablet by mouth every 6 (six) hours as needed for Headaches. 40 tablet 0    cholecalciferol, vitamin D3, (VITAMIN D3) 25 mcg (1,000 unit) capsule Take 1 capsule (1,000 Units total) by mouth once daily.      clotrimazole (LOTRIMIN) 1 % cream Apply topically 2 (two) times daily. 28 g 2    cycloSPORINE (RESTASIS) 0.05 % ophthalmic emulsion Place 1 drop into both eyes 2 (two) times daily. 180 each 1    docusate sodium (COLACE) 100 MG capsule Take 1 capsule (100 mg total) by mouth 2 (two) times daily as needed for Constipation. 60 capsule 0    doxepin (SINEQUAN) 75 MG capsule TAKE 2 CAPSULES(150 MG) BY MOUTH EVERY EVENING 60 capsule 11    doxycycline (VIBRAMYCIN) 100 MG Cap Take 1 capsule (100 mg total) by mouth every 12 (twelve) hours. for 14 days 28 capsule 0    ergocalciferol (ERGOCALCIFEROL) 50,000 unit Cap Take 1 capsule (50,000 Units total) by mouth every 7 days. 4 capsule 2    estradiol 0.025 mg/24 hr 0.025 mg/24 hr Place 1 patch onto the skin once a week. 4 patch 11    ferrous sulfate, dried (SLOW FE) 160 mg (50 mg iron) TbSR Take 1 tablet (160 mg total) by mouth once daily. 90 tablet 2    fluconazole (DIFLUCAN) 150 MG Tab Take 1 tablet (150 mg total) by mouth once daily. for 1 day 1 tablet 0    furosemide (LASIX) 20 MG tablet Take 1 tablet (20 mg total) by mouth once daily. 90 tablet 3    gabapentin (NEURONTIN) 300 MG capsule TAKE 1 CAPSULE(300 MG) BY MOUTH THREE TIMES DAILY 90 capsule 5     "HYDROcodone-acetaminophen (NORCO) 5-325 mg per tablet Take 1 tablet by mouth every 24 hours as needed for Pain. Quantity medically necessary 30 tablet 0    hydroxychloroquine (PLAQUENIL) 200 mg tablet TAKE 1 TABLET BY MOUTH TWICE DAILY 60 tablet 0    irbesartan (AVAPRO) 300 MG tablet TAKE 1 TABLET(300 MG) BY MOUTH EVERY EVENING 90 tablet 3    lurasidone (LATUDA) 60 mg Tab tablet TAKE 1 TABLET(60 MG) BY MOUTH EVERY DAY 90 tablet 3    metroNIDAZOLE (FLAGYL) 500 MG tablet Take 1 tablet (500 mg total) by mouth 3 (three) times daily. for 14 days 42 tablet 0    NARCAN 4 mg/actuation [place]       nystatin (MYCOSTATIN) 100,000 unit/mL suspension Take 4 mLs (400,000 Units total) by mouth 4 (four) times daily. 480 mL 5    omeprazole (PRILOSEC) 40 MG capsule Take 1 capsule (40 mg total) by mouth once daily. 90 capsule 3    omeprazole (PRILOSEC) 40 MG capsule Take 1 capsule (40 mg total) by mouth 2 (two) times a day. for 14 days 28 capsule 0    pilocarpine (SALAGEN) 5 MG Tab Take 1 tablet (5 mg total) by mouth 4 (four) times daily. 360 tablet 1    tiZANidine (ZANAFLEX) 4 MG tablet TAKE 2 TABLETS(8 MG) BY MOUTH THREE TIMES DAILY 90 tablet 1    ubrogepant (UBRELVY) 100 mg tablet Take 1 tablet (100 mg total) by mouth as needed for Migraine. If symptoms persist or return, may repeat dose after 2 hours. Maximum: 200 mg per 24 hours 30 tablet 2    VENOFER 100 mg iron/5 mL injection       diazePAM (VALIUM) 2 MG tablet Take 1 tablet (2 mg total) by mouth daily as needed (Severe anxiety/Panic). 15 tablets to last 3 months 15 tablet 0     No current facility-administered medications for this visit.       Past Medical History:   Diagnosis Date    CANDELARIA (acute kidney injury) 4/23/2022    Anemia     Anxiety     Depression     History of psychiatric hospitalization 2000    [place] x 1 week for depression    History of ventral hernia repair     Hypertension     Lupus     patient reports that she is being treated "like I have Lupus"    Mental " disorder     Morbid obesity 12/15/2017    Psychiatric problem     Sjogren's syndrome 2013    Therapy     TMJ (temporomandibular joint disorder)        Past Surgical History:   Procedure Laterality Date    BONE MARROW BIOPSY N/A 2024    Procedure: Biopsy-bone marrow;  Surgeon: Latrice Arce MD;  Location: [facility] ENDO [address]);  Service: Oncology;  Laterality: N/A;  -pre call complete-tb    breast reduction      BREAST SURGERY  2005     SECTION      x 2    ESOPHAGOGASTRODUODENOSCOPY N/A 2024    Procedure: EGD (ESOPHAGOGASTRODUODENOSCOPY);  Surgeon: Parth Hernández MD;  [facility];  Service: Endoscopy;  Laterality: N/A;  Ref Dr Claude [id number]  24- pc complete. DBM    HYSTERECTOMY  2005    INGUINAL HERNIA REPAIR      LAPAROSCOPIC TOTAL HYSTERECTOMY      ASA    TOTAL REDUCTION MAMMOPLASTY      TUBAL LIGATION      VENTRAL HERNIA REPAIR         Family History   Problem Relation Name Age of Onset    Heart disease Mother Amalia Bajwa     Hypertension Mother Amalia Bajwa     Cancer Father Demarcus Shanks sr.         colon ca    Colon cancer Father Demarcus Shanks sr.     Depression Father Demarcus Shanks sr.     Schizophrenia Father Demarcus Shanks sr.     Anxiety disorder Father Demarcus Shanks sr.     Arthritis Father Demarcus Shanks sr.     Mental illness Father Demarcus Shanks sr.     Liver cancer Sister Fifi     Cancer Sister Fifi     Depression Sister Fifi     Anxiety disorder Sister Fifi     Heart attack Sister Fifi     COPD Sister Fifi     Cancer Sister Madeline         throat and colon    Depression Sister Madeline     Miscarriages / Stillbirths Sister Madeline     Pancreatic cancer Brother Demarcus bustamantes     Depression Brother Demarcus bustamantes     Alcohol abuse Brother Demarcus bustamantes     No Known Problems Daughter Ikea     Asthma Son Scott     Pancreatic cancer Other      Liver cancer Other      Suicide Neg Hx      Ovarian cancer Neg Hx      Breast cancer Neg Hx    "      OBJECTIVE:     PHYSICAL EXAM:  Vital Signs (Most Recent)  There were no vitals filed for this visit.     ,   Estimated body mass index is 38.47 kg/m² as calculated from the following:    Height as of 11/11/24: 5' 7" (1.702 m).    Weight as of 11/11/24: 111.4 kg (245 lb 9.5 oz).   General Appearance: Well nourished, well developed, in no acute distress.  HENT: Normal cephalic, oropharynx pink and moist  Eyes: PERRLA bilaterally and EOM x 4  Respiratory: Even and unlabored  Skin: Warm and Dry.   Psychiatric: AAO x 4, Mood & affect are normal.  Physical Exam    MSK: Knee - Right: Muscle strength 4/5 in right knee. Range of motion 0 to 130 degrees in right knee. Pain with valgus stress. No erythema. No swelling. Sensitivity on medial aspect.  IMAGING:  - X-rays knee: Mild medial tibial femoral joint space narrowing on both knees, no bone-on-bone contact observed         All radiographs were personally reviewed by me.    ASSESSMENT/PLAN:       ICD-10-CM ICD-9-CM   1. Acute pain of right knee  M25.561 719.46     Assessment & Plan    LIFESTYLE:  - Advised using a knee sleeve for support, which can be purchased at [employer], or [employer].  - Suggested applying topical creams after a shower for better effectiveness.    MEDICATIONS PRESCRIBED:  - Recommend using OTC Tylenol and topical creams such as aspercreme with lidocaine or Voltaren gel for pain relief. Suggested trying Icy Hot or similar muscle rub products for pain management.    REFERRALS:  - Referred to physical therapy for knee conditioning and strengthening. Jaki prefers to stay within the local area.    PROCEDURES:  - Performed physical exam of the right knee, including extension, flexion, and stress testing. Noted patient's range of motion and muscle strength.    Follow up PRN or if condition fails to improve.    This note was generated with the assistance of ambient listening technology. Verbal consent was obtained by the patient and accompanying " visitor(s) for the recording of patient appointment to facilitate this note. I attest to having reviewed and edited the generated note for accuracy, though some syntax or spelling errors may persist. Please contact the author of this note for any clarification.

## 2024-11-25 NOTE — TELEPHONE ENCOUNTER
----- Message from Zehra Velasco sent at 7/24/2018  7:26 AM CDT -----  Contact: PENG MAYO [[id number]]      Name of Who is Calling: PENG MAYO [[id number]]      What is the request in detail:    Patient called requesting to cancel and reschedule her upcoming appointment on Wednesday 08/08/2018.   Please give a call back at your earliest convenience.  THANKS!      Can the clinic reply by [facility]: No      What Number to Call Back:  PENG MAYO / ph# [phone]                                    
Assisted pt with rescheduling appt.  Pt demonstrated verbal understanding of information and had no further questions or concerns at this time.     
No

## 2024-12-04 DIAGNOSIS — F11.20 CHRONIC NARCOTIC DEPENDENCE: ICD-10-CM

## 2024-12-04 DIAGNOSIS — G89.4 CHRONIC PAIN SYNDROME: ICD-10-CM

## 2024-12-04 RX ORDER — VIT C/E/ZN/COPPR/LUTEIN/ZEAXAN 250MG-90MG
1000 CAPSULE ORAL DAILY
Start: 2024-12-04

## 2024-12-04 RX ORDER — HYDROCODONE BITARTRATE AND ACETAMINOPHEN 5; 325 MG/1; MG/1
1 TABLET ORAL
Qty: 30 TABLET | Refills: 0 | Status: SHIPPED | OUTPATIENT
Start: 2024-12-04

## 2024-12-04 NOTE — TELEPHONE ENCOUNTER
No care due was identified.  [facility] Embedded Care Due Messages. Reference number: [id number].   12/04/2024 10:03:23 AM CST

## 2024-12-04 NOTE — TELEPHONE ENCOUNTER
----- Message from Zehra Velasco sent at 12/4/2024  8:59 AM CST -----  Regarding: Medication  Refill  Request              Reply in [facility]: NO      Please refill the medication(s) listed below. Please call the patient : [phone] (U)        Medication   HYDROcodone-acetaminophen (NORCO) 5-325 mg per tablet    Medication   cholecalciferol, vitamin D3, (VITAMIN D3) 25 mcg (1,000 unit) capsule      Preferred Pharmacy: [facility][id number] [address] AT [facility] [address]   Phone: [phone]  Fax: [phone]

## 2024-12-04 NOTE — TELEPHONE ENCOUNTER
Refill Encounter    PCP Visits: Recent Visits  Date Type Provider Dept   10/11/24 Office Visit Weeks, Jose ALONSO, DO [facility]   09/06/24 Office Visit Weeks, Jose ALONSO, DO [facility]   07/12/24 Office Visit Weeks, Jose ALONSO, DO [facility]   06/19/24 Office Visit Weeks, Jose ALONSO,  [facility]   04/09/24 Office Visit Weeks, Jose ALONSO, DO [facility]   03/06/24 Office Visit Weeks, Jose ALONSO, DO [facility]   02/02/24 Office Visit Weeks, Jose ALONSO,  [facility]   Showing recent visits within past 360 days and meeting all other requirements  Future Appointments  Date Type Provider Dept   01/13/25 Appointment Weeks, Jose ALONSO,  [facility]   Showing future appointments within next 720 days and meeting all other requirements     Last 3 Blood Pressure:   BP Readings from Last 3 Encounters:   11/11/24 (!) 141/80   11/09/24 108/76   09/25/24 117/65     Preferred Pharmacy:   [id number] [place] [address] AT [facility] [address]  [facility] [address]  [facility] [address]  Phone: [phone] Fax: [phone]    Requested RX:  Requested Prescriptions     Pending Prescriptions Disp Refills    HYDROcodone-acetaminophen (NORCO) 5-325 mg per tablet 30 tablet 0     Sig: Take 1 tablet by mouth every 24 hours as needed for Pain. Quantity medically necessary      RX Route: Normal

## 2024-12-04 NOTE — TELEPHONE ENCOUNTER
No care due was identified.  [facility] Embedded Care Due Messages. Reference number: [id number].   12/04/2024 8:20:08 AM CST

## 2024-12-18 ENCOUNTER — LAB VISIT (OUTPATIENT)
Dept: LAB | Facility: HOSPITAL | Age: 54
End: 2024-12-18
Payer: MEDICARE

## 2024-12-18 ENCOUNTER — OFFICE VISIT (OUTPATIENT)
Dept: RHEUMATOLOGY | Facility: CLINIC | Age: 54
End: 2024-12-18
Payer: MEDICARE

## 2024-12-18 VITALS
WEIGHT: 244.94 LBS | BODY MASS INDEX: 38.36 KG/M2 | HEART RATE: 93 BPM | SYSTOLIC BLOOD PRESSURE: 142 MMHG | DIASTOLIC BLOOD PRESSURE: 98 MMHG

## 2024-12-18 DIAGNOSIS — M79.7 FIBROMYALGIA: ICD-10-CM

## 2024-12-18 DIAGNOSIS — M35.00 SJOGREN'S SYNDROME, WITH UNSPECIFIED ORGAN INVOLVEMENT: Primary | ICD-10-CM

## 2024-12-18 DIAGNOSIS — M35.00 SJOGREN'S SYNDROME, WITH UNSPECIFIED ORGAN INVOLVEMENT: ICD-10-CM

## 2024-12-18 LAB
CRP SERPL-MCNC: 1.2 MG/L (ref 0–8.2)
ERYTHROCYTE [SEDIMENTATION RATE] IN BLOOD BY PHOTOMETRIC METHOD: 13 MM/HR (ref 0–36)

## 2024-12-18 PROCEDURE — 85652 RBC SED RATE AUTOMATED: CPT | Mod: HCNC

## 2024-12-18 PROCEDURE — 99999 PR PBB SHADOW E&M-EST. PATIENT-LVL V: CPT | Mod: PBBFAC,HCNC,,

## 2024-12-18 PROCEDURE — 86140 C-REACTIVE PROTEIN: CPT | Mod: HCNC

## 2024-12-18 PROCEDURE — 36415 COLL VENOUS BLD VENIPUNCTURE: CPT | Mod: HCNC

## 2024-12-18 PROCEDURE — 84165 PROTEIN E-PHORESIS SERUM: CPT | Mod: 26,HCNC,, | Performed by: PATHOLOGY

## 2024-12-18 PROCEDURE — 84165 PROTEIN E-PHORESIS SERUM: CPT | Mod: HCNC

## 2024-12-18 RX ORDER — GABAPENTIN 300 MG/1
300 CAPSULE ORAL 3 TIMES DAILY
Qty: 90 CAPSULE | Refills: 5 | Status: CANCELLED | OUTPATIENT
Start: 2024-12-18

## 2024-12-18 RX ORDER — TIZANIDINE 4 MG/1
4 TABLET ORAL 2 TIMES DAILY PRN
Qty: 60 TABLET | Refills: 2 | Status: SHIPPED | OUTPATIENT
Start: 2024-12-18

## 2024-12-18 RX ORDER — HYDROXYCHLOROQUINE SULFATE 200 MG/1
TABLET, FILM COATED ORAL
Qty: 60 TABLET | Refills: 3 | Status: SHIPPED | OUTPATIENT
Start: 2024-12-18

## 2024-12-18 RX ORDER — NYSTATIN 100000 [USP'U]/ML
4 SUSPENSION ORAL 4 TIMES DAILY
Qty: 480 ML | Refills: 5 | Status: CANCELLED | OUTPATIENT
Start: 2024-12-18 | End: 2025-06-16

## 2024-12-18 RX ORDER — PILOCARPINE HYDROCHLORIDE 5 MG/1
5 TABLET, FILM COATED ORAL 4 TIMES DAILY
Qty: 360 TABLET | Refills: 1 | Status: CANCELLED | OUTPATIENT
Start: 2024-12-18 | End: 2025-06-16

## 2024-12-18 RX ORDER — HYDROXYCHLOROQUINE SULFATE 200 MG/1
TABLET, FILM COATED ORAL
Qty: 60 TABLET | Refills: 0 | Status: CANCELLED | OUTPATIENT
Start: 2024-12-18

## 2024-12-18 RX ORDER — NYSTATIN 100000 [USP'U]/ML
4 SUSPENSION ORAL 4 TIMES DAILY
Qty: 480 ML | Refills: 5 | Status: SHIPPED | OUTPATIENT
Start: 2024-12-18 | End: 2025-06-16

## 2024-12-18 RX ORDER — TIZANIDINE 4 MG/1
TABLET ORAL
Qty: 90 TABLET | Refills: 1 | Status: CANCELLED | OUTPATIENT
Start: 2024-12-18

## 2024-12-18 RX ORDER — GABAPENTIN 400 MG/1
400 CAPSULE ORAL 3 TIMES DAILY
Qty: 90 CAPSULE | Refills: 2 | Status: SHIPPED | OUTPATIENT
Start: 2024-12-18

## 2024-12-18 NOTE — PROGRESS NOTES
Answers submitted by the patient for this visit:  Rheumatology Questionnaire (Submitted on 12/17/2024)  fever: No  eye redness: No  mouth sores: Yes  headaches: Yes  shortness of breath: No  chest pain: No  trouble swallowing: Yes  diarrhea: No  constipation: No  unexpected weight change: No  genital sore: No  dysuria: No  During the last 3 days, have you had a skin rash?: No  Bruises or bleeds easily: No  cough: No

## 2024-12-18 NOTE — PROGRESS NOTES
Subjective:      Patient ID: Jaki Bajwa is a 54 y.o. female.    Chief Complaint: Disease Management    HPI  Jaki Bajwa is a 54yo female with Sjogren's syndrome (+SSA in 2020), fibromyalgia, TMJ syndrome, and anxiety/depression.  She is a former pt of Dr. Cameron.    She reports still feeling fatigued lately. However she states she probably isn't getting enough sleep. She is also taking care of her 15month old grandson, 2-3days a week for 15hrs on those days.    Recently her Ferritin level was also 9, which she received an iron infusion on Nov 11.    For Sjogren's, she is prescribed pilocarpine 5mg QID, however only taking 2 tablets, about twice a week. She feels she does not need it 4 times daily.  She does report a bit of difficulty swallowing occasionally, which may be r/t her dry mouth symptoms.  Also on Plaquenil 2 tablets daily. She has not recently had an eye exam, however it is scheduled next for Feb 2025.  Also using nystatin mouth swish about once or twice a week for mouth sores (from dryness).  Uses restasis eyedrops for dry eyes.    She does report waking in the mornings with stiffness in joints, which lasts 1-2hrs. Denies joint swelling.    She is currently taking gabapentin 300mg three times daily for her fibromyalgia, which she states has improved her fibro pain, however it is still not completely resolved. No issues with drowsiness on gabapentin.    Also taking tizanidine, which Dr. FAJARDO was prescribing 4mg 2 tablets twice daily, which helps her TMJ and leg muscle pain. TMJ pains are worse on right side.  Tried and failed flexeril in the past.     Pt reports she fell last month, tripped over a toy, and fell on her right knee.  PCP ordered Xrays and recommended PT. He advised that if pains worsen he will order an MRI, however she feels she doesn't need these since her knee pain has improved. Takes a hot bath and uses voltaren gel, which has been beneficial for her.    Now with  decreased eGFR and elevated creatinine, so she stopped Naproxen (she states this really used to help her in the past).  Avoiding all NSAIDs.    Pt also with a h/o anxiety and depression, taking wellbutrin and buspar daily with valium as needed. Also on Latuda. Uses doxepin at bedtime.    H/o low vitamin D.  She has been trying to get DEXA however states her insurance is not covering it.    Review of Systems   Constitutional:  Positive for fatigue. Negative for fever and unexpected weight change.   HENT:  Positive for mouth sores and trouble swallowing.    Eyes:  Negative for redness.   Respiratory:  Negative for cough and shortness of breath.    Cardiovascular:  Negative for chest pain.   Gastrointestinal:  Negative for constipation and diarrhea.   Genitourinary:  Negative for dysuria and genital sores.   Musculoskeletal:  Positive for arthralgias, back pain, myalgias, neck pain and neck stiffness.   Skin:  Negative for rash.   Neurological:  Positive for headaches.   Hematological:  Does not bruise/bleed easily.   Psychiatric/Behavioral:  Negative for behavioral problems. The patient is not nervous/anxious.           12/17/2024     2:32 PM   Rapid3 Question Responses and Scores   MDHAQ Score 0.1   Psychologic Score 3.3   Pain Score 5.5   When you awakened in the morning OVER THE LAST WEEK, did you feel stiff? No   Fatigue Score 5.5   Global Health Score 5.5   RAPID3 Score 3.78          Objective:   BP (!) 142/98   Pulse 93   Wt 111.1 kg (244 lb 14.9 oz)   LMP 07/11/2012 Comment: partial   BMI 38.36 kg/m²   Physical Exam   Constitutional: She is oriented to person, place, and time. normal appearance.   Cardiovascular: Normal rate and regular rhythm.   Pulmonary/Chest: Effort normal and breath sounds normal.   Abdominal: Soft.   Musculoskeletal:         General: Normal range of motion.      Right lower leg: Edema present.      Left lower leg: Edema present.      Comments: TTP over right knee(where she slipped and  fell)  Mild TTP over right wrist and bilateral ankles as well  No synovitis noted   Neurological: She is alert and oriented to person, place, and time.   Skin: Skin is warm and dry.   Psychiatric: Her behavior is normal. Mood normal.        12/18/2024   Tender (HOPSON-28) 2 / 28    Swollen (HOPSON-28) 0 / 28    Provider Global --   Patient Global 55 / 100   ESR --   CRP --   HOPSON-28 (ESR) --   HOPSON-28 (CRP) --   CDAI Score --        Assessment:     1. Sjogren's syndrome, with unspecified organ involvement    2. Fibromyalgia          Plan:     Problem List Items Addressed This Visit          Immunology/Multi System    Sjogren's syndrome - Primary    Relevant Medications    hydroxychloroquine (PLAQUENIL) 200 mg tablet    nystatin (MYCOSTATIN) 100,000 unit/mL suspension    Other Relevant Orders    Protein Electrophoresis, Serum    Sedimentation rate    C-REACTIVE PROTEIN       Orthopedic    Fibromyalgia    Relevant Medications    gabapentin (NEURONTIN) 400 MG capsule    tiZANidine (ZANAFLEX) 4 MG tablet     Sjogren's and fibromyalgia symptoms are still persistent.   -Advised to begin taking pilocarpine daily to help with swallowing issues, and see if these issues resolve.  -Advised pt to make her next eye exam to safely continue Plaquenil. If she is unable to have eyes re-examined, we will need to hold her Plaquenil until she is compliant with eye exams. We want to avoid possible risk of retinal toxicity. Pt is aware.  -Continue restasis eye drops for dry eyes, and nystastin suspension as needed to treat mouth sores.  -Take tizanidine 4mg 2 tablets twice daily as needed for TMJ and muscle pain.  -Increase gabapentin to 400mg three times daily, as her fibromyalgia pains are not completely controlled on 300mg TID. In the future we can consider increasing this if needed.  -Advised to consider talking with her psychiatrist about switching to cymbalta as an option for the future, to treat her anxiety/depression +  fibromyalgia    Labs today  RTO 4 months

## 2024-12-18 NOTE — PROGRESS NOTES
12/17/2024     2:32 PM   Rapid3 Question Responses and Scores   MDHAQ Score 0.1   Psychologic Score 3.3   Pain Score 5.5   When you awakened in the morning OVER THE LAST WEEK, did you feel stiff? No   Fatigue Score 5.5   Global Health Score 5.5   RAPID3 Score 3.78    Answers submitted by the patient for this visit:  Rheumatology Questionnaire (Submitted on 12/17/2024)  fever: No  eye redness: No  mouth sores: Yes  headaches: Yes  shortness of breath: No  chest pain: No  trouble swallowing: Yes  diarrhea: No  constipation: No  unexpected weight change: No  genital sore: No  dysuria: No  During the last 3 days, have you had a skin rash?: No  Bruises or bleeds easily: No  cough: No

## 2024-12-19 LAB
ALBUMIN SERPL ELPH-MCNC: 4.46 G/DL (ref 3.35–5.55)
ALPHA1 GLOB SERPL ELPH-MCNC: 0.31 G/DL (ref 0.17–0.41)
ALPHA2 GLOB SERPL ELPH-MCNC: 0.62 G/DL (ref 0.43–0.99)
B-GLOBULIN SERPL ELPH-MCNC: 0.82 G/DL (ref 0.5–1.1)
GAMMA GLOB SERPL ELPH-MCNC: 1.68 G/DL (ref 0.67–1.58)
PATHOLOGIST INTERPRETATION SPE: NORMAL
PROT SERPL-MCNC: 7.9 G/DL (ref 6–8.4)

## 2025-01-03 DIAGNOSIS — G89.4 CHRONIC PAIN SYNDROME: ICD-10-CM

## 2025-01-03 DIAGNOSIS — F11.20 CHRONIC NARCOTIC DEPENDENCE: ICD-10-CM

## 2025-01-03 DIAGNOSIS — G44.009 CLUSTER HEADACHE, NOT INTRACTABLE, UNSPECIFIED CHRONICITY PATTERN: ICD-10-CM

## 2025-01-03 RX ORDER — HYDROCODONE BITARTRATE AND ACETAMINOPHEN 5; 325 MG/1; MG/1
1 TABLET ORAL
Qty: 30 TABLET | Refills: 0 | Status: SHIPPED | OUTPATIENT
Start: 2025-01-03

## 2025-01-03 RX ORDER — BUTALBITAL, ACETAMINOPHEN AND CAFFEINE 50; 325; 40 MG/1; MG/1; MG/1
1 TABLET ORAL EVERY 6 HOURS PRN
Qty: 40 TABLET | Refills: 0 | Status: SHIPPED | OUTPATIENT
Start: 2025-01-03

## 2025-01-03 NOTE — TELEPHONE ENCOUNTER
No care due was identified.  [facility] Embedded Care Due Messages. Reference number: [id number].   1/03/2025 8:09:44 AM CST

## 2025-01-03 NOTE — TELEPHONE ENCOUNTER
Refill Encounter    PCP Visits: Recent Visits  Date Type Provider Dept   10/11/24 Office Visit Weeks, Jose ALONSO, DO [facility]   09/06/24 Office Visit Weeks, Jose ALONSO, DO [facility]   07/12/24 Office Visit Weeks, Jose ALONSO, DO [facility]   06/19/24 Office Visit Weeks, Jose ALONSO, DO [facility]   04/09/24 Office Visit Weeks, Jose ALONSO, DO [facility]   03/06/24 Office Visit Weeks, Jose ALONSO, DO [facility]   02/02/24 Office Visit Weeks, Jose ALONSO,  [facility]   Showing recent visits within past 360 days and meeting all other requirements  Future Appointments  Date Type Provider Dept   01/13/25 Appointment Weeks, Jose ALONSO,  [facility]   Showing future appointments within next 720 days and meeting all other requirements     Last 3 Blood Pressure:   BP Readings from Last 3 Encounters:   12/18/24 (!) 142/98   11/11/24 (!) 141/80   11/09/24 108/76     Preferred Pharmacy:   [id number] [place] [address] [facility]  [facility]  [facility] [address]  Phone: [phone] Fax: [phone]    Requested RX:  Requested Prescriptions     Pending Prescriptions Disp Refills    butalbital-acetaminophen-caffeine -40 mg (FIORICET, ESGIC) -40 mg per tablet 40 tablet 0     Sig: Take 1 tablet by mouth every 6 (six) hours as needed for Headaches.    HYDROcodone-acetaminophen (NORCO) 5-325 mg per tablet 30 tablet 0     Sig: Take 1 tablet by mouth every 24 hours as needed for Pain. Quantity medically necessary      RX Route: Normal

## 2025-01-08 ENCOUNTER — OFFICE VISIT (OUTPATIENT)
Dept: PSYCHIATRY | Facility: CLINIC | Age: 55
End: 2025-01-08
Payer: MEDICARE

## 2025-01-08 VITALS
DIASTOLIC BLOOD PRESSURE: 70 MMHG | BODY MASS INDEX: 38.28 KG/M2 | HEART RATE: 97 BPM | SYSTOLIC BLOOD PRESSURE: 120 MMHG | WEIGHT: 244.38 LBS

## 2025-01-08 DIAGNOSIS — R46.81 OBSESSIVE-COMPULSIVE BEHAVIOR: ICD-10-CM

## 2025-01-08 DIAGNOSIS — F41.1 GAD (GENERALIZED ANXIETY DISORDER): Primary | ICD-10-CM

## 2025-01-08 DIAGNOSIS — F40.10 SOCIAL ANXIETY DISORDER: ICD-10-CM

## 2025-01-08 DIAGNOSIS — F39 MOOD DISORDER: ICD-10-CM

## 2025-01-08 DIAGNOSIS — F33.42 RECURRENT MAJOR DEPRESSIVE DISORDER, IN FULL REMISSION: ICD-10-CM

## 2025-01-08 DIAGNOSIS — G47.00 INSOMNIA, UNSPECIFIED TYPE: ICD-10-CM

## 2025-01-08 DIAGNOSIS — F41.0 PANIC DISORDER: ICD-10-CM

## 2025-01-08 PROCEDURE — 99999 PR PBB SHADOW E&M-EST. PATIENT-LVL II: CPT | Mod: PBBFAC,HCNC,, | Performed by: INTERNAL MEDICINE

## 2025-01-08 PROCEDURE — 3078F DIAST BP <80 MM HG: CPT | Mod: HCNC,CPTII,S$GLB, | Performed by: INTERNAL MEDICINE

## 2025-01-08 PROCEDURE — 99214 OFFICE O/P EST MOD 30 MIN: CPT | Mod: HCNC,S$GLB,, | Performed by: INTERNAL MEDICINE

## 2025-01-08 PROCEDURE — 1160F RVW MEDS BY RX/DR IN RCRD: CPT | Mod: HCNC,CPTII,S$GLB, | Performed by: INTERNAL MEDICINE

## 2025-01-08 PROCEDURE — 1159F MED LIST DOCD IN RCRD: CPT | Mod: HCNC,CPTII,S$GLB, | Performed by: INTERNAL MEDICINE

## 2025-01-08 PROCEDURE — 3008F BODY MASS INDEX DOCD: CPT | Mod: HCNC,CPTII,S$GLB, | Performed by: INTERNAL MEDICINE

## 2025-01-08 PROCEDURE — 3074F SYST BP LT 130 MM HG: CPT | Mod: HCNC,CPTII,S$GLB, | Performed by: INTERNAL MEDICINE

## 2025-01-08 RX ORDER — DOXEPIN HYDROCHLORIDE 75 MG/1
CAPSULE ORAL
Qty: 180 CAPSULE | Refills: 3 | Status: SHIPPED | OUTPATIENT
Start: 2025-01-08

## 2025-01-08 RX ORDER — BUPROPION HYDROCHLORIDE 150 MG/1
150 TABLET ORAL DAILY
Qty: 270 TABLET | Refills: 3 | Status: SHIPPED | OUTPATIENT
Start: 2025-01-08

## 2025-01-08 RX ORDER — SERTRALINE HYDROCHLORIDE 50 MG/1
TABLET, FILM COATED ORAL
Qty: 30 TABLET | Refills: 3 | Status: SHIPPED | OUTPATIENT
Start: 2025-01-08

## 2025-01-08 RX ORDER — DIAZEPAM 2 MG/1
2 TABLET ORAL DAILY PRN
Qty: 15 TABLET | Refills: 0 | Status: SHIPPED | OUTPATIENT
Start: 2025-01-08 | End: 2025-02-07

## 2025-01-08 RX ORDER — LURASIDONE HYDROCHLORIDE 60 MG/1
60 TABLET, FILM COATED ORAL DAILY
Qty: 90 TABLET | Refills: 3 | Status: SHIPPED | OUTPATIENT
Start: 2025-01-08

## 2025-01-08 RX ORDER — BUSPIRONE HYDROCHLORIDE 15 MG/1
15 TABLET ORAL DAILY
Qty: 90 TABLET | Refills: 3 | Status: SHIPPED | OUTPATIENT
Start: 2025-01-08

## 2025-01-08 NOTE — PROGRESS NOTES
OUTPATIENT PSYCHIATRY RETURN VISIT    ENCOUNTER DATE:  1/16/2025  SITE:  [facility]  LENGTH OF SESSION:  25 minutes    CHIEF COMPLAINT:  Mood and Anxiety      HISTORY OF PRESENTING ILLNESS:  Jkai Bajwa is a 54 y.o. female with history of Mood disorder, JUAN JOSE, OCD, social phobia, and Internet shopping addiction who presents for follow up appointment.     Plan at last appointment on 7/31/2024:  Mood improved since last appointment.  No medication changes today.  Continue Latuda 60mg daily, Wellbutrin XL 450mg daily, Buspar 15mg BID-TID, Doxepin 150mg qHS, and Valium 2mg PRN panic attack (#5 tablets per month).  Discussed with patient informed consent, risks versus benefits, alternative treatments, side effect profile and the inherent unpredictability of individual responses to these treatments.  The patient expresses understanding of the above and displays the capacity to agree with this current plan.   Continue individual therapy with Mr. WillamiraHALINA.    History as told by patient:  Has a 2 week grandson.  Other new grandson is 15 months.  She is still keeping him 2-3 days per week when her daughter works (12 hour shifts, nurse).  Sometimes gets overwhelmed.  She does enjoy his company.  Still living mostly at [facility] but sometimes goes home.  Scott is older - 68 y/o.  She still rents her apartment - not sure what will happen to Scott.  Still shopping compulsively.  Spends about $1000 per month on shopping.  This month ordered 13 pairs of shoes.  Sometimes its a shirt spree, sometimes its a jeans spree.  And sometimes will order 2 of something - can't buy an odd number.  Thinks about what she is purchasing or what is coming most of the day.  Puts oil on her hands all day.  Otherwise no compulsions.  Says the shopping has been going on at least 20 years but has gotten worse over time.  Does not go to the store anymore - terrified to go to [employer].  So much anxiety about  the people standing behind her in line - what are they thinking, are they judging me.  So much anxiety if Scott wants her to do the self scanning.  Working on getting to the store again with Brandon.  Denies symptoms of depression other than low motivation.  Spends so much time worrying - took a picture of herself on the phone but then she had so many negative thoughts about it it was horrible.  Doesn't even like looking in the mirror.       Medication side effects:  Denies  Medication compliance:  Yes    PSYCHIATRIC REVIEW OF SYSTEMS:  Trouble with sleep:  Stable on Doxepin  Appetite changes:  Not discussed  Weight changes:  Not discussed  Lack of energy:  Denies  Anhedonia:  Sometimes due to social anxiety  Somatic symptoms:  Chronic pain  Libido:  Denies  Anxiety/panic:  Chronic but stable  Guilty/hopeless:  Denies  Self-injurious behavior/risky behavior:  Denies  Any drugs:  Denies  Alcohol:  Denies    MEDICAL REVIEW OF SYSTEMS:  Complete review of systems performed covering Constitutional, Musculoskeletal, Neurologic.  All systems negative except for that covered in HPI.    PAST PSYCHIATRIC, MEDICAL, AND SOCIAL HISTORY REVIEWED  The patient's past medical, family and social history have been reviewed and updated as appropriate within the electronic medical record - see encounter notes.    MEDICATIONS:    Current Outpatient Medications:     amLODIPine (NORVASC) 5 MG tablet, Take 1 tablet (5 mg total) by mouth once daily., Disp: 90 tablet, Rfl: 3    buPROPion (WELLBUTRIN XL) 150 MG TB24 tablet, Take 1 tablet (150 mg total) by mouth once daily., Disp: 270 tablet, Rfl: 3    busPIRone (BUSPAR) 15 MG tablet, Take 1 tablet (15 mg total) by mouth once daily., Disp: 90 tablet, Rfl: 3    butalbital-acetaminophen-caffeine -40 mg (FIORICET, ESGIC) -40 mg per tablet, Take 1 tablet by mouth every 6 (six) hours as needed for Headaches., Disp: 40 tablet, Rfl: 0    cholecalciferol, vitamin D3, (VITAMIN D3) 25 mcg  (1,000 unit) capsule, Take 1 capsule (1,000 Units total) by mouth once daily., Disp: , Rfl:     cycloSPORINE (RESTASIS) 0.05 % ophthalmic emulsion, Place 1 drop into both eyes 2 (two) times daily., Disp: 180 each, Rfl: 1    diazePAM (VALIUM) 2 MG tablet, Take 1 tablet (2 mg total) by mouth daily as needed (Severe anxiety/Panic). 15 tablets to last 3 months, Disp: 15 tablet, Rfl: 0    docusate sodium (COLACE) 100 MG capsule, Take 1 capsule (100 mg total) by mouth 2 (two) times daily as needed for Constipation. (Patient not taking: Reported on 1/13/2025), Disp: 60 capsule, Rfl: 0    doxepin (SINEQUAN) 75 MG capsule, TAKE 2 CAPSULES(150 MG) BY MOUTH EVERY EVENING, Disp: 180 capsule, Rfl: 3    estradiol 0.025 mg/24 hr 0.025 mg/24 hr, Place 1 patch onto the skin once a week., Disp: 4 patch, Rfl: 11    ferrous sulfate, dried (SLOW FE) 160 mg (50 mg iron) TbSR, Take 1 tablet (160 mg total) by mouth once daily., Disp: 90 tablet, Rfl: 2    furosemide (LASIX) 20 MG tablet, Take 1 tablet (20 mg total) by mouth once daily., Disp: 90 tablet, Rfl: 3    gabapentin (NEURONTIN) 400 MG capsule, Take 1 capsule (400 mg total) by mouth 3 (three) times daily., Disp: 90 capsule, Rfl: 2    HYDROcodone-acetaminophen (NORCO) 5-325 mg per tablet, Take 1 tablet by mouth every 24 hours as needed for Pain. Quantity medically necessary, Disp: 30 tablet, Rfl: 0    hydroxychloroquine (PLAQUENIL) 200 mg tablet, TAKE 1 TABLET BY MOUTH TWICE DAILY, Disp: 60 tablet, Rfl: 3    irbesartan (AVAPRO) 300 MG tablet, TAKE 1 TABLET(300 MG) BY MOUTH EVERY EVENING, Disp: 90 tablet, Rfl: 3    lurasidone (LATUDA) 60 mg Tab tablet, Take 1 tablet (60 mg total) by mouth once daily., Disp: 90 tablet, Rfl: 3    NARCAN 4 mg/actuation Spry, , Disp: , Rfl:     nystatin (MYCOSTATIN) 100,000 unit/mL suspension, Take 4 mLs (400,000 Units total) by mouth 4 (four) times daily., Disp: 480 mL, Rfl: 5    omeprazole (PRILOSEC) 40 MG capsule, Take 1 capsule (40 mg total) by mouth  "once daily., Disp: 90 capsule, Rfl: 3    pilocarpine (SALAGEN) 5 MG Tab, Take 1 tablet (5 mg total) by mouth 4 (four) times daily., Disp: 360 tablet, Rfl: 1    sertraline (ZOLOFT) 50 MG tablet, Take 1/2 tablet (25mg) daily x 7 days.  If tolerating can then increase to 1 tablet (50mg) daily thereafter., Disp: 30 tablet, Rfl: 3    tiZANidine (ZANAFLEX) 4 MG tablet, Take 1 tablet (4 mg total) by mouth 2 (two) times daily as needed (2 tablets twice daily as needed)., Disp: 60 tablet, Rfl: 2    ubrogepant (UBRELVY) 100 mg tablet, Take 1 tablet (100 mg total) by mouth as needed for Migraine. If symptoms persist or return, may repeat dose after 2 hours. Maximum: 200 mg per 24 hours, Disp: 30 tablet, Rfl: 2    VENOFER 100 mg iron/5 mL injection, , Disp: , Rfl:     ALLERGIES:  Review of patient's allergies indicates:   Allergen Reactions    Aspirin Hives       PSYCHIATRIC EXAM:  Vitals:    01/08/25 0828   BP: 120/70   Pulse: 97   Weight: 110.9 kg (244 lb 6.1 oz)       Appearance:  Well groomed, appearing healthy and of stated age  Behavior:  Cooperative, pleasant, no psychomotor agitation or retardation  Speech:  Normal rate, rhythm, prosody, and volume  Mood:  "Ok"  Affect:  Euthymic  Thought Process:  Linear, logical, goal directed  Thought Content:  Negative for suicidal ideation, homicidal ideation, delusions or hallucinations.  Associations:  Intact  Memory:  Grossly Intact  Level of Consciousness/Orientation:  Grossly intact  Fund of Knowledge:  Good  Attention:  Good  Language:  Fluent, able to name abstract and concrete objects  Insight:  Fair  Judgment:  Intact  Psychomotor signs:  No involuntary movements or tremor  Gait:  Normal      RELEVANT LABS/STUDIES:    Lab Results   Component Value Date    WBC 7.43 09/25/2024    HGB 8.9 (L) 09/25/2024    HCT 31.9 (L) 09/25/2024    MCV 81 (L) 09/25/2024     09/25/2024     BMP  Lab Results   Component Value Date     10/11/2024    K 4.0 10/11/2024     " 10/11/2024    CO2 28 10/11/2024    BUN 11 10/11/2024    CREATININE 1.2 10/11/2024    CALCIUM 9.7 10/11/2024    ANIONGAP 8 10/11/2024    ESTGFRAFRICA 45.5 (A) 06/15/2022    EGFRNONAA 39.5 (A) 06/15/2022     Lab Results   Component Value Date    ALT 11 09/19/2024    AST 15 09/19/2024    ALKPHOS 77 09/19/2024    BILITOT 0.3 09/19/2024     Lab Results   Component Value Date    TSH 2.563 09/22/2021     Lab Results   Component Value Date    HGBA1C 4.9 02/13/2024       IMPRESSION:    Jaki Bajwa is a 54 y.o. female with history of Mood disorder, JUAN JOSE, OCD, social phobia, and Internet shopping addiction who presents for follow up appointment.    Status/Progress:  Based on the examination today, the patient's problem(s) is/are adequately but not ideally controlled.  New problems have not been presented today.    Risk Parameters:  Patient reports no suicidal ideation  Patient reports no homicidal ideation  Patient reports no self-injurious behavior  Patient reports no violent behavior      DIAGNOSES:    ICD-10-CM ICD-9-CM   1. JUAN JOSE (generalized anxiety disorder)  F41.1 300.02   2. Panic disorder  F41.0 300.01   3. Social anxiety disorder  F40.10 300.23   4. Obsessive-compulsive behavior  R46.81 300.3   5. Recurrent major depressive disorder, in full remission  F33.42 296.36   6. Mood disorder  F39 296.90   7. Insomnia, unspecified type  G47.00 780.52       PLAN:  Again discussed concern with compulsive shopping and relationship to OCD diagnosis.  No medication changes today - she feels this regimen is working very well.    Continue Latuda 60mg daily, Wellbutrin XL 450mg daily, Buspar 15mg BID-TID, Doxepin 150mg qHS, and Valium 2mg PRN panic attack (#5 tablets per month).  Discussed with patient informed consent, risks versus benefits, alternative treatments, side effect profile and the inherent unpredictability of individual responses to these treatments.  The patient expresses understanding of the above and displays the  capacity to agree with this current plan.   Continue individual therapy with Mr. Noah LCSW.    RETURN TO CLINIC:  Follow up in about 3 months (around 4/8/2025).

## 2025-01-13 ENCOUNTER — OFFICE VISIT (OUTPATIENT)
Dept: INTERNAL MEDICINE | Facility: CLINIC | Age: 55
End: 2025-01-13
Payer: MEDICARE

## 2025-01-13 VITALS — BODY MASS INDEX: 38.28 KG/M2 | HEIGHT: 67 IN

## 2025-01-13 DIAGNOSIS — F11.20 CHRONIC NARCOTIC DEPENDENCE: ICD-10-CM

## 2025-01-13 DIAGNOSIS — M32.9 SYSTEMIC LUPUS ERYTHEMATOSUS, UNSPECIFIED SLE TYPE, UNSPECIFIED ORGAN INVOLVEMENT STATUS: Primary | ICD-10-CM

## 2025-01-13 DIAGNOSIS — E66.01 SEVERE OBESITY (BMI 35.0-35.9 WITH COMORBIDITY): ICD-10-CM

## 2025-01-13 DIAGNOSIS — Z12.31 ENCOUNTER FOR SCREENING MAMMOGRAM FOR BREAST CANCER: ICD-10-CM

## 2025-01-13 DIAGNOSIS — N18.32 STAGE 3B CHRONIC KIDNEY DISEASE: ICD-10-CM

## 2025-01-13 DIAGNOSIS — A04.8 H. PYLORI INFECTION: ICD-10-CM

## 2025-01-13 DIAGNOSIS — F31.32 BIPOLAR AFFECTIVE DISORDER, CURRENTLY DEPRESSED, MODERATE: ICD-10-CM

## 2025-01-16 ENCOUNTER — LAB VISIT (OUTPATIENT)
Dept: LAB | Facility: OTHER | Age: 55
End: 2025-01-16
Attending: INTERNAL MEDICINE
Payer: MEDICARE

## 2025-01-16 DIAGNOSIS — D50.9 IRON DEFICIENCY ANEMIA, UNSPECIFIED IRON DEFICIENCY ANEMIA TYPE: ICD-10-CM

## 2025-01-16 PROBLEM — F42.8: Status: ACTIVE | Noted: 2018-07-18

## 2025-01-16 LAB
BASOPHILS # BLD AUTO: 0.05 K/UL (ref 0–0.2)
BASOPHILS NFR BLD: 0.7 % (ref 0–1.9)
DIFFERENTIAL METHOD BLD: ABNORMAL
EOSINOPHIL # BLD AUTO: 0.3 K/UL (ref 0–0.5)
EOSINOPHIL NFR BLD: 3.7 % (ref 0–8)
ERYTHROCYTE [DISTWIDTH] IN BLOOD BY AUTOMATED COUNT: 18.6 % (ref 11.5–14.5)
FERRITIN SERPL-MCNC: 51 NG/ML (ref 20–300)
HCT VFR BLD AUTO: 39.1 % (ref 37–48.5)
HGB BLD-MCNC: 11.3 G/DL (ref 12–16)
IMM GRANULOCYTES # BLD AUTO: 0.01 K/UL (ref 0–0.04)
IMM GRANULOCYTES NFR BLD AUTO: 0.1 % (ref 0–0.5)
IRON SERPL-MCNC: 50 UG/DL (ref 30–160)
LYMPHOCYTES # BLD AUTO: 2.9 K/UL (ref 1–4.8)
LYMPHOCYTES NFR BLD: 39.5 % (ref 18–48)
MCH RBC QN AUTO: 23.9 PG (ref 27–31)
MCHC RBC AUTO-ENTMCNC: 28.9 G/DL (ref 32–36)
MCV RBC AUTO: 83 FL (ref 82–98)
MONOCYTES # BLD AUTO: 0.5 K/UL (ref 0.3–1)
MONOCYTES NFR BLD: 6.9 % (ref 4–15)
NEUTROPHILS # BLD AUTO: 3.6 K/UL (ref 1.8–7.7)
NEUTROPHILS NFR BLD: 49.1 % (ref 38–73)
NRBC BLD-RTO: 0 /100 WBC
PLATELET # BLD AUTO: 217 K/UL (ref 150–450)
PMV BLD AUTO: 11.6 FL (ref 9.2–12.9)
RBC # BLD AUTO: 4.73 M/UL (ref 4–5.4)
SATURATED IRON: 18 % (ref 20–50)
TOTAL IRON BINDING CAPACITY: 274 UG/DL (ref 250–450)
TRANSFERRIN SERPL-MCNC: 185 MG/DL (ref 200–375)
WBC # BLD AUTO: 7.26 K/UL (ref 3.9–12.7)

## 2025-01-16 PROCEDURE — 83540 ASSAY OF IRON: CPT | Mod: HCNC | Performed by: INTERNAL MEDICINE

## 2025-01-16 PROCEDURE — 36415 COLL VENOUS BLD VENIPUNCTURE: CPT | Mod: HCNC | Performed by: INTERNAL MEDICINE

## 2025-01-16 PROCEDURE — 82728 ASSAY OF FERRITIN: CPT | Mod: HCNC | Performed by: INTERNAL MEDICINE

## 2025-01-16 PROCEDURE — 85025 COMPLETE CBC W/AUTO DIFF WBC: CPT | Mod: HCNC | Performed by: INTERNAL MEDICINE

## 2025-01-17 NOTE — PROGRESS NOTES
[id number]:       Patient ID: Jaki Bajwa is a 54 y.o. female.    The patient location is: [place]  The chief complaint leading to consultation is:   Chief Complaint   Patient presents with    Follow-up         Visit type: audiovisual    Face to Face time with patient: 15 minutes of total time spent on the encounter, which includes face to face time and non-face to face time preparing to see the patient (eg, review of tests), Obtaining and/or reviewing separately obtained history, Documenting clinical information in the electronic or other health record, Independently interpreting results (not separately reported) and communicating results to the patient/family/caregiver, or Care coordination (not separately reported).       Each patient to whom he or she provides medical services by telemedicine is:  (1) informed of the relationship between the physician and patient and the respective role of any other health care provider with respect to management of the patient; and (2) notified that he or she may decline to receive medical services by telemedicine and may withdraw from such care at any time.    Notes:     Follow-up  Associated symptoms include headaches. Pertinent negatives include no arthralgias, chest pain, joint swelling, neck pain, vomiting or weakness.     History of Present Illness    CHIEF COMPLAINT:  Jaki presents today for follow up of gastritis with H. pylori.    GASTROINTESTINAL:  She reports intermittent severe abdominal pain and experiences vomiting during bowel movements, particularly when straining with hard stools. She notes dark brown stools, with prior episodes of black stools that have resolved after antibiotic treatment. Upper endoscopy in November revealed gastric inflammation, following which she completed a 2-week course of antibiotics and Pepto-Bismol.    MEDICAL HISTORY:  She has a history of anemia, treated with iron infusion in October. She continues oral iron  supplementation.    MEDICATIONS:  She was recently started on Zoloft by her psychiatrist. Her Gabapentin was increased from 300 to 400 during a rheumatology follow-up before [place]. She has discontinued naproxen.      ROS:  General: -fever, -chills, -fatigue, -weight gain, -weight loss  Eyes: -vision changes, -redness, -discharge  ENT: -ear pain, -nasal congestion, -sore throat  Cardiovascular: -chest pain, -palpitations, -lower extremity edema  Respiratory: -cough, -shortness of breath  Gastrointestinal: +abdominal pain, -nausea, +vomiting, -diarrhea, -constipation, -blood in stool, -melena  Genitourinary: -dysuria, -hematuria, -frequency  Musculoskeletal: -joint pain, -muscle pain  Skin: -rash, -lesion  Neurological: -headache, -dizziness, -numbness, -tingling  Psychiatric: -anxiety, -depression, -sleep difficulty           All of your core healthy metrics are met.      Social History     Social History Narrative    Not on file       Family History   Problem Relation Name Age of Onset    Heart disease Mother Amalia Bajwa     Hypertension Mother Amalia Bajwa     Cancer Father Demarcus Lawtons sr.         colon ca    Colon cancer Father Demarcus Lawtons sr.     Depression Father Demarcus Lawtons sr.     Schizophrenia Father Demarcus Lawtons sr.     Anxiety disorder Father Demarcus Lawtons sr.     Arthritis Father Demarcus Shanks sr.     Mental illness Father Demarcus Shanks sr.     Liver cancer Sister Fifi     Cancer Sister Fifi     Depression Sister Fifi     Anxiety disorder Sister Fifi     Heart attack Sister Fifi     COPD Sister Fifi     Cancer Sister Madeline         throat and colon    Depression Sister Madeline     Miscarriages / Stillbirths Sister Madeline     Pancreatic cancer Brother Demarcus bajwa     Depression Brother Demarcus bajwa     Alcohol abuse Brother Demarcus bajwa     No Known Problems Daughter Ikea     Asthma Son Scott     Pancreatic cancer Other      Liver cancer Other      Suicide Neg Hx      Ovarian cancer Neg Hx       Breast cancer Neg Hx         Current Outpatient Medications:     amLODIPine (NORVASC) 5 MG tablet, Take 1 tablet (5 mg total) by mouth once daily., Disp: 90 tablet, Rfl: 3    buPROPion (WELLBUTRIN XL) 150 MG TB24 tablet, Take 1 tablet (150 mg total) by mouth once daily., Disp: 270 tablet, Rfl: 3    busPIRone (BUSPAR) 15 MG tablet, Take 1 tablet (15 mg total) by mouth once daily., Disp: 90 tablet, Rfl: 3    butalbital-acetaminophen-caffeine -40 mg (FIORICET, ESGIC) -40 mg per tablet, Take 1 tablet by mouth every 6 (six) hours as needed for Headaches., Disp: 40 tablet, Rfl: 0    cholecalciferol, vitamin D3, (VITAMIN D3) 25 mcg (1,000 unit) capsule, Take 1 capsule (1,000 Units total) by mouth once daily., Disp: , Rfl:     cycloSPORINE (RESTASIS) 0.05 % ophthalmic emulsion, Place 1 drop into both eyes 2 (two) times daily., Disp: 180 each, Rfl: 1    diazePAM (VALIUM) 2 MG tablet, Take 1 tablet (2 mg total) by mouth daily as needed (Severe anxiety/Panic). 15 tablets to last 3 months, Disp: 15 tablet, Rfl: 0    doxepin (SINEQUAN) 75 MG capsule, TAKE 2 CAPSULES(150 MG) BY MOUTH EVERY EVENING, Disp: 180 capsule, Rfl: 3    estradiol 0.025 mg/24 hr 0.025 mg/24 hr, Place 1 patch onto the skin once a week., Disp: 4 patch, Rfl: 11    ferrous sulfate, dried (SLOW FE) 160 mg (50 mg iron) TbSR, Take 1 tablet (160 mg total) by mouth once daily., Disp: 90 tablet, Rfl: 2    furosemide (LASIX) 20 MG tablet, Take 1 tablet (20 mg total) by mouth once daily., Disp: 90 tablet, Rfl: 3    gabapentin (NEURONTIN) 400 MG capsule, Take 1 capsule (400 mg total) by mouth 3 (three) times daily., Disp: 90 capsule, Rfl: 2    HYDROcodone-acetaminophen (NORCO) 5-325 mg per tablet, Take 1 tablet by mouth every 24 hours as needed for Pain. Quantity medically necessary, Disp: 30 tablet, Rfl: 0    hydroxychloroquine (PLAQUENIL) 200 mg tablet, TAKE 1 TABLET BY MOUTH TWICE DAILY, Disp: 60 tablet, Rfl: 3    irbesartan (AVAPRO) 300 MG tablet, TAKE 1  TABLET(300 MG) BY MOUTH EVERY EVENING, Disp: 90 tablet, Rfl: 3    lurasidone (LATUDA) 60 mg Tab tablet, Take 1 tablet (60 mg total) by mouth once daily., Disp: 90 tablet, Rfl: 3    NARCAN 4 mg/actuation Spry, , Disp: , Rfl:     nystatin (MYCOSTATIN) 100,000 unit/mL suspension, Take 4 mLs (400,000 Units total) by mouth 4 (four) times daily., Disp: 480 mL, Rfl: 5    omeprazole (PRILOSEC) 40 MG capsule, Take 1 capsule (40 mg total) by mouth once daily., Disp: 90 capsule, Rfl: 3    pilocarpine (SALAGEN) 5 MG Tab, Take 1 tablet (5 mg total) by mouth 4 (four) times daily., Disp: 360 tablet, Rfl: 1    sertraline (ZOLOFT) 50 MG tablet, Take 1/2 tablet (25mg) daily x 7 days.  If tolerating can then increase to 1 tablet (50mg) daily thereafter., Disp: 30 tablet, Rfl: 3    tiZANidine (ZANAFLEX) 4 MG tablet, Take 1 tablet (4 mg total) by mouth 2 (two) times daily as needed (2 tablets twice daily as needed)., Disp: 60 tablet, Rfl: 2    ubrogepant (UBRELVY) 100 mg tablet, Take 1 tablet (100 mg total) by mouth as needed for Migraine. If symptoms persist or return, may repeat dose after 2 hours. Maximum: 200 mg per 24 hours, Disp: 30 tablet, Rfl: 2    docusate sodium (COLACE) 100 MG capsule, Take 1 capsule (100 mg total) by mouth 2 (two) times daily as needed for Constipation. (Patient not taking: Reported on 1/13/2025), Disp: 60 capsule, Rfl: 0    VENOFER 100 mg iron/5 mL injection, , Disp: , Rfl:     Review of Systems   Constitutional:  Negative for activity change and unexpected weight change.   HENT:  Negative for hearing loss, rhinorrhea and trouble swallowing.    Eyes:  Negative for discharge and visual disturbance.   Respiratory:  Negative for chest tightness and wheezing.    Cardiovascular:  Negative for chest pain and palpitations.   Gastrointestinal:  Negative for blood in stool, constipation, diarrhea and vomiting.   Endocrine: Negative for polydipsia and polyuria.   Genitourinary:  Negative for difficulty urinating,  "dysuria, hematuria and menstrual problem.   Musculoskeletal:  Negative for arthralgias, joint swelling and neck pain.   Neurological:  Positive for headaches. Negative for weakness.   Psychiatric/Behavioral:  Negative for confusion and dysphoric mood.        Objective:   Ht 5' 7" (1.702 m)   LMP 07/11/2012 Comment: partial   BMI 38.28 kg/m²      Physical Exam  Constitutional:       General: She is not in acute distress.     Appearance: She is well-developed.   HENT:      Head: Normocephalic.   Pulmonary:      Effort: Pulmonary effort is normal. No respiratory distress.   Neurological:      Mental Status: She is alert and oriented to person, place, and time.   Psychiatric:         Behavior: Behavior normal.         Physical Exam             @resultssec@  Assessment & Plan   Assessment & Plan    IMPRESSION:  - Assessed H. pylori treatment response from November; considered potential need for follow-up testing due to ongoing symptoms  - Evaluated persistent GI symptoms, including vomiting during bowel movements and dark stools  - Reviewed recent medication changes, including gabapentin dose increase by rheumatology and addition of new psychiatric medication  - Considered impact of iron supplementation on stool color  - Assessed kidney function in relation to previous naproxen use; noted improvement in kidney numbers from last labs    MORBID (SEVERE) OBESITY DUE TO EXCESS CALORIES:  - Ordered a mammogram for the patient.    SYSTEMIC LUPUS ERYTHEMATOSUS, UNSPECIFIED:  - Jaki saw rheumatology before [place].  - Ordered follow-up labs as previously requested by Dr. Arce from rheumatology.  - Instructed patient to follow up on Thursday to complete these lab works.  - Increased gabapentin dosage from 300 to 400 mg.  - Advised patient to request Rituxan refill through the portal when needed.    BIPOLAR AFFECTIVE DISORDER, CURRENTLY DEPRESSED, MODERATE:  - Jaki saw psychiatrist last week who prescribed a new " medication.    STAGE 3B CHRONIC KIDNEY DISEASE:  - Kidney numbers look better in the last test.  - Discontinued naproxen due to concerns about bleeding risk, potential contribution to anemia, stomach inflammation, and its effects on kidney function.    GASTRITIS AND H. PYLORI INFECTION:  - Jaki had an endoscopy in November revealing gastritis and H. pylori.  - Completed a 2-week course of antibiotics in November but reports occasional abdominal pain after treatment.  - Prescribed another 2-week course of antibiotics to treat persistent gastritis and H. pylori.  - Emphasized the importance of eradicating H. pylori to prevent complications such as stomach inflammation, ulcers, and rarely, stomach cancer.  - Ordered a stool sample test to confirm H. pylori eradication.  - Jaki instructed to follow up on Thursday to collect stool sample kit for H. pylori testing.    MEDICATIONS:  - Explained naproxen's potential effects on increasing the risk of bleeding, which could exacerbate stomach inflammation.    VOMITING:  - Noted ongoing vomiting, especially during hard bowel movements.  - Acknowledged previous discussion about vomiting related to straining during bowel movements.    DARK STOOLS:  - Jaki reports dark brown stools.  - Explained that iron supplements can cause dark stools, potentially explaining the current stool color.         Problem List Items Addressed This Visit          Psychiatric    Chronic narcotic dependence    Bipolar affective disorder, currently depressed, moderate       Renal/    Stage 3b chronic kidney disease       Immunology/Multi System    Systemic lupus erythematosus, unspecified SLE type, unspecified organ involvement status - Primary       Endocrine    Severe obesity (BMI 35.0-35.9 with comorbidity)    Current Assessment & Plan     Stable weight. Continue lifestyle measures          Other Visit Diagnoses       H. pylori infection        Relevant Orders    H. pylori antigen,  stool    Encounter for screening mammogram for breast cancer        Relevant Orders    Mammo Digital Screening Bilat              Immunizations Administered on Date of Encounter - 1/13/2025       No immunizations on file.             No follow-ups on file.    This note was generated with the assistance of ambient listening technology. Verbal consent was obtained by the patient and accompanying visitor(s) for the recording of patient appointment to facilitate this note. I attest to having reviewed and edited the generated note for accuracy, though some syntax or spelling errors may persist. Please contact the author of this note for any clarification.      Disclaimer:  This note may have been prepared using voice recognition software, it may have not been extensively proofed, as such there could be errors within the text such as sound alike errors.

## 2025-01-24 DIAGNOSIS — R12 HEARTBURN: ICD-10-CM

## 2025-01-24 RX ORDER — OMEPRAZOLE 40 MG/1
40 CAPSULE, DELAYED RELEASE ORAL DAILY
Qty: 90 CAPSULE | Refills: 3 | Status: SHIPPED | OUTPATIENT
Start: 2025-01-24

## 2025-01-24 NOTE — TELEPHONE ENCOUNTER
Jaki Bajwa  is requesting a refill authorization.  Brief Assessment and Rationale for Refill:  Approve     Medication Therapy Plan:         Comments:     Note composed:8:58 AM 01/24/2025

## 2025-01-24 NOTE — TELEPHONE ENCOUNTER
No care due was identified.  [facility] Embedded Care Due Messages. Reference number: [id number].   1/24/2025 8:07:24 AM CST

## 2025-01-29 DIAGNOSIS — D50.9 IRON DEFICIENCY ANEMIA, UNSPECIFIED IRON DEFICIENCY ANEMIA TYPE: Primary | ICD-10-CM

## 2025-01-30 ENCOUNTER — LAB VISIT (OUTPATIENT)
Dept: LAB | Facility: OTHER | Age: 55
End: 2025-01-30
Attending: FAMILY MEDICINE
Payer: MEDICARE

## 2025-01-30 DIAGNOSIS — A04.8 H. PYLORI INFECTION: ICD-10-CM

## 2025-01-30 PROCEDURE — 87338 HPYLORI STOOL AG IA: CPT | Mod: HCNC | Performed by: FAMILY MEDICINE

## 2025-01-31 ENCOUNTER — TELEPHONE (OUTPATIENT)
Dept: RHEUMATOLOGY | Facility: CLINIC | Age: 55
End: 2025-01-31
Payer: MEDICARE

## 2025-01-31 NOTE — TELEPHONE ENCOUNTER
I called patient and got her scheduled for April 1,2025 @ 1:30          ----- Message from Rachel sent at 1/31/2025 10:26 AM CST -----  Regarding: appt  Contact: [phone]  ..Type:  Needs appt     Who Called: pt   reason (please be specific): asking for f/u type appt. Attempted to schedule, no access. Must be private      Would the patient rather a call back or a response via [facility]? Call   Best Call Back Number: [phone]  Additional Information: n/a

## 2025-02-03 DIAGNOSIS — G89.4 CHRONIC PAIN SYNDROME: ICD-10-CM

## 2025-02-03 DIAGNOSIS — N95.1 MENOPAUSAL SYMPTOM: ICD-10-CM

## 2025-02-03 DIAGNOSIS — F11.20 CHRONIC NARCOTIC DEPENDENCE: ICD-10-CM

## 2025-02-03 RX ORDER — ESTRADIOL 0.03 MG/D
1 PATCH TRANSDERMAL WEEKLY
Qty: 4 PATCH | Refills: 11 | Status: CANCELLED | OUTPATIENT
Start: 2025-02-03 | End: 2026-02-03

## 2025-02-03 RX ORDER — HYDROCODONE BITARTRATE AND ACETAMINOPHEN 5; 325 MG/1; MG/1
1 TABLET ORAL
Qty: 30 TABLET | Refills: 0 | Status: SHIPPED | OUTPATIENT
Start: 2025-02-03

## 2025-02-03 NOTE — TELEPHONE ENCOUNTER
Pt notified of refills available at pharmacy. Pt VU. Pt was reminded to schedule WWE on or after 4/25/2025    ----- Message from Gurvinder sent at 2/3/2025  9:11 AM CST -----  Regarding: self  Type: RX Refill Request    Who called:self  Have you contacted your pharmacy:no  Refill or New Rx:refill  RX name and strength:estradiol 0.025 mg/24 hr 0.025 mg/24 hr    Preferred pharmacy and phone number:  [id number] - [place], [place] - [address] AT [facility] [address]  [address]  [facility] [address]  Phone: [phone] Fax: [phone]      Ordering provider:cortez heaton  Would the patient rather a call back or a response via [facility]:  Best call back number:[phone]    Additional information:

## 2025-02-03 NOTE — TELEPHONE ENCOUNTER
No care due was identified.  [facility] Embedded Care Due Messages. Reference number: [id number].   2/03/2025 9:21:01 AM CST

## 2025-02-03 NOTE — TELEPHONE ENCOUNTER
Refill Encounter    PCP Visits: Recent Visits  Date Type Provider Dept   01/13/25 Office Visit Weeks, Jose ALONSO, DO [facility]   10/11/24 Office Visit Weeks, Jose ALONSO, DO [facility]   09/06/24 Office Visit Weeks, Jose ALONSO, DO [facility]   07/12/24 Office Visit Weeks, Jose ALONSO, DO [facility]   06/19/24 Office Visit Weeks, Jose ALONSO,  [facility]   04/09/24 Office Visit Weeks, Jose ALONSO, DO [facility]   03/06/24 Office Visit Weeks, Jose ALONSO,  [facility]   Showing recent visits within past 360 days and meeting all other requirements  Future Appointments  Date Type Provider Dept   04/09/25 Appointment Weeks, Jose ALONSO,  [facility]   Showing future appointments within next 720 days and meeting all other requirements     Last 3 Blood Pressure:   BP Readings from Last 3 Encounters:   12/18/24 (!) 142/98   11/11/24 (!) 141/80   11/09/24 108/76     Preferred Pharmacy:   [id number] [place] [id number] AT [facility] [address]  [facility] [address]  [facility] [address]  Phone: [phone] Fax: [phone]    Requested RX:  Requested Prescriptions     Pending Prescriptions Disp Refills    HYDROcodone-acetaminophen (NORCO) 5-325 mg per tablet 30 tablet 0     Sig: Take 1 tablet by mouth every 24 hours as needed for Pain. Quantity medically necessary      RX Route: Normal

## 2025-02-04 LAB — H PYLORI AG STL QL IA: NOT DETECTED

## 2025-02-06 ENCOUNTER — CLINICAL SUPPORT (OUTPATIENT)
Dept: OPHTHALMOLOGY | Facility: CLINIC | Age: 55
End: 2025-02-06
Payer: MEDICARE

## 2025-02-06 ENCOUNTER — OFFICE VISIT (OUTPATIENT)
Dept: OPTOMETRY | Facility: CLINIC | Age: 55
End: 2025-02-06
Payer: MEDICARE

## 2025-02-06 DIAGNOSIS — M32.9 SYSTEMIC LUPUS ERYTHEMATOSUS, UNSPECIFIED SLE TYPE, UNSPECIFIED ORGAN INVOLVEMENT STATUS: ICD-10-CM

## 2025-02-06 DIAGNOSIS — Z79.899 LONG-TERM USE OF PLAQUENIL: Primary | ICD-10-CM

## 2025-02-06 DIAGNOSIS — H52.4 MYOPIA WITH ASTIGMATISM AND PRESBYOPIA, BILATERAL: ICD-10-CM

## 2025-02-06 DIAGNOSIS — H52.13 MYOPIA WITH ASTIGMATISM AND PRESBYOPIA, BILATERAL: ICD-10-CM

## 2025-02-06 DIAGNOSIS — H16.229 KERATOCONJUNCT SICCA, NOT SPECIFIED AS SJOGREN'S, UNSP EYE: ICD-10-CM

## 2025-02-06 DIAGNOSIS — H52.203 MYOPIA WITH ASTIGMATISM AND PRESBYOPIA, BILATERAL: ICD-10-CM

## 2025-02-06 DIAGNOSIS — H04.123 DRY EYE SYNDROME OF BOTH EYES: ICD-10-CM

## 2025-02-06 PROCEDURE — 99999 PR PBB SHADOW E&M-EST. PATIENT-LVL II: CPT | Mod: PBBFAC,HCNC,, | Performed by: OPTOMETRIST

## 2025-02-06 PROCEDURE — 1159F MED LIST DOCD IN RCRD: CPT | Mod: HCNC,CPTII,S$GLB, | Performed by: OPTOMETRIST

## 2025-02-06 PROCEDURE — 92015 DETERMINE REFRACTIVE STATE: CPT | Mod: HCNC,S$GLB,, | Performed by: OPTOMETRIST

## 2025-02-06 PROCEDURE — 1160F RVW MEDS BY RX/DR IN RCRD: CPT | Mod: HCNC,CPTII,S$GLB, | Performed by: OPTOMETRIST

## 2025-02-06 PROCEDURE — 92083 EXTENDED VISUAL FIELD XM: CPT | Mod: HCNC,S$GLB,, | Performed by: OPTOMETRIST

## 2025-02-06 PROCEDURE — 92014 COMPRE OPH EXAM EST PT 1/>: CPT | Mod: HCNC,S$GLB,, | Performed by: OPTOMETRIST

## 2025-02-06 PROCEDURE — 92134 CPTRZ OPH DX IMG PST SGM RTA: CPT | Mod: HCNC,S$GLB,, | Performed by: OPTOMETRIST

## 2025-02-06 RX ORDER — CYCLOSPORINE 0.5 MG/ML
1 EMULSION OPHTHALMIC 2 TIMES DAILY
Qty: 180 EACH | Refills: 3 | Status: SHIPPED | OUTPATIENT
Start: 2025-02-06

## 2025-02-06 NOTE — PROGRESS NOTES
PEARL    MARIAN: 10/22 with Dr. Simons  Chief complaint (CC): Patient is here for annual eye exam today with HVF   and OCT.  Patient is taking plaquenil for Lupus.  Patient feels her   glasses are too strong and they give her headaches.  Glasses? +  Contacts? -  H/o eye surgery, injections or laser: -  H/o eye injury: -  Known eye conditions? See above  Family h/o eye conditions? -  Eye gtts? Restasis BID OU      (-) Flashes (-)  Floaters (-) Mucous   (-)  Tearing (-) Itching (-) Burning   (+) Headaches (-) Eye Pain/discomfort (-) Irritation   (-)  Redness (-) Double vision (-) Blurry vision    Diabetic? -  A1c? -      Last edited by Ratna Cooper on 2/6/2025  9:24 AM.            Assessment /Plan     For exam results, see Encounter Report.      Long-term use of Plaquenil  Systemic lupus erythematosus, unspecified SLE type, unspecified organ involvement status  No e/o maculopathy. Normal mac OCT and 10-2 SS HVF    Dry eye syndrome of both eyes  Keratoconjunct sicca, not specified as Sjogren's, unsp eye  -     cycloSPORINE (RESTASIS) 0.05 % ophthalmic emulsion; Place 1 drop into both eyes 2 (two) times daily.  Dispense: 180 each; Refill: 3  Recommend iVizia, Systane Ultra or Refresh Optive BID-TID OU to aid with symptoms of dry eyes.  Cont Restasis BID OU    Myopia with astigmatism and presbyopia, bilateral  SRx released to patient. Patient educated on lens options. Normal ocular health. RTC 1 year for routine exam.

## 2025-02-13 ENCOUNTER — TELEPHONE (OUTPATIENT)
Dept: ORTHOPEDICS | Facility: CLINIC | Age: 55
End: 2025-02-13
Payer: MEDICARE

## 2025-02-13 ENCOUNTER — HOSPITAL ENCOUNTER (OUTPATIENT)
Dept: RADIOLOGY | Facility: OTHER | Age: 55
Discharge: HOME | End: 2025-02-13
Attending: FAMILY MEDICINE
Payer: MEDICARE

## 2025-02-13 ENCOUNTER — OFFICE VISIT (OUTPATIENT)
Dept: INTERNAL MEDICINE | Facility: CLINIC | Age: 55
End: 2025-02-13
Payer: MEDICARE

## 2025-02-13 ENCOUNTER — OFFICE VISIT (OUTPATIENT)
Dept: OBSTETRICS AND GYNECOLOGY | Facility: CLINIC | Age: 55
End: 2025-02-13
Payer: MEDICARE

## 2025-02-13 VITALS
WEIGHT: 250.44 LBS | SYSTOLIC BLOOD PRESSURE: 120 MMHG | BODY MASS INDEX: 39.31 KG/M2 | OXYGEN SATURATION: 98 % | DIASTOLIC BLOOD PRESSURE: 80 MMHG | HEIGHT: 67 IN | HEART RATE: 102 BPM

## 2025-02-13 VITALS
DIASTOLIC BLOOD PRESSURE: 79 MMHG | HEIGHT: 67 IN | HEART RATE: 98 BPM | BODY MASS INDEX: 39.39 KG/M2 | SYSTOLIC BLOOD PRESSURE: 118 MMHG | WEIGHT: 251 LBS

## 2025-02-13 DIAGNOSIS — G89.29 CHRONIC PAIN OF RIGHT KNEE: Primary | ICD-10-CM

## 2025-02-13 DIAGNOSIS — Z78.0 POSTMENOPAUSAL: ICD-10-CM

## 2025-02-13 DIAGNOSIS — N95.1 MENOPAUSAL SYMPTOM: ICD-10-CM

## 2025-02-13 DIAGNOSIS — Z01.419 ENCOUNTER FOR GYNECOLOGICAL EXAMINATION (GENERAL) (ROUTINE) WITHOUT ABNORMAL FINDINGS: Primary | ICD-10-CM

## 2025-02-13 DIAGNOSIS — M25.561 CHRONIC PAIN OF RIGHT KNEE: Primary | ICD-10-CM

## 2025-02-13 DIAGNOSIS — Z12.31 ENCOUNTER FOR SCREENING MAMMOGRAM FOR BREAST CANCER: ICD-10-CM

## 2025-02-13 DIAGNOSIS — Z12.31 ENCOUNTER FOR SCREENING MAMMOGRAM FOR MALIGNANT NEOPLASM OF BREAST: ICD-10-CM

## 2025-02-13 DIAGNOSIS — Z12.4 PAP SMEAR FOR CERVICAL CANCER SCREENING: ICD-10-CM

## 2025-02-13 DIAGNOSIS — I10 ESSENTIAL HYPERTENSION: Chronic | ICD-10-CM

## 2025-02-13 DIAGNOSIS — R60.0 BILATERAL LOWER EXTREMITY EDEMA: ICD-10-CM

## 2025-02-13 PROCEDURE — 1159F MED LIST DOCD IN RCRD: CPT | Mod: HCNC,CPTII,S$GLB,

## 2025-02-13 PROCEDURE — 3074F SYST BP LT 130 MM HG: CPT | Mod: HCNC,CPTII,S$GLB,

## 2025-02-13 PROCEDURE — 77067 SCR MAMMO BI INCL CAD: CPT | Mod: 26,HCNC,, | Performed by: RADIOLOGY

## 2025-02-13 PROCEDURE — 99999 PR PBB SHADOW E&M-EST. PATIENT-LVL IV: CPT | Mod: PBBFAC,HCNC,,

## 2025-02-13 PROCEDURE — 99999 PR PBB SHADOW E&M-EST. PATIENT-LVL V: CPT | Mod: PBBFAC,HCNC,,

## 2025-02-13 PROCEDURE — 3079F DIAST BP 80-89 MM HG: CPT | Mod: HCNC,CPTII,S$GLB,

## 2025-02-13 PROCEDURE — 77063 BREAST TOMOSYNTHESIS BI: CPT | Mod: 26,HCNC,, | Performed by: RADIOLOGY

## 2025-02-13 PROCEDURE — 77067 SCR MAMMO BI INCL CAD: CPT | Mod: TC,HCNC

## 2025-02-13 PROCEDURE — 3008F BODY MASS INDEX DOCD: CPT | Mod: HCNC,CPTII,S$GLB,

## 2025-02-13 PROCEDURE — 99214 OFFICE O/P EST MOD 30 MIN: CPT | Mod: HCNC,S$GLB,,

## 2025-02-13 RX ORDER — ESTRADIOL 0.03 MG/D
1 PATCH TRANSDERMAL WEEKLY
Qty: 4 PATCH | Refills: 3 | Status: SHIPPED | OUTPATIENT
Start: 2025-02-13 | End: 2026-02-13

## 2025-02-13 RX ORDER — ESTRADIOL 0.1 MG/G
1 CREAM VAGINAL
Qty: 42.5 G | Refills: 1 | Status: SHIPPED | OUTPATIENT
Start: 2025-02-13 | End: 2026-02-13

## 2025-02-13 NOTE — PROGRESS NOTES
CHIEF COMPLAINT     Chief Complaint   Patient presents with    Knee Pain     Right knee.        HPI     Jaki Bajwa is a 54 y.o. female who presents for xxx today.    PCP is Jose Carpenter DO, patient is known to me. This note was generated with the assistance of ambient listening technology. Verbal consent was obtained by the patient and accompanying visitor(s) for the recording of patient appointment to facilitate this note. I attest to having reviewed and edited the generated note for accuracy, though some syntax or spelling errors may persist. Please contact the author of this note for any clarification.     History of Present Illness    CHIEF COMPLAINT:  Patient presents today for knee pain after a fall    LEFT KNEE PAIN:  She reports left knee pain initially occurring after a fall in November 2024. X-ray at that time showed no fracture. Pain initially resolved before scheduled physical therapy appointment but has recently returned. Pain is exacerbated by kneeling, stooping, rising from sitting position, and rainy weather. She currently uses Hemant-Weber and muscle rub for symptom relief. Naproxen was previously effective for pain management but was discontinued due to kidney issues.    PAIN MANAGEMENT:  She has been taking Hydrocodone/Acetaminophen 5/325 mg for several years. She expresses concern about discussing potential changes to her pain medication due to perceived external pressures to minimize opioid prescriptions.    LOWER EXTREMITY EDEMA:  She experiences bilateral leg swelling every other day despite taking a diuretic, with asymmetric presentation. She uses compression stockings for management.          Past Medical History:  Past Medical History:   Diagnosis Date    CANDELARIA (acute kidney injury) 4/23/2022    Anemia     Anxiety     Depression     History of psychiatric hospitalization 2000    [place] x 1 week for depression    History of ventral hernia repair     Hypertension     Lupus      "patient reports that she is being treated "like I have Lupus"    Mental disorder     Morbid obesity 12/15/2017    Psychiatric problem     Sjogren's syndrome 2013    Therapy     TMJ (temporomandibular joint disorder)        Home Medications:  Prior to Admission medications    Medication Sig Start Date End Date Taking? Authorizing Provider   amLODIPine (NORVASC) 5 MG tablet Take 1 tablet (5 mg total) by mouth once daily. 5/7/24  Yes Weeks, Jose ALONSO, DO   buPROPion (WELLBUTRIN XL) 150 MG TB24 tablet Take 1 tablet (150 mg total) by mouth once daily. 1/8/25  Yes Najma Doyle MD   busPIRone (BUSPAR) 15 MG tablet Take 1 tablet (15 mg total) by mouth once daily. 1/8/25  Yes Najma Doyle MD   butalbital-acetaminophen-caffeine -40 mg (FIORICET, ESGIC) -40 mg per tablet Take 1 tablet by mouth every 6 (six) hours as needed for Headaches. 1/3/25  Yes Weeks, Jose ALONSO, DO   cholecalciferol, vitamin D3, (VITAMIN D3) 25 mcg (1,000 unit) capsule Take 1 capsule (1,000 Units total) by mouth once daily. 12/4/24  Yes Weeks, Jose ALONSO, DO   cycloSPORINE (RESTASIS) 0.05 % ophthalmic emulsion Place 1 drop into both eyes 2 (two) times daily. 2/6/25  Yes Melo Chino, NICOLÁS   doxepin (SINEQUAN) 75 MG capsule TAKE 2 CAPSULES(150 MG) BY MOUTH EVERY EVENING 1/8/25  Yes Najma Doyle MD   estradiol 0.025 mg/24 hr 0.025 mg/24 hr Place 1 patch onto the skin once a week. 4/24/24 4/24/25 Yes Gelacio Carlson, KAILEY   ferrous sulfate, dried (SLOW FE) 160 mg (50 mg iron) TbSR Take 1 tablet (160 mg total) by mouth once daily. 10/24/24  Yes Jayden, Jose ALONSO, DO   furosemide (LASIX) 20 MG tablet Take 1 tablet (20 mg total) by mouth once daily. 9/18/24  Yes Weeks, Jose ALONSO, DO   gabapentin (NEURONTIN) 400 MG capsule Take 1 capsule (400 mg total) by mouth 3 (three) times daily. 12/18/24  Yes Jade Hernandez, FNP-BC   HYDROcodone-acetaminophen (NORCO) 5-325 mg per tablet Take 1 tablet by mouth every 24 hours as " needed for Pain. Quantity medically necessary 2/3/25  Yes Jose Carpenter,    hydroxychloroquine (PLAQUENIL) 200 mg tablet TAKE 1 TABLET BY MOUTH TWICE DAILY 12/18/24  Yes Jade Hernandez FNP-BC   irbesartan (AVAPRO) 300 MG tablet TAKE 1 TABLET(300 MG) BY MOUTH EVERY EVENING 9/4/24  Yes Jose Carpenter DO   lurasidone (LATUDA) 60 mg Tab tablet Take 1 tablet (60 mg total) by mouth once daily. 1/8/25  Yes Najma Doyle MD   NARCAN 4 mg/actuation Spry  9/25/21  Yes Provider, Historical   nystatin (MYCOSTATIN) 100,000 unit/mL suspension Take 4 mLs (400,000 Units total) by mouth 4 (four) times daily. 12/18/24 6/16/25 Yes Jade Hernandez FNP-BC   omeprazole (PRILOSEC) 40 MG capsule Take 1 capsule (40 mg total) by mouth once daily. 1/24/25  Yes Jose Carpenter,    sertraline (ZOLOFT) 50 MG tablet Take 1/2 tablet (25mg) daily x 7 days.  If tolerating can then increase to 1 tablet (50mg) daily thereafter. 1/8/25  Yes Najma Doyle MD   tiZANidine (ZANAFLEX) 4 MG tablet Take 1 tablet (4 mg total) by mouth 2 (two) times daily as needed (2 tablets twice daily as needed). 12/18/24  Yes Jade Hernandez FNP-BC   ubrogepant (UBRELVY) 100 mg tablet Take 1 tablet (100 mg total) by mouth as needed for Migraine. If symptoms persist or return, may repeat dose after 2 hours. Maximum: 200 mg per 24 hours 7/8/24  Yes Irma Gamboa MD   diazePAM (VALIUM) 2 MG tablet Take 1 tablet (2 mg total) by mouth daily as needed (Severe anxiety/Panic). 15 tablets to last 3 months 1/8/25 2/7/25  Najma Doyle MD   docusate sodium (COLACE) 100 MG capsule Take 1 capsule (100 mg total) by mouth 2 (two) times daily as needed for Constipation.  Patient not taking: Reported on 2/13/2025 7/13/22   Pratik Rasmussen MD   pilocarpine (SALAGEN) 5 MG Tab Take 1 tablet (5 mg total) by mouth 4 (four) times daily. 7/31/24 1/27/25  Branden Cameron MD   VENOFER 100 mg iron/5 mL injection  1/19/24    "Provider, Historical       Review of Systems:  Review of Systems   Constitutional: Negative.    Respiratory: Negative.     Cardiovascular:  Positive for leg swelling.   Musculoskeletal:  Positive for arthralgias.       Health Maintainence:   Immunizations:  Health Maintenance         Date Due Completion Date    Mammogram 02/12/2025 2/12/2024    Urine Drug Screen 06/19/2025 6/19/2024    Annual UACr 06/19/2025 6/19/2024    Colorectal Cancer Screening 01/28/2026 1/28/2021    Hemoglobin A1c (Diabetic Prevention Screening) 02/13/2027 2/13/2024    TETANUS VACCINE 04/18/2027 4/18/2017    Lipid Panel 09/12/2027 9/12/2022    RSV Vaccine (Age 60+ and Pregnant patients) (1 - 1-dose 75+ series) 10/17/2043 ---             PHYSICAL EXAM     /80   Pulse 102   Ht 5' 7" (1.702 m)   Wt 113.6 kg (250 lb 7.1 oz)   LMP 07/11/2012 Comment: partial   SpO2 98%   BMI 39.22 kg/m²     Physical Exam  Vitals reviewed.   Constitutional:       Appearance: She is well-developed.   HENT:      Head: Normocephalic and atraumatic.   Eyes:      Conjunctiva/sclera: Conjunctivae normal.   Cardiovascular:      Rate and Rhythm: Normal rate.   Pulmonary:      Effort: Pulmonary effort is normal. No respiratory distress.   Musculoskeletal:      Right lower leg: No edema.      Left lower leg: No edema.   Skin:     General: Skin is warm and dry.      Findings: No rash.   Neurological:      Mental Status: She is alert and oriented to person, place, and time.      Coordination: Coordination normal.   Psychiatric:         Behavior: Behavior normal.           ASSESSMENT/PLAN   Assessment & Plan    IMPRESSION:  - Reviewed orthopedic note and x-ray results, which were reported as normal.  - Suspected meniscus damage based on patient's pain description and location.  - Considered peripheral vascular disease as a potential cause for leg swelling, but found no outward signs.  - Assessed swelling as likely benign due to waxing and waning nature and absence of " concerning symptoms.  - Acknowledged limitations on opioid prescriptions due to external pressures.    BILATERAL KNEE PAIN:  - Evaluated the patient's bilateral knee pain, which is reported when kneeling down, getting up, and during rainy weather.  - Noted that an orthopedist previously diagnosed an issue with both knees, possibly related to the meniscus.  - X-ray was taken but showed no fracture.  - Assessed the condition and suspected meniscus damage based on pain location and lack of recent injury.  - Explained the benefits of icing for knee pain management.  - Instructed the patient to ice the knee at least 2 times daily for 10-15 minutes at a time.  - Noted that the patient is currently using Hemant-Weber and muscle rub for pain relief.  - Referred the patient back to orthopedics for potential joint injections and further evaluation.    HYPERTENSION:  - Continued the patient's current antihypertensive regimen, which includes amlodipine 5mg and furosemide 20mg.    EDEMA:  - Evaluated the patient's reported swelling in ankles occurring every other day, with one leg swelling more than the other.  - The swelling can be severe, described as 'elephant foot'.  - No heat, redness, or pain was observed, ruling out concerns of a blood clot.  - Considered the possibility of peripheral vascular disease but did not observe outward signs.  - Instructed the patient to elevate feet when experiencing swelling.  - Advised the patient to continue using compression stockings.  - Continued furosemide 20 mg.  - Informed the patient that they can double up on furosemide when experiencing significant swelling, but not to be done daily.  - Instructed the patient to contact the office if leg swelling becomes more persistent or frequent, or if one leg swells significantly more than the other.  - Recommend follow-up if swelling issues persist, and may consider ultrasound at that time.    CHRONIC PAIN:  - Continued the patient's long-term pain  medication, specifically hydrocodone 5/325mg.  - Acknowledged pressure to minimize opioid prescriptions and suggested considering pain management if pain becomes unmanageable.  - Noted that the patient was previously on naproxen for inflammation, but it was discontinued due to kidney issues.    OTHER INSTRUCTIONS:  - Discussed signs and symptoms of blood clots to be aware of (erythema, edema, calor, pain).          Jaki was seen today for knee pain.    Diagnoses and all orders for this visit:    Chronic pain of right knee    Essential hypertension    Bilateral lower extremity edema          Andrea Barlow NP   Department of Internal Medicine - [facility]  9:26 AM

## 2025-02-13 NOTE — PROGRESS NOTES
"CC: Well Woman/Annual Exam    Jaki Bajwa is a 54 y.o. female  presents for her annual/well woman exam.   Pt is postmenopausal, s/p hysterectomy; Denies vaginal itching or irritation, vaginal discharge.  Pt feels safe at home and denies domestic violence.   Denies further issues, problems, or complaints.      Pap: 2016- no longer indicated   Mammogram: 2025   Colonoscopy:   PCP: Jose Carpenter, DO Routine Screening Labs:     /79   Pulse 98   Ht 5' 7" (1.702 m)   Wt 113.9 kg (251 lb)   LMP 2012 Comment: partial   BMI 39.31 kg/m²     ROS:  Constitutional: no weight loss, weight gain, fever, fatigue  Eyes:  No vision changes, glasses/contacts  ENT/Mouth: No ulcers, sinus problems, ears ringing, headache  Cardiovascular: No inability to lie flat, chest pain, exercise intolerance, swelling, heart palpitations  Respiratory: No wheezing, coughing blood, shortness of breath, or cough  Gastrointestinal: No diarrhea, bloody stool, nausea/vomiting, constipation, gas, hemorrhoids  Genitourinary: No blood in urine, painful urination, urgency of urination, frequency of urination, incomplete emptying, incontinence, abnormal bleeding, painful periods, heavy periods, vaginal discharge, vaginal odor, painful intercourse, sexual problems, bleeding after intercourse.  Musculoskeletal: No muscle weakness  Breast: No painful breasts, nipple discharge, masses, rash, ulcers.  Neurological: No passing out, seizures, numbness, headache  Endocrine: No diabetes, hypothyroid, hyperthyroid, hot flashes, hair loss, abnormal hair growth, acne  Psychiatric: No depression, crying  Hematologic: No bruises, bleeding, swollen lymph nodes, anemia.    OBJECTIVE:  Physical Exam:   Constitutional: She is oriented to person, place, and time. She appears well-developed and well-nourished.      Neck: No tracheal deviation present. No thyromegaly present.    Cardiovascular:       Exam reveals no edema.      "   Pulmonary/Chest: Effort normal. She exhibits no mass, no tenderness, no deformity and no retraction. Right breast exhibits no inverted nipple, no mass, no nipple discharge, no skin change, no tenderness, presence, no bleeding and no swelling. Left breast exhibits no inverted nipple, no mass, no nipple discharge, no skin change, no tenderness, presence, no bleeding and no swelling. Breasts are symmetrical.        Abdominal: Soft. She exhibits no distension and no mass. There is no abdominal tenderness. There is no rebound and no guarding. No hernia. Hernia confirmed negative in the left inguinal area.     Genitourinary:    Urethra normal.   Rectum:      No external hemorrhoid.            Pelvic exam was performed with patient in the lithotomy position.   The external female genitalia was normal.   External genitalia lesions present.  (mild folliculitis)Genitalia hair distrobution normal .   There is no rash, tenderness or lesion on the right labia. There is no rash, tenderness or lesion on the left labia. No no adexnal prolapse. Right adnexum displays no mass, no tenderness and no fullness. Left adnexum displays no mass, no tenderness and no fullness. No vaginal discharge, tenderness, bleeding, rectocele, cystocele or prolapse of vaginal walls in the vagina. Cervix is absent.Uterus is absent.           Musculoskeletal: Normal range of motion and moves all extremeties. No edema.       Neurological: She is alert and oriented to person, place, and time.    Skin: No rash noted. No erythema. No pallor.    Psychiatric: She has a normal mood and affect. Her behavior is normal. Judgment and thought content normal.        ASSESSMENT:   Encounter for gynecological examination (general) (routine) without abnormal findings    Menopausal symptom  -     Ambulatory referral/consult to Obstetrics / Gynecology; Future; Expected date: 02/20/2025  -     estradiol 0.025 mg/24 hr 0.025 mg/24 hr; Place 1 patch onto the skin once a week.   Dispense: 4 patch; Refill: 3    Postmenopausal    Pap smear for cervical cancer screening  -     HPV High Risk Genotypes, PCR  -     Liquid-Based Pap Smear, Screening    Encounter for screening mammogram for malignant neoplasm of breast  -     Mammo Digital Screening Bilat; Future; Expected date: 02/13/2026    PLAN:   - Annual performed today  - Mammogram ordered  - Estrogen patches refilled until next avail Hormone Consult, vaginal estrogen prescribed  - Reinforced healthy lifestyle choices to include sleep hygiene, regular exercise and nutrition.  - Follow up prn    Patient was counseled today on A.C.S. Pap guidelines and recommendations for yearly pelvic exams, mammograms and monthly self breast exams; to see her PCP for other health maintenance.     Female chaperone present for entire procedure

## 2025-02-13 NOTE — TELEPHONE ENCOUNTER
Called pt to reschedule appt. Patient accepted 3/31 appt at 1:30 PM.     ----- Message from Pippa Hodge sent at 2/13/2025 10:57 AM CST -----  Regarding: FW: PT'S REQUESTING A CALL BACK REGARDING MOVING APPT TO MARCH  Contact: PT  Call and move her to 3/31/25 please  ----- Message -----  From: Simon Kirkland  Sent: 2/13/2025  10:15 AM CST  To: Ayesha Yu; Diane [facility] Staff  Subject: PT'S REQUESTING A CALL BACK REGARDING MOVING#    Confirmed contact info below:  Contact Name: Jaki Bajwa  Phone Number: [phone]

## 2025-02-18 ENCOUNTER — CLINICAL SUPPORT (OUTPATIENT)
Dept: OBSTETRICS AND GYNECOLOGY | Facility: CLINIC | Age: 55
End: 2025-02-18
Payer: MEDICARE

## 2025-02-18 ENCOUNTER — TELEPHONE (OUTPATIENT)
Dept: ORTHOPEDICS | Facility: CLINIC | Age: 55
End: 2025-02-18
Payer: MEDICARE

## 2025-02-18 DIAGNOSIS — N95.1 MENOPAUSAL SYMPTOMS: Primary | ICD-10-CM

## 2025-02-18 NOTE — PROGRESS NOTES
Personal Information    Full Name: Jaki Bajwa 1970    PCP: Dr. Jose Carpenter     Medical History    Do you have a chronic medical condition? (e.g., diabetes, hypertension, thyroid issues)   If yes, please specify:   Yes - HTN    2.   Do you have a history of hormone- related conditions? (e.g., endometriosis, breast cancer)   If yes, please explain:   No    3.   Have you previously or are you currently taking hormone replacement therapy?   If yes, please list:   Yes - Currently using a Estrogen patch- weekly     Menstrual History    At what age did you begin menstruating?   14-15     2.   Have you experienced any changes in your menstrual cycle in the last year?   If yes, please describe:   Yes - Hysterectomy 2012     3.   When was your last menstrual period or age of last period?   N/A    4.   Have you had a hysterectomy?   If yes, at what age?  Yes - Ovaries in place     Symptoms Assesments      Are you experiencing any of the following symptoms:  Hot Flashes: Yes   Night Sweats: Yes   Vaginal Dryness or Pain with [place]: Yes   Mood Swings: Yes   Anxiety or Depression: Yes   Sleep Disturbances: Yes   Fatigue: Yes   Weight Gain: No   Joint or Muscle Pain: Yes   Memory Issues or Brain Fog: Yes   Decreased Interest in Sex (Low Libido): Yes  Colonoscopy : Yes 2020     Lifestyle Factors    How would you describe your diet?  Other- lack of appetite     2.   How often do you exercise?   Rarely    3.   Do you smoke or consume alcohol?   If yes, please specify frequency:   Yes - Does not smoke . Drinks socially     4.   How would you rate your stress levels?   High    Family History    Is there a family history of menopause-related conditions? (e.g., osteoporosis, breast cancer)  If yes, please specify  No    2.   Is there a family history of heart disease?   If yes, please specify   Yes - Maternal family history of heart disease      Spoke with patient for a total of 30 minutes during virtual visit.   Patient was guided through expectations and treatment options.     Questions answered. Encouraged to send message or call office with any questions/concerns. Verbalized understanding.       Emilia

## 2025-02-19 ENCOUNTER — HOSPITAL ENCOUNTER (OUTPATIENT)
Dept: RADIOLOGY | Facility: HOSPITAL | Age: 55
Discharge: HOME | End: 2025-02-19
Attending: NURSE PRACTITIONER
Payer: MEDICARE

## 2025-02-19 ENCOUNTER — OFFICE VISIT (OUTPATIENT)
Dept: ORTHOPEDICS | Facility: CLINIC | Age: 55
End: 2025-02-19
Payer: MEDICARE

## 2025-02-19 DIAGNOSIS — M25.561 ACUTE PAIN OF RIGHT KNEE: Primary | ICD-10-CM

## 2025-02-19 DIAGNOSIS — M25.561 ACUTE PAIN OF RIGHT KNEE: ICD-10-CM

## 2025-02-19 DIAGNOSIS — M25.561 CHRONIC PAIN OF RIGHT KNEE: Primary | ICD-10-CM

## 2025-02-19 DIAGNOSIS — M25.561 CHRONIC PAIN OF RIGHT KNEE: ICD-10-CM

## 2025-02-19 DIAGNOSIS — G89.29 CHRONIC PAIN OF RIGHT KNEE: ICD-10-CM

## 2025-02-19 DIAGNOSIS — G89.29 CHRONIC PAIN OF RIGHT KNEE: Primary | ICD-10-CM

## 2025-02-19 PROCEDURE — 73562 X-RAY EXAM OF KNEE 3: CPT | Mod: TC,HCNC,LT

## 2025-02-19 PROCEDURE — 73721 MRI JNT OF LWR EXTRE W/O DYE: CPT | Mod: TC,HCNC,RT

## 2025-02-19 NOTE — PROGRESS NOTES
SUBJECTIVE:   History of Present Illness    CHIEF COMPLAINT:  - Right knee pain follow-up    HPI:  Jaki returns to orthopedics for knee pain, which she initially presented with in November. She reports sharp pains in her knee. Pain initially subsided after her last visit when she was given an appointment for physical therapy, but has since returned worse than before. She has difficulty sleeping due to discomfort. Her knee sometimes locks up and catches, particularly when standing. She experiences discomfort when getting up and standing, often waiting for her knee to pop back into place. Pain worsens when it rains. She reports significant sleep disturbance due to the pain.    She has been managing symptoms with hot and cold therapy, muscle rubs, and Aspercreme. She cannot take certain pain medications like naproxen due to kidney issues. She denies any recent trauma or injury to the knee.    Jaki reports chronic back pain but denies hip pain. She does not eat particularly healthy but does not eat much, and had shrimp yesterday.    Jaki denies any vaginal pain. Jaki denies any history of gout.    PREVIOUS TREATMENTS:  - Jaki underwent physical therapy for knee pain: The pain initially stopped but later returned and worsened.  - Jaki has been applying hot and cold therapy to the knee.    MEDICATIONS:  - Aspercreme: For knee pain  - Tylenol: For knee pain  - Hydrochlorothiazide: Discontinued    SOCIAL HISTORY:  - Alcohol Use: Jaki denies drinking beer.      ROS:  Constitutional: -fever, -chills  Eyes: -visual changes  ENT: -nasal congestion, -sore throat  Respiratory: -cough, -shortness of breath  Cardiovascular: -chest pain, -palpitations  Gastrointestinal: -nausea, -vomiting  Genitourinary: -hematuria, -dysuria, -pelvic pain  Integument/Breast: -rash, -pruritus, -breast skin changes  Hematologic/Lymphatic: -easy bruising, -lymphadenopathy  Musculoskeletal: +arthralgia, -myalgia,  "+back pain, -joint swelling  Neurological: -seizures, -tremors  Behavioral/Psych: -hallucinations  Endocrine: -heat intolerance, -cold intolerance  Psychiatric: +sleep difficulty         Review of patient's allergies indicates:   Allergen Reactions    Aspirin Hives         Current Medications[1]    Past Medical History:   Diagnosis Date    CANDELARIA (acute kidney injury) 2022    Anemia     Anxiety     Depression     History of psychiatric hospitalization     [place] x 1 week for depression    History of ventral hernia repair     Hypertension     Lupus     patient reports that she is being treated "like I have Lupus"    Mental disorder     Morbid obesity 12/15/2017    Psychiatric problem     Sjogren's syndrome 2013    Therapy     TMJ (temporomandibular joint disorder)        Past Surgical History:   Procedure Laterality Date    BONE MARROW BIOPSY N/A 2024    Procedure: Biopsy-bone marrow;  Surgeon: Latrice Arce MD;  Location: [facility] ENDO [address]);  Service: Oncology;  Laterality: N/A;  -pre call complete-tb    breast reduction      BREAST SURGERY  2005     SECTION      x 2    ESOPHAGOGASTRODUODENOSCOPY N/A 2024    Procedure: EGD (ESOPHAGOGASTRODUODENOSCOPY);  Surgeon: Parth Hernández MD;  Location: [facility] ENDOSCOPY;  Service: Endoscopy;  Laterality: N/A;  Ref Dr Claude PollardYeojt-Ideter-WWav  24- pc complete. DBM    HYSTERECTOMY  2005    INGUINAL HERNIA REPAIR      LAPAROSCOPIC TOTAL HYSTERECTOMY      ASA    TOTAL REDUCTION MAMMOPLASTY      TUBAL LIGATION      VENTRAL HERNIA REPAIR         Family History   Problem Relation Name Age of Onset    Heart disease Mother Amalia Bajwa     Hypertension Mother Amalia Bajwa     Cancer Father Demarcus Shanks sr.         colon ca    Colon cancer Father Demarcus Shanks sr.     Depression Father Demarcus Shanks sr.     Schizophrenia Father Demarcus Shanks sr.     Anxiety disorder Father Demarcus Shanks sr.     Arthritis Father Demarcus Shanks sr.     Mental " "illness Father Demarcus Bajwa sr.     Liver cancer Sister Fifi     Cancer Sister Fifi     Depression Sister Fifi     Anxiety disorder Sister Fifi     Heart attack Sister Fifi     COPD Sister Fifi     Cancer Sister Madeline         throat and colon    Depression Sister Madeline     Miscarriages / Stillbirths Sister Madeline     Pancreatic cancer Brother Demarcus bjawa     Depression Brother Demarcus bajwa     Alcohol abuse Brother Demarcus bajwa     No Known Problems Daughter Ikea     Asthma Son Scott     Pancreatic cancer Other      Liver cancer Other      Suicide Neg Hx      Ovarian cancer Neg Hx      Breast cancer Neg Hx           OBJECTIVE:     PHYSICAL EXAM:  Vital Signs (Most Recent)  There were no vitals filed for this visit.     ,   Estimated body mass index is 39.31 kg/m² as calculated from the following:    Height as of 2/13/25: 5' 7" (1.702 m).    Weight as of 2/13/25: 113.9 kg (251 lb).   General Appearance: Well nourished, well developed, in no acute distress.  HENT: Normal cephalic, oropharynx pink and moist  Eyes: PERRLA bilaterally and EOM x 4  Respiratory: Even and unlabored  Skin: Warm and Dry.   Psychiatric: AAO x 4, Mood & affect are normal.    Physical Exam    MSK: Knee - Right: Pain with terminal flexion. Pain with valgus and varus stress. Questionable anterior drawer test. Minimal warmth on palpation.  Quad/Hamstring strength is 4/5.  IMAGING:  - X-rays of the knee:  No acute fractures.  Mild medial tibial femoral joint space narrowing consistent with osteoarthritis.      All radiographs were personally reviewed by me.    ASSESSMENT/PLAN:       ICD-10-CM ICD-9-CM   1. Chronic pain of right knee  M25.561 719.46    G89.29 338.29     54-year-old female who presents to the clinic with chief complaint of ongoing right knee pain and instability for the past several months.  Patient initially seen back in November referred to physical therapy.  She states when she finally got into physical therapy or " pain subsided.  She is now reporting that the knee pain is back and worse then it was previously.  She does endorse some knee locking and catching sensation.  On clinical exam there is some instability in the right knee.  She has pain with terminal flexion and with valgus stress.  I will get a MRI to rule out a ligament/tendon disruption.  In the meantime recommend she continue using over-the-counter analgesics for pain.  She is unable to take NSAIDs due to history of chronic kidney disease.    Assessment & Plan    MEDICATIONS:  - Take Tylenol for pain.  - Consider using Voltaren gel for pain.  - Take turmeric.  - Take glucosamine.    IMAGING:  - Ordered MRI Knee.    PROCEDURES:  - Provided knee brace to keep the knee stable and mobile.  - Discussed potential future knee injection with steroids if MRI is negative.    FOLLOW UP:  - Follow up after MRI is completed.    PATIENT INSTRUCTIONS:  - Apply hot and cold therapy to affected area.  - Use muscle rubs.         This note was generated with the assistance of ambient listening technology. Verbal consent was obtained by the patient and accompanying visitor(s) for the recording of patient appointment to facilitate this note. I attest to having reviewed and edited the generated note for accuracy, though some syntax or spelling errors may persist. Please contact the author of this note for any clarification.             [1]   Current Outpatient Medications   Medication Sig Dispense Refill    amLODIPine (NORVASC) 5 MG tablet Take 1 tablet (5 mg total) by mouth once daily. 90 tablet 3    buPROPion (WELLBUTRIN XL) 150 MG TB24 tablet Take 1 tablet (150 mg total) by mouth once daily. 270 tablet 3    busPIRone (BUSPAR) 15 MG tablet Take 1 tablet (15 mg total) by mouth once daily. 90 tablet 3    butalbital-acetaminophen-caffeine -40 mg (FIORICET, ESGIC) -40 mg per tablet Take 1 tablet by mouth every 6 (six) hours as needed for Headaches. 40 tablet 0     cholecalciferol, vitamin D3, (VITAMIN D3) 25 mcg (1,000 unit) capsule Take 1 capsule (1,000 Units total) by mouth once daily.      cycloSPORINE (RESTASIS) 0.05 % ophthalmic emulsion Place 1 drop into both eyes 2 (two) times daily. 180 each 3    docusate sodium (COLACE) 100 MG capsule Take 1 capsule (100 mg total) by mouth 2 (two) times daily as needed for Constipation. 60 capsule 0    doxepin (SINEQUAN) 75 MG capsule TAKE 2 CAPSULES(150 MG) BY MOUTH EVERY EVENING 180 capsule 3    estradioL (ESTRACE) 0.01 % (0.1 mg/gram) vaginal cream Place 1 g vaginally twice a week. 42.5 g 1    estradiol 0.025 mg/24 hr 0.025 mg/24 hr Place 1 patch onto the skin once a week. 4 patch 3    ferrous sulfate, dried (SLOW FE) 160 mg (50 mg iron) TbSR Take 1 tablet (160 mg total) by mouth once daily. 90 tablet 2    furosemide (LASIX) 20 MG tablet Take 1 tablet (20 mg total) by mouth once daily. 90 tablet 3    gabapentin (NEURONTIN) 400 MG capsule Take 1 capsule (400 mg total) by mouth 3 (three) times daily. 90 capsule 2    HYDROcodone-acetaminophen (NORCO) 5-325 mg per tablet Take 1 tablet by mouth every 24 hours as needed for Pain. Quantity medically necessary 30 tablet 0    hydroxychloroquine (PLAQUENIL) 200 mg tablet TAKE 1 TABLET BY MOUTH TWICE DAILY 60 tablet 3    irbesartan (AVAPRO) 300 MG tablet TAKE 1 TABLET(300 MG) BY MOUTH EVERY EVENING 90 tablet 3    lurasidone (LATUDA) 60 mg Tab tablet Take 1 tablet (60 mg total) by mouth once daily. 90 tablet 3    NARCAN 4 mg/actuation Spry       nystatin (MYCOSTATIN) 100,000 unit/mL suspension Take 4 mLs (400,000 Units total) by mouth 4 (four) times daily. 480 mL 5    omeprazole (PRILOSEC) 40 MG capsule Take 1 capsule (40 mg total) by mouth once daily. 90 capsule 3    sertraline (ZOLOFT) 50 MG tablet Take 1/2 tablet (25mg) daily x 7 days.  If tolerating can then increase to 1 tablet (50mg) daily thereafter. 30 tablet 3    tiZANidine (ZANAFLEX) 4 MG tablet Take 1 tablet (4 mg total) by mouth 2  (two) times daily as needed (2 tablets twice daily as needed). 60 tablet 2    ubrogepant (UBRELVY) 100 mg tablet Take 1 tablet (100 mg total) by mouth as needed for Migraine. If symptoms persist or return, may repeat dose after 2 hours. Maximum: 200 mg per 24 hours 30 tablet 2    VENOFER 100 mg iron/5 mL injection       diazePAM (VALIUM) 2 MG tablet Take 1 tablet (2 mg total) by mouth daily as needed (Severe anxiety/Panic). 15 tablets to last 3 months 15 tablet 0    pilocarpine (SALAGEN) 5 MG Tab Take 1 tablet (5 mg total) by mouth 4 (four) times daily. 360 tablet 1     No current facility-administered medications for this visit.

## 2025-02-20 ENCOUNTER — PATIENT MESSAGE (OUTPATIENT)
Dept: ORTHOPEDICS | Facility: CLINIC | Age: 55
End: 2025-02-20
Payer: MEDICARE

## 2025-02-20 ENCOUNTER — RESULTS FOLLOW-UP (OUTPATIENT)
Dept: ORTHOPEDICS | Facility: CLINIC | Age: 55
End: 2025-02-20
Payer: MEDICARE

## 2025-02-20 DIAGNOSIS — G89.29 CHRONIC PAIN OF RIGHT KNEE: Primary | ICD-10-CM

## 2025-02-20 DIAGNOSIS — M25.561 CHRONIC PAIN OF RIGHT KNEE: Primary | ICD-10-CM

## 2025-02-20 NOTE — TELEPHONE ENCOUNTER
I called and spoke with patient and informed her I had discussed her recent visit as well as her right knee MRI results with Dr. Argueta.  He is recommending referral to physical therapy and have her follow up with [facility] for further treatment and management.  We will get her scheduled with Dr. Ireland in 4 weeks to allow her to work with therapy for the next 4 weeks.  All of the patient's questions were answered to her satisfaction.

## 2025-02-20 NOTE — PROGRESS NOTES
Please schedule patient with Dr. Ireland in 4 weeks.  Right knee MRI shows a mensicus tear and chondral fissuring.

## 2025-02-24 ENCOUNTER — TELEPHONE (OUTPATIENT)
Dept: ORTHOPEDICS | Facility: CLINIC | Age: 55
End: 2025-02-24
Payer: MEDICARE

## 2025-02-24 ENCOUNTER — OFFICE VISIT (OUTPATIENT)
Dept: PSYCHIATRY | Facility: CLINIC | Age: 55
End: 2025-02-24
Payer: MEDICARE

## 2025-02-24 ENCOUNTER — TELEPHONE (OUTPATIENT)
Dept: HEMATOLOGY/ONCOLOGY | Facility: CLINIC | Age: 55
End: 2025-02-24
Payer: MEDICARE

## 2025-02-24 DIAGNOSIS — F99 INSOMNIA DUE TO OTHER MENTAL DISORDER: ICD-10-CM

## 2025-02-24 DIAGNOSIS — F40.10 SOCIAL ANXIETY DISORDER: ICD-10-CM

## 2025-02-24 DIAGNOSIS — F39 MOOD DISORDER: ICD-10-CM

## 2025-02-24 DIAGNOSIS — F41.1 GAD (GENERALIZED ANXIETY DISORDER): Primary | ICD-10-CM

## 2025-02-24 DIAGNOSIS — F42.8 COMPULSIVE SHOPPING: ICD-10-CM

## 2025-02-24 DIAGNOSIS — F41.0 PANIC DISORDER: ICD-10-CM

## 2025-02-24 DIAGNOSIS — F51.05 INSOMNIA DUE TO OTHER MENTAL DISORDER: ICD-10-CM

## 2025-02-24 PROCEDURE — 1159F MED LIST DOCD IN RCRD: CPT | Mod: HCNC,CPTII,95, | Performed by: INTERNAL MEDICINE

## 2025-02-24 PROCEDURE — 98006 SYNCH AUDIO-VIDEO EST MOD 30: CPT | Mod: HCNC,95,, | Performed by: INTERNAL MEDICINE

## 2025-02-24 PROCEDURE — 1160F RVW MEDS BY RX/DR IN RCRD: CPT | Mod: HCNC,CPTII,95, | Performed by: INTERNAL MEDICINE

## 2025-02-24 NOTE — TELEPHONE ENCOUNTER
----- Message from Mikel sent at 2/24/2025  8:32 AM CST -----  Regarding: FW: r/s appt  Contact: Pt @ [phone]    ----- Message -----  From: Brianen Day  Sent: 2/24/2025   8:22 AM CST  To: [facility]  Pool  Subject: r/s appt                                         Pt is calling to speak to someone in the office to r/s her appt that she is currently scheduled for; no available appts in Epic. Please call to advise. Thanks. Additional Info:   change appt on 3/31 anything after 12:45pm

## 2025-02-24 NOTE — TELEPHONE ENCOUNTER
Called pt. Informed her we had nothing earlier with Dr. Contreras but that Dr. Morrow has availability. Pt accepted 3/7 appt with Dr. Morrow.     ----- Message from Jose sent at 2/24/2025 11:16 AM CST -----  Regarding: appointment access  Contact: [phone]  Pt is calling to get scheduled with an earlier appointment. Pt have an appointment scheduled for 03/31/2025 . Please contact pt if pt can get an earlier appointment time and date . Ryanne Bajwa [phone] contact pt

## 2025-02-24 NOTE — PROGRESS NOTES
OUTPATIENT PSYCHIATRY RETURN VISIT    ENCOUNTER DATE:  3/4/2025  SITE:  [facility]  LENGTH OF SESSION:  10 minutes    The patient location is:  [place], not in a healthcare facility  Visit type:  audiovisual    Face to Face time with patient:  10 minutes  15 minutes of total time spent on the encounter, which includes face to face time and non-face to face time preparing to see the patient (eg, review of tests), Obtaining and/or reviewing separately obtained history, Documenting clinical information in the electronic or other health record, Independently interpreting results (not separately reported) and communicating results to the patient/family/caregiver, or Care coordination (not separately reported).     Each patient to whom he or she provides medical services by telemedicine is:  (1) informed of the relationship between the physician and patient and the respective role of any other health care provider with respect to management of the patient; and (2) notified that he or she may decline to receive medical services by telemedicine and may withdraw from such care at any time.    CHIEF COMPLAINT:  Anxiety      HISTORY OF PRESENTING ILLNESS:  Jaki Bajwa is a 54 y.o. female with history of Mood disorder, JUAN JOSE, OCD, social phobia, and Internet shopping addiction who presents for follow up appointment.     Plan at last appointment on 1/8/2025:  Start Zoloft 25mg daily x 1 week and then 50mg daily for anxiety.    Continue Latuda 60mg daily, Wellbutrin XL 450mg daily, Buspar 15mg BID-TID, Doxepin 150mg qHS, and Valium 2mg PRN panic attack (#5 tablets per month).  Discussed with patient informed consent, risks versus benefits, alternative treatments, side effect profile and the inherent unpredictability of individual responses to these treatments.  The patient expresses understanding of the above and displays the capacity to agree with this current plan.   Continue individual  therapy with  Noah, HALINA.    History as told by patient:  Tore meniscus - significant amount of pain, needs to see Ortho.  Saw NP.  Thinks Zoloft is helping her mood, decreased some spending, feelings happier.  Started having nightmares but can deal with this.  Having trouble sleeping because of pain - sleeping in recliner with ice pack.  Taken off Naproxen because it was messing with her kidneys.  Tylenol is not helpful.      Medication side effects:  Denies  Medication compliance:  Yes    PSYCHIATRIC REVIEW OF SYSTEMS:  Trouble with sleep:  Stable on Doxepin  Appetite changes:  Not discussed  Weight changes:  Not discussed  Lack of energy:  Denies  Anhedonia:  Denies  Somatic symptoms:  Chronic pain  Libido:  Denies  Anxiety/panic:  Chronic but stable  Guilty/hopeless:  Denies  Self-injurious behavior/risky behavior:  Denies  Any drugs:  Denies  Alcohol:  Denies    MEDICAL REVIEW OF SYSTEMS:  Complete review of systems performed covering Constitutional, Musculoskeletal, Neurologic.  All systems negative except for that covered in HPI.    PAST PSYCHIATRIC, MEDICAL, AND SOCIAL HISTORY REVIEWED  The patient's past medical, family and social history have been reviewed and updated as appropriate within the electronic medical record - see encounter notes.    MEDICATIONS:    Current Outpatient Medications:     amLODIPine (NORVASC) 5 MG tablet, Take 1 tablet (5 mg total) by mouth once daily., Disp: 90 tablet, Rfl: 3    buPROPion (WELLBUTRIN XL) 150 MG TB24 tablet, Take 1 tablet (150 mg total) by mouth once daily., Disp: 270 tablet, Rfl: 3    busPIRone (BUSPAR) 15 MG tablet, Take 1 tablet (15 mg total) by mouth once daily., Disp: 90 tablet, Rfl: 3    butalbital-acetaminophen-caffeine -40 mg (FIORICET, ESGIC) -40 mg per tablet, Take 1 tablet by mouth every 6 (six) hours as needed for Headaches., Disp: 40 tablet, Rfl: 0    cholecalciferol, vitamin D3, (VITAMIN D3) 25 mcg (1,000 unit) capsule, Take 1  capsule (1,000 Units total) by mouth once daily., Disp: , Rfl:     cycloSPORINE (RESTASIS) 0.05 % ophthalmic emulsion, Place 1 drop into both eyes 2 (two) times daily., Disp: 180 each, Rfl: 3    diazePAM (VALIUM) 2 MG tablet, Take 1 tablet (2 mg total) by mouth daily as needed (Severe anxiety/Panic). 15 tablets to last 3 months, Disp: 15 tablet, Rfl: 0    docusate sodium (COLACE) 100 MG capsule, Take 1 capsule (100 mg total) by mouth 2 (two) times daily as needed for Constipation., Disp: 60 capsule, Rfl: 0    doxepin (SINEQUAN) 75 MG capsule, TAKE 2 CAPSULES(150 MG) BY MOUTH EVERY EVENING, Disp: 180 capsule, Rfl: 3    estradioL (ESTRACE) 0.01 % (0.1 mg/gram) vaginal cream, Place 1 g vaginally twice a week., Disp: 42.5 g, Rfl: 1    estradiol 0.025 mg/24 hr 0.025 mg/24 hr, Place 1 patch onto the skin once a week., Disp: 4 patch, Rfl: 3    ferrous sulfate, dried (SLOW FE) 160 mg (50 mg iron) TbSR, Take 1 tablet (160 mg total) by mouth once daily., Disp: 90 tablet, Rfl: 2    furosemide (LASIX) 20 MG tablet, Take 1 tablet (20 mg total) by mouth once daily., Disp: 90 tablet, Rfl: 3    gabapentin (NEURONTIN) 400 MG capsule, Take 1 capsule (400 mg total) by mouth 3 (three) times daily., Disp: 90 capsule, Rfl: 2    HYDROcodone-acetaminophen (NORCO) 5-325 mg per tablet, Take 1 tablet by mouth every 24 hours as needed for Pain. Quantity medically necessary, Disp: 30 tablet, Rfl: 0    hydroxychloroquine (PLAQUENIL) 200 mg tablet, TAKE 1 TABLET BY MOUTH TWICE DAILY, Disp: 60 tablet, Rfl: 3    irbesartan (AVAPRO) 300 MG tablet, TAKE 1 TABLET(300 MG) BY MOUTH EVERY EVENING, Disp: 90 tablet, Rfl: 3    lurasidone (LATUDA) 60 mg Tab tablet, Take 1 tablet (60 mg total) by mouth once daily., Disp: 90 tablet, Rfl: 3    NARCAN 4 mg/actuation Spry, , Disp: , Rfl:     nystatin (MYCOSTATIN) 100,000 unit/mL suspension, Take 4 mLs (400,000 Units total) by mouth 4 (four) times daily., Disp: 480 mL, Rfl: 5    omeprazole (PRILOSEC) 40 MG  "capsule, Take 1 capsule (40 mg total) by mouth once daily., Disp: 90 capsule, Rfl: 3    pilocarpine (SALAGEN) 5 MG Tab, Take 1 tablet (5 mg total) by mouth 4 (four) times daily., Disp: 360 tablet, Rfl: 1    sertraline (ZOLOFT) 50 MG tablet, Take 1/2 tablet (25mg) daily x 7 days.  If tolerating can then increase to 1 tablet (50mg) daily thereafter., Disp: 30 tablet, Rfl: 3    tiZANidine (ZANAFLEX) 4 MG tablet, Take 1 tablet (4 mg total) by mouth 2 (two) times daily as needed (2 tablets twice daily as needed)., Disp: 60 tablet, Rfl: 2    ubrogepant (UBRELVY) 100 mg tablet, Take 1 tablet (100 mg total) by mouth as needed for Migraine. If symptoms persist or return, may repeat dose after 2 hours. Maximum: 200 mg per 24 hours, Disp: 30 tablet, Rfl: 2    VENOFER 100 mg iron/5 mL injection, , Disp: , Rfl:     ALLERGIES:  Review of patient's allergies indicates:   Allergen Reactions    Aspirin Hives       PSYCHIATRIC EXAM:  There were no vitals filed for this visit.      Appearance:  Well groomed, appearing healthy and of stated age  Behavior:  Cooperative, pleasant, no psychomotor agitation or retardation  Speech:  Normal rate, rhythm, prosody, and volume  Mood:  "Ok"  Affect:  Euthymic  Thought Process:  Linear, logical, goal directed  Thought Content:  Negative for suicidal ideation, homicidal ideation, delusions or hallucinations.  Associations:  Intact  Memory:  Grossly Intact  Level of Consciousness/Orientation:  Grossly intact  Fund of Knowledge:  Good  Attention:  Good  Language:  Fluent, able to name abstract and concrete objects  Insight:  Fair  Judgment:  Intact  Psychomotor signs:  No involuntary movements or tremor of face  Gait:  Unable to assess via virtual visit      RELEVANT LABS/STUDIES:    Lab Results   Component Value Date    WBC 7.26 01/16/2025    HGB 11.3 (L) 01/16/2025    HCT 39.1 01/16/2025    MCV 83 01/16/2025     01/16/2025     BMP  Lab Results   Component Value Date     10/11/2024    " K 4.0 10/11/2024     10/11/2024    CO2 28 10/11/2024    BUN 11 10/11/2024    CREATININE 1.2 10/11/2024    CALCIUM 9.7 10/11/2024    ANIONGAP 8 10/11/2024    ESTGFRAFRICA 45.5 (A) 06/15/2022    EGFRNONAA 39.5 (A) 06/15/2022     Lab Results   Component Value Date    ALT 11 09/19/2024    AST 15 09/19/2024    ALKPHOS 77 09/19/2024    BILITOT 0.3 09/19/2024     Lab Results   Component Value Date    TSH 2.563 09/22/2021     Lab Results   Component Value Date    HGBA1C 4.9 02/13/2024       IMPRESSION:    Jaki Bajwa is a 54 y.o. female with history of Mood disorder, JUAN JOSE, OCD, social phobia, and Internet shopping addiction who presents for follow up appointment.    Status/Progress:  Based on the examination today, the patient's problem(s) is/are adequately but not ideally controlled.  New problems have not been presented today.    Risk Parameters:  Patient reports no suicidal ideation  Patient reports no homicidal ideation  Patient reports no self-injurious behavior  Patient reports no violent behavior      DIAGNOSES:    ICD-10-CM ICD-9-CM   1. JUAN JOSE (generalized anxiety disorder)  F41.1 300.02   2. Compulsive shopping  F42.8 300.3   3. Panic disorder  F41.0 300.01   4. Social anxiety disorder  F40.10 300.23   5. Mood disorder  F39 296.90   6. Insomnia due to other mental disorder  F51.05 300.9    F99 327.02         PLAN:  Some improvement in depression and anxiety on Zoloft 50mg daily.  Unclear if nightmares are related to Zoloft, however will continue to monitor.    Continue Latuda 60mg daily, Zoloft 50mg daily, Wellbutrin XL 450mg daily, Buspar 15mg BID-TID, Doxepin 150mg qHS, and Valium 2mg PRN panic attack (#5 tablets per month).  She is seeing Ortho for torn meniscus.    Discussed with patient informed consent, risks versus benefits, alternative treatments, side effect profile and the inherent unpredictability of individual responses to these treatments.  The patient expresses understanding of the above and  displays the capacity to agree with this current plan.   Continue individual therapy with Mr. Noah LCSW.    RETURN TO CLINIC:  Follow up in about 3 months (around 5/24/2025).

## 2025-02-25 DIAGNOSIS — M79.7 FIBROMYALGIA: ICD-10-CM

## 2025-02-25 RX ORDER — TIZANIDINE 4 MG/1
4 TABLET ORAL 2 TIMES DAILY PRN
Qty: 60 TABLET | Refills: 2 | Status: SHIPPED | OUTPATIENT
Start: 2025-02-25

## 2025-02-26 ENCOUNTER — TELEPHONE (OUTPATIENT)
Dept: INTERNAL MEDICINE | Facility: CLINIC | Age: 55
End: 2025-02-26
Payer: MEDICARE

## 2025-02-26 NOTE — TELEPHONE ENCOUNTER
----- Message from Bg sent at 2/25/2025  8:21 AM CST -----  Type : Patient Call  Who Called : Patient   Does the patient know what this is regarding?: Requesting a call back from the Dr. Carpenter to discuss a increase in medication dosage. HYDROcodone-acetaminophen 5-325 mg per tablet 1 tablet . Pt is stating she is taking tylenol along with this medication and it is still not working/strong enough. Attempt to schedule a appt for today person and virtual, no available dates populated. Please Advise   Would the patient rather a call back or a response via [facility]? Call   Best Call Back Number: [phone]  Additional Information:

## 2025-03-03 ENCOUNTER — TELEPHONE (OUTPATIENT)
Dept: HEMATOLOGY/ONCOLOGY | Facility: CLINIC | Age: 55
End: 2025-03-03
Payer: MEDICARE

## 2025-03-03 NOTE — TELEPHONE ENCOUNTER
----- Message from [employer] sent at 3/3/2025  4:18 PM CST -----  Regarding: Returning a Missed Call  Contact: :Jaki Bajwa  Returning a Missed Call Caller:Jaki Bajwa   Returning call to: Nilda Caller can be reached @:[phone] (home)   Nature of the call:Patient is returning missed call to reschedule. Requesting a call back

## 2025-03-03 NOTE — TELEPHONE ENCOUNTER
----- Message from Noemi Ramos sent at 3/3/2025 10:16 AM CST -----  Regarding: Appt Reschedule  Contact: [phone]  Reschedule Appt: Current Appt Date: 3/31/25Type of Appt: 4-6 MONTH FU CBC, ferritin and iron and TIBC  labs 1 week before return visit Physician: Jacques: Patient Contact Preference: [phone]

## 2025-03-03 NOTE — TELEPHONE ENCOUNTER
Spoke with  and rescheduled her appointment to 4/1/25 at 1 pm as requested. Pt verbalized agreement and understanding to time, date, and location.

## 2025-03-05 DIAGNOSIS — G44.009 CLUSTER HEADACHE, NOT INTRACTABLE, UNSPECIFIED CHRONICITY PATTERN: ICD-10-CM

## 2025-03-05 RX ORDER — BUTALBITAL, ACETAMINOPHEN AND CAFFEINE 50; 325; 40 MG/1; MG/1; MG/1
1 TABLET ORAL EVERY 6 HOURS PRN
Qty: 40 TABLET | Refills: 0 | Status: SHIPPED | OUTPATIENT
Start: 2025-03-05

## 2025-03-05 NOTE — TELEPHONE ENCOUNTER
No care due was identified.  [facility] Embedded Care Due Messages. Reference number: [id number].   3/05/2025 11:34:35 AM CST

## 2025-03-05 NOTE — TELEPHONE ENCOUNTER
Refill Encounter    PCP Visits: Recent Visits  Date Type Provider Dept   02/13/25 Office Visit Andrea Barlow, NP [facility] Internal Medicine   01/13/25 Office Visit Weeks, Jose ALONSO, DO [facility] Internal Medicine   10/11/24 Office Visit Weeks, Jose ALONSO, DO [facility] Internal Medicine   09/06/24 Office Visit Weeks, Jose ALONSO, DO [facility] Internal Medicine   08/27/24 Office Visit Irma Gamboa, MD [facility] Internal Medicine   07/12/24 Office Visit Weeks, Jose ALONSO, DO [facility] Internal Medicine   07/08/24 Office Visit Irma Gamboa, MD [facility] Internal Medicine   06/19/24 Office Visit Weeks, Jose ALONSO, DO [facility] Internal Medicine   04/09/24 Office Visit Weeks, Jose ALONSO, DO [facility] Internal [facility]   04/03/24 Office Visit Andrea Barlow, NP [facility] Internal Medicine   Showing recent visits within past 360 days and meeting all other requirements  Future Appointments  Date Type Provider Dept   03/10/25 Appointment Weeks, Jose ALONSO, [facility] Internal Medicine   04/09/25 Appointment Weeks, Jose ALONSO, DO [facility] Internal [facility]   Showing future appointments within next 720 days and meeting all other requirements      Last 3 Blood Pressure:   BP Readings from Last 3 Encounters:   02/13/25 118/79   02/13/25 120/80   12/18/24 (!) 142/98     Preferred Pharmacy:   [facility][id number] [address] [facility] [address]  [facility]  [facility] [address]  Phone: [phone] Fax: [phone]    Requested RX:  Requested Prescriptions     Pending Prescriptions Disp Refills    butalbital-acetaminophen-caffeine -40 mg (FIORICET, ESGIC) -40 mg per tablet 40 tablet 0     Sig: Take 1 tablet by mouth every 6 (six) hours as needed for Headaches.      RX Route: Normal

## 2025-03-07 ENCOUNTER — OFFICE VISIT (OUTPATIENT)
Dept: ORTHOPEDICS | Facility: CLINIC | Age: 55
End: 2025-03-07
Payer: MEDICARE

## 2025-03-07 VITALS — HEIGHT: 67 IN | WEIGHT: 250 LBS | BODY MASS INDEX: 39.24 KG/M2

## 2025-03-07 DIAGNOSIS — M17.11 ARTHRITIS OF RIGHT KNEE: Primary | ICD-10-CM

## 2025-03-07 DIAGNOSIS — G89.4 CHRONIC PAIN SYNDROME: ICD-10-CM

## 2025-03-07 DIAGNOSIS — F11.20 CHRONIC NARCOTIC DEPENDENCE: ICD-10-CM

## 2025-03-07 DIAGNOSIS — S83.281A OTHER TEAR OF LATERAL MENISCUS OF RIGHT KNEE AS CURRENT INJURY, INITIAL ENCOUNTER: ICD-10-CM

## 2025-03-07 PROCEDURE — 1159F MED LIST DOCD IN RCRD: CPT | Mod: HCNC,CPTII,S$GLB, | Performed by: STUDENT IN AN ORGANIZED HEALTH CARE EDUCATION/TRAINING PROGRAM

## 2025-03-07 PROCEDURE — 99999 PR PBB SHADOW E&M-EST. PATIENT-LVL IV: CPT | Mod: PBBFAC,HCNC,, | Performed by: STUDENT IN AN ORGANIZED HEALTH CARE EDUCATION/TRAINING PROGRAM

## 2025-03-07 PROCEDURE — 4010F ACE/ARB THERAPY RXD/TAKEN: CPT | Mod: HCNC,CPTII,S$GLB, | Performed by: STUDENT IN AN ORGANIZED HEALTH CARE EDUCATION/TRAINING PROGRAM

## 2025-03-07 PROCEDURE — 20610 DRAIN/INJ JOINT/BURSA W/O US: CPT | Mod: HCNC,RT,S$GLB, | Performed by: STUDENT IN AN ORGANIZED HEALTH CARE EDUCATION/TRAINING PROGRAM

## 2025-03-07 PROCEDURE — 3008F BODY MASS INDEX DOCD: CPT | Mod: HCNC,CPTII,S$GLB, | Performed by: STUDENT IN AN ORGANIZED HEALTH CARE EDUCATION/TRAINING PROGRAM

## 2025-03-07 PROCEDURE — 1160F RVW MEDS BY RX/DR IN RCRD: CPT | Mod: HCNC,CPTII,S$GLB, | Performed by: STUDENT IN AN ORGANIZED HEALTH CARE EDUCATION/TRAINING PROGRAM

## 2025-03-07 PROCEDURE — 99214 OFFICE O/P EST MOD 30 MIN: CPT | Mod: 25,HCNC,S$GLB, | Performed by: STUDENT IN AN ORGANIZED HEALTH CARE EDUCATION/TRAINING PROGRAM

## 2025-03-07 RX ORDER — HYDROCODONE BITARTRATE AND ACETAMINOPHEN 5; 325 MG/1; MG/1
1 TABLET ORAL
Qty: 30 TABLET | Refills: 0 | Status: SHIPPED | OUTPATIENT
Start: 2025-03-07

## 2025-03-07 RX ADMIN — TRIAMCINOLONE ACETONIDE 40 MG: 40 INJECTION, SUSPENSION INTRA-ARTICULAR; INTRAMUSCULAR at 11:03

## 2025-03-07 NOTE — TELEPHONE ENCOUNTER
Refill Encounter    PCP Visits: Recent Visits  Date Type Provider Dept   02/13/25 Office Visit Andrea Barlow, NP [facility] Internal Medicine   01/13/25 Office Visit Weeks, Jose ALONSO, DO [facility] Internal Medicine   10/11/24 Office Visit Weeks, Jose ALONSO, DO [facility] Internal Medicine   09/06/24 Office Visit Weeks, Jose LAONSO, DO [facility] Internal Medicine   08/27/24 Office Visit Irma Gamboa, MD [facility] Internal Medicine   07/12/24 Office Visit Weeks, Jose ALONSO, DO [facility] Internal Medicine   07/08/24 Office Visit Irma Gamboa, MD [facility] Internal Medicine   06/19/24 Office Visit Weeks, Jose ALONSO, DO [facility] Internal Medicine   04/09/24 Office Visit Weeks, Jose ALONSO, DO [facility] Internal Medicine   04/03/24 Office Visit Andrea Barlow, NP [facility] Internal Medicine   Showing recent visits within past 360 days and meeting all other requirements  Future Appointments  Date Type Provider Dept   03/10/25 Appointment Weeks, Jose ALONSO, [facility] Internal Medicine   04/09/25 Appointment Weeks, Jose ALONSO, DO [facility] Internal [facility]   Showing future appointments within next 720 days and meeting all other requirements      Last 3 Blood Pressure:   BP Readings from Last 3 Encounters:   02/13/25 118/79   02/13/25 120/80   12/18/24 (!) 142/98     Preferred Pharmacy:   [facility][id number] [address] [facility] [address]  [facility]  [facility] [address]  Phone: [phone] Fax: [phone]    Requested RX:  Requested Prescriptions     Pending Prescriptions Disp Refills    HYDROcodone-acetaminophen (NORCO) 5-325 mg per tablet 30 tablet 0     Sig: Take 1 tablet by mouth every 24 hours as needed for Pain. Quantity medically necessary      RX Route: Normal

## 2025-03-07 NOTE — TELEPHONE ENCOUNTER
No care due was identified.  [facility] Embedded Care Due Messages. Reference number: [id number].   3/07/2025 1:43:23 PM CST

## 2025-03-07 NOTE — PROGRESS NOTES
Encounter Diagnoses   Name Primary?    Arthritis of right knee Yes    Other tear of lateral meniscus of right knee as current injury, initial encounter      Assessment & Plan    Ms. Bajwa is a 54 yoF who presents with right knee pain since an injury on November 2024. On MRI, she was found to have a lateral meniscus tear. She continues to have pain after trying conservative therapy consisting of brace, Tylenol and Advil. Patient is interested in CSI today, which was administered to right knee in clinic. Please see procedure note below for details.  - CSI in clinic today  - Continue conservative therapy consisting of brace, and anti-inflammatory medications  - PT referral   - FU in a few months or as needed.            Patient ID: Jaki Bajwa is a 54 y.o. female.  Chief Complaint   Patient presents with    Right Knee - Pain        History of Present Illness    CHIEF COMPLAINT:  - Right knee pain following a mechanical fall.    HPI:  Ms. Bajwa, with a past medical history of hypertension, fibromyalgia, and Sjogren's syndrome, presents for evaluation of a right lateral meniscal tear. She reports a sudden mechanical fall in November 2024, resulting in ongoing knee pain. Pain is located in the medial, lateral, and anterior portions of the knee and has progressively worsened since the initial injury. She describes it as sharp, dull, throbbing, and tight, rating it as 10 out of 10. Pain is constant with various activities, including walking, standing, sitting, and at night.    Ms. Bajwa reports knee stiffness and rates her overall function as 30 on a scale from zero to 100 normal. She has difficulty with stairs, taking them one at a time, and struggles with putting on socks and shoes. She cannot sit for more than half an hour without discomfort. She has attempted pain management with Tylenol and Advil, which have provided minimal relief. She has been using a knee brace, which has helped slightly, and has been  "relying on a cane for mobility since the injury.    Ms. Bajwa has not attempted physical therapy.    PREVIOUS TREATMENTS:  - Wearing a brace: Since injury, helped slightly  - Using a cane: Since injury, for mobility assistance  - Tylenol: Provided minimal relief  - Advil: Provided minimal relief    MEDICATIONS:  - Tylenol: Providing minimal benefit  - Advil: Providing minimal benefit    MEDICAL HISTORY:  - Hypertension  - Fibromyalgia  - Sjogren's syndrome          The knee pain has been present since 2024 and has been progressive. Conservative treatment in the form of brace, tylenol and advil has been attempted previously. The patient requires a cane as assistive device. The pain is worsened by activity, and sitting. Physical therapy has not been attempted previously. The patient has a decreased quality of life due to knee pain and presents today for evaluation.     Past Medical History:  Past Medical History:   Diagnosis Date    CANDELARIA (acute kidney injury) 2022    Anemia     Anxiety     Depression     History of psychiatric hospitalization     [place] x 1 week for depression    History of ventral hernia repair     Hypertension     Lupus     patient reports that she is being treated "like I have Lupus"    Mental disorder     Morbid obesity 12/15/2017    Psychiatric problem     Sjogren's syndrome 2013    Therapy     TMJ (temporomandibular joint disorder)         Surgical History:  Past Surgical History:   Procedure Laterality Date    BONE MARROW BIOPSY N/A 2024    Procedure: Biopsy-bone marrow;  Surgeon: Latrice Arce MD;  Location: [address]);  Service: Oncology;  Laterality: N/A;  -pre call complete-tb    breast reduction      BREAST SURGERY  2005     SECTION      x 2    ESOPHAGOGASTRODUODENOSCOPY N/A 2024    Procedure: EGD (ESOPHAGOGASTRODUODENOSCOPY);  Surgeon: Parth Hernández MD;  Location: [facility] ENDOSCOPY;  Service: Endoscopy;  Laterality: N/A;  Ref " "Dr Claude [id number]  11/4/24- pc complete. DBM    HYSTERECTOMY  02/02/2005    INGUINAL HERNIA REPAIR      LAPAROSCOPIC TOTAL HYSTERECTOMY  2012    ASA    TOTAL REDUCTION MAMMOPLASTY      TUBAL LIGATION      VENTRAL HERNIA REPAIR          Social History:  She reports that she has never smoked. She has never used smokeless tobacco. She reports that she does not currently use alcohol after a past usage of about 1.0 standard drink of alcohol per week. She reports that she does not currently use drugs after having used the following drugs: Barbituates and Hydrocodone.     Family History:  family history includes Alcohol abuse in her brother; Anxiety disorder in her father and sister; Arthritis in her father; Asthma in her son; COPD in her sister; Cancer in her father, sister, and sister; Colon cancer in her father; Depression in her brother, father, sister, and sister; Heart attack in her sister; Heart disease in her mother; Hypertension in her mother; Liver cancer in her sister and another family member; Mental illness in her father; Miscarriages / Stillbirths in her sister; No Known Problems in her daughter; Pancreatic cancer in her brother and another family member; Schizophrenia in her father.     Medications Ordered Prior to Encounter[1]  Review of patient's allergies indicates:   Allergen Reactions    Aspirin Hives          Physical exam:  Height 5' 7.28" (1.709 m), weight 113.4 kg (250 lb), last menstrual period 07/11/2012.  General: no apparent distress    Gait: antalgic, right sided limp, without use of assistive devices    Physical Exam    Right knee:   TTP at the anterior, medial and lateral joint line and over patella  Skin: intact, mild effusion  Range of motion: 3 to 95  Strength: 4/5 with extension, 4/5 with flexion  Ligament exam: stable to varus/valgus and stable to AP stress in flexion and extension  Neurovascular: 2+ DP pulse,  Light touch sensation        Relevant Results:  Imaging:  Plain x-rays " of the right knee were obtained and independently reviewed by me today, 3/7/2025 , and demonstrate minimal joint space narrowing, an ossicle overlying the medial right tibial spinous process intercondylar region.  MRI shows partial lateral meniscal tear with chondral fissuring.     Labs:  Hb- 11.3  Cr- 1.5  HbA1c- 4.9  Alb- 4      This note was generated with the assistance of ambient listening technology. Verbal consent was obtained by the patient and accompanying visitor(s) for the recording of patient appointment to facilitate this note. I attest to having reviewed and edited the generated note for accuracy, though some syntax or spelling errors may persist. Please contact the author of this note for any clarification.           [1]   Current Outpatient Medications on File Prior to Visit   Medication Sig Dispense Refill    amLODIPine (NORVASC) 5 MG tablet Take 1 tablet (5 mg total) by mouth once daily. 90 tablet 3    buPROPion (WELLBUTRIN XL) 150 MG TB24 tablet Take 1 tablet (150 mg total) by mouth once daily. 270 tablet 3    busPIRone (BUSPAR) 15 MG tablet Take 1 tablet (15 mg total) by mouth once daily. 90 tablet 3    butalbital-acetaminophen-caffeine -40 mg (FIORICET, ESGIC) -40 mg per tablet Take 1 tablet by mouth every 6 (six) hours as needed for Headaches. 40 tablet 0    cholecalciferol, vitamin D3, (VITAMIN D3) 25 mcg (1,000 unit) capsule Take 1 capsule (1,000 Units total) by mouth once daily.      cycloSPORINE (RESTASIS) 0.05 % ophthalmic emulsion Place 1 drop into both eyes 2 (two) times daily. 180 each 3    docusate sodium (COLACE) 100 MG capsule Take 1 capsule (100 mg total) by mouth 2 (two) times daily as needed for Constipation. 60 capsule 0    doxepin (SINEQUAN) 75 MG capsule TAKE 2 CAPSULES(150 MG) BY MOUTH EVERY EVENING 180 capsule 3    estradioL (ESTRACE) 0.01 % (0.1 mg/gram) vaginal cream Place 1 g vaginally twice a week. 42.5 g 1    estradiol 0.025 mg/24 hr 0.025 mg/24 hr Place 1  patch onto the skin once a week. 4 patch 3    ferrous sulfate, dried (SLOW FE) 160 mg (50 mg iron) TbSR Take 1 tablet (160 mg total) by mouth once daily. 90 tablet 2    furosemide (LASIX) 20 MG tablet Take 1 tablet (20 mg total) by mouth once daily. 90 tablet 3    gabapentin (NEURONTIN) 400 MG capsule Take 1 capsule (400 mg total) by mouth 3 (three) times daily. 90 capsule 2    HYDROcodone-acetaminophen (NORCO) 5-325 mg per tablet Take 1 tablet by mouth every 24 hours as needed for Pain. Quantity medically necessary 30 tablet 0    hydroxychloroquine (PLAQUENIL) 200 mg tablet TAKE 1 TABLET BY MOUTH TWICE DAILY 60 tablet 3    irbesartan (AVAPRO) 300 MG tablet TAKE 1 TABLET(300 MG) BY MOUTH EVERY EVENING 90 tablet 3    lurasidone (LATUDA) 60 mg Tab tablet Take 1 tablet (60 mg total) by mouth once daily. 90 tablet 3    NARCAN 4 mg/actuation Spry       nystatin (MYCOSTATIN) 100,000 unit/mL suspension Take 4 mLs (400,000 Units total) by mouth 4 (four) times daily. 480 mL 5    omeprazole (PRILOSEC) 40 MG capsule Take 1 capsule (40 mg total) by mouth once daily. 90 capsule 3    sertraline (ZOLOFT) 50 MG tablet Take 1/2 tablet (25mg) daily x 7 days.  If tolerating can then increase to 1 tablet (50mg) daily thereafter. 30 tablet 3    tiZANidine (ZANAFLEX) 4 MG tablet Take 1 tablet (4 mg total) by mouth 2 (two) times daily as needed (2 tablets twice daily as needed). 60 tablet 2    ubrogepant (UBRELVY) 100 mg tablet Take 1 tablet (100 mg total) by mouth as needed for Migraine. If symptoms persist or return, may repeat dose after 2 hours. Maximum: 200 mg per 24 hours 30 tablet 2    VENOFER 100 mg iron/5 mL injection       diazePAM (VALIUM) 2 MG tablet Take 1 tablet (2 mg total) by mouth daily as needed (Severe anxiety/Panic). 15 tablets to last 3 months 15 tablet 0    pilocarpine (SALAGEN) 5 MG Tab Take 1 tablet (5 mg total) by mouth 4 (four) times daily. 360 tablet 1     No current facility-administered medications on file  prior to visit.

## 2025-03-10 ENCOUNTER — TELEPHONE (OUTPATIENT)
Dept: INTERNAL MEDICINE | Facility: CLINIC | Age: 55
End: 2025-03-10
Payer: MEDICARE

## 2025-03-10 ENCOUNTER — OFFICE VISIT (OUTPATIENT)
Dept: INTERNAL MEDICINE | Facility: CLINIC | Age: 55
End: 2025-03-10
Payer: MEDICARE

## 2025-03-10 VITALS
DIASTOLIC BLOOD PRESSURE: 79 MMHG | WEIGHT: 250 LBS | HEIGHT: 67 IN | SYSTOLIC BLOOD PRESSURE: 118 MMHG | BODY MASS INDEX: 39.24 KG/M2

## 2025-03-10 DIAGNOSIS — F11.20 CHRONIC NARCOTIC DEPENDENCE: ICD-10-CM

## 2025-03-10 DIAGNOSIS — M54.50 CHRONIC BILATERAL LOW BACK PAIN WITHOUT SCIATICA: Primary | ICD-10-CM

## 2025-03-10 DIAGNOSIS — G89.29 CHRONIC BILATERAL LOW BACK PAIN WITHOUT SCIATICA: Primary | ICD-10-CM

## 2025-03-10 DIAGNOSIS — G89.4 CHRONIC PAIN SYNDROME: ICD-10-CM

## 2025-03-10 PROCEDURE — 1159F MED LIST DOCD IN RCRD: CPT | Mod: HCNC,CPTII,95, | Performed by: FAMILY MEDICINE

## 2025-03-10 PROCEDURE — 3074F SYST BP LT 130 MM HG: CPT | Mod: HCNC,CPTII,95, | Performed by: FAMILY MEDICINE

## 2025-03-10 PROCEDURE — 3078F DIAST BP <80 MM HG: CPT | Mod: HCNC,CPTII,95, | Performed by: FAMILY MEDICINE

## 2025-03-10 PROCEDURE — 98005 SYNCH AUDIO-VIDEO EST LOW 20: CPT | Mod: HCNC,95,, | Performed by: FAMILY MEDICINE

## 2025-03-10 PROCEDURE — 4010F ACE/ARB THERAPY RXD/TAKEN: CPT | Mod: HCNC,CPTII,95, | Performed by: FAMILY MEDICINE

## 2025-03-10 RX ORDER — HYDROCODONE BITARTRATE AND ACETAMINOPHEN 5; 325 MG/1; MG/1
1 TABLET ORAL
Qty: 30 TABLET | Refills: 0 | OUTPATIENT
Start: 2025-03-10

## 2025-03-10 NOTE — PROGRESS NOTES
Subjective:       Patient ID: Jaki Bajwa is a 54 y.o. female.    The patient location is: [place]  The chief complaint leading to consultation is:   Chief Complaint   Patient presents with    Torn miniscus         Visit type: audiovisual    Face to Face time with patient: 6 minutes of total time spent on the encounter, which includes face to face time and non-face to face time preparing to see the patient (eg, review of tests), Obtaining and/or reviewing separately obtained history, Documenting clinical information in the electronic or other health record, Independently interpreting results (not separately reported) and communicating results to the patient/family/caregiver, or Care coordination (not separately reported).       Each patient to whom he or she provides medical services by telemedicine is:  (1) informed of the relationship between the physician and patient and the respective role of any other health care provider with respect to management of the patient; and (2) notified that he or she may decline to receive medical services by telemedicine and may withdraw from such care at any time.    Notes:     HPI  History of Present Illness    CHIEF COMPLAINT:  Jaki presents today for medication adjustment    KNEE PAIN AND TREATMENT:  She reports knee pain since November 2024 that significantly impacts sleep, preventing her from sleeping in bed due to pain with movement. She was evaluated by orthopedics and received a cortisone injection. She reports current once-daily pain medication regimen is insufficient and requests temporary increase to twice daily due to increased discomfort.      ROS:  General: -fever, -chills, -fatigue, -weight gain, -weight loss  Eyes: -vision changes, -redness, -discharge  ENT: -ear pain, -nasal congestion, -sore throat  Cardiovascular: -chest pain, -palpitations, -lower extremity edema  Respiratory: -cough, -shortness of breath  Gastrointestinal: -abdominal pain, -nausea,  -vomiting, -diarrhea, -constipation, -blood in stool  Genitourinary: -dysuria, -hematuria, -frequency  Musculoskeletal: -joint pain, -muscle pain  Skin: -rash, -lesion  Neurological: -headache, -dizziness, -numbness, -tingling  Psychiatric: -anxiety, -depression, -sleep difficulty           All of your core healthy metrics are met.      Social History     Social History Narrative    Not on file       Family History   Problem Relation Name Age of Onset    Heart disease Mother Amalia Bajwa     Hypertension Mother Amalia Bajwa     Cancer Father Demarcus Lawtons sr.         colon ca    Colon cancer Father Demarcus Lawotns sr.     Depression Father Demarcus Lawtons sr.     Schizophrenia Father Demarcus Lawtons sr.     Anxiety disorder Father Demarcus Lawtons sr.     Arthritis Father Demarcus Lawtons sr.     Mental illness Father Demarcus Lawtons sr.     Liver cancer Sister Fifi     Cancer Sister Fifi     Depression Sister Fifi     Anxiety disorder Sister Fifi     Heart attack Sister Fifi     COPD Sister Fifi     Cancer Sister Madeline         throat and colon    Depression Sister Madeline     Miscarriages / Stillbirths Sister Madeline     Pancreatic cancer Brother Demarcus bajwa     Depression Brother Demarcus bajwa     Alcohol abuse Brother Demarcus bajwa     No Known Problems Daughter Ikea     Asthma Son Scott     Pancreatic cancer Other      Liver cancer Other      Suicide Neg Hx      Ovarian cancer Neg Hx      Breast cancer Neg Hx       Current Medications[1]    Review of Systems   Constitutional:  Negative for activity change and unexpected weight change.   HENT:  Negative for hearing loss, rhinorrhea and trouble swallowing.    Eyes:  Negative for discharge and visual disturbance.   Respiratory:  Negative for chest tightness and wheezing.    Cardiovascular:  Negative for chest pain and palpitations.   Gastrointestinal:  Negative for blood in stool, constipation, diarrhea and vomiting.   Endocrine: Negative for polydipsia and polyuria.  "  Genitourinary:  Negative for difficulty urinating, dysuria, hematuria and menstrual problem.   Musculoskeletal:  Positive for arthralgias and joint swelling. Negative for neck pain.   Neurological:  Negative for headaches.   Psychiatric/Behavioral:  Negative for confusion and dysphoric mood.        Objective:   /79   Ht 5' 7.28" (1.709 m)   Wt 113.4 kg (250 lb)   LMP 07/11/2012 Comment: partial   BMI 38.83 kg/m²      Physical Exam  Constitutional:       General: She is not in acute distress.     Appearance: She is well-developed.   HENT:      Head: Normocephalic.   Pulmonary:      Effort: Pulmonary effort is normal. No respiratory distress.   Neurological:      Mental Status: She is alert and oriented to person, place, and time.   Psychiatric:         Behavior: Behavior normal.         Physical Exam              @resultssec@  Assessment & Plan   Assessment & Plan    IMPRESSION:  - Assessed knee pain, likely due to meniscus tear from late 2024  - Considered recent orthopedic intervention with cortisone injection  - Approved temporary increase in pain medication frequency while awaiting full effect of steroid injection  - Anticipated potential need for further orthopedic intervention if symptoms persist  - Evaluated kidney function, determining no immediate need for lab work    PLAN SUMMARY:   Increased pain medication dosage from daily to twice daily temporarily   Started turmeric supplement daily as a natural anti-inflammatory   Advised follow-up with orthopedics if needed   Follow-up in one week to evaluate effect of steroid injection    KNEE PAIN:   Monitored the patient's knee pain, which started in November 2024 and has been ongoing, affecting sleep and mobility.   Evaluated the patient's recent orthopedic appointment, where they likely received a cortisone injection.   Assessed the knee issue and discussed potential treatment options, ranging from injections and physical therapy to surgery for " meniscus tears.   Approved temporary increase in pain medication dosage from daily to twice daily while knee pain persists.   Instructed the patient to monitor the effect of the steroid injection over the next week.   Advised follow-up with orthopedics if needed.   Explained range of treatments for meniscus tears, from injections and physical therapy to surgery.    MEDICATIONS/SUPPLEMENTS:   Recommend turmeric as a safe, natural anti-inflammatory supplement, to be taken daily.   Discussed safety and natural origin of turmeric as an anti-inflammatory supplement.   Started turmeric supplement daily as a safe, natural anti-inflammatory.         Problem List Items Addressed This Visit    None        Immunizations Administered on Date of Encounter - 3/10/2025       No immunizations on file.             No follow-ups on file.    This note was generated with the assistance of ambient listening technology. Verbal consent was obtained by the patient and accompanying visitor(s) for the recording of patient appointment to facilitate this note. I attest to having reviewed and edited the generated note for accuracy, though some syntax or spelling errors may persist. Please contact the author of this note for any clarification.      Disclaimer:  This note may have been prepared using voice recognition software, it may have not been extensively proofed, as such there could be errors within the text such as sound alike errors.         [1]   Current Outpatient Medications:     amLODIPine (NORVASC) 5 MG tablet, Take 1 tablet (5 mg total) by mouth once daily., Disp: 90 tablet, Rfl: 3    buPROPion (WELLBUTRIN XL) 150 MG TB24 tablet, Take 1 tablet (150 mg total) by mouth once daily., Disp: 270 tablet, Rfl: 3    busPIRone (BUSPAR) 15 MG tablet, Take 1 tablet (15 mg total) by mouth once daily., Disp: 90 tablet, Rfl: 3    butalbital-acetaminophen-caffeine -40 mg (FIORICET, ESGIC) -40 mg per tablet, Take 1 tablet by mouth every  6 (six) hours as needed for Headaches., Disp: 40 tablet, Rfl: 0    cycloSPORINE (RESTASIS) 0.05 % ophthalmic emulsion, Place 1 drop into both eyes 2 (two) times daily., Disp: 180 each, Rfl: 3    diazePAM (VALIUM) 2 MG tablet, Take 1 tablet (2 mg total) by mouth daily as needed (Severe anxiety/Panic). 15 tablets to last 3 months, Disp: 15 tablet, Rfl: 0    docusate sodium (COLACE) 100 MG capsule, Take 1 capsule (100 mg total) by mouth 2 (two) times daily as needed for Constipation., Disp: 60 capsule, Rfl: 0    doxepin (SINEQUAN) 75 MG capsule, TAKE 2 CAPSULES(150 MG) BY MOUTH EVERY EVENING, Disp: 180 capsule, Rfl: 3    estradioL (ESTRACE) 0.01 % (0.1 mg/gram) vaginal cream, Place 1 g vaginally twice a week., Disp: 42.5 g, Rfl: 1    estradiol 0.025 mg/24 hr 0.025 mg/24 hr, Place 1 patch onto the skin once a week., Disp: 4 patch, Rfl: 3    ferrous sulfate, dried (SLOW FE) 160 mg (50 mg iron) TbSR, Take 1 tablet (160 mg total) by mouth once daily., Disp: 90 tablet, Rfl: 2    furosemide (LASIX) 20 MG tablet, Take 1 tablet (20 mg total) by mouth once daily., Disp: 90 tablet, Rfl: 3    gabapentin (NEURONTIN) 400 MG capsule, Take 1 capsule (400 mg total) by mouth 3 (three) times daily., Disp: 90 capsule, Rfl: 2    HYDROcodone-acetaminophen (NORCO) 5-325 mg per tablet, Take 1 tablet by mouth every 24 hours as needed for Pain. Quantity medically necessary, Disp: 30 tablet, Rfl: 0    hydroxychloroquine (PLAQUENIL) 200 mg tablet, TAKE 1 TABLET BY MOUTH TWICE DAILY, Disp: 60 tablet, Rfl: 3    irbesartan (AVAPRO) 300 MG tablet, TAKE 1 TABLET(300 MG) BY MOUTH EVERY EVENING, Disp: 90 tablet, Rfl: 3    lurasidone (LATUDA) 60 mg Tab tablet, Take 1 tablet (60 mg total) by mouth once daily., Disp: 90 tablet, Rfl: 3    NARCAN 4 mg/actuation Spry, , Disp: , Rfl:     omeprazole (PRILOSEC) 40 MG capsule, Take 1 capsule (40 mg total) by mouth once daily., Disp: 90 capsule, Rfl: 3    pilocarpine (SALAGEN) 5 MG Tab, Take 1 tablet (5 mg total)  by mouth 4 (four) times daily., Disp: 360 tablet, Rfl: 1    sertraline (ZOLOFT) 50 MG tablet, Take 1/2 tablet (25mg) daily x 7 days.  If tolerating can then increase to 1 tablet (50mg) daily thereafter., Disp: 30 tablet, Rfl: 3    tiZANidine (ZANAFLEX) 4 MG tablet, Take 1 tablet (4 mg total) by mouth 2 (two) times daily as needed (2 tablets twice daily as needed)., Disp: 60 tablet, Rfl: 2    ubrogepant (UBRELVY) 100 mg tablet, Take 1 tablet (100 mg total) by mouth as needed for Migraine. If symptoms persist or return, may repeat dose after 2 hours. Maximum: 200 mg per 24 hours, Disp: 30 tablet, Rfl: 2    cholecalciferol, vitamin D3, (VITAMIN D3) 25 mcg (1,000 unit) capsule, Take 1 capsule (1,000 Units total) by mouth once daily. (Patient not taking: Reported on 3/10/2025), Disp: , Rfl:     nystatin (MYCOSTATIN) 100,000 unit/mL suspension, Take 4 mLs (400,000 Units total) by mouth 4 (four) times daily., Disp: 480 mL, Rfl: 5    VENOFER 100 mg iron/5 mL injection, , Disp: , Rfl:

## 2025-03-10 NOTE — TELEPHONE ENCOUNTER
No care due was identified.  [facility] Embedded Care Due Messages. Reference number: [id number].   3/10/2025 4:14:18 PM CDT

## 2025-03-10 NOTE — TELEPHONE ENCOUNTER
----- Message from Lien sent at 3/7/2025 12:38 PM CST -----  Type: RX Refill RequestWho called: PatientRefill or New Rx: Refill Rx name and Strength: HYDROcodone-acetaminophen (NORCO) 5-325 mg per tabletRequesting a nurse to give a call back ; please advise Would the patient rather a call back or a response via [facility]? CallBe call back number: [phone] information:[facility][id number] - [place], [place] - [address] AT [facility] [id number]MAN[address] [place] [address]Wcnxx: [phone] Fax: [phone]

## 2025-03-10 NOTE — TELEPHONE ENCOUNTER
Refill Encounter    PCP Visits: Recent Visits  Date Type Provider Dept   02/13/25 Office Visit Andrea Barlow, NP [facility]   01/13/25 Office Visit Weeks, Jose ALONSO, DO [facility]   10/11/24 Office Visit Weeks, Jose ALONSO, DO [facility]   09/06/24 Office Visit Weeks, Jose ALONSO, DO [facility]   08/27/24 Office Visit Irma Gamboa, MD [facility]   07/12/24 Office Visit Weeks, Jose ALONSO, DO [facility]   07/08/24 Office Visit Irma Gamboa, MD [facility]   06/19/24 Office Visit Weeks, Jose ALONSO, DO [facility]   04/09/24 Office Visit Weeks, Joes ALONSO, DO [facility]   04/03/24 Office Visit Andrea Barlow, NP [facility]   Showing recent visits within past 360 days and meeting all other requirements  Today's Visits  Date Type Provider Dept   03/10/25 Appointment Weeks, Jose ALONSO,  [facility]   Showing today's visits and meeting all other requirements  Future Appointments  Date Type Provider Dept   04/09/25 Appointment Weeks, Jose ALONSO,  [facility]   Showing future appointments within next 720 days and meeting all other requirements      Last 3 Blood Pressure:   BP Readings from Last 3 Encounters:   02/13/25 118/79   02/13/25 120/80   12/18/24 (!) 142/98     Preferred Pharmacy:   [id number] [address] [address] [facility] [address]  [facility]  [facility] [address]  Phone: [phone] Fax: [phone]    Requested RX:  Requested Prescriptions     Pending Prescriptions Disp Refills    HYDROcodone-acetaminophen (NORCO) 5-325 mg per tablet 30 tablet 0     Sig: Take 1 tablet by mouth every 24 hours as needed for Pain. Quantity medically necessary      RX Route: Normal

## 2025-03-10 NOTE — TELEPHONE ENCOUNTER
----- Message from Gabriela sent at 3/7/2025 11:45 AM CST -----  Who Called:Refill or New Rx: refill RX Name and Strength:  HYDROcodone-acetaminophen (NORCO) 5-325 mg per tabletIs this a 30 day or 90 day RXPreferred Pharmacy with phone number:  [facility] #[id number] [place] [address] AT [facility] [address] or Mail Order: local Would the patient rather a call back or a response via [facility]? Call back Best Call Back Number:Additional Information:

## 2025-03-11 RX ORDER — TRIAMCINOLONE ACETONIDE 40 MG/ML
40 INJECTION, SUSPENSION INTRA-ARTICULAR; INTRAMUSCULAR
Status: DISCONTINUED | OUTPATIENT
Start: 2025-03-07 | End: 2025-03-11 | Stop reason: HOSPADM

## 2025-03-11 NOTE — PROCEDURES
Large Joint Aspiration/Injection: R knee    Date/Time: 3/7/2025 11:00 AM    Performed by: Sunny Morrow MD  Authorized by: Sunny Morrow MD    Consent Done?:  Yes (Verbal)  Indications:  Arthritis and pain  Site marked: the procedure site was marked    Timeout: prior to procedure the correct patient, procedure, and site was verified    Prep: patient was prepped and draped in usual sterile fashion      Local anesthesia used?: Yes    Anesthesia:  Local infiltration    Details:  Needle Size:  25 G  Approach:  Anterolateral  Location:  Knee  Site:  R knee  Medications:  40 mg triamcinolone acetonide 40 mg/mL  Patient tolerance:  Patient tolerated the procedure well with no immediate complications

## 2025-03-11 NOTE — PATIENT INSTRUCTIONS
Cut out TV   Change to decaf coffee   Bring in food journal    Tried calling patient for hospital follow up. Discharged 3/3/25, left message to schedule arminda

## 2025-03-12 ENCOUNTER — TELEPHONE (OUTPATIENT)
Dept: PSYCHIATRY | Facility: CLINIC | Age: 55
End: 2025-03-12
Payer: MEDICARE

## 2025-03-12 DIAGNOSIS — F41.1 GAD (GENERALIZED ANXIETY DISORDER): ICD-10-CM

## 2025-03-12 DIAGNOSIS — F40.10 SOCIAL ANXIETY DISORDER: ICD-10-CM

## 2025-03-12 DIAGNOSIS — F41.0 PANIC DISORDER: ICD-10-CM

## 2025-03-12 DIAGNOSIS — R46.81 OBSESSIVE-COMPULSIVE BEHAVIOR: ICD-10-CM

## 2025-03-12 RX ORDER — SERTRALINE HYDROCHLORIDE 100 MG/1
100 TABLET, FILM COATED ORAL DAILY
Qty: 30 TABLET | Refills: 2 | Status: SHIPPED | OUTPATIENT
Start: 2025-03-12

## 2025-03-12 NOTE — TELEPHONE ENCOUNTER
----- Message from MARIAMA Cahney sent at 3/12/2025  9:57 AM CDT -----  Regarding: FW: Increase    ----- Message -----  From: Cynthia Mcknight  Sent: 3/12/2025   9:26 AM CDT  To: Vivek Yao Clinical Staff  Subject: Increase                                         Patient is requesting a returned call and wants an increase in her Zoloft.She can be reached at [phone].Last seen on 2/24/25 with no future scheduled appointments.Thank you.

## 2025-03-14 ENCOUNTER — TELEPHONE (OUTPATIENT)
Dept: INTERNAL MEDICINE | Facility: CLINIC | Age: 55
End: 2025-03-14

## 2025-03-14 NOTE — TELEPHONE ENCOUNTER
----- Message from Lien sent at 3/14/2025  2:24 PM CDT -----  Type: Patient callWho called: Patient Does the patient know what this is regarding? Requesting a call back in regards to wanting to change appt on 4/9 to in person as an annual for insurance reasons if possible ; also requesting annual lab orders ; please advise Would the patient rather a call back or response via [facility]? [facility] number: [phone] information:

## 2025-03-20 ENCOUNTER — OFFICE VISIT (OUTPATIENT)
Dept: PSYCHIATRY | Facility: CLINIC | Age: 55
End: 2025-03-20
Payer: MEDICARE

## 2025-03-20 DIAGNOSIS — F41.0 PANIC DISORDER: ICD-10-CM

## 2025-03-20 DIAGNOSIS — F41.1 GAD (GENERALIZED ANXIETY DISORDER): Primary | ICD-10-CM

## 2025-03-20 DIAGNOSIS — F99 INSOMNIA DUE TO OTHER MENTAL DISORDER: ICD-10-CM

## 2025-03-20 DIAGNOSIS — F39 MOOD DISORDER: ICD-10-CM

## 2025-03-20 DIAGNOSIS — F42.8 COMPULSIVE SHOPPING: ICD-10-CM

## 2025-03-20 DIAGNOSIS — F51.05 INSOMNIA DUE TO OTHER MENTAL DISORDER: ICD-10-CM

## 2025-03-20 DIAGNOSIS — F40.10 SOCIAL ANXIETY DISORDER: ICD-10-CM

## 2025-03-20 NOTE — PROGRESS NOTES
OUTPATIENT PSYCHIATRY RETURN VISIT    ENCOUNTER DATE:  3/27/2025  SITE:  [facility], [facility]  LENGTH OF SESSION:  7 minutes    The patient location is:  [place], not in a healthcare facility  Visit type:  audiovisual    Face to Face time with patient:  7 minutes  12 minutes of total time spent on the encounter, which includes face to face time and non-face to face time preparing to see the patient (eg, review of tests), Obtaining and/or reviewing separately obtained history, Documenting clinical information in the electronic or other health record, Independently interpreting results (not separately reported) and communicating results to the patient/family/caregiver, or Care coordination (not separately reported).     Each patient to whom he or she provides medical services by telemedicine is:  (1) informed of the relationship between the physician and patient and the respective role of any other health care provider with respect to management of the patient; and (2) notified that he or she may decline to receive medical services by telemedicine and may withdraw from such care at any time.    CHIEF COMPLAINT:  Anxiety      HISTORY OF PRESENTING ILLNESS:  Jaki Bajwa is a 54 y.o. female with history of Mood disorder, JUAN JOSE, OCD, social phobia, and Internet shopping addiction who presents for follow up appointment.     Plan at last appointment on 2/24/2025:  Some improvement in depression and anxiety on Zoloft 50mg daily.  Unclear if nightmares are related to Zoloft, however will continue to monitor.    Continue Latuda 60mg daily, Zoloft 50mg daily, Wellbutrin XL 450mg daily, Buspar 15mg BID-TID, Doxepin 150mg qHS, and Valium 2mg PRN panic attack (#5 tablets per month).  She is seeing Ortho for torn meniscus.    Discussed with patient informed consent, risks versus benefits, alternative treatments, side effect profile and the inherent unpredictability of individual responses to these  treatments.  The patient expresses understanding of the above and displays the capacity to agree with this current plan.   Continue individual therapy with Mr. Zamora, LCSW.    History as told by patient:  Had been having really good weeks last time we talked.  Then had a family member pass away and then plummeted.  Now taking Zoloft 100mg daily - thinks its helpful.  Tried at night but couldn't sleep.  Now taking in the morning and sleeping fine.  Tear in meniscus was increasing her anxiety.  Got shot in her knee that helped.  After family member passed, anxiety was really bad.  Was crying more.  Beating herself up with the depression.  But that is all getting better.  No longer crying.  Still going through menopause symptoms too - night sweats.  On patch now.      Medication side effects:  Denies  Medication compliance:  Yes    PSYCHIATRIC REVIEW OF SYSTEMS:  Trouble with sleep:  Stable on Doxepin  Appetite changes:  Not discussed  Weight changes:  Not discussed  Lack of energy:  Denies  Anhedonia:  Yes but now improving  Somatic symptoms:  Chronic pain  Libido:  Denies  Anxiety/panic:  Yes as above  Guilty/hopeless:  Denies  Self-injurious behavior/risky behavior:  Denies  Any drugs:  Denies  Alcohol:  Denies    MEDICAL REVIEW OF SYSTEMS:  Complete review of systems performed covering Constitutional, Musculoskeletal, Neurologic.  All systems negative except for that covered in HPI.    PAST PSYCHIATRIC, MEDICAL, AND SOCIAL HISTORY REVIEWED  The patient's past medical, family and social history have been reviewed and updated as appropriate within the electronic medical record - see encounter notes.    MEDICATIONS:    Current Outpatient Medications:     amLODIPine (NORVASC) 5 MG tablet, Take 1 tablet (5 mg total) by mouth once daily., Disp: 90 tablet, Rfl: 3    buPROPion (WELLBUTRIN XL) 150 MG TB24 tablet, Take 1 tablet (150 mg total) by mouth once daily., Disp: 270 tablet, Rfl: 3    busPIRone (BUSPAR) 15 MG  tablet, Take 1 tablet (15 mg total) by mouth once daily., Disp: 90 tablet, Rfl: 3    butalbital-acetaminophen-caffeine -40 mg (FIORICET, ESGIC) -40 mg per tablet, Take 1 tablet by mouth every 6 (six) hours as needed for Headaches., Disp: 40 tablet, Rfl: 0    cholecalciferol, vitamin D3, (VITAMIN D3) 25 mcg (1,000 unit) capsule, Take 1 capsule (1,000 Units total) by mouth once daily. (Patient not taking: Reported on 3/14/2025), Disp: , Rfl:     cycloSPORINE (RESTASIS) 0.05 % ophthalmic emulsion, Place 1 drop into both eyes 2 (two) times daily., Disp: 180 each, Rfl: 3    diazePAM (VALIUM) 2 MG tablet, Take 1 tablet (2 mg total) by mouth daily as needed (Severe anxiety/Panic). 15 tablets to last 3 months, Disp: 15 tablet, Rfl: 0    docusate sodium (COLACE) 100 MG capsule, Take 1 capsule (100 mg total) by mouth 2 (two) times daily as needed for Constipation., Disp: 60 capsule, Rfl: 0    doxepin (SINEQUAN) 75 MG capsule, TAKE 2 CAPSULES(150 MG) BY MOUTH EVERY EVENING, Disp: 180 capsule, Rfl: 3    estradioL (ESTRACE) 0.01 % (0.1 mg/gram) vaginal cream, Place 1 g vaginally twice a week., Disp: 42.5 g, Rfl: 1    estradiol 0.025 mg/24 hr 0.025 mg/24 hr, Place 1 patch onto the skin once a week., Disp: 4 patch, Rfl: 3    ferrous sulfate, dried (SLOW FE) 160 mg (50 mg iron) TbSR, Take 1 tablet (160 mg total) by mouth once daily., Disp: 90 tablet, Rfl: 2    furosemide (LASIX) 20 MG tablet, Take 1 tablet (20 mg total) by mouth once daily., Disp: 90 tablet, Rfl: 3    gabapentin (NEURONTIN) 400 MG capsule, Take 1 capsule (400 mg total) by mouth 3 (three) times daily., Disp: 90 capsule, Rfl: 2    HYDROcodone-acetaminophen (NORCO) 5-325 mg per tablet, Take 1 tablet by mouth every 24 hours as needed for Pain. Quantity medically necessary, Disp: 30 tablet, Rfl: 0    hydroxychloroquine (PLAQUENIL) 200 mg tablet, TAKE 1 TABLET BY MOUTH TWICE DAILY, Disp: 60 tablet, Rfl: 3    irbesartan (AVAPRO) 300 MG tablet, TAKE 1  "TABLET(300 MG) BY MOUTH EVERY EVENING, Disp: 90 tablet, Rfl: 3    lurasidone (LATUDA) 60 mg Tab tablet, Take 1 tablet (60 mg total) by mouth once daily., Disp: 90 tablet, Rfl: 3    NARCAN 4 mg/actuation Spry, , Disp: , Rfl:     nystatin (MYCOSTATIN) 100,000 unit/mL suspension, Take 4 mLs (400,000 Units total) by mouth 4 (four) times daily., Disp: 480 mL, Rfl: 5    omeprazole (PRILOSEC) 40 MG capsule, Take 1 capsule (40 mg total) by mouth once daily., Disp: 90 capsule, Rfl: 3    pilocarpine (SALAGEN) 5 MG Tab, Take 1 tablet (5 mg total) by mouth 4 (four) times daily., Disp: 360 tablet, Rfl: 1    sertraline (ZOLOFT) 100 MG tablet, Take 1 tablet (100 mg total) by mouth once daily., Disp: 30 tablet, Rfl: 2    tiZANidine (ZANAFLEX) 4 MG tablet, Take 1 tablet (4 mg total) by mouth 2 (two) times daily as needed (2 tablets twice daily as needed)., Disp: 60 tablet, Rfl: 2    ubrogepant (UBRELVY) 100 mg tablet, Take 1 tablet (100 mg total) by mouth as needed for Migraine. If symptoms persist or return, may repeat dose after 2 hours. Maximum: 200 mg per 24 hours, Disp: 30 tablet, Rfl: 2    VENOFER 100 mg iron/5 mL injection, , Disp: , Rfl:     ALLERGIES:  Review of patient's allergies indicates:   Allergen Reactions    Aspirin Hives       PSYCHIATRIC EXAM:  There were no vitals filed for this visit.      Appearance:  Well groomed, appearing healthy and of stated age  Behavior:  Cooperative, pleasant, no psychomotor agitation or retardation  Speech:  Normal rate, rhythm, prosody, and volume  Mood:  "Better"  Affect:  Euthymic  Thought Process:  Linear, logical, goal directed  Thought Content:  Negative for suicidal ideation, homicidal ideation, delusions or hallucinations.  Associations:  Intact  Memory:  Grossly Intact  Level of Consciousness/Orientation:  Grossly intact  Fund of Knowledge:  Good  Attention:  Good  Language:  Fluent, able to name abstract and concrete objects  Insight:  Fair  Judgment:  Intact  Psychomotor " signs:  No involuntary movements or tremor of face  Gait:  Unable to assess via virtual visit      RELEVANT LABS/STUDIES:    Lab Results   Component Value Date    WBC 7.26 01/16/2025    HGB 11.3 (L) 01/16/2025    HCT 39.1 01/16/2025    MCV 83 01/16/2025     01/16/2025     BMP  Lab Results   Component Value Date     10/11/2024    K 4.0 10/11/2024     10/11/2024    CO2 28 10/11/2024    BUN 11 10/11/2024    CREATININE 1.2 10/11/2024    CALCIUM 9.7 10/11/2024    ANIONGAP 8 10/11/2024    ESTGFRAFRICA 45.5 (A) 06/15/2022    EGFRNONAA 39.5 (A) 06/15/2022     Lab Results   Component Value Date    ALT 11 09/19/2024    AST 15 09/19/2024    ALKPHOS 77 09/19/2024    BILITOT 0.3 09/19/2024     Lab Results   Component Value Date    TSH 2.563 09/22/2021     Lab Results   Component Value Date    HGBA1C 4.9 02/13/2024       IMPRESSION:    Jaki Bajwa is a 54 y.o. female with history of Mood disorder, JUAN JOSE, OCD, social phobia, and Internet shopping addiction who presents for follow up appointment.    Status/Progress:  Based on the examination today, the patient's problem(s) is/are adequately but not ideally controlled.  New problems have not been presented today.    Risk Parameters:  Patient reports no suicidal ideation  Patient reports no homicidal ideation  Patient reports no self-injurious behavior  Patient reports no violent behavior      DIAGNOSES:    ICD-10-CM ICD-9-CM   1. JUAN JOSE (generalized anxiety disorder)  F41.1 300.02   2. Panic disorder  F41.0 300.01   3. Social anxiety disorder  F40.10 300.23   4. Mood disorder  F39 296.90   5. Compulsive shopping  F42.8 300.3   6. Insomnia due to other mental disorder  F51.05 300.9    F99 327.02           PLAN:  Patient just increased Zoloft to 100mg daily and believes it is helping her anxiety.  Continue this dose for now.    Continue Latuda 60mg daily for mood.  Continue Wellbutrin XL 450mg daily for depression.  Continue Buspar 15mg BID-TID for  anxiety.  Continue Doxepin 150mg qHS for mood and insomnia.  Continue Valium 2mg PRN panic attack (#5 tablets per month).  Discussed with patient informed consent, risks versus benefits, alternative treatments, side effect profile and the inherent unpredictability of individual responses to these treatments.  The patient expresses understanding of the above and displays the capacity to agree with this current plan.   Continue individual therapy with Mr. Noah LCSW.    RETURN TO CLINIC:  Follow up in about 2 months (around 5/20/2025).

## 2025-03-25 ENCOUNTER — TELEPHONE (OUTPATIENT)
Dept: RHEUMATOLOGY | Facility: CLINIC | Age: 55
End: 2025-03-25
Payer: MEDICARE

## 2025-03-25 DIAGNOSIS — M79.7 FIBROMYALGIA: ICD-10-CM

## 2025-03-25 RX ORDER — GABAPENTIN 400 MG/1
400 CAPSULE ORAL 3 TIMES DAILY
Qty: 90 CAPSULE | Refills: 2 | Status: SHIPPED | OUTPATIENT
Start: 2025-03-25

## 2025-03-25 RX ORDER — TIZANIDINE 4 MG/1
4 TABLET ORAL 2 TIMES DAILY PRN
Qty: 60 TABLET | Refills: 2 | Status: SHIPPED | OUTPATIENT
Start: 2025-03-25

## 2025-03-25 NOTE — TELEPHONE ENCOUNTER
----- Message from Serenity sent at 3/25/2025 10:41 AM CDT -----  Regarding: refill  Contact: pt  Reason for call: New RefillPharmacy Name: [id number] - [place], [place] - [address] AT [facility] & [id number] [facility] [address]Zyqey: [phone] Fax: [phone] Prescription Name: tiZANidine (ZANAFLEX) 4 MG tablet and gabapentin (NEURONTIN) 400 MG capsulePhone Number: [phone] (home)  Additional Information: pt will be out of refill, she is on her last refill

## 2025-03-26 DIAGNOSIS — F41.1 GAD (GENERALIZED ANXIETY DISORDER): ICD-10-CM

## 2025-03-26 DIAGNOSIS — F40.10 SOCIAL ANXIETY DISORDER: ICD-10-CM

## 2025-03-26 RX ORDER — DIAZEPAM 2 MG/1
2 TABLET ORAL DAILY PRN
Qty: 15 TABLET | Refills: 0 | Status: SHIPPED | OUTPATIENT
Start: 2025-03-26 | End: 2025-04-25

## 2025-04-02 DIAGNOSIS — R12 HEARTBURN: ICD-10-CM

## 2025-04-02 DIAGNOSIS — G89.4 CHRONIC PAIN SYNDROME: ICD-10-CM

## 2025-04-02 DIAGNOSIS — F11.20 CHRONIC NARCOTIC DEPENDENCE: ICD-10-CM

## 2025-04-02 RX ORDER — HYDROCODONE BITARTRATE AND ACETAMINOPHEN 5; 325 MG/1; MG/1
1 TABLET ORAL
Qty: 30 TABLET | Refills: 0 | Status: SHIPPED | OUTPATIENT
Start: 2025-04-02

## 2025-04-02 RX ORDER — OMEPRAZOLE 40 MG/1
40 CAPSULE, DELAYED RELEASE ORAL DAILY
Qty: 90 CAPSULE | Refills: 3 | Status: CANCELLED | OUTPATIENT
Start: 2025-04-02

## 2025-04-02 NOTE — TELEPHONE ENCOUNTER
Refill Encounter  Allergies: Confirmed    PCP Visits: Recent Visits  Date Type Provider Dept   03/10/25 Virtual Visit Weeks, Jose ALONSO, DO [facility]   01/13/25 Virtual Visit Weeks, Jose ALONSO, DO [facility]   10/11/24 Office Visit Weeks, Jose ALONSO, DO [facility]   09/06/24 Virtual Visit Weeks, Jose ALONSO, DO [facility]   07/12/24 Office Visit Weeks, Jose ALONSO, DO [facility]   06/19/24 Office Visit Weeks, Jose ALONSO, DO [facility]   04/09/24 Office Visit Weeks, Jose ALONSO,  [facility]   Showing recent visits within past 360 days and meeting all other requirements  Future Appointments  Date Type Provider Dept   04/09/25 Appointment Weeks, Jose ALONSO, DO [facility]   05/02/25 Appointment Weeks, Jose ALOSNO,  [facility]   Showing future appointments within next 720 days and meeting all other requirements     Last 3 Blood Pressure:   BP Readings from Last 3 Encounters:   03/14/25 (!) 140/80   03/10/25 118/79   02/13/25 118/79     Preferred Pharmacy:   [email or web address][id number] [place] [id number] [facility] [address]  [address]  [facility] [address]  [facility] [address]  Phone: [phone] Fax: [phone]    Requested RX:  Requested Prescriptions     Pending Prescriptions Disp Refills               HYDROcodone-acetaminophen (NORCO) 5-325 mg per tablet 30 tablet 0     Sig: Take 1 tablet by mouth every 24 hours as needed for Pain. Quantity medically necessary      RX Route: Normal

## 2025-04-02 NOTE — TELEPHONE ENCOUNTER
No care due was identified.  Health [facility] Embedded Care Due Messages. Reference number: [id number].   4/02/2025 9:27:35 AM CDT

## 2025-04-07 ENCOUNTER — TELEPHONE (OUTPATIENT)
Dept: HEMATOLOGY/ONCOLOGY | Facility: CLINIC | Age: 55
End: 2025-04-07
Payer: MEDICARE

## 2025-04-07 NOTE — TELEPHONE ENCOUNTER
----- Message from Buffy sent at 4/7/2025  8:23 AM CDT -----  Regarding: Reschedule Existing Appointment  Contact: Jaki Zaldivar Existing Appointment Current appt date:04/09/2025 and 04/16/2025 Type of appt : Lab and F/U Physician:[place] Reason for rescheduling: Would like to have lab on 04/16 and F/U 04/22/2025 Caller:Jaki Bajwa Contact Preference:[phone] (home) , requesting a call back.

## 2025-04-11 VITALS — DIASTOLIC BLOOD PRESSURE: 78 MMHG | SYSTOLIC BLOOD PRESSURE: 120 MMHG

## 2025-04-11 DIAGNOSIS — G44.009 CLUSTER HEADACHE, NOT INTRACTABLE, UNSPECIFIED CHRONICITY PATTERN: ICD-10-CM

## 2025-04-11 RX ORDER — BUTALBITAL, ACETAMINOPHEN AND CAFFEINE 50; 325; 40 MG/1; MG/1; MG/1
1 TABLET ORAL EVERY 6 HOURS PRN
Qty: 40 TABLET | Refills: 0 | Status: CANCELLED | OUTPATIENT
Start: 2025-04-11

## 2025-04-11 NOTE — TELEPHONE ENCOUNTER
Refill Encounter    PCP Visits: Recent Visits  Date Type Provider Dept   03/10/25 Office Visit Weeks, Jose ALONSO,  [facility]   01/13/25 Office Visit Weeks, Jose ALONSO, DO [facility]   10/11/24 Office Visit Weeks, Jose ALONSO, DO [facility]   09/06/24 Office Visit Weeks, Jose ALONSO,  [facility]   07/12/24 Office Visit Weeks, Jose ALONSO, DO [facility]   06/19/24 Office Visit Weeks, Jose ALONSO,  [facility]   Showing recent visits within past 360 days and meeting all other requirements  Future Appointments  Date Type Provider Dept   05/02/25 Appointment Weeks, Jose ALONSO,  [facility]   Showing future appointments within next 720 days and meeting all other requirements     Last 3 Blood Pressure:   BP Readings from Last 3 Encounters:   03/14/25 (!) 140/80   03/10/25 118/79   02/13/25 118/79     Preferred Pharmacy:   [facility][id number] [address] AT [facility] [address]  [address]  [facility] [address]  [facility] [address]  Phone: [phone] Fax: [phone]    Requested RX:  Requested Prescriptions     Pending Prescriptions Disp Refills    butalbital-acetaminophen-caffeine -40 mg (FIORICET, ESGIC) -40 mg per tablet 40 tablet 0     Sig: Take 1 tablet by mouth every 6 (six) hours as needed for Headaches.      RX Route: Normal

## 2025-04-14 ENCOUNTER — TELEPHONE (OUTPATIENT)
Dept: INTERNAL MEDICINE | Facility: CLINIC | Age: 55
End: 2025-04-14
Payer: MEDICARE

## 2025-04-15 ENCOUNTER — TELEPHONE (OUTPATIENT)
Dept: INTERNAL MEDICINE | Facility: CLINIC | Age: 55
End: 2025-04-15
Payer: MEDICARE

## 2025-04-15 DIAGNOSIS — G44.009 CLUSTER HEADACHE, NOT INTRACTABLE, UNSPECIFIED CHRONICITY PATTERN: ICD-10-CM

## 2025-04-15 RX ORDER — BUTALBITAL, ACETAMINOPHEN AND CAFFEINE 50; 325; 40 MG/1; MG/1; MG/1
1 TABLET ORAL EVERY 6 HOURS PRN
Qty: 40 TABLET | Refills: 0 | Status: SHIPPED | OUTPATIENT
Start: 2025-04-15 | End: 2025-04-16 | Stop reason: SDUPTHER

## 2025-04-15 RX ORDER — BUTALBITAL, ACETAMINOPHEN AND CAFFEINE 50; 325; 40 MG/1; MG/1; MG/1
1 TABLET ORAL EVERY 6 HOURS PRN
Qty: 40 TABLET | Refills: 0 | Status: SHIPPED | OUTPATIENT
Start: 2025-04-15 | End: 2025-04-15 | Stop reason: SDUPTHER

## 2025-04-15 NOTE — TELEPHONE ENCOUNTER
Called and spoke to [facility] pharmacy - [place], [place] - [address]. Pharmacy stated that they were not sure as to why the transmission of the butalbital-acetaminophen-caffeine -40 mg (FIORICET, ESGIC) -40 mg per tablet prescription failed. They requested for new script to be E-prescribed.Asked them if we could fax over the the prescription to which they replied no as it is a controlled substance. Informed them I would fwd request to Dr. Carpenter.     Called and spoke to patient via telephone call. Informed pt of the encounter with pharmacy. Informed pt I would forward refill request to Dr. Carpenter once again. Patient verbalized gratitude and understanding.

## 2025-04-15 NOTE — TELEPHONE ENCOUNTER
Both calls addressed in separate encounter.   Pharmacy requesting new script for butalbital-acetaminophen-caffeine -40 mg (FIORICET, ESGIC) -40 mg per tablet to be resent, as the transmission failed on the previous script sent today, 4/15/25.   Med refill request sent in separate encounter.

## 2025-04-15 NOTE — TELEPHONE ENCOUNTER
Jaki Bajwa  Mrn:[id number]  Date of birth 1970      Pharmacy refusing electronic e-sc ribe can you fax a paper prescription  For  butalbital-acetaminophen-caffeine -40 mg (FIORICET, ESGIC) -40 mg per tablet       Thank you

## 2025-04-15 NOTE — TELEPHONE ENCOUNTER
----- Message from Sergeheri sent at 4/15/2025  2:03 PM CDT -----  Type: RX REFILL REQUEST  Who Called: Patient   Refill or New Rx: refill   Rx Name and Strength: butalbital-acetaminophen-caffeine -40 mg (FIORICET, ESGIC) -40 mg per tablet  Pt request was sent over to [facility] , but I am seeing from my end that transmission failed . Please Advise   Would the patient rather a call back or a response via [facility]? Call   Best Call Back Number: [phone]   Additional Information:  [facility] #[id number] - [place], [place] - [address] AT [facility] [address] [address]Cludm: [phone] Fax: [phone]

## 2025-04-15 NOTE — TELEPHONE ENCOUNTER
No care due was identified.  [facility] Embedded Care Due Messages. Reference number: [id number].   4/15/2025 4:16:04 PM CDT

## 2025-04-15 NOTE — TELEPHONE ENCOUNTER
Refill Encounter  Allergies: Confirmed    PCP Visits: Recent Visits  Date Type Provider Dept   03/10/25 Office Visit Weeks, Jose ALONSO,  [facility]   01/13/25 Office Visit Weeks, Jose ALONSO, DO [facility]   10/11/24 Office Visit Weeks, Jose ALONSO, DO [facility]   09/06/24 Office Visit Weeks, Jose ALONSO,  [facility]   07/12/24 Office Visit Weeks, Jose ALONSO, DO [facility]   06/19/24 Office Visit Weeks, Jose ALONSO,  [facility]   Showing recent visits within past 360 days and meeting all other requirements  Future Appointments  Date Type Provider Dept   05/02/25 Appointment Weeks, Jose ALONSO,  [facility]   Showing future appointments within next 720 days and meeting all other requirements     Last 3 Blood Pressure:   BP Readings from Last 3 Encounters:   04/11/25 120/78   03/14/25 (!) 140/80   03/10/25 118/79     Preferred Pharmacy:   [facility] #[id number] [address] [facility] [address]  [facility] [address]  [facility] [address]  Phone: [phone] Fax: [phone]    Requested RX:  Requested Prescriptions     Pending Prescriptions Disp Refills    butalbital-acetaminophen-caffeine -40 mg (FIORICET, ESGIC) -40 mg per tablet [Pharmacy Med Name: BUT/ACETAMINOPHEN/CAFF -40 TB] 40 tablet 0     Sig: TAKE 1 TABLET BY MOUTH EVERY 6 HOURS AS NEEDED FOR HEADACHE      RX Route: Normal

## 2025-04-15 NOTE — TELEPHONE ENCOUNTER
----- Message from Med Assistant Villaseñor sent at 4/15/2025 11:52 AM CDT -----  Name of Who is Calling: PENG MAYO [[id number]]      What is the request in detail: Pt is calling to speak with someone in the office. Pt stated that her prescription was approve but the pharmacy is stated that they did not received it. The medication is butalbital-acetaminophen-caffeine -40 mg      Can the clinic reply by [facility]: No      What Number to Call Back if not [facility]: [phone]

## 2025-04-15 NOTE — TELEPHONE ENCOUNTER
Pharmacy requesting new script for:    butalbital-acetaminophen-caffeine -40 mg (FIORICET, ESGIC) -40 mg per tablet     to be resent, as the transmission failed on the previous script sent today, 4/15/25.

## 2025-04-15 NOTE — TELEPHONE ENCOUNTER
Called and spoke to patient via telephone call. Cleared this up with patient that one of the two med refills was canceled and the other sent to Dr. Carpenter. Patient verbalized gratitude and understanding.

## 2025-04-16 ENCOUNTER — TELEPHONE (OUTPATIENT)
Dept: INTERNAL MEDICINE | Facility: CLINIC | Age: 55
End: 2025-04-16
Payer: MEDICARE

## 2025-04-16 ENCOUNTER — OFFICE VISIT (OUTPATIENT)
Dept: INTERNAL MEDICINE | Facility: CLINIC | Age: 55
End: 2025-04-16
Payer: MEDICARE

## 2025-04-16 VITALS
DIASTOLIC BLOOD PRESSURE: 78 MMHG | WEIGHT: 250.88 LBS | BODY MASS INDEX: 39.38 KG/M2 | HEIGHT: 67 IN | SYSTOLIC BLOOD PRESSURE: 120 MMHG

## 2025-04-16 DIAGNOSIS — G44.009 CLUSTER HEADACHE, NOT INTRACTABLE, UNSPECIFIED CHRONICITY PATTERN: ICD-10-CM

## 2025-04-16 DIAGNOSIS — R09.81 NASAL SINUS CONGESTION: Primary | ICD-10-CM

## 2025-04-16 DIAGNOSIS — G44.009 CLUSTER HEADACHE, NOT INTRACTABLE, UNSPECIFIED CHRONICITY PATTERN: Primary | ICD-10-CM

## 2025-04-16 RX ORDER — BUTALBITAL, ACETAMINOPHEN AND CAFFEINE 50; 325; 40 MG/1; MG/1; MG/1
1 TABLET ORAL EVERY 6 HOURS PRN
Qty: 40 TABLET | Refills: 0 | Status: SHIPPED | OUTPATIENT
Start: 2025-04-16 | End: 2025-04-16 | Stop reason: SDUPTHER

## 2025-04-16 RX ORDER — BUTALBITAL, ACETAMINOPHEN AND CAFFEINE 50; 325; 40 MG/1; MG/1; MG/1
1 TABLET ORAL EVERY 6 HOURS PRN
Qty: 40 TABLET | Refills: 0 | Status: SHIPPED | OUTPATIENT
Start: 2025-04-16 | End: 2025-04-16

## 2025-04-16 RX ORDER — FLUTICASONE PROPIONATE 50 MCG
1-2 SPRAY, SUSPENSION (ML) NASAL DAILY
Qty: 16 G | Refills: 1 | Status: SHIPPED | OUTPATIENT
Start: 2025-04-16 | End: 2025-04-26

## 2025-04-16 RX ORDER — BUTALBITAL, ACETAMINOPHEN AND CAFFEINE 50; 325; 40 MG/1; MG/1; MG/1
1 TABLET ORAL EVERY 6 HOURS PRN
Qty: 40 TABLET | Refills: 0 | Status: CANCELLED | OUTPATIENT
Start: 2025-04-16

## 2025-04-16 RX ORDER — BUTALBITAL, ACETAMINOPHEN AND CAFFEINE 50; 325; 40 MG/1; MG/1; MG/1
1 TABLET ORAL EVERY 6 HOURS PRN
Qty: 40 TABLET | Refills: 0 | Status: SHIPPED | OUTPATIENT
Start: 2025-04-16 | End: 2025-04-17 | Stop reason: SDUPTHER

## 2025-04-16 RX ORDER — AZELASTINE 1 MG/ML
1 SPRAY, METERED NASAL 2 TIMES DAILY
Qty: 30 ML | Refills: 1 | Status: CANCELLED | OUTPATIENT
Start: 2025-04-16

## 2025-04-16 NOTE — TELEPHONE ENCOUNTER
No care due was identified.  Health [facility] Embedded Care Due Messages. Reference number: [id number].   4/16/2025 9:57:03 AM CDT

## 2025-04-16 NOTE — TELEPHONE ENCOUNTER
Ms. Lawton have a virtual visit with  to talk about her medication              ----- Message from Cami sent at 4/16/2025  1:33 PM CDT -----  Name of Who is Calling:PENG MAYO [[id number]]What is the request in detail:pt is requesting a call back regarding her medication. butalbital-acetaminophen-caffeine -40 mg (FIORICET, ESGIC) -40 mg per tablet. Pt stated pt would like to know if she can  a paper copy of prescription. Please contact to further discuss and advise.  Can the clinic reply by [facility]:call What Number to Call Back if not in [facility]:.[phone]

## 2025-04-16 NOTE — TELEPHONE ENCOUNTER
Called pt and confirmed alternative pharmacy to send med to. Pt requested [facility] on [address]     Med pended and pharmacy updated.

## 2025-04-16 NOTE — TELEPHONE ENCOUNTER
----- Message from Cami sent at 4/16/2025  8:49 AM CDT -----  Name of Who is Calling:PENG MAYO [[id number]]What is the request in detail:pt is requesting call back regarding her medication going to the pharmacy. Pt stated she talk to someone yesterday about her medication being sent to the pharmacy and to see what is the hold up. Please contact to further discuss and advise.  Can the clinic reply by [facility]:call What Number to Call Back if not [facility][phone]

## 2025-04-16 NOTE — PROGRESS NOTES
Subjective:      Patient ID: Jaki Bajwa is a 54 y.o. female.    Chief Complaint: Chronic anemia      HPI: Ms. Bajwa returns to Hematology for follow-up of [place]. She was previously evaluated by Neal Wren and Claude.    Hematology History:  Per Dr. Arce  10/22/2024    Patient transfer of care from Dr. Wren for iron deficiency anemia. Bone marrow biopsy normal 8/2014 and 9/2024. No identified source of blood loss. Menopausal, therefore now monthly cycles. Normal colonoscopy in 2021. No gross blood loss. Normal UA. Last received IV iron 10/2023.     Interval History:  4/22/2025  She received Infed in November 2024 which she tolerated well. Also underwent EGD in November with the following findings:    Diffuse mild mucosal changes characterized by sloughing were found in the upper third of the esophagus and in the middle third of the esophagus. Moderate to severe chronic antral and oxyntic gastritis with mild activity.   Helicobacter organisms present.    She was treated with triple therapy. Follow-up stool sample indicated eradication of H. Pylori. She is again fatigued. No overt bleeding, f/c/ns, unexplained weight loss, appetite change, CP, abdominal pain.    She states she has chronic ankle swelling since her provider discontinued triamterene/HCTZ 3 years ago replacing it with amlodipine and irbesartan. She was subsequently prescribed lasix prn for the swelling.        have reviewed all of the patient's relevant lab work available in the medical record and have utilized this in my evaluation and management recommendations today.    Social History[1]    Family History   Problem Relation Name Age of Onset    Heart disease Mother Amalia Bajwa     Hypertension Mother Amalia Bajwa     Cancer Father Demarcus Bajwa sr.         colon ca    Colon cancer Father Demarcus Bajwa sr.     Depression Father Demarcus Bajwa sr.     Schizophrenia Father Demarcus Bajwa sr.     Anxiety disorder Father Demarcus Bajwa sr.     Arthritis Father  "Demarcus [employer] sr.     Mental illness Father Demarcus Bajwa sr.     Liver cancer Sister Fifi     Cancer Sister Fifi     Depression Sister Fifi     Anxiety disorder Sister Fifi     Heart attack Sister Fifi     COPD Sister Fifi     Cancer Sister Madeline         throat and colon    Depression Sister Madeline     Miscarriages / Stillbirths Sister Madeline     Pancreatic cancer Brother Demarcus bajwa     Depression Brother Demarcus bajwa     Alcohol abuse Brother Demarcus bajwa     No Known Problems Daughter Ikea     Asthma Son Scott     Pancreatic cancer Other      Liver cancer Other      Suicide Neg Hx      Ovarian cancer Neg Hx      Breast cancer Neg Hx         Past Surgical History:   Procedure Laterality Date    BONE MARROW BIOPSY N/A 2024    Procedure: Biopsy-bone marrow;  Surgeon: Latrice Arce MD;  Location: [facility] ENDO ([address] [place] [address]);  Service: Oncology;  Laterality: N/A;  -pre call complete-tb    breast reduction      BREAST SURGERY  2005     SECTION      x 2    ESOPHAGOGASTRODUODENOSCOPY N/A 2024    Procedure: EGD (ESOPHAGOGASTRODUODENOSCOPY);  Surgeon: Parth Hernández MD;  Location: [facility] ENDOSCOPY;  Service: Endoscopy;  Laterality: N/A;  Ref Dr Claude [id number]-Mloria-TPke  24- pc complete. DBM    HYSTERECTOMY  2005    INGUINAL HERNIA REPAIR      LAPAROSCOPIC TOTAL HYSTERECTOMY      ASA    TOTAL REDUCTION MAMMOPLASTY      TUBAL LIGATION      VENTRAL HERNIA REPAIR         Past Medical History:   Diagnosis Date    CANDELARIA (acute kidney injury) 2022    Anemia     Anxiety     Depression     History of psychiatric hospitalization     [place] x 1 week for depression    History of ventral hernia repair     Hypertension     Lupus     patient reports that she is being treated "like I have Lupus"    Mental disorder     Morbid obesity 12/15/2017    Psychiatric problem     Sjogren's syndrome 2013    Therapy     TMJ (temporomandibular joint disorder)        Review of Systems "   Constitutional:  Positive for diaphoresis. Negative for appetite change, chills, fatigue, fever and unexpected weight change.   HENT:  Negative for mouth sores and nosebleeds.    Eyes:  Negative for visual disturbance.   Respiratory:  Negative for cough and shortness of breath.    Cardiovascular:  Positive for leg swelling. Negative for chest pain and palpitations.   Gastrointestinal:  Negative for abdominal pain, anal bleeding, blood in stool and diarrhea.   Genitourinary:  Negative for frequency, hematuria, menstrual problem and vaginal bleeding.   Musculoskeletal:  Positive for back pain.   Skin:  Negative for color change and rash.   Neurological:  Positive for headaches. Negative for numbness.   Hematological:  Negative for adenopathy. Does not bruise/bleed easily.   Psychiatric/Behavioral:  The patient is nervous/anxious.           Medication List with Changes/Refills   Current Medications    AMLODIPINE (NORVASC) 5 MG TABLET    Take 1 tablet (5 mg total) by mouth once daily.    BUPROPION (WELLBUTRIN XL) 150 MG TB24 TABLET    Take 1 tablet (150 mg total) by mouth once daily.    BUSPIRONE (BUSPAR) 15 MG TABLET    Take 1 tablet (15 mg total) by mouth once daily.    BUTALBITAL-ACETAMINOPHEN-CAFFEINE -40 MG (FIORICET, ESGIC) -40 MG PER TABLET    Take 1 tablet by mouth every 6 (six) hours as needed for Headaches.    CHOLECALCIFEROL, VITAMIN D3, (VITAMIN D3) 25 MCG (1,000 UNIT) CAPSULE    Take 1 capsule (1,000 Units total) by mouth once daily.    CYCLOSPORINE (RESTASIS) 0.05 % OPHTHALMIC EMULSION    Place 1 drop into both eyes 2 (two) times daily.    DIAZEPAM (VALIUM) 2 MG TABLET    Take 1 tablet (2 mg total) by mouth daily as needed (Severe anxiety/Panic). 15 tablets to last 3 months    DOCUSATE SODIUM (COLACE) 100 MG CAPSULE    Take 1 capsule (100 mg total) by mouth 2 (two) times daily as needed for Constipation.    DOXEPIN (SINEQUAN) 75 MG CAPSULE    TAKE 2 CAPSULES(150 MG) BY MOUTH EVERY EVENING     ESTRADIOL (ESTRACE) 0.01 % (0.1 MG/GRAM) VAGINAL CREAM    Place 1 g vaginally twice a week.    ESTRADIOL 0.025 MG/24 HR 0.025 MG/24 HR    Place 1 patch onto the skin once a week.    FERROUS SULFATE, DRIED (SLOW FE) 160 MG (50 MG IRON) TBSR    Take 1 tablet (160 mg total) by mouth once daily.    FLUTICASONE PROPIONATE (FLONASE) 50 MCG/ACTUATION NASAL SPRAY    1-2 sprays ( mcg total) by Each Nostril route once daily. Use saline nasal wash prior to using for maximum effect. Discontinue after 3 weeks if no improvement for 10 days    FUROSEMIDE (LASIX) 20 MG TABLET    Take 1 tablet (20 mg total) by mouth once daily.    GABAPENTIN (NEURONTIN) 400 MG CAPSULE    Take 1 capsule (400 mg total) by mouth 3 (three) times daily.    HYDROCODONE-ACETAMINOPHEN (NORCO) 5-325 MG PER TABLET    Take 1 tablet by mouth every 24 hours as needed for Pain. Quantity medically necessary    HYDROXYCHLOROQUINE (PLAQUENIL) 200 MG TABLET    TAKE 1 TABLET BY MOUTH TWICE DAILY    IRBESARTAN (AVAPRO) 300 MG TABLET    TAKE 1 TABLET(300 MG) BY MOUTH EVERY EVENING    LURASIDONE (LATUDA) 60 MG TAB TABLET    Take 1 tablet (60 mg total) by mouth once daily.    NARCAN 4 MG/ACTUATION SPRY        NYSTATIN (MYCOSTATIN) 100,000 UNIT/ML SUSPENSION    Take 4 mLs (400,000 Units total) by mouth 4 (four) times daily.    OMEPRAZOLE (PRILOSEC) 40 MG CAPSULE    Take 1 capsule (40 mg total) by mouth once daily.    PILOCARPINE (SALAGEN) 5 MG TAB    Take 1 tablet (5 mg total) by mouth 4 (four) times daily.    SERTRALINE (ZOLOFT) 100 MG TABLET    Take 1 tablet (100 mg total) by mouth once daily.    TIZANIDINE (ZANAFLEX) 4 MG TABLET    Take 1 tablet (4 mg total) by mouth 2 (two) times daily as needed (2 tablets twice daily as needed).    UBROGEPANT (UBRELVY) 100 MG TABLET    Take 1 tablet (100 mg total) by mouth as needed for Migraine. If symptoms persist or return, may repeat dose after 2 hours. Maximum: 200 mg per 24 hours    VENOFER 100 MG IRON/5 ML INJECTION             Objective:     Vitals:    04/22/25 0955   BP: 133/63   Pulse: 92   Resp: 18   Temp: 97.6 °F (36.4 °C)       Physical Exam  Constitutional:       Appearance: Normal appearance. She is obese.   HENT:      Head: Normocephalic.      Right Ear: External ear normal.      Left Ear: External ear normal.      Nose: Nose normal.   Cardiovascular:      Rate and Rhythm: Normal rate and regular rhythm.      Heart sounds: Normal heart sounds.   Pulmonary:      Effort: Pulmonary effort is normal.      Breath sounds: Normal breath sounds.   Musculoskeletal:         General: Swelling present. Normal range of motion.      Cervical back: Normal range of motion.      Right ankle: Swelling present.      Left ankle: Swelling present.   Skin:     General: Skin is warm and dry.      Capillary Refill: Capillary refill takes less than 2 seconds.      Coloration: Skin is not pale.      Findings: No bruising.   Neurological:      Mental Status: She is alert and oriented to person, place, and time.   Psychiatric:         Mood and Affect: Mood normal.         Behavior: Behavior normal.         Thought Content: Thought content normal.         Judgment: Judgment normal.         Assessment:     Problem List Items Addressed This Visit          Oncology    Anemia of unknown etiology    Relevant Orders    E-Consult to Gastroenterology    CBC w/ DIFF    FERRITIN    IRON AND TIBC    Iron deficiency anemia - Primary    Relevant Orders    E-Consult to Gastroenterology    CBC w/ DIFF    FERRITIN    IRON AND TIBC       Orthopedic    Swelling of both ankles       Lab Results   Component Value Date    WBC 6.76 04/22/2025    RBC 3.83 (L) 04/22/2025    HGB 9.2 (L) 04/22/2025    HCT 32.0 (L) 04/22/2025    MCV 84 04/22/2025    MCH 24.0 (L) 04/22/2025    MCHC 28.8 (L) 04/22/2025    RDW 14.4 04/22/2025     04/22/2025    MPV 12.0 04/22/2025    GRAN 3.6 01/16/2025    GRAN 49.1 01/16/2025    LYMPH 35.8 04/22/2025    LYMPH 2.42 04/22/2025    MONO 7.1  04/22/2025    MONO 0.48 04/22/2025    EOS 2.7 04/22/2025    EOS 0.18 04/22/2025    BASO 0.05 01/16/2025    EOSINOPHIL 3.7 01/16/2025    BASOPHIL 0.7 04/22/2025    BASOPHIL 0.05 04/22/2025      Lab Results   Component Value Date     10/11/2024    K 4.0 10/11/2024     10/11/2024    CO2 28 10/11/2024    BUN 11 10/11/2024    CREATININE 1.2 10/11/2024    CALCIUM 9.7 10/11/2024    ANIONGAP 8 10/11/2024    ESTGFRAFRICA 45.5 (A) 06/15/2022    EGFRNONAA 39.5 (A) 06/15/2022     Lab Results   Component Value Date    ALT 11 09/19/2024    AST 15 09/19/2024    ALKPHOS 77 09/19/2024    BILITOT 0.3 09/19/2024     Lab Results   Component Value Date    IRON 22 (L) 04/22/2025    TRANSFERRIN 276 04/22/2025    TIBC 408 04/22/2025    FESATURATED 18 (L) 01/16/2025    FERRITIN 11.0 (L) 04/22/2025        Plan:   Iron deficiency anemia, unspecified iron deficiency anemia type  Anemia of unknown etiology  Long standing history of iron deficiency anemia with no clear etiology after multiple interventions  Upper and lower GI scopes unrevealing  No VCE on chart review  No longer has menstrual cycles  E-consult to GI to discuss utility of VCE  Will schedule Infed x 1 again once authorized  Follow-up in 4 months after Infed with labs and MILTON visit    -     E-Consult to Gastroenterology  -     CBC w/ DIFF; Future; Expected date: 04/22/2025  -     FERRITIN; Future; Expected date: 04/22/2025  -     IRON AND TIBC; Future; Expected date: 04/22/2025    Swelling of both ankles    Mild, non-pitting edema   Likely due to amlodipine as this is a known side effect  Has an appt with PCP 5/2/25 to discuss       Collaborating Provider:  Dr. Latrice Arce MD      BMT Chart Routing      Follow up with physician    Follow up with MILTON 4 months. 4 months after Infed infusion   Provider visit type Benign hem   Infusion scheduling note   Infed x 1   Injection scheduling note NA   Labs CBC, ferritin and iron and TIBC   Scheduling:  Preferred lab:  Lab  interval:  1-2 days prior to cisit   Imaging   NA   Pharmacy appointment No pharmacy appointment needed      Other referrals no referral to Oncology Primary Care needed -  no Massage appointment needed    No additional referrals needed           Thank You,  RYLIE Thayer  Benign Hematology         [1]   Social History  Socioeconomic History    Marital status: Single    Number of children: 2   Occupational History     Comment: [employer]   Tobacco Use    Smoking status: Never    Smokeless tobacco: Never   Substance and Sexual Activity    Alcohol use: Not Currently     Alcohol/week: 1.0 standard drink of alcohol     Types: 1 Glasses of wine per week     Comment: rarely     Drug use: Not Currently     Types: Barbituates, Hydrocodone     Comment: rx hydrocodone    Sexual activity: Not Currently     Partners: Male     Birth control/protection: Abstinence, Condom, Patch   Other Topics Concern    Patient feels they ought to cut down on drinking/drug use No    Patient annoyed by others criticizing their drinking/drug use No    Patient has felt bad or guilty about drinking/drug use No    Patient has had a drink/used drugs as an eye opener in the AM No     Social Drivers of Health     Financial Resource Strain: Low Risk  (12/17/2024)    Overall Financial Resource Strain (CARDIA)     Difficulty of Paying Living Expenses: Not hard at all   Food Insecurity: No Food Insecurity (12/17/2024)    Hunger Vital Sign     Worried About Running Out of Food in the Last Year: Never true     Ran Out of Food in the Last Year: Never true   Transportation Needs: No Transportation Needs (11/5/2024)    PRAPARE - Transportation     Lack of Transportation (Medical): No     Lack of Transportation (Non-Medical): No   Physical Activity: Insufficiently Active (12/17/2024)    Exercise Vital Sign     Days of Exercise per Week: 2 days     Minutes of Exercise per Session: 30 min   Stress: No Stress Concern Present (12/17/2024)    [employer] - Occupational Stress Questionnaire     Feeling of Stress : Only a little   Recent Concern: Stress - Stress Concern Present (11/5/2024)    [employer] - Occupational Stress Questionnaire     Feeling of Stress : To some extent   Housing Stability: Unknown (12/17/2024)    Housing Stability Vital Sign     Unable to Pay for Housing in the Last Year: No     Homeless in the Last Year: No

## 2025-04-16 NOTE — TELEPHONE ENCOUNTER
No care due was identified.  [facility] Embedded Care Due Messages. Reference number: [id number].   4/16/2025 9:31:15 AM CDT

## 2025-04-16 NOTE — TELEPHONE ENCOUNTER
"Pharmacy said we can't fax "controlled substances" to them, please reorder as E-Prescribe.     Pended butalbital-acetaminophen-caffeine -40 mg (FIORICET, ESGIC) -40 mg per tablet prescription to be E-Prescribed to [facility] pharmacy - [address] AT [facility] [address].   "

## 2025-04-17 ENCOUNTER — TELEPHONE (OUTPATIENT)
Dept: INTERNAL MEDICINE | Facility: CLINIC | Age: 55
End: 2025-04-17
Payer: MEDICARE

## 2025-04-17 DIAGNOSIS — G44.009 CLUSTER HEADACHE, NOT INTRACTABLE, UNSPECIFIED CHRONICITY PATTERN: ICD-10-CM

## 2025-04-17 RX ORDER — BUTALBITAL, ACETAMINOPHEN AND CAFFEINE 50; 325; 40 MG/1; MG/1; MG/1
1 TABLET ORAL EVERY 6 HOURS PRN
Qty: 40 TABLET | Refills: 0 | Status: SHIPPED | OUTPATIENT
Start: 2025-04-17

## 2025-04-17 NOTE — TELEPHONE ENCOUNTER
----- Message from [employer] sent at 4/17/2025  3:38 PM CDT -----  Type : Patient CallWho Called : Patient Does the patient know what this is regarding?: Patient is needing a callback regarding the paper prescription she received from provider on today. Pt was advised by pharmacy that the prescription was missing a SHALONDA number. Pt is currently at pharmacy and says she is in a lot of pain. Would the patient rather a call back or a response via [facility]? [facility]: [phone] Information:

## 2025-04-17 NOTE — TELEPHONE ENCOUNTER
----- Message from [employer] sent at 4/17/2025  3:17 PM CDT -----  Type: Pharmacy calling to clarify an RxName of caller: [place]iaPharmacy [facility]: butalbital-acetaminophen-caffeine -40 mg (FIORICET, ESGIC) -40 mg per tabletWhat do they need to clarify? Prescription does not have the SHALONDA number ; please advise Best call back number: [phone] information:[id number] - [place], [place] - [address] AT [facility] [address]AM[facility] [id number]: [phone] Fax: [phone]

## 2025-04-17 NOTE — TELEPHONE ENCOUNTER
Pharmacy calling to inform that prescription does not have the SHALONDA number. They informed that SHALONDA number can be added to the script comment section.     Med pended for PRINT and SHALONDA number for Demarcus Lee MD., added to comment for pharmacy section.

## 2025-04-21 NOTE — PROGRESS NOTES
Telehealth Visit  [facility]      The patient location is: Home in [place]   The chief complaint leading to consultation is: Medication refill and sinus congestion  Visit type: audiovisual  Face to Face time with patient: 20 minutes      HPI: Jaki Bajwa is a 54 y.o. year old female who presents for sinus congestion and medication refill    She reports nasal congestion after exposure to her grandchild approximately one week ago. She denies fever, chills, or cough. She previously used Flonase in 2024 for similar symptoms with good response.    She takes fluoxetine as needed for migraine headaches. She reports difficulty obtaining fluoxetine prescription at [facility] pharmacy due to electronic prescription transmission issues.    Physical Exam:   Gen- NAD, appears stated age  Resp- Breathing comfortably on RA  Neuro- Alert, spontaneous movements  Psych- Calm, cooperative, logical thought process     Assessment and plan:      Jaki was seen today for nasal congestion.    Diagnoses and all orders for this visit:    Nasal sinus congestion  -     fluticasone propionate (FLONASE) 50 mcg/actuation nasal spray; 1-2 sprays ( mcg total) by Each Nostril route once daily. Use saline nasal wash prior to using for maximum effect. Discontinue after 3 weeks if no improvement for 10 days    Cluster headache, not intractable, unspecified chronicity pattern    Other orders  The following orders have not been finalized:  -     Cancel: butalbital-acetaminophen-caffeine -40 mg (FIORICET, ESGIC) -40 mg per tablet  -     Cancel: azelastine (ASTELIN) 137 mcg (0.1 %) nasal spray    Assessed ongoing need for fiorcet for migraine headaches,  reviewed and order placed in separate orders only encounter so patient can be able to  printable copy of script  Evaluated current cold symptoms, likely viral in nature.  Determined antibiotics not necessary at this time for upper respiratory  symptoms, will trial flonase  Recommend [employer] brand nasal saline wash before using Flonase   Contact office if new symptoms develop indicating potential bacterial infection      Follow-up: for annual with PCP    30 minutes of total time spent on the encounter, which includes face to face time and non-face to face time preparing to see the patient (eg, review of tests), Obtaining and/or reviewing separately obtained history, Documenting clinical information in the electronic or other health record, Independently interpreting results (not separately reported) and communicating results to the patient/family/caregiver, or Care coordination (not separately reported).         This service was not originating from a related E/M service provided within the previous 7 days nor will  to an E/M service or procedure within the next 24 hours or my soonest available appointment.  Prevailing standard of care was able to be met in this audio-only visit.      Demarcus Lee MD  [facility]

## 2025-04-22 ENCOUNTER — LAB VISIT (OUTPATIENT)
Dept: LAB | Facility: HOSPITAL | Age: 55
End: 2025-04-22
Payer: MEDICARE

## 2025-04-22 ENCOUNTER — OFFICE VISIT (OUTPATIENT)
Dept: RHEUMATOLOGY | Facility: CLINIC | Age: 55
End: 2025-04-22
Payer: MEDICARE

## 2025-04-22 ENCOUNTER — OFFICE VISIT (OUTPATIENT)
Dept: HEMATOLOGY/ONCOLOGY | Facility: CLINIC | Age: 55
End: 2025-04-22
Payer: MEDICARE

## 2025-04-22 VITALS
HEART RATE: 103 BPM | BODY MASS INDEX: 40.41 KG/M2 | WEIGHT: 260.13 LBS | SYSTOLIC BLOOD PRESSURE: 134 MMHG | DIASTOLIC BLOOD PRESSURE: 87 MMHG

## 2025-04-22 VITALS
HEIGHT: 67 IN | BODY MASS INDEX: 40.66 KG/M2 | TEMPERATURE: 98 F | SYSTOLIC BLOOD PRESSURE: 133 MMHG | DIASTOLIC BLOOD PRESSURE: 63 MMHG | WEIGHT: 259.06 LBS | RESPIRATION RATE: 18 BRPM | OXYGEN SATURATION: 96 % | HEART RATE: 92 BPM

## 2025-04-22 DIAGNOSIS — M25.471 SWELLING OF BOTH ANKLES: ICD-10-CM

## 2025-04-22 DIAGNOSIS — M25.472 SWELLING OF BOTH ANKLES: ICD-10-CM

## 2025-04-22 DIAGNOSIS — D64.9 ANEMIA OF UNKNOWN ETIOLOGY: ICD-10-CM

## 2025-04-22 DIAGNOSIS — D50.9 IRON DEFICIENCY ANEMIA, UNSPECIFIED IRON DEFICIENCY ANEMIA TYPE: Primary | ICD-10-CM

## 2025-04-22 DIAGNOSIS — M35.00 SJOGREN'S SYNDROME, WITH UNSPECIFIED ORGAN INVOLVEMENT: ICD-10-CM

## 2025-04-22 DIAGNOSIS — M79.7 FIBROMYALGIA: ICD-10-CM

## 2025-04-22 DIAGNOSIS — M79.662 PAIN OF LEFT CALF: Primary | ICD-10-CM

## 2025-04-22 DIAGNOSIS — D50.9 IRON DEFICIENCY ANEMIA, UNSPECIFIED IRON DEFICIENCY ANEMIA TYPE: ICD-10-CM

## 2025-04-22 LAB
ABSOLUTE EOSINOPHIL (OHS): 0.18 K/UL
ABSOLUTE MONOCYTE (OHS): 0.48 K/UL (ref 0.3–1)
ABSOLUTE NEUTROPHIL COUNT (OHS): 3.61 K/UL (ref 1.8–7.7)
BASOPHILS # BLD AUTO: 0.05 K/UL
BASOPHILS NFR BLD AUTO: 0.7 %
ERYTHROCYTE [DISTWIDTH] IN BLOOD BY AUTOMATED COUNT: 14.4 % (ref 11.5–14.5)
FERRITIN SERPL-MCNC: 11 NG/ML (ref 20–300)
HCT VFR BLD AUTO: 32 % (ref 37–48.5)
HGB BLD-MCNC: 9.2 GM/DL (ref 12–16)
IMM GRANULOCYTES # BLD AUTO: 0.02 K/UL (ref 0–0.04)
IMM GRANULOCYTES NFR BLD AUTO: 0.3 % (ref 0–0.5)
IRON SATN MFR SERPL: 5 % (ref 20–50)
IRON SERPL-MCNC: 22 UG/DL (ref 30–160)
LYMPHOCYTES # BLD AUTO: 2.42 K/UL (ref 1–4.8)
MCH RBC QN AUTO: 24 PG (ref 27–31)
MCHC RBC AUTO-ENTMCNC: 28.8 G/DL (ref 32–36)
MCV RBC AUTO: 84 FL (ref 82–98)
NUCLEATED RBC (/100WBC) (OHS): 0 /100 WBC
PLATELET # BLD AUTO: 264 K/UL (ref 150–450)
PMV BLD AUTO: 12 FL (ref 9.2–12.9)
RBC # BLD AUTO: 3.83 M/UL (ref 4–5.4)
RELATIVE EOSINOPHIL (OHS): 2.7 %
RELATIVE LYMPHOCYTE (OHS): 35.8 % (ref 18–48)
RELATIVE MONOCYTE (OHS): 7.1 % (ref 4–15)
RELATIVE NEUTROPHIL (OHS): 53.4 % (ref 38–73)
TIBC SERPL-MCNC: 408 UG/DL (ref 250–450)
TRANSFERRIN SERPL-MCNC: 276 MG/DL (ref 200–375)
WBC # BLD AUTO: 6.76 K/UL (ref 3.9–12.7)

## 2025-04-22 PROCEDURE — 3079F DIAST BP 80-89 MM HG: CPT | Mod: HCNC,CPTII,S$GLB,

## 2025-04-22 PROCEDURE — 99214 OFFICE O/P EST MOD 30 MIN: CPT | Mod: HCNC,S$GLB,,

## 2025-04-22 PROCEDURE — 99999 PR PBB SHADOW E&M-EST. PATIENT-LVL V: CPT | Mod: PBBFAC,HCNC,,

## 2025-04-22 PROCEDURE — 4010F ACE/ARB THERAPY RXD/TAKEN: CPT | Mod: HCNC,CPTII,S$GLB,

## 2025-04-22 PROCEDURE — 1159F MED LIST DOCD IN RCRD: CPT | Mod: HCNC,CPTII,S$GLB,

## 2025-04-22 PROCEDURE — 3075F SYST BP GE 130 - 139MM HG: CPT | Mod: HCNC,CPTII,S$GLB,

## 2025-04-22 PROCEDURE — 99999 PR PBB SHADOW E&M-EST. PATIENT-LVL IV: CPT | Mod: PBBFAC,HCNC,,

## 2025-04-22 PROCEDURE — 3008F BODY MASS INDEX DOCD: CPT | Mod: HCNC,CPTII,S$GLB,

## 2025-04-22 PROCEDURE — G2211 COMPLEX E/M VISIT ADD ON: HCPCS | Mod: HCNC,S$GLB,,

## 2025-04-22 PROCEDURE — 36415 COLL VENOUS BLD VENIPUNCTURE: CPT | Mod: HCNC

## 2025-04-22 PROCEDURE — 84466 ASSAY OF TRANSFERRIN: CPT | Mod: HCNC

## 2025-04-22 PROCEDURE — 82728 ASSAY OF FERRITIN: CPT | Mod: HCNC

## 2025-04-22 PROCEDURE — 85025 COMPLETE CBC W/AUTO DIFF WBC: CPT | Mod: HCNC

## 2025-04-22 RX ORDER — PILOCARPINE HYDROCHLORIDE 5 MG/1
5 TABLET, FILM COATED ORAL 4 TIMES DAILY
Qty: 360 TABLET | Refills: 1 | Status: SHIPPED | OUTPATIENT
Start: 2025-04-22 | End: 2025-10-19

## 2025-04-22 RX ORDER — TIZANIDINE 4 MG/1
4 TABLET ORAL 2 TIMES DAILY PRN
Qty: 60 TABLET | Refills: 2 | Status: SHIPPED | OUTPATIENT
Start: 2025-04-22

## 2025-04-22 RX ORDER — GABAPENTIN 400 MG/1
400 CAPSULE ORAL 3 TIMES DAILY
Qty: 90 CAPSULE | Refills: 2 | Status: SHIPPED | OUTPATIENT
Start: 2025-04-22

## 2025-04-22 RX ORDER — NYSTATIN 100000 [USP'U]/ML
4 SUSPENSION ORAL 4 TIMES DAILY
Qty: 480 ML | Refills: 5 | Status: SHIPPED | OUTPATIENT
Start: 2025-04-22 | End: 2025-10-19

## 2025-04-22 RX ORDER — HYDROXYCHLOROQUINE SULFATE 200 MG/1
TABLET, FILM COATED ORAL
Qty: 60 TABLET | Refills: 3 | Status: SHIPPED | OUTPATIENT
Start: 2025-04-22

## 2025-04-22 NOTE — PROGRESS NOTES
Subjective:      Patient ID: Jaki Bajwa is a 54 y.o. female.    Chief Complaint: Follow up on Sjogrens    HPI  Jaki Bajwa is a 54yo female with Sjogren's syndrome (+SSA in 2020), fibromyalgia, TMJ syndrome, and anxiety/depression.  She is a former pt of Dr. Cameron.     Taking care of her 18mo grandson 2-3x per week.     Iron levels have dropped again. She has seen Hem/onc earlier today, and will be getting another iron infusion.     For Sjogren's, her dry mouth symptoms have improved since she started taking it daily, now 1-2x per day. No longer with xerostomia.  Also on Plaquenil 2 tablets daily. Eye exam is UTD and WNL.  Also using nystatin mouth swish about once or twice a week for mouth sores (from dryness).  Uses restasis eyedrops for dry eyes.    She does report waking in the mornings with stiffness in joints, which lasts 1-2hrs. Denies joint swelling or pain.    Her fibromyalgia pain has improved since increasing from 300mg to 400mg daily. Usually taking BID, sometimes TID.   No issues with drowsiness on gabapentin.     Also taking tizanidine, which helps her RLS at nighttime.    Pt reports she fell last Nov 2024, tripped over a toy, and fell on her right knee.  Recently found out she has torn meniscus. Orthopedics gave steroid injection; still with stiffness at times.  They wanted her to complete PT, however she's unable to do so since she cares for her grandson.     Now with decreased eGFR and elevated creatinine, so she stopped Naproxen (she states this really used to help her in the past).  Avoiding all NSAIDs.    She reports having L calf pain for the past couple of months, which has been worsening this week.  She also reports her L leg looks larger than R.  She denies long distance travel recently.    Review of Systems   Constitutional:  Negative for fever and unexpected weight change.   HENT:  Negative for mouth sores and trouble swallowing.    Eyes:  Negative for redness.    Respiratory:  Negative for cough and shortness of breath.    Cardiovascular:  Negative for chest pain.   Gastrointestinal:  Negative for constipation and diarrhea.   Genitourinary:  Negative for dysuria and genital sores.   Skin:  Positive for rash.   Neurological:  Positive for headaches.   Hematological:  Does not bruise/bleed easily.        Objective:   /87   Pulse 103   Wt 118 kg (260 lb 2.3 oz)   LMP 07/11/2012 Comment: partial   BMI 40.41 kg/m²   Physical Exam   Constitutional: She is oriented to person, place, and time. normal appearance.   Cardiovascular: Normal rate and regular rhythm.   Pulmonary/Chest: Effort normal and breath sounds normal.   Abdominal: Soft.   Musculoskeletal:         General: Tenderness present.      Comments: Tenderness in L calf   Neurological: She is alert and oriented to person, place, and time.   Skin: Skin is warm and dry.   Psychiatric: Her behavior is normal. Mood normal.        12/18/2024   Tender (HOPSON-28) 2 / 28    Swollen (HOPSON-28) 0 / 28    Provider Global --   Patient Global 55 / 100   ESR 13 mm/hr   CRP 1.2 mg/L   HOPSON-28 (ESR) 3.36 (Moderate disease activity)   HOPSON-28 (CRP) 2.81 (Low disease activity)   CDAI Score --        Assessment:     1. Pain of left calf    2. Fibromyalgia    3. Sjogren's syndrome, with unspecified organ involvement          Plan:     Problem List Items Addressed This Visit       Fibromyalgia    Relevant Medications    gabapentin (NEURONTIN) 400 MG capsule    tiZANidine (ZANAFLEX) 4 MG tablet    Sjogren's syndrome    Relevant Medications    hydroxychloroquine (PLAQUENIL) 200 mg tablet    pilocarpine (SALAGEN) 5 MG Tab    nystatin (MYCOSTATIN) 100,000 unit/mL suspension    Other Relevant Orders    Comprehensive Metabolic Panel (Completed)    Sedimentation rate (Completed)    C-Reactive Protein (Completed)     Other Visit Diagnoses         Pain of left calf    -  Primary    Relevant Orders    CV Ultrasound doppler venous DVT leg left           Sjogrens:  Pt is doing much better with dry mouth symptoms since consistently taking pilocarpine. In the past she was taking it a few times a week, then I advised to start taking daily. Now taking 1-2x per day, and she no longer has issues of xerostomia.    Dry eyes are also controlled on Restasis.  She has some AM joint stiffness in hands, lasting about an hour, without pain or swelling. Continue Plaquenil 200mg BID. Eye exam from Feb 2025 is stable.    Fibromyalgia:  She c/o RLS, especially at bedtime, likely related with her fibromyalgia. Tizanidine helps ease these symptoms.  Continue tizanidine 4mg PRN.  Her fibromyalgia pain has improved since increasing gabapentin from 300mg to 400mg.  Continue gabapentin 400mg TID  She can consider talking to her psychiatrist about switching to cymbalta to help with fibromyalgia pain as well, if needed in the future.    L calf pain:  She c/o left calf muscle pain for the past 2 months, which has been worsening this week. She has noticed her L leg looks larger than her R as well.   Order doppler of L leg now to r/o DVT. Have this completed as soon as she is able to, and pt is aware of risks of DVT/PE.     Labs today  RTO 4-6 months

## 2025-04-23 ENCOUNTER — PATIENT MESSAGE (OUTPATIENT)
Dept: HEMATOLOGY/ONCOLOGY | Facility: CLINIC | Age: 55
End: 2025-04-23
Payer: MEDICAID

## 2025-04-23 ENCOUNTER — E-CONSULT (OUTPATIENT)
Dept: GASTROENTEROLOGY | Facility: CLINIC | Age: 55
End: 2025-04-23
Payer: MEDICARE

## 2025-04-23 ENCOUNTER — RESULTS FOLLOW-UP (OUTPATIENT)
Dept: RHEUMATOLOGY | Facility: CLINIC | Age: 55
End: 2025-04-23

## 2025-04-23 DIAGNOSIS — D64.9 ANEMIA OF UNKNOWN ETIOLOGY: Primary | ICD-10-CM

## 2025-04-23 DIAGNOSIS — D50.9 IRON DEFICIENCY ANEMIA, UNSPECIFIED IRON DEFICIENCY ANEMIA TYPE: Primary | ICD-10-CM

## 2025-04-23 PROCEDURE — 99499 UNLISTED E&M SERVICE: CPT | Mod: S$GLB,,, | Performed by: INTERNAL MEDICINE

## 2025-04-23 NOTE — CONSULTS
[facility]  Response for E-Consult     Patient Name: Jaki Bajwa  MRN: [id number]  Primary Care Provider: Jose Carpenter DO   Requesting Provider: Evette Olson FNP-C  E-Consult to Gastroenterology  Consult performed by: Ramirez Bolaños MD  Consult ordered by: Evette Olson FNP-C          After evaluation of your eConsult clinical questions, I believe the patient should be scheduled for an office visit in our specialty due to need for a full GI evaluation.  If VCE is indicated, this is a procedure that requires informed consent.    Total time of Consultation: 5 minute    *This eConsult is based on the clinical data available to me and is furnished without benefit of a physician examination.  The eConsult will need to be interpreted in light of any clinical issues of changes in patient status not available to me at the time rime of filing this eConsults.  Significant changes in patient condition of level of acuity should result in a referral for in person consultation and reevaluation.  Please alert me if you have any furth questions.     Thank you for this eConsult referral.     Ramirez Bolaños MD  [facility] 4[facility] [address]

## 2025-04-24 ENCOUNTER — OFFICE VISIT (OUTPATIENT)
Dept: GASTROENTEROLOGY | Facility: CLINIC | Age: 55
End: 2025-04-24
Payer: MEDICARE

## 2025-04-24 ENCOUNTER — TELEPHONE (OUTPATIENT)
Dept: HEMATOLOGY/ONCOLOGY | Facility: CLINIC | Age: 55
End: 2025-04-24
Payer: MEDICARE

## 2025-04-24 ENCOUNTER — TELEPHONE (OUTPATIENT)
Dept: GASTROENTEROLOGY | Facility: CLINIC | Age: 55
End: 2025-04-24

## 2025-04-24 DIAGNOSIS — D50.9 IRON DEFICIENCY ANEMIA, UNSPECIFIED IRON DEFICIENCY ANEMIA TYPE: Primary | ICD-10-CM

## 2025-04-24 DIAGNOSIS — K59.00 CONSTIPATION, UNSPECIFIED CONSTIPATION TYPE: ICD-10-CM

## 2025-04-24 NOTE — TELEPHONE ENCOUNTER
----- Message from MARIAMA Kumar sent at 4/24/2025  7:33 AM CDT -----  Pleas see below message and reschedule iron infusions if available  ----- Message -----  From: Radha Hollingsworth  Sent: 4/23/2025   1:57 PM CDT  To: José Howard RN    Type:  Needs Medical AdviceWho Called: Jaki Symptoms (please be specific):  How long has patient had these symptoms:  Pharmacy name and phone #:  Would the patient rather a call back or a response via [facility]? callBest Call Back Number:  [phone] Information: Needs to have her Iron Infusion schedule before she switch insurance

## 2025-04-24 NOTE — PROGRESS NOTES
The patient location is: Home  The chief complaint leading to consultation is: iron deficiency anemia    Visit type: audiovisual    Face to Face time with patient: 10 minutes  25 minutes of total time spent on the encounter, which includes face to face time and non-face to face time preparing to see the patient (eg, review of tests), Obtaining and/or reviewing separately obtained history, Documenting clinical information in the electronic or other health record, Independently interpreting results (not separately reported) and communicating results to the patient/family/caregiver, or Care coordination (not separately reported).     Each patient to whom he or she provides medical services by telemedicine is:  (1) informed of the relationship between the physician and patient and the respective role of any other health care provider with respect to management of the patient; and (2) notified that he or she may decline to receive medical services by telemedicine and may withdraw from such care at any time.                                                                                 Gastroenterology Progress Note    Reason for Visit:  The primary encounter diagnosis was Iron deficiency anemia, unspecified iron deficiency anemia type. A diagnosis of Constipation, unspecified constipation type was also pertinent to this visit.    PCP:   Jose Carpenter   [address] [address] [facility] [address] / [place] [address]      Initial HPI   This is a 54 y.o. female presenting for GI visit for her iron def anemia. Referred from her Hematology NP for further GI evaluation. Patient with last Hgb 9.2 which is down from her previous Hgb of 11.3. She seems to fluctuate from ~9-11. Iron studies done recently noted an iron=22, Ferritin=11.0, TSat=5. She is currently taking oral iron daily and will be receiving IV iron. She has received IV iron in the past about every 6 months. She has had a BM Bx 9/2024 that was noted to be normal. She  underwent an EGD 2024 that was normal, but did note H. Pylori infection. She completed antibiotics for this and confirmed eradication 2025. She reports today, however, that she had been taking Omeprazole when stool specimen was submitted. She does still continue with upper GI symptoms. When trying to move her bowels, she will often regurgitate and vomit she reports. She denies odynophagia, dysphagia, but does experience nausea. She is still taking Omeprazole daily.     Patient reports taking daily oral iron. She does report constipation. She will move her bowels about once a week. Does endorse straining, but denies blood in her stool. She will take Colace, but does not feel like this provides her relief. Stools are darker than normal, but she thinks this could be from the iron supplementation. Her last colonoscopy was in  that was normal. Recommended 5 years due to her family history of colon cancer in her father. He was dx'd at a young age she believes.       ROS:  Review of Systems   Constitutional:  Negative for chills, fever, malaise/fatigue and weight loss.   HENT:  Negative for sore throat.    Eyes:  Negative for redness.   Gastrointestinal:  Positive for constipation, nausea and vomiting. Negative for abdominal pain, blood in stool, diarrhea, heartburn and melena.   Skin:  Negative for rash.   Neurological:  Negative for dizziness, loss of consciousness and weakness.        Medical History:  has a past medical history of CANDELARIA (acute kidney injury) (2022), Anemia, Anxiety, Depression, History of psychiatric hospitalization (), History of ventral hernia repair, Hypertension, Lupus, Mental disorder, Morbid obesity (12/15/2017), Psychiatric problem, Sjogren's syndrome (), Therapy, and TMJ (temporomandibular joint disorder).    Surgical History:  has a past surgical history that includes Inguinal hernia repair; breast reduction; Tubal ligation;  section; Ventral hernia repair; Total  Reduction Mammoplasty; Laparoscopic total hysterectomy (2012); Breast surgery (02/13/2005); Hysterectomy (02/02/2005); Bone marrow biopsy (N/A, 9/25/2024); and Esophagogastroduodenoscopy (N/A, 11/9/2024).    Family History: family history includes Alcohol abuse in her brother; Anxiety disorder in her father and sister; Arthritis in her father; Asthma in her son; COPD in her sister; Cancer in her father, sister, and sister; Colon cancer in her father; Depression in her brother, father, sister, and sister; Heart attack in her sister; Heart disease in her mother; Hypertension in her mother; Liver cancer in her sister and another family member; Mental illness in her father; Miscarriages / Stillbirths in her sister; No Known Problems in her daughter; Pancreatic cancer in her brother and another family member; Schizophrenia in her father..       Review of patient's allergies indicates:   Allergen Reactions    Aspirin Hives       Medications Ordered Prior to Encounter[1]      Objective Findings:    Vital Signs:  LMP 07/11/2012 Comment: partial   There is no height or weight on file to calculate BMI.    Physical Exam:  Physical Exam  Neurological:      Mental Status: She is alert.             Labs:  Lab Results   Component Value Date    WBC 6.76 04/22/2025    HGB 9.2 (L) 04/22/2025    HCT 32.0 (L) 04/22/2025     04/22/2025    CRP 1.4 04/22/2025    CHOL 156 09/12/2022    TRIG 95 09/12/2022    HDL 61 09/12/2022    ALKPHOS 80 04/22/2025    ALT 15 04/22/2025    AST 17 04/22/2025     04/22/2025    K 4.0 04/22/2025     04/22/2025    CREATININE 1.1 04/22/2025    BUN 12 04/22/2025    CO2 29 04/22/2025    TSH 2.563 09/22/2021    INR 1.0 04/23/2022    HGBA1C 4.9 02/13/2024         Imaging reviewed: No pertinent imaging reviewed       Endoscopy reviewed: EGD 11/9/2024  Impression:            - Mucosal changes in the esophagus. Biopsied.                          - Normal stomach. Biopsied.                          -  Normal examined duodenum. Biopsied.   Recommendation:        - Await pathology results.                          - Discharge patient to home.                          - Patient has a contact number available for                          emergencies. The signs and symptoms of potential                          delayed complications were discussed with the                          patient. Return to normal activities tomorrow.                          Written discharge instructions were provided to                          the patient.                          - The findings and recommendations were discussed                          with the patient.   Attending Participation:        I personally performed the entire procedure.   Parth Hernández MD   11/9/2024 9:36:56 AM     Colonoscopy 1/28/2021      Assessment:  1. Iron deficiency anemia, unspecified iron deficiency anemia type    2. Constipation, unspecified constipation type             Recommendations:  Patient with ongoing iron deficiency anemia of unknown etiology. Given no recent colonoscopy, will proceed with colonoscopy given her family hx of colon cancer. Will repeat EGD given ongoing upper GI symptoms. Her stool test was done while on PPI so concerned this could have been a false negative. Repeat EGD with biopsies. If EGD and colonoscopy are normal, discussed with patient that she would need an in person visit to discuss VCE for further evaluation of her ongoing anemia.   Recommended Miralax daily. Can take twice daily if needed. Can consider Linzess if constipation continues.   RTC after EGD and colonoscopy to discuss VCE      Thank you for allowing me to participate in this patient's care.    Sincerely,     Cherelle Garcia NP  Gastroenterology Department  [facility]            [1]   Current Outpatient Medications on File Prior to Visit   Medication Sig Dispense Refill    amLODIPine (NORVASC) 5 MG tablet Take 1 tablet (5 mg total) by mouth once  daily. 90 tablet 3    buPROPion (WELLBUTRIN XL) 150 MG TB24 tablet Take 1 tablet (150 mg total) by mouth once daily. 270 tablet 3    busPIRone (BUSPAR) 15 MG tablet Take 1 tablet (15 mg total) by mouth once daily. 90 tablet 3    butalbital-acetaminophen-caffeine -40 mg (FIORICET, ESGIC) -40 mg per tablet Take 1 tablet by mouth every 6 (six) hours as needed for Headaches. 40 tablet 0    cholecalciferol, vitamin D3, (VITAMIN D3) 25 mcg (1,000 unit) capsule Take 1 capsule (1,000 Units total) by mouth once daily. (Patient not taking: Reported on 4/22/2025)      cycloSPORINE (RESTASIS) 0.05 % ophthalmic emulsion Place 1 drop into both eyes 2 (two) times daily. 180 each 3    diazePAM (VALIUM) 2 MG tablet Take 1 tablet (2 mg total) by mouth daily as needed (Severe anxiety/Panic). 15 tablets to last 3 months 15 tablet 0    docusate sodium (COLACE) 100 MG capsule Take 1 capsule (100 mg total) by mouth 2 (two) times daily as needed for Constipation. 60 capsule 0    doxepin (SINEQUAN) 75 MG capsule TAKE 2 CAPSULES(150 MG) BY MOUTH EVERY EVENING 180 capsule 3    estradioL (ESTRACE) 0.01 % (0.1 mg/gram) vaginal cream Place 1 g vaginally twice a week. 42.5 g 1    estradiol 0.025 mg/24 hr 0.025 mg/24 hr Place 1 patch onto the skin once a week. 4 patch 3    ferrous sulfate, dried (SLOW FE) 160 mg (50 mg iron) TbSR Take 1 tablet (160 mg total) by mouth once daily. 90 tablet 2    fluticasone propionate (FLONASE) 50 mcg/actuation nasal spray 1-2 sprays ( mcg total) by Each Nostril route once daily. Use saline nasal wash prior to using for maximum effect. Discontinue after 3 weeks if no improvement for 10 days 16 g 1    furosemide (LASIX) 20 MG tablet Take 1 tablet (20 mg total) by mouth once daily. 90 tablet 3    gabapentin (NEURONTIN) 400 MG capsule Take 1 capsule (400 mg total) by mouth 3 (three) times daily. 90 capsule 2    HYDROcodone-acetaminophen (NORCO) 5-325 mg per tablet Take 1 tablet by mouth every 24 hours  as needed for Pain. Quantity medically necessary 30 tablet 0    hydroxychloroquine (PLAQUENIL) 200 mg tablet TAKE 1 TABLET BY MOUTH TWICE DAILY 60 tablet 3    irbesartan (AVAPRO) 300 MG tablet TAKE 1 TABLET(300 MG) BY MOUTH EVERY EVENING 90 tablet 3    lurasidone (LATUDA) 60 mg Tab tablet Take 1 tablet (60 mg total) by mouth once daily. 90 tablet 3    NARCAN 4 mg/actuation Spry       nystatin (MYCOSTATIN) 100,000 unit/mL suspension Take 4 mLs (400,000 Units total) by mouth 4 (four) times daily. 480 mL 5    omeprazole (PRILOSEC) 40 MG capsule Take 1 capsule (40 mg total) by mouth once daily. 90 capsule 3    pilocarpine (SALAGEN) 5 MG Tab Take 1 tablet (5 mg total) by mouth 4 (four) times daily. 360 tablet 1    sertraline (ZOLOFT) 100 MG tablet Take 1 tablet (100 mg total) by mouth once daily. 30 tablet 2    tiZANidine (ZANAFLEX) 4 MG tablet Take 1 tablet (4 mg total) by mouth 2 (two) times daily as needed (2 tablets twice daily as needed). 60 tablet 2    ubrogepant (UBRELVY) 100 mg tablet Take 1 tablet (100 mg total) by mouth as needed for Migraine. If symptoms persist or return, may repeat dose after 2 hours. Maximum: 200 mg per 24 hours 30 tablet 2    VENOFER 100 mg iron/5 mL injection  (Patient not taking: Reported on 4/22/2025)       No current facility-administered medications on file prior to visit.

## 2025-04-24 NOTE — TELEPHONE ENCOUNTER
----- Message from Brianne sent at 4/24/2025  1:21 PM CDT -----  Regarding: Schedule Test  Contact: Pt @[phone]  Pt is calling to speak to someone in the office to schedule procedure. Pt is asking for a return call soon.

## 2025-04-28 ENCOUNTER — OFFICE VISIT (OUTPATIENT)
Dept: INTERNAL MEDICINE | Facility: CLINIC | Age: 55
End: 2025-04-28
Payer: MEDICAID

## 2025-04-28 ENCOUNTER — OFFICE VISIT (OUTPATIENT)
Dept: PSYCHIATRY | Facility: CLINIC | Age: 55
End: 2025-04-28
Payer: MEDICARE

## 2025-04-28 ENCOUNTER — TELEPHONE (OUTPATIENT)
Dept: ENDOSCOPY | Facility: HOSPITAL | Age: 55
End: 2025-04-28
Payer: MEDICARE

## 2025-04-28 VITALS — HEIGHT: 67 IN | BODY MASS INDEX: 40.49 KG/M2 | WEIGHT: 258 LBS

## 2025-04-28 VITALS
OXYGEN SATURATION: 98 % | DIASTOLIC BLOOD PRESSURE: 85 MMHG | HEART RATE: 107 BPM | SYSTOLIC BLOOD PRESSURE: 130 MMHG | BODY MASS INDEX: 39.79 KG/M2 | HEIGHT: 67 IN | WEIGHT: 253.5 LBS

## 2025-04-28 DIAGNOSIS — F41.1 GAD (GENERALIZED ANXIETY DISORDER): ICD-10-CM

## 2025-04-28 DIAGNOSIS — F42.9 OBSESSIVE-COMPULSIVE DISORDER, UNSPECIFIED TYPE: Primary | ICD-10-CM

## 2025-04-28 DIAGNOSIS — F33.0 MDD (MAJOR DEPRESSIVE DISORDER), RECURRENT EPISODE, MILD: ICD-10-CM

## 2025-04-28 DIAGNOSIS — D50.9 IRON DEFICIENCY ANEMIA, UNSPECIFIED IRON DEFICIENCY ANEMIA TYPE: ICD-10-CM

## 2025-04-28 DIAGNOSIS — Z12.11 SCREEN FOR COLON CANCER: Primary | ICD-10-CM

## 2025-04-28 DIAGNOSIS — I10 ESSENTIAL HYPERTENSION: Primary | Chronic | ICD-10-CM

## 2025-04-28 DIAGNOSIS — G89.4 CHRONIC PAIN SYNDROME: ICD-10-CM

## 2025-04-28 DIAGNOSIS — Z86.19 HISTORY OF HELICOBACTER PYLORI INFECTION: ICD-10-CM

## 2025-04-28 DIAGNOSIS — R11.2 NAUSEA AND VOMITING, UNSPECIFIED VOMITING TYPE: ICD-10-CM

## 2025-04-28 DIAGNOSIS — F11.20 CHRONIC NARCOTIC DEPENDENCE: ICD-10-CM

## 2025-04-28 DIAGNOSIS — K21.9 GASTROESOPHAGEAL REFLUX DISEASE, UNSPECIFIED WHETHER ESOPHAGITIS PRESENT: Primary | ICD-10-CM

## 2025-04-28 DIAGNOSIS — N18.32 STAGE 3B CHRONIC KIDNEY DISEASE: ICD-10-CM

## 2025-04-28 DIAGNOSIS — F39 MOOD DISORDER: ICD-10-CM

## 2025-04-28 DIAGNOSIS — D50.8 OTHER IRON DEFICIENCY ANEMIA: ICD-10-CM

## 2025-04-28 DIAGNOSIS — F40.10 SOCIAL ANXIETY DISORDER: ICD-10-CM

## 2025-04-28 DIAGNOSIS — F40.01 PANIC DISORDER WITH AGORAPHOBIA: ICD-10-CM

## 2025-04-28 PROCEDURE — 90833 PSYTX W PT W E/M 30 MIN: CPT | Mod: 95,,, | Performed by: INTERNAL MEDICINE

## 2025-04-28 PROCEDURE — 4010F ACE/ARB THERAPY RXD/TAKEN: CPT | Mod: CPTII,,, | Performed by: FAMILY MEDICINE

## 2025-04-28 PROCEDURE — 3008F BODY MASS INDEX DOCD: CPT | Mod: CPTII,,, | Performed by: FAMILY MEDICINE

## 2025-04-28 PROCEDURE — 1160F RVW MEDS BY RX/DR IN RCRD: CPT | Mod: CPTII,95,, | Performed by: INTERNAL MEDICINE

## 2025-04-28 PROCEDURE — G2211 COMPLEX E/M VISIT ADD ON: HCPCS | Mod: S$PBB,,, | Performed by: FAMILY MEDICINE

## 2025-04-28 PROCEDURE — 1160F RVW MEDS BY RX/DR IN RCRD: CPT | Mod: CPTII,,, | Performed by: FAMILY MEDICINE

## 2025-04-28 PROCEDURE — 99999 PR PBB SHADOW E&M-EST. PATIENT-LVL V: CPT | Mod: PBBFAC,,, | Performed by: FAMILY MEDICINE

## 2025-04-28 PROCEDURE — 1159F MED LIST DOCD IN RCRD: CPT | Mod: CPTII,95,, | Performed by: INTERNAL MEDICINE

## 2025-04-28 PROCEDURE — 3079F DIAST BP 80-89 MM HG: CPT | Mod: CPTII,,, | Performed by: FAMILY MEDICINE

## 2025-04-28 PROCEDURE — 1159F MED LIST DOCD IN RCRD: CPT | Mod: CPTII,,, | Performed by: FAMILY MEDICINE

## 2025-04-28 PROCEDURE — 99215 OFFICE O/P EST HI 40 MIN: CPT | Mod: PBBFAC | Performed by: FAMILY MEDICINE

## 2025-04-28 PROCEDURE — 4010F ACE/ARB THERAPY RXD/TAKEN: CPT | Mod: CPTII,95,, | Performed by: INTERNAL MEDICINE

## 2025-04-28 PROCEDURE — 98006 SYNCH AUDIO-VIDEO EST MOD 30: CPT | Mod: 95,,, | Performed by: INTERNAL MEDICINE

## 2025-04-28 PROCEDURE — 3075F SYST BP GE 130 - 139MM HG: CPT | Mod: CPTII,,, | Performed by: FAMILY MEDICINE

## 2025-04-28 RX ORDER — HYDROCODONE BITARTRATE AND ACETAMINOPHEN 5; 325 MG/1; MG/1
1 TABLET ORAL
Qty: 30 TABLET | Refills: 0 | Status: SHIPPED | OUTPATIENT
Start: 2025-04-28

## 2025-04-28 RX ORDER — SODIUM, POTASSIUM,MAG SULFATES 17.5-3.13G
SOLUTION, RECONSTITUTED, ORAL ORAL
Qty: 2 KIT | Refills: 0 | Status: ON HOLD | OUTPATIENT
Start: 2025-04-28 | End: 2025-04-30 | Stop reason: HOSPADM
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
## 2025-04-28 NOTE — TELEPHONE ENCOUNTER
Referral for procedure from [facility]      Spoke to Jaki to schedule procedure(s) Colonoscopy/EGD       Physician to perform procedure(s) Dr. CELESTE Bolaños  Date of Procedure (s) 4/30/25  Arrival Time 2:30 PM  Time of Procedure(s) 3:30 PM   Location of Procedure(s) [facility]  Type of Rx Prep sent to patient: Suprep  Instructions provided to patient via [facility]    Patient was informed on the following information and verbalized understanding. Screening questionnaire reviewed with patient and complete. If procedure requires anesthesia, a responsible adult needs to be present to accompany the patient home, patient cannot drive after receiving anesthesia. Appointment details are tentative, especially check-in time. Patient will receive a prep-op call 7 days prior to confirm check-in time for procedure. If applicable the patient should contact their pharmacy to verify Rx for procedure prep is ready for pick-up. Patient was advised to call the scheduling department at [phone] if pharmacy states no Rx is available. Patient was advised to call the endoscopy scheduling department if any questions or concerns arise.       Endoscopy Scheduling Department

## 2025-04-28 NOTE — PROGRESS NOTES
Subjective:       Patient ID: Jaki Bajwa is a 54 y.o. female.    Chief Complaint: Follow-up (Back, leg pain ) and Rash (On left arm)    Follow-up  Associated symptoms include a rash.   Rash      History of Present Illness    CHIEF COMPLAINT:  Jaki presents today for medication refills before insurance change    CURRENT SYMPTOMS:  She recently developed a rash that has been spreading upwards on her body after sharon a cold from her grandbaby. The rash has not improved with OTC cortisone cream and prescribed medication. She also experienced ankle swelling with associated discomfort that has now resolved with regular use of prescribed diuretic medication.    MEDICATIONS:  She reports recent increase in Zoloft dosage with good response. She has improved compliance with diuretic medication, now taking it regularly instead of as needed for swelling.    IRON DEFICIENCY:  She reports low iron levels with iron saturation of 5, noting this occurs approximately every 6 months. She denies experiencing typical ice cravings associated with her iron deficiency.    LABS:  GFR was 59, representing the highest value in the past year with overall improvement in kidney function studies. Recent rheumatology labs were completed.    SCHEDULED PROCEDURES:  She has EGD and colonoscopy scheduled for Wednesday.      ROS:  General: -fever, -chills, -fatigue, -weight gain, -weight loss  Eyes: -vision changes, -redness, -discharge  ENT: -ear pain, -nasal congestion, -sore throat  Cardiovascular: -chest pain, -palpitations, +lower extremity edema  Respiratory: -cough, -shortness of breath  Gastrointestinal: -abdominal pain, -nausea, -vomiting, -diarrhea, -constipation, -blood in stool  Genitourinary: -dysuria, -hematuria, -frequency  Musculoskeletal: -joint pain, -muscle pain, +limb pain  Skin: +rash, -lesion  Neurological: -headache, -dizziness, -numbness, -tingling  Psychiatric: +anxiety, -depression, -sleep difficulty      "      Tests to Keep You Healthy    Mammogram: Met on 2/13/2025  Colon Cancer Screening: Met on 1/28/2021  Last Blood Pressure <= 139/89 (4/28/2025): NO      Social History     Social History Narrative    Not on file       Family History   Problem Relation Name Age of Onset    Heart disease Mother Amalia Bajwa     Hypertension Mother Amalia Bajwa     Cancer Father Demarcus Lawtons sr.         colon ca    Colon cancer Father Demarcus Lawtons sr.     Depression Father Demarcus Lawtons sr.     Schizophrenia Father Demarcus Lawtons sr.     Anxiety disorder Father Demarcus Lawtons sr.     Arthritis Father Demarcus Lawtons sr.     Mental illness Father Demarcus Lawtons sr.     Liver cancer Sister Fifi     Cancer Sister Fifi     Depression Sister Fifi     Anxiety disorder Sister Fifi     Heart attack Sister Fifi     COPD Sister Fifi     Cancer Sister Madeline         throat and colon    Depression Sister Madeline     Miscarriages / Stillbirths Sister Madeline     Pancreatic cancer Brother Demarcus bajwa     Depression Brother Demarcus bajwa     Alcohol abuse Brother Demarcus bajwa     No Known Problems Daughter [employer]     Asthma Son Scott     Pancreatic cancer Other      Liver cancer Other      Suicide Neg Hx      Ovarian cancer Neg Hx      Breast cancer Neg Hx       Current Medications[1]    Review of Systems   Skin:  Positive for rash.       Objective:   /85 Comment: average home readings  Pulse 107   Ht 5' 7" (1.702 m)   Wt 115 kg (253 lb 8.5 oz)   LMP 07/11/2012 Comment: partial   SpO2 98%   BMI 39.71 kg/m²      Physical Exam  Vitals reviewed.   Constitutional:       Appearance: She is well-developed.   HENT:      Head: Normocephalic and atraumatic.   Eyes:      Conjunctiva/sclera: Conjunctivae normal.   Cardiovascular:      Rate and Rhythm: Normal rate.   Pulmonary:      Effort: Pulmonary effort is normal. No respiratory distress.   Skin:     General: Skin is warm and dry.      Findings: No rash.   Neurological:      Mental Status: She is " alert and oriented to person, place, and time.      Coordination: Coordination normal.   Psychiatric:         Behavior: Behavior normal.         Physical Exam              @resultssec@  Assessment & Plan   Assessment & Plan    IMPRESSION:   Reviewed recent lab results showing improvement in renal function (GFR increased from 37 in February last year to 59 currently).   Assessed upcoming endoscopy and colonoscopy procedures.   Evaluated current medication regimen, including recent increase in Zoloft which is working well.   Recommend symptomatic treatment for post-viral rash with regular lotion; explained that post-viral rashes are common and typically resolve on their own.   Advised intermittent use of diuretics as needed for fluid retention; Furosemide: Take as needed when swelling occurs, for 2-3 days consecutively, then discontinue until next episode of swelling.   Evaluated need for continued omeprazole use, pending results of upcoming endoscopy; stop taking before upcoming EGD procedure.    PLAN SUMMARY:   Recommend another iron infusion, pending approval   Continue Norco (hydrocodone/acetaminophen)   Advise on as-needed use of diuretics instead of daily administration   Apply regular lotion for symptom management   Increase Zoloft dosage   Prescribe ferrous sulfate for oral iron supplementation   Continue ferrous sulfate supplementation   Refer to specialist for compulsive shopping behavior   Educate on colonoscopy preparation   Follow up in 3 months   Contact office if needed before next appointment    RASH AND NONSPECIFIC SKIN ERUPTION:   Assessed the patient's rash as a post-viral eruption following recovery from a cold.   Noted the rash is spreading upwards.   Determined the rash will likely resolve spontaneously.   Recommend application of regular lotion for symptom management.   Advised waiting for 2 days for resolution.   Noted previous ineffective treatments with cortisone and a prescribed  medication.    IRON DEFICIENCY ANEMIA:   Continued ferrous sulfate supplementation.   Noted recurrence of iron deficiency approximately every 6 months.   Observed current iron saturation at 5%.   Recognized early detection of the condition in this instance.   Noted absence of typical ice craving symptom.   Recommend another iron infusion, pending approval.   Prescribed ferrous sulfate for oral iron supplementation.    CHRONIC KIDNEY DISEASE:   Reviewed kidney function parameters, noting improvement over the past year.   Observed current GFR at 59, improved from 37 in February of previous year.   Advised against daily use of diuretics to prevent kidney strain.    MAJOR DEPRESSIVE DISORDER:   Noted patient's consultation with a mental health professional ([facility]).   Recommend referral to a specialist for compulsive shopping behavior.   Increased Zoloft dosage recently, which is proving effective.    LOCALIZED EDEMA:   Observed absence of current ankle swelling during exam.   Educated the patient about common anatomical variations, including asymmetry in leg size.   Advised on as-needed use of diuretics rather than daily administration.   Reassured the patient that unilateral leg enlargement can be a normal variation in some individuals.      OTHER MEDICAL MANAGEMENT:   Educated on colonoscopy preparation: proper hydration with 6-8 glasses of water daily, clear liquid diet restrictions, and proper timing of laxatives and prep solution.   Continued Norco (hydrocodone/acetaminophen).    FOLLOW-UP:   Follow up in 3 months.   Contact the office if needed before the next appointment.         Problem List Items Addressed This Visit          Neuro    Chronic pain    Relevant Medications    HYDROcodone-acetaminophen (NORCO) 5-325 mg per tablet       Psychiatric    Chronic narcotic dependence    Relevant Medications    HYDROcodone-acetaminophen (NORCO) 5-325 mg per tablet       Cardiac/Vascular    Essential hypertension - Primary  (Chronic)       Renal/    Stage 3b chronic kidney disease       Oncology    Iron deficiency anemia    Relevant Medications    ferrous sulfate, dried (SLOW FE) 160 mg (50 mg iron) TbSR         Immunizations Administered on Date of Encounter - 4/28/2025       No immunizations on file.             No follow-ups on file.    This note was generated with the assistance of ambient listening technology. Verbal consent was obtained by the patient and accompanying visitor(s) for the recording of patient appointment to facilitate this note. I attest to having reviewed and edited the generated note for accuracy, though some syntax or spelling errors may persist. Please contact the author of this note for any clarification.      Disclaimer:  This note may have been prepared using voice recognition software, it may have not been extensively proofed, as such there could be errors within the text such as sound alike errors.         [1]   Current Outpatient Medications:     amLODIPine (NORVASC) 5 MG tablet, Take 1 tablet (5 mg total) by mouth once daily., Disp: 90 tablet, Rfl: 3    buPROPion (WELLBUTRIN XL) 150 MG TB24 tablet, Take 1 tablet (150 mg total) by mouth once daily., Disp: 270 tablet, Rfl: 3    busPIRone (BUSPAR) 15 MG tablet, Take 1 tablet (15 mg total) by mouth once daily., Disp: 90 tablet, Rfl: 3    butalbital-acetaminophen-caffeine -40 mg (FIORICET, ESGIC) -40 mg per tablet, Take 1 tablet by mouth every 6 (six) hours as needed for Headaches., Disp: 40 tablet, Rfl: 0    cholecalciferol, vitamin D3, (VITAMIN D3) 25 mcg (1,000 unit) capsule, Take 1 capsule (1,000 Units total) by mouth once daily. (Patient not taking: Reported on 4/22/2025), Disp: , Rfl:     cycloSPORINE (RESTASIS) 0.05 % ophthalmic emulsion, Place 1 drop into both eyes 2 (two) times daily., Disp: 180 each, Rfl: 3    diazePAM (VALIUM) 2 MG tablet, Take 1 tablet (2 mg total) by mouth daily as needed (Severe anxiety/Panic). 15 tablets to last 3  months, Disp: 15 tablet, Rfl: 0    docusate sodium (COLACE) 100 MG capsule, Take 1 capsule (100 mg total) by mouth 2 (two) times daily as needed for Constipation., Disp: 60 capsule, Rfl: 0    doxepin (SINEQUAN) 75 MG capsule, TAKE 2 CAPSULES(150 MG) BY MOUTH EVERY EVENING, Disp: 180 capsule, Rfl: 3    estradioL (ESTRACE) 0.01 % (0.1 mg/gram) vaginal cream, Place 1 g vaginally twice a week., Disp: 42.5 g, Rfl: 1    estradiol 0.025 mg/24 hr 0.025 mg/24 hr, Place 1 patch onto the skin once a week., Disp: 4 patch, Rfl: 3    ferrous sulfate, dried (SLOW FE) 160 mg (50 mg iron) TbSR, Take 1 tablet (160 mg total) by mouth once daily., Disp: 90 tablet, Rfl: 2    furosemide (LASIX) 20 MG tablet, Take 1 tablet (20 mg total) by mouth once daily., Disp: 90 tablet, Rfl: 3    gabapentin (NEURONTIN) 400 MG capsule, Take 1 capsule (400 mg total) by mouth 3 (three) times daily., Disp: 90 capsule, Rfl: 2    HYDROcodone-acetaminophen (NORCO) 5-325 mg per tablet, Take 1 tablet by mouth every 24 hours as needed for Pain. Quantity medically necessary, Disp: 30 tablet, Rfl: 0    hydroxychloroquine (PLAQUENIL) 200 mg tablet, TAKE 1 TABLET BY MOUTH TWICE DAILY, Disp: 60 tablet, Rfl: 3    irbesartan (AVAPRO) 300 MG tablet, TAKE 1 TABLET(300 MG) BY MOUTH EVERY EVENING, Disp: 90 tablet, Rfl: 3    lurasidone (LATUDA) 60 mg Tab tablet, Take 1 tablet (60 mg total) by mouth once daily., Disp: 90 tablet, Rfl: 3    NARCAN 4 mg/actuation Spry, , Disp: , Rfl:     nystatin (MYCOSTATIN) 100,000 unit/mL suspension, Take 4 mLs (400,000 Units total) by mouth 4 (four) times daily., Disp: 480 mL, Rfl: 5    omeprazole (PRILOSEC) 40 MG capsule, Take 1 capsule (40 mg total) by mouth once daily., Disp: 90 capsule, Rfl: 3    pilocarpine (SALAGEN) 5 MG Tab, Take 1 tablet (5 mg total) by mouth 4 (four) times daily., Disp: 360 tablet, Rfl: 1    sertraline (ZOLOFT) 100 MG tablet, Take 1 tablet (100 mg total) by mouth once daily., Disp: 30 tablet, Rfl: 2     sodium,potassium,mag sulfates (SUPREP BOWEL PREP KIT) 17.5-3.13-1.6 gram SolR, Take as directed by provider office, Disp: 2 kit, Rfl: 0    tiZANidine (ZANAFLEX) 4 MG tablet, Take 1 tablet (4 mg total) by mouth 2 (two) times daily as needed (2 tablets twice daily as needed)., Disp: 60 tablet, Rfl: 2    ubrogepant (UBRELVY) 100 mg tablet, Take 1 tablet (100 mg total) by mouth as needed for Migraine. If symptoms persist or return, may repeat dose after 2 hours. Maximum: 200 mg per 24 hours, Disp: 30 tablet, Rfl: 2    VENOFER 100 mg iron/5 mL injection, , Disp: , Rfl:

## 2025-04-28 NOTE — TELEPHONE ENCOUNTER
"----- Message from Spring sent at 4/24/2025  4:18 PM CDT -----  Regarding: FW: EGD/Colonoscopy    ----- Message -----  From: Cherelle Garcia NP  Sent: 4/24/2025   9:02 AM CDT  To: [facility]  Subject: EGD/Colonoscopy                                  Procedure: EGD/ColonoscopyDiagnosis: GERD, Iron deficiency anemia, Nausea/Vomiting, and hx of H. PyloriProcedure Timing: Within 4 weeks (Urgent)#If within 4 weeks selected, please zhanna as high priority##If greater than 12 weeks, please select "5-12 weeks" and delay sending until 3 months prior to requested date# Location: Any SiteAdditional Scheduling Information: No scheduling concernsPrep Specifications:Extended/Constipation prepIs the patient taking a GLP-1 Agonist:noHave you attached a patient to this message: yes  "

## 2025-04-29 ENCOUNTER — TELEPHONE (OUTPATIENT)
Dept: INFUSION THERAPY | Facility: HOSPITAL | Age: 55
End: 2025-04-29
Payer: MEDICARE

## 2025-04-30 ENCOUNTER — ANESTHESIA (OUTPATIENT)
Dept: ENDOSCOPY | Facility: HOSPITAL | Age: 55
End: 2025-04-30
Payer: MEDICARE

## 2025-04-30 ENCOUNTER — OFFICE VISIT (OUTPATIENT)
Facility: CLINIC | Age: 55
End: 2025-04-30
Payer: MEDICAID

## 2025-04-30 ENCOUNTER — HOSPITAL ENCOUNTER (OUTPATIENT)
Facility: HOSPITAL | Age: 55
Discharge: HOME | End: 2025-04-30
Attending: INTERNAL MEDICINE | Admitting: INTERNAL MEDICINE
Payer: MEDICARE

## 2025-04-30 ENCOUNTER — PATIENT MESSAGE (OUTPATIENT)
Dept: OBSTETRICS AND GYNECOLOGY | Facility: CLINIC | Age: 55
End: 2025-04-30
Payer: MEDICAID

## 2025-04-30 ENCOUNTER — ANESTHESIA EVENT (OUTPATIENT)
Dept: ENDOSCOPY | Facility: HOSPITAL | Age: 55
End: 2025-04-30
Payer: MEDICARE

## 2025-04-30 VITALS
OXYGEN SATURATION: 100 % | HEART RATE: 97 BPM | TEMPERATURE: 98 F | SYSTOLIC BLOOD PRESSURE: 138 MMHG | WEIGHT: 249.81 LBS | HEIGHT: 67 IN | DIASTOLIC BLOOD PRESSURE: 81 MMHG | RESPIRATION RATE: 17 BRPM | BODY MASS INDEX: 39.21 KG/M2

## 2025-04-30 DIAGNOSIS — N95.1 MENOPAUSAL SYMPTOM: ICD-10-CM

## 2025-04-30 DIAGNOSIS — D50.9 IDA (IRON DEFICIENCY ANEMIA): ICD-10-CM

## 2025-04-30 DIAGNOSIS — N94.19 DYSPAREUNIA DUE TO MEDICAL CONDITION IN FEMALE PATIENT: ICD-10-CM

## 2025-04-30 DIAGNOSIS — N95.1 VASOMOTOR SYMPTOMS DUE TO MENOPAUSE: Primary | ICD-10-CM

## 2025-04-30 DIAGNOSIS — N95.1 VAGINAL DRYNESS, MENOPAUSAL: ICD-10-CM

## 2025-04-30 DIAGNOSIS — R11.2 NAUSEA AND VOMITING, UNSPECIFIED VOMITING TYPE: ICD-10-CM

## 2025-04-30 DIAGNOSIS — D50.9 IRON DEFICIENCY ANEMIA, UNSPECIFIED IRON DEFICIENCY ANEMIA TYPE: Primary | ICD-10-CM

## 2025-04-30 DIAGNOSIS — Z86.19 HISTORY OF HELICOBACTER PYLORI INFECTION: ICD-10-CM

## 2025-04-30 DIAGNOSIS — K21.9 GASTROESOPHAGEAL REFLUX DISEASE, UNSPECIFIED WHETHER ESOPHAGITIS PRESENT: ICD-10-CM

## 2025-04-30 PROCEDURE — 45385 COLONOSCOPY W/LESION REMOVAL: CPT | Mod: PT,HCNC | Performed by: INTERNAL MEDICINE

## 2025-04-30 PROCEDURE — 27201012 HC FORCEPS, HOT/COLD, DISP: Mod: HCNC | Performed by: INTERNAL MEDICINE

## 2025-04-30 PROCEDURE — 43239 EGD BIOPSY SINGLE/MULTIPLE: CPT | Mod: HCNC | Performed by: INTERNAL MEDICINE

## 2025-04-30 PROCEDURE — 88305 TISSUE EXAM BY PATHOLOGIST: CPT | Mod: TC,91,HCNC | Performed by: INTERNAL MEDICINE

## 2025-04-30 PROCEDURE — 25000003 PHARM REV CODE 250: Mod: HCNC | Performed by: STUDENT IN AN ORGANIZED HEALTH CARE EDUCATION/TRAINING PROGRAM

## 2025-04-30 PROCEDURE — 45385 COLONOSCOPY W/LESION REMOVAL: CPT | Mod: PT,HCNC,, | Performed by: INTERNAL MEDICINE

## 2025-04-30 PROCEDURE — 43239 EGD BIOPSY SINGLE/MULTIPLE: CPT | Mod: 51,HCNC,, | Performed by: INTERNAL MEDICINE

## 2025-04-30 PROCEDURE — 63600175 PHARM REV CODE 636 W HCPCS: Mod: HCNC | Performed by: STUDENT IN AN ORGANIZED HEALTH CARE EDUCATION/TRAINING PROGRAM

## 2025-04-30 PROCEDURE — 37000008 HC ANESTHESIA 1ST 15 MINUTES: Mod: HCNC | Performed by: INTERNAL MEDICINE

## 2025-04-30 PROCEDURE — 37000009 HC ANESTHESIA EA ADD 15 MINS: Mod: HCNC | Performed by: INTERNAL MEDICINE

## 2025-04-30 RX ORDER — PROPOFOL 10 MG/ML
VIAL (ML) INTRAVENOUS CONTINUOUS PRN
Status: DISCONTINUED | OUTPATIENT
Start: 2025-04-30 | End: 2025-04-30

## 2025-04-30 RX ORDER — ESTRADIOL 0.1 MG/G
1 CREAM VAGINAL DAILY
Qty: 42 G | Refills: 4 | Status: SHIPPED | OUTPATIENT
Start: 2025-04-30 | End: 2026-04-30

## 2025-04-30 RX ORDER — SODIUM CHLORIDE 9 MG/ML
INJECTION, SOLUTION INTRAVENOUS CONTINUOUS
Status: DISCONTINUED | OUTPATIENT
Start: 2025-04-30 | End: 2025-04-30 | Stop reason: HOSPADM

## 2025-04-30 RX ORDER — ESTRADIOL 0.03 MG/D
FILM, EXTENDED RELEASE TRANSDERMAL
Qty: 8 PATCH | Refills: 12 | Status: SHIPPED | OUTPATIENT
Start: 2025-04-30

## 2025-04-30 RX ORDER — LIDOCAINE HYDROCHLORIDE 20 MG/ML
INJECTION INTRAVENOUS
Status: DISCONTINUED | OUTPATIENT
Start: 2025-04-30 | End: 2025-04-30

## 2025-04-30 RX ORDER — PROPOFOL 10 MG/ML
VIAL (ML) INTRAVENOUS
Status: DISCONTINUED | OUTPATIENT
Start: 2025-04-30 | End: 2025-04-30

## 2025-04-30 RX ADMIN — GLYCOPYRROLATE 0.1 MG: 0.2 INJECTION, SOLUTION INTRAMUSCULAR; INTRAVENOUS at 03:04

## 2025-04-30 RX ADMIN — SODIUM CHLORIDE: 9 INJECTION, SOLUTION INTRAVENOUS at 03:04

## 2025-04-30 RX ADMIN — PROPOFOL 50 MG: 10 INJECTION, EMULSION INTRAVENOUS at 04:04

## 2025-04-30 RX ADMIN — PROPOFOL 50 MG: 10 INJECTION, EMULSION INTRAVENOUS at 03:04

## 2025-04-30 RX ADMIN — PROPOFOL 100 MG: 10 INJECTION, EMULSION INTRAVENOUS at 03:04

## 2025-04-30 RX ADMIN — LIDOCAINE HYDROCHLORIDE 100 MG: 20 INJECTION INTRAVENOUS at 03:04

## 2025-04-30 RX ADMIN — PROPOFOL 200 MCG/KG/MIN: 10 INJECTION, EMULSION INTRAVENOUS at 03:04

## 2025-04-30 NOTE — TRANSFER OF CARE
"Anesthesia Transfer of Care Note    Patient: Jaki Bajwa    Procedure(s) Performed: Procedure(s) (LRB):  EGD (ESOPHAGOGASTRODUODENOSCOPY) (N/A)  COLONOSCOPY (N/A)    Patient location: GI    Anesthesia Type: general    Transport from OR: Transported from OR on room air with adequate spontaneous ventilation    Post pain: adequate analgesia    Post assessment: no apparent anesthetic complications and tolerated procedure well    Post vital signs: stable    Level of consciousness: awake, alert and oriented    Nausea/Vomiting: no nausea/vomiting    Complications: none    Transfer of care protocol was followed    Last vitals: Visit Vitals  /73 (BP Location: Left arm, Patient Position: Lying)   Pulse 98   Temp 36.4 °C (97.5 °F) (Temporal)   Resp 16   Ht 5' 7" (1.702 m)   Wt 113.3 kg (249 lb 12.5 oz)   LMP 07/11/2012   SpO2 (!) 94%   Breastfeeding No   BMI 39.12 kg/m²     "

## 2025-04-30 NOTE — H&P
Short Stay Endoscopy History and Physical    PCP - Jose Carpenter, DO    Procedure - EGD/Colonoscopy  ASA - per anesthesia  Mallampati - per anesthesia  History of Anesthesia problems - no  Family history Anesthesia problems -  no   Plan of anesthesia - MAC    HPI:  This is a 54 y.o. female here for evaluation of iron deficiency anemia and history of H pylori.       ROS:  Constitutional: No fevers, chills  CV: No chest pain  Pulm: No cough, No shortness of breath  Ophtho: No vision changes  GI: see HPI    Medical History:  has a past medical history of CANDELARIA (acute kidney injury) (2022), Anemia, Anxiety, Depression, History of psychiatric hospitalization (), History of ventral hernia repair, Hypertension, Lupus, Mental disorder, Morbid obesity (12/15/2017), Psychiatric problem, Sjogren's syndrome (), Therapy, and TMJ (temporomandibular joint disorder).    Surgical History:  has a past surgical history that includes Inguinal hernia repair; breast reduction; Tubal ligation;  section; Ventral hernia repair; Total Reduction Mammoplasty; Laparoscopic total hysterectomy (); Breast surgery (2005); Hysterectomy (2005); Bone marrow biopsy (N/A, 2024); and Esophagogastroduodenoscopy (N/A, 2024).    Family History: family history includes Alcohol abuse in her brother; Anxiety disorder in her father and sister; Arthritis in her father; Asthma in her son; COPD in her sister; Cancer in her father, sister, and sister; Colon cancer in her father; Depression in her brother, father, sister, and sister; Heart attack in her sister; Heart disease in her mother; Hypertension in her mother; Liver cancer in her sister and another family member; Mental illness in her father; Miscarriages / Stillbirths in her sister; No Known Problems in her daughter; Pancreatic cancer in her brother and another family member; Schizophrenia in her father.. Otherwise no colon cancer, inflammatory bowel disease,  or GI malignancies.    Social History:  reports that she has never smoked. She has never used smokeless tobacco. She reports that she does not currently use alcohol after a past usage of about 1.0 standard drink of alcohol per week. She reports that she does not currently use drugs after having used the following drugs: Barbituates and Hydrocodone.    Review of patient's allergies indicates:   Allergen Reactions    Aspirin Hives       Medications:   Prescriptions Prior to Admission[1]    Physical Exam:    Vital Signs:   Vitals:    04/30/25 1458   BP: 131/73   Pulse: 98   Resp: 16   Temp: 97.5 °F (36.4 °C)       General Appearance: Well appearing in no acute distress  Eyes:    No scleral icterus  Lungs: CTA anteriorly  Heart:  Regular rate and rhythm  Abdomen: Soft, non tender, non distended with normal bowel sounds.    Labs:  Lab Results   Component Value Date    WBC 6.76 04/22/2025    HGB 9.2 (L) 04/22/2025    HCT 32.0 (L) 04/22/2025    MCV 84 04/22/2025     04/22/2025        BMP  Lab Results   Component Value Date     04/22/2025    K 4.0 04/22/2025     04/22/2025    CO2 29 04/22/2025    BUN 12 04/22/2025    CREATININE 1.1 04/22/2025    CALCIUM 9.4 04/22/2025    ANIONGAP 8 04/22/2025    ESTGFRAFRICA 45.5 (A) 06/15/2022    EGFRNONAA 39.5 (A) 06/15/2022     Lab Results   Component Value Date    INR 1.0 04/23/2022    INR 1.1 12/09/2011          Assessment:  54 y.o. female with iron deficiency anemia and history of H pylori.     Plan:  Proceed with EGD and colonoscopy today.  I have explained the risks and benefits of endoscopy procedures to the patient including but not limited to bleeding, perforation, infection, and death.  All questions answered.        Ramirez Bolaños MD         [1]   Medications Prior to Admission   Medication Sig Dispense Refill Last Dose/Taking    amLODIPine (NORVASC) 5 MG tablet Take 1 tablet (5 mg total) by mouth once daily. 90 tablet 3 4/30/2025    buPROPion (WELLBUTRIN  XL) 150 MG TB24 tablet Take 1 tablet (150 mg total) by mouth once daily. 270 tablet 3 4/29/2025    busPIRone (BUSPAR) 15 MG tablet Take 1 tablet (15 mg total) by mouth once daily. 90 tablet 3 4/29/2025    butalbital-acetaminophen-caffeine -40 mg (FIORICET, ESGIC) -40 mg per tablet Take 1 tablet by mouth every 6 (six) hours as needed for Headaches. 40 tablet 0 4/29/2025    diazePAM (VALIUM) 2 MG tablet Take 1 tablet (2 mg total) by mouth daily as needed (Severe anxiety/Panic). 15 tablets to last 3 months 15 tablet 0 Past Week    doxepin (SINEQUAN) 75 MG capsule TAKE 2 CAPSULES(150 MG) BY MOUTH EVERY EVENING 180 capsule 3 4/29/2025    estradioL (VIVELLE-DOT) 0.025 mg/24 hr Place 1 patch on lower abdomen twice a week (Sunday and Thursday) 8 patch 12 Past Week    furosemide (LASIX) 20 MG tablet Take 1 tablet (20 mg total) by mouth once daily. 90 tablet 3 4/29/2025    gabapentin (NEURONTIN) 400 MG capsule Take 1 capsule (400 mg total) by mouth 3 (three) times daily. 90 capsule 2 4/29/2025    HYDROcodone-acetaminophen (NORCO) 5-325 mg per tablet Take 1 tablet by mouth every 24 hours as needed for Pain. Quantity medically necessary 30 tablet 0 4/29/2025    hydroxychloroquine (PLAQUENIL) 200 mg tablet TAKE 1 TABLET BY MOUTH TWICE DAILY 60 tablet 3 4/29/2025    irbesartan (AVAPRO) 300 MG tablet TAKE 1 TABLET(300 MG) BY MOUTH EVERY EVENING 90 tablet 3 4/30/2025    lurasidone (LATUDA) 60 mg Tab tablet Take 1 tablet (60 mg total) by mouth once daily. 90 tablet 3 4/29/2025    omeprazole (PRILOSEC) 40 MG capsule Take 1 capsule (40 mg total) by mouth once daily. 90 capsule 3 Past Week    pilocarpine (SALAGEN) 5 MG Tab Take 1 tablet (5 mg total) by mouth 4 (four) times daily. 360 tablet 1 4/29/2025    sertraline (ZOLOFT) 100 MG tablet Take 1 tablet (100 mg total) by mouth once daily. 30 tablet 2 4/29/2025    sodium,potassium,mag sulfates (SUPREP BOWEL PREP KIT) 17.5-3.13-1.6 gram SolR Take as directed by provider office  2 kit 0 4/30/2025    tiZANidine (ZANAFLEX) 4 MG tablet Take 1 tablet (4 mg total) by mouth 2 (two) times daily as needed (2 tablets twice daily as needed). 60 tablet 2 4/29/2025    ubrogepant (UBRELVY) 100 mg tablet Take 1 tablet (100 mg total) by mouth as needed for Migraine. If symptoms persist or return, may repeat dose after 2 hours. Maximum: 200 mg per 24 hours 30 tablet 2 4/29/2025    cholecalciferol, vitamin D3, (VITAMIN D3) 25 mcg (1,000 unit) capsule Take 1 capsule (1,000 Units total) by mouth once daily. (Patient not taking: Reported on 3/10/2025)   More than a month    cycloSPORINE (RESTASIS) 0.05 % ophthalmic emulsion Place 1 drop into both eyes 2 (two) times daily. 180 each 3     docusate sodium (COLACE) 100 MG capsule Take 1 capsule (100 mg total) by mouth 2 (two) times daily as needed for Constipation. 60 capsule 0 More than a month    estradioL (ESTRACE) 0.01 % (0.1 mg/gram) vaginal cream Place 1 g vaginally once daily. Place 1 gram vaginally once daily for 2 weeks, then place 1 gram vaginally twice a week (Sunday and Thursday) 42 g 4     ferrous sulfate, dried (SLOW FE) 160 mg (50 mg iron) TbSR Take 1 tablet (160 mg total) by mouth once daily. 90 tablet 2 More than a month    NARCAN 4 mg/actuation Spry        nystatin (MYCOSTATIN) 100,000 unit/mL suspension Take 4 mLs (400,000 Units total) by mouth 4 (four) times daily. 480 mL 5 More than a month    VENOFER 100 mg iron/5 mL injection  (Patient not taking: Reported on 3/10/2025)   More than a month

## 2025-04-30 NOTE — PROGRESS NOTES
[facility] Hormone Consult Preparation Sheet    Patient Name: Jaki Bajwa 54 y.o. who presents for hormone consult.  Patient is a currently on Estradiol patch 0.025mg once a week for the last 6 months.  She states that her hot flashes, night sweats, fatigue, mood and brain fog have all improved.  She states that she still has some mood irritability and wants to know if her dose can be increased.  She states that her VMS started approximately 6 months ago.  She states her sleep has improved.  She typically goes to bed at 7PM and wakes up at 3:30AM.  She has no trouble falling asleep but occ has trouble staying asleep, however, she is able to fall back to sleep.  She denies snoring.  She admits to using electronics prior to bedtime.  She also has vaginal dryness.  She uses KY Jelly but she states that it does cause some irritation.  She also states that she has decreased libido.  She currently does not exercise and states her diet is poor.  A typical diet consists of grits, processed meets, eggs, pork with creamy sauces.  She recently started eating vegetables but does not eat fruit.    Provider: Chelsea Limon  CC and Symptoms: No chief complaint on file.      Patient History:  Problem List[1]    Patient Medications:  Current Outpatient Medications on File Prior to Visit   Medication Sig    amLODIPine (NORVASC) 5 MG tablet Take 1 tablet (5 mg total) by mouth once daily.    buPROPion (WELLBUTRIN XL) 150 MG TB24 tablet Take 1 tablet (150 mg total) by mouth once daily.    busPIRone (BUSPAR) 15 MG tablet Take 1 tablet (15 mg total) by mouth once daily.    butalbital-acetaminophen-caffeine -40 mg (FIORICET, ESGIC) -40 mg per tablet Take 1 tablet by mouth every 6 (six) hours as needed for Headaches.    cholecalciferol, vitamin D3, (VITAMIN D3) 25 mcg (1,000 unit) capsule Take 1 capsule (1,000 Units total) by mouth once daily. (Patient not taking: Reported on 4/22/2025)     cycloSPORINE (RESTASIS) 0.05 % ophthalmic emulsion Place 1 drop into both eyes 2 (two) times daily.    diazePAM (VALIUM) 2 MG tablet Take 1 tablet (2 mg total) by mouth daily as needed (Severe anxiety/Panic). 15 tablets to last 3 months    docusate sodium (COLACE) 100 MG capsule Take 1 capsule (100 mg total) by mouth 2 (two) times daily as needed for Constipation.    doxepin (SINEQUAN) 75 MG capsule TAKE 2 CAPSULES(150 MG) BY MOUTH EVERY EVENING    estradioL (ESTRACE) 0.01 % (0.1 mg/gram) vaginal cream Place 1 g vaginally twice a week.    estradiol 0.025 mg/24 hr 0.025 mg/24 hr Place 1 patch onto the skin once a week.    ferrous sulfate, dried (SLOW FE) 160 mg (50 mg iron) TbSR Take 1 tablet (160 mg total) by mouth once daily.    furosemide (LASIX) 20 MG tablet Take 1 tablet (20 mg total) by mouth once daily.    gabapentin (NEURONTIN) 400 MG capsule Take 1 capsule (400 mg total) by mouth 3 (three) times daily.    HYDROcodone-acetaminophen (NORCO) 5-325 mg per tablet Take 1 tablet by mouth every 24 hours as needed for Pain. Quantity medically necessary    hydroxychloroquine (PLAQUENIL) 200 mg tablet TAKE 1 TABLET BY MOUTH TWICE DAILY    irbesartan (AVAPRO) 300 MG tablet TAKE 1 TABLET(300 MG) BY MOUTH EVERY EVENING    lurasidone (LATUDA) 60 mg Tab tablet Take 1 tablet (60 mg total) by mouth once daily.    NARCAN 4 mg/actuation Spry     nystatin (MYCOSTATIN) 100,000 unit/mL suspension Take 4 mLs (400,000 Units total) by mouth 4 (four) times daily.    omeprazole (PRILOSEC) 40 MG capsule Take 1 capsule (40 mg total) by mouth once daily.    pilocarpine (SALAGEN) 5 MG Tab Take 1 tablet (5 mg total) by mouth 4 (four) times daily.    sertraline (ZOLOFT) 100 MG tablet Take 1 tablet (100 mg total) by mouth once daily.    sodium,potassium,mag sulfates (SUPREP BOWEL PREP KIT) 17.5-3.13-1.6 gram SolR Take as directed by provider office    tiZANidine (ZANAFLEX) 4 MG tablet Take 1 tablet (4 mg total) by mouth 2 (two) times daily as  "needed (2 tablets twice daily as needed).    ubrogepant (UBRELVY) 100 mg tablet Take 1 tablet (100 mg total) by mouth as needed for Migraine. If symptoms persist or return, may repeat dose after 2 hours. Maximum: 200 mg per 24 hours    VENOFER 100 mg iron/5 mL injection  (Patient not taking: Reported on 4/22/2025)     No current facility-administered medications on file prior to visit.       Patient Imaging and Procedures  LMP: Patient's last menstrual period was 07/11/2012.  PAP: 5/13/2016  HPV: No results found for: "HPV"  Mammo: 2/2025  Colonoscopy:    BMI: There is no height or weight on file to calculate BMI.    Recent Labs:  Estradiol:   Estradiol   Date Value Ref Range Status   05/06/2015 239 See Text pg/mL Final     Comment:     Estradiol Reference Ranges (Female):  Follicular phase:  pg/mL  Midcycle:          pg/mL  Luteal phase:      pg/mL  Post-menopausal(Not on HRT): <10-28 pg/mL  Post-menopausal(On HRT): < pg/mL  Males: 11-44 pg/mL       Testosterone: No results found for: "TESTOSTERONE"  FSH:   Follicle Stimulating Hormone   Date Value Ref Range Status   05/06/2015 16.10 See Text mIU/mL Final     Comment:     Female Reference Ranges:  Follicular Phase.................3.03-8.08 mIU/mL  Midcycle Peak....................2.55-16.69 mIU/mL  Luteal Phase.....................1.38-5.47 mIU/mL  Postmenopausal...................26..41 mIU/mL  Male Reference Range:............0.95-11.95 mIU/mL       CBC:   Lab Results   Component Value Date    WBC 6.76 04/22/2025    HGB 9.2 (L) 04/22/2025    HCT 32.0 (L) 04/22/2025    MCV 84 04/22/2025     04/22/2025       CMP:  Sodium   Date Value Ref Range Status   04/22/2025 142 136 - 145 mmol/L Final   10/11/2024 141 136 - 145 mmol/L Final     Potassium   Date Value Ref Range Status   04/22/2025 4.0 3.5 - 5.1 mmol/L Final   10/11/2024 4.0 3.5 - 5.1 mmol/L Final     Chloride   Date Value Ref Range Status   04/22/2025 105 95 - 110 mmol/L " Final   10/11/2024 105 95 - 110 mmol/L Final     CO2   Date Value Ref Range Status   04/22/2025 29 23 - 29 mmol/L Final   10/11/2024 28 23 - 29 mmol/L Final     Glucose   Date Value Ref Range Status   04/22/2025 93 70 - 110 mg/dL Final   10/11/2024 88 70 - 110 mg/dL Final     BUN   Date Value Ref Range Status   04/22/2025 12 6 - 20 mg/dL Final     Creatinine   Date Value Ref Range Status   04/22/2025 1.1 0.5 - 1.4 mg/dL Final     Calcium   Date Value Ref Range Status   04/22/2025 9.4 8.7 - 10.5 mg/dL Final   10/11/2024 9.7 8.7 - 10.5 mg/dL Final     Protein Total   Date Value Ref Range Status   04/22/2025 7.7 6.0 - 8.4 gm/dL Final     Total Protein   Date Value Ref Range Status   09/19/2024 7.8 6.0 - 8.4 g/dL Final     Albumin   Date Value Ref Range Status   04/22/2025 3.8 3.5 - 5.2 g/dL Final   10/11/2024 3.9 3.5 - 5.2 g/dL Final     Total Bilirubin   Date Value Ref Range Status   09/19/2024 0.3 0.1 - 1.0 mg/dL Final     Comment:     For infants and newborns, interpretation of results should be based  on gestational age, weight and in agreement with clinical  observations.    Premature Infant recommended reference ranges:  Up to 24 hours.............<8.0 mg/dL  Up to 48 hours............<12.0 mg/dL  3-5 days..................<15.0 mg/dL  6-29 days.................<15.0 mg/dL       Bilirubin Total   Date Value Ref Range Status   04/22/2025 0.2 0.1 - 1.0 mg/dL Final     Comment:     For infants and newborns, interpretation of results should be based   on gestational age, weight and in agreement with clinical   observations.    Premature Infant recommended reference ranges:   0-24 hours:  <8.0 mg/dL   24-48 hours: <12.0 mg/dL   3-5 days:    <15.0 mg/dL   6-29 days:   <15.0 mg/dL     Alkaline Phosphatase   Date Value Ref Range Status   09/19/2024 77 55 - 135 U/L Final     ALP   Date Value Ref Range Status   04/22/2025 80 40 - 150 unit/L Final     AST   Date Value Ref Range Status   04/22/2025 17 11 - 45 unit/L Final    09/19/2024 15 10 - 40 U/L Final     ALT   Date Value Ref Range Status   04/22/2025 15 10 - 44 unit/L Final   09/19/2024 11 10 - 44 U/L Final     Anion Gap   Date Value Ref Range Status   04/22/2025 8 8 - 16 mmol/L Final     eGFR if    Date Value Ref Range Status   06/15/2022 45.5 (A) >60 mL/min/1.73 m^2 Final     eGFR if non    Date Value Ref Range Status   06/15/2022 39.5 (A) >60 mL/min/1.73 m^2 Final     Comment:     Calculation used to obtain the estimated glomerular filtration  rate (eGFR) is the CKD-EPI equation.        Lipids:   Cholesterol   Date Value Ref Range Status   09/12/2022 156 120 - 199 mg/dL Final     Comment:     The National Cholesterol Education Program (NCEP) has set the  following guidelines (reference ranges) for Cholesterol:  Optimal.....................<200 mg/dL  Borderline High.............200-239 mg/dL  High........................> or = 240 mg/dL       Triglycerides   Date Value Ref Range Status   09/12/2022 95 30 - 150 mg/dL Final     Comment:     The National Cholesterol Education Program (NCEP) has set the  following guidelines (reference values) for triglycerides:  Normal......................<150 mg/dL  Borderline High.............150-199 mg/dL  High........................200-499 mg/dL       HDL   Date Value Ref Range Status   09/12/2022 61 40 - 75 mg/dL Final     Comment:     The National Cholesterol Education Program (NCEP) has set the  following guidelines (reference values) for HDL Cholesterol:  Low...............<40 mg/dL  Optimal...........>60 mg/dL       LDL Cholesterol   Date Value Ref Range Status   09/12/2022 76.0 63.0 - 159.0 mg/dL Final     Comment:     The National Cholesterol Education Program (NCEP) has set the  following guidelines (reference values) for LDL Cholesterol:  Optimal.......................<130 mg/dL  Borderline High...............130-159 mg/dL  High..........................160-189 mg/dL  Very  High.....................>190 mg/dL       HDL/Cholesterol Ratio   Date Value Ref Range Status   09/12/2022 39.1 20.0 - 50.0 % Final     Total Cholesterol/HDL Ratio   Date Value Ref Range Status   09/12/2022 2.6 2.0 - 5.0 Final     Non-HDL Cholesterol   Date Value Ref Range Status   09/12/2022 95 mg/dL Final     Comment:     Risk category and Non-HDL cholesterol goals:  Coronary heart disease (CHD)or equivalent (10-year risk of CHD >20%):  Non-HDL cholesterol goal     <130 mg/dL  Two or more CHD risk factors and 10-year risk of CHD <= 20%:  Non-HDL cholesterol goal     <160 mg/dL  0 to 1 CHD risk factor:  Non-HDL cholesterol goal     <190 mg/dL       A1C:   Lab Results   Component Value Date    HGBA1C 4.9 02/13/2024       Assessment & Plan    Vasomotor Symptoms   ? Discussed estrogen therapy for hot flashes: delivery methods (oral, transdermal, topical), risks (e.g., VTE, breast cancer, stroke), and benefits (relief of vasomotor symptoms, bone health).   ? Rx: Increase Estradiol patch 0.025mg once a week to twice a week to help improve mood.  ? Reviewed hysterectomy status to determine need for progesterone.   ?     Vaginal Dryness   ? Emphasized benefits of local vaginal estrogen for genitourinary syndrome of menopause (GSM)   ? Provided instructions for application (e.g., cream, ring, or tablet) and stressed consistent use for optimal results.   Rx Estrace qD x 2 weeks then twice a week  Will recommend a good water or silicone based lubricant: Good Clean Love    Libido   ? to be discussed at next follow up visit    Sleep   ? Provided education on sleep hygiene: consistent sleep/wake times, limiting caffeine/alcohol, and avoiding screens before bed.   ? Encouraged stimulus control (bed for sleep and sex only) and relaxation techniques   ? Highlighted the impact of poor sleep on physical and mental health, including mood, memory, and overall quality of life.   -Recommend adding magnesium glycinate 500mg 1 hour before  bedtime    Exercise   ? Reinforced importance of 150 minutes of moderate aerobic activity per week and resistance training twice weekly to counteract bone loss and maintain lean muscle mass.   ? Discussed the accelerated loss of bone and muscle during levy- and post menopause and its role in reducing fracture risk and preserving physical function.       Diet   ? Reviewed protein intake recommendation (at least 0.8g/kg/day) for muscle maintenance, with a focus on high-quality protein sources   ? Recommend 1200mg calcium and 800-1000IU vitamin D daily to support bone health.   ? Encouraged hydration (at least 2L/day) to improve fatigue, cognition, and overall metabolic function.   ? Suggested a Mediterranean-style diet for cardiometabolic health, with an emphasis on whole grains, healthy fats, fruits, and vegetables.     Additional Recommendations   ? Discussed routine follow-up to assess symptom management and adjust hormone therapy or lifestyle interventions as needed.     Follow up in 3 months       [1]   Patient Active Problem List  Diagnosis    Essential hypertension    Uterine fibroids    Pelvic pain     Fibromyalgia    Leg cramps, sleep related    TMJ syndrome    JUAN JOSE (generalized anxiety disorder)    Mood disorder    Chronic pain    Sjogren's syndrome    Low back pain    Recurrent UTI    Insomnia    Severe obesity (BMI 35.0-35.9 with comorbidity)    Vaginal dryness    Incomplete bladder emptying    Compulsive shopping    Anemia of unknown etiology    Iron deficiency anemia    Stage 3b chronic kidney disease    Bleeding hemorrhoid    Chronic narcotic dependence    Social anxiety disorder    Panic disorder with agoraphobia    Chronic migraine without aura without status migrainosus, not intractable    Hyperparathyroidism    Lower extremity edema    Vitamin D deficiency    Age-related osteoporosis without current pathological fracture    Depressive disorder    Systemic lupus erythematosus, unspecified SLE type,  unspecified organ involvement status    Bipolar affective disorder, currently depressed, moderate    Swelling of both ankles

## 2025-04-30 NOTE — PLAN OF CARE
Discharge instructions and provation reviewed with patient and spouse Scott. PPIV removed, cannula intact. Coban informed to removed once home. Denies pain or discomfort    1710 Dr Miky mccann with pt and spouse regarding results verbalize understanding  1725 Escorted with spouse to lobby steady gait

## 2025-04-30 NOTE — PROVATION PATIENT INSTRUCTIONS
Discharge Summary/Instructions after an Endoscopic Procedure  Patient Name: Jaki Bajwa  Patient MRN: [id number]  Patient YOB: 1970  Wednesday, April 30, 2025  Ramirez Bolaños MD  Dear patient,  As a result of recent federal legislation (The Federal Cures Act), you may   receive lab or pathology results from your procedure in your [facility]   account before your physician is able to contact you. Your physician or   their representative will relay the results to you with their   recommendations at their soonest availability.  Thank you,  RESTRICTIONS:  During your procedure today, you received medications for sedation.  These   medications may affect your judgment, balance and coordination.  Therefore,   for 24 hours, you have the following restrictions:   - DO NOT drive a car, operate machinery, make legal/financial decisions,   sign important papers or drink alcohol.    ACTIVITY:  Today: no heavy lifting, straining or running due to procedural   sedation/anesthesia.  The following day: return to full activity including work.  DIET:  Eat and drink normally unless instructed otherwise.     TREATMENT FOR COMMON SIDE EFFECTS:  - Mild abdominal pain, nausea, belching, bloating or excessive gas:  rest,   eat lightly and use a heating pad.  - Sore Throat: treat with throat lozenges and/or gargle with warm salt   water.  - Because air was used during the procedure, expelling large amounts of air   from your rectum or belching is normal.  - If a bowel prep was taken, you may not have a bowel movement for 1-3 days.    This is normal.  SYMPTOMS TO WATCH FOR AND REPORT TO YOUR PHYSICIAN:  1. Abdominal pain or bloating, other than gas cramps.  2. Chest pain.  3. Back pain.  4. Signs of infection such as: chills or fever occurring within 24 hours   after the procedure.  5. Rectal bleeding, which would show as bright red, maroon, or black stools.   (A tablespoon of blood from the rectum is not serious, especially  if   hemorrhoids are present.)  6. Vomiting.  7. Weakness or dizziness.  GO DIRECTLY TO THE NEAREST EMERGENCY ROOM IF YOU HAVE ANY OF THE FOLLOWING:      Difficulty breathing              Chills and/or fever over 101 F   Persistent vomiting and/or vomiting blood   Severe abdominal pain   Severe chest pain   Black, tarry stools   Bleeding- more than one tablespoon   Any other symptom or condition that you feel may need urgent attention  Your doctor recommends these additional instructions:  If any biopsies were taken, your doctors clinic will contact you in 1 to 2   weeks with any results.  - Discharge patient to home.   - Patient has a contact number available for emergencies.  The signs and   symptoms of potential delayed complications were discussed with the   patient.  Return to normal activities tomorrow.  Written discharge   instructions were provided to the patient.   - Resume previous diet.   - Continue present medications.   - Await pathology results.   - Repeat colonoscopy in 5 years for surveillance.  For questions, problems or results please call your physician - Ramirez Bolaños MD at Work:  [phone].  [facility] [place], EMERGENCY ROOM PHONE NUMBER: [phone]  IF A COMPLICATION OR EMERGENCY SITUATION ARISES AND YOU ARE UNABLE TO REACH   YOUR PHYSICIAN - GO DIRECTLY TO THE EMERGENCY ROOM.  Ramirez Bolaños MD  4/30/2025 4:47:54 PM  This report has been verified and signed electronically.  Dear patient,  As a result of recent federal legislation (The Federal Cures Act), you may   receive lab or pathology results from your procedure in your [facility]   account before your physician is able to contact you. Your physician or   their representative will relay the results to you with their   recommendations at their soonest availability.  Thank you,  PROVATION

## 2025-04-30 NOTE — ANESTHESIA PREPROCEDURE EVALUATION
04/30/2025  [facility]  Anesthesia Pre-Operative Evaluation     Patient Name: Jaki Bajwa  YOB: 1970  MRN: [id number]  [facility]: [id number]       Admit Date: 4/30/2025   Admit Team: Networked reference to record PCT   Hospital Day: 1  Date of Procedure: 4/30/2025  Anesthesia: Choice Procedure: Procedure(s) (LRB):  EGD (ESOPHAGOGASTRODUODENOSCOPY) (N/A)  COLONOSCOPY (N/A)  Pre-Operative Diagnosis: Gastroesophageal reflux disease, unspecified whether esophagitis present [K21.9]  Iron deficiency anemia, unspecified iron deficiency anemia type [D50.9]  Nausea and vomiting, unspecified vomiting type [R11.2]  History of Helicobacter pylori infection [Z86.19]  Proceduralist:Surgeons and Role:     * Ramirez Bolaños MD - Primary  Code Status: Prior   Advanced Directive: <no information>  Isolation Precautions: No active isolations  Capacity: Full capacity     SUBJECTIVE:   Jaki Bajwa is a 54 y.o. female who  has a past medical history of CANDELARIA (acute kidney injury) (4/23/2022), Anemia, Anxiety, Depression, History of psychiatric hospitalization (2000), History of ventral hernia repair, Hypertension, Lupus, Mental disorder, Morbid obesity (12/15/2017), Psychiatric problem, Sjogren's syndrome (2013), Therapy, and TMJ (temporomandibular joint disorder).  No notes on file     0.9% NaCl   Intravenous Continuous         Hospital LOS: 0 days  ICU LOS: Patient does not have an ICU stay during this admission.    she has a current medication list which includes the following long-term medication(s): amlodipine, bupropion, buspirone, diazepam, doxepin, estradiol, furosemide, gabapentin, irbesartan, omeprazole, pilocarpine, sertraline, cyclosporine, and estradiol.     ALLERGIES:     Review of patient's allergies indicates:   Allergen Reactions    Aspirin Hives     LDA:   AIRWAY:          [unfilled]     Lines/Drains/Airways       None                  Anesthesia Evaluation      Airway   Mallampati: II  Neck ROM: Normal ROM  Dental      Pulmonary    Cardiovascular   (+) hypertension    Neuro/Psych    (+) neuromuscular disease, headaches, psychiatric history    GI/Hepatic/Renal    (+) chronic renal disease    Endo/Other    Abdominal                    MEDICATIONS:     Current Outpatient Medications on File Prior to Encounter   Medication Sig Dispense Refill Last Dose/Taking    amLODIPine (NORVASC) 5 MG tablet Take 1 tablet (5 mg total) by mouth once daily. 90 tablet 3 4/30/2025    buPROPion (WELLBUTRIN XL) 150 MG TB24 tablet Take 1 tablet (150 mg total) by mouth once daily. 270 tablet 3 4/29/2025    busPIRone (BUSPAR) 15 MG tablet Take 1 tablet (15 mg total) by mouth once daily. 90 tablet 3 4/29/2025    butalbital-acetaminophen-caffeine -40 mg (FIORICET, ESGIC) -40 mg per tablet Take 1 tablet by mouth every 6 (six) hours as needed for Headaches. 40 tablet 0 4/29/2025    diazePAM (VALIUM) 2 MG tablet Take 1 tablet (2 mg total) by mouth daily as needed (Severe anxiety/Panic). 15 tablets to last 3 months 15 tablet 0 Past Week    doxepin (SINEQUAN) 75 MG capsule TAKE 2 CAPSULES(150 MG) BY MOUTH EVERY EVENING 180 capsule 3 4/29/2025    furosemide (LASIX) 20 MG tablet Take 1 tablet (20 mg total) by mouth once daily. 90 tablet 3 4/29/2025    gabapentin (NEURONTIN) 400 MG capsule Take 1 capsule (400 mg total) by mouth 3 (three) times daily. 90 capsule 2 4/29/2025    HYDROcodone-acetaminophen (NORCO) 5-325 mg per tablet Take 1 tablet by mouth every 24 hours as needed for Pain. Quantity medically necessary 30 tablet 0 4/29/2025    hydroxychloroquine (PLAQUENIL) 200 mg tablet TAKE 1 TABLET BY MOUTH TWICE DAILY 60 tablet 3 4/29/2025    irbesartan (AVAPRO) 300 MG tablet TAKE 1 TABLET(300 MG) BY MOUTH EVERY EVENING 90 tablet 3 4/30/2025    lurasidone (LATUDA) 60 mg Tab tablet Take 1 tablet (60 mg  total) by mouth once daily. 90 tablet 3 4/29/2025    omeprazole (PRILOSEC) 40 MG capsule Take 1 capsule (40 mg total) by mouth once daily. 90 capsule 3 Past Week    pilocarpine (SALAGEN) 5 MG Tab Take 1 tablet (5 mg total) by mouth 4 (four) times daily. 360 tablet 1 4/29/2025    sertraline (ZOLOFT) 100 MG tablet Take 1 tablet (100 mg total) by mouth once daily. 30 tablet 2 4/29/2025    tiZANidine (ZANAFLEX) 4 MG tablet Take 1 tablet (4 mg total) by mouth 2 (two) times daily as needed (2 tablets twice daily as needed). 60 tablet 2 4/29/2025    ubrogepant (UBRELVY) 100 mg tablet Take 1 tablet (100 mg total) by mouth as needed for Migraine. If symptoms persist or return, may repeat dose after 2 hours. Maximum: 200 mg per 24 hours 30 tablet 2 4/29/2025    cholecalciferol, vitamin D3, (VITAMIN D3) 25 mcg (1,000 unit) capsule Take 1 capsule (1,000 Units total) by mouth once daily. (Patient not taking: Reported on 3/10/2025)   More than a month    cycloSPORINE (RESTASIS) 0.05 % ophthalmic emulsion Place 1 drop into both eyes 2 (two) times daily. 180 each 3     docusate sodium (COLACE) 100 MG capsule Take 1 capsule (100 mg total) by mouth 2 (two) times daily as needed for Constipation. 60 capsule 0 More than a month    ferrous sulfate, dried (SLOW FE) 160 mg (50 mg iron) TbSR Take 1 tablet (160 mg total) by mouth once daily. 90 tablet 2 More than a month    NARCAN 4 mg/actuation Spry        nystatin (MYCOSTATIN) 100,000 unit/mL suspension Take 4 mLs (400,000 Units total) by mouth 4 (four) times daily. 480 mL 5 More than a month    VENOFER 100 mg iron/5 mL injection  (Patient not taking: Reported on 3/10/2025)   More than a month      Inpatient Medications:  Antibiotics (From admission, onward)      None          VTE Risk Mitigation (From admission, onward)      None              Current Medications[1]       History:   There are no hospital problems to display for this patient.    Surgical History:    has a past surgical  "history that includes Inguinal hernia repair; breast reduction; Tubal ligation;  section; Ventral hernia repair; Total Reduction Mammoplasty; Laparoscopic total hysterectomy (); Breast surgery (2005); Hysterectomy (2005); Bone marrow biopsy (N/A, 2024); and Esophagogastroduodenoscopy (N/A, 2024).   Social History:    reports that she is not currently sexually active and has had partner(s) who are male. She reports using the following methods of birth control/protection: Abstinence, Condom, and Patch.  reports that she has never smoked. She has never used smokeless tobacco. She reports that she does not currently use alcohol after a past usage of about 1.0 standard drink of alcohol per week. She reports that she does not currently use drugs after having used the following drugs: Barbituates and Hydrocodone.    Vitals:    25 1458   BP: 131/73   BP Location: Left arm   Patient Position: Lying   Pulse: 98   Resp: 16   Temp: 36.4 °C (97.5 °F)   TempSrc: Temporal   SpO2: (!) 94%   Weight: 113.3 kg (249 lb 12.5 oz)   Height: 5' 7" (1.702 m)     Vital Signs Range (Last 24H):  Temp:  [36.4 °C (97.5 °F)]   Pulse:  [98]   Resp:  [16]   BP: (131)/(73)   SpO2:  [94 %]     Body mass index is 39.12 kg/m².  Wt Readings from Last 4 Encounters:   25 113.3 kg (249 lb 12.5 oz)   25 115 kg (253 lb 8.5 oz)   25 117 kg (258 lb)   25 118 kg (260 lb 2.3 oz)        Intake/Output - Last 3 Shifts       None          Lab Results   Component Value Date    WBC 6.76 2025    HGB 9.2 (L) 2025    HCT 32.0 (L) 2025     2025     2025    K 4.0 2025     2025    CREATININE 1.1 2025    BUN 12 2025    CO2 29 2025    GLU 93 2025    CALCIUM 9.4 2025    MG 1.8 2024    PHOS 3.4 10/11/2024    ALKPHOS 80 2025    ALT 15 2025    AST 17 2025    ALBUMIN 3.8 2025    INR 1.0 2022 " "   APTT 21.9 04/23/2022    HGBA1C 4.9 02/13/2024     08/28/2020    BNP 24 06/19/2015     No results found for this or any previous visit (from the past 12 hours).  No results for input(s): "WBC", "HGB", "HCT", "PLT", "NA", "K", "CREATININE", "GLU", "INR" in the last 168 hours.  Patient's last menstrual period was 07/11/2012.    EKG:   Results for orders placed or performed during the hospital encounter of 05/31/23   EKG 12-lead    Collection Time: 05/31/23  6:58 PM    Narrative    Test Reason : I10,    Vent. Rate : 087 BPM     Atrial Rate : 087 BPM     P-R Int : 160 ms          QRS Dur : 076 ms      QT Int : 328 ms       P-R-T Axes : 049 -32 -35 degrees     QTc Int : 394 ms    Sinus rhythm with Premature atrial complexes  Left axis deviation  Nonspecific ST and T wave abnormality  Abnormal ECG  When compared with ECG of 23-APR-2022 19:23,  Premature atrial complexes are now Present  Nonspecific T wave abnormality now evident in Inferior leads  Nonspecific T wave abnormality now evident in Anterior-lateral leads  Confirmed by Mukesh STEEN MD ([id number]) on 6/1/2023 10:05:42 PM    Referred By: [email or web address]   SELF           Confirmed By:Mukesh STEEN MD     TTE:  No results found. However, due to the size of the patient record, not all encounters were searched. Please check Results Review for a complete set of results.  No results found. However, due to the size of the patient record, not all encounters were searched. Please check Results Review for a complete set of results.  JARVIS:  No results found. However, due to the size of the patient record, not all encounters were searched. Please check Results Review for a complete set of results.  Stress Test:  No results found for this or any previous visit.     LHC:  No results found for this or any previous visit.     PFT:  No results found for: "FEV1", "FVC", "ZAG3CCK", "TLC", "DLCO"           Pre-op Assessment    I have reviewed the Patient Summary Reports.     I have reviewed " the Nursing Notes. I have reviewed the NPO Status.   I have reviewed the Medications.     Review of Systems  Anesthesia Hx:               Denies Personal Hx of Anesthesia complications.                    Cardiovascular:     Hypertension                                          Renal/:  Chronic Renal Disease                Neurological:    Neuromuscular Disease,  Headaches                                 Psych:  Psychiatric History                  Physical Exam  General: Alert, Cooperative and Oriented    Airway:  Mallampati: II   Neck ROM: Normal ROM        Anesthesia Plan  Type of Anesthesia, risks & benefits discussed:    Anesthesia Type: Gen Natural Airway  Intra-op Monitoring Plan: Standard ASA Monitors  Post Op Pain Control Plan: multimodal analgesia  Induction:  IV  Airway Plan: Direct  Informed Consent: Informed consent signed with the Patient and all parties understand the risks and agree with anesthesia plan.  All questions answered.   ASA Score: 3  Day of Surgery Review of History & Physical: H&P Update referred to the surgeon/provider.    Ready For Surgery From Anesthesia Perspective.     .           [1]   Current Facility-Administered Medications   Medication Dose Route Frequency Provider Last Rate Last Admin    0.9% NaCl infusion   Intravenous Continuous Ramirez Bolaños MD

## 2025-04-30 NOTE — PROVATION PATIENT INSTRUCTIONS
Discharge Summary/Instructions after an Endoscopic Procedure  Patient Name: Jaki Bajwa  Patient MRN: [id number]  Patient YOB: 1970  Wednesday, April 30, 2025  Ramirez Bolaños MD  Dear patient,  As a result of recent federal legislation (The Federal Cures Act), you may   receive lab or pathology results from your procedure in your [facility]   account before your physician is able to contact you. Your physician or   their representative will relay the results to you with their   recommendations at their soonest availability.  Thank you,  RESTRICTIONS:  During your procedure today, you received medications for sedation.  These   medications may affect your judgment, balance and coordination.  Therefore,   for 24 hours, you have the following restrictions:   - DO NOT drive a car, operate machinery, make legal/financial decisions,   sign important papers or drink alcohol.    ACTIVITY:  Today: no heavy lifting, straining or running due to procedural   sedation/anesthesia.  The following day: return to full activity including work.  DIET:  Eat and drink normally unless instructed otherwise.     TREATMENT FOR COMMON SIDE EFFECTS:  - Mild abdominal pain, nausea, belching, bloating or excessive gas:  rest,   eat lightly and use a heating pad.  - Sore Throat: treat with throat lozenges and/or gargle with warm salt   water.  - Because air was used during the procedure, expelling large amounts of air   from your rectum or belching is normal.  - If a bowel prep was taken, you may not have a bowel movement for 1-3 days.    This is normal.  SYMPTOMS TO WATCH FOR AND REPORT TO YOUR PHYSICIAN:  1. Abdominal pain or bloating, other than gas cramps.  2. Chest pain.  3. Back pain.  4. Signs of infection such as: chills or fever occurring within 24 hours   after the procedure.  5. Rectal bleeding, which would show as bright red, maroon, or black stools.   (A tablespoon of blood from the rectum is not serious, especially  if   hemorrhoids are present.)  6. Vomiting.  7. Weakness or dizziness.  GO DIRECTLY TO THE NEAREST EMERGENCY ROOM IF YOU HAVE ANY OF THE FOLLOWING:      Difficulty breathing              Chills and/or fever over 101 F   Persistent vomiting and/or vomiting blood   Severe abdominal pain   Severe chest pain   Black, tarry stools   Bleeding- more than one tablespoon   Any other symptom or condition that you feel may need urgent attention  Your doctor recommends these additional instructions:  If any biopsies were taken, your doctors clinic will contact you in 1 to 2   weeks with any results.  - Await pathology results.   - Perform a colonoscopy now.   - See colonoscopy report for further recommendations.  For questions, problems or results please call your physician - Ramirez Bolaños MD at Work:  [phone].  [facility], EMERGENCY ROOM PHONE NUMBER: [phone]  IF A COMPLICATION OR EMERGENCY SITUATION ARISES AND YOU ARE UNABLE TO REACH   YOUR PHYSICIAN - GO DIRECTLY TO THE EMERGENCY ROOM.  Ramirez Bolaños MD  4/30/2025 4:57:52 PM  This report has been verified and signed electronically.  Dear patient,  As a result of recent federal legislation (The Federal Cures Act), you may   receive lab or pathology results from your procedure in your [facility]   account before your physician is able to contact you. Your physician or   their representative will relay the results to you with their   recommendations at their soonest availability.  Thank you,  PROVATION

## 2025-05-01 PROBLEM — F31.31 BIPOLAR AFFECTIVE DISORDER, CURRENTLY DEPRESSED, MILD: Status: ACTIVE | Noted: 2025-01-13

## 2025-05-01 PROBLEM — F42.9 OBSESSIVE-COMPULSIVE DISORDER: Status: ACTIVE | Noted: 2025-05-01

## 2025-05-01 PROBLEM — F31.31 BIPOLAR AFFECTIVE DISORDER, CURRENTLY DEPRESSED, MILD: Status: RESOLVED | Noted: 2025-01-13 | Resolved: 2025-05-01

## 2025-05-01 PROBLEM — F42.8: Status: RESOLVED | Noted: 2018-07-18 | Resolved: 2025-05-01

## 2025-05-01 NOTE — ANESTHESIA POSTPROCEDURE EVALUATION
Anesthesia Post Evaluation    Patient: Jaki Bajwa    Procedure(s) Performed: Procedure(s) (LRB):  EGD (ESOPHAGOGASTRODUODENOSCOPY) (N/A)  COLONOSCOPY (N/A)    Final Anesthesia Type: general      Patient location during evaluation: GI PACU  Patient participation: Yes- Able to Participate  Level of consciousness: awake and alert and oriented  Post-procedure vital signs: reviewed and stable  Pain management: adequate  Airway patency: patent    PONV status at discharge: No PONV  Anesthetic complications: no      Cardiovascular status: hemodynamically stable  Respiratory status: unassisted  Hydration status: euvolemic  Follow-up not needed.              Vitals Value Taken Time   /81 04/30/25 17:14   Temp 36.8 °C (98.2 °F) 04/30/25 16:44   Pulse 97 04/30/25 17:14   Resp 17 04/30/25 17:14   SpO2 100 % 04/30/25 17:14         Event Time   Out of Recovery 17:28:31         Pain/Saúl Score: Saúl Score: 10 (4/30/2025  4:44 PM)

## 2025-05-05 LAB
ESTROGEN SERPL-MCNC: NORMAL PG/ML
INSULIN SERPL-ACNC: NORMAL U[IU]/ML
LAB AP CLINICAL INFORMATION: NORMAL
LAB AP GROSS DESCRIPTION: NORMAL
LAB AP PERFORMING LOCATION(S): NORMAL
LAB AP REPORT FOOTNOTES: NORMAL
T3RU NFR SERPL: NORMAL %

## 2025-05-05 RX ORDER — HEPARIN 100 UNIT/ML
500 SYRINGE INTRAVENOUS
Status: CANCELLED | OUTPATIENT
Start: 2025-05-05

## 2025-05-05 RX ORDER — SODIUM CHLORIDE 0.9 % (FLUSH) 0.9 %
10 SYRINGE (ML) INJECTION
Status: CANCELLED | OUTPATIENT
Start: 2025-05-05

## 2025-05-05 RX ORDER — EPINEPHRINE 0.3 MG/.3ML
0.3 INJECTION SUBCUTANEOUS ONCE AS NEEDED
Status: CANCELLED | OUTPATIENT
Start: 2025-05-05

## 2025-05-06 ENCOUNTER — INFUSION (OUTPATIENT)
Dept: INFUSION THERAPY | Facility: HOSPITAL | Age: 55
End: 2025-05-06
Payer: MEDICARE

## 2025-05-06 VITALS
HEART RATE: 95 BPM | HEIGHT: 67 IN | OXYGEN SATURATION: 100 % | SYSTOLIC BLOOD PRESSURE: 137 MMHG | TEMPERATURE: 98 F | DIASTOLIC BLOOD PRESSURE: 81 MMHG | WEIGHT: 249.13 LBS | RESPIRATION RATE: 16 BRPM | BODY MASS INDEX: 39.1 KG/M2

## 2025-05-06 DIAGNOSIS — D50.0 IRON DEFICIENCY ANEMIA DUE TO CHRONIC BLOOD LOSS: Primary | ICD-10-CM

## 2025-05-06 PROCEDURE — 96376 TX/PRO/DX INJ SAME DRUG ADON: CPT

## 2025-05-06 PROCEDURE — 25000003 PHARM REV CODE 250

## 2025-05-06 PROCEDURE — 96365 THER/PROPH/DIAG IV INF INIT: CPT

## 2025-05-06 PROCEDURE — 63600175 PHARM REV CODE 636 W HCPCS: Mod: JZ,TB

## 2025-05-06 RX ORDER — SODIUM CHLORIDE 0.9 % (FLUSH) 0.9 %
10 SYRINGE (ML) INJECTION
OUTPATIENT
Start: 2025-05-06

## 2025-05-06 RX ORDER — SODIUM CHLORIDE 0.9 % (FLUSH) 0.9 %
10 SYRINGE (ML) INJECTION
Status: DISCONTINUED | OUTPATIENT
Start: 2025-05-06 | End: 2025-05-06 | Stop reason: HOSPADM

## 2025-05-06 RX ORDER — HEPARIN 100 UNIT/ML
500 SYRINGE INTRAVENOUS
OUTPATIENT
Start: 2025-05-06

## 2025-05-06 RX ORDER — EPINEPHRINE 0.3 MG/.3ML
0.3 INJECTION SUBCUTANEOUS ONCE AS NEEDED
OUTPATIENT
Start: 2025-05-06

## 2025-05-06 RX ORDER — HEPARIN 100 UNIT/ML
500 SYRINGE INTRAVENOUS
Status: DISCONTINUED | OUTPATIENT
Start: 2025-05-06 | End: 2025-05-06 | Stop reason: HOSPADM

## 2025-05-06 RX ADMIN — IRON DEXTRAN 25 MG: 50 INJECTION INTRAMUSCULAR; INTRAVENOUS at 07:05

## 2025-05-06 RX ADMIN — SODIUM CHLORIDE 975 MG: 9 INJECTION, SOLUTION INTRAVENOUS at 09:05

## 2025-05-06 NOTE — PLAN OF CARE
Pt received Infed test dose and dose today and tolerated well, without complications. VSS throughout infusion. Educated patient about Infed test dose and dose (indications, side effects, possible reactions, precautions) and verbalized understanding. PIV positive for blood return, saline locked and removed prior to DC, catheter tip intact. Pt DC with no distress noted, ambulated off of unit, w/o event, via self, pleased. Obs for 1 hr between, no S/S of reactions.

## 2025-05-08 DIAGNOSIS — I10 ESSENTIAL HYPERTENSION: Primary | Chronic | ICD-10-CM

## 2025-05-08 RX ORDER — AMLODIPINE BESYLATE 5 MG/1
5 TABLET ORAL DAILY
Qty: 90 TABLET | Refills: 3 | Status: SHIPPED | OUTPATIENT
Start: 2025-05-08

## 2025-05-08 NOTE — TELEPHONE ENCOUNTER
No care due was identified.  [facility] Embedded Care Due Messages. Reference number: [id number].   5/08/2025 9:54:05 AM CDT

## 2025-05-08 NOTE — TELEPHONE ENCOUNTER
Refill Encounter    PCP Visits: Recent Visits  Date Type Provider Dept   04/28/25 Office Visit Weeks, Jose ALONSO, DO [facility] Medicine   04/16/25 Office Visit Demarcus Lee MD [facility]   03/10/25 Office Visit Weeks, oJse ALONSO, DO [facility]   02/13/25 Office Visit Andrea Barlow, NP [facility]   01/13/25 Office Visit Weeks, Jose ALONSO, DO [facility]   10/11/24 Office Visit Weeks, Jose ALONSO, DO [facility]   09/06/24 Office Visit Weeks, Jose ALONSO, DO [facility]   08/27/24 Office Visit Irma Gamboa MD [facility]   07/12/24 Office Visit Weeks, Jose ALONSO, DO [facility]   07/08/24 Office Visit Irma Gamboa MD [facility]   Showing recent visits within past 360 days and meeting all other requirements  Future Appointments  Date Type Provider Dept   07/08/25 Appointment Kayli Gil, KAILEY [facility] Medicine   07/23/25 Appointment Weeks, Jose ALONSO, [facility]   Showing future appointments within next 720 days and meeting all other requirements      Last 3 Blood Pressure:   BP Readings from Last 3 Encounters:   05/06/25 137/81   04/30/25 138/81   04/28/25 130/85     Preferred Pharmacy:   [facility][id number] [address] [facility]  [facility]  [facility] [address]  Phone: [phone] Fax: [phone]    Requested RX:  Requested Prescriptions     Pending Prescriptions Disp Refills    amLODIPine (NORVASC) 5 MG tablet 90 tablet 3     Sig: Take 1 tablet (5 mg total) by mouth once daily.      RX Route: Normal

## 2025-05-12 DIAGNOSIS — G89.4 CHRONIC PAIN SYNDROME: ICD-10-CM

## 2025-05-12 DIAGNOSIS — F11.20 CHRONIC NARCOTIC DEPENDENCE: ICD-10-CM

## 2025-05-12 NOTE — TELEPHONE ENCOUNTER
No care due was identified.  [facility] Embedded Care Due Messages. Reference number: [id number].   5/12/2025 4:54:06 PM CDT

## 2025-05-12 NOTE — TELEPHONE ENCOUNTER
.Refill Encounter    PCP Visits: Recent Visits  Date Type Provider Dept   04/28/25 Office Visit Weeks, Jose ALONSO, DO [facility] Internal Medicine   04/16/25 Office Visit Demarcus Lee MD [facility]   03/10/25 Office Visit Weeks, Jose ALONSO, DO [facility]   02/13/25 Office Visit Andrea Barlow, NP [facility]   01/13/25 Office Visit Weeks, Jose ALONSO, DO [facility]   10/11/24 Office Visit Weeks, Jose ALONSO, DO [facility]   09/06/24 Office Visit Weeks, Jose ALONSO, DO [facility]   08/27/24 Office Visit Irma Gamboa MD [facility]   07/12/24 Office Visit Weeks, Jose ALONSO, DO [facility]   07/08/24 Office Visit Irma Gamboa MD [facility]   Showing recent visits within past 360 days and meeting all other requirements  Future Appointments  Date Type Provider Dept   07/08/25 Appointment Kayli Gil, KAILEY [facility] Medicine   07/23/25 Appointment Weeks, Jose ALONSO,  [facility] Internal [facility]   Showing future appointments within next 720 days and meeting all other requirements      Last 3 Blood Pressure:   BP Readings from Last 3 Encounters:   05/06/25 137/81   04/30/25 138/81   04/28/25 130/85     Preferred Pharmacy:   "[facility] #[id number] [place] [id number] AT [facility] [address]  [facility]  [facility] [address]  Phone: [phone] Fax: [phone]    Requested RX:  Requested Prescriptions      No prescriptions requested or ordered in this encounter      RX Route: Normal

## 2025-05-13 RX ORDER — HYDROCODONE BITARTRATE AND ACETAMINOPHEN 5; 325 MG/1; MG/1
1 TABLET ORAL
Qty: 30 TABLET | Refills: 0 | OUTPATIENT
Start: 2025-05-13

## 2025-05-19 DIAGNOSIS — G44.009 CLUSTER HEADACHE, NOT INTRACTABLE, UNSPECIFIED CHRONICITY PATTERN: ICD-10-CM

## 2025-05-19 RX ORDER — BUTALBITAL, ACETAMINOPHEN AND CAFFEINE 50; 325; 40 MG/1; MG/1; MG/1
1 TABLET ORAL EVERY 6 HOURS PRN
Qty: 40 TABLET | Refills: 0 | Status: CANCELLED | OUTPATIENT
Start: 2025-05-19

## 2025-05-19 RX ORDER — BUTALBITAL, ACETAMINOPHEN AND CAFFEINE 50; 325; 40 MG/1; MG/1; MG/1
1 TABLET ORAL EVERY 6 HOURS PRN
Qty: 40 TABLET | Refills: 0 | Status: SHIPPED | OUTPATIENT
Start: 2025-05-19

## 2025-05-19 NOTE — TELEPHONE ENCOUNTER
.Refill Encounter    PCP Visits: Recent Visits  Date Type Provider Dept   04/28/25 Office Visit Weeks, Jose ALONSO, DO [facility] Internal Medicine   04/16/25 Office Visit Demarcus Lee MD [facility]   03/10/25 Office Visit Weeks, Jose ALONSO, DO [facility]   02/13/25 Office Visit Andrea Barlow, NP [facility]   01/13/25 Office Visit Weeks, Jose ALONSO, DO [facility]   10/11/24 Office Visit Weeks, Jose ALONSO, DO [facility]   09/06/24 Office Visit Weeks, Jose ALONSO, DO [facility]   08/27/24 Office Visit Irma Gamboa MD [facility]   07/12/24 Office Visit Weeks, Jose ALONSO, DO [facility]   07/08/24 Office Visit Irma Gamboa MD [facility]   Showing recent visits within past 360 days and meeting all other requirements  Future Appointments  Date Type Provider Dept   07/08/25 Appointment Kayli Gil, KAILEY [facility] Medicine   07/23/25 Appointment Weeks, Jose ALONSO,  [facility] Internal [facility]   Showing future appointments within next 720 days and meeting all other requirements      Last 3 Blood Pressure:   BP Readings from Last 3 Encounters:   05/06/25 137/81   04/30/25 138/81   04/28/25 130/85     Preferred Pharmacy:   [facility] #[id number] [address] [facility] [address]  [facility]  [facility] [address]  Phone: [phone] Fax: [phone]      Requested RX:  Requested Prescriptions     Pending Prescriptions Disp Refills    butalbital-acetaminophen-caffeine -40 mg (FIORICET, ESGIC) -40 mg per tablet 40 tablet 0     Sig: Take 1 tablet by mouth every 6 (six) hours as needed for Headaches.      RX Route: Normal

## 2025-05-19 NOTE — TELEPHONE ENCOUNTER
Refill Encounter    PCP Visits: Recent Visits  Date Type Provider Dept   04/28/25 Office Visit Weeks, Jose ALONSO, DO [facility]   04/16/25 Office Visit Demarcus Lee MD [facility]   03/10/25 Office Visit Weeks, Jose ALONSO, DO [facility]   02/13/25 Office Visit Andrea Barlow, NP [facility]   01/13/25 Office Visit Weeks, Jose ALONSO, DO [facility]   10/11/24 Office Visit Weeks, Jose ALONSO, DO [facility]   09/06/24 Office Visit Weeks, Jose ALONSO, DO [facility]   08/27/24 Office Visit Irma Gamboa MD [facility]   07/12/24 Office Visit Weeks, Jose ALONSO, DO [facility]   07/08/24 Office Visit Irma Gamboa MD [facility]   Showing recent visits within past 360 days and meeting all other requirements  Future Appointments  Date Type Provider Dept   07/08/25 Appointment Kayli Gil, KAILEY [facility]   07/23/25 Appointment Weeks, Jose ALONSO, [facility]   Showing future appointments within next 720 days and meeting all other requirements      Last 3 Blood Pressure:   BP Readings from Last 3 Encounters:   05/06/25 137/81   04/30/25 138/81   04/28/25 130/85     Preferred Pharmacy:   [facility] #[id number] [facility] [address] [facility] & [address]  [address]  [facility] [address]  Phone: [phone] Fax: [phone]    [id number] - [place], [place] - [address] [facility] & [facility]  [address] [facility] [address]  Phone: [phone] Fax: [phone]    Requested RX:  Requested Prescriptions     Pending Prescriptions Disp Refills    butalbital-acetaminophen-caffeine -40 mg (FIORICET, ESGIC) -40 mg per tablet 40 tablet 0     Sig: Take 1 tablet by mouth every 6 (six) hours as needed for Headaches.      RX Route: Normal

## 2025-05-19 NOTE — TELEPHONE ENCOUNTER
No care due was identified.  [facility] Embedded Care Due Messages. Reference number: [id number].   5/19/2025 3:33:21 PM CDT

## 2025-05-20 DIAGNOSIS — F41.0 PANIC DISORDER: ICD-10-CM

## 2025-05-20 DIAGNOSIS — R46.81 OBSESSIVE-COMPULSIVE BEHAVIOR: ICD-10-CM

## 2025-05-20 DIAGNOSIS — F41.1 GAD (GENERALIZED ANXIETY DISORDER): ICD-10-CM

## 2025-05-20 DIAGNOSIS — F40.10 SOCIAL ANXIETY DISORDER: ICD-10-CM

## 2025-05-20 DIAGNOSIS — R09.81 NASAL SINUS CONGESTION: ICD-10-CM

## 2025-05-20 RX ORDER — SERTRALINE HYDROCHLORIDE 100 MG/1
TABLET, FILM COATED ORAL
Qty: 30 TABLET | Refills: 11 | Status: SHIPPED | OUTPATIENT
Start: 2025-05-20

## 2025-05-20 NOTE — TELEPHONE ENCOUNTER
Refill Encounter  Allergies: Confirmed    PCP Visits:   Recent Visits  Date Type Provider Dept   04/28/25 Office Visit Weeks, Jose ALONSO, DO [facility]   03/10/25 Office Visit Weeks, Jose ALONSO, DO [facility]   01/13/25 Office Visit Weeks, Jose ALONSO, DO [facility]   10/11/24 Office Visit Weeks, Jose ALONSO,  [facility]   09/06/24 Office Visit Weeks, Jose ALONSO, DO [facility]   07/12/24 Office Visit Weeks, Jose ALONSO, DO [facility]   06/19/24 Office Visit Weeks, Jose ALONSO,  [facility]   Showing recent visits within past 360 days and meeting all other requirements  Future Appointments  Date Type Provider Dept   07/23/25 Appointment Weeks, Jose ALONSO,  [facility]   Showing future appointments within next 720 days and meeting all other requirements     Last 3 Blood Pressure:   BP Readings from Last 3 Encounters:   05/06/25 137/81   04/30/25 138/81   04/28/25 130/85     Preferred Pharmacy:   [facility] #[id number] [address] AT [facility] [address]  [facility] [address]  [facility] [address]  Phone: [phone] Fax: [phone]    Requested RX:  Requested Prescriptions     Pending Prescriptions Disp Refills    fluticasone propionate (FLONASE) 50 mcg/actuation nasal spray [Pharmacy Med Name: FLUTICASONE 50MCG NASAL SP (120) RX] 16 g 1     Sig: TAKE 1-2 SPRAYS INTO EACH NOSTRIL EVERY DAY. USE SALINE WASH PRIOR TO USE. DISCONTINUE AFTER 21 DAYS IF NO IMPROVEMENT FOR 10 DAYS      RX Route: Normal

## 2025-05-21 RX ORDER — FLUTICASONE PROPIONATE 50 MCG
SPRAY, SUSPENSION (ML) NASAL
Qty: 16 G | Refills: 1 | Status: SHIPPED | OUTPATIENT
Start: 2025-05-21

## 2025-06-03 DIAGNOSIS — G89.4 CHRONIC PAIN SYNDROME: ICD-10-CM

## 2025-06-03 DIAGNOSIS — F11.20 CHRONIC NARCOTIC DEPENDENCE: ICD-10-CM

## 2025-06-03 RX ORDER — HYDROCODONE BITARTRATE AND ACETAMINOPHEN 5; 325 MG/1; MG/1
1 TABLET ORAL
Qty: 30 TABLET | Refills: 0 | Status: SHIPPED | OUTPATIENT
Start: 2025-06-03

## 2025-06-11 ENCOUNTER — TELEPHONE (OUTPATIENT)
Dept: INTERNAL MEDICINE | Facility: CLINIC | Age: 55
End: 2025-06-11
Payer: MEDICAID

## 2025-06-11 NOTE — TELEPHONE ENCOUNTER
----- Message from Med Assistant Powell sent at 6/11/2025  9:17 AM CDT -----  Name of Who is Calling:PENG MAYO [[id number]]  What is the request in detail: Pt is requesting a call back to discuss HYDROcodone-acetaminophen (NORCO) 5-325 mg per tablet. Please assist.  Can the clinic reply by [facility]: No  What Number to Call Back if not in [facility]: [phone]

## 2025-06-11 NOTE — TELEPHONE ENCOUNTER
Telephoned Ms. Bajwa, patient states she called to refill her prescription, pharmacy states the are out of the milligram that she's prescribed,she wants to discus with provider if he can increase the dosage to accommodate what the pharmacy has on hand,she states she's completely out and currently in pain.

## 2025-06-11 NOTE — TELEPHONE ENCOUNTER
Telephoned [facility] and they stated they our out of the hydrocodone-acetaminophen 5-325mg, however they have hydrocodone-acetaminophen 10-325mg available.

## 2025-06-13 ENCOUNTER — TELEPHONE (OUTPATIENT)
Dept: INTERNAL MEDICINE | Facility: CLINIC | Age: 55
End: 2025-06-13
Payer: MEDICAID

## 2025-06-13 ENCOUNTER — PATIENT MESSAGE (OUTPATIENT)
Dept: INTERNAL MEDICINE | Facility: CLINIC | Age: 55
End: 2025-06-13
Payer: MEDICARE

## 2025-06-13 DIAGNOSIS — G89.4 CHRONIC PAIN SYNDROME: ICD-10-CM

## 2025-06-13 DIAGNOSIS — F11.20 CHRONIC NARCOTIC DEPENDENCE: ICD-10-CM

## 2025-06-13 RX ORDER — HYDROCODONE BITARTRATE AND ACETAMINOPHEN 5; 325 MG/1; MG/1
1 TABLET ORAL
Qty: 30 TABLET | Refills: 0 | Status: SHIPPED | OUTPATIENT
Start: 2025-06-13

## 2025-06-13 NOTE — TELEPHONE ENCOUNTER
----- Message from Med Assistant Saab sent at 6/12/2025  1:39 PM CDT -----  The pharmacy calling about dosage change  ----- Message -----  From: Bg Biggs  Sent: 6/11/2025   4:25 PM CDT  To: [place] Staff    Type : Patient Call        Who Called : Patient         Does the patient know what this is regarding?: Pt is requesting a call back from the providers office in regards to the pt medications . Please Advise         Would the patient rather a call back or a response via [facility]? Call         Best Call Back Number: [phone]           Additional Information:

## 2025-06-13 NOTE — TELEPHONE ENCOUNTER
Copied from CRM #[id number]. Topic: General Inquiry - Patient Advice  >> Jun 13, 2025 10:00 AM Alberto wrote:  Type: Patient Call Back    Who called: Patient     What is the request in detail: Pt is requesting a call back to discuss if she can still be seen by Dr. Carpenter with her insurance and also about her medication. Please advise       Would the patient rather a call back or a response via [facility]?  call    Best call back number:[phone]     Additional Information:

## 2025-06-13 NOTE — TELEPHONE ENCOUNTER
Copied from CRM #[id number]. Topic: General Inquiry - Patient Advice  >> Jun 13, 2025  1:03 PM Pippa Villarreal wrote:  Type: Patient Call Back    Who called:self     What is the request in detail:asking for a call back in regards to pain medication HYDROcodone-acetaminophen (NORCO) 5-325 mg per tablet    Can the clinic reply by [facility]?no     Would the patient rather a call back or a response via [facility]? No    Best call back number: .[phone]          Additional Information:

## 2025-06-19 DIAGNOSIS — M79.7 FIBROMYALGIA: ICD-10-CM

## 2025-06-19 RX ORDER — TIZANIDINE 4 MG/1
4 TABLET ORAL 2 TIMES DAILY PRN
Qty: 60 TABLET | Refills: 2 | Status: SHIPPED | OUTPATIENT
Start: 2025-06-19

## 2025-06-23 NOTE — TELEPHONE ENCOUNTER
Returned call spoke with pt advising [employer] SNP-DE is accepted. Further advised pt that she has a melissa'd f/u appt with Jade. Patient verbalized understanding.      Copied from CRM #[id number]. Topic: General Inquiry - Patient Advice  >> Jun 23, 2025  1:46 PM Noemi Ramos wrote:  Type:  Advice    Who Called: Patient     Would the patient rather a call back or a response via [facility]? Call Back    Best Call Back Number: [phone]    Additional Information: Pt is calling to confirm if provider accepts: [employer] Gold Plus SNP-DE because she will be switching to this insurance plan next month.

## 2025-06-25 ENCOUNTER — PATIENT MESSAGE (OUTPATIENT)
Dept: INTERNAL MEDICINE | Facility: CLINIC | Age: 55
End: 2025-06-25
Payer: MEDICARE

## 2025-06-25 DIAGNOSIS — G44.009 CLUSTER HEADACHE, NOT INTRACTABLE, UNSPECIFIED CHRONICITY PATTERN: ICD-10-CM

## 2025-06-25 RX ORDER — BUTALBITAL, ACETAMINOPHEN AND CAFFEINE 50; 325; 40 MG/1; MG/1; MG/1
1 TABLET ORAL EVERY 6 HOURS PRN
Qty: 40 TABLET | Refills: 0 | Status: SHIPPED | OUTPATIENT
Start: 2025-06-25 | End: 2025-06-25 | Stop reason: SDUPTHER

## 2025-06-25 NOTE — TELEPHONE ENCOUNTER
Refill Encounter  Allergies: Confirmed    PCP Visits:   Recent Visits  Date Type Provider Dept   04/28/25 Office Visit Weeks, Jose ALONSO,  [facility]   03/10/25 Office Visit Weeks, Jose ALONSO, DO [facility]   01/13/25 Office Visit Weeks, Jose ALONSO, DO [facility]   10/11/24 Office Visit Weeks, Jose ALONSO,  [facility]   09/06/24 Office Visit Weeks, Jose ALONSO, DO [facility]   07/12/24 Office Visit Weeks, Jose ALONSO,  [facility]   Showing recent visits within past 360 days and meeting all other requirements  Future Appointments  Date Type Provider Dept   08/15/25 Appointment Weeks, Jose ALONSO,  [facility]   Showing future appointments within next 720 days and meeting all other requirements     Last 3 Blood Pressure:   BP Readings from Last 3 Encounters:   05/06/25 137/81   04/30/25 138/81   04/28/25 130/85     Preferred Pharmacy:   [facility] #[id number] [address] AT [facility] [address]  [address]  [facility] [address]  [facility] [address]  Phone: [phone] Fax: [phone]    Requested RX:  Requested Prescriptions     Pending Prescriptions Disp Refills    butalbital-acetaminophen-caffeine -40 mg (FIORICET, ESGIC) -40 mg per tablet [Pharmacy Med Name: BUT/ACETAMINOPHEN/CAFF -40 TB] 40 tablet 0     Sig: TAKE 1 TABLET BY MOUTH EVERY 6 HOURS AS NEEDED FOR HEADACHES      RX Route: Normal

## 2025-06-25 NOTE — TELEPHONE ENCOUNTER
No care due was identified.  [facility] Embedded Care Due Messages. Reference number: [id number].   6/25/2025 4:22:47 PM CDT

## 2025-06-25 NOTE — TELEPHONE ENCOUNTER
Refill Encounter  - Patient would like to know if Rx can be sent to [facility] instead please. Thank you!    PCP Visits: Recent Visits  Date Type Provider Dept   04/28/25 Office Visit Weeks, Jose ALONSO, DO [facility]   04/16/25 Office Visit Demarcus Lee MD [facility]   03/10/25 Office Visit Weeks, Jose ALONSO, DO [facility]   02/13/25 Office Visit Andrea Barlow, NP [facility]   01/13/25 Office Visit Weeks, Jose ALONSO, DO [facility]   10/11/24 Office Visit Weeks, Jose ALONSO, DO [facility]   09/06/24 Office Visit Weeks, Jose ALONSO, DO [facility]   08/27/24 Office Visit Irma Gamboa MD [facility]   07/12/24 Office Visit Weeks, Jose ALONSO, DO [facility]   07/08/24 Office Visit Irma Gamboa MD [facility]   Showing recent visits within past 360 days and meeting all other requirements  Future Appointments  Date Type Provider Dept   07/08/25 Appointment Kayli Gil, KAILEY [facility]   08/15/25 Appointment Weeks, Jose ALONSO,  [facility]   Showing future appointments within next 720 days and meeting all other requirements      Last 3 Blood Pressure:   BP Readings from Last 3 Encounters:   05/06/25 137/81   04/30/25 138/81   04/28/25 130/85     Preferred Pharmacy:   [facility] [phone]     Requested RX:  Requested Prescriptions     Pending Prescriptions Disp Refills    butalbital-acetaminophen-caffeine -40 mg (FIORICET, ESGIC) -40 mg per tablet 40 tablet 0     Sig: Take 1 tablet by mouth every 6 (six) hours as needed for Headaches.      RX Route: Normal

## 2025-06-25 NOTE — PROGRESS NOTES
Individual Psychotherapy (PhD/LCSW)    2/26/2019    Site:  [facility]         Therapeutic Intervention: Met with patient.  Outpatient - Insight oriented psychotherapy 45 min - CPT code 55054 and Outpatient - Behavior modifying psychotherapy 45 min - CPT code 79610    Chief complaint/reason for encounter: depression, anxiety and interpersonal     Interval history and content of current session:        Sleeps from 3 pm till 4 am.   However she reports she loves to sleep so that she is very worried that her sleeping medications could be taken off.   She also watches tv from bed.   Her chief complain today is feeling tire which could be synergized by her excessive sleeping.    Overall she is doing well given her baseline.   She is satisfied with her relationship.  She depends on him and he complements this with his giving.      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        Treatment plan:  · Target symptoms: depression, anxiety   · Why chosen therapy is appropriate versus another modality: relevant to diagnosis  · Outcome monitoring methods: self-report, observation    · Therapeutic intervention type: insight oriented psychotherapy, behavior modifying psychotherapy, supportive psychotherapy    Risk parameters:  Patient reports no suicidal ideation  Patient reports no homicidal ideation  Patient reports no self-injurious behavior  Patient reports no violent behavior    Verbal deficits: None    Patient's response to intervention:  The patient's response to intervention is accepting.    Progress toward goals and other mental status changes:  The patient's progress toward goals is limited.    Diagnosis: 296.35;   Obsessive compulsive disorder.  personality disorder nos  Learning problems/limitations   internet shopping addiction     Plan:  individual psychotherapy    Return to clinic: 2 weeks pt prefers to come in 3 months.                                
Marian

## 2025-06-25 NOTE — TELEPHONE ENCOUNTER
E-prescribe failed. Transmission to pharmacy failed (6/25/2025  2:12 PM CDT)     Can one of the nurses call this RX in to the pharmacy?

## 2025-06-26 RX ORDER — BUTALBITAL, ACETAMINOPHEN AND CAFFEINE 50; 325; 40 MG/1; MG/1; MG/1
1 TABLET ORAL EVERY 6 HOURS PRN
Qty: 40 TABLET | Refills: 0 | Status: SHIPPED | OUTPATIENT
Start: 2025-06-26

## 2025-06-28 DIAGNOSIS — F39 MOOD DISORDER: ICD-10-CM

## 2025-06-28 DIAGNOSIS — F33.42 RECURRENT MAJOR DEPRESSIVE DISORDER, IN FULL REMISSION: ICD-10-CM

## 2025-06-29 RX ORDER — LURASIDONE HYDROCHLORIDE 60 MG/1
60 TABLET, FILM COATED ORAL
Qty: 90 TABLET | Refills: 3 | Status: SHIPPED | OUTPATIENT
Start: 2025-06-29

## 2025-07-03 ENCOUNTER — PATIENT MESSAGE (OUTPATIENT)
Dept: PSYCHIATRY | Facility: CLINIC | Age: 55
End: 2025-07-03
Payer: MEDICARE

## 2025-07-08 ENCOUNTER — TELEPHONE (OUTPATIENT)
Dept: OPTOMETRY | Facility: CLINIC | Age: 55
End: 2025-07-08
Payer: MEDICARE

## 2025-07-08 DIAGNOSIS — F41.1 GAD (GENERALIZED ANXIETY DISORDER): ICD-10-CM

## 2025-07-08 DIAGNOSIS — F40.10 SOCIAL ANXIETY DISORDER: ICD-10-CM

## 2025-07-08 RX ORDER — DIAZEPAM 2 MG/1
2 TABLET ORAL DAILY PRN
Qty: 15 TABLET | Refills: 0 | Status: SHIPPED | OUTPATIENT
Start: 2025-07-08 | End: 2025-08-07

## 2025-07-08 NOTE — TELEPHONE ENCOUNTER
Copied from CRM #[id number]. Topic: General Inquiry - Patient Advice  >> Jul 8, 2025  2:36 PM Rebecca wrote:  CONSULT/ADVISORY    Name of Caller: Jaki     Contact Preference: [phone] (home)      Nature of Call: Pt  would like to speak with someone in regards to getting a amount for new lenses for her frames. Would like a call back in order to further discuss

## 2025-07-09 ENCOUNTER — OFFICE VISIT (OUTPATIENT)
Dept: INTERNAL MEDICINE | Facility: CLINIC | Age: 55
End: 2025-07-09
Payer: MEDICAID

## 2025-07-09 VITALS
BODY MASS INDEX: 39.1 KG/M2 | HEART RATE: 95 BPM | WEIGHT: 249.13 LBS | OXYGEN SATURATION: 100 % | SYSTOLIC BLOOD PRESSURE: 137 MMHG | DIASTOLIC BLOOD PRESSURE: 81 MMHG | HEIGHT: 67 IN

## 2025-07-09 DIAGNOSIS — G89.4 CHRONIC PAIN SYNDROME: ICD-10-CM

## 2025-07-09 DIAGNOSIS — F11.20 CHRONIC NARCOTIC DEPENDENCE: ICD-10-CM

## 2025-07-09 DIAGNOSIS — I10 ESSENTIAL HYPERTENSION: ICD-10-CM

## 2025-07-09 DIAGNOSIS — R39.15 URINARY URGENCY: Primary | ICD-10-CM

## 2025-07-09 PROCEDURE — 98006 SYNCH AUDIO-VIDEO EST MOD 30: CPT | Mod: 95,,, | Performed by: FAMILY MEDICINE

## 2025-07-09 RX ORDER — NAPROXEN 500 MG/1
500 TABLET ORAL 2 TIMES DAILY
Qty: 20 TABLET | Refills: 2 | Status: SHIPPED | OUTPATIENT
Start: 2025-07-09

## 2025-07-09 RX ORDER — HYDROCODONE BITARTRATE AND ACETAMINOPHEN 5; 325 MG/1; MG/1
1 TABLET ORAL
Qty: 30 TABLET | Refills: 0 | Status: SHIPPED | OUTPATIENT
Start: 2025-07-09

## 2025-07-09 RX ORDER — CHLORTHALIDONE 25 MG/1
25 TABLET ORAL DAILY
Qty: 30 TABLET | Refills: 11 | Status: SHIPPED | OUTPATIENT
Start: 2025-07-09 | End: 2026-07-09

## 2025-07-11 ENCOUNTER — PATIENT MESSAGE (OUTPATIENT)
Dept: INTERNAL MEDICINE | Facility: CLINIC | Age: 55
End: 2025-07-11
Payer: MEDICARE

## 2025-07-11 ENCOUNTER — LAB VISIT (OUTPATIENT)
Dept: LAB | Facility: HOSPITAL | Age: 55
End: 2025-07-11
Attending: FAMILY MEDICINE
Payer: MEDICARE

## 2025-07-11 DIAGNOSIS — I10 ESSENTIAL HYPERTENSION: ICD-10-CM

## 2025-07-11 DIAGNOSIS — B37.9 YEAST INFECTION: Primary | ICD-10-CM

## 2025-07-11 DIAGNOSIS — R39.15 URINARY URGENCY: ICD-10-CM

## 2025-07-11 LAB
ALBUMIN SERPL BCP-MCNC: 4.2 G/DL (ref 3.5–5.2)
ALBUMIN/CREAT UR: 7.4 UG/MG
ALP SERPL-CCNC: 79 UNIT/L (ref 40–150)
ALT SERPL W/O P-5'-P-CCNC: 17 UNIT/L (ref 10–44)
ANION GAP (OHS): 6 MMOL/L (ref 8–16)
AST SERPL-CCNC: 19 UNIT/L (ref 11–45)
BACTERIA #/AREA URNS AUTO: ABNORMAL /HPF
BILIRUB SERPL-MCNC: 0.2 MG/DL (ref 0.1–1)
BILIRUB UR QL STRIP.AUTO: NEGATIVE
BUN SERPL-MCNC: 13 MG/DL (ref 6–20)
CALCIUM SERPL-MCNC: 9.3 MG/DL (ref 8.7–10.5)
CHLORIDE SERPL-SCNC: 105 MMOL/L (ref 95–110)
CLARITY UR: ABNORMAL
CO2 SERPL-SCNC: 31 MMOL/L (ref 23–29)
COLOR UR AUTO: YELLOW
CREAT SERPL-MCNC: 1.1 MG/DL (ref 0.5–1.4)
CREAT UR-MCNC: 204 MG/DL (ref 15–325)
GFR SERPLBLD CREATININE-BSD FMLA CKD-EPI: 59 ML/MIN/1.73/M2
GLUCOSE SERPL-MCNC: 88 MG/DL (ref 70–110)
GLUCOSE UR QL STRIP: NEGATIVE
HGB UR QL STRIP: NEGATIVE
HYALINE CASTS UR QL AUTO: 2 /LPF (ref 0–1)
KETONES UR QL STRIP: NEGATIVE
LEUKOCYTE ESTERASE UR QL STRIP: ABNORMAL
MICROALBUMIN UR-MCNC: 15 UG/ML (ref ?–5000)
MICROSCOPIC COMMENT: ABNORMAL
NITRITE UR QL STRIP: POSITIVE
PH UR STRIP: 6 [PH]
POTASSIUM SERPL-SCNC: 4.1 MMOL/L (ref 3.5–5.1)
PROT SERPL-MCNC: 7.9 GM/DL (ref 6–8.4)
PROT UR QL STRIP: ABNORMAL
RBC #/AREA URNS AUTO: 1 /HPF (ref 0–4)
SODIUM SERPL-SCNC: 142 MMOL/L (ref 136–145)
SP GR UR STRIP: 1.02
SQUAMOUS #/AREA URNS AUTO: 2 /HPF
UROBILINOGEN UR STRIP-ACNC: ABNORMAL EU/DL
WBC #/AREA URNS AUTO: 0 /HPF (ref 0–5)

## 2025-07-11 PROCEDURE — 82043 UR ALBUMIN QUANTITATIVE: CPT

## 2025-07-11 PROCEDURE — 80053 COMPREHEN METABOLIC PANEL: CPT

## 2025-07-11 PROCEDURE — 81003 URINALYSIS AUTO W/O SCOPE: CPT

## 2025-07-11 PROCEDURE — 36415 COLL VENOUS BLD VENIPUNCTURE: CPT

## 2025-07-12 LAB — HOLD SPECIMEN: NORMAL

## 2025-07-13 NOTE — PROGRESS NOTES
Subjective:       Patient ID: Jaki Bajwa is a 54 y.o. female.    The patient location is: [place]  The chief complaint leading to consultation is:   Chief Complaint   Patient presents with    Medication Problem     Discuss medication         Visit type: audiovisual    Face to Face time with patient: 20 minutes of total time spent on the encounter, which includes face to face time and non-face to face time preparing to see the patient (eg, review of tests), Obtaining and/or reviewing separately obtained history, Documenting clinical information in the electronic or other health record, Independently interpreting results (not separately reported) and communicating results to the patient/family/caregiver, or Care coordination (not separately reported).       Each patient to whom he or she provides medical services by telemedicine is:  (1) informed of the relationship between the physician and patient and the respective role of any other health care provider with respect to management of the patient; and (2) notified that he or she may decline to receive medical services by telemedicine and may withdraw from such care at any time.    Notes:     HPI  History of Present Illness    CHIEF COMPLAINT:  Jaki presents today for leg swelling.    LEG SWELLING:  She reports frequent leg swelling that persists for approximately one week and requires wearing compression socks. She takes furosemide 20 mg as needed for management. The swelling may be related to prolonged standing and potentially exacerbated by wearing uncomfortable shoes.    GENITOURINARY:  She reports ongoing urinary symptoms characterized by difficulty holding bladder and experiencing frequent leakage. She describes involuntary urine leakage when laughing, coughing, and sometimes being unable to reach the bathroom in time. She reports strong-smelling urine with an orange color that has been present for an extended period. She acknowledges not drinking  adequate amounts of water during the day. She denies current symptoms of urinary tract infection and expresses concern about underlying urinary health.    PAIN MANAGEMENT:  She has a history of using naproxen for pain management, noting it was effective in reducing inflammation and pain intensity. She has been off naproxen for an extended period and expresses desire to restart the medication. She describes experiencing intermittent pain that occasionally requires additional pain management intervention.    CURRENT MEDICATIONS:  She is currently using estradiol patch and takes furosemide 20 mg as needed for leg swelling. She is not using topical estradiol.      ROS:  General: -fever, -chills, -fatigue, -weight gain, -weight loss  Eyes: -vision changes, -redness, -discharge  ENT: -ear pain, -nasal congestion, -sore throat  Cardiovascular: -chest pain, -palpitations, +lower extremity edema  Respiratory: -cough, -shortness of breath  Gastrointestinal: -abdominal pain, -nausea, -vomiting, -diarrhea, -constipation, -blood in stool  Genitourinary: -dysuria, -hematuria, -frequency  Musculoskeletal: -joint pain, -muscle pain  Skin: -rash, -lesion  Neurological: -headache, -dizziness, -numbness, -tingling  Psychiatric: -anxiety, -depression, -sleep difficulty  Female Genitourinary: +urgency, +urinary incontinence, +abnormal urine appearance, +urine changes           All of your core healthy metrics are met.      Social History     Social History Narrative    Not on file       Family History   Problem Relation Name Age of Onset    Heart disease Mother Amalia Bajwa     Hypertension Mother Amalia Bajwa     Cancer Father Demarcus Bajwa sr.         colon ca    Colon cancer Father Demarcus Bajwa sr.     Depression Father Demarcus Bajwa sr.     Schizophrenia Father Demarcus Bajwa sr.     Anxiety disorder Father Demarcus Bajwa sr.     Arthritis Father Demarcus Bajwa sr.     Mental illness Father Demarcus Bajwa sr.     Liver cancer Sister Fifi      "Cancer Sister Fifi     Depression Sister Fifi     Anxiety disorder Sister Fifi     Heart attack Sister Fifi     COPD Sister Fifi     Cancer Sister Madeline         throat and colon    Depression Sister Madeline     Miscarriages / Stillbirths Sister Madeline     Pancreatic cancer Brother Demarcus barnett     Depression Brother Demarcus barnett     Alcohol abuse Brother Demarcus barnett     No Known Problems Daughter Ikea     Asthma Son Scott     Pancreatic cancer Other      Liver cancer Other      Suicide Neg Hx      Ovarian cancer Neg Hx      Breast cancer Neg Hx       Current Medications[1]    Review of Systems   Constitutional:  Negative for activity change and unexpected weight change.   HENT:  Negative for hearing loss, rhinorrhea and trouble swallowing.    Eyes:  Negative for discharge and visual disturbance.   Respiratory:  Negative for chest tightness and wheezing.    Cardiovascular:  Negative for chest pain and palpitations.   Gastrointestinal:  Negative for blood in stool, constipation, diarrhea and vomiting.   Endocrine: Negative for polydipsia and polyuria.   Genitourinary:  Negative for difficulty urinating, dysuria, hematuria and menstrual problem.   Musculoskeletal:  Positive for arthralgias and joint swelling. Negative for neck pain.   Neurological:  Positive for headaches. Negative for weakness.   Psychiatric/Behavioral:  Negative for confusion and dysphoric mood.        Objective:   /81   Pulse 95   Ht 5' 7" (1.702 m)   Wt 113 kg (249 lb 1.9 oz)   LMP 07/11/2012 Comment: partial   SpO2 100%   BMI 39.02 kg/m²      Physical Exam  Constitutional:       General: She is not in acute distress.     Appearance: She is well-developed.   HENT:      Head: Normocephalic.   Pulmonary:      Effort: Pulmonary effort is normal. No respiratory distress.   Neurological:      Mental Status: She is alert and oriented to person, place, and time.   Psychiatric:         Behavior: Behavior normal.         Physical " Exam              @resultssec@  Assessment & Plan   Assessment & Plan    IMPRESSION:   Considered switching amlodipine to chlorthalidone to address persistent edema.   Evaluated urinary symptoms, considering potential causes such as dehydration or UTI.   Assessed renal function, noting improvement in renal numbers since discontinuation of naproxen.    PLAN SUMMARY:   Ordered potassium level check in 2 weeks   Discontinued topical estradiol cream   Referred to urogynecologist for urinary symptoms   Prescribed chlorthalidone for hypertension and swelling, to be taken in morning   Prescribed naproxen as needed for pain management   Ordered urinalysis within next few days at [place]   Continue furosemide 20 mg as needed for fluid management   Discontinued amlodipine   Follow-up appointment in 2 weeks to review lab results    ESSENTIAL HYPERTENSION:   Discontinued amlodipine which may have contributed to the patient's swelling.   Prescribed chlorthalidone for blood pressure management to be taken in the morning.   Discussed potential side effects of chlorthalidone, including increased urination and possible potassium loss.   Ordered labs to check potassium levels in 2 weeks after initiating chlorthalidone.    EDEMA:   Jaki experiences swelling frequently, especially after wearing uncomfortable shoes.   This may be related to previous amlodipine use.   Chlorthalidone (prescribed for hypertension) will also help manage swelling.   Recommend continuing furosemide 20 mg as needed for additional fluid management.    MIXED INCONTINENCE:   Jaki experiences urgency and leakage, especially when laughing or coughing, and reports strong, orange-colored urine with difficulty holding bladder.   Discontinued topical estradiol cream.   Referred to a urogynecologist for further evaluation of urinary urgency and leakage issues.    ABNORMAL URINE FINDINGS:   Jaki reports strong, orange-colored urine which may  be due to insufficient fluid intake.   Recommend increasing fluid intake to address concentrated urine.   Ordered urinalysis to be completed within the next few days at [facility] location to check for possible bladder infection.    CHRONIC PAIN MANAGEMENT:   Jaki reports needing pain management and has been off naproxen for a long time.   Kidney function has improved since stopping naproxen.   Prescribed naproxen as needed for pain management with instructions to use sparingly, given improved renal function.    FOLLOW-UP:   Scheduled follow-up visit in 2 weeks to review lab results.         Problem List Items Addressed This Visit          Neuro    Chronic pain    Relevant Medications    HYDROcodone-acetaminophen (NORCO) 5-325 mg per tablet       Psychiatric    Chronic narcotic dependence    Relevant Medications    HYDROcodone-acetaminophen (NORCO) 5-325 mg per tablet       Cardiac/Vascular    Essential hypertension (Chronic)    Relevant Medications    chlorthalidone (HYGROTEN) 25 MG Tab    Other Relevant Orders    Comprehensive Metabolic Panel (Completed)     Other Visit Diagnoses         Urinary urgency    -  Primary    Relevant Medications    chlorthalidone (HYGROTEN) 25 MG Tab    Other Relevant Orders    Comprehensive Metabolic Panel (Completed)    Microalbumin/Creatinine Ratio, Urine (Completed)    Urinalysis, Reflex to Urine Culture Urine, Clean Catch (Completed)              Immunizations Administered on Date of Encounter - 7/9/2025       No immunizations on file.             No follow-ups on file.    This note was generated with the assistance of ambient listening technology. Verbal consent was obtained by the patient and accompanying visitor(s) for the recording of patient appointment to facilitate this note. I attest to having reviewed and edited the generated note for accuracy, though some syntax or spelling errors may persist. Please contact the author of this note for any  clarification.      Disclaimer:  This note may have been prepared using voice recognition software, it may have not been extensively proofed, as such there could be errors within the text such as sound alike errors.       [1]   Current Outpatient Medications:     buPROPion (WELLBUTRIN XL) 150 MG TB24 tablet, Take 1 tablet (150 mg total) by mouth once daily., Disp: 270 tablet, Rfl: 3    busPIRone (BUSPAR) 15 MG tablet, Take 1 tablet (15 mg total) by mouth once daily., Disp: 90 tablet, Rfl: 3    butalbital-acetaminophen-caffeine -40 mg (FIORICET, ESGIC) -40 mg per tablet, Take 1 tablet by mouth every 6 (six) hours as needed for Headaches., Disp: 40 tablet, Rfl: 0    chlorthalidone (HYGROTEN) 25 MG Tab, Take 1 tablet (25 mg total) by mouth once daily., Disp: 30 tablet, Rfl: 11    cholecalciferol, vitamin D3, (VITAMIN D3) 25 mcg (1,000 unit) capsule, Take 1 capsule (1,000 Units total) by mouth once daily. (Patient not taking: Reported on 3/10/2025), Disp: , Rfl:     cycloSPORINE (RESTASIS) 0.05 % ophthalmic emulsion, Place 1 drop into both eyes 2 (two) times daily., Disp: 180 each, Rfl: 3    diazePAM (VALIUM) 2 MG tablet, Take 1 tablet (2 mg total) by mouth daily as needed (Severe anxiety/Panic). 15 tablets to last 3 months, Disp: 15 tablet, Rfl: 0    docusate sodium (COLACE) 100 MG capsule, Take 1 capsule (100 mg total) by mouth 2 (two) times daily as needed for Constipation., Disp: 60 capsule, Rfl: 0    doxepin (SINEQUAN) 75 MG capsule, TAKE 2 CAPSULES(150 MG) BY MOUTH EVERY EVENING, Disp: 180 capsule, Rfl: 3    estradioL (VIVELLE-DOT) 0.025 mg/24 hr, Place 1 patch on lower abdomen twice a week (Sunday and Thursday), Disp: 8 patch, Rfl: 12    ferrous sulfate, dried (SLOW FE) 160 mg (50 mg iron) TbSR, Take 1 tablet (160 mg total) by mouth once daily., Disp: 90 tablet, Rfl: 2    fluticasone propionate (FLONASE) 50 mcg/actuation nasal spray, TAKE 1-2 SPRAYS INTO EACH NOSTRIL EVERY DAY. USE SALINE WASH PRIOR  TO USE. DISCONTINUE AFTER 21 DAYS IF NO IMPROVEMENT FOR 10 DAYS, Disp: 16 g, Rfl: 1    furosemide (LASIX) 20 MG tablet, Take 1 tablet (20 mg total) by mouth once daily., Disp: 90 tablet, Rfl: 3    gabapentin (NEURONTIN) 400 MG capsule, Take 1 capsule (400 mg total) by mouth 3 (three) times daily., Disp: 90 capsule, Rfl: 2    HYDROcodone-acetaminophen (NORCO) 5-325 mg per tablet, Take 1 tablet by mouth every 24 hours as needed for Pain. Quantity medically necessary, Disp: 30 tablet, Rfl: 0    hydroxychloroquine (PLAQUENIL) 200 mg tablet, TAKE 1 TABLET BY MOUTH TWICE DAILY, Disp: 60 tablet, Rfl: 3    irbesartan (AVAPRO) 300 MG tablet, TAKE 1 TABLET(300 MG) BY MOUTH EVERY EVENING, Disp: 90 tablet, Rfl: 3    lurasidone (LATUDA) 60 mg Tab tablet, TAKE 1 TABLET(60 MG) BY MOUTH EVERY DAY, Disp: 90 tablet, Rfl: 3    naproxen (NAPROSYN) 500 MG tablet, Take 1 tablet (500 mg total) by mouth 2 (two) times daily., Disp: 20 tablet, Rfl: 2    NARCAN 4 mg/actuation Spry, , Disp: , Rfl:     nystatin (MYCOSTATIN) 100,000 unit/mL suspension, Take 4 mLs (400,000 Units total) by mouth 4 (four) times daily., Disp: 480 mL, Rfl: 5    omeprazole (PRILOSEC) 40 MG capsule, Take 1 capsule (40 mg total) by mouth once daily., Disp: 90 capsule, Rfl: 3    pilocarpine (SALAGEN) 5 MG Tab, Take 1 tablet (5 mg total) by mouth 4 (four) times daily., Disp: 360 tablet, Rfl: 1    sertraline (ZOLOFT) 100 MG tablet, TAKE 1 TABLET(100 MG) BY MOUTH DAILY, Disp: 30 tablet, Rfl: 11    tiZANidine (ZANAFLEX) 4 MG tablet, Take 1 tablet (4 mg total) by mouth 2 (two) times daily as needed (2 tablets twice daily as needed)., Disp: 60 tablet, Rfl: 2    ubrogepant (UBRELVY) 100 mg tablet, Take 1 tablet (100 mg total) by mouth as needed for Migraine. If symptoms persist or return, may repeat dose after 2 hours. Maximum: 200 mg per 24 hours, Disp: 30 tablet, Rfl: 2    VENOFER 100 mg iron/5 mL injection, , Disp: , Rfl:

## 2025-07-14 ENCOUNTER — PATIENT MESSAGE (OUTPATIENT)
Dept: INTERNAL MEDICINE | Facility: CLINIC | Age: 55
End: 2025-07-14
Payer: MEDICARE

## 2025-07-14 RX ORDER — NITROFURANTOIN 25; 75 MG/1; MG/1
100 CAPSULE ORAL 2 TIMES DAILY
Qty: 10 CAPSULE | Refills: 0 | Status: SHIPPED | OUTPATIENT
Start: 2025-07-14 | End: 2025-07-19

## 2025-07-14 NOTE — TELEPHONE ENCOUNTER
Patient is following up on recent 7/11/25 lab results. Please advise. Thank you!    LOV with Jose Carpenter DO , 7/9/2025

## 2025-07-15 ENCOUNTER — PATIENT MESSAGE (OUTPATIENT)
Dept: PSYCHIATRY | Facility: CLINIC | Age: 55
End: 2025-07-15
Payer: MEDICARE

## 2025-07-15 RX ORDER — FLUCONAZOLE 150 MG/1
150 TABLET ORAL DAILY
Qty: 1 TABLET | Refills: 0 | Status: SHIPPED | OUTPATIENT
Start: 2025-07-15 | End: 2025-07-16

## 2025-07-17 ENCOUNTER — TELEPHONE (OUTPATIENT)
Dept: RHEUMATOLOGY | Facility: CLINIC | Age: 55
End: 2025-07-17
Payer: MEDICARE

## 2025-07-17 NOTE — TELEPHONE ENCOUNTER
I called patient and had to reschedule patient due to Jade booking out. I rescheduled patient from 07/29@ 8:30 to 08/13 @ 10:30. Patient verbalized confirmation on appointment.

## 2025-07-25 ENCOUNTER — PATIENT MESSAGE (OUTPATIENT)
Dept: INTERNAL MEDICINE | Facility: CLINIC | Age: 55
End: 2025-07-25
Payer: MEDICARE

## 2025-07-30 DIAGNOSIS — G44.009 CLUSTER HEADACHE, NOT INTRACTABLE, UNSPECIFIED CHRONICITY PATTERN: ICD-10-CM

## 2025-07-30 RX ORDER — BUTALBITAL, ACETAMINOPHEN AND CAFFEINE 50; 325; 40 MG/1; MG/1; MG/1
1 TABLET ORAL EVERY 6 HOURS PRN
Qty: 40 TABLET | Refills: 0 | Status: SHIPPED | OUTPATIENT
Start: 2025-07-30

## 2025-07-30 NOTE — TELEPHONE ENCOUNTER
Care Due:                  Date            Visit Type   Department     Provider  --------------------------------------------------------------------------------                                ESTABLISHED                              PATIENT -    [facility]  Last Visit: 07-      VIRTUAL      MEDICINE       Jose Carpenter                              ESTABLISHED                              PATIENT -    [facility]  Next Visit: 08-      VIRTUAL      MEDICINE       Jose Carpenter                                                            Last  Test          Frequency    Reason                     Performed    Due Date  --------------------------------------------------------------------------------    Vitamin D...  12 months..  cholecalciferol,.........  10-   10-    [facility] Embedded Care Due Messages. Reference number: [id number].   7/30/2025 9:19:35 AM CDT

## 2025-07-30 NOTE — TELEPHONE ENCOUNTER
Refill Encounter    PCP Visits: Recent Visits  Date Type Provider Dept   07/09/25 Office Visit Weeks, Jose ALONSO, DO [facility]   04/28/25 Office Visit Weeks, Jose ALONSO, DO [facility]   04/16/25 Office Visit Demarcus Lee MD [facility]   03/10/25 Office Visit Weeks, Jose ALONSO, DO [facility]   02/13/25 Office Visit Andrea Barlow NP [facility]   01/13/25 Office Visit Weeks, Jose ALONSO, DO [facility]   10/11/24 Office Visit Weeks, Jose ALONSO, DO [facility]   09/06/24 Office Visit Weeks, Jose ALONSO, DO [facility]   08/27/24 Office Visit Irma Gamboa MD [facility]   Showing recent visits within past 360 days and meeting all other requirements  Future Appointments  Date Type Provider Dept   08/15/25 Appointment Weeks, Jose ALONSO, DO [facility] Medicine   08/29/25 Appointment Weeks, Jose ALONSO,  [facility]   Showing future appointments within next 720 days and meeting all other requirements      Last 3 Blood Pressure:   BP Readings from Last 3 Encounters:   07/09/25 137/81   05/06/25 137/81   04/30/25 138/81     Preferred Pharmacy:   [id number] [address] [facility] [address]  [facility] [address]  [facility] [address]  Phone: [phone] Fax: [phone]    Requested RX:  Requested Prescriptions     Pending Prescriptions Disp Refills    butalbital-acetaminophen-caffeine -40 mg (FIORICET, ESGIC) -40 mg per tablet [Pharmacy Med Name: BUT/ACETAMINOPHEN/CAFF -40 TB] 40 tablet 0     Sig: TAKE 1 TABLET BY MOUTH EVERY 6 HOURS AS NEEDED FOR HEADACHE      RX Route: Normal

## 2025-08-05 ENCOUNTER — PATIENT MESSAGE (OUTPATIENT)
Dept: INTERNAL MEDICINE | Facility: CLINIC | Age: 55
End: 2025-08-05
Payer: MEDICARE

## 2025-08-05 DIAGNOSIS — B37.9 YEAST INFECTION: Primary | ICD-10-CM

## 2025-08-05 RX ORDER — FLUCONAZOLE 150 MG/1
150 TABLET ORAL DAILY
Qty: 1 TABLET | Refills: 0 | Status: SHIPPED | OUTPATIENT
Start: 2025-08-05 | End: 2025-08-06

## 2025-08-05 NOTE — TELEPHONE ENCOUNTER
Patient recently completed treatment of Macrobid for UTI. She has completed 1 dose of Diflucan for yeast infection & is requesting an additional dose to ensure infection is treated.     Rx pended for review if appropriate. Please advise if additional visit is needed, I'd be happy to assist with scheduling. Thank you!    LOV with Jose Carpenter DO , 7/9/2025

## 2025-08-06 ENCOUNTER — TELEPHONE (OUTPATIENT)
Dept: INTERNAL MEDICINE | Facility: CLINIC | Age: 55
End: 2025-08-06
Payer: MEDICARE

## 2025-08-06 ENCOUNTER — OFFICE VISIT (OUTPATIENT)
Dept: INTERNAL MEDICINE | Facility: CLINIC | Age: 55
End: 2025-08-06
Payer: MEDICARE

## 2025-08-06 VITALS
HEART RATE: 95 BPM | BODY MASS INDEX: 39.1 KG/M2 | SYSTOLIC BLOOD PRESSURE: 137 MMHG | HEIGHT: 67 IN | WEIGHT: 249.13 LBS | DIASTOLIC BLOOD PRESSURE: 81 MMHG | OXYGEN SATURATION: 100 %

## 2025-08-06 DIAGNOSIS — G89.4 CHRONIC PAIN SYNDROME: ICD-10-CM

## 2025-08-06 DIAGNOSIS — G89.4 CHRONIC PAIN SYNDROME: Primary | ICD-10-CM

## 2025-08-06 DIAGNOSIS — M79.7 FIBROMYALGIA: ICD-10-CM

## 2025-08-06 DIAGNOSIS — I10 ESSENTIAL HYPERTENSION: Chronic | ICD-10-CM

## 2025-08-06 DIAGNOSIS — N18.32 STAGE 3B CHRONIC KIDNEY DISEASE: ICD-10-CM

## 2025-08-06 DIAGNOSIS — F11.20 CHRONIC NARCOTIC DEPENDENCE: ICD-10-CM

## 2025-08-06 PROCEDURE — 98006 SYNCH AUDIO-VIDEO EST MOD 30: CPT | Mod: HCNC,95,, | Performed by: FAMILY MEDICINE

## 2025-08-06 PROCEDURE — 3079F DIAST BP 80-89 MM HG: CPT | Mod: CPTII,HCNC,95, | Performed by: FAMILY MEDICINE

## 2025-08-06 PROCEDURE — 3066F NEPHROPATHY DOC TX: CPT | Mod: CPTII,HCNC,95, | Performed by: FAMILY MEDICINE

## 2025-08-06 PROCEDURE — 3075F SYST BP GE 130 - 139MM HG: CPT | Mod: CPTII,HCNC,95, | Performed by: FAMILY MEDICINE

## 2025-08-06 PROCEDURE — 3061F NEG MICROALBUMINURIA REV: CPT | Mod: CPTII,HCNC,95, | Performed by: FAMILY MEDICINE

## 2025-08-06 PROCEDURE — 4010F ACE/ARB THERAPY RXD/TAKEN: CPT | Mod: CPTII,HCNC,95, | Performed by: FAMILY MEDICINE

## 2025-08-06 RX ORDER — HYDROCODONE BITARTRATE AND ACETAMINOPHEN 5; 325 MG/1; MG/1
1 TABLET ORAL
Qty: 30 TABLET | Refills: 0 | Status: SHIPPED | OUTPATIENT
Start: 2025-08-06

## 2025-08-06 NOTE — PROGRESS NOTES
Subjective:       Patient ID: Jaki Bajwa is a 54 y.o. female.    The patient location is: [place]  The chief complaint leading to consultation is:   Chief Complaint   Patient presents with    Follow-up     Pt want to go over labs and iron level          Visit type: audiovisual    Face to Face time with patient: 12 minutes of total time spent on the encounter, which includes face to face time and non-face to face time preparing to see the patient (eg, review of tests), Obtaining and/or reviewing separately obtained history, Documenting clinical information in the electronic or other health record, Independently interpreting results (not separately reported) and communicating results to the patient/family/caregiver, or Care coordination (not separately reported).       Each patient to whom he or she provides medical services by telemedicine is:  (1) informed of the relationship between the physician and patient and the respective role of any other health care provider with respect to management of the patient; and (2) notified that he or she may decline to receive medical services by telemedicine and may withdraw from such care at any time.    Notes:     Follow-up  Pertinent negatives include no abdominal pain, chest pain, fever, headaches, numbness or weakness.   Back Pain  This is a chronic problem. The current episode started today. The problem occurs daily. The problem has been waxing and waning since onset. The pain is present in the sacro-iliac and thoracic spine. The quality of the pain is described as cramping. The pain radiates to the left foot, left knee, right foot and right knee. The pain is at a severity of 5/10. The pain is moderate. The pain is The same all the time. The symptoms are aggravated by bending, position and twisting. Stiffness is present In the morning and at night. Pertinent negatives include no abdominal pain, bladder incontinence, bowel incontinence, chest pain, dysuria, fever,  headaches, leg pain, numbness, paresis, paresthesias, pelvic pain, perianal numbness, tingling, weakness or weight loss. Risk factors include menopause, obesity and poor posture. The treatment provided significant relief.     History of Present Illness    CHIEF COMPLAINT:  Jaki presents today for routine follow up.    GENITOURINARY:  She reports a recent UTI in July that has resolved. Her urine remains dark but without odor. She denies current urinary symptoms, fever, nausea, or vomiting. She has a history of a severe UTI requiring hospitalization with IV antibiotics.    GASTROINTESTINAL:  She underwent colonoscopy and EGD in April during which a lesion was identified in a difficult-to-access location. The lesion was challenging to remove and she had it extracted during the procedure.    EARS/NOSE/THROAT:  She reports significant anxiety regarding upcoming air travel due to history of severe ear pain during flights. Her last flight was 17 years ago during which she experienced intense ear discomfort. She is concerned about potential ear pain during an anticipated two-hour flight and seeks preventative interventions.    MEDICATIONS:  She accidentally discarded her chlorthalidone blood pressure medication while disposing of a bag's contents. She anticipates being able to obtain replacement medication around the 11th of the current month. She recently received fluconazole and felt the first dose was partially effective but believed a second dose might be necessary due to persistent irritation.      ROS:  General: -fever, -chills, -fatigue, -weight gain, -weight loss  Eyes: -vision changes, -redness, -discharge  ENT: +ear pain, -nasal congestion, -sore throat  Cardiovascular: -chest pain, -palpitations, -lower extremity edema  Respiratory: -cough, -shortness of breath  Gastrointestinal: +abdominal pain, -nausea, -vomiting, -diarrhea, -constipation, -blood in stool  Genitourinary: -dysuria, -hematuria, -frequency,  "+painful urination, +abnormal urine appearance, +urine changes  Musculoskeletal: -joint pain, -muscle pain  Skin: -rash, -lesion  Neurological: -headache, -dizziness, -numbness, -tingling  Psychiatric: -anxiety, -depression, -sleep difficulty           All of your core healthy metrics are met.      Social History     Social History Narrative    Not on file       Family History   Problem Relation Name Age of Onset    Heart disease Mother Amalia Bajwa     Hypertension Mother Amalia Bajwa     Cancer Father Demarcus Lawtons sr.         colon ca    Colon cancer Father Demarcus Lawtons sr.     Depression Father Demarucs Lawtons sr.     Schizophrenia Father Demarcus Shanks sr.     Anxiety disorder Father Demarcus Shanks sr.     Arthritis Father Demarcus Shanks sr.     Mental illness Father Demarcus Lawtons sr.     Liver cancer Sister Fifi     Cancer Sister Fifi     Depression Sister Fifi     Anxiety disorder Sister Fifi     Heart attack Sister Fifi     COPD Sister Fifi     Cancer Sister Madeline         throat and colon    Depression Sister Madeline     Miscarriages / Stillbirths Sister Madeline     Pancreatic cancer Brother Demarcus bajwa     Depression Brother Demarcus bajwa     Alcohol abuse Brother Demarcus bajwa     No Known Problems Daughter Ikea     Asthma Son Scott     Pancreatic cancer Other      Liver cancer Other      Suicide Neg Hx      Ovarian cancer Neg Hx      Breast cancer Neg Hx       Current Medications[1]    Review of Systems   Constitutional:  Negative for fever and weight loss.   Cardiovascular:  Negative for chest pain.   Gastrointestinal:  Negative for abdominal pain and bowel incontinence.   Genitourinary:  Negative for bladder incontinence, dysuria, hematuria and pelvic pain.   Musculoskeletal:  Positive for back pain.   Neurological:  Negative for tingling, weakness, numbness, headaches and paresthesias.       Objective:   /81 (Patient Position: Sitting)   Pulse 95   Ht 5' 7" (1.702 m)   Wt 113 kg (249 lb 1.9 oz)   " LMP 07/11/2012 Comment: partial   SpO2 100%   BMI 39.02 kg/m²      Physical Exam  Constitutional:       General: She is not in acute distress.     Appearance: She is well-developed.   HENT:      Head: Normocephalic.   Pulmonary:      Effort: Pulmonary effort is normal. No respiratory distress.   Neurological:      Mental Status: She is alert and oriented to person, place, and time.   Psychiatric:         Behavior: Behavior normal.         Physical Exam              @resultssec@  Assessment & Plan   Assessment & Plan    IMPRESSION:   Assessed recent UTI treatment, noting symptoms have improved but some irritation persists from yeast.   Reviewed recent lab results, including metabolic panel, confirming normal renal function.   Considered upcoming air travel and associated ear pain risk.   Started additional dose of fluconazole for persistent yeast infection symptoms.    PLAN SUMMARY:   Prescribed fluconazole for persistent symptoms   Prescribed Afrin nasal spray for ear pain during flights   Jaki to obtain blood pressure medication refill on the 11th   Follow-up in 3 months (early December)    HYPERTENSION:   Blood pressure is stable with no issues reported.   Changed antihypertensive medication from previous regimen to chlorthalidone.   Jaki accidentally discarded blood pressure medication but will be able to obtain a refill on the 11th of this month.    VULVOVAGINAL CANDIDIASIS:   Jaki reports feeling irritated and requested additional antifungal therapy.   Prescribed an additional dose of fluconazole for persistent symptoms.   Confirmed that fluconazole prescription was sent to pharmacy yesterday.    SITUATIONAL PHOBIA (FLIGHT-RELATED):   Jaki reports ear pain during flights and anxiety about flying due to past experiences.   Prescribed Afrin nasal spray to alleviate ear pain, to be administered 30 minutes before takeoff.    HISTORY OF URINARY TRACT INFECTION:   Urinary symptoms from July have  resolved.   Urine remains dark but less so than previously and without odor.   Educated patient that UTI and bladder infection are essentially the same condition.   Clarified that significant UTIs requiring hospitalization typically involve fever, nausea, and vomiting.    HISTORY OF DIGESTIVE SYSTEM PROCEDURES:   Jaki had a colonoscopy and EGD with polyp removal.   Reports abdominal pain which is currently being managed with metronidazole and analgesics.    PREVENTIVE CARE:   Discussed availability of flu vaccines, typically starting in August at pharmacies and later at medical offices.    FOLLOW-UP:   Follow up in about 3 months (early December).         Problem List Items Addressed This Visit          Neuro    Chronic pain - Primary       Cardiac/Vascular    Essential hypertension (Chronic)       Renal/    Stage 3b chronic kidney disease       Orthopedic    Fibromyalgia         Immunizations Administered on Date of Encounter - 8/6/2025       No immunizations on file.             No follow-ups on file.    This note was generated with the assistance of ambient listening technology. Verbal consent was obtained by the patient and accompanying visitor(s) for the recording of patient appointment to facilitate this note. I attest to having reviewed and edited the generated note for accuracy, though some syntax or spelling errors may persist. Please contact the author of this note for any clarification.      Disclaimer:  This note may have been prepared using voice recognition software, it may have not been extensively proofed, as such there could be errors within the text such as sound alike errors.       [1]   Current Outpatient Medications:     buPROPion (WELLBUTRIN XL) 150 MG TB24 tablet, Take 1 tablet (150 mg total) by mouth once daily., Disp: 270 tablet, Rfl: 3    busPIRone (BUSPAR) 15 MG tablet, Take 1 tablet (15 mg total) by mouth once daily., Disp: 90 tablet, Rfl: 3    butalbital-acetaminophen-caffeine  -40 mg (FIORICET, ESGIC) -40 mg per tablet, TAKE 1 TABLET BY MOUTH EVERY 6 HOURS AS NEEDED FOR HEADACHE, Disp: 40 tablet, Rfl: 0    chlorthalidone (HYGROTEN) 25 MG Tab, Take 1 tablet (25 mg total) by mouth once daily., Disp: 30 tablet, Rfl: 11    cholecalciferol, vitamin D3, (VITAMIN D3) 25 mcg (1,000 unit) capsule, Take 1 capsule (1,000 Units total) by mouth once daily. (Patient not taking: Reported on 3/10/2025), Disp: , Rfl:     cycloSPORINE (RESTASIS) 0.05 % ophthalmic emulsion, Place 1 drop into both eyes 2 (two) times daily., Disp: 180 each, Rfl: 3    diazePAM (VALIUM) 2 MG tablet, Take 1 tablet (2 mg total) by mouth daily as needed (Severe anxiety/Panic). 15 tablets to last 3 months, Disp: 15 tablet, Rfl: 0    docusate sodium (COLACE) 100 MG capsule, Take 1 capsule (100 mg total) by mouth 2 (two) times daily as needed for Constipation., Disp: 60 capsule, Rfl: 0    doxepin (SINEQUAN) 75 MG capsule, TAKE 2 CAPSULES(150 MG) BY MOUTH EVERY EVENING, Disp: 180 capsule, Rfl: 3    estradioL (VIVELLE-DOT) 0.025 mg/24 hr, Place 1 patch on lower abdomen twice a week (Sunday and Thursday), Disp: 8 patch, Rfl: 12    ferrous sulfate, dried (SLOW FE) 160 mg (50 mg iron) TbSR, Take 1 tablet (160 mg total) by mouth once daily., Disp: 90 tablet, Rfl: 2    fluconazole (DIFLUCAN) 150 MG Tab, Take 1 tablet (150 mg total) by mouth once daily. for 1 day, Disp: 1 tablet, Rfl: 0    fluticasone propionate (FLONASE) 50 mcg/actuation nasal spray, TAKE 1-2 SPRAYS INTO EACH NOSTRIL EVERY DAY. USE SALINE WASH PRIOR TO USE. DISCONTINUE AFTER 21 DAYS IF NO IMPROVEMENT FOR 10 DAYS, Disp: 16 g, Rfl: 1    furosemide (LASIX) 20 MG tablet, Take 1 tablet (20 mg total) by mouth once daily., Disp: 90 tablet, Rfl: 3    gabapentin (NEURONTIN) 400 MG capsule, Take 1 capsule (400 mg total) by mouth 3 (three) times daily., Disp: 90 capsule, Rfl: 2    HYDROcodone-acetaminophen (NORCO) 5-325 mg per tablet, Take 1 tablet by mouth every 24 hours  as needed for Pain. Quantity medically necessary, Disp: 30 tablet, Rfl: 0    hydroxychloroquine (PLAQUENIL) 200 mg tablet, TAKE 1 TABLET BY MOUTH TWICE DAILY, Disp: 60 tablet, Rfl: 3    irbesartan (AVAPRO) 300 MG tablet, TAKE 1 TABLET(300 MG) BY MOUTH EVERY EVENING, Disp: 90 tablet, Rfl: 3    lurasidone (LATUDA) 60 mg Tab tablet, TAKE 1 TABLET(60 MG) BY MOUTH EVERY DAY, Disp: 90 tablet, Rfl: 3    naproxen (NAPROSYN) 500 MG tablet, Take 1 tablet (500 mg total) by mouth 2 (two) times daily., Disp: 20 tablet, Rfl: 2    NARCAN 4 mg/actuation Spry, , Disp: , Rfl:     nystatin (MYCOSTATIN) 100,000 unit/mL suspension, Take 4 mLs (400,000 Units total) by mouth 4 (four) times daily., Disp: 480 mL, Rfl: 5    omeprazole (PRILOSEC) 40 MG capsule, Take 1 capsule (40 mg total) by mouth once daily., Disp: 90 capsule, Rfl: 3    pilocarpine (SALAGEN) 5 MG Tab, Take 1 tablet (5 mg total) by mouth 4 (four) times daily., Disp: 360 tablet, Rfl: 1    sertraline (ZOLOFT) 100 MG tablet, TAKE 1 TABLET(100 MG) BY MOUTH DAILY, Disp: 30 tablet, Rfl: 11    tiZANidine (ZANAFLEX) 4 MG tablet, Take 1 tablet (4 mg total) by mouth 2 (two) times daily as needed (2 tablets twice daily as needed)., Disp: 60 tablet, Rfl: 2    ubrogepant (UBRELVY) 100 mg tablet, Take 1 tablet (100 mg total) by mouth as needed for Migraine. If symptoms persist or return, may repeat dose after 2 hours. Maximum: 200 mg per 24 hours, Disp: 30 tablet, Rfl: 2    VENOFER 100 mg iron/5 mL injection, , Disp: , Rfl:

## 2025-08-06 NOTE — TELEPHONE ENCOUNTER
Refill Encounter  Allergies: Confirmed    PCP Visits:   Recent Visits  Date Type Provider Dept   07/09/25 Office Visit Weeks, Jose ALONSO,  [facility]   04/28/25 Office Visit Weeks, Jsoe ALONSO, DO [facility]   03/10/25 Office Visit Weeks, Jose ALONSO, DO [facility]   01/13/25 Office Visit Weeks, Jose ALONSO,  [facility]   10/11/24 Office Visit Weeks, Jose ALONSO, DO [facility]   09/06/24 Office Visit Weeks, Jose ALONSO,  [facility]   Showing recent visits within past 360 days and meeting all other requirements  Future Appointments  Date Type Provider Dept   08/29/25 Appointment Weeks, Jose ALONSO,  [facility]   Showing future appointments within next 720 days and meeting all other requirements     Last 3 Blood Pressure:   BP Readings from Last 3 Encounters:   07/09/25 137/81   05/06/25 137/81   04/30/25 138/81     Preferred Pharmacy:   [id number] [address] AT [facility] [address]  [facility] [address]  [facility] [address]  Phone: [phone] Fax: [phone]    Requested RX:  Requested Prescriptions     Pending Prescriptions Disp Refills    HYDROcodone-acetaminophen (NORCO) 5-325 mg per tablet 30 tablet 0     Sig: Take 1 tablet by mouth every 24 hours as needed for Pain. Quantity medically necessary      RX Route: Normal

## 2025-08-06 NOTE — TELEPHONE ENCOUNTER
Unable to retrieve patient chart and identify care due.  [facility] Embedded Care Due Messages. Reference number: [id number].   8/06/2025 8:28:54 AM CDT

## 2025-08-06 NOTE — TELEPHONE ENCOUNTER
Talk to pt     Copied from CRM #[id number]. Topic: General Inquiry - Patient Advice  >> Aug 6, 2025 11:05 AM Brianne wrote:  Type:  Needs Medical Advice    Who Called: pt   Would the patient rather a call back or a response via [employer]? Call back   Best Call Back Number: [phone]   Additional Information: pt requesting a call in regards to virtual appt today 08/06 pt states if it was cxl that is okay needs to if lab work is need since 08/15 would be 3/m appt

## 2025-08-07 ENCOUNTER — PATIENT MESSAGE (OUTPATIENT)
Dept: INTERNAL MEDICINE | Facility: CLINIC | Age: 55
End: 2025-08-07
Payer: MEDICARE

## 2025-08-07 ENCOUNTER — TELEPHONE (OUTPATIENT)
Dept: INTERNAL MEDICINE | Facility: CLINIC | Age: 55
End: 2025-08-07
Payer: MEDICARE

## 2025-08-07 NOTE — TELEPHONE ENCOUNTER
Copied from CRM #[id number]. Topic: Medications - Medication Authorization  >> Aug 7, 2025  9:12 AM Alberto wrote:  Type: Patient Call Back    Who called: Patient     What is the request in detail: Pt's pharmacy is needing approval or authorization so they can fill her medication ( HYDROcodone-acetaminophen (NORCO) 5-325 mg per tablet) early because she is going out of town. Please advise       Would the patient rather a call back or a response via [facility]?  Call     Best call back number: [phone]     Additional Information:     [id number] - [place], [place] - [address] AT [facility] [address]  [facility]  [facility] [address]  Phone: [phone] Fax: [phone]

## 2025-08-07 NOTE — TELEPHONE ENCOUNTER
Pt's pharmacy is needing approval or authorization so they can fill her medication ( HYDROcodone-acetaminophen (NORCO) 5-325 mg per tablet) early because she is going out of town.     Are you ok with this?

## 2025-08-07 NOTE — TELEPHONE ENCOUNTER
Spoke with pharmacist, authorization for early fill provided per Dr. Carpenter approval. Patient notified via separate [employer] message encounter.

## 2025-08-08 RX ORDER — NAPROXEN 500 MG/1
500 TABLET ORAL 2 TIMES DAILY
Qty: 20 TABLET | Refills: 2 | Status: SHIPPED | OUTPATIENT
Start: 2025-08-08

## 2025-08-08 NOTE — TELEPHONE ENCOUNTER
Refill Encounter  Allergies: Confirmed    PCP Visits:   Recent Visits  Date Type Provider Dept   08/06/25 Office Visit Weeks, Jose ALONSO, DO [facility]   07/09/25 Office Visit Weeks, Jose ALONSO, DO [facility]   04/28/25 Office Visit Weeks, Jose ALONSO, DO [facility]   03/10/25 Office Visit Weeks, Jose ALONSO, DO [facility]   01/13/25 Office Visit Weeks, Jose ALONSO, DO [facility]   10/11/24 Office Visit Weeks, Jose ALONSO, DO [facility]   09/06/24 Office Visit Weeks, Jose ALONSO, DO [facility]   Showing recent visits within past 360 days and meeting all other requirements  Future Appointments  Date Type Provider Dept   11/10/25 Appointment Weeks, Jose ALONSO,  [facility]   Showing future appointments within next 720 days and meeting all other requirements     Last 3 Blood Pressure:   BP Readings from Last 3 Encounters:   08/06/25 137/81   07/09/25 137/81   05/06/25 137/81     Preferred Pharmacy:   [facility][id number] [address] [facility] [address]  [facility] [address]  [facility] [address]  Phone: [phone] Fax: [phone]    Requested RX:  Requested Prescriptions     Pending Prescriptions Disp Refills    naproxen (NAPROSYN) 500 MG tablet [Pharmacy Med Name: NAPROXEN 500MG TABLETS] 20 tablet 2     Sig: TAKE 1 TABLET(500 MG) BY MOUTH TWICE DAILY      RX Route: Normal

## 2025-08-12 ENCOUNTER — OFFICE VISIT (OUTPATIENT)
Dept: PSYCHIATRY | Facility: CLINIC | Age: 55
End: 2025-08-12
Payer: MEDICARE

## 2025-08-12 DIAGNOSIS — F42.9 OBSESSIVE-COMPULSIVE DISORDER, UNSPECIFIED TYPE: ICD-10-CM

## 2025-08-12 DIAGNOSIS — F39 MOOD DISORDER: ICD-10-CM

## 2025-08-12 DIAGNOSIS — F40.01 PANIC DISORDER WITH AGORAPHOBIA: ICD-10-CM

## 2025-08-12 DIAGNOSIS — F99 INSOMNIA DUE TO OTHER MENTAL DISORDER: ICD-10-CM

## 2025-08-12 DIAGNOSIS — F41.1 GAD (GENERALIZED ANXIETY DISORDER): Primary | ICD-10-CM

## 2025-08-12 DIAGNOSIS — F40.10 SOCIAL ANXIETY DISORDER: ICD-10-CM

## 2025-08-12 DIAGNOSIS — F51.05 INSOMNIA DUE TO OTHER MENTAL DISORDER: ICD-10-CM

## 2025-08-12 PROCEDURE — 4010F ACE/ARB THERAPY RXD/TAKEN: CPT | Mod: CPTII,HCNC,95, | Performed by: INTERNAL MEDICINE

## 2025-08-12 PROCEDURE — 1159F MED LIST DOCD IN RCRD: CPT | Mod: CPTII,HCNC,95, | Performed by: INTERNAL MEDICINE

## 2025-08-12 PROCEDURE — G2211 COMPLEX E/M VISIT ADD ON: HCPCS | Mod: HCNC,95,, | Performed by: INTERNAL MEDICINE

## 2025-08-12 PROCEDURE — 3061F NEG MICROALBUMINURIA REV: CPT | Mod: CPTII,HCNC,95, | Performed by: INTERNAL MEDICINE

## 2025-08-12 PROCEDURE — 1160F RVW MEDS BY RX/DR IN RCRD: CPT | Mod: CPTII,HCNC,95, | Performed by: INTERNAL MEDICINE

## 2025-08-12 PROCEDURE — 3066F NEPHROPATHY DOC TX: CPT | Mod: CPTII,HCNC,95, | Performed by: INTERNAL MEDICINE

## 2025-08-12 PROCEDURE — 98006 SYNCH AUDIO-VIDEO EST MOD 30: CPT | Mod: HCNC,95,, | Performed by: INTERNAL MEDICINE

## 2025-08-12 RX ORDER — DIAZEPAM 2 MG/1
2 TABLET ORAL 2 TIMES DAILY PRN
Qty: 30 TABLET | Refills: 0 | Status: SHIPPED | OUTPATIENT
Start: 2025-08-12 | End: 2025-09-11

## 2025-08-12 RX ORDER — SERTRALINE HYDROCHLORIDE 100 MG/1
100 TABLET, FILM COATED ORAL DAILY
Qty: 90 TABLET | Refills: 3 | Status: SHIPPED | OUTPATIENT
Start: 2025-08-12

## 2025-08-13 DIAGNOSIS — M35.00 SJOGREN'S SYNDROME, WITH UNSPECIFIED ORGAN INVOLVEMENT: ICD-10-CM

## 2025-08-13 RX ORDER — HYDROXYCHLOROQUINE SULFATE 200 MG/1
TABLET, FILM COATED ORAL
Qty: 60 TABLET | Refills: 3 | Status: SHIPPED | OUTPATIENT
Start: 2025-08-13

## 2025-08-18 ENCOUNTER — PATIENT MESSAGE (OUTPATIENT)
Dept: INTERNAL MEDICINE | Facility: CLINIC | Age: 55
End: 2025-08-18
Payer: MEDICARE

## 2025-08-18 ENCOUNTER — TELEPHONE (OUTPATIENT)
Dept: UROLOGY | Facility: CLINIC | Age: 55
End: 2025-08-18
Payer: MEDICARE

## 2025-08-19 ENCOUNTER — OFFICE VISIT (OUTPATIENT)
Dept: UROLOGY | Facility: CLINIC | Age: 55
End: 2025-08-19
Payer: MEDICARE

## 2025-08-19 ENCOUNTER — TELEPHONE (OUTPATIENT)
Dept: PSYCHIATRY | Facility: CLINIC | Age: 55
End: 2025-08-19
Payer: MEDICARE

## 2025-08-19 ENCOUNTER — PATIENT MESSAGE (OUTPATIENT)
Dept: PSYCHIATRY | Facility: CLINIC | Age: 55
End: 2025-08-19
Payer: MEDICARE

## 2025-08-19 DIAGNOSIS — R39.9 UTI SYMPTOMS: Primary | ICD-10-CM

## 2025-08-19 DIAGNOSIS — R39.15 URINARY URGENCY: ICD-10-CM

## 2025-08-19 DIAGNOSIS — M79.7 FIBROMYALGIA: ICD-10-CM

## 2025-08-19 DIAGNOSIS — R35.0 URINARY FREQUENCY: ICD-10-CM

## 2025-08-19 RX ORDER — CEPHALEXIN 500 MG/1
500 CAPSULE ORAL EVERY 8 HOURS
Qty: 21 CAPSULE | Refills: 0 | Status: SHIPPED | OUTPATIENT
Start: 2025-08-19 | End: 2025-08-26

## 2025-08-19 RX ORDER — PHENAZOPYRIDINE HYDROCHLORIDE 200 MG/1
200 TABLET, FILM COATED ORAL 3 TIMES DAILY PRN
Qty: 30 TABLET | Refills: 0 | Status: SHIPPED | OUTPATIENT
Start: 2025-08-19 | End: 2025-08-29

## 2025-08-19 RX ORDER — FLUCONAZOLE 150 MG/1
150 TABLET ORAL
Qty: 3 TABLET | Refills: 0 | Status: SHIPPED | OUTPATIENT
Start: 2025-08-19

## 2025-08-20 ENCOUNTER — OFFICE VISIT (OUTPATIENT)
Dept: HEMATOLOGY/ONCOLOGY | Facility: CLINIC | Age: 55
End: 2025-08-20
Payer: MEDICARE

## 2025-08-20 ENCOUNTER — LAB VISIT (OUTPATIENT)
Dept: LAB | Facility: HOSPITAL | Age: 55
End: 2025-08-20
Payer: MEDICARE

## 2025-08-20 VITALS
DIASTOLIC BLOOD PRESSURE: 61 MMHG | HEART RATE: 96 BPM | RESPIRATION RATE: 18 BRPM | WEIGHT: 257.25 LBS | OXYGEN SATURATION: 96 % | BODY MASS INDEX: 40.38 KG/M2 | SYSTOLIC BLOOD PRESSURE: 111 MMHG | HEIGHT: 67 IN

## 2025-08-20 DIAGNOSIS — D64.9 ANEMIA OF UNKNOWN ETIOLOGY: ICD-10-CM

## 2025-08-20 DIAGNOSIS — D50.9 IRON DEFICIENCY ANEMIA, UNSPECIFIED IRON DEFICIENCY ANEMIA TYPE: Primary | ICD-10-CM

## 2025-08-20 DIAGNOSIS — D50.9 IRON DEFICIENCY ANEMIA, UNSPECIFIED IRON DEFICIENCY ANEMIA TYPE: ICD-10-CM

## 2025-08-20 DIAGNOSIS — M35.01 SJOGREN'S SYNDROME WITH KERATOCONJUNCTIVITIS SICCA: ICD-10-CM

## 2025-08-20 LAB
ABSOLUTE EOSINOPHIL (OHS): 0.33 K/UL
ABSOLUTE MONOCYTE (OHS): 0.77 K/UL (ref 0.3–1)
ABSOLUTE NEUTROPHIL COUNT (OHS): 4.71 K/UL (ref 1.8–7.7)
BACTERIA #/AREA URNS AUTO: ABNORMAL /HPF
BASOPHILS # BLD AUTO: 0.07 K/UL
BASOPHILS NFR BLD AUTO: 0.8 %
BILIRUB UR QL STRIP.AUTO: NEGATIVE
CLARITY UR: ABNORMAL
COLOR UR AUTO: YELLOW
CREAT UR-MCNC: 143 MG/DL (ref 15–325)
ERYTHROCYTE [DISTWIDTH] IN BLOOD BY AUTOMATED COUNT: 14.4 % (ref 11.5–14.5)
FERRITIN SERPL-MCNC: 40 NG/ML (ref 20–300)
GLUCOSE UR QL STRIP: NEGATIVE
HCT VFR BLD AUTO: 36.8 % (ref 37–48.5)
HGB BLD-MCNC: 10.9 GM/DL (ref 12–16)
HGB UR QL STRIP: ABNORMAL
HYALINE CASTS UR QL AUTO: 6 /LPF (ref 0–1)
IMM GRANULOCYTES # BLD AUTO: 0.02 K/UL (ref 0–0.04)
IMM GRANULOCYTES NFR BLD AUTO: 0.2 % (ref 0–0.5)
IRON SATN MFR SERPL: 17 % (ref 20–50)
IRON SERPL-MCNC: 57 UG/DL (ref 30–160)
KETONES UR QL STRIP: NEGATIVE
LEUKOCYTE ESTERASE UR QL STRIP: ABNORMAL
LYMPHOCYTES # BLD AUTO: 2.6 K/UL (ref 1–4.8)
MCH RBC QN AUTO: 25.2 PG (ref 27–31)
MCHC RBC AUTO-ENTMCNC: 29.6 G/DL (ref 32–36)
MCV RBC AUTO: 85 FL (ref 82–98)
MICROSCOPIC COMMENT: ABNORMAL
NITRITE UR QL STRIP: NEGATIVE
NUCLEATED RBC (/100WBC) (OHS): 0 /100 WBC
PH UR STRIP: 6 [PH]
PLATELET # BLD AUTO: 250 K/UL (ref 150–450)
PMV BLD AUTO: 12.4 FL (ref 9.2–12.9)
PROT UR QL STRIP: ABNORMAL
PROT UR-MCNC: 18 MG/DL
PROT/CREAT UR: 0.13 MG/G{CREAT}
RBC # BLD AUTO: 4.32 M/UL (ref 4–5.4)
RBC #/AREA URNS AUTO: 16 /HPF (ref 0–4)
RELATIVE EOSINOPHIL (OHS): 3.9 %
RELATIVE LYMPHOCYTE (OHS): 30.6 % (ref 18–48)
RELATIVE MONOCYTE (OHS): 9.1 % (ref 4–15)
RELATIVE NEUTROPHIL (OHS): 55.4 % (ref 38–73)
SP GR UR STRIP: 1.02
SQUAMOUS #/AREA URNS AUTO: 2 /HPF
TIBC SERPL-MCNC: 334 UG/DL (ref 250–450)
TRANSFERRIN SERPL-MCNC: 226 MG/DL (ref 200–375)
UROBILINOGEN UR STRIP-ACNC: NEGATIVE EU/DL
WBC # BLD AUTO: 8.5 K/UL (ref 3.9–12.7)
WBC #/AREA URNS AUTO: >100 /HPF (ref 0–5)

## 2025-08-20 PROCEDURE — 36415 COLL VENOUS BLD VENIPUNCTURE: CPT | Mod: HCNC

## 2025-08-20 PROCEDURE — 82570 ASSAY OF URINE CREATININE: CPT | Mod: HCNC

## 2025-08-20 PROCEDURE — 81003 URINALYSIS AUTO W/O SCOPE: CPT | Mod: HCNC

## 2025-08-20 PROCEDURE — 99999 PR PBB SHADOW E&M-EST. PATIENT-LVL V: CPT | Mod: PBBFAC,HCNC,,

## 2025-08-20 PROCEDURE — 85025 COMPLETE CBC W/AUTO DIFF WBC: CPT | Mod: HCNC

## 2025-08-20 PROCEDURE — 83540 ASSAY OF IRON: CPT | Mod: HCNC

## 2025-08-20 PROCEDURE — 82728 ASSAY OF FERRITIN: CPT | Mod: HCNC

## 2025-08-20 RX ORDER — ESTRADIOL 0.03 MG/D
1 PATCH TRANSDERMAL
COMMUNITY
Start: 2025-07-02

## 2025-08-20 RX ORDER — TIZANIDINE 4 MG/1
4 TABLET ORAL 2 TIMES DAILY PRN
Qty: 60 TABLET | Refills: 2 | Status: SHIPPED | OUTPATIENT
Start: 2025-08-20

## 2025-08-20 RX ORDER — ESTRADIOL 0.1 MG/G
CREAM VAGINAL
COMMUNITY
Start: 2025-07-31

## 2025-08-21 ENCOUNTER — OFFICE VISIT (OUTPATIENT)
Dept: PSYCHIATRY | Facility: CLINIC | Age: 55
End: 2025-08-21
Payer: MEDICARE

## 2025-08-21 ENCOUNTER — TELEPHONE (OUTPATIENT)
Dept: GASTROENTEROLOGY | Facility: CLINIC | Age: 55
End: 2025-08-21
Payer: MEDICARE

## 2025-08-21 DIAGNOSIS — F41.1 GENERALIZED ANXIETY DISORDER WITH PANIC ATTACKS: Primary | ICD-10-CM

## 2025-08-21 DIAGNOSIS — F33.0 MDD (MAJOR DEPRESSIVE DISORDER), RECURRENT EPISODE, MILD: ICD-10-CM

## 2025-08-21 DIAGNOSIS — F41.0 GENERALIZED ANXIETY DISORDER WITH PANIC ATTACKS: Primary | ICD-10-CM

## 2025-08-21 PROCEDURE — 90791 PSYCH DIAGNOSTIC EVALUATION: CPT | Mod: HCNC,95,, | Performed by: PSYCHOLOGIST

## 2025-08-21 PROCEDURE — 3066F NEPHROPATHY DOC TX: CPT | Mod: CPTII,HCNC,95, | Performed by: PSYCHOLOGIST

## 2025-08-21 PROCEDURE — 4010F ACE/ARB THERAPY RXD/TAKEN: CPT | Mod: CPTII,HCNC,95, | Performed by: PSYCHOLOGIST

## 2025-08-21 PROCEDURE — 3061F NEG MICROALBUMINURIA REV: CPT | Mod: CPTII,HCNC,95, | Performed by: PSYCHOLOGIST

## 2025-08-25 DIAGNOSIS — M79.7 FIBROMYALGIA: ICD-10-CM

## 2025-08-25 RX ORDER — GABAPENTIN 400 MG/1
400 CAPSULE ORAL 3 TIMES DAILY
Qty: 90 CAPSULE | Refills: 2 | Status: SHIPPED | OUTPATIENT
Start: 2025-08-25

## 2025-08-26 ENCOUNTER — PATIENT MESSAGE (OUTPATIENT)
Dept: PSYCHIATRY | Facility: CLINIC | Age: 55
End: 2025-08-26
Payer: MEDICARE

## 2025-08-26 ENCOUNTER — TELEPHONE (OUTPATIENT)
Dept: PSYCHIATRY | Facility: CLINIC | Age: 55
End: 2025-08-26
Payer: MEDICARE

## 2025-09-02 DIAGNOSIS — G89.4 CHRONIC PAIN SYNDROME: ICD-10-CM

## 2025-09-02 DIAGNOSIS — F11.20 CHRONIC NARCOTIC DEPENDENCE: ICD-10-CM

## 2025-09-02 DIAGNOSIS — G44.009 CLUSTER HEADACHE, NOT INTRACTABLE, UNSPECIFIED CHRONICITY PATTERN: ICD-10-CM

## 2025-09-03 ENCOUNTER — OFFICE VISIT (OUTPATIENT)
Dept: INTERNAL MEDICINE | Facility: CLINIC | Age: 55
End: 2025-09-03
Payer: MEDICARE

## 2025-09-03 DIAGNOSIS — F11.20 CHRONIC NARCOTIC DEPENDENCE: ICD-10-CM

## 2025-09-03 DIAGNOSIS — G89.4 CHRONIC PAIN SYNDROME: Primary | ICD-10-CM

## 2025-09-03 PROCEDURE — 3066F NEPHROPATHY DOC TX: CPT | Mod: CPTII,HCNC,95, | Performed by: FAMILY MEDICINE

## 2025-09-03 PROCEDURE — 98006 SYNCH AUDIO-VIDEO EST MOD 30: CPT | Mod: HCNC,95,, | Performed by: FAMILY MEDICINE

## 2025-09-03 PROCEDURE — G2211 COMPLEX E/M VISIT ADD ON: HCPCS | Mod: HCNC,95,, | Performed by: FAMILY MEDICINE

## 2025-09-03 PROCEDURE — 3061F NEG MICROALBUMINURIA REV: CPT | Mod: CPTII,HCNC,95, | Performed by: FAMILY MEDICINE

## 2025-09-03 PROCEDURE — 4010F ACE/ARB THERAPY RXD/TAKEN: CPT | Mod: CPTII,HCNC,95, | Performed by: FAMILY MEDICINE

## 2025-09-03 PROCEDURE — 1159F MED LIST DOCD IN RCRD: CPT | Mod: CPTII,HCNC,95, | Performed by: FAMILY MEDICINE

## 2025-09-03 RX ORDER — IRBESARTAN 300 MG/1
300 TABLET ORAL NIGHTLY
Qty: 90 TABLET | Refills: 3 | Status: SHIPPED | OUTPATIENT
Start: 2025-09-03

## 2025-09-03 RX ORDER — HYDROCODONE BITARTRATE AND ACETAMINOPHEN 5; 325 MG/1; MG/1
1 TABLET ORAL
Qty: 30 TABLET | Refills: 0 | Status: SHIPPED | OUTPATIENT
Start: 2025-09-03

## 2025-09-03 RX ORDER — BUTALBITAL, ACETAMINOPHEN AND CAFFEINE 50; 325; 40 MG/1; MG/1; MG/1
1 TABLET ORAL EVERY 6 HOURS PRN
Qty: 40 TABLET | Refills: 0 | Status: SHIPPED | OUTPATIENT
Start: 2025-09-03

## 2025-09-05 ENCOUNTER — TELEPHONE (OUTPATIENT)
Dept: INTERNAL MEDICINE | Facility: CLINIC | Age: 55
End: 2025-09-05
Payer: MEDICARE